# Patient Record
Sex: FEMALE | Race: WHITE | NOT HISPANIC OR LATINO | Employment: PART TIME | URBAN - NONMETROPOLITAN AREA
[De-identification: names, ages, dates, MRNs, and addresses within clinical notes are randomized per-mention and may not be internally consistent; named-entity substitution may affect disease eponyms.]

---

## 2017-01-02 ENCOUNTER — HOSPITAL ENCOUNTER (INPATIENT)
Facility: HOSPITAL | Age: 67
LOS: 2 days | Discharge: HOME | DRG: 190 | End: 2017-01-04
Attending: FAMILY MEDICINE | Admitting: INTERNAL MEDICINE
Payer: MEDICARE

## 2017-01-02 DIAGNOSIS — J44.1 COPD EXACERBATION (H): ICD-10-CM

## 2017-01-02 DIAGNOSIS — J20.9 ACUTE BRONCHITIS, UNSPECIFIED ORGANISM: Primary | ICD-10-CM

## 2017-01-02 DIAGNOSIS — I48.19 ATRIAL FIBRILLATION, PERSISTENT (H): ICD-10-CM

## 2017-01-02 DIAGNOSIS — Q23.81 BICUSPID AORTIC VALVE: ICD-10-CM

## 2017-01-02 PROBLEM — J96.20 ACUTE ON CHRONIC RESPIRATORY FAILURE (H): Status: ACTIVE | Noted: 2017-01-02

## 2017-01-02 LAB
ALBUMIN SERPL-MCNC: 3.7 G/DL (ref 3.4–5)
ALP SERPL-CCNC: 32 U/L (ref 40–150)
ALT SERPL W P-5'-P-CCNC: 24 U/L (ref 0–50)
ANION GAP SERPL CALCULATED.3IONS-SCNC: 9 MMOL/L (ref 3–14)
AST SERPL W P-5'-P-CCNC: 31 U/L (ref 0–45)
BASE EXCESS BLDA CALC-SCNC: 4.6 MMOL/L
BASOPHILS # BLD AUTO: 0.1 10E9/L (ref 0–0.2)
BASOPHILS NFR BLD AUTO: 0.5 %
BILIRUB SERPL-MCNC: 0.5 MG/DL (ref 0.2–1.3)
BUN SERPL-MCNC: 6 MG/DL (ref 7–30)
CALCIUM SERPL-MCNC: 9.3 MG/DL (ref 8.5–10.1)
CHLORIDE SERPL-SCNC: 97 MMOL/L (ref 94–109)
CO2 SERPL-SCNC: 31 MMOL/L (ref 20–32)
CREAT SERPL-MCNC: 0.69 MG/DL (ref 0.52–1.04)
DIFFERENTIAL METHOD BLD: ABNORMAL
EOSINOPHIL # BLD AUTO: 0.4 10E9/L (ref 0–0.7)
EOSINOPHIL NFR BLD AUTO: 3.9 %
ERYTHROCYTE [DISTWIDTH] IN BLOOD BY AUTOMATED COUNT: 13.2 % (ref 10–15)
FLUAV+FLUBV AG SPEC QL: NEGATIVE
FLUAV+FLUBV AG SPEC QL: NORMAL
GFR SERPL CREATININE-BSD FRML MDRD: 85 ML/MIN/1.7M2
GLUCOSE SERPL-MCNC: 129 MG/DL (ref 70–99)
HCO3 BLD-SCNC: 30 MMOL/L (ref 21–28)
HCT VFR BLD AUTO: 43.9 % (ref 35–47)
HGB BLD-MCNC: 14.3 G/DL (ref 11.7–15.7)
IMM GRANULOCYTES # BLD: 0 10E9/L (ref 0–0.4)
IMM GRANULOCYTES NFR BLD: 0.2 %
INR PPP: 1.86 (ref 0.8–1.2)
LACTATE SERPL-SCNC: 1 MMOL/L (ref 0.4–2)
LYMPHOCYTES # BLD AUTO: 1.8 10E9/L (ref 0.8–5.3)
LYMPHOCYTES NFR BLD AUTO: 15.6 %
MCH RBC QN AUTO: 31.7 PG (ref 26.5–33)
MCHC RBC AUTO-ENTMCNC: 32.6 G/DL (ref 31.5–36.5)
MCV RBC AUTO: 97 FL (ref 78–100)
MONOCYTES # BLD AUTO: 1.1 10E9/L (ref 0–1.3)
MONOCYTES NFR BLD AUTO: 9.9 %
NEUTROPHILS # BLD AUTO: 7.9 10E9/L (ref 1.6–8.3)
NEUTROPHILS NFR BLD AUTO: 69.9 %
NRBC # BLD AUTO: 0 10*3/UL
NRBC BLD AUTO-RTO: 0 /100
O2/TOTAL GAS SETTING VFR VENT: 30 %
OXYHGB MFR BLD: 97 % (ref 92–100)
PCO2 BLD: 51 MM HG (ref 35–45)
PH BLD: 7.39 PH (ref 7.35–7.45)
PLATELET # BLD AUTO: 227 10E9/L (ref 150–450)
PO2 BLD: 80 MM HG (ref 80–105)
POTASSIUM SERPL-SCNC: 4.2 MMOL/L (ref 3.4–5.3)
PROT SERPL-MCNC: 7.1 G/DL (ref 6.8–8.8)
RBC # BLD AUTO: 4.51 10E12/L (ref 3.8–5.2)
SODIUM SERPL-SCNC: 137 MMOL/L (ref 133–144)
SPECIMEN SOURCE: NORMAL
WBC # BLD AUTO: 11.3 10E9/L (ref 4–11)

## 2017-01-02 PROCEDURE — 25000125 ZZHC RX 250: Performed by: INTERNAL MEDICINE

## 2017-01-02 PROCEDURE — 83605 ASSAY OF LACTIC ACID: CPT | Performed by: FAMILY MEDICINE

## 2017-01-02 PROCEDURE — A9270 NON-COVERED ITEM OR SERVICE: HCPCS | Mod: GY | Performed by: INTERNAL MEDICINE

## 2017-01-02 PROCEDURE — 36600 WITHDRAWAL OF ARTERIAL BLOOD: CPT

## 2017-01-02 PROCEDURE — 25000132 ZZH RX MED GY IP 250 OP 250 PS 637: Mod: GY | Performed by: INTERNAL MEDICINE

## 2017-01-02 PROCEDURE — 40000275 ZZH STATISTIC RCP TIME EA 10 MIN

## 2017-01-02 PROCEDURE — 94640 AIRWAY INHALATION TREATMENT: CPT | Mod: 76

## 2017-01-02 PROCEDURE — 20000003 ZZH R&B ICU

## 2017-01-02 PROCEDURE — 36415 COLL VENOUS BLD VENIPUNCTURE: CPT | Performed by: FAMILY MEDICINE

## 2017-01-02 PROCEDURE — 82805 BLOOD GASES W/O2 SATURATION: CPT | Performed by: INTERNAL MEDICINE

## 2017-01-02 PROCEDURE — 99285 EMERGENCY DEPT VISIT HI MDM: CPT | Mod: 25

## 2017-01-02 PROCEDURE — 85610 PROTHROMBIN TIME: CPT | Performed by: FAMILY MEDICINE

## 2017-01-02 PROCEDURE — 5A09357 ASSISTANCE WITH RESPIRATORY VENTILATION, LESS THAN 24 CONSECUTIVE HOURS, CONTINUOUS POSITIVE AIRWAY PRESSURE: ICD-10-PCS | Performed by: FAMILY MEDICINE

## 2017-01-02 PROCEDURE — 99223 1ST HOSP IP/OBS HIGH 75: CPT | Mod: AI | Performed by: INTERNAL MEDICINE

## 2017-01-02 PROCEDURE — 25000132 ZZH RX MED GY IP 250 OP 250 PS 637: Performed by: FAMILY MEDICINE

## 2017-01-02 PROCEDURE — 93005 ELECTROCARDIOGRAM TRACING: CPT

## 2017-01-02 PROCEDURE — 25000308 HC RX OP HPI UCR WEL MED 250 IP 250: Performed by: FAMILY MEDICINE

## 2017-01-02 PROCEDURE — 96374 THER/PROPH/DIAG INJ IV PUSH: CPT

## 2017-01-02 PROCEDURE — 93010 ELECTROCARDIOGRAM REPORT: CPT | Performed by: INTERNAL MEDICINE

## 2017-01-02 PROCEDURE — 25000128 H RX IP 250 OP 636: Performed by: FAMILY MEDICINE

## 2017-01-02 PROCEDURE — 70450 CT HEAD/BRAIN W/O DYE: CPT | Mod: TC

## 2017-01-02 PROCEDURE — 25000125 ZZHC RX 250: Performed by: FAMILY MEDICINE

## 2017-01-02 PROCEDURE — 96361 HYDRATE IV INFUSION ADD-ON: CPT

## 2017-01-02 PROCEDURE — 87804 INFLUENZA ASSAY W/OPTIC: CPT | Performed by: FAMILY MEDICINE

## 2017-01-02 PROCEDURE — 80053 COMPREHEN METABOLIC PANEL: CPT | Performed by: FAMILY MEDICINE

## 2017-01-02 PROCEDURE — 71010 ZZHC CHEST ONE VIEW: CPT | Mod: TC

## 2017-01-02 PROCEDURE — 99285 EMERGENCY DEPT VISIT HI MDM: CPT | Performed by: FAMILY MEDICINE

## 2017-01-02 PROCEDURE — 85025 COMPLETE CBC W/AUTO DIFF WBC: CPT | Performed by: FAMILY MEDICINE

## 2017-01-02 PROCEDURE — 94640 AIRWAY INHALATION TREATMENT: CPT

## 2017-01-02 PROCEDURE — 94660 CPAP INITIATION&MGMT: CPT

## 2017-01-02 PROCEDURE — 87040 BLOOD CULTURE FOR BACTERIA: CPT | Performed by: FAMILY MEDICINE

## 2017-01-02 RX ORDER — DIPHENHYDRAMINE HCL 25 MG
25-50 CAPSULE ORAL EVERY 6 HOURS PRN
Status: DISCONTINUED | OUTPATIENT
Start: 2017-01-02 | End: 2017-01-04 | Stop reason: HOSPADM

## 2017-01-02 RX ORDER — IPRATROPIUM BROMIDE AND ALBUTEROL SULFATE 2.5; .5 MG/3ML; MG/3ML
3 SOLUTION RESPIRATORY (INHALATION) ONCE
Status: COMPLETED | OUTPATIENT
Start: 2017-01-02 | End: 2017-01-02

## 2017-01-02 RX ORDER — CLONIDINE HYDROCHLORIDE 0.1 MG/1
0.1 TABLET ORAL EVERY MORNING
Status: DISCONTINUED | OUTPATIENT
Start: 2017-01-03 | End: 2017-01-04 | Stop reason: HOSPADM

## 2017-01-02 RX ORDER — HYDRALAZINE HYDROCHLORIDE 25 MG/1
25 TABLET, FILM COATED ORAL EVERY 8 HOURS
Status: DISCONTINUED | OUTPATIENT
Start: 2017-01-02 | End: 2017-01-04 | Stop reason: HOSPADM

## 2017-01-02 RX ORDER — ALBUTEROL SULFATE 5 MG/ML
2.5 SOLUTION RESPIRATORY (INHALATION) ONCE
Status: COMPLETED | OUTPATIENT
Start: 2017-01-02 | End: 2017-01-02

## 2017-01-02 RX ORDER — WARFARIN SODIUM 2 MG/1
2 TABLET ORAL DAILY
Status: DISCONTINUED | OUTPATIENT
Start: 2017-01-02 | End: 2017-01-02

## 2017-01-02 RX ORDER — LORAZEPAM 1 MG/1
1 TABLET ORAL EVERY 4 HOURS PRN
Status: DISCONTINUED | OUTPATIENT
Start: 2017-01-02 | End: 2017-01-04 | Stop reason: HOSPADM

## 2017-01-02 RX ORDER — METHYLPREDNISOLONE SODIUM SUCCINATE 125 MG/2ML
125 INJECTION, POWDER, LYOPHILIZED, FOR SOLUTION INTRAMUSCULAR; INTRAVENOUS ONCE
Status: COMPLETED | OUTPATIENT
Start: 2017-01-02 | End: 2017-01-02

## 2017-01-02 RX ORDER — NALOXONE HYDROCHLORIDE 0.4 MG/ML
.1-.4 INJECTION, SOLUTION INTRAMUSCULAR; INTRAVENOUS; SUBCUTANEOUS
Status: DISCONTINUED | OUTPATIENT
Start: 2017-01-02 | End: 2017-01-04 | Stop reason: HOSPADM

## 2017-01-02 RX ORDER — LEVOTHYROXINE SODIUM 100 UG/1
100 TABLET ORAL
Status: DISCONTINUED | OUTPATIENT
Start: 2017-01-03 | End: 2017-01-04 | Stop reason: HOSPADM

## 2017-01-02 RX ORDER — ACETAMINOPHEN 325 MG/1
325-650 TABLET ORAL EVERY 6 HOURS PRN
Status: DISCONTINUED | OUTPATIENT
Start: 2017-01-02 | End: 2017-01-04 | Stop reason: HOSPADM

## 2017-01-02 RX ORDER — METHYLPREDNISOLONE SODIUM SUCCINATE 125 MG/2ML
125 INJECTION, POWDER, LYOPHILIZED, FOR SOLUTION INTRAMUSCULAR; INTRAVENOUS EVERY 8 HOURS
Status: DISCONTINUED | OUTPATIENT
Start: 2017-01-02 | End: 2017-01-03

## 2017-01-02 RX ORDER — CLONIDINE HYDROCHLORIDE 0.1 MG/1
0.3 TABLET ORAL EVERY EVENING
Status: DISCONTINUED | OUTPATIENT
Start: 2017-01-02 | End: 2017-01-04 | Stop reason: HOSPADM

## 2017-01-02 RX ORDER — LIDOCAINE 40 MG/G
CREAM TOPICAL
Status: DISCONTINUED | OUTPATIENT
Start: 2017-01-02 | End: 2017-01-04 | Stop reason: HOSPADM

## 2017-01-02 RX ORDER — IPRATROPIUM BROMIDE AND ALBUTEROL SULFATE 2.5; .5 MG/3ML; MG/3ML
3 SOLUTION RESPIRATORY (INHALATION) EVERY 4 HOURS PRN
Status: DISCONTINUED | OUTPATIENT
Start: 2017-01-02 | End: 2017-01-04 | Stop reason: HOSPADM

## 2017-01-02 RX ORDER — CEFTRIAXONE SODIUM 1 G/50ML
1 INJECTION, SOLUTION INTRAVENOUS EVERY 24 HOURS
Status: DISCONTINUED | OUTPATIENT
Start: 2017-01-02 | End: 2017-01-04 | Stop reason: HOSPADM

## 2017-01-02 RX ORDER — FLECAINIDE ACETATE 100 MG/1
100 TABLET ORAL 2 TIMES DAILY
Status: DISCONTINUED | OUTPATIENT
Start: 2017-01-02 | End: 2017-01-04 | Stop reason: HOSPADM

## 2017-01-02 RX ORDER — ACETAMINOPHEN 325 MG/1
650 TABLET ORAL ONCE
Status: COMPLETED | OUTPATIENT
Start: 2017-01-02 | End: 2017-01-02

## 2017-01-02 RX ORDER — WARFARIN SODIUM 2.5 MG/1
2.5 TABLET ORAL
Status: COMPLETED | OUTPATIENT
Start: 2017-01-02 | End: 2017-01-02

## 2017-01-02 RX ADMIN — HYDRALAZINE HYDROCHLORIDE 25 MG: 25 TABLET ORAL at 18:32

## 2017-01-02 RX ADMIN — CLONIDINE HYDROCHLORIDE 0.3 MG: 0.1 TABLET ORAL at 21:06

## 2017-01-02 RX ADMIN — METHYLPREDNISOLONE SODIUM SUCCINATE 125 MG: 125 INJECTION, POWDER, FOR SOLUTION INTRAMUSCULAR; INTRAVENOUS at 21:06

## 2017-01-02 RX ADMIN — METHYLPREDNISOLONE SODIUM SUCCINATE 125 MG: 125 INJECTION, POWDER, FOR SOLUTION INTRAMUSCULAR; INTRAVENOUS at 13:36

## 2017-01-02 RX ADMIN — IPRATROPIUM BROMIDE AND ALBUTEROL SULFATE 3 ML: .5; 3 SOLUTION RESPIRATORY (INHALATION) at 19:59

## 2017-01-02 RX ADMIN — WARFARIN SODIUM 2.5 MG: 2.5 TABLET ORAL at 18:32

## 2017-01-02 RX ADMIN — IPRATROPIUM BROMIDE AND ALBUTEROL SULFATE 3 ML: .5; 3 SOLUTION RESPIRATORY (INHALATION) at 13:34

## 2017-01-02 RX ADMIN — SODIUM CHLORIDE 1000 ML: 9 INJECTION, SOLUTION INTRAVENOUS at 13:36

## 2017-01-02 RX ADMIN — CEFTRIAXONE SODIUM 1 G: 1 INJECTION, SOLUTION INTRAVENOUS at 17:29

## 2017-01-02 RX ADMIN — ALBUTEROL SULFATE 2.5 MG: 2.5 SOLUTION RESPIRATORY (INHALATION) at 14:27

## 2017-01-02 RX ADMIN — ACETAMINOPHEN 650 MG: 325 TABLET, FILM COATED ORAL at 13:58

## 2017-01-02 RX ADMIN — FLECAINIDE ACETATE 100 MG: 100 TABLET ORAL at 21:06

## 2017-01-02 NOTE — ED NOTES
Patient transported by this writer to ICU without incident.  Patient was maintained with sats of % on 15LPM NRB.

## 2017-01-02 NOTE — IP AVS SNAPSHOT
MRN:[id number]                      After Visit Summary   1/2/2017    Mary Alice Cameron    MRN: [id number]           Thank you!     Thank you for choosing [facility] for your care. Our goal is always to provide you with excellent care. Hearing back from our patients is one way we can continue to improve our services. Please take a few minutes to complete the written survey that you may receive in the mail after you visit with us. Thank you!        Patient Information     Date Of Birth          1950        About your hospital stay     You were admitted on:  January 2, 2017 You last received care in the:  [facility]    You were discharged on:  January 4, 2017       Who to Call     For medical emergencies, please call 911.  For non-urgent questions about your medical care, please call your primary care provider or clinic, [phone]          Attending Provider     Provider    Haley Grajeda MD Dinter, MD Megha Colindres Scott, MD       Primary Care Provider Office Phone # Fax #    Braydon Yen [phone]       [address] [address]        After Care Instructions     Activity       Your activity upon discharge: activity as tolerated            Diet       Follow this diet upon discharge: Orders Placed This Encounter  Regular Diet Adult            Oxygen Adult       [place] Oxygen Order 2-3 liter(s) by nasal cannula continuously with use of portable tank. Expected treatment length is indefinite (99 months).. Test on conserving device as applicable.    Patients who qualify for home O2 coverage under the CMS guidelines require ABG tests or O2 sat readings obtained closest to, but no earlier than 2 days prior to the discharge, as evidence of the need for home oxygen therapy. Testing must be performed while patient is in the chronic stable state. See notes for O2 sats.    I certify that this patient, Mary Alice Cameron has been  under my care and that I, or a nurse practitioner or physician's assistant working with me, had a face-to-face encounter that meets the face-to-face encounter requirements with this patient on 1/4/2017. The patient, Mary Alice Cameron was evaluated or treated in whole, or in part, for the following medical condition, which necessitates the use of the ordered oxygen. Treatment Diagnosis: copd      Attending Provider: Lito Cleveland  Physician signature: See electronic signature associated with these discharge orders  Date of Order: January 4, 2017                  Follow-up Appointments     Follow-up and recommended labs and tests        Follow up with primary care provider, Braydon Yen, within 7-10 days for hospital follow- up.  No follow up labs or test are needed.                  Your next 10 appointments already scheduled     Jan 06, 2017 10:00 AM   LAB with NA LAB   [facility] ([facility])    [address]  [facility] [address]   [phone]            Jan 11, 2017 10:15 AM   Anticoagulation Visit with NA ANTI COAGULATION   [facility] ([facility])    [address]  [facility] [address]   [phone]            Jan 12, 2017  9:30 AM   (Arrive by 9:15 AM)   SHORT with Braydon Yen MD   [facility] ([facility])    [address]  [facility] [address]   [phone]            May 23, 2017  8:30 AM   (Arrive by 8:15 AM)   Return Visit with Eliezer Myers MD   [facility] ([facility])    [address]  [facility] [address]   [phone]              Future tests that were ordered for you     INR                 Pending Results     Date and Time Order Name Status Description    1/2/2017 1331 Blood culture Preliminary     1/2/2017 1331 Blood culture Preliminary     1/2/2017 1325 XR Chest Port 1 View Preliminary     1/2/2017 0000 CT IMAGING - HIM SCAN Preliminary             Statement of Approval     Ordered  "         01/04/17 1534  I have reviewed and agree with all the recommendations and orders detailed in this document.   EFFECTIVE NOW     Approved and electronically signed by:  Lito Cleveland MD             Admission Information        Provider Department Dept Phone    1/2/2017 Lito Cleveland MD [facility] [phone]      Your Vitals Were     Blood Pressure Pulse Temperature    169/80 mmHg 93 98.7  F (37.1  C) (Tympanic)    Respirations Height Weight    16 1.651 m (5' 5\") 75.8 kg (167 lb 1.7 oz)    BMI (Body Mass Index) Pulse Oximetry       27.81 kg/m2 93%       MyChart Information     [employer] gives you secure access to your electronic health record. If you see a primary care provider, you can also send messages to your care team and make appointments. If you have questions, please call your primary care clinic.  If you do not have a primary care provider, please call [phone] and they will assist you.        Care EveryWhere ID     This is your Care EveryWhere ID. This could be used by other organizations to access your [facility] medical records  [id number]           Review of your medicines      START taking        Dose / Directions    cefUROXime 250 MG tablet   Commonly known as:  CEFTIN   Used for:  COPD exacerbation (H), Acute bronchitis, unspecified organism        Dose:  250 mg   Take 1 tablet (250 mg) by mouth 2 times daily for 5 days   Quantity:  10 tablet   Refills:  0       predniSONE 20 MG tablet   Commonly known as:  DELTASONE   Used for:  COPD exacerbation (H)        Dose:  20 mg   Take 1 tablet (20 mg) by mouth 2 times daily (with meals) for 3 days   Quantity:  6 tablet   Refills:  0         CONTINUE these medicines which have NOT CHANGED        Dose / Directions    acetaminophen 500 MG tablet   Commonly known as:  TYLENOL        Dose:  500-1000 mg   Take 500-1,000 mg by mouth every 6 hours as needed for mild pain   Refills:  0       * albuterol 108 (90 BASE) MCG/ACT Inhaler "   Commonly known as:  VENTOLIN HFA   Used for:  Other emphysema (H)        Dose:  2 puff   Inhale 2 puffs into the lungs every 4 hours as needed for shortness of breath / dyspnea or wheezing   Quantity:  3 Inhaler   Refills:  0       * albuterol (2.5 MG/3ML) 0.083% neb solution   Used for:  COPD exacerbation (H)        Dose:  1 vial   Take 1 vial (2.5 mg) by nebulization every 6 hours as needed for shortness of breath / dyspnea or wheezing   Quantity:  25 vial   Refills:  1       * cloNIDine 0.1 MG tablet   Commonly known as:  CATAPRES   Used for:  Benign essential hypertension        Dose:  0.1 mg   Take 1 tablet (0.1 mg) by mouth every morning   Quantity:  90 tablet   Refills:  0       * cloNIDine 0.1 MG tablet   Commonly known as:  CATAPRES   Used for:  Benign essential hypertension        Dose:  0.3 mg   Take 3 tablets (0.3 mg) by mouth every evening   Quantity:  90 tablet   Refills:  0       diphenhydrAMINE 25 MG tablet   Commonly known as:  BENADRYL        Dose:  25-50 mg   Take 1-2 tablets (25-50 mg) by mouth every 6 hours as needed for itching or allergies   Quantity:  60 tablet   Refills:  1       flecainide 100 MG tablet   Commonly known as:  TAMBOCOR   Used for:  Atrial fibrillation, persistent (H)        Dose:  100 mg   Take 1 tablet (100 mg) by mouth 2 times daily   Quantity:  180 tablet   Refills:  3       furosemide 40 MG tablet   Commonly known as:  LASIX   Used for:  Essential hypertension, benign        TAKE 1 TABLET BY MOUTH TWICE DAILY.   Quantity:  180 tablet   Refills:  2       hydrALAZINE 25 MG tablet   Commonly known as:  APRESOLINE   Used for:  Essential hypertension, benign        TAKE 3 TABLETS BY MOUTH THREE TIMES DAILY   Quantity:  270 tablet   Refills:  5       ipratropium - albuterol 0.5 mg/2.5 mg/3 mL 0.5-2.5 (3) MG/3ML neb solution   Commonly known as:  DUONEB   Used for:  COPD exacerbation (H)        USE 1 VIAL PER NEBULIZER FOUR TIMES DAILY   Quantity:  6 Box   Refills:  3        levothyroxine 100 MCG tablet   Commonly known as:  SYNTHROID/LEVOTHROID   Used for:  Hypothyroidism, postablative        Dose:  100 mcg   Take 1 tablet (100 mcg) by mouth daily   Quantity:  90 tablet   Refills:  3       LORazepam 1 MG tablet   Commonly known as:  ATIVAN        Dose:  0.5-1 tablet   Take 0.5-1 tablets by mouth every 6 hours as needed   Refills:  0       other medical supplies   Used for:  Edema, Atrial fibrillation, persistent (H)        Apply one pair to help with edema   Quantity:  1 each   Refills:  0       potassium chloride SA 20 MEQ CR tablet   Commonly known as:  K-DUR/KLOR-CON M   Used for:  Hypokalemia        Dose:  20 mEq   Take 1 tablet (20 mEq) by mouth 2 times daily   Quantity:  180 tablet   Refills:  3       simvastatin 10 MG tablet   Commonly known as:  ZOCOR   Used for:  Hyperlipidemia LDL goal <130        TAKE 1 TABLET(10 MG) BY MOUTH AT BEDTIME   Quantity:  90 tablet   Refills:  3       triamcinolone 0.1 % ointment   Commonly known as:  KENALOG   Used for:  Contact dermatitis, unspecified contact dermatitis type, unspecified trigger        Apply bid and hs left hand and legs - for dermatitis   Quantity:  454 g   Refills:  3       warfarin 2 MG tablet   Commonly known as:  COUMADIN   Used for:  Persistent atrial fibrillation (H)        Dose:  2 mg   Take 1 tablet (2 mg) by mouth daily   Quantity:  90 tablet   Refills:  3       * Notice:  This list has 4 medication(s) that are the same as other medications prescribed for you. Read the directions carefully, and ask your doctor or other care provider to review them with you.         Where to get your medicines      These medications were sent to [id number] - [place] - [address] AT [facility] [address] [address]     Phone:  [phone]    - cefUROXime 250 MG tablet  - predniSONE 20 MG tablet             Protect others around you: Learn how to safely use, store and throw away your  medicines at www.disposemymeds.org.             Medication List: This is a list of all your medications and when to take them. Check marks below indicate your daily home schedule. Keep this list as a reference.      Medications           Morning Afternoon Evening Bedtime As Needed    acetaminophen 500 MG tablet   Commonly known as:  TYLENOL   Take 500-1,000 mg by mouth every 6 hours as needed for mild pain   Last time this was given:  650 mg on 1/2/2017  1:58 PM                                * albuterol 108 (90 BASE) MCG/ACT Inhaler   Commonly known as:  VENTOLIN HFA   Inhale 2 puffs into the lungs every 4 hours as needed for shortness of breath / dyspnea or wheezing   Last time this was given:  2 puffs on 1/4/2017 12:35 PM                                * albuterol (2.5 MG/3ML) 0.083% neb solution   Take 1 vial (2.5 mg) by nebulization every 6 hours as needed for shortness of breath / dyspnea or wheezing                                cefUROXime 250 MG tablet   Commonly known as:  CEFTIN   Take 1 tablet (250 mg) by mouth 2 times daily for 5 days                                * cloNIDine 0.1 MG tablet   Commonly known as:  CATAPRES   Take 1 tablet (0.1 mg) by mouth every morning   Last time this was given:  0.1 mg on 1/4/2017  8:27 AM                                * cloNIDine 0.1 MG tablet   Commonly known as:  CATAPRES   Take 3 tablets (0.3 mg) by mouth every evening   Last time this was given:  0.1 mg on 1/4/2017  8:27 AM                                diphenhydrAMINE 25 MG tablet   Commonly known as:  BENADRYL   Take 1-2 tablets (25-50 mg) by mouth every 6 hours as needed for itching or allergies                                flecainide 100 MG tablet   Commonly known as:  TAMBOCOR   Take 1 tablet (100 mg) by mouth 2 times daily   Last time this was given:  100 mg on 1/4/2017  8:18 AM                                furosemide 40 MG tablet   Commonly known as:  LASIX   TAKE 1 TABLET BY MOUTH TWICE DAILY.                                 hydrALAZINE 25 MG tablet   Commonly known as:  APRESOLINE   TAKE 3 TABLETS BY MOUTH THREE TIMES DAILY   Last time this was given:  25 mg on 1/4/2017 10:41 AM                                ipratropium - albuterol 0.5 mg/2.5 mg/3 mL 0.5-2.5 (3) MG/3ML neb solution   Commonly known as:  DUONEB   USE 1 VIAL PER NEBULIZER FOUR TIMES DAILY   Last time this was given:  3 mLs on 1/4/2017  3:11 PM                                levothyroxine 100 MCG tablet   Commonly known as:  SYNTHROID/LEVOTHROID   Take 1 tablet (100 mcg) by mouth daily   Last time this was given:  100 mcg on 1/4/2017  6:46 AM                                LORazepam 1 MG tablet   Commonly known as:  ATIVAN   Take 0.5-1 tablets by mouth every 6 hours as needed                                other medical supplies   Apply one pair to help with edema                                potassium chloride SA 20 MEQ CR tablet   Commonly known as:  K-DUR/KLOR-CON M   Take 1 tablet (20 mEq) by mouth 2 times daily                                predniSONE 20 MG tablet   Commonly known as:  DELTASONE   Take 1 tablet (20 mg) by mouth 2 times daily (with meals) for 3 days   Last time this was given:  20 mg on 1/4/2017  8:18 AM                                simvastatin 10 MG tablet   Commonly known as:  ZOCOR   TAKE 1 TABLET(10 MG) BY MOUTH AT BEDTIME                                triamcinolone 0.1 % ointment   Commonly known as:  KENALOG   Apply bid and hs left hand and legs - for dermatitis                                warfarin 2 MG tablet   Commonly known as:  COUMADIN   Take 1 tablet (2 mg) by mouth daily   Last time this was given:  1 mg on 1/3/2017  7:30 PM                                * Notice:  This list has 4 medication(s) that are the same as other medications prescribed for you. Read the directions carefully, and ask your doctor or other care provider to review them with you.              More Information        Taking Coumadin -  CORE MEASURES  Coumadin (warfarin) helps keep your blood from clotting. It is used to reduce the risk for stroke, heart attack, or a blood clot passing to your lung. Coumadin also increases your risk of bleeding. Because of this, it must be taken exactly as directed by your doctor. You also need to protect yourself from injury.    Follow These Tips    Take this medicine at the same time each day. Take it with a full glass of water, with or without food. If you miss a dose, contact your doctor immediately to find out how much to take. Never take a double dose.    Warfarin is an effective drug, but it can be dangerous if not taken properly. It makes your blood less likely to form clots. If you take too much, it can cause too much internal or external bleeding.    Be sure to tell all of your doctors that you take Coumadin. If you will be taking Coumadin for quite a while, carry an ID card or get a Medic-Alert bracelet. This will alert medical staff in case you aren t able to do so yourself. Also carry with you the name and number of the person to contact in case of an emergency.    You will need to have regular blood tests to measure the effects of the warfarin. Keep your scheduled test appointment and be sure to talk with your doctor afterward to find out your results. Your doctor may need to change your dose. Follow your doctor s advice exactly about how to take this medicine. Do not stop the medicine without talking with your doctor.  Monitoring Your PT/INR Blood Levels After Discharge  Two tests are used to find out how your blood is clotting. One is protime (PT) and the other is the international normalized ratio (INR).    Go for your blood (PT/INR) tests as often as directed. Note that diet and medication can affect your PT/INR level.    Your INR was between _____ and _____ .    Ask your doctor what your goal INR is. My goal INR is between _____ and _____.    My next PT/INR blood draw is due on _________________  (date) at ________________ (time) by ____________________ (name of doctor or clinic).    The name of the doctor who is monitoring my anticoagulation therapy is ______________________ and the phone number is ___________________.    Follow up with your doctor or as advised by his or her staff. It usually takes a few hours for your doctor to get the results of your clotting tests. Please call to get your lab results and find out if your doctor needs to make further changes to your Coumadin dose.    If your labs (PT/INR) are drawn at a location other than your doctor's office, please remember to tell your doctor as soon as you get your lab results.      What to Do at Home  1. Adjust your Coumadin dose as directed by your doctor, ____________________ (name of doctor).  2. Have another clotting test done every _______ days at _____________________ (name of clinic) by ____________________ (name of doctor).  3. Do not go barefoot. Always wear shoes.  4. Do not trim corns or calluses yourself.  5. Always talk with your doctor before taking any herbs, vitamins, or prescription or over-the-counter (OTC) medications, especially aspirin and nonsteroidal anti-inflammatory drugs.  6. Always talk with your doctor before stopping any medication or changing the dose of any of your medications.  7. Use an electric razor instead of a manual one.  8. Use a soft-bristled toothbrush and waxed floss.  9. Avoid major changes in your diet.      When to Call Your Health Care Provider  Coumadin increases your risk of bleeding. Call your health care provider right away before you take your next dose of Coumadin if you have any of these problems:    Bleeding that doesn t stop in 10 minutes    A heavier-than-normal period or bleeding between periods    Coughing or throwing up blood or something that looks like coffee grounds    Nausea, bloating, diarrhea, or bleeding hemorrhoids    Bleeding hemorrhoids    Dark red or brown urine    Red or black  tarry stools    Red or black-and-blue marks on the skin that get larger    A fever or an illness that gets worse    Dizziness, headache, weakness, or fatigue    Chest pain or trouble breathing    A serious fall or a blow to the head    Swelling or pain after an injury or at an injection site    Bleeding gums after brushing your teeth  Keep Your Diet Steady  Keep your diet pretty much the same each day. That s because many foods contain vitamin K. Vitamin K helps your blood clot. So eating foods that contain vitamin K can affect the way Coumadin works. You don t need to avoid foods that have vitamin K. But you do need to keep the amount of them you eat steady (about the same day to day). If you change your diet for any reason, such as due to illness or to lose weight, be sure to tell your doctor.    Examples of foods high in vitamin K are asparagus, avocado, broccoli, cabbage, kale, spinach, and some other leafy green vegetables. Oils, such as soybean, canola, and olive oils, are also high in vitamin K.    Other food products can affect the way Coumadin works in your body:    Food products that may affect blood clotting include cranberries and cranberry juice, fish oil supplements, garlic, richard, licorice, and turmeric.    Herbs used in herbal teas or supplements can also affect blood clotting. Keep the amount of herbal teas and supplements you use steady.    Alcohol can increase the effect of Coumadin in your body.  Talk with your health care provider if you have concerns about these or other food products and their effects on Coumadin.  What to Watch For  If you have any of these signs or reactions, call your doctor right away or go to the hospital.  Signs of too much bleeding:    More bruising than normal    Prolonged bleeding from cuts    Bleeding from the nose or gums    Blood in your urine, vomit, or stools (red or black color)    Coughing up blood    Unusually heavy menstrual bleeding    Sudden change to a  dark or purplish color in your toes or any other area of your body  Allergic Reactions:    Rash    Itching    Swelling    Trouble swallowing or breathing  ---------- IMPORTANT ----------  Medical Conditions  Before starting this medicine, be sure your doctor knows if you have any of these conditions:    Stomach ulcer now or in the past    Vomited blood or had bloody stools (black or red color)    Aneurysm, pericarditis, or pericardial effusion    Blood disorder    Recent surgery, stroke, mini-stroke, or spinal puncture    Kidney or liver disease, uncontrolled high blood pressure, diabetes, vasculitis, congestive heart failure, lupus or other collagen-vascular disease, or high cholesterol    Pregnancy or breastfeeding    Younger than 18 years old    Recent or planned dental procedure  Drug Interactions  Many medicines interfere with the effect of Coumadin. Before starting this medicine, be sure your doctor knows about any prescription, OTC, or herbal drugs you are taking. This is especially true if you are taking:    Antibiotics    Heart medicines    Cimetidine (Tagamet)    Aspirin or other anti-inflammatory drugs such as ibuprofen (Advil, Motrin, or Nuprin), naproxen (Aleve, Naprosyn, Anaprox), ketoprofen (Orudis, Oruvail), or other arthritis medicines    Drugs for depression, cancer, HIV (protease inhibitors), diabetes, seizures, gout, high cholesterol, or thyroid replacement    Vitamins containing Vitamin K or herbal products such as ginkgo, Q10, garlic, or [place]'s wort  [NOTE: This information topic may not include all directions, precautions, medical conditions, drug/food interactions, and warnings for this drug. Check with your doctor, nurse, or pharmacist for any questions that you may have.]          2092-3011 [employer]. [address] [place] [address]. All rights reserved. This information is not intended as a substitute for professional medical care. Always follow your  healthcare professional's instructions.                Using Blood Thinners (Anticoagulants)  Blood thinners or anticoagulants are medicines that help prevent blood clots from forming. They include warfarin, heparin, dabigatran, rivaroxaban, apixaban,and edoxaban. Your health care provider will help you decide which medicine is best for you.    Taking an anticoagulant safely  When you are taking a blood thinner, you will need to take certain steps to stay safe. Too much blood thinner puts you at risk for bleeding. Too little puts you at risk for stroke. Follow these guidelines. Also follow any others that your health care provider gives you.    You may be told you need regular lab testing while taking these medicines. Warfarin requires routine testing to blood-thinning level testing while the other medicines do not.    Tell your doctor about all medicines you take. This includes over the counter medicines, supplements, or herbal remedies. Do not take any medicines (including ones you buy over the counter) that your doctor doesn t know about. Some medicines can interact with blood thinners and cause serious problems.    Tell any health care provider that you see for care (such as doctors, dentists, chiropractors, home health nurses) that you take a blood thinner.    Carry a medical ID card or wear a medical-alert bracelet that says you take an anticoagulant.    Before taking aspirin, check with your doctor. Aspirin can significantly increase your risk of bleeding.    This medicine makes bleeding harder to stop. To protect yourself:    Avoid activities that may cause injury. If you fall or are injured, contact your health care provider right away. Blood thinners prevent clotting, so you could be bleeding inside without realizing it.    Use a soft-bristle toothbrush and waxed dental floss. Shave with an electric razor rather than a blade.    Don t go barefoot. Don t trim corns or calluses yourself.  Warfarin: Other  important information  Several precautions are especially important when you are taking warfarin. Always keep these points in mind:    Be sure to follow your health care provider's instructions for taking warfarin.    Take this medicine at the same time each day. Take it with a full glass of water, with or without food. If you miss a dose, contact your doctor to find out how much to take. Avoid takinga double dose.    Warfarin is an effective drug, but it can be dangerous if not taken properly. It makes your blood less likely to form clots. If you take too much, it can cause serious internal or external bleeding.    You will need to have regular monitoring while you are taking warfarin. This includes blood tests to check your international normalized ratio (INR) and prothrombin time (PT). These tests show how quickly your blood clots. You will also have a complete blood count (CBC) periodically. This looks at your blood and platelet levels. Both of these need to be followed while you're on warfarin. Talk with your health care provider about whether you need to visit the clinic every week, or if services are available for monitoring in your home.    Certain medicines can affect your INR and PT levels. Tell your health care provider if there are any changes in your medicines. This includes any over-the-counter medicines, supplements like vitamin K, or herbal remedies.    Your diet can also affect your INR and PT levels. Because of this, it's important to eat a consistent diet. It is especially important to eat a consistent amount of foods that are high in vitamin K. Be sure to talk with your health care provider before making any big changes in your diet.    Remember that warfarin increases your risk of bleeding. Be careful not to injure yourself. If you have a significant injury, contact your health care provider right away. It's important to alert your doctor if you've fallen or hurt yourself, even if you don't  break your skin. You could be bleeding inside your body without realizing it.     Warfarin: Watch your INR/PT blood levels  Two tests are used to find out how your blood is clotting. One is protime (PT), the other is the international normalized ratio (INR).    Go for your blood tests (INR/PT) as often as directed. Your diet and the other medicines you take can affect your INR/PT levels.    Your INR was between ___ and ___.    Ask your doctor what your goal INR is. My goal INR is between ___ and ___.    My next INR/PT blood draw is due on _____________ (date) at ___________ (time) by ___________ (name of doctor or clinic).    The name of the doctor who is monitoring my anticoagulation therapy is _____________________ and the phone number is _________________.    Follow up with your doctor or as advised by his or her staff. It usually takes a few hours for your doctor to get the results of your clotting tests. Call to get your lab results to find out if your doctor needs to make further changes to your warfarin dose.    If your blood is drawn for these tests at a location other than your doctor's office, tell your doctor as soon as you get your lab results.     Warfarin: Watch what you eat  Vitamin K helps your blood clot. So you have to watch how much you eat of foods that contain vitamin K. These foods can affect the way warfarin works. They do not affect the other non-warfarin blood thinners.Here are some specific tips:    Try to keep your diet about the same each day. If you change your diet for any reason, such as for illness or to lose weight, be sure to tell your doctor.    Each day, eat the same amount of foods that are high in vitamin K. These include asparagus, avocado, broccoli, cabbage, kale, spinach, and some other leafy green vegetables. Oils, such as soybean, canola, and olive oils, are also high in vitamin K.    Limit fats to 2 to 4 tablespoons a day.    Ask your health care provider if you should  avoid alcohol while you are taking a blood thinner.    Avoid teas that contain sweet clover, sweet heath, or tonka beans. These can affect how your medicine works.    Talk with your health care provider and pharmacist about specific foods or special diets that can affect anticoagulant levels. These include grapefruit juice, cranberries and cranberry juice, fish oil supplements, garlic, richard, licorice, turmeric, and herbal teas and supplements.  Talk with your health care provider if you have concerns about these or other food products and their effects on warfarin.     When to call your doctor if you re taking an anticoagulant  Call your doctor right away if you have any of these:    Bleeding that doesn t stop in 10 minutes    A heavier-than-normal menstrual period or bleeding between periods    Coughing or throwing up blood    Bloody diarrhea or bleeding hemorrhoids     Dark-colored urine or black stools    Red or black-and-blue marks on the skin that get larger    Dizziness or fatigue    Chest pain or trouble breathing  Allergic reactions:    Rash    Itching    Swelling    Trouble swallowing or breathing   Medical conditions and anticoagulants  Before starting a blood thinner, be sure your doctor knows if you have any of these conditions:    Stomach ulcer now or in the past    Vomited blood or had bloody stools (black or red color)    Aneurysm, pericarditis, or pericardial effusion    Blood disorder    Recent surgery, stroke, mini-stroke, or spinal puncture    Kidney or liver disease, uncontrolled high blood pressure, diabetes, vasculitis, heart failure, lupus, or other collagen-vascular disease, or high cholesterol    Pregnancy or breastfeeding    Younger than 18 years old    Recent or planned dental procedure  Drug interactions and anticoagulants  Many medicines interfere with the effect of blood thinners. Before starting these medicines, be sure your doctor knows about any prescription, over-the-counter,  or herbal supplements you take. In particular, tell your provider about:    Antibiotics    Heart medicines    Cimetidine    Aspirin or other anti-inflammatory drugs such as ibuprofen, naproxen, ketoprofen, or other arthritis medicines    Drugs for depression, cancer HIV (protease inhibitors), diabetes, seizures, gout, high cholesterol, or thyroid replacement    Vitamins containing vitamin K or herbal products such as ginkgo, Co-Q10, garlic, or Karli's wort     Note: This information topic may not include all directions, precautions, medical conditions, drug/food interactions, and warnings for these medicines. Check with your doctor, nurse, or pharmacist for any questions you have.      8588-0238 [employer]. [address] [place] [address], [place] [address]. All rights reserved. This information is not intended as a substitute for professional medical care. Always follow your healthcare professional's instructions.                Discharge Instructions: COPD  You have been diagnosed with chronic obstructive pulmonary disease (COPD). This is a name given to a group of diseases that limit the flow of air in and out of your lungs. This makes it harder to breathe. With COPD, you are also more likely to get lung infections. COPD includes chronic bronchitis and emphysema. COPD is most often caused by heavy, long-term cigarette smoking.  Home care  Quit smoking    If you smoke, quit. It is the best thing you can do for your COPD and your overall health.    Join a stop-smoking program. There are even telephone, text message, and Internet programs to help you quit.    Ask your health care provider about medications or other methods to help you quit.    Ask family members to quit smoking as well.    Don't allow people to smoke in your home, in your car, or when they are around you.  Protect yourself from infection    Wash your hands often. Do your best to keep your hands away from your face. Most germs are spread  from your hands to your mouth.    Get a flu shot every year. Also ask your provider about pneumonia vaccines.    Avoid crowds. It's especially important to do this in the winter when more people have colds and flu.    To stay healthy, get enough sleep, exercise regularly, and eat a balanced diet. You should:    Get about 8 hours of sleep every night.    Try to exercise for at least 30 minutes on most days.    Have healthy foods including fruits and vegetables, 100% whole grains, lean meats and fish, and low-fat dairy products. Try to stay away from foods high in fats and sugar.  Take your medications  Take your medications exactly as directed. Don't skip doses.  Manage your stress  Stress can make COPD worse. Use this stress management technique:    Find a quiet place and sit or lie in a comfortable position.    Close your eyes and perform breathing exercises for several minutes. Ask your provider about the best way to breathe.  Pulmonary rehabilitation    Pulmonary rehab can help you feel better. These programs include exercise, breathing techniques, information about COPD, counseling, and help for smokers.    Ask your provider or your local hospital about programs in your area.  When to call your health care provider  Call your provider immediately if you have any of the following:    Shortness of breath, wheezing, or coughing    Increased mucus    Yellow, green, bloody, or smelly mucus    Fever or chills    Tightness in your chest that does not go away with rest or medication    An irregular heartbeat or a feeling that your heart is beating very fast    Swollen ankles     3907-8567 [employer]. [address] [place] [address], [place] [address]. All rights reserved. This information is not intended as a substitute for professional medical care. Always follow your healthcare professional's instructions.                COPD Flare    You have had a flare-up of your COPD.  COPD, or chronic obstructive pulmonary  disease, is a common lung disease. It causes your airways to become irritated and narrower. This makes it harder for you to breathe. Emphysema and chronic bronchitis are both types of COPD. This is a chronic condition, which means you always have it. Sometimes it gets worse. When this happens, it is called a flare-up.  Symptoms of COPD  People with COPD may have symptoms most of the time. In a flare-up, your symptoms get worse. These symptoms may mean you are having a flare-up:    Shortness of breath, shallow or rapid breathing, or wheezing that gets worse    Lung infection    Cough that gets worse    More mucus, thicker mucus or mucus of a different color    Tiredness, decreased energy, or trouble doing your usual activities    Fever    Chest tightness    Your symptoms don t get better even when you use your usual medicines, inhalers, and nebulizer    Trouble talking    You feel confused  Causes of flare-ups  Unfortunately, a flare-up can happen even though you did everything right, and you followed your doctor s instructions. Some causes of flare-ups are:    Smoking or secondhand smoke    Colds, the flu, or respiratory infections    Air pollution    Sudden change in the weather    Dust, irritating chemicals, or strong fumes    Not taking your medicines as prescribed  Home care  Here are some things you can do at home to treat a flare-up:    Try not to panic. This makes it harder to breathe, and keeps you from doing the right things.    Don t smoke or be around others who are smoking.    Try to drink more fluids than usual during a flare-up, unless your doctor has told you not to because of heart and kidney problems. More fluids can help loosen the mucus.    Use your inhalers and nebulizer, if you have one, as you have been told to.    If you were given antibiotics, take them until they are used up or your doctor tells you to stop. It s important to finish the antibiotics, even though you feel better. This will  make sure the infection has cleared.    If you were given prednisone or another steroid, finish it even if you feel better.  Preventing a flare-up  Even though flare-ups happen, the best way to treat one is to prevent it before it starts. Here are some pointers:    Don t smoke or be around others who are smoking.    Take your medicines as you have been told.    Talk with your doctor about getting a flu shot every year. Also find out if you need a pneumonia shot.    If there is a weather advisory warning to stay indoors, try to stay inside when possible.    Try to eat healthy and get plenty of sleep.    Try to avoid things that usually set you off, like dust, chemical fumes, hairsprays, or strong perfumes.  Follow-up care  Follow up with your healthcare provider.  If a culture was done, you will be told if your treatment needs to be changed. You can call as directed for the results.  If X-rays were done, and a radiologist had not seen them while you were there, they will be reviewed. You will be told if there is a change in the reading, especially if it affects your treatment.  Call 911  Call 911 if any of these occur:    You have trouble breathing    You feel confused or it s difficult to wake you up    You faint or lose consciousness    You have a rapid heart rate    You have new pain in your chest, arm, shoulder, neck or upper back  When to seek medical advice  Call your healthcare provider right away if any of these occur:    Wheezing or shortness of breath gets worse    You need to use your inhalers more often than usual without relief    Fever of 100.4 F (38 C) or higher, or as directed    Coughing up lots of dark-colored or bloody sputum (mucus)    Chest pain with each breath    You do not start to get better within 24 hours    Swelling or your ankles gets worse    Dizziness or weakness       5023-8468 [employer]. [address] [place] [address], [place] [address]. All rights reserved. This information  is not intended as a substitute for professional medical care. Always follow your healthcare professional's instructions.                Patient Education    Prednisone Gastro-resistant tablet    Prednisone Oral solution    Prednisone Oral tablet  Prednisone Oral tablet  What is this medicine?  PREDNISONE (PRED ni sone) is a corticosteroid. It is commonly used to treat inflammation of the skin, joints, lungs, and other organs. Common conditions treated include asthma, allergies, and arthritis. It is also used for other conditions, such as blood disorders and diseases of the adrenal glands.  This medicine may be used for other purposes; ask your health care provider or pharmacist if you have questions.  What should I tell my health care provider before I take this medicine?  They need to know if you have any of these conditions:    Cushing's syndrome    diabetes    glaucoma    heart disease    high blood pressure    infection (especially a virus infection such as chickenpox, cold sores, or herpes)    kidney disease    liver disease    mental illness    myasthenia gravis    osteoporosis    seizures    stomach or intestine problems    thyroid disease    an unusual or allergic reaction to lactose, prednisone, other medicines, foods, dyes, or preservatives    pregnant or trying to get pregnant    breast-feeding  How should I use this medicine?  Take this medicine by mouth with a glass of water. Follow the directions on the prescription label. Take this medicine with food. If you are taking this medicine once a day, take it in the morning. Do not take more medicine than you are told to take. Do not suddenly stop taking your medicine because you may develop a severe reaction. Your doctor will tell you how much medicine to take. If your doctor wants you to stop the medicine, the dose may be slowly lowered over time to avoid any side effects.  Talk to your pediatrician regarding the use of this medicine in children. Special  care may be needed.  Overdosage: If you think you have taken too much of this medicine contact a poison control center or emergency room at once.  NOTE: This medicine is only for you. Do not share this medicine with others.  What if I miss a dose?  If you miss a dose, take it as soon as you can. If it is almost time for your next dose, talk to your doctor or health care professional. You may need to miss a dose or take an extra dose. Do not take double or extra doses without advice.  What may interact with this medicine?  Do not take this medicine with any of the following medications:    metyrapone    mifepristone  This medicine may also interact with the following medications:    aminoglutethimide    amphotericin B    aspirin and aspirin-like medicines    barbiturates    certain medicines for diabetes, like glipizide or glyburide    cholestyramine    cholinesterase inhibitors    cyclosporine    digoxin    diuretics    ephedrine    female hormones, like estrogens and birth control pills    isoniazid    ketoconazole    NSAIDS, medicines for pain and inflammation, like ibuprofen or naproxen    phenytoin    rifampin    toxoids    vaccines    warfarin  This list may not describe all possible interactions. Give your health care provider a list of all the medicines, herbs, non-prescription drugs, or dietary supplements you use. Also tell them if you smoke, drink alcohol, or use illegal drugs. Some items may interact with your medicine.  What should I watch for while using this medicine?  Visit your doctor or health care professional for regular checks on your progress. If you are taking this medicine over a prolonged period, carry an identification card with your name and address, the type and dose of your medicine, and your doctor's name and address.  This medicine may increase your risk of getting an infection. Tell your doctor or health care professional if you are around anyone with measles or chickenpox, or if you  develop sores or blisters that do not heal properly.  If you are going to have surgery, tell your doctor or health care professional that you have taken this medicine within the last twelve months.  Ask your doctor or health care professional about your diet. You may need to lower the amount of salt you eat.  This medicine may affect blood sugar levels. If you have diabetes, check with your doctor or health care professional before you change your diet or the dose of your diabetic medicine.  What side effects may I notice from receiving this medicine?  Side effects that you should report to your doctor or health care professional as soon as possible:    allergic reactions like skin rash, itching or hives, swelling of the face, lips, or tongue    changes in emotions or moods    changes in vision    depressed mood    eye pain    fever or chills, cough, sore throat, pain or difficulty passing urine    increased thirst    swelling of ankles, feet  Side effects that usually do not require medical attention (report to your doctor or health care professional if they continue or are bothersome):    confusion, excitement, restlessness    headache    nausea, vomiting    skin problems, acne, thin and shiny skin    trouble sleeping    weight gain  This list may not describe all possible side effects. Call your doctor for medical advice about side effects. You may report side effects to FDA at 8-409-FDA-6108.  Where should I keep my medicine?  Keep out of the reach of children.  Store at room temperature between 15 and 30 degrees C (59 and 86 degrees F). Protect from light. Keep container tightly closed. Throw away any unused medicine after the expiration date.  NOTE:This sheet is a summary. It may not cover all possible information. If you have questions about this medicine, talk to your doctor, pharmacist, or health care provider. Copyright  2016 [employer]                Patient Education    Omeprazole Gastro-resistant  tablet    Omeprazole Magnesium Oral suspension    Omeprazole Oral capsule, gastro-resistant sprinkles  Omeprazole Oral capsule, gastro-resistant sprinkles  What is this medicine?  OMEPRAZOLE (oh ME pray zol) prevents the production of acid in the stomach. It is used to treat gastroesophageal reflux disease (GERD), ulcers, certain bacteria in the stomach, inflammation of the esophagus, and Zollinger-Wall Syndrome. It is also used to treat other conditions that cause too much stomach acid.  This medicine may be used for other purposes; ask your health care provider or pharmacist if you have questions.  What should I tell my health care provider before I take this medicine?  They need to know if you have any of these conditions:    liver disease    low levels of magnesium in the blood    an unusual or allergic reaction to omeprazole, other medicines, foods, dyes, or preservatives    pregnant or trying to get pregnant    breast-feeding  How should I use this medicine?  Take this medicine by mouth with a glass of water. Follow the directions on the prescription label. Do not crush, break or chew the capsules. They can be opened and the contents sprinkled on a small amount of applesauce or yogurt, given with fruit juices, or swallowed immediately with water. This medicine works best if taken on an empty stomach 30 to 60 minutes before breakfast. Take your doses at regular intervals. Do not take your medicine more often than directed.  Talk to your pediatrician regarding the use of this medicine in children. Special care may be needed.  Overdosage: If you think you have taken too much of this medicine contact a poison control center or emergency room at once.  NOTE: This medicine is only for you. Do not share this medicine with others.  What if I miss a dose?  If you miss a dose, take it as soon as you can. If it is almost time for your next dose, take only that dose. Do not take double or extra doses.  What may  interact with this medicine?  Do not take this medicine with any of the following medications:    atazanavir    clopidogrel    nelfinavir  This medicine may also interact with the following medications:    ampicillin    certain medicines for anxiety or sleep    certain medicines that treat or prevent blood clots like warfarin    cyclosporine    diazepam    digoxin    disulfiram    diuretics    iron salts    methotrexate    mycophenolate mofetil    phenytoin    prescription medicine for fungal or yeast infection like itraconazole, ketoconazole, voriconazole    saquinavir    tacrolimus  This list may not describe all possible interactions. Give your health care provider a list of all the medicines, herbs, non-prescription drugs, or dietary supplements you use. Also tell them if you smoke, drink alcohol, or use illegal drugs. Some items may interact with your medicine.  What should I watch for while using this medicine?  It can take several days before your stomach pain gets better. Check with your doctor or health care professional if your condition does not start to get better, or if it gets worse.  You may need blood work done while you are taking this medicine.  What side effects may I notice from receiving this medicine?  Side effects that you should report to your doctor or health care professional as soon as possible:    allergic reactions like skin rash, itching or hives, swelling of the face, lips, or tongue    bone, muscle or joint pain    breathing problems    chest pain or chest tightness    dark yellow or brown urine    dizziness    fast, irregular heartbeat    feeling faint or lightheaded    fever or sore throat    muscle spasm    palpitations    redness, blistering, peeling or loosening of the skin, including inside the mouth    seizures    tremors    unusual bleeding or bruising    unusually weak or tired    yellowing of the eyes or skin  Side effects that usually do not require medical attention  (Report these to your doctor or health care professional if they continue or are bothersome.):    constipation    diarrhea    dry mouth    headache    nausea  This list may not describe all possible side effects. Call your doctor for medical advice about side effects. You may report side effects to FDA at 5-998-MEE-6335.  Where should I keep my medicine?  Keep out of the reach of children.  Store at room temperature between 15 and 30 degrees C (59 and 86 degrees F). Protect from light and moisture. Throw away any unused medicine after the expiration date.  NOTE:This sheet is a summary. It may not cover all possible information. If you have questions about this medicine, talk to your doctor, pharmacist, or health care provider. Copyright  2016 [employer]                Ceftriaxone Sodium Solution for injection  What is this medicine?  CEFTRIAXONE (sef try AX one) is a cephalosporin antibiotic. It is used to treat certain kinds of bacterial infections. It will not work for colds, flu, or other viral infections.  This medicine may be used for other purposes; ask your health care provider or pharmacist if you have questions.  What should I tell my health care provider before I take this medicine?  They need to know if you have any of these conditions:    any chronic illness    bowel disease, like colitis    both kidney and liver disease    high bilirubin level in  patients    an unusual or allergic reaction to ceftriaxone, other cephalosporin or penicillin antibiotics, foods, dyes, or preservatives    pregnant or trying to get pregnant    breast-feeding  How should I use this medicine?  This medicine is injected into a muscle or infused it into a vein. It is usually given in a medical office or clinic. If you are to give this medicine you will be taught how to inject it. Follow instructions carefully. Use your doses at regular intervals. Do not take your medicine more often than directed. Do not skip doses or  stop your medicine early even if you feel better. Do not stop taking except on your doctor's advice.  Talk to your pediatrician regarding the use of this medicine in children. Special care may be needed.  Overdosage: If you think you have taken too much of this medicine contact a poison control center or emergency room at once.  NOTE: This medicine is only for you. Do not share this medicine with others.  What if I miss a dose?  If you miss a dose, take it as soon as you can. If it is almost time for your next dose, take only that dose. Do not take double or extra doses.  What may interact with this medicine?  Do not take this medicine with any of the following medications:    intravenous calcium  This medicine may also interact with the following medications:    birth control pills  This list may not describe all possible interactions. Give your health care provider a list of all the medicines, herbs, non-prescription drugs, or dietary supplements you use. Also tell them if you smoke, drink alcohol, or use illegal drugs. Some items may interact with your medicine.  What should I watch for while using this medicine?  Tell your doctor or health care professional if your symptoms do not improve or if they get worse.  Do not treat diarrhea with over the counter products. Contact your doctor if you have diarrhea that lasts more than 2 days or if it is severe and watery.  If you are being treated for a sexually transmitted disease, avoid sexual contact until you have finished your treatment. Having sex can infect your sexual partner.  Calcium may bind to this medicine and cause lung or kidney problems. Avoid calcium products while taking this medicine and for 48 hours after taking the last dose of this medicine.  What side effects may I notice from receiving this medicine?  Side effects that you should report to your doctor or health care professional as soon as possible:    allergic reactions like skin rash, itching or  hives, swelling of the face, lips, or tongue    breathing problems    fever, chills    irregular heartbeat    pain when passing urine    seizures    stomach pain, cramps    unusual bleeding, bruising    unusually weak or tired  Side effects that usually do not require medical attention (report to your doctor or health care professional if they continue or are bothersome):    diarrhea    dizzy, drowsy    headache    nausea, vomiting    pain, swelling, irritation where injected    stomach upset    sweating  This list may not describe all possible side effects. Call your doctor for medical advice about side effects. You may report side effects to FDA at 7-223-OBQ-5045.  Where should I keep my medicine?  Keep out of the reach of children.  Store at room temperature below 25 degrees C (77 degrees F). Protect from light. Throw away any unused vials after the expiration date.  NOTE:This sheet is a summary. It may not cover all possible information. If you have questions about this medicine, talk to your doctor, pharmacist, or health care provider. Copyright  2016 Gold Standard                Patient Education    Cefuroxime Axetil Oral suspension    Cefuroxime Axetil Oral tablet    Cefuroxime Sodium Solution for injection  Cefuroxime Axetil Oral tablet  What is this medicine?  CEFUROXIME (se fyoor OX eem) is a cephalosporin antibiotic. It is used to treat certain kinds of bacterial infections. It will not work for colds, flu, or other viral infections.  This medicine may be used for other purposes; ask your health care provider or pharmacist if you have questions.  What should I tell my health care provider before I take this medicine?  They need to know if you have any of these conditions:    bleeding problems    bowel disease, like colitis    kidney disease    liver disease    an unusual or allergic reaction to cefuroxime, other antibiotics or medicines, foods, dyes or preservatives    pregnant or trying to get  pregnant    breast-feeding  How should I use this medicine?  Take this medicine by mouth with a full glass of water. Follow the directions on the prescription label. Do not crush or chew. This medicine works best if you take it with food. Take your medicine at regular intervals. Do not take your medicine more often than directed. Take all of your medicine as directed even if you think your are better. Do not skip doses or stop your medicine early.  Talk to your pediatrician regarding the use of this medicine in children. Special care may be needed. While this drug may be prescribed for children as young as 3 months of age for selected conditions, precautions do apply.  Overdosage: If you think you have taken too much of this medicine contact a poison control center or emergency room at once.  NOTE: This medicine is only for you. Do not share this medicine with others.  What if I miss a dose?  If you miss a dose, take it as soon as you can. If it is almost time for your next dose, take only that dose. Do not take double or extra doses.  What may interact with this medicine?  This medicine may interact with the following medications:    antacids    birth control pills    certain medicines for infection like amikacin, gentamicin, tobramycin    diuretics    probenecid    warfarin  This list may not describe all possible interactions. Give your health care provider a list of all the medicines, herbs, non-prescription drugs, or dietary supplements you use. Also tell them if you smoke, drink alcohol, or use illegal drugs. Some items may interact with your medicine.  What should I watch for while using this medicine?  Tell your doctor or health care professional if your symptoms do not improve or if you get new symptoms.  Do not treat diarrhea with over the counter products. Contact your doctor if you have diarrhea that lasts more than 2 days or if it is severe and watery.  This medicine can interfere with some urine  glucose tests. If you use such tests, talk with your health care professional.  If you are being treated for a sexually transmitted disease, avoid sexual contact until you have finished your treatment. Your sexual partner may also need treatment.  What side effects may I notice from receiving this medicine?  Side effects that you should report to your doctor or health care professional as soon as possible:    allergic reactions like skin rash, itching or hives, swelling of the face, lips, or tongue    dark urine    difficulty breathing    fever    irregular heartbeat or chest pain    redness, blistering, peeling or loosening of the skin, including inside the mouth    seizures    unusual bleeding or bruising    unusually weak or tired    white patches or sores in the mouth  Side effects that usually do not require medical attention (report to your doctor or health care professional if they continue or are bothersome):    diarrhea    gas or heartburn    headache    nausea, vomiting    vaginal itching  This list may not describe all possible side effects. Call your doctor for medical advice about side effects. You may report side effects to FDA at 9-669-XQM-0781.  Where should I keep my medicine?  Keep out of the reach of children.  Store at room temperature between 15 and 30 degrees C (59 and 86 degrees F). Keep container tightly closed. Protect from moisture. Throw away any unused medicine after the expiration date.  NOTE:This sheet is a summary. It may not cover all possible information. If you have questions about this medicine, talk to your doctor, pharmacist, or health care provider. Copyright  2016 Gold Standard

## 2017-01-02 NOTE — ED NOTES
Back from CT.  Patient was off bipap for CT and on 15LPM NRB and felt very SOB.  Patient placed back on bipap.  Resp rate increased off bipap.  Patient requesting neb tx.  MD notified.

## 2017-01-02 NOTE — IP AVS SNAPSHOT
[facility]    [address] [place] [id number]    Phone:  [phone]    Fax:  [phone]                                       After Visit Summary   1/2/2017    Mary Alice Cameron    MRN: [id number]           After Visit Summary Signature Page     I have received my discharge instructions, and my questions have been answered. I have discussed any challenges I see with this plan with the nurse or doctor.    ..........................................................................................................................................  Patient/Patient Representative Signature      ..........................................................................................................................................  Patient Representative Print Name and Relationship to Patient    ..................................................               ................................................  Date                                            Time    ..........................................................................................................................................  Reviewed by Signature/Title    ...................................................              ..............................................  Date                                                            Time

## 2017-01-02 NOTE — PHARMACY-ANTICOAGULATION SERVICE
Clinical Pharmacy - Warfarin Dosing Consult     Pharmacy has been consulted to manage this patient s warfarin therapy.  Indication: Atrial Fibrillation  Therapy Goal: INR 2-3  Warfarin Prior to Admission: Yes  Warfarin PTA Regimen: 2.5 mg daily  Significant drug interactions: ROcephin  Recent documented change in oral intake/nutrition: Unknown    INR   Date Value Ref Range Status   01/02/2017 1.86* 0.80 - 1.20 Final     INR PROTIME   Date Value Ref Range Status   12/28/2016 1.5* 0.86 - 1.14 Final       Recommend warfarin 2.5 mg today.  Pharmacy will monitor Mary Alice Cameron daily and order warfarin doses to achieve specified goal.      Please contact pharmacy as soon as possible if the warfarin needs to be held for a procedure or if the warfarin goals change.

## 2017-01-02 NOTE — ED NOTES
Patient presents today via [facility] for severe SOB.  States she was coughing yesterday but didn't feel this bad until this am.  Norman does have COPD and wears oxygen at night at 2.5LPM NC.  Found today by first responders with sats in the 70's on room air.  Upon arrival patient switched from 15LPM NRB to 4LPM NC.  Sats down to 85% on 4LPM NC with movement.  MD into eval patient.  RT paged.  Monitors applied, IV est.  Also c/o headache 6/10 and headaches are uncommon for patient.

## 2017-01-02 NOTE — ED PROVIDER NOTES
eMERGENCY dEPARTMENT eNCOUnter        CHIEF COMPLAINT    Chief Complaint   Patient presents with     Shortness of Breath     started this am at 0900     Headache     started this am       HPI    Mary Alice Cameron is a 66 year old female who presents with shortness of breath which started this morning.  Has a history of O2 (at night) dependent COPD.  No recent fevers, said the SOB gradually worsened this morning.  Has a history of a fib, on coumadin.  Comes in by ambulance.  sats here were 83% on room air.  She is very dyspnic, speaking in 1-2 word sentences.  States she also has a headache. Shortly after her arrival we were called by her POA who stated she is DNR.  She is also complaining of a headache.  She was hospitalized here on 11/6 for COPD exacerbation, treated with antibiotics and discharged.  She came from home.      REVIEW OF SYSTEMS    She is really not able to answer detailed questions due to her shortness of breath      PAST MEDICAL & SURGICAL HISTORY    Past Medical History   Diagnosis Date     Asthma      Depression      Atrial fibrillation (H)      COPD (chronic obstructive pulmonary disease) (H)      High cholesterol      HTN (hypertension)      Thyroid disease      Past Surgical History   Procedure Laterality Date     Sling bladder suspension with fascia scar       Back surgery  2006     Hysterectomy  1980     partial     Colonoscopy N/A 1/19/2016     Procedure: COLONOSCOPY;  Surgeon: Waldemar Bob MD;  Location: [facility]       CURRENT MEDICATIONS    No current outpatient prescriptions on file.    ALLERGIES    Allergies   Allergen Reactions     Amlodipine Besylate Cough     Norvasc     Lisinopril Cough       SOCIAL & FAMILY HISTORY    Social History     Social History     Marital Status:      Spouse Name: N/A     Number of Children: N/A     Years of Education: N/A     Occupational History      Retired     disabled     Social History Main Topics     Smoking status: Former Smoker -- 48 years      "Types: Cigarettes     Quit date: 01/28/2007     Smokeless tobacco: Never Used     Alcohol Use: No     Drug Use: No     Sexual Activity: No      Comment:      Other Topics Concern      Service No     Blood Transfusions Yes     Permits if needed     Caffeine Concern Yes     2 cups coffee daily     Seat Belt Yes     Parent/Sibling W/ Cabg, Mi Or Angioplasty Before 65f 55m? No     Social History Narrative     Family History   Problem Relation Age of Onset     Asthma Mother      Asthma Brother      CANCER Mother      ovarian     Prostate Cancer Father      Hypertension Father      Hypertension Mother      Hypertension Sister      Hypertension Brother      Heart Failure Father      CHF       PHYSICAL EXAM    VITAL SIGNS: /73 mmHg  Pulse 93  Temp(Src) 98.9  F (37.2  C) (Oral)  Resp 16  Ht 1.651 m (5' 5\")  Wt 73.936 kg (163 lb)  BMI 27.12 kg/m2  SpO2 96%   Constitutional:  Thin female, wide-eyed, in acute distress  HENT:  Atraumatic, moist mucus membranes  Neck: supple, no JVD   Respiratory:  Lungs very distant breath sounds, intercoastal restractions   Cardiovascular:  regular rate, no murmurs  GI:  Soft, nontender, normal bowel sounds  Musculoskeletal:  No edema, no acute deformities  Integument:  Skin is warm and dry, no petechiae   Neurologic:  Alert & oriented, no slurred speech  Psych: Pleasant affect, no hallucinations    EKG  Sinus tach, no ischemic changes.  She has a history of a fib, this appears to be sinus.    RADIOLOGY/PROCEDURES    CXR: negative for infiltrate    ED COURSE & MEDICAL DECISION MAKING    Pertinent Labs & Imaging studies reviewed and interpreted. (See chart for details)    See chart for details of medications given during the ED stay.    Filed Vitals:    01/02/17 1400 01/02/17 1430 01/02/17 1445 01/02/17 1458   BP: 154/95 161/81 133/73 134/73   Pulse:    93   Temp:    98.9  F (37.2  C)   TempSrc:       Resp: 24 33  16   Height:       Weight:       SpO2: 93% 98% 97% 96% "         FINAL IMPRESSION    1. COPD exacerbation (H)        PLAN  She was started on BiPAP and given solumedrol, initially without much improvement.  She is admitted to the ICU, Dr. Blake is here to see the patient.        Haley Grajeda MD  01/02/17 5523

## 2017-01-02 NOTE — PLAN OF CARE
Problem: Patient Goal: Rt Focus  Goal: 1. Patient Goal: RT Focus  [facility] - Respiratory Clinical Assessment    Current Patient History:    Respiratory History: COPD    Smoking History: former smoker    Oxygen dependency: Yes,    Oxygen prescribed: 2.5 lpm noc    1/2/2017 4:07 PM Patient Initial Assessment:     Level of Consciousness: alert , cooperative and pleasant    Skin color: pale    Lung sounds:expiratory wheezes    Respirations:  Normal with easy respirations and no use of accessory muscles to breathe    Respiratory symptoms: no other unusual symptoms    Cough/Sputum:  dry    Current oxygenation status: 97% 3 lpm nc

## 2017-01-03 ENCOUNTER — APPOINTMENT (OUTPATIENT)
Dept: ULTRASOUND IMAGING | Facility: HOSPITAL | Age: 67
DRG: 190 | End: 2017-01-03
Payer: MEDICARE

## 2017-01-03 LAB
ANION GAP SERPL CALCULATED.3IONS-SCNC: 9 MMOL/L (ref 3–14)
BUN SERPL-MCNC: 7 MG/DL (ref 7–30)
CALCIUM SERPL-MCNC: 9.1 MG/DL (ref 8.5–10.1)
CHLORIDE SERPL-SCNC: 96 MMOL/L (ref 94–109)
CO2 SERPL-SCNC: 30 MMOL/L (ref 20–32)
CREAT SERPL-MCNC: 0.53 MG/DL (ref 0.52–1.04)
ERYTHROCYTE [DISTWIDTH] IN BLOOD BY AUTOMATED COUNT: 12.9 % (ref 10–15)
GFR SERPL CREATININE-BSD FRML MDRD: ABNORMAL ML/MIN/1.7M2
GLUCOSE SERPL-MCNC: 161 MG/DL (ref 70–99)
HCT VFR BLD AUTO: 38.4 % (ref 35–47)
HGB BLD-MCNC: 12.8 G/DL (ref 11.7–15.7)
INR PPP: 2.13 (ref 0.8–1.2)
MCH RBC QN AUTO: 31.8 PG (ref 26.5–33)
MCHC RBC AUTO-ENTMCNC: 33.3 G/DL (ref 31.5–36.5)
MCV RBC AUTO: 95 FL (ref 78–100)
PLATELET # BLD AUTO: 164 10E9/L (ref 150–450)
POTASSIUM SERPL-SCNC: 3.9 MMOL/L (ref 3.4–5.3)
RBC # BLD AUTO: 4.03 10E12/L (ref 3.8–5.2)
SODIUM SERPL-SCNC: 135 MMOL/L (ref 133–144)
WBC # BLD AUTO: 2.6 10E9/L (ref 4–11)

## 2017-01-03 PROCEDURE — 80048 BASIC METABOLIC PNL TOTAL CA: CPT | Performed by: INTERNAL MEDICINE

## 2017-01-03 PROCEDURE — 36415 COLL VENOUS BLD VENIPUNCTURE: CPT | Performed by: INTERNAL MEDICINE

## 2017-01-03 PROCEDURE — 25000132 ZZH RX MED GY IP 250 OP 250 PS 637: Mod: GY | Performed by: INTERNAL MEDICINE

## 2017-01-03 PROCEDURE — 94640 AIRWAY INHALATION TREATMENT: CPT | Mod: 76

## 2017-01-03 PROCEDURE — 94640 AIRWAY INHALATION TREATMENT: CPT

## 2017-01-03 PROCEDURE — 85610 PROTHROMBIN TIME: CPT | Performed by: INTERNAL MEDICINE

## 2017-01-03 PROCEDURE — A9270 NON-COVERED ITEM OR SERVICE: HCPCS | Mod: GY | Performed by: INTERNAL MEDICINE

## 2017-01-03 PROCEDURE — 40000275 ZZH STATISTIC RCP TIME EA 10 MIN

## 2017-01-03 PROCEDURE — 25000125 ZZHC RX 250: Performed by: INTERNAL MEDICINE

## 2017-01-03 PROCEDURE — 99232 SBSQ HOSP IP/OBS MODERATE 35: CPT | Performed by: INTERNAL MEDICINE

## 2017-01-03 PROCEDURE — 85027 COMPLETE CBC AUTOMATED: CPT | Performed by: INTERNAL MEDICINE

## 2017-01-03 PROCEDURE — 12000000 ZZH R&B MED SURG/OB

## 2017-01-03 RX ORDER — PREDNISONE 20 MG/1
20 TABLET ORAL 2 TIMES DAILY WITH MEALS
Status: DISCONTINUED | OUTPATIENT
Start: 2017-01-03 | End: 2017-01-04 | Stop reason: HOSPADM

## 2017-01-03 RX ORDER — WARFARIN SODIUM 1 MG/1
1 TABLET ORAL
Status: COMPLETED | OUTPATIENT
Start: 2017-01-03 | End: 2017-01-03

## 2017-01-03 RX ORDER — ALUMINA, MAGNESIA, AND SIMETHICONE 2400; 2400; 240 MG/30ML; MG/30ML; MG/30ML
30 SUSPENSION ORAL EVERY 4 HOURS PRN
Status: DISCONTINUED | OUTPATIENT
Start: 2017-01-03 | End: 2017-01-04 | Stop reason: HOSPADM

## 2017-01-03 RX ADMIN — WARFARIN SODIUM 1 MG: 1 TABLET ORAL at 19:30

## 2017-01-03 RX ADMIN — CEFTRIAXONE SODIUM 1 G: 1 INJECTION, SOLUTION INTRAVENOUS at 17:15

## 2017-01-03 RX ADMIN — IPRATROPIUM BROMIDE AND ALBUTEROL SULFATE 3 ML: .5; 3 SOLUTION RESPIRATORY (INHALATION) at 11:23

## 2017-01-03 RX ADMIN — PREDNISONE 20 MG: 20 TABLET ORAL at 17:14

## 2017-01-03 RX ADMIN — HYDRALAZINE HYDROCHLORIDE 25 MG: 25 TABLET ORAL at 01:45

## 2017-01-03 RX ADMIN — FLECAINIDE ACETATE 100 MG: 100 TABLET ORAL at 08:47

## 2017-01-03 RX ADMIN — METHYLPREDNISOLONE SODIUM SUCCINATE 125 MG: 125 INJECTION, POWDER, FOR SOLUTION INTRAMUSCULAR; INTRAVENOUS at 05:28

## 2017-01-03 RX ADMIN — PREDNISONE 20 MG: 20 TABLET ORAL at 11:10

## 2017-01-03 RX ADMIN — IPRATROPIUM BROMIDE AND ALBUTEROL SULFATE 3 ML: .5; 3 SOLUTION RESPIRATORY (INHALATION) at 04:14

## 2017-01-03 RX ADMIN — HYDRALAZINE HYDROCHLORIDE 25 MG: 25 TABLET ORAL at 09:27

## 2017-01-03 RX ADMIN — IPRATROPIUM BROMIDE AND ALBUTEROL SULFATE 3 ML: .5; 3 SOLUTION RESPIRATORY (INHALATION) at 00:19

## 2017-01-03 RX ADMIN — CLONIDINE HYDROCHLORIDE 0.1 MG: 0.1 TABLET ORAL at 08:47

## 2017-01-03 RX ADMIN — IPRATROPIUM BROMIDE AND ALBUTEROL SULFATE 3 ML: .5; 3 SOLUTION RESPIRATORY (INHALATION) at 20:27

## 2017-01-03 RX ADMIN — OMEPRAZOLE 20 MG: 20 CAPSULE, DELAYED RELEASE ORAL at 11:10

## 2017-01-03 RX ADMIN — FLECAINIDE ACETATE 100 MG: 100 TABLET ORAL at 20:47

## 2017-01-03 RX ADMIN — IPRATROPIUM BROMIDE AND ALBUTEROL SULFATE 3 ML: .5; 3 SOLUTION RESPIRATORY (INHALATION) at 23:48

## 2017-01-03 RX ADMIN — CLONIDINE HYDROCHLORIDE 0.3 MG: 0.1 TABLET ORAL at 20:46

## 2017-01-03 RX ADMIN — IPRATROPIUM BROMIDE AND ALBUTEROL SULFATE 3 ML: .5; 3 SOLUTION RESPIRATORY (INHALATION) at 15:31

## 2017-01-03 RX ADMIN — IPRATROPIUM BROMIDE AND ALBUTEROL SULFATE 3 ML: .5; 3 SOLUTION RESPIRATORY (INHALATION) at 07:12

## 2017-01-03 RX ADMIN — HYDRALAZINE HYDROCHLORIDE 25 MG: 25 TABLET ORAL at 19:31

## 2017-01-03 RX ADMIN — LEVOTHYROXINE SODIUM 100 MCG: 100 TABLET ORAL at 07:44

## 2017-01-03 NOTE — PLAN OF CARE
Face to face report given with opportunity to observe patient.    Report given to augustina smith   1/2/2017  8:07 PM

## 2017-01-03 NOTE — PHARMACY-ANTICOAGULATION SERVICE
INR = 2.13, recommend warfarin 1 mg today.  This is lower than normal home dose, but patient is only eating clears and is on Rocephin.  This decreased oral intake and antibiotic use will likely make patient more sensitive to warfarin.    Haley Parks, PharmD  January 3, 2017

## 2017-01-03 NOTE — PLAN OF CARE
Problem: Goal Outcome Summary  Goal: Goal Outcome Summary  Outcome: Improving  Afebrile. VSS. Denies pain. Up in chair with SBA. O2 down to 2lpm via N.C. IV lock patent. Appetite poor.   1430 - O2 decreased to 1lpm via N.C. Denies pain. Up in room without difficulty.

## 2017-01-03 NOTE — PLAN OF CARE
Problem: Patient Goal: Rt Focus  Goal: 1. Patient Goal: RT Focus  Outcome: No Change  Pt maintaining oxygen saturation >92% on 1L n/c .  Uses 2.5L at home at night.  Three nebs given this shift.  Encouraged cough and deep breathing.

## 2017-01-03 NOTE — PLAN OF CARE
Problem: Patient Goal: Rt Focus  Goal: 1. Patient Goal: RT Focus  Patient weaned down to 2.5lpm NC per home routine.  Duonebs given Q4 hr.  Breath sounds diminished throughout with expiratory wheezes.  Sats 97%.

## 2017-01-03 NOTE — PLAN OF CARE
[facility] Inpatient Admission Note:    Patient admitted to 3126/3126-1 at approximately 1520 via cart accompanied by nurse from emergency room . Report received from PUSHPA Mar in SBAR format at 1440 via telephone. Patient ambulated to bed via self.. Patient is alert and oriented X 3, denies pain; rates at 0 on 0-10 scale.  Patient oriented to room, unit, hourly rounding, and plan of care. Explained admission packet and patient handbook with patient bill of rights brochure. Will continue to monitor and document as needed.     Inpatient Nursing criteria listed below was met:      Health care directives status obtained and documented: Yes      Care Everywhere authorization obtained No      MRSA swab completed for patient 65 years and older: N/A      Patient identifies a surrogate decision maker: Yes If yes, who: Ksenia Flood Contact Information: as on facesheet      Core Measure diagnosis present:No. If yes, state diagnosis: n/a       If initial lactic acid >2.0, repeat lactic acid drawn within one hour of arrival to unit: NA. If no, state reason: n/a      Vaccination assessment and education completed: Yes   Vaccinations received prior to admission: Pneumovax yes  Influenza(seasonal)  YES   Vaccination(s) ordered: up to date.  Received in October 2016      Clergy visit ordered if patient requests: Yes      Skin issues/needs documented: No      Isolation Patient: no Education given, correct sign in place and documentation row added to PCS:  n/a      Fall Prevention Yes: Care plan updated, education given and documented, sticker and magnet in place: Yes      Care Plan initiated: Yes, by Dona ortiz RN      Education Documented (including assessment): Yes      Patient has discharge needs : No If yes, please explain: n/a

## 2017-01-03 NOTE — PLAN OF CARE
Problem: Patient Goal: Social Work Focus  Goal: 1. Patient Goal: Social Work Focus  Outcome: No Change  Assessment completed via flowsheet.  Mary Alice is awake and up in her chair. Her PCP is Dr Yen. She see's a dental provider at [facility]. She does not have an eye provider and was provided with a list of local eye providers. She works with Dr Rosa for cardiology needs. Her POA is her niece Ksenia Piedra. She has a healthcare directive on file. She uses [facility] and [employer] [facility] in [place]. She is not a . Prior to this admission she used oxygen at night at 2.5 LPM via NC. Her oxygen supplies are provided by [employer] in [place]. She requested this be switched to [facility]. In talking to [employer] they say that because she has been with [employer] longer than one year they would not take her due to how the billing/payment is done.     Mary Alice lives in an apartment by herself with her dog Eugenio. She is normally independent with her personal cares, household upkeep, and community activity. She drives and works at the [employer] in [place]. She normally uses a cane around the home, but does not use it when she goes to work as she is uncomfortable with being seen with it publicly. She will talk to [employer] about getting a portable option for oxygen beside the tank that she could use at work when needed.     She sleeps in a recliner at night due to her breathing. She sometimes feels depressed. We discussed her history of an unexpected death of her son, death of her second  prior to that, and the relationship with her estranged remaining son. She has not worked with any kind of counselor, is willing to do so, and was provided a list of local counselors and psychologists. She denies smoking, alcohol use, and street drug use.

## 2017-01-03 NOTE — PLAN OF CARE
Face to face report given with opportunity to observe patient.    Report given to Wilmer Garcia   1/3/2017  4:44 PM

## 2017-01-03 NOTE — H&P
PRIMARY CARE PROVIDER:  Braydon Yen MD.      HISTORY OF PRESENT ILLNESS:  Mary Alice Cameron is a 66-year-old woman with a smoking history of 40 pack years and known underlying severe chronic obstructive pulmonary disease.  The exact quantification of her disease is not really well known.  The patient, however, has had a persistent pattern of abruptly becoming short of breath and requiring BiPAP therapy.  She appears to get better fairly quickly and returns to home.  She denies current smoking.  The patient has had no recent cough or sputum production.  The patient once again woke up this morning and had the onset of significant shortness of breath.  This prompted her to come to the emergency room.  In the ER, she was noted to be tachypneic and was placed immediately on BiPAP.  On this, there has been stabilization of her respiratory status and she is now admitted to the hospital for continuation of therapy.  The patient had no problems with chest pain.  As mentioned, no sputum production.      PAST MEDICAL HISTORY:  Remarkable for having had a bicuspid aortic valve.  She has not had any episodes of congestive heart failure that I can identify.  It does not appear that the patient is having chronic management of her aortic valve either.  The patient has had a laminectomy in the past.  She had hyperthyroidism which was treated with radioablation.  She has had longstanding management of atrial fibrillation and quite honestly has been in sinus rhythm most of the time.  She has been maintained on Coumadin for several years.      FAMILY HISTORY:  The patient's family history is positive for vascular disease and diabetes.      SOCIAL HISTORY:  She is , lives at home.        ALLERGIES:  She has allergies or intolerances to amlodipine and lisinopril.        CODE STATUS:  She requests do not resuscitate, do not intubate status.      MEDICATIONS:  Bronchodilators and inhalers, Coumadin and antihypertensives.      REVIEW  OF SYSTEMS:   CONSTITUTIONAL:  No fevers, no chills.   CARDIAC:  No syncope.   RESPIRATORY:  No sputum production.   GASTROINTESTINAL:  No melena, no hematochezia.   GENITOURINARY:  No dysuria.   NEUROLOGIC:  No weakness of an arm or leg or blindness of an eye.  She has had 1 fall and hit her head but CT scan today does not show any bleed.      PHYSICAL EXAMINATION:   GENERAL:  Reveals a woman who is comfortable on BiPAP.   VITAL SIGNS:  Temperature is 100.8.  Her heart rate 117 and regular.  Her respiratory rate is 30 and nonlabored, blood pressure is 160/80, saturation is 98% on 4 liters.   HEENT:  Her sclerae were clear.   NECK:  Supple.   CHEST:  Wheezing bilaterally.  No rales are heard.  No jugular venous distention is noted.   CARDIOVASCULAR:  S1, S2, without audible murmur.   ABDOMEN:  Soft, without masses.   EXTREMITIES:  Without edema.   NEUROLOGIC:  She is alert and follows commands.      PERTINENT LABORATORY DATA:  Includes a chest x-ray which does not show evidence of heart failure or pneumothorax.  CT of the head showing no signs of a bleed.  The patient's glucose is 129, hemoglobin is 14.3 with a white count of 11,300.  INR is 1.9.  The EKG appears to show sinus rhythm.      ASSESSMENT:   1.  Chronic obstructive pulmonary disease:  The patient has COPD that appears to have a significant reactive component with a history of fairly quick response to inhaler steroids and BiPAP.  The patient will have blood gases obtained at this time in order to better understand how severe her COPD is.  She will be treated with steroids and bronchodilators.  While I do not think there is a bacterial infection contributing to this, in view of the severity of her presentation, she will receive Rocephin.   2.  Hypothyroidism:  The patient continues on Synthroid replacement.   3.  Anticoagulation:  The patient continues on Coumadin for this.   4.  Bicuspid aortic valve:  It does not appear that this has been evaluated for  some time and so we will obtain an echocardiogram while she is in the hospital.         WENDIE ABEL MD             D: 2017 14:57   T: 2017 18:41   MT:       Name:     LANCE VALLEJO   MRN:      [id number]        Account:      [id number]   :      1950           Admitted:     [id number]      Document: [id number]       cc: Braydon Yen MD

## 2017-01-03 NOTE — PROGRESS NOTES
[facility]    Hospitalist Progress Note    Date of Service (when I saw the patient): 01/03/2017    Assessment and Plan  Mary Alice Cameron is a 66 year old female who was admitted on 1/2/2017.      1.  COPD Exacerbation  Acute on chronic respiratory failure:  Currently patient is doing a lot better is down to 2 liters.  breathing easier  still with some wheezes and cough.  No fevers. No chest pain .  Trying to eat  Has some complaints of heart burn.  Vitals stable.   Will move out of ICU  continue with steroids changing to oral prednisone and antibiotic and nebs  will need another day here possibly home tomorrow depending on how she is doing.    2.  Afib:  Appears to be in NSR  Is on warfarin and pharmacist is dosing. Is on flecainide also.  Stable.    3.  Heartburn:  will put her on something for this PPI for now and some Maalox prn.    4.  Hypertension:  Stable at this time on her oral meds.    DVT Prophylaxis: Warfarin  Code Status: DNR/DNI    Disposition: Expected discharge in 1-2 days once stable respiratory esquivel..    Lito Cleveland    Interval History  Has done well overnight no need for any BiPAP  has been weaned down on her oxygen.  Is hungry. no fevers  sat's good  still feels some sob and wheezing.    -Data reviewed today: I reviewed all new labs and imaging results over the last 24 hours. I personally reviewed no images or EKG's today.    Physical Exam  Temp: 98.5  F (36.9  C) Temp src: Tympanic BP: 128/64 mmHg Pulse: 93 Heart Rate: 80 Resp: 12 SpO2: 96 % O2 Device: Nasal cannula Oxygen Delivery: 2 LPM  Filed Vitals:    01/02/17 1321   Weight: 73.936 kg (163 lb)     Vital Signs with Ranges  Temp:  [97.5  F (36.4  C)-100.8  F (38.2  C)] 98.5  F (36.9  C)  Pulse:  [] 93  Heart Rate:  [] 80  Resp:  [11-33] 12  BP: (107-189)/() 128/64 mmHg  FiO2 (%):  [30 %] 30 %  SpO2:  [93 %-99 %] 96 %  I/O last 3 completed shifts:  In: 600 [P.O.:600]  Out: -     Peripheral IV 01/02/17 Left Upper  forearm (Active)   Site Assessment WDL 1/3/2017  8:13 AM   Line Status Saline locked;Checked every 1-2 hour 1/3/2017  8:13 AM   Phlebitis Scale 0-->no symptoms 1/3/2017  8:13 AM   Infiltration Scale 0 1/3/2017  8:13 AM   Number of days:1     No line/device    Constitutional: Alert and oriented x3. No distress    HEENT: NC/AT, PERRL, EOMI, mouth moist, neck supple, no LN.     Cardiovascular: RRR. no Murmur, no  rubs, or gallops.  JVD no.  Bruits no.  Pulses 2+    Respiratory:  She has wheezing bilaterally with some crackles  at bases  Able to speak full sentences. Clear to auscultation bilaterally    Abdomen: Soft, NTND, no organomegaly. Bowel sounds present    Extremities: Warm/dry. No edema    Neuro:   Non focal, cranial nerves intact, Moves all extremities.    Medications    - MEDICATION INSTRUCTIONS -       Warfarin Therapy Reminder         warfarin  1 mg Oral ONCE at 18:00     predniSONE  20 mg Oral BID w/meals     omeprazole  20 mg Oral QAM AC     sodium chloride (PF)  3 mL Intravenous Q8H     cloNIDine  0.1 mg Oral QAM     cloNIDine  0.3 mg Oral QPM     flecainide  100 mg Oral BID     hydrALAZINE  25 mg Oral Q8H     levothyroxine  100 mcg Oral QAM AC     cefTRIAXone  1 g Intravenous Q24H       Data    Recent Labs  Lab 01/03/17  0447 01/02/17  1325 12/28/16   WBC 2.6* 11.3*  --    HGB 12.8 14.3  --    MCV 95 97  --     227  --    INR 2.13* 1.86* 1.5*    137  --    POTASSIUM 3.9 4.2  --    CHLORIDE 96 97  --    CO2 30 31  --    BUN 7 6*  --    CR 0.53 0.69  --    ANIONGAP 9 9  --    KOSTA 9.1 9.3  --    * 129*  --    ALBUMIN  --  3.7  --    PROTTOTAL  --  7.1  --    BILITOTAL  --  0.5  --    ALKPHOS  --  32*  --    ALT  --  24  --    AST  --  31  --      LACTIC ACID   Date Value Ref Range Status   01/02/2017 1.0 0.4 - 2.0 mmol/L Final   11/06/2016 1.6 0.4 - 2.0 mmol/L Final   11/06/2016 2.3* 0.4 - 2.0 mmol/L Final       Recent Results (from the past 24 hour(s))   CT Head w/o Contrast     Narrative    CT SCAN OF BRAIN    REPORT:  The ventricular system is normal in size.  There are no  masses, ventricular shifts, or extracerebral collections.  Brainstem  and cerebellum appear normal.  Cranial vault is intact.  Paranasal  sinuses are clear.    IMPRESSION: NEGATIVE CT SCAN OF THE BRAIN.  Exam Date: Jan 02, 2017 02:18:00 PM  Author: REDD WADSWORTH  This report is final and signed     XR Chest Port 1 View    Narrative    CHEST SINGLE VIEW    COMPARISON:  Today's study is compared to a prior examination which is  dated December 15, 2016.    FINDINGS:  Heart is stable in size.  There is some mild linear density  at the right lung base, likely some mild atelectasis.  Mild linear  atelectasis is seen at the left lung base as well.  Upper lungs are  clear.  There is no confluent infiltrate or pleural effusion.    IMPRESSION:  MILD LINEAR ATELECTASIS.  Exam Date: Jan 02, 2017 02:48:13 PM  Author: DEBBIE URIAS  This report is preliminary and transcribed

## 2017-01-03 NOTE — PROGRESS NOTES
"Pt referred via the nutrition risk assessment for weight loss in the past 2 months.  Here with COPD exacerbation  Is on regular diet  5' 5\"  163 lbs 0 oz  Body mass index is 27.12 kg/(m^2).    Eating small amounts, as she states she has heartburn.  Receiving Maalox for this.  States she feels hungry    INR 1.  WBC 2.6    No intake charted to review.  If oral intake is low (under 50%) RD recommends supplement TID of Ensure Enlive in order to meet protein and nutrition needs.    RD will continue to follow and remain available prn.    Dona Isaac RD    "

## 2017-01-03 NOTE — PLAN OF CARE
Problem: Goal Outcome Summary  Goal: Goal Outcome Summary  Outcome: Improving  Pt presented with VSS, denying pain throughout the shift. Lungs have had expiratory wheezes throughout - pt received nebs q4h prn throughout the night. Continuing on 2.5L O2 via NC to maintain sats. Pt reported that her chest felt tight, but after neb treatment that feeling had lifted. Pt is alert/oriented. Up to bedside commode with SBA. Voiding adequately. Small BM this shift. Able to make needs known. Will continue to monitor.     Temp: 97.5  F (36.4  C) Temp src: Tympanic BP: 109/59 mmHg Pulse: 93 Heart Rate: 67 Resp: 20 SpO2: 97 % O2 Device: Nasal cannula Oxygen Delivery: 2.5 LPM

## 2017-01-03 NOTE — PLAN OF CARE
Face to face report given with opportunity to observe patient.    Report given to Norma ARCINIEGA RN.     Dona Calvert   1/3/2017  7:06 AM

## 2017-01-04 VITALS
HEART RATE: 93 BPM | DIASTOLIC BLOOD PRESSURE: 80 MMHG | HEIGHT: 65 IN | SYSTOLIC BLOOD PRESSURE: 169 MMHG | TEMPERATURE: 98.7 F | WEIGHT: 167.11 LBS | BODY MASS INDEX: 27.84 KG/M2 | RESPIRATION RATE: 16 BRPM | OXYGEN SATURATION: 93 %

## 2017-01-04 LAB
ANION GAP SERPL CALCULATED.3IONS-SCNC: 8 MMOL/L (ref 3–14)
BUN SERPL-MCNC: 11 MG/DL (ref 7–30)
CALCIUM SERPL-MCNC: 9.3 MG/DL (ref 8.5–10.1)
CHLORIDE SERPL-SCNC: 99 MMOL/L (ref 94–109)
CO2 SERPL-SCNC: 31 MMOL/L (ref 20–32)
CREAT SERPL-MCNC: 0.63 MG/DL (ref 0.52–1.04)
ERYTHROCYTE [DISTWIDTH] IN BLOOD BY AUTOMATED COUNT: 13.1 % (ref 10–15)
GFR SERPL CREATININE-BSD FRML MDRD: ABNORMAL ML/MIN/1.7M2
GLUCOSE SERPL-MCNC: 126 MG/DL (ref 70–99)
HCT VFR BLD AUTO: 36.5 % (ref 35–47)
HGB BLD-MCNC: 12 G/DL (ref 11.7–15.7)
INR PPP: 2.74 (ref 0.8–1.2)
MCH RBC QN AUTO: 31.3 PG (ref 26.5–33)
MCHC RBC AUTO-ENTMCNC: 32.9 G/DL (ref 31.5–36.5)
MCV RBC AUTO: 95 FL (ref 78–100)
PLATELET # BLD AUTO: 176 10E9/L (ref 150–450)
POTASSIUM SERPL-SCNC: 4 MMOL/L (ref 3.4–5.3)
RBC # BLD AUTO: 3.83 10E12/L (ref 3.8–5.2)
SODIUM SERPL-SCNC: 138 MMOL/L (ref 133–144)
WBC # BLD AUTO: 10.2 10E9/L (ref 4–11)

## 2017-01-04 PROCEDURE — 40000275 ZZH STATISTIC RCP TIME EA 10 MIN

## 2017-01-04 PROCEDURE — 85610 PROTHROMBIN TIME: CPT | Performed by: INTERNAL MEDICINE

## 2017-01-04 PROCEDURE — 36415 COLL VENOUS BLD VENIPUNCTURE: CPT | Performed by: INTERNAL MEDICINE

## 2017-01-04 PROCEDURE — 99238 HOSP IP/OBS DSCHRG MGMT 30/<: CPT | Performed by: INTERNAL MEDICINE

## 2017-01-04 PROCEDURE — 25000125 ZZHC RX 250: Performed by: INTERNAL MEDICINE

## 2017-01-04 PROCEDURE — A9270 NON-COVERED ITEM OR SERVICE: HCPCS | Mod: GY | Performed by: INTERNAL MEDICINE

## 2017-01-04 PROCEDURE — 94640 AIRWAY INHALATION TREATMENT: CPT | Mod: 76

## 2017-01-04 PROCEDURE — 85027 COMPLETE CBC AUTOMATED: CPT | Performed by: INTERNAL MEDICINE

## 2017-01-04 PROCEDURE — 25000132 ZZH RX MED GY IP 250 OP 250 PS 637: Mod: GY | Performed by: INTERNAL MEDICINE

## 2017-01-04 PROCEDURE — 80048 BASIC METABOLIC PNL TOTAL CA: CPT | Performed by: INTERNAL MEDICINE

## 2017-01-04 PROCEDURE — 94640 AIRWAY INHALATION TREATMENT: CPT

## 2017-01-04 RX ORDER — PREDNISONE 20 MG/1
20 TABLET ORAL 2 TIMES DAILY WITH MEALS
Qty: 6 TABLET | Refills: 0 | Status: SHIPPED | OUTPATIENT
Start: 2017-01-04 | End: 2017-01-07

## 2017-01-04 RX ORDER — ALBUTEROL SULFATE 90 UG/1
2 AEROSOL, METERED RESPIRATORY (INHALATION) EVERY 6 HOURS PRN
Status: DISCONTINUED | OUTPATIENT
Start: 2017-01-04 | End: 2017-01-04 | Stop reason: HOSPADM

## 2017-01-04 RX ORDER — CEFUROXIME AXETIL 250 MG/1
250 TABLET ORAL 2 TIMES DAILY
Qty: 10 TABLET | Refills: 0 | Status: SHIPPED | OUTPATIENT
Start: 2017-01-04 | End: 2017-01-09

## 2017-01-04 RX ADMIN — ALBUTEROL SULFATE 2 PUFF: 90 AEROSOL, METERED RESPIRATORY (INHALATION) at 12:35

## 2017-01-04 RX ADMIN — HYDRALAZINE HYDROCHLORIDE 25 MG: 25 TABLET ORAL at 10:41

## 2017-01-04 RX ADMIN — IPRATROPIUM BROMIDE AND ALBUTEROL SULFATE 3 ML: .5; 3 SOLUTION RESPIRATORY (INHALATION) at 11:29

## 2017-01-04 RX ADMIN — IPRATROPIUM BROMIDE AND ALBUTEROL SULFATE 3 ML: .5; 3 SOLUTION RESPIRATORY (INHALATION) at 15:11

## 2017-01-04 RX ADMIN — IPRATROPIUM BROMIDE AND ALBUTEROL SULFATE 3 ML: .5; 3 SOLUTION RESPIRATORY (INHALATION) at 07:35

## 2017-01-04 RX ADMIN — CLONIDINE HYDROCHLORIDE 0.1 MG: 0.1 TABLET ORAL at 08:27

## 2017-01-04 RX ADMIN — IPRATROPIUM BROMIDE AND ALBUTEROL SULFATE 3 ML: .5; 3 SOLUTION RESPIRATORY (INHALATION) at 03:32

## 2017-01-04 RX ADMIN — OMEPRAZOLE 20 MG: 20 CAPSULE, DELAYED RELEASE ORAL at 06:46

## 2017-01-04 RX ADMIN — PREDNISONE 20 MG: 20 TABLET ORAL at 08:18

## 2017-01-04 RX ADMIN — LEVOTHYROXINE SODIUM 100 MCG: 100 TABLET ORAL at 06:46

## 2017-01-04 RX ADMIN — FLECAINIDE ACETATE 100 MG: 100 TABLET ORAL at 08:18

## 2017-01-04 NOTE — PHARMACY-ANTICOAGULATION SERVICE
INR = 2.74, large increase from yesterday's INR.  Due to Rocephin treatment and not eating well in addition to this increase, I will hold today's warfarin dose.  Haley Parks, PharmD  January 4, 2017

## 2017-01-04 NOTE — PLAN OF CARE
Dr. Rosenberg paged regarding BP's and scheduled oral Hydralazine. Ordered to hold dose at 0200 am.

## 2017-01-04 NOTE — PLAN OF CARE
Problem: Goal Outcome Summary  Goal: Goal Outcome Summary  Outcome: No Change  Pt VSS stable, temp 99, lungs have inspiratory wheezes and expiratory wheezes through out, infrequent nonproductive cough, pt is on 1 LPM O2 via nasal cannula O2 sat maintaining at 92%, at beginning of shift pt O2 was lowered to 1/2 LPM, however O2 Sat dipped to 89%, while ambulating pt maintained O2 sat of 92% on 1 LPM via nasal cannula. Pt ambulated 2x this shift, with minimal difficulty. Trace edema noted in both lower legs, no edema in feet, pt states this is normal for her. Pt makes needs known and uses her call light, no alarms present.    Problem: COPD, Chronic Bronchitis/Emphysema (Adult)  Goal: Signs and Symptoms of Listed Potential Problems Will be Absent or Manageable (COPD, Chronic Bronchitis/Emphysema)  Signs and symptoms of listed potential problems will be absent or manageable by discharge/transition of care (reference COPD, Chronic Bronchitis/Emphysema (Adult) CPG).   Outcome: Improving    01/03/17 2300   COPD, Chronic Bronchitis/Emphysema   Problems Assessed (COPD, Chronic Bronchitis/Emphysema) all   Problems Present (COPD, Chronic Bronchitis/Emphysema) dyspnea;hypoxia/hypoxemia       Face to face report given with opportunity to observe patient.    Report given to PUSHPA Walter RN  1/3/2017  11:22 PM

## 2017-01-04 NOTE — PLAN OF CARE
Problem: Patient Goal: Social Work Focus  Goal: 1. Patient Goal: Social Work Focus  Outcome: Adequate for Discharge Date Met:  01/04/17  Mary Alice will get a ride home with her niece. Respiratory arranging for oxygen needs with [facility] (ph [phone]; fax [phone]).

## 2017-01-04 NOTE — PLAN OF CARE
Face to face report given with opportunity to observe patient.    Report given to Wilmer Shelton   1/4/2017  7:35 AM

## 2017-01-04 NOTE — PLAN OF CARE
Problem: Patient Goal: Rt Focus  Goal: 1. Patient Goal: RT Focus  Patient on 1lpm nasal cannula sats 93%.  Three duonebs were given.  Breath sounds diminished with expiratory wheezes.

## 2017-01-04 NOTE — PROVIDER NOTIFICATION
Dr. Cleveland notified of pt having shortness of breath after prolonged coughing episode in the bathroom, pt just had a neb treatment at 1130, pt is requesting her ventolin inhaler to be prescribed, Dr. Cleveland will order. O2 sat is 93% on 2L of O2, inspiratory wheezes throughout.

## 2017-01-04 NOTE — PLAN OF CARE
Pt VSS, afebrile, lungs are dim throughout, O2 2 LPM via nasal cannula, O2 sat 92%, pt ambulated 1x this shift. She had an episode of SOB around 1220 after a prolonged coughing spell, she had just had a neb, O2 sat were 92%, and lungs at that time had inspiratory wheezes, Dr. Cleveland notified pt requesting inhaler, inhaler ordered and given by RT with relief.     Face to face report given with opportunity to observe patient.    Report given to PUSHPA Suarez   1/4/2017  4:06 PM

## 2017-01-04 NOTE — PROGRESS NOTES
LANCE VALLEJO R - Patient seen during  rounds. Getting ready to go home.  Appreciated the care given by the staff.  No other spiritual needs requested.

## 2017-01-04 NOTE — PLAN OF CARE
Patient discharged at 4:49 PM via wheel chair accompanied by family transport and staff. Prescriptions sent to patients preferred pharmacy. All belongings sent with patient.     Discharge instructions reviewed with patient. Listed belongings gathered and returned to patient.     Patient discharged to home with O2 tank.     Core Measures and Vaccines  Core Measures applicable during stay: No.   Pneumonia and Influenza given prior to discharge, if indicated: N/A    Surgical Patient   Surgical Procedures during stay: no  Did patient receive discharge instruction on wound care and recognition of infection symptoms? Yes    MISC  Follow up appointment made:  Yes  Home and hospital aquired medications returned to patient: Yes  Patient reports pain was well managed at discharge: Yes

## 2017-01-04 NOTE — PLAN OF CARE
Problem: Goal Outcome Summary  Goal: Goal Outcome Summary  Outcome: Improving  NO falls or injuries. A&O. Afebrile. Hypotensive upon initial assessment-scheduled hydralazine PO held per MD williamson. BP slowly improving. Sat's above 92% on 1 L O2 per NC. Denies SOB or dyspnea on exertion. Denies pain. Lungs diminished throughout. HR regular. Bowels active. Slept majority of night. IV saline locked-noted to be bloody but does not leak during saline flush. Voiding adequately, no BM. Uses call light appropriately.

## 2017-01-04 NOTE — PLAN OF CARE
Addressed INR recheck for discharge with Dr. Linda. Recheck INR Friday at [place] lab per Dr. Cleveland. Appt scheduled, coumadin clinic notified of recheck lab appt.   Carola Sarkar  3:46 PM  January 4, 2017

## 2017-01-05 ENCOUNTER — ANTICOAGULATION THERAPY VISIT (OUTPATIENT)
Dept: ANTICOAGULATION | Facility: OTHER | Age: 67
End: 2017-01-05

## 2017-01-05 DIAGNOSIS — I48.19 ATRIAL FIBRILLATION, PERSISTENT (H): ICD-10-CM

## 2017-01-05 DIAGNOSIS — Z79.01 LONG-TERM (CURRENT) USE OF ANTICOAGULANTS: Primary | ICD-10-CM

## 2017-01-05 NOTE — PROGRESS NOTES
ANTICOAGULATION FOLLOW-UP CLINIC VISIT    Patient Name:  Mary Alice Cameron  Date:  1/5/2017  Contact Type:  Telephone    SUBJECTIVE:     Patient Findings     Positives Medication Changes, Hospitalization, Other Complaints, Antibiotic use or infection    Comments Discharged from [facility] 1/4/17 for exacerbation of COPD.  Pt stated concern that each episode seems to be worsening.  Pt informed to discuss this w/PCP at post hosp visit on 1/12/17 and inquire if she should see a Pulmonologist, whom she denies ever seeing.  Pt discharged on Ceftin 250mg BID x 5 days and Prednisone 20mg BID x 3 days.  States she was informed to go to the [facility] for an INR on 1/6/17, but states she still does not feel good and is concerned about going out in the extreme cold at this time.  Pt had contacted Dr Yen's office inquiring if there was a way to have the INR checked in her home.  Pt informed this service is provided with certain qualifiers per Medicare rules and with a homecare agency; this  program could not perform this service.  Discussed most recent INR 2.74 results and new medications with the discharge.  Pt informed the Ceftin should not affect the Coumadin dosing; but the Prednisone dose most likely would.  Coumadin dosing/INR recheck changed to accommodate the pt request and the medications.  Call ended with pt stating  understanding.            OBJECTIVE    INR   Date Value Ref Range Status   01/04/2017 2.74* 0.80 - 1.20 Final       ASSESSMENT / PLAN  No question data found.  Anticoagulation Summary as of 1/5/2017     INR goal 2.0-3.0   Selected INR No new INR was available at the time of this encounter.   Maintenance plan 2 mg (2 mg x 1) every day   Full instructions 1/5: 1 mg; 1/6: 1 mg; 1/7: 1 mg; Otherwise 2 mg every day   Weekly total 14 mg   Plan last modified Kenna Storm RN (8/17/2016)   Next INR check 1/11/2017   Priority INR   Target end date Indefinite    Indications   Atrial fibrillation  persistent  (H) [I48.1]  Long-term (current) use of anticoagulants [Z79.01] [Z79.01]         Anticoagulation Episode Summary     INR check location     Preferred lab     Send INR reminders to  ANTICOAG POOL    Comments       Anticoagulation Care Providers     Provider Role Specialty Phone number    Braydon Yen MD [facility] [phone]            See the Encounter Report to view Anticoagulation Flowsheet and Dosing Calendar (Go to Encounters tab in chart review, and find the Anticoagulation Therapy Visit)        Kenna Storm RN

## 2017-01-08 LAB
BACTERIA SPEC CULT: NORMAL
BACTERIA SPEC CULT: NORMAL
Lab: NORMAL
MICRO REPORT STATUS: NORMAL
MICRO REPORT STATUS: NORMAL
SPECIMEN SOURCE: NORMAL
SPECIMEN SOURCE: NORMAL

## 2017-01-11 ENCOUNTER — ANTICOAGULATION THERAPY VISIT (OUTPATIENT)
Dept: ANTICOAGULATION | Facility: OTHER | Age: 67
End: 2017-01-11
Attending: FAMILY MEDICINE
Payer: MEDICARE

## 2017-01-11 DIAGNOSIS — I48.19 ATRIAL FIBRILLATION, PERSISTENT (H): ICD-10-CM

## 2017-01-11 DIAGNOSIS — I48.19 PERSISTENT ATRIAL FIBRILLATION (H): Primary | ICD-10-CM

## 2017-01-11 DIAGNOSIS — Z79.01 LONG-TERM (CURRENT) USE OF ANTICOAGULANTS: Primary | ICD-10-CM

## 2017-01-11 LAB — INR POINT OF CARE: 2 (ref 0.86–1.14)

## 2017-01-11 PROCEDURE — 85610 PROTHROMBIN TIME: CPT | Mod: QW

## 2017-01-11 NOTE — Clinical Note
COLETTE - INR 2.0 and ok.  BUT - she sees you tomorrow for her Respiratory issue.  Today's findings - PAO2-97% w/3L O2 cont; measures at home-Low 90%'s w/O2; below 88% w/o O2; was using 2L/min unsuccessfully and bumped up to 3L/min-improved.  Nebs used as directed.  ABX/Prednisone completed treatment.  Temp-99.6; HR 75.  Skin pale, dry.  Has yellow, thick productive cough.  Has no appetite - drinking water to try staying hydrated.  Sleeps sitting upright (has been going on for some time now.)  Hope this helps your visit tomorrow with her.  If you have any new issues, feel free to contact us.  Her next INR is 4 weeks, unless needs to be sooner.  Kenna Epstein

## 2017-01-11 NOTE — MR AVS SNAPSHOT
Mary Alice Cameron   1/11/2017 10:15 AM   Anticoagulation Therapy Visit    Description:  66 year old female   Provider:   ANTI COAGULATION   Department:  [facility] Anti Coagulation           INR as of 1/11/2017     Selected INR 2.0 (1/11/2017)      Anticoagulation Summary as of 1/11/2017     INR goal 2.0-3.0   Selected INR 2.0 (1/11/2017)   Full instructions 2 mg every day   Next INR check 2/8/2017    Indications   Atrial fibrillation  persistent (H) [I48.1]  Long-term (current) use of anticoagulants [Z79.01] [Z79.01]         Your next Anticoagulation Clinic appointment(s)     Jan 11, 2017 10:15 AM   Anticoagulation Visit with [facility] ANTI COAGULATION   [facility]    [address]  [facility] [address]   [phone]            Feb 08, 2017 10:00 AM   Anticoagulation Visit with NA ANTI COAGULATION   [facility]    [address] [place]  [facility] [address]   [phone]              January 2017 Details    Sun Mon Tue Wed Thu Fri Sat     1               2               3               4               5               6               7                 8               9               10               11      2 mg   See details      12      2 mg         13      2 mg         14      2 mg           15      2 mg         16      2 mg         17      2 mg         18      2 mg         19      2 mg         20      2 mg         21      2 mg           22      2 mg         23      2 mg         24      2 mg         25      2 mg         26      2 mg         27      2 mg         28      2 mg           29      2 mg         30      2 mg         31      2 mg              Date Details   01/11 This INR check               How to take your warfarin dose     To take:  2 mg Take 1 of the 2 mg tablets.           February 2017 Details    Sun Mon Tue Wed Thu Fri Sat        1      2 mg         2      2 mg         3      2 mg         4      2 mg           5      2 mg         6      2 mg          7      2 mg         8            9               10               11                 12               13               14               15               16               17               18                 19               20               21               22               23               24               25                 26               27               28                    Date Details   No additional details    Date of next INR:  2/8/2017         How to take your warfarin dose     To take:  2 mg Take 1 of the 2 mg tablets.

## 2017-01-11 NOTE — PROGRESS NOTES
ANTICOAGULATION FOLLOW-UP CLINIC VISIT    Patient Name:  Mary Alice Cameron  Date:  1/11/2017  Contact Type:  Face to Face    SUBJECTIVE:     Patient Findings     Positives Medication Changes, Diet Changes, Antibiotic use or infection, Activity level change    Comments PAO2-97% w/3L cont O2; home measurements-low 90%'s w/cont 3L O2; below 88% w/o O2. States changed O2 liter flow from 2 to 3L/min as 2L was not helping.  States she uses O2 continuously.  Using Nebs as directed.  States she has a productive cough - yellow thick mucous - states it is a breathing effort to cough and tries to avoid this issue by not moving around too much.  Has no appetite/little interest, states it is a breathing effort to eat - states is trying to stay hydrated by drinking water, eating jello; states can not eat simple foods like soups due to the effort it takes.  States the ABX and Prednisone completed the course of treatment as directed.  Otic temp was 99.6 today; HR-75.   Pt informed Dr Yen would be notified of these findings today.  Pt states awareness to her appt with him tomorrow and plans to attend.             OBJECTIVE    INR PROTIME   Date Value Ref Range Status   01/11/2017 2.0* 0.86 - 1.14 Final       ASSESSMENT / PLAN  INR assessment THER    Recheck INR In: 4 WEEKS    INR Location Clinic      Anticoagulation Summary as of 1/11/2017     INR goal 2.0-3.0   Selected INR 2.0 (1/11/2017)   Maintenance plan 2 mg (2 mg x 1) every day   Full instructions 2 mg every day   Weekly total 14 mg   No change documented Kenna Storm RN   Plan last modified Kenna Storm RN (8/17/2016)   Next INR check 2/8/2017   Priority INR   Target end date Indefinite    Indications   Atrial fibrillation  persistent (H) [I48.1]  Long-term (current) use of anticoagulants [Z79.01] [Z79.01]         Anticoagulation Episode Summary     INR check location     Preferred lab     Send INR reminders to  ANTICO POOL    Comments       Anticoagulation  Care Providers     Provider Role Specialty Phone number    Braydon Yen MD [facility] [phone]            See the Encounter Report to view Anticoagulation Flowsheet and Dosing Calendar (Go to Encounters tab in chart review, and find the Anticoagulation Therapy Visit)        Kenna Storm RN

## 2017-01-12 ENCOUNTER — OFFICE VISIT (OUTPATIENT)
Dept: FAMILY MEDICINE | Facility: OTHER | Age: 67
End: 2017-01-12
Attending: FAMILY MEDICINE
Payer: COMMERCIAL

## 2017-01-12 ENCOUNTER — ANTICOAGULATION THERAPY VISIT (OUTPATIENT)
Dept: ANTICOAGULATION | Facility: OTHER | Age: 67
End: 2017-01-12

## 2017-01-12 ENCOUNTER — TELEPHONE (OUTPATIENT)
Dept: FAMILY MEDICINE | Facility: OTHER | Age: 67
End: 2017-01-12

## 2017-01-12 VITALS
BODY MASS INDEX: 27.12 KG/M2 | RESPIRATION RATE: 16 BRPM | DIASTOLIC BLOOD PRESSURE: 57 MMHG | HEART RATE: 65 BPM | SYSTOLIC BLOOD PRESSURE: 108 MMHG | HEIGHT: 65 IN | TEMPERATURE: 98.6 F | OXYGEN SATURATION: 96 % | WEIGHT: 162.8 LBS

## 2017-01-12 DIAGNOSIS — I48.19 ATRIAL FIBRILLATION, PERSISTENT (H): ICD-10-CM

## 2017-01-12 DIAGNOSIS — Z79.01 LONG-TERM (CURRENT) USE OF ANTICOAGULANTS: Primary | ICD-10-CM

## 2017-01-12 DIAGNOSIS — J44.1 COPD EXACERBATION (H): Primary | ICD-10-CM

## 2017-01-12 DIAGNOSIS — F41.9 ANXIETY: ICD-10-CM

## 2017-01-12 PROCEDURE — 99214 OFFICE O/P EST MOD 30 MIN: CPT | Performed by: FAMILY MEDICINE

## 2017-01-12 PROCEDURE — 99212 OFFICE O/P EST SF 10 MIN: CPT

## 2017-01-12 RX ORDER — DOXYCYCLINE 100 MG/1
100 CAPSULE ORAL 2 TIMES DAILY
Qty: 28 CAPSULE | Refills: 0 | Status: SHIPPED | OUTPATIENT
Start: 2017-01-12 | End: 2017-02-02

## 2017-01-12 RX ORDER — PREDNISONE 20 MG/1
TABLET ORAL
Qty: 20 TABLET | Refills: 0 | Status: SHIPPED | OUTPATIENT
Start: 2017-01-12 | End: 2017-01-25

## 2017-01-12 ASSESSMENT — PAIN SCALES - GENERAL: PAINLEVEL: NO PAIN (0)

## 2017-01-12 NOTE — PROGRESS NOTES
ANTICOAGULATION FOLLOW-UP CLINIC VISIT    Patient Name:  Mary Alice Cameron  Date:  1/12/2017  Contact Type:  Telephone    SUBJECTIVE:     Patient Findings     Positives Medication Changes    Comments Doxycycline and prednisone           OBJECTIVE    INR PROTIME   Date Value Ref Range Status   01/11/2017 2.0* 0.86 - 1.14 Final       ASSESSMENT / PLAN  INR assessment THER    Recheck INR In: 1 WEEK    INR Location Clinic      Anticoagulation Summary as of 1/12/2017     INR goal 2.0-3.0   Selected INR No new INR was available at the time of this encounter.   Maintenance plan 2 mg (2 mg x 1) every day   Full instructions 1/13: 1 mg; 1/14: 1 mg; 1/15: 1 mg; 1/17: 1 mg; Otherwise 2 mg every day   Weekly total 14 mg   Plan last modified Kenna Storm RN (8/17/2016)   Next INR check 1/18/2017   Priority INR   Target end date Indefinite    Indications   Atrial fibrillation  persistent (H) [I48.1]  Long-term (current) use of anticoagulants [Z79.01] [Z79.01]         Anticoagulation Episode Summary     INR check location     Preferred lab     Send INR reminders to  ANTICOAG POOL    Comments       Anticoagulation Care Providers     Provider Role Specialty Phone number    Braydon Yen MD [facility] [phone]            See the Encounter Report to view Anticoagulation Flowsheet and Dosing Calendar (Go to Encounters tab in chart review, and find the Anticoagulation Therapy Visit)        Kenna Kiran RN

## 2017-01-12 NOTE — NURSING NOTE
"Chief Complaint   Patient presents with     Hospital F/U     Pt is in for a FU after hospitalization on 001/02/17 for Bronchitis, COPD and A-fib. Pt has a cough with think yellow mucus. Pt is on continuous O2.       Initial /57 mmHg  Pulse 65  Temp(Src) 98.6  F (37  C) (Tympanic)  Resp 16  Ht 5' 5\" (1.651 m)  Wt 162 lb 12.8 oz (73.846 kg)  BMI 27.09 kg/m2  SpO2 96% Estimated body mass index is 27.09 kg/(m^2) as calculated from the following:    Height as of this encounter: 5' 5\" (1.651 m).    Weight as of this encounter: 162 lb 12.8 oz (73.846 kg).  BP completed using cuff size: jaylin Randle      "

## 2017-01-16 RX ORDER — WARFARIN SODIUM 2 MG/1
TABLET ORAL
Qty: 90 TABLET | Refills: 1 | Status: SHIPPED | OUTPATIENT
Start: 2017-01-16 | End: 2017-01-25

## 2017-01-18 ENCOUNTER — ANTICOAGULATION THERAPY VISIT (OUTPATIENT)
Dept: ANTICOAGULATION | Facility: OTHER | Age: 67
End: 2017-01-18
Attending: FAMILY MEDICINE
Payer: MEDICARE

## 2017-01-18 DIAGNOSIS — I48.19 ATRIAL FIBRILLATION, PERSISTENT (H): ICD-10-CM

## 2017-01-18 DIAGNOSIS — I48.19 PERSISTENT ATRIAL FIBRILLATION (H): Primary | ICD-10-CM

## 2017-01-18 DIAGNOSIS — Z79.01 LONG-TERM (CURRENT) USE OF ANTICOAGULANTS: Primary | ICD-10-CM

## 2017-01-18 LAB — INR POINT OF CARE: 1.7 (ref 0.86–1.14)

## 2017-01-18 PROCEDURE — 85610 PROTHROMBIN TIME: CPT | Mod: QW

## 2017-01-18 RX ORDER — WARFARIN SODIUM 2 MG/1
TABLET ORAL
Qty: 90 TABLET | Refills: 1 | Status: SHIPPED | OUTPATIENT
Start: 2017-01-18 | End: 2017-02-08

## 2017-01-18 NOTE — PROGRESS NOTES
ANTICOAGULATION FOLLOW-UP CLINIC VISIT    Patient Name:  Mary Alice Cameron  Date:  1/18/2017  Contact Type:  Face to Face    SUBJECTIVE:     Patient Findings     Positives Medication Changes, Antibiotic use or infection, Missed doses    Comments Coumadin adjustment made last wk (1/12/17) due to Doxycycline BID x 2 wk; Prednisone sliding scale-states taking 20mg daily x 3 days now.  Pt states other concerns about her health and inquiring if she should see a Pulmonologist.  Pt states she is trying to wean from cont O2 use to as needed during the day; states using approx 2x/day and measures her PA O2 to assist with determining if O2 is needed.  States has used cont at night for a long time.  States she does however feel much better than last wk.  Pt not using O2 at this appt today.  States coughing continues, but is improved.  States when she has been so sick and when not catching her breath, she becomes afraid.  (pt had a tear in her eye when voicing this concern).  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  Discussed the Care Coordination program at the [facility] and the services that can be provided.  Pt received a Care Coordination brochure.  Pt stated she was extremely interested in further pursuing participation in this program.  She was informed Dr Yen will be notified pertaining to the need for a referral to the Care Coordination program and also the pt's request for a Pulmonology consult.             OBJECTIVE    INR PROTIME   Date Value Ref Range Status   01/18/2017 1.7* 0.86 - 1.14 Final       ASSESSMENT / PLAN  INR assessment SUB    Recheck INR In: 1 WEEK    INR Location Clinic      Anticoagulation Summary as of 1/18/2017     INR goal 2.0-3.0   Selected INR 1.7! (1/18/2017)   Maintenance plan 2 mg (2 mg x 1) every day   Full instructions 2 mg  every day   Weekly total 14 mg   No change documented Kenna Storm RN   Plan last modified Kenna Storm RN (8/17/2016)   Next INR check 1/25/2017   Priority INR   Target end date Indefinite    Indications   Atrial fibrillation  persistent (H) [I48.1]  Long-term (current) use of anticoagulants [Z79.01] [Z79.01]         Anticoagulation Episode Summary     INR check location     Preferred lab     Send INR reminders to  ANTICO POOL    Comments       Anticoagulation Care Providers     Provider Role Specialty Phone number    Braydon Yen MD [facility] [phone]            See the Encounter Report to view Anticoagulation Flowsheet and Dosing Calendar (Go to Encounters tab in chart review, and find the Anticoagulation Therapy Visit)        Kenna Storm RN

## 2017-01-18 NOTE — MR AVS SNAPSHOT
Mary Alice Cameron   1/18/2017 11:15 AM   Anticoagulation Therapy Visit    Description:  66 year old female   Provider:  [facility]   Department:  [facility]           INR as of 1/18/2017     Selected INR 1.7! (1/18/2017)      Anticoagulation Summary as of 1/18/2017     INR goal 2.0-3.0   Selected INR 1.7! (1/18/2017)   Full instructions 2 mg every day   Next INR check 1/25/2017    Indications   Atrial fibrillation  persistent (H) [I48.1]  Long-term (current) use of anticoagulants [Z79.01] [Z79.01]         Your next Anticoagulation Clinic appointment(s)     Jan 25, 2017 10:15 AM   Anticoagulation Visit with [facility]   [facility]    [address] [facility] [address]   [phone]              January 2017 Details    Sun Mon Tue Wed Thu Fri Sat     1               2               3               4               5               6               7                 8               9               10               11               12               13               14                 15               16               17               18      2 mg   See details      19      2 mg         20      2 mg         21      2 mg           22      2 mg         23      2 mg         24      2 mg         25            26               27               28                 29               30               31                    Date Details   01/18 This INR check       Date of next INR:  1/25/2017         How to take your warfarin dose     To take:  2 mg Take 1 of the 2 mg tablets.

## 2017-01-18 NOTE — Clinical Note
Pt notified to schedule an appt to see you late next wk or the following - after the ABX are done.  She agreed to this plan.  She was also given information about the Senior Linkage Line.  Thanks for your feedback.  Kenna Epstein

## 2017-01-18 NOTE — PROGRESS NOTES
Pt contacted by  nurse on behalf of care coordination.  Discussed the Senior Linkage Line.  Pt states she contacted them a long time ago.  She stated she had their magnet with the phone number.  She does not recall too much about that encounter.  Pt advised to contact them again, follow the steps they suggest.  She was informed  This is one of the first steps advised by Care Coordination for pt's to do.  Pt stated understanding by stating she had not even thought about doing this before.  A booklet regarding services provided was offered to be mailed and pt stated she would like one.  Call ended with questions answered and understanding stated.

## 2017-01-18 NOTE — Clinical Note
Pt seen in AC clinic today. She is greatly improved since last wk; trying daytime F4-[id number]-rxofo 1-2x/day per PAO2 levels; using cont at night.  She was inquiring about the need to see a Pulmonologist; would like a referral and you setting this up.  She also has deep concerns about making ends meet, as well as staying out of the hospital due to the expense it creates.  We discussed the Care Coordination program; she is very interested in participation and would like to be enrolled.  Please complete a Care Coordination Referral and we will take it from there.  If you have any questions, feel free to contact me.  Thanks  Kenna Epstein

## 2017-01-25 ENCOUNTER — ANTICOAGULATION THERAPY VISIT (OUTPATIENT)
Dept: ANTICOAGULATION | Facility: OTHER | Age: 67
End: 2017-01-25
Attending: FAMILY MEDICINE
Payer: MEDICARE

## 2017-01-25 ENCOUNTER — CARE COORDINATION (OUTPATIENT)
Dept: CARE COORDINATION | Facility: OTHER | Age: 67
End: 2017-01-25

## 2017-01-25 DIAGNOSIS — Z79.01 LONG-TERM (CURRENT) USE OF ANTICOAGULANTS: Primary | ICD-10-CM

## 2017-01-25 DIAGNOSIS — I48.19 ATRIAL FIBRILLATION, PERSISTENT (H): ICD-10-CM

## 2017-01-25 DIAGNOSIS — Z76.89 HEALTH CARE HOME: Primary | ICD-10-CM

## 2017-01-25 DIAGNOSIS — J44.9 COPD (CHRONIC OBSTRUCTIVE PULMONARY DISEASE) (H): ICD-10-CM

## 2017-01-25 LAB — INR POINT OF CARE: 1.8 (ref 0.86–1.14)

## 2017-01-25 PROCEDURE — 85610 PROTHROMBIN TIME: CPT | Mod: QW

## 2017-01-25 NOTE — MR AVS SNAPSHOT
Mary Alice Cameron   1/25/2017 10:15 AM   Anticoagulation Therapy Visit    Description:  66 year old female   Provider:  [facility]   Department:  [facility]           INR as of 1/25/2017     Selected INR 1.8! (1/25/2017)      Anticoagulation Summary as of 1/25/2017     INR goal 2.0-3.0   Selected INR 1.8! (1/25/2017)   Full instructions 1/25: 3 mg; Otherwise 2 mg every day   Next INR check 2/2/2017    Indications   Atrial fibrillation  persistent (H) [I48.1]  Long-term (current) use of anticoagulants [Z79.01] [Z79.01]         January 2017 Details    Sun Mon Tue Wed Thu Fri Sat     1               2               3               4               5               6               7                 8               9               10               11               12               13               14                 15               16               17               18               19               20               21                 22               23               24               25      3 mg   See details      26      2 mg         27      2 mg         28      2 mg           29      2 mg         30      2 mg         31      2 mg              Date Details   01/25 This INR check               How to take your warfarin dose     To take:  2 mg Take 1 of the 2 mg tablets.    To take:  3 mg Take 1.5 of the 2 mg tablets.           February 2017 Details    Sun Mon Tue Wed Thu Fri Sat        1      2 mg         2            3               4                 5               6               7               8               9               10               11                 12               13               14               15               16               17               18                 19               20               21               22               23               24               25                 26               27               28                    Date Details   No additional details    Date  of next INR:  2/2/2017         How to take your warfarin dose     To take:  2 mg Take 1 of the 2 mg tablets.

## 2017-01-25 NOTE — Clinical Note
[facility] [place]  [address]  [facility] [id number] [phone]      January 26, 2017      Mary Alice Cameron  [address]    [facility] [address]      EMERGENCY CARE PLAN  Presenting Problem Treatment Plan   Questions or concerns during clinic hours    24 hour Nurse line available - Call main clinic line and follow prompts I will call the clinic directly: [phone]    24 Hour Nurse Line [phone]- Follow prompts and press option for nurse advisor    [facility]  [address] [place] [address]   Patient needs to schedule an appointment  I will call the scheduling team during business hours at [phone]   Same day treatment   I will call the clinic first, then urgent care if needed   Clinic Care Coordinators [facility]  RN Clinic Care Coordinator  [phone]    [phone]   Crisis Services:  Behavioral or Mental Health Behavioral Health Crisis [facility]  [phone]   Emergency treatment--Immediately CALL 911

## 2017-01-25 NOTE — PROGRESS NOTES
ANTICOAGULATION FOLLOW-UP CLINIC VISIT    Patient Name:  Mary Alice Cameron  Date:  1/25/2017  Contact Type:  Face to Face    SUBJECTIVE:     Patient Findings     Positives Medication Changes, Diet Changes, Antibiotic use or infection, Activity level change    Comments Has a couple Doxycycline doses remaining; states she forgot some days.  Prednisone completed course of treatment as scheduled.  States using Nebs every 6 hrs, not at night and using inhalers less frequently.  Reports her wt at 158# today; weighs self biwkly, appetite returning slowly.  Using O2 only at night; denies cough.  States is tired today and unsure why.  Has appt to follow up with PCP next wk.           OBJECTIVE    INR PROTIME   Date Value Ref Range Status   01/25/2017 1.8* 0.86 - 1.14 Final       ASSESSMENT / PLAN  INR assessment SUB    Recheck INR In: 1 WEEK    INR Location Clinic      Anticoagulation Summary as of 1/25/2017     INR goal 2.0-3.0   Selected INR 1.8! (1/25/2017)   Maintenance plan 2 mg (2 mg x 1) every day   Full instructions 1/25: 3 mg; Otherwise 2 mg every day   Weekly total 14 mg   Plan last modified Kenna Storm RN (8/17/2016)   Next INR check 2/2/2017   Priority INR   Target end date Indefinite    Indications   Atrial fibrillation  persistent (H) [I48.1]  Long-term (current) use of anticoagulants [Z79.01] [Z79.01]         Anticoagulation Episode Summary     INR check location     Preferred lab     Send INR reminders to  ANTICOAG POOL    Comments       Anticoagulation Care Providers     Provider Role Specialty Phone number    Braydon Yen MD [facility] [phone]            See the Encounter Report to view Anticoagulation Flowsheet and Dosing Calendar (Go to Encounters tab in chart review, and find the Anticoagulation Therapy Visit)        Kenna Storm RN

## 2017-01-26 DIAGNOSIS — I10 ESSENTIAL HYPERTENSION, BENIGN: Primary | ICD-10-CM

## 2017-01-26 PROBLEM — Z76.89 HEALTH CARE HOME: Status: ACTIVE | Noted: 2017-01-26

## 2017-01-26 RX ORDER — HYDRALAZINE HYDROCHLORIDE 25 MG/1
TABLET, FILM COATED ORAL
Qty: 810 TABLET | Refills: 3 | Status: SHIPPED | OUTPATIENT
Start: 2017-01-26 | End: 2017-12-12

## 2017-01-26 NOTE — PROGRESS NOTES
Clinic Care Coordination Contact  OUTREACH    Referral Information:  Referral Source: Care Team  Reason for Contact: COPD Exacerbation; recent lengthy hospitalization        Universal Utilization:   ED Visits in last year: 2  Hospital visits in last year: 3  Last PCP appointment: 01/12/17  Missed Appointments: 0     Multiple Providers or Specialists: Cardiology; Care Coordination, PCP, Anticoagulation, Dermatology    Clinical Concerns:  Current Medical Concerns: Pt reports since last wk has contacted the [facility] Line; has been directed to [employer] and is in the process of completing an application to determine qualifications and benefit eligibility. States she is also working on an application with [facility] for possible rental assistance.  States the contact so far has been beneficial.  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  Pt stated concerns R/T latest medical bills; pt given the contact information for [facility] [employer].  Discussed COPD issues.  States PAO2 ranges 95-97% with and w/out O2.  Uses O2 2/min cont @ night; prn during the day.  States using Inhalers less, as long as she Nebs self every 6 hrs/while awake.  States she sleeps in an elevated reclining chair, tho this is not new-states she has done this for quite some time.  Prednisone sliding scale completed course of treatment this past wk; still taking Doxycycline for a couple more days as she states she forgot to take it once in a while - will take all med till gone.  States concerns about wt loss; not particularly her wt of 158#.  Discussed her appetite is slowly improving.    Current Behavioral Concerns: None at this time  Education Provided to patient: COPD Action Plan, Guide to Living with Chronic Lung Disease handouts/booklet given to pt.  Education centered around the Action Plan and how to use, where to display this at home.  Discussed the use  of the booklet to increase her knowledge of COPD that will assist her in reaching her goals to stay out of the hospital/ED.  Pt stated interest in learning more about COPD and appreciated the information shared today.     Clinical Pathway: Clinic Care Coordination COPD Follow up Assessment  Symptom Review:    Are you having any increased shortness of breath? No  When you cough are you coughing up anything? Yes  Color: whitish/yellow   Consistency:  improving sometimes thick  Frequency depends on what activity she was doing; outdoors is difficult; discussed the use of a face mask last wk and pt was observed carrying this in during the visit today  What COPD Zone currently in:Green  What number would you call if you were in the YELLOW zones: Pt education provided on this information today as the COPD Action Plan was unfamiliar to the pt prior to the visit today     Medications:   Were you started on any new medications since the last time we talked?  No - finishing up the Doxycycline  Do you have all of your medications? Yes  Have you had trouble filling your prescriptions? No  Are you medications effective in controlling your symptoms? Yes, Prednisone and Doxycycline since hospital discharge have helped s/sx  Are you still on Prednisone (* does pt understand the tapering instructions)?  No - finished prescription within the past few days    Oxygen/DME  What is the current liter flow you are using? 2 L NC - Continuous at night; Intermittent during the day  Has there been any changes? No      Activity:  How much activity can you do before you are SOB? Difficulty being outdoors; have not increased activity too much yet  Have you had to reduce your activities because of dyspnea or other symptoms? Yes - no longer working at the outside job; has pretty much remained indoors in her apt due to illness     Diet:  Do you weigh yourself daily? No    Has there been a recent change in your weight? Yes, states weight was in the  160's until this recent illness - Wt today was 158#.   is ok with the wt overall, but concerned about the lack of appetite and the rapid rate of wt loss recently     Emotions/Lifestyle:    Do you smoke (if not asked upon initial assessment)? No    Follow Up Plan:  Care Coordinator follow up plan: follow up in 1-2 wks, jr following the PCP appt next wk  Referrals to consider: Discussed Pulmonology referral per pt request last wk to her PCP, who will discuss this further at the upcoming appt        Medication Management:  Manages own meds    Functional Status:  Mobility Status: Independent  Equipment Currently Used at Home: oxygen  Transportation: Drives, except will arrange rides when needed     Psychosocial:  Current living arrangement:: I live in a private home  Financial/Insurance: Referred to Sr Linkage Line and [employer].  Pt  is in the process of filing applications with [employer] and [employer] for rental assistance, whom she has been in contact with     Resources and Interventions:  Current Resources:  PCP; Care Coordination, Anticoagulation Therapy; Dermatology; Cardiology;       Senior Linkage Line Referral Placed: 01/18/17           Goals:   Goal 1 Statement:  (to manage financially w/out working outside the home)  Goal 2 Statement:  (to remain in the COPD Green Zone; stay out of the hosp)     Strengths: Motivated to understand more about living with COPD and pursuing financial assistance  Patient/Caregiver understanding:States understanding all aspects of today's visit  Frequency of Care Coordination: 1-2 wks  Upcoming appointment: 02/02/17 (Dr Yen)     Plan: Care Coordination to contact the pt following her office/exam with Dr Farshad Storm RN  Care Coordination

## 2017-02-02 ENCOUNTER — OFFICE VISIT (OUTPATIENT)
Dept: FAMILY MEDICINE | Facility: OTHER | Age: 67
End: 2017-02-02
Attending: FAMILY MEDICINE
Payer: MEDICARE

## 2017-02-02 ENCOUNTER — ANTICOAGULATION THERAPY VISIT (OUTPATIENT)
Dept: ANTICOAGULATION | Facility: OTHER | Age: 67
End: 2017-02-02

## 2017-02-02 VITALS
HEIGHT: 65 IN | RESPIRATION RATE: 20 BRPM | SYSTOLIC BLOOD PRESSURE: 138 MMHG | BODY MASS INDEX: 27.32 KG/M2 | DIASTOLIC BLOOD PRESSURE: 76 MMHG | WEIGHT: 164 LBS | OXYGEN SATURATION: 96 % | TEMPERATURE: 98.4 F | HEART RATE: 98 BPM

## 2017-02-02 DIAGNOSIS — I48.19 ATRIAL FIBRILLATION, PERSISTENT (H): ICD-10-CM

## 2017-02-02 DIAGNOSIS — Z79.01 LONG-TERM (CURRENT) USE OF ANTICOAGULANTS: Primary | ICD-10-CM

## 2017-02-02 DIAGNOSIS — J44.1 COPD EXACERBATION (H): Primary | ICD-10-CM

## 2017-02-02 DIAGNOSIS — Z79.01 LONG-TERM (CURRENT) USE OF ANTICOAGULANTS: ICD-10-CM

## 2017-02-02 LAB — INR BLD: 1.5 (ref 0.86–1.14)

## 2017-02-02 PROCEDURE — 99212 OFFICE O/P EST SF 10 MIN: CPT

## 2017-02-02 PROCEDURE — 99213 OFFICE O/P EST LOW 20 MIN: CPT | Performed by: FAMILY MEDICINE

## 2017-02-02 PROCEDURE — 85610 PROTHROMBIN TIME: CPT | Mod: QW | Performed by: FAMILY MEDICINE

## 2017-02-02 PROCEDURE — 36416 COLLJ CAPILLARY BLOOD SPEC: CPT | Performed by: FAMILY MEDICINE

## 2017-02-02 ASSESSMENT — PAIN SCALES - GENERAL: PAINLEVEL: NO PAIN (0)

## 2017-02-02 NOTE — PROGRESS NOTES
ANTICOAGULATION FOLLOW-UP CLINIC VISIT    Patient Name:  Mary Alice Cameron  Date:  2/2/2017  Contact Type:  Telephone    SUBJECTIVE:     Patient Findings     Positives Diet Changes, Antibiotic use or infection, Activity level change    Comments ABX has ended; states she saw Dr Yen today who informed the pt she is back in Atrial Fib.  Pt stated she is to seek medical attention if the HR goes above 155/min; pt self monitors at home.  Medications were reviewed by the PCP today.  Discussed diet/Vit K containing foods that can lower the INR.  Pt stated she has been eating greens sporadically or none while ill.  States she likes green tea.  Discussed consistency today with the meal planning.  States her PCP visit was beneficial.  Discussed Coumadin dosing plan and will have the INR checked next wk.             OBJECTIVE    INR POINT OF CARE   Date Value Ref Range Status   02/02/2017 1.5* 0.86 - 1.14 Final     Comment:     This test is intended for monitoring Coumadin therapy.  Results are not   accurate   in patients with prolonged INR due to factor deficiency.         ASSESSMENT / PLAN  No question data found.  Anticoagulation Summary as of 2/2/2017     INR goal 2.0-3.0   Selected INR 1.5! (2/2/2017)   Maintenance plan 2 mg (2 mg x 1) every day   Full instructions 2/2: 4 mg; 2/3: 4 mg; 2/6: 4 mg; Otherwise 2 mg every day   Weekly total 14 mg   Plan last modified Kenna Storm RN (8/17/2016)   Next INR check 2/8/2017   Priority INR   Target end date Indefinite    Indications   Atrial fibrillation  persistent (H) [I48.1]  Long-term (current) use of anticoagulants [Z79.01] [Z79.01]         Anticoagulation Episode Summary     INR check location     Preferred lab     Send INR reminders to  ANTICOAG POOL    Comments       Anticoagulation Care Providers     Provider Role Specialty Phone number    Braydon Yen MD [facility] [phone]            See the Encounter Report to view Anticoagulation  Flowsheet and Dosing Calendar (Go to Encounters tab in chart review, and find the Anticoagulation Therapy Visit)        Kenna Storm RN

## 2017-02-02 NOTE — NURSING NOTE
"Chief Complaint   Patient presents with     COPD     Pt is in for a FU on COPD. Pt has difficulty with cold air. Pt is still having problems with her breathing. Pt suses O2 at HS and during the day prn. Pt has an occasional burning sensation in her lungs.     Atrial Fib     Pt is in for a FU on a-fib.       Initial /76 mmHg  Pulse 98  Temp(Src) 98.4  F (36.9  C) (Tympanic)  Resp 20  Ht 5' 5\" (1.651 m)  Wt 164 lb (74.39 kg)  BMI 27.29 kg/m2  SpO2 96% Estimated body mass index is 27.29 kg/(m^2) as calculated from the following:    Height as of this encounter: 5' 5\" (1.651 m).    Weight as of this encounter: 164 lb (74.39 kg).  BP completed using cuff size: jaylin Randle      "

## 2017-02-02 NOTE — PROGRESS NOTES
Mary Alice Cameron    February 2, 2017    Chief Complaint   Patient presents with     COPD     Pt is in for a FU on COPD. Pt has difficulty with cold air. Pt is still having problems with her breathing. Pt suses O2 at HS and during the day prn. Pt has an occasional burning sensation in her lungs.     Atrial Fib     Pt is in for a FU on a-fib.       SUBJECTIVE:  Here for f/u.  Doing better now after the doxy.  No recurrence of sx.  Done with steroid and abx.  Continues with nebs.  F/u afib doing well without issue.  We reveiwed.      Past Medical History   Diagnosis Date     Asthma      Depression      Atrial fibrillation (H)      COPD (chronic obstructive pulmonary disease) (H)      High cholesterol      HTN (hypertension)      Thyroid disease        Past Surgical History   Procedure Laterality Date     Sling bladder suspension with fascia scar       Back surgery  2006     Hysterectomy  1980     partial     Colonoscopy N/A 1/19/2016     Procedure: COLONOSCOPY;  Surgeon: Waldemar Bob MD;  Location: [facility]       Current Outpatient Prescriptions   Medication Sig Dispense Refill     hydrALAZINE (APRESOLINE) 25 MG tablet TAKE 3 TABLETS BY MOUTH THREE TIMES DAILY 810 tablet 3     warfarin (COUMADIN) 2 MG tablet TAKE 1 TABLET BY MOUTH EVERY DAY 90 tablet 1     furosemide (LASIX) 40 MG tablet TAKE 1 TABLET BY MOUTH TWICE DAILY. 180 tablet 2     ipratropium - albuterol 0.5 mg/2.5 mg/3 mL (DUONEB) 0.5-2.5 (3) MG/3ML neb solution USE 1 VIAL PER NEBULIZER FOUR TIMES DAILY 6 Box 3     albuterol (2.5 MG/3ML) 0.083% neb solution Take 1 vial (2.5 mg) by nebulization every 6 hours as needed for shortness of breath / dyspnea or wheezing 25 vial 1     albuterol (VENTOLIN HFA) 108 (90 BASE) MCG/ACT inhaler Inhale 2 puffs into the lungs every 4 hours as needed for shortness of breath / dyspnea or wheezing 3 Inhaler 0     cloNIDine (CATAPRES) 0.1 MG tablet Take 1 tablet (0.1 mg) by mouth every morning 90 tablet 0     cloNIDine (CATAPRES)  0.1 MG tablet Take 3 tablets (0.3 mg) by mouth every evening 90 tablet 0     triamcinolone (KENALOG) 0.1 % ointment Apply bid and hs left hand and legs - for dermatitis 454 g 3     flecainide (TAMBOCOR) 100 MG tablet Take 1 tablet (100 mg) by mouth 2 times daily 180 tablet 3     simvastatin (ZOCOR) 10 MG tablet TAKE 1 TABLET(10 MG) BY MOUTH AT BEDTIME 90 tablet 3     levothyroxine (SYNTHROID,LEVOTHROID) 100 MCG tablet Take 1 tablet (100 mcg) by mouth daily 90 tablet 3     potassium chloride SA (K-DUR,KLOR-CON M) 20 MEQ tablet Take 1 tablet (20 mEq) by mouth 2 times daily 180 tablet 3     diphenhydrAMINE (BENADRYL) 25 MG tablet Take 1-2 tablets (25-50 mg) by mouth every 6 hours as needed for itching or allergies 60 tablet 1     acetaminophen (TYLENOL) 500 MG tablet Take 500-1,000 mg by mouth every 6 hours as needed for mild pain       OTHER MEDICAL SUPPLIES Apply one pair to help with edema 1 each 0     LORazepam (ATIVAN) 1 MG tablet Take 0.5-1 tablets by mouth every 6 hours as needed         Allergies   Allergen Reactions     Amlodipine Besylate Cough     Norvasc     Lisinopril Cough       Family History   Problem Relation Age of Onset     Asthma Mother      Asthma Brother      CANCER Mother      ovarian     Prostate Cancer Father      Hypertension Father      Hypertension Mother      Hypertension Sister      Hypertension Brother      Heart Failure Father      CHF       Social History     Social History     Marital Status:      Spouse Name: N/A     Number of Children: N/A     Years of Education: N/A     Occupational History      Retired     disabled     Social History Main Topics     Smoking status: Former Smoker -- 48 years     Types: Cigarettes     Quit date: 01/28/2007     Smokeless tobacco: Never Used     Alcohol Use: No     Drug Use: No     Sexual Activity: No      Comment:      Other Topics Concern      Service No     Blood Transfusions Yes     Permits if needed     Caffeine Concern Yes      2 cups coffee daily     Seat Belt Yes     Parent/Sibling W/ Cabg, Mi Or Angioplasty Before 65f 55m? No     Social History Narrative       5 point ROS negative except as noted above in HPI, including Gen., Resp., CV, GI &  system review.     OBJECTIVE:  B/P: 138/76, T: 98.4, P: 98, R: 20    GENERAL APPEARANCE: Alert, no acute distress  CV: irregular rate and rhythm, no murmur, rub or gallop  RESP: lungs clear to auscultation bilaterally with decreased breath sounds throughout.    ABDOMEN: normal bowel sounds, soft, nontender, no hepatosplenomegaly or other masses  SKIN: no suspicious lesions or rashes to visualized skin  NEURO: Alert, oriented x 3, speech and mentation normal    ASSESSMENT and PLAN:  (J44.1) COPD exacerbation (H)  (primary encounter diagnosis)  Comment: improved.    Plan: stable.  Oxygen good.  Lungs clear.  Clinically improved following the doxy.  F/u when needed.  No change in the plan right now.  Can't really sleep without the head being elevated and would like a hospital bed script.  I wrote one for the copd and orthopnea.      (I48.1) Atrial fibrillation, persistent (H)  Comment: ongoing  Plan: in fib right now.  We discussed.  On the coumadin, finally doing better with the lungs.  Recheck INR today per CC recommendations.

## 2017-02-03 ENCOUNTER — CARE COORDINATION (OUTPATIENT)
Dept: CARE COORDINATION | Facility: OTHER | Age: 67
End: 2017-02-03

## 2017-02-03 NOTE — PROGRESS NOTES
Clinic Care Coordination Contact  OUTREACH    Referral Information:  Referral Source: Care Team  Reason for Contact: Follow up post PCP visit R/T COPD; financial needs        Universal Utilization:   ED Visits in last year: 2  Hospital visits in last year: 3  Last PCP appointment: 01/12/17  Missed Appointments: 0     Multiple Providers or Specialists: Cardiology; Care Coordination, PCP, Anticoagulation, Dermatology    Clinical Concerns:  Current Medical Concerns: States she has received a decrease in her rent already; states she has returned the required documents to [facility] for further financial assistance.  Pt states her goal, especially after discussing her health issues with Dr Yen is not have to return to work.  States she forgot to ask about a Pulmonary Referral; discussed Pulmonary Rehab as a consideration to further discuss with him.  The benefits of Pul Rehab discussed and educated provided today.  Pt was advised to contact Dr Yen to check into either issue, as he had indicated earlier that he preferred to evaluate the pt before a referral would be made.  She stated he wrote a script for a hospital bed; she was going to pursue this with [employer] on Friday.  She states she is tired of sleeping in her recliner and can not position self in a regular bed.  States she was informed she is back in Atrial Fib; PCP discussed this issue and informed her to self monitor her heart rate routinely; he informed her to contact him if the HR stays above 155/min.        Education Provided to patient: Discussed and provided education on the benefits of Pulmonary Rehab.        Clinical Pathway: Clinic Care Coordination COPD Follow up Assessment  Symptom Review:    Are you having any increased shortness of breath? Depends on activity.  States is better today  When you cough are you coughing up anything? Occassional cough  Color whitish  Consistency moist  Frequency Occassional  What COPD Zone currently  in:Green  What number would you call if you were in the YELLOW zones: Pt states understanding/awareness of this information and will follow the COPD Action Plan     Medications:   Were you started on any new medications since the last time we talked?  No  Do you have all of your medications? Yes - Antibiotics have completed course of treatment  Have you had trouble filling your prescriptions? No  Are you medications effective in controlling your symptoms? Yes, At this time she is improved  Are you still on Prednisone (* does pt understand the tapering instructions)?  No  Oxygen/DME  What is the current liter flow you are using? 2 L/Min via NC Intermittent during day - uses continuous at night  Has there been any changes? No      Activity:  How much activity can you do before you are SOB? Activity is slowly improving as sx have improved  Have you had to reduce your activities because of dyspnea or other symptoms? Since recent hospitalization she has had to stop working and is hoping she will not have to return (financial considerations)    Emotions/Lifestyle:    Do you smoke (if not asked upon initial assessment)? No    Follow Up Plan:  Care Coordinator follow up plan: Follow up on At Fib (new issue) and COPD issues as well as providing any further assistance with financial connections     Medication Management:  Manages own medications independently     Functional Status:  Mobility Status: Independent  Equipment Currently Used at Home: oxygen  Transportation: Drives self       Psychosocial:  Current living arrangement:: I live in a private home  Financial/Insurance: has submitted paperwork to [facility]; currently has received a rent reduction       Resources and Interventions:  Current Resources:  PCP, Anticoagulation Clinic; Care Coordination       Senior Linkage Line Referral Placed: 01/18/17      Goals:   Goal 1 Statement:  (to manage financially w/out working outside the home)  Goal 2 Statement:  (to remain  in the COPD Green Zone; stay out of the hosp)     Strengths: Motivation to improve her health issues and to not have to return to work, by seeking the other options  Patient/Caregiver understanding: States fully understanding the plan of care and contact information  Frequency of Care Coordination: 1-2 wks  Upcoming appointment: 02/08/2017 Anticoagulation     Plan: Discuss current issues at the upcoming AC visit, incorporating Care Coordination with this visit    Kenna Storm RN  Care Coordination

## 2017-02-07 ENCOUNTER — TELEPHONE (OUTPATIENT)
Dept: FAMILY MEDICINE | Facility: OTHER | Age: 67
End: 2017-02-07

## 2017-02-07 DIAGNOSIS — J44.1 CHRONIC OBSTRUCTIVE PULMONARY DISEASE WITH ACUTE EXACERBATION (H): Primary | ICD-10-CM

## 2017-02-07 NOTE — TELEPHONE ENCOUNTER
Pt calls she forgot to aks you if you think it would be a good idea for her to see a pulmonologist?

## 2017-02-08 ENCOUNTER — CARE COORDINATION (OUTPATIENT)
Dept: CARE COORDINATION | Facility: OTHER | Age: 67
End: 2017-02-08

## 2017-02-08 ENCOUNTER — ANTICOAGULATION THERAPY VISIT (OUTPATIENT)
Dept: ANTICOAGULATION | Facility: OTHER | Age: 67
End: 2017-02-08
Attending: FAMILY MEDICINE
Payer: MEDICARE

## 2017-02-08 DIAGNOSIS — I48.19 PERSISTENT ATRIAL FIBRILLATION (H): ICD-10-CM

## 2017-02-08 DIAGNOSIS — Z79.01 LONG-TERM (CURRENT) USE OF ANTICOAGULANTS: Primary | ICD-10-CM

## 2017-02-08 DIAGNOSIS — I48.19 ATRIAL FIBRILLATION, PERSISTENT (H): ICD-10-CM

## 2017-02-08 LAB — INR POINT OF CARE: 1.8 (ref 0.86–1.14)

## 2017-02-08 PROCEDURE — 85610 PROTHROMBIN TIME: CPT | Mod: QW

## 2017-02-08 RX ORDER — WARFARIN SODIUM 2 MG/1
TABLET ORAL
Qty: 90 TABLET | Refills: 1 | COMMUNITY
Start: 2017-02-08 | End: 2017-03-01

## 2017-02-08 NOTE — PROGRESS NOTES
Clinic Care Coordination Contact  OUTREACH    Referral Information:  Referral Source: Care Team  Reason for Contact: COPD Follow up; Financial concerns follow up        Universal Utilization:   ED Visits in last year: 2  Hospital visits in last year: 3  Last PCP appointment: 01/12/17  Missed Appointments: 0     Multiple Providers or Specialists: Cardiology; Care Coordination, PCP, Anticoagulation, Dermatology; Pulmonology Referral    Clinical Concerns:  Current Medical Concerns: Discussed current COPD concerns.  States she followed up with her PCP pertaining to the possibility of a Pulmonary Referral, based on last wk's Care Coordination visit.  States her PCP agreed with this plan and offered the referral to [place] or [place].  Pt states she agreed to scheduling this appt in [place] as it is difficult to travel to [place] for appts.  She requested this decision be passed on by this writer to her PCP.  Discussed/Explained the possibility of receiving Pulmonary Rehab services.  Pt indicated she was not familiar with such; pt given information and recommended to discuss this program with the Pulmonologist.  States she is overall improving slowly; pt came in today wearing a face mask, due to cold/windy weather today.  Pt skin was warm, dry and had pink in her cheeks today.  Discussed financial concerns; states she has submitted all forms, as she knows to [facility] and has not heard yet, but was not concerned by this as she stated she knows it can take time.  States the reduction in her monthly rent changed her payment by half.  States she is more encouraged she will not have to return to her job and has discussed this with her employer.  Pt having a slight flare of the dermatitis issue on her L hand index finger knuckle; states she recently went back to applying the cream prescribed by Dr Gomez for this, which cleared the issue prior.       Education Provided to patient: Provided education/information about the  potential benefits of participating in Pulmonary Rehab; pt states she will check into this issue during the Pulmonary visit.   Clinical Pathway Name: COPD  Clinical Pathway: Clinic Care Coordination COPD Assessment    Day of hospital discharge: Approx 6 wks ago  Have the follow up appointments been scheduled? Yes  If not, can I help you set up theses appointments? N/A  Transportation concerns? No - pt improved and is able to drive self to appts  Is there a referral for Pulmonary Rehab? No - Pt has initial Pulmonology Referral scheduled for 2/14/17 w/Dr Madison    Symptom Review:    How have you been feeling now that you are home? Slowly improving  Are you having any increased shortness of breath? Depends on the day and the activity-states using Nebulizer treatments QID  When you cough, are you coughing up anything? Occassional   Do you have a COPD Action Plan?  Yes **send pt a copy  Is the COPD Action Plan on refrigerator or available: Yes   reviewed  What COPD Zone currently in:Green  What number would you call if you were in the YELLOW zones: Pt states awareness to contact information      Medications:    Were you started on any new medications?  No  Were any of your previous medications changed? No  Do you have all of your medications? Yes  Have you had trouble filling your prescriptions? No  Are you medications effective in controlling your symptoms? Yes  Are you currently on Prednisone - NO    Oxygen/DME:    Are you currently on oxygen? Yes   Is this new for you? No   Is it set up at home? Yes   What liter flow were you instructed to use and how often do you use it? 2L/min via NC continuous at night; daytime as needed  Review with patient how to use/maintain nebulizers and inhalers: States awareness of their use and had no questions.      Activity:    How much activity can you do before you are SOB? States is slowly building self up from most recent hospitalization   Have you had to reduce your activities  because of dyspnea or other symptoms? 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       Emotions/Lifestyle:    Do you smoke? No    Care Coordinator follow up plan: Contact pt in 2 wks  Referrals to consider: Pulmonary Rehab; Pt has initial Pulmonology visit 2/14/17      Medication Management:  Pt manages meds independently and without questions    Functional Status:  Mobility Status: Independent  Equipment Currently Used at Home: oxygen  Transportation: Drives self      Psychosocial:  Current living arrangement:: I live in a private home  Financial/Insurance: has applied for assistance with [employer]; awaiting further information.  Recently received a reduced [employer] rent     Resources and Interventions:  Current Resources:  Care Coordination; Anticoagulation; PCP; Cardiology     Senior Linkage Line Referral Placed: 01/18/17     Goals:   Goal 1 Statement:  (to manage financially w/out working outside the home)  Goal 2 Statement:  (to remain in the COPD Green Zone; stay out of the hosp)     Strengths: Pt motivation to improving her health, educating self pertaining to COPD  Patient/Caregiver understanding: Visit ended with pt stating understanding and questions answered today  Frequency of Care Coordination: 1-2 wks  Upcoming appointment: 02/14/17 (Pulmonology Referral)     Plan: Care Coordination to follow up with pt on 2/22/17    Kenna Storm RN  Care Coordinator

## 2017-02-08 NOTE — Clinical Note
Pt is interested in a Pulmonary Referral; states she prefers to stay in [place] and with [place].  She asked me to message you with this information to make the referral/appt.  Thanks  Kenna Epstein

## 2017-02-08 NOTE — PROGRESS NOTES
ANTICOAGULATION FOLLOW-UP CLINIC VISIT    Patient Name:  Mary Alice Cameron  Date:  2/8/2017  Contact Type:  Face to Face    SUBJECTIVE:     Patient Findings     Positives No Problem Findings, Unexplained INR or factor level change    Comments States is very slowly improving.  States she can get a referral for a Pulmonary specialist and prefers to stay in [place] for this appt.  Pt requested to this nurse to inform her PCP of this choice.  Discussed an overall Coumadin dose change due to the low INR results since the hospitalization approx 4-6wks ago.  Pt agreed to this plan.             OBJECTIVE    INR PROTIME   Date Value Ref Range Status   02/08/2017 1.8* 0.86 - 1.14 Final       ASSESSMENT / PLAN  INR assessment SUB    Recheck INR In: 2 WEEKS    INR Location Clinic      Anticoagulation Summary as of 2/8/2017     INR goal 2.0-3.0   Selected INR 1.8! (2/8/2017)   Maintenance plan 3 mg (2 mg x 1.5) on Mon, Wed, Fri; 2 mg (2 mg x 1) all other days   Full instructions 3 mg on Mon, Wed, Fri; 2 mg all other days   Weekly total 17 mg   Plan last modified Kenna Storm RN (2/8/2017)   Next INR check 2/22/2017   Priority INR   Target end date Indefinite    Indications   Atrial fibrillation  persistent (H) [I48.1]  Long-term (current) use of anticoagulants [Z79.01] [Z79.01]         Anticoagulation Episode Summary     INR check location     Preferred lab     Send INR reminders to  ANTICOAG POOL    Comments       Anticoagulation Care Providers     Provider Role Specialty Phone number    Braydon Yen MD [facility] [phone]            See the Encounter Report to view Anticoagulation Flowsheet and Dosing Calendar (Go to Encounters tab in chart review, and find the Anticoagulation Therapy Visit)        Kenna Storm RN

## 2017-02-08 NOTE — MR AVS SNAPSHOT
Mary Alice Cameron   2/8/2017 10:00 AM   Anticoagulation Therapy Visit    Description:  66 year old female   Provider:  [facility]   Department:  [facility]           INR as of 2/8/2017     Selected INR 1.8! (2/8/2017)      Anticoagulation Summary as of 2/8/2017     INR goal 2.0-3.0   Selected INR 1.8! (2/8/2017)   Full instructions 3 mg on Mon, Wed, Fri; 2 mg all other days   Next INR check 2/22/2017    Indications   Atrial fibrillation  persistent (H) [I48.1]  Long-term (current) use of anticoagulants [Z79.01] [Z79.01]         Your next Anticoagulation Clinic appointment(s)     Feb 22, 2017 10:15 AM   Anticoagulation Visit with [facility]   [facility]    [address]  [facility] [address]   [phone]              February 2017 Details    Sun Mon Tue Wed Thu Fri Sat        1               2               3               4                 5               6               7               8      3 mg   See details      9      2 mg         10      3 mg         11      2 mg           12      2 mg         13      3 mg         14      2 mg         15      3 mg         16      2 mg         17      3 mg         18      2 mg           19      2 mg         20      3 mg         21      2 mg         22            23               24               25                 26               27               28                    Date Details   02/08 This INR check       Date of next INR:  2/22/2017         How to take your warfarin dose     To take:  2 mg Take 1 of the 2 mg tablets.    To take:  3 mg Take 1.5 of the 2 mg tablets.

## 2017-02-14 ENCOUNTER — OFFICE VISIT (OUTPATIENT)
Dept: SLEEP MEDICINE | Facility: HOSPITAL | Age: 67
End: 2017-02-14
Attending: FAMILY MEDICINE
Payer: MEDICARE

## 2017-02-14 ENCOUNTER — ANTICOAGULATION THERAPY VISIT (OUTPATIENT)
Dept: ANTICOAGULATION | Facility: OTHER | Age: 67
End: 2017-02-14

## 2017-02-14 VITALS
SYSTOLIC BLOOD PRESSURE: 104 MMHG | WEIGHT: 160 LBS | HEART RATE: 62 BPM | BODY MASS INDEX: 26.66 KG/M2 | RESPIRATION RATE: 20 BRPM | HEIGHT: 65 IN | DIASTOLIC BLOOD PRESSURE: 52 MMHG | OXYGEN SATURATION: 93 %

## 2017-02-14 DIAGNOSIS — J45.40 MODERATE PERSISTENT ASTHMA WITHOUT COMPLICATION: Primary | ICD-10-CM

## 2017-02-14 DIAGNOSIS — I48.19 ATRIAL FIBRILLATION, PERSISTENT (H): ICD-10-CM

## 2017-02-14 DIAGNOSIS — Z79.01 LONG-TERM (CURRENT) USE OF ANTICOAGULANTS: ICD-10-CM

## 2017-02-14 PROCEDURE — 99212 OFFICE O/P EST SF 10 MIN: CPT | Performed by: INTERNAL MEDICINE

## 2017-02-14 PROCEDURE — 99212 OFFICE O/P EST SF 10 MIN: CPT

## 2017-02-14 RX ORDER — PREDNISONE 10 MG/1
TABLET ORAL
Qty: 60 TABLET | Refills: 0 | Status: SHIPPED | OUTPATIENT
Start: 2017-02-14 | End: 2017-03-15

## 2017-02-14 RX ORDER — MONTELUKAST SODIUM 10 MG/1
10 TABLET ORAL AT BEDTIME
Qty: 30 TABLET | Refills: 0 | Status: SHIPPED | OUTPATIENT
Start: 2017-02-14 | End: 2017-03-25

## 2017-02-14 ASSESSMENT — ENCOUNTER SYMPTOMS
SPUTUM PRODUCTION: 1
WHEEZING: 1
SHORTNESS OF BREATH: 1
PND: 0
HEADACHES: 0
COUGH: 1
ORTHOPNEA: 0

## 2017-02-14 NOTE — PROGRESS NOTES
Roomed patient, verified ID by name and .  Took vitals and brief history.  Reviewed medications, allergies and smoking history.

## 2017-02-14 NOTE — PROGRESS NOTES
ANTICOAGULATION FOLLOW-UP CLINIC VISIT    Patient Name:  Mary Alice Cameron  Date:  2/14/2017  Contact Type:  Telephone    SUBJECTIVE:     Patient Findings     Positives Change in medications    Comments Pt telephoned the AC clinic today to report she saw Dr Farrell, Pulmonology today.  States med changes were Prednisone x 30 days at differing doses and Montelukast 10mg x 30 days.  She is to follow up in 30 days.  Discussed Coumadin dosing, discussed keeping the dose without changes and maintaining the previously scheduled INR recheck for 2/22/17.  Call ended with questions answered and understanding stated.             OBJECTIVE    INR Protime   Date Value Ref Range Status   02/08/2017 1.8 (A) 0.86 - 1.14 Final       ASSESSMENT / PLAN  No question data found.  Anticoagulation Summary as of 2/14/2017     INR goal 2.0-3.0   Today's INR No new INR was available at the time of this encounter.   Maintenance plan 3 mg (2 mg x 1.5) on Mon, Wed, Fri; 2 mg (2 mg x 1) all other days   Full instructions 3 mg on Mon, Wed, Fri; 2 mg all other days   Weekly total 17 mg   No change documented Kenna Storm RN   Plan last modified Kenna Storm RN (2/8/2017)   Next INR check 2/22/2017   Priority INR   Target end date Indefinite    Indications   Atrial fibrillation  persistent (H) [I48.1]  Long-term (current) use of anticoagulants [Z79.01] [Z79.01]         Anticoagulation Episode Summary     INR check location     Preferred lab     Send INR reminders to [facility] POOL    Comments       Anticoagulation Care Providers     Provider Role Specialty Phone number    Braydon Yen MD [facility] [phone]            See the Encounter Report to view Anticoagulation Flowsheet and Dosing Calendar (Go to Encounters tab in chart review, and find the Anticoagulation Therapy Visit)        Kenna Storm RN

## 2017-02-14 NOTE — MR AVS SNAPSHOT
After Visit Summary   2/14/2017    Mary Alice Cameron    MRN: [id number]           Patient Information     Date Of Birth          1950        Visit Information        Provider Department      2/14/2017 11:00 AM Hayes Farrell MD [facility]        Today's Diagnoses     Moderate persistent asthma without complication    -  1       Follow-ups after your visit        Your next 10 appointments already scheduled     Feb 22, 2017 10:15 AM CST   Anticoagulation Visit with NA ANTI COAGULATION   [facility] ([facility])    [address]  [facility] [address]   [phone]            Mar 15, 2017 11:00 AM CDT   Return Visit with Hayes Farrell MD   [facility] ([facility] )    [address] [id number] [phone]            May 23, 2017  8:30 AM CDT   (Arrive by 8:15 AM)   Return Visit with Eliezer Myers MD   [facility] ([facility])    [address] [facility] [id number] [phone]              Who to contact     If you have questions or need follow up information about today's clinic visit or your schedule please contact [facility] directly at [phone].  Normal or non-critical lab and imaging results will be communicated to you by MyChart, letter or phone within 4 business days after the clinic has received the results. If you do not hear from us within 7 days, please contact the clinic through CyberArt[employer]art or phone. If you have a critical or abnormal lab result, we will notify you by phone as soon as possible.  Submit refill requests through [employer] or call your pharmacy and they will forward the refill request to us. Please allow 3 business days for your refill to be completed.          Additional Information About Your Visit        CyberArtshart Information     Collect[employer] gives you secure access to your electronic health record. If you see a primary care provider, you can also send messages to your care team and make  "appointments. If you have questions, please call your primary care clinic.  If you do not have a primary care provider, please call [phone] and they will assist you.        Care EveryWhere ID     This is your Care EveryWhere ID. This could be used by other organizations to access your [place] medical records  [id number]        Your Vitals Were     Pulse Respirations Height Pulse Oximetry BMI (Body Mass Index)       62 20 5' 5\" (1.651 m) 93% 26.63 kg/m2        Blood Pressure from Last 3 Encounters:   02/14/17 104/52   02/02/17 138/76   01/12/17 108/57    Weight from Last 3 Encounters:   02/14/17 160 lb (72.6 kg)   02/02/17 164 lb (74.4 kg)   01/12/17 162 lb 12.8 oz (73.8 kg)              Today, you had the following     No orders found for display         Today's Medication Changes          These changes are accurate as of: 2/14/17 11:43 AM.  If you have any questions, ask your nurse or doctor.               Start taking these medicines.        Dose/Directions    montelukast 10 MG tablet   Commonly known as:  SINGULAIR   Used for:  Moderate persistent asthma without complication   Started by:  Hayes Farrell MD        Dose:  10 mg   Take 1 tablet (10 mg) by mouth At Bedtime   Quantity:  30 tablet   Refills:  0       predniSONE 10 MG tablet   Commonly known as:  DELTASONE   Used for:  Moderate persistent asthma without complication   Started by:  Hayes Farrell MD        2 qam for 7 d , then 1 po qam   Quantity:  60 tablet   Refills:  0            Where to get your medicines      These medications were sent to [id number] - [place], [place] - [address] AT [facility] [address], [place] [address]     Phone:  [phone]     montelukast 10 MG tablet    predniSONE 10 MG tablet                Primary Care Provider Office Phone # Fax #    Braydon Yen [phone]       [facility] [address] [facility] [address]        Thank you!     " Thank you for choosing [facility]  for your care. Our goal is always to provide you with excellent care. Hearing back from our patients is one way we can continue to improve our services. Please take a few minutes to complete the written survey that you may receive in the mail after your visit with us. Thank you!             Your Updated Medication List - Protect others around you: Learn how to safely use, store and throw away your medicines at www.disposemymeds.org.          This list is accurate as of: 2/14/17 11:43 AM.  Always use your most recent med list.                   Brand Name Dispense Instructions for use    acetaminophen 500 MG tablet    TYLENOL     Take 500-1,000 mg by mouth every 6 hours as needed for mild pain       * albuterol 108 (90 BASE) MCG/ACT Inhaler    VENTOLIN HFA    3 Inhaler    Inhale 2 puffs into the lungs every 4 hours as needed for shortness of breath / dyspnea or wheezing       * albuterol (2.5 MG/3ML) 0.083% neb solution     25 vial    Take 1 vial (2.5 mg) by nebulization every 6 hours as needed for shortness of breath / dyspnea or wheezing       * cloNIDine 0.1 MG tablet    CATAPRES    90 tablet    Take 1 tablet (0.1 mg) by mouth every morning       * cloNIDine 0.1 MG tablet    CATAPRES    90 tablet    Take 3 tablets (0.3 mg) by mouth every evening       diphenhydrAMINE 25 MG tablet    BENADRYL    60 tablet    Take 1-2 tablets (25-50 mg) by mouth every 6 hours as needed for itching or allergies       flecainide 100 MG tablet    TAMBOCOR    180 tablet    Take 1 tablet (100 mg) by mouth 2 times daily       furosemide 40 MG tablet    LASIX    180 tablet    TAKE 1 TABLET BY MOUTH TWICE DAILY.       hydrALAZINE 25 MG tablet    APRESOLINE    810 tablet    TAKE 3 TABLETS BY MOUTH THREE TIMES DAILY       ipratropium - albuterol 0.5 mg/2.5 mg/3 mL 0.5-2.5 (3) MG/3ML neb solution    DUONEB    6 Box    USE 1 VIAL PER NEBULIZER FOUR TIMES DAILY       levothyroxine 100 MCG tablet     SYNTHROID/LEVOTHROID    90 tablet    Take 1 tablet (100 mcg) by mouth daily       LORazepam 1 MG tablet    ATIVAN     Take 0.5-1 tablets by mouth every 6 hours as needed       montelukast 10 MG tablet    SINGULAIR    30 tablet    Take 1 tablet (10 mg) by mouth At Bedtime       other medical supplies     1 each    Apply one pair to help with edema       potassium chloride SA 20 MEQ CR tablet    K-DUR/KLOR-CON M    180 tablet    Take 1 tablet (20 mEq) by mouth 2 times daily       predniSONE 10 MG tablet    DELTASONE    60 tablet    2 qam for 7 d , then 1 po qam       simvastatin 10 MG tablet    ZOCOR    90 tablet    TAKE 1 TABLET(10 MG) BY MOUTH AT BEDTIME       triamcinolone 0.1 % ointment    KENALOG    454 g    Apply bid and hs left hand and legs - for dermatitis       warfarin 2 MG tablet    COUMADIN    90 tablet    Take 3mg Mon/Wed/Fri; 2mg all other days or as directed by [facility]       * Notice:  This list has 4 medication(s) that are the same as other medications prescribed for you. Read the directions carefully, and ask your doctor or other care provider to review them with you.

## 2017-02-14 NOTE — MR AVS SNAPSHOT
Mary Alice Cameron   2/14/2017   Anticoagulation Therapy Visit    Description:  66 year old female   Provider:  Braydon Yen MD   Department:  [facility]           INR as of 2/14/2017     Today's INR No new INR was available at the time of this encounter.      Anticoagulation Summary as of 2/14/2017     INR goal 2.0-3.0   Today's INR No new INR was available at the time of this encounter.   Full instructions 3 mg on Mon, Wed, Fri; 2 mg all other days   Next INR check 2/22/2017    Indications   Atrial fibrillation  persistent (H) [I48.1]  Long-term (current) use of anticoagulants [Z79.01] [Z79.01]         Your next Anticoagulation Clinic appointment(s)     Feb 22, 2017 10:15 AM CST   Anticoagulation Visit with  ANTI COAGULATION   [facility] ([facility])    [address]  [facility] [address]   [phone]              February 2017 Details    Sun Mon Tue Wed Thu Fri Sat        1               2               3               4                 5               6               7               8               9               10               11                 12               13               14      2 mg   See details      15      3 mg         16      2 mg         17      3 mg         18      2 mg           19      2 mg         20      3 mg         21      2 mg         22            23               24               25                 26               27               28                    Date Details   02/14 This INR check       Date of next INR:  2/22/2017         How to take your warfarin dose     To take:  2 mg Take 1 of the 2 mg tablets.    To take:  3 mg Take 1.5 of the 2 mg tablets.

## 2017-02-14 NOTE — PROGRESS NOTES
"HPI Comments: 65 y/o referred by Dr Yen with COPD/asthma. Pt has a 40 py smoking hx, quit 2008. Has some mild chronic dypnea that doesn't bother her much in the Summer, but she tends to flare in the Winter. She was hosp in Nov 16, again in early Jan 17. AH to perfume, smoke dust. Nebs with duonebs 6-8 time a day, is effective. Steroid inhalers too expensive for her. Prednisone very effective, sx return quickly when she finishes it. Hx of a fib, on chronic AC, echos have shown good LV function in the past.     PMH hypertension,     SH , used to work for a [employer] group        Review of Systems   Constitutional: Negative for malaise/fatigue.   Respiratory: Positive for cough, sputum production, shortness of breath and wheezing.    Cardiovascular: Negative for orthopnea and PND.   Neurological: Negative for headaches.         Physical Exam   Constitutional: She is oriented to person, place, and time and well-developed, well-nourished, and in no distress.   HENT:   Mouth/Throat: No oropharyngeal exudate.   Eyes: Pupils are equal, round, and reactive to light.   Cardiovascular: Normal rate.    Pulmonary/Chest: Effort normal and breath sounds normal.   Poor airflow, coughs freq   Neurological: She is alert and oriented to person, place, and time. Gait normal.   Skin: Skin is warm and dry.   Psychiatric: Mood, memory, affect and judgment normal.       /52  Pulse 62  Resp 20  Ht 5' 5\" (1.651 m)  Wt 160 lb (72.6 kg)  SpO2 93%  BMI 26.63 kg/m2      Current Outpatient Prescriptions:      predniSONE (DELTASONE) 10 MG tablet, 2 qam for 7 d , then 1 po qam, Disp: 60 tablet, Rfl: 0     montelukast (SINGULAIR) 10 MG tablet, Take 1 tablet (10 mg) by mouth At Bedtime, Disp: 30 tablet, Rfl: 0     warfarin (COUMADIN) 2 MG tablet, Take 3mg Mon/Wed/Fri; 2mg all other days or as directed by [facility], Disp: 90 tablet, Rfl: 1     hydrALAZINE (APRESOLINE) 25 MG tablet, TAKE 3 TABLETS BY MOUTH THREE TIMES DAILY, " Disp: 810 tablet, Rfl: 3     furosemide (LASIX) 40 MG tablet, TAKE 1 TABLET BY MOUTH TWICE DAILY., Disp: 180 tablet, Rfl: 2     ipratropium - albuterol 0.5 mg/2.5 mg/3 mL (DUONEB) 0.5-2.5 (3) MG/3ML neb solution, USE 1 VIAL PER NEBULIZER FOUR TIMES DAILY, Disp: 6 Box, Rfl: 3     albuterol (2.5 MG/3ML) 0.083% neb solution, Take 1 vial (2.5 mg) by nebulization every 6 hours as needed for shortness of breath / dyspnea or wheezing, Disp: 25 vial, Rfl: 1     albuterol (VENTOLIN HFA) 108 (90 BASE) MCG/ACT inhaler, Inhale 2 puffs into the lungs every 4 hours as needed for shortness of breath / dyspnea or wheezing, Disp: 3 Inhaler, Rfl: 0     cloNIDine (CATAPRES) 0.1 MG tablet, Take 1 tablet (0.1 mg) by mouth every morning, Disp: 90 tablet, Rfl: 0     cloNIDine (CATAPRES) 0.1 MG tablet, Take 3 tablets (0.3 mg) by mouth every evening, Disp: 90 tablet, Rfl: 0     triamcinolone (KENALOG) 0.1 % ointment, Apply bid and hs left hand and legs - for dermatitis, Disp: 454 g, Rfl: 3     flecainide (TAMBOCOR) 100 MG tablet, Take 1 tablet (100 mg) by mouth 2 times daily, Disp: 180 tablet, Rfl: 3     simvastatin (ZOCOR) 10 MG tablet, TAKE 1 TABLET(10 MG) BY MOUTH AT BEDTIME, Disp: 90 tablet, Rfl: 3     levothyroxine (SYNTHROID,LEVOTHROID) 100 MCG tablet, Take 1 tablet (100 mcg) by mouth daily, Disp: 90 tablet, Rfl: 3     potassium chloride SA (K-DUR,KLOR-CON M) 20 MEQ tablet, Take 1 tablet (20 mEq) by mouth 2 times daily, Disp: 180 tablet, Rfl: 3     diphenhydrAMINE (BENADRYL) 25 MG tablet, Take 1-2 tablets (25-50 mg) by mouth every 6 hours as needed for itching or allergies, Disp: 60 tablet, Rfl: 1     acetaminophen (TYLENOL) 500 MG tablet, Take 500-1,000 mg by mouth every 6 hours as needed for mild pain, Disp: , Rfl:      OTHER MEDICAL SUPPLIES, Apply one pair to help with edema, Disp: 1 each, Rfl: 0     LORazepam (ATIVAN) 1 MG tablet, Take 0.5-1 tablets by mouth every 6 hours as needed, Disp: , Rfl:      A/ COPD/asthma clinical  variability evident, responds well to steroids, for now pred 10 mg and trial Singulair, back in a month.

## 2017-02-20 DIAGNOSIS — E89.0 HYPOTHYROIDISM, POSTABLATIVE: ICD-10-CM

## 2017-02-20 RX ORDER — LEVOTHYROXINE SODIUM 100 UG/1
TABLET ORAL
Qty: 90 TABLET | Refills: 0 | Status: SHIPPED | OUTPATIENT
Start: 2017-02-20 | End: 2017-05-19

## 2017-02-22 ENCOUNTER — ANTICOAGULATION THERAPY VISIT (OUTPATIENT)
Dept: ANTICOAGULATION | Facility: OTHER | Age: 67
End: 2017-02-22
Attending: FAMILY MEDICINE
Payer: MEDICARE

## 2017-02-22 DIAGNOSIS — Z79.01 LONG-TERM (CURRENT) USE OF ANTICOAGULANTS: ICD-10-CM

## 2017-02-22 DIAGNOSIS — I48.19 ATRIAL FIBRILLATION, PERSISTENT (H): ICD-10-CM

## 2017-02-22 LAB — INR POINT OF CARE: 1.5 (ref 0.86–1.14)

## 2017-02-22 PROCEDURE — 85610 PROTHROMBIN TIME: CPT | Mod: QW

## 2017-02-22 NOTE — PROGRESS NOTES
ANTICOAGULATION FOLLOW-UP CLINIC VISIT    Patient Name:  Mary Alice Cameron  Date:  2/22/2017  Contact Type:  Face to Face    SUBJECTIVE:     Patient Findings     Positives Change in medications, Missed doses    Comments Prednisone sliding scale continues; dose adjustments made.  States is feeling quite improved.  Not using O2 except at night.  Sats running 94-95% on room air.               OBJECTIVE    INR Protime   Date Value Ref Range Status   02/22/2017 1.5 (A) 0.86 - 1.14 Final       ASSESSMENT / PLAN  INR assessment SUB    Recheck INR In: 1 WEEK    INR Location Clinic      Anticoagulation Summary as of 2/22/2017     INR goal 2.0-3.0   Today's INR 1.5!   Maintenance plan 3 mg (2 mg x 1.5) on Mon, Wed, Fri; 2 mg (2 mg x 1) all other days   Full instructions 2/23: 3 mg; 2/25: 3 mg; Otherwise 3 mg on Mon, Wed, Fri; 2 mg all other days   Weekly total 17 mg   Plan last modified Kenna Storm RN (2/8/2017)   Next INR check 3/1/2017   Priority INR   Target end date Indefinite    Indications   Atrial fibrillation  persistent (H) [I48.1]  Long-term (current) use of anticoagulants [Z79.01] [Z79.01]         Anticoagulation Episode Summary     INR check location     Preferred lab     Send INR reminders to  ANTICOAG POOL    Comments       Anticoagulation Care Providers     Provider Role Specialty Phone number    Braydon Yen MD [facility] [phone]            See the Encounter Report to view Anticoagulation Flowsheet and Dosing Calendar (Go to Encounters tab in chart review, and find the Anticoagulation Therapy Visit)        Kenna Storm RN

## 2017-02-22 NOTE — MR AVS SNAPSHOT
Mary Alice Cameron   2/22/2017 10:15 AM   Anticoagulation Therapy Visit    Description:  66 year old female   Provider:  [facility]   Department:  [facility]           INR as of 2/22/2017     Today's INR 1.5!      Anticoagulation Summary as of 2/22/2017     INR goal 2.0-3.0   Today's INR 1.5!   Full instructions 2/23: 3 mg; 2/25: 3 mg; Otherwise 3 mg on Mon, Wed, Fri; 2 mg all other days   Next INR check 3/1/2017    Indications   Atrial fibrillation  persistent (H) [I48.1]  Long-term (current) use of anticoagulants [Z79.01] [Z79.01]         Your next Anticoagulation Clinic appointment(s)     Mar 01, 2017 10:15 AM CST   Anticoagulation Visit with NA ANTI COAGULATION   [facility] ([facility])    [address] [facility] [address]   [phone]              February 2017 Details    Sun Mon Tue Wed Thu Fri Sat        1               2               3               4                 5               6               7               8               9               10               11                 12               13               14               15               16               17               18                 19               20               21               22      3 mg   See details      23      3 mg         24      3 mg         25      3 mg           26      2 mg         27      3 mg         28      2 mg              Date Details   02/22 This INR check               How to take your warfarin dose     To take:  2 mg Take 1 of the 2 mg tablets.    To take:  3 mg Take 1.5 of the 2 mg tablets.           March 2017 Details    Sun Mon Tue Wed Thu Fri Sat        1            2               3               4                 5               6               7               8               9               10               11                 12               13               14               15               16               17               18                 19                20               21               22               23               24               25                 26               27               28               29               30               31                 Date Details   No additional details    Date of next INR:  3/1/2017         How to take your warfarin dose     To take:  3 mg Take 1.5 of the 2 mg tablets.

## 2017-02-23 ENCOUNTER — CARE COORDINATION (OUTPATIENT)
Dept: CARE COORDINATION | Facility: OTHER | Age: 67
End: 2017-02-23

## 2017-02-23 ENCOUNTER — ANTICOAGULATION THERAPY VISIT (OUTPATIENT)
Dept: CARE COORDINATION | Facility: OTHER | Age: 67
End: 2017-02-23

## 2017-02-23 NOTE — PROGRESS NOTES
"Clinic Care Coordination Contact  OUTREACH    Referral Information:  Referral Source: Care Team  Reason for Contact:  COPD follow up        Universal Utilization:   ED Visits in last year: 2  Hospital visits in last year: 3  Last PCP appointment: 02/02/17  Missed Appointments: 0     Multiple Providers or Specialists: Cardiology; Care Coordination, PCP, Anticoagulation, Dermatology, Pulmonology    Clinical Concerns:  Current Medical Concerns: Face to face visit accomplished today.  Pt reports continued improvement, since seeing Dr Farrell, Pulmonology on 2/14/17.  Pt states is noticing a difference in her motivation, as evidenced by her statement she has picked up her \"quilting\" again, not for long, but has not looked at it for a long time.  States she was able to be outdoors and taking her dog for short walks, something that she has also not done for a long time.  States her O2 Sats range 94-95% on room air; using O2 only at night.  States she is currently is taking Prednisone sliding scale and Singular as directed and thinks these are helping.  Dermatitis issue as observed previously has subsided since starting the Prednisone and using the special cream.  States she has not heard from [facility], despite leaving a message.  Pt encourage to contact that number again.  States she has a packet of new papers to go through from [facility].  Pt is still hopeful she will not have to return to her former employment and be retired.  States concern that if she were to have to work, her rent assistance would stop and is not wanting to jeopardize the health gains recently for the difference.    Education Provided to patient: Continued support and assistance with giving direction with outside services available.  Education provided to assure pt understands and recognizes when to contact her PCP for her COPD issues to avoid ED/Hospitalization  Clinical Pathway Name: COPD  Clinical Pathway: Clinic Care Coordination COPD Follow " up Assessment  Symptom Review:    Are you having any increased shortness of breath? Denies increased SOB; Uses face protection if outdoors when cold/windy.    When you cough are you coughing up anything? No  What COPD Zone currently in:Green  What number would you call if you were in the YELLOW zones: Pt states awareness and has the numbers to call readily available if needed     Medications:   Were you started on any new medications since the last time we talked?  Yes, Prednisone sliding scale; Singular @ HS  Do you have all of your medications? Yes  Have you had trouble filling your prescriptions? No  Are you medications effective in controlling your symptoms? Yes, the new ones recently added as stated above  Are you still on Prednisone - YES and pt states understanding dosing/tapering requirements as directed by Dr Farrell    Oxygen/DME  What is the current liter flow you are using? 2L/minute continuous at night; prn daytime  Has there been any changes? Yes, is only O2 at night continuous; nearly none during the day; PAO2 Sats - 94-95% on room air    Activity:  How much activity can you do before you are SOB? Is able to walk outdoors about a block and only on non-windy/warmer days  Have you had to reduce your activities because of dyspnea or other symptoms? Slowly regaining motivation - ie picking up her [employer] projects    Follow Up Plan:  Care Coordinator follow up plan: Continue to follow up with pt every 1-2 wks  Referrals to consider: Per Dr Farrell, will consider Pulmonary Rehab once enough improvement is noted to avoid a COPD Exacerbation    Medication Management:  Pt manages own meds    Functional Status:  Mobility Status: Independent  Equipment Currently Used at Home: oxygen  Transportation: Drives self      Psychosocial:  Current living arrangement:: I live in a private home  Financial/Insurance: Currently working on the necessary forms with [facility] for rent assistance and other financial  assistance.  Pt encouraged to recontact [facility] pertaining to her hospital expenses.     Resources and Interventions:  Current Resources:  Care Coordination; PCP; Anticoagulation Clinic       Senior Linkage Line Referral Placed: 01/18/17     Goals:   Goal 1 Statement:  (to manage financially w/out working outside the home)  Goal 2 Statement:  (to remain in the COPD Green Zone; stay out of the hosp)     Strengths: As pt's health is improving her motivation is improving  Patient/Caregiver understanding: Visit ended with pt stating understanding and with questions answered today  Frequency of Care Coordination: 1-2 wks  Upcoming appointment: 03/15/17 (Pulmonology Follow up)     Plan: Care Coordination to follow up with pt on the financial issues and COPD in 1 week, during the Coumadin visit.

## 2017-03-01 ENCOUNTER — ANTICOAGULATION THERAPY VISIT (OUTPATIENT)
Dept: ANTICOAGULATION | Facility: OTHER | Age: 67
End: 2017-03-01
Attending: FAMILY MEDICINE
Payer: MEDICARE

## 2017-03-01 DIAGNOSIS — I48.19 PERSISTENT ATRIAL FIBRILLATION (H): ICD-10-CM

## 2017-03-01 DIAGNOSIS — E89.0 HYPOTHYROIDISM, POSTABLATIVE: ICD-10-CM

## 2017-03-01 DIAGNOSIS — Z79.01 LONG-TERM (CURRENT) USE OF ANTICOAGULANTS: ICD-10-CM

## 2017-03-01 DIAGNOSIS — I48.19 ATRIAL FIBRILLATION, PERSISTENT (H): ICD-10-CM

## 2017-03-01 LAB
INR POINT OF CARE: 1.9 (ref 0.86–1.14)
TSH SERPL DL<=0.005 MIU/L-ACNC: 1 MU/L (ref 0.4–4)

## 2017-03-01 PROCEDURE — 84443 ASSAY THYROID STIM HORMONE: CPT | Performed by: FAMILY MEDICINE

## 2017-03-01 PROCEDURE — 85610 PROTHROMBIN TIME: CPT | Mod: QW

## 2017-03-01 RX ORDER — WARFARIN SODIUM 2 MG/1
TABLET ORAL
Qty: 90 TABLET | Refills: 1 | COMMUNITY
Start: 2017-03-01 | End: 2017-03-29

## 2017-03-01 NOTE — PROGRESS NOTES
ANTICOAGULATION FOLLOW-UP CLINIC VISIT    Patient Name:  Mary Alice Cameron  Date:  3/1/2017  Contact Type:  Face to Face    SUBJECTIVE:     Patient Findings     Positives Change in medications, Antibiotic use or infection    Comments Prednisone continues until pt returns to see Dr Farrell, Pulmonology on 3/15/17.  States she is feeling quite improved, but states has a way to go still.  States PAO2 ranges above 95% on room air consistently.  Using O2 only at night.  Discussed an overall Coumadin dose change today.             OBJECTIVE    INR Protime   Date Value Ref Range Status   03/01/2017 1.9 (A) 0.86 - 1.14 Final       ASSESSMENT / PLAN  INR assessment THER    Recheck INR In: 2 WEEKS    INR Location Clinic      Anticoagulation Summary as of 3/1/2017     INR goal 2.0-3.0   Today's INR 1.9!   Maintenance plan 2 mg (2 mg x 1) on Mon, Wed, Fri; 3 mg (2 mg x 1.5) all other days   Full instructions 3/1: 3 mg; Otherwise 2 mg on Mon, Wed, Fri; 3 mg all other days   Weekly total 18 mg   Plan last modified Kenna Storm RN (3/1/2017)   Next INR check 3/15/2017   Priority INR   Target end date Indefinite    Indications   Atrial fibrillation  persistent (H) [I48.1]  Long-term (current) use of anticoagulants [Z79.01] [Z79.01]         Anticoagulation Episode Summary     INR check location     Preferred lab     Send INR reminders to [facility] POOL    Comments       Anticoagulation Care Providers     Provider Role Specialty Phone number    Braydon Yen MD [facility] [phone]            See the Encounter Report to view Anticoagulation Flowsheet and Dosing Calendar (Go to Encounters tab in chart review, and find the Anticoagulation Therapy Visit)        Kenna Storm RN

## 2017-03-01 NOTE — MR AVS SNAPSHOT
Mary Alice Cameron   3/1/2017 10:15 AM   Anticoagulation Therapy Visit    Description:  66 year old female   Provider:  [facility]   Department:  [facility]           INR as of 3/1/2017     Today's INR 1.9!      Anticoagulation Summary as of 3/1/2017     INR goal 2.0-3.0   Today's INR 1.9!   Full instructions 3/1: 3 mg; Otherwise 2 mg on Mon, Wed, Fri; 3 mg all other days   Next INR check 3/15/2017    Indications   Atrial fibrillation  persistent (H) [I48.1]  Long-term (current) use of anticoagulants [Z79.01] [Z79.01]         Your next Anticoagulation Clinic appointment(s)     Mar 15, 2017 10:00 AM CDT   Anticoagulation Visit with [facility]   [facility]    [address]  [facility] [address]   [phone]              March 2017 Details    Sun Mon Tue Wed Thu Fri Sat        1      3 mg   See details      2      3 mg         3      2 mg         4      3 mg           5      3 mg         6      2 mg         7      3 mg         8      2 mg         9      3 mg         10      2 mg         11      3 mg           12      3 mg         13      2 mg         14      3 mg         15            16               17               18                 19               20               21               22               23               24               25                 26               27               28               29               30               31                 Date Details   03/01 This INR check       Date of next INR:  3/15/2017         How to take your warfarin dose     To take:  2 mg Take 1 of the 2 mg tablets.    To take:  3 mg Take 1.5 of the 2 mg tablets.

## 2017-03-06 ENCOUNTER — CARE COORDINATION (OUTPATIENT)
Dept: CARE COORDINATION | Facility: OTHER | Age: 67
End: 2017-03-06

## 2017-03-06 NOTE — PROGRESS NOTES
Thank you. I am not clear what to do with this information but I will help any way I can.  Braydon Yen

## 2017-03-06 NOTE — PROGRESS NOTES
"Clinic Care Coordination Contact  OUTREACH    Referral Information:  Referral Source: Care Team  Reason for Contact: follow up to unanswered questions such as obtaining a hospital bed; financial concerns pertaining to her hospital charges        Universal Utilization:   ED Visits in last year: 2  Hospital visits in last year: 3  Last PCP appointment: 02/02/17  Missed Appointments: 0     Multiple Providers or Specialists: Cardiology; Care Coordination, PCP, Anticoagulation, Dermatology (Pulmonology)    Clinical Concerns:  Current Medical Concerns: CC nurse telephoned pt today to further discuss her sleeping arrangements at home.  States since her most recent hospitalization for COPD, she has only been able to sleep in her regular bed for 2-4 hours nightly.  States from the first night she has used 4 pillows beneath her head as props to elevate the head.  States she has place stacked books beneath the head of the bed frame to elevate the head.  States she wears O2 @ 2.5L/min continuous at night.  Reports the PAO2 Sats have been 95% without O2 during the day within the past wk.  States she knows these modalities do not work as evidenced by her symptoms - PAO2 decrease to 90-91% w/continuous O2 @ 2.5L/min; states she has \"labored breathing\"; and it feels like an elephant is sitting on her chest.  States she moves to a sitting up position using a non-reclining loveseat chair with the a detached small footrest to slightly elevate her feet.  States her s/sx decrease after 1-1-1/2 hours and is able to finally fall asleep.  States she has been going through this routinely every night since 1/2/17.  States she has been in contact with PFS since last wk; discussed notification she received pertaining to new information about what she may qualify for financially.  States she will drop off the Proof of Income for applying for pharmacy assistance possibility.  *    Clinical Pathway Name: COPD    Medication Management:  Self " manages medications     Functional Status:  Mobility Status: Independent  Equipment Currently Used at Home: oxygen  Transportation: Independently drives     Psychosocial:  Current living arrangement:: I live in a private home  Financial/Insurance: Continues to work with [employer] HAMMOND and [facility]      Resources and Interventions:  Current Resources:  PCP; Care Coordination; Anticoagulation Therapy; Cardiology; Pulmonology       Senior Linkage Line Referral Placed: 01/18/17      Goals:   Goal 1 Statement:  (to manage financially w/out working outside the home)  Goal 2 Statement:  (to remain in the COPD Green Zone; stay out of the hosp)     Strengths: Motivated to improve her COPD exacerbations  Patient/Caregiver understanding: Pt states understanding pertaining to the information discussed today  Frequency of Care Coordination: 1-2 wks  Upcoming appointment: 03/15/17 (Pulmonology Follow up)     Plan: CC to continue assisting pt with COPD issues and financial issues

## 2017-03-10 NOTE — DISCHARGE SUMMARY
DATE OF ADMISSION:  01/02/2017   DATE OF DISCHARGE:  01/04/2017      DISCHARGE DIAGNOSES:   1.  Acute on chronic respiratory failure with hypoxia.   2.  Chronic obstructive pulmonary disease exacerbation.   3.  Persistent atrial fibrillation.   4.  History of bicuspid aortic valve.   5.  Post ablative hypothyroidism.      PROCEDURES:  BiPAP use.      HOSPITAL COURSE:  Mary Alice Cameron is a 66-year-old female who has significant history of COPD.  She does O2 consistently at night.  She has a history of coming in very dyspneic and quickly turns around with the use of BiPAP.  She presented to our ER on the day of her admission with complaints of dyspnea that basically started the morning of her arrival here.  When she got to the ER, her O2 sats were 88% on room air.  She had a hard time even speaking just 1 or 2-word sentences and is very dyspneic.  She has been DNR/DNI status.  However, she was placed on BiPAP and actually did very well with that.  She was transferred back up to our ICU.  Blood gas was done which showed a pH of 7.39, pCO2 of 51, pO2 of 80, bicarb 30 with 30% FiO2, 97% sats and she did actually very well.  X-ray showed no acute abnormalities.  She was treated empirically with antibiotics and thought that this may have been an infection causing this, although no clear infiltrates were seen on x-ray.  She was also given IV steroids.  She was able to be weaned off of the BiPAP without difficulty.  She was able to ambulate; however, she was maintained on O2 on a constant basis even at the time of discharge on 2 liters with sats 95%.  She was in sinus rhythm while she was here.  Her lab results were basically for the most part unremarkable.  At the time of discharge, his sodium was 138, potassium is 4, chloride 99, CO2 is 31, creatinine was 0.63, BUN of 11.  Glucose is 126.  White count 10,000, hemoglobin was 12.  INR was 2.74 at the time of discharge.  Her blood cultures were negative.  No sputum was ever  obtained as she could not produce any, but she was ready for discharge.  At the time of her discharge, her blood pressure was 168/80, her heart rate was 90, her sats were 93% on 2 liters of O2, but she felt back to her baseline and was ready to be discharged home.  She was discharged home on 2 liters per nasal cannula with a portable tank that she will use during the daytime hours for now.  In the past, she has mainly used it just at night.  She continues to be DNR/DNI status.      DISCHARGE MEDICATIONS:   1.  Ceftin 250 mg p.o. b.i.d. for 5 days.   2.  Prednisone 20 mg daily.  She takes 1 tablet twice daily for 3 more days.   3.  Clonidine 0.3 mg every evening.   4.  Albuterol inhaler 2 puffs every 4 hours p.r.n.   5.  Albuterol nebs every 6 hours p.r.n. shortness of breath.   6.  Benadryl 25-50 mg every 6 hours p.r.n. itching.   7.  Flecainide 100 mg p.o. b.i.d.   8.  Furosemide 40 mg twice daily.   9.  DuoNeb 4 times daily.   10.  Lorazepam 0.5 to 1 mg every 6 hours as needed for anxiety.   11.  Simvastatin 10 mg at bedtime.     12.  Also, she is on I believe levothyroxine 100 mcg daily.      Her activities are as tolerated.  She should have followup with Dr. Braydon Yen in 7-10 days.  Followup with the anticoagulation clinic on 03/15/2017.         SOLOMON MITCHELL MD             D: 03/10/2017 12:54   T: 03/10/2017 17:56   MT:       Name:     LANCE VALLEJO   MRN:      [id number]        Account:        [id number]   :      1950           Admit Date:                                       Discharge Date: 2017      Document: [id number]       cc: Braydon Yen MD

## 2017-03-13 DIAGNOSIS — R06.01 ORTHOPNEA: ICD-10-CM

## 2017-03-13 DIAGNOSIS — J44.9 COPD (CHRONIC OBSTRUCTIVE PULMONARY DISEASE) (H): Primary | ICD-10-CM

## 2017-03-13 NOTE — TELEPHONE ENCOUNTER
Fax received from [facility] form received for the pt's hospital bed. Form states the pt must have tried a Wedge prior to the hospital bed. I called the pt and notified. She has not tried this in the past. Rx will be sent to [facility]. Please sign the order.

## 2017-03-15 ENCOUNTER — CARE COORDINATION (OUTPATIENT)
Dept: CARE COORDINATION | Facility: OTHER | Age: 67
End: 2017-03-15

## 2017-03-15 ENCOUNTER — ANTICOAGULATION THERAPY VISIT (OUTPATIENT)
Dept: ANTICOAGULATION | Facility: OTHER | Age: 67
End: 2017-03-15
Attending: FAMILY MEDICINE
Payer: MEDICARE

## 2017-03-15 ENCOUNTER — OFFICE VISIT (OUTPATIENT)
Dept: SLEEP MEDICINE | Facility: HOSPITAL | Age: 67
End: 2017-03-15
Attending: FAMILY MEDICINE
Payer: MEDICARE

## 2017-03-15 VITALS — SYSTOLIC BLOOD PRESSURE: 140 MMHG | HEART RATE: 63 BPM | DIASTOLIC BLOOD PRESSURE: 80 MMHG | OXYGEN SATURATION: 97 %

## 2017-03-15 DIAGNOSIS — J45.30 MILD PERSISTENT ASTHMA WITHOUT COMPLICATION: Primary | ICD-10-CM

## 2017-03-15 DIAGNOSIS — Z79.01 LONG-TERM (CURRENT) USE OF ANTICOAGULANTS: ICD-10-CM

## 2017-03-15 DIAGNOSIS — I48.19 ATRIAL FIBRILLATION, PERSISTENT (H): ICD-10-CM

## 2017-03-15 LAB — INR POINT OF CARE: 2.2 (ref 0.86–1.14)

## 2017-03-15 PROCEDURE — 85610 PROTHROMBIN TIME: CPT | Mod: QW

## 2017-03-15 PROCEDURE — 99212 OFFICE O/P EST SF 10 MIN: CPT

## 2017-03-15 PROCEDURE — 99211 OFF/OP EST MAY X REQ PHY/QHP: CPT | Performed by: INTERNAL MEDICINE

## 2017-03-15 RX ORDER — ALBUTEROL SULFATE 90 UG/1
2 AEROSOL, METERED RESPIRATORY (INHALATION) EVERY 6 HOURS
Qty: 1 INHALER | Refills: 1 | Status: SHIPPED | OUTPATIENT
Start: 2017-03-15 | End: 2017-03-25

## 2017-03-15 NOTE — PROGRESS NOTES
"Clinic Care Coordination Contact  OUTREACH    Referral Information:  Referral Source: Care Team  Reason for Contact: COPD follow up        Universal Utilization:   ED Visits in last year: 2  Hospital visits in last year: 3  Last PCP appointment: 02/02/17  Missed Appointments: 0     Multiple Providers or Specialists: Cardiology; Care Coordination, PCP, Anticoagulation, Dermatology (Pulmonology)    Clinical Concerns:  Current Medical Concerns: Pt telephoned CC following up from the Pulmonology visit today.  Pt states Dr Farrell stated her progress is good.  States she will not be restarted on Prednisone.  States he wants to reserve this for when she really needs it.  States that once the Singular script is out (by week's end) she is to stop this med and switch to DuoNebs.  She is to see him again in 3 mos.  States she is getting back to normal activities - is able to quilt and do some of her crafts; is walking the dog outdoors increasing from 1/2 block round trip to 2 blocks round trip (4 blocks total), daily.  States she is quite sure she will not be returning to her former job.  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  States she is happy with the way her health has improved as she would never have been able to do these things a month ago.     Clinical Pathway Name: COPD  Clinical Pathway: Clinic Care Coordination COPD Follow up Assessment  Symptom Review:    Are you having any increased shortness of breath? No  When you cough are you coughing up anything? Yes - in the morning - yesterday was a \"rough day\" - pt R/T weather changes  Color whitish/yellow - unchanged  Consistency not too thick - unchanged  Frequency unchanged  What COPD Zone currently in:Green  What number would you call if you were in the YELLOW zones: Pt states understanding of when and who to contact.  States she has the numbers handy     Medications:   Were you started on any " "new medications since the last time we talked?  Yes, DuoNeb once Singular is completed in a few days.  Do you have all of your medications? Yes  Have you had trouble filling your prescriptions? No - but Proof of Income papers have been filed with [facility] to see if there can be a discount for meds.    Are you medications effective in controlling your symptoms? Yes, as stated above  Are you still on Prednisone  - NO last dose taken 3/14/17  Oxygen/DME  What is the current liter flow you are using? 2 L NC; continuous at night  Has there been any changes? No      Activity:  How much activity can you do before you are SOB? 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  Have you had to reduce your activities because of dyspnea or other symptoms? Yes - yesterday she described as \"a rough day\"     Follow Up Plan:  Care Coordinator follow up plan: Contact pt every 2 wks  Medication Management:  Self manages medications     Functional Status:  Mobility Status: Independent  Equipment Currently Used at Home: oxygen  Transportation: Independently drives own vehicle short distances     Psychosocial:  Current living arrangement:: I live in a private home  Financial/Insurance: Has filed Proof of Income with [facility] and [facility] for the 340B program; is working with Sukh HAMMOND       Resources and Interventions:  Current Resources:  Pulmonology; Care Coordination; Cardiology; Anticoagulation       Senior Linkage Line Referral Placed: 01/18/17     Goals:   Goal 1 Statement:  (to manage financially w/out working outside the home)  Goal 2 Statement:  (to remain in the COPD Green Zone; stay out of the hosp)     Patient/Caregiver understanding: Call ended with pt stating understanding and questions answered  Frequency of Care Coordination: 1-2 wks  Upcoming appointment: " 03/29/17 (Anticoag 3/29; Cardiology 5/23; Pulmonology 6/15/17)     Plan: CC to contact pt in 2 wks for follow up of outstanding issues and COPD

## 2017-03-15 NOTE — MR AVS SNAPSHOT
After Visit Summary   3/15/2017    Mary Alice Cameron    MRN: [id number]           Patient Information     Date Of Birth          1950        Visit Information        Provider Department      3/15/2017 11:00 AM Hayes Farrell MD [facility]        Today's Diagnoses     Mild persistent asthma without complication    -  1       Follow-ups after your visit        Your next 10 appointments already scheduled     Mar 29, 2017 10:00 AM CDT   Anticoagulation Visit with [facility]   [facility] ([facility])    [address]  [facility] [address] [phone]            May 23, 2017  8:30 AM CDT   (Arrive by 8:15 AM)   Return Visit with Eliezer Myers MD   [facility] ([facility])    [address]  [facility] [id number] [phone]              Who to contact     If you have questions or need follow up information about today's clinic visit or your schedule please contact [facility] directly at [phone].  Normal or non-critical lab and imaging results will be communicated to you by DokDokhart, letter or phone within 4 business days after the clinic has received the results. If you do not hear from us within 7 days, please contact the clinic through [employer] or phone. If you have a critical or abnormal lab result, we will notify you by phone as soon as possible.  Submit refill requests through GliaCure or call your pharmacy and they will forward the refill request to us. Please allow 3 business days for your refill to be completed.          Additional Information About Your Visit        DokDokhart Information     [employer] gives you secure access to your electronic health record. If you see a primary care provider, you can also send messages to your care team and make appointments. If you have questions, please call your primary care clinic.  If you do not have a primary care provider, please call [phone] and they will assist you.        Care  EveryWhere ID     This is your Care EveryWhere ID. This could be used by other organizations to access your [place] medical records  [id number]        Your Vitals Were     Pulse Pulse Oximetry                63 97%           Blood Pressure from Last 3 Encounters:   03/15/17 140/80   02/14/17 104/52   02/02/17 138/76    Weight from Last 3 Encounters:   02/14/17 160 lb (72.6 kg)   02/02/17 164 lb (74.4 kg)   01/12/17 162 lb 12.8 oz (73.8 kg)              Today, you had the following     No orders found for display         Today's Medication Changes          These changes are accurate as of: 3/15/17 11:10 AM.  If you have any questions, ask your nurse or doctor.               These medicines have changed or have updated prescriptions.        Dose/Directions    * albuterol 108 (90 BASE) MCG/ACT Inhaler   Commonly known as:  VENTOLIN HFA   This may have changed:  Another medication with the same name was added. Make sure you understand how and when to take each.   Used for:  Other emphysema (H)   Changed by:  Braydon Yen MD        Dose:  2 puff   Inhale 2 puffs into the lungs every 4 hours as needed for shortness of breath / dyspnea or wheezing   Quantity:  3 Inhaler   Refills:  0       * albuterol (2.5 MG/3ML) 0.083% neb solution   This may have changed:  Another medication with the same name was added. Make sure you understand how and when to take each.   Used for:  COPD exacerbation (H)   Changed by:  Braydon Yen MD        Dose:  1 vial   Take 1 vial (2.5 mg) by nebulization every 6 hours as needed for shortness of breath / dyspnea or wheezing   Quantity:  25 vial   Refills:  1       * albuterol 108 (90 BASE) MCG/ACT Inhaler   Commonly known as:  PROAIR HFA/PROVENTIL HFA/VENTOLIN HFA   This may have changed:  You were already taking a medication with the same name, and this prescription was added. Make sure you understand how and when to take each.   Used for:  Mild persistent asthma without  complication   Changed by:  Hayes Farrell MD        Dose:  2 puff   Inhale 2 puffs into the lungs every 6 hours   Quantity:  1 Inhaler   Refills:  1       * Notice:  This list has 3 medication(s) that are the same as other medications prescribed for you. Read the directions carefully, and ask your doctor or other care provider to review them with you.         Where to get your medicines      Some of these will need a paper prescription and others can be bought over the counter.  Ask your nurse if you have questions.     Bring a paper prescription for each of these medications     albuterol 108 (90 BASE) MCG/ACT Inhaler                Primary Care Provider Office Phone # Fax #    Braydon Yen [phone]       [address] [address]        Thank you!     Thank you for choosing [facility]  for your care. Our goal is always to provide you with excellent care. Hearing back from our patients is one way we can continue to improve our services. Please take a few minutes to complete the written survey that you may receive in the mail after your visit with us. Thank you!             Your Updated Medication List - Protect others around you: Learn how to safely use, store and throw away your medicines at www.disposemymeds.org.          This list is accurate as of: 3/15/17 11:10 AM.  Always use your most recent med list.                   Brand Name Dispense Instructions for use    acetaminophen 500 MG tablet    TYLENOL     Take 500-1,000 mg by mouth every 6 hours as needed for mild pain       * albuterol 108 (90 BASE) MCG/ACT Inhaler    VENTOLIN HFA    3 Inhaler    Inhale 2 puffs into the lungs every 4 hours as needed for shortness of breath / dyspnea or wheezing       * albuterol (2.5 MG/3ML) 0.083% neb solution     25 vial    Take 1 vial (2.5 mg) by nebulization every 6 hours as needed for shortness of breath / dyspnea or wheezing       * albuterol 108 (90 BASE)  MCG/ACT Inhaler    PROAIR HFA/PROVENTIL HFA/VENTOLIN HFA    1 Inhaler    Inhale 2 puffs into the lungs every 6 hours       * cloNIDine 0.1 MG tablet    CATAPRES    90 tablet    Take 3 tablets (0.3 mg) by mouth every evening       * cloNIDine 0.1 MG tablet    CATAPRES    360 tablet    TAKE 1 TABLET BY MOUTH EVERY MORNING AND 3 TABLETS EVERY EVENING       diphenhydrAMINE 25 MG tablet    BENADRYL    60 tablet    Take 1-2 tablets (25-50 mg) by mouth every 6 hours as needed for itching or allergies       flecainide 100 MG tablet    TAMBOCOR    180 tablet    Take 1 tablet (100 mg) by mouth 2 times daily       furosemide 40 MG tablet    LASIX    180 tablet    TAKE 1 TABLET BY MOUTH TWICE DAILY.       hydrALAZINE 25 MG tablet    APRESOLINE    810 tablet    TAKE 3 TABLETS BY MOUTH THREE TIMES DAILY       ipratropium - albuterol 0.5 mg/2.5 mg/3 mL 0.5-2.5 (3) MG/3ML neb solution    DUONEB    6 Box    USE 1 VIAL PER NEBULIZER FOUR TIMES DAILY       levothyroxine 100 MCG tablet    SYNTHROID/LEVOTHROID    90 tablet    TAKE 1 TABLET(100 MCG) BY MOUTH DAILY       LORazepam 1 MG tablet    ATIVAN     Take 0.5-1 tablets by mouth every 6 hours as needed       montelukast 10 MG tablet    SINGULAIR    30 tablet    Take 1 tablet (10 mg) by mouth At Bedtime       order for DME     1 each    Equipment being ordered: wedge for bed       other medical supplies     1 each    Apply one pair to help with edema       potassium chloride SA 20 MEQ CR tablet    K-DUR/KLOR-CON M    180 tablet    Take 1 tablet (20 mEq) by mouth 2 times daily       simvastatin 10 MG tablet    ZOCOR    90 tablet    TAKE 1 TABLET(10 MG) BY MOUTH AT BEDTIME       triamcinolone 0.1 % ointment    KENALOG    454 g    Apply bid and hs left hand and legs - for dermatitis       warfarin 2 MG tablet    COUMADIN    90 tablet    Take 2 mg Mon/Wed/Fri; 3 mg all other days or as directed by [facility]       * Notice:  This list has 5 medication(s) that are the same as  other medications prescribed for you. Read the directions carefully, and ask your doctor or other care provider to review them with you.

## 2017-03-15 NOTE — MR AVS SNAPSHOT
Mary Alice Cameron   3/15/2017 10:00 AM   Anticoagulation Therapy Visit    Description:  66 year old female   Provider:  [facility]   Department:  [facility]           INR as of 3/15/2017     Today's INR 2.2      Anticoagulation Summary as of 3/15/2017     INR goal 2.0-3.0   Today's INR 2.2   Full instructions 2 mg on Mon, Wed, Fri; 3 mg all other days   Next INR check 3/29/2017    Indications   Atrial fibrillation  persistent (H) [I48.1]  Long-term (current) use of anticoagulants [Z79.01] [Z79.01]         Your next Anticoagulation Clinic appointment(s)     Mar 29, 2017 10:00 AM CDT   Anticoagulation Visit with [facility]   [facility] ([facility])    [address] [facility] [address]   [phone]              March 2017 Details    Sun Mon Tue Wed Thu Fri Sat        1               2               3               4                 5               6               7               8               9               10               11                 12               13               14               15      2 mg   See details      16      3 mg         17      2 mg         18      3 mg           19      3 mg         20      2 mg         21      3 mg         22      2 mg         23      3 mg         24      2 mg         25      3 mg           26      3 mg         27      2 mg         28      3 mg         29            30               31                 Date Details   03/15 This INR check       Date of next INR:  3/29/2017         How to take your warfarin dose     To take:  2 mg Take 1 of the 2 mg tablets.    To take:  3 mg Take 1.5 of the 2 mg tablets.

## 2017-03-15 NOTE — PROGRESS NOTES
ANTICOAGULATION FOLLOW-UP CLINIC VISIT    Patient Name:  Mary Alice Cameron  Date:  3/15/2017  Contact Type:  Face to Face    SUBJECTIVE:     Patient Findings     Positives Change in medications, No Problem Findings    Comments Prednisone completed course of treatment last night.  Sees Dr Farrell today.             OBJECTIVE    INR Protime   Date Value Ref Range Status   03/15/2017 2.2 (A) 0.86 - 1.14 Final       ASSESSMENT / PLAN  INR assessment THER    Recheck INR In: 2 WEEKS    INR Location Clinic      Anticoagulation Summary as of 3/15/2017     INR goal 2.0-3.0   Today's INR 2.2   Maintenance plan 2 mg (2 mg x 1) on Mon, Wed, Fri; 3 mg (2 mg x 1.5) all other days   Full instructions 2 mg on Mon, Wed, Fri; 3 mg all other days   Weekly total 18 mg   No change documented Kenna Storm RN   Plan last modified Kenna Storm RN (3/1/2017)   Next INR check 3/29/2017   Priority INR   Target end date Indefinite    Indications   Atrial fibrillation  persistent (H) [I48.1]  Long-term (current) use of anticoagulants [Z79.01] [Z79.01]         Anticoagulation Episode Summary     INR check location     Preferred lab     Send INR reminders to  ANTICOAG POOL    Comments       Anticoagulation Care Providers     Provider Role Specialty Phone number    Braydon Yen MD [facility] [phone]            See the Encounter Report to view Anticoagulation Flowsheet and Dosing Calendar (Go to Encounters tab in chart review, and find the Anticoagulation Therapy Visit)        Kenna Storm RN

## 2017-03-15 NOTE — PROGRESS NOTES
Much improved on singulair and prednisone 10 mg. Will taper off the prednisone , if she worsens add probably nebulized budesonide 1mg. Back in 3 mo.  /80  Pulse 63  SpO2 97%  Resp no wheeze

## 2017-03-25 ENCOUNTER — HOSPITAL ENCOUNTER (EMERGENCY)
Facility: HOSPITAL | Age: 67
Discharge: HOME | End: 2017-03-25
Attending: PHYSICIAN ASSISTANT | Admitting: PHYSICIAN ASSISTANT
Payer: MEDICARE

## 2017-03-25 VITALS
TEMPERATURE: 97.6 F | SYSTOLIC BLOOD PRESSURE: 155 MMHG | HEART RATE: 68 BPM | OXYGEN SATURATION: 93 % | RESPIRATION RATE: 17 BRPM | DIASTOLIC BLOOD PRESSURE: 73 MMHG

## 2017-03-25 DIAGNOSIS — J44.1 ACUTE EXACERBATION OF CHRONIC OBSTRUCTIVE PULMONARY DISEASE (H): ICD-10-CM

## 2017-03-25 LAB
ANION GAP SERPL CALCULATED.3IONS-SCNC: 8 MMOL/L (ref 3–14)
BASOPHILS # BLD AUTO: 0 10E9/L (ref 0–0.2)
BASOPHILS NFR BLD AUTO: 0.5 %
BUN SERPL-MCNC: 11 MG/DL (ref 7–30)
CALCIUM SERPL-MCNC: 9.2 MG/DL (ref 8.5–10.1)
CHLORIDE SERPL-SCNC: 99 MMOL/L (ref 94–109)
CO2 SERPL-SCNC: 32 MMOL/L (ref 20–32)
CREAT SERPL-MCNC: 0.78 MG/DL (ref 0.52–1.04)
DIFFERENTIAL METHOD BLD: NORMAL
EOSINOPHIL # BLD AUTO: 0.2 10E9/L (ref 0–0.7)
EOSINOPHIL NFR BLD AUTO: 1.8 %
ERYTHROCYTE [DISTWIDTH] IN BLOOD BY AUTOMATED COUNT: 13.2 % (ref 10–15)
FLUAV+FLUBV AG SPEC QL: NEGATIVE
FLUAV+FLUBV AG SPEC QL: NORMAL
GFR SERPL CREATININE-BSD FRML MDRD: 73 ML/MIN/1.7M2
GLUCOSE SERPL-MCNC: 92 MG/DL (ref 70–99)
HCT VFR BLD AUTO: 42.9 % (ref 35–47)
HGB BLD-MCNC: 14.1 G/DL (ref 11.7–15.7)
IMM GRANULOCYTES # BLD: 0 10E9/L (ref 0–0.4)
IMM GRANULOCYTES NFR BLD: 0.4 %
INR PPP: 2.16 (ref 0.8–1.2)
LYMPHOCYTES # BLD AUTO: 1 10E9/L (ref 0.8–5.3)
LYMPHOCYTES NFR BLD AUTO: 11.9 %
MCH RBC QN AUTO: 31.9 PG (ref 26.5–33)
MCHC RBC AUTO-ENTMCNC: 32.9 G/DL (ref 31.5–36.5)
MCV RBC AUTO: 97 FL (ref 78–100)
MONOCYTES # BLD AUTO: 0.6 10E9/L (ref 0–1.3)
MONOCYTES NFR BLD AUTO: 6.8 %
NEUTROPHILS # BLD AUTO: 6.5 10E9/L (ref 1.6–8.3)
NEUTROPHILS NFR BLD AUTO: 78.6 %
NRBC # BLD AUTO: 0 10*3/UL
NRBC BLD AUTO-RTO: 0 /100
NT-PROBNP SERPL-MCNC: 337 PG/ML (ref 0–900)
PLATELET # BLD AUTO: 247 10E9/L (ref 150–450)
POTASSIUM SERPL-SCNC: 3.9 MMOL/L (ref 3.4–5.3)
RBC # BLD AUTO: 4.42 10E12/L (ref 3.8–5.2)
SODIUM SERPL-SCNC: 139 MMOL/L (ref 133–144)
SPECIMEN SOURCE: NORMAL
TROPONIN I SERPL-MCNC: NORMAL UG/L (ref 0–0.04)
WBC # BLD AUTO: 8.3 10E9/L (ref 4–11)

## 2017-03-25 PROCEDURE — 85610 PROTHROMBIN TIME: CPT | Performed by: PHYSICIAN ASSISTANT

## 2017-03-25 PROCEDURE — 25000132 ZZH RX MED GY IP 250 OP 250 PS 637: Mod: GY | Performed by: PHYSICIAN ASSISTANT

## 2017-03-25 PROCEDURE — 80048 BASIC METABOLIC PNL TOTAL CA: CPT | Performed by: PHYSICIAN ASSISTANT

## 2017-03-25 PROCEDURE — A9270 NON-COVERED ITEM OR SERVICE: HCPCS | Mod: GY | Performed by: PHYSICIAN ASSISTANT

## 2017-03-25 PROCEDURE — 36415 COLL VENOUS BLD VENIPUNCTURE: CPT | Performed by: PHYSICIAN ASSISTANT

## 2017-03-25 PROCEDURE — 87804 INFLUENZA ASSAY W/OPTIC: CPT | Performed by: FAMILY MEDICINE

## 2017-03-25 PROCEDURE — 93005 ELECTROCARDIOGRAM TRACING: CPT

## 2017-03-25 PROCEDURE — 99284 EMERGENCY DEPT VISIT MOD MDM: CPT | Performed by: PHYSICIAN ASSISTANT

## 2017-03-25 PROCEDURE — 93010 ELECTROCARDIOGRAM REPORT: CPT | Performed by: INTERNAL MEDICINE

## 2017-03-25 PROCEDURE — 83880 ASSAY OF NATRIURETIC PEPTIDE: CPT | Performed by: PHYSICIAN ASSISTANT

## 2017-03-25 PROCEDURE — 71020 ZZHC CHEST TWO VIEWS, FRONT/LAT: CPT | Mod: TC

## 2017-03-25 PROCEDURE — 85025 COMPLETE CBC W/AUTO DIFF WBC: CPT | Performed by: PHYSICIAN ASSISTANT

## 2017-03-25 PROCEDURE — 25000125 ZZHC RX 250: Performed by: PHYSICIAN ASSISTANT

## 2017-03-25 PROCEDURE — 84484 ASSAY OF TROPONIN QUANT: CPT | Performed by: PHYSICIAN ASSISTANT

## 2017-03-25 PROCEDURE — 99285 EMERGENCY DEPT VISIT HI MDM: CPT | Mod: 25

## 2017-03-25 RX ORDER — PREDNISONE 20 MG/1
TABLET ORAL
Qty: 10 TABLET | Refills: 0 | Status: SHIPPED | OUTPATIENT
Start: 2017-03-25 | End: 2017-04-12

## 2017-03-25 RX ORDER — CEFDINIR 300 MG/1
300 CAPSULE ORAL 2 TIMES DAILY
Qty: 20 CAPSULE | Refills: 0 | Status: SHIPPED | OUTPATIENT
Start: 2017-03-25 | End: 2017-04-12

## 2017-03-25 RX ORDER — ASPIRIN 81 MG/1
162 TABLET, CHEWABLE ORAL ONCE
Status: COMPLETED | OUTPATIENT
Start: 2017-03-25 | End: 2017-03-25

## 2017-03-25 RX ORDER — PREDNISONE 20 MG/1
40 TABLET ORAL ONCE
Status: COMPLETED | OUTPATIENT
Start: 2017-03-25 | End: 2017-03-25

## 2017-03-25 RX ADMIN — PREDNISONE 40 MG: 20 TABLET ORAL at 13:24

## 2017-03-25 RX ADMIN — ASPIRIN 81 MG 162 MG: 81 TABLET ORAL at 12:12

## 2017-03-25 ASSESSMENT — ENCOUNTER SYMPTOMS
DIARRHEA: 0
HEADACHES: 0
PALPITATIONS: 0
VOMITING: 0
APPETITE CHANGE: 0
CHILLS: 0
SORE THROAT: 0
NECK PAIN: 0
SHORTNESS OF BREATH: 1
FEVER: 0
ACTIVITY CHANGE: 1
NAUSEA: 0
DIZZINESS: 0
CHEST TIGHTNESS: 1
FATIGUE: 0
COUGH: 0
ABDOMINAL PAIN: 0
LIGHT-HEADEDNESS: 0

## 2017-03-25 NOTE — ED AVS SNAPSHOT
[facility]    [address] [address]    Phone:  [phone]                                       Mary Alice Cameron   MRN: [id number]    Department:  [facility]   Date of Visit:  3/25/2017           After Visit Summary Signature Page     I have received my discharge instructions, and my questions have been answered. I have discussed any challenges I see with this plan with the nurse or doctor.    ..........................................................................................................................................  Patient/Patient Representative Signature      ..........................................................................................................................................  Patient Representative Print Name and Relationship to Patient    ..................................................               ................................................  Date                                            Time    ..........................................................................................................................................  Reviewed by Signature/Title    ...................................................              ..............................................  Date                                                            Time

## 2017-03-25 NOTE — DISCHARGE INSTRUCTIONS
Prednisone and Cefdinir as prescribed.      Follow-up with Dr. Yen early this week for a recheck.     Please return here for ANY worsening symptoms, changes chest symptoms, fevers, or for ANY other concerns or questions.     Continue your home nebulizers.

## 2017-03-25 NOTE — ED AVS SNAPSHOT
[facility]    [address]    [facility] [address]    Phone:  [phone]                                       Mary Alice Cameron   MRN: [id number]    Department:  [facility]   Date of Visit:  3/25/2017           Patient Information     Date Of Birth          1950        Your diagnoses for this visit were:     Acute exacerbation of chronic obstructive pulmonary disease (H)        You were seen by Clarke Orellana PA-C.      Follow-up Information     Schedule an appointment as soon as possible for a visit with Braydon Yen MD.    Specialty:  Family Practice    Contact information:    [facility]  [address]  [facility] [address] [phone]          Follow up with [facility].    Specialty:  EMERGENCY MEDICINE    Why:  If symptoms worsen    Contact information:    [address]  [facility] [address] [phone]    Additional information:    From [place]: Take [address]. Turn left at [address]. Turn left at the first stoplight on [address]. At the first stop sign, take a right onto [address]. Take a left into the parking lot and continue through until you reach the North enterance of the building.       From [place]: Take [address]. Take the [address] ramp towards [place]. Turn left onto [address]. Take a slight right onto [address]. Turn left at the first stoplight on [address]. At the first stop sign, take a right onto [address]. Take a left into the parking lot and continue through until you reach the North enterance of the building.       From [place]: Take [address]. Take a right at [address]. At the first stop sign, take a right onto [address]. Take a left into the parking lot and continue through until you reach the North enterance of the building.         Discharge Instructions       Prednisone and Cefdinir as prescribed.       Follow-up with Dr. Yen early this week for a recheck.     Please return here for ANY worsening symptoms, changes chest symptoms, fevers, or for ANY other concerns or questions.     Continue your home nebulizers.         Discharge References/Attachments     COPD FLARE (ENGLISH)      Future Appointments        Provider Department Dept Phone Center    3/29/2017 10:00 AM NA ANTI COAGULATION [facility] [phone] [facility]    5/23/2017 8:30 AM Eliezer Myers MD [facility] [phone] [place]    6/15/2017 10:00 AM Hayes Farrell MD [facility] [phone] [facility]         Review of your medicines      START taking        Dose / Directions Last dose taken    cefdinir 300 MG capsule   Commonly known as:  OMNICEF   Dose:  300 mg   Quantity:  20 capsule        Take 1 capsule (300 mg) by mouth 2 times daily   Refills:  0        predniSONE 20 MG tablet   Commonly known as:  DELTASONE   Quantity:  10 tablet        Take two tablets (= 40mg) each day for 5 (five) days   Refills:  0          Our records show that you are taking the medicines listed below. If these are incorrect, please call your family doctor or clinic.        Dose / Directions Last dose taken    acetaminophen 500 MG tablet   Commonly known as:  TYLENOL   Dose:  500-1000 mg        Take 500-1,000 mg by mouth every 6 hours as needed for mild pain   Refills:  0        * albuterol 108 (90 BASE) MCG/ACT Inhaler   Commonly known as:  VENTOLIN HFA   Dose:  2 puff   Quantity:  3 Inhaler        Inhale 2 puffs into the lungs every 4 hours as needed for shortness of breath / dyspnea or wheezing   Refills:  0        * albuterol (2.5 MG/3ML) 0.083% neb solution   Dose:  1 vial   Quantity:  25 vial        Take 1 vial (2.5 mg) by nebulization every 6 hours as needed for shortness of breath / dyspnea or wheezing   Refills:  1        * cloNIDine 0.1 MG tablet   Commonly known as:  CATAPRES   Dose:  0.3 mg   Quantity:   90 tablet        Take 3 tablets (0.3 mg) by mouth every evening   Refills:  0        * cloNIDine 0.1 MG tablet   Commonly known as:  CATAPRES   Quantity:  360 tablet        TAKE 1 TABLET BY MOUTH EVERY MORNING AND 3 TABLETS EVERY EVENING   Refills:  0        diphenhydrAMINE 25 MG tablet   Commonly known as:  BENADRYL   Dose:  25-50 mg   Quantity:  60 tablet        Take 1-2 tablets (25-50 mg) by mouth every 6 hours as needed for itching or allergies   Refills:  1        flecainide 100 MG tablet   Commonly known as:  TAMBOCOR   Dose:  100 mg   Quantity:  180 tablet        Take 1 tablet (100 mg) by mouth 2 times daily   Refills:  3        furosemide 40 MG tablet   Commonly known as:  LASIX   Quantity:  180 tablet        TAKE 1 TABLET BY MOUTH TWICE DAILY.   Refills:  2        hydrALAZINE 25 MG tablet   Commonly known as:  APRESOLINE   Quantity:  810 tablet        TAKE 3 TABLETS BY MOUTH THREE TIMES DAILY   Refills:  3        ipratropium - albuterol 0.5 mg/2.5 mg/3 mL 0.5-2.5 (3) MG/3ML neb solution   Commonly known as:  DUONEB   Quantity:  6 Box        USE 1 VIAL PER NEBULIZER FOUR TIMES DAILY   Refills:  3        levothyroxine 100 MCG tablet   Commonly known as:  SYNTHROID/LEVOTHROID   Quantity:  90 tablet        TAKE 1 TABLET(100 MCG) BY MOUTH DAILY   Refills:  0        LORazepam 1 MG tablet   Commonly known as:  ATIVAN   Dose:  0.5-1 tablet        Take 0.5-1 tablets by mouth every 6 hours as needed   Refills:  0        order for DME   Quantity:  1 each        Equipment being ordered: wedge for bed   Refills:  0        other medical supplies   Quantity:  1 each        Apply one pair to help with edema   Refills:  0        potassium chloride SA 20 MEQ CR tablet   Commonly known as:  K-DUR/KLOR-CON M   Dose:  20 mEq   Quantity:  180 tablet        Take 1 tablet (20 mEq) by mouth 2 times daily   Refills:  3        simvastatin 10 MG tablet   Commonly known as:  ZOCOR   Quantity:  90 tablet        TAKE 1 TABLET(10 MG) BY  MOUTH AT BEDTIME   Refills:  3        triamcinolone 0.1 % ointment   Commonly known as:  KENALOG   Quantity:  454 g        Apply bid and hs left hand and legs - for dermatitis   Refills:  3        warfarin 2 MG tablet   Commonly known as:  COUMADIN   Quantity:  90 tablet        Take 2 mg Mon/Wed/Fri; 3 mg all other days or as directed by [facility]   Refills:  1        * Notice:  This list has 4 medication(s) that are the same as other medications prescribed for you. Read the directions carefully, and ask your doctor or other care provider to review them with you.            Prescriptions were sent or printed at these locations (2 Prescriptions)                   [facility] - [place], [place] - [address] AT [facility] [address]   [address], [facility] [address]    Telephone:  [phone]   Fax:  [phone]   Hours:                  E-Prescribed (2 of 2)         predniSONE (DELTASONE) 20 MG tablet               cefdinir (OMNICEF) 300 MG capsule                Procedures and tests performed during your visit     Basic metabolic panel    CBC with platelets differential    EKG 12-lead, tracing only    INR    Influenza A/B antigen    Nt probnp inpatient (BNP)    Troponin I    XR Chest 2 Views      Orders Needing Specimen Collection     None      Pending Results     Date and Time Order Name Status Description    3/25/2017 1145 XR Chest 2 Views In process             Pending Culture Results     No orders found from 3/23/2017 to 3/26/2017.            Thank you for choosing [facility]       Thank you for choosing [facility] for your care. Our goal is always to provide you with excellent care. Hearing back from our patients is one way we can continue to improve our services. Please take a few minutes to complete the written survey that you may receive in the mail after you visit with us. Thank you!        [employer] Information     [facility] gives you secure access to your electronic health record.  If you see a primary care provider, you can also send messages to your care team and make appointments. If you have questions, please call your primary care clinic.  If you do not have a primary care provider, please call [phone] and they will assist you.        Care EveryWhere ID     This is your Care EveryWhere ID. This could be used by other organizations to access your [facility] medical records  [id number]        After Visit Summary       This is your record. Keep this with you and show to your community pharmacist(s) and doctor(s) at your next visit.

## 2017-03-25 NOTE — ED PROVIDER NOTES
History     Chief Complaint   Patient presents with     Chest Wall Pain     hx of COPD, chest congestion X24 hours, cough, chest congestion/ burning X24 hours.     The history is provided by the patient.     Mary Alice Cameron is a 66 year old female who presented to the ED ambulatory for evaluation of 24 hours of chest burning and dyspnea.  She has an appreciable hx of COPD and uses O2 at night as needed.  She was hospitalized here in Jan and Feb for COPD exacerbations.  She tells me that this seem very similar as the previous episodes but she decided to come in the ED much earlier.  No fevers.  Very little cough and no change in production.  States that she had trouble lying supine last night. Takes coumadin for atrial fib.  No hx of CAD.      Past Medical History:   Diagnosis Date     Asthma      Atrial fibrillation (H)      COPD (chronic obstructive pulmonary disease) (H)      Depression      High cholesterol      HTN (hypertension)      Thyroid disease       Past Surgical History:   Procedure Laterality Date     BACK SURGERY  2006     COLONOSCOPY N/A 1/19/2016    Procedure: COLONOSCOPY;  Surgeon: Waldemar Bob MD;  Location: [facility]     HYSTERECTOMY  1980    partial     SLING BLADDER SUSPENSION WITH FASCIA LINNETTE        Social History     Social History     Marital status:      Spouse name: N/A     Number of children: N/A     Years of education: N/A     Occupational History      Retired     disabled     Social History Main Topics     Smoking status: Former Smoker     Years: 48.00     Types: Cigarettes     Quit date: 1/28/2007     Smokeless tobacco: Never Used     Alcohol use No     Drug use: No     Sexual activity: No      Comment:      Other Topics Concern      Service No     Blood Transfusions Yes     Permits if needed     Caffeine Concern Yes     2 cups coffee daily     Seat Belt Yes     Parent/Sibling W/ Cabg, Mi Or Angioplasty Before 65f 55m? No     Social History Narrative      Family  History   Problem Relation Age of Onset     Asthma Mother      Asthma Brother      CANCER Mother      ovarian     Prostate Cancer Father      Hypertension Father      Hypertension Mother      Hypertension Sister      Hypertension Brother      Heart Failure Father      CHF       I have reviewed the Medications, Allergies, Past Medical and Surgical History, and Social History in the Epic system.    Review of Systems   Constitutional: Positive for activity change. Negative for appetite change, chills, fatigue and fever.   HENT: Negative for congestion and sore throat.    Respiratory: Positive for chest tightness and shortness of breath. Negative for cough.    Cardiovascular: Positive for chest pain. Negative for palpitations.   Gastrointestinal: Negative for abdominal pain, diarrhea, nausea and vomiting.   Genitourinary: Negative.    Musculoskeletal: Negative for neck pain.   Skin: Negative for rash.   Neurological: Negative for dizziness, light-headedness and headaches.       Physical Exam   BP: 157/76  Pulse: 69  Heart Rate: 62  Temp: 97.8  F (36.6  C)  Resp: 18  SpO2: 95 %  Physical Exam   Constitutional: She is oriented to person, place, and time. She appears well-developed and well-nourished. No distress.   Pleasant and talkative    Neck: Normal range of motion. Neck supple.   Cardiovascular: Normal rate.    Irregular    Pulmonary/Chest: Effort normal.   Lungs sound pretty good    Musculoskeletal: She exhibits no edema.   Lymphadenopathy:     She has no cervical adenopathy.   Neurological: She is alert and oriented to person, place, and time.   Skin: Skin is warm and dry.   Psychiatric: She has a normal mood and affect.   Nursing note and vitals reviewed.      ED Course     ED Course     Procedures        EKG shows a NSR with slightly prolonged QT and nonspecific T wave inversions in III, V2-V3.  No significant change from previous.      CXR shows no focal consolidation, PTX, or widened mediastinum.      Medications   predniSONE (DELTASONE) tablet 40 mg (not administered)   aspirin chewable tablet 162 mg (162 mg Oral Given 3/25/17 1212)     Results for orders placed or performed during the hospital encounter of 03/25/17 (from the past 24 hour(s))   Influenza A/B antigen   Result Value Ref Range    Influenza A/B Agn Specimen Nares     Influenza A Negative NEG    Influenza B  NEG     Negative   Test results must be correlated with clinical data. If necessary, results   should be confirmed by a molecular assay or viral culture.     CBC with platelets differential   Result Value Ref Range    WBC 8.3 4.0 - 11.0 10e9/L    RBC Count 4.42 3.8 - 5.2 10e12/L    Hemoglobin 14.1 11.7 - 15.7 g/dL    Hematocrit 42.9 35.0 - 47.0 %    MCV 97 78 - 100 fl    MCH 31.9 26.5 - 33.0 pg    MCHC 32.9 31.5 - 36.5 g/dL    RDW 13.2 10.0 - 15.0 %    Platelet Count 247 150 - 450 10e9/L    Diff Method Automated Method     % Neutrophils 78.6 %    % Lymphocytes 11.9 %    % Monocytes 6.8 %    % Eosinophils 1.8 %    % Basophils 0.5 %    % Immature Granulocytes 0.4 %    Nucleated RBCs 0 0 /100    Absolute Neutrophil 6.5 1.6 - 8.3 10e9/L    Absolute Lymphocytes 1.0 0.8 - 5.3 10e9/L    Absolute Monocytes 0.6 0.0 - 1.3 10e9/L    Absolute Eosinophils 0.2 0.0 - 0.7 10e9/L    Absolute Basophils 0.0 0.0 - 0.2 10e9/L    Abs Immature Granulocytes 0.0 0 - 0.4 10e9/L    Absolute Nucleated RBC 0.0    Basic metabolic panel   Result Value Ref Range    Sodium 139 133 - 144 mmol/L    Potassium 3.9 3.4 - 5.3 mmol/L    Chloride 99 94 - 109 mmol/L    Carbon Dioxide 32 20 - 32 mmol/L    Anion Gap 8 3 - 14 mmol/L    Glucose 92 70 - 99 mg/dL    Urea Nitrogen 11 7 - 30 mg/dL    Creatinine 0.78 0.52 - 1.04 mg/dL    GFR Estimate 73 >60 mL/min/1.7m2    GFR Estimate If Black 89 >60 mL/min/1.7m2    Calcium 9.2 8.5 - 10.1 mg/dL   Troponin I   Result Value Ref Range    Troponin I ES  0.000 - 0.045 ug/L     <0.015  The 99th percentile for upper reference range is 0.045 ug/L.   Troponin values in   the range of 0.045 - 0.120 ug/L may be associated with risks of adverse   clinical events.     INR   Result Value Ref Range    INR 2.16 (H) 0.80 - 1.20   Nt probnp inpatient (BNP)   Result Value Ref Range    N-Terminal Pro BNP Inpatient 337 0 - 900 pg/mL       Critical Care time:  none               Labs Ordered and Resulted from Time of ED Arrival Up to the Time of Departure from the ED   INR - Abnormal; Notable for the following:        Result Value    INR 2.16 (*)     All other components within normal limits   CBC WITH PLATELETS DIFFERENTIAL   BASIC METABOLIC PANEL   TROPONIN I   NT PROBNP INPATIENT   INFLUENZA A/B ANTIGEN       Assessments & Plan (with Medical Decision Making)   Ms Cameron was quite adamant about her condition and how she would like it treated.  She tells me that she is trying to come to the ED sooner than in previous years.  She tells me that the chest burning is normal for her when she has exacerbations.  She has no hx of CAD and with a negative troponin after >24 hours of continuous symptoms; ACS would be very unlikely.  This was all discussed at length with Ms. Cameron.  She would like to go home and this is certainly reasonable.  Cefdinir and prednisone as an outpatient with close clinic follow-up.  She is to return HERE for ANY worsening symptoms whatsoever.  This includes any changes in her chest symptoms.  Ms. Cameron voiced complete understanding and was happy and agreeable.     I have reviewed the nursing notes.    I have reviewed the findings, diagnosis, plan and need for follow up with the patient.    New Prescriptions    CEFDINIR (OMNICEF) 300 MG CAPSULE    Take 1 capsule (300 mg) by mouth 2 times daily    PREDNISONE (DELTASONE) 20 MG TABLET    Take two tablets (= 40mg) each day for 5 (five) days       Final diagnoses:   Acute exacerbation of chronic obstructive pulmonary disease (H)       3/25/2017   [facility]     Clarke Orellana PA-C  03/25/17  [id number]

## 2017-03-25 NOTE — ED NOTES
Presents to the ED with friend Ksenia.  Pt has COPD and past 24 hours has had some coughing, SOB, chest congestion, chest burning,   Assessment is complete.  BP and sat monitor on. Call light is given.  Friend at the bedside.

## 2017-03-27 ENCOUNTER — ANTICOAGULATION THERAPY VISIT (OUTPATIENT)
Dept: ANTICOAGULATION | Facility: OTHER | Age: 67
End: 2017-03-27

## 2017-03-27 DIAGNOSIS — I48.19 ATRIAL FIBRILLATION, PERSISTENT (H): ICD-10-CM

## 2017-03-27 DIAGNOSIS — Z79.01 LONG-TERM (CURRENT) USE OF ANTICOAGULANTS: ICD-10-CM

## 2017-03-27 NOTE — MR AVS SNAPSHOT
Mary Alice Cameron   3/27/2017   Anticoagulation Therapy Visit    Description:  66 year old female   Provider:  Braydon Yen MD   Department:  [facility] Anti Coagulation           INR as of 3/27/2017     Today's INR 2.16 (3/25/2017)      Anticoagulation Summary as of 3/27/2017     INR goal 2.0-3.0   Today's INR 2.16 (3/25/2017)   Full instructions 3/27: Hold; 3/28: 2 mg; Otherwise 2 mg on Mon, Wed, Fri; 3 mg all other days   Next INR check 3/29/2017    Indications   Atrial fibrillation  persistent (H) [I48.1]  Long-term (current) use of anticoagulants [Z79.01] [Z79.01]         Your next Anticoagulation Clinic appointment(s)     Mar 29, 2017 10:00 AM CDT   Anticoagulation Visit with NA ANTI COAGULATION   [facility]    [address]  [facility] [address]   [phone]              March 2017 Details    Sun Mon Tue Wed Thu Fri Sat        1               2               3               4                 5               6               7               8               9               10               11                 12               13               14               15               16               17               18                 19               20               21               22               23               24               25                 26               27      Hold   See details      28      2 mg         29            30               31                 Date Details   03/27 This INR check   Hold dose   Prednisone 40mg x 5d; Omnicef 300mg began 3/25       Date of next INR:  3/29/2017         How to take your warfarin dose     To take:  2 mg Take 1 of the 2 mg tablets.    Hold Do not take your warfarin dose. See the Details table to the right for additional instructions.

## 2017-03-27 NOTE — PROGRESS NOTES
ANTICOAGULATION FOLLOW-UP CLINIC VISIT    Patient Name:  Mary Alice Cameron  Date:  3/27/2017  Contact Type:  Telephone    SUBJECTIVE:     Patient Findings     Positives Change in medications, Antibiotic use or infection    Comments Pt seen in ED on 3/25/17.  Instructed to take Prednisone 40mg daily x 5days and Omnicef as directed.  INR drawn during the visit.  AC nurse telephoned pt, left voice message.   INR results/Coumadin dosing and INR recheck information left on the telephone voice message.  Call ended with pt informed to contact the AC clinic with any questions.           OBJECTIVE    INR   Date Value Ref Range Status   03/25/2017 2.16 (H) 0.80 - 1.20 Final       ASSESSMENT / PLAN  INR assessment THER    Recheck INR In: 2 DAYS    INR Location Clinic      Anticoagulation Summary as of 3/27/2017     INR goal 2.0-3.0   Today's INR 2.16 (3/25/2017)   Maintenance plan 2 mg (2 mg x 1) on Mon, Wed, Fri; 3 mg (2 mg x 1.5) all other days   Full instructions 3/27: Hold; 3/28: 2 mg; Otherwise 2 mg on Mon, Wed, Fri; 3 mg all other days   Weekly total 18 mg   Plan last modified Kenna Storm RN (3/1/2017)   Next INR check 3/29/2017   Priority INR   Target end date Indefinite    Indications   Atrial fibrillation  persistent (H) [I48.1]  Long-term (current) use of anticoagulants [Z79.01] [Z79.01]         Anticoagulation Episode Summary     INR check location     Preferred lab     Send INR reminders to  ANTICOAG POOL    Comments       Anticoagulation Care Providers     Provider Role Specialty Phone number    Braydon Yen MD [facility] [phone]            See the Encounter Report to view Anticoagulation Flowsheet and Dosing Calendar (Go to Encounters tab in chart review, and find the Anticoagulation Therapy Visit)        Kenna Storm RN

## 2017-03-28 ENCOUNTER — CARE COORDINATION (OUTPATIENT)
Dept: CARE COORDINATION | Facility: OTHER | Age: 67
End: 2017-03-28

## 2017-03-28 NOTE — PROGRESS NOTES
"Clinic Care Coordination Contact  OUTREACH    Referral Information:     Reason for Contact: follow up ER visit for COPD exacerbation,         Universal Utilization:    ED visits past year 3  Hospital visits in past year  3  Last PCP appt.  2/2/17, but will call today for post ER follow up appt.  Missed appointments 0                   Clinical Concerns:  Current Medical Concerns: patient in ER on 3/25/17. States she was having a little increased shortness of breath and inside of her chest \"itched\" which signifies increase in COPD issues for her. She wanted to get to the ER and get treatment before it got \"worse\" and end up in the hospital.  She states she was given an antibiotic (omnicef) and Prednisone 40mg daily for 5 days. She will complete both on 3/30/17. I encouraged her to call for her follow up MD appointment today as sometimes it is hard to get an appointment with Dr. Yen. She said she will do that.  She states she is feeling much better today and is having no issues. She states with the change to Duonebs she is coughing up a great deal of clear phlegm right after the treatment. She states she was able to walk 8 blocks with her dog yesterday and had no increase in symptoms. She says she can usually vacuum 1 room and has to rest for about 15 minutes before she can go on to the next room. 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   She states she is also able to do some of her crafting such as quilting without any issues. She states she is feeling good today and will continue her medications given to her in ER and will follow up with Dr. Yen sometime this week or early next week.   Current Behavioral Concerns: none at this time    Education Provided to patient: We discussed taking her medications as prescribed. We discussed early intervention when having increased symptoms will probably have a good effect instead of waiting " "until symptoms progress and is having increased respiratory difficulty. We discussed continuing her activity as much as she can. Continued walks with her dog and continued interest in crafting and quilting.      Clinical Pathway: Clinic Care Coordination COPD Follow up Assessment  Symptom Review:    Are you having any increased shortness of breath? Yes  When you cough are you coughing up anything? Yes  Color she states it is clear  Consistency watery  Frequency states right after duoneb she coughs a great deal bringing up phlegm but after that it subsides quite a bit.  What COPD Zone currently in:Green  What number would you call if you were in the YELLOW zones: she states she has care coordination number     Medications:   Were you started on any new medications since the last time we talked?  Yes, she was seen in ER and started on Omnicef and prednisone  Do you have all of your medications? Yes, she states she was recently reimbursed by [employer] for what she described was \"overages\" in the amount she was paying for copay. She states she is no longer paying anything out of pocket for prescriptions so is pretty sure she is on the 340 B program also but not positive.   Have you had trouble filling your prescriptions? No  Are you medications effective in controlling your symptoms? Yes, She states with the antibiotic and prednisone she is not having any difficulty  Are you still on Prednisone (* does pt understand the tapering instructions)?  She will continue prednisone 40 mg. Daily and will complete on 3/30/17. She states that warfarin clinic is away of antibiotic and prednisone use for past few days.       Oxygen/DME  What is the current liter flow you are using? 2 L NC at night  Has there been any changes? Yes, she states when she is feeling short of breath during day that she has sometimes had to use O2 for short period of time      Activity:  How much activity can you do before you are SOB? She states she was able " to walk 8 blocks yesterday without symptoms. She states if she is vacuuming she has to rest about 15 minutes between rooms   Have you had to reduce your activities because of dyspnea or other symptoms? No     Diet:  Do you weigh yourself daily? No and N/A    Has there been a recent change in your weight? No     Emotions/Lifestyle:    Do you smoke (if not asked upon initial assessment)? no    Follow Up Plan:  Care Coordinator follow up plan: She is going to call clinic today for post ER follow up appointment with her MD. We will also follow up with patient in about 10 days (we will call her after her MD appt).          Medication Management:  Patient manages her own medications and states she has no difficulty     Functional Status:        Transportation: she continues to drive and has no issues with transportation           Psychosocial:     Financial/Insurance: she recently quit her outside job and will be doing  at her apartment complex. She gets the benefit of free rent by doing this and states it is going well       Resources and Interventions:  Current Resources:  ;  PCP, care coordination. Warfarin management, Pulmonology,  She states she is also utilizing some of the programs listed in the senior linkage line.                 Goals: to be able to stay out of the hospital and to remain financially stable without working outside job.                 Patient/Caregiver understanding: she verbalized understanding of all things discussed           Plan: to follow up with patient after her post ER MD appointment later this week or early next week.       Kenna Kiran RN  Care Coordination

## 2017-03-29 ENCOUNTER — ANTICOAGULATION THERAPY VISIT (OUTPATIENT)
Dept: ANTICOAGULATION | Facility: OTHER | Age: 67
End: 2017-03-29
Attending: FAMILY MEDICINE
Payer: MEDICARE

## 2017-03-29 DIAGNOSIS — I48.19 ATRIAL FIBRILLATION, PERSISTENT (H): ICD-10-CM

## 2017-03-29 DIAGNOSIS — Z79.01 LONG-TERM (CURRENT) USE OF ANTICOAGULANTS: ICD-10-CM

## 2017-03-29 DIAGNOSIS — I48.19 PERSISTENT ATRIAL FIBRILLATION (H): ICD-10-CM

## 2017-03-29 LAB — INR POINT OF CARE: 1.9 (ref 0.86–1.14)

## 2017-03-29 PROCEDURE — 85610 PROTHROMBIN TIME: CPT | Mod: QW

## 2017-03-29 RX ORDER — WARFARIN SODIUM 2 MG/1
TABLET ORAL
Qty: 135 TABLET | Refills: 3 | Status: SHIPPED | OUTPATIENT
Start: 2017-03-29 | End: 2017-12-13

## 2017-03-29 NOTE — MR AVS SNAPSHOT
Mary Alice Cameron   3/29/2017 10:00 AM   Anticoagulation Therapy Visit    Description:  66 year old female   Provider:  [facility]   Department:  [facility]           INR as of 3/29/2017     Today's INR 1.9!      Anticoagulation Summary as of 3/29/2017     INR goal 2.0-3.0   Today's INR 1.9!   Full instructions 2 mg on Mon, Wed, Fri; 3 mg all other days   Next INR check 4/12/2017    Indications   Atrial fibrillation  persistent (H) [I48.1]  Long-term (current) use of anticoagulants [Z79.01] [Z79.01]         Your next Anticoagulation Clinic appointment(s)     Apr 12, 2017 10:15 AM CDT   Anticoagulation Visit with [facility]   [facility] ([facility])    [address]  [facility] [address]   [phone]              March 2017 Details    Sun Mon Tue Wed Thu Fri Sat        1               2               3               4                 5               6               7               8               9               10               11                 12               13               14               15               16               17               18                 19               20               21               22               23               24               25                 26               27               28               29      2 mg   See details      30      3 mg         31      2 mg           Date Details   03/29 This INR check               How to take your warfarin dose     To take:  2 mg Take 1 of the 2 mg tablets.    To take:  3 mg Take 1.5 of the 2 mg tablets.           April 2017 Details    Sun Mon Tue Wed Thu Fri Sat           1      3 mg           2      3 mg         3      2 mg         4      3 mg         5      2 mg         6      3 mg         7      2 mg         8      3 mg           9      3 mg         10      2 mg         11      3 mg         12            13               14               15                 16               17                18               19               20               21               22                 23               24               25               26               27               28               29                 30                      Date Details   No additional details    Date of next INR:  4/12/2017         How to take your warfarin dose     To take:  2 mg Take 1 of the 2 mg tablets.    To take:  3 mg Take 1.5 of the 2 mg tablets.

## 2017-03-29 NOTE — PROGRESS NOTES
ANTICOAGULATION FOLLOW-UP CLINIC VISIT    Patient Name:  Mary Alice Cameron  Date:  3/29/2017  Contact Type:  Face to Face    SUBJECTIVE:     Patient Findings     Positives Change in medications, Antibiotic use or infection, Intentional hold of therapy    Comments Intentional HOLD R/T ABX and Prednisone from ED visit 3/26.  States has 1 day remaining on the Prednisone.  States usually 2 wks following Prednisone use and discontinuance, the COPD sx start coming back.  Pt will see PCP in follow up tomorrow.  Discussed plan for Coumadin dosing and INR recheck; pt agreed.             OBJECTIVE    INR Protime   Date Value Ref Range Status   03/29/2017 1.9 (A) 0.86 - 1.14 Final       ASSESSMENT / PLAN  INR assessment THER    Recheck INR In: 2 WEEKS    INR Location Clinic      Anticoagulation Summary as of 3/29/2017     INR goal 2.0-3.0   Today's INR 1.9!   Maintenance plan 2 mg (2 mg x 1) on Mon, Wed, Fri; 3 mg (2 mg x 1.5) all other days   Full instructions 2 mg on Mon, Wed, Fri; 3 mg all other days   Weekly total 18 mg   No change documented Kenna Storm RN   Plan last modified Kenna Storm RN (3/1/2017)   Next INR check 4/12/2017   Priority INR   Target end date Indefinite    Indications   Atrial fibrillation  persistent (H) [I48.1]  Long-term (current) use of anticoagulants [Z79.01] [Z79.01]         Anticoagulation Episode Summary     INR check location     Preferred lab     Send INR reminders to [facility] POOL    Comments       Anticoagulation Care Providers     Provider Role Specialty Phone number    Braydon Yen MD [facility] [phone]            See the Encounter Report to view Anticoagulation Flowsheet and Dosing Calendar (Go to Encounters tab in chart review, and find the Anticoagulation Therapy Visit)        Kenna Storm RN

## 2017-03-30 ENCOUNTER — OFFICE VISIT (OUTPATIENT)
Dept: FAMILY MEDICINE | Facility: OTHER | Age: 67
End: 2017-03-30
Attending: FAMILY MEDICINE
Payer: COMMERCIAL

## 2017-03-30 ENCOUNTER — CARE COORDINATION (OUTPATIENT)
Dept: ANTICOAGULATION | Facility: OTHER | Age: 67
End: 2017-03-30

## 2017-03-30 VITALS
OXYGEN SATURATION: 94 % | DIASTOLIC BLOOD PRESSURE: 58 MMHG | SYSTOLIC BLOOD PRESSURE: 112 MMHG | WEIGHT: 160 LBS | HEIGHT: 65 IN | BODY MASS INDEX: 26.66 KG/M2 | HEART RATE: 61 BPM | TEMPERATURE: 98.2 F | RESPIRATION RATE: 24 BRPM

## 2017-03-30 DIAGNOSIS — N39.41 URGE INCONTINENCE OF URINE: ICD-10-CM

## 2017-03-30 DIAGNOSIS — J44.1 COPD EXACERBATION (H): Primary | ICD-10-CM

## 2017-03-30 PROCEDURE — 99212 OFFICE O/P EST SF 10 MIN: CPT

## 2017-03-30 PROCEDURE — 99213 OFFICE O/P EST LOW 20 MIN: CPT | Performed by: FAMILY MEDICINE

## 2017-03-30 RX ORDER — FLUTICASONE PROPIONATE AND SALMETEROL XINAFOATE 115; 21 UG/1; UG/1
2 AEROSOL, METERED RESPIRATORY (INHALATION) 2 TIMES DAILY
Qty: 36 G | Refills: 1 | Status: SHIPPED | OUTPATIENT
Start: 2017-03-30 | End: 2017-10-29

## 2017-03-30 RX ORDER — PREDNISONE 10 MG/1
TABLET ORAL
Qty: 30 TABLET | Refills: 0 | Status: SHIPPED | OUTPATIENT
Start: 2017-03-30 | End: 2017-04-12

## 2017-03-30 ASSESSMENT — PAIN SCALES - GENERAL: PAINLEVEL: NO PAIN (0)

## 2017-03-30 NOTE — PROGRESS NOTES
Clinic Care Coordination Contact  OUTREACH    Referral Information:  Referral Source: Care Team  Reason for Contact: COPD follow up/ED visit; Hospital bed acquisition issue        Universal Utilization:   ED Visits in last year: 3  Hospital visits in last year: 2  Last PCP appointment: 02/02/17  Missed Appointments: 0     Multiple Providers or Specialists: Cardiology; Care Coordination, PCP, Anticoagulation, Dermatology (Pulmonology)    Clinical Concerns:  Current Medical Concerns:  CC nurse visited face to face w/pt today.  Followed up on recent ED visit on 3/25/17.  Pt states she was being proactive to report to the ED.  States she knew the s/sx to watch for and was sure she was headed to the Red Zone if she waited until Monday.  Was given Prednisone and an ABX for the sx.  Sees PCP on 3/30 in follow up.  States she is in the Green zone today.  States she is still trying to get the hospital bed so she no longer has to sleep, sitting upright, in the love seat at night.  States she has tried a wedge.  States she placed it between the mattress and pillows for a couple wks.  States this was unsuccessful and had to move to the loveseat.  States she placed the wedge between the mattress and box spring of the bed and tried this for a couple wks; this too was unsuccessful whereby she would have to move to the loveseat, sitting up to sleep.  She uses O2 continuous at night.  Pt is independent in her bed mobility, can get in and out of bed, turn side to side and sit up in bed all independently - no assistance is needed.  States she has also tried elevating the head of the bed by raising the head of bed up onto blocks, while leaving the foot of the bed on the floor without devices.  States this has failed, whereby she is unable to sleep and moves to try sleeping sitting up in the loveseat.  She has tried increased use of pillows behind her to raise the head without success.           Education Provided to patient:  Recommended she inform her PCP of the bed issue also during the appt.   Clinical Pathway Name: COPD  Clinical Pathway: Clinic Care Coordination COPD Follow up Assessment  Symptom Review:    Are you having any increased shortness of breath? No  When you cough are you coughing up anything? Yes  Color whitish/thick phlegm-like sputum  Consistency see above  Frequency frequently during the recent episode  What COPD Zone currently in:Green today, but on 3/25 was yellow and feeling like it would have gone to Red if she would have waited longer to get medical attention  What number would you call if you were in the YELLOW zones: States awareness to contact the CC clinic      Medications:   Were you started on any new medications since the last time we talked?  Yes, Prednisone and Omnicef  Do you have all of your medications? Yes,   Have you had trouble filling your prescriptions? Not now, but has applied for medication assistance via [facility], as [facility] increased the pricing on the meds.  States she has worked out a better pricing schedule with [employer], states they were overcharging her.  Are you medications effective in controlling your symptoms? Yes  Are you still on Prednisone (* does pt understand the tapering instructions)?  States is taking Prednisone 40mg daily x 5 days then is to stop.    Oxygen/DME  What is the current liter flow you are using? Intermittent during the day-2L/min continuous at night   Has there been any changes? Yes - over the wkend      Activity:  How much activity can you do before you are SOB?  Depends on the activity; this weekend she was unable to do very much  Have you had to reduce your activities because of dyspnea or other symptoms? This wkend - yes; is feeling improved today and able to resume most activites     Diet:  Do you weigh yourself daily? No    Has there been a recent change in your weight? No     Emotions/Lifestyle:    Do you smoke (if not asked upon initial  assessment)? No    Follow Up Plan:  Care Coordinator follow up plan:  Contact pt in 2 weeks, during Anticoagulation visit    Medication Management:  Manages independently    Functional Status:  Mobility Status: Independent  Equipment Currently Used at Home: oxygen  Transportation: Drives     Psychosocial:  Current living arrangement:: I live in a private home  Financial/Insurance:  Working with [employer] includes the [facility] [facility]    Resources and Interventions:  Current Resources:  PCP; Care Coordination; Pulmonology; Anticoagulation; Cardiology       Senior Linkage Line Referral Placed: 01/18/17     Goals:   Goal 1 Statement:  (to manage financially w/out working outside the home)  Goal 2 Statement:  (to remain in the COPD Green Zone; stay out of the hosp)      Strengths: Pt recognizes the s/sx of COPD and understands the Zones  Patient/Caregiver understanding: Pt leaves stating understanding and with questions answered.  Frequency of Care Coordination: 1-2 wks  Upcoming appointment: 03/30/17     Plan: Contact pt in 2 weeks.  Continue efforts assisting pt with obtaining a hospital bed.  Continue to assist with medication costs follow up.    Kenna Storm RN  Care Coordination      4/3/17 -  nurse telephoned [facility] pertaining to the pt's request for a hospital bed.  This nurse described information received from the pt R/T the wedge and pillow issues used in her home.  She stated she would resend the Medical Necessity form to Dr Yen, based on the information as described above.  This nurse indicated Dr Yen will be notified today.  Call ended with questions answered and understanding stated.  Kenna Storm RN

## 2017-03-30 NOTE — PROGRESS NOTES
Mary Alice Cameron    March 30, 2017    Chief Complaint   Patient presents with     Hospital F/U     Follow up hospital 03/25/2017, COPD flare up.       SUBJECTIVE:  Here for copd exacc f/u.  Doing well.  Breathing better.  Finishing 5 days of prednisone today.   We discussed options and copd today.  Also stress incontinence of urine.  Had surgery years ago.  Would like a depends undergarment for intermittent use.      Past Medical History:   Diagnosis Date     Asthma      Atrial fibrillation (H)      COPD (chronic obstructive pulmonary disease) (H)      Depression      High cholesterol      HTN (hypertension)      Thyroid disease        Past Surgical History:   Procedure Laterality Date     BACK SURGERY  2006     COLONOSCOPY N/A 1/19/2016    Procedure: COLONOSCOPY;  Surgeon: Waldemar Bob MD;  Location: [facility] OR     HYSTERECTOMY  1980    partial     SLING BLADDER SUSPENSION WITH FASCIA LINNETTE         Current Outpatient Prescriptions   Medication Sig Dispense Refill     predniSONE (DELTASONE) 10 MG tablet 20 mg daily x 3 days, 10 mg po daily x 3 days, 5 mg po daily x 4 days, then stop. 30 tablet 0     warfarin (COUMADIN) 2 MG tablet Take 2 mg Mon/Wed/Fri; 3 mg all other days or as directed by [facility] 135 tablet 3     predniSONE (DELTASONE) 20 MG tablet Take two tablets (= 40mg) each day for 5 (five) days 10 tablet 0     cefdinir (OMNICEF) 300 MG capsule Take 1 capsule (300 mg) by mouth 2 times daily 20 capsule 0     order for DME Equipment being ordered: wedge for bed 1 each 0     cloNIDine (CATAPRES) 0.1 MG tablet TAKE 1 TABLET BY MOUTH EVERY MORNING AND 3 TABLETS EVERY EVENING 360 tablet 0     levothyroxine (SYNTHROID/LEVOTHROID) 100 MCG tablet TAKE 1 TABLET(100 MCG) BY MOUTH DAILY 90 tablet 0     hydrALAZINE (APRESOLINE) 25 MG tablet TAKE 3 TABLETS BY MOUTH THREE TIMES DAILY 810 tablet 3     furosemide (LASIX) 40 MG tablet TAKE 1 TABLET BY MOUTH TWICE DAILY. 180 tablet 2     ipratropium - albuterol 0.5  mg/2.5 mg/3 mL (DUONEB) 0.5-2.5 (3) MG/3ML neb solution USE 1 VIAL PER NEBULIZER FOUR TIMES DAILY 6 Box 3     albuterol (2.5 MG/3ML) 0.083% neb solution Take 1 vial (2.5 mg) by nebulization every 6 hours as needed for shortness of breath / dyspnea or wheezing 25 vial 1     albuterol (VENTOLIN HFA) 108 (90 BASE) MCG/ACT inhaler Inhale 2 puffs into the lungs every 4 hours as needed for shortness of breath / dyspnea or wheezing 3 Inhaler 0     cloNIDine (CATAPRES) 0.1 MG tablet Take 3 tablets (0.3 mg) by mouth every evening 90 tablet 0     triamcinolone (KENALOG) 0.1 % ointment Apply bid and hs left hand and legs - for dermatitis 454 g 3     flecainide (TAMBOCOR) 100 MG tablet Take 1 tablet (100 mg) by mouth 2 times daily 180 tablet 3     simvastatin (ZOCOR) 10 MG tablet TAKE 1 TABLET(10 MG) BY MOUTH AT BEDTIME 90 tablet 3     potassium chloride SA (K-DUR,KLOR-CON M) 20 MEQ tablet Take 1 tablet (20 mEq) by mouth 2 times daily 180 tablet 3     diphenhydrAMINE (BENADRYL) 25 MG tablet Take 1-2 tablets (25-50 mg) by mouth every 6 hours as needed for itching or allergies 60 tablet 1     acetaminophen (TYLENOL) 500 MG tablet Take 500-1,000 mg by mouth every 6 hours as needed for mild pain       OTHER MEDICAL SUPPLIES Apply one pair to help with edema 1 each 0     LORazepam (ATIVAN) 1 MG tablet Take 0.5-1 tablets by mouth every 6 hours as needed         Allergies   Allergen Reactions     Amlodipine Besylate Cough     Norvasc     Lisinopril Cough       Family History   Problem Relation Age of Onset     Prostate Cancer Father      Hypertension Father      Heart Failure Father      CHF     Asthma Mother      CANCER Mother      ovarian     Hypertension Mother      Asthma Brother      Hypertension Sister      Hypertension Brother        Social History     Social History     Marital status:      Spouse name: N/A     Number of children: N/A     Years of education: N/A     Occupational History      Retired     disabled      Social History Main Topics     Smoking status: Former Smoker     Years: 48.00     Types: Cigarettes     Quit date: 1/28/2007     Smokeless tobacco: Never Used     Alcohol use No     Drug use: No     Sexual activity: No      Comment:      Other Topics Concern      Service No     Blood Transfusions Yes     Permits if needed     Caffeine Concern Yes     2 cups coffee daily     Seat Belt Yes     Parent/Sibling W/ Cabg, Mi Or Angioplasty Before 65f 55m? No     Social History Narrative       5 point ROS negative except as noted above in HPI, including Gen., Resp., CV, GI &  system review.     OBJECTIVE:  B/P: 112/58, T: 98.2, P: 61, R: 24    GENERAL APPEARANCE: Alert, no acute distress  CV: regular rate and rhythm, no murmur, rub or gallop  RESP: lungs clear to auscultation bilaterally with decreased   ABDOMEN: normal bowel sounds, soft, nontender, no hepatosplenomegaly or other masses  SKIN: no suspicious lesions or rashes to visualized skin  NEURO: Alert, oriented x 3, speech and mentation normal    ASSESSMENT and PLAN:  (J44.1) COPD exacerbation (H)  (primary encounter diagnosis)  Comment: ongoing  Plan: predniSONE (DELTASONE) 10 MG tablet        Extend and slow down the prednisone, longer taper sent.  Add advair which she used to take and have less flares.  No labs needed today in f/u.  F/u promptly with any worsening.      (N39.41) Urge incontinence of urine  Comment: discussed.   Plan: would like a depend type undergarment.  This is reasonable.  Written script given.  She will let me know if it changes.

## 2017-03-30 NOTE — MR AVS SNAPSHOT
After Visit Summary   3/30/2017    Mary Alice Cameron    MRN: [id number]           Patient Information     Date Of Birth          1950        Visit Information        Provider Department      3/30/2017 11:00 AM Braydon Yen MD [facility]        Today's Diagnoses     COPD exacerbation (H)    -  1    Urge incontinence of urine          Care Instructions    F/u with ongoing concerns.         Follow-ups after your visit        Your next 10 appointments already scheduled     Apr 12, 2017 10:15 AM CDT   Anticoagulation Visit with NA ANTI COAGULATION   [facility] ([facility])    [address]  [facility] [address]   [phone]            May 23, 2017  8:30 AM CDT   (Arrive by 8:15 AM)   Return Visit with Eliezer Myers MD   [facility] ([facility])    [address] [facility] [address]   [phone]            Mahendra 15, 2017 10:00 AM CDT   Return Visit with Hayes Farrell MD   [facility] ([facility] )    [address] [address] [phone]              Who to contact     If you have questions or need follow up information about today's clinic visit or your schedule please contact [facility] directly at [phone].  Normal or non-critical lab and imaging results will be communicated to you by [employer], letter or phone within 4 business days after the clinic has received the results. If you do not hear from us within 7 days, please contact the clinic through [employer] or phone. If you have a critical or abnormal lab result, we will notify you by phone as soon as possible.  Submit refill requests through [employer] or call your pharmacy and they will forward the refill request to us. Please allow 3 business days for your refill to be completed.          Additional Information About Your Visit        [employer] Information     [employer] gives you secure access to your electronic health record. If you  "see a primary care provider, you can also send messages to your care team and make appointments. If you have questions, please call your primary care clinic.  If you do not have a primary care provider, please call [phone] and they will assist you.        Care EveryWhere ID     This is your Care EveryWhere ID. This could be used by other organizations to access your [facility] medical records  [id number]        Your Vitals Were     Pulse Temperature Respirations Height Pulse Oximetry BMI (Body Mass Index)    61 98.2  F (36.8  C) (Tympanic) 24 5' 5\" (1.651 m) 94% 26.63 kg/m2       Blood Pressure from Last 3 Encounters:   03/30/17 112/58   03/25/17 155/73   03/15/17 140/80    Weight from Last 3 Encounters:   03/30/17 160 lb (72.6 kg)   02/14/17 160 lb (72.6 kg)   02/02/17 164 lb (74.4 kg)              Today, you had the following     No orders found for display         Today's Medication Changes          These changes are accurate as of: 3/30/17 11:35 AM.  If you have any questions, ask your nurse or doctor.               Start taking these medicines.        Dose/Directions    fluticasone-salmeterol 115-21 MCG/ACT Inhaler   Commonly known as:  ADVAIR-HFA   Used for:  COPD exacerbation (H)   Started by:  Braydon Yen MD        Dose:  2 puff   Inhale 2 puffs into the lungs 2 times daily   Quantity:  36 g   Refills:  1         These medicines have changed or have updated prescriptions.        Dose/Directions    * predniSONE 20 MG tablet   Commonly known as:  DELTASONE   This may have changed:  Another medication with the same name was added. Make sure you understand how and when to take each.        Take two tablets (= 40mg) each day for 5 (five) days   Quantity:  10 tablet   Refills:  0       * predniSONE 10 MG tablet   Commonly known as:  DELTASONE   This may have changed:  You were already taking a medication with the same name, and this prescription was added. Make sure you understand how and when to take " each.   Used for:  COPD exacerbation (H)   Changed by:  Braydon Yen MD        20 mg daily x 3 days, 10 mg po daily x 3 days, 5 mg po daily x 4 days, then stop.   Quantity:  30 tablet   Refills:  0       * Notice:  This list has 2 medication(s) that are the same as other medications prescribed for you. Read the directions carefully, and ask your doctor or other care provider to review them with you.         Where to get your medicines      These medications were sent to [id number] - [place] - [address] AT [facility] [address], [place] [address]     Phone:  [phone]     fluticasone-salmeterol 115-21 MCG/ACT Inhaler    predniSONE 10 MG tablet                Primary Care Provider Office Phone # Fax #    Braydon Yen [phone]       [facility] [address] [facility] [address]        Thank you!     Thank you for choosing [facility]  for your care. Our goal is always to provide you with excellent care. Hearing back from our patients is one way we can continue to improve our services. Please take a few minutes to complete the written survey that you may receive in the mail after your visit with us. Thank you!             Your Updated Medication List - Protect others around you: Learn how to safely use, store and throw away your medicines at www.disposemymeds.org.          This list is accurate as of: 3/30/17 11:35 AM.  Always use your most recent med list.                   Brand Name Dispense Instructions for use    acetaminophen 500 MG tablet    TYLENOL     Take 500-1,000 mg by mouth every 6 hours as needed for mild pain       * albuterol 108 (90 BASE) MCG/ACT Inhaler    VENTOLIN HFA    3 Inhaler    Inhale 2 puffs into the lungs every 4 hours as needed for shortness of breath / dyspnea or wheezing       * albuterol (2.5 MG/3ML) 0.083% neb solution     25 vial    Take 1 vial (2.5 mg) by nebulization every 6 hours  as needed for shortness of breath / dyspnea or wheezing       cefdinir 300 MG capsule    OMNICEF    20 capsule    Take 1 capsule (300 mg) by mouth 2 times daily       * cloNIDine 0.1 MG tablet    CATAPRES    90 tablet    Take 3 tablets (0.3 mg) by mouth every evening       * cloNIDine 0.1 MG tablet    CATAPRES    360 tablet    TAKE 1 TABLET BY MOUTH EVERY MORNING AND 3 TABLETS EVERY EVENING       diphenhydrAMINE 25 MG tablet    BENADRYL    60 tablet    Take 1-2 tablets (25-50 mg) by mouth every 6 hours as needed for itching or allergies       flecainide 100 MG tablet    TAMBOCOR    180 tablet    Take 1 tablet (100 mg) by mouth 2 times daily       fluticasone-salmeterol 115-21 MCG/ACT Inhaler    ADVAIR-HFA    36 g    Inhale 2 puffs into the lungs 2 times daily       furosemide 40 MG tablet    LASIX    180 tablet    TAKE 1 TABLET BY MOUTH TWICE DAILY.       hydrALAZINE 25 MG tablet    APRESOLINE    810 tablet    TAKE 3 TABLETS BY MOUTH THREE TIMES DAILY       ipratropium - albuterol 0.5 mg/2.5 mg/3 mL 0.5-2.5 (3) MG/3ML neb solution    DUONEB    6 Box    USE 1 VIAL PER NEBULIZER FOUR TIMES DAILY       levothyroxine 100 MCG tablet    SYNTHROID/LEVOTHROID    90 tablet    TAKE 1 TABLET(100 MCG) BY MOUTH DAILY       LORazepam 1 MG tablet    ATIVAN     Take 0.5-1 tablets by mouth every 6 hours as needed       order for DME     1 each    Equipment being ordered: wedge for bed       other medical supplies     1 each    Apply one pair to help with edema       potassium chloride SA 20 MEQ CR tablet    K-DUR/KLOR-CON M    180 tablet    Take 1 tablet (20 mEq) by mouth 2 times daily       * predniSONE 20 MG tablet    DELTASONE    10 tablet    Take two tablets (= 40mg) each day for 5 (five) days       * predniSONE 10 MG tablet    DELTASONE    30 tablet    20 mg daily x 3 days, 10 mg po daily x 3 days, 5 mg po daily x 4 days, then stop.       simvastatin 10 MG tablet    ZOCOR    90 tablet    TAKE 1 TABLET(10 MG) BY MOUTH AT BEDTIME        triamcinolone 0.1 % ointment    KENALOG    454 g    Apply bid and hs left hand and legs - for dermatitis       warfarin 2 MG tablet    COUMADIN    135 tablet    Take 2 mg Mon/Wed/Fri; 3 mg all other days or as directed by [facility]       * Notice:  This list has 6 medication(s) that are the same as other medications prescribed for you. Read the directions carefully, and ask your doctor or other care provider to review them with you.

## 2017-03-30 NOTE — NURSING NOTE
"Chief Complaint   Patient presents with     Hospital F/U     Follow up hospital 03/25/2017, COPD flare up.       Initial /58  Pulse 61  Temp 98.2  F (36.8  C) (Tympanic)  Resp 24  Ht 5' 5\" (1.651 m)  Wt 160 lb (72.6 kg)  SpO2 94%  BMI 26.63 kg/m2 Estimated body mass index is 26.63 kg/(m^2) as calculated from the following:    Height as of this encounter: 5' 5\" (1.651 m).    Weight as of this encounter: 160 lb (72.6 kg).  Medication Reconciliation: complete   Vivian Valencia      "

## 2017-04-05 ENCOUNTER — TELEPHONE (OUTPATIENT)
Dept: FAMILY MEDICINE | Facility: OTHER | Age: 67
End: 2017-04-05

## 2017-04-05 NOTE — TELEPHONE ENCOUNTER
I spoke to the pt she has tried pillows for elevation without success. Form given to Dr Yen to be completed.

## 2017-04-05 NOTE — TELEPHONE ENCOUNTER
Form received from [facility] for hospital bed. Pt has tried the wedge without success per Kenna Storm's note. I called the pt and left a message to call back to verify pillows were unsuccessful also.

## 2017-04-12 ENCOUNTER — ANTICOAGULATION THERAPY VISIT (OUTPATIENT)
Dept: ANTICOAGULATION | Facility: OTHER | Age: 67
End: 2017-04-12
Attending: FAMILY MEDICINE
Payer: MEDICARE

## 2017-04-12 ENCOUNTER — ANTICOAGULATION THERAPY VISIT (OUTPATIENT)
Dept: ANTICOAGULATION | Facility: OTHER | Age: 67
End: 2017-04-12

## 2017-04-12 DIAGNOSIS — I48.19 ATRIAL FIBRILLATION, PERSISTENT (H): ICD-10-CM

## 2017-04-12 DIAGNOSIS — Z79.01 LONG-TERM (CURRENT) USE OF ANTICOAGULANTS: ICD-10-CM

## 2017-04-12 LAB — INR POINT OF CARE: 2.1 (ref 0.86–1.14)

## 2017-04-12 PROCEDURE — 85610 PROTHROMBIN TIME: CPT | Mod: QW

## 2017-04-12 NOTE — PROGRESS NOTES
Clinic Care Coordination Contact  OUTREACH    Referral Information:  Referral Source: Care Team  Reason for Contact: COPD Follow up        Universal Utilization:   ED Visits in last year: 3  Hospital visits in last year: 2  Last PCP appointment: 03/30/17  Missed Appointments: 0     Multiple Providers or Specialists: Cardiology; Care Coordination, PCP, Anticoagulation, Dermatology (Pulmonology)    Clinical Concerns:  Current Medical Concerns: CC nurse visited face to face today with pt following up on COPD; financial issues and acquisition of a medical bed.  Pt reports she now has a hospital bed; was delivered 4/7/17.  States she is able to sleep so much better; states she feels rested in the morning now that she has the bed.  She states appreciation for the assistance with this issue.  She also reports with the assistance of the [facility] Care program, she no longer has hospital charges and is so appreciative of this program.  States she will for sure not have to return to the former employment at the [employer].  States she was able to work out her issues with [employer] to her satisfaction and is not interested in the [facility] program at this time.  States she is walking approx 1 mile 3x/wk and is overall feeling good.  Refer to COPD pathway below for further information.      Education Provided to patient: Discussed the issues she was having in the past with working at the [employer], such as the dermatitis issue; discussed how that environment may also had been contributing to her COPD exacerbation.  Pt states she will not be returning to that employment; states she helps out at her apt bldg as able.  Clinical Pathway Name: COPD  Clinical Pathway: Clinic Care Coordination COPD Follow up Assessment  Symptom Review:    Are you having any increased shortness of breath? No  When you cough are you coughing up anything? Occassional cough  Color whitish  Consistency thin  Frequency occasional  What COPD Zone  currently in:Green  What number would you call if you were in the YELLOW zones: pt states awareness to the number to contact within the Care Coordination office     Medications:   Were you started on any new medications since the last time we talked?  No  Do you have all of your medications? Yes  Have you had trouble filling your prescriptions? No  Are you medications effective in controlling your symptoms? Yes  Are you still on Prednisone (* does pt understand the tapering instructions)?  Just finished the prescription within the past wk  How often have you had to use your rescue medications? Depends on the weather and activity level    Oxygen/DME  What is the current liter flow you are using? 2L/min at night  Has there been any changes? No      Activity:  How much activity can you do before you are SOB? Able to walk 3x/daily approx 2 blocks down and 2 blocks back each time  Have you had to reduce your activities because of dyspnea or other symptoms? Able to increase outdoor walking as long as the weather cooperates    Diet:  Do you weigh yourself daily? No    Has there been a recent change in your weight? No     Follow Up Plan:  Care Coordinator follow up plan: 3 weeks    Medication Management:  Manages own meds    Functional Status:  Mobility Status: Independent  Equipment Currently Used at Home: oxygen  Transportation: Drives self     Psychosocial:  Current living arrangement:: I live in a private home  Financial/Insurance: [facility] program has assisted immensely with her hospital changes recently; [facility] has worked out her issue with F[employer] and the prescription changes; no longer interested in pursuing the program thru [place] [employer] for prescription assistance at this time       Resources and Interventions:  Current Resources:  Care Coordination; Anticoagulation therapy; Cardiology; Pulmonology; PCP       Senior Linkage Line Referral Placed: 01/18/17           Goals:   Goal 1 Statement:   (to manage financially w/out working outside the home)  Goal 2 Statement:  (to remain in the COPD Green Zone; stay out of the hosp)    Strengths: Motivated to continue to improve and recognize the COPD s/sx before they worsen into the Yellow zone  Patient/Caregiver understanding: States understanding and left today with questions answered  Frequency of Care Coordination: 2-4 wks - pt states will continue to follow the CC program and next visit in 3 wks during her Anticoagulation visit.  Upcoming appointment: 05/03/17 (5/3- Mushtaq; 5/23-Cardio-Bette; 6/15- Pul-Bud)     Plan: COPD follow up in 3 weeks - 5/3/17    Kenna Storm RN  Care Coordination

## 2017-04-12 NOTE — PROGRESS NOTES
ANTICOAGULATION FOLLOW-UP CLINIC VISIT    Patient Name:  Mary Alice Cameron  Date:  4/12/2017  Contact Type:  Face to Face    SUBJECTIVE:     Patient Findings     Positives No Problem Findings           OBJECTIVE    INR Protime   Date Value Ref Range Status   04/12/2017 2.1 (A) 0.86 - 1.14 Final       ASSESSMENT / PLAN  INR assessment THER    Recheck INR In: 3 WEEKS    INR Location Clinic      Anticoagulation Summary as of 4/12/2017     INR goal 2.0-3.0   Today's INR 2.1   Maintenance plan 2 mg (2 mg x 1) on Mon, Wed, Fri; 3 mg (2 mg x 1.5) all other days   Full instructions 2 mg on Mon, Wed, Fri; 3 mg all other days   Weekly total 18 mg   No change documented Kenna Storm RN   Plan last modified Kenna Storm RN (3/1/2017)   Next INR check 5/3/2017   Priority INR   Target end date Indefinite    Indications   Atrial fibrillation  persistent (H) [I48.1]  Long-term (current) use of anticoagulants [Z79.01] [Z79.01]         Anticoagulation Episode Summary     INR check location     Preferred lab     Send INR reminders to  ANTICOAG POOL    Comments       Anticoagulation Care Providers     Provider Role Specialty Phone number    Braydon Yen MD [facility] [phone]            See the Encounter Report to view Anticoagulation Flowsheet and Dosing Calendar (Go to Encounters tab in chart review, and find the Anticoagulation Therapy Visit)        Kenna Storm RN

## 2017-04-12 NOTE — MR AVS SNAPSHOT
Mary Alice Cameron   4/12/2017   Anticoagulation Therapy Visit    Description:  66 year old female   Provider:  Braydon Yen MD   Department:  Hc Anti Coagulation           INR as of 4/12/2017     Today's INR       Anticoagulation Summary as of 4/12/2017     INR goal 2.0-3.0   Today's INR    Next INR check     Indications   Atrial fibrillation  persistent (H) [I48.1]  Long-term (current) use of anticoagulants [Z79.01] [Z79.01]         Your next Anticoagulation Clinic appointment(s)     May 03, 2017 10:15 AM CDT   Anticoagulation Visit with NA ANTI COAGULATION   [facility] ([facility])    [address]  [facility] [address]   [phone]              April 2017 Details    Sun Mon Tue Wed Thu Fri Sat           1                 2               3               4               5               6               7               8                 9               10               11               12      2 mg   See details      13      3 mg         14      2 mg         15      3 mg           16      3 mg         17      2 mg         18      3 mg         19      2 mg         20      3 mg         21      2 mg         22      3 mg           23      3 mg         24      2 mg         25      3 mg         26      2 mg         27      3 mg         28      2 mg         29      3 mg           30      3 mg                Date Details   04/12 This INR check               How to take your warfarin dose     To take:  2 mg Take 1 of the 2 mg tablets.    To take:  3 mg Take 1.5 of the 2 mg tablets.           May 2017 Details    Sun Mon Tue Wed Thu Fri Sat      1      2 mg         2      3 mg         3            4               5               6                 7               8               9               10               11               12               13                 14               15               16               17               18               19               20                 21                22               23               24               25               26               27                 28               29               30               31                   Date Details   No additional details    Date of next INR:  5/3/2017         How to take your warfarin dose     To take:  2 mg Take 1 of the 2 mg tablets.    To take:  3 mg Take 1.5 of the 2 mg tablets.

## 2017-04-12 NOTE — MR AVS SNAPSHOT
Mary Alice Cameron   4/12/2017 10:15 AM   Anticoagulation Therapy Visit    Description:  66 year old female   Provider:  [facility] ANTI COAGUL[facility]   Department:  [facility] Anti Coagulation           INR as of 4/12/2017     Today's INR 2.1      Anticoagulation Summary as of 4/12/2017     INR goal 2.0-3.0   Today's INR 2.1   Full instructions 2 mg on Mon, Wed, Fri; 3 mg all other days   Next INR check 5/3/2017    Indications   Atrial fibrillation  persistent (H) [I48.1]  Long-term (current) use of anticoagulants [Z79.01] [Z79.01]         Your next Anticoagulation Clinic appointment(s)     May 03, 2017 10:15 AM CDT   Anticoagulation Visit with [facility] ANTI COAGULATION   [facility] ([facility])    [address]  [facility] [address]   [phone]              April 2017 Details    Sun Mon Tue Wed Thu Fri Sat           1                 2               3               4               5               6               7               8                 9               10               11               12      2 mg   See details      13      3 mg         14      2 mg         15      3 mg           16      3 mg         17      2 mg         18      3 mg         19      2 mg         20      3 mg         21      2 mg         22      3 mg           23      3 mg         24      2 mg         25      3 mg         26      2 mg         27      3 mg         28      2 mg         29      3 mg           30      3 mg                Date Details   04/12 This INR check               How to take your warfarin dose     To take:  2 mg Take 1 of the 2 mg tablets.    To take:  3 mg Take 1.5 of the 2 mg tablets.           May 2017 Details    Sun Mon Tue Wed Thu Fri Sat      1      2 mg         2      3 mg         3            4               5               6                 7               8               9               10               11               12               13                 14               15               16                17               18               19               20                 21               22               23               24               25               26               27                 28               29               30               31                   Date Details   No additional details    Date of next INR:  5/3/2017         How to take your warfarin dose     To take:  2 mg Take 1 of the 2 mg tablets.    To take:  3 mg Take 1.5 of the 2 mg tablets.

## 2017-04-17 ENCOUNTER — ANTICOAGULATION THERAPY VISIT (OUTPATIENT)
Dept: ANTICOAGULATION | Facility: OTHER | Age: 67
End: 2017-04-17

## 2017-04-17 DIAGNOSIS — I48.19 ATRIAL FIBRILLATION, PERSISTENT (H): ICD-10-CM

## 2017-04-17 DIAGNOSIS — J43.8 OTHER EMPHYSEMA (H): ICD-10-CM

## 2017-04-17 DIAGNOSIS — Z79.01 LONG-TERM (CURRENT) USE OF ANTICOAGULANTS: ICD-10-CM

## 2017-04-17 NOTE — MR AVS SNAPSHOT
Mary Alice Cameron   4/17/2017   Anticoagulation Therapy Visit    Description:  66 year old female   Provider:  Braydon Yen MD   Department:  Hc Anti Coagulation           INR as of 4/17/2017     Today's INR No new INR was available at the time of this encounter.      Anticoagulation Summary as of 4/17/2017     INR goal 2.0-3.0   Today's INR No new INR was available at the time of this encounter.   Full instructions 2 mg on Mon, Wed, Fri; 3 mg all other days   Next INR check 5/3/2017    Indications   Atrial fibrillation  persistent (H) [I48.1]  Long-term (current) use of anticoagulants [Z79.01] [Z79.01]         Your next Anticoagulation Clinic appointment(s)     May 03, 2017 10:15 AM CDT   Anticoagulation Visit with  ANTI COAGULATION   [facility] ([facility])    [address]  [facility] [address]   [phone]              April 2017 Details    Sun Mon Tue Wed Thu Fri Sat           1                 2               3               4               5               6               7               8                 9               10               11               12               13               14               15                 16               17      2 mg   See details      18      3 mg         19      2 mg         20      3 mg         21      2 mg         22      3 mg           23      3 mg         24      2 mg         25      3 mg         26      2 mg         27      3 mg         28      2 mg         29      3 mg           30      3 mg                Date Details   04/17 This INR check               How to take your warfarin dose     To take:  2 mg Take 1 of the 2 mg tablets.    To take:  3 mg Take 1.5 of the 2 mg tablets.           May 2017 Details    Sun Mon Tue Wed Thu Fri Sat      1      2 mg         2      3 mg         3            4               5               6                 7               8               9               10               11               12                13                 14               15               16               17               18               19               20                 21               22               23               24               25               26               27                 28               29               30               31                   Date Details   No additional details    Date of next INR:  5/3/2017         How to take your warfarin dose     To take:  2 mg Take 1 of the 2 mg tablets.    To take:  3 mg Take 1.5 of the 2 mg tablets.

## 2017-04-17 NOTE — PROGRESS NOTES
ANTICOAGULATION FOLLOW-UP CLINIC VISIT    Patient Name:  Mary Alice Cameron  Date:  4/17/2017  Contact Type:  Telephone    SUBJECTIVE:     Patient Findings     Positives Dental/Other procedures    Comments Pt telephoned the AC clinic pertaining to an upcoming dental appt.  Voice message left for AC nurse questioning if Coumadin needed to be stopped for this appt.  AC nurse returned the call today, left a voice message, informing the pt that for tooth cleaning and anything other than oral surgery, tooth extractions, root canals, etc, Coumadin is not stopped.  Pt informed to contact the AC clinic if this information did not answer her question.  Call ended.           OBJECTIVE    INR Protime   Date Value Ref Range Status   04/12/2017 2.1 (A) 0.86 - 1.14 Final       ASSESSMENT / PLAN  No question data found.  Anticoagulation Summary as of 4/17/2017     INR goal 2.0-3.0   Today's INR No new INR was available at the time of this encounter.   Maintenance plan 2 mg (2 mg x 1) on Mon, Wed, Fri; 3 mg (2 mg x 1.5) all other days   Full instructions 2 mg on Mon, Wed, Fri; 3 mg all other days   Weekly total 18 mg   No change documented Kenna Storm RN   Plan last modified Kenna Storm RN (3/1/2017)   Next INR check 5/3/2017   Priority INR   Target end date Indefinite    Indications   Atrial fibrillation  persistent (H) [I48.1]  Long-term (current) use of anticoagulants [Z79.01] [Z79.01]         Anticoagulation Episode Summary     INR check location     Preferred lab     Send INR reminders to  ANTICOAG POOL    Comments       Anticoagulation Care Providers     Provider Role Specialty Phone number    Braydon Yen MD [facility] [phone]            See the Encounter Report to view Anticoagulation Flowsheet and Dosing Calendar (Go to Encounters tab in chart review, and find the Anticoagulation Therapy Visit)        Kenna Storm RN

## 2017-04-19 RX ORDER — ALBUTEROL SULFATE 90 UG/1
2 AEROSOL, METERED RESPIRATORY (INHALATION) EVERY 4 HOURS PRN
Qty: 3 INHALER | Refills: 0 | Status: SHIPPED | OUTPATIENT
Start: 2017-04-19 | End: 2017-06-05

## 2017-05-03 ENCOUNTER — ANTICOAGULATION THERAPY VISIT (OUTPATIENT)
Dept: ANTICOAGULATION | Facility: OTHER | Age: 67
End: 2017-05-03
Attending: FAMILY MEDICINE
Payer: MEDICARE

## 2017-05-03 ENCOUNTER — CARE COORDINATION (OUTPATIENT)
Dept: CARE COORDINATION | Facility: OTHER | Age: 67
End: 2017-05-03

## 2017-05-03 DIAGNOSIS — Z79.01 LONG-TERM (CURRENT) USE OF ANTICOAGULANTS: ICD-10-CM

## 2017-05-03 DIAGNOSIS — I48.19 ATRIAL FIBRILLATION, PERSISTENT (H): ICD-10-CM

## 2017-05-03 LAB — INR POINT OF CARE: 2.3 (ref 0.86–1.14)

## 2017-05-03 PROCEDURE — 85610 PROTHROMBIN TIME: CPT | Mod: QW

## 2017-05-03 NOTE — PROGRESS NOTES
Clinic Care Coordination Contact  OUTREACH    Referral Information:  Referral Source: Care Team  Reason for Contact: COPD follow up        Universal Utilization:   ED Visits in last year: 3  Hospital visits in last year: 2  Last PCP appointment: 03/30/17  Missed Appointments: 0     Multiple Providers or Specialists: Cardiology; Care Coordination, PCP, Anticoagulation, Dermatology (Pulmonology)    Clinical Concerns:  Current Medical Concerns: CC nurse visited face to face w/pt today updating COPD status.  States she was in the Green zone today.  States then the weather was bad a few days ago, was Yellow, dropping to a Red Zone.  States she used the COPD Action Plan as a guide and followed the plan directions, which she stated helped her significantly.  States she used her inhalers more.  Denies having to use O2 during the day with this episode.  States the hospital bed is of great relief and was able to sleep, despite the issues.      Education Provided to patient: Discussed the importance of following the COPD Action Plan; calling the Care Coord office for advice and assistance, when in the slipping into the the different COPD Zones.     Clinical Pathway Name: COPD  Clinical Pathway: Clinic Care Coordination COPD Follow up Assessment  Symptom Review:  Are you having any increased shortness of breath? No  When you cough are you coughing up anything? No  What COPD Zone currently in:Green  What number would you call if you were in the YELLOW zones: States understanding how to follow the Action Plan to watch for s/sx and when to make contact     Medications:   Were you started on any new medications since the last time we talked?  No  Do you have all of your medications? Yes,   Have you had trouble filling your prescriptions? No  Are you medications effective in controlling your symptoms? Yes, Recently used the inhalers to assist with a recent exaccerbation  How often have you had to use your rescue medications? Used  them a few days ago    Oxygen/DME  What is the current liter flow you are using? 2 L NC cont @ night; none during the day, even during the recent exaccerbation  Has there been any changes? No    Activity:  How much activity can you do before you are SOB? Walking outdoors with her pet; but never on windy days as it takes her breath away, causing coughing spells  Have you had to reduce your activities because of dyspnea or other symptoms? Recently yes - but prior no    Emotions/Lifestyle:    Do you smoke (if not asked upon initial assessment)? No    Follow Up Plan:  Care Coordinator follow up plan: 4-6wks    Medication Management:  Independent     Functional Status:  Mobility Status: Independent  Equipment Currently Used at Home: oxygen  Transportation: Drives     Psychosocial:  Current living arrangement:: I live in a private home  Financial/Insurance:  Denies needing assistance, more than she receives at this time    Resources and Interventions:  Current Resources:  Care Coord; Anticoag; PCP; Pulmonology; Cardiology; Dermatology       Senior Linkage Line Referral Placed: 01/18/17     Goals:   Goal 1 Statement:  (to manage financially w/out working outside the home)  Goal 2 Statement:  (to remain in the COPD Green Zone; stay out of the hosp)     Strengths: Motivation to regain strength; resume and maintain independence  Patient/Caregiver understanding: Visit ended with questions answered and understanding stated.  Frequency of Care Coordination: 4-6 wks  Upcoming appointment: 05/23/17 (5/23-Cardio-Bette; 6/14-AC Clinic6/15-PulWil)     Plan: Follow up COPD in 6 wks during the AC visit    Kenna Storm RN  Care Coordination

## 2017-05-03 NOTE — MR AVS SNAPSHOT
Mary Alice Cameron   5/3/2017 10:15 AM   Anticoagulation Therapy Visit    Description:  66 year old female   Provider:  [facility]   Department:  [facility]           INR as of 5/3/2017     Today's INR 2.3      Anticoagulation Summary as of 5/3/2017     INR goal 2.0-3.0   Today's INR 2.3   Full instructions 2 mg on Mon, Wed, Fri; 3 mg all other days   Next INR check 6/14/2017    Indications   Atrial fibrillation  persistent (H) [I48.1]  Long-term (current) use of anticoagulants [Z79.01] [Z79.01]         Your next Anticoagulation Clinic appointment(s)     Jun 14, 2017 10:00 AM CDT   Anticoagulation Visit with [facility]   [facility]    [address]  [facility] [address]   [phone]              May 2017 Details    Sun Mon Tue Wed Thu Fri Sat      1               2               3      2 mg   See details      4      3 mg         5      2 mg         6      3 mg           7      3 mg         8      2 mg         9      3 mg         10      2 mg         11      3 mg         12      2 mg         13      3 mg           14      3 mg         15      2 mg         16      3 mg         17      2 mg         18      3 mg         19      2 mg         20      3 mg           21      3 mg         22      2 mg         23      3 mg         24      2 mg         25      3 mg         26      2 mg         27      3 mg           28      3 mg         29      2 mg         30      3 mg         31      2 mg             Date Details   05/03 This INR check               How to take your warfarin dose     To take:  2 mg Take 1 of the 2 mg tablets.    To take:  3 mg Take 1.5 of the 2 mg tablets.           June 2017 Details    Sun Mon Tue Wed Thu Fri Sat         1      3 mg         2      2 mg         3      3 mg           4      3 mg         5      2 mg         6      3 mg         7      2 mg         8      3 mg         9      2 mg         10      3 mg           11      3 mg          12      2 mg         13      3 mg         14            15               16               17                 18               19               20               21               22               23               24                 25               26               27               28               29               30                 Date Details   No additional details    Date of next INR:  6/14/2017         How to take your warfarin dose     To take:  2 mg Take 1 of the 2 mg tablets.    To take:  3 mg Take 1.5 of the 2 mg tablets.

## 2017-05-03 NOTE — PROGRESS NOTES
ANTICOAGULATION FOLLOW-UP CLINIC VISIT    Patient Name:  Mary Alice Cameron  Date:  5/3/2017  Contact Type:  Face to Face    SUBJECTIVE:     Patient Findings     Positives No Problem Findings           OBJECTIVE    INR Protime   Date Value Ref Range Status   05/03/2017 2.3 (A) 0.86 - 1.14 Final       ASSESSMENT / PLAN  INR assessment THER    Recheck INR In: 6 WEEKS    INR Location Clinic      Anticoagulation Summary as of 5/3/2017     INR goal 2.0-3.0   Today's INR 2.3   Maintenance plan 2 mg (2 mg x 1) on Mon, Wed, Fri; 3 mg (2 mg x 1.5) all other days   Full instructions 2 mg on Mon, Wed, Fri; 3 mg all other days   Weekly total 18 mg   No change documented Kenna Storm RN   Plan last modified Kenna Storm RN (3/1/2017)   Next INR check 6/14/2017   Priority INR   Target end date Indefinite    Indications   Atrial fibrillation  persistent (H) [I48.1]  Long-term (current) use of anticoagulants [Z79.01] [Z79.01]         Anticoagulation Episode Summary     INR check location     Preferred lab     Send INR reminders to  ANTICOAG POOL    Comments       Anticoagulation Care Providers     Provider Role Specialty Phone number    Braydon Yen MD [facility] [phone]            See the Encounter Report to view Anticoagulation Flowsheet and Dosing Calendar (Go to Encounters tab in chart review, and find the Anticoagulation Therapy Visit)        Kenna Storm RN

## 2017-05-19 DIAGNOSIS — E89.0 HYPOTHYROIDISM, POSTABLATIVE: ICD-10-CM

## 2017-05-22 RX ORDER — LEVOTHYROXINE SODIUM 100 UG/1
TABLET ORAL
Qty: 90 TABLET | Refills: 2 | Status: SHIPPED | OUTPATIENT
Start: 2017-05-22 | End: 2017-08-23

## 2017-05-23 ENCOUNTER — OFFICE VISIT (OUTPATIENT)
Dept: CARDIOLOGY | Facility: OTHER | Age: 67
End: 2017-05-23
Attending: INTERNAL MEDICINE
Payer: COMMERCIAL

## 2017-05-23 VITALS
RESPIRATION RATE: 20 BRPM | WEIGHT: 160 LBS | OXYGEN SATURATION: 93 % | HEIGHT: 63 IN | DIASTOLIC BLOOD PRESSURE: 61 MMHG | HEART RATE: 59 BPM | BODY MASS INDEX: 28.35 KG/M2 | TEMPERATURE: 97.4 F | SYSTOLIC BLOOD PRESSURE: 121 MMHG

## 2017-05-23 DIAGNOSIS — I10 ESSENTIAL HYPERTENSION, BENIGN: Primary | ICD-10-CM

## 2017-05-23 DIAGNOSIS — I48.19 ATRIAL FIBRILLATION, PERSISTENT (H): ICD-10-CM

## 2017-05-23 DIAGNOSIS — Q23.81 BICUSPID AORTIC VALVE: ICD-10-CM

## 2017-05-23 PROCEDURE — 99214 OFFICE O/P EST MOD 30 MIN: CPT | Performed by: INTERNAL MEDICINE

## 2017-05-23 PROCEDURE — 99212 OFFICE O/P EST SF 10 MIN: CPT

## 2017-05-23 ASSESSMENT — PAIN SCALES - GENERAL: PAINLEVEL: NO PAIN (0)

## 2017-05-23 NOTE — NURSING NOTE
"Chief Complaint   Patient presents with     RECHECK     1 year follow-up Atrial fibrillation       Initial /61 (BP Location: Left arm, Cuff Size: Adult Regular)  Pulse 59  Temp 97.4  F (36.3  C) (Tympanic)  Resp 20  Ht 1.6 m (5' 3\")  Wt 72.6 kg (160 lb)  SpO2 93%  BMI 28.34 kg/m2 Estimated body mass index is 28.34 kg/(m^2) as calculated from the following:    Height as of this encounter: 1.6 m (5' 3\").    Weight as of this encounter: 72.6 kg (160 lb).  Medication Reconciliation: complete   Fe Stephenson      "

## 2017-05-23 NOTE — LETTER
"2017      RE: Mary Alice Cameron  [address]    [facility] [address]       Patient seen by Dr. Myers 2017   See note below.     Giana Andino RN-BSN      Chief Complaint: atrial fibrillation, bicuspid valve    HPI: I was happy to see Mrs. Cameron for the above. She has seen several cardiologists over the past year for these problems. Her  was ill and they moved to be closer to his daughters. He  in October and she has settled in [place]. Her atrial fibrillation began in the past year. In reviewing the old records both  and  are reported as when the atrial fibrillation began and she is no longer sure. This has been associated with shortness of breath but as was discussed with her by one of the cardiologist she has multiple reasons for shortness of breath including COPD. She is finishing treatment for a COPD exacerbation at this time. She reports the plan had been to start her on warfarin and then cardiovert her. With all the chaos in her life the past few months this never happened. She was found to have hyperthyroidism in August and was treated with Iodine ablation. She also has a bicuspid aortic valve and mild aortic root dilation (per report of echocardiogram in old records). The echocardiogram did not report significant aortic stenosis or insufficiency and her systolic function was normal making it less likely that her shortness of breath was related to her valvular heart disease. She was told she would need periodic f/u of her valve and aorta. She reports two angiograms done at [facility] and that they were \"okay\". I do not see copies of those reports in the old records. They will be requested.    She underwent DC cardioversion on 2014. She reports feeling better afterward. She feels like she needs to use her inhaler less often and has less shortness of breath. However, the ECG today shows she is back in atrial fibrillation.    I have reviewed the records sent from [facility]. " "There is an echocardiogram report from 2002 and records regarding back surgery. I see no cath results.    A repeat cardioversion in July failed to restore sinus rhythm. A stress study in April (see below) showed no ischemia or infarction.    [id number]:  She had recurrent atrial fibrillation was started on flecainide and scheduled for cardioversion. When she arrived for the cardioversion she was in sinus.     A CT aortogram showed atherosclerosis but not dilation of the descending aoota.    [id number]:    She was admitted in March 2015 with a COPD exacerbation. Her breathing is at baseline with no fever, sputum or wheezing.    She has not noted any atrial fibrillation, no palpitations, chest pain/discomfort, unusual dyspnea on exertion, bleeding or neurologic symptoms.    [id number]: Her follow-up echocardiogram (see lab section) showed no change in aortic valve function. She report rare \"flutters\" but no sustained palpitations like with her atrial fibrillation. She reports no significant bleeding episodes on warfarin. She denies any neurologic symptoms suggestive of CVA or TIA. Edema improved when she switched her diltiazem to bedtime.    Her primary health problem has been her COPD. She uses oxygen at night. She has had had any recent symptoms of upper or lower respiratory infection.    [id number] Interval History: She had two admissions this past winter for COPD exacerbations. Overall, she feels her dyspnea on exertion is slowly getting worse. She has not had exertional chest pain/discomfort.     She reports no increased edema, orthopnea or paroxysmal nocturnal dyspnea.    Current Outpatient Prescriptions   Medication Sig Dispense Refill     levothyroxine (SYNTHROID/LEVOTHROID) 100 MCG tablet TAKE 1 TABLET(100 MCG) BY MOUTH DAILY 90 tablet 2     albuterol (VENTOLIN HFA) 108 (90 BASE) MCG/ACT Inhaler Inhale 2 puffs into the lungs every 4 hours as needed for shortness of breath / dyspnea or wheezing 3 Inhaler 0     " fluticasone-salmeterol (ADVAIR-HFA) 115-21 MCG/ACT Inhaler Inhale 2 puffs into the lungs 2 times daily 36 g 1     warfarin (COUMADIN) 2 MG tablet Take 2 mg Mon/Wed/Fri; 3 mg all other days or as directed by [facility] 135 tablet 3     order for DME Equipment being ordered: wedge for bed 1 each 0     cloNIDine (CATAPRES) 0.1 MG tablet TAKE 1 TABLET BY MOUTH EVERY MORNING AND 3 TABLETS EVERY EVENING 360 tablet 0     hydrALAZINE (APRESOLINE) 25 MG tablet TAKE 3 TABLETS BY MOUTH THREE TIMES DAILY 810 tablet 3     furosemide (LASIX) 40 MG tablet TAKE 1 TABLET BY MOUTH TWICE DAILY. 180 tablet 2     ipratropium - albuterol 0.5 mg/2.5 mg/3 mL (DUONEB) 0.5-2.5 (3) MG/3ML neb solution USE 1 VIAL PER NEBULIZER FOUR TIMES DAILY 6 Box 3     albuterol (2.5 MG/3ML) 0.083% neb solution Take 1 vial (2.5 mg) by nebulization every 6 hours as needed for shortness of breath / dyspnea or wheezing 25 vial 1     flecainide (TAMBOCOR) 100 MG tablet Take 1 tablet (100 mg) by mouth 2 times daily 180 tablet 3     simvastatin (ZOCOR) 10 MG tablet TAKE 1 TABLET(10 MG) BY MOUTH AT BEDTIME 90 tablet 3     potassium chloride SA (K-DUR,KLOR-CON M) 20 MEQ tablet Take 1 tablet (20 mEq) by mouth 2 times daily 180 tablet 3     diphenhydrAMINE (BENADRYL) 25 MG tablet Take 1-2 tablets (25-50 mg) by mouth every 6 hours as needed for itching or allergies 60 tablet 1     acetaminophen (TYLENOL) 500 MG tablet Take 500-1,000 mg by mouth every 6 hours as needed for mild pain       OTHER MEDICAL SUPPLIES Apply one pair to help with edema 1 each 0     triamcinolone (KENALOG) 0.1 % ointment Apply bid and hs left hand and legs - for dermatitis (Patient not taking: Reported on 5/23/2017) 454 g 3     LORazepam (ATIVAN) 1 MG tablet Take 0.5-1 tablets by mouth every 6 hours as needed Reported on 5/23/2017         Past Medical History:   Diagnosis Date     Asthma      Atrial fibrillation (H)      COPD (chronic obstructive pulmonary disease) (H)      Depression   "    High cholesterol      HTN (hypertension)      Thyroid disease        Past Surgical History:   Procedure Laterality Date     BACK SURGERY  2006     COLONOSCOPY N/A 1/19/2016    Procedure: COLONOSCOPY;  Surgeon: Waldemar Bob MD;  Location: [facility] OR     HYSTERECTOMY  1980    partial     SLING BLADDER SUSPENSION WITH FASCIA LINNETTE         Family History   Problem Relation Age of Onset     Prostate Cancer Father      Hypertension Father      Heart Failure Father      CHF     Asthma Mother      CANCER Mother      ovarian     Hypertension Mother      Asthma Brother      Hypertension Sister      Hypertension Brother        Social History   Substance Use Topics     Smoking status: Former Smoker     Years: 48.00     Types: Cigarettes     Quit date: 1/28/2007     Smokeless tobacco: Never Used     Alcohol use No       Allergies   Allergen Reactions     Amlodipine Besylate Cough     Norvasc     Lisinopril Cough       ROS: ten system review negative except as noted above. [id number]    Physical Examination:  /61 (BP Location: Left arm, Cuff Size: Adult Regular)  Pulse 59  Temp 97.4  F (36.3  C) (Tympanic)  Resp 20  Ht 1.6 m (5' 3\")  Wt 72.6 kg (160 lb)  SpO2 93%  BMI 28.34 kg/m2  GENERAL APPEARANCE: healthy, alert and no distress  HEENT: no icterus, no xanthelasmas  NECK:  JVP is not visible, left CEA scar  CHEST:  no rales or rhonchi, breath sounds are diminished throughout, expiratory phase is prolonged, no wheezing  CARDIOVASCULAR: regular rhythm, S1S2 split, no S3 or S4 and no murmur, click or rub, precordium quiet  ABDOMEN: soft, non tender, bowel sounds normal, no abdominal bruits  EXTREMITIES: some edema, but also adipose    Laboratory:    Follow-up echocardiogram ordered.    Previous Studies:   ECHOCARDIOGRAM    M-mode, two-dimensional, color-flow, and Doppler studies were obtained  on this patient.  Standard views were utilized.    ORDERING PHYSICIAN:  Eliezer Myers MD    INDICATIONS:  Bicuspid " aortic valve.    Cardiac Dimensions                                    Normal  Dimensions  Aortic Root:                         32.3 mm      Aortic Root  Diameter: 20-37 mm  Left Atrium:                          45 mm      Left Atrium: 19-40  mm  Right Ventricle:                     25.8 mm      Right Ventricle:  7-23 mm  Left Ventricle end-diastole:   54.8 mm      Left Ventricle  end-diastole: 35-56 mm  Left Ventricle end-systole:    32.8 mm      Left Ventricle  end-systole: 35-56 mm  IVS end-diastole:                 8.8 mm      IVS end-diastole: 7-11  mm  LVPW:                                6.6 mm      LVPW: 7-11 mm    Review of the study shows there is no pericardial effusion.  The left  ventricle is normal size and systolic function.  Ejection fraction at  70%.  The left atrium is borderline enlarged, volume is 70 mL, indexed  it was 37 mL/meter2.  The right ventricle is normal on 2-D study.  The  mitral valve has a mild amount of mitral regurgitation.  The aortic  valve appears to be bicuspid in nature.  Peak and mean gradients are  25 and 13 respectively with a peak velocity of 2.52 meter/second,  signifying just very slight stenosis.  There is mild-to-moderate  aortic insufficiency.  Also noted is some slight tricuspid  regurgitation.  Unable to estimate the right ventricular systolic  pressure.      ASSESSMENT:  Echocardiographic study revealing  1.  Normal left ventricular size and systolic function.  Ejection  fraction at 70%.  2.  Borderline left atrial enlargement.  3.  Normal right ventricular size and function.  4.  Slight mitral regurgitation.  5.  Aortic valve is likely bicuspid.  Mild stenosis.  Peak and mean  gradients are 25 and 13 with mild-to-moderate aortic insufficiency.  This is a slight increase in terms of insufficiency compared to echo  from February 2014.  6.  Slight tricuspid regurgitation.  Unable to estimate the right  ventricular systolic pressure.  Exam Date: May 12, 2016 10:33:00  AM  Author: LITO CLEVELAND    LEXISCAN CARDIOLITE STUDY-INTERNAL MEDICINE DICTATION  DICTATING PHYSICIAN: Lito Cleveland MD  INDICATIONS: A 63-year-old female referred by Dr. Myers and Dr. Yen because of chest pain and atrial fibrillation.  FINDINGS: The patient was placed upon the table using our protocol,  given 5 mL/0.4 mg of Lexiscan rapidly over 15 seconds followed by  saline flush. She was then given the Sestamibi at 45 seconds into  the study. She was monitored continuously throughout the study.  Resting heart rate was 85 with blood pressure 130/80. Heart rate gurpreet  to 113 and blood pressure dropped to 110/70. She did have some  transient shortness of breath. Review of her electrocardiogram shows  she has atrial fibrillation throughout the study, but no acute changes  were noted.  ASSESSMENT: Lexiscan Sestamibi study. On the Lexiscan portion the  patient did have appropriate heart rate and blood pressure response,  transient symptoms. No acute electrocardiogram changes or  arrhythmias. The Cardiolite scan is pending.  CARDIOLITE IV LEXISCAN AND GATED SPECT-RADIOLOGIST DICTATION  DICTATING PHYSICIAN: Rufus Newberry MD  HISTORY: A 63-year-old female with chest pain.  LEXISCAN DOSE: 5 mL (0.4 mg regadenoson) by rapid intravenous  injection.  SESTAMIBI DOSE: 10 mCi 99mTc, 1 mL MIBI Injection Time: 0550  SESTAMIBI DOSE: 30 mCi 99mTc, 1 mL MIBI Injection Time: 0749  ANGIOCATH SIZE: 22 INJECTION SITE: Right AC INITIALS: JR  IV DISCONTINUE TIME: 0753 SITE CONDITION: No infiltrate TIP INTACT:  Yes    The patient is a 63-year-old female who was evaluated with  Sestamibi/Lexiscan testing. 5 mL Lexiscan (0.4 mg regadenoson) was  administered by rapid intravenous injection; followed immediately by  saline flush and radiopharmaceutical. The patient's resting heart  rate was 85 and blood pressure was 130/80. The maximum response in  heart rate and blood pressure after Lexiscan injection was 113  and  110/70.  Symptoms during the procedure included: Transient shortness of  breath.  If symptoms were present, did they resolve post-test? Yes.  Electrocardiogram monitoring demonstrates: Atrial fibrillation  throughout study. No acute electrocardiogram changes.  Arrhythmias: None, other than atrial fibrillation.  Summary: Appropriate heart rate and blood pressure response.  Transient shortness of breath. No acute electrocardiogram changes.  VIEWS OBTAINED: Short axis, horizontal long axis, vertical long axis  at rest and stress.  FINDINGS: The left ventricular ejection fraction is 56%. No fixed  or reversible perfusion defects are present.  IMPRESSION: No evidence of cardiac ischemia.      Echocardiogram:  ASSESSMENT: Echocardiographic study revealing  1. Normal left ventricle size and systolic function. Ejection  fraction at 55%.  2. Ascending aorta appears to be normal on current measurements.  3. Left atrial enlargement.  4. Normal right ventricle size and function.  5. Mild mitral regurgitation.  6. Aortic valve appears to probably be bicuspid without any  significant stenosis. There is a slight amount of aortic  insufficiency.  7. Slight tricuspid regurgitation. Peak systolic pressure right  ventricle is estimated at 14 plus right atrium.    Exam Date: Feb 14, 2014 11:18:00 AM  Author: SOLOMON MITCHELL    Old records show a TSH of 1.87 in 11/13. Labs from 10/13 show a normal K, creat and estimated GFR.    Assessment and recommendations:    1) Persistent atrial fibrillation (recurrent) - maintaining sinus, feels well    - continue flecainide 100 mg BID    2) Bicuspid aortic valve - previous echocardiogram does not show significant aortic stenosis or insufficiency. CT aortogram shows no dilation of descending aorta.    - follow-up echocardiogram in a year    3) Peripheral edema - unchanged    4) Hypertension - controlled    I appreciate the chance to help with Mrs. Cameron's care. Please let me know if you have  any questions or concerns. I will see her back in one year.    Eliezer Myers M.D.        Eliezer Myers MD

## 2017-05-23 NOTE — MR AVS SNAPSHOT
After Visit Summary   5/23/2017    Mary Alice Cameron    MRN: [id number]           Patient Information     Date Of Birth          1950        Visit Information        Provider Department      5/23/2017 8:30 AM Eliezer Myers MD [facility]        Today's Diagnoses     Essential hypertension, benign    -  1    Atrial fibrillation, persistent (H)        Bicuspid aortic valve          Care Instructions    You were seen by Dr. Myers , 5/23/2017 .     1. You have an Echocardiogram in one year, you will  Be called by diagnostic imaging to schedule this test.    2. Continue all current medications      You will follow up with Dr. Myers in one year.       Please call the cardiology office with problems, questions, or concerns at [phone].    If you experience chest pain, chest pressure, chest tightness, shortness of breath, fainting, lightheadedness, nausea, vomiting, or other concerning symptoms, please report to the Emergency Department or call 911. These symptoms may be emergent, and best treated in the Emergency Department.     Giana NAVARRO RN-BSN  Cardiology   [facility]  [phone]        Follow-ups after your visit        Your next 10 appointments already scheduled     Jun 14, 2017 10:00 AM CDT   Anticoagulation Visit with NA ANTI COAGULATION   [facility] ([facility])    [address]  [facility] [address] [phone]            Mahendra 15, 2017 10:00 AM CDT   Return Visit with Hayes Farrell MD   [facility] ([facility] )    [address] [address] [phone]              Who to contact     If you have questions or need follow up information about today's clinic visit or your schedule please contact [facility] directly at [phone].  Normal or non-critical lab and imaging results will be communicated to you by MyChart, letter or phone within 4 business days after the clinic has received  "the results. If you do not hear from us within 7 days, please contact the clinic through [employer] or phone. If you have a critical or abnormal lab result, we will notify you by phone as soon as possible.  Submit refill requests through [employer] or call your pharmacy and they will forward the refill request to us. Please allow 3 business days for your refill to be completed.          Additional Information About Your Visit        [facility] [employer] Information     [employer] gives you secure access to your electronic health record. If you see a primary care provider, you can also send messages to your care team and make appointments. If you have questions, please call your primary care clinic.  If you do not have a primary care provider, please call [phone] and they will assist you.        Care EveryWhere ID     This is your Care EveryWhere ID. This could be used by other organizations to access your [place] medical records  [id number]        Your Vitals Were     Pulse Temperature Respirations Height Pulse Oximetry BMI (Body Mass Index)    59 97.4  F (36.3  C) (Tympanic) 20 1.6 m (5' 3\") 93% 28.34 kg/m2       Blood Pressure from Last 3 Encounters:   05/23/17 121/61   03/30/17 112/58   03/25/17 155/73    Weight from Last 3 Encounters:   05/23/17 72.6 kg (160 lb)   03/30/17 72.6 kg (160 lb)   02/14/17 72.6 kg (160 lb)              We Performed the Following     Echocardiogram Complete        Primary Care Provider Office Phone # Fax #    Braydon Yen [phone]       [address] [address]        Thank you!     Thank you for choosing [facility] [place]  for your care. Our goal is always to provide you with excellent care. Hearing back from our patients is one way we can continue to improve our services. Please take a few minutes to complete the written survey that you may receive in the mail after your visit with us. Thank you!             Your Updated Medication " List - Protect others around you: Learn how to safely use, store and throw away your medicines at www.disposemymeds.org.          This list is accurate as of: 5/23/17  8:56 AM.  Always use your most recent med list.                   Brand Name Dispense Instructions for use    acetaminophen 500 MG tablet    TYLENOL     Take 500-1,000 mg by mouth every 6 hours as needed for mild pain       * albuterol (2.5 MG/3ML) 0.083% neb solution     25 vial    Take 1 vial (2.5 mg) by nebulization every 6 hours as needed for shortness of breath / dyspnea or wheezing       * albuterol 108 (90 BASE) MCG/ACT Inhaler    VENTOLIN HFA    3 Inhaler    Inhale 2 puffs into the lungs every 4 hours as needed for shortness of breath / dyspnea or wheezing       cloNIDine 0.1 MG tablet    CATAPRES    360 tablet    TAKE 1 TABLET BY MOUTH EVERY MORNING AND 3 TABLETS EVERY EVENING       diphenhydrAMINE 25 MG tablet    BENADRYL    60 tablet    Take 1-2 tablets (25-50 mg) by mouth every 6 hours as needed for itching or allergies       flecainide 100 MG tablet    TAMBOCOR    180 tablet    Take 1 tablet (100 mg) by mouth 2 times daily       fluticasone-salmeterol 115-21 MCG/ACT Inhaler    ADVAIR-HFA    36 g    Inhale 2 puffs into the lungs 2 times daily       furosemide 40 MG tablet    LASIX    180 tablet    TAKE 1 TABLET BY MOUTH TWICE DAILY.       hydrALAZINE 25 MG tablet    APRESOLINE    810 tablet    TAKE 3 TABLETS BY MOUTH THREE TIMES DAILY       ipratropium - albuterol 0.5 mg/2.5 mg/3 mL 0.5-2.5 (3) MG/3ML neb solution    DUONEB    6 Box    USE 1 VIAL PER NEBULIZER FOUR TIMES DAILY       levothyroxine 100 MCG tablet    SYNTHROID/LEVOTHROID    90 tablet    TAKE 1 TABLET(100 MCG) BY MOUTH DAILY       LORazepam 1 MG tablet    ATIVAN     Take 0.5-1 tablets by mouth every 6 hours as needed Reported on 5/23/2017       order for DME     1 each    Equipment being ordered: wedge for bed       other medical supplies     1 each    Apply one pair to help  with edema       potassium chloride SA 20 MEQ CR tablet    K-DUR/KLOR-CON M    180 tablet    Take 1 tablet (20 mEq) by mouth 2 times daily       simvastatin 10 MG tablet    ZOCOR    90 tablet    TAKE 1 TABLET(10 MG) BY MOUTH AT BEDTIME       triamcinolone 0.1 % ointment    KENALOG    454 g    Apply bid and hs left hand and legs - for dermatitis       warfarin 2 MG tablet    COUMADIN    135 tablet    Take 2 mg Mon/Wed/Fri; 3 mg all other days or as directed by [facility]       * Notice:  This list has 2 medication(s) that are the same as other medications prescribed for you. Read the directions carefully, and ask your doctor or other care provider to review them with you.

## 2017-05-23 NOTE — PROGRESS NOTES
"Chief Complaint: atrial fibrillation, bicuspid valve    HPI: I was happy to see Mrs. Cameron for the above. She has seen several cardiologists over the past year for these problems. Her  was ill and they moved to be closer to his daughters. He  in October and she has settled in [place]. Her atrial fibrillation began in the past year. In reviewing the old records both  and  are reported as when the atrial fibrillation began and she is no longer sure. This has been associated with shortness of breath but as was discussed with her by one of the cardiologist she has multiple reasons for shortness of breath including COPD. She is finishing treatment for a COPD exacerbation at this time. She reports the plan had been to start her on warfarin and then cardiovert her. With all the chaos in her life the past few months this never happened. She was found to have hyperthyroidism in August and was treated with Iodine ablation. She also has a bicuspid aortic valve and mild aortic root dilation (per report of echocardiogram in old records). The echocardiogram did not report significant aortic stenosis or insufficiency and her systolic function was normal making it less likely that her shortness of breath was related to her valvular heart disease. She was told she would need periodic f/u of her valve and aorta. She reports two angiograms done at [facility] and that they were \"okay\". I do not see copies of those reports in the old records. They will be requested.    She underwent DC cardioversion on 2014. She reports feeling better afterward. She feels like she needs to use her inhaler less often and has less shortness of breath. However, the ECG today shows she is back in atrial fibrillation.    I have reviewed the records sent from [facility]. There is an echocardiogram report from  and records regarding back surgery. I see no cath results.    A repeat cardioversion in July failed to restore sinus " "rhythm. A stress study in April (see below) showed no ischemia or infarction.    11Nov2014:  She had recurrent atrial fibrillation was started on flecainide and scheduled for cardioversion. When she arrived for the cardioversion she was in sinus.     A CT aortogram showed atherosclerosis but not dilation of the descending aoota.    [id number]:    She was admitted in March 2015 with a COPD exacerbation. Her breathing is at baseline with no fever, sputum or wheezing.    She has not noted any atrial fibrillation, no palpitations, chest pain/discomfort, unusual dyspnea on exertion, bleeding or neurologic symptoms.    [id number]: Her follow-up echocardiogram (see lab section) showed no change in aortic valve function. She report rare \"flutters\" but no sustained palpitations like with her atrial fibrillation. She reports no significant bleeding episodes on warfarin. She denies any neurologic symptoms suggestive of CVA or TIA. Edema improved when she switched her diltiazem to bedtime.    Her primary health problem has been her COPD. She uses oxygen at night. She has had had any recent symptoms of upper or lower respiratory infection.    [id number] Interval History: She had two admissions this past winter for COPD exacerbations. Overall, she feels her dyspnea on exertion is slowly getting worse. She has not had exertional chest pain/discomfort.     She reports no increased edema, orthopnea or paroxysmal nocturnal dyspnea.    Current Outpatient Prescriptions   Medication Sig Dispense Refill     levothyroxine (SYNTHROID/LEVOTHROID) 100 MCG tablet TAKE 1 TABLET(100 MCG) BY MOUTH DAILY 90 tablet 2     albuterol (VENTOLIN HFA) 108 (90 BASE) MCG/ACT Inhaler Inhale 2 puffs into the lungs every 4 hours as needed for shortness of breath / dyspnea or wheezing 3 Inhaler 0     fluticasone-salmeterol (ADVAIR-HFA) 115-21 MCG/ACT Inhaler Inhale 2 puffs into the lungs 2 times daily 36 g 1     warfarin (COUMADIN) 2 MG tablet Take 2 mg " Mon/Wed/Fri; 3 mg all other days or as directed by [facility] 135 tablet 3     order for DME Equipment being ordered: wedge for bed 1 each 0     cloNIDine (CATAPRES) 0.1 MG tablet TAKE 1 TABLET BY MOUTH EVERY MORNING AND 3 TABLETS EVERY EVENING 360 tablet 0     hydrALAZINE (APRESOLINE) 25 MG tablet TAKE 3 TABLETS BY MOUTH THREE TIMES DAILY 810 tablet 3     furosemide (LASIX) 40 MG tablet TAKE 1 TABLET BY MOUTH TWICE DAILY. 180 tablet 2     ipratropium - albuterol 0.5 mg/2.5 mg/3 mL (DUONEB) 0.5-2.5 (3) MG/3ML neb solution USE 1 VIAL PER NEBULIZER FOUR TIMES DAILY 6 Box 3     albuterol (2.5 MG/3ML) 0.083% neb solution Take 1 vial (2.5 mg) by nebulization every 6 hours as needed for shortness of breath / dyspnea or wheezing 25 vial 1     flecainide (TAMBOCOR) 100 MG tablet Take 1 tablet (100 mg) by mouth 2 times daily 180 tablet 3     simvastatin (ZOCOR) 10 MG tablet TAKE 1 TABLET(10 MG) BY MOUTH AT BEDTIME 90 tablet 3     potassium chloride SA (K-DUR,KLOR-CON M) 20 MEQ tablet Take 1 tablet (20 mEq) by mouth 2 times daily 180 tablet 3     diphenhydrAMINE (BENADRYL) 25 MG tablet Take 1-2 tablets (25-50 mg) by mouth every 6 hours as needed for itching or allergies 60 tablet 1     acetaminophen (TYLENOL) 500 MG tablet Take 500-1,000 mg by mouth every 6 hours as needed for mild pain       OTHER MEDICAL SUPPLIES Apply one pair to help with edema 1 each 0     triamcinolone (KENALOG) 0.1 % ointment Apply bid and hs left hand and legs - for dermatitis (Patient not taking: Reported on 5/23/2017) 454 g 3     LORazepam (ATIVAN) 1 MG tablet Take 0.5-1 tablets by mouth every 6 hours as needed Reported on 5/23/2017         Past Medical History:   Diagnosis Date     Asthma      Atrial fibrillation (H)      COPD (chronic obstructive pulmonary disease) (H)      Depression      High cholesterol      HTN (hypertension)      Thyroid disease        Past Surgical History:   Procedure Laterality Date     BACK SURGERY  2006      "COLONOSCOPY N/A 1/19/2016    Procedure: COLONOSCOPY;  Surgeon: Waldemar Bob MD;  Location: [facility]     HYSTERECTOMY  1980    partial     SLING BLADDER SUSPENSION WITH FASCIA LINNETTE         Family History   Problem Relation Age of Onset     Prostate Cancer Father      Hypertension Father      Heart Failure Father      CHF     Asthma Mother      CANCER Mother      ovarian     Hypertension Mother      Asthma Brother      Hypertension Sister      Hypertension Brother        Social History   Substance Use Topics     Smoking status: Former Smoker     Years: 48.00     Types: Cigarettes     Quit date: 1/28/2007     Smokeless tobacco: Never Used     Alcohol use No       Allergies   Allergen Reactions     Amlodipine Besylate Cough     Norvasc     Lisinopril Cough       ROS: ten system review negative except as noted above. [id number]    Physical Examination:  /61 (BP Location: Left arm, Cuff Size: Adult Regular)  Pulse 59  Temp 97.4  F (36.3  C) (Tympanic)  Resp 20  Ht 1.6 m (5' 3\")  Wt 72.6 kg (160 lb)  SpO2 93%  BMI 28.34 kg/m2  GENERAL APPEARANCE: healthy, alert and no distress  HEENT: no icterus, no xanthelasmas  NECK:  JVP is not visible, left CEA scar  CHEST:  no rales or rhonchi, breath sounds are diminished throughout, expiratory phase is prolonged, no wheezing  CARDIOVASCULAR: regular rhythm, S1S2 split, no S3 or S4 and no murmur, click or rub, precordium quiet  ABDOMEN: soft, non tender, bowel sounds normal, no abdominal bruits  EXTREMITIES: some edema, but also adipose    Laboratory:    Follow-up echocardiogram ordered.    Previous Studies:   ECHOCARDIOGRAM    M-mode, two-dimensional, color-flow, and Doppler studies were obtained  on this patient.  Standard views were utilized.    ORDERING PHYSICIAN:  Eliezer Myers MD    INDICATIONS:  Bicuspid aortic valve.    Cardiac Dimensions                                    Normal  Dimensions  Aortic Root:                         32.3 mm      Aortic " Root  Diameter: 20-37 mm  Left Atrium:                          45 mm      Left Atrium: 19-40  mm  Right Ventricle:                     25.8 mm      Right Ventricle:  7-23 mm  Left Ventricle end-diastole:   54.8 mm      Left Ventricle  end-diastole: 35-56 mm  Left Ventricle end-systole:    32.8 mm      Left Ventricle  end-systole: 35-56 mm  IVS end-diastole:                 8.8 mm      IVS end-diastole: 7-11  mm  LVPW:                                6.6 mm      LVPW: 7-11 mm    Review of the study shows there is no pericardial effusion.  The left  ventricle is normal size and systolic function.  Ejection fraction at  70%.  The left atrium is borderline enlarged, volume is 70 mL, indexed  it was 37 mL/meter2.  The right ventricle is normal on 2-D study.  The  mitral valve has a mild amount of mitral regurgitation.  The aortic  valve appears to be bicuspid in nature.  Peak and mean gradients are  25 and 13 respectively with a peak velocity of 2.52 meter/second,  signifying just very slight stenosis.  There is mild-to-moderate  aortic insufficiency.  Also noted is some slight tricuspid  regurgitation.  Unable to estimate the right ventricular systolic  pressure.      ASSESSMENT:  Echocardiographic study revealing  1.  Normal left ventricular size and systolic function.  Ejection  fraction at 70%.  2.  Borderline left atrial enlargement.  3.  Normal right ventricular size and function.  4.  Slight mitral regurgitation.  5.  Aortic valve is likely bicuspid.  Mild stenosis.  Peak and mean  gradients are 25 and 13 with mild-to-moderate aortic insufficiency.  This is a slight increase in terms of insufficiency compared to echo  from February 2014.  6.  Slight tricuspid regurgitation.  Unable to estimate the right  ventricular systolic pressure.  Exam Date: May 12, 2016 10:33:00 AM  Author: [facility]-INTERNAL MEDICINE DICTATION  DICTATING PHYSICIAN: Lito Cleveland MD  INDICATIONS: A  63-year-old female referred by Dr. Myers and Dr. Yen because of chest pain and atrial fibrillation.  FINDINGS: The patient was placed upon the table using our protocol,  given 5 mL/0.4 mg of Lexiscan rapidly over 15 seconds followed by  saline flush. She was then given the Sestamibi at 45 seconds into  the study. She was monitored continuously throughout the study.  Resting heart rate was 85 with blood pressure 130/80. Heart rate gurpreet  to 113 and blood pressure dropped to 110/70. She did have some  transient shortness of breath. Review of her electrocardiogram shows  she has atrial fibrillation throughout the study, but no acute changes  were noted.  ASSESSMENT: Lexiscan Sestamibi study. On the Lexiscan portion the  patient did have appropriate heart rate and blood pressure response,  transient symptoms. No acute electrocardiogram changes or  arrhythmias. The Cardiolite scan is pending.  CARDIOLITE IV LEXISCAN AND GATED SPECT-RADIOLOGIST DICTATION  DICTATING PHYSICIAN: Rufus Newberry MD  HISTORY: A 63-year-old female with chest pain.  LEXISCAN DOSE: 5 mL (0.4 mg regadenoson) by rapid intravenous  injection.  SESTAMIBI DOSE: 10 mCi 99mTc, 1 mL MIBI Injection Time: 0550  SESTAMIBI DOSE: 30 mCi 99mTc, 1 mL MIBI Injection Time: 0749  ANGIOCATH SIZE: 22 INJECTION SITE: Right AC INITIALS: JR  IV DISCONTINUE TIME: 0753 SITE CONDITION: No infiltrate TIP INTACT:  Yes    The patient is a 63-year-old female who was evaluated with  Sestamibi/Lexiscan testing. 5 mL Lexiscan (0.4 mg regadenoson) was  administered by rapid intravenous injection; followed immediately by  saline flush and radiopharmaceutical. The patient's resting heart  rate was 85 and blood pressure was 130/80. The maximum response in  heart rate and blood pressure after Lexiscan injection was 113 and  110/70.  Symptoms during the procedure included: Transient shortness of  breath.  If symptoms were present, did they resolve post-test?  Yes.  Electrocardiogram monitoring demonstrates: Atrial fibrillation  throughout study. No acute electrocardiogram changes.  Arrhythmias: None, other than atrial fibrillation.  Summary: Appropriate heart rate and blood pressure response.  Transient shortness of breath. No acute electrocardiogram changes.  VIEWS OBTAINED: Short axis, horizontal long axis, vertical long axis  at rest and stress.  FINDINGS: The left ventricular ejection fraction is 56%. No fixed  or reversible perfusion defects are present.  IMPRESSION: No evidence of cardiac ischemia.      Echocardiogram:  ASSESSMENT: Echocardiographic study revealing  1. Normal left ventricle size and systolic function. Ejection  fraction at 55%.  2. Ascending aorta appears to be normal on current measurements.  3. Left atrial enlargement.  4. Normal right ventricle size and function.  5. Mild mitral regurgitation.  6. Aortic valve appears to probably be bicuspid without any  significant stenosis. There is a slight amount of aortic  insufficiency.  7. Slight tricuspid regurgitation. Peak systolic pressure right  ventricle is estimated at 14 plus right atrium.    Exam Date: Feb 14, 2014 11:18:00 AM  Author: SOLOMON MITCHELL    Old records show a TSH of 1.87 in 11/13. Labs from 10/13 show a normal K, creat and estimated GFR.    Assessment and recommendations:    1) Persistent atrial fibrillation (recurrent) - maintaining sinus, feels well    - continue flecainide 100 mg BID    2) Bicuspid aortic valve - previous echocardiogram does not show significant aortic stenosis or insufficiency. CT aortogram shows no dilation of descending aorta.    - follow-up echocardiogram in a year    3) Peripheral edema - unchanged    4) Hypertension - controlled    I appreciate the chance to help with Mrs. Cameron's care. Please let me know if you have any questions or concerns. I will see her back in one year.    Eliezer Myers M.D.

## 2017-05-23 NOTE — PATIENT INSTRUCTIONS
You were seen by Dr. Myers , 5/23/2017 .     1. You have an Echocardiogram in one year, you will  Be called by diagnostic imaging to schedule this test.    2. Continue all current medications      You will follow up with Dr. Myers in one year.       Please call the cardiology office with problems, questions, or concerns at [phone].    If you experience chest pain, chest pressure, chest tightness, shortness of breath, fainting, lightheadedness, nausea, vomiting, or other concerning symptoms, please report to the Emergency Department or call 911. These symptoms may be emergent, and best treated in the Emergency Department.     Giana NAVARRO RN-BSN  Cardiology   [facility]  [phone]

## 2017-06-03 DIAGNOSIS — I10 ESSENTIAL HYPERTENSION: ICD-10-CM

## 2017-06-05 DIAGNOSIS — J43.8 OTHER EMPHYSEMA (H): ICD-10-CM

## 2017-06-06 RX ORDER — CLONIDINE HYDROCHLORIDE 0.1 MG/1
TABLET ORAL
Qty: 360 TABLET | Refills: 1 | Status: SHIPPED | OUTPATIENT
Start: 2017-06-06 | End: 2017-08-23

## 2017-06-06 NOTE — TELEPHONE ENCOUNTER
Catapres      Last Written Prescription Date: 3/6/2017  Last Fill Quantity: 360, # refills: 0  Last Office Visit with [facility], [facility] or [facility] prescribing provider: 5/23/2017  Next 5 appointments (look out 90 days)     Mahendra 15, 2017 10:00 AM CDT   Return Visit with Hayes Farrell MD   [facility] ([facility] )    [address] [place] [id number]   [phone]                   Potassium   Date Value Ref Range Status   03/25/2017 3.9 3.4 - 5.3 mmol/L Final     Creatinine   Date Value Ref Range Status   03/25/2017 0.78 0.52 - 1.04 mg/dL Final     BP Readings from Last 3 Encounters:   05/23/17 121/61   03/30/17 112/58   03/25/17 155/73

## 2017-06-07 RX ORDER — ALBUTEROL SULFATE 90 UG/1
AEROSOL, METERED RESPIRATORY (INHALATION)
Qty: 54 G | Refills: 0 | Status: SHIPPED | OUTPATIENT
Start: 2017-06-07 | End: 2017-09-01

## 2017-06-14 ENCOUNTER — ANTICOAGULATION THERAPY VISIT (OUTPATIENT)
Dept: ANTICOAGULATION | Facility: OTHER | Age: 67
End: 2017-06-14

## 2017-06-14 DIAGNOSIS — Z79.01 LONG-TERM (CURRENT) USE OF ANTICOAGULANTS: ICD-10-CM

## 2017-06-14 DIAGNOSIS — I48.19 ATRIAL FIBRILLATION, PERSISTENT (H): ICD-10-CM

## 2017-06-14 LAB — INR BLD: 2.3 (ref 0.86–1.14)

## 2017-06-14 PROCEDURE — 36416 COLLJ CAPILLARY BLOOD SPEC: CPT | Mod: ZL | Performed by: FAMILY MEDICINE

## 2017-06-14 PROCEDURE — 85610 PROTHROMBIN TIME: CPT | Mod: QW,ZL | Performed by: FAMILY MEDICINE

## 2017-06-14 NOTE — PROGRESS NOTES
ANTICOAGULATION FOLLOW-UP CLINIC VISIT    Patient Name:  Mary Alice Cameron  Date:  6/14/2017  Contact Type:  Telephone    SUBJECTIVE:     Patient Findings     Positives No Problem Findings           OBJECTIVE    INR Point of Care   Date Value Ref Range Status   06/14/2017 2.3 (H) 0.86 - 1.14 Final     Comment:     This test is intended for monitoring Coumadin therapy.  Results are not   accurate   in patients with prolonged INR due to factor deficiency.         ASSESSMENT / PLAN  INR assessment THER    Recheck INR In: 6 WEEKS    INR Location Clinic      Anticoagulation Summary as of 6/14/2017     INR goal 2.0-3.0   Today's INR 2.3   Maintenance plan 2 mg (2 mg x 1) on Mon, Wed, Fri; 3 mg (2 mg x 1.5) all other days   Full instructions 2 mg on Mon, Wed, Fri; 3 mg all other days   Weekly total 18 mg   No change documented Kenna Kiran RN   Plan last modified Kenna Storm RN (3/1/2017)   Next INR check 7/26/2017   Priority INR   Target end date Indefinite    Indications   Atrial fibrillation  persistent (H) [I48.1]  Long-term (current) use of anticoagulants [Z79.01] [Z79.01]         Anticoagulation Episode Summary     INR check location     Preferred lab     Send INR reminders to [facility] POOL    Comments       Anticoagulation Care Providers     Provider Role Specialty Phone number    Braydon Yen MD [facility] [phone]            See the Encounter Report to view Anticoagulation Flowsheet and Dosing Calendar (Go to Encounters tab in chart review, and find the Anticoagulation Therapy Visit)        Kenna Kiran RN

## 2017-06-14 NOTE — MR AVS SNAPSHOT
Mary Alice Cameron   6/14/2017   Anticoagulation Therapy Visit    Description:  67 year old female   Provider:  Braydon Yen MD   Department:  [facility]           INR as of 6/14/2017     Today's INR 2.3      Anticoagulation Summary as of 6/14/2017     INR goal 2.0-3.0   Today's INR 2.3   Full instructions 2 mg on Mon, Wed, Fri; 3 mg all other days   Next INR check 7/26/2017    Indications   Atrial fibrillation  persistent (H) [I48.1]  Long-term (current) use of anticoagulants [Z79.01] [Z79.01]         June 2017 Details    Sun Mon Tue Wed Thu Fri Sat         1               2               3                 4               5               6               7               8               9               10                 11               12               13               14      2 mg   See details      15      3 mg         16      2 mg         17      3 mg           18      3 mg         19      2 mg         20      3 mg         21      2 mg         22      3 mg         23      2 mg         24      3 mg           25      3 mg         26      2 mg         27      3 mg         28      2 mg         29      3 mg         30      2 mg           Date Details   06/14 This INR check               How to take your warfarin dose     To take:  2 mg Take 1 of the 2 mg tablets.    To take:  3 mg Take 1.5 of the 2 mg tablets.           July 2017 Details    Sun Mon Tue Wed Thu Fri Sat           1      3 mg           2      3 mg         3      2 mg         4      3 mg         5      2 mg         6      3 mg         7      2 mg         8      3 mg           9      3 mg         10      2 mg         11      3 mg         12      2 mg         13      3 mg         14      2 mg         15      3 mg           16      3 mg         17      2 mg         18      3 mg         19      2 mg         20      3 mg         21      2 mg         22      3 mg           23      3 mg         24      2 mg         25      3 mg         26             27               28               29                 30               31                     Date Details   No additional details    Date of next INR:  7/26/2017         How to take your warfarin dose     To take:  2 mg Take 1 of the 2 mg tablets.    To take:  3 mg Take 1.5 of the 2 mg tablets.

## 2017-06-15 ENCOUNTER — CARE COORDINATION (OUTPATIENT)
Dept: CARE COORDINATION | Facility: OTHER | Age: 67
End: 2017-06-15

## 2017-06-15 ENCOUNTER — OFFICE VISIT (OUTPATIENT)
Dept: SLEEP MEDICINE | Facility: HOSPITAL | Age: 67
End: 2017-06-15
Attending: INTERNAL MEDICINE
Payer: MEDICARE

## 2017-06-15 VITALS
DIASTOLIC BLOOD PRESSURE: 64 MMHG | HEART RATE: 63 BPM | SYSTOLIC BLOOD PRESSURE: 124 MMHG | RESPIRATION RATE: 12 BRPM | WEIGHT: 160 LBS | HEIGHT: 65 IN | BODY MASS INDEX: 26.66 KG/M2 | OXYGEN SATURATION: 95 %

## 2017-06-15 DIAGNOSIS — J45.40 MODERATE PERSISTENT ASTHMA WITHOUT COMPLICATION: Primary | ICD-10-CM

## 2017-06-15 PROCEDURE — 99212 OFFICE O/P EST SF 10 MIN: CPT | Performed by: INTERNAL MEDICINE

## 2017-06-15 PROCEDURE — 99211 OFF/OP EST MAY X REQ PHY/QHP: CPT

## 2017-06-15 NOTE — PROGRESS NOTES
"Had a tough winter, hosp x 2, last in Feb, responds well to prednisone and Dr Yen has given her a script at home for flares. She is also now on Advair, started recently, and doing pretty well.   /64  Pulse 63  Resp 12  Ht 5' 5\" (1.651 m)  Wt 160 lb (72.6 kg)  SpO2 95%  BMI 26.63 kg/m2  Resp mild exp wheeze  Cor rrr    A/ Asthma, agree with her current management, follow up prn.  "

## 2017-06-15 NOTE — MR AVS SNAPSHOT
After Visit Summary   6/15/2017    Mary Alice Cameron    MRN: [id number]           Patient Information     Date Of Birth          1950        Visit Information        Provider Department      6/15/2017 10:00 AM Hayes Farrell MD [facility]        Today's Diagnoses     Moderate persistent asthma without complication    -  1       Follow-ups after your visit        Your next 10 appointments already scheduled     May 22, 2018 10:30 AM CDT   (Arrive by 10:15 AM)   Return Visit with Eliezer Myers MD   [facility] [place] ([facility] [place] )    [address]  [place] [address]   [phone]              Who to contact     If you have questions or need follow up information about today's clinic visit or your schedule please contact [facility] directly at [phone].  Normal or non-critical lab and imaging results will be communicated to you by MyChart, letter or phone within 4 business days after the clinic has received the results. If you do not hear from us within 7 days, please contact the clinic through MyChart or phone. If you have a critical or abnormal lab result, we will notify you by phone as soon as possible.  Submit refill requests through [employer] or call your pharmacy and they will forward the refill request to us. Please allow 3 business days for your refill to be completed.          Additional Information About Your Visit        MyChart Information     [employer] gives you secure access to your electronic health record. If you see a primary care provider, you can also send messages to your care team and make appointments. If you have questions, please call your primary care clinic.  If you do not have a primary care provider, please call [phone] and they will assist you.        Care EveryWhere ID     This is your Care EveryWhere ID. This could be used by other organizations to access your [facility] [place] medical records  [id number]        Your Vitals Were      "Pulse Respirations Height Pulse Oximetry BMI (Body Mass Index)       63 12 5' 5\" (1.651 m) 95% 26.63 kg/m2        Blood Pressure from Last 3 Encounters:   06/15/17 124/64   05/23/17 121/61   03/30/17 112/58    Weight from Last 3 Encounters:   06/15/17 160 lb (72.6 kg)   05/23/17 160 lb (72.6 kg)   03/30/17 160 lb (72.6 kg)              Today, you had the following     No orders found for display       Primary Care Provider Office Phone # Fax #    Braydon Yen [phone]       [address] [address]        Thank you!     Thank you for choosing [facility]  for your care. Our goal is always to provide you with excellent care. Hearing back from our patients is one way we can continue to improve our services. Please take a few minutes to complete the written survey that you may receive in the mail after your visit with us. Thank you!             Your Updated Medication List - Protect others around you: Learn how to safely use, store and throw away your medicines at www.disposemymeds.org.          This list is accurate as of: 6/15/17 10:09 AM.  Always use your most recent med list.                   Brand Name Dispense Instructions for use    acetaminophen 500 MG tablet    TYLENOL     Take 500-1,000 mg by mouth every 6 hours as needed for mild pain       * albuterol (2.5 MG/3ML) 0.083% neb solution     25 vial    Take 1 vial (2.5 mg) by nebulization every 6 hours as needed for shortness of breath / dyspnea or wheezing       * VENTOLIN  (90 BASE) MCG/ACT Inhaler   Generic drug:  albuterol     54 g    INHALE 2 PUFFS INTO THE LUNGS EVERY 4 HOURS AS NEEDED FOR SHORTNESS OF BREATH OR DIFFICULT BREATHING OR WHEEZING       * cloNIDine 0.1 MG tablet    CATAPRES    360 tablet    TAKE 1 TABLET BY MOUTH EVERY MORNING AND 3 TABLETS EVERY EVENING       * cloNIDine 0.1 MG tablet    CATAPRES    360 tablet    TAKE 1 TABLET BY MOUTH EVERY MORNING AND 3 TABLETS EVERY " EVENING       diphenhydrAMINE 25 MG tablet    BENADRYL    60 tablet    Take 1-2 tablets (25-50 mg) by mouth every 6 hours as needed for itching or allergies       flecainide 100 MG tablet    TAMBOCOR    180 tablet    Take 1 tablet (100 mg) by mouth 2 times daily       fluticasone-salmeterol 115-21 MCG/ACT Inhaler    ADVAIR-HFA    36 g    Inhale 2 puffs into the lungs 2 times daily       furosemide 40 MG tablet    LASIX    180 tablet    TAKE 1 TABLET BY MOUTH TWICE DAILY.       hydrALAZINE 25 MG tablet    APRESOLINE    810 tablet    TAKE 3 TABLETS BY MOUTH THREE TIMES DAILY       ipratropium - albuterol 0.5 mg/2.5 mg/3 mL 0.5-2.5 (3) MG/3ML neb solution    DUONEB    6 Box    USE 1 VIAL PER NEBULIZER FOUR TIMES DAILY       levothyroxine 100 MCG tablet    SYNTHROID/LEVOTHROID    90 tablet    TAKE 1 TABLET(100 MCG) BY MOUTH DAILY       LORazepam 1 MG tablet    ATIVAN     Take 0.5-1 tablets by mouth every 6 hours as needed Reported on 5/23/2017       order for DME     1 each    Equipment being ordered: wedge for bed       other medical supplies     1 each    Apply one pair to help with edema       potassium chloride SA 20 MEQ CR tablet    K-DUR/KLOR-CON M    180 tablet    Take 1 tablet (20 mEq) by mouth 2 times daily       simvastatin 10 MG tablet    ZOCOR    90 tablet    TAKE 1 TABLET(10 MG) BY MOUTH AT BEDTIME       triamcinolone 0.1 % ointment    KENALOG    454 g    Apply bid and hs left hand and legs - for dermatitis       warfarin 2 MG tablet    COUMADIN    135 tablet    Take 2 mg Mon/Wed/Fri; 3 mg all other days or as directed by [facility]       * Notice:  This list has 4 medication(s) that are the same as other medications prescribed for you. Read the directions carefully, and ask your doctor or other care provider to review them with you.

## 2017-06-15 NOTE — PROGRESS NOTES
Clinic Care Coordination Contact  [facility]/Voicemail    Referral Source: Care Team  Clinical Data: Care Coordinator Outreach  Outreach attempted x 1.  Left message on voicemail with call back information and requested return call.  Plan:  Care Coordinator will try to reach patient again in 1-2 business days.  Ina Arias RN

## 2017-06-19 ENCOUNTER — CARE COORDINATION (OUTPATIENT)
Dept: CARE COORDINATION | Facility: OTHER | Age: 67
End: 2017-06-19

## 2017-06-19 NOTE — PROGRESS NOTES
Clinic Care Coordination Contact  [facility]/Voicemail    Referral Source: Care Team  Clinical Data: Care Coordinator Outreach  Outreach attempted x 1.  Left message on voicemail with call back information and requested return call.  Plan:. Care Coordinator will try to reach patient again in 1-2 business days.    Magaly Aguirre RN-BSN  RN Clinic Care Coordinator  [facility]  Phone:  [phone]

## 2017-06-22 ENCOUNTER — CARE COORDINATION (OUTPATIENT)
Dept: CARE COORDINATION | Facility: OTHER | Age: 67
End: 2017-06-22

## 2017-06-22 NOTE — PROGRESS NOTES
"Clinic Care Coordination Contact  OUTREACH    Referral Information:  Referral Source: Care Team  Reason for Contact: Follow-up on COPD        [place] Utilization:   ED Visits in last year: 3  Hospital visits in last year: 2  Last PCP appointment: 03/30/17  Missed Appointments: 0     Multiple Providers or Specialists: Cardiology; Care Coordination, PCP, Anticoagulation, Dermatology (Pulmonology)    Clinical Concerns:  Current Medical Concerns: See COPD pathway   Current Behavioral Concerns: No concerns   Education Provided to patient: Educated on after hours nurse line.   Clinical Pathway Name: COPD  Clinical Pathway: Clinic Care Coordination COPD Follow up Assessment  Symptom Review:    Are you having any increased shortness of breath? States \"My chest was a little bit tight this morning, but I have not used my nebulizer  When you cough are you coughing up anything? No  Color NA  Consistency NA  Frequency NA  What COPD Zone currently in:Green- States \" For the most part of the I'm in the green zone, and if not I take it easy\"  What number would you call if you were in the YELLOW zones: Is aware of care coordinator and provided with after ours     Medications:   Were you started on any new medications since the last time we talked?  No  Do you have all of your medications? Yes  Have you had trouble filling your prescriptions? No  Are you medications effective in controlling your symptoms? Yes, for the most part pending on the days  Are you still on Prednisone (* does pt understand the tapering instructions)?  She has prn order available  For patients with DM:     Are you monitoring your blood sugars, if so how are your blood sugars? NA  How often have you had to use your rescue medications? \"Not very often, unless I feel my chest getting tight:  On average 3x/day    Oxygen/DME  What is the current liter flow you are using? 2.5 L NC and Intermittent at night  Has there been any changes? No      Activity:  How much " "activity can you do before you are SOB? \"I can do quite a bit\"    Have you had to reduce your activities because of dyspnea or other symptoms? \"only when it gets humid, I think it's more weather than related than activty  Diet:  Do you weigh yourself daily? Yes    Has there been a recent change in your weight? No - Remains stable    Emotions/Lifestyle:    Do you smoke (if not asked upon initial assessment)? No- quit 2006    Follow Up Plan:  Care Coordinator follow up plan: RN care coordinator follows every 4-6 week        Medication Management:   Denies any changes to medication regime.    Functional Status:  Mobility Status: Independent  Equipment Currently Used at Home: oxygen  Transportation: Drives           Psychosocial:  Current living arrangement:: I live in a private home  Financial/Insurance: Reports \"I'm doing good there.  I'm getting prescription assistance at [facility]\"     Resources and Interventions:  Current Resources:  ;  Cardiology, Pulmonology as needed, anti-coagulation clinic, and follows with Dr. Yen     [facility] Linkage Line Referral Placed: 01/18/17        Goals: \"My biggest goal is to maintain my current status and stay out of the hospital\"        Barriers: Patient does have dx of COPD and does have steroid containing inhalers which can decrease immunity and increase risk for infections.   Strengths: Adherent to medical plan of care and takes her medications as directed.  Continues to stay active and listens to her body when she needs to rest  Patient/Caregiver understanding: Patient verbalized an understanding of how to contact the nurse advisor line after hours and aware to contact care coordination for a change in her symptoms  Frequency of Care Coordination: 4-6 wks  Upcoming appointment: 05/22/18 (Dr. Myers)     Plan: Plan to f/u with patient in 6 weeks.     Magaly Aguirre, RN-BSN  RN Clinic Care Coordinator  [facility]  Phone:  [phone]  "

## 2017-08-07 ENCOUNTER — CARE COORDINATION (OUTPATIENT)
Dept: CARE COORDINATION | Facility: OTHER | Age: 67
End: 2017-08-07

## 2017-08-07 NOTE — PROGRESS NOTES
Called and left patient message to follow up with concerns. She will be contacted again in 2 weeks for follow up.     MAC ValdezN-RN  Care Coordinator

## 2017-08-09 ENCOUNTER — MYC MEDICAL ADVICE (OUTPATIENT)
Dept: FAMILY MEDICINE | Facility: OTHER | Age: 67
End: 2017-08-09

## 2017-08-09 ENCOUNTER — ANTICOAGULATION THERAPY VISIT (OUTPATIENT)
Dept: ANTICOAGULATION | Facility: OTHER | Age: 67
End: 2017-08-09

## 2017-08-09 DIAGNOSIS — J44.1 COPD EXACERBATION (H): Primary | ICD-10-CM

## 2017-08-09 DIAGNOSIS — Z79.01 LONG-TERM (CURRENT) USE OF ANTICOAGULANTS: ICD-10-CM

## 2017-08-09 DIAGNOSIS — I48.19 ATRIAL FIBRILLATION, PERSISTENT (H): ICD-10-CM

## 2017-08-09 LAB — INR BLD: 2.8 (ref 0.86–1.14)

## 2017-08-09 PROCEDURE — 85610 PROTHROMBIN TIME: CPT | Mod: QW,ZL | Performed by: FAMILY MEDICINE

## 2017-08-09 PROCEDURE — 36416 COLLJ CAPILLARY BLOOD SPEC: CPT | Mod: ZL | Performed by: FAMILY MEDICINE

## 2017-08-09 RX ORDER — PREDNISONE 20 MG/1
TABLET ORAL
Qty: 20 TABLET | Refills: 3 | Status: SHIPPED | OUTPATIENT
Start: 2017-08-09 | End: 2017-10-26

## 2017-08-09 NOTE — PROGRESS NOTES
ANTICOAGULATION FOLLOW-UP CLINIC VISIT    Patient Name:  Mary Alice Cameron  Date:  8/9/2017  Contact Type:  Telephone/ message left on patient listed phone number    SUBJECTIVE:     Patient Findings     Comments Message left on patient voicemail re: INR result, warfarin dosing and INR recheck date. She is to call warfarin clinic if any bleeding/bruising, changes in diet/meds/activity or questions.            OBJECTIVE    INR Point of Care   Date Value Ref Range Status   08/09/2017 2.8 (H) 0.86 - 1.14 Final     Comment:     This test is intended for monitoring Coumadin therapy.  Results are not   accurate   in patients with prolonged INR due to factor deficiency.         ASSESSMENT / PLAN  INR assessment THER    Recheck INR In: 5 WEEKS    INR Location Clinic      Anticoagulation Summary as of 8/9/2017     INR goal 2.0-3.0   Today's INR 2.8   Maintenance plan 2 mg (2 mg x 1) on Mon, Wed, Fri; 3 mg (2 mg x 1.5) all other days   Full instructions 2 mg on Mon, Wed, Fri; 3 mg all other days   Weekly total 18 mg   No change documented Kenna Kiran RN   Plan last modified Kenna Storm RN (3/1/2017)   Next INR check 9/13/2017   Priority INR   Target end date Indefinite    Indications   Atrial fibrillation  persistent (H) [I48.1]  Long-term (current) use of anticoagulants [Z79.01] [Z79.01]         Anticoagulation Episode Summary     INR check location     Preferred lab     Send INR reminders to  ANTICOAG POOL    Comments       Anticoagulation Care Providers     Provider Role Specialty Phone number    Braydon Yen MD [facility] [phone]            See the Encounter Report to view Anticoagulation Flowsheet and Dosing Calendar (Go to Encounters tab in chart review, and find the Anticoagulation Therapy Visit)        Kenna Kiran RN

## 2017-08-09 NOTE — MR AVS SNAPSHOT
Mary Alice MERLE Cameron   8/9/2017   Anticoagulation Therapy Visit    Description:  67 year old female   Provider:  Braydon Yen MD   Department:  [facility]           INR as of 8/9/2017     Today's INR 2.8      Anticoagulation Summary as of 8/9/2017     INR goal 2.0-3.0   Today's INR 2.8   Full instructions 2 mg on Mon, Wed, Fri; 3 mg all other days   Next INR check 9/13/2017    Indications   Atrial fibrillation  persistent (H) [I48.1]  Long-term (current) use of anticoagulants [Z79.01] [Z79.01]         August 2017 Details    Sun Mon Tue Wed Thu Fri Sat       1               2               3               4               5                 6               7               8               9      2 mg   See details      10      3 mg         11      2 mg         12      3 mg           13      3 mg         14      2 mg         15      3 mg         16      2 mg         17      3 mg         18      2 mg         19      3 mg           20      3 mg         21      2 mg         22      3 mg         23      2 mg         24      3 mg         25      2 mg         26      3 mg           27      3 mg         28      2 mg         29      3 mg         30      2 mg         31      3 mg            Date Details   08/09 This INR check               How to take your warfarin dose     To take:  2 mg Take 1 of the 2 mg tablets.    To take:  3 mg Take 1.5 of the 2 mg tablets.           September 2017 Details    Sun Mon Tue Wed Thu Fri Sat          1      2 mg         2      3 mg           3      3 mg         4      2 mg         5      3 mg         6      2 mg         7      3 mg         8      2 mg         9      3 mg           10      3 mg         11      2 mg         12      3 mg         13            14               15               16                 17               18               19               20               21               22               23                 24               25               26               27                28               29               30                Date Details   No additional details    Date of next INR:  9/13/2017         How to take your warfarin dose     To take:  2 mg Take 1 of the 2 mg tablets.    To take:  3 mg Take 1.5 of the 2 mg tablets.

## 2017-08-17 ENCOUNTER — CARE COORDINATION (OUTPATIENT)
Dept: CARE COORDINATION | Facility: OTHER | Age: 67
End: 2017-08-17

## 2017-08-17 NOTE — PROGRESS NOTES
Clinic Care Coordination Contact  OUTREACH    Referral Information:     Reason for Contact: follow up on COPD and AFIB        [place] Utilization:      ED Visits in last year: 3  Hospital visits in last year: 2  Last PCP appointment: 03/30/17  Missed Appointments: 0                 Clinical Concerns:  Current Medical Concerns: patient stated that she has AFib and doesn't know when it happens, COPD is managing no concerns   Current Behavioral Concerns: none at this time    Education Provided to patient: COPD Action Plan      Clinical Pathway: Clinic Care Coordination COPD Follow up Assessment  Symptom Review:    Are you having any increased shortness of breath? Yes  When you cough are you coughing up anything? no  Color n/a  Consistency n/a  Frequency n/a  What COPD Zone currently in:Green  What number would you call if you were in the YELLOW zones: CC number is in her cell phone     Medications:   Were you started on any new medications since the last time we talked?  No  Do you have all of your medications? Yes, Dr Farrell prefers that she has Prednisone on hand  Have you had trouble filling your prescriptions? No  Are you medications effective in controlling your symptoms? Yes, for the most part. Seemed as though medications were not working when she was in the Yellow Zone with her symptoms  Are you still on Prednisone (* does pt understand the tapering instructions)?  no  For patients with DM:     Are you monitoring your blood sugars, if so how are your blood sugars? N/a  How often have you had to use your rescue medications? N/a    Oxygen/DME  What is the current liter flow you are using? At night n/c 2.5 L  Has there been any changes? No      Activity:  How much activity can you do before you are SOB? Walking dogs every day, but after walking she needs to put on the O2  Have you had to reduce your activities because of dyspnea or other symptoms? Sometimes not always     Diet:  Do you weigh yourself daily?  "Yes    Has there been a recent change in your weight? No     Emotions/Lifestyle:    Do you smoke (if not asked upon initial assessment)? no  If so, how many cigarettes a day are you smoking? N/a  Would you like to try to quit smoking? N/a    Follow Up Plan:  Care Coordinator follow up plan: monthly  Referrals to consider: none at this time          Medication Management:  Up to date on medications, however she stated that Dr. Farrell would like her to have a standing order for Prednisone on file so that she can fill when needed. Patient will talk about this with PCP on 8/23/17     Functional Status:        Transportation: drives herself           Psychosocial:     Financial/Insurance: not discussed       Resources and Interventions:  Current Resources:  ;     Cardiology, Pulmonology as needed, anti-coagulation clinic, and follows with Dr. Yen              Goals: My biggest goal is to maintain my current status and stay out of the hospital\"                 Barriers: Patient does have dx of COPD and does have steroid containing inhalers which can decrease immunity and increase risk for infections.   Strengths: Adherent to medical plan of care and takes her medications as directed.  Continues to stay active and listens to her body when she needs to rest  Patient/Caregiver understanding:Patient verbalized understanding of COPD action plan and know to call CC when she is in the yellow, no other concerns at this time.          Plan: patient would like to hear from CC once monthly, I agree. I will f/u on     STEPHANIE Valdez-RN  Care Coordinator  "

## 2017-08-23 ENCOUNTER — OFFICE VISIT (OUTPATIENT)
Dept: FAMILY MEDICINE | Facility: OTHER | Age: 67
End: 2017-08-23
Attending: FAMILY MEDICINE
Payer: MEDICARE

## 2017-08-23 VITALS
RESPIRATION RATE: 18 BRPM | BODY MASS INDEX: 28.66 KG/M2 | WEIGHT: 172 LBS | DIASTOLIC BLOOD PRESSURE: 68 MMHG | TEMPERATURE: 97.9 F | HEART RATE: 63 BPM | HEIGHT: 65 IN | OXYGEN SATURATION: 98 % | SYSTOLIC BLOOD PRESSURE: 116 MMHG

## 2017-08-23 DIAGNOSIS — J43.8 OTHER EMPHYSEMA (H): ICD-10-CM

## 2017-08-23 DIAGNOSIS — E78.5 HYPERLIPIDEMIA LDL GOAL <130: ICD-10-CM

## 2017-08-23 DIAGNOSIS — Z78.9 HEALTH MAINTENANCE ALTERATION: ICD-10-CM

## 2017-08-23 DIAGNOSIS — E89.0 HYPOTHYROIDISM, POSTABLATIVE: ICD-10-CM

## 2017-08-23 DIAGNOSIS — S39.012D STRAIN OF LUMBAR REGION, SUBSEQUENT ENCOUNTER: Primary | ICD-10-CM

## 2017-08-23 DIAGNOSIS — I48.19 ATRIAL FIBRILLATION, PERSISTENT (H): ICD-10-CM

## 2017-08-23 LAB — TSH SERPL DL<=0.005 MIU/L-ACNC: 1.43 MU/L (ref 0.4–4)

## 2017-08-23 PROCEDURE — 99214 OFFICE O/P EST MOD 30 MIN: CPT | Performed by: FAMILY MEDICINE

## 2017-08-23 PROCEDURE — 36415 COLL VENOUS BLD VENIPUNCTURE: CPT | Mod: ZL | Performed by: FAMILY MEDICINE

## 2017-08-23 PROCEDURE — G0009 ADMIN PNEUMOCOCCAL VACCINE: HCPCS | Performed by: FAMILY MEDICINE

## 2017-08-23 PROCEDURE — 90732 PPSV23 VACC 2 YRS+ SUBQ/IM: CPT | Performed by: FAMILY MEDICINE

## 2017-08-23 PROCEDURE — 84443 ASSAY THYROID STIM HORMONE: CPT | Mod: ZL | Performed by: FAMILY MEDICINE

## 2017-08-23 PROCEDURE — 99212 OFFICE O/P EST SF 10 MIN: CPT

## 2017-08-23 PROCEDURE — 72100 X-RAY EXAM L-S SPINE 2/3 VWS: CPT | Mod: TC

## 2017-08-23 RX ORDER — LEVOTHYROXINE SODIUM 100 UG/1
TABLET ORAL
Qty: 90 TABLET | Refills: 3 | Status: SHIPPED | OUTPATIENT
Start: 2017-08-23 | End: 2017-12-12

## 2017-08-23 RX ORDER — SIMVASTATIN 10 MG
TABLET ORAL
Qty: 90 TABLET | Refills: 3 | Status: SHIPPED | OUTPATIENT
Start: 2017-08-23 | End: 2017-10-12

## 2017-08-23 RX ORDER — FLECAINIDE ACETATE 100 MG/1
100 TABLET ORAL 2 TIMES DAILY
Qty: 180 TABLET | Refills: 3 | Status: SHIPPED | OUTPATIENT
Start: 2017-08-23 | End: 2018-06-19 | Stop reason: ALTCHOICE

## 2017-08-23 ASSESSMENT — ANXIETY QUESTIONNAIRES
GAD7 TOTAL SCORE: 3
5. BEING SO RESTLESS THAT IT IS HARD TO SIT STILL: NOT AT ALL
1. FEELING NERVOUS, ANXIOUS, OR ON EDGE: NOT AT ALL
IF YOU CHECKED OFF ANY PROBLEMS ON THIS QUESTIONNAIRE, HOW DIFFICULT HAVE THESE PROBLEMS MADE IT FOR YOU TO DO YOUR WORK, TAKE CARE OF THINGS AT HOME, OR GET ALONG WITH OTHER PEOPLE: NOT DIFFICULT AT ALL
2. NOT BEING ABLE TO STOP OR CONTROL WORRYING: SEVERAL DAYS
7. FEELING AFRAID AS IF SOMETHING AWFUL MIGHT HAPPEN: NOT AT ALL
6. BECOMING EASILY ANNOYED OR IRRITABLE: NOT AT ALL
3. WORRYING TOO MUCH ABOUT DIFFERENT THINGS: SEVERAL DAYS
4. TROUBLE RELAXING: SEVERAL DAYS

## 2017-08-23 ASSESSMENT — PATIENT HEALTH QUESTIONNAIRE - PHQ9: SUM OF ALL RESPONSES TO PHQ QUESTIONS 1-9: 4

## 2017-08-23 NOTE — PROGRESS NOTES
Mary Alice Cameron    August 23, 2017    Chief Complaint   Patient presents with     COPD     refills needed     Lipids     Hypertension       SUBJECTIVE:  Here for f/u.  Doing ok overall.  Always better in the summer.  Breathing doing well.  Compliant with her meds.  No new issues or concerns.  Lengthy review of her overall health, meds, recent cares and cares going forward.     Past Medical History:   Diagnosis Date     Asthma      Atrial fibrillation (H)      COPD (chronic obstructive pulmonary disease) (H)      Depression      High cholesterol      HTN (hypertension)      Thyroid disease        Past Surgical History:   Procedure Laterality Date     BACK SURGERY  2006     COLONOSCOPY N/A 1/19/2016    Procedure: COLONOSCOPY;  Surgeon: Waldemar Bob MD;  Location: [facility]     HYSTERECTOMY  1980    partial     SLING BLADDER SUSPENSION WITH FASCIA LINNETTE         Current Outpatient Prescriptions   Medication Sig Dispense Refill     Calcium Carb-Cholecalciferol (CALTRATE 600+D3 SOFT PO) Take 600 mg by mouth 2 times daily       simvastatin (ZOCOR) 10 MG tablet TAKE 1 TABLET(10 MG) BY MOUTH AT BEDTIME 90 tablet 3     flecainide (TAMBOCOR) 100 MG tablet Take 1 tablet (100 mg) by mouth 2 times daily 180 tablet 3     levothyroxine (SYNTHROID/LEVOTHROID) 100 MCG tablet TAKE 1 TABLET(100 MCG) BY MOUTH DAILY 90 tablet 3     predniSONE (DELTASONE) 20 MG tablet Take 3 tabs (60 mg) by mouth daily x 3 days, 2 tabs (40 mg) daily x 3 days, 1 tab (20 mg) daily x 3 days, then 1/2 tab (10 mg) x 3 days. 20 tablet 3     VENTOLIN  (90 BASE) MCG/ACT Inhaler INHALE 2 PUFFS INTO THE LUNGS EVERY 4 HOURS AS NEEDED FOR SHORTNESS OF BREATH OR DIFFICULT BREATHING OR WHEEZING 54 g 0     fluticasone-salmeterol (ADVAIR-HFA) 115-21 MCG/ACT Inhaler Inhale 2 puffs into the lungs 2 times daily 36 g 1     warfarin (COUMADIN) 2 MG tablet Take 2 mg Mon/Wed/Fri; 3 mg all other days or as directed by [facility] 135 tablet 3     order for DME  Equipment being ordered: wedge for bed 1 each 0     cloNIDine (CATAPRES) 0.1 MG tablet TAKE 1 TABLET BY MOUTH EVERY MORNING AND 3 TABLETS EVERY EVENING 360 tablet 0     hydrALAZINE (APRESOLINE) 25 MG tablet TAKE 3 TABLETS BY MOUTH THREE TIMES DAILY 810 tablet 3     furosemide (LASIX) 40 MG tablet TAKE 1 TABLET BY MOUTH TWICE DAILY. 180 tablet 2     ipratropium - albuterol 0.5 mg/2.5 mg/3 mL (DUONEB) 0.5-2.5 (3) MG/3ML neb solution USE 1 VIAL PER NEBULIZER FOUR TIMES DAILY 6 Box 3     albuterol (2.5 MG/3ML) 0.083% neb solution Take 1 vial (2.5 mg) by nebulization every 6 hours as needed for shortness of breath / dyspnea or wheezing 25 vial 1     triamcinolone (KENALOG) 0.1 % ointment Apply bid and hs left hand and legs - for dermatitis 454 g 3     potassium chloride SA (K-DUR,KLOR-CON M) 20 MEQ tablet Take 1 tablet (20 mEq) by mouth 2 times daily 180 tablet 3     diphenhydrAMINE (BENADRYL) 25 MG tablet Take 1-2 tablets (25-50 mg) by mouth every 6 hours as needed for itching or allergies 60 tablet 1     acetaminophen (TYLENOL) 500 MG tablet Take 500-1,000 mg by mouth every 6 hours as needed for mild pain       OTHER MEDICAL SUPPLIES Apply one pair to help with edema 1 each 0     LORazepam (ATIVAN) 1 MG tablet Take 0.5-1 tablets by mouth every 6 hours as needed Reported on 5/23/2017       [DISCONTINUED] cloNIDine (CATAPRES) 0.1 MG tablet TAKE 1 TABLET BY MOUTH EVERY MORNING AND 3 TABLETS EVERY EVENING 360 tablet 1     [DISCONTINUED] levothyroxine (SYNTHROID/LEVOTHROID) 100 MCG tablet TAKE 1 TABLET(100 MCG) BY MOUTH DAILY 90 tablet 2     [DISCONTINUED] flecainide (TAMBOCOR) 100 MG tablet Take 1 tablet (100 mg) by mouth 2 times daily 180 tablet 3     [DISCONTINUED] simvastatin (ZOCOR) 10 MG tablet TAKE 1 TABLET(10 MG) BY MOUTH AT BEDTIME 90 tablet 3       Allergies   Allergen Reactions     Amlodipine Besylate Cough     Norvasc     Lisinopril Cough       Family History   Problem Relation Age of Onset     Prostate Cancer  Father      Hypertension Father      Heart Failure Father      CHF     Asthma Mother      CANCER Mother      ovarian     Hypertension Mother      Asthma Brother      Hypertension Sister      Hypertension Brother        Social History     Social History     Marital status:      Spouse name: N/A     Number of children: N/A     Years of education: N/A     Occupational History      Retired     disabled     Social History Main Topics     Smoking status: Former Smoker     Years: 48.00     Types: Cigarettes     Quit date: 1/28/2007     Smokeless tobacco: Never Used     Alcohol use No     Drug use: No     Sexual activity: No      Comment:      Other Topics Concern      Service No     Blood Transfusions Yes     Permits if needed     Caffeine Concern Yes     2 cups coffee daily     Seat Belt Yes     Parent/Sibling W/ Cabg, Mi Or Angioplasty Before 65f 55m? No     Social History Narrative       5 point ROS negative except as noted above in HPI, including Gen., Resp., CV, GI &  system review.     OBJECTIVE:  B/P: 116/68, T: 97.9, P: 63, R: 18    GENERAL APPEARANCE: Alert, no acute distress  CV: regular rate and rhythm, no murmur, rub or gallop  RESP: lungs clear to auscultation bilaterally with decreased breath sounds and no wheezes.    ABDOMEN: normal bowel sounds, soft, nontender, no hepatosplenomegaly or other masses  MSK:  Walks with slight limp.    SKIN: no suspicious lesions or rashes to visualized skin  NEURO: Alert, oriented x 3, speech and mentation normal    ASSESSMENT and PLAN:  (S39.012D) Strain of lumbar region, subsequent encounter  (primary encounter diagnosis)  Comment: reviewed options.    Plan: PHYSICAL THERAPY REFERRAL        Going to update xray and start PT.  F/u based on clinical picture.     (E78.5) Hyperlipidemia LDL goal <130  Comment: stable.   Plan: simvastatin (ZOCOR) 10 MG tablet        Update labs.     (I48.1) Atrial fibrillation, persistent (H)  Comment: stable, paced.    Plan: flecainide (TAMBOCOR) 100 MG tablet        No change.     (E89.0) Hypothyroidism, postablative  Comment: stable.   Plan: levothyroxine (SYNTHROID/LEVOTHROID) 100 MCG         tablet, TSH with free T4 reflex        Update labs and follow.     (J43.8) Other emphysema (H)  Comment: stable.   Plan: COPD ACTION PLAN - order for Health Maintenance        No change.        (Z78.9) Health maintenance alteration  Comment: pneumovax  Plan: given.

## 2017-08-23 NOTE — PATIENT INSTRUCTIONS
My COPD Action Plan     Name: Mary Alice Cameron    YOB: 1950   Date: 8/23/2017    My doctor: Braydon Yen MD   My clinic: [address] [address]  [phone]  My Controller Medicine: Serevent/Fluticasone (Advair)   Dose: 115     My Rescue Medicine: Albuterol (Proair/Ventolin/Proventil) inhaler   Dose: 108     My Flare Up Medicine: duo nebs   Dose:      My COPD Severity: Mild = FeV1 > 80%      Use of Oxygen: 2.5 Liters at night     Make sure you've had your pneumonia   vaccines.          GREEN ZONE       Doing well today      Usual level of activity and exercise    Usual amount of cough and mucus    No shortness of breath    Usual level of health (thinking clearly, sleeping well, feel like eating) Actions:      Take daily medicines    Use oxygen as prescribed    Follow regular exercise and diet plan    Avoid cigarette smoke and other irritants that harm the lungs           YELLOW ZONE          Having a bad day or flare up      Short of breath more than usual    A lot more sputum (mucus) than usual    Sputum looks yellow, green, tan, brown or bloody    More coughing or wheezing    Fever or chills    Less energy; trouble completing activities    Trouble thinking or focusing    Using quick relief inhaler or nebulizer more often    Poor sleep; symptoms wake me up    Do not feel like eating Actions:      Get plenty of rest    Take daily medicines    Use quick relief inhaler every 4 hours    If you use oxygen, call you doctor to see if you should adjust your oxygen    Do breathing exercises or other things to help you relax    Let a loved one, friend or neighbor know you are feeling worse    Call your care team if you have 2 or more symptoms.  Start taking steroids or antibiotics if directed by your care team           RED ZONE       Need medical care now      Severe shortness of breath (feel you can't breathe)    Fever, chills    Not enough breath to do any  activity    Trouble coughing up mucus, walking or talking    Blood in mucus    Frequent coughing   Rescue medicines are not working    Not able to sleep because of breathing    Feel confused or drowsy    Chest pain    Actions:      Call your health care team.  If you cannot reach your care team, call 911 or go to the emergency room.        Electronically signed by: Kay Randle, August 23, 2017  Annual Reminders:  Meet with Care Team, Flu Shot every Fall  Pharmacy:    [facility] [address] [place] [address] [address] [place] [address] AT [facility] [address]  [facility] - [place], [place] - [facility] [address]

## 2017-08-23 NOTE — MR AVS SNAPSHOT
After Visit Summary   8/23/2017    Mary Alice Cameron    MRN: [id number]           Patient Information     Date Of Birth          1950        Visit Information        Provider Department      8/23/2017 10:15 AM Braydon Yen MD [facility]        Today's Diagnoses     Strain of lumbar region, subsequent encounter    -  1    Hyperlipidemia LDL goal <130        Atrial fibrillation, persistent (H)        Hypothyroidism, postablative        Other emphysema (H)        Health maintenance alteration          Care Instructions       My COPD Action Plan     Name: Mary Alice Cameron    YOB: 1950   Date: 8/23/2017    My doctor: Braydon Yen MD   My clinic: [address] [address]  [phone]  My Controller Medicine: Serevent/Fluticasone (Advair)   Dose: 115     My Rescue Medicine: Albuterol (Proair/Ventolin/Proventil) inhaler   Dose: 108     My Flare Up Medicine: duo nebs   Dose:      My COPD Severity: Mild = FeV1 > 80%      Use of Oxygen: 2.5 Liters at night     Make sure you've had your pneumonia   vaccines.          GREEN ZONE       Doing well today      Usual level of activity and exercise    Usual amount of cough and mucus    No shortness of breath    Usual level of health (thinking clearly, sleeping well, feel like eating) Actions:      Take daily medicines    Use oxygen as prescribed    Follow regular exercise and diet plan    Avoid cigarette smoke and other irritants that harm the lungs           YELLOW ZONE          Having a bad day or flare up      Short of breath more than usual    A lot more sputum (mucus) than usual    Sputum looks yellow, green, tan, brown or bloody    More coughing or wheezing    Fever or chills    Less energy; trouble completing activities    Trouble thinking or focusing    Using quick relief inhaler or nebulizer more often    Poor sleep; symptoms wake me up    Do not feel like eating Actions:      Get  "plenty of rest    Take daily medicines    Use quick relief inhaler every 4 hours    If you use oxygen, call you doctor to see if you should adjust your oxygen    Do breathing exercises or other things to help you relax    Let a loved one, friend or neighbor know you are feeling worse    Call your care team if you have 2 or more symptoms.  Start taking steroids or antibiotics if directed by your care team           RED ZONE       Need medical care now      Severe shortness of breath (feel you can't breathe)    Fever, chills    Not enough breath to do any activity    Trouble coughing up mucus, walking or talking    Blood in mucus    Frequent coughing   Rescue medicines are not working    Not able to sleep because of breathing    Feel confused or drowsy    Chest pain    Actions:      Call your health care team.  If you cannot reach your care team, call 911 or go to the emergency room.        Electronically signed by: Kay Randle, August 23, 2017  Annual Reminders:  Meet with Care Team, Flu Shot every Fall  Pharmacy:    [facility] [address] [place] [address] [address] [address]  AT [facility] [address]  [place] [facility] - [facility] [address] [place] [address]          Follow-ups after your visit        Additional Services     PHYSICAL THERAPY REFERRAL       [facility]  Treatment: Evaluation & Treatment  Special Instructions/Modalities: none  Special Programs: None    Please be aware that coverage of these services is subject to the terms and limitations of your health insurance plan.  Call member services at your health plan with any benefit or coverage questions.      **Note to Provider:  If you are referring outside of [facility] for the therapy appointment, please list the name of the location in the \"special instructions\" above, print the referral and give to the patient to schedule the appointment.                  Your next 10 appointments already scheduled     May 22, 2018 10:30 AM CDT " "  (Arrive by 10:15 AM)   Return Visit with Eliezer Myers MD   [facility] ([facility] )    [place] [address]   [phone]              Who to contact     If you have questions or need follow up information about today's clinic visit or your schedule please contact [facility] directly at [phone].  Normal or non-critical lab and imaging results will be communicated to you by Orexohart, letter or phone within 4 business days after the clinic has received the results. If you do not hear from us within 7 days, please contact the clinic through Orexohart or phone. If you have a critical or abnormal lab result, we will notify you by phone as soon as possible.  Submit refill requests through [email or web address] or call your pharmacy and they will forward the refill request to us. Please allow 3 business days for your refill to be completed.          Additional Information About Your Visit        [email or web address] Information     [email or web address] lets you send messages to your doctor, view your test results, renew your prescriptions, schedule appointments and more. To sign up, go to [email or web address] . Click on \"Log in\" on the left side of the screen, which will take you to the Welcome page. Then click on \"Sign up Now\" on the right side of the page.     You will be asked to enter the access code listed below, as well as some personal information. Please follow the directions to create your username and password.     Your access code is: [email or web address]  Expires: 2017 11:31 AM     Your access code will  in 90 days. If you need help or a new code, please call your [facility] clinic or [phone].        Care EveryWhere ID     This is your Care EveryWhere ID. This could be used by other organizations to access your [facility] medical records  [id number]        Your Vitals Were     Pulse Temperature Respirations Height Pulse Oximetry BMI (Body Mass Index)    63 97.9 " " F (36.6  C) (Tympanic) 18 5' 5\" (1.651 m) 98% 28.62 kg/m2       Blood Pressure from Last 3 Encounters:   08/23/17 116/68   06/15/17 124/64   05/23/17 121/61    Weight from Last 3 Encounters:   08/23/17 172 lb (78 kg)   06/15/17 160 lb (72.6 kg)   05/23/17 160 lb (72.6 kg)              We Performed the Following     ADMIN 1st VACCINE     COPD ACTION PLAN - order for Health Maintenance     PHYSICAL THERAPY REFERRAL     PNEUMOCOCCAL VACCINE,ADULT,SQ OR IM     TSH with free T4 reflex     XR LUMBAR SPINE 2/3 VIEWS (Clinic Performed)          Today's Medication Changes          These changes are accurate as of: 8/23/17 11:31 AM.  If you have any questions, ask your nurse or doctor.               These medicines have changed or have updated prescriptions.        Dose/Directions    cloNIDine 0.1 MG tablet   Commonly known as:  CATAPRES   This may have changed:  Another medication with the same name was removed. Continue taking this medication, and follow the directions you see here.   Used for:  Essential hypertension   Changed by:  Braydon Yen MD        TAKE 1 TABLET BY MOUTH EVERY MORNING AND 3 TABLETS EVERY EVENING   Quantity:  360 tablet   Refills:  0       levothyroxine 100 MCG tablet   Commonly known as:  SYNTHROID/LEVOTHROID   This may have changed:  See the new instructions.   Used for:  Hypothyroidism, postablative   Changed by:  Braydon Yen MD        TAKE 1 TABLET(100 MCG) BY MOUTH DAILY   Quantity:  90 tablet   Refills:  3            Where to get your medicines      [facility] [address] [place] [address] [facility] [address] AT [facility] [address], [place] [address]     Phone:  [phone]     flecainide 100 MG tablet    levothyroxine 100 MCG tablet    simvastatin 10 MG tablet                Primary Care Provider Office Phone # Fax #    Braydon Yen [phone]       [place] [address] [address] [facility] [address]  [facility] [address]   "      Equal Access to Services     [facility]: Hadii aad ku hadirisnael Osminali, waemileda luqadaha, qaybta kabarbmaryam mendoza, sourav holder. So [facility] [phone].    ATENCIÓN: Si habla español, tiene a lindsey disposición servicios gratuitos de asistencia lingüística. Troyame al [phone].    We comply with applicable federal civil rights laws and [place] laws. We do not discriminate on the basis of race, color, national origin, age, disability sex, sexual orientation or gender identity.            Thank you!     Thank you for choosing [facility]  for your care. Our goal is always to provide you with excellent care. Hearing back from our patients is one way we can continue to improve our services. Please take a few minutes to complete the written survey that you may receive in the mail after your visit with us. Thank you!             Your Updated Medication List - Protect others around you: Learn how to safely use, store and throw away your medicines at www.disposemymeds.org.          This list is accurate as of: 8/23/17 11:31 AM.  Always use your most recent med list.                   Brand Name Dispense Instructions for use Diagnosis    acetaminophen 500 MG tablet    TYLENOL     Take 500-1,000 mg by mouth every 6 hours as needed for mild pain        * albuterol (2.5 MG/3ML) 0.083% neb solution     25 vial    Take 1 vial (2.5 mg) by nebulization every 6 hours as needed for shortness of breath / dyspnea or wheezing    COPD exacerbation (H)       * VENTOLIN  (90 BASE) MCG/ACT Inhaler   Generic drug:  albuterol     54 g    INHALE 2 PUFFS INTO THE LUNGS EVERY 4 HOURS AS NEEDED FOR SHORTNESS OF BREATH OR DIFFICULT BREATHING OR WHEEZING    Other emphysema (H)       CALTRATE 600+D3 SOFT PO      Take 600 mg by mouth 2 times daily        cloNIDine 0.1 MG tablet    CATAPRES    360 tablet    TAKE 1 TABLET BY MOUTH EVERY MORNING AND 3 TABLETS EVERY EVENING    Essential  hypertension       diphenhydrAMINE 25 MG tablet    BENADRYL    60 tablet    Take 1-2 tablets (25-50 mg) by mouth every 6 hours as needed for itching or allergies        flecainide 100 MG tablet    TAMBOCOR    180 tablet    Take 1 tablet (100 mg) by mouth 2 times daily    Atrial fibrillation, persistent (H)       fluticasone-salmeterol 115-21 MCG/ACT Inhaler    ADVAIR-HFA    36 g    Inhale 2 puffs into the lungs 2 times daily    COPD exacerbation (H)       furosemide 40 MG tablet    LASIX    180 tablet    TAKE 1 TABLET BY MOUTH TWICE DAILY.    Essential hypertension, benign       hydrALAZINE 25 MG tablet    APRESOLINE    810 tablet    TAKE 3 TABLETS BY MOUTH THREE TIMES DAILY    Essential hypertension, benign       ipratropium - albuterol 0.5 mg/2.5 mg/3 mL 0.5-2.5 (3) MG/3ML neb solution    DUONEB    6 Box    USE 1 VIAL PER NEBULIZER FOUR TIMES DAILY    COPD exacerbation (H)       levothyroxine 100 MCG tablet    SYNTHROID/LEVOTHROID    90 tablet    TAKE 1 TABLET(100 MCG) BY MOUTH DAILY    Hypothyroidism, postablative       LORazepam 1 MG tablet    ATIVAN     Take 0.5-1 tablets by mouth every 6 hours as needed Reported on 5/23/2017        order for DME     1 each    Equipment being ordered: wedge for bed    Orthopnea       other medical supplies     1 each    Apply one pair to help with edema    Edema, Atrial fibrillation, persistent (H)       potassium chloride SA 20 MEQ CR tablet    K-DUR/KLOR-CON M    180 tablet    Take 1 tablet (20 mEq) by mouth 2 times daily    Hypokalemia       predniSONE 20 MG tablet    DELTASONE    20 tablet    Take 3 tabs (60 mg) by mouth daily x 3 days, 2 tabs (40 mg) daily x 3 days, 1 tab (20 mg) daily x 3 days, then 1/2 tab (10 mg) x 3 days.    COPD exacerbation (H)       simvastatin 10 MG tablet    ZOCOR    90 tablet    TAKE 1 TABLET(10 MG) BY MOUTH AT BEDTIME    Hyperlipidemia LDL goal <130       triamcinolone 0.1 % ointment    KENALOG    454 g    Apply bid and hs left hand and legs -  for dermatitis    Contact dermatitis, unspecified contact dermatitis type, unspecified trigger       warfarin 2 MG tablet    COUMADIN    135 tablet    Take 2 mg Mon/Wed/Fri; 3 mg all other days or as directed by [facility]    Persistent atrial fibrillation (H)       * Notice:  This list has 2 medication(s) that are the same as other medications prescribed for you. Read the directions carefully, and ask your doctor or other care provider to review them with you.

## 2017-08-24 ASSESSMENT — ANXIETY QUESTIONNAIRES: GAD7 TOTAL SCORE: 3

## 2017-08-29 ENCOUNTER — TRANSFERRED RECORDS (OUTPATIENT)
Dept: HEALTH INFORMATION MANAGEMENT | Facility: HOSPITAL | Age: 67
End: 2017-08-29

## 2017-09-01 DIAGNOSIS — J43.8 OTHER EMPHYSEMA (H): ICD-10-CM

## 2017-09-01 DIAGNOSIS — J44.1 COPD EXACERBATION (H): ICD-10-CM

## 2017-09-05 RX ORDER — IPRATROPIUM BROMIDE AND ALBUTEROL SULFATE 2.5; .5 MG/3ML; MG/3ML
SOLUTION RESPIRATORY (INHALATION)
Qty: 540 ML | Refills: 1 | Status: SHIPPED | OUTPATIENT
Start: 2017-09-05 | End: 2018-01-25

## 2017-09-05 RX ORDER — ALBUTEROL SULFATE 90 UG/1
AEROSOL, METERED RESPIRATORY (INHALATION)
Qty: 54 G | Refills: 1 | Status: SHIPPED | OUTPATIENT
Start: 2017-09-05 | End: 2017-12-12

## 2017-09-05 NOTE — TELEPHONE ENCOUNTER
ipratropium - albuterol 0.5 mg/2.5 mg/3 mL (DUONEB) 0.5-2.5 (3) MG/3ML neb solution     Last Written Prescription Date: 12/15/16  Last Fill Quantity: 540ml, # refills: 0    Last Office Visit with [facility] or [facility] prescribing provider:  8/23/17   Future Office Visit:       Date of Last Asthma Action Plan Letter:   There are no preventive care reminders to display for this patient.   Asthma Control Test:   ACT Total Scores 12/10/2013   ACT TOTAL SCORE 9   ASTHMA ER VISITS 0 = None   ASTHMA HOSPITALIZATIONS 0 = None       Date of Last Spirometry Test:   No results found for this or any previous visit.          VENTOLIN  (90 BASE) MCG/ACT Inhaler  Last Written Prescription Date:  6/7/17  Last Fill Quantity: 54g,   # refills: 0  Last Office Visit with [facility] or [facility] prescribing provider: 8/23/17  Future Office visit:       Routing refill request to provider for review/approval because:  Drug not on the [facility] or [facility] refill protocol or controlled substance    [

## 2017-09-14 ENCOUNTER — ANTICOAGULATION THERAPY VISIT (OUTPATIENT)
Dept: ANTICOAGULATION | Facility: OTHER | Age: 67
End: 2017-09-14

## 2017-09-14 DIAGNOSIS — I48.19 ATRIAL FIBRILLATION, PERSISTENT (H): ICD-10-CM

## 2017-09-14 DIAGNOSIS — Z79.01 LONG-TERM (CURRENT) USE OF ANTICOAGULANTS: ICD-10-CM

## 2017-09-14 LAB — INR BLD: 2.8 (ref 0.86–1.14)

## 2017-09-14 PROCEDURE — 85610 PROTHROMBIN TIME: CPT | Mod: QW,ZL | Performed by: FAMILY MEDICINE

## 2017-09-14 PROCEDURE — 36416 COLLJ CAPILLARY BLOOD SPEC: CPT | Mod: ZL | Performed by: FAMILY MEDICINE

## 2017-09-14 NOTE — PROGRESS NOTES
ANTICOAGULATION FOLLOW-UP CLINIC VISIT    Patient Name:  Mary Alice Cameron  Date:  9/14/2017  Contact Type:  Telephone    SUBJECTIVE:     Patient Findings     Positives No Problem Findings    Comments Call placed to patient listed phone number and spoke to patient re: INR result, warfarin dosing and INR recheck date. She verbalized understanding of instructions and has no questions.            OBJECTIVE    INR Point of Care   Date Value Ref Range Status   09/14/2017 2.8 (H) 0.86 - 1.14 Final     Comment:     This test is intended for monitoring Coumadin therapy.  Results are not   accurate in patients with prolonged INR due to factor deficiency.         ASSESSMENT / PLAN  INR assessment THER    Recheck INR In: 6 WEEKS    INR Location Clinic      Anticoagulation Summary as of 9/14/2017     INR goal 2.0-3.0   Today's INR 2.8   Maintenance plan 2 mg (2 mg x 1) on Mon, Wed, Fri; 3 mg (2 mg x 1.5) all other days   Full instructions 2 mg on Mon, Wed, Fri; 3 mg all other days   Weekly total 18 mg   No change documented Kenna Kiran RN   Plan last modified Kenna Storm RN (3/1/2017)   Next INR check 10/26/2017   Priority INR   Target end date Indefinite    Indications   Atrial fibrillation  persistent (H) [I48.1]  Long-term (current) use of anticoagulants [Z79.01] [Z79.01]         Anticoagulation Episode Summary     INR check location     Preferred lab     Send INR reminders to  ANTICOAG POOL    Comments       Anticoagulation Care Providers     Provider Role Specialty Phone number    Braydon Yen MD [facility] [phone]            See the Encounter Report to view Anticoagulation Flowsheet and Dosing Calendar (Go to Encounters tab in chart review, and find the Anticoagulation Therapy Visit)        Kenna Kiran RN

## 2017-09-14 NOTE — MR AVS SNAPSHOT
Mary Alice Cameron   9/14/2017   Anticoagulation Therapy Visit    Description:  67 year old female   Provider:  Braydon Yen MD   Department:  [facility] Anti Coagulation           INR as of 9/14/2017     Today's INR 2.8      Anticoagulation Summary as of 9/14/2017     INR goal 2.0-3.0   Today's INR 2.8   Full instructions 2 mg on Mon, Wed, Fri; 3 mg all other days   Next INR check 10/26/2017    Indications   Atrial fibrillation  persistent (H) [I48.1]  Long-term (current) use of anticoagulants [Z79.01] [Z79.01]         September 2017 Details    Sun Mon Tue Wed Thu Fri Sat          1               2                 3               4               5               6               7               8               9                 10               11               12               13               14      3 mg   See details      15      2 mg         16      3 mg           17      3 mg         18      2 mg         19      3 mg         20      2 mg         21      3 mg         22      2 mg         23      3 mg           24      3 mg         25      2 mg         26      3 mg         27      2 mg         28      3 mg         29      2 mg         30      3 mg          Date Details   09/14 This INR check               How to take your warfarin dose     To take:  2 mg Take 1 of the 2 mg tablets.    To take:  3 mg Take 1.5 of the 2 mg tablets.           October 2017 Details    Sun Mon Tue Wed Thu Fri Sat     1      3 mg         2      2 mg         3      3 mg         4      2 mg         5      3 mg         6      2 mg         7      3 mg           8      3 mg         9      2 mg         10      3 mg         11      2 mg         12      3 mg         13      2 mg         14      3 mg           15      3 mg         16      2 mg         17      3 mg         18      2 mg         19      3 mg         20      2 mg         21      3 mg           22      3 mg         23      2 mg         24      3 mg         25      2 mg         26             27               28                 29               30               31                    Date Details   No additional details    Date of next INR:  10/26/2017         How to take your warfarin dose     To take:  2 mg Take 1 of the 2 mg tablets.    To take:  3 mg Take 1.5 of the 2 mg tablets.

## 2017-09-17 DIAGNOSIS — I10 ESSENTIAL HYPERTENSION, BENIGN: ICD-10-CM

## 2017-09-18 RX ORDER — FUROSEMIDE 40 MG
TABLET ORAL
Qty: 180 TABLET | Refills: 2 | Status: SHIPPED | OUTPATIENT
Start: 2017-09-18 | End: 2018-07-13

## 2017-09-27 ENCOUNTER — CARE COORDINATION (OUTPATIENT)
Dept: CARE COORDINATION | Facility: OTHER | Age: 67
End: 2017-09-27

## 2017-09-27 NOTE — PROGRESS NOTES
Clinic Care Coordination Contact  [facility]/Voicemail       Clinical Data: Care Coordinator Outreach  Outreach attempted x 1.  Left message on voicemail with call back information and requested return call.  Plan: Care Coordinator . Care Coordinator will try to call back in 3-5 business days.      STEPHANIE Valdez  CHF and General Care Coordinator

## 2017-10-04 ENCOUNTER — TRANSFERRED RECORDS (OUTPATIENT)
Dept: HEALTH INFORMATION MANAGEMENT | Facility: HOSPITAL | Age: 67
End: 2017-10-04

## 2017-10-11 ENCOUNTER — CARE COORDINATION (OUTPATIENT)
Dept: CARE COORDINATION | Facility: OTHER | Age: 67
End: 2017-10-11

## 2017-10-11 ENCOUNTER — TELEPHONE (OUTPATIENT)
Dept: FAMILY MEDICINE | Facility: OTHER | Age: 67
End: 2017-10-11

## 2017-10-11 NOTE — PROGRESS NOTES
Clinic Care Coordination Contact  OUTREACH    Referral Information:  Referral Source: Care Team  Reason for Contact: Patient called to report on set on new symptoms that started on Saturday 10/7/17. She woke to have a headache, took Tylenol 1000 mg right away, which didn't relieve headache. She laid down in the afternoon to see if that would help (getting rest). When she got up she was sweating, clammy, SOB, no energy nausea, and palpitations. The palpations are a new symptom that she had never experienced before. She denies dizziness, vomiting, LOC, diarrhea. Symptoms had subsided later that day however patient did mention that she thinks that her COPD is getting worse from last office visit on 8/23/17. Increased SOB with activity and stated that she wants to use her rescue medications more than prescribed.         Universal Utilization:   ED Visits in last year: 3  Hospital visits in last year: 2  Last PCP appointment: 03/30/17  Missed Appointments: 0     Multiple Providers or Specialists: Cardiology; Care Coordination, PCP, Anticoagulation, Dermatology (Pulmonology)    Clinical Concerns:  Current Medical Concerns: COPD, SOB, Weight gain of # 10 in one month, swelling in lower extremities bilaterally and abdomen.     Current Behavioral Concerns: Denies    Education Provided to patient: elevate extremities, take medications as prescribed, deep breathing exercises   Clinical Pathway Name: COPD  Clinical Pathway: Clinic Care Coordination COPD Follow up Assessment  Symptom Review:    Are you having any increased shortness of breath? Yes- breathing has gotten worse in the last month.   When you cough are you coughing up anything? No  Color N/A  Consistency N/A  Frequency N/A  What COPD Zone currently in:Yellow-SOB,less energy,using inhalers.  What number would you call if you were in the YELLOW zones: CC read back number.      Medications:   Were you started on any new medications since the last time we talked?  No  Do  "you have all of your medications? Yes, No refill needed  Have you had trouble filling your prescriptions? No  Are you medications effective in controlling your symptoms? Well, she feel like sometime they are or sometimes they aren't and she stated that she feels worse with her COPD.   Are you still on Prednisone (* does pt understand the tapering instructions)?  Standing order at [facility]  For patients with DM:     Are you monitoring your blood sugars, if so how are your blood sugars? N/A  How often have you had to use your rescue medications? \"All depend on the day, tries not to use them only when you should\" Recently feels like she needs to use them more often than prescribed.     Oxygen/DME  What is the current liter flow you are using? 2.5 L NC- at night. 95 - 96 % O2   Has there been any changes? No changes      Activity:  How much activity can you do before you are SOB? I can do a little of vacuuming then have to stop and take a break for about 10 minutes before resuming her activity.   Have you had to reduce your activities because of dyspnea or other symptoms? No \"i try not to\"    Diet:  Do you weigh yourself daily? Yes    Has there been a recent change in your weight? Yes, # 173. This is a 10 pound weight increase in a month.      Emotions/Lifestyle:    Do you smoke (if not asked upon initial assessment)? No  If so, how many cigarettes a day are you smoking? N/A  Would you like to try to quit smoking? N/A    Follow Up Plan:  Care Coordinator follow up plan: follow up with patient after appointment with PCP.   Referrals to consider: none at this time.           Medication Management:  Patient verbalized med list is up to date. No refills at this time.     Functional Status:  Mobility Status: Independent  Equipment Currently Used at Home: cane, straight, grab bar, tub bench, walker, rolling  Transportation: \"I drive myself\"           Psychosocial:  Current living arrangement:: I live in a private " home  Financial/Insurance: Denies concerns at this time       Resources and Interventions:  Current Resources:  ;       Senior Linkage Line Referral Placed: 01/18/17  Advanced Care Plans/Directives on file:: Yes  Referrals Placed: Other      Goals:   Goal 1 Statement: to really stay out of hospital and find out what is going on with this wilakhwinderht gain.              Barriers: Her COPD and increased SOB   Strengths: proactive of health care and has a good support of friends.   Patient/Caregiver understanding: Patient stated that she understands plan of care.   Frequency of Care Coordination: 4-6 wks  Upcoming appointment: 05/22/18 (Dr. Myers)     Plan: Follow up after office visit with PCP for increased weight gain and SOB. Patient verbalized that she will go to UC/ER or call 911 if increased SOB becomes intolerable. Schedulers, scheduled this patient for the 24 th. I called the scheduling department, talked with Yumiko, who was very helpful, and put in an overbook for this week. Patient was called and updated that the clinic will be calling back with the date and time of when PCP can get her in.     STEPHANIE Valdez-RN  CHF and General Care Coordinator  [phone] Option # 1         (0) understands/communicates without difficulty

## 2017-10-11 NOTE — TELEPHONE ENCOUNTER
12:39 PM    Reason for Call: OVERBOOK    Patient is having the following symptoms Pt has an appointment on (10/24/17) and she wants to be seen sooner than this..    The patient is requesting an appointment for (10/12/17) with .    Was an appointment offered for this call? No  If yes : Appointment type              Date    Preferred method for responding to this message: Telephone Call  What is your phone number ?    If we cannot reach you directly, may we leave a detailed response at the number you provided? Yes    Can this message wait until your PCP/provider returns, if unavailable today? Not applicable,     Ade Wright

## 2017-10-11 NOTE — TELEPHONE ENCOUNTER
I called the pt she is having COPD issues and needs to be seen tomorrow at 3:15 pm, arriving at 3 pm. Please schedule her she is aware.

## 2017-10-12 ENCOUNTER — OFFICE VISIT (OUTPATIENT)
Dept: FAMILY MEDICINE | Facility: OTHER | Age: 67
End: 2017-10-12
Attending: FAMILY MEDICINE
Payer: MEDICARE

## 2017-10-12 ENCOUNTER — RADIANT APPOINTMENT (OUTPATIENT)
Dept: GENERAL RADIOLOGY | Facility: OTHER | Age: 67
End: 2017-10-12
Attending: FAMILY MEDICINE
Payer: MEDICARE

## 2017-10-12 VITALS
DIASTOLIC BLOOD PRESSURE: 68 MMHG | SYSTOLIC BLOOD PRESSURE: 134 MMHG | RESPIRATION RATE: 20 BRPM | OXYGEN SATURATION: 97 % | BODY MASS INDEX: 29.49 KG/M2 | HEART RATE: 63 BPM | TEMPERATURE: 98.1 F | WEIGHT: 177 LBS | HEIGHT: 65 IN

## 2017-10-12 DIAGNOSIS — E78.5 HYPERLIPIDEMIA LDL GOAL <130: ICD-10-CM

## 2017-10-12 DIAGNOSIS — R06.02 SOB (SHORTNESS OF BREATH): ICD-10-CM

## 2017-10-12 DIAGNOSIS — R63.5 WEIGHT GAIN: ICD-10-CM

## 2017-10-12 DIAGNOSIS — B34.9 VIRAL SYNDROME: Primary | ICD-10-CM

## 2017-10-12 DIAGNOSIS — Z23 NEED FOR PROPHYLACTIC VACCINATION AND INOCULATION AGAINST INFLUENZA: ICD-10-CM

## 2017-10-12 LAB
BASOPHILS # BLD AUTO: 0 10E9/L (ref 0–0.2)
BASOPHILS NFR BLD AUTO: 0.3 %
DIFFERENTIAL METHOD BLD: NORMAL
EOSINOPHIL # BLD AUTO: 0.1 10E9/L (ref 0–0.7)
EOSINOPHIL NFR BLD AUTO: 1.2 %
ERYTHROCYTE [DISTWIDTH] IN BLOOD BY AUTOMATED COUNT: 12.9 % (ref 10–15)
HCT VFR BLD AUTO: 38.8 % (ref 35–47)
HGB BLD-MCNC: 12.7 G/DL (ref 11.7–15.7)
LYMPHOCYTES # BLD AUTO: 0.9 10E9/L (ref 0.8–5.3)
LYMPHOCYTES NFR BLD AUTO: 15.8 %
MCH RBC QN AUTO: 32.4 PG (ref 26.5–33)
MCHC RBC AUTO-ENTMCNC: 32.7 G/DL (ref 31.5–36.5)
MCV RBC AUTO: 99 FL (ref 78–100)
MONOCYTES # BLD AUTO: 0.6 10E9/L (ref 0–1.3)
MONOCYTES NFR BLD AUTO: 10.9 %
NEUTROPHILS # BLD AUTO: 4.2 10E9/L (ref 1.6–8.3)
NEUTROPHILS NFR BLD AUTO: 71.8 %
PLATELET # BLD AUTO: 181 10E9/L (ref 150–450)
RBC # BLD AUTO: 3.92 10E12/L (ref 3.8–5.2)
WBC # BLD AUTO: 5.9 10E9/L (ref 4–11)

## 2017-10-12 PROCEDURE — 99214 OFFICE O/P EST MOD 30 MIN: CPT | Performed by: FAMILY MEDICINE

## 2017-10-12 PROCEDURE — 99212 OFFICE O/P EST SF 10 MIN: CPT | Mod: 25

## 2017-10-12 PROCEDURE — 80053 COMPREHEN METABOLIC PANEL: CPT | Mod: ZL | Performed by: FAMILY MEDICINE

## 2017-10-12 PROCEDURE — 90662 IIV NO PRSV INCREASED AG IM: CPT | Performed by: FAMILY MEDICINE

## 2017-10-12 PROCEDURE — 84443 ASSAY THYROID STIM HORMONE: CPT | Mod: ZL | Performed by: FAMILY MEDICINE

## 2017-10-12 PROCEDURE — 85025 COMPLETE CBC W/AUTO DIFF WBC: CPT | Mod: ZL | Performed by: FAMILY MEDICINE

## 2017-10-12 PROCEDURE — 71020 XR CHEST 2 VW: CPT | Mod: TC

## 2017-10-12 PROCEDURE — 90471 IMMUNIZATION ADMIN: CPT | Performed by: FAMILY MEDICINE

## 2017-10-12 PROCEDURE — 36415 COLL VENOUS BLD VENIPUNCTURE: CPT | Mod: ZL | Performed by: FAMILY MEDICINE

## 2017-10-12 RX ORDER — SIMVASTATIN 10 MG
TABLET ORAL
Qty: 90 TABLET | Refills: 3 | Status: SHIPPED | OUTPATIENT
Start: 2017-10-12 | End: 2018-01-11

## 2017-10-12 ASSESSMENT — PATIENT HEALTH QUESTIONNAIRE - PHQ9
SUM OF ALL RESPONSES TO PHQ QUESTIONS 1-9: 8
5. POOR APPETITE OR OVEREATING: MORE THAN HALF THE DAYS

## 2017-10-12 ASSESSMENT — ANXIETY QUESTIONNAIRES
7. FEELING AFRAID AS IF SOMETHING AWFUL MIGHT HAPPEN: NOT AT ALL
6. BECOMING EASILY ANNOYED OR IRRITABLE: NOT AT ALL
5. BEING SO RESTLESS THAT IT IS HARD TO SIT STILL: SEVERAL DAYS
2. NOT BEING ABLE TO STOP OR CONTROL WORRYING: MORE THAN HALF THE DAYS
3. WORRYING TOO MUCH ABOUT DIFFERENT THINGS: MORE THAN HALF THE DAYS
IF YOU CHECKED OFF ANY PROBLEMS ON THIS QUESTIONNAIRE, HOW DIFFICULT HAVE THESE PROBLEMS MADE IT FOR YOU TO DO YOUR WORK, TAKE CARE OF THINGS AT HOME, OR GET ALONG WITH OTHER PEOPLE: SOMEWHAT DIFFICULT
GAD7 TOTAL SCORE: 7
1. FEELING NERVOUS, ANXIOUS, OR ON EDGE: NOT AT ALL

## 2017-10-12 ASSESSMENT — PAIN SCALES - GENERAL: PAINLEVEL: NO PAIN (0)

## 2017-10-12 NOTE — NURSING NOTE
"Chief Complaint   Patient presents with     COPD     Pt is having increased SOB which worsened 10/07/17. Pt has swelling in her lower legs. Pt does not have an increased cough and no sputum. Pt has fatigue. Pt wears O2 at HS only.     Weight Problem     Pt has had a 13 lb wt gain in the last month. Pt has abd bloating.       Initial /68 (BP Location: Right arm, Patient Position: Chair, Cuff Size: Adult Regular)  Pulse 63  Temp 98.1  F (36.7  C) (Tympanic)  Resp 20  Ht 5' 5\" (1.651 m)  Wt 177 lb (80.3 kg)  SpO2 97%  BMI 29.45 kg/m2 Estimated body mass index is 29.45 kg/(m^2) as calculated from the following:    Height as of this encounter: 5' 5\" (1.651 m).    Weight as of this encounter: 177 lb (80.3 kg).  Medication Reconciliation: complete   Talia Randle    "

## 2017-10-12 NOTE — MR AVS SNAPSHOT
"              After Visit Summary   10/12/2017    Mary Alice Cameron    MRN: [id number]           Patient Information     Date Of Birth          1950        Visit Information        Provider Department      10/12/2017 3:15 PM Braydon Yen MD [facility]        Today's Diagnoses     Viral syndrome    -  1    Hyperlipidemia LDL goal <130        SOB (shortness of breath)        Weight gain        Need for prophylactic vaccination and inoculation against influenza          Care Instructions    F/u with ongoing concerns          Follow-ups after your visit        Your next 10 appointments already scheduled     May 22, 2018 10:30 AM CDT   (Arrive by 10:15 AM)   Return Visit with Eliezer Myers MD   [facility] [place] ([facility] [place] )    [address]  [place] [address]   [phone]              Who to contact     If you have questions or need follow up information about today's clinic visit or your schedule please contact [facility] directly at [phone].  Normal or non-critical lab and imaging results will be communicated to you by [facility] [employer], letter or phone within 4 business days after the clinic has received the results. If you do not hear from us within 7 days, please contact the clinic through [employer] or phone. If you have a critical or abnormal lab result, we will notify you by phone as soon as possible.  Submit refill requests through [facility] or call your pharmacy and they will forward the refill request to us. Please allow 3 business days for your refill to be completed.          Additional Information About Your Visit        MyChart Information     [employer] lets you send messages to your doctor, view your test results, renew your prescriptions, schedule appointments and more. To sign up, go to [email or web address] . Click on \"Log in\" on the left side of the screen, which will take you to the Welcome page. Then click on \"Sign up Now\" on the " "right side of the page.     You will be asked to enter the access code listed below, as well as some personal information. Please follow the directions to create your username and password.     Your access code is: [email or web address]  Expires: 2017 11:31 AM     Your access code will  in 90 days. If you need help or a new code, please call your [facility] or [phone].        Care EveryWhere ID     This is your Care EveryWhere ID. This could be used by other organizations to access your [facility] medical records  [id number]        Your Vitals Were     Pulse Temperature Respirations Height Pulse Oximetry BMI (Body Mass Index)    63 98.1  F (36.7  C) (Tympanic) 20 5' 5\" (1.651 m) 97% 29.45 kg/m2       Blood Pressure from Last 3 Encounters:   10/12/17 134/68   17 116/68   06/15/17 124/64    Weight from Last 3 Encounters:   10/12/17 177 lb (80.3 kg)   17 172 lb (78 kg)   06/15/17 160 lb (72.6 kg)              We Performed the Following     CBC with platelets and differential     Comprehensive metabolic panel (BMP + Alb, Alk Phos, ALT, AST, Total. Bili, TP)     FLU VACCINE, INCREASED ANTIGEN, PRESV FREE, AGE 65+ [40406]     TSH with free T4 reflex     Vaccine Administration, Initial [27247]          Where to get your medicines      These medications were sent to [id number] - [place], [place] - [address] AT [facility] [address], [place] [address]     Phone:  [phone]     simvastatin 10 MG tablet          Primary Care Provider Office Phone # Fax #    Braydon Yen [phone]       [facility] [address] [facility] [address]  [facility] [address]        Equal Access to Services     [facility] : Triin Garcia, waaxda luqadaha, qaybta kaalmisha mendoza, sourav montgomery . [facility] [phone].    ATENCIÓN: Si habla español, tiene a lindsey disposición servicios gratuitos de asistencia lingüística. Llame al " [phone].    We comply with applicable federal civil rights laws and [place] laws. We do not discriminate on the basis of race, color, national origin, age, disability, sex, sexual orientation, or gender identity.            Thank you!     Thank you for choosing [facility]  for your care. Our goal is always to provide you with excellent care. Hearing back from our patients is one way we can continue to improve our services. Please take a few minutes to complete the written survey that you may receive in the mail after your visit with us. Thank you!             Your Updated Medication List - Protect others around you: Learn how to safely use, store and throw away your medicines at www.disposemymeds.org.          This list is accurate as of: 10/12/17  4:35 PM.  Always use your most recent med list.                   Brand Name Dispense Instructions for use Diagnosis    acetaminophen 500 MG tablet    TYLENOL     Take 500-1,000 mg by mouth every 6 hours as needed for mild pain        * albuterol (2.5 MG/3ML) 0.083% neb solution     25 vial    Take 1 vial (2.5 mg) by nebulization every 6 hours as needed for shortness of breath / dyspnea or wheezing    COPD exacerbation (H)       * VENTOLIN  (90 BASE) MCG/ACT Inhaler   Generic drug:  albuterol     54 g    INHALE 2 PUFFS INTO THE LUNGS EVERY 4 HOURS AS NEEDED FOR SHORTNESS OF BREATH OR DIFFICULT BREATHING OR WHEEZING    Other emphysema (H)       CALTRATE 600+D3 SOFT PO      Take 600 mg by mouth 2 times daily        cloNIDine 0.1 MG tablet    CATAPRES    360 tablet    TAKE 1 TABLET BY MOUTH EVERY MORNING AND 3 TABLETS EVERY EVENING    Essential hypertension       diphenhydrAMINE 25 MG tablet    BENADRYL    60 tablet    Take 1-2 tablets (25-50 mg) by mouth every 6 hours as needed for itching or allergies        flecainide 100 MG tablet    TAMBOCOR    180 tablet    Take 1 tablet (100 mg) by mouth 2 times daily    Atrial fibrillation, persistent (H)        fluticasone-salmeterol 115-21 MCG/ACT Inhaler    ADVAIR-HFA    36 g    Inhale 2 puffs into the lungs 2 times daily    COPD exacerbation (H)       furosemide 40 MG tablet    LASIX    180 tablet    TAKE 1 TABLET BY MOUTH TWICE DAILY.    Essential hypertension, benign       hydrALAZINE 25 MG tablet    APRESOLINE    810 tablet    TAKE 3 TABLETS BY MOUTH THREE TIMES DAILY    Essential hypertension, benign       ipratropium - albuterol 0.5 mg/2.5 mg/3 mL 0.5-2.5 (3) MG/3ML neb solution    DUONEB    540 mL    USE 1 VIAL PER NEBULIZER FOUR TIMES DAILY    COPD exacerbation (H)       levothyroxine 100 MCG tablet    SYNTHROID/LEVOTHROID    90 tablet    TAKE 1 TABLET(100 MCG) BY MOUTH DAILY    Hypothyroidism, postablative       LORazepam 1 MG tablet    ATIVAN     Take 0.5-1 tablets by mouth every 6 hours as needed Reported on 5/23/2017        order for DME     1 each    Equipment being ordered: wedge for bed    Orthopnea       other medical supplies     1 each    Apply one pair to help with edema    Edema, Atrial fibrillation, persistent (H)       potassium chloride SA 20 MEQ CR tablet    K-DUR/KLOR-CON M    180 tablet    Take 1 tablet (20 mEq) by mouth 2 times daily    Hypokalemia       predniSONE 20 MG tablet    DELTASONE    20 tablet    Take 3 tabs (60 mg) by mouth daily x 3 days, 2 tabs (40 mg) daily x 3 days, 1 tab (20 mg) daily x 3 days, then 1/2 tab (10 mg) x 3 days.    COPD exacerbation (H)       simvastatin 10 MG tablet    ZOCOR    90 tablet    TAKE 1 TABLET(10 MG) BY MOUTH AT BEDTIME    Hyperlipidemia LDL goal <130       triamcinolone 0.1 % ointment    KENALOG    454 g    Apply bid and hs left hand and legs - for dermatitis    Contact dermatitis, unspecified contact dermatitis type, unspecified trigger       warfarin 2 MG tablet    COUMADIN    135 tablet    Take 2 mg Mon/Wed/Fri; 3 mg all other days or as directed by [facility]    Persistent atrial fibrillation (H)       * Notice:  This list has 2  medication(s) that are the same as other medications prescribed for you. Read the directions carefully, and ask your doctor or other care provider to review them with you.

## 2017-10-12 NOTE — PROGRESS NOTES
Mary Alice Cameron    October 12, 2017    Chief Complaint   Patient presents with     COPD     Pt is having increased SOB which worsened 10/07/17. Pt has swelling in her lower legs. Pt does not have an increased cough and no sputum. Pt has fatigue. Pt wears O2 at HS only.     Weight Problem     Pt has had a 13 lb wt gain in the last month. Pt has abd bloating.       SUBJECTIVE:  Here for increased sob.  It happened on the weekend.  Saturday and Sunday sx were present, and then they improved.  No cough.  Some GI upset is ongoing with early satiety, which just started on Saturday.  We discussed at some length.  Overall she is feeling ok but just worried about the eating.  She reports gaining 13 pounds in a month.  We discussed.     Past Medical History:   Diagnosis Date     Asthma      Atrial fibrillation (H)      COPD (chronic obstructive pulmonary disease) (H)      Depression      High cholesterol      HTN (hypertension)      Thyroid disease        Past Surgical History:   Procedure Laterality Date     BACK SURGERY  2006     COLONOSCOPY N/A 1/19/2016    Procedure: COLONOSCOPY;  Surgeon: Waldemar Bob MD;  Location: [facility]     HYSTERECTOMY  1980    partial     SLING BLADDER SUSPENSION WITH FASCIA LINNETTE         Current Outpatient Prescriptions   Medication Sig Dispense Refill     simvastatin (ZOCOR) 10 MG tablet TAKE 1 TABLET(10 MG) BY MOUTH AT BEDTIME 90 tablet 3     furosemide (LASIX) 40 MG tablet TAKE 1 TABLET BY MOUTH TWICE DAILY. 180 tablet 2     ipratropium - albuterol 0.5 mg/2.5 mg/3 mL (DUONEB) 0.5-2.5 (3) MG/3ML neb solution USE 1 VIAL PER NEBULIZER FOUR TIMES DAILY 540 mL 1     VENTOLIN  (90 BASE) MCG/ACT Inhaler INHALE 2 PUFFS INTO THE LUNGS EVERY 4 HOURS AS NEEDED FOR SHORTNESS OF BREATH OR DIFFICULT BREATHING OR WHEEZING 54 g 1     Calcium Carb-Cholecalciferol (CALTRATE 600+D3 SOFT PO) Take 600 mg by mouth 2 times daily       flecainide (TAMBOCOR) 100 MG tablet Take 1 tablet (100 mg) by mouth 2 times  daily 180 tablet 3     levothyroxine (SYNTHROID/LEVOTHROID) 100 MCG tablet TAKE 1 TABLET(100 MCG) BY MOUTH DAILY 90 tablet 3     predniSONE (DELTASONE) 20 MG tablet Take 3 tabs (60 mg) by mouth daily x 3 days, 2 tabs (40 mg) daily x 3 days, 1 tab (20 mg) daily x 3 days, then 1/2 tab (10 mg) x 3 days. 20 tablet 3     fluticasone-salmeterol (ADVAIR-HFA) 115-21 MCG/ACT Inhaler Inhale 2 puffs into the lungs 2 times daily 36 g 1     warfarin (COUMADIN) 2 MG tablet Take 2 mg Mon/Wed/Fri; 3 mg all other days or as directed by [facility] 135 tablet 3     order for DME Equipment being ordered: wedge for bed 1 each 0     cloNIDine (CATAPRES) 0.1 MG tablet TAKE 1 TABLET BY MOUTH EVERY MORNING AND 3 TABLETS EVERY EVENING 360 tablet 0     hydrALAZINE (APRESOLINE) 25 MG tablet TAKE 3 TABLETS BY MOUTH THREE TIMES DAILY 810 tablet 3     albuterol (2.5 MG/3ML) 0.083% neb solution Take 1 vial (2.5 mg) by nebulization every 6 hours as needed for shortness of breath / dyspnea or wheezing 25 vial 1     triamcinolone (KENALOG) 0.1 % ointment Apply bid and hs left hand and legs - for dermatitis 454 g 3     potassium chloride SA (K-DUR,KLOR-CON M) 20 MEQ tablet Take 1 tablet (20 mEq) by mouth 2 times daily 180 tablet 3     diphenhydrAMINE (BENADRYL) 25 MG tablet Take 1-2 tablets (25-50 mg) by mouth every 6 hours as needed for itching or allergies 60 tablet 1     acetaminophen (TYLENOL) 500 MG tablet Take 500-1,000 mg by mouth every 6 hours as needed for mild pain       OTHER MEDICAL SUPPLIES Apply one pair to help with edema 1 each 0     LORazepam (ATIVAN) 1 MG tablet Take 0.5-1 tablets by mouth every 6 hours as needed Reported on 5/23/2017       [DISCONTINUED] simvastatin (ZOCOR) 10 MG tablet TAKE 1 TABLET(10 MG) BY MOUTH AT BEDTIME 90 tablet 3       Allergies   Allergen Reactions     Amlodipine Besylate Cough     Norvasc     Lisinopril Cough       Family History   Problem Relation Age of Onset     Prostate Cancer Father       Hypertension Father      Heart Failure Father      CHF     Asthma Mother      CANCER Mother      ovarian     Hypertension Mother      Asthma Brother      Hypertension Sister      Hypertension Brother        Social History     Social History     Marital status:      Spouse name: N/A     Number of children: N/A     Years of education: N/A     Occupational History      Retired     disabled     Social History Main Topics     Smoking status: Former Smoker     Years: 48.00     Types: Cigarettes     Quit date: 1/28/2007     Smokeless tobacco: Never Used     Alcohol use No     Drug use: No     Sexual activity: No      Comment:      Other Topics Concern      Service No     Blood Transfusions Yes     Permits if needed     Caffeine Concern Yes     2 cups coffee daily     Seat Belt Yes     Parent/Sibling W/ Cabg, Mi Or Angioplasty Before 65f 55m? No     Social History Narrative       5 point ROS negative except as noted above in HPI, including Gen., Resp., CV, GI &  system review.     OBJECTIVE:  B/P: 134/68, T: 98.1, P: 63, R: 20    GENERAL APPEARANCE: Alert, no acute distress  CV: regular rate and rhythm, no murmur, rub or gallop  RESP: lungs clear to auscultation bilaterally with decreased breath sounds throughout.   ABDOMEN: normal bowel sounds, soft, nontender, no hepatosplenomegaly or other masses.  No fluid wave.  Doubt ascites.    SKIN: no suspicious lesions or rashes to visualized skin  Ext:  Trace to 1+ LE edema bilaterally.    NEURO: Alert, oriented x 3, speech and mentation normal    ASSESSMENT and PLAN:  (B34.9) Viral syndrome  (primary encounter diagnosis)  Comment: likely given hx.   Plan: reviewed.  I think this set things off.  I couldn't find anything else going on at this point.  I didn't change the heart medds.   (E78.5) Hyperlipidemia LDL goal <130  Comment: history of.   Plan: simvastatin (ZOCOR) 10 MG tablet, Comprehensive        metabolic panel (BMP + Alb, Alk Phos, ALT, AST,         Total. Bili, TP), TSH with free T4 reflex, CBC         with platelets and differential, XR CHEST 2 VW         (Clinic Performed)        Refilled.      (R06.02) SOB (shortness of breath)  Comment: copd and heart considered.   Plan: Comprehensive metabolic panel (BMP + Alb, Alk         Phos, ALT, AST, Total. Bili, TP), TSH with free        T4 reflex, CBC with platelets and differential,        XR CHEST 2 VW (Clinic Performed)        Both seem stable right now.  Reassuring workup today.        (R63.5) Weight gain  Comment: as above.   Plan: as above.  No clear cause.  cmp and tsh pending at this time, but will be back tomorrow.  Otherwise observe sx.        Injectable Influenza Immunization Documentation    1.  Is the person to be vaccinated sick today?   No    2. Does the person to be vaccinated have an allergy to a component   of the vaccine?   No    3. Has the person to be vaccinated ever had a serious reaction   to influenza vaccine in the past?   No    4. Has the person to be vaccinated ever had Guillain-Barré syndrome?   No    Form completed by SERGEI EVANGELISTA

## 2017-10-13 LAB
ALBUMIN SERPL-MCNC: 3.6 G/DL (ref 3.4–5)
ALP SERPL-CCNC: 31 U/L (ref 40–150)
ALT SERPL W P-5'-P-CCNC: 20 U/L (ref 0–50)
ANION GAP SERPL CALCULATED.3IONS-SCNC: 8 MMOL/L (ref 3–14)
AST SERPL W P-5'-P-CCNC: 20 U/L (ref 0–45)
BILIRUB SERPL-MCNC: 0.4 MG/DL (ref 0.2–1.3)
BUN SERPL-MCNC: 14 MG/DL (ref 7–30)
CALCIUM SERPL-MCNC: 8.7 MG/DL (ref 8.5–10.1)
CHLORIDE SERPL-SCNC: 101 MMOL/L (ref 94–109)
CO2 SERPL-SCNC: 30 MMOL/L (ref 20–32)
CREAT SERPL-MCNC: 0.87 MG/DL (ref 0.52–1.04)
GFR SERPL CREATININE-BSD FRML MDRD: 65 ML/MIN/1.7M2
GLUCOSE SERPL-MCNC: 103 MG/DL (ref 70–99)
POTASSIUM SERPL-SCNC: 4 MMOL/L (ref 3.4–5.3)
PROT SERPL-MCNC: 6.8 G/DL (ref 6.8–8.8)
SODIUM SERPL-SCNC: 139 MMOL/L (ref 133–144)
TSH SERPL DL<=0.005 MIU/L-ACNC: 0.82 MU/L (ref 0.4–4)

## 2017-10-13 ASSESSMENT — ANXIETY QUESTIONNAIRES: GAD7 TOTAL SCORE: 7

## 2017-10-17 ENCOUNTER — CARE COORDINATION (OUTPATIENT)
Dept: CARE COORDINATION | Facility: OTHER | Age: 67
End: 2017-10-17

## 2017-10-17 NOTE — PROGRESS NOTES
Clinic Care Coordination Contact  Care Team Conversations    Called patient and she reported that her swelling has gone down, stated that she has a little bit of SOB. Denies any other symptoms. Reports that she will call CC if she has any concerns or questions.     Plan: follow up in one month    STEPHANIE Valdez-RN  CHF and General Care Coordinator  [phone] Option # 1

## 2017-10-26 ENCOUNTER — ANTICOAGULATION THERAPY VISIT (OUTPATIENT)
Dept: ANTICOAGULATION | Facility: OTHER | Age: 67
End: 2017-10-26

## 2017-10-26 DIAGNOSIS — Z79.01 LONG-TERM (CURRENT) USE OF ANTICOAGULANTS: ICD-10-CM

## 2017-10-26 DIAGNOSIS — I48.19 ATRIAL FIBRILLATION, PERSISTENT (H): ICD-10-CM

## 2017-10-26 LAB — INR BLD: 2 (ref 0.86–1.14)

## 2017-10-26 PROCEDURE — 85610 PROTHROMBIN TIME: CPT | Mod: QW,ZL | Performed by: FAMILY MEDICINE

## 2017-10-26 PROCEDURE — 36416 COLLJ CAPILLARY BLOOD SPEC: CPT | Mod: ZL | Performed by: FAMILY MEDICINE

## 2017-10-26 NOTE — MR AVS SNAPSHOT
Mary Alice MERLE Cameron   10/26/2017   Anticoagulation Therapy Visit    Description:  67 year old female   Provider:  Braydon Yen MD   Department:  [facility]           INR as of 10/26/2017     Today's INR 2.0      Anticoagulation Summary as of 10/26/2017     INR goal 2.0-3.0   Today's INR 2.0   Full instructions 2 mg on Mon, Wed, Fri; 3 mg all other days   Next INR check 12/6/2017    Indications   Atrial fibrillation  persistent (H) [I48.1]  Long-term (current) use of anticoagulants [Z79.01] [Z79.01]         October 2017 Details    Sun Mon Tue Wed Thu Fri Sat     1               2               3               4               5               6               7                 8               9               10               11               12               13               14                 15               16               17               18               19               20               21                 22               23               24               25               26      3 mg   See details      27      2 mg         28      3 mg           29      3 mg         30      2 mg         31      3 mg              Date Details   10/26 This INR check               How to take your warfarin dose     To take:  2 mg Take 1 of the 2 mg tablets.    To take:  3 mg Take 1.5 of the 2 mg tablets.           November 2017 Details    Sun Mon Tue Wed Thu Fri Sat        1      2 mg         2      3 mg         3      2 mg         4      3 mg           5      3 mg         6      2 mg         7      3 mg         8      2 mg         9      3 mg         10      2 mg         11      3 mg           12      3 mg         13      2 mg         14      3 mg         15      2 mg         16      3 mg         17      2 mg         18      3 mg           19      3 mg         20      2 mg         21      3 mg         22      2 mg         23      3 mg         24      2 mg         25      3 mg           26      3 mg         27      2  mg         28      3 mg         29      2 mg         30      3 mg            Date Details   No additional details            How to take your warfarin dose     To take:  2 mg Take 1 of the 2 mg tablets.    To take:  3 mg Take 1.5 of the 2 mg tablets.           December 2017 Details    Sun Mon Tue Wed Thu Fri Sat          1      2 mg         2      3 mg           3      3 mg         4      2 mg         5      3 mg         6            7               8               9                 10               11               12               13               14               15               16                 17               18               19               20               21               22               23                 24               25               26               27               28               29               30                 31                      Date Details   No additional details    Date of next INR:  12/6/2017         How to take your warfarin dose     To take:  2 mg Take 1 of the 2 mg tablets.    To take:  3 mg Take 1.5 of the 2 mg tablets.

## 2017-10-26 NOTE — PROGRESS NOTES
ANTICOAGULATION FOLLOW-UP CLINIC VISIT    Patient Name:  Mary Alice Cameron  Date:  10/26/2017  Contact Type   Telephone  message left on home phone voicemail    SUBJECTIVE:     Patient Findings     Positives No Problem Findings    Comments Call placed to patient and message left on home phone voicemail re: INR result, warfarin dosing and INR recheck date. Patient to call warfarin clinic if any changes in diet/meds/activity or questions.            OBJECTIVE    INR Point of Care   Date Value Ref Range Status   10/26/2017 2.0 (H) 0.86 - 1.14 Final     Comment:     This test is intended for monitoring Coumadin therapy.  Results are not   accurate in patients with prolonged INR due to factor deficiency.         ASSESSMENT / PLAN  INR assessment THER    Recheck INR In: 6 WEEKS    INR Location Clinic      Anticoagulation Summary as of 10/26/2017     INR goal 2.0-3.0   Today's INR 2.0   Maintenance plan 2 mg (2 mg x 1) on Mon, Wed, Fri; 3 mg (2 mg x 1.5) all other days   Full instructions 2 mg on Mon, Wed, Fri; 3 mg all other days   Weekly total 18 mg   No change documented Kenna Kiran RN   Plan last modified Kenna Storm RN (3/1/2017)   Next INR check 12/6/2017   Priority INR   Target end date Indefinite    Indications   Atrial fibrillation  persistent (H) [I48.1]  Long-term (current) use of anticoagulants [Z79.01] [Z79.01]         Anticoagulation Episode Summary     INR check location     Preferred lab     Send INR reminders to  ANTICOAG POOL    Comments       Anticoagulation Care Providers     Provider Role Specialty Phone number    Braydon Yen MD [facility] [phone]            See the Encounter Report to view Anticoagulation Flowsheet and Dosing Calendar (Go to Encounters tab in chart review, and find the Anticoagulation Therapy Visit)        Kenna Kiran, RN

## 2017-10-29 DIAGNOSIS — J44.1 COPD EXACERBATION (H): ICD-10-CM

## 2017-10-30 NOTE — TELEPHONE ENCOUNTER
advair      Last Written Prescription Date: 3/30/17  Last Fill Quantity: 36,  # refills: 1   Last Office Visit with G, P or [facility] prescribing provider: 10/26/17

## 2017-11-01 RX ORDER — FLUTICASONE PROPIONATE AND SALMETEROL XINAFOATE 115; 21 UG/1; UG/1
AEROSOL, METERED RESPIRATORY (INHALATION)
Qty: 36 G | Refills: 2 | Status: SHIPPED | OUTPATIENT
Start: 2017-11-01 | End: 2018-10-30

## 2017-11-13 DIAGNOSIS — E87.6 HYPOKALEMIA: ICD-10-CM

## 2017-11-15 RX ORDER — POTASSIUM CHLORIDE 1500 MG/1
TABLET, EXTENDED RELEASE ORAL
Qty: 180 TABLET | Refills: 2 | Status: SHIPPED | OUTPATIENT
Start: 2017-11-15 | End: 2018-02-19

## 2017-11-15 NOTE — TELEPHONE ENCOUNTER
Potassium       Last Written Prescription Date: 11/10/2016  Last Fill Quantity: 180,  # refills: 3   Last Office Visit with G, [facility] or [facility] prescribing provider: 10/12/2017

## 2017-11-22 ENCOUNTER — CARE COORDINATION (OUTPATIENT)
Dept: CARE COORDINATION | Facility: OTHER | Age: 67
End: 2017-11-22

## 2017-11-22 NOTE — PROGRESS NOTES
Clinic Care Coordination Contact  [facility]/Voicemail    Referral Source: Care Team  Clinical Data: Care Coordinator Outreach  Outreach attempted x 1.  Left message on voicemail with call back information and requested return call.  Plan:. Care Coordinator will try to reach patient again in 3-5 business days.    MAC ValdezN-RN  CHF and General Care Coordinator  [phone] Option # 1

## 2017-12-04 ENCOUNTER — CARE COORDINATION (OUTPATIENT)
Dept: CARE COORDINATION | Facility: OTHER | Age: 67
End: 2017-12-04

## 2017-12-04 NOTE — PROGRESS NOTES
Clinic Care Coordination Contact  [facility]/Voicemail    Referral Source: Care Team  Clinical Data: Care Coordinator Outreach  Outreach attempted x 2.  Left message on voicemail with call back information and requested return call.      STEPHANIE Valdez-RN  CHF and General Care Coordinator  [phone] Option # 1

## 2017-12-06 ENCOUNTER — ANTICOAGULATION THERAPY VISIT (OUTPATIENT)
Dept: ANTICOAGULATION | Facility: OTHER | Age: 67
End: 2017-12-06
Attending: FAMILY MEDICINE
Payer: MEDICARE

## 2017-12-06 DIAGNOSIS — I48.19 ATRIAL FIBRILLATION, PERSISTENT (H): ICD-10-CM

## 2017-12-06 DIAGNOSIS — Z79.01 LONG-TERM (CURRENT) USE OF ANTICOAGULANTS: ICD-10-CM

## 2017-12-06 LAB — INR POINT OF CARE: 2.9 (ref 0.86–1.14)

## 2017-12-06 PROCEDURE — 85610 PROTHROMBIN TIME: CPT | Mod: QW,ZL

## 2017-12-06 RX ORDER — PREDNISONE 20 MG/1
20 TABLET ORAL DAILY
Qty: 15 TABLET | Refills: 0 | COMMUNITY
Start: 2017-12-04 | End: 2018-01-17

## 2017-12-06 NOTE — MR AVS SNAPSHOT
Mary Alice Cameron   12/6/2017 10:15 AM   Anticoagulation Therapy Visit    Description:  67 year old female   Provider:  [facility]   Department:  [facility]           INR as of 12/6/2017     Today's INR 2.9      Anticoagulation Summary as of 12/6/2017     INR goal 2.0-3.0   Today's INR 2.9   Full instructions 12/6: 1 mg; 12/7: 2 mg; 12/9: 2 mg; 12/10: 2 mg; 12/12: 2 mg; Otherwise 2 mg on Mon, Wed, Fri; 3 mg all other days   Next INR check 12/13/2017    Indications   Atrial fibrillation  persistent (H) [I48.1]  Long-term (current) use of anticoagulants [Z79.01] [Z79.01]         Your next Anticoagulation Clinic appointment(s)     Dec 13, 2017 10:15 AM CST   Anticoagulation Visit with [facility]   [facility] ([facility] )    [address] [address]   [phone]              December 2017 Details    Sun Mon Tue Wed Thu Fri Sat          1               2                 3               4               5               6      1 mg   See details      7      2 mg         8      2 mg         9      2 mg           10      2 mg         11      2 mg         12      2 mg         13            14               15               16                 17               18               19               20               21               22               23                 24               25               26               27               28               29               30                 31                      Date Details   12/06 This INR check       Date of next INR:  12/13/2017         How to take your warfarin dose     To take:  1 mg Take 0.5 of a 2 mg tablet.    To take:  2 mg Take 1 of the 2 mg tablets.

## 2017-12-12 ENCOUNTER — OFFICE VISIT (OUTPATIENT)
Dept: FAMILY MEDICINE | Facility: OTHER | Age: 67
End: 2017-12-12
Attending: FAMILY MEDICINE
Payer: COMMERCIAL

## 2017-12-12 VITALS
BODY MASS INDEX: 28.99 KG/M2 | DIASTOLIC BLOOD PRESSURE: 88 MMHG | HEART RATE: 88 BPM | WEIGHT: 174 LBS | HEIGHT: 65 IN | SYSTOLIC BLOOD PRESSURE: 128 MMHG | OXYGEN SATURATION: 95 % | TEMPERATURE: 97.9 F

## 2017-12-12 DIAGNOSIS — E89.0 HYPOTHYROIDISM, POSTABLATIVE: ICD-10-CM

## 2017-12-12 DIAGNOSIS — J44.1 COPD EXACERBATION (H): ICD-10-CM

## 2017-12-12 DIAGNOSIS — E78.5 HYPERLIPIDEMIA LDL GOAL <130: ICD-10-CM

## 2017-12-12 DIAGNOSIS — J43.8 OTHER EMPHYSEMA (H): ICD-10-CM

## 2017-12-12 DIAGNOSIS — I48.19 PERSISTENT ATRIAL FIBRILLATION (H): ICD-10-CM

## 2017-12-12 DIAGNOSIS — I10 ESSENTIAL HYPERTENSION, BENIGN: ICD-10-CM

## 2017-12-12 LAB
CHOLEST SERPL-MCNC: 243 MG/DL
HDLC SERPL-MCNC: 145 MG/DL
LDLC SERPL CALC-MCNC: 79 MG/DL
NONHDLC SERPL-MCNC: 98 MG/DL
TRIGL SERPL-MCNC: 94 MG/DL

## 2017-12-12 PROCEDURE — 99212 OFFICE O/P EST SF 10 MIN: CPT

## 2017-12-12 PROCEDURE — 80061 LIPID PANEL: CPT | Mod: ZL | Performed by: FAMILY MEDICINE

## 2017-12-12 PROCEDURE — 99214 OFFICE O/P EST MOD 30 MIN: CPT | Performed by: FAMILY MEDICINE

## 2017-12-12 PROCEDURE — 36415 COLL VENOUS BLD VENIPUNCTURE: CPT | Mod: ZL | Performed by: FAMILY MEDICINE

## 2017-12-12 RX ORDER — ALBUTEROL SULFATE 90 UG/1
AEROSOL, METERED RESPIRATORY (INHALATION)
Qty: 54 G | Refills: 1 | Status: SHIPPED | OUTPATIENT
Start: 2017-12-12 | End: 2018-10-30

## 2017-12-12 RX ORDER — LEVOTHYROXINE SODIUM 100 UG/1
TABLET ORAL
Qty: 90 TABLET | Refills: 3 | Status: SHIPPED | OUTPATIENT
Start: 2017-12-12 | End: 2018-02-28

## 2017-12-12 RX ORDER — ALBUTEROL SULFATE 0.83 MG/ML
1 SOLUTION RESPIRATORY (INHALATION) EVERY 6 HOURS PRN
Qty: 25 VIAL | Refills: 1 | Status: SHIPPED | OUTPATIENT
Start: 2017-12-12 | End: 2020-11-10

## 2017-12-12 RX ORDER — HYDRALAZINE HYDROCHLORIDE 25 MG/1
TABLET, FILM COATED ORAL
Qty: 810 TABLET | Refills: 3 | Status: SHIPPED | OUTPATIENT
Start: 2017-12-12 | End: 2018-08-21

## 2017-12-12 ASSESSMENT — ANXIETY QUESTIONNAIRES
GAD7 TOTAL SCORE: 3
3. WORRYING TOO MUCH ABOUT DIFFERENT THINGS: SEVERAL DAYS
6. BECOMING EASILY ANNOYED OR IRRITABLE: NOT AT ALL
5. BEING SO RESTLESS THAT IT IS HARD TO SIT STILL: SEVERAL DAYS
1. FEELING NERVOUS, ANXIOUS, OR ON EDGE: NOT AT ALL
7. FEELING AFRAID AS IF SOMETHING AWFUL MIGHT HAPPEN: NOT AT ALL
2. NOT BEING ABLE TO STOP OR CONTROL WORRYING: NOT AT ALL
4. TROUBLE RELAXING: SEVERAL DAYS

## 2017-12-12 ASSESSMENT — PAIN SCALES - GENERAL: PAINLEVEL: NO PAIN (0)

## 2017-12-12 ASSESSMENT — PATIENT HEALTH QUESTIONNAIRE - PHQ9: SUM OF ALL RESPONSES TO PHQ QUESTIONS 1-9: 3

## 2017-12-12 NOTE — PROGRESS NOTES
Mary Alice Cameron    December 12, 2017    Chief Complaint   Patient presents with     Recheck Medication     pt needs refills of meds       SUBJECTIVE:  Here for f/u.  Doing well and feeling well.  Needs some meds refilled.  Overall stable without new issue.  Breathing is good right now.  She gets flares from time to time and none right now.  OTW no new issues or concerns.      Past Medical History:   Diagnosis Date     Asthma      Atrial fibrillation (H)      COPD (chronic obstructive pulmonary disease) (H)      Depression      High cholesterol      HTN (hypertension)      Thyroid disease        Past Surgical History:   Procedure Laterality Date     BACK SURGERY  2006     COLONOSCOPY N/A 1/19/2016    Procedure: COLONOSCOPY;  Surgeon: Waldemar Bob MD;  Location: [facility]     HYSTERECTOMY  1980    partial     SLING BLADDER SUSPENSION WITH FASCIA LINNETTE         Current Outpatient Prescriptions   Medication Sig Dispense Refill     levothyroxine (SYNTHROID/LEVOTHROID) 100 MCG tablet TAKE 1 TABLET(100 MCG) BY MOUTH DAILY 90 tablet 3     hydrALAZINE (APRESOLINE) 25 MG tablet TAKE 3 TABLETS BY MOUTH THREE TIMES DAILY 810 tablet 3     albuterol (VENTOLIN HFA) 108 (90 BASE) MCG/ACT Inhaler INHALE 2 PUFFS INTO THE LUNGS EVERY 4 HOURS AS NEEDED FOR SHORTNESS OF BREATH OR DIFFICULT BREATHING OR WHEEZING 54 g 1     albuterol (2.5 MG/3ML) 0.083% neb solution Take 1 vial (2.5 mg) by nebulization every 6 hours as needed for shortness of breath / dyspnea or wheezing 25 vial 1     cloNIDine (CATAPRES) 0.1 MG tablet TAKE 1 TABLET BY MOUTH EVERY MORNING AND 3 TABLETS EVERY EVENING 360 tablet 2     predniSONE (DELTASONE) 20 MG tablet Take 1 tablet (20 mg) by mouth daily Take 60mg (3 tabs) daily x 3 days; 40mg (2 tabs) daily x 3 days; 20mg (1 tab) daily x 3 days; 10mg (1/2 tab) daily x 3 days 15 tablet 0     potassium chloride SA (K-DUR/KLOR-CON M) 20 MEQ CR tablet TAKE ONE TABLET BY MOUTH TWICE A  tablet 2     ADVAIR -21  MCG/ACT Inhaler INHALE 2 PUFFS INTO THE LUNGS TWICE DAILY 36 g 2     simvastatin (ZOCOR) 10 MG tablet TAKE 1 TABLET(10 MG) BY MOUTH AT BEDTIME 90 tablet 3     furosemide (LASIX) 40 MG tablet TAKE 1 TABLET BY MOUTH TWICE DAILY. 180 tablet 2     ipratropium - albuterol 0.5 mg/2.5 mg/3 mL (DUONEB) 0.5-2.5 (3) MG/3ML neb solution USE 1 VIAL PER NEBULIZER FOUR TIMES DAILY 540 mL 1     Calcium Carb-Cholecalciferol (CALTRATE 600+D3 SOFT PO) Take 600 mg by mouth 2 times daily       flecainide (TAMBOCOR) 100 MG tablet Take 1 tablet (100 mg) by mouth 2 times daily 180 tablet 3     warfarin (COUMADIN) 2 MG tablet Take 2 mg Mon/Wed/Fri; 3 mg all other days or as directed by [facility] 135 tablet 3     order for DME Equipment being ordered: wedge for bed 1 each 0     triamcinolone (KENALOG) 0.1 % ointment Apply bid and hs left hand and legs - for dermatitis 454 g 3     diphenhydrAMINE (BENADRYL) 25 MG tablet Take 1-2 tablets (25-50 mg) by mouth every 6 hours as needed for itching or allergies 60 tablet 1     acetaminophen (TYLENOL) 500 MG tablet Take 500-1,000 mg by mouth every 6 hours as needed for mild pain       OTHER MEDICAL SUPPLIES Apply one pair to help with edema 1 each 0     LORazepam (ATIVAN) 1 MG tablet Take 0.5-1 tablets by mouth every 6 hours as needed Reported on 5/23/2017       [DISCONTINUED] VENTOLIN  (90 BASE) MCG/ACT Inhaler INHALE 2 PUFFS INTO THE LUNGS EVERY 4 HOURS AS NEEDED FOR SHORTNESS OF BREATH OR DIFFICULT BREATHING OR WHEEZING 54 g 1     [DISCONTINUED] levothyroxine (SYNTHROID/LEVOTHROID) 100 MCG tablet TAKE 1 TABLET(100 MCG) BY MOUTH DAILY 90 tablet 3     [DISCONTINUED] cloNIDine (CATAPRES) 0.1 MG tablet TAKE 1 TABLET BY MOUTH EVERY MORNING AND 3 TABLETS EVERY EVENING 360 tablet 0     [DISCONTINUED] hydrALAZINE (APRESOLINE) 25 MG tablet TAKE 3 TABLETS BY MOUTH THREE TIMES DAILY 810 tablet 3     [DISCONTINUED] albuterol (2.5 MG/3ML) 0.083% neb solution Take 1 vial (2.5 mg) by  nebulization every 6 hours as needed for shortness of breath / dyspnea or wheezing 25 vial 1       Allergies   Allergen Reactions     Amlodipine Besylate Cough     Norvasc     Lisinopril Cough       Family History   Problem Relation Age of Onset     Prostate Cancer Father      Hypertension Father      Heart Failure Father      CHF     Asthma Mother      CANCER Mother      ovarian     Hypertension Mother      Asthma Brother      Hypertension Sister      Hypertension Brother        Social History     Social History     Marital status:      Spouse name: N/A     Number of children: N/A     Years of education: N/A     Occupational History      Retired     disabled     Social History Main Topics     Smoking status: Former Smoker     Years: 48.00     Types: Cigarettes     Quit date: 1/28/2007     Smokeless tobacco: Never Used     Alcohol use No     Drug use: No     Sexual activity: No      Comment:      Other Topics Concern      Service No     Blood Transfusions Yes     Permits if needed     Caffeine Concern Yes     2 cups coffee daily     Seat Belt Yes     Parent/Sibling W/ Cabg, Mi Or Angioplasty Before 65f 55m? No     Social History Narrative       5 point ROS negative except as noted above in HPI, including Gen., Resp., CV, GI &  system review.     OBJECTIVE:  B/P: 128/88, Temperature: 97.9, Pulse: 88, Respirations: Data Unavailable    GENERAL APPEARANCE: Alert, no acute distress  CV: irregular rate and rhythm, no murmur, rub or gallop  RESP: lungs clear to auscultation bilaterally  ABDOMEN: normal bowel sounds, soft, nontender, no hepatosplenomegaly or other masses  SKIN: no suspicious lesions or rashes to visualized skin  NEURO: Alert, oriented x 3, speech and mentation normal    ASSESSMENT and PLAN:  (I48.1) Persistent atrial fibrillation (H)  Comment: stable.   Plan: Lipid Profile (Chol, Trig, HDL, LDL calc)        Reviewed.  On the coumadin.  Update meds and lipids today.     (E89.0)  Hypothyroidism, postablative  Comment: stable.   Plan: levothyroxine (SYNTHROID/LEVOTHROID) 100 MCG         tablet        Update lab.  Due for recheck in 10/2018.      (I10) Essential hypertension, benign  Comment: stable.   Plan: hydrALAZINE (APRESOLINE) 25 MG tablet, Lipid         Profile (Chol, Trig, HDL, LDL calc)        No change.  Continue cares.     (E78.5) Hyperlipidemia LDL goal <130  Comment: as above.   Plan: Lipid Profile (Chol, Trig, HDL, LDL calc)        As above.     (J43.8) Other emphysema (H)  Comment: stable.   Plan: albuterol (VENTOLIN HFA) 108 (90 BASE) MCG/ACT         Inhaler        No change.  cointue nebs and meds.     (J44.1) COPD exacerbation (H)  Comment: history of.   Plan: albuterol (2.5 MG/3ML) 0.083% neb solution        Neb on hand.  Using the inhaler primarily.  Doing well overall.

## 2017-12-12 NOTE — MR AVS SNAPSHOT
After Visit Summary   12/12/2017    Mary Alice Cameron    MRN: [id number]           Patient Information     Date Of Birth          1950        Visit Information        Provider Department      12/12/2017 8:45 AM Braydon Yen MD [facility] [place]        Today's Diagnoses     Persistent atrial fibrillation (H)        Hypothyroidism, postablative        Essential hypertension, benign        Hyperlipidemia LDL goal <130        Other emphysema (H)        COPD exacerbation (H)          Care Instructions    F/u with ongoing concerns.           Follow-ups after your visit        Your next 10 appointments already scheduled     Dec 13, 2017 10:15 AM CST   Anticoagulation Visit with [facility] ANTI COAGULATION   [facility] [place] ([facility] )    [address]  [facility] [address]   [phone]            May 22, 2018 10:30 AM CDT   (Arrive by 10:15 AM)   Return Visit with Eliezer Myers MD   [facility] ([facility] [place] [facility] )    [address]  [facility] [id number] [phone]              Who to contact     If you have questions or need follow up information about today's clinic visit or your schedule please contact [facility] directly at [phone].  Normal or non-critical lab and imaging results will be communicated to you by Getting[employer]hart, letter or phone within 4 business days after the clinic has received the results. If you do not hear from us within 7 days, please contact the clinic through Getting[employer] or phone. If you have a critical or abnormal lab result, we will notify you by phone as soon as possible.  Submit refill requests through [employer] or call your pharmacy and they will forward the refill request to us. Please allow 3 business days for your refill to be completed.          Additional Information About Your Visit        GettingHiredharcottonTracks Information     [employer] lets you send messages to your doctor, view your test results,  "renew your prescriptions, schedule appointments and more. To sign up, go to [email or web address] . Click on \"Log in\" on the left side of the screen, which will take you to the Welcome page. Then click on \"Sign up Now\" on the right side of the page.     You will be asked to enter the access code listed below, as well as some personal information. Please follow the directions to create your username and password.     Your access code is: [email or web address]  Expires: 3/12/2018  9:33 AM     Your access code will  in 90 days. If you need help or a new code, please call your [place] clinic or [phone].        Care EveryWhere ID     This is your Care EveryWhere ID. This could be used by other organizations to access your [place] medical records  [id number]        Your Vitals Were     Pulse Temperature Height Pulse Oximetry BMI (Body Mass Index)       88 97.9  F (36.6  C) 5' 5\" (1.651 m) 95% 28.96 kg/m2        Blood Pressure from Last 3 Encounters:   17 128/88   10/12/17 134/68   17 116/68    Weight from Last 3 Encounters:   17 174 lb (78.9 kg)   10/12/17 177 lb (80.3 kg)   17 172 lb (78 kg)              We Performed the Following     Lipid Profile (Chol, Trig, HDL, LDL calc)          Today's Medication Changes          These changes are accurate as of: 17  9:33 AM.  If you have any questions, ask your nurse or doctor.               These medicines have changed or have updated prescriptions.        Dose/Directions    * albuterol 108 (90 BASE) MCG/ACT Inhaler   Commonly known as:  VENTOLIN HFA   This may have changed:  See the new instructions.   Used for:  Other emphysema (H)   Changed by:  Braydon Yen MD        INHALE 2 PUFFS INTO THE LUNGS EVERY 4 HOURS AS NEEDED FOR SHORTNESS OF BREATH OR DIFFICULT BREATHING OR WHEEZING   Quantity:  54 g   Refills:  1       * albuterol (2.5 MG/3ML) 0.083% neb solution   This may have changed:  Another medication with the same name was " changed. Make sure you understand how and when to take each.   Used for:  COPD exacerbation (H)   Changed by:  Braydon Yen MD        Dose:  1 vial   Take 1 vial (2.5 mg) by nebulization every 6 hours as needed for shortness of breath / dyspnea or wheezing   Quantity:  25 vial   Refills:  1       cloNIDine 0.1 MG tablet   Commonly known as:  CATAPRES   This may have changed:  Another medication with the same name was removed. Continue taking this medication, and follow the directions you see here.   Used for:  Essential hypertension   Changed by:  Braydon Yen MD        TAKE 1 TABLET BY MOUTH EVERY MORNING AND 3 TABLETS EVERY EVENING   Quantity:  360 tablet   Refills:  2       * Notice:  This list has 2 medication(s) that are the same as other medications prescribed for you. Read the directions carefully, and ask your doctor or other care provider to review them with you.         Where to get your medicines      These medications were sent to [facility] [address] [place] [address], [place] - [address] AT [facility] of [facility] [address], [facility] [place] [address]     Phone:  [phone]     albuterol (2.5 MG/3ML) 0.083% neb solution    albuterol 108 (90 BASE) MCG/ACT Inhaler    hydrALAZINE 25 MG tablet    levothyroxine 100 MCG tablet                Primary Care Provider Office Phone # Fax #    Braydon Yen [phone]       [facility] [address]  [facility] [address]        Equal Access to Services     CHARLES FISHER AH: Hadii aad ku hadasho Soomaali, waaxda luqadaha, qaybta kaalmada adeegyada, sourav holder. So [facility] [phone].    ATENCIÓN: Si habla español, tiene a lindsey disposición servicios gratuitos de asistencia lingüística. Hong al [phone].    We comply with applicable federal civil rights laws and [place] laws. We do not discriminate on the basis of race, color, national origin, age, disability, sex, sexual orientation, or gender  identity.            Thank you!     Thank you for choosing [facility]  for your care. Our goal is always to provide you with excellent care. Hearing back from our patients is one way we can continue to improve our services. Please take a few minutes to complete the written survey that you may receive in the mail after your visit with us. Thank you!             Your Updated Medication List - Protect others around you: Learn how to safely use, store and throw away your medicines at www.disposemymeds.org.          This list is accurate as of: 12/12/17  9:33 AM.  Always use your most recent med list.                   Brand Name Dispense Instructions for use Diagnosis    acetaminophen 500 MG tablet    TYLENOL     Take 500-1,000 mg by mouth every 6 hours as needed for mild pain        ADVAIR -21 MCG/ACT Inhaler   Generic drug:  fluticasone-salmeterol     36 g    INHALE 2 PUFFS INTO THE LUNGS TWICE DAILY    COPD exacerbation (H)       * albuterol 108 (90 BASE) MCG/ACT Inhaler    VENTOLIN HFA    54 g    INHALE 2 PUFFS INTO THE LUNGS EVERY 4 HOURS AS NEEDED FOR SHORTNESS OF BREATH OR DIFFICULT BREATHING OR WHEEZING    Other emphysema (H)       * albuterol (2.5 MG/3ML) 0.083% neb solution     25 vial    Take 1 vial (2.5 mg) by nebulization every 6 hours as needed for shortness of breath / dyspnea or wheezing    COPD exacerbation (H)       CALTRATE 600+D3 SOFT PO      Take 600 mg by mouth 2 times daily        cloNIDine 0.1 MG tablet    CATAPRES    360 tablet    TAKE 1 TABLET BY MOUTH EVERY MORNING AND 3 TABLETS EVERY EVENING    Essential hypertension       diphenhydrAMINE 25 MG tablet    BENADRYL    60 tablet    Take 1-2 tablets (25-50 mg) by mouth every 6 hours as needed for itching or allergies        flecainide 100 MG tablet    TAMBOCOR    180 tablet    Take 1 tablet (100 mg) by mouth 2 times daily    Atrial fibrillation, persistent (H)       furosemide 40 MG tablet    LASIX    180 tablet    TAKE 1  TABLET BY MOUTH TWICE DAILY.    Essential hypertension, benign       hydrALAZINE 25 MG tablet    APRESOLINE    810 tablet    TAKE 3 TABLETS BY MOUTH THREE TIMES DAILY    Essential hypertension, benign       ipratropium - albuterol 0.5 mg/2.5 mg/3 mL 0.5-2.5 (3) MG/3ML neb solution    DUONEB    540 mL    USE 1 VIAL PER NEBULIZER FOUR TIMES DAILY    COPD exacerbation (H)       levothyroxine 100 MCG tablet    SYNTHROID/LEVOTHROID    90 tablet    TAKE 1 TABLET(100 MCG) BY MOUTH DAILY    Hypothyroidism, postablative       LORazepam 1 MG tablet    ATIVAN     Take 0.5-1 tablets by mouth every 6 hours as needed Reported on 5/23/2017        order for DME     1 each    Equipment being ordered: wedge for bed    Orthopnea       other medical supplies     1 each    Apply one pair to help with edema    Edema, Atrial fibrillation, persistent (H)       potassium chloride SA 20 MEQ CR tablet    K-DUR/KLOR-CON M    180 tablet    TAKE ONE TABLET BY MOUTH TWICE A DAY    Hypokalemia       predniSONE 20 MG tablet    DELTASONE    15 tablet    Take 1 tablet (20 mg) by mouth daily Take 60mg (3 tabs) daily x 3 days; 40mg (2 tabs) daily x 3 days; 20mg (1 tab) daily x 3 days; 10mg (1/2 tab) daily x 3 days        simvastatin 10 MG tablet    ZOCOR    90 tablet    TAKE 1 TABLET(10 MG) BY MOUTH AT BEDTIME    Hyperlipidemia LDL goal <130       triamcinolone 0.1 % ointment    KENALOG    454 g    Apply bid and hs left hand and legs - for dermatitis    Contact dermatitis, unspecified contact dermatitis type, unspecified trigger       warfarin 2 MG tablet    COUMADIN    135 tablet    Take 2 mg Mon/Wed/Fri; 3 mg all other days or as directed by [facility]    Persistent atrial fibrillation (H)       * Notice:  This list has 2 medication(s) that are the same as other medications prescribed for you. Read the directions carefully, and ask your doctor or other care provider to review them with you.

## 2017-12-12 NOTE — NURSING NOTE
"Chief Complaint   Patient presents with     Recheck Medication     pt needs refills of meds       Initial /88  Pulse 88  Temp 97.9  F (36.6  C)  Ht 5' 5\" (1.651 m)  Wt 174 lb (78.9 kg)  SpO2 95%  BMI 28.96 kg/m2 Estimated body mass index is 28.96 kg/(m^2) as calculated from the following:    Height as of this encounter: 5' 5\" (1.651 m).    Weight as of this encounter: 174 lb (78.9 kg).  Medication Reconciliation: complete   Joy Franklin    "

## 2017-12-12 NOTE — LETTER
December 12, 2017      Mary Alice Cameron      [facility] [address]        Dear ,    We are writing to inform you of your test results.    Your test results are stable from previous results. Your good cholesterol is better than the bad cholesterol, so these are good numbers. Please continue with current treatment plan.    Resulted Orders   Lipid Profile (Chol, Trig, HDL, LDL calc)   Result Value Ref Range    Cholesterol 243 (H) <200 mg/dL      Comment:      Desirable:       <200 mg/dl    Triglycerides 94 <150 mg/dL      Comment:      Fasting specimen    HDL Cholesterol 145 >49 mg/dL    LDL Cholesterol Calculated 79 <100 mg/dL      Comment:      Desirable:       <100 mg/dl    Non HDL Cholesterol 98 <130 mg/dL       If you have any questions or concerns, please call the clinic at the number listed above.       Sincerely,        Braydon Yen MD

## 2017-12-13 ENCOUNTER — ANTICOAGULATION THERAPY VISIT (OUTPATIENT)
Dept: ANTICOAGULATION | Facility: OTHER | Age: 67
End: 2017-12-13
Attending: FAMILY MEDICINE
Payer: MEDICARE

## 2017-12-13 DIAGNOSIS — I48.19 PERSISTENT ATRIAL FIBRILLATION (H): ICD-10-CM

## 2017-12-13 DIAGNOSIS — I48.19 ATRIAL FIBRILLATION, PERSISTENT (H): ICD-10-CM

## 2017-12-13 DIAGNOSIS — Z79.01 LONG-TERM (CURRENT) USE OF ANTICOAGULANTS: ICD-10-CM

## 2017-12-13 LAB — INR POINT OF CARE: 3.2 (ref 0.86–1.14)

## 2017-12-13 PROCEDURE — 85610 PROTHROMBIN TIME: CPT | Mod: QW,ZL

## 2017-12-13 RX ORDER — WARFARIN SODIUM 2 MG/1
TABLET ORAL
Qty: 135 TABLET | Refills: 3 | COMMUNITY
Start: 2017-12-13 | End: 2018-02-27 | Stop reason: DRUGHIGH

## 2017-12-13 ASSESSMENT — ANXIETY QUESTIONNAIRES: GAD7 TOTAL SCORE: 3

## 2017-12-13 NOTE — MR AVS SNAPSHOT
Mary Alice Cameron   12/13/2017 10:15 AM   Anticoagulation Therapy Visit    Description:  67 year old female   Provider:  [facility]   Department:  [facility]           INR as of 12/13/2017     Today's INR 3.2!      Anticoagulation Summary as of 12/13/2017     INR goal 2.0-3.0   Today's INR 3.2!   Full instructions 12/13: 1 mg; Otherwise 3 mg on Mon, Wed, Fri; 2 mg all other days   Next INR check 1/3/2018    Indications   Atrial fibrillation  persistent (H) [I48.1]  Long-term (current) use of anticoagulants [Z79.01] [Z79.01]         December 2017 Details    Sun Mon Tue Wed Thu Fri Sat          1               2                 3               4               5               6               7               8               9                 10               11               12               13      1 mg   See details      14      2 mg         15      3 mg         16      2 mg           17      2 mg         18      3 mg         19      2 mg         20      3 mg         21      2 mg         22      3 mg         23      2 mg           24      2 mg         25      3 mg         26      2 mg         27      3 mg         28      2 mg         29      3 mg         30      2 mg           31      2 mg                Date Details   12/13 This INR check               How to take your warfarin dose     To take:  1 mg Take 0.5 of a 2 mg tablet.    To take:  2 mg Take 1 of the 2 mg tablets.    To take:  3 mg Take 1.5 of the 2 mg tablets.           January 2018 Details    Sun Mon Tue Wed Thu Fri Sat      1      3 mg         2      2 mg         3            4               5               6                 7               8               9               10               11               12               13                 14               15               16               17               18               19               20                 21               22               23               24               25                26               27                 28               29               30               31                   Date Details   No additional details    Date of next INR:  1/3/2018         How to take your warfarin dose     To take:  2 mg Take 1 of the 2 mg tablets.    To take:  3 mg Take 1.5 of the 2 mg tablets.

## 2017-12-13 NOTE — PROGRESS NOTES
ANTICOAGULATION FOLLOW-UP CLINIC VISIT    Patient Name:  Mary Alice Cameron  Date:  12/13/2017  Contact Type:  Face to Face    SUBJECTIVE:     Patient Findings     Positives Unexplained INR or factor level change    Comments Pt states has not had any changes.  Discussed an overall dose reduction; pt agreed with plan and stated understanding.           OBJECTIVE    INR Protime   Date Value Ref Range Status   12/13/2017 3.2 (A) 0.86 - 1.14 Final       ASSESSMENT / PLAN  INR assessment SUPRA    Recheck INR In: 3 WEEKS    INR Location Clinic      Anticoagulation Summary as of 12/13/2017     INR goal 2.0-3.0   Today's INR 3.2!   Maintenance plan 3 mg (2 mg x 1.5) on Mon, Wed, Fri; 2 mg (2 mg x 1) all other days   Full instructions 12/13: 1 mg; Otherwise 3 mg on Mon, Wed, Fri; 2 mg all other days   Weekly total 17 mg   Plan last modified Kenna Storm RN (12/13/2017)   Next INR check 1/3/2018   Priority INR   Target end date Indefinite    Indications   Atrial fibrillation  persistent (H) [I48.1]  Long-term (current) use of anticoagulants [Z79.01] [Z79.01]         Anticoagulation Episode Summary     INR check location     Preferred lab     Send INR reminders to [facility] POOL    Comments       Anticoagulation Care Providers     Provider Role Specialty Phone number    Braydon Yen MD [facility] [phone]            See the Encounter Report to view Anticoagulation Flowsheet and Dosing Calendar (Go to Encounters tab in chart review, and find the Anticoagulation Therapy Visit)        Kenna Storm RN

## 2018-01-08 DIAGNOSIS — J44.1 COPD EXACERBATION (H): ICD-10-CM

## 2018-01-10 ENCOUNTER — CARE COORDINATION (OUTPATIENT)
Dept: CARE COORDINATION | Facility: OTHER | Age: 68
End: 2018-01-10

## 2018-01-10 ENCOUNTER — ANTICOAGULATION THERAPY VISIT (OUTPATIENT)
Dept: ANTICOAGULATION | Facility: OTHER | Age: 68
End: 2018-01-10
Payer: COMMERCIAL

## 2018-01-10 DIAGNOSIS — I48.19 ATRIAL FIBRILLATION, PERSISTENT (H): ICD-10-CM

## 2018-01-10 DIAGNOSIS — Z79.01 LONG-TERM (CURRENT) USE OF ANTICOAGULANTS: ICD-10-CM

## 2018-01-10 LAB — INR BLD: 4.3 (ref 0.86–1.14)

## 2018-01-10 PROCEDURE — 36416 COLLJ CAPILLARY BLOOD SPEC: CPT | Mod: ZL | Performed by: FAMILY MEDICINE

## 2018-01-10 PROCEDURE — 85610 PROTHROMBIN TIME: CPT | Mod: QW,ZL | Performed by: FAMILY MEDICINE

## 2018-01-10 RX ORDER — PREDNISONE 20 MG/1
TABLET ORAL
Qty: 20 TABLET | Refills: 0 | Status: SHIPPED | OUTPATIENT
Start: 2018-01-10 | End: 2018-01-17

## 2018-01-10 NOTE — MR AVS SNAPSHOT
Mary Alice MERLE Cameron   1/10/2018   Anticoagulation Therapy Visit    Description:  67 year old female   Provider:  Braydon Yen MD   Department:  [facility]           INR as of 1/10/2018     Today's INR 4.3!      Anticoagulation Summary as of 1/10/2018     INR goal 2.0-3.0   Today's INR 4.3!   Full instructions 1/10: Hold; 1/11: Hold; Otherwise 3 mg on Mon, Fri; 2 mg all other days   Next INR check 1/17/2018    Indications   Atrial fibrillation  persistent (H) [I48.1]  Long-term (current) use of anticoagulants [Z79.01] [Z79.01]         January 2018 Details    Sun Mon Tue Wed Thu Fri Sat      1               2               3               4               5               6                 7               8               9               10      Hold   See details      11      Hold         12      3 mg         13      2 mg           14      2 mg         15      3 mg         16      2 mg         17            18               19               20                 21               22               23               24               25               26               27                 28               29               30               31                   Date Details   01/10 This INR check       Date of next INR:  1/17/2018         How to take your warfarin dose     To take:  2 mg Take 1 of the 2 mg tablets.    To take:  3 mg Take 1.5 of the 2 mg tablets.    Hold Do not take your warfarin dose. See the Details table to the right for additional instructions.

## 2018-01-10 NOTE — PROGRESS NOTES
"Clinic Care Coordination Contact  OUTREACH    Referral Information:  Referral Source: Care Team  Reason for Contact: follow up COPD        [place] Utilization:   ED Visits in last year: 1  Hospital visits in last year: 1  Last PCP appointment: 12/12/17  Missed Appointments: 0     Multiple Providers or Specialists: Cardiology; Care Coordination, PCP, Anticoagulation, Dermatology (Pulmonology)    Clinical Concerns:  Current Medical Concerns: Patient states she is feeling good at this time. States she notices when the barometer changes and goes up is when she starts having more issues with her COPD.  She did say that she ran out of her Flecainide around [place] and was without the med for 1 week. She states she does have it now and is taking it. She did notice an increase in her \"palpitations\" when off the medication. She states that she has had no increase in COPD symptoms recently and is able to do her housework and walks without any shortness of breath or need to take a break before finishing. She did say she is having increased palpitations and then having \"twinges\" of chest pain that wake her at night. She states it usually last a short time. She states that taking \"Tums\" has helped relieve the twinges of chest pain. I told her to make sure she mentions that to Dr. Myers at her next visit with him.   Current Behavioral Concerns: none at this time   Education Provided to patient: We discussed that she should make sure she has enough of her medication so she does not run out. We talked about her chest pain and palpitations and I told her that she really needed to talk to cardiology. If chest pain persists she should go to ER (she states she has a \"twinge\" in her chest when she gets palpitations).   Clinical Pathway Name: COPD  Clinical Pathway: Clinic Care Coordination COPD Follow up Assessment  Symptom Review:    Are you having any increased shortness of breath? No  When you cough are you coughing up " anything? No  Color she states the color of her phlegm is clear at this time. She states when it becomes yellow she starts taking her PRN prednisone taper. She states she is not on prednisone at this time  Consistency N/A  Frequency only when she has increased shortness of breath does she get increased phlegm production  What COPD Zone currently in:Green  What number would you call if you were in the YELLOW zones: she knows to call care coordination     Medications:   Were you started on any new medications since the last time we talked?  She has PRN prescription for prednisone  Do you have all of your medications? She has all her medications at this time but did run out of flecainide and did not take it for 1 week around the holidays  Have you had trouble filling your prescriptions? She states she ran out of flecainide r/t disruption of mail around the holidays  Are you medications effective in controlling your symptoms? Yes, she states she is having no symptoms at this time  Are you still on Prednisone    She has the medication at home but states only starts it when her phlegm becomes yellow and thick She understands dosing instructions  For patients with DM:     Are you monitoring your blood sugars, if so how are your blood sugars? N/A  How often have you had to use your rescue medications? Not at all lately    Oxygen/DME  What is the current liter flow you are using? 2.5 L NC but states only using it at night  Has there been any changes? No      Activity:  How much activity can you do before you are SOB? She states that when she is having symptoms she is severely restricted in activity. She states at this time she can complete her housework without resting and is able to walk the length of the apartment building hallways without stopping  Have you had to reduce your activities because of dyspnea or other symptoms? When she has increased shortness of breath she restricts activity.      Diet:  Do you weigh  yourself daily? No    Has there been a recent change in your weight? She states she weighs 170 pounds     Emotions/Lifestyle:    Do you smoke (if not asked upon initial assessment)? N/A  If so, how many cigarettes a day are you smoking? Does not smoke  Would you like to try to quit smoking? N/A    Follow Up Plan:  Care Coordinator follow up plan: follow up with patient in 4 weeks or if she starts having COPD symptoms  Referrals to consider:None at this time          Medication Management:  She manages her own medications and usually does not have issues getting them    Functional Status:  Mobility Status: Independent  Equipment Currently Used at Home:  grab bar, tub bench, walker, rolling  Transportation: She drives herself           Psychosocial:  Current living arrangement:: I live in a private home  Financial/Insurance: Not addressed at this time       Resources and Interventions:  Current Resources:  ;  PCP, care coordination, cardiology     Senior Linkage Line Referral Placed: 01/18/17  Advanced Care Plans/Directives on file:: Yes  Referrals Placed: NONE     Goals: to maintain current health status                 Barriers: recurring episodes of COPD and the increased shortness of breath when COPD flares  Strengths: does her usual activity when feeling good.  Patient/Caregiver understanding: she verbalized understanding of conversation and will tell cardiology about palpitations and other symptoms  Frequency of Care Coordination: 4-6 wks  Upcoming appointment: 05/22/18 (Dr. Myers)     Plan: follow up with patient in 6 weeks or sooner if issues      LISA Kiran RN, BSN  Care Coordination

## 2018-01-10 NOTE — PROGRESS NOTES
ANTICOAGULATION FOLLOW-UP CLINIC VISIT    Patient Name:  Mary Alice Cameron  Date:  1/10/2018  Contact Type:  Telephone    SUBJECTIVE:     Patient Findings     Positives Other complaints    Comments INR done by lab and result noted by warfarin clinic. Call placed to patient and spoke to her re: INR result, warfarin dosing and INR recheck date. She does have prednisone listed on medication list but states it is PRN use and is not using it at this time. We discussed that if she starts the taper dose of prednisone it is important to let us know as prednisone will affect INR result. She states she will notify warfarin clinic if she starts the prednisone. We discussed INR and warfarin dosing and she verbalized understanding and has no questions.            OBJECTIVE    INR Point of Care   Date Value Ref Range Status   01/10/2018 4.3 (H) 0.86 - 1.14 Final     Comment:     This test is intended for monitoring Coumadin therapy.  Results are not   accurate in patients with prolonged INR due to factor deficiency.         ASSESSMENT / PLAN  INR assessment SUPRA    Recheck INR In: 1 WEEK    INR Location Clinic      Anticoagulation Summary as of 1/10/2018     INR goal 2.0-3.0   Today's INR 4.3!   Maintenance plan 3 mg (2 mg x 1.5) on Mon, Fri; 2 mg (2 mg x 1) all other days   Full instructions 1/10: Hold; 1/11: Hold; Otherwise 3 mg on Mon, Fri; 2 mg all other days   Weekly total 16 mg   Plan last modified Kenna Kiran RN (1/10/2018)   Next INR check 1/17/2018   Priority INR   Target end date Indefinite    Indications   Atrial fibrillation  persistent (H) [I48.1]  Long-term (current) use of anticoagulants [Z79.01] [Z79.01]         Anticoagulation Episode Summary     INR check location     Preferred lab     Send INR reminders to  ANTICOAG POOL    Comments       Anticoagulation Care Providers     Provider Role Specialty Phone number    Braydon Yen MD [facility] [phone]            See the Encounter  Report to view Anticoagulation Flowsheet and Dosing Calendar (Go to Encounters tab in chart review, and find the Anticoagulation Therapy Visit)        Kenna Kiran RN

## 2018-01-11 DIAGNOSIS — E78.5 HYPERLIPIDEMIA LDL GOAL <130: ICD-10-CM

## 2018-01-11 NOTE — TELEPHONE ENCOUNTER
simvastatin (ZOCOR) 10 MG tablet  Last Written Prescription Date:  10/12/17  Last Fill Quantity: 90,   # refills: 3  Last Office Visit: 12/12/17  Future Office visit:

## 2018-01-12 RX ORDER — SIMVASTATIN 10 MG
TABLET ORAL
Qty: 90 TABLET | Refills: 3 | Status: SHIPPED | OUTPATIENT
Start: 2018-01-12 | End: 2018-06-19 | Stop reason: ALTCHOICE

## 2018-01-17 ENCOUNTER — ANTICOAGULATION THERAPY VISIT (OUTPATIENT)
Dept: ANTICOAGULATION | Facility: OTHER | Age: 68
End: 2018-01-17
Payer: COMMERCIAL

## 2018-01-17 DIAGNOSIS — I48.19 ATRIAL FIBRILLATION, PERSISTENT (H): ICD-10-CM

## 2018-01-17 DIAGNOSIS — Z79.01 LONG-TERM (CURRENT) USE OF ANTICOAGULANTS: ICD-10-CM

## 2018-01-17 LAB — INR BLD: 1.7 (ref 0.86–1.14)

## 2018-01-17 PROCEDURE — 85610 PROTHROMBIN TIME: CPT | Mod: QW,ZL | Performed by: FAMILY MEDICINE

## 2018-01-17 PROCEDURE — 36416 COLLJ CAPILLARY BLOOD SPEC: CPT | Mod: ZL | Performed by: FAMILY MEDICINE

## 2018-01-17 NOTE — MR AVS SNAPSHOT
Mary Alice MERLE Cameron   1/17/2018   Anticoagulation Therapy Visit    Description:  67 year old female   Provider:  Braydon Yen MD   Department:  [facility] Anti Coagulation           INR as of 1/17/2018     Today's INR 1.7!      Anticoagulation Summary as of 1/17/2018     INR goal 2.0-3.0   Today's INR 1.7!   Full instructions 1/17: 3 mg; Otherwise 3 mg on Mon, Fri; 2 mg all other days   Next INR check 2/7/2018    Indications   Atrial fibrillation  persistent (H) [I48.1]  Long-term (current) use of anticoagulants [Z79.01] [Z79.01]         January 2018 Details    Sun Mon Tue Wed Thu Fri Sat      1               2               3               4               5               6                 7               8               9               10               11               12               13                 14               15               16               17      3 mg   See details      18      2 mg         19      3 mg         20      2 mg           21      2 mg         22      3 mg         23      2 mg         24      2 mg         25      2 mg         26      3 mg         27      2 mg           28      2 mg         29      3 mg         30      2 mg         31      2 mg             Date Details   01/17 This INR check               How to take your warfarin dose     To take:  2 mg Take 1 of the 2 mg tablets.    To take:  3 mg Take 1.5 of the 2 mg tablets.           February 2018 Details    Sun Mon Tue Wed Thu Fri Sat         1      2 mg         2      3 mg         3      2 mg           4      2 mg         5      3 mg         6      2 mg         7            8               9               10                 11               12               13               14               15               16               17                 18               19               20               21               22               23               24                 25               26               27               28                    Date Details   No additional details    Date of next INR:  2/7/2018         How to take your warfarin dose     To take:  2 mg Take 1 of the 2 mg tablets.    To take:  3 mg Take 1.5 of the 2 mg tablets.

## 2018-01-17 NOTE — PROGRESS NOTES
ANTICOAGULATION FOLLOW-UP CLINIC VISIT    Patient Name:  Mary Alice Cameron  Date:  1/17/2018  Contact Type:  Telephone    SUBJECTIVE:     Patient Findings     Positives No Problem Findings    Comments Call placed to patient home phone and spoke to patient. She has completed prednisone. States her breathing is better. We discussed INR result, warfarin dosing and INR recheck date. She verbalized understanding and has no questions. She will call if she has any new changes in diet/meds/activity.           OBJECTIVE    INR Point of Care   Date Value Ref Range Status   01/17/2018 1.7 (H) 0.86 - 1.14 Final     Comment:     This test is intended for monitoring Coumadin therapy.  Results are not   accurate in patients with prolonged INR due to factor deficiency.         ASSESSMENT / PLAN  INR assessment SUB    Recheck INR In: 3 WEEKS    INR Location Clinic      Anticoagulation Summary as of 1/17/2018     INR goal 2.0-3.0   Today's INR 1.7!   Maintenance plan 3 mg (2 mg x 1.5) on Mon, Fri; 2 mg (2 mg x 1) all other days   Full instructions 1/17: 3 mg; Otherwise 3 mg on Mon, Fri; 2 mg all other days   Weekly total 16 mg   Plan last modified Kenna Kiran, RN (1/10/2018)   Next INR check 2/7/2018   Priority INR   Target end date Indefinite    Indications   Atrial fibrillation  persistent (H) [I48.1]  Long-term (current) use of anticoagulants [Z79.01] [Z79.01]         Anticoagulation Episode Summary     INR check location     Preferred lab     Send INR reminders to  ANTICOAG POOL    Comments       Anticoagulation Care Providers     Provider Role Specialty Phone number    Braydon Yen MD [facility] [phone]            See the Encounter Report to view Anticoagulation Flowsheet and Dosing Calendar (Go to Encounters tab in chart review, and find the Anticoagulation Therapy Visit)        Kenna Kiran, RN

## 2018-01-24 ENCOUNTER — ANTICOAGULATION THERAPY VISIT (OUTPATIENT)
Dept: ANTICOAGULATION | Facility: OTHER | Age: 68
End: 2018-01-24
Payer: COMMERCIAL

## 2018-01-24 DIAGNOSIS — I48.19 ATRIAL FIBRILLATION, PERSISTENT (H): ICD-10-CM

## 2018-01-24 DIAGNOSIS — Z79.01 LONG-TERM (CURRENT) USE OF ANTICOAGULANTS: ICD-10-CM

## 2018-01-24 RX ORDER — PREDNISONE 20 MG/1
TABLET ORAL
Qty: 20 TABLET | Refills: 0 | COMMUNITY
Start: 2018-01-24 | End: 2018-02-28

## 2018-01-24 NOTE — Clinical Note
FYI-Pt reported today she was in the COPD Yellow Zone.  She states she self started the standing order for Prednisone 60x3d; 40x3d; 20x3d; 10x3d on 1/23.  Started using O2-2L/min Cont last night; not sleeping well and increase SOB w/ambulating short distances.  Informed to follow the COPD Action Plan and to seek assistance if needed at the ED/UC.  Coumadin adjusted accordingly with an INR recheck for 1/30.  States will contact Dr Farrell on 1/25 to report sx.

## 2018-01-24 NOTE — MR AVS SNAPSHOT
Mary Alice MERLE Cameron   1/24/2018   Anticoagulation Therapy Visit    Description:  67 year old female   Provider:  Braydon Yen MD   Department:  [facility] Anti Coagulation           INR as of 1/24/2018     Today's INR No new INR was available at the time of this encounter.      Anticoagulation Summary as of 1/24/2018     INR goal 2.0-3.0   Today's INR No new INR was available at the time of this encounter.   Full instructions 1/24: 1 mg; 1/25: 1 mg; 1/26: 2 mg; 1/27: 1 mg; 1/28: 1 mg; 1/29: 2 mg; Otherwise 3 mg on Mon, Fri; 2 mg all other days   Next INR check 1/30/2018    Indications   Atrial fibrillation  persistent (H) [I48.1]  Long-term (current) use of anticoagulants [Z79.01] [Z79.01]         January 2018 Details    Sun Mon Tue Wed Thu Fri Sat      1               2               3               4               5               6                 7               8               9               10               11               12               13                 14               15               16               17               18               19               20                 21               22               23               24      1 mg   See details      25      1 mg   See details      26      2 mg   See details      27      1 mg   See details        28      1 mg   See details      29      2 mg   See details      30            31                   Date Details   01/24 This INR check   Take 1 mg (2 mg tablets x 0.5)   Prednisone 60mg x3d      01/25 Take 1 mg (2 mg tablets x 0.5)   Prednisone 60mg x3d      01/26 Take 2 mg (2 mg tablets x 1)   Prednisone 40mg x3d      01/27 Take 1 mg (2 mg tablets x 0.5)   Prednisone 40mg x3d      01/28 Take 1 mg (2 mg tablets x 0.5)   Prednisone 40mg x3d      01/29 Take 2 mg (2 mg tablets x 1)   Prednisone 20mg x3d       Date of next INR:  1/30/2018         How to take your warfarin dose     To take:  1 mg Take 0.5 of a 2 mg tablet.    To take:  2 mg Take 1 of the 2 mg  tablets.

## 2018-01-24 NOTE — PROGRESS NOTES
"  ANTICOAGULATION FOLLOW-UP CLINIC VISIT    Patient Name:  Mary Alice Cameron  Date:  1/24/2018  Contact Type:  Telephone    SUBJECTIVE:     Patient Findings     Positives Change in medications, Other complaints    Comments Pt telephoned the AC clinic reporting she was having an COPD exacerbation and was in the \"YELLOW ZONE\".  States she did not sleep much last night.  States she has resumed her O2@2L/min continuous.  States she has difficulty walking distances.  States she follows the [facility] Action Plan and understands the physical changes, if there is no improvement she needs to seek immediate attention via 911 or using [facility] ED and as guided on the sheet.  She also reports she self started Prednisone Standing Order on 1/23/18 per Dr Farrell-Pulmonology.  She stated she has now used the last of her refills and was inquiring what to do.  Pt informed to contact Dr Farrell's office tomorrow to report her current condition and to report the Prednisone issue/and usage.  She stated she follow through tomorrow, due to the time of this call today.  Pt reports her standing Prednisone order is 60mg x 3d; 40mg x 3d; 20mg x3d; 10mg x3d then D/C.  Discussed next INR recheck; discussed altering her Coumadin dosing plan until the INR is checked, which also is based on the pt improving.  This call ended with questions answered and understanding stated.             OBJECTIVE    INR Point of Care   Date Value Ref Range Status   01/17/2018 1.7 (H) 0.86 - 1.14 Final     Comment:     This test is intended for monitoring Coumadin therapy.  Results are not   accurate in patients with prolonged INR due to factor deficiency.         ASSESSMENT / PLAN  No question data found.  Anticoagulation Summary as of 1/24/2018     INR goal 2.0-3.0   Today's INR No new INR was available at the time of this encounter.   Maintenance plan 3 mg (2 mg x 1.5) on Mon, Fri; 2 mg (2 mg x 1) all other days   Full instructions 1/24: 1 mg; 1/25: 1 mg; 1/26: 2 mg; 1/27: " 1 mg; 1/28: 1 mg; 1/29: 2 mg; Otherwise 3 mg on Mon, Fri; 2 mg all other days   Weekly total 16 mg   Plan last modified Kenna Kiran RN (1/10/2018)   Next INR check 1/30/2018   Priority INR   Target end date Indefinite    Indications   Atrial fibrillation  persistent (H) [I48.1]  Long-term (current) use of anticoagulants [Z79.01] [Z79.01]         Anticoagulation Episode Summary     INR check location     Preferred lab     Send INR reminders to  ANTICOAG POOL    Comments 1/24/18-Started Prednisone 60mg x 3d; 40mg x 3d; 20mg x3d; 10mg x3d then D/C.       Anticoagulation Care Providers     Provider Role Specialty Phone number    Braydon Yen MD [facility] [phone]            See the Encounter Report to view Anticoagulation Flowsheet and Dosing Calendar (Go to Encounters tab in chart review, and find the Anticoagulation Therapy Visit)        Kenna Storm RN

## 2018-01-25 ENCOUNTER — DOCUMENTATION ONLY (OUTPATIENT)
Dept: SLEEP MEDICINE | Facility: HOSPITAL | Age: 68
End: 2018-01-25

## 2018-01-25 DIAGNOSIS — J44.1 COPD EXACERBATION (H): ICD-10-CM

## 2018-01-25 RX ORDER — IPRATROPIUM BROMIDE AND ALBUTEROL SULFATE 2.5; .5 MG/3ML; MG/3ML
SOLUTION RESPIRATORY (INHALATION)
Qty: 1620 ML | Refills: 1 | Status: SHIPPED | OUTPATIENT
Start: 2018-01-25 | End: 2018-12-04

## 2018-01-29 ENCOUNTER — TELEPHONE (OUTPATIENT)
Dept: FAMILY MEDICINE | Facility: OTHER | Age: 68
End: 2018-01-29

## 2018-01-29 NOTE — TELEPHONE ENCOUNTER
Pt calls back and leaves a voicemail to call back.  I called pt again and no answer, left a message on her voicemail that if she is having chest pain she should go to the ER for evaluation.

## 2018-01-29 NOTE — TELEPHONE ENCOUNTER
12:26 PM    Reason for Call: OVERBOOK    Patient is having the following symptoms: Was having chest pains and unstable BP over the weekend    The patient is requesting an appointment for Today with Dr. Yen    Was an appointment offered for this call?  No    Preferred method for responding to this message: [phone]    If we cannot reach you directly, may we leave a detailed response at the number you provided ?  Yes      Kenna Panchal

## 2018-01-30 ENCOUNTER — ANTICOAGULATION THERAPY VISIT (OUTPATIENT)
Dept: ANTICOAGULATION | Facility: OTHER | Age: 68
End: 2018-01-30
Payer: COMMERCIAL

## 2018-01-30 DIAGNOSIS — Z79.01 LONG-TERM (CURRENT) USE OF ANTICOAGULANTS: ICD-10-CM

## 2018-01-30 DIAGNOSIS — I48.19 ATRIAL FIBRILLATION, PERSISTENT (H): ICD-10-CM

## 2018-01-30 LAB — INR BLD: 1.6 (ref 0.86–1.14)

## 2018-01-30 PROCEDURE — 36416 COLLJ CAPILLARY BLOOD SPEC: CPT | Mod: ZL | Performed by: FAMILY MEDICINE

## 2018-01-30 PROCEDURE — 85610 PROTHROMBIN TIME: CPT | Mod: QW,ZL | Performed by: FAMILY MEDICINE

## 2018-01-30 NOTE — MR AVS SNAPSHOT
Mary Alice Cameron   1/30/2018   Anticoagulation Therapy Visit    Description:  67 year old female   Provider:  Braydon Yen MD   Department:  [facility]c Anti Coagulation           INR as of 1/30/2018     Today's INR 1.6!      Anticoagulation Summary as of 1/30/2018     INR goal 2.0-3.0   Today's INR 1.6!   Full instructions 1/30: 4 mg; Otherwise 3 mg on Mon, Fri; 2 mg all other days   Next INR check 2/13/2018    Indications   Atrial fibrillation  persistent (H) [I48.1]  Long-term (current) use of anticoagulants [Z79.01] [Z79.01]         January 2018 Details    Sun Mon Tue Wed Thu Fri Sat      1               2               3               4               5               6                 7               8               9               10               11               12               13                 14               15               16               17               18               19               20                 21               22               23               24               25               26               27                 28               29               30      4 mg   See details      31      2 mg             Date Details   01/30 This INR check               How to take your warfarin dose     To take:  2 mg Take 1 of the 2 mg tablets.    To take:  4 mg Take 2 of the 2 mg tablets.           February 2018 Details    Sun Mon Tue Wed Thu Fri Sat         1      2 mg         2      3 mg         3      2 mg           4      2 mg         5      3 mg         6      2 mg         7      2 mg         8      2 mg         9      3 mg         10      2 mg           11      2 mg         12      3 mg         13            14               15               16               17                 18               19               20               21               22               23               24                 25               26               27               28                   Date Details   No additional  details    Date of next INR:  2/13/2018         How to take your warfarin dose     To take:  2 mg Take 1 of the 2 mg tablets.    To take:  3 mg Take 1.5 of the 2 mg tablets.

## 2018-01-30 NOTE — PROGRESS NOTES
ANTICOAGULATION FOLLOW-UP CLINIC VISIT    Patient Name:  Mary Alice Cameron  Date:  1/30/2018  Contact Type:  Telephone/ message left on listed phone number voicemail    SUBJECTIVE:     Patient Findings     Positives No Problem Findings    Comments Call placed to patient and message left re: INR result, warfarin dosing and INR recheck date. She told nurse last week that her breathing was worsening and she was thinking of starting the standing order of prednisone that she has. She is to call the warfarin clinic and let this nurse know if she is on prednisone. She is also to notify warfarin clinic if any bleeding/bruising, changes in diet/meds/activity or questions.            OBJECTIVE    INR Point of Care   Date Value Ref Range Status   01/30/2018 1.6 (H) 0.86 - 1.14 Final     Comment:     This test is intended for monitoring Coumadin therapy.  Results are not   accurate in patients with prolonged INR due to factor deficiency.         ASSESSMENT / PLAN  INR assessment SUB    Recheck INR In: 2 WEEKS    INR Location Clinic      Anticoagulation Summary as of 1/30/2018     INR goal 2.0-3.0   Today's INR 1.6!   Maintenance plan 3 mg (2 mg x 1.5) on Mon, Fri; 2 mg (2 mg x 1) all other days   Full instructions 1/30: 4 mg; Otherwise 3 mg on Mon, Fri; 2 mg all other days   Weekly total 16 mg   Plan last modified Kenna Kiran RN (1/10/2018)   Next INR check 2/13/2018   Priority INR   Target end date Indefinite    Indications   Atrial fibrillation  persistent (H) [I48.1]  Long-term (current) use of anticoagulants [Z79.01] [Z79.01]         Anticoagulation Episode Summary     INR check location     Preferred lab     Send INR reminders to  ANTICOAG POOL    Comments 1/24/18-Started Prednisone 60mg x 3d; 40mg x 3d; 20mg x3d; 10mg x3d then D/C.       Anticoagulation Care Providers     Provider Role Specialty Phone number    Braydon Yen MD [facility] [phone]            See the Encounter Report to view  Anticoagulation Flowsheet and Dosing Calendar (Go to Encounters tab in chart review, and find the Anticoagulation Therapy Visit)        Kenna Kiran RN

## 2018-02-01 DIAGNOSIS — J44.1 COPD EXACERBATION (H): ICD-10-CM

## 2018-02-01 RX ORDER — PREDNISONE 20 MG/1
TABLET ORAL
Qty: 20 TABLET | Refills: 0 | Status: SHIPPED | OUTPATIENT
Start: 2018-02-01 | End: 2018-03-22

## 2018-02-12 ENCOUNTER — ANTICOAGULATION THERAPY VISIT (OUTPATIENT)
Dept: ANTICOAGULATION | Facility: OTHER | Age: 68
End: 2018-02-12
Payer: COMMERCIAL

## 2018-02-12 DIAGNOSIS — I48.19 ATRIAL FIBRILLATION, PERSISTENT (H): ICD-10-CM

## 2018-02-12 DIAGNOSIS — Z79.01 LONG-TERM (CURRENT) USE OF ANTICOAGULANTS: ICD-10-CM

## 2018-02-12 NOTE — PROGRESS NOTES
ANTICOAGULATION FOLLOW-UP CLINIC VISIT    Patient Name:  Mary Alice Cameron  Date:  2/12/2018  Contact Type:  Telephone/ message left on listed phone voicemail    SUBJECTIVE:     Patient Findings     Positives Other complaints    Comments Call placed to patient and message left on voicemail re: missed INR appointment last week. Requested she go have INR done before Friday this week.            OBJECTIVE    INR Point of Care   Date Value Ref Range Status   01/30/2018 1.6 (H) 0.86 - 1.14 Final     Comment:     This test is intended for monitoring Coumadin therapy.  Results are not   accurate in patients with prolonged INR due to factor deficiency.         ASSESSMENT / PLAN  No question data found.  Anticoagulation Summary as of 2/12/2018     INR goal 2.0-3.0   Today's INR No new INR was available at the time of this encounter.   Maintenance plan 3 mg (2 mg x 1.5) on Mon, Fri; 2 mg (2 mg x 1) all other days   Full instructions 3 mg on Mon, Fri; 2 mg all other days   Weekly total 16 mg   No change documented Kenna Kiran RN   Plan last modified Kenna Kiran RN (1/10/2018)   Next INR check 2/19/2018   Priority INR   Target end date Indefinite    Indications   Atrial fibrillation  persistent (H) [I48.1]  Long-term (current) use of anticoagulants [Z79.01] [Z79.01]         Anticoagulation Episode Summary     INR check location     Preferred lab     Send INR reminders to [facility] POOL    Comments 1/24/18-Started Prednisone 60mg x 3d; 40mg x 3d; 20mg x3d; 10mg x3d then D/C.       Anticoagulation Care Providers     Provider Role Specialty Phone number    Braydon Yen MD [facility] [phone]            See the Encounter Report to view Anticoagulation Flowsheet and Dosing Calendar (Go to Encounters tab in chart review, and find the Anticoagulation Therapy Visit)        Kenna Kiran RN

## 2018-02-12 NOTE — MR AVS SNAPSHOT
Mary Alice Cameron   2/12/2018   Anticoagulation Therapy Visit    Description:  67 year old female   Provider:  Braydon Yen MD   Department:  Hc Anti Coagulation           INR as of 2/12/2018     Today's INR No new INR was available at the time of this encounter.      Anticoagulation Summary as of 2/12/2018     INR goal 2.0-3.0   Today's INR No new INR was available at the time of this encounter.   Full instructions 3 mg on Mon, Fri; 2 mg all other days   Next INR check 2/19/2018    Indications   Atrial fibrillation  persistent (H) [I48.1]  Long-term (current) use of anticoagulants [Z79.01] [Z79.01]         February 2018 Details    Sun Mon Tue Wed Thu Fri Sat         1               2               3                 4               5               6               7               8               9               10                 11               12      3 mg   See details      13      2 mg         14      2 mg         15      2 mg         16      3 mg         17      2 mg           18      2 mg         19            20               21               22               23               24                 25               26               27               28                   Date Details   02/12 This INR check       Date of next INR:  2/19/2018         How to take your warfarin dose     To take:  2 mg Take 1 of the 2 mg tablets.    To take:  3 mg Take 1.5 of the 2 mg tablets.

## 2018-02-14 ENCOUNTER — ANTICOAGULATION THERAPY VISIT (OUTPATIENT)
Dept: ANTICOAGULATION | Facility: OTHER | Age: 68
End: 2018-02-14
Payer: COMMERCIAL

## 2018-02-14 DIAGNOSIS — Z79.01 LONG-TERM (CURRENT) USE OF ANTICOAGULANTS: ICD-10-CM

## 2018-02-14 DIAGNOSIS — Z79.01 LONG TERM CURRENT USE OF ANTICOAGULANT THERAPY: ICD-10-CM

## 2018-02-14 DIAGNOSIS — Z79.01 LONG TERM CURRENT USE OF ANTICOAGULANT THERAPY: Primary | ICD-10-CM

## 2018-02-14 DIAGNOSIS — I48.19 ATRIAL FIBRILLATION, PERSISTENT (H): ICD-10-CM

## 2018-02-14 LAB — INR BLD: 2.9 (ref 0.86–1.14)

## 2018-02-14 PROCEDURE — 36416 COLLJ CAPILLARY BLOOD SPEC: CPT | Mod: ZL | Performed by: FAMILY MEDICINE

## 2018-02-14 PROCEDURE — 85610 PROTHROMBIN TIME: CPT | Mod: QW,ZL | Performed by: FAMILY MEDICINE

## 2018-02-14 NOTE — MR AVS SNAPSHOT
Mary Alice Cameron   2/14/2018   Anticoagulation Therapy Visit    Description:  67 year old female   Provider:  Braydon Yen MD   Department:  Hc Anti Coagulation           INR as of 2/14/2018     Today's INR 2.9      Anticoagulation Summary as of 2/14/2018     INR goal 2.0-3.0   Today's INR 2.9   Full instructions 3 mg on Mon, Fri; 2 mg all other days   Next INR check 3/7/2018    Indications   Atrial fibrillation  persistent (H) [I48.1]  Long-term (current) use of anticoagulants [Z79.01] [Z79.01]         February 2018 Details    Sun Mon Tue Wed Thu Fri Sat         1               2               3                 4               5               6               7               8               9               10                 11               12               13               14      2 mg   See details      15      2 mg         16      3 mg         17      2 mg           18      2 mg         19      3 mg         20      2 mg         21      2 mg         22      2 mg         23      3 mg         24      2 mg           25      2 mg         26      3 mg         27      2 mg         28      2 mg             Date Details   02/14 This INR check               How to take your warfarin dose     To take:  2 mg Take 1 of the 2 mg tablets.    To take:  3 mg Take 1.5 of the 2 mg tablets.           March 2018 Details    Sun Mon Tue Wed Thu Fri Sat         1      2 mg         2      3 mg         3      2 mg           4      2 mg         5      3 mg         6      2 mg         7            8               9               10                 11               12               13               14               15               16               17                 18               19               20               21               22               23               24                 25               26               27               28               29               30               31                Date Details   No additional  details    Date of next INR:  3/7/2018         How to take your warfarin dose     To take:  2 mg Take 1 of the 2 mg tablets.    To take:  3 mg Take 1.5 of the 2 mg tablets.

## 2018-02-14 NOTE — PROGRESS NOTES
ANTICOAGULATION FOLLOW-UP CLINIC VISIT    Patient Name:  Mary Alice Cameron  Date:  2/14/2018  Contact Type:  Telephone/ message left on home phone voicemail    SUBJECTIVE:     Patient Findings     Positives No Problem Findings    Comments INR done by lab and result noted by warfarin clinic nurse. Call placed to patient and message left re: INR result, warfarin dosing and INR recheck date. She is to call warfarin clinic if any bleeding/bruising, changes in diet/meds/activity or questions           OBJECTIVE    INR Point of Care   Date Value Ref Range Status   02/14/2018 2.9 (H) 0.86 - 1.14 Final     Comment:     This test is intended for monitoring Coumadin therapy.  Results are not   accurate in patients with prolonged INR due to factor deficiency.         ASSESSMENT / PLAN  INR assessment THER    Recheck INR In: 3 WEEKS    INR Location Clinic      Anticoagulation Summary as of 2/14/2018     INR goal 2.0-3.0   Today's INR 2.9   Maintenance plan 3 mg (2 mg x 1.5) on Mon, Fri; 2 mg (2 mg x 1) all other days   Full instructions 3 mg on Mon, Fri; 2 mg all other days   Weekly total 16 mg   No change documented Kenna Kiran RN   Plan last modified Kenna Kiran RN (1/10/2018)   Next INR check 3/7/2018   Priority INR   Target end date Indefinite    Indications   Atrial fibrillation  persistent (H) [I48.1]  Long-term (current) use of anticoagulants [Z79.01] [Z79.01]         Anticoagulation Episode Summary     INR check location     Preferred lab     Send INR reminders to [facility] ANTICOAG POOL    Comments 1/24/18-Started Prednisone 60mg x 3d; 40mg x 3d; 20mg x3d; 10mg x3d then D/C.       Anticoagulation Care Providers     Provider Role Specialty Phone number    Braydon Yen MD [facility] [phone]            See the Encounter Report to view Anticoagulation Flowsheet and Dosing Calendar (Go to Encounters tab in chart review, and find the Anticoagulation Therapy Visit)        Kenna Kiran RN

## 2018-02-19 DIAGNOSIS — E89.0 HYPOTHYROIDISM, POSTABLATIVE: ICD-10-CM

## 2018-02-19 DIAGNOSIS — E87.6 HYPOKALEMIA: ICD-10-CM

## 2018-02-19 NOTE — TELEPHONE ENCOUNTER
Pt calls the [facility] mail order pharmacy she has been getting her Potassium through is no longer available. She needs to get this sent to a different pharmacy. I called the pt and left a message to call back and let me know where she wants this sent.

## 2018-02-19 NOTE — TELEPHONE ENCOUNTER
Levothyroxine  Last Written Prescription Date: 12/12/17  Last Fill Quantity: 90 # of Refills: 3  Last Office Visit: 12/12/17

## 2018-02-19 NOTE — TELEPHONE ENCOUNTER
Pt calls back she would like it to go to [facility]. Last visit was 12/12/17. Last Potassium was 10/12/17.

## 2018-02-20 RX ORDER — POTASSIUM CHLORIDE 1500 MG/1
20 TABLET, EXTENDED RELEASE ORAL 2 TIMES DAILY
Qty: 180 TABLET | Refills: 2 | Status: SHIPPED | OUTPATIENT
Start: 2018-02-20 | End: 2018-11-15

## 2018-02-20 RX ORDER — LEVOTHYROXINE SODIUM 100 UG/1
TABLET ORAL
Qty: 90 TABLET | Refills: 0 | OUTPATIENT
Start: 2018-02-20

## 2018-02-27 DIAGNOSIS — Z79.01 LONG TERM CURRENT USE OF ANTICOAGULANT THERAPY: Primary | ICD-10-CM

## 2018-02-27 RX ORDER — WARFARIN SODIUM 2 MG/1
TABLET ORAL
Qty: 110 TABLET | Refills: 3 | Status: SHIPPED | OUTPATIENT
Start: 2018-02-27 | End: 2018-06-26

## 2018-02-28 ENCOUNTER — OFFICE VISIT (OUTPATIENT)
Dept: FAMILY MEDICINE | Facility: OTHER | Age: 68
End: 2018-02-28
Attending: FAMILY MEDICINE
Payer: COMMERCIAL

## 2018-02-28 VITALS
SYSTOLIC BLOOD PRESSURE: 105 MMHG | WEIGHT: 172.4 LBS | BODY MASS INDEX: 28.69 KG/M2 | OXYGEN SATURATION: 96 % | RESPIRATION RATE: 20 BRPM | TEMPERATURE: 98.3 F | HEART RATE: 85 BPM | DIASTOLIC BLOOD PRESSURE: 72 MMHG

## 2018-02-28 DIAGNOSIS — E89.0 HYPOTHYROIDISM, POSTABLATIVE: Primary | ICD-10-CM

## 2018-02-28 DIAGNOSIS — R68.2 DRY MOUTH: ICD-10-CM

## 2018-02-28 DIAGNOSIS — I48.19 ATRIAL FIBRILLATION, PERSISTENT (H): ICD-10-CM

## 2018-02-28 PROCEDURE — G0463 HOSPITAL OUTPT CLINIC VISIT: HCPCS

## 2018-02-28 PROCEDURE — 99213 OFFICE O/P EST LOW 20 MIN: CPT | Performed by: FAMILY MEDICINE

## 2018-02-28 RX ORDER — LEVOTHYROXINE SODIUM 100 UG/1
TABLET ORAL
Qty: 90 TABLET | Refills: 3 | Status: SHIPPED | OUTPATIENT
Start: 2018-02-28 | End: 2019-10-10

## 2018-02-28 ASSESSMENT — ANXIETY QUESTIONNAIRES
GAD7 TOTAL SCORE: 0
1. FEELING NERVOUS, ANXIOUS, OR ON EDGE: NOT AT ALL
6. BECOMING EASILY ANNOYED OR IRRITABLE: NOT AT ALL
IF YOU CHECKED OFF ANY PROBLEMS ON THIS QUESTIONNAIRE, HOW DIFFICULT HAVE THESE PROBLEMS MADE IT FOR YOU TO DO YOUR WORK, TAKE CARE OF THINGS AT HOME, OR GET ALONG WITH OTHER PEOPLE: NOT DIFFICULT AT ALL
2. NOT BEING ABLE TO STOP OR CONTROL WORRYING: NOT AT ALL
5. BEING SO RESTLESS THAT IT IS HARD TO SIT STILL: NOT AT ALL
3. WORRYING TOO MUCH ABOUT DIFFERENT THINGS: NOT AT ALL
7. FEELING AFRAID AS IF SOMETHING AWFUL MIGHT HAPPEN: NOT AT ALL

## 2018-02-28 ASSESSMENT — PAIN SCALES - GENERAL: PAINLEVEL: NO PAIN (0)

## 2018-02-28 ASSESSMENT — PATIENT HEALTH QUESTIONNAIRE - PHQ9: 5. POOR APPETITE OR OVEREATING: NOT AT ALL

## 2018-02-28 NOTE — NURSING NOTE
"Chief Complaint   Patient presents with     Recheck Medication     Pt is in for a FU on her meds. Pt has been having a dry mouth. Pt was told by her pharmacy she needed to be seen. She is not due for labs at this time.       Initial /72 (BP Location: Left arm, Patient Position: Chair, Cuff Size: Adult Regular)  Pulse 85  Temp 98.3  F (36.8  C) (Tympanic)  Resp 20  Wt 172 lb 6.4 oz (78.2 kg)  SpO2 96%  BMI 28.69 kg/m2 Estimated body mass index is 28.69 kg/(m^2) as calculated from the following:    Height as of 12/12/17: 5' 5\" (1.651 m).    Weight as of this encounter: 172 lb 6.4 oz (78.2 kg).  Medication Reconciliation: complete   Talia Randle    "

## 2018-02-28 NOTE — PROGRESS NOTES
Mary Alice BLOOM Nisha    February 28, 2018    Chief Complaint   Patient presents with     Recheck Medication     Pt is in for a FU on her meds. Pt has been having a dry mouth. Pt was told by her pharmacy she needed to be seen. She is not due for labs at this time.       SUBJECTIVE:  Here for f/u.  Was told she needed f/u for her thyroid.  She does not.  She is due next October for lab.  She has been out for 2 weeks and has some sx she attributes to her thyroid.  Nothing else going on to speak of.  We reviewed and discussed.  See below.  She can feel an irregular heartbeat of late and we discussed.     Past Medical History:   Diagnosis Date     Asthma      Atrial fibrillation (H)      COPD (chronic obstructive pulmonary disease) (H)      Depression      High cholesterol      HTN (hypertension)      Thyroid disease        Past Surgical History:   Procedure Laterality Date     BACK SURGERY  2006     COLONOSCOPY N/A 1/19/2016    Procedure: COLONOSCOPY;  Surgeon: Waldemar Bob MD;  Location: [facility]     HYSTERECTOMY  1980    partial     SLING BLADDER SUSPENSION WITH FASCIA LINNETTE         Current Outpatient Prescriptions   Medication Sig Dispense Refill     levothyroxine (SYNTHROID/LEVOTHROID) 100 MCG tablet TAKE 1 TABLET(100 MCG) BY MOUTH DAILY 90 tablet 3     warfarin (COUMADIN) 2 MG tablet 3 mg Mon,Fri and 2mg all other days or as directed by warfarin clinic 110 tablet 3     potassium chloride SA (K-DUR/KLOR-CON M) 20 MEQ CR tablet Take 1 tablet (20 mEq) by mouth 2 times daily 180 tablet 2     predniSONE (DELTASONE) 20 MG tablet TAKE 3 TABLETS BY MOUTH DAILY FOR 3 DAYS, THEN 2 TABLETS X 3 DAYS, 1 TABLET X 3 DAYS, AND 1/2 TABLET X 3 DAYS 20 tablet 0     ipratropium - albuterol 0.5 mg/2.5 mg/3 mL (DUONEB) 0.5-2.5 (3) MG/3ML neb solution USE 1 VIAL PER NEBULIZER FOUR TIMES DAILY 1620 mL 1     simvastatin (ZOCOR) 10 MG tablet TAKE 1 TABLET(10 MG) BY MOUTH AT BEDTIME 90 tablet 3     hydrALAZINE (APRESOLINE) 25 MG tablet TAKE 3  TABLETS BY MOUTH THREE TIMES DAILY 810 tablet 3     albuterol (VENTOLIN HFA) 108 (90 BASE) MCG/ACT Inhaler INHALE 2 PUFFS INTO THE LUNGS EVERY 4 HOURS AS NEEDED FOR SHORTNESS OF BREATH OR DIFFICULT BREATHING OR WHEEZING 54 g 1     albuterol (2.5 MG/3ML) 0.083% neb solution Take 1 vial (2.5 mg) by nebulization every 6 hours as needed for shortness of breath / dyspnea or wheezing 25 vial 1     cloNIDine (CATAPRES) 0.1 MG tablet TAKE 1 TABLET BY MOUTH EVERY MORNING AND 3 TABLETS EVERY EVENING 360 tablet 2     ADVAIR -21 MCG/ACT Inhaler INHALE 2 PUFFS INTO THE LUNGS TWICE DAILY 36 g 2     furosemide (LASIX) 40 MG tablet TAKE 1 TABLET BY MOUTH TWICE DAILY. 180 tablet 2     Calcium Carb-Cholecalciferol (CALTRATE 600+D3 SOFT PO) Take 600 mg by mouth 2 times daily       flecainide (TAMBOCOR) 100 MG tablet Take 1 tablet (100 mg) by mouth 2 times daily 180 tablet 3     order for DME Equipment being ordered: wedge for bed 1 each 0     triamcinolone (KENALOG) 0.1 % ointment Apply bid and hs left hand and legs - for dermatitis 454 g 3     diphenhydrAMINE (BENADRYL) 25 MG tablet Take 1-2 tablets (25-50 mg) by mouth every 6 hours as needed for itching or allergies 60 tablet 1     acetaminophen (TYLENOL) 500 MG tablet Take 500-1,000 mg by mouth every 6 hours as needed for mild pain       OTHER MEDICAL SUPPLIES Apply one pair to help with edema 1 each 0     LORazepam (ATIVAN) 1 MG tablet Take 0.5-1 tablets by mouth every 6 hours as needed Reported on 5/23/2017       [DISCONTINUED] levothyroxine (SYNTHROID/LEVOTHROID) 100 MCG tablet TAKE 1 TABLET(100 MCG) BY MOUTH DAILY 90 tablet 3       Allergies   Allergen Reactions     Amlodipine Besylate Cough     Norvasc     Lisinopril Cough       Family History   Problem Relation Age of Onset     Prostate Cancer Father      Hypertension Father      Heart Failure Father      CHF     Asthma Mother      CANCER Mother      ovarian     Hypertension Mother      Asthma Brother      Hypertension  Sister      Hypertension Brother        Social History     Social History     Marital status:      Spouse name: N/A     Number of children: N/A     Years of education: N/A     Occupational History      Retired     disabled     Social History Main Topics     Smoking status: Former Smoker     Years: 48.00     Types: Cigarettes     Quit date: 1/28/2007     Smokeless tobacco: Never Used     Alcohol use No     Drug use: No     Sexual activity: No      Comment:      Other Topics Concern      Service No     Blood Transfusions Yes     Permits if needed     Caffeine Concern Yes     2 cups coffee daily     Seat Belt Yes     Parent/Sibling W/ Cabg, Mi Or Angioplasty Before 65f 55m? No     Social History Narrative       5 point ROS negative except as noted above in HPI, including Gen., Resp., CV, GI &  system review.     OBJECTIVE:  B/P: 105/72, Temperature: 98.3, Pulse: 85, Respirations: 20    GENERAL APPEARANCE: Alert, no acute distress  CV: irregular rate and rhythm, no murmur, rub or gallop  RESP: lungs clear to auscultation bilaterally  ABDOMEN: normal bowel sounds, soft, nontender, no hepatosplenomegaly or other masses  SKIN: no suspicious lesions or rashes to visualized skin  NEURO: Alert, oriented x 3, speech and mentation normal    ASSESSMENT and PLAN:    (E89.0) Hypothyroidism, postablative  (primary encounter diagnosis)  Comment: reviewed.   Plan: levothyroxine (SYNTHROID/LEVOTHROID) 100 MCG         tablet        She is not due.  I sent the med for the year.  F/u in October.      (R68.2) Dry mouth  Comment: likely due to thyroid.    Plan: f/u in a month or so if this is ongoing.  I think it's pretty clear.      (I48.1) Atrial fibrillation, persistent (H)  Comment: reviewed.   Plan: she is anticoagulated.  We discussed just observation right now.  No tachycardia or chf sx.

## 2018-02-28 NOTE — MR AVS SNAPSHOT
"              After Visit Summary   2/28/2018    Mary Alice Cameron    MRN: [id number]           Patient Information     Date Of Birth          1950        Visit Information        Provider Department      2/28/2018 9:30 AM Braydon Yen MD [facility]        Today's Diagnoses     Hypothyroidism, postablative    -  1    Dry mouth        Atrial fibrillation, persistent (H)          Care Instructions    F/u with ongoing concerns.           Follow-ups after your visit        Your next 10 appointments already scheduled     May 22, 2018 10:30 AM CDT   (Arrive by 10:15 AM)   Return Visit with Eliezer Myers MD   [facility] ([facility] )    [address]  [facility] [address]   [phone]              Who to contact     If you have questions or need follow up information about today's clinic visit or your schedule please contact [facility] directly at [phone].  Normal or non-critical lab and imaging results will be communicated to you by MyChart, letter or phone within 4 business days after the clinic has received the results. If you do not hear from us within 7 days, please contact the clinic through [employer] or phone. If you have a critical or abnormal lab result, we will notify you by phone as soon as possible.  Submit refill requests through [employer] or call your pharmacy and they will forward the refill request to us. Please allow 3 business days for your refill to be completed.          Additional Information About Your Visit        [employer] Information     [employer] lets you send messages to your doctor, view your test results, renew your prescriptions, schedule appointments and more. To sign up, go to [email or web address] . Click on \"Log in\" on the left side of the screen, which will take you to the Welcome page. Then click on \"Sign up Now\" on the right side of the page.     You will be asked to enter the access code listed below, as " well as some personal information. Please follow the directions to create your username and password.     Your access code is: [email or web address]  Expires: 2018  9:19 AM     Your access code will  in 90 days. If you need help or a new code, please call your [facility] clinic or [phone].        Care EveryWhere ID     This is your Care EveryWhere ID. This could be used by other organizations to access your [facility] medical records  [id number]        Your Vitals Were     Pulse Temperature Respirations Pulse Oximetry BMI (Body Mass Index)       85 98.3  F (36.8  C) (Tympanic) 20 96% 28.69 kg/m2        Blood Pressure from Last 3 Encounters:   18 105/72   17 128/88   10/12/17 134/68    Weight from Last 3 Encounters:   18 172 lb 6.4 oz (78.2 kg)   17 174 lb (78.9 kg)   10/12/17 177 lb (80.3 kg)              Today, you had the following     No orders found for display         Where to get your medicines      These medications were sent to [facility] [address] [place] [address] [address] AT [facility] [address]  [address], [facility] [address]     Phone:  [phone]     levothyroxine 100 MCG tablet          Primary Care Provider Office Phone # Fax #    Braydon Yen [phone]       [address] [place] [address]        Equal Access to Services     [facility] AH: Hadii aad ku hadasho Soomaali, waaxda luqadaha, qaybta kaalmada adeegyada, sourav idiyamila holder. So [facility] [phone].    ATENCIÓN: Si habla español, tiene a lindsey disposición servicios gratuitos de asistencia lingüística. Llame al [phone].    We comply with applicable federal civil rights laws and [place] laws. We do not discriminate on the basis of race, color, national origin, age, disability, sex, sexual orientation, or gender identity.            Thank you!     Thank you for choosing [facility]  for your care. Our goal is always to provide you  with excellent care. Hearing back from our patients is one way we can continue to improve our services. Please take a few minutes to complete the written survey that you may receive in the mail after your visit with us. Thank you!             Your Updated Medication List - Protect others around you: Learn how to safely use, store and throw away your medicines at www.disposemymeds.org.          This list is accurate as of 2/28/18  9:47 AM.  Always use your most recent med list.                   Brand Name Dispense Instructions for use Diagnosis    acetaminophen 500 MG tablet    TYLENOL     Take 500-1,000 mg by mouth every 6 hours as needed for mild pain        ADVAIR -21 MCG/ACT Inhaler   Generic drug:  fluticasone-salmeterol     36 g    INHALE 2 PUFFS INTO THE LUNGS TWICE DAILY    COPD exacerbation (H)       * albuterol 108 (90 BASE) MCG/ACT Inhaler    VENTOLIN HFA    54 g    INHALE 2 PUFFS INTO THE LUNGS EVERY 4 HOURS AS NEEDED FOR SHORTNESS OF BREATH OR DIFFICULT BREATHING OR WHEEZING    Other emphysema (H)       * albuterol (2.5 MG/3ML) 0.083% neb solution     25 vial    Take 1 vial (2.5 mg) by nebulization every 6 hours as needed for shortness of breath / dyspnea or wheezing    COPD exacerbation (H)       CALTRATE 600+D3 SOFT PO      Take 600 mg by mouth 2 times daily        cloNIDine 0.1 MG tablet    CATAPRES    360 tablet    TAKE 1 TABLET BY MOUTH EVERY MORNING AND 3 TABLETS EVERY EVENING    Essential hypertension       diphenhydrAMINE 25 MG tablet    BENADRYL    60 tablet    Take 1-2 tablets (25-50 mg) by mouth every 6 hours as needed for itching or allergies        flecainide 100 MG tablet    TAMBOCOR    180 tablet    Take 1 tablet (100 mg) by mouth 2 times daily    Atrial fibrillation, persistent (H)       furosemide 40 MG tablet    LASIX    180 tablet    TAKE 1 TABLET BY MOUTH TWICE DAILY.    Essential hypertension, benign       hydrALAZINE 25 MG tablet    APRESOLINE    810 tablet    TAKE 3 TABLETS  BY MOUTH THREE TIMES DAILY    Essential hypertension, benign       ipratropium - albuterol 0.5 mg/2.5 mg/3 mL 0.5-2.5 (3) MG/3ML neb solution    DUONEB    1620 mL    USE 1 VIAL PER NEBULIZER FOUR TIMES DAILY    COPD exacerbation (H)       levothyroxine 100 MCG tablet    SYNTHROID/LEVOTHROID    90 tablet    TAKE 1 TABLET(100 MCG) BY MOUTH DAILY    Hypothyroidism, postablative       LORazepam 1 MG tablet    ATIVAN     Take 0.5-1 tablets by mouth every 6 hours as needed Reported on 5/23/2017        order for DME     1 each    Equipment being ordered: wedge for bed    Orthopnea       other medical supplies     1 each    Apply one pair to help with edema    Edema, Atrial fibrillation, persistent (H)       potassium chloride SA 20 MEQ CR tablet    K-DUR/KLOR-CON M    180 tablet    Take 1 tablet (20 mEq) by mouth 2 times daily    Hypokalemia       predniSONE 20 MG tablet    DELTASONE    20 tablet    TAKE 3 TABLETS BY MOUTH DAILY FOR 3 DAYS, THEN 2 TABLETS X 3 DAYS, 1 TABLET X 3 DAYS, AND 1/2 TABLET X 3 DAYS    COPD exacerbation (H)       simvastatin 10 MG tablet    ZOCOR    90 tablet    TAKE 1 TABLET(10 MG) BY MOUTH AT BEDTIME    Hyperlipidemia LDL goal <130       triamcinolone 0.1 % ointment    KENALOG    454 g    Apply bid and hs left hand and legs - for dermatitis    Contact dermatitis, unspecified contact dermatitis type, unspecified trigger       warfarin 2 MG tablet    COUMADIN    110 tablet    3 mg Mon,Fri and 2mg all other days or as directed by warfarin clinic    Long term current use of anticoagulant therapy       * Notice:  This list has 2 medication(s) that are the same as other medications prescribed for you. Read the directions carefully, and ask your doctor or other care provider to review them with you.

## 2018-03-01 ASSESSMENT — PATIENT HEALTH QUESTIONNAIRE - PHQ9: SUM OF ALL RESPONSES TO PHQ QUESTIONS 1-9: 4

## 2018-03-01 ASSESSMENT — ANXIETY QUESTIONNAIRES: GAD7 TOTAL SCORE: 0

## 2018-03-05 ENCOUNTER — CARE COORDINATION (OUTPATIENT)
Dept: CARE COORDINATION | Facility: OTHER | Age: 68
End: 2018-03-05

## 2018-03-05 NOTE — PROGRESS NOTES
Clinic Care Coordination Contact  [facility]/Voicemail       Clinical Data: Care Coordinator Outreach  Outreach attempted x 1.  Left message on voicemail with call back information and requested return call.  Plan: Care Coordinator will await return call. Care Coordinator will try to reach patient again in 3-5 business days.    Lauren Pipkin, BS-RN   CHF and General Care Coordinator  [phone] Option # 1

## 2018-03-07 ENCOUNTER — ANTICOAGULATION THERAPY VISIT (OUTPATIENT)
Dept: ANTICOAGULATION | Facility: OTHER | Age: 68
End: 2018-03-07
Payer: COMMERCIAL

## 2018-03-07 DIAGNOSIS — Z79.01 LONG-TERM (CURRENT) USE OF ANTICOAGULANTS: ICD-10-CM

## 2018-03-07 DIAGNOSIS — I48.19 ATRIAL FIBRILLATION, PERSISTENT (H): ICD-10-CM

## 2018-03-07 DIAGNOSIS — Z79.01 LONG TERM CURRENT USE OF ANTICOAGULANT THERAPY: ICD-10-CM

## 2018-03-07 LAB — INR BLD: 2.6 (ref 0.86–1.14)

## 2018-03-07 PROCEDURE — 85610 PROTHROMBIN TIME: CPT | Mod: QW,ZL | Performed by: FAMILY MEDICINE

## 2018-03-07 PROCEDURE — 36416 COLLJ CAPILLARY BLOOD SPEC: CPT | Mod: ZL | Performed by: FAMILY MEDICINE

## 2018-03-07 NOTE — MR AVS SNAPSHOT
Mary Alice MERLE Cameron   3/7/2018   Anticoagulation Therapy Visit    Description:  67 year old female   Provider:  Braydon Yen MD   Department:  [facility] Anti Coagulation           INR as of 3/7/2018     Today's INR 2.6      Anticoagulation Summary as of 3/7/2018     INR goal 2.0-3.0   Today's INR 2.6   Full instructions 3 mg on Mon, Fri; 2 mg all other days   Next INR check 4/11/2018    Indications   Atrial fibrillation  persistent (H) [I48.1]  Long-term (current) use of anticoagulants [Z79.01] [Z79.01]         March 2018 Details    Sun Mon Tue Wed Thu Fri Sat         1               2               3                 4               5               6               7      2 mg   See details      8      2 mg         9      3 mg         10      2 mg           11      2 mg         12      3 mg         13      2 mg         14      2 mg         15      2 mg         16      3 mg         17      2 mg           18      2 mg         19      3 mg         20      2 mg         21      2 mg         22      2 mg         23      3 mg         24      2 mg           25      2 mg         26      3 mg         27      2 mg         28      2 mg         29      2 mg         30      3 mg         31      2 mg          Date Details   03/07 This INR check               How to take your warfarin dose     To take:  2 mg Take 1 of the 2 mg tablets.    To take:  3 mg Take 1.5 of the 2 mg tablets.           April 2018 Details    Sun Mon Tue Wed Thu Fri Sat     1      2 mg         2      3 mg         3      2 mg         4      2 mg         5      2 mg         6      3 mg         7      2 mg           8      2 mg         9      3 mg         10      2 mg         11            12               13               14                 15               16               17               18               19               20               21                 22               23               24               25               26               27               28                  29 30                     Date Details   No additional details    Date of next INR:  4/11/2018         How to take your warfarin dose     To take:  2 mg Take 1 of the 2 mg tablets.    To take:  3 mg Take 1.5 of the 2 mg tablets.

## 2018-03-07 NOTE — PROGRESS NOTES
ANTICOAGULATION FOLLOW-UP CLINIC VISIT    Patient Name:  Mary Alice Cameron  Date:  3/7/2018  Contact Type:  Telephone/ message left on voicemail    SUBJECTIVE:     Patient Findings     Comments INR done by lab and result noted by warfarin clinic nurse. Call placed to patient and message left on her voicemail re: INR result, warfarin dosing and INR recheck date and time to go to lab. She is to call warfarin clinic if she has any new changes in diet/meds/activity or any bleeding/bruising.            OBJECTIVE    INR Point of Care   Date Value Ref Range Status   03/07/2018 2.6 (H) 0.86 - 1.14 Final     Comment:     This test is intended for monitoring Coumadin therapy.  Results are not   accurate in patients with prolonged INR due to factor deficiency.         ASSESSMENT / PLAN  INR assessment THER    Recheck INR In: 5 WEEKS    INR Location Clinic      Anticoagulation Summary as of 3/7/2018     INR goal 2.0-3.0   Today's INR 2.6   Maintenance plan 3 mg (2 mg x 1.5) on Mon, Fri; 2 mg (2 mg x 1) all other days   Full instructions 3 mg on Mon, Fri; 2 mg all other days   Weekly total 16 mg   No change documented Kenna Kiran, RN   Plan last modified Kenna Kiran RN (1/10/2018)   Next INR check 4/11/2018   Priority INR   Target end date Indefinite    Indications   Atrial fibrillation  persistent (H) [I48.1]  Long-term (current) use of anticoagulants [Z79.01] [Z79.01]         Anticoagulation Episode Summary     INR check location     Preferred lab     Send INR reminders to  ANTICOAG POOL    Comments 1/24/18-Started Prednisone 60mg x 3d; 40mg x 3d; 20mg x3d; 10mg x3d then D/C.       Anticoagulation Care Providers     Provider Role Specialty Phone number    B[facility]don Yen MD [facility] [phone]            See the Encounter Report to view Anticoagulation Flowsheet and Dosing Calendar (Go to Encounters tab in chart review, and find the Anticoagulation Therapy Visit)        Kenna Kiran RN

## 2018-03-08 ENCOUNTER — CARE COORDINATION (OUTPATIENT)
Dept: CARE COORDINATION | Facility: OTHER | Age: 68
End: 2018-03-08

## 2018-03-08 NOTE — PROGRESS NOTES
Clinic Care Coordination Contact  OUTREACH    Referral Information:  Referral Source: Care Team  Reason for Contact: Regular Outreach        [place] Utilization:   ED Visits in last year: 0  Hospital visits in last year: 1  Last PCP appointment: 02/28/18  Missed Appointments: 0  Upcoming Appointment: Dr Myers 5/22/18  Multiple Providers or Specialists: Cardiology; Care Coordination, PCP, Anticoagulation, Dermatology (Pulmonology)    Clinical Concerns:  Current Medical Concerns: denies at this time    Current Behavioral Concerns: none identified/denies    Education Provided to patient: educated on COPD action plan and when to call Care Coordinator   Clinical Pathway Name: COPD  Clinical Pathway: Clinic Care Coordination COPD Follow up Assessment  Symptom Review:    Are you having any increased shortness of breath? No  When you cough are you coughing up anything? Yes  Color: clear  Consistency: viscous  Frequency: occasionally in the AM  What COPD Zone currently in:Green  What number would you call if you were in the YELLOW zones: Care Coordinator     Medications:   Were you started on any new medications since the last time we talked?  No  Do you have all of your medications? Yes  Have you had trouble filling your prescriptions? Yes, recently had an issue filling her thyroid medication.  She went without the medication for several days before she was able to come in to see Dr Yen.  She came in feeling sluggish and tired.  She restarted the medication and this resolved.  Are you medications effective in controlling your symptoms? Yes  Are you still on Prednisone (* does pt understand the tapering instructions)?  No- states took it for quite some time with the winter temperatures recently  For patients with DM:  N/A  Are you monitoring your blood sugars, if so how are your blood sugars? N/A  How often have you had to use your rescue medications? minimal    Oxygen/DME  What is the current liter flow you are  using? 2.5 L NC  Has there been any changes? No      Activity:  How much activity can you do before you are SOB? Patient states I can do quite a bit depending on the day.  I do housework and walk in my apartment building every day.  Have you had to reduce your activities because of dyspnea or other symptoms? When she is having more SOB will restrict activity as needed.     Diet:  Do you weigh yourself daily? Yes    Has there been a recent change in your weight? No still 170lbs- stable     Emotions/Lifestyle:    Do you smoke (if not asked upon initial assessment)? No  If so, how many cigarettes a day are you smoking? N/A  Would you like to try to quit smoking? N/A    Follow Up Plan:  Care Coordinator follow up plan: follow up with patient in 6 weeks- patient to contact CC if she starts having increasing COPD symptoms  Referrals to consider: None at this time        Medication Management:  Patient takes all medications as ordered and denies need for refills at this time.     Functional Status:  Mobility Status: Independent  Equipment Currently Used at Home: cane, straight, grab bar, tub bench, walker, rolling  Transportation: Patient drives self           Psychosocial:  Current living arrangement:: I live in a private home- with my little dog  Financial/Insurance: denies at this time       Resources and Interventions:  Current Resources: PCP; CC; Cardiology       Senior Linkage Line Referral Placed: 01/18/17  Advanced Care Plans/Directives on file:: Yes  Referrals Place: None today     Goals:   Statement 1: I would like to continue to maintain my current health status.     Barriers: recurring episodes of COPD, increased SOB with COPD flares  Strengths: patient is active when she is feeling well  Patient/Caregiver understanding: patient verbalized understanding that she can contact CC anytime she feels she may be entering the Yellow Zone of COPD  Frequency of Care Coordination: 4-6 wks  Upcoming appointment: 05/22/18  (Dr. Myers)     Plan: Care Coordinator will follow up with patient in 6 weeks.  Patient is in agreement with this and will reach out to Care Coordinator if any needs arise sooner.    Lauren Pipkin, BS-RN   CHF and General Care Coordinator  [phone] Option # 1

## 2018-03-08 NOTE — PROGRESS NOTES
Clinic Care Coordination Contact  Care Team Conversations    Patient called and left a message for Care Coordinator.  CC phoned patient again and left a message.  Will await return call.    Lauren Pipkin, BS-RN   CHF and General Care Coordinator  [phone] Option # 1

## 2018-03-14 ENCOUNTER — TELEPHONE (OUTPATIENT)
Dept: FAMILY MEDICINE | Facility: OTHER | Age: 68
End: 2018-03-14

## 2018-03-14 NOTE — TELEPHONE ENCOUNTER
1:44 PM    Reason for Call: OVERBOOK    Patient is having the following symptoms: COPD and possible flu for 1 weeks.    The patient is requesting an appointment for 03/14/18 or 03/15/18 with .    Was an appointment offered for this call? No  If yes : Appointment type              Date    Preferred method for responding to this message: Telephone Call  What is your phone number ?[phone]    If we cannot reach you directly, may we leave a detailed response at the number you provided? Yes    Can this message wait until your PCP/provider returns, if unavailable today? Not applicable, PCP is in     [facility]

## 2018-03-15 ENCOUNTER — OFFICE VISIT (OUTPATIENT)
Dept: FAMILY MEDICINE | Facility: OTHER | Age: 68
End: 2018-03-15
Attending: FAMILY MEDICINE
Payer: MEDICARE

## 2018-03-15 ENCOUNTER — TELEPHONE (OUTPATIENT)
Dept: FAMILY MEDICINE | Facility: OTHER | Age: 68
End: 2018-03-15

## 2018-03-15 ENCOUNTER — RADIANT APPOINTMENT (OUTPATIENT)
Dept: GENERAL RADIOLOGY | Facility: OTHER | Age: 68
End: 2018-03-15
Attending: FAMILY MEDICINE
Payer: MEDICARE

## 2018-03-15 VITALS
HEIGHT: 65 IN | TEMPERATURE: 99.1 F | SYSTOLIC BLOOD PRESSURE: 152 MMHG | OXYGEN SATURATION: 94 % | BODY MASS INDEX: 28.16 KG/M2 | DIASTOLIC BLOOD PRESSURE: 98 MMHG | HEART RATE: 112 BPM | WEIGHT: 169 LBS

## 2018-03-15 DIAGNOSIS — J44.1 COPD EXACERBATION (H): ICD-10-CM

## 2018-03-15 DIAGNOSIS — J18.9 PNEUMONIA OF BOTH LOWER LOBES DUE TO INFECTIOUS ORGANISM: ICD-10-CM

## 2018-03-15 DIAGNOSIS — J44.1 COPD EXACERBATION (H): Primary | ICD-10-CM

## 2018-03-15 PROCEDURE — 99214 OFFICE O/P EST MOD 30 MIN: CPT | Performed by: FAMILY MEDICINE

## 2018-03-15 PROCEDURE — G0463 HOSPITAL OUTPT CLINIC VISIT: HCPCS | Mod: 25

## 2018-03-15 PROCEDURE — 71046 X-RAY EXAM CHEST 2 VIEWS: CPT | Mod: TC,FY

## 2018-03-15 PROCEDURE — 96372 THER/PROPH/DIAG INJ SC/IM: CPT | Performed by: FAMILY MEDICINE

## 2018-03-15 RX ORDER — CEFTRIAXONE 1 G/1
1000 INJECTION, POWDER, FOR SOLUTION INTRAMUSCULAR; INTRAVENOUS ONCE
Qty: 10 ML | Refills: 0 | OUTPATIENT
Start: 2018-03-15 | End: 2019-02-07

## 2018-03-15 RX ORDER — PREDNISONE 20 MG/1
TABLET ORAL
Qty: 20 TABLET | Refills: 0 | Status: SHIPPED | OUTPATIENT
Start: 2018-03-15 | End: 2018-04-11

## 2018-03-15 RX ORDER — METHYLPREDNISOLONE SODIUM SUCCINATE 125 MG/2ML
125 INJECTION, POWDER, LYOPHILIZED, FOR SOLUTION INTRAMUSCULAR; INTRAVENOUS ONCE
Qty: 2 ML | Refills: 0 | OUTPATIENT
Start: 2018-03-15 | End: 2019-02-07

## 2018-03-15 RX ORDER — AZITHROMYCIN 250 MG/1
TABLET, FILM COATED ORAL
Qty: 6 TABLET | Refills: 0 | Status: SHIPPED | OUTPATIENT
Start: 2018-03-15 | End: 2018-03-22

## 2018-03-15 ASSESSMENT — ANXIETY QUESTIONNAIRES
7. FEELING AFRAID AS IF SOMETHING AWFUL MIGHT HAPPEN: NOT AT ALL
3. WORRYING TOO MUCH ABOUT DIFFERENT THINGS: NOT AT ALL
GAD7 TOTAL SCORE: 1
2. NOT BEING ABLE TO STOP OR CONTROL WORRYING: NOT AT ALL
6. BECOMING EASILY ANNOYED OR IRRITABLE: NOT AT ALL
5. BEING SO RESTLESS THAT IT IS HARD TO SIT STILL: NOT AT ALL
1. FEELING NERVOUS, ANXIOUS, OR ON EDGE: NOT AT ALL

## 2018-03-15 ASSESSMENT — PATIENT HEALTH QUESTIONNAIRE - PHQ9: 5. POOR APPETITE OR OVEREATING: SEVERAL DAYS

## 2018-03-15 ASSESSMENT — PAIN SCALES - GENERAL: PAINLEVEL: NO PAIN (0)

## 2018-03-15 NOTE — MR AVS SNAPSHOT
After Visit Summary   3/15/2018    Mary Alice Cameron    MRN: [id number]           Patient Information     Date Of Birth          1950        Visit Information        Provider Department      3/15/2018 9:45 AM Braydon Yen MD [facility]        Today's Diagnoses     COPD exacerbation (H)    -  1    Pneumonia of both lower lobes due to infectious organism          Care Instructions    F/u with ongoing concerns.           Follow-ups after your visit        Your next 10 appointments already scheduled     Mar 19, 2018  9:30 AM CDT   (Arrive by 9:15 AM)   SHORT with Braydon Yen MD   [facility] ([facility] )    [address]  [facility] [place] [address]   [phone]            Apr 11, 2018 11:00 AM CDT   LAB with NA LAB   [facility] ([facility] )    [address]  [facility] [place] [address]   [phone]            May 22, 2018 10:30 AM CDT   (Arrive by 10:15 AM)   Return Visit with Eliezer Myers MD   [facility] ([facility] )    [address] [facility] [address]   [phone]              Who to contact     If you have questions or need follow up information about today's clinic visit or your schedule please contact [facility] directly at [phone].  Normal or non-critical lab and imaging results will be communicated to you by MyChart, letter or phone within 4 business days after the clinic has received the results. If you do not hear from us within 7 days, please contact the clinic through MyChart or phone. If you have a critical or abnormal lab result, we will notify you by phone as soon as possible.  Submit refill requests through [employer] or call your pharmacy and they will forward the refill request to us. Please allow 3 business days for your refill to be completed.          Additional Information About Your Visit        MyChart Information      "[id number] gives you secure access to your electronic health record. If you see a primary care provider, you can also send messages to your care team and make appointments. If you have questions, please call your primary care clinic.  If you do not have a primary care provider, please call [phone] and they will assist you.        Care EveryWhere ID     This is your Care EveryWhere ID. This could be used by other organizations to access your [facility] medical records  [id number]        Your Vitals Were     Pulse Temperature Height Pulse Oximetry BMI (Body Mass Index)       112 99.1  F (37.3  C) 5' 5\" (1.651 m) 94% 28.12 kg/m2        Blood Pressure from Last 3 Encounters:   03/15/18 (!) 152/98   02/28/18 105/72   12/12/17 128/88    Weight from Last 3 Encounters:   03/15/18 169 lb (76.7 kg)   02/28/18 172 lb 6.4 oz (78.2 kg)   12/12/17 174 lb (78.9 kg)              We Performed the Following     CEFTRIAXONE NA INJ /250MG     INJECTION INTRAMUSCULAR OR SUB-Q     INJECTION INTRAMUSCULAR OR SUB-Q     METHYLPRED 125 MG SOD INJ          Today's Medication Changes          These changes are accurate as of 3/15/18 12:40 PM.  If you have any questions, ask your nurse or doctor.               Start taking these medicines.        Dose/Directions    azithromycin 250 MG tablet   Commonly known as:  ZITHROMAX   Used for:  Pneumonia of both lower lobes due to infectious organism   Started by:  Braydon Yen MD        Two tablets first day, then one tablet daily for four days.   Quantity:  6 tablet   Refills:  0       cefTRIAXone 1 GM vial   Commonly known as:  ROCEPHIN   Used for:  Pneumonia of both lower lobes due to infectious organism, COPD exacerbation (H)   Started by:  Braydon Yen MD        Dose:  1000 mg   Inject 1 g (1,000 mg) into the muscle once for 1 dose   Quantity:  10 mL   Refills:  0       methylPREDNISolone sodium succinate 125 mg/2 mL injection   Commonly known as:  solu-MEDROL   Used for:  Pneumonia " of both lower lobes due to infectious organism, COPD exacerbation (H)   Started by:  Braydon Yen MD        Dose:  125 mg   Inject 2 mLs (125 mg) into the muscle once for 1 dose   Quantity:  2 mL   Refills:  0         These medicines have changed or have updated prescriptions.        Dose/Directions    * predniSONE 20 MG tablet   Commonly known as:  DELTASONE   This may have changed:  Another medication with the same name was added. Make sure you understand how and when to take each.   Used for:  COPD exacerbation (H)   Changed by:  Braydon Yen MD        TAKE 3 TABLETS BY MOUTH DAILY FOR 3 DAYS, THEN 2 TABLETS X 3 DAYS, 1 TABLET X 3 DAYS, AND 1/2 TABLET X 3 DAYS   Quantity:  20 tablet   Refills:  0       * predniSONE 20 MG tablet   Commonly known as:  DELTASONE   This may have changed:  You were already taking a medication with the same name, and this prescription was added. Make sure you understand how and when to take each.   Used for:  COPD exacerbation (H)   Changed by:  Braydon Yen MD        Take 3 tabs (60 mg) by mouth daily x 3 days, 2 tabs (40 mg) daily x 3 days, 1 tab (20 mg) daily x 3 days, then 1/2 tab (10 mg) x 3 days.   Quantity:  20 tablet   Refills:  0       * Notice:  This list has 2 medication(s) that are the same as other medications prescribed for you. Read the directions carefully, and ask your doctor or other care provider to review them with you.         Where to get your medicines      These medications were sent to [facility] [address] [place] [address] [place] - [address] [facility] [address] [place] [address]     Phone:  [phone]     azithromycin 250 MG tablet    predniSONE 20 MG tablet         Some of these will need a paper prescription and others can be bought over the counter.  Ask your nurse if you have questions.     You don't need a prescription for these medications     cefTRIAXone 1 GM vial    methylPREDNISolone sodium succinate 125  mg/2 mL injection                Primary Care Provider Office Phone # Fax #    Braydon Yen [phone]       [address] [place] [address] E  [facility] [address]        Equal Access to Services     CHARLES FISHER : Trini madrid ku torreso Sospencerali, waaxda luqadaha, qaybta kaalmada kelly, sourav reddytroy holder. So [facility] [place] [facility] [phone].    ATENCIÓN: Si habla español, tiene a lindsey disposición servicios gratuitos de asistencia lingüística. Llame al [phone].    We comply with applicable federal civil rights laws and [place] laws. We do not discriminate on the basis of race, color, national origin, age, disability, sex, sexual orientation, or gender identity.            Thank you!     Thank you for choosing [facility]  for your care. Our goal is always to provide you with excellent care. Hearing back from our patients is one way we can continue to improve our services. Please take a few minutes to complete the written survey that you may receive in the mail after your visit with us. Thank you!             Your Updated Medication List - Protect others around you: Learn how to safely use, store and throw away your medicines at www.disposemymeds.org.          This list is accurate as of 3/15/18 12:40 PM.  Always use your most recent med list.                   Brand Name Dispense Instructions for use Diagnosis    acetaminophen 500 MG tablet    TYLENOL     Take 500-1,000 mg by mouth every 6 hours as needed for mild pain        ADVAIR -21 MCG/ACT Inhaler   Generic drug:  fluticasone-salmeterol     36 g    INHALE 2 PUFFS INTO THE LUNGS TWICE DAILY    COPD exacerbation (H)       * albuterol 108 (90 BASE) MCG/ACT Inhaler    VENTOLIN HFA    54 g    INHALE 2 PUFFS INTO THE LUNGS EVERY 4 HOURS AS NEEDED FOR SHORTNESS OF BREATH OR DIFFICULT BREATHING OR WHEEZING    Other emphysema (H)       * albuterol (2.5 MG/3ML) 0.083% neb solution     25 vial    Take 1 vial (2.5 mg) by  nebulization every 6 hours as needed for shortness of breath / dyspnea or wheezing    COPD exacerbation (H)       azithromycin 250 MG tablet    ZITHROMAX    6 tablet    Two tablets first day, then one tablet daily for four days.    Pneumonia of both lower lobes due to infectious organism       CALTRATE 600+D3 SOFT PO      Take 600 mg by mouth 2 times daily        cefTRIAXone 1 GM vial    ROCEPHIN    10 mL    Inject 1 g (1,000 mg) into the muscle once for 1 dose    Pneumonia of both lower lobes due to infectious organism, COPD exacerbation (H)       cloNIDine 0.1 MG tablet    CATAPRES    360 tablet    TAKE 1 TABLET BY MOUTH EVERY MORNING AND 3 TABLETS EVERY EVENING    Essential hypertension       diphenhydrAMINE 25 MG tablet    BENADRYL    60 tablet    Take 1-2 tablets (25-50 mg) by mouth every 6 hours as needed for itching or allergies        flecainide 100 MG tablet    TAMBOCOR    180 tablet    Take 1 tablet (100 mg) by mouth 2 times daily    Atrial fibrillation, persistent (H)       furosemide 40 MG tablet    LASIX    180 tablet    TAKE 1 TABLET BY MOUTH TWICE DAILY.    Essential hypertension, benign       hydrALAZINE 25 MG tablet    APRESOLINE    810 tablet    TAKE 3 TABLETS BY MOUTH THREE TIMES DAILY    Essential hypertension, benign       ipratropium - albuterol 0.5 mg/2.5 mg/3 mL 0.5-2.5 (3) MG/3ML neb solution    DUONEB    1620 mL    USE 1 VIAL PER NEBULIZER FOUR TIMES DAILY    COPD exacerbation (H)       levothyroxine 100 MCG tablet    SYNTHROID/LEVOTHROID    90 tablet    TAKE 1 TABLET(100 MCG) BY MOUTH DAILY    Hypothyroidism, postablative       LORazepam 1 MG tablet    ATIVAN     Take 0.5-1 tablets by mouth every 6 hours as needed Reported on 5/23/2017        methylPREDNISolone sodium succinate 125 mg/2 mL injection    solu-MEDROL    2 mL    Inject 2 mLs (125 mg) into the muscle once for 1 dose    Pneumonia of both lower lobes due to infectious organism, COPD exacerbation (H)       order for DME     1 each     Equipment being ordered: wedge for bed    Orthopnea       other medical supplies     1 each    Apply one pair to help with edema    Edema, Atrial fibrillation, persistent (H)       potassium chloride SA 20 MEQ CR tablet    K-DUR/KLOR-CON M    180 tablet    Take 1 tablet (20 mEq) by mouth 2 times daily    Hypokalemia       * predniSONE 20 MG tablet    DELTASONE    20 tablet    TAKE 3 TABLETS BY MOUTH DAILY FOR 3 DAYS, THEN 2 TABLETS X 3 DAYS, 1 TABLET X 3 DAYS, AND 1/2 TABLET X 3 DAYS    COPD exacerbation (H)       * predniSONE 20 MG tablet    DELTASONE    20 tablet    Take 3 tabs (60 mg) by mouth daily x 3 days, 2 tabs (40 mg) daily x 3 days, 1 tab (20 mg) daily x 3 days, then 1/2 tab (10 mg) x 3 days.    COPD exacerbation (H)       simvastatin 10 MG tablet    ZOCOR    90 tablet    TAKE 1 TABLET(10 MG) BY MOUTH AT BEDTIME    Hyperlipidemia LDL goal <130       triamcinolone 0.1 % ointment    KENALOG    454 g    Apply bid and hs left hand and legs - for dermatitis    Contact dermatitis, unspecified contact dermatitis type, unspecified trigger       warfarin 2 MG tablet    COUMADIN    110 tablet    3 mg Mon,Fri and 2mg all other days or as directed by warfarin clinic    Long term current use of anticoagulant therapy       * Notice:  This list has 4 medication(s) that are the same as other medications prescribed for you. Read the directions carefully, and ask your doctor or other care provider to review them with you.

## 2018-03-15 NOTE — NURSING NOTE
"Chief Complaint   Patient presents with     URI     cough, SOB, chest tightness, fatigue, night sweats x 5 days, started prednisone on Sunday       Initial BP (!) 152/98  Pulse 112  Temp 99.1  F (37.3  C)  Ht 5' 5\" (1.651 m)  Wt 169 lb (76.7 kg)  SpO2 94%  BMI 28.12 kg/m2 Estimated body mass index is 28.12 kg/(m^2) as calculated from the following:    Height as of this encounter: 5' 5\" (1.651 m).    Weight as of this encounter: 169 lb (76.7 kg).  Medication Reconciliation: complete   Joy Franklin    "

## 2018-03-15 NOTE — PROGRESS NOTES
Mary Alice Cameron    March 15, 2018    Chief Complaint   Patient presents with     URI     cough, SOB, chest tightness, fatigue, night sweats x 5 days, started prednisone on Sunday       SUBJECTIVE:  Here for bad URI and cough.  Feels very sob.  Started prednisone 3 days ago.  Severe cough and SOB.  Worried about pneumonia.  Got sick from her grandchild who had a URI.  She has had to be in the hospital with copd and is scared right now.  We reviewed and discussed at some length.      Past Medical History:   Diagnosis Date     Asthma      Atrial fibrillation (H)      COPD (chronic obstructive pulmonary disease) (H)      Depression      High cholesterol      HTN (hypertension)      Thyroid disease        Past Surgical History:   Procedure Laterality Date     BACK SURGERY  2006     COLONOSCOPY N/A 1/19/2016    Procedure: COLONOSCOPY;  Surgeon: Waldemar Bob MD;  Location: [facility] OR     HYSTERECTOMY  1980    partial     SLING BLADDER SUSPENSION WITH FASCIA LINNETTE         Current Outpatient Prescriptions   Medication Sig Dispense Refill     predniSONE (DELTASONE) 20 MG tablet Take 3 tabs (60 mg) by mouth daily x 3 days, 2 tabs (40 mg) daily x 3 days, 1 tab (20 mg) daily x 3 days, then 1/2 tab (10 mg) x 3 days. 20 tablet 0     azithromycin (ZITHROMAX) 250 MG tablet Two tablets first day, then one tablet daily for four days. 6 tablet 0     cefTRIAXone (ROCEPHIN) 1 GM vial Inject 1 g (1,000 mg) into the muscle once for 1 dose 10 mL 0     levothyroxine (SYNTHROID/LEVOTHROID) 100 MCG tablet TAKE 1 TABLET(100 MCG) BY MOUTH DAILY 90 tablet 3     warfarin (COUMADIN) 2 MG tablet 3 mg Mon,Fri and 2mg all other days or as directed by warfarin clinic 110 tablet 3     potassium chloride SA (K-DUR/KLOR-CON M) 20 MEQ CR tablet Take 1 tablet (20 mEq) by mouth 2 times daily 180 tablet 2     predniSONE (DELTASONE) 20 MG tablet TAKE 3 TABLETS BY MOUTH DAILY FOR 3 DAYS, THEN 2 TABLETS X 3 DAYS, 1 TABLET X 3 DAYS, AND 1/2 TABLET X 3 DAYS 20  tablet 0     ipratropium - albuterol 0.5 mg/2.5 mg/3 mL (DUONEB) 0.5-2.5 (3) MG/3ML neb solution USE 1 VIAL PER NEBULIZER FOUR TIMES DAILY 1620 mL 1     simvastatin (ZOCOR) 10 MG tablet TAKE 1 TABLET(10 MG) BY MOUTH AT BEDTIME 90 tablet 3     hydrALAZINE (APRESOLINE) 25 MG tablet TAKE 3 TABLETS BY MOUTH THREE TIMES DAILY 810 tablet 3     albuterol (VENTOLIN HFA) 108 (90 BASE) MCG/ACT Inhaler INHALE 2 PUFFS INTO THE LUNGS EVERY 4 HOURS AS NEEDED FOR SHORTNESS OF BREATH OR DIFFICULT BREATHING OR WHEEZING 54 g 1     albuterol (2.5 MG/3ML) 0.083% neb solution Take 1 vial (2.5 mg) by nebulization every 6 hours as needed for shortness of breath / dyspnea or wheezing 25 vial 1     cloNIDine (CATAPRES) 0.1 MG tablet TAKE 1 TABLET BY MOUTH EVERY MORNING AND 3 TABLETS EVERY EVENING 360 tablet 2     ADVAIR -21 MCG/ACT Inhaler INHALE 2 PUFFS INTO THE LUNGS TWICE DAILY 36 g 2     furosemide (LASIX) 40 MG tablet TAKE 1 TABLET BY MOUTH TWICE DAILY. 180 tablet 2     Calcium Carb-Cholecalciferol (CALTRATE 600+D3 SOFT PO) Take 600 mg by mouth 2 times daily       flecainide (TAMBOCOR) 100 MG tablet Take 1 tablet (100 mg) by mouth 2 times daily 180 tablet 3     order for DME Equipment being ordered: wedge for bed 1 each 0     triamcinolone (KENALOG) 0.1 % ointment Apply bid and hs left hand and legs - for dermatitis 454 g 3     diphenhydrAMINE (BENADRYL) 25 MG tablet Take 1-2 tablets (25-50 mg) by mouth every 6 hours as needed for itching or allergies 60 tablet 1     acetaminophen (TYLENOL) 500 MG tablet Take 500-1,000 mg by mouth every 6 hours as needed for mild pain       OTHER MEDICAL SUPPLIES Apply one pair to help with edema 1 each 0     LORazepam (ATIVAN) 1 MG tablet Take 0.5-1 tablets by mouth every 6 hours as needed Reported on 5/23/2017         Allergies   Allergen Reactions     Amlodipine Besylate Cough     Norvasc     Lisinopril Cough       Family History   Problem Relation Age of Onset     Prostate Cancer Father       Hypertension Father      Heart Failure Father      CHF     Asthma Mother      CANCER Mother      ovarian     Hypertension Mother      Asthma Brother      Hypertension Sister      Hypertension Brother        Social History     Social History     Marital status:      Spouse name: N/A     Number of children: N/A     Years of education: N/A     Occupational History      Retired     disabled     Social History Main Topics     Smoking status: Former Smoker     Years: 48.00     Types: Cigarettes     Quit date: 1/28/2007     Smokeless tobacco: Never Used     Alcohol use No     Drug use: No     Sexual activity: No      Comment:      Other Topics Concern      Service No     Blood Transfusions Yes     Permits if needed     Caffeine Concern Yes     2 cups coffee daily     Seat Belt Yes     Parent/Sibling W/ Cabg, Mi Or Angioplasty Before 65f 55m? No     Social History Narrative       5 point ROS negative except as noted above in HPI, including Gen., Resp., CV, GI &  system review.     OBJECTIVE:  B/P: 152/98, Temperature: 99.1, Pulse: 112, Respirations: Data Unavailable    GENERAL APPEARANCE: Alert, no acute distress  HEENT:  Entirely intact.   CV: regular rate and rhythm, no murmur, rub or gallop  RESP: tight wheeze and decreased breath sounds throughout.   ABDOMEN: normal bowel sounds, soft, nontender, no hepatosplenomegaly or other masses  SKIN: no suspicious lesions or rashes to visualized skin  NEURO: Alert, oriented x 3, speech and mentation normal    ASSESSMENT and PLAN:  (J44.1) COPD exacerbation (H)  (primary encounter diagnosis)  Comment: reviewed.   Plan: XR CHEST 2 VW (Clinic Performed), predniSONE         (DELTASONE) 20 MG tablet, cefTRIAXone         (ROCEPHIN) 1 GM vial        Question the bases bilaterally.  Radiology read clear, but I errored on the side of abx coverage.  Rocephin, azithro given.  Continue home prednisone she already started, and I got 125 IM solumedrol in her today as  well.  Continue same steroid.  Jen Hays.  F/u Monday with me in clinic.  ER over the weekend with any worsening.  I did not get CBC intentionally as she is on the steroid and I don't trust the wbc in this case.     (J18.9) Pneumonia of both lower lobes due to infectious organism  Comment: as above.   Plan: azithromycin (ZITHROMAX) 250 MG tablet,         cefTRIAXone (ROCEPHIN) 1 GM vial        As above.  Radiology read atelectasis at the bases, but I am favoring an early pneumonia.  Either way, cover and close f/u.

## 2018-03-16 ASSESSMENT — ANXIETY QUESTIONNAIRES: GAD7 TOTAL SCORE: 1

## 2018-03-16 ASSESSMENT — PATIENT HEALTH QUESTIONNAIRE - PHQ9: SUM OF ALL RESPONSES TO PHQ QUESTIONS 1-9: 4

## 2018-03-19 ENCOUNTER — ANTICOAGULATION THERAPY VISIT (OUTPATIENT)
Dept: ANTICOAGULATION | Facility: OTHER | Age: 68
End: 2018-03-19

## 2018-03-19 ENCOUNTER — OFFICE VISIT (OUTPATIENT)
Dept: FAMILY MEDICINE | Facility: OTHER | Age: 68
End: 2018-03-19
Attending: FAMILY MEDICINE
Payer: COMMERCIAL

## 2018-03-19 VITALS
OXYGEN SATURATION: 96 % | DIASTOLIC BLOOD PRESSURE: 76 MMHG | WEIGHT: 169 LBS | BODY MASS INDEX: 28.16 KG/M2 | TEMPERATURE: 97.8 F | HEART RATE: 85 BPM | SYSTOLIC BLOOD PRESSURE: 120 MMHG | HEIGHT: 65 IN

## 2018-03-19 DIAGNOSIS — J44.1 COPD EXACERBATION (H): Primary | ICD-10-CM

## 2018-03-19 DIAGNOSIS — Z79.01 LONG-TERM (CURRENT) USE OF ANTICOAGULANTS: ICD-10-CM

## 2018-03-19 DIAGNOSIS — I48.19 ATRIAL FIBRILLATION, PERSISTENT (H): ICD-10-CM

## 2018-03-19 PROCEDURE — G0463 HOSPITAL OUTPT CLINIC VISIT: HCPCS

## 2018-03-19 PROCEDURE — 99213 OFFICE O/P EST LOW 20 MIN: CPT | Performed by: FAMILY MEDICINE

## 2018-03-19 ASSESSMENT — ANXIETY QUESTIONNAIRES
1. FEELING NERVOUS, ANXIOUS, OR ON EDGE: NOT AT ALL
3. WORRYING TOO MUCH ABOUT DIFFERENT THINGS: NOT AT ALL
5. BEING SO RESTLESS THAT IT IS HARD TO SIT STILL: NOT AT ALL
7. FEELING AFRAID AS IF SOMETHING AWFUL MIGHT HAPPEN: NOT AT ALL
GAD7 TOTAL SCORE: 0
6. BECOMING EASILY ANNOYED OR IRRITABLE: NOT AT ALL
2. NOT BEING ABLE TO STOP OR CONTROL WORRYING: NOT AT ALL

## 2018-03-19 ASSESSMENT — PATIENT HEALTH QUESTIONNAIRE - PHQ9: 5. POOR APPETITE OR OVEREATING: NOT AT ALL

## 2018-03-19 ASSESSMENT — PAIN SCALES - GENERAL: PAINLEVEL: NO PAIN (0)

## 2018-03-19 NOTE — MR AVS SNAPSHOT
After Visit Summary   3/19/2018    Mary Alice Cameron    MRN: [id number]           Patient Information     Date Of Birth          1950        Visit Information        Provider Department      3/19/2018 9:30 AM Braydon Yen MD [facility]        Today's Diagnoses     COPD exacerbation (H)    -  1      Care Instructions    F/u with ongoing concerns.           Follow-ups after your visit        Your next 10 appointments already scheduled     Apr 11, 2018 11:00 AM CDT   LAB with NA LAB   [facility] ([facility] )    [address]  [facility] [address]   [phone]            May 22, 2018 10:30 AM CDT   (Arrive by 10:15 AM)   Return Visit with Eliezer Myers MD   [facility] ([facility] )    [address]  [facility] [address] [phone]              Who to contact     If you have questions or need follow up information about today's clinic visit or your schedule please contact [facility] directly at [phone].  Normal or non-critical lab and imaging results will be communicated to you by [employer], letter or phone within 4 business days after the clinic has received the results. If you do not hear from us within 7 days, please contact the clinic through [employer] or phone. If you have a critical or abnormal lab result, we will notify you by phone as soon as possible.  Submit refill requests through [email or web address] or call your pharmacy and they will forward the refill request to us. Please allow 3 business days for your refill to be completed.          Additional Information About Your Visit        MyChart Information     [email or web address] gives you secure access to your electronic health record. If you see a primary care provider, you can also send messages to your care team and make appointments. If you have questions, please call your primary care clinic.  If you do not have a primary care provider, please  "call [phone] and they will assist you.        Care EveryWhere ID     This is your Care EveryWhere ID. This could be used by other organizations to access your [facility] medical records  [id number]        Your Vitals Were     Pulse Temperature Height Pulse Oximetry BMI (Body Mass Index)       85 97.8  F (36.6  C) 5' 5\" (1.651 m) 96% 28.12 kg/m2        Blood Pressure from Last 3 Encounters:   03/19/18 120/76   03/15/18 (!) 152/98   02/28/18 105/72    Weight from Last 3 Encounters:   03/19/18 169 lb (76.7 kg)   03/15/18 169 lb (76.7 kg)   02/28/18 172 lb 6.4 oz (78.2 kg)              Today, you had the following     No orders found for display       Primary Care Provider Office Phone # Fax #    Braydon Yen [phone]       [address] [place] [address]        Equal Access to Services     CHARLES FISHER AH: Hadii aad ku hadasho Soomaali, waaxda luqadaha, qaybta kaalmada adeegyada, waxay josein haysantiagon pinky montgomery . So [facility] [phone].    ATENCIÓN: Si habla español, tiene a lindsey disposición servicios gratuitos de asistencia lingüística. [facility] [phone].    We comply with applicable federal civil rights laws and [place] laws. We do not discriminate on the basis of race, color, national origin, age, disability, sex, sexual orientation, or gender identity.            Thank you!     Thank you for choosing [facility]  for your care. Our goal is always to provide you with excellent care. Hearing back from our patients is one way we can continue to improve our services. Please take a few minutes to complete the written survey that you may receive in the mail after your visit with us. Thank you!             Your Updated Medication List - Protect others around you: Learn how to safely use, store and throw away your medicines at www.disposemymeds.org.          This list is accurate as of 3/19/18  9:45 AM.  Always use your most recent med list.                   Brand " Name Dispense Instructions for use Diagnosis    acetaminophen 500 MG tablet    TYLENOL     Take 500-1,000 mg by mouth every 6 hours as needed for mild pain        ADVAIR -21 MCG/ACT Inhaler   Generic drug:  fluticasone-salmeterol     36 g    INHALE 2 PUFFS INTO THE LUNGS TWICE DAILY    COPD exacerbation (H)       * albuterol 108 (90 BASE) MCG/ACT Inhaler    VENTOLIN HFA    54 g    INHALE 2 PUFFS INTO THE LUNGS EVERY 4 HOURS AS NEEDED FOR SHORTNESS OF BREATH OR DIFFICULT BREATHING OR WHEEZING    Other emphysema (H)       * albuterol (2.5 MG/3ML) 0.083% neb solution     25 vial    Take 1 vial (2.5 mg) by nebulization every 6 hours as needed for shortness of breath / dyspnea or wheezing    COPD exacerbation (H)       azithromycin 250 MG tablet    ZITHROMAX    6 tablet    Two tablets first day, then one tablet daily for four days.    Pneumonia of both lower lobes due to infectious organism       CALTRATE 600+D3 SOFT PO      Take 600 mg by mouth 2 times daily        cloNIDine 0.1 MG tablet    CATAPRES    360 tablet    TAKE 1 TABLET BY MOUTH EVERY MORNING AND 3 TABLETS EVERY EVENING    Essential hypertension       diphenhydrAMINE 25 MG tablet    BENADRYL    60 tablet    Take 1-2 tablets (25-50 mg) by mouth every 6 hours as needed for itching or allergies        flecainide 100 MG tablet    TAMBOCOR    180 tablet    Take 1 tablet (100 mg) by mouth 2 times daily    Atrial fibrillation, persistent (H)       furosemide 40 MG tablet    LASIX    180 tablet    TAKE 1 TABLET BY MOUTH TWICE DAILY.    Essential hypertension, benign       hydrALAZINE 25 MG tablet    APRESOLINE    810 tablet    TAKE 3 TABLETS BY MOUTH THREE TIMES DAILY    Essential hypertension, benign       ipratropium - albuterol 0.5 mg/2.5 mg/3 mL 0.5-2.5 (3) MG/3ML neb solution    DUONEB    1620 mL    USE 1 VIAL PER NEBULIZER FOUR TIMES DAILY    COPD exacerbation (H)       levothyroxine 100 MCG tablet    SYNTHROID/LEVOTHROID    90 tablet    TAKE 1  TABLET(100 MCG) BY MOUTH DAILY    Hypothyroidism, postablative       LORazepam 1 MG tablet    ATIVAN     Take 0.5-1 tablets by mouth every 6 hours as needed Reported on 5/23/2017        order for DME     1 each    Equipment being ordered: wedge for bed    Orthopnea       other medical supplies     1 each    Apply one pair to help with edema    Edema, Atrial fibrillation, persistent (H)       potassium chloride SA 20 MEQ CR tablet    K-DUR/KLOR-CON M    180 tablet    Take 1 tablet (20 mEq) by mouth 2 times daily    Hypokalemia       * predniSONE 20 MG tablet    DELTASONE    20 tablet    TAKE 3 TABLETS BY MOUTH DAILY FOR 3 DAYS, THEN 2 TABLETS X 3 DAYS, 1 TABLET X 3 DAYS, AND 1/2 TABLET X 3 DAYS    COPD exacerbation (H)       * predniSONE 20 MG tablet    DELTASONE    20 tablet    Take 3 tabs (60 mg) by mouth daily x 3 days, 2 tabs (40 mg) daily x 3 days, 1 tab (20 mg) daily x 3 days, then 1/2 tab (10 mg) x 3 days.    COPD exacerbation (H)       simvastatin 10 MG tablet    ZOCOR    90 tablet    TAKE 1 TABLET(10 MG) BY MOUTH AT BEDTIME    Hyperlipidemia LDL goal <130       triamcinolone 0.1 % ointment    KENALOG    454 g    Apply bid and hs left hand and legs - for dermatitis    Contact dermatitis, unspecified contact dermatitis type, unspecified trigger       warfarin 2 MG tablet    COUMADIN    110 tablet    3 mg Mon,Fri and 2mg all other days or as directed by warfarin clinic    Long term current use of anticoagulant therapy       * Notice:  This list has 4 medication(s) that are the same as other medications prescribed for you. Read the directions carefully, and ask your doctor or other care provider to review them with you.

## 2018-03-19 NOTE — PROGRESS NOTES
Mary Alice Cameron    March 19, 2018    Chief Complaint   Patient presents with     Recheck Medication     pneumonia-pt states she is feeling better       SUBJECTIVE:  Here for f/u pneumonia.  Questionable xray with atelectasis vs. Infiltrate.  I gave her rocephin/azithro along with solumedrol and she's on the zpac and prednisone and nebs.  Doing way better.  Not quite at baseline but near.  Feels happy about it.     Past Medical History:   Diagnosis Date     Asthma      Atrial fibrillation (H)      COPD (chronic obstructive pulmonary disease) (H)      Depression      High cholesterol      HTN (hypertension)      Thyroid disease        Past Surgical History:   Procedure Laterality Date     BACK SURGERY  2006     COLONOSCOPY N/A 1/19/2016    Procedure: COLONOSCOPY;  Surgeon: Waldemar Bob MD;  Location: [facility]     HYSTERECTOMY  1980    partial     SLING BLADDER SUSPENSION WITH FASCIA LINNETTE         Current Outpatient Prescriptions   Medication Sig Dispense Refill     predniSONE (DELTASONE) 20 MG tablet Take 3 tabs (60 mg) by mouth daily x 3 days, 2 tabs (40 mg) daily x 3 days, 1 tab (20 mg) daily x 3 days, then 1/2 tab (10 mg) x 3 days. 20 tablet 0     azithromycin (ZITHROMAX) 250 MG tablet Two tablets first day, then one tablet daily for four days. 6 tablet 0     levothyroxine (SYNTHROID/LEVOTHROID) 100 MCG tablet TAKE 1 TABLET(100 MCG) BY MOUTH DAILY 90 tablet 3     warfarin (COUMADIN) 2 MG tablet 3 mg Mon,Fri and 2mg all other days or as directed by warfarin clinic 110 tablet 3     potassium chloride SA (K-DUR/KLOR-CON M) 20 MEQ CR tablet Take 1 tablet (20 mEq) by mouth 2 times daily 180 tablet 2     predniSONE (DELTASONE) 20 MG tablet TAKE 3 TABLETS BY MOUTH DAILY FOR 3 DAYS, THEN 2 TABLETS X 3 DAYS, 1 TABLET X 3 DAYS, AND 1/2 TABLET X 3 DAYS 20 tablet 0     ipratropium - albuterol 0.5 mg/2.5 mg/3 mL (DUONEB) 0.5-2.5 (3) MG/3ML neb solution USE 1 VIAL PER NEBULIZER FOUR TIMES DAILY 1620 mL 1     simvastatin (ZOCOR)  10 MG tablet TAKE 1 TABLET(10 MG) BY MOUTH AT BEDTIME 90 tablet 3     hydrALAZINE (APRESOLINE) 25 MG tablet TAKE 3 TABLETS BY MOUTH THREE TIMES DAILY 810 tablet 3     albuterol (VENTOLIN HFA) 108 (90 BASE) MCG/ACT Inhaler INHALE 2 PUFFS INTO THE LUNGS EVERY 4 HOURS AS NEEDED FOR SHORTNESS OF BREATH OR DIFFICULT BREATHING OR WHEEZING 54 g 1     albuterol (2.5 MG/3ML) 0.083% neb solution Take 1 vial (2.5 mg) by nebulization every 6 hours as needed for shortness of breath / dyspnea or wheezing 25 vial 1     cloNIDine (CATAPRES) 0.1 MG tablet TAKE 1 TABLET BY MOUTH EVERY MORNING AND 3 TABLETS EVERY EVENING 360 tablet 2     ADVAIR -21 MCG/ACT Inhaler INHALE 2 PUFFS INTO THE LUNGS TWICE DAILY 36 g 2     furosemide (LASIX) 40 MG tablet TAKE 1 TABLET BY MOUTH TWICE DAILY. 180 tablet 2     Calcium Carb-Cholecalciferol (CALTRATE 600+D3 SOFT PO) Take 600 mg by mouth 2 times daily       flecainide (TAMBOCOR) 100 MG tablet Take 1 tablet (100 mg) by mouth 2 times daily 180 tablet 3     order for DME Equipment being ordered: wedge for bed 1 each 0     triamcinolone (KENALOG) 0.1 % ointment Apply bid and hs left hand and legs - for dermatitis 454 g 3     diphenhydrAMINE (BENADRYL) 25 MG tablet Take 1-2 tablets (25-50 mg) by mouth every 6 hours as needed for itching or allergies 60 tablet 1     acetaminophen (TYLENOL) 500 MG tablet Take 500-1,000 mg by mouth every 6 hours as needed for mild pain       OTHER MEDICAL SUPPLIES Apply one pair to help with edema 1 each 0     LORazepam (ATIVAN) 1 MG tablet Take 0.5-1 tablets by mouth every 6 hours as needed Reported on 5/23/2017         Allergies   Allergen Reactions     Amlodipine Besylate Cough     Norvasc     Lisinopril Cough       Family History   Problem Relation Age of Onset     Prostate Cancer Father      Hypertension Father      Heart Failure Father      CHF     Asthma Mother      CANCER Mother      ovarian     Hypertension Mother      Asthma Brother      Hypertension  Sister      Hypertension Brother        Social History     Social History     Marital status:      Spouse name: N/A     Number of children: N/A     Years of education: N/A     Occupational History      Retired     disabled     Social History Main Topics     Smoking status: Former Smoker     Years: 48.00     Types: Cigarettes     Quit date: 1/28/2007     Smokeless tobacco: Never Used     Alcohol use No     Drug use: No     Sexual activity: No      Comment:      Other Topics Concern      Service No     Blood Transfusions Yes     Permits if needed     Caffeine Concern Yes     2 cups coffee daily     Seat Belt Yes     Parent/Sibling W/ Cabg, Mi Or Angioplasty Before 65f 55m? No     Social History Narrative       5 point ROS negative except as noted above in HPI, including Gen., Resp., CV, GI &  system review.     OBJECTIVE:  B/P: 120/76, Temperature: 97.8, Pulse: 85, Respirations: Data Unavailable    GENERAL APPEARANCE: Alert, no acute distress  CV: regular rate and rhythm, no murmur, rub or gallop  RESP: lungs clear to auscultation bilaterally without wheeze.  Decreased breath sounds.    ABDOMEN: normal bowel sounds, soft, nontender, no hepatosplenomegaly or other masses  SKIN: no suspicious lesions or rashes to visualized skin  NEURO: Alert, oriented x 3, speech and mentation normal    ASSESSMENT and PLAN:  (J44.1) COPD exacerbation (H)  (primary encounter diagnosis)  Comment: improved.    Plan: continue same.  No further workup today as such clear clinical improvement.  F/u with any change.  Finish the steroids and the zpac.

## 2018-03-19 NOTE — PROGRESS NOTES
ANTICOAGULATION FOLLOW-UP CLINIC VISIT    Patient Name:  Mary Alice Cameron  Date:  3/19/2018  Contact Type:  Telephone    SUBJECTIVE:     Patient Findings     Comments Call placed to patient as I had been Notified that patient has been on prednisone and zpak. Call placed to patient and spoke to her re: effects of antibiotic and prednisone on INR.  She states she took her last dose of azithromycin today.  She will be done with prednisone in 3-4 days. She is down to 20mg daily. She denies any bleeding/bruising. No other changes. We discussed warfarin dosing and INR recheck date and she verbalized understanding and has no questions.           OBJECTIVE    INR Point of Care   Date Value Ref Range Status   03/07/2018 2.6 (H) 0.86 - 1.14 Final     Comment:     This test is intended for monitoring Coumadin therapy.  Results are not   accurate in patients with prolonged INR due to factor deficiency.         ASSESSMENT / PLAN  No question data found.  Anticoagulation Summary as of 3/19/2018     INR goal 2.0-3.0   Today's INR No new INR was available at the time of this encounter.   Maintenance plan 3 mg (2 mg x 1.5) on Mon, Fri; 2 mg (2 mg x 1) all other days   Full instructions 3/19: Hold; 3/20: Hold; 3/21: 1 mg; Otherwise 3 mg on Mon, Fri; 2 mg all other days   Weekly total 16 mg   Plan last modified Kenna Kiran RN (1/10/2018)   Next INR check 3/22/2018   Priority INR   Target end date Indefinite    Indications   Atrial fibrillation  persistent (H) [I48.1]  Long-term (current) use of anticoagulants [Z79.01] [Z79.01]         Anticoagulation Episode Summary     INR check location     Preferred lab     Send INR reminders to [facility] POOL    Comments 1/24/18-Started Prednisone 60mg x 3d; 40mg x 3d; 20mg x3d; 10mg x3d then D/C.       Anticoagulation Care Providers     Provider Role Specialty Phone number    Braydon Yen MD [facility] [phone]            See the Encounter Report to view  Anticoagulation Flowsheet and Dosing Calendar (Go to Encounters tab in chart review, and find the Anticoagulation Therapy Visit)        Kenna Kiran RN

## 2018-03-19 NOTE — MR AVS SNAPSHOT
Mary Alice MERLE Cameron   3/19/2018   Anticoagulation Therapy Visit    Description:  67 year old female   Provider:  Braydon Yen MD   Department:  [facility] Anti Coagulation           INR as of 3/19/2018     Today's INR No new INR was available at the time of this encounter.      Anticoagulation Summary as of 3/19/2018     INR goal 2.0-3.0   Today's INR No new INR was available at the time of this encounter.   Full instructions 3/19: Hold; 3/20: Hold; 3/21: 1 mg; Otherwise 3 mg on Mon, Fri; 2 mg all other days   Next INR check 3/22/2018    Indications   Atrial fibrillation  persistent (H) [I48.1]  Long-term (current) use of anticoagulants [Z79.01] [Z79.01]         March 2018 Details    Sun Mon Tue Wed Thu Fri Sat         1               2               3                 4               5               6               7               8               9               10                 11               12               13               14               15               16               17                 18               19      Hold   See details      20      Hold         21      1 mg         22            23               24                 25               26               27               28               29               30               31                Date Details   03/19 This INR check       Date of next INR:  3/22/2018         How to take your warfarin dose     To take:  1 mg Take 0.5 of a 2 mg tablet.    To take:  2 mg Take 1 of the 2 mg tablets.    Hold Do not take your warfarin dose. See the Details table to the right for additional instructions.

## 2018-03-19 NOTE — NURSING NOTE
"Chief Complaint   Patient presents with     Recheck Medication     pneumonia-pt states she is feeling better       Initial /76  Pulse 85  Temp 97.8  F (36.6  C)  Ht 5' 5\" (1.651 m)  Wt 169 lb (76.7 kg)  SpO2 96%  BMI 28.12 kg/m2 Estimated body mass index is 28.12 kg/(m^2) as calculated from the following:    Height as of this encounter: 5' 5\" (1.651 m).    Weight as of this encounter: 169 lb (76.7 kg).  Medication Reconciliation: complete   Joy Franklin    "

## 2018-03-20 ASSESSMENT — ANXIETY QUESTIONNAIRES: GAD7 TOTAL SCORE: 0

## 2018-03-20 ASSESSMENT — PATIENT HEALTH QUESTIONNAIRE - PHQ9: SUM OF ALL RESPONSES TO PHQ QUESTIONS 1-9: 0

## 2018-03-21 ENCOUNTER — PATIENT OUTREACH (OUTPATIENT)
Dept: CARE COORDINATION | Facility: OTHER | Age: 68
End: 2018-03-21

## 2018-03-21 NOTE — PROGRESS NOTES
Clinic Care Coordination Contact    Situation: Patient chart reviewed by care coordinator.    Background: Patient was seen in clinic by Dr Yen 3/15 and 3/19 for COPD exacerbation/pneumonia.      Assessment: Per Dr Yen's note from 3/19 patient is reporting feeling better now.  Not quite to baseline but near.      Plan/Recommendations: See Dr Yen's progress note for updates to plan of care.  Patient to follow-up if any change in condition or concerns.  Care Coordinator reaches out to patient every 4-6 weeks.    Lauren Pipkin, BS-RN   CHF and General Care Coordinator  [phone] Option # 1

## 2018-03-22 ENCOUNTER — ANTICOAGULATION THERAPY VISIT (OUTPATIENT)
Dept: ANTICOAGULATION | Facility: OTHER | Age: 68
End: 2018-03-22
Payer: COMMERCIAL

## 2018-03-22 DIAGNOSIS — I48.19 ATRIAL FIBRILLATION, PERSISTENT (H): ICD-10-CM

## 2018-03-22 DIAGNOSIS — Z79.01 LONG-TERM (CURRENT) USE OF ANTICOAGULANTS: ICD-10-CM

## 2018-03-22 DIAGNOSIS — Z79.01 LONG TERM CURRENT USE OF ANTICOAGULANT THERAPY: ICD-10-CM

## 2018-03-22 LAB — INR PPP: 2.14 (ref 0.8–1.2)

## 2018-03-22 PROCEDURE — 85610 PROTHROMBIN TIME: CPT | Mod: ZL | Performed by: FAMILY MEDICINE

## 2018-03-22 PROCEDURE — 36415 COLL VENOUS BLD VENIPUNCTURE: CPT | Mod: ZL | Performed by: FAMILY MEDICINE

## 2018-03-22 PROCEDURE — 85610 PROTHROMBIN TIME: CPT | Mod: QW,ZL | Performed by: FAMILY MEDICINE

## 2018-03-22 PROCEDURE — 36416 COLLJ CAPILLARY BLOOD SPEC: CPT | Mod: ZL | Performed by: FAMILY MEDICINE

## 2018-03-22 NOTE — MR AVS SNAPSHOT
Mary Alice Cameron   3/22/2018   Anticoagulation Therapy Visit    Description:  67 year old female   Provider:  Braydon Yen MD   Department:  [facility] Anti Coagulation           INR as of 3/22/2018     Today's INR 2.14      Anticoagulation Summary as of 3/22/2018     INR goal 2.0-3.0   Today's INR 2.14   Full instructions 3/23: 2 mg; Otherwise 3 mg on Mon, Fri; 2 mg all other days   Next INR check 4/2/2018    Indications   Atrial fibrillation  persistent (H) [I48.1]  Long-term (current) use of anticoagulants [Z79.01] [Z79.01]         March 2018 Details    Sun Mon Tue Wed Thu Fri Sat         1               2               3                 4               5               6               7               8               9               10                 11               12               13               14               15               16               17                 18               19               20               21               22      2 mg   See details      23      2 mg         24      2 mg           25      2 mg         26      3 mg         27      2 mg         28      2 mg         29      2 mg         30      3 mg         31      2 mg          Date Details   03/22 This INR check               How to take your warfarin dose     To take:  2 mg Take 1 of the 2 mg tablets.    To take:  3 mg Take 1.5 of the 2 mg tablets.           April 2018 Details    Sun Mon Tue Wed Thu Fri Sat     1      2 mg         2            3               4               5               6               7                 8               9               10               11               12               13               14                 15               16               17               18               19               20               21                 22               23               24               25               26               27               28                 29               30                     Date Details    No additional details    Date of next INR:  4/2/2018         How to take your warfarin dose     To take:  2 mg Take 1 of the 2 mg tablets.    To take:  3 mg Take 1.5 of the 2 mg tablets.

## 2018-03-22 NOTE — PROGRESS NOTES
ANTICOAGULATION FOLLOW-UP CLINIC VISIT    Patient Name:  Mary Alice Cameron  Date:  3/22/2018  Contact Type:  Telephone/ message left on patient phone    SUBJECTIVE:     Patient Findings     Comments INR done by lab and result noted by warfarin clinic nurse Call placed to patient and message left re: INR result, warfarin dosing and INR recheck date. She should have 2 days left of prednisone 20mg and then 3 days of 10mg and then complete. Zpak should already have been completed. Patient to call warfarin clinic if she has any new changes in medications, diet, activity, any bleeding/bruising.           OBJECTIVE    INR   Date Value Ref Range Status   03/22/2018 2.14 (H) 0.80 - 1.20 Final       ASSESSMENT / PLAN  INR assessment THER    Recheck INR In: 10 DAYS    INR Location Clinic      Anticoagulation Summary as of 3/22/2018     INR goal 2.0-3.0   Today's INR 2.14   Maintenance plan 3 mg (2 mg x 1.5) on Mon, Fri; 2 mg (2 mg x 1) all other days   Full instructions 3/23: 2 mg; Otherwise 3 mg on Mon, Fri; 2 mg all other days   Weekly total 16 mg   Plan last modified Kenna Kiran, RN (1/10/2018)   Next INR check 4/2/2018   Priority INR   Target end date Indefinite    Indications   Atrial fibrillation  persistent (H) [I48.1]  Long-term (current) use of anticoagulants [Z79.01] [Z79.01]         Anticoagulation Episode Summary     INR check location     Preferred lab     Send INR reminders to [facility]TICO[facility] POOL    Comments 1/24/18-Started Prednisone 60mg x 3d; 40mg x 3d; 20mg x3d; 10mg x3d then D/C.       Anticoagulation Care Providers     Provider Role Specialty Phone number    Braydon Yen MD [facility] [phone]            See the Encounter Report to view Anticoagulation Flowsheet and Dosing Calendar (Go to Encounters tab in chart review, and find the Anticoagulation Therapy Visit)        Kenna Kiran, RN

## 2018-04-02 ENCOUNTER — ANTICOAGULATION THERAPY VISIT (OUTPATIENT)
Dept: ANTICOAGULATION | Facility: OTHER | Age: 68
End: 2018-04-02
Payer: COMMERCIAL

## 2018-04-02 DIAGNOSIS — Z79.01 LONG TERM CURRENT USE OF ANTICOAGULANT THERAPY: ICD-10-CM

## 2018-04-02 DIAGNOSIS — Z79.01 LONG-TERM (CURRENT) USE OF ANTICOAGULANTS: ICD-10-CM

## 2018-04-02 DIAGNOSIS — I48.19 ATRIAL FIBRILLATION, PERSISTENT (H): ICD-10-CM

## 2018-04-02 LAB — INR BLD: 3 (ref 0.86–1.14)

## 2018-04-02 PROCEDURE — 85610 PROTHROMBIN TIME: CPT | Mod: QW,ZL | Performed by: FAMILY MEDICINE

## 2018-04-02 PROCEDURE — 36416 COLLJ CAPILLARY BLOOD SPEC: CPT | Mod: ZL | Performed by: FAMILY MEDICINE

## 2018-04-02 NOTE — PROGRESS NOTES
ANTICOAGULATION FOLLOW-UP CLINIC VISIT    Patient Name:  Mary Alice Cameron  Date:  4/2/2018  Contact Type:  Telephone    SUBJECTIVE:     Patient Findings     Positives No Problem Findings    Comments INR done by lab and result noted by warfarin clinic nurse. Call placed to patient and spoke to her re: INR result, warfarin dosing and INR recheck date. She states vit K intake has decreased as sometimes the greens upset her stomach. She states her diet is not consistent and sometimes she eats very little when her stomach is upset. We discussed that she can drink some green tea for vit K and also on the days she is not eating well that she can drink Boost or Ensure as those are diet supplements and will also give her some vit K intake. She verbalized understanding of all things discussed and has no questions. She states she is done with prednisone and antibiotics at this time.            OBJECTIVE    INR Point of Care   Date Value Ref Range Status   04/02/2018 3.0 (H) 0.86 - 1.14 Final     Comment:     This test is intended for monitoring Coumadin therapy.  Results are not   accurate in patients with prolonged INR due to factor deficiency.         ASSESSMENT / PLAN  INR assessment THER    Recheck INR In: 2 WEEKS    INR Location Clinic      Anticoagulation Summary as of 4/2/2018     INR goal 2.0-3.0   Today's INR 3.0   Maintenance plan 3 mg (2 mg x 1.5) on Mon, Fri; 2 mg (2 mg x 1) all other days   Full instructions 4/2: 2 mg; Otherwise 3 mg on Mon, Fri; 2 mg all other days   Weekly total 16 mg   Plan last modified Kenna Kiran RN (1/10/2018)   Next INR check 4/18/2018   Priority INR   Target end date Indefinite    Indications   Atrial fibrillation  persistent (H) [I48.1]  Long-term (current) use of anticoagulants [Z79.01] [Z79.01]         Anticoagulation Episode Summary     INR check location     Preferred lab     Send INR reminders to  ANTICOAG POOL    Comments        Anticoagulation Care Providers     Provider Role  Specialty Phone number    Braydon Yen MD [facility] [phone]            See the Encounter Report to view Anticoagulation Flowsheet and Dosing Calendar (Go to Encounters tab in chart review, and find the Anticoagulation Therapy Visit)        Kenna Kiran RN

## 2018-04-02 NOTE — MR AVS SNAPSHOT
Mary Alice Cameron   4/2/2018   Anticoagulation Therapy Visit    Description:  67 year old female   Provider:  Braydon Yen MD   Department:  Hc Anti Coagulation           INR as of 4/2/2018     Today's INR 3.0      Anticoagulation Summary as of 4/2/2018     INR goal 2.0-3.0   Today's INR 3.0   Full instructions 4/2: 2 mg; Otherwise 3 mg on Mon, Fri; 2 mg all other days   Next INR check 4/18/2018    Indications   Atrial fibrillation  persistent (H) [I48.1]  Long-term (current) use of anticoagulants [Z79.01] [Z79.01]         April 2018 Details    Sun Mon Tue Wed Thu Fri Sat     1               2      2 mg   See details      3      2 mg         4      2 mg         5      2 mg         6      3 mg         7      2 mg           8      2 mg         9      3 mg         10      2 mg         11      2 mg         12      2 mg         13      3 mg         14      2 mg           15      2 mg         16      3 mg         17      2 mg         18            19               20               21                 22               23               24               25               26               27               28                 29               30                     Date Details   04/02 This INR check       Date of next INR:  4/18/2018         How to take your warfarin dose     To take:  2 mg Take 1 of the 2 mg tablets.    To take:  3 mg Take 1.5 of the 2 mg tablets.

## 2018-04-11 ENCOUNTER — ANTICOAGULATION THERAPY VISIT (OUTPATIENT)
Dept: ANTICOAGULATION | Facility: OTHER | Age: 68
End: 2018-04-11
Payer: COMMERCIAL

## 2018-04-11 DIAGNOSIS — Z79.01 LONG-TERM (CURRENT) USE OF ANTICOAGULANTS: ICD-10-CM

## 2018-04-11 DIAGNOSIS — I48.19 ATRIAL FIBRILLATION, PERSISTENT (H): ICD-10-CM

## 2018-04-11 DIAGNOSIS — Z79.01 LONG TERM CURRENT USE OF ANTICOAGULANT THERAPY: ICD-10-CM

## 2018-04-11 LAB — INR BLD: 2.2 (ref 0.86–1.14)

## 2018-04-11 PROCEDURE — 85610 PROTHROMBIN TIME: CPT | Mod: QW,ZL | Performed by: FAMILY MEDICINE

## 2018-04-11 PROCEDURE — 36416 COLLJ CAPILLARY BLOOD SPEC: CPT | Mod: ZL | Performed by: FAMILY MEDICINE

## 2018-04-11 NOTE — MR AVS SNAPSHOT
Mary Alice Cameron   4/11/2018   Anticoagulation Therapy Visit    Description:  67 year old female   Provider:  Braydon Yen MD   Department:  Hc Anti Coagulation           INR as of 4/11/2018     Today's INR 2.2      Anticoagulation Summary as of 4/11/2018     INR goal 2.0-3.0   Today's INR 2.2   Full instructions 3 mg on Mon, Fri; 2 mg all other days   Next INR check 5/2/2018    Indications   Atrial fibrillation  persistent (H) [I48.1]  Long-term (current) use of anticoagulants [Z79.01] [Z79.01]         Your next Anticoagulation Clinic appointment(s)     May 02, 2018 11:00 AM CDT   Anticoagulation Visit with HC ANTI COAGULATION   [facility] [place] ([facility] [place] )    [address]  [place] [address]   [phone]              April 2018 Details    Sun Mon Tue Wed Thu Fri Sat     1               2               3               4               5               6               7                 8               9               10               11      2 mg   See details      12      2 mg         13      3 mg         14      2 mg           15      2 mg         16      3 mg         17      2 mg         18      2 mg         19      2 mg         20      3 mg         21      2 mg           22      2 mg         23      3 mg         24      2 mg         25      2 mg         26      2 mg         27      3 mg         28      2 mg           29      2 mg         30      3 mg               Date Details   04/11 This INR check               How to take your warfarin dose     To take:  2 mg Take 1 of the 2 mg tablets.    To take:  3 mg Take 1.5 of the 2 mg tablets.           May 2018 Details    Sun Mon Tue Wed Thu Fri Sat       1      2 mg         2            3               4               5                 6               7               8               9               10               11               12                 13               14               15               16               17                18               19                 20               21               22               23               24               25               26                 27               28               29               30               31                  Date Details   No additional details    Date of next INR:  5/2/2018         How to take your warfarin dose     To take:  2 mg Take 1 of the 2 mg tablets.

## 2018-04-11 NOTE — PROGRESS NOTES
ANTICOAGULATION FOLLOW-UP CLINIC VISIT    Patient Name:  Mary Alice Cameron  Date:  4/11/2018  Contact Type:  Telephone/ message left on phone voicemail.    SUBJECTIVE:     Patient Findings     Positives No Problem Findings    Comments INR done by lab and result noted by warfarin clinic nurse. Call placed to patient and message left re: INR result, warfarin dosing and INR recheck date. Patient to call warfarin clinic if she has any bleeding/bruising, changes in diet/meds/activity or questions.            OBJECTIVE    INR Point of Care   Date Value Ref Range Status   04/11/2018 2.2 (H) 0.86 - 1.14 Final     Comment:     This test is intended for monitoring Coumadin therapy.  Results are not   accurate in patients with prolonged INR due to factor deficiency.         ASSESSMENT / PLAN  INR assessment THER    Recheck INR In: 3 WEEKS    INR Location Clinic      Anticoagulation Summary as of 4/11/2018     INR goal 2.0-3.0   Today's INR 2.2   Maintenance plan 3 mg (2 mg x 1.5) on Mon, Fri; 2 mg (2 mg x 1) all other days   Full instructions 3 mg on Mon, Fri; 2 mg all other days   Weekly total 16 mg   No change documented Kenna Kiran RN   Plan last modified Kenna Kiran, RN (1/10/2018)   Next INR check 5/2/2018   Priority INR   Target end date Indefinite    Indications   Atrial fibrillation  persistent (H) [I48.1]  Long-term (current) use of anticoagulants [Z79.01] [Z79.01]         Anticoagulation Episode Summary     INR check location     Preferred lab     Send INR reminders to  JOSAFAT POOL    Comments        Anticoagulation Care Providers     Provider Role Specialty Phone number    Braydon Yen MD [facility] [phone]            See the Encounter Report to view Anticoagulation Flowsheet and Dosing Calendar (Go to Encounters tab in chart review, and find the Anticoagulation Therapy Visit)        Kenna Kiran RN

## 2018-04-13 ENCOUNTER — ANTICOAGULATION THERAPY VISIT (OUTPATIENT)
Dept: ANTICOAGULATION | Facility: OTHER | Age: 68
End: 2018-04-13
Payer: COMMERCIAL

## 2018-04-13 DIAGNOSIS — I48.19 ATRIAL FIBRILLATION, PERSISTENT (H): ICD-10-CM

## 2018-04-13 DIAGNOSIS — Z79.01 LONG-TERM (CURRENT) USE OF ANTICOAGULANTS: ICD-10-CM

## 2018-04-13 NOTE — MR AVS SNAPSHOT
Mary Alice MERLE Cameron   4/13/2018   Anticoagulation Therapy Visit    Description:  67 year old female   Provider:  Braydon Yen MD   Department:  Hc Anti Coagulation           INR as of 4/13/2018     Today's INR No new INR was available at the time of this encounter.      Anticoagulation Summary as of 4/13/2018     INR goal 2.0-3.0   Today's INR No new INR was available at the time of this encounter.   Full instructions 4/13: 1 mg; 4/14: 1 mg; 4/15: 1 mg; 4/16: 2 mg; Otherwise 3 mg on Mon, Fri; 2 mg all other days   Next INR check 4/17/2018    Indications   Atrial fibrillation  persistent (H) [I48.1]  Long-term (current) use of anticoagulants [Z79.01] [Z79.01]         Your next Anticoagulation Clinic appointment(s)     Apr 17, 2018  9:30 AM CDT   Anticoagulation Visit with HC ANTI COAGULATION   [facility] [place] ([facility] )    [address]  [place] [address]   [phone]            May 02, 2018 11:00 AM CDT   Anticoagulation Visit with HC ANTI COAGULATION   [facility] [place] ([facility] [place] )    [address]  [place] [address]   [phone]              April 2018 Details    Sun Mon Tue Wed Thu Fri Sat     1               2               3               4               5               6               7                 8               9               10               11               12               13      1 mg   See details      14      1 mg           15      1 mg         16      2 mg         17            18               19               20               21                 22               23               24               25               26               27               28                 29               30                     Date Details   04/13 This INR check       Date of next INR:  4/17/2018         How to take your warfarin dose     To take:  1 mg Take 0.5 of a 2 mg tablet.    To take:  2 mg Take 1 of the 2 mg tablets.

## 2018-04-13 NOTE — PROGRESS NOTES
ANTICOAGULATION FOLLOW-UP CLINIC VISIT    Patient Name:  Mary Alice Cameron  Date:  4/13/2018  Contact Type:  Telephone    SUBJECTIVE:     Patient Findings     Positives Change in medications    Comments Call from patient. She states she restarted her prednisone yesterday.  Takes 60mg x 3 days, 40mg x 3 days, 20mg x 3 days and 1/2 pill x 3 days. Should complete prednisone on 4/22/18  We discussed changes in warfarin clinic dosing and INR recheck date. Patient verbalized understanding and has no questions.            OBJECTIVE    INR Point of Care   Date Value Ref Range Status   04/11/2018 2.2 (H) 0.86 - 1.14 Final     Comment:     This test is intended for monitoring Coumadin therapy.  Results are not   accurate in patients with prolonged INR due to factor deficiency.         ASSESSMENT / PLAN  No question data found.  Anticoagulation Summary as of 4/13/2018     INR goal 2.0-3.0   Today's INR No new INR was available at the time of this encounter.   Maintenance plan 3 mg (2 mg x 1.5) on Mon, Fri; 2 mg (2 mg x 1) all other days   Full instructions 4/13: 1 mg; 4/14: 1 mg; 4/15: 1 mg; 4/16: 2 mg; Otherwise 3 mg on Mon, Fri; 2 mg all other days   Weekly total 16 mg   Plan last modified Knena Kiran RN (1/10/2018)   Next INR check 4/17/2018   Priority INR   Target end date Indefinite    Indications   Atrial fibrillation  persistent (H) [I48.1]  Long-term (current) use of anticoagulants [Z79.01] [Z79.01]         Anticoagulation Episode Summary     INR check location     Preferred lab     Send INR reminders to  ANTICOAG POOL    Comments        Anticoagulation Care Providers     Provider Role Specialty Phone number    Braydon Yen MD [facility] [phone]            See the Encounter Report to view Anticoagulation Flowsheet and Dosing Calendar (Go to Encounters tab in chart review, and find the Anticoagulation Therapy Visit)        Kenna Kiran, RN

## 2018-04-15 DIAGNOSIS — J45.40 MODERATE PERSISTENT ASTHMA WITHOUT COMPLICATION: ICD-10-CM

## 2018-04-15 DIAGNOSIS — J44.1 COPD EXACERBATION (H): ICD-10-CM

## 2018-04-16 RX ORDER — PREDNISONE 20 MG/1
TABLET ORAL
Qty: 20 TABLET | Refills: 0 | Status: SHIPPED | OUTPATIENT
Start: 2018-04-16 | End: 2018-06-19

## 2018-04-17 ENCOUNTER — ANTICOAGULATION THERAPY VISIT (OUTPATIENT)
Dept: ANTICOAGULATION | Facility: OTHER | Age: 68
End: 2018-04-17
Attending: FAMILY MEDICINE
Payer: MEDICARE

## 2018-04-17 DIAGNOSIS — Z79.01 LONG TERM CURRENT USE OF ANTICOAGULANT THERAPY: ICD-10-CM

## 2018-04-17 DIAGNOSIS — I48.19 ATRIAL FIBRILLATION, PERSISTENT (H): ICD-10-CM

## 2018-04-17 DIAGNOSIS — Z79.01 LONG-TERM (CURRENT) USE OF ANTICOAGULANTS: ICD-10-CM

## 2018-04-17 LAB — INR BLD: 2.6 (ref 0.86–1.14)

## 2018-04-17 PROCEDURE — 85610 PROTHROMBIN TIME: CPT | Mod: QW,ZL | Performed by: FAMILY MEDICINE

## 2018-04-17 PROCEDURE — 36416 COLLJ CAPILLARY BLOOD SPEC: CPT | Mod: ZL | Performed by: FAMILY MEDICINE

## 2018-04-17 RX ORDER — PREDNISONE 10 MG/1
TABLET ORAL
Qty: 60 TABLET | Refills: 0 | Status: SHIPPED | OUTPATIENT
Start: 2018-04-17 | End: 2018-05-22

## 2018-04-17 NOTE — MR AVS SNAPSHOT
Mary Alice Cameron   4/17/2018 9:30 AM   Anticoagulation Therapy Visit    Description:  67 year old female   Provider:   ANTI COAGULATION   Department:  Hc Anti Coagulation           INR as of 4/17/2018     Today's INR 2.6      Anticoagulation Summary as of 4/17/2018     INR goal 2.0-3.0   Today's INR 2.6   Full instructions 4/17: 1 mg; 4/18: 1 mg; 4/19: 1 mg; 4/20: 2 mg; Otherwise 3 mg on Mon, Fri; 2 mg all other days   Next INR check 4/23/2018    Indications   Atrial fibrillation  persistent (H) [I48.1]  Long-term (current) use of anticoagulants [Z79.01] [Z79.01]         Your next Anticoagulation Clinic appointment(s)     May 02, 2018 11:00 AM CDT   Anticoagulation Visit with  ANTI COAGULATION   [facility] [place] ([facility] [place] )    [address]  [place] [address]   [phone]              April 2018 Details    Sun Mon Tue Wed Thu Fri Sat     1               2               3               4               5               6               7                 8               9               10               11               12               13               14                 15               16               17      1 mg   See details      18      1 mg         19      1 mg         20      2 mg         21      2 mg           22      2 mg         23            24               25               26               27               28                 29               30                     Date Details   04/17 This INR check       Date of next INR:  4/23/2018         How to take your warfarin dose     To take:  1 mg Take 0.5 of a 2 mg tablet.    To take:  2 mg Take 1 of the 2 mg tablets.    To take:  3 mg Take 1.5 of the 2 mg tablets.

## 2018-04-17 NOTE — PROGRESS NOTES
ANTICOAGULATION FOLLOW-UP CLINIC VISIT    Patient Name:  Mary Alice Cameron  Date:  4/17/2018  Contact Type:  Telephone    SUBJECTIVE:     Patient Findings     Positives Change in medications    Comments INR done by lab and result noted by warfarin clinic. Call placed to patient and spoke to her. She is down to prednisone 20 mg daily for 3 more day 18,19,20) and then 10 mg daily for 3 days (21,22,23). We discussed warfarin dosing and INR recheck date and she verbalized understanding and has no questions.            OBJECTIVE    INR Point of Care   Date Value Ref Range Status   04/17/2018 2.6 (H) 0.86 - 1.14 Final     Comment:     This test is intended for monitoring Coumadin therapy.  Results are not   accurate in patients with prolonged INR due to factor deficiency.         ASSESSMENT / PLAN  INR assessment THER    Recheck INR In: 6 DAYS    INR Location Clinic      Anticoagulation Summary as of 4/17/2018     INR goal 2.0-3.0   Today's INR 2.6   Maintenance plan 3 mg (2 mg x 1.5) on Mon, Fri; 2 mg (2 mg x 1) all other days   Full instructions 4/17: 1 mg; 4/18: 1 mg; 4/19: 1 mg; 4/20: 2 mg; Otherwise 3 mg on Mon, Fri; 2 mg all other days   Weekly total 16 mg   Plan last modified Kenna Kiran RN (1/10/2018)   Next INR check 4/23/2018   Priority INR   Target end date Indefinite    Indications   Atrial fibrillation  persistent (H) [I48.1]  Long-term (current) use of anticoagulants [Z79.01] [Z79.01]         Anticoagulation Episode Summary     INR check location     Preferred lab     Send INR reminders to  ANTICOAG POOL    Comments        Anticoagulation Care Providers     Provider Role Specialty Phone number    Braydon Yen MD [facility] [phone]            See the Encounter Report to view Anticoagulation Flowsheet and Dosing Calendar (Go to Encounters tab in chart review, and find the Anticoagulation Therapy Visit)        Kenna Kiran, RN

## 2018-04-23 ENCOUNTER — ANTICOAGULATION THERAPY VISIT (OUTPATIENT)
Dept: ANTICOAGULATION | Facility: OTHER | Age: 68
End: 2018-04-23
Attending: FAMILY MEDICINE
Payer: MEDICARE

## 2018-04-23 DIAGNOSIS — Z79.01 LONG TERM CURRENT USE OF ANTICOAGULANT THERAPY: ICD-10-CM

## 2018-04-23 DIAGNOSIS — Z79.01 LONG-TERM (CURRENT) USE OF ANTICOAGULANTS: ICD-10-CM

## 2018-04-23 DIAGNOSIS — I48.19 ATRIAL FIBRILLATION, PERSISTENT (H): ICD-10-CM

## 2018-04-23 LAB — INR BLD: 1.9 (ref 0.86–1.14)

## 2018-04-23 PROCEDURE — 36416 COLLJ CAPILLARY BLOOD SPEC: CPT | Mod: ZL | Performed by: FAMILY MEDICINE

## 2018-04-23 PROCEDURE — 85610 PROTHROMBIN TIME: CPT | Mod: QW,ZL | Performed by: FAMILY MEDICINE

## 2018-04-23 NOTE — PROGRESS NOTES
ANTICOAGULATION FOLLOW-UP CLINIC VISIT    Patient Name:  Mary Alice Cameron  Date:  4/23/2018  Contact Type:  Telephone/ Message left on patient voicemail X 2.    SUBJECTIVE:     Patient Findings     Comments Called patient and message left.  Requested patient to call back and she has not.  So second call put out to patient and message left regarding INR result, Next INR recheck date and new Warfarin dosing.             OBJECTIVE    INR Point of Care   Date Value Ref Range Status   04/23/2018 1.9 (H) 0.86 - 1.14 Final     Comment:     This test is intended for monitoring Coumadin therapy.  Results are not   accurate in patients with prolonged INR due to factor deficiency.         ASSESSMENT / PLAN  No question data found.  Anticoagulation Summary as of 4/23/2018     INR goal 2.0-3.0   Today's INR 1.9!   Maintenance plan 3 mg (2 mg x 1.5) on Mon, Fri; 2 mg (2 mg x 1) all other days   Full instructions 3 mg on Mon, Fri; 2 mg all other days   Weekly total 16 mg   No change documented Nathaly Davidson RN   Plan last modified Kenna Kiran RN (1/10/2018)   Next INR check 5/7/2018   Priority INR   Target end date Indefinite    Indications   Atrial fibrillation  persistent (H) [I48.1]  Long-term (current) use of anticoagulants [Z79.01] [Z79.01]         Anticoagulation Episode Summary     INR check location     Preferred lab     Send INR reminders to  ANTICOAG POOL    Comments        Anticoagulation Care Providers     Provider Role Specialty Phone number    Braydon Yen MD [facility] [phone]            See the Encounter Report to view Anticoagulation Flowsheet and Dosing Calendar (Go to Encounters tab in chart review, and find the Anticoagulation Therapy Visit)        Nathaly Davidson, PUSHPA

## 2018-04-23 NOTE — MR AVS SNAPSHOT
Mary Alice Cameron   4/23/2018 10:00 AM   Anticoagulation Therapy Visit    Description:  67 year old female   Provider:   ANTI COAGULATION   Department:  Hc Anti Coagulation           INR as of 4/23/2018     Today's INR 1.9!      Anticoagulation Summary as of 4/23/2018     INR goal 2.0-3.0   Today's INR 1.9!   Full instructions 3 mg on Mon, Fri; 2 mg all other days   Next INR check 5/7/2018    Indications   Atrial fibrillation  persistent (H) [I48.1]  Long-term (current) use of anticoagulants [Z79.01] [Z79.01]         Description     Not on antibiotic and only 10 mg prednisone then back to reg dosing. 9:30?      Your next Anticoagulation Clinic appointment(s)     May 02, 2018 11:00 AM CDT   Anticoagulation Visit with  ANTI COAGULATION   [facility] [place] ([facility] [place] )    [address] [place] [address]   [phone]              April 2018 Details    Sun Mon Tue Wed Thu Fri Sat     1               2               3               4               5               6               7                 8               9               10               11               12               13               14                 15               16               17               18               19               20               21                 22               23      3 mg   See details      24      2 mg         25      2 mg         26      2 mg         27      3 mg         28      2 mg           29      2 mg         30      3 mg               Date Details   04/23 This INR check               How to take your warfarin dose     To take:  2 mg Take 1 of the 2 mg tablets.    To take:  3 mg Take 1.5 of the 2 mg tablets.           May 2018 Details    Sun Mon Tue Wed Thu Fri Sat       1      2 mg         2      2 mg         3      2 mg         4      3 mg         5      2 mg           6      2 mg         7            8               9               10               11               12                  13               14               15               16               17               18               19                 20               21               22               23               24               25               26                 27               28               29               30               31                  Date Details   No additional details    Date of next INR:  5/7/2018         How to take your warfarin dose     To take:  2 mg Take 1 of the 2 mg tablets.    To take:  3 mg Take 1.5 of the 2 mg tablets.

## 2018-04-25 ENCOUNTER — MEDICAL CORRESPONDENCE (OUTPATIENT)
Dept: HEALTH INFORMATION MANAGEMENT | Facility: CLINIC | Age: 68
End: 2018-04-25

## 2018-05-07 ENCOUNTER — ANTICOAGULATION THERAPY VISIT (OUTPATIENT)
Dept: ANTICOAGULATION | Facility: OTHER | Age: 68
End: 2018-05-07
Attending: FAMILY MEDICINE
Payer: MEDICARE

## 2018-05-07 DIAGNOSIS — Z79.01 LONG TERM CURRENT USE OF ANTICOAGULANT THERAPY: ICD-10-CM

## 2018-05-07 DIAGNOSIS — I48.19 ATRIAL FIBRILLATION, PERSISTENT (H): ICD-10-CM

## 2018-05-07 DIAGNOSIS — Z79.01 LONG-TERM (CURRENT) USE OF ANTICOAGULANTS: ICD-10-CM

## 2018-05-07 LAB — INR BLD: 3 (ref 0.86–1.14)

## 2018-05-07 PROCEDURE — 36416 COLLJ CAPILLARY BLOOD SPEC: CPT | Mod: ZL | Performed by: FAMILY MEDICINE

## 2018-05-07 PROCEDURE — 85610 PROTHROMBIN TIME: CPT | Mod: QW,ZL | Performed by: FAMILY MEDICINE

## 2018-05-07 NOTE — MR AVS SNAPSHOT
Mary Alice Cameron   5/7/2018 9:30 AM   Anticoagulation Therapy Visit    Description:  67 year old female   Provider:   ANTI COAGULATION   Department:  Hc Anti Coagulation           INR as of 5/7/2018     Today's INR 3.0      Anticoagulation Summary as of 5/7/2018     INR goal 2.0-3.0   Today's INR 3.0   Full instructions 5/7: 2 mg; Otherwise 3 mg on Mon, Fri; 2 mg all other days   Next INR check 5/21/2018    Indications   Atrial fibrillation  persistent (H) [I48.1]  Long-term (current) use of anticoagulants [Z79.01] [Z79.01]         Your next Anticoagulation Clinic appointment(s)     May 21, 2018 10:00 AM CDT   Anticoagulation Visit with  ANTI COAGULATION   [facility] [place] ([facility] [place] )    [address] [place] [address]   [phone]              May 2018 Details    Sun Mon Tue Wed Thu Fri Sat       1               2               3               4               5                 6               7      2 mg   See details      8      2 mg         9      2 mg         10      2 mg         11      3 mg         12      2 mg           13      2 mg         14      3 mg         15      2 mg         16      2 mg         17      2 mg         18      3 mg         19      2 mg           20      2 mg         21            22               23               24               25               26                 27               28               29               30               31                  Date Details   05/07 This INR check       Date of next INR:  5/21/2018         How to take your warfarin dose     To take:  2 mg Take 1 of the 2 mg tablets.    To take:  3 mg Take 1.5 of the 2 mg tablets.

## 2018-05-07 NOTE — PROGRESS NOTES
ANTICOAGULATION FOLLOW-UP CLINIC VISIT    Patient Name:  Mary Alice Cameron  Date:  5/7/2018  Contact Type:  Telephone/ message left on patient voicemail    SUBJECTIVE:     Patient Findings     Positives No Problem Findings    Comments INR done by lab. Call placed to patient and message left re: INR result, warfarin dosing and INR recheck date. She is to call warfarin clinic if she has any bleeding/bruising, changes in diet/meds/activity or questions.           OBJECTIVE    INR Point of Care   Date Value Ref Range Status   05/07/2018 3.0 (H) 0.86 - 1.14 Final     Comment:     This test is intended for monitoring Coumadin therapy.  Results are not   accurate in patients with prolonged INR due to factor deficiency.         ASSESSMENT / PLAN  INR assessment THER    Recheck INR In: 2 WEEKS    INR Location Clinic      Anticoagulation Summary as of 5/7/2018     INR goal 2.0-3.0   Today's INR 3.0   Maintenance plan 3 mg (2 mg x 1.5) on Mon, Fri; 2 mg (2 mg x 1) all other days   Full instructions 5/7: 2 mg; Otherwise 3 mg on Mon, Fri; 2 mg all other days   Weekly total 16 mg   Plan last modified Kenna Kiran, RN (1/10/2018)   Next INR check 5/21/2018   Priority INR   Target end date Indefinite    Indications   Atrial fibrillation  persistent (H) [I48.1]  Long-term (current) use of anticoagulants [Z79.01] [Z79.01]         Anticoagulation Episode Summary     INR check location     Preferred lab     Send INR reminders to  ANTICOAG POOL    Comments        Anticoagulation Care Providers     Provider Role Specialty Phone number    Braydon Yen MD [facility] [phone]            See the Encounter Report to view Anticoagulation Flowsheet and Dosing Calendar (Go to Encounters tab in chart review, and find the Anticoagulation Therapy Visit)        Kenna Kiran, RN

## 2018-05-09 ENCOUNTER — HOSPITAL ENCOUNTER (OUTPATIENT)
Dept: CARDIOLOGY | Facility: HOSPITAL | Age: 68
Discharge: HOME | End: 2018-05-09
Attending: INTERNAL MEDICINE | Admitting: INTERNAL MEDICINE
Payer: MEDICARE

## 2018-05-09 PROCEDURE — 93306 TTE W/DOPPLER COMPLETE: CPT | Mod: 26 | Performed by: INTERNAL MEDICINE

## 2018-05-09 PROCEDURE — 93306 TTE W/DOPPLER COMPLETE: CPT | Mod: TC

## 2018-05-21 ENCOUNTER — ANTICOAGULATION THERAPY VISIT (OUTPATIENT)
Dept: ANTICOAGULATION | Facility: OTHER | Age: 68
End: 2018-05-21
Attending: FAMILY MEDICINE
Payer: MEDICARE

## 2018-05-21 DIAGNOSIS — Z79.01 LONG-TERM (CURRENT) USE OF ANTICOAGULANTS: ICD-10-CM

## 2018-05-21 DIAGNOSIS — I48.19 ATRIAL FIBRILLATION, PERSISTENT (H): ICD-10-CM

## 2018-05-21 DIAGNOSIS — Z79.01 LONG TERM CURRENT USE OF ANTICOAGULANT THERAPY: ICD-10-CM

## 2018-05-21 LAB — INR BLD: 2.6 (ref 0.86–1.14)

## 2018-05-21 PROCEDURE — 36416 COLLJ CAPILLARY BLOOD SPEC: CPT | Mod: ZL | Performed by: FAMILY MEDICINE

## 2018-05-21 PROCEDURE — 85610 PROTHROMBIN TIME: CPT | Mod: QW,ZL | Performed by: FAMILY MEDICINE

## 2018-05-21 NOTE — PROGRESS NOTES
ANTICOAGULATION FOLLOW-UP CLINIC VISIT    Patient Name:  Mary Alice Cameron  Date:  5/21/2018  Contact Type:  Telephone/ message left on voicemail    SUBJECTIVE:     Patient Findings     Positives No Problem Findings    Comments INR done by lab. Call placed to patient and message left re: INR result, warfarin dosing and INR recheck date. She is to call warfarin clinic if she has any bleeding/bruising, changes in diet/meds/activity or questions.            OBJECTIVE    INR Point of Care   Date Value Ref Range Status   05/21/2018 2.6 (H) 0.86 - 1.14 Final     Comment:     This test is intended for monitoring Coumadin therapy.  Results are not   accurate in patients with prolonged INR due to factor deficiency.         ASSESSMENT / PLAN  INR assessment THER    Recheck INR In: 4 WEEKS    INR Location Clinic      Anticoagulation Summary as of 5/21/2018     INR goal 2.0-3.0   Today's INR 2.6   Maintenance plan 3 mg (2 mg x 1.5) on Mon, Fri; 2 mg (2 mg x 1) all other days   Full instructions 3 mg on Mon, Fri; 2 mg all other days   Weekly total 16 mg   No change documented Kenna Kiran RN   Plan last modified Kenna Kiran RN (1/10/2018)   Next INR check 6/18/2018   Priority INR   Target end date Indefinite    Indications   Atrial fibrillation  persistent (H) [I48.1]  Long-term (current) use of anticoagulants [Z79.01] [Z79.01]         Anticoagulation Episode Summary     INR check location     Preferred lab     Send INR reminders to  ANTICOAG POOL    Comments        Anticoagulation Care Providers     Provider Role Specialty Phone number    Braydon Yen MD [facility] [phone]            See the Encounter Report to view Anticoagulation Flowsheet and Dosing Calendar (Go to Encounters tab in chart review, and find the Anticoagulation Therapy Visit)        Kenna Kiran RN

## 2018-05-21 NOTE — MR AVS SNAPSHOT
Mary Alice Cameron   5/21/2018 10:00 AM   Anticoagulation Therapy Visit    Description:  67 year old female   Provider:   ANTI COAGULATION   Department:  Hc Anti Coagulation           INR as of 5/21/2018     Today's INR 2.6      Anticoagulation Summary as of 5/21/2018     INR goal 2.0-3.0   Today's INR 2.6   Full instructions 3 mg on Mon, Fri; 2 mg all other days   Next INR check 6/18/2018    Indications   Atrial fibrillation  persistent (H) [I48.1]  Long-term (current) use of anticoagulants [Z79.01] [Z79.01]         Your next Anticoagulation Clinic appointment(s)     Jun 18, 2018 10:00 AM CDT   Anticoagulation Visit with  ANTI COAGULATION   [facility] ([facility] )    [address] [place] [address]   [phone]              May 2018 Details    Sun Mon Tue Wed Thu Fri Sat       1               2               3               4               5                 6               7               8               9               10               11               12                 13               14               15               16               17               18               19                 20               21      3 mg   See details      22      2 mg         23      2 mg         24      2 mg         25      3 mg         26      2 mg           27      2 mg         28      3 mg         29      2 mg         30      2 mg         31      2 mg            Date Details   05/21 This INR check               How to take your warfarin dose     To take:  2 mg Take 1 of the 2 mg tablets.    To take:  3 mg Take 1.5 of the 2 mg tablets.           June 2018 Details    Sun Mon Tue Wed Thu Fri Sat          1      3 mg         2      2 mg           3      2 mg         4      3 mg         5      2 mg         6      2 mg         7      2 mg         8      3 mg         9      2 mg           10      2 mg         11      3 mg         12      2 mg         13      2 mg         14      2 mg          15      3 mg         16      2 mg           17      2 mg         18            19               20               21               22               23                 24               25               26               27               28               29               30                Date Details   No additional details    Date of next INR:  6/18/2018         How to take your warfarin dose     To take:  2 mg Take 1 of the 2 mg tablets.    To take:  3 mg Take 1.5 of the 2 mg tablets.

## 2018-05-22 ENCOUNTER — OFFICE VISIT (OUTPATIENT)
Dept: CARDIOLOGY | Facility: OTHER | Age: 68
End: 2018-05-22
Attending: INTERNAL MEDICINE
Payer: COMMERCIAL

## 2018-05-22 ENCOUNTER — PATIENT OUTREACH (OUTPATIENT)
Dept: CARE COORDINATION | Facility: OTHER | Age: 68
End: 2018-05-22

## 2018-05-22 VITALS
BODY MASS INDEX: 27.82 KG/M2 | WEIGHT: 167 LBS | HEIGHT: 65 IN | DIASTOLIC BLOOD PRESSURE: 60 MMHG | SYSTOLIC BLOOD PRESSURE: 114 MMHG | OXYGEN SATURATION: 95 % | TEMPERATURE: 97.9 F | HEART RATE: 96 BPM | RESPIRATION RATE: 22 BRPM

## 2018-05-22 DIAGNOSIS — I51.89 MILD LEFT VENTRICULAR SYSTOLIC DYSFUNCTION: ICD-10-CM

## 2018-05-22 DIAGNOSIS — I48.19 ATRIAL FIBRILLATION, PERSISTENT (H): ICD-10-CM

## 2018-05-22 DIAGNOSIS — R06.09 DOE (DYSPNEA ON EXERTION): ICD-10-CM

## 2018-05-22 DIAGNOSIS — Z87.891 FORMER SMOKER: ICD-10-CM

## 2018-05-22 DIAGNOSIS — Q23.81 BICUSPID AORTIC VALVE: Primary | ICD-10-CM

## 2018-05-22 PROCEDURE — 99214 OFFICE O/P EST MOD 30 MIN: CPT | Performed by: INTERNAL MEDICINE

## 2018-05-22 PROCEDURE — 93010 ELECTROCARDIOGRAM REPORT: CPT | Performed by: INTERNAL MEDICINE

## 2018-05-22 PROCEDURE — G0463 HOSPITAL OUTPT CLINIC VISIT: HCPCS

## 2018-05-22 PROCEDURE — G0463 HOSPITAL OUTPT CLINIC VISIT: HCPCS | Mod: 25

## 2018-05-22 PROCEDURE — 93005 ELECTROCARDIOGRAM TRACING: CPT

## 2018-05-22 ASSESSMENT — PAIN SCALES - GENERAL: PAINLEVEL: NO PAIN (0)

## 2018-05-22 NOTE — PROGRESS NOTES
Clinic Care Coordination Contact  Clinic Care Coordination Contact  OUTREACH    Referral Information:  Referral Source: Care Team    Primary Diagnosis: COPD    No chief complaint on file.       [facility] Utilization:   Clinic Utilization  Difficulty keeping appointments:: No  Utilization    Last refreshed: 5/22/2018 11:09 AM:  No Show Count (past year) 0       Last refreshed: 5/22/2018 11:09 AM:  ED visits 0       Last refreshed: 5/22/2018 11:09 AM:  Hospital admissions 0          Current as of: 5/22/2018 11:09 AM         Clinical Concerns:  Current Medical Concerns:  COPD- currently stable  Current Behavioral Concerns: denies  Education Provided to patient: None   Pain  Chronic pain (GOAL):: No  Health Maintenance Reviewed:    Clinical Pathway: Clinic Care Coordination COPD Assessment    Discharge:    Hospital summary: N/A  Day of hospital discharge: N/A  What recommendations were made for follow up after your recent hospitalization? N/A  Have the follow up appointments been scheduled? N/A  If not, can I help you set up these appointments? N/A  Transportation concerns (GOAL):: No  Is there a referral for Pulmonary Rehab? N/A    Symptom Review:    How have you been feeling now that you are home? N/A  Are you having any increased shortness of breath? No       Do you have a COPD Action Plan? Yes **send pt a copy COPD     Is the COPD Action Plan on refrigerator or available: Yes  CC reviewed  What number would you call if you were in the YELLOW zones:  [phone] option #1  Medications:    Were you started on any new medications?  No  Were any of your previous medications changed? No  Do you have all of your medications? Yes  Have you had trouble filling your prescriptions? No  Are you medications effective in controlling your symptoms? Yes  Are you currently on Prednisone (* does pt understand the tapering instructions)?  No  For patients with DM:     Are you monitoring your blood sugars, if so how are your blood  sugars? N/A  Did you start on insulin in the hospital or has your Insulin dose changed? N/A  Medication reconciliation completed? Yes  Was MTM or Diabetic Education referral placed (*Make sure to put transitions as reason for referral)? No    Oxygen/DME:    Are you currently on oxygen?  Yes at night  Is this new for you? No   Is it set up at home? Yes   What liter flow were you instructed to use and how often do you use it? 2.5L at night  What type of home oxygen system do you have (*ask about portability and ability to manage a portable oxygen delivery)?  Concentrator in bedroom, also portable  Who is your supply company? [employer]  Review with patient how to use/maintain nebulizers and inhalers: Patient verbalized understanding    Activity:    How much activity can you do before you are SOB? Quite a bit  Have you had to reduce your activities because of dyspnea or other symptoms? No     Diet:    Do you weigh yourself daily? No    Are there any current diet restrictions or changes per discharge instructions? No    Emotions/Lifestyle:    Do you smoke?  reports that she quit smoking about 11 years ago. Her smoking use included Cigarettes. She quit after 48.00 years of use. She has never used smokeless tobacco.  Would you like to try to quit smoking? N/A    Follow Up Plan:    Care Coordinator follow up plan: Continue outreach approximately every 6 weeks  Referrals to consider: None      Medication Management:  Patient manages her own medications, denies concerns or questions, denies need for refills     Functional Status:  Bed or wheelchair confined:: No  Mobility Status: Independent  Fallen 2 or more times in the past year?: No  Any fall with injury in the past year?: No    Living Situation:  Current living arrangement:: I live in a private home  Type of residence:: Apartment    Diet/Exercise/Sleep:  Diet:: Regular  Inadequate nutrition (GOAL):: No  Food Insecurity: No  Tube Feeding: No  Exercise::  Yes  Days per week of moderate to strenuous exercise (like a brisk walk): 2  On average, minutes per day of exercise at this level: 60  How intense was your typical exercise? : Moderate (like brisk walking)  Exercise Minutes per Week: 120  Inadequate activity/exercise (GOAL):: No  Significant changes in sleep pattern (GOAL): No    Transportation:  Transportation concerns (GOAL):: No  Transportation means:: Regular car     Psychosocial:  Mandaeism or spiritual beliefs that impact treatment:: No  Mental health DX:: No  Mental health management concern (GOAL):: No  Informal Support system:: Family     Financial/Insurance:   Financial/Insurance concerns (GOAL):: No       Resources and Interventions:  Current Resources: PCP; Cardiology; Pulmonology; CC       Advance Care Plan/Directive  Advanced Care Plans/Directives on file:: Yes  Type Advanced Care Plans/Directives: Advanced Directive - On File  Advanced Care Plan/Directive Status: Not Applicable          Goals: Maintain current health status and functional ability despite chronic health alterations    Patient/Caregiver understanding: Patient verbalized understanding that she can call CC with any questions or concerns.  She verbalized understanding of COPD action plan and when to call CC.    Outreach Frequency: 6 weeks  Future Appointments              In 3 weeks HC ANTI COAGULATION [facility] [place]    In 3 weeks NA LAB [facility]    In 4 months Eliezer Myers MD [facility] [place]          Plan: Continue regular outreach    Lauren Pipkin, BS-RN   CHF and General Care Coordinator  [phone] Option # 1

## 2018-05-22 NOTE — MR AVS SNAPSHOT
After Visit Summary   5/22/2018    Mary Alice Cameron    MRN: [id number]           Patient Information     Date Of Birth          1950        Visit Information        Provider Department      5/22/2018 10:30 AM Eliezer Myers MD [facility]        Today's Diagnoses     Bicuspid aortic valve    -  1    Atrial fibrillation, persistent (H)        Mild left ventricular systolic dysfunction        Former smoker        MOROCHO (dyspnea on exertion)          Care Instructions    1. A stress test has been ordered.  Diagnostic imaging will call you to set this up.  2. An electrocardiogram has been ordered.  Please complete this today.  3. Follow up with Dr. Myers in 4 months, sooner if you have problems.  4. Please call cardiology nurse at [phone] if you have an questions/concerns.  5. If you have severe chest pain or increased shortness of breath, please seek medical treatment as soon as possible.  6. Continue your current medications.  There were no medication changes today.          Follow-ups after your visit        Your next 10 appointments already scheduled     Jun 18, 2018 10:00 AM CDT   Anticoagulation Visit with  ANTI COAGULATION   [facility] ([facility] )    [address] [place] [address]   [phone]            Jun 18, 2018 10:15 AM CDT   LAB with NA LAB   [facility]    [address] [place] [address]   [phone]            Sep 25, 2018 10:30 AM CDT   (Arrive by 10:15 AM)   Return Visit with Eliezer Myers MD   [facility] ([facility] )    [address] [place] [address]   [phone]              Future tests that were ordered for you today     Open Future Orders        Priority Expected Expires Ordered    NM Lexiscan stress test Routine 5/22/2018 5/22/2019 5/22/2018            Who to contact     If you have questions or need  "follow up information about today's clinic visit or your schedule please contact [facility] directly at [phone].  Normal or non-critical lab and imaging results will be communicated to you by MyChart, letter or phone within 4 business days after the clinic has received the results. If you do not hear from us within 7 days, please contact the clinic through [employer] or phone. If you have a critical or abnormal lab result, we will notify you by phone as soon as possible.  Submit refill requests through [employer] or call your pharmacy and they will forward the refill request to us. Please allow 3 business days for your refill to be completed.          Additional Information About Your Visit        [employer] Information     [employer] gives you secure access to your electronic health record. If you see a primary care provider, you can also send messages to your care team and make appointments. If you have questions, please call your primary care clinic.  If you do not have a primary care provider, please call [phone] and they will assist you.        Care EveryWhere ID     This is your Care EveryWhere ID. This could be used by other organizations to access your [place] medical records  [id number]        Your Vitals Were     Pulse Temperature Respirations Height Pulse Oximetry BMI (Body Mass Index)    96 97.9  F (36.6  C) (Tympanic) 22 5' 5\" (1.651 m) 95% 27.79 kg/m2       Blood Pressure from Last 3 Encounters:   05/22/18 114/60   03/19/18 120/76   03/15/18 (!) 152/98    Weight from Last 3 Encounters:   05/22/18 167 lb (75.8 kg)   03/19/18 169 lb (76.7 kg)   03/15/18 169 lb (76.7 kg)              We Performed the Following     EKG 12-lead complete w/read - (Clinic Performed)        Primary Care Provider Office Phone # Fax #    Braydon Yen [phone]       [address] [place] [id number]        Equal Access to Services     CHARLES FISHER AH: sienna Segovia " madhav papidev rosalessourav lobo ah. So [facility] [place] [facility] [phone].    ATENCIÓN: Si patricia ortega, tiene a lindsey disposición servicios gratuitos de asistencia lingüística. Hong al [phone].    We comply with applicable federal civil rights laws and [place] laws. We do not discriminate on the basis of race, color, national origin, age, disability, sex, sexual orientation, or gender identity.            Thank you!     [facility] you [facility]  [facility]. Our goal is always to provide you with excellent care. Hearing back from our patients is one way we can continue to improve our services. Please take a few minutes to complete the written survey that you may receive in the mail after your visit with us. Thank you!             Your Updated Medication List - Protect others around you: Learn how to safely use, store and throw away your medicines at www.disposemymeds.org.          This list is accurate as of 5/22/18 10:41 AM.  Always use your most recent med list.                   Brand Name Dispense Instructions for use Diagnosis    acetaminophen 500 MG tablet    TYLENOL     Take 500-1,000 mg by mouth every 6 hours as needed for mild pain        ADVAIR -21 MCG/ACT Inhaler   Generic drug:  fluticasone-salmeterol     36 g    INHALE 2 PUFFS INTO THE LUNGS TWICE DAILY    COPD exacerbation (H)       * albuterol 108 (90 Base) MCG/ACT Inhaler    VENTOLIN HFA    54 g    INHALE 2 PUFFS INTO THE LUNGS EVERY 4 HOURS AS NEEDED FOR SHORTNESS OF BREATH OR DIFFICULT BREATHING OR WHEEZING    Other emphysema (H)       * albuterol (2.5 MG/3ML) 0.083% neb solution     25 vial    Take 1 vial (2.5 mg) by nebulization every 6 hours as needed for shortness of breath / dyspnea or wheezing    COPD exacerbation (H)       CALTRATE 600+D3 SOFT PO      Take 600 mg by mouth 2 times daily        cloNIDine 0.1 MG tablet    CATAPRES    360 tablet    TAKE 1 TABLET BY MOUTH EVERY  MORNING AND 3 TABLETS EVERY EVENING    Essential hypertension       diphenhydrAMINE 25 MG tablet    BENADRYL    60 tablet    Take 1-2 tablets (25-50 mg) by mouth every 6 hours as needed for itching or allergies        flecainide 100 MG tablet    TAMBOCOR    180 tablet    Take 1 tablet (100 mg) by mouth 2 times daily    Atrial fibrillation, persistent (H)       furosemide 40 MG tablet    LASIX    180 tablet    TAKE 1 TABLET BY MOUTH TWICE DAILY.    Essential hypertension, benign       hydrALAZINE 25 MG tablet    APRESOLINE    810 tablet    TAKE 3 TABLETS BY MOUTH THREE TIMES DAILY    Essential hypertension, benign       ipratropium - albuterol 0.5 mg/2.5 mg/3 mL 0.5-2.5 (3) MG/3ML neb solution    DUONEB    1620 mL    USE 1 VIAL PER NEBULIZER FOUR TIMES DAILY    COPD exacerbation (H)       levothyroxine 100 MCG tablet    SYNTHROID/LEVOTHROID    90 tablet    TAKE 1 TABLET(100 MCG) BY MOUTH DAILY    Hypothyroidism, postablative       LORazepam 1 MG tablet    ATIVAN     Take 0.5-1 tablets by mouth every 6 hours as needed Reported on 5/23/2017        other medical supplies     1 each    Apply one pair to help with edema    Edema, Atrial fibrillation, persistent (H)       potassium chloride SA 20 MEQ CR tablet    K-DUR/KLOR-CON M    180 tablet    Take 1 tablet (20 mEq) by mouth 2 times daily    Hypokalemia       predniSONE 20 MG tablet    DELTASONE    20 tablet    TAKE 3 TABLETS BY MOUTH DAILY X 3 DAYS, 2 TABLETS DAILY X 3 DAYS, 1 TABLET DAILY X 3 DAYS, 1/2 TABLET DAILY X 3 DAYS    COPD exacerbation (H)       simvastatin 10 MG tablet    ZOCOR    90 tablet    TAKE 1 TABLET(10 MG) BY MOUTH AT BEDTIME    Hyperlipidemia LDL goal <130       triamcinolone 0.1 % ointment    KENALOG    454 g    Apply bid and hs left hand and legs - for dermatitis    Contact dermatitis, unspecified contact dermatitis type, unspecified trigger       warfarin 2 MG tablet    COUMADIN    110 tablet    3 mg Mon,Fri and 2mg all other days or as directed  by warfarin clinic    Long term current use of anticoagulant therapy       * Notice:  This list has 2 medication(s) that are the same as other medications prescribed for you. Read the directions carefully, and ask your doctor or other care provider to review them with you.

## 2018-05-22 NOTE — NURSING NOTE
"Chief Complaint   Patient presents with     RECHECK     1 year follow-up Atrial fibrillation, persistent       Initial /60 (BP Location: Left arm, Patient Position: Chair, Cuff Size: Adult Large)  Pulse 96  Temp 97.9  F (36.6  C) (Tympanic)  Resp 22  Ht 1.651 m (5' 5\")  Wt 75.8 kg (167 lb)  SpO2 95%  BMI 27.79 kg/m2 Estimated body mass index is 27.79 kg/(m^2) as calculated from the following:    Height as of this encounter: 1.651 m (5' 5\").    Weight as of this encounter: 75.8 kg (167 lb).  Medication Reconciliation: complete    Fe Stephenson LPN    "

## 2018-05-22 NOTE — PATIENT INSTRUCTIONS
1. A stress test has been ordered.  Diagnostic imaging will call you to set this up.  2. An electrocardiogram has been ordered.  Please complete this today.  3. Follow up with Dr. Myers in 4 months, sooner if you have problems.  4. Please call cardiology nurse at [phone] if you have an questions/concerns.  5. If you have severe chest pain or increased shortness of breath, please seek medical treatment as soon as possible.  6. Continue your current medications.  There were no medication changes today.

## 2018-05-22 NOTE — PROGRESS NOTES
"Chief Complaint: atrial fibrillation, bicuspid valve    HPI: I was happy to see Mrs. Cameron for the above. She has seen several cardiologists over the past year for these problems. Her  was ill and they moved to be closer to his daughters. He  in October and she has settled in [place]. Her atrial fibrillation began in the past year. In reviewing the old records both  and  are reported as when the atrial fibrillation began and she is no longer sure. This has been associated with shortness of breath but as was discussed with her by one of the cardiologist she has multiple reasons for shortness of breath including COPD. She is finishing treatment for a COPD exacerbation at this time. She reports the plan had been to start her on warfarin and then cardiovert her. With all the chaos in her life the past few months this never happened. She was found to have hyperthyroidism in August and was treated with Iodine ablation. She also has a bicuspid aortic valve and mild aortic root dilation (per report of echocardiogram in old records). The echocardiogram did not report significant aortic stenosis or insufficiency and her systolic function was normal making it less likely that her shortness of breath was related to her valvular heart disease. She was told she would need periodic f/u of her valve and aorta. She reports two angiograms done at [facility] and that they were \"okay\". I do not see copies of those reports in the old records. They will be requested.    She underwent DC cardioversion on 2014. She reports feeling better afterward. She feels like she needs to use her inhaler less often and has less shortness of breath. However, the ECG today shows she is back in atrial fibrillation.    I have reviewed the records sent from [facility]. There is an echocardiogram report from  and records regarding back surgery. I see no cath results.    A repeat cardioversion in July failed to restore sinus " "rhythm. A stress study in April (see below) showed no ischemia or infarction.    11Nov2014:  She had recurrent atrial fibrillation was started on flecainide and scheduled for cardioversion. When she arrived for the cardioversion she was in sinus.     A CT aortogram showed atherosclerosis but not dilation of the descending aoota.    [id number]:    She was admitted in March 2015 with a COPD exacerbation. Her breathing is at baseline with no fever, sputum or wheezing.    She has not noted any atrial fibrillation, no palpitations, chest pain/discomfort, unusual dyspnea on exertion, bleeding or neurologic symptoms.    [id number]: Her follow-up echocardiogram (see lab section) showed no change in aortic valve function. She report rare \"flutters\" but no sustained palpitations like with her atrial fibrillation. She reports no significant bleeding episodes on warfarin. She denies any neurologic symptoms suggestive of CVA or TIA. Edema improved when she switched her diltiazem to bedtime.    Her primary health problem has been her COPD. She uses oxygen at night. She has had had any recent symptoms of upper or lower respiratory infection.    [id number]: She had two admissions this past winter for COPD exacerbations. Overall, she feels her dyspnea on exertion is slowly getting worse. She has not had exertional chest pain/discomfort.     She reports no increased edema, orthopnea or paroxysmal nocturnal dyspnea.    [id number] Interval history: echocardiogram shows a decline in systolic function.  She reports that she has felt more fatigued and has had more dyspnea on exertion since I last saw her.  She also reports she has had problems with her lungs over the winter.  She is been on a prolonged course of prednisone.  She has not had chest pain or chest discomfort.  She has noted episodes of atrial fibrillation, or palpitations that she thinks is atrial fibrillation.  These episodes were relatively infrequent and do not last very long. "  They are not associated with associated symptoms and she has not found them to be overly troublesome.  She has had no prolonged episodes where she felt the need to consider seeking medical treatment.      Current Outpatient Prescriptions   Medication Sig Dispense Refill     ADVAIR -21 MCG/ACT Inhaler INHALE 2 PUFFS INTO THE LUNGS TWICE DAILY 36 g 2     albuterol (2.5 MG/3ML) 0.083% neb solution Take 1 vial (2.5 mg) by nebulization every 6 hours as needed for shortness of breath / dyspnea or wheezing 25 vial 1     albuterol (VENTOLIN HFA) 108 (90 BASE) MCG/ACT Inhaler INHALE 2 PUFFS INTO THE LUNGS EVERY 4 HOURS AS NEEDED FOR SHORTNESS OF BREATH OR DIFFICULT BREATHING OR WHEEZING 54 g 1     Calcium Carb-Cholecalciferol (CALTRATE 600+D3 SOFT PO) Take 600 mg by mouth 2 times daily       cloNIDine (CATAPRES) 0.1 MG tablet TAKE 1 TABLET BY MOUTH EVERY MORNING AND 3 TABLETS EVERY EVENING 360 tablet 2     diphenhydrAMINE (BENADRYL) 25 MG tablet Take 1-2 tablets (25-50 mg) by mouth every 6 hours as needed for itching or allergies 60 tablet 1     flecainide (TAMBOCOR) 100 MG tablet Take 1 tablet (100 mg) by mouth 2 times daily 180 tablet 3     furosemide (LASIX) 40 MG tablet TAKE 1 TABLET BY MOUTH TWICE DAILY. 180 tablet 2     hydrALAZINE (APRESOLINE) 25 MG tablet TAKE 3 TABLETS BY MOUTH THREE TIMES DAILY 810 tablet 3     ipratropium - albuterol 0.5 mg/2.5 mg/3 mL (DUONEB) 0.5-2.5 (3) MG/3ML neb solution USE 1 VIAL PER NEBULIZER FOUR TIMES DAILY 1620 mL 1     levothyroxine (SYNTHROID/LEVOTHROID) 100 MCG tablet TAKE 1 TABLET(100 MCG) BY MOUTH DAILY 90 tablet 3     OTHER MEDICAL SUPPLIES Apply one pair to help with edema 1 each 0     potassium chloride SA (K-DUR/KLOR-CON M) 20 MEQ CR tablet Take 1 tablet (20 mEq) by mouth 2 times daily 180 tablet 2     simvastatin (ZOCOR) 10 MG tablet TAKE 1 TABLET(10 MG) BY MOUTH AT BEDTIME 90 tablet 3     triamcinolone (KENALOG) 0.1 % ointment Apply bid and hs left hand and legs - for  "dermatitis 454 g 3     warfarin (COUMADIN) 2 MG tablet 3 mg Mon,Fri and 2mg all other days or as directed by warfarin clinic 110 tablet 3     acetaminophen (TYLENOL) 500 MG tablet Take 500-1,000 mg by mouth every 6 hours as needed for mild pain       LORazepam (ATIVAN) 1 MG tablet Take 0.5-1 tablets by mouth every 6 hours as needed Reported on 5/23/2017       predniSONE (DELTASONE) 20 MG tablet TAKE 3 TABLETS BY MOUTH DAILY X 3 DAYS, 2 TABLETS DAILY X 3 DAYS, 1 TABLET DAILY X 3 DAYS, 1/2 TABLET DAILY X 3 DAYS (Patient not taking: Reported on 5/22/2018) 20 tablet 0       Past Medical History:   Diagnosis Date     Asthma      Atrial fibrillation (H)      COPD (chronic obstructive pulmonary disease) (H)      Depression      High cholesterol      HTN (hypertension)      Thyroid disease        Past Surgical History:   Procedure Laterality Date     BACK SURGERY  2006     COLONOSCOPY N/A 1/19/2016    Procedure: COLONOSCOPY;  Surgeon: Waldemar Bob MD;  Location: [facility]     HYSTERECTOMY  1980    partial     SLING BLADDER SUSPENSION WITH FASCIA LINNETTE         Family History   Problem Relation Age of Onset     Prostate Cancer Father      Hypertension Father      Heart Failure Father      CHF     Asthma Mother      CANCER Mother      ovarian     Hypertension Mother      Asthma Brother      Hypertension Sister      Hypertension Brother        Social History   Substance Use Topics     Smoking status: Former Smoker     Years: 48.00     Types: Cigarettes     Quit date: 1/28/2007     Smokeless tobacco: Never Used     Alcohol use No       Allergies   Allergen Reactions     Amlodipine Besylate Cough     Norvasc     Lisinopril Cough       ROS: ten system review negative except as noted above. [id number]    Physical Examination:  /60 (BP Location: Left arm, Patient Position: Chair, Cuff Size: Adult Large)  Pulse 96  Temp 97.9  F (36.6  C) (Tympanic)  Resp 22  Ht 1.651 m (5' 5\")  Wt 75.8 kg (167 lb)  SpO2 95%  BMI " 27.79 kg/m2  GENERAL APPEARANCE: healthy, alert and no distress  HEENT: no icterus, no xanthelasmas  NECK:  JVP is not visible, left CEA scar  CHEST:  no rales or rhonchi, breath sounds are diminished throughout, expiratory phase is prolonged, no wheezing  CARDIOVASCULAR: regular rhythm, S1S2 split, no S3 or S4 and no murmur, click or rub, precordium quiet  ABDOMEN: soft, non tender, bowel sounds normal, no abdominal bruits  EXTREMITIES: some edema, but also adipose    Laboratory:    ECG and nuclear stress study ordered today.  I have personally reviewed the ECG obtained today.  It shows atrial fibrillation with controlled ventricular rate.  No new Q waves are seen.  Nonspecific repolarization changes are again noted.    [facility]  Echocardiography Laboratory  [address] [place] [id number]     Name: LANCE VALLEJO  MRN: [id number]  : 1950  Study Date: 2018 10:30 AM  Age: 67 yrs  Gender: Female  Patient Location: [facility]  Reason For Study: , Essential hypertension, benign, Atrial fibrillation,  persisten  History: bicuspid AV  Ordering Physician: TODD JAMES  Referring Physician: TODD JAMES  Performed By: Genevieve Contreras     BSA: 1.8 m2  Height: 64 in  Weight: 160 lb  _____________________________________________________________________________  __        Procedure  The patient's rhythm is atrial fibrillation.  _____________________________________________________________________________  __        Interpretation Summary  No pericardial effusion is present.  Borderline left ventricular dilation is present.  The Ejection Fraction is estimated at 40-45%.  Grade I or early diastolic dysfunction.  The right ventricle is normal size.  Global right ventricular function is normal.  Mild left atrial enlargement is present.  Mild right atrial enlargement is present.  Mild mitral insufficiency is present.  Mild to moderate aortic valve sclerosis is  present.  Cannot exclude a bicuspid aortic valve.  Mild to moderate aortic insufficiency is present.  The peak aortic velocity is 2.57 m/sec.  The mean gradient across the aortic valve is16.9 mmHg.  Peak gradient AoV 26.4 mmHg  The aortic valve area is .94 cm^2, by the continuity equation.  Mild to moderate tricuspid insufficiency is present.  The peak velocity of the tricuspid regurgitant jet is not obtainable.    Previous Studies:   ECHOCARDIOGRAM    M-mode, two-dimensional, color-flow, and Doppler studies were obtained  on this patient.  Standard views were utilized.    ORDERING PHYSICIAN:  Eliezer Myers MD    INDICATIONS:  Bicuspid aortic valve.    Cardiac Dimensions                                    Normal  Dimensions  Aortic Root:                         32.3 mm      Aortic Root  Diameter: 20-37 mm  Left Atrium:                          45 mm      Left Atrium: 19-40  mm  Right Ventricle:                     25.8 mm      Right Ventricle:  7-23 mm  Left Ventricle end-diastole:   54.8 mm      Left Ventricle  end-diastole: 35-56 mm  Left Ventricle end-systole:    32.8 mm      Left Ventricle  end-systole: 35-56 mm  IVS end-diastole:                 8.8 mm      IVS end-diastole: 7-11  mm  LVPW:                                6.6 mm      LVPW: 7-11 mm    Review of the study shows there is no pericardial effusion.  The left  ventricle is normal size and systolic function.  Ejection fraction at  70%.  The left atrium is borderline enlarged, volume is 70 mL, indexed  it was 37 mL/meter2.  The right ventricle is normal on 2-D study.  The  mitral valve has a mild amount of mitral regurgitation.  The aortic  valve appears to be bicuspid in nature.  Peak and mean gradients are  25 and 13 respectively with a peak velocity of 2.52 meter/second,  signifying just very slight stenosis.  There is mild-to-moderate  aortic insufficiency.  Also noted is some slight tricuspid  regurgitation.  Unable to estimate the right  ventricular systolic  pressure.      ASSESSMENT:  Echocardiographic study revealing  1.  Normal left ventricular size and systolic function.  Ejection  fraction at 70%.  2.  Borderline left atrial enlargement.  3.  Normal right ventricular size and function.  4.  Slight mitral regurgitation.  5.  Aortic valve is likely bicuspid.  Mild stenosis.  Peak and mean  gradients are 25 and 13 with mild-to-moderate aortic insufficiency.  This is a slight increase in terms of insufficiency compared to echo  from February 2014.  6.  Slight tricuspid regurgitation.  Unable to estimate the right  ventricular systolic pressure.  Exam Date: May 12, 2016 10:33:00 AM  Author: LITO CLEVELAND    LEXISCAN CARDIOLITE STUDY-INTERNAL MEDICINE DICTATION  DICTATING PHYSICIAN: Lito Cleveland MD  INDICATIONS: A 63-year-old female referred by Dr. Myers and Dr. Yen because of chest pain and atrial fibrillation.  FINDINGS: The patient was placed upon the table using our protocol,  given 5 mL/0.4 mg of Lexiscan rapidly over 15 seconds followed by  saline flush. She was then given the Sestamibi at 45 seconds into  the study. She was monitored continuously throughout the study.  Resting heart rate was 85 with blood pressure 130/80. Heart rate gurpreet  to 113 and blood pressure dropped to 110/70. She did have some  transient shortness of breath. Review of her electrocardiogram shows  she has atrial fibrillation throughout the study, but no acute changes  were noted.  ASSESSMENT: Lexiscan Sestamibi study. On the Lexiscan portion the  patient did have appropriate heart rate and blood pressure response,  transient symptoms. No acute electrocardiogram changes or  arrhythmias. The Cardiolite scan is pending.  CARDIOLITE IV LEXISCAN AND GATED SPECT-RADIOLOGIST DICTATION  DICTATING PHYSICIAN: Rufus Newberry MD  HISTORY: A 63-year-old female with chest pain.  LEXISCAN DOSE: 5 mL (0.4 mg regadenoson) by rapid intravenous  injection.  SESTAMIBI  DOSE: 10 mCi 99mTc, 1 mL MIBI Injection Time: 0550  SESTAMIBI DOSE: 30 mCi 99mTc, 1 mL MIBI Injection Time: 0749  ANGIOCATH SIZE: 22 INJECTION SITE: Right AC INITIALS: JR  IV DISCONTINUE TIME: 0753 SITE CONDITION: No infiltrate TIP INTACT:  Yes    The patient is a 63-year-old female who was evaluated with  Sestamibi/Lexiscan testing. 5 mL Lexiscan (0.4 mg regadenoson) was  administered by rapid intravenous injection; followed immediately by  saline flush and radiopharmaceutical. The patient's resting heart  rate was 85 and blood pressure was 130/80. The maximum response in  heart rate and blood pressure after Lexiscan injection was 113 and  110/70.  Symptoms during the procedure included: Transient shortness of  breath.  If symptoms were present, did they resolve post-test? Yes.  Electrocardiogram monitoring demonstrates: Atrial fibrillation  throughout study. No acute electrocardiogram changes.  Arrhythmias: None, other than atrial fibrillation.  Summary: Appropriate heart rate and blood pressure response.  Transient shortness of breath. No acute electrocardiogram changes.  VIEWS OBTAINED: Short axis, horizontal long axis, vertical long axis  at rest and stress.  FINDINGS: The left ventricular ejection fraction is 56%. No fixed  or reversible perfusion defects are present.  IMPRESSION: No evidence of cardiac ischemia.      Echocardiogram:  ASSESSMENT: Echocardiographic study revealing  1. Normal left ventricle size and systolic function. Ejection  fraction at 55%.  2. Ascending aorta appears to be normal on current measurements.  3. Left atrial enlargement.  4. Normal right ventricle size and function.  5. Mild mitral regurgitation.  6. Aortic valve appears to probably be bicuspid without any  significant stenosis. There is a slight amount of aortic  insufficiency.  7. Slight tricuspid regurgitation. Peak systolic pressure right  ventricle is estimated at 14 plus right atrium.    Exam Date: Feb 14, 2014 11:18:00  AM  Author: OSLOMON MITCHELL    Old records show a TSH of 1.87 in 11/13. Labs from 10/13 show a normal K, creat and estimated GFR.    Assessment and recommendations:    1) Persistent atrial fibrillation (recurrent) - maintaining sinus, feels well    - continue flecainide 100 mg BID  - stress test ordered    2) Bicuspid aortic valve - previous echocardiogram does not show significant aortic stenosis or insufficiency. CT aortogram shows no dilation of descending aorta.    - follow-up echocardiogram in a year    3) Peripheral edema - unchanged    4) Hypertension - controlled    5) New systolic dysfunction -there is been no change in her aortic valve disease to explain left ventricular systolic dysfunction.  She has noted more dyspnea on exertion and exercise intolerance.  She is a former smoker.  A nuclear stress study has been ordered to exclude underlying ischemia.  I discussed with her if the study is abnormal coronary angiography would be required.    I would like to put her on an ACE inhibitor or ARB as well as a beta-blocker.  However, she has been having increasing problems with her COPD taking beta-blocker therapy problematic.  She has a history of cough related to an ACE inhibitor.  If her nuclear study confirms LV systolic dysfunction will talk with her about starting angiotensin receptor blocker.    I appreciate the chance to help with Mrs. Cameron's care. Please let me know if you have any questions or concerns. I will see her back in one year.    Portions of this note were dictated using speech recognition software. The note has been proofread but errors in the text may have been overlooked. Please contact me if there are any concerns regarding the accuracy of the dictation.     Eliezer Myers M.D.

## 2018-05-30 ENCOUNTER — HOSPITAL ENCOUNTER (OUTPATIENT)
Dept: NUCLEAR MEDICINE | Facility: HOSPITAL | Age: 68
Setting detail: NUCLEAR MEDICINE
End: 2018-05-30
Attending: INTERNAL MEDICINE
Payer: MEDICARE

## 2018-05-30 ENCOUNTER — HOSPITAL ENCOUNTER (OUTPATIENT)
Dept: GENERAL RADIOLOGY | Facility: HOSPITAL | Age: 68
Setting detail: NUCLEAR MEDICINE
End: 2018-05-30
Attending: INTERNAL MEDICINE
Payer: MEDICARE

## 2018-05-30 ENCOUNTER — HOSPITAL ENCOUNTER (OUTPATIENT)
Dept: NUCLEAR MEDICINE | Facility: HOSPITAL | Age: 68
Setting detail: NUCLEAR MEDICINE
Discharge: HOME | End: 2018-05-30
Attending: INTERNAL MEDICINE | Admitting: INTERNAL MEDICINE
Payer: MEDICARE

## 2018-05-30 DIAGNOSIS — I51.89 MILD LEFT VENTRICULAR SYSTOLIC DYSFUNCTION: ICD-10-CM

## 2018-05-30 DIAGNOSIS — Z87.891 FORMER SMOKER: ICD-10-CM

## 2018-05-30 DIAGNOSIS — R06.09 DOE (DYSPNEA ON EXERTION): ICD-10-CM

## 2018-05-30 PROCEDURE — 25000128 H RX IP 250 OP 636: Performed by: INTERNAL MEDICINE

## 2018-05-30 PROCEDURE — A9500 TC99M SESTAMIBI: HCPCS | Performed by: RADIOLOGY

## 2018-05-30 PROCEDURE — 34300033 ZZH RX 343: Performed by: RADIOLOGY

## 2018-05-30 PROCEDURE — 93018 CV STRESS TEST I&R ONLY: CPT | Performed by: INTERNAL MEDICINE

## 2018-05-30 PROCEDURE — 93016 CV STRESS TEST SUPVJ ONLY: CPT | Performed by: INTERNAL MEDICINE

## 2018-05-30 PROCEDURE — 78452 HT MUSCLE IMAGE SPECT MULT: CPT | Mod: TC

## 2018-05-30 PROCEDURE — 93017 CV STRESS TEST TRACING ONLY: CPT

## 2018-05-30 RX ORDER — AMINOPHYLLINE 25 MG/ML
INJECTION, SOLUTION INTRAVENOUS
Status: DISCONTINUED
Start: 2018-05-30 | End: 2018-05-30 | Stop reason: WASHOUT

## 2018-05-30 RX ORDER — REGADENOSON 0.08 MG/ML
0.4 INJECTION, SOLUTION INTRAVENOUS ONCE
Status: COMPLETED | OUTPATIENT
Start: 2018-05-30 | End: 2018-05-30

## 2018-05-30 RX ADMIN — Medication 10 MILLICURIE: at 07:45

## 2018-05-30 RX ADMIN — Medication 30 MILLICURIE: at 10:15

## 2018-05-30 RX ADMIN — REGADENOSON 0.4 MG: 0.08 INJECTION, SOLUTION INTRAVENOUS at 10:16

## 2018-05-31 ENCOUNTER — TELEPHONE (OUTPATIENT)
Dept: CARDIOLOGY | Facility: OTHER | Age: 68
End: 2018-05-31

## 2018-05-31 DIAGNOSIS — R06.09 DOE (DYSPNEA ON EXERTION): ICD-10-CM

## 2018-05-31 DIAGNOSIS — R94.39 ABNORMAL CARDIOVASCULAR STRESS TEST: Primary | ICD-10-CM

## 2018-05-31 NOTE — TELEPHONE ENCOUNTER
"Patient returned call and was informed of Lexiscan stress test results per the note recorded by Dr. Myers.  Patient states she \"is unsure if she wants to proceed\" with an angiogram at this time.  Patient states she \"would like some time to think about it\".  Offered patient several appointments to come in and follow-up with Pau Nelson next week or Dr. Pennington tomorrow.  Patient declined all appointments and states she \"has grandkids tomorrow and she'll be busy next week\".  Patient is requesting a call back from Pau Nelson CNP to discuss these results.    "

## 2018-06-01 NOTE — TELEPHONE ENCOUNTER
Please fax all cardiology records, cardiology test results including Lexiscan stress test, EKG's, demographics, etc to Elisha,  at [facility] phone:  [phone]  [place] fax:  [phone]

## 2018-06-01 NOTE — TELEPHONE ENCOUNTER
Shamika Culver 10 minutes ago (11:34 AM)                  Notes were faxed to Elisha at [facility].  Imaging was pushed (Routing comment)

## 2018-06-01 NOTE — TELEPHONE ENCOUNTER
Called patient to review results of recent Lexiscan stress test, with noted new systolic dysfunction and increased MOROCHO, angiogram has been recommended. Patient admits that she is not interested in driving to the [facility] for this procedure and is requesting to have this done in [place] if possible. Referral has been sent to [facility] for angiogram. She will receive a phone call from [facility] for scheduling and further instruction.   Pau Nelson

## 2018-06-01 NOTE — TELEPHONE ENCOUNTER
Call placed to Elisha at [facility] and patients name and  left with instructions that we would be faxing down records for them to set-up an angiogram.    RICARDA Mendez:  Please fax all cardiology records, tests, EKG, demographics to Elisha, ,  at [facility].

## 2018-06-04 NOTE — TELEPHONE ENCOUNTER
Vanessa,   Please call patient and schedule a Cardiology follow-up with Pau Nelson CNP for 1 week after angiogram.

## 2018-06-07 ENCOUNTER — TRANSFERRED RECORDS (OUTPATIENT)
Dept: HEALTH INFORMATION MANAGEMENT | Facility: CLINIC | Age: 68
End: 2018-06-07

## 2018-06-07 ENCOUNTER — TELEPHONE (OUTPATIENT)
Dept: FAMILY MEDICINE | Facility: OTHER | Age: 68
End: 2018-06-07

## 2018-06-07 DIAGNOSIS — R94.39 ABNORMAL STRESS TEST: Primary | ICD-10-CM

## 2018-06-07 LAB
ANION GAP SERPL CALCULATED.3IONS-SCNC: 8 MMOL/L (ref 3–14)
BASOPHILS # BLD AUTO: 0 10E9/L (ref 0–0.2)
BASOPHILS NFR BLD AUTO: 0.3 %
BUN SERPL-MCNC: 13 MG/DL (ref 7–30)
CALCIUM SERPL-MCNC: 8.7 MG/DL (ref 8.5–10.1)
CHLORIDE SERPL-SCNC: 104 MMOL/L (ref 94–109)
CO2 SERPL-SCNC: 29 MMOL/L (ref 20–32)
CREAT SERPL-MCNC: 0.88 MG/DL (ref 0.52–1.04)
DIFFERENTIAL METHOD BLD: ABNORMAL
EOSINOPHIL # BLD AUTO: 0.1 10E9/L (ref 0–0.7)
EOSINOPHIL NFR BLD AUTO: 0.9 %
ERYTHROCYTE [DISTWIDTH] IN BLOOD BY AUTOMATED COUNT: 12.8 % (ref 10–15)
GFR SERPL CREATININE-BSD FRML MDRD: 64 ML/MIN/1.7M2
GLUCOSE SERPL-MCNC: 125 MG/DL (ref 70–99)
HCT VFR BLD AUTO: 41 % (ref 35–47)
HGB BLD-MCNC: 13.7 G/DL (ref 11.7–15.7)
LYMPHOCYTES # BLD AUTO: 1 10E9/L (ref 0.8–5.3)
LYMPHOCYTES NFR BLD AUTO: 13.3 %
MCH RBC QN AUTO: 33.1 PG (ref 26.5–33)
MCHC RBC AUTO-ENTMCNC: 33.4 G/DL (ref 31.5–36.5)
MCV RBC AUTO: 99 FL (ref 78–100)
MONOCYTES # BLD AUTO: 0.5 10E9/L (ref 0–1.3)
MONOCYTES NFR BLD AUTO: 6.9 %
NEUTROPHILS # BLD AUTO: 6.1 10E9/L (ref 1.6–8.3)
NEUTROPHILS NFR BLD AUTO: 78.6 %
PLATELET # BLD AUTO: 175 10E9/L (ref 150–450)
POTASSIUM SERPL-SCNC: 4.1 MMOL/L (ref 3.4–5.3)
RBC # BLD AUTO: 4.14 10E12/L (ref 3.8–5.2)
SODIUM SERPL-SCNC: 141 MMOL/L (ref 133–144)
WBC # BLD AUTO: 7.8 10E9/L (ref 4–11)

## 2018-06-07 PROCEDURE — 80048 BASIC METABOLIC PNL TOTAL CA: CPT | Mod: ZL | Performed by: INTERNAL MEDICINE

## 2018-06-07 PROCEDURE — 85025 COMPLETE CBC W/AUTO DIFF WBC: CPT | Mod: ZL | Performed by: INTERNAL MEDICINE

## 2018-06-07 PROCEDURE — 36415 COLL VENOUS BLD VENIPUNCTURE: CPT | Mod: ZL | Performed by: INTERNAL MEDICINE

## 2018-06-07 NOTE — TELEPHONE ENCOUNTER
10:43 AM    Reason for Call: Phone Call    Description: pt called in with concerns over angiogram she is supposed to have in [place], stated that lab/bloodwork is supposed to be sent to Dr. Yen but she is not seeing anything in her MyChart.  Please advise if we have this information or not.      Was an appointment offered for this call? No  If yes : Appointment type              Date    Preferred method for responding to this message: Telephone Call  What is your phone number ? [phone]    If we cannot reach you directly, may we leave a detailed response at the number you provided? Yes    Can this message wait until your PCP/provider returns, if available today? Not applicable, provider is in today.    Thank you,    Tatiana Shane

## 2018-06-12 ENCOUNTER — TRANSFERRED RECORDS (OUTPATIENT)
Dept: HEALTH INFORMATION MANAGEMENT | Facility: CLINIC | Age: 68
End: 2018-06-12

## 2018-06-14 ENCOUNTER — TELEPHONE (OUTPATIENT)
Dept: CARDIOLOGY | Facility: OTHER | Age: 68
End: 2018-06-14

## 2018-06-14 ENCOUNTER — ANTICOAGULATION THERAPY VISIT (OUTPATIENT)
Dept: ANTICOAGULATION | Facility: OTHER | Age: 68
End: 2018-06-14
Payer: COMMERCIAL

## 2018-06-14 DIAGNOSIS — Z79.01 LONG-TERM (CURRENT) USE OF ANTICOAGULANTS: ICD-10-CM

## 2018-06-14 DIAGNOSIS — I48.19 ATRIAL FIBRILLATION, PERSISTENT (H): ICD-10-CM

## 2018-06-14 NOTE — PROGRESS NOTES
ANTICOAGULATION FOLLOW-UP CLINIC VISIT    Patient Name:  Mary lAice Cameron  Date:  6/14/2018  Contact Type:  Telephone    SUBJECTIVE:     Patient Findings     Positives Other complaints, Intentional hold of therapy    Comments Call from patient stating she had angiogram did in [place] 6/12/18. States she was told to hold warfarin x 4 days prior to procedure and also held night of procedure. We discussed that withholding warfarin x 5 days her INR should be very low. We discussed new warfarin dosing and she is to have INR done on 6/18/18 as previously scheduled. She verbalized understanding of instruction and has no questions.            OBJECTIVE    INR Point of Care   Date Value Ref Range Status   05/21/2018 2.6 (H) 0.86 - 1.14 Final     Comment:     This test is intended for monitoring Coumadin therapy.  Results are not   accurate in patients with prolonged INR due to factor deficiency.         ASSESSMENT / PLAN  No question data found.  Anticoagulation Summary as of 6/14/2018     INR goal 2.0-3.0   Today's INR No new INR was available at the time of this encounter.   Warfarin maintenance plan 3 mg (2 mg x 1.5) on Mon, Fri; 2 mg (2 mg x 1) all other days   Full warfarin instructions 6/14: 4 mg; 6/15: 5 mg; Otherwise 3 mg on Mon, Fri; 2 mg all other days   Weekly warfarin total 16 mg   Plan last modified Kenna Kiran RN (1/10/2018)   Next INR check 6/18/2018   Priority INR   Target end date Indefinite    Indications   Atrial fibrillation  persistent (H) [I48.1]  Long-term (current) use of anticoagulants [Z79.01] [Z79.01]         Anticoagulation Episode Summary     INR check location     Preferred lab     Send INR reminders to  ANTICOAG POOL    Comments        Anticoagulation Care Providers     Provider Role Specialty Phone number    Braydon Yen MD Responsible Family Practice [phone]            See the Encounter Report to view Anticoagulation Flowsheet and Dosing Calendar (Go to Encounters tab in chart  review, and find the Anticoagulation Therapy Visit)        Kenna Kiran RN

## 2018-06-14 NOTE — MR AVS SNAPSHOT
Mary Alice Cameron   6/14/2018   Anticoagulation Therapy Visit    Description:  68 year old female   Provider:  Braydon Yen MD   Department:  [facility] Anti Coagulation           INR as of 6/14/2018     Today's INR No new INR was available at the time of this encounter.      Anticoagulation Summary as of 6/14/2018     INR goal 2.0-3.0   Today's INR No new INR was available at the time of this encounter.   Full warfarin instructions 6/14: 4 mg; 6/15: 5 mg; Otherwise 3 mg on Mon, Fri; 2 mg all other days   Next INR check 6/18/2018    Indications   Atrial fibrillation  persistent (H) [I48.1]  Long-term (current) use of anticoagulants [Z79.01] [Z79.01]         Your next Anticoagulation Clinic appointment(s)     Jun 18, 2018 10:00 AM CDT   Anticoagulation Visit with  ANTI COAGULATION   [facility] ([facility] )    [address] [place] [place] [address]   [phone]              June 2018 Details    Sun Mon Tue Wed Thu Fri Sat          1               2                 3               4               5               6               7               8               9                 10               11               12               13               14      4 mg   See details      15      5 mg         16      2 mg           17      2 mg         18            19               20               21               22               23                 24               25               26               27               28               29               30                Date Details   06/14 This INR check       Date of next INR:  6/18/2018         How to take your warfarin dose     To take:  2 mg Take 1 of the 2 mg tablets.    To take:  3 mg Take 1.5 of the 2 mg tablets.    To take:  4 mg Take 2 of the 2 mg tablets.    To take:  5 mg Take 2.5 of the 2 mg tablets.

## 2018-06-14 NOTE — TELEPHONE ENCOUNTER
Please request records from [facility] from the angiogram which was completed on 6/12/18.   Thank you , Giana

## 2018-06-18 ENCOUNTER — ANTICOAGULATION THERAPY VISIT (OUTPATIENT)
Dept: ANTICOAGULATION | Facility: OTHER | Age: 68
End: 2018-06-18
Attending: FAMILY MEDICINE
Payer: MEDICARE

## 2018-06-18 DIAGNOSIS — Z79.01 LONG-TERM (CURRENT) USE OF ANTICOAGULANTS: ICD-10-CM

## 2018-06-18 DIAGNOSIS — Z79.01 LONG TERM CURRENT USE OF ANTICOAGULANT THERAPY: ICD-10-CM

## 2018-06-18 DIAGNOSIS — I48.19 ATRIAL FIBRILLATION, PERSISTENT (H): ICD-10-CM

## 2018-06-18 LAB — INR BLD: 1.5 (ref 0.86–1.14)

## 2018-06-18 PROCEDURE — 36416 COLLJ CAPILLARY BLOOD SPEC: CPT | Mod: ZL | Performed by: FAMILY MEDICINE

## 2018-06-18 PROCEDURE — 85610 PROTHROMBIN TIME: CPT | Mod: QW,ZL | Performed by: FAMILY MEDICINE

## 2018-06-18 NOTE — PROGRESS NOTES
ANTICOAGULATION FOLLOW-UP CLINIC VISIT    Patient Name:  Mary Alice Cameron  Date:  6/18/2018  Contact Type:  Telephone/ message left on voicemail    SUBJECTIVE:     Patient Findings     Positives Missed doses    Comments INR done by lab. Call placed to patient and message left re: INR result, warfarin dosing and INR recheck date. She is to notify warfarin clinic if she has any bleeding/bruising, changes in diet/meds/activity or questions.           OBJECTIVE    INR Point of Care   Date Value Ref Range Status   06/18/2018 1.5 (H) 0.86 - 1.14 Final     Comment:     This test is intended for monitoring Coumadin therapy.  Results are not   accurate in patients with prolonged INR due to factor deficiency.         ASSESSMENT / PLAN  INR assessment SUB missed doses last week for heart cath   Recheck INR In: 1 WEEK    INR Location Clinic      Anticoagulation Summary as of 6/18/2018     INR goal 2.0-3.0   Today's INR 1.5!   Warfarin maintenance plan 3 mg (2 mg x 1.5) on Mon, Fri; 2 mg (2 mg x 1) all other days   Full warfarin instructions 6/18: 4 mg; Otherwise 3 mg on Mon, Fri; 2 mg all other days   Weekly warfarin total 16 mg   Plan last modified Kenna Kiran RN (1/10/2018)   Next INR check 6/25/2018   Priority INR   Target end date Indefinite    Indications   Atrial fibrillation  persistent (H) [I48.1]  Long-term (current) use of anticoagulants [Z79.01] [Z79.01]         Anticoagulation Episode Summary     INR check location     Preferred lab     Send INR reminders to  ANTICOAG POOL    Comments        Anticoagulation Care Providers     Provider Role Specialty Phone number    Braydon Yen MD [facility] [phone]            See the Encounter Report to view Anticoagulation Flowsheet and Dosing Calendar (Go to Encounters tab in chart review, and find the Anticoagulation Therapy Visit)        Kenna Kiran, RN

## 2018-06-18 NOTE — MR AVS SNAPSHOT
Mary Alice Cameron   6/18/2018 10:00 AM   Anticoagulation Therapy Visit    Description:  68 year old female   Provider:   ANTI COAGULATION   Department:  [facility] Anti Coagulation           INR as of 6/18/2018     Today's INR 1.5!      Anticoagulation Summary as of 6/18/2018     INR goal 2.0-3.0   Today's INR 1.5!   Full warfarin instructions 6/18: 4 mg; Otherwise 3 mg on Mon, Fri; 2 mg all other days   Next INR check 6/25/2018    Indications   Atrial fibrillation  persistent (H) [I48.1]  Long-term (current) use of anticoagulants [Z79.01] [Z79.01]         Your next Anticoagulation Clinic appointment(s)     Jun 25, 2018 10:45 AM CDT   Anticoagulation Visit with  ANTI COAGULATION   [facility] ([facility] )    [address] [place] [address]   [phone]              June 2018 Details    Sun Mon Tue Wed Thu Fri Sat          1               2                 3               4               5               6               7               8               9                 10               11               12               13               14               15               16                 17               18      4 mg   See details      19      2 mg         20      2 mg         21      2 mg         22      3 mg         23      2 mg           24      2 mg         25            26               27               28               29               30                Date Details   06/18 This INR check       Date of next INR:  6/25/2018         How to take your warfarin dose     To take:  2 mg Take 1 of the 2 mg tablets.    To take:  3 mg Take 1.5 of the 2 mg tablets.    To take:  4 mg Take 2 of the 2 mg tablets.

## 2018-06-19 ENCOUNTER — PATIENT OUTREACH (OUTPATIENT)
Dept: CARE COORDINATION | Facility: OTHER | Age: 68
End: 2018-06-19

## 2018-06-19 ENCOUNTER — OFFICE VISIT (OUTPATIENT)
Dept: CARDIOLOGY | Facility: OTHER | Age: 68
End: 2018-06-19
Attending: NURSE PRACTITIONER
Payer: COMMERCIAL

## 2018-06-19 VITALS
TEMPERATURE: 98.2 F | SYSTOLIC BLOOD PRESSURE: 118 MMHG | RESPIRATION RATE: 20 BRPM | HEIGHT: 65 IN | WEIGHT: 164 LBS | OXYGEN SATURATION: 95 % | HEART RATE: 89 BPM | DIASTOLIC BLOOD PRESSURE: 70 MMHG | BODY MASS INDEX: 27.32 KG/M2

## 2018-06-19 DIAGNOSIS — E78.2 MIXED HYPERLIPIDEMIA: ICD-10-CM

## 2018-06-19 DIAGNOSIS — I48.19 ATRIAL FIBRILLATION, PERSISTENT (H): ICD-10-CM

## 2018-06-19 DIAGNOSIS — I51.9 LV DYSFUNCTION: ICD-10-CM

## 2018-06-19 DIAGNOSIS — I10 ESSENTIAL HYPERTENSION: ICD-10-CM

## 2018-06-19 DIAGNOSIS — I25.10 CORONARY ARTERY DISEASE INVOLVING NATIVE CORONARY ARTERY OF NATIVE HEART WITHOUT ANGINA PECTORIS: ICD-10-CM

## 2018-06-19 DIAGNOSIS — Z98.890 STATUS POST CORONARY ANGIOGRAM: Primary | ICD-10-CM

## 2018-06-19 DIAGNOSIS — Q23.81 BICUSPID AORTIC VALVE: ICD-10-CM

## 2018-06-19 DIAGNOSIS — Z79.01 LONG-TERM (CURRENT) USE OF ANTICOAGULANTS: ICD-10-CM

## 2018-06-19 PROCEDURE — 99214 OFFICE O/P EST MOD 30 MIN: CPT | Performed by: NURSE PRACTITIONER

## 2018-06-19 PROCEDURE — G0463 HOSPITAL OUTPT CLINIC VISIT: HCPCS

## 2018-06-19 RX ORDER — ATORVASTATIN CALCIUM 10 MG/1
10 TABLET, FILM COATED ORAL DAILY
Qty: 30 TABLET | Refills: 11 | Status: SHIPPED | OUTPATIENT
Start: 2018-06-19 | End: 2018-06-19

## 2018-06-19 RX ORDER — VALSARTAN 40 MG/1
40 TABLET ORAL 2 TIMES DAILY
Qty: 60 TABLET | Refills: 3 | Status: SHIPPED | OUTPATIENT
Start: 2018-06-19 | End: 2018-06-19

## 2018-06-19 RX ORDER — DILTIAZEM HYDROCHLORIDE 120 MG/1
120 CAPSULE, COATED, EXTENDED RELEASE ORAL DAILY
Qty: 30 CAPSULE | Refills: 11 | Status: SHIPPED | OUTPATIENT
Start: 2018-06-19 | End: 2018-06-19

## 2018-06-19 ASSESSMENT — PAIN SCALES - GENERAL: PAINLEVEL: NO PAIN (0)

## 2018-06-19 NOTE — PATIENT INSTRUCTIONS
You were seen by Pau Nelson NP, 6/19/2018.     1.  Please discontinue Flecainide daily.    2.  You will begin Diltiazem 120 mg daily    3.  Please discontinue Zocor daily.    4.  Please begin Lipitor 10 mg daily.    5. You will have a repeat EKG at the time of your follow up appointment     You will follow up with Dr. Pau Nelson NP in 1 month.       Please call the cardiology office with problems, questions, or concerns at [phone].    If you experience chest pain, chest pressure, chest tightness, shortness of breath, fainting, lightheadedness, nausea, vomiting, or other concerning symptoms, please report to the Emergency Department or call 911. These symptoms may be emergent, and best treated in the Emergency Department.       Giana NAVARRO RN-BSN  Cardiology   [facility]  [phone]

## 2018-06-19 NOTE — PROGRESS NOTES
[facility] HEART [facility]   CARDIOLOGY PROGRESS NOTE    Mary Alice Cameron   [address]    [facility] [address]      Braydon Yen     Chief Complaint   Patient presents with     RECHECK     Follow-up angiogram at [facility], patient of Dr. Myers        HPI:   Ms. Cameron is a 68 year old female who presents for cardiology follow-up s/p coronary angiogram. She has followed cardiology for atrial fibrillation, for which she developed this past year and known bicuspid aortic valve. Additionally, she has a history of COPD, hypothyroidism and depression.     She has limited symptoms with permanent AF, reports shortness of breath which is likely multifactorial with significant COPD. She has been on Flecainide AAT and Coumadin oral anticoagulation.    Recent TTE on 5/23/17 with LV EF reduced to 40-45%, grade I/early diastolic dysfunction. History of bicuspid aortic valve with mild to moderate aortic insufficiency with peak aortic velocity 2.57 m/sec, mean gradient 16.9 mmHg and peak gradient AoV 26.4 mmHg.     At her last cardiology visit on 5/22/18, reviewd results of TTE, with decline in systolic function and reported increased fatigue a NM Lexiscan stress test was ordered. Results of this study revealed possible ischemia of the anterior wall and apex with small completed infarct of the apex, LV EF 55%. It was recommended further evaluation and possible intervention with coronary angiography. Patient requested to have this at [facility].    Angiogram at [facility] on 6/12/18 with findings of mild coronary atherosclerosis.   Coronaries:  LM: mild diffuse disease  LAD:Mild diffuse disease  LCX: Mild diffuse disease  RCA: Dominant, mild diffuse disease.     INTERVAL HISTORY:  Today patient admits that she is feeling good. She denies any chest pain or pressure, no increased shortness of breath or MORO[place], no increased edema. No increased palpitations, lightheadedness or syncope. She has no concerns today.     PAST MEDICAL HISTORY:   Past  Medical History:   Diagnosis Date     Asthma      Atrial fibrillation (H)      COPD (chronic obstructive pulmonary disease) (H)      Depression      High cholesterol      HTN (hypertension)      Thyroid disease           FAMILY HISTORY:   Family History   Problem Relation Age of Onset     Prostate Cancer Father      Hypertension Father      Heart Failure Father      CHF     Asthma Mother      Cancer Mother      ovarian     Hypertension Mother      Asthma Brother      Hypertension Sister      Hypertension Brother           PAST SURGICAL HISTORY:   Past Surgical History:   Procedure Laterality Date     BACK SURGERY  2006     CARDIAC SURGERY  06/12/2018    Angiogram at [facility]     COLONOSCOPY N/A 1/19/2016    Procedure: COLONOSCOPY;  Surgeon: Waldemar Bob MD;  Location: [facility] OR     HYSTERECTOMY  1980    partial     SLING BLADDER SUSPENSION WITH FASCIA LINNETTE            SOCIAL HISTORY:   Social History     Social History     Marital status:      Spouse name: N/A     Number of children: N/A     Years of education: N/A     Occupational History      Retired     disabled     Social History Main Topics     Smoking status: Former Smoker     Years: 48.00     Types: Cigarettes     Quit date: 1/28/2007     Smokeless tobacco: Never Used     Alcohol use No     Drug use: No     Sexual activity: No      Comment:      Other Topics Concern      Service No     Blood Transfusions Yes     Permits if needed     Caffeine Concern Yes     2 cups coffee daily     Seat Belt Yes     Parent/Sibling W/ Cabg, Mi Or Angioplasty Before 65f 55m? No     Social History Narrative          CURRENT MEDICATIONS:   Current Outpatient Prescriptions   Medication     acetaminophen (TYLENOL) 500 MG tablet     ADVAIR -21 MCG/ACT Inhaler     albuterol (2.5 MG/3ML) 0.083% neb solution     Calcium Carb-Cholecalciferol (CALTRATE 600+D3 SOFT PO)     cloNIDine (CATAPRES) 0.1 MG tablet     flecainide (TAMBOCOR) 100 MG tablet  "    furosemide (LASIX) 40 MG tablet     hydrALAZINE (APRESOLINE) 25 MG tablet     ipratropium - albuterol 0.5 mg/2.5 mg/3 mL (DUONEB) 0.5-2.5 (3) MG/3ML neb solution     levothyroxine (SYNTHROID/LEVOTHROID) 100 MCG tablet     potassium chloride SA (K-DUR/KLOR-CON M) 20 MEQ CR tablet     simvastatin (ZOCOR) 10 MG tablet     warfarin (COUMADIN) 2 MG tablet     albuterol (VENTOLIN HFA) 108 (90 BASE) MCG/ACT Inhaler     diphenhydrAMINE (BENADRYL) 25 MG tablet     LORazepam (ATIVAN) 1 MG tablet     OTHER MEDICAL SUPPLIES     triamcinolone (KENALOG) 0.1 % ointment     No current facility-administered medications for this visit.           ALLERGIES:   Allergies   Allergen Reactions     Amlodipine Besylate Cough     Norvasc     Lisinopril Cough          ROS:   CONSTITUTIONAL: No weight changes, fever, chills or weakness.   CARDIOVASCULAR: No chest pain, chest pressure or chest discomfort. No increased palpitations or lower extremity edema.   RESPIRATORY: No increased shortness of breath, cough or sputum production.   GASTROINTESTINAL: No reported abdominal pain.  NEUROLOGICAL: No lightheadedness, dizziness, syncope, ataxia or weakness.   HEMATOLOGIC: No reported anemia, bleeding or excessive bruising.   SKIN: No abnormal rashes or itching. Right wrist access site healed well.       PHYSICAL EXAM:   /70 (BP Location: Right arm, Patient Position: Chair, Cuff Size: Adult Regular)  Pulse 89  Temp 98.2  F (36.8  C) (Tympanic)  Resp 20  Ht 1.651 m (5' 5\")  Wt 74.4 kg (164 lb)  SpO2 95%  BMI 27.29 kg/m2  GENERAL: The patient is a well-developed, well-nourished, in no apparent distress. Alert and oriented x3.   HEENT: Head is normocephalic and atraumatic. Eyes are symmetrical with normal visual tracking. Nares appeared normal without nasal drainage. Mucous membranes are moist.   NECK: Supple. No JVD.  HEART: Iegular rate and rhythm, S1S2 present without murmur, rub or gallop.   LUNGS: Respirations regular and " unlabored. Clear to auscultation.   GI: Abdomen is nondistended.   EXTREMITIES: No peripheral edema present.   NEUROLOGIC: Alert and oriented X3. No focal neurologic deficits.   SKIN: No jaundice. No rashes or visible skin lesions present. Right wrist access site healed well.       LAB RESULTS:   Orders Only on 06/18/2018   Component Date Value Ref Range Status     INR Point of Care 06/18/2018 1.5* 0.86 - 1.14 Final   Orders Only on 06/07/2018   Component Date Value Ref Range Status     Sodium 06/07/2018 141  133 - 144 mmol/L Final     Potassium 06/07/2018 4.1  3.4 - 5.3 mmol/L Final     Chloride 06/07/2018 104  94 - 109 mmol/L Final     Carbon Dioxide 06/07/2018 29  20 - 32 mmol/L Final     Anion Gap 06/07/2018 8  3 - 14 mmol/L Final     Glucose 06/07/2018 125* 70 - 99 mg/dL Final     Urea Nitrogen 06/07/2018 13  7 - 30 mg/dL Final     Creatinine 06/07/2018 0.88  0.52 - 1.04 mg/dL Final     GFR Estimate 06/07/2018 64  >60 mL/min/1.7m2 Final     GFR Estimate If Black 06/07/2018 77  >60 mL/min/1.7m2 Final     Calcium 06/07/2018 8.7  8.5 - 10.1 mg/dL Final     WBC 06/07/2018 7.8  4.0 - 11.0 10e9/L Final     RBC Count 06/07/2018 4.14  3.8 - 5.2 10e12/L Final     Hemoglobin 06/07/2018 13.7  11.7 - 15.7 g/dL Final     Hematocrit 06/07/2018 41.0  35.0 - 47.0 % Final     MCV 06/07/2018 99  78 - 100 fl Final     MCH 06/07/2018 33.1* 26.5 - 33.0 pg Final     MCHC 06/07/2018 33.4  31.5 - 36.5 g/dL Final     RDW 06/07/2018 12.8  10.0 - 15.0 % Final     Platelet Count 06/07/2018 175  150 - 450 10e9/L Final     Diff Method 06/07/2018 Automated Method   Final     % Neutrophils 06/07/2018 78.6  % Final     % Lymphocytes 06/07/2018 13.3  % Final     % Monocytes 06/07/2018 6.9  % Final     % Eosinophils 06/07/2018 0.9  % Final     % Basophils 06/07/2018 0.3  % Final     Absolute Neutrophil 06/07/2018 6.1  1.6 - 8.3 10e9/L Final     Absolute Lymphocytes 06/07/2018 1.0  0.8 - 5.3 10e9/L Final     Absolute Monocytes 06/07/2018 0.5  0.0  - 1.3 10e9/L Final     Absolute Eosinophils 06/07/2018 0.1  0.0 - 0.7 10e9/L Final     Absolute Basophils 06/07/2018 0.0  0.0 - 0.2 10e9/L Final   Orders Only on 05/21/2018   Component Date Value Ref Range Status     INR Point of Care 05/21/2018 2.6* 0.86 - 1.14 Final   Orders Only on 05/07/2018   Component Date Value Ref Range Status     INR Point of Care 05/07/2018 3.0* 0.86 - 1.14 Final   Orders Only on 04/23/2018   Component Date Value Ref Range Status     INR Point of Care 04/23/2018 1.9* 0.86 - 1.14 Final   Orders Only on 04/17/2018   Component Date Value Ref Range Status     INR Point of Care 04/17/2018 2.6* 0.86 - 1.14 Final   Orders Only on 04/11/2018   Component Date Value Ref Range Status     INR Point of Care 04/11/2018 2.2* 0.86 - 1.14 Final   Orders Only on 04/02/2018   Component Date Value Ref Range Status     INR Point of Care 04/02/2018 3.0* 0.86 - 1.14 Final   Orders Only on 03/22/2018   Component Date Value Ref Range Status     INR 03/22/2018 2.14* 0.80 - 1.20 Final   Orders Only on 03/07/2018   Component Date Value Ref Range Status     INR Point of Care 03/07/2018 2.6* 0.86 - 1.14 Final          ASSESSMENT:   Mary Alice Cameron presents for cardiology follow-up s/p coronary angiogram. She has followed cardiology for atrial fibrillation, for which she developed this past year and known bicuspid aortic valve. Additionally, she has a history of COPD, hypothyroidism and depression.   At her last cardiology visit on 5/22/18, reviewd results of TTE, with decline in systolic function and reported increased fatigue a NM Lexiscan stress test was ordered. Results of this study revealed possible ischemia of the anterior wall and apex with small completed infarct of the apex, LV EF 55%. It was recommended further evaluation and possible intervention with coronary angiography. Angiogram at [facility] on 6/12/18 with findings of mild coronary atherosclerosis.    PLAN:   1. Status post coronary angiogram  Angiogram  at [facility] on 6/12/18 with findings of mild coronary atherosclerosis.  Continue cardiac risk factor optimization. Start Atorvastatin 10 mg every night.      - EKG 12-lead complete w/read - (Clinic Performed); Future    2. Coronary artery disease involving native coronary artery of native heart without angina pectoris  - atorvastatin (LIPITOR) 10 MG tablet; Take 1 tablet (10 mg) by mouth daily  Dispense: 30 tablet; Refill: 11    3. Bicuspid aortic valve  This has remained stable, continue with repeat TTE surveillance in one year, no increased symptoms  CT aortogram shows no dilation of descending aorta.    4. Atrial fibrillation, persistent (H)  In regards to AF patient has been on Flecainide AAT, with new mild CAD and reduced LV systolic function it is recommended that she discontinue Flecainide.   She will restart Diltiazem for rate control, admits that she was previously on this medication. May consider alternative AAT if increased symptoms or difficulty with rate control.     - diltiazem (CARDIZEM CD/CARTIA XT) 120 MG 24 hr capsule; Take 1 capsule (120 mg) by mouth daily  Dispense: 30 capsule; Refill: 11  - EKG 12-lead complete w/read - (Clinic Performed); Future    5. Long-term (current) use of anticoagulants [Z79.01]  Continue with Coumadin oral anticoagulation for persistent AF    6. Essential hypertension  BP stable today, see med adjustments.   With starting Diltiazem and Valsartan, it has been recommended she take 25 mg Hydralazine now on a PRN basis up to three times daily for systolic pressure above 160.    - diltiazem (CARDIZEM CD/CARTIA XT) 120 MG 24 hr capsule; Take 1 capsule (120 mg) by mouth daily  Dispense: 30 capsule; Refill: 11  - valsartan (DIOVAN) 40 MG tablet; Take 1 tablet (40 mg) by mouth 2 times daily  Dispense: 60 tablet; Refill: 3    7. LV dysfunction  With recent TTE findings of depressed LV systolic function, no obstructing CAD on angiography, no changes in her aortic valve disease on  recent TTE to explain this.   With this findings it is recommended ACE or ARB as well as Beta blocker for GDMT. With COPD and increased shortness of breath BB therapy was felt may be problematic. She has a history of cough with ACEi. It is recommended today that she start ARB valsartan 40 mg BID.     - valsartan (DIOVAN) 40 MG tablet; Take 1 tablet (40 mg) by mouth 2 times daily  Dispense: 60 tablet; Refill: 3    8. Mixed hyperlipidemia  - atorvastatin (LIPITOR) 10 MG tablet; Take 1 tablet (10 mg) by mouth daily  Dispense: 30 tablet; Refill: 11         Thank you for allowing me to participate in the care of your patient. Please do not hesitate to contact me if you have any questions.     Pau Nelson

## 2018-06-19 NOTE — PROGRESS NOTES
Clinic Care Coordination Contact  Care Team Conversations    Care Coordinator attended Mary Alice's appointment today with Pau Nelson.  See progress note for updates to plan of care.  Patient verbalized understanding of the instruction provided at today's visit.  She reports fatigue following her angiogram but Pau reassured her this is a common occurrence.  Mary Alice was instructed to stop taking flecainide as this is contraindicated in CAD.  She will monitor for atrial fibrillation symptoms and will contact CC as needed. She was instructed to follow-up with Pau Nelson in 1 month.  Care Coordinator will continue to reach out regularly.      Lauren Pipkin, BS-RN   CHF and General Care Coordinator  [phone] Option # 1

## 2018-06-19 NOTE — NURSING NOTE
"Chief Complaint   Patient presents with     RECHECK     Follow-up angiogram at [facility], patient of Dr. Myers       Initial /70 (BP Location: Right arm, Patient Position: Chair, Cuff Size: Adult Regular)  Pulse 89  Temp 98.2  F (36.8  C) (Tympanic)  Resp 20  Ht 1.651 m (5' 5\")  Wt 74.4 kg (164 lb)  SpO2 95%  BMI 27.29 kg/m2 Estimated body mass index is 27.29 kg/(m^2) as calculated from the following:    Height as of this encounter: 1.651 m (5' 5\").    Weight as of this encounter: 74.4 kg (164 lb).  Medication Reconciliation: complete    Fe Stephenson LPN    "

## 2018-06-19 NOTE — MR AVS SNAPSHOT
After Visit Summary   6/19/2018    Mary Alice Cameron    MRN: [id number]           Patient Information     Date Of Birth          1950        Visit Information        Provider Department      6/19/2018 9:45 AM Pau Nelson APRN [facility]        Today's Diagnoses     Status post coronary angiogram    -  1    Coronary artery disease involving native coronary artery of native heart without angina pectoris        Bicuspid aortic valve        Atrial fibrillation, persistent (H)        Long-term (current) use of anticoagulants [Z79.01]        Essential hypertension          Care Instructions    You were seen by Pau Nelson NP, 6/19/2018.     1.  Please discontinue Flecainide daily.    2.  You will begin Diltiazem 120 mg daily    3.  Please discontinue Zocor daily.    4.  Please begin Lipitor 10 mg daily.    5. You will have a repeat EKG at the time of your follow up appointment     You will follow up with Dr. Pau Nelson NP in 1 month.       Please call the cardiology office with problems, questions, or concerns at [phone].    If you experience chest pain, chest pressure, chest tightness, shortness of breath, fainting, lightheadedness, nausea, vomiting, or other concerning symptoms, please report to the Emergency Department or call 911. These symptoms may be emergent, and best treated in the Emergency Department.       Giana NAVARRO RN-BSN  Cardiology   [facility]  [phone]              Follow-ups after your visit        Follow-up notes from your care team     Return in about 4 weeks (around 7/17/2018).      Your next 10 appointments already scheduled     Jun 25, 2018 10:45 AM CDT   Anticoagulation Visit with  ANTI COAGULATION   [facility] ([facility] )    [address] [place] [address]   [phone]            Jun 25, 2018 10:45 AM CDT   LAB with NA LAB   [facility] ([facility] )    [address]  [facility] [address]   [phone]            Jun 27, 2018  1:15 PM CDT   (Arrive by 1:00 PM)   SHORT with Braydon Yen MD   [facility] ([facility] )    [address]  [facility] [address]   [phone]            Jul 19, 2018  9:15 AM CDT   (Arrive by 9:00 AM)   Return Visit with CESAR Aleman CNP   [facility] ([facility] )    [address]  [facility] [address]   [phone]            Sep 25, 2018 10:30 AM CDT   (Arrive by 10:15 AM)   Return Visit with Eliezer Myers MD   [facility] ([facility] )    [address]  [facility] [address]   [phone]              Who to contact     If you have questions or need follow up information about today's clinic visit or your schedule please contact [facility] directly at [phone].  Normal or non-critical lab and imaging results will be communicated to you by MyChart, letter or phone within 4 business days after the clinic has received the results. If you do not hear from us within 7 days, please contact the clinic through [employer] or phone. If you have a critical or abnormal lab result, we will notify you by phone as soon as possible.  Submit refill requests through [employer] or call your pharmacy and they will forward the refill request to us. Please allow 3 business days for your refill to be completed.          Additional Information About Your Visit        MyChart Information     [employer] gives you secure access to your electronic health record. If you see a primary care provider, you can also send messages to your care team and make appointments. If you have questions, please call your primary care clinic.  If you do not have a primary care provider, please call [phone] and they will assist you.        Care EveryWhere ID     This is your Care EveryWhere ID. This could be used by other  "organizations to access your [place] medical records  [id number]        Your Vitals Were     Pulse Temperature Respirations Height Pulse Oximetry BMI (Body Mass Index)    89 98.2  F (36.8  C) (Tympanic) 20 1.651 m (5' 5\") 95% 27.29 kg/m2       Blood Pressure from Last 3 Encounters:   06/19/18 118/70   05/22/18 114/60   03/19/18 120/76    Weight from Last 3 Encounters:   06/19/18 74.4 kg (164 lb)   05/22/18 75.8 kg (167 lb)   03/19/18 76.7 kg (169 lb)              Today, you had the following     No orders found for display       Primary Care Provider Office Phone # Fax #    Braydon Yen [phone]       [address] [place] [id number]        Goals        General    Maintain current health status     Notes - Note created  5/22/2018 11:35 AM by Pipkin, Lauren N, RN    Maintain current health status and functional ability despite chronic health alterations          Equal Access to Services     [facility]: Hadii aad ku hadasho Soomaali, waaxda luqadaha, qaybta kaalmada adeegyada, waxay josein haysantiagon pinky montgomery . So [facility] [place] [phone].    ATENCIÓN: Si habla español, tiene a lindsey disposición servicios gratuitos de asistencia lingüística. Llame al [phone].    We comply with applicable federal civil rights laws and [place] laws. We do not discriminate on the basis of race, color, national origin, age, disability, sex, sexual orientation, or gender identity.            Thank you!     Thank you for choosing [facility] [place]  for your care. Our goal is always to provide you with excellent care. Hearing back from our patients is one way we can continue to improve our services. Please take a few minutes to complete the written survey that you may receive in the mail after your visit with us. Thank you!             Your Updated Medication List - Protect others around you: Learn how to safely use, store and throw away your medicines at www.disposemymeds.org.          This " list is accurate as of 6/19/18 10:32 AM.  Always use your most recent med list.                   Brand Name Dispense Instructions for use Diagnosis    acetaminophen 500 MG tablet    TYLENOL     Take 500-1,000 mg by mouth every 6 hours as needed for mild pain        ADVAIR -21 MCG/ACT Inhaler   Generic drug:  fluticasone-salmeterol     36 g    INHALE 2 PUFFS INTO THE LUNGS TWICE DAILY    COPD exacerbation (H)       * albuterol 108 (90 Base) MCG/ACT Inhaler    VENTOLIN HFA    54 g    INHALE 2 PUFFS INTO THE LUNGS EVERY 4 HOURS AS NEEDED FOR SHORTNESS OF BREATH OR DIFFICULT BREATHING OR WHEEZING    Other emphysema (H)       * albuterol (2.5 MG/3ML) 0.083% neb solution     25 vial    Take 1 vial (2.5 mg) by nebulization every 6 hours as needed for shortness of breath / dyspnea or wheezing    COPD exacerbation (H)       CALTRATE 600+D3 SOFT PO      Take 600 mg by mouth 2 times daily        cloNIDine 0.1 MG tablet    CATAPRES    360 tablet    TAKE 1 TABLET BY MOUTH EVERY MORNING AND 3 TABLETS EVERY EVENING    Essential hypertension       diphenhydrAMINE 25 MG tablet    BENADRYL    60 tablet    Take 1-2 tablets (25-50 mg) by mouth every 6 hours as needed for itching or allergies        flecainide 100 MG tablet    TAMBOCOR    180 tablet    Take 1 tablet (100 mg) by mouth 2 times daily    Atrial fibrillation, persistent (H)       furosemide 40 MG tablet    LASIX    180 tablet    TAKE 1 TABLET BY MOUTH TWICE DAILY.    Essential hypertension, benign       hydrALAZINE 25 MG tablet    APRESOLINE    810 tablet    TAKE 3 TABLETS BY MOUTH THREE TIMES DAILY    Essential hypertension, benign       ipratropium - albuterol 0.5 mg/2.5 mg/3 mL 0.5-2.5 (3) MG/3ML neb solution    DUONEB    1620 mL    USE 1 VIAL PER NEBULIZER FOUR TIMES DAILY    COPD exacerbation (H)       levothyroxine 100 MCG tablet    SYNTHROID/LEVOTHROID    90 tablet    TAKE 1 TABLET(100 MCG) BY MOUTH DAILY    Hypothyroidism, postablative       LORazepam 1 MG  tablet    ATIVAN     Take 0.5-1 tablets by mouth every 6 hours as needed Reported on 5/23/2017        other medical supplies     1 each    Apply one pair to help with edema    Edema, Atrial fibrillation, persistent (H)       potassium chloride SA 20 MEQ CR tablet    K-DUR/KLOR-CON M    180 tablet    Take 1 tablet (20 mEq) by mouth 2 times daily    Hypokalemia       simvastatin 10 MG tablet    ZOCOR    90 tablet    TAKE 1 TABLET(10 MG) BY MOUTH AT BEDTIME    Hyperlipidemia LDL goal <130       triamcinolone 0.1 % ointment    KENALOG    454 g    Apply bid and hs left hand and legs - for dermatitis    Contact dermatitis, unspecified contact dermatitis type, unspecified trigger       warfarin 2 MG tablet    COUMADIN    110 tablet    3 mg Mon,Fri and 2mg all other days or as directed by warfarin clinic    Long term current use of anticoagulant therapy       * Notice:  This list has 2 medication(s) that are the same as other medications prescribed for you. Read the directions carefully, and ask your doctor or other care provider to review them with you.

## 2018-06-20 ENCOUNTER — ANTICOAGULATION THERAPY VISIT (OUTPATIENT)
Dept: ANTICOAGULATION | Facility: OTHER | Age: 68
End: 2018-06-20
Payer: COMMERCIAL

## 2018-06-20 DIAGNOSIS — I48.19 ATRIAL FIBRILLATION, PERSISTENT (H): ICD-10-CM

## 2018-06-20 DIAGNOSIS — Z79.01 LONG-TERM (CURRENT) USE OF ANTICOAGULANTS: ICD-10-CM

## 2018-06-20 RX ORDER — ATORVASTATIN CALCIUM 10 MG/1
TABLET, FILM COATED ORAL
Qty: 90 TABLET | Refills: 3 | Status: SHIPPED | OUTPATIENT
Start: 2018-06-20 | End: 2018-07-19

## 2018-06-20 RX ORDER — VALSARTAN 40 MG/1
TABLET ORAL
Qty: 180 TABLET | Refills: 0 | Status: SHIPPED | OUTPATIENT
Start: 2018-06-20 | End: 2018-08-16

## 2018-06-20 RX ORDER — DILTIAZEM HYDROCHLORIDE 120 MG/1
CAPSULE, EXTENDED RELEASE ORAL
Qty: 90 CAPSULE | Refills: 3 | Status: SHIPPED | OUTPATIENT
Start: 2018-06-20 | End: 2018-07-19

## 2018-06-20 NOTE — MR AVS SNAPSHOT
Mary Alice Cameron   6/20/2018   Anticoagulation Therapy Visit    Description:  68 year old female   Provider:  Braydon Yen MD   Department:  Hc Anti Coagulation           INR as of 6/20/2018     Today's INR No new INR was available at the time of this encounter.      Anticoagulation Summary as of 6/20/2018     INR goal 2.0-3.0   Today's INR No new INR was available at the time of this encounter.   Full warfarin instructions 3 mg on Mon, Fri; 2 mg all other days   Next INR check 6/25/2018    Indications   Atrial fibrillation  persistent (H) [I48.1]  Long-term (current) use of anticoagulants [Z79.01] [Z79.01]         Your next Anticoagulation Clinic appointment(s)     Jun 25, 2018 10:45 AM CDT   Anticoagulation Visit with HC ANTI COAGULATION   [facility] ([facility] )    [address] [facility] [address]   [phone]              June 2018 Details    Sun Mon Tue Wed Thu Fri Sat          1               2                 3               4               5               6               7               8               9                 10               11               12               13               14               15               16                 17               18               19               20      2 mg   See details      21      2 mg         22      3 mg         23      2 mg           24      2 mg         25            26               27               28               29               30                Date Details   06/20 This INR check       Date of next INR:  6/25/2018         How to take your warfarin dose     To take:  2 mg Take 1 of the 2 mg tablets.    To take:  3 mg Take 1.5 of the 2 mg tablets.

## 2018-06-20 NOTE — PROGRESS NOTES
ANTICOAGULATION FOLLOW-UP CLINIC VISIT    Patient Name:  Mary Alice Cameron  Date:  6/20/2018  Contact Type:  Telephone    SUBJECTIVE:     Patient Findings     Positives Change in medications    Comments Call from patient stating she saw Giana Jackman, cardiology NP and states that the flecainide was discontinued and will be on diltiazem. Also she will be on Lipitor. We discussed that the discontinuing of flecainide may affect her INR. She is to have INR done on 6/25 so will have that one done and will see if needs dose adjustment at that time. She verbalized understanding of instruction and had no questions           OBJECTIVE    INR Point of Care   Date Value Ref Range Status   06/18/2018 1.5 (H) 0.86 - 1.14 Final     Comment:     This test is intended for monitoring Coumadin therapy.  Results are not   accurate in patients with prolonged INR due to factor deficiency.         ASSESSMENT / PLAN  No question data found.  Anticoagulation Summary as of 6/20/2018     INR goal 2.0-3.0   Today's INR No new INR was available at the time of this encounter.   Warfarin maintenance plan 3 mg (2 mg x 1.5) on Mon, Fri; 2 mg (2 mg x 1) all other days   Full warfarin instructions 3 mg on Mon, Fri; 2 mg all other days   Weekly warfarin total 16 mg   No change documented Kenna Kiran, RN   Plan last modified Kenna Kiran, RN (1/10/2018)   Next INR check 6/25/2018   Priority INR   Target end date Indefinite    Indications   Atrial fibrillation  persistent (H) [I48.1]  Long-term (current) use of anticoagulants [Z79.01] [Z79.01]         Anticoagulation Episode Summary     INR check location     Preferred lab     Send INR reminders to  ANTICOAG POOL    Comments        Anticoagulation Care Providers     Provider Role Specialty Phone number    Braydon Yen MD [facility] [phone]            See the Encounter Report to view Anticoagulation Flowsheet and Dosing Calendar (Go to Encounters tab in chart review,  and find the Anticoagulation Therapy Visit)        Kenna Kiran RN

## 2018-06-21 ENCOUNTER — TELEPHONE (OUTPATIENT)
Dept: CARE COORDINATION | Facility: OTHER | Age: 68
End: 2018-06-21

## 2018-06-21 NOTE — TELEPHONE ENCOUNTER
Clinic Care Coordination Contact  Care Team Conversations    LM for Mary Alice to call back to speak with Care Coordinator to inform her of Pau Nelson's instructions regarding her medication.  Will await return call.    Lauren Pipkin, BS-RN   CHF and General Care Coordinator  [phone] Option # 1

## 2018-06-21 NOTE — TELEPHONE ENCOUNTER
----- Message from CESAR Aleman CNP sent at 6/20/2018 11:00 AM CDT -----  Regarding: Med adjustments  Please call patient with medication adjustments following visit yesterday. Due to decreased systolic function seen on echo and NM stress test I would like her to also start new medication Valsartan 40 mg twice daily. As this can effect her BP, I would like her to take Hydralazine 25 mg now on an as needed basis (she was taking this scheduled) for systolic BP's above 160.   This medication is in addition to starting Diltiazem recommended yesterday for AFib.  Thanks!  Pau

## 2018-06-21 NOTE — TELEPHONE ENCOUNTER
Clinic Care Coordination Contact  Care Team Conversations    Care Coordinator informed Mary Alice of the medication adjustments Pau Nelson is making.  Mary Alice verbalized understanding and states she has written down all new instructions.  She reports she has felt headachey today and believes it is related to the medication changes.  She was advised to continue to monitor this closely and call if things seem to worsen or if she has additional questions or needs clarification on instructions provided today.       Lauren Pipkin, BS-RN   CHF and General Care Coordinator  [phone] Option # 1

## 2018-06-25 DIAGNOSIS — Z79.01 LONG TERM CURRENT USE OF ANTICOAGULANT THERAPY: ICD-10-CM

## 2018-06-25 LAB — INR BLD: 1.9 (ref 0.86–1.14)

## 2018-06-25 PROCEDURE — 85610 PROTHROMBIN TIME: CPT | Mod: QW,ZL | Performed by: FAMILY MEDICINE

## 2018-06-25 PROCEDURE — 36416 COLLJ CAPILLARY BLOOD SPEC: CPT | Mod: ZL | Performed by: FAMILY MEDICINE

## 2018-06-26 DIAGNOSIS — Z79.01 LONG TERM CURRENT USE OF ANTICOAGULANT THERAPY: ICD-10-CM

## 2018-06-26 RX ORDER — WARFARIN SODIUM 2 MG/1
TABLET ORAL
Qty: 110 TABLET | Refills: 3 | Status: SHIPPED | OUTPATIENT
Start: 2018-06-26 | End: 2018-06-28

## 2018-06-27 ENCOUNTER — OFFICE VISIT (OUTPATIENT)
Dept: FAMILY MEDICINE | Facility: OTHER | Age: 68
End: 2018-06-27
Attending: FAMILY MEDICINE
Payer: COMMERCIAL

## 2018-06-27 VITALS
BODY MASS INDEX: 27.49 KG/M2 | WEIGHT: 165 LBS | TEMPERATURE: 98.4 F | HEIGHT: 65 IN | HEART RATE: 102 BPM | DIASTOLIC BLOOD PRESSURE: 74 MMHG | SYSTOLIC BLOOD PRESSURE: 126 MMHG | OXYGEN SATURATION: 88 %

## 2018-06-27 DIAGNOSIS — J43.9 PULMONARY EMPHYSEMA, UNSPECIFIED EMPHYSEMA TYPE (H): Primary | ICD-10-CM

## 2018-06-27 PROCEDURE — G0463 HOSPITAL OUTPT CLINIC VISIT: HCPCS

## 2018-06-27 PROCEDURE — 99214 OFFICE O/P EST MOD 30 MIN: CPT | Performed by: FAMILY MEDICINE

## 2018-06-27 ASSESSMENT — ANXIETY QUESTIONNAIRES
6. BECOMING EASILY ANNOYED OR IRRITABLE: NOT AT ALL
7. FEELING AFRAID AS IF SOMETHING AWFUL MIGHT HAPPEN: NOT AT ALL
1. FEELING NERVOUS, ANXIOUS, OR ON EDGE: NOT AT ALL
5. BEING SO RESTLESS THAT IT IS HARD TO SIT STILL: SEVERAL DAYS
GAD7 TOTAL SCORE: 2
2. NOT BEING ABLE TO STOP OR CONTROL WORRYING: NOT AT ALL
3. WORRYING TOO MUCH ABOUT DIFFERENT THINGS: NOT AT ALL

## 2018-06-27 ASSESSMENT — PAIN SCALES - GENERAL: PAINLEVEL: NO PAIN (0)

## 2018-06-27 ASSESSMENT — PATIENT HEALTH QUESTIONNAIRE - PHQ9: 5. POOR APPETITE OR OVEREATING: SEVERAL DAYS

## 2018-06-27 NOTE — PROGRESS NOTES
SUBJECTIVE:                                                    Mary Alice Cameron is a 68 year old female who presents to clinic today for the following health issues:    COPD Follow-Up    Symptoms are currently: slightly worsened    Current fatigue or dyspnea with ambulation: worsened from baseline    Shortness of breath: slightly worsened    Increased or change in Cough/Sputum: No    Fever(s): No    Baseline ambulation without stopping to rest:  Unknown . Able to walk up 1 flights of stairs without stopping to rest.    Any ER/UC or hospital admissions since your last visit? No     History   Smoking Status     Former Smoker     Years: 48.00     Types: Cigarettes     Quit date: 1/28/2007   Smokeless Tobacco     Never Used     No results found for: FEV1, VGH2DZB    Amount of exercise or physical activity: 2-3 days/week for an average of 30-45 minutes    Problems taking medications regularly: No    Medication side effects: none    Diet: regular (no restrictions)        PROBLEMS TO ADD ON...    Problem list and histories reviewed & adjusted, as indicated.  Additional history: doingok.  Some sweats with new afib meds.  Lots of changes recently.  Breathing stable overall.      Patient Active Problem List   Diagnosis     Atrial fibrillation, persistent (H)     Pulmonary emphysema (H)     Bicuspid aortic valve     Mild major depression (H)     Acute bronchitis     Hypothyroidism, postablative     Advance care planning     Essential hypertension     Long-term (current) use of anticoagulants [Z79.01]     Acute on chronic respiratory failure (H)     Health Care Home     Status post coronary angiogram     Coronary artery disease involving native coronary artery of native heart without angina pectoris     Past Surgical History:   Procedure Laterality Date     BACK SURGERY  2006     CARDIAC SURGERY  06/12/2018    [facility]giogram at [facility]     COLONOSCOPY N/A 1/19/2016    Procedure: COLONOSCOPY;  Surgeon: Waldemar Bob,  MD;  Location: [facility] OR     HYSTERECTOMY  1980    partial     SLING BLADDER SUSPENSION WITH FASCIA LINNETTE         Social History   Substance Use Topics     Smoking status: Former Smoker     Packs/day: 0.50     Years: 41.00     Types: Cigarettes     Quit date: 1/28/2007     Smokeless tobacco: Never Used     Alcohol use No     Family History   Problem Relation Age of Onset     Prostate Cancer Father      Hypertension Father      Heart Failure Father      CHF     Asthma Mother      Cancer Mother      ovarian     Hypertension Mother      Asthma Brother      Hypertension Sister      Hypertension Brother          Current Outpatient Prescriptions   Medication Sig Dispense Refill     acetaminophen (TYLENOL) 500 MG tablet Take 500-1,000 mg by mouth every 6 hours as needed for mild pain       ADVAIR -21 MCG/ACT Inhaler INHALE 2 PUFFS INTO THE LUNGS TWICE DAILY 36 g 2     albuterol (2.5 MG/3ML) 0.083% neb solution Take 1 vial (2.5 mg) by nebulization every 6 hours as needed for shortness of breath / dyspnea or wheezing 25 vial 1     albuterol (VENTOLIN HFA) 108 (90 BASE) MCG/ACT Inhaler INHALE 2 PUFFS INTO THE LUNGS EVERY 4 HOURS AS NEEDED FOR SHORTNESS OF BREATH OR DIFFICULT BREATHING OR WHEEZING (Patient not taking: Reported on 6/19/2018) 54 g 1     atorvastatin (LIPITOR) 10 MG tablet TAKE 1 TABLET BY MOUTH DAILY 90 tablet 3     Calcium Carb-Cholecalciferol (CALTRATE 600+D3 SOFT PO) Take 600 mg by mouth 2 times daily       CARTIA  MG 24 hr capsule TAKE 1 CAPSULE BY MOUTH DAILY 90 capsule 3     cloNIDine (CATAPRES) 0.1 MG tablet TAKE 1 TABLET BY MOUTH EVERY MORNING AND 3 TABLETS EVERY EVENING 360 tablet 2     diphenhydrAMINE (BENADRYL) 25 MG tablet Take 1-2 tablets (25-50 mg) by mouth every 6 hours as needed for itching or allergies 60 tablet 1     furosemide (LASIX) 40 MG tablet TAKE 1 TABLET BY MOUTH TWICE DAILY. 180 tablet 2     hydrALAZINE (APRESOLINE) 25 MG tablet TAKE 3 TABLETS BY MOUTH THREE TIMES DAILY  "(Patient taking differently: TAKE 3 TABLETS BY MOUTH THREE TIMES DAILY PRN) 810 tablet 3     ipratropium - albuterol 0.5 mg/2.5 mg/3 mL (DUONEB) 0.5-2.5 (3) MG/3ML neb solution USE 1 VIAL PER NEBULIZER FOUR TIMES DAILY 1620 mL 1     levothyroxine (SYNTHROID/LEVOTHROID) 100 MCG tablet TAKE 1 TABLET(100 MCG) BY MOUTH DAILY 90 tablet 3     LORazepam (ATIVAN) 1 MG tablet Take 0.5-1 tablets by mouth every 6 hours as needed Reported on 5/23/2017       OTHER MEDICAL SUPPLIES Apply one pair to help with edema (Patient not taking: Reported on 6/19/2018) 1 each 0     potassium chloride SA (K-DUR/KLOR-CON M) 20 MEQ CR tablet Take 1 tablet (20 mEq) by mouth 2 times daily 180 tablet 2     triamcinolone (KENALOG) 0.1 % ointment Apply bid and hs left hand and legs - for dermatitis (Patient not taking: Reported on 6/19/2018) 454 g 3     valsartan (DIOVAN) 40 MG tablet TAKE 1 TABLET BY MOUTH TWICE DAILY 180 tablet 0     warfarin (COUMADIN) 2 MG tablet 3 mg Mon,Fri and 2mg all other days or as directed by warfarin clinic 110 tablet 3     Allergies   Allergen Reactions     Amlodipine Besylate Cough     Norvasc     Lisinopril Cough       ROS:  Constitutional, HEENT, cardiovascular, pulmonary, gi and gu systems are negative, except as otherwise noted.    OBJECTIVE:                                                    /74  Pulse 102  Temp 98.4  F (36.9  C)  Ht 5' 5\" (1.651 m)  Wt 165 lb (74.8 kg)  SpO2 (!) 88%  BMI 27.46 kg/m2  Body mass index is 27.46 kg/(m^2).  GENERAL APPEARANCE: Alert, no acute distress  CV: irregular rate and rhythm, no murmur, rub or gallop  RESP: lungs clear to auscultation bilaterally with no wheeze.    ABDOMEN: normal bowel sounds, soft, nontender, no hepatosplenomegaly or other masses  SKIN: no suspicious lesions or rashes to visualized skin  NEURO: Alert, oriented x 3, speech and mentation normal           ASSESSMENT/PLAN:                                                    1. Pulmonary emphysema, " unspecified emphysema type (H)  Reviewed.  Update spirometry with respiratory.  Stable right now.  Working through cardiology with the meds.    - General PFT Lab (Please always keep checked); Future  - General PFT Lab (Please always keep checked)      F/u based on results.  Oxygen down to 88 with walk.      Braydon Yen MD  [facility]

## 2018-06-27 NOTE — MR AVS SNAPSHOT
After Visit Summary   6/27/2018    Mary Alice Cameron    MRN: [id number]           Patient Information     Date Of Birth          1950        Visit Information        Provider Department      6/27/2018 1:15 PM Braydon Yen MD [facility]        Today's Diagnoses     Pulmonary emphysema, unspecified emphysema type (H)    -  1      Care Instructions    F/u with ongoing concerns.           Follow-ups after your visit        Your next 10 appointments already scheduled     Jul 09, 2018  9:45 AM CDT   LAB with NA LAB   [facility] ([facility] )    [address]  [facility] [address]   [phone]            Jul 09, 2018  9:45 AM CDT   Anticoagulation Visit with HC ANTI COAGULATION   [facility] ([facility] )    [address]  [place] [address]   [phone]            Jul 19, 2018  9:15 AM CDT   (Arrive by 9:00 AM)   Return Visit with CESAR Aleman CNP   [facility] ([facility] [place] )    [address]  [place] [address]   [phone]            Sep 25, 2018 10:30 AM CDT   (Arrive by 10:15 AM)   Return Visit with Eliezer Myers MD   [facility] ([facility] [place] [facility] [place] )    [address]  [place] [address]   [phone]              Who to contact     If you have questions or need follow up information about today's clinic visit or your schedule please contact [facility] directly at [phone].  Normal or non-critical lab and imaging results will be communicated to you by MyChart, letter or phone within 4 business days after the clinic has received the results. If you do not hear from us within 7 days, please contact the clinic through MyChart or phone. If you have a critical or abnormal lab result, we will notify you by phone as soon as possible.  Submit refill requests through [employer] or call your pharmacy and they will  "forward the refill request to us. Please allow 3 business days for your refill to be completed.          Additional Information About Your Visit        [facility] Information     [employer] gives you secure access to your electronic health record. If you see a primary care provider, you can also send messages to your care team and make appointments. If you have questions, please call your primary care clinic.  If you do not have a primary care provider, please call [phone] and they will assist you.        Care EveryWhere ID     This is your Care EveryWhere ID. This could be used by other organizations to access your [place] medical records  [id number]        Your Vitals Were     Pulse Temperature Height Pulse Oximetry BMI (Body Mass Index)       102 98.4  F (36.9  C) 5' 5\" (1.651 m) 88% 27.46 kg/m2        Blood Pressure from Last 3 Encounters:   06/27/18 126/74   06/19/18 118/70   05/22/18 114/60    Weight from Last 3 Encounters:   06/27/18 165 lb (74.8 kg)   06/19/18 164 lb (74.4 kg)   05/22/18 167 lb (75.8 kg)                 Today's Medication Changes          These changes are accurate as of 6/27/18  2:21 PM.  If you have any questions, ask your nurse or doctor.               These medicines have changed or have updated prescriptions.        Dose/Directions    hydrALAZINE 25 MG tablet   Commonly known as:  APRESOLINE   This may have changed:  additional instructions   Used for:  Essential hypertension, benign        TAKE 3 TABLETS BY MOUTH THREE TIMES DAILY   Quantity:  810 tablet   Refills:  3                Primary Care Provider Office Phone # Fax #    Braydon Yen [phone]       [address] [place] [id number]        Goals        General    Maintain current health status     Notes - Note created  5/22/2018 11:35 AM by Pipkin, Lauren N, RN    Maintain current health status and functional ability despite chronic health alterations          Equal Access to Services     CHARLES FISHER " AH: Trini biggs Karyyoel, waaxda luqadaha, qaybta karanda mendoza, waxslim félix geotroy lewisamaramaritza holder. So [facility] [phone].    ATENCIÓN: Si habla espminda, tiene a lindsey disposición servicios gratuitos de asistencia lingüística. Llame al [phone].    We comply with applicable federal civil rights laws and [place] laws. We do not discriminate on the basis of race, color, national origin, age, disability, sex, sexual orientation, or gender identity.            Thank you!     Thank you for choosing [facility]  for your care. Our goal is always to provide you with excellent care. Hearing back from our patients is one way we can continue to improve our services. Please take a few minutes to complete the written survey that you may receive in the mail after your visit with us. Thank you!             Your Updated Medication List - Protect others around you: Learn how to safely use, store and throw away your medicines at www.disposemymeds.org.          This list is accurate as of 6/27/18  2:21 PM.  Always use your most recent med list.                   Brand Name Dispense Instructions for use Diagnosis    acetaminophen 500 MG tablet    TYLENOL     Take 500-1,000 mg by mouth every 6 hours as needed for mild pain        ADVAIR -21 MCG/ACT Inhaler   Generic drug:  fluticasone-salmeterol     36 g    INHALE 2 PUFFS INTO THE LUNGS TWICE DAILY    COPD exacerbation (H)       * albuterol 108 (90 Base) MCG/ACT Inhaler    VENTOLIN HFA    54 g    INHALE 2 PUFFS INTO THE LUNGS EVERY 4 HOURS AS NEEDED FOR SHORTNESS OF BREATH OR DIFFICULT BREATHING OR WHEEZING    Other emphysema (H)       * albuterol (2.5 MG/3ML) 0.083% neb solution     25 vial    Take 1 vial (2.5 mg) by nebulization every 6 hours as needed for shortness of breath / dyspnea or wheezing    COPD exacerbation (H)       atorvastatin 10 MG tablet    LIPITOR    90 tablet    TAKE 1 TABLET BY MOUTH DAILY    Coronary artery disease  involving native coronary artery of native heart without angina pectoris, Mixed hyperlipidemia       CALTRATE 600+D3 SOFT PO      Take 600 mg by mouth 2 times daily        CARTIA  MG 24 hr capsule   Generic drug:  diltiazem     90 capsule    TAKE 1 CAPSULE BY MOUTH DAILY    Atrial fibrillation, persistent (H), Essential hypertension       cloNIDine 0.1 MG tablet    CATAPRES    360 tablet    TAKE 1 TABLET BY MOUTH EVERY MORNING AND 3 TABLETS EVERY EVENING    Essential hypertension       diphenhydrAMINE 25 MG tablet    BENADRYL    60 tablet    Take 1-2 tablets (25-50 mg) by mouth every 6 hours as needed for itching or allergies        furosemide 40 MG tablet    LASIX    180 tablet    TAKE 1 TABLET BY MOUTH TWICE DAILY.    Essential hypertension, benign       hydrALAZINE 25 MG tablet    APRESOLINE    810 tablet    TAKE 3 TABLETS BY MOUTH THREE TIMES DAILY    Essential hypertension, benign       ipratropium - albuterol 0.5 mg/2.5 mg/3 mL 0.5-2.5 (3) MG/3ML neb solution    DUONEB    1620 mL    USE 1 VIAL PER NEBULIZER FOUR TIMES DAILY    COPD exacerbation (H)       levothyroxine 100 MCG tablet    SYNTHROID/LEVOTHROID    90 tablet    TAKE 1 TABLET(100 MCG) BY MOUTH DAILY    Hypothyroidism, postablative       LORazepam 1 MG tablet    ATIVAN     Take 0.5-1 tablets by mouth every 6 hours as needed Reported on 5/23/2017        other medical supplies     1 each    Apply one pair to help with edema    Edema, Atrial fibrillation, persistent (H)       potassium chloride SA 20 MEQ CR tablet    K-DUR/KLOR-CON M    180 tablet    Take 1 tablet (20 mEq) by mouth 2 times daily    Hypokalemia       triamcinolone 0.1 % ointment    KENALOG    454 g    Apply bid and hs left hand and legs - for dermatitis    Contact dermatitis, unspecified contact dermatitis type, unspecified trigger       valsartan 40 MG tablet    DIOVAN    180 tablet    TAKE 1 TABLET BY MOUTH TWICE DAILY    Essential hypertension, LV dysfunction       warfarin 2 MG  tablet    COUMADIN    110 tablet    3 mg Mon,Fri and 2mg all other days or as directed by warfarin clinic    Long term current use of anticoagulant therapy       * Notice:  This list has 2 medication(s) that are the same as other medications prescribed for you. Read the directions carefully, and ask your doctor or other care provider to review them with you.

## 2018-06-27 NOTE — NURSING NOTE
"Chief Complaint   Patient presents with     COPD       Initial /74  Pulse 102  Temp 98.4  F (36.9  C)  Ht 5' 5\" (1.651 m)  Wt 165 lb (74.8 kg)  SpO2 (!) 88%  BMI 27.46 kg/m2 Estimated body mass index is 27.46 kg/(m^2) as calculated from the following:    Height as of this encounter: 5' 5\" (1.651 m).    Weight as of this encounter: 165 lb (74.8 kg).  Medication Reconciliation: complete    Joy Franklin LPN  "

## 2018-06-28 ENCOUNTER — TELEPHONE (OUTPATIENT)
Dept: FAMILY MEDICINE | Facility: OTHER | Age: 68
End: 2018-06-28

## 2018-06-28 DIAGNOSIS — Z79.01 LONG TERM CURRENT USE OF ANTICOAGULANT THERAPY: ICD-10-CM

## 2018-06-28 RX ORDER — WARFARIN SODIUM 2 MG/1
TABLET ORAL
Qty: 110 TABLET | Refills: 3 | Status: SHIPPED | OUTPATIENT
Start: 2018-06-28 | End: 2018-07-20 | Stop reason: ALTCHOICE

## 2018-06-28 ASSESSMENT — PATIENT HEALTH QUESTIONNAIRE - PHQ9: SUM OF ALL RESPONSES TO PHQ QUESTIONS 1-9: 2

## 2018-06-28 ASSESSMENT — ANXIETY QUESTIONNAIRES: GAD7 TOTAL SCORE: 2

## 2018-06-28 NOTE — TELEPHONE ENCOUNTER
carlos santos requesting different dose than previously ordered      Fax refill request:  Warfarin SOD 2 mg tablets (purple)  Take 1.5 tablets Mondays and Friday, then take 1 tablet other days.    warfarin (COUMADIN) 2 MG tablet 110 tablet 3 6/26/2018  No   Sig: 3 mg Mon,Fri and 2mg all other days or as directed by warfarin clinic   Class: E-Prescribe     Didn't prepare med unsure if you want this dose change.

## 2018-07-09 ENCOUNTER — ANTICOAGULATION THERAPY VISIT (OUTPATIENT)
Dept: ANTICOAGULATION | Facility: OTHER | Age: 68
End: 2018-07-09
Attending: FAMILY MEDICINE
Payer: MEDICARE

## 2018-07-09 DIAGNOSIS — Z79.01 LONG TERM CURRENT USE OF ANTICOAGULANT THERAPY: ICD-10-CM

## 2018-07-09 DIAGNOSIS — Z79.01 LONG-TERM (CURRENT) USE OF ANTICOAGULANTS: ICD-10-CM

## 2018-07-09 DIAGNOSIS — I48.19 ATRIAL FIBRILLATION, PERSISTENT (H): ICD-10-CM

## 2018-07-09 LAB — INR BLD: 2.5 (ref 0.86–1.14)

## 2018-07-09 PROCEDURE — 36416 COLLJ CAPILLARY BLOOD SPEC: CPT | Mod: ZL | Performed by: FAMILY MEDICINE

## 2018-07-09 PROCEDURE — 85610 PROTHROMBIN TIME: CPT | Mod: QW,ZL | Performed by: FAMILY MEDICINE

## 2018-07-09 NOTE — PROGRESS NOTES
ANTICOAGULATION FOLLOW-UP CLINIC VISIT    Patient Name:  Mary Alice Cameron  Date:  7/9/2018  Contact Type:  Telephone    SUBJECTIVE:     Patient Findings     Positives No Problem Findings    Comments INR done by lab. Call placed to patient and spoke to her re: INR result, warfarin dosing and INR recheck date. She states she has had no bleeding/bruising and no changes in diet/meds/activity. We discussed warfarin dosing and INR recheck date. She verbalized understanding and has no questions.            OBJECTIVE    INR Point of Care   Date Value Ref Range Status   07/09/2018 2.5 (H) 0.86 - 1.14 Final     Comment:     This test is intended for monitoring Coumadin therapy.  Results are not   accurate in patients with prolonged INR due to factor deficiency.         ASSESSMENT / PLAN  INR assessment THER    Recheck INR In: 4 WEEKS    INR Location Clinic      Anticoagulation Summary as of 7/9/2018     INR goal 2.0-3.0   Today's INR 2.5   Warfarin maintenance plan 3 mg (2 mg x 1.5) on Mon, Fri; 2 mg (2 mg x 1) all other days   Full warfarin instructions 3 mg on Mon, Fri; 2 mg all other days   Weekly warfarin total 16 mg   No change documented Kenna Kiran, RN   Plan last modified Kenna Kiran, RN (1/10/2018)   Next INR check 8/6/2018   Priority INR   Target end date Indefinite    Indications   Atrial fibrillation  persistent (H) [I48.1]  Long-term (current) use of anticoagulants [Z79.01] [Z79.01]         Anticoagulation Episode Summary     INR check location     Preferred lab     Send INR reminders to  ANTICOAG POOL    Comments        Anticoagulation Care Providers     Provider Role Specialty Phone number    Braydon Yen MD [facility] [phone]            See the Encounter Report to view Anticoagulation Flowsheet and Dosing Calendar (Go to Encounters tab in chart review, and find the Anticoagulation Therapy Visit)        Kenna Kiran RN

## 2018-07-09 NOTE — MR AVS SNAPSHOT
Mary Alice Cameron   7/9/2018 9:45 AM   Anticoagulation Therapy Visit    Description:  68 year old female   Provider:   ANTI COAGULATION   Department:  [facility] Anti Coagulation           INR as of 7/9/2018     Today's INR 2.5      Anticoagulation Summary as of 7/9/2018     INR goal 2.0-3.0   Today's INR 2.5   Full warfarin instructions 3 mg on Mon, Fri; 2 mg all other days   Next INR check 8/6/2018    Indications   Atrial fibrillation  persistent (H) [I48.1]  Long-term (current) use of anticoagulants [Z79.01] [Z79.01]         Your next Anticoagulation Clinic appointment(s)     Aug 06, 2018  9:45 AM CDT   Anticoagulation Visit with  ANTI COAGULATION   [facility] ([facility] )    [address] [place] [address]   [phone]              July 2018 Details    Sun Mon Tue Wed Thu Fri Sat     1               2               3               4               5               6               7                 8               9      3 mg   See details      10      2 mg         11      2 mg         12      2 mg         13      3 mg         14      2 mg           15      2 mg         16      3 mg         17      2 mg         18      2 mg         19      2 mg         20      3 mg         21      2 mg           22      2 mg         23      3 mg         24      2 mg         25      2 mg         26      2 mg         27      3 mg         28      2 mg           29      2 mg         30      3 mg         31      2 mg              Date Details   07/09 This INR check               How to take your warfarin dose     To take:  2 mg Take 1 of the 2 mg tablets.    To take:  3 mg Take 1.5 of the 2 mg tablets.           August 2018 Details    Sun Mon Tue Wed Thu Fri Sat        1      2 mg         2      2 mg         3      3 mg         4      2 mg           5      2 mg         6            7               8               9               10               11                 12               13                14               15               16               17               18                 19               20               21               22               23               24               25                 26               27               28               29               30               31                 Date Details   No additional details    Date of next INR:  8/6/2018         How to take your warfarin dose     To take:  2 mg Take 1 of the 2 mg tablets.    To take:  3 mg Take 1.5 of the 2 mg tablets.

## 2018-07-13 DIAGNOSIS — I10 ESSENTIAL HYPERTENSION, BENIGN: ICD-10-CM

## 2018-07-13 RX ORDER — FUROSEMIDE 40 MG
TABLET ORAL
Qty: 180 TABLET | Refills: 1 | Status: SHIPPED | OUTPATIENT
Start: 2018-07-13 | End: 2018-10-09

## 2018-07-19 ENCOUNTER — OFFICE VISIT (OUTPATIENT)
Dept: CARDIOLOGY | Facility: OTHER | Age: 68
End: 2018-07-19
Attending: FAMILY MEDICINE
Payer: COMMERCIAL

## 2018-07-19 ENCOUNTER — PATIENT OUTREACH (OUTPATIENT)
Dept: CARE COORDINATION | Facility: OTHER | Age: 68
End: 2018-07-19

## 2018-07-19 VITALS
TEMPERATURE: 97.9 F | SYSTOLIC BLOOD PRESSURE: 118 MMHG | HEART RATE: 77 BPM | RESPIRATION RATE: 20 BRPM | OXYGEN SATURATION: 97 % | WEIGHT: 165 LBS | DIASTOLIC BLOOD PRESSURE: 80 MMHG | HEIGHT: 65 IN | BODY MASS INDEX: 27.49 KG/M2

## 2018-07-19 DIAGNOSIS — I48.19 PERSISTENT ATRIAL FIBRILLATION (H): Primary | ICD-10-CM

## 2018-07-19 DIAGNOSIS — E78.2 MIXED HYPERLIPIDEMIA: ICD-10-CM

## 2018-07-19 DIAGNOSIS — I48.19 ATRIAL FIBRILLATION, PERSISTENT (H): ICD-10-CM

## 2018-07-19 DIAGNOSIS — Q23.81 BICUSPID AORTIC VALVE: ICD-10-CM

## 2018-07-19 DIAGNOSIS — I25.10 CORONARY ARTERY DISEASE INVOLVING NATIVE CORONARY ARTERY OF NATIVE HEART WITHOUT ANGINA PECTORIS: ICD-10-CM

## 2018-07-19 DIAGNOSIS — Z98.890 STATUS POST CORONARY ANGIOGRAM: ICD-10-CM

## 2018-07-19 DIAGNOSIS — I10 ESSENTIAL HYPERTENSION: ICD-10-CM

## 2018-07-19 LAB
ANION GAP SERPL CALCULATED.3IONS-SCNC: 8 MMOL/L (ref 3–14)
BUN SERPL-MCNC: 14 MG/DL (ref 7–30)
CALCIUM SERPL-MCNC: 9.4 MG/DL (ref 8.5–10.1)
CHLORIDE SERPL-SCNC: 104 MMOL/L (ref 94–109)
CO2 SERPL-SCNC: 31 MMOL/L (ref 20–32)
CREAT SERPL-MCNC: 0.85 MG/DL (ref 0.52–1.04)
GFR SERPL CREATININE-BSD FRML MDRD: 66 ML/MIN/1.7M2
GLUCOSE SERPL-MCNC: 110 MG/DL (ref 70–99)
POTASSIUM SERPL-SCNC: 4.2 MMOL/L (ref 3.4–5.3)
SODIUM SERPL-SCNC: 143 MMOL/L (ref 133–144)

## 2018-07-19 PROCEDURE — 80048 BASIC METABOLIC PNL TOTAL CA: CPT | Mod: ZL | Performed by: NURSE PRACTITIONER

## 2018-07-19 PROCEDURE — 93005 ELECTROCARDIOGRAM TRACING: CPT

## 2018-07-19 PROCEDURE — 99214 OFFICE O/P EST MOD 30 MIN: CPT | Performed by: NURSE PRACTITIONER

## 2018-07-19 PROCEDURE — G0463 HOSPITAL OUTPT CLINIC VISIT: HCPCS | Mod: 25

## 2018-07-19 PROCEDURE — 36415 COLL VENOUS BLD VENIPUNCTURE: CPT | Mod: ZL | Performed by: NURSE PRACTITIONER

## 2018-07-19 PROCEDURE — 93010 ELECTROCARDIOGRAM REPORT: CPT | Performed by: INTERNAL MEDICINE

## 2018-07-19 RX ORDER — ASPIRIN 81 MG/1
81 TABLET, CHEWABLE ORAL DAILY
COMMUNITY
End: 2018-08-21

## 2018-07-19 RX ORDER — DILTIAZEM HYDROCHLORIDE 240 MG/1
240 CAPSULE, COATED, EXTENDED RELEASE ORAL DAILY
Qty: 90 CAPSULE | Refills: 3 | Status: SHIPPED | OUTPATIENT
Start: 2018-07-19 | End: 2019-06-11

## 2018-07-19 RX ORDER — ATORVASTATIN CALCIUM 10 MG/1
10 TABLET, FILM COATED ORAL AT BEDTIME
Qty: 90 TABLET | Refills: 3 | Status: SHIPPED | OUTPATIENT
Start: 2018-07-19 | End: 2019-07-16

## 2018-07-19 RX ORDER — CLONIDINE HYDROCHLORIDE 0.1 MG/1
0.2 TABLET ORAL AT BEDTIME
Qty: 60 TABLET | Refills: 3 | Status: SHIPPED | OUTPATIENT
Start: 2018-07-19 | End: 2019-01-30

## 2018-07-19 ASSESSMENT — PAIN SCALES - GENERAL: PAINLEVEL: NO PAIN (0)

## 2018-07-19 NOTE — NURSING NOTE
"Chief Complaint   Patient presents with     Follow Up For     follow up angiogram       Initial /80 (BP Location: Left arm, Patient Position: Sitting, Cuff Size: Adult Regular)  Pulse 77  Temp 97.9  F (36.6  C) (Tympanic)  Resp 20  Ht 1.651 m (5' 5\")  Wt 74.8 kg (165 lb)  SpO2 97%  BMI 27.46 kg/m2 Estimated body mass index is 27.46 kg/(m^2) as calculated from the following:    Height as of this encounter: 1.651 m (5' 5\").    Weight as of this encounter: 74.8 kg (165 lb).  Medication Reconciliation: complete    Giana Andino RN    "

## 2018-07-19 NOTE — PROGRESS NOTES
[facility] HEART [facility]   CARDIOLOGY PROGRESS NOTE    Mary Alice Cameron   PO    [facility] [id number]      Braydon Yen     Chief Complaint   Patient presents with     Follow Up For     follow up angiogram        HPI:   Ms. Cameron is a 68 year old female who presents for cardiology follow-up atrial fibrillation and  s/p coronary angiogram. She has followed cardiology for atrial fibrillation, for which she developed this past year and has history of known bicuspid aortic valve. Additionally, she has a history of COPD, hypothyroidism and depression.      She has limited symptoms with permanent AF, reports shortness of breath which is likely multifactorial with significant COPD. She has been on Flecainide AAT and Coumadin oral anticoagulation.     Recent TTE on 5/23/17 with LVEF reduced to 40-45%, grade I/early diastolic dysfunction. History of bicuspid aortic valve with mild to moderate aortic insufficiency with peak aortic velocity 2.57 m/sec, mean gradient 16.9 mmHg and peak gradient AoV 26.4 mmHg.      At her last cardiology visit on 5/22/18, reviewd results of TTE, with decline in systolic function and reported increased fatigue a NM Lexiscan stress test was ordered. Results of this study revealed possible ischemia of the anterior wall and apex with small completed infarct of the apex, LV EF 55%. It was recommended further evaluation and possible intervention with coronary angiography. Patient requested to have this at [facility].     Angiogram at [facility] on 6/12/18 with findings of mild coronary atherosclerosis.   Coronaries:  LM: mild diffuse disease  LAD:Mild diffuse disease  LCX: Mild diffuse disease  RCA: Dominant, mild diffuse disease.     Patient was last seen by cardiology on 6/19/18 with newly identified non obstructing CAD s/p angiogram and reduced LVEF it was recommended that she discontinue Flecainide AAT. She denies any palpitations or symptoms of AF since she stopped this medication. She does feel a  mild increase in shortness of breath mostly noted in the AM. At her last visit she was started back on Diltiazem for rate control. She has no concerns today.     PAST MEDICAL HISTORY:   Past Medical History:   Diagnosis Date     Asthma      Atrial fibrillation (H)      COPD (chronic obstructive pulmonary disease) (H)      Depression      High cholesterol      HTN (hypertension)      Thyroid disease           FAMILY HISTORY:   Family History   Problem Relation Age of Onset     Prostate Cancer Father      Hypertension Father      Heart Failure Father      CHF     Asthma Mother      Cancer Mother      ovarian     Hypertension Mother      Asthma Brother      Hypertension Sister      Hypertension Brother           PAST SURGICAL HISTORY:   Past Surgical History:   Procedure Laterality Date     BACK SURGERY  2006     CARDIAC SURGERY  06/12/2018    Angiogram at [facility]     COLONOSCOPY N/A 1/19/2016    Procedure: COLONOSCOPY;  Surgeon: Waldemar Bob MD;  Location: [facility] OR     HYSTERECTOMY  1980    partial     SLING BLADDER SUSPENSION WITH FASCIA LINNETTE            SOCIAL HISTORY:   Social History     Social History     Marital status:      Spouse name: N/A     Number of children: N/A     Years of education: N/A     Occupational History      Retired     disabled     Social History Main Topics     Smoking status: Former Smoker     Packs/day: 0.50     Years: 41.00     Types: Cigarettes     Quit date: 1/28/2007     Smokeless tobacco: Never Used     Alcohol use No     Drug use: No     Sexual activity: No      Comment:      Other Topics Concern      Service No     Blood Transfusions Yes     Permits if needed     Caffeine Concern Yes     2 cups coffee daily     Seat Belt Yes     Parent/Sibling W/ Cabg, Mi Or Angioplasty Before 65f 55m? No     Social History Narrative          CURRENT MEDICATIONS:   Prior to Admission medications    Medication Sig Start Date End Date Taking? Authorizing Provider    acetaminophen (TYLENOL) 500 MG tablet Take 500-1,000 mg by mouth every 6 hours as needed for mild pain   Yes Reported, Patient   ADVAIR -21 MCG/ACT Inhaler INHALE 2 PUFFS INTO THE LUNGS TWICE DAILY 11/1/17  Yes Braydon Yen MD   albuterol (2.5 MG/3ML) 0.083% neb solution Take 1 vial (2.5 mg) by nebulization every 6 hours as needed for shortness of breath / dyspnea or wheezing 12/12/17  Yes Braydon Yen MD   albuterol (VENTOLIN HFA) 108 (90 BASE) MCG/ACT Inhaler INHALE 2 PUFFS INTO THE LUNGS EVERY 4 HOURS AS NEEDED FOR SHORTNESS OF BREATH OR DIFFICULT BREATHING OR WHEEZING 12/12/17  Yes Braydon Yen MD   apixaban ANTICOAGULANT (ELIQUIS) 5 MG tablet Take 1 tablet (5 mg) by mouth 2 times daily 7/19/18  Yes Pau Nelson APRN CNP   aspirin 81 MG chewable tablet Take 81 mg by mouth daily   Yes Reported, Patient   atorvastatin (LIPITOR) 10 MG tablet Take 1 tablet (10 mg) by mouth At Bedtime 7/19/18  Yes Pau Nelson APRN CNP   Calcium Carb-Cholecalciferol (CALTRATE 600+D3 SOFT PO) Take 600 mg by mouth 2 times daily   Yes Reported, Patient   cloNIDine (CATAPRES) 0.1 MG tablet Take 2 tablets (0.2 mg) by mouth At Bedtime 7/19/18  Yes Pau Nelson APRN CNP   diltiazem 240 MG 24 hr capsule Take 1 capsule (240 mg) by mouth daily 7/19/18  Yes Pau Nelson APRN CNP   diphenhydrAMINE (BENADRYL) 25 MG tablet Take 1-2 tablets (25-50 mg) by mouth every 6 hours as needed for itching or allergies 9/7/16  Yes Braydon Yen MD   furosemide (LASIX) 40 MG tablet TAKE 1 TABLET BY MOUTH TWICE DAILY. 7/13/18  Yes Braydon Yen MD   ipratropium - albuterol 0.5 mg/2.5 mg/3 mL (DUONEB) 0.5-2.5 (3) MG/3ML neb solution USE 1 VIAL PER NEBULIZER FOUR TIMES DAILY 1/25/18  Yes Braydon Yen MD   levothyroxine (SYNTHROID/LEVOTHROID) 100 MCG tablet TAKE 1 TABLET(100 MCG) BY MOUTH DAILY 2/28/18  Yes Braydon Yen MD   LORazepam (ATIVAN) 1 MG tablet Take 0.5-1 tablets by mouth every  "6 hours as needed Reported on 5/23/2017   Yes Reported, Patient   OTHER MEDICAL SUPPLIES Apply one pair to help with edema 10/15/14  Yes Amie Stern, MARGARETTE   potassium chloride SA (K-DUR/KLOR-CON M) 20 MEQ CR tablet Take 1 tablet (20 mEq) by mouth 2 times daily 2/20/18  Yes Elana Mazariegos PA   triamcinolone (KENALOG) 0.1 % ointment Apply bid and hs left hand and legs - for dermatitis 11/15/16  Yes NICOLE Gomez MD   valsartan (DIOVAN) 40 MG tablet TAKE 1 TABLET BY MOUTH TWICE DAILY 6/20/18  Yes Pau Nelson APRN CNP   warfarin (COUMADIN) 2 MG tablet Take 1.5 tablets (3 mg) Mon,Fri and take 1 tablet (2mg) all other days or as directed by warfarin clinic 6/28/18  Yes Braydon Yen MD   hydrALAZINE (APRESOLINE) 25 MG tablet TAKE 3 TABLETS BY MOUTH THREE TIMES DAILY  Patient not taking: Reported on 7/19/2018 12/12/17   Braydon Yen MD          ALLERGIES:   Allergies   Allergen Reactions     Amlodipine Besylate Cough     Norvasc     Lisinopril Cough          ROS:   CONSTITUTIONAL: No weight changes, fever, chills, weakness or increased fatigue.   CARDIOVASCULAR: No chest pain, chest pressure or chest discomfort. No palpitations or lower extremity edema.   RESPIRATORY: Positive for chronic shortness of breath, no reported cough or sputum production.   GASTROINTESTINAL: No reported abdominal pain. No melena or hematochezia.   NEUROLOGICAL: No lightheadedness, dizziness, syncope, ataxia or weakness.   HEMATOLOGIC: No anemia, bleeding or bruising.   PSYCHIATRIC: No history of depression or anxiety.   ENDOCRINOLOGIC: No reports of sweating, cold or heat intolerance.   SKIN: No abnormal rashes or itching.       PHYSICAL EXAM:   /80 (BP Location: Left arm, Patient Position: Sitting, Cuff Size: Adult Regular)  Pulse 77  Temp 97.9  F (36.6  C) (Tympanic)  Resp 20  Ht 1.651 m (5' 5\")  Wt 74.8 kg (165 lb)  SpO2 97%  BMI 27.46 kg/m2  GENERAL: The patient is a well-developed, well-nourished, in " no apparent distress. Alert and oriented x3.   HEENT: Head is normocephalic and atraumatic. Eyes are symmetrical with normal visual tracking. Nares appeared normal without nasal drainage. Mucous membranes are moist.   NECK: Supple.   HEART: Irregular rate and rhythm, S1S2 present without murmur, rub or gallop.   LUNGS: Respirations regular and unlabored. Clear to auscultation.   GI: Abdomen is nondistended.   EXTREMITIES: No peripheral edema present.   NEUROLOGIC: Alert and oriented X3. No focal neurologic deficits.   SKIN: No jaundice. No rashes or visible skin lesions present.       EKG:    Return atrial fibrillation with RVR, rate 131 bpm.     LAB RESULTS:   Orders Only on 07/09/2018   Component Date Value Ref Range Status     INR Point of Care 07/09/2018 2.5* 0.86 - 1.14 Final   Orders Only on 06/25/2018   Component Date Value Ref Range Status     INR Point of Care 06/25/2018 1.9* 0.86 - 1.14 Final   Orders Only on 06/18/2018   Component Date Value Ref Range Status     INR Point of Care 06/18/2018 1.5* 0.86 - 1.14 Final   Orders Only on 06/07/2018   Component Date Value Ref Range Status     Sodium 06/07/2018 141  133 - 144 mmol/L Final     Potassium 06/07/2018 4.1  3.4 - 5.3 mmol/L Final     Chloride 06/07/2018 104  94 - 109 mmol/L Final     Carbon Dioxide 06/07/2018 29  20 - 32 mmol/L Final     Anion Gap 06/07/2018 8  3 - 14 mmol/L Final     Glucose 06/07/2018 125* 70 - 99 mg/dL Final     Urea Nitrogen 06/07/2018 13  7 - 30 mg/dL Final     Creatinine 06/07/2018 0.88  0.52 - 1.04 mg/dL Final     GFR Estimate 06/07/2018 64  >60 mL/min/1.7m2 Final     GFR Estimate If Black 06/07/2018 77  >60 mL/min/1.7m2 Final     Calcium 06/07/2018 8.7  8.5 - 10.1 mg/dL Final     WBC 06/07/2018 7.8  4.0 - 11.0 10e9/L Final     RBC Count 06/07/2018 4.14  3.8 - 5.2 10e12/L Final     Hemoglobin 06/07/2018 13.7  11.7 - 15.7 g/dL Final     Hematocrit 06/07/2018 41.0  35.0 - 47.0 % Final     MCV 06/07/2018 99  78 - 100 fl Final     MCH  06/07/2018 33.1* 26.5 - 33.0 pg Final     MCHC 06/07/2018 33.4  31.5 - 36.5 g/dL Final     RDW 06/07/2018 12.8  10.0 - 15.0 % Final     Platelet Count 06/07/2018 175  150 - 450 10e9/L Final     Diff Method 06/07/2018 Automated Method   Final     % Neutrophils 06/07/2018 78.6  % Final     % Lymphocytes 06/07/2018 13.3  % Final     % Monocytes 06/07/2018 6.9  % Final     % Eosinophils 06/07/2018 0.9  % Final     % Basophils 06/07/2018 0.3  % Final     Absolute Neutrophil 06/07/2018 6.1  1.6 - 8.3 10e9/L Final     Absolute Lymphocytes 06/07/2018 1.0  0.8 - 5.3 10e9/L Final     Absolute Monocytes 06/07/2018 0.5  0.0 - 1.3 10e9/L Final     Absolute Eosinophils 06/07/2018 0.1  0.0 - 0.7 10e9/L Final     Absolute Basophils 06/07/2018 0.0  0.0 - 0.2 10e9/L Final   Orders Only on 05/21/2018   Component Date Value Ref Range Status     INR Point of Care 05/21/2018 2.6* 0.86 - 1.14 Final   Orders Only on 05/07/2018   Component Date Value Ref Range Status     INR Point of Care 05/07/2018 3.0* 0.86 - 1.14 Final   Orders Only on 04/23/2018   Component Date Value Ref Range Status     INR Point of Care 04/23/2018 1.9* 0.86 - 1.14 Final   Orders Only on 04/17/2018   Component Date Value Ref Range Status     INR Point of Care 04/17/2018 2.6* 0.86 - 1.14 Final   Orders Only on 04/11/2018   Component Date Value Ref Range Status     INR Point of Care 04/11/2018 2.2* 0.86 - 1.14 Final   Orders Only on 04/02/2018   Component Date Value Ref Range Status     INR Point of Care 04/02/2018 3.0* 0.86 - 1.14 Final          ASSESSMENT:   Mary Alice MERLE Cameron presents for cardiology follow-up atrial fibrillation and  s/p coronary angiogram. She has followed cardiology for atrial fibrillation, for which she developed this past year and has history of known bicuspid aortic valve. Additionally, she has a history of COPD, hypothyroidism and depression.   Patient was last seen by cardiology on 6/19/18 with newly identified non obstructing CAD s/p angiogram and  reduced LVEF it was recommended that she discontinue Flecainide AAT. She denies any palpitations or symptoms of AF since she stopped this medication. She does feel a mild increase in shortness of breath mostly noted in the AM. At her last visit she was started back on Diltiazem for rate control. She has no concerns today.       PLAN:   1. CAD (coronary artery disease)  Angiogram at [facility] on 6/12/18 with findings of mild coronary atherosclerosis.  Continue cardiac risk factor optimization. Continue with Atorvastatin 10 mg every night.  - EKG 12-lead complete w/read - Clinics    2. Persistent atrial fibrillation (H)  Patient is returned to AF since she discontinued Flecainide, noted rate not well controlled today on ECG.   It is recommended that she increase Diltiazem to 240 mg once daily.   She reports no symptoms associated to AF.   She will continue with oral anticoagulation, currently on Warfarin. Discussed alternative option of NOAC's. She is interested in Eliquis, she is requesting a prescription to see if she can afford this. If she does choose to start Eliquis we will start this medication once she discontinues Warfarin and INR level <2, she may then start Eliquis 5mg BID.     - apixaban ANTICOAGULANT (ELIQUIS) 5 MG tablet; Take 1 tablet (5 mg) by mouth 2 times daily  Dispense: 60 tablet; Refill: 3  - diltiazem 240 MG 24 hr capsule; Take 1 capsule (240 mg) by mouth daily  Dispense: 90 capsule; Refill: 3    3. Essential hypertension  Adjustment of BP meds today with increased dose of Diltiazem. Pressures 118/80 today.   She will continue with Valsartan 40 mg BID, Diltiazem was increased to 240 mg daily for rate control with AF, she will drop Clonidine to 0.2 mg at bedtime. Hydralazine as needed for systolic pressures greater than 160.    - diltiazem 240 MG 24 hr capsule; Take 1 capsule (240 mg) by mouth daily  Dispense: 90 capsule; Refill: 3  - cloNIDine (CATAPRES) 0.1 MG tablet; Take 2 tablets (0.2 mg) by  mouth At Bedtime  Dispense: 60 tablet; Refill: 3    4. Bicuspid aortic valve  Known bicuspid aortic valve.   This has remained stable, continue with repeat TTE surveillance in 1-2 year.  CT aortogram shows no dilation of descending aorta.    5. Status post coronary angiogram  See above.    8. Mixed hyperlipidemia  - atorvastatin (LIPITOR) 10 MG tablet; Take 1 tablet (10 mg) by mouth At Bedtime  Dispense: 90 tablet; Refill: 3    Follow-up with cardiology in 4 weeks, sooner if needed.     Thank you for allowing me to participate in the care of your patient. Please do not hesitate to contact me if you have any questions.     Pau Nelson

## 2018-07-19 NOTE — PROGRESS NOTES
Clinic Care Coordination Contact  Care Team Conversations    Care Coordinator met with Mary Alice briefly following her appointment today with Pau Nelson. See Pau's progress note for updates to plan of care.  Patient verbalized understanding of the instructions provided at today's visit.  Denies additional questions or concerns for Care Coordinator today.    Lauren Pipkin, BS-RN   CHF and General Care Coordinator  [phone] Option # 1

## 2018-07-19 NOTE — PATIENT INSTRUCTIONS
You were seen by Pau Nelson NP, 7/19/2018.     1.  Please increase the dose of Diltiazem to 240 mg daily.      2.  Please discontinue the Am dose of Clonidine, please continue the PM dose of Clonidine 0.2 mg 2 tablets at bedtime.     3. You may use the Hydralazine PRN for systolic blood pressure > 160    4. Please discontinue the Flecainide.     5. You will begin Eliquis for anticoagulation if it is covered by your insurance.  Please use the Copay card to receive a discount.     6. Please continue to monitor your blood pressures at home morning and evening.  If they are running >140/90 or less than 100/60 please call the cardiology office so you may be seen sooner.     7.  You will have lab today, you will be notified when these results become available.     8. Please complete pulmonary function testing as scheduled.    You will follow up with Pau Nelson NP in 4 weeks.       Please call the cardiology office with problems, questions, or concerns at [phone].    If you experience chest pain, chest pressure, chest tightness, shortness of breath, fainting, lightheadedness, nausea, vomiting, or other concerning symptoms, please report to the Emergency Department or call 911. These symptoms may be emergent, and best treated in the Emergency Department.       Giana NAVARRO RN-BSN  Cardiology   [facility]  [phone]

## 2018-07-19 NOTE — MR AVS SNAPSHOT
After Visit Summary   7/19/2018    Mary Alice Cameron    MRN: [id number]           Patient Information     Date Of Birth          1950        Visit Information        Provider Department      7/19/2018 9:15 AM Pau Nelson APRN [facility]        Today's Diagnoses     Persistent atrial fibrillation (H)    -  1    CAD (coronary artery disease)        Essential hypertension        Bicuspid aortic valve        Status post coronary angiogram        Atrial fibrillation, persistent (H)        Coronary artery disease involving native coronary artery of native heart without angina pectoris        Mixed hyperlipidemia          Care Instructions    You were seen by Pau Nelson NP, 7/19/2018.     1.  Please increase the dose of Diltiazem to 240 mg daily.      2.  Please discontinue the Am dose of Clonidine, please continue the PM dose of Clonidine 0.2 mg 2 tablets at bedtime.     3. You may use the Hydralazine PRN for systolic blood pressure > 160    4. Please discontinue the Flecainide.     5. You will begin Eliquis for anticoagulation if it is covered by your insurance.  Please use the Copay card to receive a discount.     6. Please continue to monitor your blood pressures at home morning and evening.  If they are running >140/90 or less than 100/60 please call the cardiology office so you may be seen sooner.     7.  You will have lab today, you will be notified when these results become available.     8. Please complete pulmonary function testing as scheduled.    You will follow up with Pau Nelson NP in 4 weeks.       Please call the cardiology office with problems, questions, or concerns at [phone].    If you experience chest pain, chest pressure, chest tightness, shortness of breath, fainting, lightheadedness, nausea, vomiting, or other concerning symptoms, please report to the Emergency Department or call 911. These symptoms may be emergent, and best treated in the  Emergency Department.       Giana NAVARRO RN-BSN  Cardiology   [facility]  [phone]              Follow-ups after your visit        Your next 10 appointments already scheduled     Jul 31, 2018 12:15 PM CDT   Pulmonary Function with HI PULMONARY LAB   [facility] ([facility] )    [address] [place] [id number] [phone]           No Inhalers for 6 hours prior to test No Smoking 2 hours prior to test            Aug 06, 2018  9:45 AM CDT   Anticoagulation Visit with HC ANTI COAGULATION   [facility]    [address] [facility] [address]   [phone]            Aug 06, 2018  9:45 AM CDT   LAB with NA LAB   [facility] )    [address]  [facility] [address]   [phone]            Aug 16, 2018  9:15 AM CDT   (Arrive by 9:00 AM)   Return Visit with CESAR Aleman CNP   [facility] )    [address] [facility] [address]   [phone]            Sep 25, 2018 10:30 AM CDT   (Arrive by 10:15 AM)   Return Visit with Eliezer Myers MD   [facility] )    [address] [place] [address]   [phone]              Who to contact     If you have questions or need follow up information about today's clinic visit or your schedule please contact [facility] directly at [phone].  Normal or non-critical lab and imaging results will be communicated to you by MyChart, letter or phone within 4 business days after the clinic has received the results. If you do not hear from us within 7 days, please contact the clinic through MyChart or phone. If you have a critical or abnormal lab result, we will notify you by phone as soon as possible.  Submit refill requests through [employer] or call your pharmacy and they will forward the refill request to us. Please  "allow 3 business days for your refill to be completed.          Additional Information About Your Visit        MyChart Information     [email or web address] gives you secure access to your electronic health record. If you see a primary care provider, you can also send messages to your care team and make appointments. If you have questions, please call your primary care clinic.  If you do not have a primary care provider, please call [phone] and they will assist you.        Care EveryWhere ID     This is your Care EveryWhere ID. This could be used by other organizations to access your [facility] medical records  [id number]        Your Vitals Were     Pulse Temperature Respirations Height Pulse Oximetry BMI (Body Mass Index)    77 97.9  F (36.6  C) (Tympanic) 20 1.651 m (5' 5\") 97% 27.46 kg/m2       Blood Pressure from Last 3 Encounters:   07/19/18 118/80   06/27/18 126/74   06/19/18 118/70    Weight from Last 3 Encounters:   07/19/18 74.8 kg (165 lb)   06/27/18 74.8 kg (165 lb)   06/19/18 74.4 kg (164 lb)              We Performed the Following     Basic metabolic panel     EKG 12-lead complete w/read - Clinics          Today's Medication Changes          These changes are accurate as of 7/19/18 10:02 AM.  If you have any questions, ask your nurse or doctor.               Start taking these medicines.        Dose/Directions    apixaban ANTICOAGULANT 5 MG tablet   Commonly known as:  ELIQUIS   Used for:  Persistent atrial fibrillation (H)   Started by:  Pau Nelson APRN CNP        Dose:  5 mg   Take 1 tablet (5 mg) by mouth 2 times daily   Quantity:  60 tablet   Refills:  3         These medicines have changed or have updated prescriptions.        Dose/Directions    atorvastatin 10 MG tablet   Commonly known as:  LIPITOR   This may have changed:  See the new instructions.   Used for:  Coronary artery disease involving native coronary artery of native heart without angina pectoris, Mixed hyperlipidemia   Changed by:  " Pau Nelson APRN CNP        Dose:  10 mg   Take 1 tablet (10 mg) by mouth At Bedtime   Quantity:  90 tablet   Refills:  3       cloNIDine 0.1 MG tablet   Commonly known as:  CATAPRES   This may have changed:  See the new instructions.   Used for:  Essential hypertension   Changed by:  Pau Nelson APRN CNP        Dose:  0.2 mg   Take 2 tablets (0.2 mg) by mouth At Bedtime   Quantity:  60 tablet   Refills:  3       diltiazem 240 MG 24 hr capsule   Commonly known as:  CARTIA XT   This may have changed:    - medication strength  - See the new instructions.   Used for:  Atrial fibrillation, persistent (H), Essential hypertension   Changed by:  Pau Nelson APRN CNP        Dose:  240 mg   Take 1 capsule (240 mg) by mouth daily   Quantity:  90 capsule   Refills:  3            Where to get your medicines      These medications were sent to [facility] - [place], [place] - [address] [address] [facility] [address]  [address] , [facility] [address]     Phone:  [phone]     apixaban ANTICOAGULANT 5 MG tablet    atorvastatin 10 MG tablet    cloNIDine 0.1 MG tablet    diltiazem 240 MG 24 hr capsule                Primary Care Provider Office Phone # Fax #    Braydon Yen [phone]       [address] [place] [address]        Goals        General    Maintain current health status     Notes - Note created  5/22/2018 11:35 AM by Pipkin, Lauren N, RN    Maintain current health status and functional ability despite chronic health alterations          Equal Access to Services     [facility]: Hadii aad ku hadasho Soomaali, waaxda luqadaha, qaybta kaalmada adeegyada, waxay idiin hayaan adeeg becca montgomery . So [facility] [phone].    ATENCIÓN: Si habla español, tiene a lindsey disposición servicios gratuitos de asistencia lingüística. Llame al [phone].    We comply with applicable federal civil rights laws and [place] laws. We do not discriminate on the basis of  race, color, national origin, age, disability, sex, sexual orientation, or gender identity.            Thank you!     Thank you for choosing [facility]  for your care. Our goal is always to provide you with excellent care. Hearing back from our patients is one way we can continue to improve our services. Please take a few minutes to complete the written survey that you may receive in the mail after your visit with us. Thank you!             Your Updated Medication List - Protect others around you: Learn how to safely use, store and throw away your medicines at www.disposemymeds.org.          This list is accurate as of 7/19/18 10:02 AM.  Always use your most recent med list.                   Brand Name Dispense Instructions for use Diagnosis    acetaminophen 500 MG tablet    TYLENOL     Take 500-1,000 mg by mouth every 6 hours as needed for mild pain        ADVAIR -21 MCG/ACT Inhaler   Generic drug:  fluticasone-salmeterol     36 g    INHALE 2 PUFFS INTO THE LUNGS TWICE DAILY    COPD exacerbation (H)       * albuterol 108 (90 Base) MCG/ACT Inhaler    VENTOLIN HFA    54 g    INHALE 2 PUFFS INTO THE LUNGS EVERY 4 HOURS AS NEEDED FOR SHORTNESS OF BREATH OR DIFFICULT BREATHING OR WHEEZING    Other emphysema (H)       * albuterol (2.5 MG/3ML) 0.083% neb solution     25 vial    Take 1 vial (2.5 mg) by nebulization every 6 hours as needed for shortness of breath / dyspnea or wheezing    COPD exacerbation (H)       apixaban ANTICOAGULANT 5 MG tablet    ELIQUIS    60 tablet    Take 1 tablet (5 mg) by mouth 2 times daily    Persistent atrial fibrillation (H)       aspirin 81 MG chewable tablet      Take 81 mg by mouth daily        atorvastatin 10 MG tablet    LIPITOR    90 tablet    Take 1 tablet (10 mg) by mouth At Bedtime    Coronary artery disease involving native coronary artery of native heart without angina pectoris, Mixed hyperlipidemia       CALTRATE 600+D3 SOFT PO      Take 600 mg by mouth 2  times daily        cloNIDine 0.1 MG tablet    CATAPRES    60 tablet    Take 2 tablets (0.2 mg) by mouth At Bedtime    Essential hypertension       diltiazem 240 MG 24 hr capsule    CARTIA XT    90 capsule    Take 1 capsule (240 mg) by mouth daily    Atrial fibrillation, persistent (H), Essential hypertension       diphenhydrAMINE 25 MG tablet    BENADRYL    60 tablet    Take 1-2 tablets (25-50 mg) by mouth every 6 hours as needed for itching or allergies        furosemide 40 MG tablet    LASIX    180 tablet    TAKE 1 TABLET BY MOUTH TWICE DAILY.    Essential hypertension, benign       hydrALAZINE 25 MG tablet    APRESOLINE    810 tablet    TAKE 3 TABLETS BY MOUTH THREE TIMES DAILY    Essential hypertension, benign       ipratropium - albuterol 0.5 mg/2.5 mg/3 mL 0.5-2.5 (3) MG/3ML neb solution    DUONEB    1620 mL    USE 1 VIAL PER NEBULIZER FOUR TIMES DAILY    COPD exacerbation (H)       levothyroxine 100 MCG tablet    SYNTHROID/LEVOTHROID    90 tablet    TAKE 1 TABLET(100 MCG) BY MOUTH DAILY    Hypothyroidism, postablative       LORazepam 1 MG tablet    ATIVAN     Take 0.5-1 tablets by mouth every 6 hours as needed Reported on 5/23/2017        other medical supplies     1 each    Apply one pair to help with edema    Edema, Atrial fibrillation, persistent (H)       potassium chloride SA 20 MEQ CR tablet    K-DUR/KLOR-CON M    180 tablet    Take 1 tablet (20 mEq) by mouth 2 times daily    Hypokalemia       triamcinolone 0.1 % ointment    KENALOG    454 g    Apply bid and hs left hand and legs - for dermatitis    Contact dermatitis, unspecified contact dermatitis type, unspecified trigger       valsartan 40 MG tablet    DIOVAN    180 tablet    TAKE 1 TABLET BY MOUTH TWICE DAILY    Essential hypertension, LV dysfunction       warfarin 2 MG tablet    COUMADIN    110 tablet    Take 1.5 tablets (3 mg) Mon,Fri and take 1 tablet (2mg) all other days or as directed by warfarin clinic    Long term current use of  anticoagulant therapy       * Notice:  This list has 2 medication(s) that are the same as other medications prescribed for you. Read the directions carefully, and ask your doctor or other care provider to review them with you.

## 2018-07-20 ENCOUNTER — ANTICOAGULATION THERAPY VISIT (OUTPATIENT)
Dept: ANTICOAGULATION | Facility: OTHER | Age: 68
End: 2018-07-20
Payer: COMMERCIAL

## 2018-07-20 ENCOUNTER — ANTICOAGULATION THERAPY VISIT (OUTPATIENT)
Dept: ANTICOAGULATION | Facility: OTHER | Age: 68
End: 2018-07-20

## 2018-07-20 ENCOUNTER — TELEPHONE (OUTPATIENT)
Dept: CARDIOLOGY | Facility: OTHER | Age: 68
End: 2018-07-20

## 2018-07-20 DIAGNOSIS — Z79.01 LONG-TERM (CURRENT) USE OF ANTICOAGULANTS: ICD-10-CM

## 2018-07-20 DIAGNOSIS — I48.19 ATRIAL FIBRILLATION, PERSISTENT (H): ICD-10-CM

## 2018-07-20 DIAGNOSIS — Z79.01 LONG TERM CURRENT USE OF ANTICOAGULANT THERAPY: ICD-10-CM

## 2018-07-20 LAB — INR BLD: 4.4 (ref 0.86–1.14)

## 2018-07-20 PROCEDURE — 36416 COLLJ CAPILLARY BLOOD SPEC: CPT | Mod: ZL | Performed by: FAMILY MEDICINE

## 2018-07-20 PROCEDURE — 85610 PROTHROMBIN TIME: CPT | Mod: QW,ZL | Performed by: FAMILY MEDICINE

## 2018-07-20 NOTE — PROGRESS NOTES
ANTICOAGULATION FOLLOW-UP CLINIC VISIT    Patient Name:  Mary Alice Cameron  Date:  7/20/2018  Contact Type:  Telephone    SUBJECTIVE:     Patient Findings     Comments Call from patient stating that she saw cardiology today. She states that she was told to start Eliquis today. I told patient to stop her warfarin today.  Unknown what INR is today. She states that she saw Giana Nelson NP and was not told to do anything else but to start the Eliquis today and patient unsure if she was to stop warfarin today.  Patient discharged from warfarin clinic           OBJECTIVE    INR Point of Care   Date Value Ref Range Status   07/09/2018 2.5 (H) 0.86 - 1.14 Final     Comment:     This test is intended for monitoring Coumadin therapy.  Results are not   accurate in patients with prolonged INR due to factor deficiency.         ASSESSMENT / PLAN  No question data found.  Anticoagulation Summary as of 7/20/2018     INR goal 2.0-3.0   Today's INR No new INR was available at the time of this encounter.   Warfarin maintenance plan 3 mg (2 mg x 1.5) on Mon, Fri; 2 mg (2 mg x 1) all other days   Full warfarin instructions 7/22: Hold; Otherwise 3 mg on Mon, Fri; 2 mg all other days   Weekly warfarin total 16 mg   Plan last modified Kenna Kiran RN (1/10/2018)   Next INR check    Target end date Indefinite    Indications   Atrial fibrillation  persistent (H) [I48.1]  Long-term (current) use of anticoagulants [Z79.01] [Z79.01]         Anticoagulation Episode Summary     INR check location     Preferred lab     Resolved date 7/20/2018    Resolved reason Changed Anticoagulant     Send INR reminders to  ANTICOAG POOL    Comments        Anticoagulation Care Providers     Provider Role Specialty Phone number    Braydon Yen MD [facility] [phone]            See the Encounter Report to view Anticoagulation Flowsheet and Dosing Calendar (Go to Encounters tab in chart review, and find the Anticoagulation Therapy  Visit)        Kenna Kiran RN

## 2018-07-20 NOTE — PROGRESS NOTES
Clinic Care Coordination Contact  Care Team Conversations    Care Coordinator phoned Mary Alice today to inquire about enrolling her now for the [facility] program.  She agrees to participate.  Care Coordinator will enroll her and will be in touch with her about next steps.      Lauren Pipkin, BS-RN   CHF and General Care Coordinator  [phone] Option # 1

## 2018-07-20 NOTE — MR AVS SNAPSHOT
Mary Alice Cameron   7/20/2018   Anticoagulation Therapy Visit    Description:  68 year old female   Provider:  Braydon Yen MD   Department:  Hc Anti Coagulation           INR as of 7/20/2018     Today's INR 4.4!      Anticoagulation Summary as of 7/20/2018     INR goal 2.0-3.0   Today's INR 4.4!   Full warfarin instructions 7/20: Hold; 7/21: Hold; 7/22: Hold; Otherwise No maintenance plan   Next INR check 7/23/2018    Indications   Atrial fibrillation  persistent (H) [I48.1]  Long-term (current) use of anticoagulants [Z79.01] [Z79.01]         Your next Anticoagulation Clinic appointment(s)     Jul 23, 2018 10:15 AM CDT   Anticoagulation Visit with HC ANTI COAGULATION   [facility] [place] ([facility] [place] )    [address]  [place] [address]   [phone]              July 2018 Details    Sun Mon Tue Wed Thu Fri Sat     1               2               3               4               5               6               7                 8               9               10               11               12               13               14                 15               16               17               18               19               20      Hold   See details      21      Hold           22      Hold         23            24               25               26               27               28                 29               30               31                    Date Details   07/20 This INR check       Date of next INR:  7/23/2018         How to take your warfarin dose     Hold Do not take your warfarin dose. See the Details table to the right for additional instructions.

## 2018-07-20 NOTE — TELEPHONE ENCOUNTER
Patient has filled the Rx for Eliquis and discontinued the warfarin. Called to inform you that the medication was approved. Warfarin has been discontinued from her medication list.   Giana Andino RN-BSN

## 2018-07-20 NOTE — PROGRESS NOTES
ANTICOAGULATION FOLLOW-UP CLINIC VISIT    Patient Name:  Mary Alice Cameron  Date:  7/20/2018  Contact Type:  Telephone    SUBJECTIVE:     Patient Findings     Positives Change in medications, Other complaints    Comments INR today is 4.4.  Patient told to hold both eliquis and warfarin and have INR done on 7/23/18.  Eliquis is not to be taken until INR is 2.0 or below.  Dr. Sun will be notified of action taken re: warfarin/eliquis hold until INR on 7/23/18. Cardiology and Dr. Yen and all other internal med MD's gone already for the day. Please see first note from today for other info. Patient was discharged and had to be readmitted from warfarin clinic program r/t elevated INR todya           OBJECTIVE    INR Point of Care   Date Value Ref Range Status   07/20/2018 4.4 (H) 0.86 - 1.14 Final     Comment:     This test is intended for monitoring Coumadin therapy.  Results are not   accurate in patients with prolonged INR due to factor deficiency.         ASSESSMENT / PLAN  INR assessment SUPRA    Recheck INR In: 3 DAYS    INR Location Clinic      Anticoagulation Summary as of 7/20/2018     INR goal 2.0-3.0   Today's INR 4.4!   Warfarin maintenance plan No maintenance plan   Full warfarin instructions 7/20: Hold; 7/21: Hold; 7/22: Hold; Otherwise No maintenance plan   Next INR check 7/23/2018   Target end date     Indications   Atrial fibrillation  persistent (H) [I48.1]  Long-term (current) use of anticoagulants [Z79.01] [Z79.01]         Anticoagulation Episode Summary     INR check location     Preferred lab     Send INR reminders to  ANTICOAG POOL    Comments       Anticoagulation Care Providers     Provider Role Specialty Phone number    Braydon Yen MD [facility] [phone]            See the Encounter Report to view Anticoagulation Flowsheet and Dosing Calendar (Go to Encounters tab in chart review, and find the Anticoagulation Therapy Visit)        Kenna Kiran RN

## 2018-07-20 NOTE — MR AVS SNAPSHOT
Mary Alice Cameron   7/20/2018   Anticoagulation Therapy Visit    Description:  68 year old female   Provider:  Braydon Yen MD   Department:  Hc Anti Coagulation           INR as of 7/20/2018     Today's INR No new INR was available at the time of this encounter.      Anticoagulation Summary as of 7/20/2018     INR goal 2.0-3.0   Today's INR No new INR was available at the time of this encounter.   Full warfarin instructions 7/22: Hold; Otherwise 3 mg on Mon, Fri; 2 mg all other days   Next INR check     Indications   Atrial fibrillation  persistent (H) [I48.1]  Long-term (current) use of anticoagulants [Z79.01] [Z79.01]         Anticoagulation Episode Summary     Resolved date 7/20/2018    Resolved reason Changed Anticoagulant       Your next Anticoagulation Clinic appointment(s)     Aug 06, 2018  9:45 AM CDT   Anticoagulation Visit with HC ANTI COAGULATION   [facility] ([facility] )    [address] [place] [address]   [phone]

## 2018-07-23 ENCOUNTER — ANTICOAGULATION THERAPY VISIT (OUTPATIENT)
Dept: ANTICOAGULATION | Facility: OTHER | Age: 68
End: 2018-07-23
Payer: COMMERCIAL

## 2018-07-23 DIAGNOSIS — Z79.01 LONG TERM CURRENT USE OF ANTICOAGULANT THERAPY: ICD-10-CM

## 2018-07-23 DIAGNOSIS — I48.19 ATRIAL FIBRILLATION, PERSISTENT (H): ICD-10-CM

## 2018-07-23 DIAGNOSIS — Z79.01 LONG-TERM (CURRENT) USE OF ANTICOAGULANTS: ICD-10-CM

## 2018-07-23 LAB — INR BLD: 1.4 (ref 0.86–1.14)

## 2018-07-23 PROCEDURE — 36416 COLLJ CAPILLARY BLOOD SPEC: CPT | Mod: ZL | Performed by: FAMILY MEDICINE

## 2018-07-23 PROCEDURE — 85610 PROTHROMBIN TIME: CPT | Mod: QW,ZL | Performed by: FAMILY MEDICINE

## 2018-07-23 NOTE — TELEPHONE ENCOUNTER
Pau Nelson, CESAR CNP   You 3 hours ago (9:52 AM)                 Great!   Thanks,   PRADEEP (Routing comment)

## 2018-07-23 NOTE — MR AVS SNAPSHOT
Mary Alice Cameron   7/23/2018   Anticoagulation Therapy Visit    Description:  68 year old female   Provider:  Braydon Yen MD   Department:  Hc Anti Coagulation           INR as of 7/23/2018     Today's INR 1.4!      Anticoagulation Summary as of 7/23/2018     INR goal 2.0-3.0   Today's INR 1.4!   Full warfarin instructions No maintenance plan   Next INR check     Indications   Atrial fibrillation  persistent (H) [I48.1]  Long-term (current) use of anticoagulants [Z79.01] [Z79.01]         Anticoagulation Episode Summary     Resolved date 7/23/2018    Resolved reason Changed Anticoagulant

## 2018-07-23 NOTE — PROGRESS NOTES
ANTICOAGULATION FOLLOW-UP CLINIC VISIT    Patient Name:  Mary Alice Cameron  Date:  7/23/2018  Contact Type:  Telephone/ message left on patient voicemail    SUBJECTIVE:     Patient Findings     Positives Change in medications    Comments Patient INR done and result was 1.4. Call placed to patient and  Message left to discontinue warfarin and start Eliquis today as her INR is low enough to do so.  She is to call warfarin clinic if she has any questions. Will discharge her from warfarin clinic program since warfarin has been discontinue and Eliquis started           OBJECTIVE    INR Point of Care   Date Value Ref Range Status   07/23/2018 1.4 (H) 0.86 - 1.14 Final     Comment:     This test is intended for monitoring Coumadin therapy.  Results are not   accurate in patients with prolonged INR due to factor deficiency.         ASSESSMENT / PLAN  No question data found.  Anticoagulation Summary as of 7/23/2018     INR goal 2.0-3.0   Today's INR 1.4!   Warfarin maintenance plan No maintenance plan   Full warfarin instructions No maintenance plan   No change documented Kenna Kiran RN   Next INR check    Target end date     Indications   Atrial fibrillation  persistent (H) [I48.1]  Long-term (current) use of anticoagulants [Z79.01] [Z79.01]         Anticoagulation Episode Summary     INR check location     Preferred lab     Resolved date 7/23/2018    Resolved reason Changed Anticoagulant     Send INR reminders to  ANTICOAG POOL    Comments       Anticoagulation Care Providers     Provider Role Specialty Phone number    Braydon Yen MD [facility] [phone]            See the Encounter Report to view Anticoagulation Flowsheet and Dosing Calendar (Go to Encounters tab in chart review, and find the Anticoagulation Therapy Visit)        Kenna Kiran RN

## 2018-07-31 ENCOUNTER — HOSPITAL ENCOUNTER (OUTPATIENT)
Dept: RESPIRATORY THERAPY | Facility: HOSPITAL | Age: 68
Discharge: HOME | End: 2018-07-31
Attending: FAMILY MEDICINE | Admitting: FAMILY MEDICINE
Payer: MEDICARE

## 2018-07-31 LAB
COHGB MFR BLD: 1.4 % (ref 0–2)
HGB BLD-MCNC: 13.8 G/DL (ref 11.7–15.7)

## 2018-07-31 PROCEDURE — 94726 PLETHYSMOGRAPHY LUNG VOLUMES: CPT | Mod: 26 | Performed by: INTERNAL MEDICINE

## 2018-07-31 PROCEDURE — 94060 EVALUATION OF WHEEZING: CPT

## 2018-07-31 PROCEDURE — 94060 EVALUATION OF WHEEZING: CPT | Mod: 26 | Performed by: INTERNAL MEDICINE

## 2018-07-31 PROCEDURE — 82375 ASSAY CARBOXYHB QUANT: CPT | Performed by: FAMILY MEDICINE

## 2018-07-31 PROCEDURE — 85018 HEMOGLOBIN: CPT | Performed by: FAMILY MEDICINE

## 2018-07-31 PROCEDURE — 94729 DIFFUSING CAPACITY: CPT | Mod: 26 | Performed by: INTERNAL MEDICINE

## 2018-07-31 PROCEDURE — 25000125 ZZHC RX 250: Performed by: FAMILY MEDICINE

## 2018-07-31 PROCEDURE — 94729 DIFFUSING CAPACITY: CPT

## 2018-07-31 PROCEDURE — 36415 COLL VENOUS BLD VENIPUNCTURE: CPT | Performed by: FAMILY MEDICINE

## 2018-07-31 PROCEDURE — 94726 PLETHYSMOGRAPHY LUNG VOLUMES: CPT

## 2018-07-31 RX ORDER — ALBUTEROL SULFATE 0.83 MG/ML
2.5 SOLUTION RESPIRATORY (INHALATION)
Status: COMPLETED | OUTPATIENT
Start: 2018-07-31 | End: 2018-07-31

## 2018-07-31 RX ADMIN — ALBUTEROL SULFATE 2.5 MG: 2.5 SOLUTION RESPIRATORY (INHALATION) at 12:53

## 2018-08-03 ENCOUNTER — TELEPHONE (OUTPATIENT)
Dept: FAMILY MEDICINE | Facility: OTHER | Age: 68
End: 2018-08-03

## 2018-08-10 ENCOUNTER — PATIENT OUTREACH (OUTPATIENT)
Dept: CARE COORDINATION | Facility: OTHER | Age: 68
End: 2018-08-10

## 2018-08-10 NOTE — PROGRESS NOTES
Clinic Care Coordination Contact  Care Team Conversations    Care Coordinator received a phone call form Mary Alice today.  She states she received a letter in the mail letting her know [facility] in [place] is closing and can no longer supply her oxygen.  She reports she currently has a concentrator and portable tanks (wheeled) in her home.  She does not want to lose the concentrator so would like to find out what options she has locally.  She was advised Care Coordinator will look into this and will let her know next week when she comes into the clinic 8/16 for cardiology follow-up.  She was advised Care Coordinator will also complete her enrollment and sync her device for [employer] at that time.  Will plan to have her start using [employer] on 8/20.    Lauren Pipkin, BS-RN   CHF and General Care Coordinator  [phone] Option # 1

## 2018-08-16 ENCOUNTER — OFFICE VISIT (OUTPATIENT)
Dept: CARDIOLOGY | Facility: OTHER | Age: 68
End: 2018-08-16
Attending: FAMILY MEDICINE
Payer: COMMERCIAL

## 2018-08-16 ENCOUNTER — PATIENT OUTREACH (OUTPATIENT)
Dept: CARE COORDINATION | Facility: OTHER | Age: 68
End: 2018-08-16

## 2018-08-16 VITALS
BODY MASS INDEX: 28.88 KG/M2 | DIASTOLIC BLOOD PRESSURE: 63 MMHG | TEMPERATURE: 98.3 F | OXYGEN SATURATION: 97 % | HEIGHT: 63 IN | WEIGHT: 163 LBS | RESPIRATION RATE: 16 BRPM | HEART RATE: 99 BPM | SYSTOLIC BLOOD PRESSURE: 137 MMHG

## 2018-08-16 DIAGNOSIS — Z98.890 STATUS POST CORONARY ANGIOGRAM: ICD-10-CM

## 2018-08-16 DIAGNOSIS — I25.10 CORONARY ARTERY DISEASE INVOLVING NATIVE CORONARY ARTERY OF NATIVE HEART WITHOUT ANGINA PECTORIS: ICD-10-CM

## 2018-08-16 DIAGNOSIS — Q23.81 BICUSPID AORTIC VALVE: ICD-10-CM

## 2018-08-16 DIAGNOSIS — I10 ESSENTIAL HYPERTENSION: ICD-10-CM

## 2018-08-16 DIAGNOSIS — J43.9 PULMONARY EMPHYSEMA, UNSPECIFIED EMPHYSEMA TYPE (H): ICD-10-CM

## 2018-08-16 DIAGNOSIS — I48.19 ATRIAL FIBRILLATION, PERSISTENT (H): Primary | ICD-10-CM

## 2018-08-16 PROCEDURE — 99214 OFFICE O/P EST MOD 30 MIN: CPT | Performed by: NURSE PRACTITIONER

## 2018-08-16 PROCEDURE — G0463 HOSPITAL OUTPT CLINIC VISIT: HCPCS

## 2018-08-16 RX ORDER — LOSARTAN POTASSIUM 50 MG/1
50 TABLET ORAL
Qty: 60 TABLET | Refills: 3 | Status: SHIPPED | OUTPATIENT
Start: 2018-08-16 | End: 2018-12-31

## 2018-08-16 RX ORDER — LOSARTAN POTASSIUM 50 MG/1
TABLET ORAL
Qty: 180 TABLET | Refills: 3 | OUTPATIENT
Start: 2018-08-16

## 2018-08-16 ASSESSMENT — PAIN SCALES - GENERAL: PAINLEVEL: NO PAIN (0)

## 2018-08-16 NOTE — PATIENT INSTRUCTIONS
You were seen by Pau Nelson NP, 8/16/2018.     1.  Please continue all medications as they have been prescribed.      2.   If you develop new or worsening symptoms please call the cardiology office.       You will follow up with Dr. Myers in 9/25/18.       Please call the cardiology office with problems, questions, or concerns at [phone].    If you experience chest pain, chest pressure, chest tightness, shortness of breath, fainting, lightheadedness, nausea, vomiting, or other concerning symptoms, please report to the Emergency Department or call 911. These symptoms may be emergent, and best treated in the Emergency Department.       Giana NAVARRO RN-BSN  Cardiology   [facility]  [phone]

## 2018-08-16 NOTE — NURSING NOTE
"Chief Complaint   Patient presents with     Atrial Fib     Hypertension       Initial /64 (BP Location: Left arm, Patient Position: Chair, Cuff Size: Adult Regular)  Pulse 99  Temp 98.3  F (36.8  C) (Tympanic)  Resp 16  Ht 1.594 m (5' 2.75\")  Wt 73.9 kg (163 lb)  SpO2 97%  BMI 29.1 kg/m2 Estimated body mass index is 29.1 kg/(m^2) as calculated from the following:    Height as of this encounter: 1.594 m (5' 2.75\").    Weight as of this encounter: 73.9 kg (163 lb).  Medication Reconciliation: complete    Talia Randle LPN    "

## 2018-08-16 NOTE — PROGRESS NOTES
Clinic Care Coordination Contact  Care Team Conversations    Care Coordinator met with Mary Alice today and completed enrollment for [facility] for CHF monitoring.  Patient was provided with instruction on using the helen on her smartphone and confirmatory messages were sent and received.  Patient will begin entering her data on the helen on 8/20/18.  Patient verbalized understanding of all instruction today.  She was encouraged to reach out with any questions or concerns.    Lauren Pipkin, BS-RN   CHF and General Care Coordinator  [phone] Option # 1

## 2018-08-16 NOTE — PROGRESS NOTES
[facility] HEART CARE   CARDIOLOGY PROGRESS NOTE    Mary Alice Cameron   [address]    [facility] [address]      Braydon Yen     Chief Complaint   Patient presents with     Atrial Fib     Hypertension        HPI:   Ms. Cameron is a 68 year old female who presents for cardiology follow-up atrial fibrillation and  s/p coronary angiogram. She has followed cardiology for atrial fibrillation, for which she developed this past year and has history of known bicuspid aortic valve. Additionally, she has a history of COPD, hypothyroidism and depression.       She has limited symptoms with permanent AF, reports shortness of breath which is likely multifactorial with significant COPD. She had been on Flecainide AAT and Coumadin oral anticoagulation for management.      Recent TTE on 5/23/17 with LVEF reduced to 40-45%, grade I/early diastolic dysfunction. History of bicuspid aortic valve with mild to moderate aortic insufficiency with peak aortic velocity 2.57 m/sec, mean gradient 16.9 mmHg and peak gradient AoV 26.4 mmHg.       With decline in systolic function and reported increased fatigue a NM Lexiscan stress test was ordered. Results of this study revealed possible ischemia of the anterior wall and apex with small completed infarct of the apex, LV EF 55%. It was recommended further evaluation and possible intervention with coronary angiography. Patient requested to have this performed at [facility] in [place].      Angiogram at [facility] on 6/12/18 with findings of mild coronary atherosclerosis.   Coronaries:  LM: mild diffuse disease  LAD:Mild diffuse disease  LCX: Mild diffuse disease  RCA: Dominant, mild diffuse disease.      With newly identified mild CAD s/p angiogram and reduced LVEF it was recommended that she discontinue Flecainide AAT. At her last visit on 7/19/18 she denied any palpitations or symptoms of AF since she stopped this medication. She does feel a mild increase in shortness of breath mostly noted in  the AM. She was started back on Diltiazem for rate control. With advanced COPD she is not the best candidate for beta blockade. She has no concerns today.      PAST MEDICAL HISTORY:   Past Medical History:   Diagnosis Date     Asthma      Atrial fibrillation (H)      COPD (chronic obstructive pulmonary disease) (H)      Depression      High cholesterol      HTN (hypertension)      Thyroid disease           FAMILY HISTORY:   Family History   Problem Relation Age of Onset     Prostate Cancer Father      Hypertension Father      Heart Failure Father      CHF     Asthma Mother      Cancer Mother      ovarian     Hypertension Mother      Asthma Brother      Hypertension Sister      Hypertension Brother           PAST SURGICAL HISTORY:   Past Surgical History:   Procedure Laterality Date     BACK SURGERY  2006     CARDIAC SURGERY  06/12/2018    Angiogram at [facility]     COLONOSCOPY N/A 1/19/2016    Procedure: COLONOSCOPY;  Surgeon: Waldemar Bob MD;  Location: [facility] OR     HYSTERECTOMY  1980    partial     SLING BLADDER SUSPENSION WITH FASCIA LINNETTE            SOCIAL HISTORY:   Social History     Social History     Marital status:      Spouse name: N/A     Number of children: N/A     Years of education: N/A     Occupational History      Retired     disabled     Social History Main Topics     Smoking status: Former Smoker     Packs/day: 0.50     Years: 41.00     Types: Cigarettes     Quit date: 1/28/2007     Smokeless tobacco: Never Used     Alcohol use No     Drug use: No     Sexual activity: No      Comment:      Other Topics Concern      Service No     Blood Transfusions Yes     Permits if needed     Caffeine Concern Yes     2 cups coffee daily     Seat Belt Yes     Parent/Sibling W/ Cabg, Mi Or Angioplasty Before 65f 55m? No     Social History Narrative          CURRENT MEDICATIONS:   Prior to Admission medications    Medication Sig Start Date End Date Taking? Authorizing Provider    acetaminophen (TYLENOL) 500 MG tablet Take 500-1,000 mg by mouth every 6 hours as needed for mild pain   Yes Reported, Patient   ADVAIR -21 MCG/ACT Inhaler INHALE 2 PUFFS INTO THE LUNGS TWICE DAILY 11/1/17  Yes Braydon Yen MD   albuterol (2.5 MG/3ML) 0.083% neb solution Take 1 vial (2.5 mg) by nebulization every 6 hours as needed for shortness of breath / dyspnea or wheezing 12/12/17  Yes Braydon Yen MD   albuterol (VENTOLIN HFA) 108 (90 BASE) MCG/ACT Inhaler INHALE 2 PUFFS INTO THE LUNGS EVERY 4 HOURS AS NEEDED FOR SHORTNESS OF BREATH OR DIFFICULT BREATHING OR WHEEZING 12/12/17  Yes Braydon Yen MD   apixaban ANTICOAGULANT (ELIQUIS) 5 MG tablet Take 1 tablet (5 mg) by mouth 2 times daily 7/19/18  Yes Pau Nelson APRN CNP   aspirin 81 MG chewable tablet Take 81 mg by mouth daily   Yes Reported, Patient   atorvastatin (LIPITOR) 10 MG tablet Take 1 tablet (10 mg) by mouth At Bedtime 7/19/18  Yes Pau Nelson APRN CNP   Calcium Carb-Cholecalciferol (CALTRATE 600+D3 SOFT PO) Take 600 mg by mouth 2 times daily   Yes Reported, Patient   cloNIDine (CATAPRES) 0.1 MG tablet Take 2 tablets (0.2 mg) by mouth At Bedtime 7/19/18  Yes Pau Nelson APRN CNP   diltiazem 240 MG 24 hr capsule Take 1 capsule (240 mg) by mouth daily 7/19/18  Yes Pau Nelson APRN CNP   diphenhydrAMINE (BENADRYL) 25 MG tablet Take 1-2 tablets (25-50 mg) by mouth every 6 hours as needed for itching or allergies 9/7/16  Yes Braydon Yen MD   furosemide (LASIX) 40 MG tablet TAKE 1 TABLET BY MOUTH TWICE DAILY. 7/13/18  Yes Braydon Yen MD   hydrALAZINE (APRESOLINE) 25 MG tablet TAKE 3 TABLETS BY MOUTH THREE TIMES DAILY 12/12/17  Yes Braydon Yen MD   ipratropium - albuterol 0.5 mg/2.5 mg/3 mL (DUONEB) 0.5-2.5 (3) MG/3ML neb solution USE 1 VIAL PER NEBULIZER FOUR TIMES DAILY 1/25/18  Yes Braydon Yen MD   levothyroxine (SYNTHROID/LEVOTHROID) 100 MCG tablet TAKE 1 TABLET(100  "MCG) BY MOUTH DAILY 2/28/18  Yes Braydon Yen MD   LORazepam (ATIVAN) 1 MG tablet Take 0.5-1 tablets by mouth every 6 hours as needed Reported on 5/23/2017   Yes Reported, Patient   OTHER MEDICAL SUPPLIES Apply one pair to help with edema 10/15/14  Yes Amie Stern, MARGARETTE   potassium chloride SA (K-DUR/KLOR-CON M) 20 MEQ CR tablet Take 1 tablet (20 mEq) by mouth 2 times daily 2/20/18  Yes Elana Mazariegos PA   triamcinolone (KENALOG) 0.1 % ointment Apply bid and hs left hand and legs - for dermatitis 11/15/16  Yes NICOLE Gomez MD   valsartan (DIOVAN) 40 MG tablet TAKE 1 TABLET BY MOUTH TWICE DAILY 6/20/18  Yes Pau Nelson APRN CNP          ALLERGIES:   Allergies   Allergen Reactions     Amlodipine Besylate Cough     Norvasc     Lisinopril Cough          ROS:   CONSTITUTIONAL: No reported fever or chills. No changes in weight.  ENT: No visual disturbance, ear ache, epistaxis or sore throat.   CARDIOVASCULAR: No chest pain, chest pressure or chest discomfort. No palpitations or lower extremity edema.   RESPIRATORY: Chronic shortness of breath and dyspnea upon exertion which has remained stable. No reported cough, wheezing or hemoptysis.   GI: No reported abdominal pain. No nausea, vomiting or diarrhea. No melena or hematochezia.  : No hematuria or dysuria.   NEUROLOGICAL: No reported lightheadedness, dizziness, syncope, ataxia, paresthesias or weakness.   HEMATOLOGIC: No history of anemia. No bleeding or excessive bruising. No history of blood clots.   MUSCULOSKELETAL: No joint pain or swelling, no muscle pain.  ENDOCRINOLOGIC: No temperature intolerance. No hair or skin changes.  SKIN: No abnormal rashes or sores, no unusual itching.      PHYSICAL EXAM:   /63  Pulse 99  Temp 98.3  F (36.8  C) (Tympanic)  Resp 16  Ht 1.594 m (5' 2.75\")  Wt 73.9 kg (163 lb)  SpO2 97%  BMI 29.1 kg/m2  GENERAL: The patient is a well-developed, well-nourished, in no apparent distress.  HEENT: Head is " normocephalic and atraumatic. Eyes are symmetrical with normal visual tracking. No icterus, no xanthelasmas. Nares appeared normal without nasal drainage. Mucous membranes are moist, no cyanosis.  NECK: Supple. No adenopathy, asymmetry or masses.   CHEST/ LUNGS: Slight expiratory wheeze present, no rales, rhonchi or use of accessory muscles, no retractions, respirations unlabored and normal respiratory rate.   CARDIO: Irregular rate and rhythm, no murmur present, click or rub. Precordium quiet with normal PMI.    ABD: Abdomen is nondistended.   MUSCULOSKELETAL: No joint swelling.   NEUROLOGIC: Alert and oriented X3. Normal speech, gait and affect. No focal neurologic deficits.   SKIN: No jaundice. No rashes or visible skin lesions present. No ecchymosis.       LAB RESULTS:   Orders Only on 07/23/2018   Component Date Value Ref Range Status     INR Point of Care 07/23/2018 1.4* 0.86 - 1.14 Final   Orders Only on 07/20/2018   Component Date Value Ref Range Status     INR Point of Care 07/20/2018 4.4* 0.86 - 1.14 Final   Office Visit on 07/19/2018   Component Date Value Ref Range Status     Sodium 07/19/2018 143  133 - 144 mmol/L Final     Potassium 07/19/2018 4.2  3.4 - 5.3 mmol/L Final     Chloride 07/19/2018 104  94 - 109 mmol/L Final     Carbon Dioxide 07/19/2018 31  20 - 32 mmol/L Final     Anion Gap 07/19/2018 8  3 - 14 mmol/L Final     Glucose 07/19/2018 110* 70 - 99 mg/dL Final     Urea Nitrogen 07/19/2018 14  7 - 30 mg/dL Final     Creatinine 07/19/2018 0.85  0.52 - 1.04 mg/dL Final     GFR Estimate 07/19/2018 66  >60 mL/min/1.7m2 Final     GFR Estimate If Black 07/19/2018 80  >60 mL/min/1.7m2 Final     Calcium 07/19/2018 9.4  8.5 - 10.1 mg/dL Final   Orders Only on 07/09/2018   Component Date Value Ref Range Status     INR Point of Care 07/09/2018 2.5* 0.86 - 1.14 Final   Orders Only on 06/25/2018   Component Date Value Ref Range Status     INR Point of Care 06/25/2018 1.9* 0.86 - 1.14 Final   Orders Only on  06/18/2018   Component Date Value Ref Range Status     INR Point of Care 06/18/2018 1.5* 0.86 - 1.14 Final   Orders Only on 06/07/2018   Component Date Value Ref Range Status     Sodium 06/07/2018 141  133 - 144 mmol/L Final     Potassium 06/07/2018 4.1  3.4 - 5.3 mmol/L Final     Chloride 06/07/2018 104  94 - 109 mmol/L Final     Carbon Dioxide 06/07/2018 29  20 - 32 mmol/L Final     Anion Gap 06/07/2018 8  3 - 14 mmol/L Final     Glucose 06/07/2018 125* 70 - 99 mg/dL Final     Urea Nitrogen 06/07/2018 13  7 - 30 mg/dL Final     Creatinine 06/07/2018 0.88  0.52 - 1.04 mg/dL Final     GFR Estimate 06/07/2018 64  >60 mL/min/1.7m2 Final     GFR Estimate If Black 06/07/2018 77  >60 mL/min/1.7m2 Final     Calcium 06/07/2018 8.7  8.5 - 10.1 mg/dL Final     WBC 06/07/2018 7.8  4.0 - 11.0 10e9/L Final     RBC Count 06/07/2018 4.14  3.8 - 5.2 10e12/L Final     Hemoglobin 06/07/2018 13.7  11.7 - 15.7 g/dL Final     Hematocrit 06/07/2018 41.0  35.0 - 47.0 % Final     MCV 06/07/2018 99  78 - 100 fl Final     MCH 06/07/2018 33.1* 26.5 - 33.0 pg Final     MCHC 06/07/2018 33.4  31.5 - 36.5 g/dL Final     RDW 06/07/2018 12.8  10.0 - 15.0 % Final     Platelet Count 06/07/2018 175  150 - 450 10e9/L Final     Diff Method 06/07/2018 Automated Method   Final     % Neutrophils 06/07/2018 78.6  % Final     % Lymphocytes 06/07/2018 13.3  % Final     % Monocytes 06/07/2018 6.9  % Final     % Eosinophils 06/07/2018 0.9  % Final     % Basophils 06/07/2018 0.3  % Final     Absolute Neutrophil 06/07/2018 6.1  1.6 - 8.3 10e9/L Final     Absolute Lymphocytes 06/07/2018 1.0  0.8 - 5.3 10e9/L Final     Absolute Monocytes 06/07/2018 0.5  0.0 - 1.3 10e9/L Final     Absolute Eosinophils 06/07/2018 0.1  0.0 - 0.7 10e9/L Final     Absolute Basophils 06/07/2018 0.0  0.0 - 0.2 10e9/L Final   Orders Only on 05/21/2018   Component Date Value Ref Range Status     INR Point of Care 05/21/2018 2.6* 0.86 - 1.14 Final          ASSESSMENT:   Mary Alice Cameron  presents for cardiology follow-up atrial fibrillation and  s/p coronary angiogram. She has followed cardiology for atrial fibrillation, for which she developed this past year and has history of known bicuspid aortic valve. Additionally, she has a history of COPD, hypothyroidism and depression.     PLAN:   1. Atrial fibrillation, persistent (H)  Patient describes remaining largely asymptomatic with AF since she discontinued the Flecainide AAT.    She will continue with Diltiazem to 240 mg once daily, admits that rates have remained well controlled.  She will continue with Eliquis oral anticoagulation.      2. Bicuspid aortic valve  Known bicuspid aortic valve.   This has remained stable, continue with repeat TTE surveillance in 1-2 year.  CT aortogram shows no dilation of descending aorta.       3. Essential hypertension  /63 today, continue to monitor pressures.   With Valsartan recall we will switch her ARB as ordered.   No other adjustments at this time, she will continue to monitor her pressures.       4. Coronary artery disease involving native coronary artery of native heart without angina pectoris  [facility] at [facility] on 6/12/18 with findings of mild coronary atherosclerosis.  Continue cardiac risk factor optimization, continue with Atorvastatin 10 mg every night.    5. Pulmonary emphysema, unspecified emphysema type (H)  Reports that she has found a new medical supplier for her oxygen, she will now be using [employer] out of [place]    Follow-up with cardiology as scheduled with Dr. Myers on 9/25/18.      Thank you for allowing me to participate in the care of your patient. Please do not hesitate to contact me if you have any questions.     Pau Nelson

## 2018-08-16 NOTE — MR AVS SNAPSHOT
After Visit Summary   8/16/2018    Mary Alice Cameron    MRN: [id number]           Patient Information     Date Of Birth          1950        Visit Information        Provider Department      8/16/2018 9:15 AM Pau Nelson APRN [facility]        Today's Diagnoses     Atrial fibrillation, persistent (H)    -  1    Bicuspid aortic valve        Essential hypertension        Coronary artery disease involving native coronary artery of native heart without angina pectoris        Pulmonary emphysema, unspecified emphysema type (H)        Status post coronary angiogram          Care Instructions    You were seen by Pau Nelson NP, 8/16/2018.     1.  Please continue all medications as they have been prescribed.      2.   If you develop new or worsening symptoms please call the cardiology office.       You will follow up with Dr. Myers in 9/25/18.       Please call the cardiology office with problems, questions, or concerns at [phone].    If you experience chest pain, chest pressure, chest tightness, shortness of breath, fainting, lightheadedness, nausea, vomiting, or other concerning symptoms, please report to the Emergency Department or call 911. These symptoms may be emergent, and best treated in the Emergency Department.       Giana NAVARRO RN-BSN  Cardiology   [facility]  [phone]              Follow-ups after your visit        Your next 10 appointments already scheduled     Sep 25, 2018 10:30 AM CDT   (Arrive by 10:15 AM)   Return Visit with Eliezer Myers MD   [facility] ([facility] )    [address] [place] [address]   [phone]              Who to contact     If you have questions or need follow up information about today's clinic visit or your schedule please contact [facility] directly at [phone].  Normal or non-critical lab and imaging results will be communicated to you by Afshan  "letter or phone within 4 business days after the clinic has received the results. If you do not hear from us within 7 days, please contact the clinic through Jaunt or phone. If you have a critical or abnormal lab result, we will notify you by phone as soon as possible.  Submit refill requests through Jaunt or call your pharmacy and they will forward the refill request to us. Please allow 3 business days for your refill to be completed.          Additional Information About Your Visit        [email or web address] (formerly [facility]) Information     Jaunt gives you secure access to your electronic health record. If you see a primary care provider, you can also send messages to your care team and make appointments. If you have questions, please call your primary care clinic.  If you do not have a primary care provider, please call [phone] and they will assist you.        Care EveryWhere ID     This is your Care EveryWhere ID. This could be used by other organizations to access your [place] medical records  [id number]        Your Vitals Were     Pulse Temperature Respirations Height Pulse Oximetry BMI (Body Mass Index)    99 98.3  F (36.8  C) (Tympanic) 16 1.594 m (5' 2.75\") 97% 29.1 kg/m2       Blood Pressure from Last 3 Encounters:   08/16/18 137/63   07/19/18 118/80   06/27/18 126/74    Weight from Last 3 Encounters:   08/16/18 73.9 kg (163 lb)   07/19/18 74.8 kg (165 lb)   06/27/18 74.8 kg (165 lb)              Today, you had the following     No orders found for display       Primary Care Provider Office Phone # Fax #    Braydon Yen [phone]       [address] [place] [address]        Goals        General    Maintain current health status     Notes - Note created  5/22/2018 11:35 AM by Pipkin, Lauren N, RN    Maintain current health status and functional ability despite chronic health alterations          Equal Access to Services     CHARLES FISHER AH: [facility] Soyoel, waaxda luqadaha, qaybta " sourav huynhlakhwinder montgomery ah. Kary [facility] [phone].    ATENCIÓN: Si patricia ortega, tiene a lindsey disposición servicios gratuitos de asistencia lingüística. Hong al [phone].    We comply with applicable federal civil rights laws and [place] laws. We do not discriminate on the basis of race, color, national origin, age, disability, sex, sexual orientation, or gender identity.            Thank you!     Thank you for choosing [facility]  for your care. Our goal is always to provide you with excellent care. Hearing back from our patients is one way we can continue to improve our services. Please take a few minutes to complete the written survey that you may receive in the mail after your visit with us. Thank you!             Your Updated Medication List - Protect others around you: Learn how to safely use, store and throw away your medicines at www.disposemymeds.org.          This list is accurate as of 8/16/18  9:45 AM.  Always use your most recent med list.                   Brand Name Dispense Instructions for use Diagnosis    acetaminophen 500 MG tablet    TYLENOL     Take 500-1,000 mg by mouth every 6 hours as needed for mild pain        ADVAIR -21 MCG/ACT Inhaler   Generic drug:  fluticasone-salmeterol     36 g    INHALE 2 PUFFS INTO THE LUNGS TWICE DAILY    COPD exacerbation (H)       * albuterol 108 (90 Base) MCG/ACT inhaler    VENTOLIN HFA    54 g    INHALE 2 PUFFS INTO THE LUNGS EVERY 4 HOURS AS NEEDED FOR SHORTNESS OF BREATH OR DIFFICULT BREATHING OR WHEEZING    Other emphysema (H)       * albuterol (2.5 MG/3ML) 0.083% neb solution     25 vial    Take 1 vial (2.5 mg) by nebulization every 6 hours as needed for shortness of breath / dyspnea or wheezing    COPD exacerbation (H)       apixaban ANTICOAGULANT 5 MG tablet    ELIQUIS    60 tablet    Take 1 tablet (5 mg) by mouth 2 times daily    Persistent atrial fibrillation (H)       aspirin 81 MG chewable  tablet      Take 81 mg by mouth daily        atorvastatin 10 MG tablet    LIPITOR    90 tablet    Take 1 tablet (10 mg) by mouth At Bedtime    Coronary artery disease involving native coronary artery of native heart without angina pectoris, Mixed hyperlipidemia       CALTRATE 600+D3 SOFT PO      Take 600 mg by mouth 2 times daily        cloNIDine 0.1 MG tablet    CATAPRES    60 tablet    Take 2 tablets (0.2 mg) by mouth At Bedtime    Essential hypertension       diltiazem 240 MG 24 hr capsule    CARTIA XT    90 capsule    Take 1 capsule (240 mg) by mouth daily    Atrial fibrillation, persistent (H), Essential hypertension       diphenhydrAMINE 25 MG tablet    BENADRYL    60 tablet    Take 1-2 tablets (25-50 mg) by mouth every 6 hours as needed for itching or allergies        furosemide 40 MG tablet    LASIX    180 tablet    TAKE 1 TABLET BY MOUTH TWICE DAILY.    Essential hypertension, benign       hydrALAZINE 25 MG tablet    APRESOLINE    810 tablet    TAKE 3 TABLETS BY MOUTH THREE TIMES DAILY    Essential hypertension, benign       ipratropium - albuterol 0.5 mg/2.5 mg/3 mL 0.5-2.5 (3) MG/3ML neb solution    DUONEB    1620 mL    USE 1 VIAL PER NEBULIZER FOUR TIMES DAILY    COPD exacerbation (H)       levothyroxine 100 MCG tablet    SYNTHROID/LEVOTHROID    90 tablet    TAKE 1 TABLET(100 MCG) BY MOUTH DAILY    Hypothyroidism, postablative       LORazepam 1 MG tablet    ATIVAN     Take 0.5-1 tablets by mouth every 6 hours as needed Reported on 5/23/2017        other medical supplies     1 each    Apply one pair to help with edema    Edema, Atrial fibrillation, persistent (H)       potassium chloride SA 20 MEQ CR tablet    K-DUR/KLOR-CON M    180 tablet    Take 1 tablet (20 mEq) by mouth 2 times daily    Hypokalemia       triamcinolone 0.1 % ointment    KENALOG    454 g    Apply bid and hs left hand and legs - for dermatitis    Contact dermatitis, unspecified contact dermatitis type, unspecified trigger        valsartan 40 MG tablet    DIOVAN    180 tablet    TAKE 1 TABLET BY MOUTH TWICE DAILY    Essential hypertension, LV dysfunction       * Notice:  This list has 2 medication(s) that are the same as other medications prescribed for you. Read the directions carefully, and ask your doctor or other care provider to review them with you.

## 2018-08-24 ENCOUNTER — TELEPHONE (OUTPATIENT)
Dept: CARDIOLOGY | Facility: OTHER | Age: 68
End: 2018-08-24

## 2018-08-24 NOTE — TELEPHONE ENCOUNTER
Returned pt phone call.  Pt had left a message regarding a mailed prescription for losartan; wondered what it was for as she has never taken it before.  Explained to pt that Giana was out today; will leave her a message regarding pt concern and question.  She will get a call back on Monday.  Pt ok with this, but will not start this medication until after speaking with Giana or Tila in cardiology.  Fe Sterling

## 2018-08-24 NOTE — TELEPHONE ENCOUNTER
Spoke with patient regarding new prescription for Losartan.  Explained to patient that there was a recall on Valsartan, and this is why Pau Nelson prescribed this medication.  Patient verbalized understanding.  She just wasn't comfortable taking the new medication until she knew why.    Carola Trujillo RN

## 2018-09-12 ENCOUNTER — PATIENT OUTREACH (OUTPATIENT)
Dept: CARE COORDINATION | Facility: OTHER | Age: 68
End: 2018-09-12

## 2018-09-12 NOTE — PROGRESS NOTES
Clinic Care Coordination Contact  [facility]/Voicemail       Clinical Data: Care Coordinator Outreach - checked On[employer] this AM and there was an alert from pt stating patient requests provider contact.  Outreach attempted x 1.  Left message on voicemail with call back information and requested return call.  Plan: Care Coordinator will await return phone call from pt.    Delia Marie RN-BSN  Chronic Pain Care Coordinator  [phone] opt. #3

## 2018-09-14 NOTE — PROGRESS NOTES
Clinic Care Coordination Contact  Care Team Conversations    Care Coordinator received a return call from Mary Alice today.  She states the reason she wanted a call from the nurse was to discuss her nebulizers.  She states she is not able to get in all 4 daily Duonebs sometimes when she is not at home all day.  She reports it is difficult for her to maintain an active lifestyle and still get all 4 in every day.  She was advised that it isn't realistic for her to have to stay at home to assure she can do this every day so she should just do the best she can to get in all of her daily breathing medications.  It is understandable that occasionally a dose may be missed.  She doesn't want this to impact her success with On[employer] and she was assured it will not.  Care Coordinator praised her efforts to use this tool in successfully managing her CHF.  She reports that she is really happy with the program.  She also reports she frequently refers to her CHF Action Plan.    Mary Alice is scheduled to see Dr Myers on 9/25/18.  Care Coordinator will continue regular outreach.    Lauren Pipkin, BS-RN   CHF and General Care Coordinator  [phone] Option # 1

## 2018-09-25 ENCOUNTER — TELEPHONE (OUTPATIENT)
Dept: CARDIOLOGY | Facility: OTHER | Age: 68
End: 2018-09-25

## 2018-09-25 ENCOUNTER — OFFICE VISIT (OUTPATIENT)
Dept: CARDIOLOGY | Facility: OTHER | Age: 68
End: 2018-09-25
Attending: INTERNAL MEDICINE
Payer: COMMERCIAL

## 2018-09-25 VITALS
BODY MASS INDEX: 25.71 KG/M2 | OXYGEN SATURATION: 98 % | RESPIRATION RATE: 18 BRPM | WEIGHT: 160 LBS | HEIGHT: 66 IN | HEART RATE: 83 BPM | SYSTOLIC BLOOD PRESSURE: 128 MMHG | DIASTOLIC BLOOD PRESSURE: 83 MMHG | TEMPERATURE: 98.8 F

## 2018-09-25 DIAGNOSIS — I77.810 ASCENDING AORTA DILATATION (H): ICD-10-CM

## 2018-09-25 DIAGNOSIS — Q23.81 BICUSPID AORTIC VALVE: Primary | ICD-10-CM

## 2018-09-25 DIAGNOSIS — M54.6 ACUTE MIDLINE THORACIC BACK PAIN: ICD-10-CM

## 2018-09-25 DIAGNOSIS — I48.19 ATRIAL FIBRILLATION, PERSISTENT (H): ICD-10-CM

## 2018-09-25 PROCEDURE — 93010 ELECTROCARDIOGRAM REPORT: CPT | Performed by: INTERNAL MEDICINE

## 2018-09-25 PROCEDURE — G0463 HOSPITAL OUTPT CLINIC VISIT: HCPCS | Mod: 25

## 2018-09-25 PROCEDURE — 93005 ELECTROCARDIOGRAM TRACING: CPT

## 2018-09-25 PROCEDURE — 99214 OFFICE O/P EST MOD 30 MIN: CPT | Performed by: INTERNAL MEDICINE

## 2018-09-25 ASSESSMENT — PAIN SCALES - GENERAL: PAINLEVEL: NO PAIN (0)

## 2018-09-25 NOTE — TELEPHONE ENCOUNTER
CT Angiogram of the abdomen, diagnosis needed.  Bicuspid aortic valve not sufficient.   Please place order.  Thanks, Giana.

## 2018-09-25 NOTE — MR AVS SNAPSHOT
After Visit Summary   9/25/2018    Mary Alice Cameron    MRN: [id number]           Patient Information     Date Of Birth          1950        Visit Information        Provider Department      9/25/2018 10:30 AM Eliezer Myers MD [facility]        Today's Diagnoses     Bicuspid aortic valve    -  1    Ascending aorta dilatation (H)          Care Instructions    You were seen by Dr. Myers, 9/25/2018.     1.  You will be scheduled for a CT of the Chest and Abdomen to evaluate for aneyserum.    Hospital scheduling will call to schedule this appointment.     2.  You will have an echocardiogram performed.  This is an ultrasound of the heart, that evaluates heart function.  The hospital scheduling department will call to schedule you for this test.     3. You will have an EKG 9/25/2018    4. Please continue all meds as prescribed.       You will follow up with Dr. Myers in October, 2018.       Please call the cardiology office with problems, questions, or concerns at [phone].    If you experience chest pain, chest pressure, chest tightness, shortness of breath, fainting, lightheadedness, nausea, vomiting, or other concerning symptoms, please report to the Emergency Department or call 911. These symptoms may be emergent, and best treated in the Emergency Department.       Giana NAVARRO RN-BSN  Cardiology   [facility]  [phone]              Follow-ups after your visit        Your next 10 appointments already scheduled     Oct 23, 2018 10:00 AM CDT   (Arrive by 9:45 AM)   Return Visit with Eliezer Myers MD   [facility] ([facility] )    [address] [place] [address]   [phone]              Future tests that were ordered for you today     Open Future Orders        Priority Expected Expires Ordered    Echocardiogram Complete Routine  9/25/2019 9/25/2018    CT Abdomen w & w/o contrast Routine  "9/26/2018 9/25/2019 9/25/2018    CT Chest Angio w/o & w Contrast Routine 9/26/2018 9/25/2019 9/25/2018            Who to contact     If you have questions or need follow up information about today's clinic visit or your schedule please contact [facility] directly at [phone].  Normal or non-critical lab and imaging results will be communicated to you by MyChart, letter or phone within 4 business days after the clinic has received the results. If you do not hear from us within 7 days, please contact the clinic through [employer] or phone. If you have a critical or abnormal lab result, we will notify you by phone as soon as possible.  Submit refill requests through [employer] or call your pharmacy and they will forward the refill request to us. Please allow 3 business days for your refill to be completed.          Additional Information About Your Visit        [employer] Information     [employer] gives you secure access to your electronic health record. If you see a primary care provider, you can also send messages to your care team and make appointments. If you have questions, please call your primary care clinic.  If you do not have a primary care provider, please call [phone] and they will assist you.        Care EveryWhere ID     This is your Care EveryWhere ID. This could be used by other organizations to access your [facility] medical records  [id number]        Your Vitals Were     Pulse Temperature Respirations Height Pulse Oximetry BMI (Body Mass Index)    83 98.8  F (37.1  C) (Tympanic) 18 1.664 m (5' 5.5\") 98% 26.22 kg/m2       Blood Pressure from Last 3 Encounters:   09/25/18 128/83   08/16/18 137/63   07/19/18 118/80    Weight from Last 3 Encounters:   09/25/18 72.6 kg (160 lb)   08/16/18 73.9 kg (163 lb)   07/19/18 74.8 kg (165 lb)              We Performed the Following     EKG 12-lead complete w/read - (Clinic Performed)          Today's Medication Changes          These changes are " accurate as of 9/25/18 11:08 AM.  If you have any questions, ask your nurse or doctor.               These medicines have changed or have updated prescriptions.        Dose/Directions    cloNIDine 0.1 MG tablet   Commonly known as:  CATAPRES   This may have changed:  Another medication with the same name was removed. Continue taking this medication, and follow the directions you see here.   Used for:  Essential hypertension   Changed by:  Eliezer Myers MD        Dose:  0.2 mg   Take 2 tablets (0.2 mg) by mouth At Bedtime   Quantity:  60 tablet   Refills:  3                Primary Care Provider Office Phone # Fax #    Braydon Yen [phone]       [address] [place] [address]        Goals        General    Maintain current health status     Notes - Note created  5/22/2018 11:35 AM by Pipkin, Lauren N, RN    Maintain current health status and functional ability despite chronic health alterations          Equal Access to Services     [facility]: Hadii aad ku hadasho Soomaali, waaxda luqadaha, qaybta kaalmada adeegyada, waxay josein hayaan pinky montgomery . So [facility] [phone].    ATENCIÓN: Si habla español, tiene a lindsey disposición servicios gratuitos de asistencia lingüística. Hong al [phone].    We comply with applicable federal civil rights laws and [place] laws. We do not discriminate on the basis of race, color, national origin, age, disability, sex, sexual orientation, or gender identity.            Thank you!     Thank you for choosing [facility]  for your care. Our goal is always to provide you with excellent care. Hearing back from our patients is one way we can continue to improve our services. Please take a few minutes to complete the written survey that you may receive in the mail after your visit with us. Thank you!             Your Updated Medication List - Protect others around you: Learn how to safely use, store and throw away  your medicines at www.disposemymeds.org.          This list is accurate as of 9/25/18 11:08 AM.  Always use your most recent med list.                   Brand Name Dispense Instructions for use Diagnosis    acetaminophen 500 MG tablet    TYLENOL     Take 500-1,000 mg by mouth every 6 hours as needed for mild pain        ADVAIR -21 MCG/ACT Inhaler   Generic drug:  fluticasone-salmeterol     36 g    INHALE 2 PUFFS INTO THE LUNGS TWICE DAILY    COPD exacerbation (H)       * albuterol 108 (90 Base) MCG/ACT inhaler    VENTOLIN HFA    54 g    INHALE 2 PUFFS INTO THE LUNGS EVERY 4 HOURS AS NEEDED FOR SHORTNESS OF BREATH OR DIFFICULT BREATHING OR WHEEZING    Other emphysema (H)       * albuterol (2.5 MG/3ML) 0.083% neb solution     25 vial    Take 1 vial (2.5 mg) by nebulization every 6 hours as needed for shortness of breath / dyspnea or wheezing    COPD exacerbation (H)       apixaban ANTICOAGULANT 5 MG tablet    ELIQUIS    60 tablet    Take 1 tablet (5 mg) by mouth 2 times daily    Persistent atrial fibrillation (H)       atorvastatin 10 MG tablet    LIPITOR    90 tablet    Take 1 tablet (10 mg) by mouth At Bedtime    Coronary artery disease involving native coronary artery of native heart without angina pectoris, Mixed hyperlipidemia       CALTRATE 600+D3 SOFT PO      Take 600 mg by mouth 2 times daily        cloNIDine 0.1 MG tablet    CATAPRES    60 tablet    Take 2 tablets (0.2 mg) by mouth At Bedtime    Essential hypertension       diltiazem 240 MG 24 hr capsule    CARTIA XT    90 capsule    Take 1 capsule (240 mg) by mouth daily    Atrial fibrillation, persistent (H), Essential hypertension       diphenhydrAMINE 25 MG tablet    BENADRYL    60 tablet    Take 1-2 tablets (25-50 mg) by mouth every 6 hours as needed for itching or allergies        furosemide 40 MG tablet    LASIX    180 tablet    TAKE 1 TABLET BY MOUTH TWICE DAILY.    Essential hypertension, benign       ipratropium - albuterol 0.5 mg/2.5 mg/3 mL  0.5-2.5 (3) MG/3ML neb solution    DUONEB    1620 mL    USE 1 VIAL PER NEBULIZER FOUR TIMES DAILY    COPD exacerbation (H)       levothyroxine 100 MCG tablet    SYNTHROID/LEVOTHROID    90 tablet    TAKE 1 TABLET(100 MCG) BY MOUTH DAILY    Hypothyroidism, postablative       LORazepam 1 MG tablet    ATIVAN     Take 0.5-1 tablets by mouth every 6 hours as needed Reported on 5/23/2017        losartan 50 MG tablet    COZAAR    60 tablet    Take 1 tablet (50 mg) by mouth 2 times daily    Essential hypertension       other medical supplies     1 each    Apply one pair to help with edema    Edema, Atrial fibrillation, persistent (H)       potassium chloride SA 20 MEQ CR tablet    K-DUR/KLOR-CON M    180 tablet    Take 1 tablet (20 mEq) by mouth 2 times daily    Hypokalemia       * Notice:  This list has 2 medication(s) that are the same as other medications prescribed for you. Read the directions carefully, and ask your doctor or other care provider to review them with you.

## 2018-09-25 NOTE — PROGRESS NOTES
"Chief Complaint: atrial fibrillation, bicuspid valve    HPI: I was happy to see Mrs. Cameron for the above. She has seen several cardiologists over the past year for these problems. Her  was ill and they moved to be closer to his daughters. He  in October and she has settled in [place]. Her atrial fibrillation began in the past year. In reviewing the old records both  and  are reported as when the atrial fibrillation began and she is no longer sure. This has been associated with shortness of breath but as was discussed with her by one of the cardiologist she has multiple reasons for shortness of breath including COPD. She is finishing treatment for a COPD exacerbation at this time. She reports the plan had been to start her on warfarin and then cardiovert her. With all the chaos in her life the past few months this never happened. She was found to have hyperthyroidism in August and was treated with Iodine ablation. She also has a bicuspid aortic valve and mild aortic root dilation (per report of echocardiogram in old records). The echocardiogram did not report significant aortic stenosis or insufficiency and her systolic function was normal making it less likely that her shortness of breath was related to her valvular heart disease. She was told she would need periodic f/u of her valve and aorta. She reports two angiograms done at [facility] and that they were \"okay\". I do not see copies of those reports in the old records. They will be requested.    She underwent DC cardioversion on 2014. She reports feeling better afterward. She feels like she needs to use her inhaler less often and has less shortness of breath. However, the ECG today shows she is back in atrial fibrillation.    I have reviewed the records sent from [facility]. There is an echocardiogram report from  and records regarding back surgery. I see no cath results.    A repeat cardioversion in July failed to restore sinus " "rhythm. A stress study in April (see below) showed no ischemia or infarction.    11Nov2014:  She had recurrent atrial fibrillation was started on flecainide and scheduled for cardioversion. When she arrived for the cardioversion she was in sinus.     A CT aortogram showed atherosclerosis but not dilation of the descending aoota.    [id number]:    She was admitted in March 2015 with a COPD exacerbation. Her breathing is at baseline with no fever, sputum or wheezing.    She has not noted any atrial fibrillation, no palpitations, chest pain/discomfort, unusual dyspnea on exertion, bleeding or neurologic symptoms.    [id number]: Her follow-up echocardiogram (see lab section) showed no change in aortic valve function. She report rare \"flutters\" but no sustained palpitations like with her atrial fibrillation. She reports no significant bleeding episodes on warfarin. She denies any neurologic symptoms suggestive of CVA or TIA. Edema improved when she switched her diltiazem to bedtime.    Her primary health problem has been her COPD. She uses oxygen at night. She has had had any recent symptoms of upper or lower respiratory infection.    [id number]: She had two admissions this past winter for COPD exacerbations. Overall, she feels her dyspnea on exertion is slowly getting worse. She has not had exertional chest pain/discomfort.     She reports no increased edema, orthopnea or paroxysmal nocturnal dyspnea.    [id number]: echocardiogram shows a decline in systolic function.  She reports that she has felt more fatigued and has had more dyspnea on exertion since I last saw her.  She also reports she has had problems with her lungs over the winter.  She is been on a prolonged course of prednisone.  She has not had chest pain or chest discomfort.  She has noted episodes of atrial fibrillation, or palpitations that she thinks is atrial fibrillation.  These episodes were relatively infrequent and do not last very long.  They are not " associated with associated symptoms and she has not found them to be overly troublesome.  She has had no prolonged episodes where she felt the need to consider seeking medical treatment.    September 25, 2018 Interval history: her angiogram showed no obstructive coronary artery disease. She reports two episodes of pain between her shoulders, lasting 10 minutes.     No palpitations. Flecainide stopped due to drop in systolic function.     Has had a few problems with her COPD.       Current Outpatient Prescriptions   Medication Sig Dispense Refill     acetaminophen (TYLENOL) 500 MG tablet Take 500-1,000 mg by mouth every 6 hours as needed for mild pain       ADVAIR -21 MCG/ACT Inhaler INHALE 2 PUFFS INTO THE LUNGS TWICE DAILY 36 g 2     albuterol (2.5 MG/3ML) 0.083% neb solution Take 1 vial (2.5 mg) by nebulization every 6 hours as needed for shortness of breath / dyspnea or wheezing 25 vial 1     albuterol (VENTOLIN HFA) 108 (90 BASE) MCG/ACT Inhaler INHALE 2 PUFFS INTO THE LUNGS EVERY 4 HOURS AS NEEDED FOR SHORTNESS OF BREATH OR DIFFICULT BREATHING OR WHEEZING 54 g 1     apixaban ANTICOAGULANT (ELIQUIS) 5 MG tablet Take 1 tablet (5 mg) by mouth 2 times daily 60 tablet 3     atorvastatin (LIPITOR) 10 MG tablet Take 1 tablet (10 mg) by mouth At Bedtime 90 tablet 3     Calcium Carb-Cholecalciferol (CALTRATE 600+D3 SOFT PO) Take 600 mg by mouth 2 times daily       cloNIDine (CATAPRES) 0.1 MG tablet Take 2 tablets (0.2 mg) by mouth At Bedtime 60 tablet 3     diltiazem 240 MG 24 hr capsule Take 1 capsule (240 mg) by mouth daily 90 capsule 3     diphenhydrAMINE (BENADRYL) 25 MG tablet Take 1-2 tablets (25-50 mg) by mouth every 6 hours as needed for itching or allergies 60 tablet 1     furosemide (LASIX) 40 MG tablet TAKE 1 TABLET BY MOUTH TWICE DAILY. 180 tablet 1     ipratropium - albuterol 0.5 mg/2.5 mg/3 mL (DUONEB) 0.5-2.5 (3) MG/3ML neb solution USE 1 VIAL PER NEBULIZER FOUR TIMES DAILY 1620 mL 1      levothyroxine (SYNTHROID/LEVOTHROID) 100 MCG tablet TAKE 1 TABLET(100 MCG) BY MOUTH DAILY 90 tablet 3     LORazepam (ATIVAN) 1 MG tablet Take 0.5-1 tablets by mouth every 6 hours as needed Reported on 5/23/2017       losartan (COZAAR) 50 MG tablet Take 1 tablet (50 mg) by mouth 2 times daily 60 tablet 3     potassium chloride SA (K-DUR/KLOR-CON M) 20 MEQ CR tablet Take 1 tablet (20 mEq) by mouth 2 times daily 180 tablet 2     OTHER MEDICAL SUPPLIES Apply one pair to help with edema (Patient not taking: Reported on 9/25/2018) 1 each 0     [DISCONTINUED] cloNIDine (CATAPRES) 0.1 MG tablet TAKE 1 TABLET BY MOUTH EVERY MORNING AND 3 TABLETS EVERY EVENING 360 tablet 0       Past Medical History:   Diagnosis Date     Asthma      Atrial fibrillation (H)      COPD (chronic obstructive pulmonary disease) (H)      Depression      High cholesterol      HTN (hypertension)      Thyroid disease        Past Surgical History:   Procedure Laterality Date     BACK SURGERY  2006     CARDIAC SURGERY  06/12/2018    [facility]gi[facility] at [facility]     COLONOSCOPY N/A 1/19/2016    Procedure: COLONOSCOPY;  Surgeon: Waldemar Bob MD;  Location: [facility]     HYSTERECTOMY  1980    partial     SLING BLADDER SUSPENSION WITH FASCIA LINNETTE         Family History   Problem Relation Age of Onset     Prostate Cancer Father      Hypertension Father      Heart Failure Father      CHF     Asthma Mother      Cancer Mother      ovarian     Hypertension Mother      Asthma Brother      Hypertension Sister      Hypertension Brother        Social History   Substance Use Topics     Smoking status: Former Smoker     Packs/day: 0.50     Years: 41.00     Types: Cigarettes     Quit date: 1/28/2007     Smokeless tobacco: Never Used     Alcohol use No       Allergies   Allergen Reactions     Amlodipine Besylate Cough     Norvasc     Lisinopril Cough       ROS: ten system review negative except as noted above. September 25, 2018/woa    Physical Examination:  BP  "128/83 (BP Location: Left arm, Patient Position: Chair, Cuff Size: Adult Regular)  Pulse 83  Temp 98.8  F (37.1  C) (Tympanic)  Resp 18  Ht 1.664 m (5' 5.5\")  Wt 72.6 kg (160 lb)  SpO2 98%  BMI 26.22 kg/m2  GENERAL APPEARANCE: healthy, alert and no distress  HEENT: no icterus, no xanthelasmas  NECK:  JVP is not visible, left CEA scar  CHEST:  no rales or rhonchi, breath sounds are diminished throughout, expiratory phase is prolonged, no wheezing  CARDIOVASCULAR: irregular rhythm, S1S2 split, no S3 or S4 and no murmur, click or rub, precordium quiet  ABDOMEN: soft, non tender, bowel sounds normal, no abdominal bruits  EXTREMITIES: some edema, but also adipose    Laboratory and diagnostic data reviewed 2018:          Previous Studies reviewed 2018:    [facility]  Echocardiography Laboratory  [address] [place] [id number]     Name: LANCE VALLEJO  MRN: [id number]  : 1950  Study Date: 2018 10:30 AM  Age: 67 yrs  Gender: Female  Patient Location: [facility]  Reason For Study: , Essential hypertension, benign, Atrial fibrillation,  persisten  History: bicuspid AV  Ordering Physician: TODD JAMES  Referring Physician: TODD JAMES  Performed By: Genevieve Contreras     BSA: 1.8 m2  Height: 64 in  Weight: 160 lb  _____________________________________________________________________________  __        Procedure  The patient's rhythm is atrial fibrillation.  _____________________________________________________________________________  __        Interpretation Summary  No pericardial effusion is present.  Borderline left ventricular dilation is present.  The Ejection Fraction is estimated at 40-45%.  Grade I or early diastolic dysfunction.  The right ventricle is normal size.  Global right ventricular function is normal.  Mild left atrial enlargement is present.  Mild right atrial enlargement is present.  Mild mitral insufficiency is " present.  Mild to moderate aortic valve sclerosis is present.  Cannot exclude a bicuspid aortic valve.  Mild to moderate aortic insufficiency is present.  The peak aortic velocity is 2.57 m/sec.  The mean gradient across the aortic valve is16.9 mmHg.  Peak gradient AoV 26.4 mmHg  The aortic valve area is .94 cm^2, by the continuity equation.  Mild to moderate tricuspid insufficiency is present.  The peak velocity of the tricuspid regurgitant jet is not obtainable.      ECHOCARDIOGRAM    M-mode, two-dimensional, color-flow, and Doppler studies were obtained  on this patient.  Standard views were utilized.    ORDERING PHYSICIAN:  Eliezer Myers MD    INDICATIONS:  Bicuspid aortic valve.    Cardiac Dimensions                                    Normal  Dimensions  Aortic Root:                         32.3 mm      Aortic Root  Diameter: 20-37 mm  Left Atrium:                          45 mm      Left Atrium: 19-40  mm  Right Ventricle:                     25.8 mm      Right Ventricle:  7-23 mm  Left Ventricle end-diastole:   54.8 mm      Left Ventricle  end-diastole: 35-56 mm  Left Ventricle end-systole:    32.8 mm      Left Ventricle  end-systole: 35-56 mm  IVS end-diastole:                 8.8 mm      IVS end-diastole: 7-11  mm  LVPW:                                6.6 mm      LVPW: 7-11 mm    Review of the study shows there is no pericardial effusion.  The left  ventricle is normal size and systolic function.  Ejection fraction at  70%.  The left atrium is borderline enlarged, volume is 70 mL, indexed  it was 37 mL/meter2.  The right ventricle is normal on 2-D study.  The  mitral valve has a mild amount of mitral regurgitation.  The aortic  valve appears to be bicuspid in nature.  Peak and mean gradients are  25 and 13 respectively with a peak velocity of 2.52 meter/second,  signifying just very slight stenosis.  There is mild-to-moderate  aortic insufficiency.  Also noted is some slight tricuspid  regurgitation.   Unable to estimate the right ventricular systolic  pressure.      ASSESSMENT:  Echocardiographic study revealing  1.  Normal left ventricular size and systolic function.  Ejection  fraction at 70%.  2.  Borderline left atrial enlargement.  3.  Normal right ventricular size and function.  4.  Slight mitral regurgitation.  5.  Aortic valve is likely bicuspid.  Mild stenosis.  Peak and mean  gradients are 25 and 13 with mild-to-moderate aortic insufficiency.  This is a slight increase in terms of insufficiency compared to echo  from February 2014.  6.  Slight tricuspid regurgitation.  Unable to estimate the right  ventricular systolic pressure.  Exam Date: May 12, 2016 10:33:00 AM  Author: LITO CLEVELAND    LEXISCAN CARDIOLITE STUDY-INTERNAL MEDICINE DICTATION  DICTATING PHYSICIAN: Lito Cleveland MD  INDICATIONS: A 63-year-old female referred by Dr. Myers and Dr. Yen because of chest pain and atrial fibrillation.  FINDINGS: The patient was placed upon the table using our protocol,  given 5 mL/0.4 mg of Lexiscan rapidly over 15 seconds followed by  saline flush. She was then given the Sestamibi at 45 seconds into  the study. She was monitored continuously throughout the study.  Resting heart rate was 85 with blood pressure 130/80. Heart rate gurpreet  to 113 and blood pressure dropped to 110/70. She did have some  transient shortness of breath. Review of her electrocardiogram shows  she has atrial fibrillation throughout the study, but no acute changes  were noted.  ASSESSMENT: Lexiscan Sestamibi study. On the Lexiscan portion the  patient did have appropriate heart rate and blood pressure response,  transient symptoms. No acute electrocardiogram changes or  arrhythmias. The Cardiolite scan is pending.  CARDIOLITE IV LEXISCAN AND GATED SPECT-RADIOLOGIST DICTATION  DICTATING PHYSICIAN: Rufus Newberry MD  HISTORY: A 63-year-old female with chest pain.  LEXISCAN DOSE: 5 mL (0.4 mg regadenoson) by rapid  intravenous  injection.  SESTAMIBI DOSE: 10 mCi 99mTc, 1 mL MIBI Injection Time: 0550  SESTAMIBI DOSE: 30 mCi 99mTc, 1 mL MIBI Injection Time: 0749  ANGIOCATH SIZE: 22 INJECTION SITE: Right AC INITIALS: JR  IV DISCONTINUE TIME: 0753 SITE CONDITION: No infiltrate TIP INTACT:  Yes    The patient is a 63-year-old female who was evaluated with  Sestamibi/Lexiscan testing. 5 mL Lexiscan (0.4 mg regadenoson) was  administered by rapid intravenous injection; followed immediately by  saline flush and radiopharmaceutical. The patient's resting heart  rate was 85 and blood pressure was 130/80. The maximum response in  heart rate and blood pressure after Lexiscan injection was 113 and  110/70.  Symptoms during the procedure included: Transient shortness of  breath.  If symptoms were present, did they resolve post-test? Yes.  Electrocardiogram monitoring demonstrates: Atrial fibrillation  throughout study. No acute electrocardiogram changes.  Arrhythmias: None, other than atrial fibrillation.  Summary: Appropriate heart rate and blood pressure response.  Transient shortness of breath. No acute electrocardiogram changes.  VIEWS OBTAINED: Short axis, horizontal long axis, vertical long axis  at rest and stress.  FINDINGS: The left ventricular ejection fraction is 56%. No fixed  or reversible perfusion defects are present.  IMPRESSION: No evidence of cardiac ischemia.    Echocardiogram:  ASSESSMENT: Echocardiographic study revealing  1. Normal left ventricle size and systolic function. Ejection  fraction at 55%.  2. Ascending aorta appears to be normal on current measurements.  3. Left atrial enlargement.  4. Normal right ventricle size and function.  5. Mild mitral regurgitation.  6. Aortic valve appears to probably be bicuspid without any  significant stenosis. There is a slight amount of aortic  insufficiency.  7. Slight tricuspid regurgitation. Peak systolic pressure right  ventricle is estimated at 14 plus right  atrium.    Exam Date: Feb 14, 2014 11:18:00 AM  Author: SOLOMON MITCHELL    Old records show a TSH of 1.87 in 11/13. Labs from 10/13 show a normal K, creat and estimated GFR.    Assessment and recommendations:    1) Persistent atrial fibrillation (recurrent) - off flecainide    -  ECG ordered    2) Bicuspid aortic valve - previous echocardiogram does not show significant aortic stenosis or insufficiency. CT aortogram shows no dilation of descending aorta.    - follow-up echocardiogram as below    3) Peripheral edema - unchanged    4) Hypertension - controlled    5) New systolic dysfunction - no coronary artery disease found    - echocardiogram for follow-up of EF    6) New pain between shoulders - mild aortic dilation noted on prior CT     - f/u ct angio of chest and abd ordered     I appreciate the chance to help with Mrs. Cameron's care. Please let me know if you have any questions or concerns. I will see her back in one year.    Portions of this note were dictated using speech recognition software. The note has been proofread but errors in the text may have been overlooked. Please contact me if there are any concerns regarding the accuracy of the dictation.     Eliezer Myers M.D.

## 2018-09-25 NOTE — NURSING NOTE
"Chief Complaint   Patient presents with     RECHECK     4 month follow-up.  2 episodes of pain between shoulder blades, lasting about 10 minutes.  Alleviates with rest.  Patient also gets SOB.  No other complaints.  Angiogram negative for blockages.       Initial /83 (BP Location: Left arm, Patient Position: Chair, Cuff Size: Adult Regular)  Pulse 83  Temp 98.8  F (37.1  C) (Tympanic)  Resp 18  Ht 1.664 m (5' 5.5\")  Wt 72.6 kg (160 lb)  SpO2 98%  BMI 26.22 kg/m2 Estimated body mass index is 26.22 kg/(m^2) as calculated from the following:    Height as of this encounter: 1.664 m (5' 5.5\").    Weight as of this encounter: 72.6 kg (160 lb).  Medication Reconciliation: complete    Carola Trujillo RN    "

## 2018-09-25 NOTE — PATIENT INSTRUCTIONS
You were seen by Dr. Myers, 9/25/2018.     1.  You will be scheduled for a CT of the Chest and Abdomen to evaluate for aneyserum.    Hospital scheduling will call to schedule this appointment.     2.  You will have an echocardiogram performed.  This is an ultrasound of the heart, that evaluates heart function.  The hospital scheduling department will call to schedule you for this test.     3. You will have an EKG 9/25/2018    4. Please continue all meds as prescribed.       You will follow up with Dr. Myers in October, 2018.       Please call the cardiology office with problems, questions, or concerns at [phone].    If you experience chest pain, chest pressure, chest tightness, shortness of breath, fainting, lightheadedness, nausea, vomiting, or other concerning symptoms, please report to the Emergency Department or call 911. These symptoms may be emergent, and best treated in the Emergency Department.       Giana NAVARRO RN-BSN  Cardiology   [facility]  [phone]

## 2018-09-27 ENCOUNTER — TELEPHONE (OUTPATIENT)
Dept: CARDIOLOGY | Facility: OTHER | Age: 68
End: 2018-09-27

## 2018-09-27 DIAGNOSIS — I71.21 ASCENDING AORTIC ANEURYSM (H): Primary | ICD-10-CM

## 2018-09-28 ENCOUNTER — PATIENT OUTREACH (OUTPATIENT)
Dept: CARE COORDINATION | Facility: OTHER | Age: 68
End: 2018-09-28

## 2018-09-28 NOTE — PROGRESS NOTES
Clinic Care Coordination Contact  Care Team Conversations    Per Dr Pennington:    It looks like she is on lasix 40 mg BID. Lets have her take an extra dose of lasix this afternoon as I assume that she took her morning dose already. She should take an extra dose in the morning as well if her wt is up. If her wt is back down to her baseline, she should not take an extra dose of lasix in the morning. Thanks!     Dr. Gorge Wheat was advised of the above information and verbalized understanding.  She was reminded that we are not monitoring OnTrack after business hours and on the weekends.  She would need to call the 24 Hour Nurse Line if she has questions or concerns or call 911 if she has a medical emergency.    Lauren Pipkin, BS-RN   CHF and General Care Coordinator  [phone] Option # 1

## 2018-09-28 NOTE — PROGRESS NOTES
Clinic Care Coordination Contact  Care Team Conversations    Patient participates in [facility] for CHF management.  Care Coordinator notes patient is reporting a 2lb weight gain (164-166lbs) overnight yesterday to today as well as an increase in swelling or bloating.      Unsure if this is related but she did have an increase of 4lbs (164-168lbs) overnight from 9/14-9/15 (Fri to Sat) but went back down to 164lbs by Monday 9/17.  Care Coordinator has been monitoring this with Mary Alice to see if there is a pattern.  It is concerning today that she is reporting the increase in swelling/bloating.  When she had the 4lb weight gain over the weekend she did not report this.  Prior to these fluctuations, the patient was reporting a steady, unchanged weight of 164lbs.    No cardiology provider in clinic here in [place] today.  Will forward to Dr Pennington at [facility] to see what his thought are or if he would like to have Mary Alice do anything to address this.    Lauren Pipkin, BS-RN   CHF and General Care Coordinator  [phone] Option # 1

## 2018-10-03 ENCOUNTER — TELEPHONE (OUTPATIENT)
Dept: FAMILY MEDICINE | Facility: OTHER | Age: 68
End: 2018-10-03

## 2018-10-03 NOTE — TELEPHONE ENCOUNTER
2:27 PM    Reason for Call: Phone Call    Description: Pt called and stated that they use to get oxygen from [facility] in [place] but they closed and now she is getting it from [facility]? In [place] and they need to know when last breathing test was done.     Was an appointment offered for this call? No  If yes : Appointment type              Date    Preferred method for responding to this message: Telephone Call  What is your phone number ?[phone]    If we cannot reach you directly, may we leave a detailed response at the number you provided? Yes    Can this message wait until your PCP/provider returns, if available today? YES, provider is out    Litzy Rios

## 2018-10-03 NOTE — PROGRESS NOTES
Clinic Care Coordination Contact  Care Team Conversations    Care Coordinator has been reviewing [facility] for patient data regarding weight/edema the past few days Mon 10/1 thru today.  Weight appears to have returned to baseline and she is no longer reporting increased edema.     Lauren Pipkin, BS-RN   CHF and General Care Coordinator  [phone] Option # 1

## 2018-10-09 ENCOUNTER — PATIENT OUTREACH (OUTPATIENT)
Dept: CARE COORDINATION | Facility: OTHER | Age: 68
End: 2018-10-09

## 2018-10-09 DIAGNOSIS — I10 ESSENTIAL HYPERTENSION, BENIGN: Primary | ICD-10-CM

## 2018-10-09 DIAGNOSIS — I10 ESSENTIAL HYPERTENSION, BENIGN: ICD-10-CM

## 2018-10-09 RX ORDER — TORSEMIDE 20 MG/1
40 TABLET ORAL DAILY
Qty: 120 TABLET | Refills: 0 | Status: SHIPPED | OUTPATIENT
Start: 2018-10-09 | End: 2018-12-13

## 2018-10-09 NOTE — PROGRESS NOTES
Clinic Care Coordination Contact  Care Team Conversations    Care Coordinator discussed Mary Alice's OnTrack entries with Pau Nelson NP today.  Noted patterns of weight increase/decrease along with reports of increased bloating/edema at times.  Pau feels patient would benefit from discontinuing the Lasix and starting Torsemide instead.  Torsemide likely to be more effective and will hopefully stabilize things.    Mary Alice was advised of the above information.  Rx for Torsemide 20mg 2 tablets daily sent to Mary Alice's pharmacy.  She was advised to take this medication in the morning (total of 40mg daily).  Mary Alice verbalized understanding of this instruction.  She will start the medication tomorrow and will let CC know if she has further questions or concerns.  CC will continue to monitor OnTrack.  Will help us determine if this change stabilizes her weight and eliminates the increased bloating she is having at times.    Lauren Pipkin, BS-RN   CHF and General Care Coordinator  [phone] Option # 1

## 2018-10-10 ENCOUNTER — ALLIED HEALTH/NURSE VISIT (OUTPATIENT)
Dept: FAMILY MEDICINE | Facility: OTHER | Age: 68
End: 2018-10-10
Attending: FAMILY MEDICINE
Payer: MEDICARE

## 2018-10-10 DIAGNOSIS — Z23 NEED FOR PROPHYLACTIC VACCINATION AND INOCULATION AGAINST INFLUENZA: Primary | ICD-10-CM

## 2018-10-10 PROCEDURE — 90662 IIV NO PRSV INCREASED AG IM: CPT

## 2018-10-10 PROCEDURE — G0008 ADMIN INFLUENZA VIRUS VAC: HCPCS

## 2018-10-10 NOTE — PROGRESS NOTES

## 2018-10-10 NOTE — MR AVS SNAPSHOT
After Visit Summary   10/10/2018    Mary Alice Cameron    MRN: [id number]           Patient Information     Date Of Birth          1950        Visit Information        Provider Department      10/10/2018 9:50 AM NA FLU SHOT CLINIC [facility]        Today's Diagnoses     Need for prophylactic vaccination and inoculation against influenza    -  1       Follow-ups after your visit        Your next 10 appointments already scheduled     Oct 11, 2018  8:30 AM CDT   (Arrive by 8:15 AM)   CTA CHEST WITH CONTRAST with HICT1   [facility] CT SCAN ([facility] )    [address] [place] [id number] [phone]           How do I prepare for my exam? (Food and drink instructions) **You will have contrast for this exam.** Do not eat or drink for 2 hours before your exam. If you need to take medicine, you may take it with small sips of water. (We may ask you to take liquid medicine as well.)  The day before your exam, drink extra fluids at least six 8-ounce glasses (unless your doctor tells you to restrict your fluids).  How do I prepare for my exam? (Other instructions) Patients over 70 or patients with diabetes or kidney problems: If you haven t had a blood test (creatinine test) within the last 30 days, the Cardiologist/Radiologist may require you to get this test prior to your exam.  What should I wear: Please wear loose clothing, such as a sweat suit or jogging clothes.  Avoid snaps, zippers and other metal. We may ask you to undress and put on a hospital gown.  How long does the exam take: Most scans take less than 20 minutes.  What should I bring: Please bring any scans or X-rays taken at other hospitals, if similar tests were done. Also bring a list of your medicines, including vitamins, minerals and over-the-counter drugs. It is safest to leave personal items at home.  Do I need a :  No  is needed.  What do I need to tell my doctor? Be sure to tell your  doctor: * If you have any allergies. * If there s any chance you are pregnant. * If you are breastfeeding. * If you have diabetes as your medication may need to be adjusted for this exam.  What should I do after the exam: No restrictions, You may resume normal activities.  What is this test: A CT (computed tomography) scan is a series of pictures that allows us to look inside your body. The scanner creates images of the body in cross sections, much like slices of bread. This helps us see any problems more clearly. You may receive contrast (X-ray dye) before or during your scan. Contrast is given through an IV (small needle in your arm).  Who should I call with questions: If you have any questions, please call the Imaging Department where you will have your exam. Directions, parking instructions, and other information is available on our website, [email or web address].            Oct 11, 2018  9:00 AM CDT   Ech Complete with [address] Echo ([facility] )    [address] [facility] [address] [phone]           1.  Please bring or wear a comfortable two-piece outfit. 2.  You may eat, drink and take your normal medicines. 3.  For any questions that cannot be answered, please contact the ordering physician 4.  Please do not wear perfumes or scented lotions on the day of your exam.            Oct 23, 2018 10:00 AM CDT   (Arrive by 9:45 AM)   Return Visit with Eliezer Myers MD   [facility]    [address]  [facility] [address]   [phone]            Oct 29, 2018  3:30 PM CDT   (Arrive by 3:15 PM)   SHORT with Braydon Yen MD   [facility] ([facility] )    [address]  [facility] [address]   [phone]              Who to contact     If you have questions or need follow up information about today's clinic visit or your schedule please contact [facility]  directly at [phone].  Normal or non-critical lab and imaging results will be communicated to you by MyChart, letter or phone within 4 business days after the clinic has received the results. If you do not hear from us within 7 days, please contact the clinic through [employer] or phone. If you have a critical or abnormal lab result, we will notify you by phone as soon as possible.  Submit refill requests through [employer] or call your pharmacy and they will forward the refill request to us. Please allow 3 business days for your refill to be completed.          Additional Information About Your Visit        [employer] Information     [employer] gives you secure access to your electronic health record. If you see a primary care provider, you can also send messages to your care team and make appointments. If you have questions, please call your primary care clinic.  If you do not have a primary care provider, please call [phone] and they will assist you.        Care EveryWhere ID     This is your Care EveryWhere ID. This could be used by other organizations to access your [place] medical records  [id number]         Blood Pressure from Last 3 Encounters:   09/25/18 128/83   08/16/18 137/63   07/19/18 118/80    Weight from Last 3 Encounters:   09/25/18 160 lb (72.6 kg)   08/16/18 163 lb (73.9 kg)   07/19/18 165 lb (74.8 kg)              We Performed the Following     ADMIN INFLUENZA (For MEDICARE Patients ONLY) []     HC FLU VACCINE, INCREASED ANTIGEN, PRESV FREE        Primary Care Provider Office Phone # Fax #    Braydon Yen [phone]       [address] [place] [id number]        Goals        General    Maintain current health status     Notes - Note created  5/22/2018 11:35 AM by Pipkin, Lauren N, RN    Maintain current health status and functional ability despite chronic health alterations          Equal Access to Services     CHARLES FISHER AH: sienna Segovia  kaylyn coreysourav lobo ah. So [facility] [phone].    ATENCIÓN: Si patricia ortega, tiene a lindsey disposición servicios gratuitos de asistencia lingüística. Hong al [phone].    We comply with applicable federal civil rights laws and [place] laws. We do not discriminate on the basis of race, color, national origin, age, disability, sex, sexual orientation, or gender identity.            Thank you!     Thank you for choosing [facility]  for your care. Our goal is always to provide you with excellent care. Hearing back from our patients is one way we can continue to improve our services. Please take a few minutes to complete the written survey that you may receive in the mail after your visit with us. Thank you!             Your Updated Medication List - Protect others around you: Learn how to safely use, store and throw away your medicines at www.disposemymeds.org.          This list is accurate as of 10/10/18 10:41 AM.  Always use your most recent med list.                   Brand Name Dispense Instructions for use Diagnosis    acetaminophen 500 MG tablet    TYLENOL     Take 500-1,000 mg by mouth every 6 hours as needed for mild pain        ADVAIR -21 MCG/ACT Inhaler   Generic drug:  fluticasone-salmeterol     36 g    INHALE 2 PUFFS INTO THE LUNGS TWICE DAILY    COPD exacerbation (H)       * albuterol 108 (90 Base) MCG/ACT inhaler    VENTOLIN HFA    54 g    INHALE 2 PUFFS INTO THE LUNGS EVERY 4 HOURS AS NEEDED FOR SHORTNESS OF BREATH OR DIFFICULT BREATHING OR WHEEZING    Other emphysema (H)       * albuterol (2.5 MG/3ML) 0.083% neb solution     25 vial    Take 1 vial (2.5 mg) by nebulization every 6 hours as needed for shortness of breath / dyspnea or wheezing    COPD exacerbation (H)       apixaban ANTICOAGULANT 5 MG tablet    ELIQUIS    60 tablet    Take 1 tablet (5 mg) by mouth 2 times daily    Persistent atrial fibrillation (H)        atorvastatin 10 MG tablet    LIPITOR    90 tablet    Take 1 tablet (10 mg) by mouth At Bedtime    Coronary artery disease involving native coronary artery of native heart without angina pectoris, Mixed hyperlipidemia       CALTRATE 600+D3 SOFT PO      Take 600 mg by mouth 2 times daily        cloNIDine 0.1 MG tablet    CATAPRES    60 tablet    Take 2 tablets (0.2 mg) by mouth At Bedtime    Essential hypertension       diltiazem 240 MG 24 hr capsule    CARTIA XT    90 capsule    Take 1 capsule (240 mg) by mouth daily    Atrial fibrillation, persistent (H), Essential hypertension       diphenhydrAMINE 25 MG tablet    BENADRYL    60 tablet    Take 1-2 tablets (25-50 mg) by mouth every 6 hours as needed for itching or allergies        ipratropium - albuterol 0.5 mg/2.5 mg/3 mL 0.5-2.5 (3) MG/3ML neb solution    DUONEB    1620 mL    USE 1 VIAL PER NEBULIZER FOUR TIMES DAILY    COPD exacerbation (H)       levothyroxine 100 MCG tablet    SYNTHROID/LEVOTHROID    90 tablet    TAKE 1 TABLET(100 MCG) BY MOUTH DAILY    Hypothyroidism, postablative       LORazepam 1 MG tablet    ATIVAN     Take 0.5-1 tablets by mouth every 6 hours as needed Reported on 5/23/2017        losartan 50 MG tablet    COZAAR    60 tablet    Take 1 tablet (50 mg) by mouth 2 times daily    Essential hypertension       other medical supplies     1 each    Apply one pair to help with edema    Edema, Atrial fibrillation, persistent (H)       potassium chloride SA 20 MEQ CR tablet    K-DUR/KLOR-CON M    180 tablet    Take 1 tablet (20 mEq) by mouth 2 times daily    Hypokalemia       torsemide 20 MG tablet    DEMADEX    120 tablet    Take 2 tablets (40 mg) by mouth daily    Essential hypertension, benign       * Notice:  This list has 2 medication(s) that are the same as other medications prescribed for you. Read the directions carefully, and ask your doctor or other care provider to review them with you.

## 2018-10-11 ENCOUNTER — HOSPITAL ENCOUNTER (OUTPATIENT)
Dept: CT IMAGING | Facility: HOSPITAL | Age: 68
End: 2018-10-11
Attending: INTERNAL MEDICINE
Payer: MEDICARE

## 2018-10-11 ENCOUNTER — HOSPITAL ENCOUNTER (OUTPATIENT)
Dept: CARDIOLOGY | Facility: HOSPITAL | Age: 68
Discharge: HOME | End: 2018-10-11
Attending: INTERNAL MEDICINE | Admitting: INTERNAL MEDICINE
Payer: MEDICARE

## 2018-10-11 DIAGNOSIS — I77.810 ASCENDING AORTA DILATATION (H): ICD-10-CM

## 2018-10-11 DIAGNOSIS — Q23.81 BICUSPID AORTIC VALVE: ICD-10-CM

## 2018-10-11 PROCEDURE — 93306 TTE W/DOPPLER COMPLETE: CPT | Mod: 26 | Performed by: INTERNAL MEDICINE

## 2018-10-11 PROCEDURE — 93306 TTE W/DOPPLER COMPLETE: CPT | Mod: TC

## 2018-10-11 PROCEDURE — 25000128 H RX IP 250 OP 636: Performed by: RADIOLOGY

## 2018-10-11 PROCEDURE — 71275 CT ANGIOGRAPHY CHEST: CPT | Mod: TC

## 2018-10-11 RX ORDER — IOPAMIDOL 755 MG/ML
75 INJECTION, SOLUTION INTRAVASCULAR ONCE
Status: COMPLETED | OUTPATIENT
Start: 2018-10-11 | End: 2018-10-11

## 2018-10-11 RX ORDER — TORSEMIDE 20 MG/1
TABLET ORAL
Qty: 180 TABLET | Refills: 0 | OUTPATIENT
Start: 2018-10-11

## 2018-10-11 RX ADMIN — IOPAMIDOL 75 ML: 755 INJECTION, SOLUTION INTRAVENOUS at 08:44

## 2018-10-17 ENCOUNTER — TELEPHONE (OUTPATIENT)
Dept: FAMILY MEDICINE | Facility: OTHER | Age: 68
End: 2018-10-17

## 2018-10-17 NOTE — TELEPHONE ENCOUNTER
I spoke with Alona from [facility] and they need the last note addended to say that pt is currently on O2 and would benefit from 2.5 liter continuous flow via nasal cannula and to continue O2 therapy.  Pt is mobile and requires portable O2.  Pt was notified that she will need a new face to face appt, she has an appt on 10/29.  We will update office notes and fax note to [phone].  Pt states she may call [facility] to see about switching O2 there.

## 2018-10-17 NOTE — PROGRESS NOTES
SUBJECTIVE:                                                    Mary Alice Cameron is a 68 year old female who presents to clinic today for the following health issues:      Face to face-oxygen re-cert      Duration: n/a    Description (location/character/radiation): oxygen re-cert    Intensity:  mild    Accompanying signs and symptoms: SOB    History (similar episodes/previous evaluation): None    Precipitating or alleviating factors: none    Therapies tried and outcome: oxygen 2.5 liters     Patient is currently on 2.5 liters oxygen via nasal cannula and is benefiting from it, continue oxygen therapy.  Pt is mobile and needs portable oxygen.       PROBLEMS TO ADD ON...    Problem list and histories reviewed & adjusted, as indicated.  Additional history: due for other maintenance as well.  Reviewed and discussed.  See below.  Feeling well and doing well currently.  Breathing is ok right now.     Patient Active Problem List   Diagnosis     Paroxysmal atrial fibrillation (H)     Pulmonary emphysema (H)     Bicuspid aortic valve     Mild major depression (H)     Acute bronchitis     Hypothyroidism, postablative     Advance care planning     Essential hypertension     Long-term (current) use of anticoagulants [Z79.01]     Acute on chronic respiratory failure (H)     Health Care Home     Status post coronary angiogram     Coronary artery disease involving native coronary artery of native heart without angina pectoris     Past Surgical History:   Procedure Laterality Date     BACK SURGERY  2006     CARDIAC SURGERY  06/12/2018    [facility]giogram at [facility]     COLONOSCOPY N/A 1/19/2016    Procedure: COLONOSCOPY;  Surgeon: Waldemar Bob MD;  Location: [facility]     HYSTERECTOMY  1980    partial     SLING BLADDER SUSPENSION WITH FASCIA LINNETTE         Social History   Substance Use Topics     Smoking status: Former Smoker     Packs/day: 0.50     Years: 41.00     Types: Cigarettes     Quit date: 1/28/2007     Smokeless  tobacco: Never Used     Alcohol use No     Family History   Problem Relation Age of Onset     Prostate Cancer Father      Hypertension Father      Heart Failure Father      CHF     Asthma Mother      Cancer Mother      ovarian     Hypertension Mother      Asthma Brother      Hypertension Sister      Hypertension Brother          Current Outpatient Prescriptions   Medication Sig Dispense Refill     acetaminophen (TYLENOL) 500 MG tablet Take 500-1,000 mg by mouth every 6 hours as needed for mild pain       ADVAIR -21 MCG/ACT Inhaler INHALE 2 PUFFS INTO THE LUNGS TWICE DAILY 36 g 2     albuterol (2.5 MG/3ML) 0.083% neb solution Take 1 vial (2.5 mg) by nebulization every 6 hours as needed for shortness of breath / dyspnea or wheezing 25 vial 1     albuterol (VENTOLIN HFA) 108 (90 BASE) MCG/ACT Inhaler INHALE 2 PUFFS INTO THE LUNGS EVERY 4 HOURS AS NEEDED FOR SHORTNESS OF BREATH OR DIFFICULT BREATHING OR WHEEZING 54 g 1     apixaban ANTICOAGULANT (ELIQUIS) 5 MG tablet Take 1 tablet (5 mg) by mouth 2 times daily 60 tablet 3     atorvastatin (LIPITOR) 10 MG tablet Take 1 tablet (10 mg) by mouth At Bedtime 90 tablet 3     Calcium Carb-Cholecalciferol (CALTRATE 600+D3 SOFT PO) Take 600 mg by mouth 2 times daily       cloNIDine (CATAPRES) 0.1 MG tablet Take 2 tablets (0.2 mg) by mouth At Bedtime 60 tablet 3     diltiazem 240 MG 24 hr capsule Take 1 capsule (240 mg) by mouth daily 90 capsule 3     diphenhydrAMINE (BENADRYL) 25 MG tablet Take 1-2 tablets (25-50 mg) by mouth every 6 hours as needed for itching or allergies 60 tablet 1     ipratropium - albuterol 0.5 mg/2.5 mg/3 mL (DUONEB) 0.5-2.5 (3) MG/3ML neb solution USE 1 VIAL PER NEBULIZER FOUR TIMES DAILY 1620 mL 1     levothyroxine (SYNTHROID/LEVOTHROID) 100 MCG tablet TAKE 1 TABLET(100 MCG) BY MOUTH DAILY 90 tablet 3     losartan (COZAAR) 50 MG tablet Take 1 tablet (50 mg) by mouth 2 times daily 60 tablet 3     order for DME Equipment being ordered: Oxygen 2.5  "liters via nasal canula      FAX TO [facility] 2 Units 11     OTHER MEDICAL SUPPLIES Apply one pair to help with edema 1 each 0     potassium chloride SA (K-DUR/KLOR-CON M) 20 MEQ CR tablet Take 1 tablet (20 mEq) by mouth 2 times daily 180 tablet 2     torsemide (DEMADEX) 20 MG tablet Take 2 tablets (40 mg) by mouth daily 120 tablet 0     Allergies   Allergen Reactions     Amlodipine Besylate Cough     Norvasc     Lisinopril Cough       ROS:  Constitutional, HEENT, cardiovascular, pulmonary, gi and gu systems are negative, except as otherwise noted.    OBJECTIVE:                                                    /86  Pulse 71  Ht 5' 5\" (1.651 m)  Wt 164 lb (74.4 kg)  SpO2 (!) 88%  BMI 27.29 kg/m2  Body mass index is 27.29 kg/(m^2).  GENERAL APPEARANCE: Alert, no acute distress  CV: regular rate and rhythm, no murmur, rub or gallop  RESP: lungs clear to auscultation bilaterally  ABDOMEN: normal bowel sounds, soft, nontender, no hepatosplenomegaly or other masses  SKIN: no suspicious lesions or rashes to visualized skin  NEURO: Alert, oriented x 3, speech and mentation normal      Labs pending.      ASSESSMENT/PLAN:                                                    1. Hypothyroidism, postablative  Reviewed.  Stable.  Update and follow.   - TSH with free T4 reflex    2. Essential hypertension  Stable.  No change.  Update labs, even though non fasting.    - Comprehensive metabolic panel (BMP + Alb, Alk Phos, ALT, AST, Total. Bili, TP)  - Lipid Profile (Chol, Trig, HDL, LDL calc)    3. Chronic obstructive pulmonary disease, unspecified COPD type (H)  Due for oxygen recert.  Done today.  F/u routine.  Reviewed.    - order for DME; Equipment being ordered: Oxygen 2.5 liters via nasal canula      FAX TO [facility]  Dispense: 2 Units; Refill: 11          Braydon Yen MD  [facility]      "

## 2018-10-17 NOTE — TELEPHONE ENCOUNTER
12:38 PM    Reason for Call: Phone Call    Description: Alona from [facility] called and states that she is trying to get her oxygen through them and Alona would like to know if there is anyway she can get her office notes addended for this would like a call back regarding this.     Was an appointment offered for this call? No  If yes : Appointment type              Date    Preferred method for responding to this message: Telephone Call  What is your phone number ?[phone]    If we cannot reach you directly, may we leave a detailed response at the number you provided? Yes    Can this message wait until your PCP/provider returns, if available today? No, pcp is out     Ade Wright

## 2018-10-23 ENCOUNTER — OFFICE VISIT (OUTPATIENT)
Dept: CARDIOLOGY | Facility: OTHER | Age: 68
End: 2018-10-23
Attending: INTERNAL MEDICINE
Payer: COMMERCIAL

## 2018-10-23 VITALS
WEIGHT: 165 LBS | OXYGEN SATURATION: 97 % | BODY MASS INDEX: 27.49 KG/M2 | RESPIRATION RATE: 20 BRPM | DIASTOLIC BLOOD PRESSURE: 75 MMHG | SYSTOLIC BLOOD PRESSURE: 136 MMHG | HEIGHT: 65 IN | HEART RATE: 79 BPM

## 2018-10-23 DIAGNOSIS — Q23.81 BICUSPID AORTIC VALVE: ICD-10-CM

## 2018-10-23 DIAGNOSIS — I48.0 PAROXYSMAL ATRIAL FIBRILLATION (H): ICD-10-CM

## 2018-10-23 DIAGNOSIS — I10 ESSENTIAL HYPERTENSION: Primary | ICD-10-CM

## 2018-10-23 PROCEDURE — 99214 OFFICE O/P EST MOD 30 MIN: CPT | Performed by: INTERNAL MEDICINE

## 2018-10-23 PROCEDURE — G0463 HOSPITAL OUTPT CLINIC VISIT: HCPCS

## 2018-10-23 ASSESSMENT — PAIN SCALES - GENERAL: PAINLEVEL: NO PAIN (0)

## 2018-10-23 NOTE — PROGRESS NOTES
"Chief Complaint: atrial fibrillation, bicuspid valve    HPI: I was happy to see Mrs. Cameron for the above. She has seen several cardiologists over the past year for these problems. Her  was ill and they moved to be closer to his daughters. He  in October and she has settled in [place]. Her atrial fibrillation began in the past year. In reviewing the old records both  and  are reported as when the atrial fibrillation began and she is no longer sure. This has been associated with shortness of breath but as was discussed with her by one of the cardiologist she has multiple reasons for shortness of breath including COPD. She is finishing treatment for a COPD exacerbation at this time. She reports the plan had been to start her on warfarin and then cardiovert her. With all the chaos in her life the past few months this never happened. She was found to have hyperthyroidism in August and was treated with Iodine ablation. She also has a bicuspid aortic valve and mild aortic root dilation (per report of echocardiogram in old records). The echocardiogram did not report significant aortic stenosis or insufficiency and her systolic function was normal making it less likely that her shortness of breath was related to her valvular heart disease. She was told she would need periodic f/u of her valve and aorta. She reports two angiograms done at [facility] and that they were \"okay\". I do not see copies of those reports in the old records. They will be requested.    She underwent DC cardioversion on 2014. She reports feeling better afterward. She feels like she needs to use her inhaler less often and has less shortness of breath. However, the ECG today shows she is back in atrial fibrillation.    I have reviewed the records sent from [facility]. There is an echocardiogram report from  and records regarding back surgery. I see no cath results.    A repeat cardioversion in July failed to restore sinus " "rhythm. A stress study in April (see below) showed no ischemia or infarction.    [id number]:  She had recurrent atrial fibrillation was started on flecainide and scheduled for cardioversion. When she arrived for the cardioversion she was in sinus.     A CT aortogram showed atherosclerosis but not dilation of the descending aoota.    [id number]:    She was admitted in March 2015 with a COPD exacerbation. Her breathing is at baseline with no fever, sputum or wheezing.    She has not noted any atrial fibrillation, no palpitations, chest pain/discomfort, unusual dyspnea on exertion, bleeding or neurologic symptoms.    [id number]: Her follow-up echocardiogram (see lab section) showed no change in aortic valve function. She report rare \"flutters\" but no sustained palpitations like with her atrial fibrillation. She reports no significant bleeding episodes on warfarin. She denies any neurologic symptoms suggestive of CVA or TIA. Edema improved when she switched her diltiazem to bedtime.    Her primary health problem has been her COPD. She uses oxygen at night. She has had had any recent symptoms of upper or lower respiratory infection.    [id number]: She had two admissions this past winter for COPD exacerbations. Overall, she feels her dyspnea on exertion is slowly getting worse. She has not had exertional chest pain/discomfort.     She reports no increased edema, orthopnea or paroxysmal nocturnal dyspnea.    [id number]: echocardiogram shows a decline in systolic function.  She reports that she has felt more fatigued and has had more dyspnea on exertion since I last saw her.  She also reports she has had problems with her lungs over the winter.  She is been on a prolonged course of prednisone.  She has not had chest pain or chest discomfort.  She has noted episodes of atrial fibrillation, or palpitations that she thinks is atrial fibrillation.  These episodes were relatively infrequent and do not last very long.  They are not " associated with associated symptoms and she has not found them to be overly troublesome.  She has had no prolonged episodes where she felt the need to consider seeking medical treatment.    September 25, 2018: her angiogram showed no obstructive coronary artery disease. She reports two episodes of pain between her shoulders, lasting 10 minutes.     No palpitations. Flecainide stopped due to drop in systolic function.     Has had a few problems with her COPD.     October 23, 2018 Interval history: Ms. Cameron reports that she has been feeling somewhat better since switching to the torsemide.  Her swelling is better.  She continues to work or cleaning jobs.  She reports no significant episodes of atrial fibrillation.  She has occasional palpitations but nothing that has been very troublesome.  Her echocardiogram shows an improvement in her ejection fraction.  Her CT aortogram showed evidence of atherosclerosis but no dissections or aneurysms.      Current Outpatient Prescriptions   Medication Sig Dispense Refill     acetaminophen (TYLENOL) 500 MG tablet Take 500-1,000 mg by mouth every 6 hours as needed for mild pain       ADVAIR -21 MCG/ACT Inhaler INHALE 2 PUFFS INTO THE LUNGS TWICE DAILY 36 g 2     albuterol (2.5 MG/3ML) 0.083% neb solution Take 1 vial (2.5 mg) by nebulization every 6 hours as needed for shortness of breath / dyspnea or wheezing 25 vial 1     albuterol (VENTOLIN HFA) 108 (90 BASE) MCG/ACT Inhaler INHALE 2 PUFFS INTO THE LUNGS EVERY 4 HOURS AS NEEDED FOR SHORTNESS OF BREATH OR DIFFICULT BREATHING OR WHEEZING 54 g 1     apixaban ANTICOAGULANT (ELIQUIS) 5 MG tablet Take 1 tablet (5 mg) by mouth 2 times daily 60 tablet 3     atorvastatin (LIPITOR) 10 MG tablet Take 1 tablet (10 mg) by mouth At Bedtime 90 tablet 3     Calcium Carb-Cholecalciferol (CALTRATE 600+D3 SOFT PO) Take 600 mg by mouth 2 times daily       cloNIDine (CATAPRES) 0.1 MG tablet Take 2 tablets (0.2 mg) by mouth At Bedtime 60  tablet 3     diltiazem 240 MG 24 hr capsule Take 1 capsule (240 mg) by mouth daily 90 capsule 3     diphenhydrAMINE (BENADRYL) 25 MG tablet Take 1-2 tablets (25-50 mg) by mouth every 6 hours as needed for itching or allergies 60 tablet 1     ipratropium - albuterol 0.5 mg/2.5 mg/3 mL (DUONEB) 0.5-2.5 (3) MG/3ML neb solution USE 1 VIAL PER NEBULIZER FOUR TIMES DAILY 1620 mL 1     levothyroxine (SYNTHROID/LEVOTHROID) 100 MCG tablet TAKE 1 TABLET(100 MCG) BY MOUTH DAILY 90 tablet 3     losartan (COZAAR) 50 MG tablet Take 1 tablet (50 mg) by mouth 2 times daily 60 tablet 3     OTHER MEDICAL SUPPLIES Apply one pair to help with edema 1 each 0     potassium chloride SA (K-DUR/KLOR-CON M) 20 MEQ CR tablet Take 1 tablet (20 mEq) by mouth 2 times daily 180 tablet 2     torsemide (DEMADEX) 20 MG tablet Take 2 tablets (40 mg) by mouth daily 120 tablet 0       Past Medical History:   Diagnosis Date     Asthma      Atrial fibrillation (H)      COPD (chronic obstructive pulmonary disease) (H)      Depression      High cholesterol      HTN (hypertension)      Thyroid disease        Past Surgical History:   Procedure Laterality Date     BACK SURGERY  2006     CARDIAC SURGERY  06/12/2018    Angiogram at [facility]     COLONOSCOPY N/A 1/19/2016    Procedure: COLONOSCOPY;  Surgeon: Waldemar Bob MD;  Location: [facility]     HYSTERECTOMY  1980    partial     SLING BLADDER SUSPENSION WITH FASCIA LINNETTE         Family History   Problem Relation Age of Onset     Prostate Cancer Father      Hypertension Father      Heart Failure Father      CHF     Asthma Mother      Cancer Mother      ovarian     Hypertension Mother      Asthma Brother      Hypertension Sister      Hypertension Brother        Social History   Substance Use Topics     Smoking status: Former Smoker     Packs/day: 0.50     Years: 41.00     Types: Cigarettes     Quit date: 1/28/2007     Smokeless tobacco: Never Used     Alcohol use No       Allergies   Allergen  "Reactions     Amlodipine Besylate Cough     Norvasc     Lisinopril Cough       ROS: ten system review negative except as noted above. 2018/woa    Physical Examination:  /75 (BP Location: Left arm, Patient Position: Chair, Cuff Size: Adult Regular)  Pulse 79  Resp 20  Ht 1.651 m (5' 5\")  Wt 74.8 kg (165 lb)  SpO2 97%  BMI 27.46 kg/m2  GENERAL APPEARANCE: healthy, alert and no distress  HEENT: no icterus, no xanthelasmas  NECK:  JVP is not visible, left CEA scar  CHEST:  no rales or rhonchi, breath sounds are diminished throughout, expiratory phase is prolonged, no wheezing  CARDIOVASCULAR: irregular rhythm, S1S2 split, no S3 or S4 and no murmur, click or rub, precordium quiet  ABDOMEN: soft, non tender, bowel sounds normal, no abdominal bruits  EXTREMITIES: some edema, but also adipose    Laboratory and diagnostic data reviewed 2018:    [facility],[place]  Echocardiography Laboratory  [address] [place] [address]     Name: LANCE VALLEJO  MRN: [id number]  : 1950  Study Date: 10/11/2018 08:57 AM  Age: 68 yrs  Gender: Female  Patient Location: [facility]  Reason For Study: , Bicuspid aortic valve  History: Bicuspid Aortic Valve  Ordering Physician: TODD JAMES  Referring Physician: TODD JAMES  Performed By: Genevieve Contreras     BSA: 1.8 m2  Height: 65 in  Weight: 160 lb  _____________________________________________________________________________  __     _____________________________________________________________________________  __        Interpretation Summary  No pericardial effusion is present.  Mild left ventricular dilation is present.  The Ejection Fraction is estimated at 45-50%.  The right ventricle is normal size.  Global right ventricular function is normal.  Mild left atrial enlargement is present.  Mild mitral insufficiency is present.  Cannot exclude a bicuspid aortic valve.  Mild aortic valve sclerosis is " present.  Mild aortic insufficiency is present.  The peak aortic velocity is 2.28 m/sec.  The mean gradient across the aortic valve is11.8 mmHg.  Mild tricuspid insufficiency is present.  Right ventricular systolic pressure is 32.4mmHg above the right atrial  pressure.  The pulmonic valve is normal.  The aorta root is normal.  _____________________________________________________________________________  __        Left Ventricle  Mild left ventricular dilation is present. The Ejection Fraction is estimated  at 45-50%.     Right Ventricle  The right ventricle is normal size. Global right ventricular function is  normal.     Atria  Mild left atrial enlargement is present.        Mitral Valve  Mild mitral insufficiency is present.     Aortic Valve  Cannot exclude a bicuspid aortic valve. Mild aortic valve sclerosis is  present. Mild aortic insufficiency is present. The peak aortic velocity is  2.28 m/sec. The peak AoV pressure gradient is 20.7 mmHg. The mean AoV pressure  gradient is 11.8 mmHg. The mean gradient across the aortic valve is11.8 mmHg.     Tricuspid Valve  Mild tricuspid insufficiency is present. Right ventricular systolic pressure  is 32.4mmHg above the right atrial pressure.     Pulmonic Valve  The pulmonic valve is normal.     Vessels  The aorta root is normal.     Pericardium  No pericardial effusion is present.     _____________________________________________________________________________  __  MMode/2D Measurements & Calculations  IVSd: 1.0 cm  IVSs: 1.4 cm  LVIDd: 6.1 cm  LVIDs: 4.7 cm  LVPWd: 0.84 cm  LVPWs: 1.5 cm     FS: 23.7 %  LV mass(C)d: 235.8 grams  LV mass(C)dI: 131.1 grams/m2  LV mass(C)sI: 151.1 grams/m2  Ao root diam: 3.5 cm  LA dimension: 4.7 cm  LA/Ao: 1.4  LVOT diam: 2.2 cm  LVOT area: 3.8 cm2  RWT: 0.27     Time Measurements  Pulm. HR: 228.0 BPM  MM HR: 138.5 BPM     Doppler Measurements & Calculations  Ao V2 max: 227.8 cm/sec  Ao max P.7 mmHg  Ao V2 mean: 154.9 cm/sec  Ao  mean P.8 mmHg  Ao V2 VTI: 47.0 cm  MATIAS(I,D): 1.0 cm2  AMTIAS(V,D): 1.0 cm2  LV V1 max P.5 mmHg  LV V1 max: 61.2 cm/sec  LV V1 VTI: 13.0 cm  CO(LVOT): 9.6 l/min  CI(LVOT): 5.3 l/min/m2  SV(LVOT): 49.0 ml  SI(LVOT): 27.3 ml/m2  TV max P.5 mmHg  PA V2 max: 74.0 cm/sec  PA max P.2 mmHg  PA mean P.3 mmHg  PA V2 VTI: 13.8 cm     TR max parveen: 284.9 cm/sec  TR max P.5 mmHg  AV Parveen Ratio (DI): 0.27  MATIAS Index (cm2/m2): 0.58     _____________________________________________________________________________  __           Report approved by: Kieran Singer 10/11/2018 04:02 PM        CTA CHEST ABDOMEN WITH CONTRAST    HISTORY: 68 yearsFemale ; Bicuspid aortic valve; Ascending aorta  dilatation (H)    TECHNIQUE: Axial CT imaging of the chest abdomen and pelvis was  performed with intervenous contrast contrast. Coronal and sagittal  reconstructions were obtained.    COMPARISON: None    FINDINGS:    CT CHEST: The ace ascending thoracic aorta is ectatic measuring 4.4 cm  transversely and 4.3 cm in AP dimension. The descending thoracic aorta  is 2.5 x 2.2 cm respectively. There is calcified atherosclerotic  disease of the thoracic aorta without evidence of dissection. There is  calcification of the aortic valve.  There is no mediastinal or hilar or axillary lymphadenopathy.    There is mild linear atelectasis at both lung bases. There is  cardiomegaly.         No concerning osseous lesions are identified    IMPRESSION CHEST: There is ectasia of the ascending thoracic aorta  measuring 4.4 x 4.3 cm in diameter.    There is cardiomegaly.      CT ABDOMEN AND PELVIS: There is atherosclerotic disease of the  abdominal aorta without evidence of aneurysm.     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 The residual lumen measures 2 mm in diameter. There is  approximate 50% stenosis of the origin of the superior mesenteric  artery, residual vessel lumen measures 3.5 to 4 mm.    There  are 2 patent right renal arteries. There are 2 left renal  arteries with a dominant vessel originating slightly below the smaller  accessory vessel. There is moderate to severe stenosis of the dominant  left renal artery. There is moderate to severe stenosis of the origin  of the inferior mesenteric artery.    The visualized solid organs are unremarkable. No abnormally distended  loops of bowel are evident. The appendix is unremarkable.    IMPRESSION ABDOMEN AND PELVIS:     Atherosclerotic disease of the abdominal aorta without evidence of  aneurysm or dissection.    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 See description above.    MARINA BRICE MD    Previous Studies reviewed 2018:            [facility]  Echocardiography Laboratory  [address] [place] [id number]     Name: LANCE VALLEJO  MRN: [id number]  : 1950  Study Date: 2018 10:30 AM  Age: 67 yrs  Gender: Female  Patient Location: [facility]  Reason For Study: , Essential hypertension, benign, Atrial fibrillation,  persisten  History: bicuspid AV  Ordering Physician: TODD JAMES  Referring Physician: TODD JAMES  Performed By: Genevieve Contreras     BSA: 1.8 m2  Height: 64 in  Weight: 160 lb  _____________________________________________________________________________  __        Procedure  The patient's rhythm is atrial fibrillation.  _____________________________________________________________________________  __        Interpretation Summary  No pericardial effusion is present.  Borderline left ventricular dilation is present.  The Ejection Fraction is estimated at 40-45%.  Grade I or early diastolic dysfunction.  The right ventricle is normal size.  Global right ventricular function is normal.  Mild left atrial enlargement is present.  Mild right atrial enlargement is present.  Mild mitral insufficiency is  present.  Mild to moderate aortic valve sclerosis is present.  Cannot exclude a bicuspid aortic valve.  Mild to moderate aortic insufficiency is present.  The peak aortic velocity is 2.57 m/sec.  The mean gradient across the aortic valve is16.9 mmHg.  Peak gradient AoV 26.4 mmHg  The aortic valve area is .94 cm^2, by the continuity equation.  Mild to moderate tricuspid insufficiency is present.  The peak velocity of the tricuspid regurgitant jet is not obtainable.      ECHOCARDIOGRAM    M-mode, two-dimensional, color-flow, and Doppler studies were obtained  on this patient.  Standard views were utilized.    ORDERING PHYSICIAN:  Eliezer Myers MD    INDICATIONS:  Bicuspid aortic valve.    Cardiac Dimensions                                    Normal  Dimensions  Aortic Root:                         32.3 mm      Aortic Root  Diameter: 20-37 mm  Left Atrium:                          45 mm      Left Atrium: 19-40  mm  Right Ventricle:                     25.8 mm      Right Ventricle:  7-23 mm  Left Ventricle end-diastole:   54.8 mm      Left Ventricle  end-diastole: 35-56 mm  Left Ventricle end-systole:    32.8 mm      Left Ventricle  end-systole: 35-56 mm  IVS end-diastole:                 8.8 mm      IVS end-diastole: 7-11  mm  LVPW:                                6.6 mm      LVPW: 7-11 mm    Review of the study shows there is no pericardial effusion.  The left  ventricle is normal size and systolic function.  Ejection fraction at  70%.  The left atrium is borderline enlarged, volume is 70 mL, indexed  it was 37 mL/meter2.  The right ventricle is normal on 2-D study.  The  mitral valve has a mild amount of mitral regurgitation.  The aortic  valve appears to be bicuspid in nature.  Peak and mean gradients are  25 and 13 respectively with a peak velocity of 2.52 meter/second,  signifying just very slight stenosis.  There is mild-to-moderate  aortic insufficiency.  Also noted is some slight tricuspid  regurgitation.   Unable to estimate the right ventricular systolic  pressure.      ASSESSMENT:  Echocardiographic study revealing  1.  Normal left ventricular size and systolic function.  Ejection  fraction at 70%.  2.  Borderline left atrial enlargement.  3.  Normal right ventricular size and function.  4.  Slight mitral regurgitation.  5.  Aortic valve is likely bicuspid.  Mild stenosis.  Peak and mean  gradients are 25 and 13 with mild-to-moderate aortic insufficiency.  This is a slight increase in terms of insufficiency compared to echo  from February 2014.  6.  Slight tricuspid regurgitation.  Unable to estimate the right  ventricular systolic pressure.  Exam Date: May 12, 2016 10:33:00 AM  Author: LITO CLEVELAND    LEXISCAN CARDIOLITE STUDY-INTERNAL MEDICINE DICTATION  DICTATING PHYSICIAN: Lito Cleveland MD  INDICATIONS: A 63-year-old female referred by Dr. Myers and Dr. Yen because of chest pain and atrial fibrillation.  FINDINGS: The patient was placed upon the table using our protocol,  given 5 mL/0.4 mg of Lexiscan rapidly over 15 seconds followed by  saline flush. She was then given the Sestamibi at 45 seconds into  the study. She was monitored continuously throughout the study.  Resting heart rate was 85 with blood pressure 130/80. Heart rate gurpreet  to 113 and blood pressure dropped to 110/70. She did have some  transient shortness of breath. Review of her electrocardiogram shows  she has atrial fibrillation throughout the study, but no acute changes  were noted.  ASSESSMENT: Lexiscan Sestamibi study. On the Lexiscan portion the  patient did have appropriate heart rate and blood pressure response,  transient symptoms. No acute electrocardiogram changes or  arrhythmias. The Cardiolite scan is pending.  CARDIOLITE IV LEXISCAN AND GATED SPECT-RADIOLOGIST DICTATION  DICTATING PHYSICIAN: Rufus Newberry MD  HISTORY: A 63-year-old female with chest pain.  LEXISCAN DOSE: 5 mL (0.4 mg regadenoson) by rapid  intravenous  injection.  SESTAMIBI DOSE: 10 mCi 99mTc, 1 mL MIBI Injection Time: 0550  SESTAMIBI DOSE: 30 mCi 99mTc, 1 mL MIBI Injection Time: 0749  ANGIOCATH SIZE: 22 INJECTION SITE: Right AC INITIALS: JR  IV DISCONTINUE TIME: 0753 SITE CONDITION: No infiltrate TIP INTACT:  Yes    The patient is a 63-year-old female who was evaluated with  Sestamibi/Lexiscan testing. 5 mL Lexiscan (0.4 mg regadenoson) was  administered by rapid intravenous injection; followed immediately by  saline flush and radiopharmaceutical. The patient's resting heart  rate was 85 and blood pressure was 130/80. The maximum response in  heart rate and blood pressure after Lexiscan injection was 113 and  110/70.  Symptoms during the procedure included: Transient shortness of  breath.  If symptoms were present, did they resolve post-test? Yes.  Electrocardiogram monitoring demonstrates: Atrial fibrillation  throughout study. No acute electrocardiogram changes.  Arrhythmias: None, other than atrial fibrillation.  Summary: Appropriate heart rate and blood pressure response.  Transient shortness of breath. No acute electrocardiogram changes.  VIEWS OBTAINED: Short axis, horizontal long axis, vertical long axis  at rest and stress.  FINDINGS: The left ventricular ejection fraction is 56%. No fixed  or reversible perfusion defects are present.  IMPRESSION: No evidence of cardiac ischemia.    Echocardiogram:  ASSESSMENT: Echocardiographic study revealing  1. Normal left ventricle size and systolic function. Ejection  fraction at 55%.  2. Ascending aorta appears to be normal on current measurements.  3. Left atrial enlargement.  4. Normal right ventricle size and function.  5. Mild mitral regurgitation.  6. Aortic valve appears to probably be bicuspid without any  significant stenosis. There is a slight amount of aortic  insufficiency.  7. Slight tricuspid regurgitation. Peak systolic pressure right  ventricle is estimated at 14 plus right  atrium.    Exam Date: Feb 14, 2014 11:18:00 AM  Author: SOLOMON MITCHELL    Old records show a TSH of 1.87 in 11/13. Labs from 10/13 show a normal K, creat and estimated GFR.    Assessment and recommendations:    1) Persistent atrial fibrillation (recurrent) - off flecainide    -Continue to monitor clinically, if she has increased frequency of the episodes or increased symptoms related to the episodes would consider another antiarrhythmic drug.    2) Bicuspid aortic valve - previous echocardiogram does not show significant aortic stenosis or insufficiency. CT aortogram shows no dilation of descending aorta.    -Valve function is stable    3) Peripheral edema -improved    4) Hypertension - controlled    5) Systolic dysfunction - no coronary artery disease found    - echocardiogram for follow-up of EF shows some improvement in her ejection fraction    6) New pain between shoulders - mild aortic dilation noted on prior CT     -CT scan showed no evidence of dissection or aneurysm    I appreciate the chance to help with Mrs. Cameron's care. Please let me know if you have any questions or concerns. I will see her back in one year.    Portions of this note were dictated using speech recognition software. The note has been proofread but errors in the text may have been overlooked. Please contact me if there are any concerns regarding the accuracy of the dictation.     Eliezer Myers M.D.

## 2018-10-23 NOTE — NURSING NOTE
"Chief Complaint   Patient presents with     RECHECK     1 month cardiology follow-up and test results       Initial /75 (BP Location: Left arm, Patient Position: Chair, Cuff Size: Adult Regular)  Pulse 79  Resp 20  Ht 1.651 m (5' 5\")  Wt 74.8 kg (165 lb)  SpO2 97%  BMI 27.46 kg/m2 Estimated body mass index is 27.46 kg/(m^2) as calculated from the following:    Height as of this encounter: 1.651 m (5' 5\").    Weight as of this encounter: 74.8 kg (165 lb).  Medication Reconciliation: complete    Fe Stephenson LPN    "

## 2018-10-23 NOTE — MR AVS SNAPSHOT
After Visit Summary   10/23/2018    Mary Alice Cameron    MRN: [id number]           Patient Information     Date Of Birth          1950        Visit Information        Provider Department      10/23/2018 10:00 AM Eliezer Myers MD [facility] [place]        Today's Diagnoses     Essential hypertension    -  1      Care Instructions    You were seen by Dr. Myers, 10/23/201.     1.  You will have lab 10/23/2018 you will be called with results when they become available.      2.  If you develop new or worsening symptoms please call the cardiology office as you may need to be seen sooner.     3.  Please continue all medications as they have been prescribed.       You will follow up with Dr. Myers in 6  months.       Please call the cardiology office with problems, questions, or concerns at [phone].    If you experience chest pain, chest pressure, chest tightness, shortness of breath, fainting, lightheadedness, nausea, vomiting, or other concerning symptoms, please report to the Emergency Department or call 911. These symptoms may be emergent, and best treated in the Emergency Department.       Giana NAVARRO RN-BSN  Cardiology   [facility]  [phone]              Follow-ups after your visit        Your next 10 appointments already scheduled     Oct 29, 2018  3:30 PM CDT   (Arrive by 3:15 PM)   [facility]don [facility] MD   [facility]    [address]  [facility] [address]   [phone]            Apr 23, 2019 10:00 AM CDT   (Arrive by 9:45 AM)   Return Visit with Eliezer Myers MD   [facility] [place] ([facility] )    [address] [place] [address]   [phone]              Who to contact     If you have questions or need follow up information about today's clinic visit or your schedule please contact [facility] [place] directly at  "[phone].  Normal or non-critical lab and imaging results will be communicated to you by MyChart, letter or phone within 4 business days after the clinic has received the results. If you do not hear from us within 7 days, please contact the clinic through [employer] or phone. If you have a critical or abnormal lab result, we will notify you by phone as soon as possible.  Submit refill requests through [facility] or call your pharmacy and they will forward the refill request to us. Please allow 3 business days for your refill to be completed.          Additional Information About Your Visit        [employer] Information     Clinicbook gives you secure access to your electronic health record. If you see a primary care provider, you can also send messages to your care team and make appointments. If you have questions, please call your primary care clinic.  If you do not have a primary care provider, please call [phone] and they will assist you.        Care EveryWhere ID     This is your Care EveryWhere ID. This could be used by other organizations to access your [place] medical records  [id number]        Your Vitals Were     Pulse Respirations Height Pulse Oximetry BMI (Body Mass Index)       79 20 1.651 m (5' 5\") 97% 27.46 kg/m2        Blood Pressure from Last 3 Encounters:   10/23/18 136/75   09/25/18 128/83   08/16/18 137/63    Weight from Last 3 Encounters:   10/23/18 74.8 kg (165 lb)   09/25/18 72.6 kg (160 lb)   08/16/18 73.9 kg (163 lb)              We Performed the Following     Basic metabolic panel        Primary Care Provider Office Phone # Fax #    Braydon Yen [phone]       [address] [place] [address]        Goals        General    Maintain current health status     Notes - Note created  5/22/2018 11:35 AM by Pipkin, Lauren N, RN    Maintain current health status and functional ability despite chronic health alterations          Equal Access to Services     CHARLES FISHER " AH: Trini biggs Osminali, waaxda luqadaha, qaybta kaalmisha mendoza, sourav félix geotroy lewisamaramaritza holder. So [facility] [phone].    ATENCIÓN: Si habla español, tiene a lindsey disposición servicios gratuitos de asistencia lingüística. Llame al [phone].    We comply with applicable federal civil rights laws and [place] laws. We do not discriminate on the basis of race, color, national origin, age, disability, sex, sexual orientation, or gender identity.            Thank you!     Thank you for choosing [facility]  for your care. Our goal is always to provide you with excellent care. Hearing back from our patients is one way we can continue to improve our services. Please take a few minutes to complete the written survey that you may receive in the mail after your visit with us. Thank you!             Your Updated Medication List - Protect others around you: Learn how to safely use, store and throw away your medicines at www.disposemymeds.org.          This list is accurate as of 10/23/18 10:09 AM.  Always use your most recent med list.                   Brand Name Dispense Instructions for use Diagnosis    acetaminophen 500 MG tablet    TYLENOL     Take 500-1,000 mg by mouth every 6 hours as needed for mild pain        ADVAIR -21 MCG/ACT Inhaler   Generic drug:  fluticasone-salmeterol     36 g    INHALE 2 PUFFS INTO THE LUNGS TWICE DAILY    COPD exacerbation (H)       * albuterol 108 (90 Base) MCG/ACT inhaler    VENTOLIN HFA    54 g    INHALE 2 PUFFS INTO THE LUNGS EVERY 4 HOURS AS NEEDED FOR SHORTNESS OF BREATH OR DIFFICULT BREATHING OR WHEEZING    Other emphysema (H)       * albuterol (2.5 MG/3ML) 0.083% neb solution     25 vial    Take 1 vial (2.5 mg) by nebulization every 6 hours as needed for shortness of breath / dyspnea or wheezing    COPD exacerbation (H)       apixaban ANTICOAGULANT 5 MG tablet    ELIQUIS    60 tablet    Take 1 tablet (5 mg) by mouth 2 times  daily    Persistent atrial fibrillation (H)       atorvastatin 10 MG tablet    LIPITOR    90 tablet    Take 1 tablet (10 mg) by mouth At Bedtime    Coronary artery disease involving native coronary artery of native heart without angina pectoris, Mixed hyperlipidemia       CALTRATE 600+D3 SOFT PO      Take 600 mg by mouth 2 times daily        cloNIDine 0.1 MG tablet    CATAPRES    60 tablet    Take 2 tablets (0.2 mg) by mouth At Bedtime    Essential hypertension       diltiazem 240 MG 24 hr capsule    CARTIA XT    90 capsule    Take 1 capsule (240 mg) by mouth daily    Atrial fibrillation, persistent (H), Essential hypertension       diphenhydrAMINE 25 MG tablet    BENADRYL    60 tablet    Take 1-2 tablets (25-50 mg) by mouth every 6 hours as needed for itching or allergies        ipratropium - albuterol 0.5 mg/2.5 mg/3 mL 0.5-2.5 (3) MG/3ML neb solution    DUONEB    1620 mL    USE 1 VIAL PER NEBULIZER FOUR TIMES DAILY    COPD exacerbation (H)       levothyroxine 100 MCG tablet    SYNTHROID/LEVOTHROID    90 tablet    TAKE 1 TABLET(100 MCG) BY MOUTH DAILY    Hypothyroidism, postablative       losartan 50 MG tablet    COZAAR    60 tablet    Take 1 tablet (50 mg) by mouth 2 times daily    Essential hypertension       other medical supplies     1 each    Apply one pair to help with edema    Edema, Atrial fibrillation, persistent (H)       potassium chloride SA 20 MEQ CR tablet    K-DUR/KLOR-CON M    180 tablet    Take 1 tablet (20 mEq) by mouth 2 times daily    Hypokalemia       torsemide 20 MG tablet    DEMADEX    120 tablet    Take 2 tablets (40 mg) by mouth daily    Essential hypertension, benign       * Notice:  This list has 2 medication(s) that are the same as other medications prescribed for you. Read the directions carefully, and ask your doctor or other care provider to review them with you.

## 2018-10-23 NOTE — PATIENT INSTRUCTIONS
You were seen by Dr. Myers, 10/23/201.     1.  You will have lab 10/23/2018 you will be called with results when they become available.      2.  If you develop new or worsening symptoms please call the cardiology office as you may need to be seen sooner.     3.  Please continue all medications as they have been prescribed.       You will follow up with Dr. Myers in 6  months.       Please call the cardiology office with problems, questions, or concerns at [phone].    If you experience chest pain, chest pressure, chest tightness, shortness of breath, fainting, lightheadedness, nausea, vomiting, or other concerning symptoms, please report to the Emergency Department or call 911. These symptoms may be emergent, and best treated in the Emergency Department.       Giana NAVARRO RN-BSN  Cardiology   [facility]  [phone]

## 2018-10-29 ENCOUNTER — OFFICE VISIT (OUTPATIENT)
Dept: FAMILY MEDICINE | Facility: OTHER | Age: 68
End: 2018-10-29
Attending: FAMILY MEDICINE
Payer: COMMERCIAL

## 2018-10-29 VITALS
SYSTOLIC BLOOD PRESSURE: 120 MMHG | HEART RATE: 71 BPM | WEIGHT: 164 LBS | HEIGHT: 65 IN | BODY MASS INDEX: 27.32 KG/M2 | OXYGEN SATURATION: 88 % | DIASTOLIC BLOOD PRESSURE: 86 MMHG

## 2018-10-29 DIAGNOSIS — J44.9 CHRONIC OBSTRUCTIVE PULMONARY DISEASE, UNSPECIFIED COPD TYPE (H): Primary | ICD-10-CM

## 2018-10-29 DIAGNOSIS — E89.0 HYPOTHYROIDISM, POSTABLATIVE: ICD-10-CM

## 2018-10-29 DIAGNOSIS — I10 ESSENTIAL HYPERTENSION: ICD-10-CM

## 2018-10-29 PROCEDURE — G0463 HOSPITAL OUTPT CLINIC VISIT: HCPCS

## 2018-10-29 PROCEDURE — 84443 ASSAY THYROID STIM HORMONE: CPT | Mod: ZL | Performed by: FAMILY MEDICINE

## 2018-10-29 PROCEDURE — 99214 OFFICE O/P EST MOD 30 MIN: CPT | Performed by: FAMILY MEDICINE

## 2018-10-29 PROCEDURE — 80061 LIPID PANEL: CPT | Mod: ZL | Performed by: FAMILY MEDICINE

## 2018-10-29 PROCEDURE — 80053 COMPREHEN METABOLIC PANEL: CPT | Mod: ZL | Performed by: FAMILY MEDICINE

## 2018-10-29 PROCEDURE — 36415 COLL VENOUS BLD VENIPUNCTURE: CPT | Mod: ZL | Performed by: FAMILY MEDICINE

## 2018-10-29 ASSESSMENT — PATIENT HEALTH QUESTIONNAIRE - PHQ9
SUM OF ALL RESPONSES TO PHQ QUESTIONS 1-9: 0
5. POOR APPETITE OR OVEREATING: NOT AT ALL

## 2018-10-29 ASSESSMENT — ANXIETY QUESTIONNAIRES
5. BEING SO RESTLESS THAT IT IS HARD TO SIT STILL: NOT AT ALL
6. BECOMING EASILY ANNOYED OR IRRITABLE: NOT AT ALL
GAD7 TOTAL SCORE: 0
1. FEELING NERVOUS, ANXIOUS, OR ON EDGE: NOT AT ALL
3. WORRYING TOO MUCH ABOUT DIFFERENT THINGS: NOT AT ALL
7. FEELING AFRAID AS IF SOMETHING AWFUL MIGHT HAPPEN: NOT AT ALL
2. NOT BEING ABLE TO STOP OR CONTROL WORRYING: NOT AT ALL

## 2018-10-29 ASSESSMENT — PAIN SCALES - GENERAL: PAINLEVEL: NO PAIN (0)

## 2018-10-29 NOTE — NURSING NOTE
"Chief Complaint   Patient presents with     Clinic Care Coordination - Face To Face     oxygen       Initial /86  Pulse 71  Ht 5' 5\" (1.651 m)  Wt 164 lb (74.4 kg)  SpO2 (!) 88%  BMI 27.29 kg/m2 Estimated body mass index is 27.29 kg/(m^2) as calculated from the following:    Height as of this encounter: 5' 5\" (1.651 m).    Weight as of this encounter: 164 lb (74.4 kg).  Medication Reconciliation: complete    Joy Frankiln, LPN  "

## 2018-10-29 NOTE — LETTER
My COPD Action Plan     Name: Mary Alice Cameron    YOB: 1950   Date: 10/25/2018    My doctor: Braydon Yen MD   My clinic: [address] [address]  [phone]  My Controller Medicine: Serevent/Fluticasone (Advair)   Dose: 115-21 mcg     My Rescue Medicine: Albuterol (Proair/Ventolin/Proventil) inhaler   Dose: 2 puffs every 4-6 hours as needed.     My Flare Up Medicine: .   Dose:      My COPD Severity: Severe = FeV1 < 30%-49%      Use of Oxygen: 2.5 Liters continuously     Make sure you've had your pneumonia   vaccines.          GREEN ZONE       Doing well today      Usual level of activity and exercise    Usual amount of cough and mucus    No shortness of breath    Usual level of health (thinking clearly, sleeping well, feel like eating) Actions:      Take daily medicines    Use oxygen as prescribed    Follow regular exercise and diet plan    Avoid cigarette smoke and other irritants that harm the lungs           YELLOW ZONE          Having a bad day or flare up      Short of breath more than usual    A lot more sputum (mucus) than usual    Sputum looks yellow, green, tan, brown or bloody    More coughing or wheezing    Fever or chills    Less energy; trouble completing activities    Trouble thinking or focusing    Using quick relief inhaler or nebulizer more often    Poor sleep; symptoms wake me up    Do not feel like eating Actions:      Get plenty of rest    Take daily medicines    Use quick relief inhaler every 4-6 hours    If you use oxygen, call you doctor to see if you should adjust your oxygen    Do breathing exercises or other things to help you relax    Let a loved one, friend or neighbor know you are feeling worse    Call your care team if you have 2 or more symptoms.  Start taking steroids or antibiotics if directed by your care team           RED ZONE       Need medical care now      Severe shortness of breath (feel you can't  breathe)    Fever, chills    Not enough breath to do any activity    Trouble coughing up mucus, walking or talking    Blood in mucus    Frequent coughing   Rescue medicines are not working    Not able to sleep because of breathing    Feel confused or drowsy    Chest pain    Actions:      Call your health care team.  If you cannot reach your care team, call 911 or go to the emergency room.        Annual Reminders:  Meet with Care Team, Flu Shot every Fall  Pharmacy:    [facility] [address] [place] [address] [address]  AT [facility] [address]  [facility] - [place], [place] - [address]

## 2018-10-29 NOTE — MR AVS SNAPSHOT
After Visit Summary   10/29/2018    Mary Alice Cameron    MRN: [id number]           Patient Information     Date Of Birth          1950        Visit Information        Provider Department      10/29/2018 3:30 PM Braydon Yen MD [facility]        Today's Diagnoses     Chronic obstructive pulmonary disease, unspecified COPD type (H)    -  1    Hypothyroidism, postablative        Essential hypertension          Care Instructions    F/u with ongoing concerns.             Follow-ups after your visit        Your next 10 appointments already scheduled     Apr 23, 2019 10:00 AM CDT   (Arrive by 9:45 AM)   Return Visit with Eliezer Myers MD   [facility] )    [address]  [place] [address]   [phone]              Who to contact     If you have questions or need follow up information about today's clinic visit or your schedule please contact [facility] directly at [phone].  Normal or non-critical lab and imaging results will be communicated to you by [employer], letter or phone within 4 business days after the clinic has received the results. If you do not hear from us within 7 days, please contact the clinic through [employer] or phone. If you have a critical or abnormal lab result, we will notify you by phone as soon as possible.  Submit refill requests through [employer] or call your pharmacy and they will forward the refill request to us. Please allow 3 business days for your refill to be completed.          Additional Information About Your Visit        [employer] Information     [employer] gives you secure access to your electronic health record. If you see a primary care provider, you can also send messages to your care team and make appointments. If you have questions, please call your primary care clinic.  If you do not have a primary care provider, please call [phone] and they will  "assist you.        Care EveryWhere ID     This is your Care EveryWhere ID. This could be used by other organizations to access your [place] medical records  [id number]        Your Vitals Were     Pulse Height Pulse Oximetry BMI (Body Mass Index)          71 5' 5\" (1.651 m) 88% 27.29 kg/m2         Blood Pressure from Last 3 Encounters:   10/29/18 120/86   10/23/18 136/75   09/25/18 128/83    Weight from Last 3 Encounters:   10/29/18 164 lb (74.4 kg)   10/23/18 165 lb (74.8 kg)   09/25/18 160 lb (72.6 kg)              We Performed the Following     Comprehensive metabolic panel (BMP + Alb, Alk Phos, ALT, AST, Total. Bili, TP)     Lipid Profile (Chol, Trig, HDL, LDL calc)     TSH with free T4 reflex          Today's Medication Changes          These changes are accurate as of 10/29/18  3:47 PM.  If you have any questions, ask your nurse or doctor.               Start taking these medicines.        Dose/Directions    order for DME   Used for:  Chronic obstructive pulmonary disease, unspecified COPD type (H)   Started by:  Braydon Yen MD        Equipment being ordered: Oxygen 2.5 liters via nasal canula   [facility]   Quantity:  2 Units   Refills:  11            Where to get your medicines      Some of these will need a paper prescription and others can be bought over the counter.  Ask your nurse if you have questions.     Bring a paper prescription for each of these medications     order for DME                Primary Care Provider Office Phone # Fax #    Braydon Yen [phone]       [address] [place] [address]        Goals        General    Maintain current health status     Notes - Note created  5/22/2018 11:35 AM by Pipkin, Lauren N, RN    Maintain current health status and functional ability despite chronic health alterations          Equal Access to Services     CHARLES FISHER AH: Trini haddad hadiriso Soomaali, waaxda luqadaha, qaybta kaalmada adeegyada, sourav garciain " arelis cortezaatroy ah. So [facility] [phone].    ATENCIÓN: Si habla jrañol, tiene a lindsey disposición servicios gratuitos de asistencia lingüística. Hong al [phone].    We comply with applicable federal civil rights laws and [place] laws. We do not discriminate on the basis of race, color, national origin, age, disability, sex, sexual orientation, or gender identity.            Thank you!     Thank you for choosing [facility]  for your care. Our goal is always to provide you with excellent care. Hearing back from our patients is one way we can continue to improve our services. Please take a few minutes to complete the written survey that you may receive in the mail after your visit with us. Thank you!             Your Updated Medication List - Protect others around you: Learn how to safely use, store and throw away your medicines at www.disposemymeds.org.          This list is accurate as of 10/29/18  3:47 PM.  Always use your most recent med list.                   Brand Name Dispense Instructions for use Diagnosis    acetaminophen 500 MG tablet    TYLENOL     Take 500-1,000 mg by mouth every 6 hours as needed for mild pain        ADVAIR -21 MCG/ACT Inhaler   Generic drug:  fluticasone-salmeterol     36 g    INHALE 2 PUFFS INTO THE LUNGS TWICE DAILY    COPD exacerbation (H)       * albuterol 108 (90 Base) MCG/ACT inhaler    VENTOLIN HFA    54 g    INHALE 2 PUFFS INTO THE LUNGS EVERY 4 HOURS AS NEEDED FOR SHORTNESS OF BREATH OR DIFFICULT BREATHING OR WHEEZING    Other emphysema (H)       * albuterol (2.5 MG/3ML) 0.083% neb solution     25 vial    Take 1 vial (2.5 mg) by nebulization every 6 hours as needed for shortness of breath / dyspnea or wheezing    COPD exacerbation (H)       apixaban ANTICOAGULANT 5 MG tablet    ELIQUIS    60 tablet    Take 1 tablet (5 mg) by mouth 2 times daily    Persistent atrial fibrillation (H)       atorvastatin 10 MG tablet    LIPITOR    90  tablet    Take 1 tablet (10 mg) by mouth At Bedtime    Coronary artery disease involving native coronary artery of native heart without angina pectoris, Mixed hyperlipidemia       CALTRATE 600+D3 SOFT PO      Take 600 mg by mouth 2 times daily        cloNIDine 0.1 MG tablet    CATAPRES    60 tablet    Take 2 tablets (0.2 mg) by mouth At Bedtime    Essential hypertension       diltiazem 240 MG 24 hr capsule    CARTIA XT    90 capsule    Take 1 capsule (240 mg) by mouth daily    Atrial fibrillation, persistent (H), Essential hypertension       diphenhydrAMINE 25 MG tablet    BENADRYL    60 tablet    Take 1-2 tablets (25-50 mg) by mouth every 6 hours as needed for itching or allergies        ipratropium - albuterol 0.5 mg/2.5 mg/3 mL 0.5-2.5 (3) MG/3ML neb solution    DUONEB    1620 mL    USE 1 VIAL PER NEBULIZER FOUR TIMES DAILY    COPD exacerbation (H)       levothyroxine 100 MCG tablet    SYNTHROID/LEVOTHROID    90 tablet    TAKE 1 TABLET(100 MCG) BY MOUTH DAILY    Hypothyroidism, postablative       losartan 50 MG tablet    COZAAR    60 tablet    Take 1 tablet (50 mg) by mouth 2 times daily    Essential hypertension       order for DME     2 Units    Equipment being ordered: Oxygen 2.5 liters via nasal canula   FAX TO [facility]    Chronic obstructive pulmonary disease, unspecified COPD type (H)       other medical supplies     1 each    Apply one pair to help with edema    Edema, Atrial fibrillation, persistent (H)       potassium chloride SA 20 MEQ CR tablet    K-DUR/KLOR-CON M    180 tablet    Take 1 tablet (20 mEq) by mouth 2 times daily    Hypokalemia       torsemide 20 MG tablet    DEMADEX    120 tablet    Take 2 tablets (40 mg) by mouth daily    Essential hypertension, benign       * Notice:  This list has 2 medication(s) that are the same as other medications prescribed for you. Read the directions carefully, and ask your doctor or other care provider to review them with you.

## 2018-10-30 LAB
ALBUMIN SERPL-MCNC: 3.8 G/DL (ref 3.4–5)
ALP SERPL-CCNC: 37 U/L (ref 40–150)
ALT SERPL W P-5'-P-CCNC: 16 U/L (ref 0–50)
ANION GAP SERPL CALCULATED.3IONS-SCNC: 9 MMOL/L (ref 3–14)
AST SERPL W P-5'-P-CCNC: 18 U/L (ref 0–45)
BILIRUB SERPL-MCNC: 0.7 MG/DL (ref 0.2–1.3)
BUN SERPL-MCNC: 14 MG/DL (ref 7–30)
CALCIUM SERPL-MCNC: 8.6 MG/DL (ref 8.5–10.1)
CHLORIDE SERPL-SCNC: 106 MMOL/L (ref 94–109)
CHOLEST SERPL-MCNC: 162 MG/DL
CO2 SERPL-SCNC: 28 MMOL/L (ref 20–32)
CREAT SERPL-MCNC: 0.87 MG/DL (ref 0.52–1.04)
GFR SERPL CREATININE-BSD FRML MDRD: 65 ML/MIN/1.7M2
GLUCOSE SERPL-MCNC: 77 MG/DL (ref 70–99)
HDLC SERPL-MCNC: 97 MG/DL
LDLC SERPL CALC-MCNC: 37 MG/DL
NONHDLC SERPL-MCNC: 65 MG/DL
POTASSIUM SERPL-SCNC: 3.8 MMOL/L (ref 3.4–5.3)
PROT SERPL-MCNC: 6.8 G/DL (ref 6.8–8.8)
SODIUM SERPL-SCNC: 143 MMOL/L (ref 133–144)
TRIGL SERPL-MCNC: 139 MG/DL
TSH SERPL DL<=0.005 MIU/L-ACNC: 0.46 MU/L (ref 0.4–4)

## 2018-10-30 ASSESSMENT — ANXIETY QUESTIONNAIRES: GAD7 TOTAL SCORE: 0

## 2018-11-02 ENCOUNTER — PATIENT OUTREACH (OUTPATIENT)
Dept: CARE COORDINATION | Facility: OTHER | Age: 68
End: 2018-11-02

## 2018-11-02 NOTE — PROGRESS NOTES
Clinic Care Coordination Contact    Situation: Patient chart reviewed by care coordinator.    Background: Patient had OV with PCP 10/29 for oxygen recert.  Also had OV with Cardiologist- Dr Myers 10/23.  Visits reviewed for updates to plan of care.    Assessment: Noted no significant changes to plan of care.  Patient is stable currently.    Plan/Recommendations: Continue to follow-up as recommended, continue to use [employer] for CHF monitoring, call Care Coordinator with concerns/questions.    Lauren Pipkin, BS-RN   CHF and General Care Coordinator  [phone] Option # 1

## 2018-11-13 DIAGNOSIS — I48.19 PERSISTENT ATRIAL FIBRILLATION (H): ICD-10-CM

## 2018-11-13 NOTE — TELEPHONE ENCOUNTER
ELIQUIS 5MG TABLETS  Last Written Prescription Date:  7/19/18  Last Fill Quantity: 60,   # refills: 3  Last Office Visit: 10/29/18  Future Office visit:

## 2018-11-14 RX ORDER — APIXABAN 5 MG/1
TABLET, FILM COATED ORAL
Qty: 60 TABLET | Refills: 0 | Status: SHIPPED | OUTPATIENT
Start: 2018-11-14 | End: 2018-12-13

## 2018-11-15 DIAGNOSIS — E87.6 HYPOKALEMIA: ICD-10-CM

## 2018-11-15 RX ORDER — POTASSIUM CHLORIDE 1500 MG/1
20 TABLET, EXTENDED RELEASE ORAL 2 TIMES DAILY
Qty: 180 TABLET | Refills: 3 | Status: SHIPPED | OUTPATIENT
Start: 2018-11-15 | End: 2019-09-16

## 2018-11-15 NOTE — TELEPHONE ENCOUNTER
potassium chloride SA (K-DUR/KLOR-CON M) 20 MEQ CR tablet  Last Written Prescription Date:  2/20/18  Last Fill Quantity: 180,   # refills: 2  Last Office Visit: 10/29/18  Future Office visit:

## 2018-12-04 DIAGNOSIS — J44.1 COPD EXACERBATION (H): ICD-10-CM

## 2018-12-05 NOTE — TELEPHONE ENCOUNTER
ipratropium - albuterol 0.5 mg/2.5 mg/3 mL (DUONEB) 0.5-2.5 (3) MG/3ML neb solution     Last Written Prescription Date:  1/25/18  Last Fill Quantity: 1620ml,   # refills: 1  Last Office Visit: 10/29/18  Future Office visit:

## 2018-12-06 RX ORDER — IPRATROPIUM BROMIDE AND ALBUTEROL SULFATE 2.5; .5 MG/3ML; MG/3ML
SOLUTION RESPIRATORY (INHALATION)
Qty: 1620 ML | Refills: 0 | Status: SHIPPED | OUTPATIENT
Start: 2018-12-06 | End: 2019-03-07

## 2018-12-06 NOTE — TELEPHONE ENCOUNTER
Protocol failed due to    Asthma control assessment score within normal limits in last 6 months     Please advise. Thank you!

## 2018-12-10 ENCOUNTER — ANCILLARY PROCEDURE (OUTPATIENT)
Dept: GENERAL RADIOLOGY | Facility: OTHER | Age: 68
End: 2018-12-10
Attending: FAMILY MEDICINE
Payer: MEDICARE

## 2018-12-10 ENCOUNTER — OFFICE VISIT (OUTPATIENT)
Dept: FAMILY MEDICINE | Facility: OTHER | Age: 68
End: 2018-12-10
Attending: FAMILY MEDICINE
Payer: MEDICARE

## 2018-12-10 ENCOUNTER — TELEPHONE (OUTPATIENT)
Dept: FAMILY MEDICINE | Facility: OTHER | Age: 68
End: 2018-12-10

## 2018-12-10 VITALS
HEART RATE: 72 BPM | OXYGEN SATURATION: 92 % | TEMPERATURE: 100 F | BODY MASS INDEX: 26.99 KG/M2 | SYSTOLIC BLOOD PRESSURE: 122 MMHG | WEIGHT: 162.2 LBS | DIASTOLIC BLOOD PRESSURE: 73 MMHG

## 2018-12-10 DIAGNOSIS — J44.1 COPD EXACERBATION (H): Primary | ICD-10-CM

## 2018-12-10 DIAGNOSIS — J44.1 COPD EXACERBATION (H): ICD-10-CM

## 2018-12-10 LAB
BASOPHILS # BLD AUTO: 0 10E9/L (ref 0–0.2)
BASOPHILS NFR BLD AUTO: 0.2 %
DIFFERENTIAL METHOD BLD: ABNORMAL
EOSINOPHIL # BLD AUTO: 0.1 10E9/L (ref 0–0.7)
EOSINOPHIL NFR BLD AUTO: 0.8 %
ERYTHROCYTE [DISTWIDTH] IN BLOOD BY AUTOMATED COUNT: 12.8 % (ref 10–15)
HCT VFR BLD AUTO: 41.7 % (ref 35–47)
HGB BLD-MCNC: 13.3 G/DL (ref 11.7–15.7)
LYMPHOCYTES # BLD AUTO: 1.3 10E9/L (ref 0.8–5.3)
LYMPHOCYTES NFR BLD AUTO: 10 %
MCH RBC QN AUTO: 32.8 PG (ref 26.5–33)
MCHC RBC AUTO-ENTMCNC: 31.9 G/DL (ref 31.5–36.5)
MCV RBC AUTO: 103 FL (ref 78–100)
MONOCYTES # BLD AUTO: 1.2 10E9/L (ref 0–1.3)
MONOCYTES NFR BLD AUTO: 9.2 %
NEUTROPHILS # BLD AUTO: 10.4 10E9/L (ref 1.6–8.3)
NEUTROPHILS NFR BLD AUTO: 79.8 %
PLATELET # BLD AUTO: 243 10E9/L (ref 150–450)
RBC # BLD AUTO: 4.06 10E12/L (ref 3.8–5.2)
WBC # BLD AUTO: 13.1 10E9/L (ref 4–11)

## 2018-12-10 PROCEDURE — G0463 HOSPITAL OUTPT CLINIC VISIT: HCPCS | Mod: 25

## 2018-12-10 PROCEDURE — 99214 OFFICE O/P EST MOD 30 MIN: CPT | Performed by: FAMILY MEDICINE

## 2018-12-10 PROCEDURE — 36415 COLL VENOUS BLD VENIPUNCTURE: CPT | Mod: ZL | Performed by: FAMILY MEDICINE

## 2018-12-10 PROCEDURE — 96372 THER/PROPH/DIAG INJ SC/IM: CPT | Performed by: FAMILY MEDICINE

## 2018-12-10 PROCEDURE — 71046 X-RAY EXAM CHEST 2 VIEWS: CPT | Mod: TC,FY

## 2018-12-10 PROCEDURE — 85025 COMPLETE CBC W/AUTO DIFF WBC: CPT | Mod: ZL | Performed by: FAMILY MEDICINE

## 2018-12-10 RX ORDER — AZITHROMYCIN 250 MG/1
TABLET, FILM COATED ORAL
Qty: 6 TABLET | Refills: 0 | Status: SHIPPED | OUTPATIENT
Start: 2018-12-10 | End: 2019-02-07

## 2018-12-10 RX ORDER — METHYLPREDNISOLONE SOD SUCC 125 MG
125 VIAL (EA) INJECTION ONCE
Status: CANCELLED | OUTPATIENT
Start: 2018-12-10 | End: 2018-12-10

## 2018-12-10 RX ORDER — PREDNISONE 20 MG/1
TABLET ORAL
Qty: 20 TABLET | Refills: 0 | Status: SHIPPED | OUTPATIENT
Start: 2018-12-10 | End: 2019-02-07

## 2018-12-10 RX ORDER — METHYLPREDNISOLONE SOD SUCC 125 MG
125 VIAL (EA) INJECTION ONCE
Status: DISCONTINUED | OUTPATIENT
Start: 2018-12-10 | End: 2018-12-12

## 2018-12-10 ASSESSMENT — ANXIETY QUESTIONNAIRES
GAD7 TOTAL SCORE: 0
3. WORRYING TOO MUCH ABOUT DIFFERENT THINGS: NOT AT ALL
5. BEING SO RESTLESS THAT IT IS HARD TO SIT STILL: NOT AT ALL
2. NOT BEING ABLE TO STOP OR CONTROL WORRYING: NOT AT ALL
6. BECOMING EASILY ANNOYED OR IRRITABLE: NOT AT ALL
1. FEELING NERVOUS, ANXIOUS, OR ON EDGE: NOT AT ALL
4. TROUBLE RELAXING: NOT AT ALL
7. FEELING AFRAID AS IF SOMETHING AWFUL MIGHT HAPPEN: NOT AT ALL

## 2018-12-10 ASSESSMENT — PATIENT HEALTH QUESTIONNAIRE - PHQ9: SUM OF ALL RESPONSES TO PHQ QUESTIONS 1-9: 1

## 2018-12-10 ASSESSMENT — PAIN SCALES - GENERAL: PAINLEVEL: NO PAIN (0)

## 2018-12-10 NOTE — NURSING NOTE
"Chief Complaint   Patient presents with     COPD       Initial /73 (BP Location: Left arm, Patient Position: Sitting, Cuff Size: Adult Regular)   Pulse 72   Temp 100  F (37.8  C) (Tympanic)   Wt 73.6 kg (162 lb 3.2 oz)   SpO2 92%   BMI 26.99 kg/m   Estimated body mass index is 26.99 kg/m  as calculated from the following:    Height as of 10/29/18: 1.651 m (5' 5\").    Weight as of this encounter: 73.6 kg (162 lb 3.2 oz).  Medication Reconciliation: complete    Kala Aguayo LPN  "

## 2018-12-10 NOTE — TELEPHONE ENCOUNTER
10:03 AM    Reason for Call: OVERBOOK    Patient is having the following symptoms: COPD for 1 years.    The patient is requesting an appointment for 12/10/18 with .    Was an appointment offered for this call? No  If yes : Appointment type              Date    Preferred method for responding to this message: Telephone Call  What is your phone number ? [phone]    If we cannot reach you directly, may we leave a detailed response at the number you provided? Yes    Can this message wait until your PCP/provider returns, if unavailable today? Not applicable, pcp is in     [facility]

## 2018-12-10 NOTE — LETTER
My COPD Action Plan     Name: Mary Alice Cameron    YOB: 1950   Date: 12/10/2018    My doctor: Braydon Yen MD   My clinic: [address] [address]  [phone]  My Controller Medicine: { :409870}   Dose: ***     My Rescue Medicine: { :631744}   Dose: ***     My Flare Up Medicine: { :434643}   Dose: ***     My COPD Severity: { :770429}      Use of Oxygen: { :631890}     Make sure you've had your pneumonia   vaccines.          GREEN ZONE       Doing well today      Usual level of activity and exercise    Usual amount of cough and mucus    No shortness of breath    Usual level of health (thinking clearly, sleeping well, feel like eating) Actions:      Take daily medicines    Use oxygen as prescribed    Follow regular exercise and diet plan    Avoid cigarette smoke and other irritants that harm the lungs           YELLOW ZONE          Having a bad day or flare up      Short of breath more than usual    A lot more sputum (mucus) than usual    Sputum looks yellow, green, tan, brown or bloody    More coughing or wheezing    Fever or chills    Less energy; trouble completing activities    Trouble thinking or focusing    Using quick relief inhaler or nebulizer more often    Poor sleep; symptoms wake me up    Do not feel like eating Actions:      Get plenty of rest    Take daily medicines    Use quick relief inhaler every *** hours    If you use oxygen, call you doctor to see if you should adjust your oxygen    Do breathing exercises or other things to help you relax    Let a loved one, friend or neighbor know you are feeling worse    Call your care team if you have 2 or more symptoms.  Start taking steroids or antibiotics if directed by your care team           RED ZONE       Need medical care now      Severe shortness of breath (feel you can't breathe)    Fever, chills    Not enough breath to do any activity    Trouble coughing up mucus, walking or  talking    Blood in mucus    Frequent coughing   Rescue medicines are not working    Not able to sleep because of breathing    Feel confused or drowsy    Chest pain    Actions:      Call your health care team.  If you cannot reach your care team, call 911 or go to the emergency room.        Annual Reminders:  Meet with Care Team, Flu Shot every Fall  Pharmacy:    [facility] [address] [place], [place] - [address] AT [facility] [address]  [facility] - [place], [place] - [facility] [address]

## 2018-12-10 NOTE — PROGRESS NOTES
SUBJECTIVE:   Mary Alice Cameron is a 68 year old female who presents to clinic today for the following health issues:      COPD Follow-Up    Symptoms are currently: slightly worsened    Current fatigue or dyspnea with ambulation: worsened from baseline    Shortness of breath: much worse    Increased or change in Cough/Sputum: Yes-  Sputum slight yellow to clear    Fever(s): Yes-  chills    Baseline ambulation without stopping to rest: 3  rooms. Able to walk up 0 flights of stairs without stopping to rest.    Any ER/UC or hospital admissions since your last visit? No     History   Smoking Status     Former Smoker     Packs/day: 0.50     Years: 41.00     Types: Cigarettes     Quit date: 1/28/2007   Smokeless Tobacco     Never Used     No results found for: FEV1, [id number]    Amount of exercise or physical activity: None    Problems taking medications regularly: No    Medication side effects: none    Diet: regular (no restrictions)        PROBLEMS TO ADD ON...    Problem list and histories reviewed & adjusted, as indicated.  Additional history: having cough and sob.  Worried about pneumonia.  Slight cold like sx as well.  Nebs less effective.     Patient Active Problem List   Diagnosis     Paroxysmal atrial fibrillation (H)     Pulmonary emphysema (H)     Bicuspid aortic valve     Mild major depression (H)     Acute bronchitis     Hypothyroidism, postablative     Advance care planning     Essential hypertension     Long-term (current) use of anticoagulants [Z79.01]     Acute on chronic respiratory failure (H)     Health Care Home     Status post coronary angiogram     Coronary artery disease involving native coronary artery of native heart without angina pectoris     Past Surgical History:   Procedure Laterality Date     BACK SURGERY  2006     CARDIAC SURGERY  06/12/2018    Angiogram at [facility]     COLONOSCOPY N/A 1/19/2016    Procedure: COLONOSCOPY;  Surgeon: Waldemar Bob MD;  Location: [facility]      HYSTERECTOMY      partial     SLING BLADDER SUSPENSION WITH FASCIA LINNETTE         Social History     Tobacco Use     Smoking status: Former Smoker     Packs/day: 0.50     Years: 41.00     Pack years: 20.50     Types: Cigarettes     Last attempt to quit: 2007     Years since quittin.8     Smokeless tobacco: Never Used   Substance Use Topics     Alcohol use: No     Alcohol/week: 0.0 oz     Family History   Problem Relation Age of Onset     Prostate Cancer Father      Hypertension Father      Heart Failure Father         CHF     Asthma Mother      Cancer Mother         ovarian     Hypertension Mother      Asthma Brother      Hypertension Sister      Hypertension Brother          Current Outpatient Medications   Medication Sig Dispense Refill     acetaminophen (TYLENOL) 500 MG tablet Take 500-1,000 mg by mouth every 6 hours as needed for mild pain       ADVAIR -21 MCG/ACT Inhaler INHALE 2 PUFFS INTO THE LUNGS TWICE DAILY 36 g 2     albuterol (2.5 MG/3ML) 0.083% neb solution Take 1 vial (2.5 mg) by nebulization every 6 hours as needed for shortness of breath / dyspnea or wheezing 25 vial 1     atorvastatin (LIPITOR) 10 MG tablet Take 1 tablet (10 mg) by mouth At Bedtime 90 tablet 3     Calcium Carb-Cholecalciferol (CALTRATE 600+D3 SOFT PO) Take 600 mg by mouth 2 times daily       cloNIDine (CATAPRES) 0.1 MG tablet Take 2 tablets (0.2 mg) by mouth At Bedtime 60 tablet 3     diltiazem 240 MG 24 hr capsule Take 1 capsule (240 mg) by mouth daily 90 capsule 3     diphenhydrAMINE (BENADRYL) 25 MG tablet Take 1-2 tablets (25-50 mg) by mouth every 6 hours as needed for itching or allergies 60 tablet 1     ELIQUIS 5 MG tablet TAKE 1 TABLET BY MOUTH TWICE DAILY 60 tablet 0     ipratropium - albuterol 0.5 mg/2.5 mg/3 mL (DUONEB) 0.5-2.5 (3) MG/3ML neb solution USE 1 VIAL PER NEBULIZER FOUR TIMES DAILY 1620 mL 0     levothyroxine (SYNTHROID/LEVOTHROID) 100 MCG tablet TAKE 1 TABLET(100 MCG) BY MOUTH DAILY 90 tablet  3     losartan (COZAAR) 50 MG tablet Take 1 tablet (50 mg) by mouth 2 times daily 60 tablet 3     order for DME Equipment being ordered: Oxygen 2.5 liters via nasal canula      FAX TO [facility] 2 Units 11     OTHER MEDICAL SUPPLIES Apply one pair to help with edema 1 each 0     potassium chloride SA (K-DUR/KLOR-CON M) 20 MEQ CR tablet Take 1 tablet (20 mEq) by mouth 2 times daily 180 tablet 3     torsemide (DEMADEX) 20 MG tablet Take 2 tablets (40 mg) by mouth daily 120 tablet 0     VENTOLIN  (90 Base) MCG/ACT inhaler INHALE 2 PUFFS INTO THE LUNGS EVERY 4 HOURS AS NEEDED FOR SHORTNESS OF BREATH OR DIFFICULT BREATHING OR WHEEZING 54 g 2     Allergies   Allergen Reactions     Amlodipine Besylate Cough     Norvasc     Lisinopril Cough       Reviewed and updated as needed this visit by clinical staff       Reviewed and updated as needed this visit by Provider         ROS:  Constitutional, HEENT, cardiovascular, pulmonary, gi and gu systems are negative, except as otherwise noted.    OBJECTIVE:                                                    /73 (BP Location: Left arm, Patient Position: Sitting, Cuff Size: Adult Regular)   Pulse 72   Temp 100  F (37.8  C) (Tympanic)   Wt 73.6 kg (162 lb 3.2 oz)   SpO2 92%   BMI 26.99 kg/m    Body mass index is 26.99 kg/m .  GENERAL APPEARANCE: Alert, no acute distress  CV: regular rate and rhythm, no murmur, rub or gallop  RESP: lungs clear to auscultation bilaterally with decreased breath sounds throughout  ABDOMEN: normal bowel sounds, soft, nontender, no hepatosplenomegaly or other masses  SKIN: no suspicious lesions or rashes to visualized skin  NEURO: Alert, oriented x 3, speech and mentation normal      cxr shows no obvious infiltrate.  Cbc shows slightly elevated wbc.      ASSESSMENT/PLAN:                                                    1. COPD exacerbation (H)  Worsened.  IM steroid here and steroid burst and taper.  Cover with the abx.  F/u 2 days for  recheck.  Borderline needs to be int [facility] right now but we are trying this outpatient.    - XR CHEST 2 VW (Clinic Performed); Future  - CBC with platelets and differential      2 day f/u.  Lengthy review about risk/benefit.      Braydon Yen MD  [facility]

## 2018-12-11 ASSESSMENT — ANXIETY QUESTIONNAIRES: GAD7 TOTAL SCORE: 0

## 2018-12-12 ENCOUNTER — OFFICE VISIT (OUTPATIENT)
Dept: FAMILY MEDICINE | Facility: OTHER | Age: 68
End: 2018-12-12
Attending: FAMILY MEDICINE
Payer: COMMERCIAL

## 2018-12-12 VITALS
HEART RATE: 82 BPM | DIASTOLIC BLOOD PRESSURE: 62 MMHG | OXYGEN SATURATION: 98 % | BODY MASS INDEX: 26.96 KG/M2 | SYSTOLIC BLOOD PRESSURE: 98 MMHG | WEIGHT: 162 LBS | TEMPERATURE: 97.6 F

## 2018-12-12 DIAGNOSIS — J44.1 COPD WITH ACUTE EXACERBATION (H): Primary | ICD-10-CM

## 2018-12-12 PROCEDURE — G0463 HOSPITAL OUTPT CLINIC VISIT: HCPCS

## 2018-12-12 PROCEDURE — 99213 OFFICE O/P EST LOW 20 MIN: CPT | Performed by: FAMILY MEDICINE

## 2018-12-12 ASSESSMENT — PAIN SCALES - GENERAL: PAINLEVEL: NO PAIN (0)

## 2018-12-12 NOTE — PROGRESS NOTES
SUBJECTIVE:                                                    Mary Alice Cameron is a 68 year old female who presents to clinic today for the following health issues:    RESPIRATORY SYMPTOMS      Duration: week    Description  cough    Severity: moderate    Accompanying signs and symptoms: SOB, fatigue    History (predisposing factors):  COPD    Precipitating or alleviating factors: None    Therapies tried and outcome:  Solumedrol, zpak, prednisone      PROBLEMS TO ADD ON...    Problem list and histories reviewed & adjusted, as indicated.  Additional history: getting better.  Still coughing.  Slightly productive.  Breathing easier.      Patient Active Problem List   Diagnosis     Paroxysmal atrial fibrillation (H)     Pulmonary emphysema (H)     Bicuspid aortic valve     Mild major depression (H)     Acute bronchitis     Hypothyroidism, postablative     Advance care planning     Essential hypertension     Long-term (current) use of anticoagulants [Z79.01]     Acute on chronic respiratory failure (H)     Health Care Home     Status post coronary angiogram     Coronary artery disease involving native coronary artery of native heart without angina pectoris     Past Surgical History:   Procedure Laterality Date     BACK SURGERY       CARDIAC SURGERY  2018    Angiogram at [facility]     COLONOSCOPY N/A 2016    Procedure: COLONOSCOPY;  Surgeon: Waldemar Bob MD;  Location: [facility]     HYSTERECTOMY      partial     SLING BLADDER SUSPENSION WITH FASCIA LINNETTE         Social History     Tobacco Use     Smoking status: Former Smoker     Packs/day: 0.50     Years: 41.00     Pack years: 20.50     Types: Cigarettes     Last attempt to quit: 2007     Years since quittin.8     Smokeless tobacco: Never Used   Substance Use Topics     Alcohol use: No     Alcohol/week: 0.0 oz     Family History   Problem Relation Age of Onset     Prostate Cancer Father      Hypertension Father      Heart Failure  Father         CHF     Asthma Mother      Cancer Mother         ovarian     Hypertension Mother      Asthma Brother      Hypertension Sister      Hypertension Brother          Current Outpatient Medications   Medication Sig Dispense Refill     acetaminophen (TYLENOL) 500 MG tablet Take 500-1,000 mg by mouth every 6 hours as needed for mild pain       ADVAIR -21 MCG/ACT Inhaler INHALE 2 PUFFS INTO THE LUNGS TWICE DAILY 36 g 2     albuterol (2.5 MG/3ML) 0.083% neb solution Take 1 vial (2.5 mg) by nebulization every 6 hours as needed for shortness of breath / dyspnea or wheezing 25 vial 1     atorvastatin (LIPITOR) 10 MG tablet Take 1 tablet (10 mg) by mouth At Bedtime 90 tablet 3     azithromycin (ZITHROMAX) 250 MG tablet Two tablets first day, then one tablet daily for four days. 6 tablet 0     Calcium Carb-Cholecalciferol (CALTRATE 600+D3 SOFT PO) Take 600 mg by mouth 2 times daily       cloNIDine (CATAPRES) 0.1 MG tablet Take 2 tablets (0.2 mg) by mouth At Bedtime 60 tablet 3     diltiazem 240 MG 24 hr capsule Take 1 capsule (240 mg) by mouth daily 90 capsule 3     diphenhydrAMINE (BENADRYL) 25 MG tablet Take 1-2 tablets (25-50 mg) by mouth every 6 hours as needed for itching or allergies 60 tablet 1     ELIQUIS 5 MG tablet TAKE 1 TABLET BY MOUTH TWICE DAILY 60 tablet 0     ipratropium - albuterol 0.5 mg/2.5 mg/3 mL (DUONEB) 0.5-2.5 (3) MG/3ML neb solution USE 1 VIAL PER NEBULIZER FOUR TIMES DAILY 1620 mL 0     levothyroxine (SYNTHROID/LEVOTHROID) 100 MCG tablet TAKE 1 TABLET(100 MCG) BY MOUTH DAILY 90 tablet 3     losartan (COZAAR) 50 MG tablet Take 1 tablet (50 mg) by mouth 2 times daily 60 tablet 3     order for DME Equipment being ordered: Oxygen 2.5 liters via nasal canula      FAX TO [facility] 2 Units 11     OTHER MEDICAL SUPPLIES Apply one pair to help with edema 1 each 0     potassium chloride SA (K-DUR/KLOR-CON M) 20 MEQ CR tablet Take 1 tablet (20 mEq) by mouth 2 times daily 180 tablet 3      predniSONE (DELTASONE) 20 MG tablet Take 3 tabs (60 mg) by mouth daily x 3 days, 2 tabs (40 mg) daily x 3 days, 1 tab (20 mg) daily x 3 days, then 1/2 tab (10 mg) x 3 days.. 20 tablet 0     torsemide (DEMADEX) 20 MG tablet Take 2 tablets (40 mg) by mouth daily 120 tablet 0     VENTOLIN  (90 Base) MCG/ACT inhaler INHALE 2 PUFFS INTO THE LUNGS EVERY 4 HOURS AS NEEDED FOR SHORTNESS OF BREATH OR DIFFICULT BREATHING OR WHEEZING 54 g 2     Allergies   Allergen Reactions     Amlodipine Besylate Cough     Norvasc     Lisinopril Cough       ROS:  Constitutional, HEENT, cardiovascular, pulmonary, gi and gu systems are negative, except as otherwise noted.    OBJECTIVE:                                                    BP 98/62   Pulse 82   Temp 97.6  F (36.4  C)   Wt 73.5 kg (162 lb)   SpO2 98%   BMI 26.96 kg/m    Body mass index is 26.96 kg/m .  GENERAL APPEARANCE: Alert, no acute distress  CV: regular rate and rhythm, no murmur, rub or gallop  RESP: lungs clear to auscultation bilaterally with decreased breath sounds.  Very minimal wheeze.   ABDOMEN: normal bowel sounds, soft, nontender, no hepatosplenomegaly or other masses  SKIN: no suspicious lesions or rashes to visualized skin  NEURO: Alert, oriented x 3, speech and mentation normal           ASSESSMENT/PLAN:                                                    1. COPD with acute exacerbation (H)  Improving.  Stable oxygen.  Exam stable.  F/u with ongoign concerns.  Finish azithro and the prednisone.            Braydon Yen MD  [facility]

## 2018-12-12 NOTE — NURSING NOTE
"Chief Complaint   Patient presents with     RECHECK       Initial BP 98/62   Pulse 82   Temp 97.6  F (36.4  C)   Wt 73.5 kg (162 lb)   SpO2 98%   BMI 26.96 kg/m   Estimated body mass index is 26.96 kg/m  as calculated from the following:    Height as of 10/29/18: 1.651 m (5' 5\").    Weight as of this encounter: 73.5 kg (162 lb).  Medication Reconciliation: complete    Joy Franklin LPN  "

## 2018-12-13 DIAGNOSIS — I48.19 PERSISTENT ATRIAL FIBRILLATION (H): ICD-10-CM

## 2018-12-13 DIAGNOSIS — I10 ESSENTIAL HYPERTENSION, BENIGN: ICD-10-CM

## 2018-12-13 DIAGNOSIS — I10 ESSENTIAL HYPERTENSION: ICD-10-CM

## 2018-12-13 RX ORDER — APIXABAN 5 MG/1
TABLET, FILM COATED ORAL
Qty: 60 TABLET | Refills: 5 | Status: SHIPPED | OUTPATIENT
Start: 2018-12-13 | End: 2019-06-04

## 2018-12-13 RX ORDER — TORSEMIDE 20 MG/1
TABLET ORAL
Qty: 120 TABLET | Refills: 5 | Status: SHIPPED | OUTPATIENT
Start: 2018-12-13 | End: 2019-01-18 | Stop reason: ALTCHOICE

## 2018-12-13 NOTE — TELEPHONE ENCOUNTER
Pau Nelson APRN CNP   You 1 hour ago (2:10 PM)       Patient should only be on Eliquis.   Thanks,   Pau Nelson (Routing comment)

## 2018-12-13 NOTE — TELEPHONE ENCOUNTER
Spoke with patient to clarify if she is taking both Eliquis and coumadin.  She states the pharmacy filled the coumadin by mistake.  She only takes Eliquis.  Will process refill request for Eliquis.  Patient also needs a refill on torsemide, which will be processed.  She states the pharmacy gave her lasix, which was discontinued a while ago.    Carola Trujillo RN

## 2018-12-13 NOTE — TELEPHONE ENCOUNTER
Eliquis - received call from pharmacy needing clarification because patient recently filled Warfarin last week.  Warfarin was discontinued as patient is taking Eliquis.  Please advise which patient should be taking. Thank you  Last visit: 12.12.18

## 2018-12-17 ENCOUNTER — PATIENT OUTREACH (OUTPATIENT)
Dept: CARE COORDINATION | Facility: OTHER | Age: 68
End: 2018-12-17

## 2018-12-17 RX ORDER — CLONIDINE HYDROCHLORIDE 0.1 MG/1
TABLET ORAL
Qty: 360 TABLET | Refills: 0 | OUTPATIENT
Start: 2018-12-17

## 2018-12-17 NOTE — PROGRESS NOTES
Clinic Care Coordination Contact  Care Team Conversations    Care Coordinator phoned Mary Alice today to follow-up after noting she was in twice last week for COPD exacerbation.  She states she is still not feeling 100%. She had a better day yesterday and then today is requiring her oxygen again.  She is done with the azithromycin but still has a couple of doses of prednisone left.  She was advised the azithromycin does continue working in your system for several days after ending it.  Advised if she doesn't seem to be improving as the days go on, especially after ending the prednisone, she should call CC back.  Pt verbalized understanding.    She continues to use [employer] to manage her CHF.  She reports she likes to use this helen and it seems to be helping her stay on track.  She denies any questions or concerns about this.    Denies need for medication refills at this time.  Care Coordinator will continue regular outreach.    Lauren Pipkin, BS-RN   CHF and General Care Coordinator  [phone] Option # 1

## 2018-12-31 DIAGNOSIS — I10 ESSENTIAL HYPERTENSION: ICD-10-CM

## 2018-12-31 RX ORDER — LOSARTAN POTASSIUM 50 MG/1
TABLET ORAL
Qty: 60 TABLET | Refills: 1 | Status: SHIPPED | OUTPATIENT
Start: 2018-12-31 | End: 2019-03-03

## 2019-01-08 ENCOUNTER — TELEPHONE (OUTPATIENT)
Dept: CARDIOLOGY | Facility: OTHER | Age: 69
End: 2019-01-08

## 2019-01-08 NOTE — TELEPHONE ENCOUNTER
"Patient called and states that she is \"taking Valsartan and the pharmacy sent her a prescription for losartan\".  Patient is confused which prescription she should be taking or if she is supposed to be taking both.  Patient states she has not started the losartan yet and is only taking the Valsartan at this time.    Patient states she sees both Pau Nelson CNP and Dr. Myers.    Please advise.  "

## 2019-01-08 NOTE — TELEPHONE ENCOUNTER
Pau Nelson APRN CNP   You 13 minutes ago (4:00 PM)      With Valsartan recall she was switched to Losartan which is appropriate.   Thanks,   Pau Nelson (Routing comment)

## 2019-01-12 DIAGNOSIS — I10 ESSENTIAL HYPERTENSION, BENIGN: ICD-10-CM

## 2019-01-13 ENCOUNTER — HOSPITAL ENCOUNTER (EMERGENCY)
Facility: HOSPITAL | Age: 69
Discharge: HOME | End: 2019-01-13
Attending: PHYSICIAN ASSISTANT | Admitting: PHYSICIAN ASSISTANT
Payer: MEDICARE

## 2019-01-13 VITALS
HEART RATE: 92 BPM | TEMPERATURE: 99.5 F | SYSTOLIC BLOOD PRESSURE: 158 MMHG | OXYGEN SATURATION: 97 % | RESPIRATION RATE: 20 BRPM | DIASTOLIC BLOOD PRESSURE: 90 MMHG

## 2019-01-13 DIAGNOSIS — Z76.0 ENCOUNTER FOR MEDICATION REFILL: ICD-10-CM

## 2019-01-13 DIAGNOSIS — J44.9 CHRONIC OBSTRUCTIVE PULMONARY DISEASE, UNSPECIFIED COPD TYPE (H): ICD-10-CM

## 2019-01-13 DIAGNOSIS — J44.9 COPD (CHRONIC OBSTRUCTIVE PULMONARY DISEASE) (H): ICD-10-CM

## 2019-01-13 PROCEDURE — 99213 OFFICE O/P EST LOW 20 MIN: CPT | Performed by: PHYSICIAN ASSISTANT

## 2019-01-13 PROCEDURE — G0463 HOSPITAL OUTPT CLINIC VISIT: HCPCS

## 2019-01-13 ASSESSMENT — ENCOUNTER SYMPTOMS
DIZZINESS: 0
FEVER: 0
SHORTNESS OF BREATH: 1
COUGH: 0
ABDOMINAL PAIN: 0
LIGHT-HEADEDNESS: 0
NAUSEA: 0
SINUS PRESSURE: 0
HEADACHES: 0
APPETITE CHANGE: 0
VOMITING: 0
VOICE CHANGE: 0
TROUBLE SWALLOWING: 0
PSYCHIATRIC NEGATIVE: 1
FATIGUE: 0
DIARRHEA: 0
CARDIOVASCULAR NEGATIVE: 1
SORE THROAT: 0

## 2019-01-13 NOTE — ED PROVIDER NOTES
History     Chief Complaint   Patient presents with     Medication Refill     neb machine broke last night, needs prescription for one     The history is provided by the patient. No  was used.     Mary Alice Cameron is a 68 year old female who is here for an rx for new nebulizer machine. Pt has COPD and uses it 4 times a day. Denies new or increased SOB. No fever.      Problem List:    Patient Active Problem List    Diagnosis Date Noted     Status post coronary angiogram 06/19/2018     Priority: Medium     Coronary artery disease involving native coronary artery of native heart without angina pectoris 06/19/2018     Priority: Medium     Health Care Home 01/26/2017     Priority: Medium     Acute on chronic respiratory failure (H) 01/02/2017     Priority: Medium     Long-term (current) use of anticoagulants [Z79.01] 08/03/2016     Priority: Medium     Essential hypertension 06/29/2016     Priority: Medium     Advance care planning 02/08/2016     Priority: Medium     Advance Care Planning 2/8/2016: Receipt of ACP document:  Received: Health Care Directive which was witnessed or notarized on 1/5/16.  Document previously scanned on 1/22/16.  Validation form completed and sent to be scanned.  Code Status needs to be updated to reflect choices in most recent ACP document.  1/5/16 Health Care Directive indicates preference for DNR code; recommend conversation about goals of care and completion of a POLST.  Confirmed/documented designated decision maker(s).  Added by Shobha Jang             Hypothyroidism, postablative 03/23/2015     Priority: Medium     Problem list name updated by automated process. Provider to review       Acute bronchitis 03/22/2015     Priority: Medium     Mild major depression (H) 08/27/2014     Priority: Medium     Bicuspid aortic valve 03/25/2014     Priority: Medium     Paroxysmal atrial fibrillation (H) 01/17/2014     Priority: Medium     Pulmonary emphysema (H) 01/17/2014      Priority: Medium        Past Medical History:    Past Medical History:   Diagnosis Date     Asthma      Atrial fibrillation (H)      COPD (chronic obstructive pulmonary disease) (H)      Depression      High cholesterol      HTN (hypertension)      Thyroid disease        Past Surgical History:    Past Surgical History:   Procedure Laterality Date     BACK SURGERY  2006     CARDIAC SURGERY  2018    Angiogram at [facility]     COLONOSCOPY N/A 2016    Procedure: COLONOSCOPY;  Surgeon: Waldemar Bob MD;  Location: [facility]     HYSTERECTOMY      partial     SLING BLADDER SUSPENSION WITH FASCIA LINNETTE         Family History:    Family History   Problem Relation Age of Onset     Prostate Cancer Father      Hypertension Father      Heart Failure Father         CHF     Asthma Mother      Cancer Mother         ovarian     Hypertension Mother      Asthma Brother      Hypertension Sister      Hypertension Brother        Social History:  Marital Status:   [5]  Social History     Tobacco Use     Smoking status: Former Smoker     Packs/day: 0.50     Years: 41.00     Pack years: 20.50     Types: Cigarettes     Last attempt to quit: 2007     Years since quittin.9     Smokeless tobacco: Never Used   Substance Use Topics     Alcohol use: No     Alcohol/week: 0.0 oz     Drug use: No        Medications:      ADVAIR -21 MCG/ACT Inhaler   albuterol (2.5 MG/3ML) 0.083% neb solution   atorvastatin (LIPITOR) 10 MG tablet   Calcium Carb-Cholecalciferol (CALTRATE 600+D3 SOFT PO)   cloNIDine (CATAPRES) 0.1 MG tablet   diltiazem 240 MG 24 hr capsule   ELIQUIS 5 MG tablet   ipratropium - albuterol 0.5 mg/2.5 mg/3 mL (DUONEB) 0.5-2.5 (3) MG/3ML neb solution   levothyroxine (SYNTHROID/LEVOTHROID) 100 MCG tablet   losartan (COZAAR) 50 MG tablet   potassium chloride SA (K-DUR/KLOR-CON M) 20 MEQ CR tablet   torsemide (DEMADEX) 20 MG tablet   VENTOLIN  (90 Base) MCG/ACT inhaler   acetaminophen  (TYLENOL) 500 MG tablet   diphenhydrAMINE (BENADRYL) 25 MG tablet   OTHER MEDICAL SUPPLIES         Review of Systems   Constitutional: Negative for appetite change, fatigue and fever.   HENT: Negative for congestion, ear pain, sinus pressure, sore throat, trouble swallowing and voice change.    Respiratory: Positive for shortness of breath. Negative for cough.    Cardiovascular: Negative.    Gastrointestinal: Negative for abdominal pain, diarrhea, nausea and vomiting.   Genitourinary: Negative.    Neurological: Negative for dizziness, light-headedness and headaches.   Psychiatric/Behavioral: Negative.        Physical Exam   BP: 158/90  Pulse: 92  Temp: 99.5  F (37.5  C)  Resp: 20  SpO2: 97 %      Physical Exam   Constitutional: She is oriented to person, place, and time. She appears well-developed and well-nourished. No distress.   HENT:   Head: Normocephalic and atraumatic.   Bilateral TMs/canals clear/wnl  No sinus TTP     Eyes: Conjunctivae and EOM are normal. Right eye exhibits no discharge. Left eye exhibits no discharge.   Neck: Normal range of motion. Neck supple.   Cardiovascular: Normal rate.   Pulmonary/Chest: Effort normal and breath sounds normal. No respiratory distress.   Neurological: She is alert and oriented to person, place, and time.   Skin: Skin is warm and dry. No rash noted. She is not diaphoretic.   Psychiatric: She has a normal mood and affect.   Nursing note and vitals reviewed.      ED Course        Procedures          No results found for this or any previous visit (from the past 24 hour(s)).    Medications - No data to display    Assessments & Plan (with Medical Decision Making)     I have reviewed the nursing notes.    I have reviewed the findings, diagnosis, plan and need for follow up with the patient.    Final diagnoses:   COPD (chronic obstructive pulmonary disease) (H)   Encounter for medication refill         I gave pt a written rx for new nebulizer machine.  Patient verbally  educated and given appropriate education sheets for the diagnoses and has no questions.  Take medications as directed.   Follow up with your Primary Care provider as already directed.   if further concerns develop, return to the ER  Gerri Auguste Certified  Physician Assistant  1/13/2019  11:30 AM  URGENT CARE CLINIC      1/13/2019   [facility]     Gerri Auguste PA  01/13/19 [id number]

## 2019-01-13 NOTE — ED AVS SNAPSHOT
[facility]  [address] [address]  Phone:  [phone]                                    Mary Alice Cameron   MRN: [id number]    Department:  [facility]   Date of Visit:  1/13/2019           After Visit Summary Signature Page    I have received my discharge instructions, and my questions have been answered. I have discussed any challenges I see with this plan with the nurse or doctor.    ..........................................................................................................................................  Patient/Patient Representative Signature      ..........................................................................................................................................  Patient Representative Print Name and Relationship to Patient    ..................................................               ................................................  Date                                   Time    ..........................................................................................................................................  Reviewed by Signature/Title    ...................................................              ..............................................  Date                                               Time          22EPIC Rev 08/18

## 2019-01-13 NOTE — ED TRIAGE NOTES
Pt presents today alone for a new Rx for a NEB machine as hers quit working last night after 10 years of use.

## 2019-01-14 ENCOUNTER — TELEPHONE (OUTPATIENT)
Dept: CARDIOLOGY | Facility: OTHER | Age: 69
End: 2019-01-14

## 2019-01-14 RX ORDER — FUROSEMIDE 40 MG
TABLET ORAL
Qty: 180 TABLET | Refills: 0 | Status: SHIPPED | OUTPATIENT
Start: 2019-01-14 | End: 2019-04-13

## 2019-01-14 NOTE — TELEPHONE ENCOUNTER
"Patient reports increased swelling in the feet and legs since switching to the Torsemide 20 mg 2 tablets by mouth daily.  She does not report increased shortness of breath or weight gain.  Reports weight at 160# 1/14/2019.       12/12/18 12/10/18 10/29/18 10/23/18 9/25/18   BP 98/62 122/73 120/86 136/75 128/83   Pulse 82 72 71 79 83   Resp -- -- -- 20 18   Height -- -- 1.651 m (5' 5\") 1.651 m (5' 5\") 1.664 m (5' 5.5\")   Weight 73.5 kg (162 lb) 73.6 kg (162 lb 3.2 oz) 74.4 kg (164 lb) 74.8 kg (165 lb) 72.6 kg (160 lb)   BMI (Calculated) -- -- 27.35 27.51 26.28     Please advise further changes.   Thank you, Giana  "

## 2019-01-14 NOTE — TELEPHONE ENCOUNTER
Med is not on med list and was discontinued on 10/9/18 by another health care provider.  Please advise.    furosemide (LASIX) 40 MG tablet (Discontinued)      Last Written Prescription Date:  7/13/18  Last Fill Quantity: 180,   # refills: 1  Last Office Visit: 12/12/18  Future Office visit:       Routing refill request to provider for review/approval because:  Drug not on the [facility], [facility] or [facility] refill protocol or controlled substance

## 2019-01-15 NOTE — TELEPHONE ENCOUNTER
Patient states that that the swelling is somewhat better, patient is taking Torsemide 20 mg AM and 20 mg at 1200.   Patient would still prefer Lasix as she feels it works better.    Refill of Lasix was sent to [facility] pharmacy by Dr. Yen 1/14/19.  Patient states that she did not request refill of this and did not  from pharmacy.     Please advise.

## 2019-01-16 ENCOUNTER — TELEPHONE (OUTPATIENT)
Dept: FAMILY MEDICINE | Facility: OTHER | Age: 69
End: 2019-01-16

## 2019-01-16 ENCOUNTER — OFFICE VISIT (OUTPATIENT)
Dept: FAMILY MEDICINE | Facility: OTHER | Age: 69
End: 2019-01-16
Attending: FAMILY MEDICINE
Payer: COMMERCIAL

## 2019-01-16 ENCOUNTER — ANCILLARY PROCEDURE (OUTPATIENT)
Dept: GENERAL RADIOLOGY | Facility: OTHER | Age: 69
End: 2019-01-16
Attending: FAMILY MEDICINE
Payer: MEDICARE

## 2019-01-16 VITALS
TEMPERATURE: 100.2 F | WEIGHT: 160 LBS | HEART RATE: 123 BPM | OXYGEN SATURATION: 97 % | BODY MASS INDEX: 26.63 KG/M2 | DIASTOLIC BLOOD PRESSURE: 86 MMHG | SYSTOLIC BLOOD PRESSURE: 138 MMHG

## 2019-01-16 DIAGNOSIS — J44.1 COPD EXACERBATION (H): ICD-10-CM

## 2019-01-16 DIAGNOSIS — J44.1 COPD EXACERBATION (H): Primary | ICD-10-CM

## 2019-01-16 LAB
DIFFERENTIAL METHOD BLD: ABNORMAL
EOSINOPHIL # BLD AUTO: 0.3 10E9/L (ref 0–0.7)
EOSINOPHIL NFR BLD AUTO: 1 %
ERYTHROCYTE [DISTWIDTH] IN BLOOD BY AUTOMATED COUNT: 13 % (ref 10–15)
HCT VFR BLD AUTO: 44.7 % (ref 35–47)
HGB BLD-MCNC: 14.5 G/DL (ref 11.7–15.7)
LYMPHOCYTES # BLD AUTO: 0.8 10E9/L (ref 0.8–5.3)
LYMPHOCYTES NFR BLD AUTO: 3 %
MCH RBC QN AUTO: 33.1 PG (ref 26.5–33)
MCHC RBC AUTO-ENTMCNC: 32.4 G/DL (ref 31.5–36.5)
MCV RBC AUTO: 102 FL (ref 78–100)
MONOCYTES # BLD AUTO: 0.3 10E9/L (ref 0–1.3)
MONOCYTES NFR BLD AUTO: 1 %
NEUTROPHILS # BLD AUTO: 24.2 10E9/L (ref 1.6–8.3)
NEUTROPHILS NFR BLD AUTO: 92 %
NEUTS BAND # BLD AUTO: 0.8 10E9/L (ref 0–0.6)
NEUTS BAND NFR BLD MANUAL: 3 %
PLATELET # BLD AUTO: 204 10E9/L (ref 150–450)
PLATELET # BLD EST: ABNORMAL 10*3/UL
RBC # BLD AUTO: 4.38 10E12/L (ref 3.8–5.2)
RBC MORPH BLD: NORMAL
WBC # BLD AUTO: 26.4 10E9/L (ref 4–11)

## 2019-01-16 PROCEDURE — 85025 COMPLETE CBC W/AUTO DIFF WBC: CPT | Mod: ZL | Performed by: FAMILY MEDICINE

## 2019-01-16 PROCEDURE — 71046 X-RAY EXAM CHEST 2 VIEWS: CPT | Mod: TC,FY

## 2019-01-16 PROCEDURE — G0463 HOSPITAL OUTPT CLINIC VISIT: HCPCS | Mod: 25

## 2019-01-16 PROCEDURE — 36415 COLL VENOUS BLD VENIPUNCTURE: CPT | Mod: ZL | Performed by: FAMILY MEDICINE

## 2019-01-16 PROCEDURE — 99214 OFFICE O/P EST MOD 30 MIN: CPT | Performed by: FAMILY MEDICINE

## 2019-01-16 RX ORDER — AZITHROMYCIN 250 MG/1
TABLET, FILM COATED ORAL
Qty: 6 TABLET | Refills: 0 | Status: SHIPPED | OUTPATIENT
Start: 2019-01-16 | End: 2019-02-07

## 2019-01-16 RX ORDER — PREDNISONE 20 MG/1
TABLET ORAL
Qty: 20 TABLET | Refills: 0 | Status: SHIPPED | OUTPATIENT
Start: 2019-01-16 | End: 2019-03-13

## 2019-01-16 RX ORDER — METHYLPREDNISOLONE SOD SUCC 125 MG
125 VIAL (EA) INJECTION ONCE
Status: DISCONTINUED | OUTPATIENT
Start: 2019-01-16 | End: 2019-01-16

## 2019-01-16 RX ADMIN — METHYLPREDNISOLONE SODIUM SUCCINATE 125 MG: 125 INJECTION, POWDER, FOR SOLUTION INTRAMUSCULAR; INTRAVENOUS at 15:18

## 2019-01-16 ASSESSMENT — PAIN SCALES - GENERAL: PAINLEVEL: NO PAIN (0)

## 2019-01-16 NOTE — PROGRESS NOTES
SUBJECTIVE:                                                    Mary Alice Cameron is a 68 year old female who presents to clinic today for the following health issues:    RESPIRATORY SYMPTOMS      Duration: this morning    Description  fever, headache and fatigue/malaise    Severity: moderate    Accompanying signs and symptoms: SOB    History (predisposing factors):  COPD    Precipitating or alleviating factors: None    Therapies tried and outcome:  nebs      PROBLEMS TO ADD ON...    Problem list and histories reviewed & adjusted, as indicated.  Additional history: bad URI with a cold and cough.  Severe sob.  Worried about having to go to the hospital.  Took 40 of prednsione.      Patient Active Problem List   Diagnosis     Paroxysmal atrial fibrillation (H)     Pulmonary emphysema (H)     Bicuspid aortic valve     Mild major depression (H)     Acute bronchitis     Hypothyroidism, postablative     Advance care planning     Essential hypertension     Long-term (current) use of anticoagulants [Z79.01]     Acute on chronic respiratory failure (H)     Health Care Home     Status post coronary angiogram     Coronary artery disease involving native coronary artery of native heart without angina pectoris     Past Surgical History:   Procedure Laterality Date     BACK SURGERY       CARDIAC SURGERY  2018    Angiogram at [facility]     COLONOSCOPY N/A 2016    Procedure: COLONOSCOPY;  Surgeon: Waldemar Bob MD;  Location: [facility]     HYSTERECTOMY      partial     SLING BLADDER SUSPENSION WITH FASCIA LINNETTE         Social History     Tobacco Use     Smoking status: Former Smoker     Packs/day: 0.50     Years: 41.00     Pack years: 20.50     Types: Cigarettes     Start date: 1966     Last attempt to quit: 2007     Years since quittin.9     Smokeless tobacco: Never Used   Substance Use Topics     Alcohol use: No     Alcohol/week: 0.0 oz     Family History   Problem Relation Age of Onset      Prostate Cancer Father      Hypertension Father      Heart Failure Father         CHF     Asthma Mother      Cancer Mother         ovarian     Hypertension Mother      Asthma Brother      Hypertension Sister      Hypertension Brother          Current Outpatient Medications   Medication Sig Dispense Refill     acetaminophen (TYLENOL) 500 MG tablet Take 500-1,000 mg by mouth every 6 hours as needed for mild pain       ADVAIR -21 MCG/ACT Inhaler INHALE 2 PUFFS INTO THE LUNGS TWICE DAILY 36 g 2     albuterol (2.5 MG/3ML) 0.083% neb solution Take 1 vial (2.5 mg) by nebulization every 6 hours as needed for shortness of breath / dyspnea or wheezing 25 vial 1     atorvastatin (LIPITOR) 10 MG tablet Take 1 tablet (10 mg) by mouth At Bedtime 90 tablet 3     azithromycin (ZITHROMAX) 250 MG tablet Take 2 tablets (500 mg) by mouth daily for 1 day, THEN 1 tablet (250 mg) daily for 4 days. 6 tablet 0     Calcium Carb-Cholecalciferol (CALTRATE 600+D3 SOFT PO) Take 600 mg by mouth 2 times daily       cloNIDine (CATAPRES) 0.1 MG tablet Take 2 tablets (0.2 mg) by mouth At Bedtime 60 tablet 3     diltiazem 240 MG 24 hr capsule Take 1 capsule (240 mg) by mouth daily 90 capsule 3     diphenhydrAMINE (BENADRYL) 25 MG tablet Take 1-2 tablets (25-50 mg) by mouth every 6 hours as needed for itching or allergies 60 tablet 1     ELIQUIS 5 MG tablet TAKE 1 TABLET BY MOUTH TWICE DAILY 60 tablet 5     furosemide (LASIX) 40 MG tablet TAKE 1 TABLET BY MOUTH TWICE DAILY. 180 tablet 0     ipratropium - albuterol 0.5 mg/2.5 mg/3 mL (DUONEB) 0.5-2.5 (3) MG/3ML neb solution USE 1 VIAL PER NEBULIZER FOUR TIMES DAILY 1620 mL 0     levothyroxine (SYNTHROID/LEVOTHROID) 100 MCG tablet TAKE 1 TABLET(100 MCG) BY MOUTH DAILY 90 tablet 3     losartan (COZAAR) 50 MG tablet TAKE 1 TABLET BY MOUTH TWICE DAILY 60 tablet 1     OTHER MEDICAL SUPPLIES Apply one pair to help with edema 1 each 0     potassium chloride SA (K-DUR/KLOR-CON M) 20 MEQ CR tablet  Take 1 tablet (20 mEq) by mouth 2 times daily 180 tablet 3     predniSONE (DELTASONE) 20 MG tablet Take 60 mg by mouth daily for 3 days, THEN 40 mg daily for 3 days, THEN 20 mg daily for 3 days, THEN 10 mg daily for 3 days. 20 tablet 0     VENTOLIN  (90 Base) MCG/ACT inhaler INHALE 2 PUFFS INTO THE LUNGS EVERY 4 HOURS AS NEEDED FOR SHORTNESS OF BREATH OR DIFFICULT BREATHING OR WHEEZING 54 g 2     torsemide (DEMADEX) 20 MG tablet TAKE 2 TABLETS BY MOUTH EVERY DAY (Patient not taking: Reported on 1/16/2019) 120 tablet 5     Allergies   Allergen Reactions     Amlodipine Besylate Cough     Norvasc     Lisinopril Cough       ROS:  Constitutional, HEENT, cardiovascular, pulmonary, gi and gu systems are negative, except as otherwise noted.    OBJECTIVE:                                                    /86   Pulse 123   Temp 100.2  F (37.9  C)   Wt 72.6 kg (160 lb)   SpO2 97%   BMI 26.63 kg/m    Body mass index is 26.63 kg/m .  GENERAL APPEARANCE: Alert, no acute distress  CV: regular rate and rhythm, no murmur, rub or gallop  RESP: lungs clear to auscultation bilaterally with severely decreased breath sounds.   ABDOMEN: normal bowel sounds, soft, nontender, no hepatosplenomegaly or other masses  SKIN: no suspicious lesions or rashes to visualized skin  NEURO: Alert, oriented x 3, speech and mentation normal      cxr clear.  Cbc elevated wbc, marked, presumably due to prednisone     ASSESSMENT/PLAN:                                                    1. COPD exacerbation (H)  Solumedrol, prednisone, azithro, and close f/u with any worsening.  She understands the significance of this.   - XR CHEST 2 VW (Clinic Performed); Future  - CBC with platelets and differential  - azithromycin (ZITHROMAX) 250 MG tablet; Take 2 tablets (500 mg) by mouth daily for 1 day, THEN 1 tablet (250 mg) daily for 4 days.  Dispense: 6 tablet; Refill: 0  - predniSONE (DELTASONE) 20 MG tablet; Take 60 mg by mouth daily for 3 days,  THEN 40 mg daily for 3 days, THEN 20 mg daily for 3 days, THEN 10 mg daily for 3 days.  Dispense: 20 tablet; Refill: 0          Braydon Yen MD  [facility]

## 2019-01-16 NOTE — NURSING NOTE
"Chief Complaint   Patient presents with     URI       Initial /86   Pulse 123   Temp 100.2  F (37.9  C)   Wt 72.6 kg (160 lb)   SpO2 97%   BMI 26.63 kg/m   Estimated body mass index is 26.63 kg/m  as calculated from the following:    Height as of 10/29/18: 1.651 m (5' 5\").    Weight as of this encounter: 72.6 kg (160 lb).  Medication Reconciliation: complete    Joy Franklin LPN  "

## 2019-01-18 NOTE — TELEPHONE ENCOUNTER
Patient notified of below instructions.  Patient instructed to continue daily weight and if she experiences 2 # weight gain in 24 hours or 5 # in one week she is to report this to the cardiology office, also increasing edema or shortness of breath.  Patient instructed to discontinue the Demadex at this time. Patient agrees with plan and verbalized understanding.   Giana Andino RN-BSN

## 2019-01-28 ENCOUNTER — TELEPHONE (OUTPATIENT)
Dept: CARDIOLOGY | Facility: OTHER | Age: 69
End: 2019-01-28

## 2019-01-28 DIAGNOSIS — I10 ESSENTIAL HYPERTENSION: ICD-10-CM

## 2019-01-28 NOTE — TELEPHONE ENCOUNTER
Patient called and states she has been out of her atorvastatin (Lipitor) for 3 weeks.  She states that [facility] closed out her Rx there for Lipitor.  Informed patient that she should have refills remaining at the pharmacy and advised her I will call the pharmacy to find out why she did not receive this medication.  Patient states she would like the pharmacy to mail it to her house.      Call placed to Jodi, pharmacist at [facility], who states that patient had told her that she was no longer taking the Lipitor back in December do the medication was discontinued at the pharmacy.    Jodi, pharmacist, would like Lor, RN Care Coordination, to call [facility] tomorrow to do a medication reconciliation on all of her medications that she has prescribed from the different providers.  Jodi states she will mail patient out her Lipitor in the meantime.     Lor, can you please call to follow-up with one of the pharmacists at [facility] tomorrow to clarify her medications.

## 2019-01-30 NOTE — TELEPHONE ENCOUNTER
Clinic Care Coordination Contact  Care Team Conversations    CC phoned Jodi at [facility] and reconciled pt's med list.  The only med in question is the clonidine.  They have a script on file for clonidine 0.1mg 1 tab in the AM and 3 tabs in the PM.  CC advised it looks like her med list indicates she is doing 2 tabs at bedtime only and the last script we sent was 7/19/18 for 2 tabs at bedtime. Jodi reported they have an rx on file for the alternate dosing which appears to have been sent by one of the refill nurses in September 2018.  CC advised, will clarify with Dr Yen the intended dose and have him send a new script.      Per Jodi- question about Lipitor has already been addressed with previous conversation with Pau Nelson's nurse.    Lauren Pipkin, BS-RN   CHF and General Care Coordinator  [phone] Option # 1

## 2019-01-30 NOTE — TELEPHONE ENCOUNTER
I am fine with 2 at bedtime, but I think this was sent by Pau as well?  Can we ask cardiology please for this one to as I don't want to step on any toes.  If they defer to me current dosing is fine.  Thanks.  Braydon Yen

## 2019-02-01 RX ORDER — CLONIDINE HYDROCHLORIDE 0.1 MG/1
0.2 TABLET ORAL AT BEDTIME
Qty: 180 TABLET | Refills: 0 | Status: SHIPPED | OUTPATIENT
Start: 2019-02-01 | End: 2019-06-04

## 2019-02-01 NOTE — TELEPHONE ENCOUNTER
Pau Nelson, APRN CNP   You 20 hours ago (12:58 PM)      Hi, from us in cardiology- we last recommended she be on 0.2 mg at bed time.   Thanks,   Pau Nelson (Routing comment)

## 2019-02-05 ENCOUNTER — PATIENT OUTREACH (OUTPATIENT)
Dept: CARE COORDINATION | Facility: OTHER | Age: 69
End: 2019-02-05

## 2019-02-05 NOTE — PROGRESS NOTES
Clinic Care Coordination Contact  Care Team Conversations    CC attempted to reach Mary Alice again and with no response CC has requested [facility]'s office do a welfare check.  They will keep us updated.    CC has advised Dr Yen's nurse this was done and will keep them updated as well.    Lauren Pipkin, BS-RN   CHF and General Care Coordinator  [phone] Option # 1

## 2019-02-05 NOTE — PROGRESS NOTES
Clinic Care Coordination Contact  Care Team Conversations    CC notes pt has not entered data into [employer] since 2/2/19- system alerting due to no entries.  Checked this AM and no data entered.  Waited to see if she entered today but since no data entered, CC phoned pt to check in.  Left a message on her voicemail requesting a call back.    CC also attempted to reach her emergency contact, Ksenia Piedra.  Ksenia states she spoke with Mary Alice last on Friday 2/1 and everything was fine.  CC advised we are a bit concerned as she it isn't like her to miss entries on the system.  Ksenia states she will try to call and text Mary Alice and will have her call CC back if she is able to reach her.    CC will wait until 5PM and then will request a precautionary welfare check by law enforcement if we don't hear back.    Lauren Pipkin, BS-RN   CHF and General Care Coordinator  [phone] Option # 1

## 2019-02-06 NOTE — PROGRESS NOTES
Clinic Care Coordination Contact  Care Team Conversations    CC notes the 's office called back after hrs yesterday to let us know they did make contact with Mary Alice and she is doing fine.  She told them she would be in contact with us.    Lauren Pipkin, BS-RN   CHF and General Care Coordinator  [phone] Option # 1

## 2019-02-07 ENCOUNTER — OFFICE VISIT (OUTPATIENT)
Dept: FAMILY MEDICINE | Facility: OTHER | Age: 69
End: 2019-02-07
Attending: FAMILY MEDICINE
Payer: COMMERCIAL

## 2019-02-07 VITALS
SYSTOLIC BLOOD PRESSURE: 108 MMHG | TEMPERATURE: 98.1 F | WEIGHT: 155 LBS | BODY MASS INDEX: 25.79 KG/M2 | OXYGEN SATURATION: 96 % | HEART RATE: 95 BPM | DIASTOLIC BLOOD PRESSURE: 78 MMHG

## 2019-02-07 DIAGNOSIS — J44.1 COPD EXACERBATION (H): Primary | ICD-10-CM

## 2019-02-07 PROCEDURE — G0463 HOSPITAL OUTPT CLINIC VISIT: HCPCS

## 2019-02-07 PROCEDURE — 99213 OFFICE O/P EST LOW 20 MIN: CPT | Performed by: FAMILY MEDICINE

## 2019-02-07 RX ORDER — PREDNISONE 20 MG/1
TABLET ORAL
Qty: 20 TABLET | Refills: 0 | Status: SHIPPED | OUTPATIENT
Start: 2019-02-07 | End: 2019-03-13

## 2019-02-07 RX ORDER — DOXYCYCLINE 100 MG/1
100 CAPSULE ORAL 2 TIMES DAILY
Qty: 28 CAPSULE | Refills: 0 | Status: SHIPPED | OUTPATIENT
Start: 2019-02-07 | End: 2019-03-13

## 2019-02-07 NOTE — PROGRESS NOTES
SUBJECTIVE:                                                    Mary Alice Cameron is a 68 year old female who presents to clinic today for the following health issues:    RESPIRATORY SYMPTOMS      Duration: 5 days    Description  nasal congestion, cough, wheezing, headache and fatigue/malaise    Severity: moderate    Accompanying signs and symptoms: chest congestion    History (predisposing factors):  COPD    Precipitating or alleviating factors: None    Therapies tried and outcome:  Nebs and inhaler, prednisone      PROBLEMS TO ADD ON...    Problem list and histories reviewed & adjusted, as indicated.  Additional history: got a cold 5 days ago.  Yellow phlegm, cough, and sob.  Doing well otherwise.     Patient Active Problem List   Diagnosis     Paroxysmal atrial fibrillation (H)     Pulmonary emphysema (H)     Bicuspid aortic valve     Mild major depression (H)     Acute bronchitis     Hypothyroidism, postablative     Advance care planning     Essential hypertension     Long-term (current) use of anticoagulants [Z79.01]     Acute on chronic respiratory failure (H)     Health Care Home     Status post coronary angiogram     Coronary artery disease involving native coronary artery of native heart without angina pectoris     Past Surgical History:   Procedure Laterality Date     BACK SURGERY       CARDIAC SURGERY  2018    Angiogram at [facility]     COLONOSCOPY N/A 2016    Procedure: COLONOSCOPY;  Surgeon: Waldemar Bob MD;  Location: [facility]     HYSTERECTOMY      partial     SLING BLADDER SUSPENSION WITH FASCIA LINNETTE         Social History     Tobacco Use     Smoking status: Former Smoker     Packs/day: 0.50     Years: 41.00     Pack years: 20.50     Types: Cigarettes     Start date: 1966     Last attempt to quit: 2007     Years since quittin.0     Smokeless tobacco: Never Used   Substance Use Topics     Alcohol use: No     Alcohol/week: 0.0 oz     Family History   Problem  Relation Age of Onset     Prostate Cancer Father      Hypertension Father      Heart Failure Father         CHF     Asthma Mother      Cancer Mother         ovarian     Hypertension Mother      Asthma Brother      Hypertension Sister      Hypertension Brother          Current Outpatient Medications   Medication Sig Dispense Refill     acetaminophen (TYLENOL) 500 MG tablet Take 500-1,000 mg by mouth every 6 hours as needed for mild pain       ADVAIR -21 MCG/ACT Inhaler INHALE 2 PUFFS INTO THE LUNGS TWICE DAILY 36 g 2     albuterol (2.5 MG/3ML) 0.083% neb solution Take 1 vial (2.5 mg) by nebulization every 6 hours as needed for shortness of breath / dyspnea or wheezing 25 vial 1     atorvastatin (LIPITOR) 10 MG tablet Take 1 tablet (10 mg) by mouth At Bedtime 90 tablet 3     Calcium Carb-Cholecalciferol (CALTRATE 600+D3 SOFT PO) Take 600 mg by mouth 2 times daily       cloNIDine (CATAPRES) 0.1 MG tablet Take 2 tablets (0.2 mg) by mouth At Bedtime 180 tablet 0     diltiazem 240 MG 24 hr capsule Take 1 capsule (240 mg) by mouth daily 90 capsule 3     diphenhydrAMINE (BENADRYL) 25 MG tablet Take 1-2 tablets (25-50 mg) by mouth every 6 hours as needed for itching or allergies 60 tablet 1     doxycycline hyclate (VIBRAMYCIN) 100 MG capsule Take 1 capsule (100 mg) by mouth 2 times daily for 14 days 28 capsule 0     ELIQUIS 5 MG tablet TAKE 1 TABLET BY MOUTH TWICE DAILY 60 tablet 5     furosemide (LASIX) 40 MG tablet TAKE 1 TABLET BY MOUTH TWICE DAILY. 180 tablet 0     ipratropium - albuterol 0.5 mg/2.5 mg/3 mL (DUONEB) 0.5-2.5 (3) MG/3ML neb solution USE 1 VIAL PER NEBULIZER FOUR TIMES DAILY 1620 mL 0     levothyroxine (SYNTHROID/LEVOTHROID) 100 MCG tablet TAKE 1 TABLET(100 MCG) BY MOUTH DAILY 90 tablet 3     losartan (COZAAR) 50 MG tablet TAKE 1 TABLET BY MOUTH TWICE DAILY 60 tablet 1     OTHER MEDICAL SUPPLIES Apply one pair to help with edema 1 each 0     potassium chloride SA (K-DUR/KLOR-CON M) 20 MEQ CR tablet  Take 1 tablet (20 mEq) by mouth 2 times daily 180 tablet 3     predniSONE (DELTASONE) 20 MG tablet Take 60 mg by mouth daily for 3 days, THEN 40 mg daily for 3 days, THEN 20 mg daily for 3 days, THEN 10 mg daily for 3 days. 20 tablet 0     VENTOLIN  (90 Base) MCG/ACT inhaler INHALE 2 PUFFS INTO THE LUNGS EVERY 4 HOURS AS NEEDED FOR SHORTNESS OF BREATH OR DIFFICULT BREATHING OR WHEEZING 54 g 2     Allergies   Allergen Reactions     Amlodipine Besylate Cough     Norvasc     Lisinopril Cough       ROS:  Constitutional, HEENT, cardiovascular, pulmonary, gi and gu systems are negative, except as otherwise noted.    OBJECTIVE:                                                    /78   Pulse 95   Temp 98.1  F (36.7  C)   Wt 70.3 kg (155 lb)   SpO2 96%   BMI 25.79 kg/m    Body mass index is 25.79 kg/m .  GENERAL APPEARANCE: Alert, no acute distress  CV: regular rate and rhythm, no murmur, rub or gallop  RESP: lungs clear to auscultation bilaterally with severely decreased breath sounds.   ABDOMEN: normal bowel sounds, soft, nontender, no hepatosplenomegaly or other masses  SKIN: no suspicious lesions or rashes to visualized skin  NEURO: Alert, oriented x 3, speech and mentation normal           ASSESSMENT/PLAN:                                                    1. COPD exacerbation (H)  Reviewed.  Doing same except alternating abx.  She already started the steroid.  Discussed lack of workup, as this is the same as always and wont' change the management.  F/u routine.    - predniSONE (DELTASONE) 20 MG tablet; Take 60 mg by mouth daily for 3 days, THEN 40 mg daily for 3 days, THEN 20 mg daily for 3 days, THEN 10 mg daily for 3 days.  Dispense: 20 tablet; Refill: 0  - doxycycline hyclate (VIBRAMYCIN) 100 MG capsule; Take 1 capsule (100 mg) by mouth 2 times daily for 14 days  Dispense: 28 capsule; Refill: 0          Braydon Yen MD  [facility]

## 2019-02-07 NOTE — NURSING NOTE
"Chief Complaint   Patient presents with     COPD       Initial /78   Pulse 95   Temp 98.1  F (36.7  C)   Wt 70.3 kg (155 lb)   SpO2 96%   BMI 25.79 kg/m   Estimated body mass index is 25.79 kg/m  as calculated from the following:    Height as of 10/29/18: 1.651 m (5' 5\").    Weight as of this encounter: 70.3 kg (155 lb).  Medication Reconciliation: complete    Joy Franklin LPN  "

## 2019-02-11 ENCOUNTER — PATIENT OUTREACH (OUTPATIENT)
Dept: CARE COORDINATION | Facility: OTHER | Age: 69
End: 2019-02-11

## 2019-02-11 NOTE — PROGRESS NOTES
Clinic Care Coordination Contact  Care Team Conversations    Care Coordinator phoned Mary Alice today to follow-up on her visit with Dr Yen for COPD exacerbation.  She states she is taking the antibiotic and prednisone as directed.  She is using her nebulizer 4 times daily.  She feels her SOB is improving some but she continues to have a good deal of yellow mucus that she is coughing up.  She states she can't overdo things and has to rest frequently.      CC inquired about here interest in continuing to use the [employer] system for her CHF.  She would like to inactivate her account for now.  She feels it is helpful but she is ready for a break.  She states she really was exhausted prior to CC sending out the  for a welfare check.  She states she didn't want to even get up to eat much less have any interest in entering the data.  She has been using Ensure.      Overall, Mary Alice feels these COPD exacerbation episodes seem worse with each occurrence.  She was completely wiped out.  We discussed good handwashing and avoiding going to places where she may encounter more ill people.    Also use of sanitizing wipes at the grocery store, etc.      CC will update cardiology that she is putting her [employer] account on hold for now.      CC has encouraged Mary Alice to reach out later this week should she find things aren't continuing to improve on the antibiotic and prednisone.  Pt verbalized understanding.      Lauren Pipkin, BS-RN   CHF and General Care Coordinator  [phone] Option # 1

## 2019-02-24 ENCOUNTER — MYC MEDICAL ADVICE (OUTPATIENT)
Dept: FAMILY MEDICINE | Facility: OTHER | Age: 69
End: 2019-02-24

## 2019-03-03 DIAGNOSIS — I10 ESSENTIAL HYPERTENSION: ICD-10-CM

## 2019-03-05 RX ORDER — LOSARTAN POTASSIUM 50 MG/1
TABLET ORAL
Qty: 60 TABLET | Refills: 5 | Status: SHIPPED | OUTPATIENT
Start: 2019-03-05 | End: 2019-08-15

## 2019-03-07 DIAGNOSIS — J44.1 COPD EXACERBATION (H): ICD-10-CM

## 2019-03-08 NOTE — TELEPHONE ENCOUNTER
ipratropium - albuterol 0.5 mg/2.5 mg/3 mL (DUONEB) 0.5-2.5 (3) MG/3ML neb solution    Last Written Prescription Date:  12/6/18  Last Fill Quantity: 1620ml,   # refills: 0  Last Office Visit: 2/7/19  Future Office visit:    Next 5 appointments (look out 90 days)    Apr 23, 2019 10:00 AM CDT  (Arrive by 9:45 AM)  Return Visit with Eliezer Myers MD  [facility] ([facility] ) [address]  [place] [address]  [phone]

## 2019-03-11 RX ORDER — IPRATROPIUM BROMIDE AND ALBUTEROL SULFATE 2.5; .5 MG/3ML; MG/3ML
SOLUTION RESPIRATORY (INHALATION)
Qty: 1620 ML | Refills: 0 | Status: SHIPPED | OUTPATIENT
Start: 2019-03-11 | End: 2019-04-23

## 2019-03-12 ENCOUNTER — MYC MEDICAL ADVICE (OUTPATIENT)
Dept: FAMILY MEDICINE | Facility: OTHER | Age: 69
End: 2019-03-12

## 2019-03-12 DIAGNOSIS — J44.1 COPD EXACERBATION (H): ICD-10-CM

## 2019-03-13 RX ORDER — PREDNISONE 20 MG/1
TABLET ORAL
Qty: 20 TABLET | Refills: 0 | Status: SHIPPED | OUTPATIENT
Start: 2019-03-13 | End: 2019-03-26

## 2019-03-17 ENCOUNTER — MYC MEDICAL ADVICE (OUTPATIENT)
Dept: FAMILY MEDICINE | Facility: OTHER | Age: 69
End: 2019-03-17

## 2019-03-18 ENCOUNTER — OFFICE VISIT (OUTPATIENT)
Dept: FAMILY MEDICINE | Facility: OTHER | Age: 69
End: 2019-03-18
Attending: FAMILY MEDICINE
Payer: MEDICARE

## 2019-03-18 VITALS
HEART RATE: 90 BPM | DIASTOLIC BLOOD PRESSURE: 72 MMHG | WEIGHT: 157 LBS | TEMPERATURE: 99.5 F | OXYGEN SATURATION: 97 % | BODY MASS INDEX: 26.13 KG/M2 | SYSTOLIC BLOOD PRESSURE: 122 MMHG

## 2019-03-18 DIAGNOSIS — J44.1 COPD WITH ACUTE EXACERBATION (H): Primary | ICD-10-CM

## 2019-03-18 PROCEDURE — 96372 THER/PROPH/DIAG INJ SC/IM: CPT | Performed by: FAMILY MEDICINE

## 2019-03-18 PROCEDURE — G0463 HOSPITAL OUTPT CLINIC VISIT: HCPCS | Mod: 25

## 2019-03-18 PROCEDURE — 99213 OFFICE O/P EST LOW 20 MIN: CPT | Performed by: FAMILY MEDICINE

## 2019-03-18 RX ORDER — ALBUTEROL SULFATE 90 UG/1
AEROSOL, METERED RESPIRATORY (INHALATION)
Refills: 0 | COMMUNITY
Start: 2019-03-08 | End: 2019-04-23

## 2019-03-18 RX ORDER — METHYLPREDNISOLONE SOD SUCC 125 MG
125 VIAL (EA) INJECTION ONCE
Status: COMPLETED | OUTPATIENT
Start: 2019-03-18 | End: 2019-03-18

## 2019-03-18 RX ORDER — METHYLPREDNISOLONE SODIUM SUCCINATE 40 MG/ML
80 INJECTION, POWDER, LYOPHILIZED, FOR SOLUTION INTRAMUSCULAR; INTRAVENOUS ONCE
Status: DISCONTINUED | OUTPATIENT
Start: 2019-03-18 | End: 2019-03-18 | Stop reason: DRUGHIGH

## 2019-03-18 RX ORDER — DOXYCYCLINE 100 MG/1
100 CAPSULE ORAL 2 TIMES DAILY
Qty: 28 CAPSULE | Refills: 1 | Status: SHIPPED | OUTPATIENT
Start: 2019-03-18 | End: 2019-03-26

## 2019-03-18 RX ADMIN — METHYLPREDNISOLONE SODIUM SUCCINATE 125 MG: 125 INJECTION, POWDER, FOR SOLUTION INTRAMUSCULAR; INTRAVENOUS at 11:44

## 2019-03-18 ASSESSMENT — PAIN SCALES - GENERAL: PAINLEVEL: NO PAIN (0)

## 2019-03-18 NOTE — PROGRESS NOTES
Prior to injection, verified patient identity using patient's name and date of birth.      solu-medrol    Drug Amount Wasted:  None.  Vial/Syringe: Single dose vial  Expiration Date:  07/2021      Bernadine Story

## 2019-03-18 NOTE — PROGRESS NOTES
SUBJECTIVE:                                                    Mary Alice Cameron is a 68 year old female who presents to clinic today for the following health issues:      COPD Follow-Up    Symptoms are currently: slightly worsened, feels she has a lot of fluid around the abdomen area    Current fatigue or dyspnea with ambulation: worsened from baseline    Shortness of breath: much worse    Increased or change in Cough/Sputum: Yes-  Turned to yellow in color. Hard to cough up    Fever(s): No    Baseline ambulation without stopping to rest:  20 feet. Able to walk up 0     flights of stairs without stopping to rest.    Any ER/UC or hospital admissions since your last visit? No     History   Smoking Status     Former Smoker     Packs/day: 0.50     Years: 41.00     Types: Cigarettes     Start date: 1/1/1966     Quit date: 1/28/2007   Smokeless Tobacco     Never Used     No results found for: FEV1, CFP8YSA    Amount of exercise or physical activity: None    Problems taking medications regularly: No    Medication side effects: none    Diet: low salt        PROBLEMS TO ADD ON...    Problem list and histories reviewed & adjusted, as indicated.  Additional history: doing ok but still coughing.  On the prednisone now and tapering as usual.  Feels sob but the cough is the worst.  Now having yellow sputum.      Patient Active Problem List   Diagnosis     Paroxysmal atrial fibrillation (H)     Pulmonary emphysema (H)     Bicuspid aortic valve     Mild major depression (H)     Acute bronchitis     Hypothyroidism, postablative     Advance care planning     Essential hypertension     Long-term (current) use of anticoagulants [Z79.01]     Acute on chronic respiratory failure (H)     Health Care Home     Status post coronary angiogram     Coronary artery disease involving native coronary artery of native heart without angina pectoris     Past Surgical History:   Procedure Laterality Date     BACK SURGERY  2006     CARDIAC SURGERY   2018    Angiogram at [facility] in [place]     COLONOSCOPY N/A 2016    Procedure: COLONOSCOPY;  Surgeon: Waldemar Bob MD;  Location: [facility]     HYSTERECTOMY      partial     SLING BLADDER SUSPENSION WITH FASCIA LINNETTE         Social History     Tobacco Use     Smoking status: Former Smoker     Packs/day: 0.50     Years: 41.00     Pack years: 20.50     Types: Cigarettes     Start date: 1966     Last attempt to quit: 2007     Years since quittin.1     Smokeless tobacco: Never Used   Substance Use Topics     Alcohol use: No     Alcohol/week: 0.0 oz     Family History   Problem Relation Age of Onset     Prostate Cancer Father      Hypertension Father      Heart Failure Father         CHF     Asthma Mother      Cancer Mother         ovarian     Hypertension Mother      Asthma Brother      Hypertension Sister      Hypertension Brother          Current Outpatient Medications   Medication Sig Dispense Refill     acetaminophen (TYLENOL) 500 MG tablet Take 500-1,000 mg by mouth every 6 hours as needed for mild pain       ADVAIR -21 MCG/ACT Inhaler INHALE 2 PUFFS INTO THE LUNGS TWICE DAILY 36 g 2     albuterol (2.5 MG/3ML) 0.083% neb solution Take 1 vial (2.5 mg) by nebulization every 6 hours as needed for shortness of breath / dyspnea or wheezing 25 vial 1     albuterol (PROAIR HFA/PROVENTIL HFA/VENTOLIN HFA) 108 (90 Base) MCG/ACT inhaler INL 2 PFS ITL Q 4 H PRF SOB OR DIFFICULT BREATHING OR WHZ  0     atorvastatin (LIPITOR) 10 MG tablet Take 1 tablet (10 mg) by mouth At Bedtime 90 tablet 3     Calcium Carb-Cholecalciferol (CALTRATE 600+D3 SOFT PO) Take 600 mg by mouth 2 times daily       cloNIDine (CATAPRES) 0.1 MG tablet Take 2 tablets (0.2 mg) by mouth At Bedtime 180 tablet 0     diltiazem 240 MG 24 hr capsule Take 1 capsule (240 mg) by mouth daily 90 capsule 3     diphenhydrAMINE (BENADRYL) 25 MG tablet Take 1-2 tablets (25-50 mg) by mouth every 6 hours as needed for itching or  allergies 60 tablet 1     doxycycline hyclate (VIBRAMYCIN) 100 MG capsule Take 1 capsule (100 mg) by mouth 2 times daily 28 capsule 1     ELIQUIS 5 MG tablet TAKE 1 TABLET BY MOUTH TWICE DAILY 60 tablet 5     furosemide (LASIX) 40 MG tablet TAKE 1 TABLET BY MOUTH TWICE DAILY. 180 tablet 0     ipratropium - albuterol 0.5 mg/2.5 mg/3 mL (DUONEB) 0.5-2.5 (3) MG/3ML neb solution USE 1 VIAL PER NEBULIZER FOUR TIMES DAILY 1620 mL 0     levothyroxine (SYNTHROID/LEVOTHROID) 100 MCG tablet TAKE 1 TABLET(100 MCG) BY MOUTH DAILY 90 tablet 3     losartan (COZAAR) 50 MG tablet TAKE 1 TABLET BY MOUTH TWICE DAILY 60 tablet 5     OTHER MEDICAL SUPPLIES Apply one pair to help with edema 1 each 0     potassium chloride SA (K-DUR/KLOR-CON M) 20 MEQ CR tablet Take 1 tablet (20 mEq) by mouth 2 times daily 180 tablet 3     predniSONE (DELTASONE) 20 MG tablet Take 60 mg by mouth daily for 3 days, THEN 40 mg daily for 3 days, THEN 20 mg daily for 3 days, THEN 10 mg daily for 3 days. 20 tablet 0     Allergies   Allergen Reactions     Amlodipine Besylate Cough     Norvasc     Lisinopril Cough       ROS:  Constitutional, HEENT, cardiovascular, pulmonary, gi and gu systems are negative, except as otherwise noted.    OBJECTIVE:                                                    /72   Pulse 90   Temp 99.5  F (37.5  C) (Tympanic)   Wt 71.2 kg (157 lb)   SpO2 97%   BMI 26.13 kg/m    Body mass index is 26.13 kg/m .  GENERAL APPEARANCE: Alert, no acute distress  CV: regular rate and rhythm, no murmur, rub or gallop  RESP: decreased breath sounds.  No crackles.  Occasional tiny wheeze upper lobes.    ABDOMEN: normal bowel sounds, soft, nontender, no hepatosplenomegaly or other masses  SKIN: no suspicious lesions or rashes to visualized skin  NEURO: Alert, oriented x 3, speech and mentation normal           ASSESSMENT/PLAN:                                                    1. COPD with acute exacerbation (H)  Reviewed at some length.   These are getting a bit more frequent.  Certainly with the season some of this is expected.  For now, finish the prednisone, boost given of the solumedrol at her request.   Add doxy.  Facilitate Dr. Glover to help with this going forward.  Any worsening prompt recheck.    - PULMONARY MEDICINE REFERRAL  - doxycycline hyclate (VIBRAMYCIN) 100 MG capsule; Take 1 capsule (100 mg) by mouth 2 times daily  Dispense: 28 capsule; Refill: 1  - methylPREDNISolone sodium succinate (solu-MEDROL) injection 125 mg          Braydon Yen MD  [facility]

## 2019-03-18 NOTE — NURSING NOTE
"Chief Complaint   Patient presents with     COPD       Initial /72   Pulse 90   Temp 99.5  F (37.5  C) (Tympanic)   Wt 71.2 kg (157 lb)   SpO2 97%   BMI 26.13 kg/m   Estimated body mass index is 26.13 kg/m  as calculated from the following:    Height as of 10/29/18: 1.651 m (5' 5\").    Weight as of this encounter: 71.2 kg (157 lb).  Medication Reconciliation: complete    Bernadine Story MA    "

## 2019-03-19 ENCOUNTER — HOSPITAL ENCOUNTER (EMERGENCY)
Facility: HOSPITAL | Age: 69
Discharge: HOME | End: 2019-03-19
Attending: FAMILY MEDICINE | Admitting: FAMILY MEDICINE
Payer: MEDICARE

## 2019-03-19 ENCOUNTER — APPOINTMENT (OUTPATIENT)
Dept: GENERAL RADIOLOGY | Facility: HOSPITAL | Age: 69
End: 2019-03-19
Attending: FAMILY MEDICINE
Payer: MEDICARE

## 2019-03-19 VITALS
TEMPERATURE: 97.9 F | WEIGHT: 155.65 LBS | HEART RATE: 102 BPM | BODY MASS INDEX: 25.93 KG/M2 | DIASTOLIC BLOOD PRESSURE: 66 MMHG | HEIGHT: 65 IN | SYSTOLIC BLOOD PRESSURE: 99 MMHG | RESPIRATION RATE: 18 BRPM | OXYGEN SATURATION: 95 %

## 2019-03-19 DIAGNOSIS — J81.0 ACUTE PULMONARY EDEMA (H): ICD-10-CM

## 2019-03-19 DIAGNOSIS — R60.0 PEDAL EDEMA: ICD-10-CM

## 2019-03-19 DIAGNOSIS — J44.1 COPD EXACERBATION (H): ICD-10-CM

## 2019-03-19 DIAGNOSIS — I48.91 ATRIAL FIBRILLATION WITH RVR (H): Primary | ICD-10-CM

## 2019-03-19 LAB
ALBUMIN SERPL-MCNC: 3.3 G/DL (ref 3.4–5)
ALP SERPL-CCNC: 26 U/L (ref 40–150)
ALT SERPL W P-5'-P-CCNC: 24 U/L (ref 0–50)
ANION GAP SERPL CALCULATED.3IONS-SCNC: 7 MMOL/L (ref 3–14)
AST SERPL W P-5'-P-CCNC: 25 U/L (ref 0–45)
BASOPHILS # BLD AUTO: 0 10E9/L (ref 0–0.2)
BASOPHILS NFR BLD AUTO: 0.1 %
BILIRUB SERPL-MCNC: 0.9 MG/DL (ref 0.2–1.3)
BUN SERPL-MCNC: 18 MG/DL (ref 7–30)
CALCIUM SERPL-MCNC: 9 MG/DL (ref 8.5–10.1)
CHLORIDE SERPL-SCNC: 99 MMOL/L (ref 94–109)
CO2 SERPL-SCNC: 30 MMOL/L (ref 20–32)
CREAT SERPL-MCNC: 0.82 MG/DL (ref 0.52–1.04)
DIFFERENTIAL METHOD BLD: ABNORMAL
EOSINOPHIL # BLD AUTO: 0 10E9/L (ref 0–0.7)
EOSINOPHIL NFR BLD AUTO: 0 %
ERYTHROCYTE [DISTWIDTH] IN BLOOD BY AUTOMATED COUNT: 13.2 % (ref 10–15)
GFR SERPL CREATININE-BSD FRML MDRD: 73 ML/MIN/{1.73_M2}
GLUCOSE SERPL-MCNC: 141 MG/DL (ref 70–99)
HCT VFR BLD AUTO: 40 % (ref 35–47)
HGB BLD-MCNC: 13.6 G/DL (ref 11.7–15.7)
IMM GRANULOCYTES # BLD: 0.1 10E9/L (ref 0–0.4)
IMM GRANULOCYTES NFR BLD: 0.5 %
LACTATE BLD-SCNC: 1.8 MMOL/L (ref 0.7–2)
LYMPHOCYTES # BLD AUTO: 0.2 10E9/L (ref 0.8–5.3)
LYMPHOCYTES NFR BLD AUTO: 1.5 %
MCH RBC QN AUTO: 34 PG (ref 26.5–33)
MCHC RBC AUTO-ENTMCNC: 34 G/DL (ref 31.5–36.5)
MCV RBC AUTO: 100 FL (ref 78–100)
MONOCYTES # BLD AUTO: 0.7 10E9/L (ref 0–1.3)
MONOCYTES NFR BLD AUTO: 4.5 %
NEUTROPHILS # BLD AUTO: 14.5 10E9/L (ref 1.6–8.3)
NEUTROPHILS NFR BLD AUTO: 93.4 %
NRBC # BLD AUTO: 0 10*3/UL
NRBC BLD AUTO-RTO: 0 /100
NT-PROBNP SERPL-MCNC: 2458 PG/ML (ref 0–900)
PLATELET # BLD AUTO: 194 10E9/L (ref 150–450)
POTASSIUM SERPL-SCNC: 4.1 MMOL/L (ref 3.4–5.3)
PROT SERPL-MCNC: 6.5 G/DL (ref 6.8–8.8)
RBC # BLD AUTO: 4 10E12/L (ref 3.8–5.2)
SODIUM SERPL-SCNC: 136 MMOL/L (ref 133–144)
TROPONIN I SERPL-MCNC: <0.015 UG/L (ref 0–0.04)
WBC # BLD AUTO: 15.6 10E9/L (ref 4–11)

## 2019-03-19 PROCEDURE — 85025 COMPLETE CBC W/AUTO DIFF WBC: CPT | Performed by: FAMILY MEDICINE

## 2019-03-19 PROCEDURE — 25000125 ZZHC RX 250: Performed by: FAMILY MEDICINE

## 2019-03-19 PROCEDURE — 83880 ASSAY OF NATRIURETIC PEPTIDE: CPT | Performed by: FAMILY MEDICINE

## 2019-03-19 PROCEDURE — 99285 EMERGENCY DEPT VISIT HI MDM: CPT | Mod: Z6 | Performed by: FAMILY MEDICINE

## 2019-03-19 PROCEDURE — 99285 EMERGENCY DEPT VISIT HI MDM: CPT | Mod: 25

## 2019-03-19 PROCEDURE — 93010 ELECTROCARDIOGRAM REPORT: CPT | Performed by: INTERNAL MEDICINE

## 2019-03-19 PROCEDURE — 80053 COMPREHEN METABOLIC PANEL: CPT | Performed by: FAMILY MEDICINE

## 2019-03-19 PROCEDURE — 36415 COLL VENOUS BLD VENIPUNCTURE: CPT | Performed by: FAMILY MEDICINE

## 2019-03-19 PROCEDURE — 84484 ASSAY OF TROPONIN QUANT: CPT

## 2019-03-19 PROCEDURE — 93005 ELECTROCARDIOGRAM TRACING: CPT

## 2019-03-19 PROCEDURE — 71045 X-RAY EXAM CHEST 1 VIEW: CPT | Mod: TC

## 2019-03-19 PROCEDURE — 96375 TX/PRO/DX INJ NEW DRUG ADDON: CPT

## 2019-03-19 PROCEDURE — 96374 THER/PROPH/DIAG INJ IV PUSH: CPT

## 2019-03-19 PROCEDURE — 25000128 H RX IP 250 OP 636: Performed by: FAMILY MEDICINE

## 2019-03-19 PROCEDURE — 96376 TX/PRO/DX INJ SAME DRUG ADON: CPT

## 2019-03-19 PROCEDURE — 83605 ASSAY OF LACTIC ACID: CPT | Performed by: FAMILY MEDICINE

## 2019-03-19 RX ORDER — ONDANSETRON 2 MG/ML
4 INJECTION INTRAMUSCULAR; INTRAVENOUS ONCE
Status: COMPLETED | OUTPATIENT
Start: 2019-03-19 | End: 2019-03-19

## 2019-03-19 RX ORDER — FUROSEMIDE 10 MG/ML
40 INJECTION INTRAMUSCULAR; INTRAVENOUS ONCE
Status: COMPLETED | OUTPATIENT
Start: 2019-03-19 | End: 2019-03-19

## 2019-03-19 RX ORDER — METOPROLOL TARTRATE 1 MG/ML
5 INJECTION, SOLUTION INTRAVENOUS ONCE
Status: COMPLETED | OUTPATIENT
Start: 2019-03-19 | End: 2019-03-19

## 2019-03-19 RX ORDER — DILTIAZEM HYDROCHLORIDE 5 MG/ML
10 INJECTION INTRAVENOUS ONCE
Status: DISCONTINUED | OUTPATIENT
Start: 2019-03-19 | End: 2019-03-19

## 2019-03-19 RX ORDER — DILTIAZEM HYDROCHLORIDE 5 MG/ML
15 INJECTION INTRAVENOUS ONCE
Status: COMPLETED | OUTPATIENT
Start: 2019-03-19 | End: 2019-03-19

## 2019-03-19 RX ORDER — DILTIAZEM HYDROCHLORIDE 5 MG/ML
10 INJECTION INTRAVENOUS ONCE
Status: COMPLETED | OUTPATIENT
Start: 2019-03-19 | End: 2019-03-19

## 2019-03-19 RX ADMIN — METOPROLOL TARTRATE 5 MG: 1 INJECTION, SOLUTION INTRAVENOUS at 18:49

## 2019-03-19 RX ADMIN — ONDANSETRON 4 MG: 2 INJECTION INTRAMUSCULAR; INTRAVENOUS at 17:12

## 2019-03-19 RX ADMIN — DILTIAZEM HYDROCHLORIDE 10 MG: 5 INJECTION INTRAVENOUS at 16:21

## 2019-03-19 RX ADMIN — FUROSEMIDE 40 MG: 10 INJECTION, SOLUTION INTRAMUSCULAR; INTRAVENOUS at 17:21

## 2019-03-19 RX ADMIN — DILTIAZEM HYDROCHLORIDE 15 MG: 5 INJECTION INTRAVENOUS at 17:16

## 2019-03-19 RX ADMIN — METOPROLOL TARTRATE 5 MG: 1 INJECTION, SOLUTION INTRAVENOUS at 18:11

## 2019-03-19 RX ADMIN — METOPROLOL TARTRATE 5 MG: 1 INJECTION, SOLUTION INTRAVENOUS at 19:56

## 2019-03-19 ASSESSMENT — MIFFLIN-ST. JEOR
SCORE: 1211.28
SCORE: 1236.88

## 2019-03-19 ASSESSMENT — ENCOUNTER SYMPTOMS
NECK STIFFNESS: 0
HEADACHES: 0
ABDOMINAL PAIN: 0
FEVER: 0
EYE REDNESS: 0
CHEST TIGHTNESS: 0
DIFFICULTY URINATING: 0
ARTHRALGIAS: 0
CONFUSION: 0
COLOR CHANGE: 0

## 2019-03-19 NOTE — ED PROVIDER NOTES
History     Chief Complaint   Patient presents with     Copd Exacerbation     Atrial Fib     HPI  Mary Alice Cameron is a 68 year old woman with history of HTN, COPD and CAD with atrial fibrillation with , started yesterday on treatment for COPD exacerbation (with doxycycline and prednisone) who presents by EMS with palpitations and acute shortness of breath that began approximately 1 hour PTA. She reports 4 - 5 days of increased pedal edema, progressive SOB and cough productive of thick sputum. No fevers/chills.    Allergies:  Allergies   Allergen Reactions     Amlodipine Besylate Cough     Norvasc     Lisinopril Cough       Problem List:    Patient Active Problem List    Diagnosis Date Noted     Status post coronary angiogram 06/19/2018     Priority: Medium     Coronary artery disease involving native coronary artery of native heart without angina pectoris 06/19/2018     Priority: Medium     Health Care Home 01/26/2017     Priority: Medium     Acute on chronic respiratory failure (H) 01/02/2017     Priority: Medium     Long-term (current) use of anticoagulants [Z79.01] 08/03/2016     Priority: Medium     Essential hypertension 06/29/2016     Priority: Medium     Advance care planning 02/08/2016     Priority: Medium     Advance Care Planning 2/8/2016: Receipt of ACP document:  Received: Health Care Directive which was witnessed or notarized on 1/5/16.  Document previously scanned on 1/22/16.  Validation form completed and sent to be scanned.  Code Status needs to be updated to reflect choices in most recent ACP document.  1/5/16 Health Care Directive indicates preference for DNR code; recommend conversation about goals of care and completion of a POLST.  Confirmed/documented designated decision maker(s).  Added by Shobha Jang             Hypothyroidism, postablative 03/23/2015     Priority: Medium     Problem list name updated by automated process. Provider to review       Acute bronchitis 03/22/2015      Priority: Medium     Mild major depression (H) 2014     Priority: Medium     Bicuspid aortic valve 2014     Priority: Medium     Paroxysmal atrial fibrillation (H) 2014     Priority: Medium     Pulmonary emphysema (H) 2014     Priority: Medium        Past Medical History:    Past Medical History:   Diagnosis Date     Asthma      Atrial fibrillation (H)      COPD (chronic obstructive pulmonary disease) (H)      Depression      High cholesterol      HTN (hypertension)      Thyroid disease        Past Surgical History:    Past Surgical History:   Procedure Laterality Date     BACK SURGERY       CARDIAC SURGERY  2018    [facility] at [facility]     COLONOSCOPY N/A 2016    Procedure: COLONOSCOPY;  Surgeon: Waldemar Bob MD;  Location: [facility]     HYSTERECTOMY      partial     SLING BLADDER SUSPENSION WITH FASCIA LINNETTE         Family History:    Family History   Problem Relation Age of Onset     Prostate Cancer Father      Hypertension Father      Heart Failure Father         CHF     Asthma Mother      Cancer Mother         ovarian     Hypertension Mother      Asthma Brother      Hypertension Sister      Hypertension Brother        Social History:  Marital Status:   [5]  Social History     Tobacco Use     Smoking status: Former Smoker     Packs/day: 0.50     Years: 41.00     Pack years: 20.50     Types: Cigarettes     Start date: 1966     Last attempt to quit: 2007     Years since quittin.1     Smokeless tobacco: Never Used   Substance Use Topics     Alcohol use: No     Alcohol/week: 0.0 oz     Drug use: No        Medications:      acetaminophen (TYLENOL) 500 MG tablet   ADVAIR -21 MCG/ACT Inhaler   albuterol (2.5 MG/3ML) 0.083% neb solution   albuterol (PROAIR HFA/PROVENTIL HFA/VENTOLIN HFA) 108 (90 Base) MCG/ACT inhaler   atorvastatin (LIPITOR) 10 MG tablet   Calcium Carb-Cholecalciferol (CALTRATE 600+D3 SOFT PO)   cloNIDine (CATAPRES) 0.1  "MG tablet   diltiazem 240 MG 24 hr capsule   doxycycline hyclate (VIBRAMYCIN) 100 MG capsule   ELIQUIS 5 MG tablet   furosemide (LASIX) 40 MG tablet   ipratropium - albuterol 0.5 mg/2.5 mg/3 mL (DUONEB) 0.5-2.5 (3) MG/3ML neb solution   levothyroxine (SYNTHROID/LEVOTHROID) 100 MCG tablet   losartan (COZAAR) 50 MG tablet   potassium chloride SA (K-DUR/KLOR-CON M) 20 MEQ CR tablet   predniSONE (DELTASONE) 20 MG tablet   diphenhydrAMINE (BENADRYL) 25 MG tablet   OTHER MEDICAL SUPPLIES         Review of Systems   Constitutional: Negative for fever.   HENT: Negative for congestion.    Eyes: Negative for redness.   Respiratory: Negative for chest tightness.    Cardiovascular: Positive for leg swelling. Negative for chest pain.   Gastrointestinal: Negative for abdominal pain.   Genitourinary: Negative for difficulty urinating.   Musculoskeletal: Negative for arthralgias and neck stiffness.   Skin: Negative for color change.   Neurological: Negative for headaches.   Psychiatric/Behavioral: Negative for confusion.       Physical Exam   BP: 119/100  Heart Rate: (!) 170  Temp: 98.1  F (36.7  C)  Resp: 26  Height: 165.1 cm (5' 5\")  Weight: 68 kg (150 lb)  SpO2: 96 %      Physical Exam  General Appearance: well-developed, well-nourished, alert & oriented, no apparent distress.    HEENT: atraumatic. Pupils equal, round & reactive to light, extraocular movements intact. Bilateral ear canals clear. Nose without rhinorrhea or epistaxis. Clear oropharynx. Moist mucous membranes.    Neck: normal inspection, non-tender, full & painless ROM, supple, no lymphadenopathy or nuchal rigidity. No jugular venous distension.    Cardiovascular: regular rate, rhythm, normal S1 & S2, no murmurs, rubs or gallops.    Respiratory: coarse lung sounds bilaterally, mildly diminished in the bases. Mild acute respiratory distress.    Gastrointestinal: normal inspection, normal bowel sounds, non-tender, no masses or organomegaly.    Extremities: 2+/4+ " pulses bilaterally, normal & painless ROM, non-tender, joints normal, normal inspection.    Neurologic: CN II - XII intact, no motor/sensory deficit.    Skin: normal color, no skin rash.      ED Course       2045  Repeat pulmonary examination reveals clear lungs with resolution of coarse sounds. Patient has no respiratory distress. She requests discharge at this time.  Procedures           Results for orders placed or performed during the hospital encounter of 03/19/19 (from the past 24 hour(s))   CBC with platelets differential   Result Value Ref Range    WBC 15.6 (H) 4.0 - 11.0 10e9/L    RBC Count 4.00 3.8 - 5.2 10e12/L    Hemoglobin 13.6 11.7 - 15.7 g/dL    Hematocrit 40.0 35.0 - 47.0 %     78 - 100 fl    MCH 34.0 (H) 26.5 - 33.0 pg    MCHC 34.0 31.5 - 36.5 g/dL    RDW 13.2 10.0 - 15.0 %    Platelet Count 194 150 - 450 10e9/L    Diff Method Automated Method     % Neutrophils 93.4 %    % Lymphocytes 1.5 %    % Monocytes 4.5 %    % Eosinophils 0.0 %    % Basophils 0.1 %    % Immature Granulocytes 0.5 %    Nucleated RBCs 0 0 /100    Absolute Neutrophil 14.5 (H) 1.6 - 8.3 10e9/L    Absolute Lymphocytes 0.2 (L) 0.8 - 5.3 10e9/L    Absolute Monocytes 0.7 0.0 - 1.3 10e9/L    Absolute Eosinophils 0.0 0.0 - 0.7 10e9/L    Absolute Basophils 0.0 0.0 - 0.2 10e9/L    Abs Immature Granulocytes 0.1 0 - 0.4 10e9/L    Absolute Nucleated RBC 0.0    Comprehensive metabolic panel   Result Value Ref Range    Sodium 136 133 - 144 mmol/L    Potassium 4.1 3.4 - 5.3 mmol/L    Chloride 99 94 - 109 mmol/L    Carbon Dioxide 30 20 - 32 mmol/L    Anion Gap 7 3 - 14 mmol/L    Glucose 141 (H) 70 - 99 mg/dL    Urea Nitrogen 18 7 - 30 mg/dL    Creatinine 0.82 0.52 - 1.04 mg/dL    GFR Estimate 73 >60 mL/min/[1.73_m2]    GFR Estimate If Black 85 >60 mL/min/[1.73_m2]    Calcium 9.0 8.5 - 10.1 mg/dL    Bilirubin Total 0.9 0.2 - 1.3 mg/dL    Albumin 3.3 (L) 3.4 - 5.0 g/dL    Protein Total 6.5 (L) 6.8 - 8.8 g/dL    Alkaline Phosphatase 26 (L) 40  - 150 U/L    ALT 24 0 - 50 U/L    AST 25 0 - 45 U/L   Lactic acid whole blood   Result Value Ref Range    Lactic Acid 1.8 0.7 - 2.0 mmol/L   NT pro BNP   Result Value Ref Range    N-Terminal Pro BNP Inpatient 2,458 (H) 0 - 900 pg/mL   Troponin I   Result Value Ref Range    Troponin I ES <0.015 0.000 - 0.045 ug/L   XR Chest Port 1 View    Narrative    Procedure:XR CHEST PORT 1 VW    Clinical history:Female, 68 years, cough    Technique: Single view was obtained.    Comparison: 1/16/2019    Findings: The cardiac silhouette is mildly enlarged and unchanged. The  pulmonary vasculature is normal.    The lungs are similar in appearance and remain hyperinflated with  linear atelectasis versus scarring at the left lung base. Bony  structures are unremarkable.      Impression    Impression:   No acute abnormality. Persistent hyperinflation of the lungs with  unchanged left basilar scarring/atelectasis.    MELY LUCERO MD       Medications   diltiazem (CARDIZEM) injection 10 mg (not administered)   diltiazem (CARDIZEM) injection 10 mg (10 mg Intravenous Given 3/19/19 1621)   diltiazem (CARDIZEM) injection 15 mg (15 mg Intravenous Given 3/19/19 1716)   furosemide (LASIX) injection 40 mg (40 mg Intravenous Given 3/19/19 1721)   ondansetron (ZOFRAN) injection 4 mg (4 mg Intravenous Given 3/19/19 1712)   metoprolol (LOPRESSOR) injection 5 mg (5 mg Intravenous Given 3/19/19 1811)   metoprolol (LOPRESSOR) injection 5 mg (5 mg Intravenous Given 3/19/19 1849)   metoprolol (LOPRESSOR) injection 5 mg (5 mg Intravenous Given 3/19/19 1956)       Assessments & Plan (with Medical Decision Making)   The patient is a 68 year old woman who presents with COPD exacerbation, atrial fibrillation with RVR and acute pulmonary edema & pedal edema.    1) COPD exacerbation - plan to continue treatment as previously prescribed with doxycycline and prednisone with plan for PCP follow-up in 5 - 7 days regarding this ER issue.    2) Atrial  fibrillation with RVR - possibly from strain of COPD exacerbation. Responded well to treatment with diltiazem and metoprolol. plan for PCP follow-up in 5 - 7 days regarding this ER issue.    3) Pulmonary edema & pedal edema - likely early CHF. CXR not available until after patient had diuresed, so pulmonary edema was present on exam and resolved following diuresis. Patient's SOB resolved and she requested discharge. Patient reports that she has an upcoming appointment for echocardiogram in the next month. Plan for PCP follow-up in 5 - 7 days regarding this ER issue.      The patient verbalizes agreement with this plan as well as to immediately return to the ER with any new/worsening S/Sx.      I have reviewed the nursing notes.    I have reviewed the findings, diagnosis, plan and need for follow up with the patient.         Medication List      There are no discharge medications for this visit.         Final diagnoses:   Atrial fibrillation with RVR (H)   Acute pulmonary edema (H)   Pedal edema   COPD exacerbation (H)       3/19/2019   [facility]     Waldemar Roberts MD  03/19/19 [id number]

## 2019-03-19 NOTE — ED AVS SNAPSHOT
[facility]  [address] [address]  Phone:  [phone]                                    Mary Alice Cameron   MRN: [id number]    Department:  [facility]   Date of Visit:  3/19/2019           After Visit Summary Signature Page    I have received my discharge instructions, and my questions have been answered. I have discussed any challenges I see with this plan with the nurse or doctor.    ..........................................................................................................................................  Patient/Patient Representative Signature      ..........................................................................................................................................  Patient Representative Print Name and Relationship to Patient    ..................................................               ................................................  Date                                   Time    ..........................................................................................................................................  Reviewed by Signature/Title    ...................................................              ..............................................  Date                                               Time          22EPIC Rev 08/18

## 2019-03-20 NOTE — ED NOTES
Noted heart rate mostly 90's up briefly to low 100's, pt states she feels better. Dr AMBER Roberts notified.

## 2019-03-20 NOTE — ED NOTES
Face to face report given with opportunity to observe patient.    Report given to PUSHPA Jensen   3/19/2019  7:14 PM

## 2019-03-22 NOTE — PROGRESS NOTES
SUBJECTIVE:                                                    Mary Alice Cameron is a 68 year old female who presents to clinic today for the following health issues:      ED/UC Followup:    [place]  [facility] [place]  Date of visit: 3/19/19  Reason for visit: COPD exacerbation, a-fib  Current Status: still very short of breath. Does not feel she is bouncing back like she should.         PROBLEMS TO ADD ON...    Problem list and histories reviewed & adjusted, as indicated.  Additional history: still with some fib but stable.  Doesn't feel that.  She isn't bouncing back as quickly this time.;  Still some yellow sputum despite the doxy.      Patient Active Problem List   Diagnosis     Paroxysmal atrial fibrillation (H)     Pulmonary emphysema (H)     Bicuspid aortic valve     Mild major depression (H)     Acute bronchitis     Hypothyroidism, postablative     Advance care planning     Essential hypertension     Long-term (current) use of anticoagulants [Z79.01]     Acute on chronic respiratory failure (H)     Health Care Home     Status post coronary angiogram     Coronary artery disease involving native coronary artery of native heart without angina pectoris     Past Surgical History:   Procedure Laterality Date     BACK SURGERY       CARDIAC SURGERY  2018    Angiogram at [facility]     COLONOSCOPY N/A 2016    Procedure: COLONOSCOPY;  Surgeon: Waldemar Bob MD;  Location: [facility]     HYSTERECTOMY      partial     SLING BLADDER SUSPENSION WITH FASCIA LINNETTE         Social History     Tobacco Use     Smoking status: Former Smoker     Packs/day: 0.50     Years: 41.00     Pack years: 20.50     Types: Cigarettes     Start date: 1966     Last attempt to quit: 2007     Years since quittin.1     Smokeless tobacco: Never Used   Substance Use Topics     Alcohol use: No     Alcohol/week: 0.0 oz     Family History   Problem Relation Age of Onset     Prostate Cancer Father      Hypertension Father       Heart Failure Father         CHF     Asthma Mother      Cancer Mother         ovarian     Hypertension Mother      Asthma Brother      Hypertension Sister      Hypertension Brother          Current Outpatient Medications   Medication Sig Dispense Refill     acetaminophen (TYLENOL) 500 MG tablet Take 500-1,000 mg by mouth every 6 hours as needed for mild pain       ADVAIR -21 MCG/ACT Inhaler INHALE 2 PUFFS INTO THE LUNGS TWICE DAILY 36 g 2     albuterol (2.5 MG/3ML) 0.083% neb solution Take 1 vial (2.5 mg) by nebulization every 6 hours as needed for shortness of breath / dyspnea or wheezing 25 vial 1     albuterol (PROAIR HFA/PROVENTIL HFA/VENTOLIN HFA) 108 (90 Base) MCG/ACT inhaler INHALE 2 PUFFS INTO THE LUNGS EVERY 4 HOURS AS NEEDED FOR SHORTNESS OF BREATH OR DIFFICULT BREATHING OR WHEEZING 54 g 0     albuterol (PROAIR HFA/PROVENTIL HFA/VENTOLIN HFA) 108 (90 Base) MCG/ACT inhaler INL 2 PFS ITL Q 4 H PRF SOB OR DIFFICULT BREATHING OR WHZ  0     atorvastatin (LIPITOR) 10 MG tablet Take 1 tablet (10 mg) by mouth At Bedtime 90 tablet 3     Calcium Carb-Cholecalciferol (CALTRATE 600+D3 SOFT PO) Take 600 mg by mouth 2 times daily       cloNIDine (CATAPRES) 0.1 MG tablet Take 2 tablets (0.2 mg) by mouth At Bedtime 180 tablet 0     diltiazem 240 MG 24 hr capsule Take 1 capsule (240 mg) by mouth daily 90 capsule 3     diphenhydrAMINE (BENADRYL) 25 MG tablet Take 1-2 tablets (25-50 mg) by mouth every 6 hours as needed for itching or allergies 60 tablet 1     ELIQUIS 5 MG tablet TAKE 1 TABLET BY MOUTH TWICE DAILY 60 tablet 5     furosemide (LASIX) 40 MG tablet TAKE 1 TABLET BY MOUTH TWICE DAILY. 180 tablet 0     ipratropium - albuterol 0.5 mg/2.5 mg/3 mL (DUONEB) 0.5-2.5 (3) MG/3ML neb solution USE 1 VIAL PER NEBULIZER FOUR TIMES DAILY 1620 mL 0     levothyroxine (SYNTHROID/LEVOTHROID) 100 MCG tablet TAKE 1 TABLET(100 MCG) BY MOUTH DAILY 90 tablet 3     losartan (COZAAR) 50 MG tablet TAKE 1 TABLET BY MOUTH TWICE  DAILY 60 tablet 5     OTHER MEDICAL SUPPLIES Apply one pair to help with edema 1 each 0     potassium chloride SA (K-DUR/KLOR-CON M) 20 MEQ CR tablet Take 1 tablet (20 mEq) by mouth 2 times daily 180 tablet 3     predniSONE (DELTASONE) 20 MG tablet Take 60 mg by mouth daily for 3 days, THEN 40 mg daily for 3 days, THEN 20 mg daily for 3 days, THEN 10 mg daily for 3 days. 20 tablet 0     sulfamethoxazole-trimethoprim (BACTRIM DS) 800-160 MG tablet Take 1 tablet by mouth 2 times daily for 7 days 28 tablet 0     Allergies   Allergen Reactions     Amlodipine Besylate Cough     Norvasc     Lisinopril Cough       ROS:  Constitutional, HEENT, cardiovascular, pulmonary, gi and gu systems are negative, except as otherwise noted.    OBJECTIVE:                                                    /62   Pulse 101   Temp 97.9  F (36.6  C) (Tympanic)   Wt 67.6 kg (149 lb)   SpO2 96%   BMI 24.79 kg/m    Body mass index is 24.79 kg/m .  GENERAL APPEARANCE: Alert, no acute distress  CV: regular rate and rhythm, no murmur, rub or gallop  RESP: lungs clear to auscultation bilaterally with occasional end exp wheeze bilaterally.    ABDOMEN: normal bowel sounds, soft, nontender, no hepatosplenomegaly or other masses  SKIN: no suspicious lesions or rashes to visualized skin  NEURO: Alert, oriented x 3, speech and mentation normal           ASSESSMENT/PLAN:                                                    1. COPD with acute exacerbation (H)  Reviewed.  Another round of abx warranted given sx of ongoing productive yellow sputum.  Bactrim and f/u with ongoing concerns.  Finish taper of prednisone as scheduled.    - sulfamethoxazole-trimethoprim (BACTRIM DS) 800-160 MG tablet; Take 1 tablet by mouth 2 times daily for 7 days  Dispense: 28 tablet; Refill: 0    2. Acute bronchitis, unspecified organism  As above.      3. Paroxysmal atrial fibrillation (H)  In fib today.  On eliquis and diltiazem with reasonable rate control.  tx the  lung infection and take it from there.  Consider cardiology if ongoing.     4. COPD exacerbation (H)  As above.  I sent the prednisone for her to have on hand.  She used the one she had at home.  Noted seeing pulmonary soon as well as this is getting more frequent.    - predniSONE (DELTASONE) 20 MG tablet; Take 60 mg by mouth daily for 3 days, THEN 40 mg daily for 3 days, THEN 20 mg daily for 3 days, THEN 10 mg daily for 3 days.  Dispense: 20 tablet; Refill: 0          Braydon Yen MD  [facility]

## 2019-03-23 DIAGNOSIS — J43.8 OTHER EMPHYSEMA (H): ICD-10-CM

## 2019-03-25 RX ORDER — ALBUTEROL SULFATE 90 UG/1
AEROSOL, METERED RESPIRATORY (INHALATION)
Qty: 54 G | Refills: 0 | Status: SHIPPED | OUTPATIENT
Start: 2019-03-25 | End: 2019-05-12

## 2019-03-25 NOTE — TELEPHONE ENCOUNTER
Med is historical please advise.  Due for ACT per protocol.    albuterol (PROAIR HFA/PROVENTIL HFA/VENTOLIN HFA) 108 (90 Base) MCG/ACT inhaler      Last Written Prescription Date:  3/8/19  Last Fill Quantity: ,   # refills:     Last Office Visit: 3/18/19  Future Office visit:    Next 5 appointments (look out 90 days)    Mar 26, 2019 10:00 AM CDT  (Arrive by 9:45 AM)  SHORT with Braydon Yen MD  [facility] [address]  [facility] [address]  [phone]   Apr 23, 2019 10:00 AM CDT  (Arrive by 9:45 AM)  Return Visit with Eliezer Myers MD  [facility] [address]  [facility] [address]  [phone]           Routing refill request to provider for review/approval because:  Medication is reported/historical

## 2019-03-26 ENCOUNTER — OFFICE VISIT (OUTPATIENT)
Dept: FAMILY MEDICINE | Facility: OTHER | Age: 69
End: 2019-03-26
Attending: PHYSICIAN ASSISTANT
Payer: COMMERCIAL

## 2019-03-26 VITALS
SYSTOLIC BLOOD PRESSURE: 110 MMHG | TEMPERATURE: 97.9 F | DIASTOLIC BLOOD PRESSURE: 62 MMHG | OXYGEN SATURATION: 96 % | BODY MASS INDEX: 24.79 KG/M2 | HEART RATE: 101 BPM | WEIGHT: 149 LBS

## 2019-03-26 DIAGNOSIS — J44.1 COPD WITH ACUTE EXACERBATION (H): Primary | ICD-10-CM

## 2019-03-26 DIAGNOSIS — J20.9 ACUTE BRONCHITIS, UNSPECIFIED ORGANISM: ICD-10-CM

## 2019-03-26 DIAGNOSIS — J44.1 COPD EXACERBATION (H): ICD-10-CM

## 2019-03-26 DIAGNOSIS — I48.0 PAROXYSMAL ATRIAL FIBRILLATION (H): ICD-10-CM

## 2019-03-26 PROCEDURE — 99214 OFFICE O/P EST MOD 30 MIN: CPT | Performed by: FAMILY MEDICINE

## 2019-03-26 PROCEDURE — G0463 HOSPITAL OUTPT CLINIC VISIT: HCPCS

## 2019-03-26 RX ORDER — SULFAMETHOXAZOLE/TRIMETHOPRIM 800-160 MG
1 TABLET ORAL 2 TIMES DAILY
Qty: 28 TABLET | Refills: 0 | Status: SHIPPED | OUTPATIENT
Start: 2019-03-26 | End: 2019-05-08

## 2019-03-26 RX ORDER — PREDNISONE 20 MG/1
TABLET ORAL
Qty: 20 TABLET | Refills: 0 | Status: SHIPPED | OUTPATIENT
Start: 2019-03-26 | End: 2019-05-08

## 2019-03-26 ASSESSMENT — PAIN SCALES - GENERAL: PAINLEVEL: NO PAIN (0)

## 2019-03-26 NOTE — NURSING NOTE
"Chief Complaint   Patient presents with     ER F/U       Initial /62   Pulse 101   Temp 97.9  F (36.6  C) (Tympanic)   Wt 67.6 kg (149 lb)   SpO2 96%   BMI 24.79 kg/m   Estimated body mass index is 24.79 kg/m  as calculated from the following:    Height as of 3/19/19: 1.651 m (5' 5\").    Weight as of this encounter: 67.6 kg (149 lb).  Medication Reconciliation: complete    Bernadine Story MA    "

## 2019-04-02 ENCOUNTER — PATIENT OUTREACH (OUTPATIENT)
Dept: CARE COORDINATION | Facility: OTHER | Age: 69
End: 2019-04-02

## 2019-04-02 NOTE — PROGRESS NOTES
Clinic Care Coordination Contact  Care Team Conversations    CC phoned Mary Alice to advise her on Dr Yen's recommendations. She verbalized understanding.  Will let CC know if the Robitussin DM does not provide her with some relief from the cough.    She states she probably could stay with her nephew if absolutely necessary.  CC encouraged her to try to minimize her exposure to the chemicals/toxins as best as she can given her health concerns and current exacerbation.    Lauren Pipkin, BS-RN   CHF and General Care Coordinator  [phone] Option # 1

## 2019-04-02 NOTE — PROGRESS NOTES
Braydon Yen MD   You 15 minutes ago (9:52 AM)      Thanks.  Agree with minimizing exposure to the toxins.  Recommend OTC robitussin DM.  Thanks.  Braydon Yen    Routing Comment

## 2019-04-02 NOTE — PROGRESS NOTES
Clinic Care Coordination Contact  Care Team Conversations    Care Coordinator phoned Mary Alice to see how she is doing after her ED visit 3/19 (Had f/u with Dr Yen on 3/26).    1) Mary Alice states she is still slow to improve.  Has finished the prednisone but still has several days left of the Bactrim (had to finish previous antibiotic prior to starting this).  She is now having a dry, unproductive cough that is quite bothersome.  (See COPD assessment flowsheet completed today by RN CC.)     CC advised will check with Dr Yen to see if he would be willing to prescribe a cough syrup.  She would like this sent to [facility] in [place].      2) Mary Alice also reports she is scheduled to have her windows sealed in her apartment on Thursday and Friday this week.  She states they will be using an oil-based stain with an aerosol sealer.  She is very concerned about this but feels there is nothing she can do.  She understands they need to do this work but she doesn't feel things will go well with her breathing.  CC is in agreement that this is concerning.  Mary Alice states she will leave when they are doing the work but will have to return that day as she doesn't have anywhere else to stay.  CC advised will see if Dr Yen has thoughts on this.  It will be warmer later this week so she could have her windows open a bit to air out the apartment, however, this may not be enough.    Lauren Pipkin, BS-RN   CHF and General Care Coordinator  [phone] Option # 1

## 2019-04-13 DIAGNOSIS — I10 ESSENTIAL HYPERTENSION, BENIGN: ICD-10-CM

## 2019-04-15 RX ORDER — FUROSEMIDE 40 MG
TABLET ORAL
Qty: 180 TABLET | Refills: 0 | Status: SHIPPED | OUTPATIENT
Start: 2019-04-15 | End: 2019-07-16

## 2019-04-19 ENCOUNTER — TRANSFERRED RECORDS (OUTPATIENT)
Dept: HEALTH INFORMATION MANAGEMENT | Facility: CLINIC | Age: 69
End: 2019-04-19

## 2019-04-23 ENCOUNTER — OFFICE VISIT (OUTPATIENT)
Dept: CARDIOLOGY | Facility: OTHER | Age: 69
End: 2019-04-23
Attending: INTERNAL MEDICINE
Payer: COMMERCIAL

## 2019-04-23 VITALS
RESPIRATION RATE: 20 BRPM | HEART RATE: 97 BPM | SYSTOLIC BLOOD PRESSURE: 115 MMHG | OXYGEN SATURATION: 98 % | WEIGHT: 153 LBS | DIASTOLIC BLOOD PRESSURE: 77 MMHG | HEIGHT: 64 IN | BODY MASS INDEX: 26.12 KG/M2

## 2019-04-23 DIAGNOSIS — Q23.81 BICUSPID AORTIC VALVE: ICD-10-CM

## 2019-04-23 DIAGNOSIS — Z95.2 S/P AVR (AORTIC VALVE REPLACEMENT): ICD-10-CM

## 2019-04-23 DIAGNOSIS — I10 ESSENTIAL HYPERTENSION: ICD-10-CM

## 2019-04-23 DIAGNOSIS — I48.19 PERSISTENT ATRIAL FIBRILLATION (H): Primary | ICD-10-CM

## 2019-04-23 PROCEDURE — G0463 HOSPITAL OUTPT CLINIC VISIT: HCPCS

## 2019-04-23 PROCEDURE — 99215 OFFICE O/P EST HI 40 MIN: CPT | Performed by: INTERNAL MEDICINE

## 2019-04-23 RX ORDER — IPRATROPIUM BROMIDE AND ALBUTEROL 20; 100 UG/1; UG/1
1 SPRAY, METERED RESPIRATORY (INHALATION) 4 TIMES DAILY
COMMUNITY
Start: 2019-04-20 | End: 2022-03-28

## 2019-04-23 ASSESSMENT — MIFFLIN-ST. JEOR: SCORE: 1209

## 2019-04-23 ASSESSMENT — PAIN SCALES - GENERAL: PAINLEVEL: NO PAIN (0)

## 2019-04-23 NOTE — PROGRESS NOTES
"Chief Complaint: atrial fibrillation, bicuspid valve    HPI: I was happy to see Mrs. Cameron for the above. She has seen several cardiologists over the past year for these problems. Her  was ill and they moved to be closer to his daughters. He  in October and she has settled in [place]. Her atrial fibrillation began in the past year. In reviewing the old records both  and  are reported as when the atrial fibrillation began and she is no longer sure. This has been associated with shortness of breath but as was discussed with her by one of the cardiologist she has multiple reasons for shortness of breath including COPD. She is finishing treatment for a COPD exacerbation at this time. She reports the plan had been to start her on warfarin and then cardiovert her. With all the chaos in her life the past few months this never happened. She was found to have hyperthyroidism in August and was treated with Iodine ablation. She also has a bicuspid aortic valve and mild aortic root dilation (per report of echocardiogram in old records). The echocardiogram did not report significant aortic stenosis or insufficiency and her systolic function was normal making it less likely that her shortness of breath was related to her valvular heart disease. She was told she would need periodic f/u of her valve and aorta. She reports two angiograms done at [facility] and that they were \"okay\". I do not see copies of those reports in the old records. They will be requested.    She underwent DC cardioversion on 2014. She reports feeling better afterward. She feels like she needs to use her inhaler less often and has less shortness of breath. However, the ECG today shows she is back in atrial fibrillation.    I have reviewed the records sent from [facility]. There is an echocardiogram report from  and records regarding back surgery. I see no cath results.    A repeat cardioversion in July failed to restore sinus " "rhythm. A stress study in April (see below) showed no ischemia or infarction.    [id number]:  She had recurrent atrial fibrillation was started on flecainide and scheduled for cardioversion. When she arrived for the cardioversion she was in sinus.     A CT aortogram showed atherosclerosis but not dilation of the descending aoota.    [id number]:    She was admitted in March 2015 with a COPD exacerbation. Her breathing is at baseline with no fever, sputum or wheezing.    She has not noted any atrial fibrillation, no palpitations, chest pain/discomfort, unusual dyspnea on exertion, bleeding or neurologic symptoms.    [id number]: Her follow-up echocardiogram (see lab section) showed no change in aortic valve function. She report rare \"flutters\" but no sustained palpitations like with her atrial fibrillation. She reports no significant bleeding episodes on warfarin. She denies any neurologic symptoms suggestive of CVA or TIA. Edema improved when she switched her diltiazem to bedtime.    Her primary health problem has been her COPD. She uses oxygen at night. She has had had any recent symptoms of upper or lower respiratory infection.    [id number]: She had two admissions this past winter for COPD exacerbations. Overall, she feels her dyspnea on exertion is slowly getting worse. She has not had exertional chest pain/discomfort.     She reports no increased edema, orthopnea or paroxysmal nocturnal dyspnea.    [id number]: echocardiogram shows a decline in systolic function.  She reports that she has felt more fatigued and has had more dyspnea on exertion since I last saw her.  She also reports she has had problems with her lungs over the winter.  She is been on a prolonged course of prednisone.  She has not had chest pain or chest discomfort.  She has noted episodes of atrial fibrillation, or palpitations that she thinks is atrial fibrillation.  These episodes were relatively infrequent and do not last very long.  They are not " associated with associated symptoms and she has not found them to be overly troublesome.  She has had no prolonged episodes where she felt the need to consider seeking medical treatment.    September 25, 2018: her angiogram showed no obstructive coronary artery disease. She reports two episodes of pain between her shoulders, lasting 10 minutes.     No palpitations. Flecainide stopped due to drop in systolic function.     Has had a few problems with her COPD.     October 23, 2018: Ms. Cameron reports that she has been feeling somewhat better since switching to the torsemide.  Her swelling is better.  She continues to work or cleaning jobs.  She reports no significant episodes of atrial fibrillation.  She has occasional palpitations but nothing that has been very troublesome.  Her echocardiogram shows an improvement in her ejection fraction.  Her CT aortogram showed evidence of atherosclerosis but no dissections or aneurysms.    April 23, 2019 Interval history: She was seen in the emergency department on March 19, 2019 complaining of a productive cough.  She was diagnosed with acute bronchitis.  She was also noted to be in atrial fibrillation with rapid ventricular rate.  She has had 2 courses of antibiotics for her acute bronchitis.    She reports her breathing is back to baseline.  She reports if she feels like it takes her longer to get things done and she takes more breaks but she does not find this particularly troublesome.      Current Outpatient Medications   Medication Sig Dispense Refill     acetaminophen (TYLENOL) 500 MG tablet Take 500-1,000 mg by mouth every 6 hours as needed for mild pain       ADVAIR -21 MCG/ACT Inhaler INHALE 2 PUFFS INTO THE LUNGS TWICE DAILY 36 g 2     albuterol (PROAIR HFA/PROVENTIL HFA/VENTOLIN HFA) 108 (90 Base) MCG/ACT inhaler INHALE 2 PUFFS INTO THE LUNGS EVERY 4 HOURS AS NEEDED FOR SHORTNESS OF BREATH OR DIFFICULT BREATHING OR WHEEZING 54 g 0     atorvastatin (LIPITOR) 10  MG tablet Take 1 tablet (10 mg) by mouth At Bedtime 90 tablet 3     Calcium Carb-Cholecalciferol (CALTRATE 600+D3 SOFT PO) Take 600 mg by mouth 2 times daily       cloNIDine (CATAPRES) 0.1 MG tablet Take 2 tablets (0.2 mg) by mouth At Bedtime 180 tablet 0     COMBIVENT RESPIMAT  MCG/ACT inhaler Inhale 1 puff into the lungs 4 times daily        diltiazem 240 MG 24 hr capsule Take 1 capsule (240 mg) by mouth daily 90 capsule 3     diphenhydrAMINE (BENADRYL) 25 MG tablet Take 1-2 tablets (25-50 mg) by mouth every 6 hours as needed for itching or allergies 60 tablet 1     ELIQUIS 5 MG tablet TAKE 1 TABLET BY MOUTH TWICE DAILY 60 tablet 5     furosemide (LASIX) 40 MG tablet TAKE 1 TABLET BY MOUTH TWICE DAILY. 180 tablet 0     levothyroxine (SYNTHROID/LEVOTHROID) 100 MCG tablet TAKE 1 TABLET(100 MCG) BY MOUTH DAILY 90 tablet 3     losartan (COZAAR) 50 MG tablet TAKE 1 TABLET BY MOUTH TWICE DAILY 60 tablet 5     potassium chloride SA (K-DUR/KLOR-CON M) 20 MEQ CR tablet Take 1 tablet (20 mEq) by mouth 2 times daily 180 tablet 3     albuterol (2.5 MG/3ML) 0.083% neb solution Take 1 vial (2.5 mg) by nebulization every 6 hours as needed for shortness of breath / dyspnea or wheezing (Patient not taking: Reported on 4/23/2019) 25 vial 1     OTHER MEDICAL SUPPLIES Apply one pair to help with edema (Patient not taking: Reported on 4/23/2019) 1 each 0       Past Medical History:   Diagnosis Date     Asthma      Atrial fibrillation (H)      COPD (chronic obstructive pulmonary disease) (H)      Depression      High cholesterol      HTN (hypertension)      Thyroid disease        Past Surgical History:   Procedure Laterality Date     BACK SURGERY  2006     CARDIAC SURGERY  06/12/2018    Angiogram at [facility]     COLONOSCOPY N/A 1/19/2016    Procedure: COLONOSCOPY;  Surgeon: Waldemar Bob MD;  Location: [facility]     HYSTERECTOMY  1980    partial     SLING BLADDER SUSPENSION WITH FASCIA LINNETTE         Family History  "  Problem Relation Age of Onset     Prostate Cancer Father      Hypertension Father      Heart Failure Father         CHF     Asthma Mother      Cancer Mother         ovarian     Hypertension Mother      Asthma Brother      Hypertension Sister      Hypertension Brother        Social History     Tobacco Use     Smoking status: Former Smoker     Packs/day: 0.50     Years: 41.00     Pack years: 20.50     Types: Cigarettes     Start date: 1966     Last attempt to quit: 2007     Years since quittin.2     Smokeless tobacco: Never Used   Substance Use Topics     Alcohol use: No     Alcohol/week: 0.0 oz       Allergies   Allergen Reactions     Amlodipine Besylate Cough     Norvasc     Lisinopril Cough       ROS: ten system review negative except as noted above. 2019/woa    Physical Examination:  /77 (BP Location: Right arm, Patient Position: Chair, Cuff Size: Adult Regular)   Pulse 97   Resp 20   Ht 1.626 m (5' 4\")   Wt 69.4 kg (153 lb)   SpO2 98%   BMI 26.26 kg/m    GENERAL APPEARANCE: healthy, alert and no distress  HEENT: no icterus, no xanthelasmas  NECK:  JVP is not visible, left CEA scar  CHEST:  no rales or rhonchi, breath sounds are diminished throughout, expiratory phase is prolonged, no wheezing  CARDIOVASCULAR: irregular rhythm, S1S2 split, no S3 or S4 and no murmur, click or rub, precordium quiet  ABDOMEN: soft, non tender, bowel sounds normal, no abdominal bruits  EXTREMITIES: some edema, but also adipose    Laboratory and diagnostic data reviewed 2019:    I reviewed her ECG from 2019 that shows atrial fibrillation with rapid ventricular rate.    Results for LANCE VALLEJO (MRN [id number]) as of 2019 10:09   Ref. Range 3/19/2019 16:31   Sodium Latest Ref Range: 133 - 144 mmol/L 136   Potassium Latest Ref Range: 3.4 - 5.3 mmol/L 4.1   Chloride Latest Ref Range: 94 - 109 mmol/L 99   Carbon Dioxide Latest Ref Range: 20 - 32 mmol/L 30   Urea Nitrogen " Latest Ref Range: 7 - 30 mg/dL 18   Creatinine Latest Ref Range: 0.52 - 1.04 mg/dL 0.82   GFR Estimate Latest Ref Range: >60 mL/min/1.73_m2 73           [facility],[place]  Echocardiography Laboratory  [address] [place] [address]     Name: LANCE VALLEJO  MRN: [id number]  : 1950  Study Date: 10/11/2018 08:57 AM  Age: 68 yrs  Gender: Female  Patient Location: [facility]  Reason For Study: , Bicuspid aortic valve  History: Bicuspid Aortic Valve  Ordering Physician: TODD JAMES  Referring Physician: TODD JAMES  Performed By: Genevieve Contreras     BSA: 1.8 m2  Height: 65 in  Weight: 160 lb  _____________________________________________________________________________  __     _____________________________________________________________________________  __        Interpretation Summary  No pericardial effusion is present.  Mild left ventricular dilation is present.  The Ejection Fraction is estimated at 45-50%.  The right ventricle is normal size.  Global right ventricular function is normal.  Mild left atrial enlargement is present.  Mild mitral insufficiency is present.  Cannot exclude a bicuspid aortic valve.  Mild aortic valve sclerosis is present.  Mild aortic insufficiency is present.  The peak aortic velocity is 2.28 m/sec.  The mean gradient across the aortic valve is11.8 mmHg.  Mild tricuspid insufficiency is present.  Right ventricular systolic pressure is 32.4mmHg above the right atrial  pressure.  The pulmonic valve is normal.  The aorta root is normal.  _____________________________________________________________________________  __        Left Ventricle  Mild left ventricular dilation is present. The Ejection Fraction is estimated  at 45-50%.     Right Ventricle  The right ventricle is normal size. Global right ventricular function is  normal.     Atria  Mild left atrial enlargement is present.        Mitral Valve  Mild mitral insufficiency is  present.     Aortic Valve  Cannot exclude a bicuspid aortic valve. Mild aortic valve sclerosis is  present. Mild aortic insufficiency is present. The peak aortic velocity is  2.28 m/sec. The peak AoV pressure gradient is 20.7 mmHg. The mean AoV pressure  gradient is 11.8 mmHg. The mean gradient across the aortic valve is11.8 mmHg.     Tricuspid Valve  Mild tricuspid insufficiency is present. Right ventricular systolic pressure  is 32.4mmHg above the right atrial pressure.     Pulmonic Valve  The pulmonic valve is normal.     Vessels  The aorta root is normal.     Pericardium  No pericardial effusion is present.     _____________________________________________________________________________  __  MMode/2D Measurements & Calculations  IVSd: 1.0 cm  IVSs: 1.4 cm  LVIDd: 6.1 cm  LVIDs: 4.7 cm  LVPWd: 0.84 cm  LVPWs: 1.5 cm     FS: 23.7 %  LV mass(C)d: 235.8 grams  LV mass(C)dI: 131.1 grams/m2  LV mass(C)sI: 151.1 grams/m2  Ao root diam: 3.5 cm  LA dimension: 4.7 cm  LA/Ao: 1.4  LVOT diam: 2.2 cm  LVOT area: 3.8 cm2  RWT: 0.27     Time Measurements  Pulm. HR: 228.0 BPM  MM HR: 138.5 BPM     Doppler Measurements & Calculations  Ao V2 max: 227.8 cm/sec  Ao max P.7 mmHg  Ao V2 mean: 154.9 cm/sec  Ao mean P.8 mmHg  Ao V2 VTI: 47.0 cm  MATIAS(I,D): 1.0 cm2  MATIAS(V,D): 1.0 cm2  LV V1 max P.5 mmHg  LV V1 max: 61.2 cm/sec  LV V1 VTI: 13.0 cm  CO(LVOT): 9.6 l/min  CI(LVOT): 5.3 l/min/m2  SV(LVOT): 49.0 ml  SI(LVOT): 27.3 ml/m2  TV max P.5 mmHg  PA V2 max: 74.0 cm/sec  PA max P.2 mmHg  PA mean P.3 mmHg  PA V2 VTI: 13.8 cm     TR max parveen: 284.9 cm/sec  TR max P.5 mmHg  AV Parveen Ratio (DI): 0.27  MATIAS Index (cm2/m2): 0.58     _____________________________________________________________________________  __           Report approved by: Kieran Singer 10/11/2018 04:02 PM        CTA CHEST ABDOMEN WITH CONTRAST    HISTORY: 68 yearsFemale ; Bicuspid aortic valve; Ascending aorta  dilatation  (H)    TECHNIQUE: Axial CT imaging of the chest abdomen and pelvis was  performed with intervenous contrast contrast. Coronal and sagittal  reconstructions were obtained.    COMPARISON: None    FINDINGS:    CT CHEST: The ace ascending thoracic aorta is ectatic measuring 4.4 cm  transversely and 4.3 cm in AP dimension. The descending thoracic aorta  is 2.5 x 2.2 cm respectively. There is calcified atherosclerotic  disease of the thoracic aorta without evidence of dissection. There is  calcification of the aortic valve.  There is no mediastinal or hilar or axillary lymphadenopathy.    There is mild linear atelectasis at both lung bases. There is  cardiomegaly.         No concerning osseous lesions are identified    IMPRESSION CHEST: There is ectasia of the ascending thoracic aorta  measuring 4.4 x 4.3 cm in diameter.    There is cardiomegaly.      CT ABDOMEN AND PELVIS: There is atherosclerotic disease of the  abdominal aorta without evidence of aneurysm.     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 The residual lumen measures 2 mm in diameter. There is  approximate 50% stenosis of the origin of the superior mesenteric  artery, residual vessel lumen measures 3.5 to 4 mm.    There are 2 patent right renal arteries. There are 2 left renal  arteries with a dominant vessel originating slightly below the smaller  accessory vessel. There is moderate to severe stenosis of the dominant  left renal artery. There is moderate to severe stenosis of the origin  of the inferior mesenteric artery.    The visualized solid organs are unremarkable. No abnormally distended  loops of bowel are evident. The appendix is unremarkable.    IMPRESSION ABDOMEN AND PELVIS:     Atherosclerotic disease of the abdominal aorta without evidence of  aneurysm or dissection.    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 See description above.    MARINA  MD YUNIEL                [facility]  Echocardiography Laboratory  [address] [place] [id number]     Name: LANCE VALLEJO  MRN: [id number]  : 1950  Study Date: 2018 10:30 AM  Age: 67 yrs  Gender: Female  Patient [facility]: [facility]  Reason For Study: , Essential hypertension, benign, Atrial fibrillation,  persisten  History: bicuspid AV  Ordering Physician: ELIEZER JAMES  Referring Physician: ELIEZER JAMES  Performed By: Genevieve Contreras     BSA: 1.8 m2  Height: 64 in  Weight: 160 lb  _____________________________________________________________________________  __        Procedure  The patient's rhythm is atrial fibrillation.  _____________________________________________________________________________  __        Interpretation Summary  No pericardial effusion is present.  Borderline left ventricular dilation is present.  The Ejection Fraction is estimated at 40-45%.  Grade I or early diastolic dysfunction.  The right ventricle is normal size.  Global right ventricular function is normal.  Mild left atrial enlargement is present.  Mild right atrial enlargement is present.  Mild mitral insufficiency is present.  Mild to moderate aortic valve sclerosis is present.  Cannot exclude a bicuspid aortic valve.  Mild to moderate aortic insufficiency is present.  The peak aortic velocity is 2.57 m/sec.  The mean gradient across the aortic valve is16.9 mmHg.  Peak gradient AoV 26.4 mmHg  The aortic valve area is .94 cm^2, by the continuity equation.  Mild to moderate tricuspid insufficiency is present.  The peak velocity of the tricuspid regurgitant jet is not obtainable.      ECHOCARDIOGRAM    M-mode, two-dimensional, color-flow, and Doppler studies were obtained  on this patient.  Standard views were utilized.    ORDERING PHYSICIAN:  Eliezer James MD    INDICATIONS:  Bicuspid aortic valve.    Cardiac Dimensions                                     Normal  Dimensions  Aortic Root:                         32.3 mm      Aortic Root  Diameter: 20-37 mm  Left Atrium:                          45 mm      Left Atrium: 19-40  mm  Right Ventricle:                     25.8 mm      Right Ventricle:  7-23 mm  Left Ventricle end-diastole:   54.8 mm      Left Ventricle  end-diastole: 35-56 mm  Left Ventricle end-systole:    32.8 mm      Left Ventricle  end-systole: 35-56 mm  IVS end-diastole:                 8.8 mm      IVS end-diastole: 7-11  mm  LVPW:                                6.6 mm      LVPW: 7-11 mm    Review of the study shows there is no pericardial effusion.  The left  ventricle is normal size and systolic function.  Ejection fraction at  70%.  The left atrium is borderline enlarged, volume is 70 mL, indexed  it was 37 mL/meter2.  The right ventricle is normal on 2-D study.  The  mitral valve has a mild amount of mitral regurgitation.  The aortic  valve appears to be bicuspid in nature.  Peak and mean gradients are  25 and 13 respectively with a peak velocity of 2.52 meter/second,  signifying just very slight stenosis.  There is mild-to-moderate  aortic insufficiency.  Also noted is some slight tricuspid  regurgitation.  Unable to estimate the right ventricular systolic  pressure.      ASSESSMENT:  Echocardiographic study revealing  1.  Normal left ventricular size and systolic function.  Ejection  fraction at 70%.  2.  Borderline left atrial enlargement.  3.  Normal right ventricular size and function.  4.  Slight mitral regurgitation.  5.  Aortic valve is likely bicuspid.  Mild stenosis.  Peak and mean  gradients are 25 and 13 with mild-to-moderate aortic insufficiency.  This is a slight increase in terms of insufficiency compared to echo  from February 2014.  6.  Slight tricuspid regurgitation.  Unable to estimate the right  ventricular systolic pressure.  Exam Date: May 12, 2016 10:33:00 AM  Author: SOLOMON [facility]EN[facility] CARDIOLITE STUDY-INTERNAL  MEDICINE DICTATION  DICTATING PHYSICIAN: Lito Cleveland MD  INDICATIONS: A 63-year-old female referred by Dr. Myers and Dr. Yen because of chest pain and atrial fibrillation.  FINDINGS: The patient was placed upon the table using our protocol,  given 5 mL/0.4 mg of Lexiscan rapidly over 15 seconds followed by  saline flush. She was then given the Sestamibi at 45 seconds into  the study. She was monitored continuously throughout the study.  Resting heart rate was 85 with blood pressure 130/80. Heart rate gurpreet  to 113 and blood pressure dropped to 110/70. She did have some  transient shortness of breath. Review of her electrocardiogram shows  she has atrial fibrillation throughout the study, but no acute changes  were noted.  ASSESSMENT: Lexiscan Sestamibi study. On the Lexiscan portion the  patient did have appropriate heart rate and blood pressure response,  transient symptoms. No acute electrocardiogram changes or  arrhythmias. The Cardiolite scan is pending.  CARDIOLITE IV LEXISCAN AND GATED SPECT-RADIOLOGIST DICTATION  DICTATING PHYSICIAN: Rufus Newberry MD  HISTORY: A 63-year-old female with chest pain.  LEXISCAN DOSE: 5 mL (0.4 mg regadenoson) by rapid intravenous  injection.  SESTAMIBI DOSE: 10 mCi 99mTc, 1 mL MIBI Injection Time: 0550  SESTAMIBI DOSE: 30 mCi 99mTc, 1 mL MIBI Injection Time: 0749  ANGIOCATH SIZE: 22 INJECTION SITE: Right AC INITIALS: JR  IV DISCONTINUE TIME: 0753 SITE CONDITION: No infiltrate TIP INTACT:  Yes    The patient is a 63-year-old female who was evaluated with  Sestamibi/Lexiscan testing. 5 mL Lexiscan (0.4 mg regadenoson) was  administered by rapid intravenous injection; followed immediately by  saline flush and radiopharmaceutical. The patient's resting heart  rate was 85 and blood pressure was 130/80. The maximum response in  heart rate and blood pressure after Lexiscan injection was 113 and  110/70.  Symptoms during the procedure included: Transient shortness  of  breath.  If symptoms were present, did they resolve post-test? Yes.  Electrocardiogram monitoring demonstrates: Atrial fibrillation  throughout study. No acute electrocardiogram changes.  Arrhythmias: None, other than atrial fibrillation.  Summary: Appropriate heart rate and blood pressure response.  Transient shortness of breath. No acute electrocardiogram changes.  VIEWS OBTAINED: Short axis, horizontal long axis, vertical long axis  at rest and stress.  FINDINGS: The left ventricular ejection fraction is 56%. No fixed  or reversible perfusion defects are present.  IMPRESSION: No evidence of cardiac ischemia.    Echocardiogram:  ASSESSMENT: Echocardiographic study revealing  1. Normal left ventricle size and systolic function. Ejection  fraction at 55%.  2. Ascending aorta appears to be normal on current measurements.  3. Left atrial enlargement.  4. Normal right ventricle size and function.  5. Mild mitral regurgitation.  6. Aortic valve appears to probably be bicuspid without any  significant stenosis. There is a slight amount of aortic  insufficiency.  7. Slight tricuspid regurgitation. Peak systolic pressure right  ventricle is estimated at 14 plus right atrium.    Exam Date: Feb 14, 2014 11:18:00 AM  Author: SOLOMON MITCHELL    Old records show a TSH of 1.87 in 11/13. Labs from 10/13 show a normal K, creat and estimated GFR.    Assessment and recommendations:    1) Persistent atrial fibrillation (recurrent) - discussed rate versus rhythm control. At this time she is leaning against antiarrhythmic drug/cardioversion.      - 48 hour Holter for heart rate control assessment    2) Bicuspid aortic valve - previous echocardiogram does not show significant aortic stenosis or insufficiency. CT aortogram shows no dilation of descending aorta.     -Valve function is stable    3)  Hypertension - controlled    4) Systolic dysfunction - no coronary artery disease found     - echocardiogram with stable EF, if anything  slightly improved      I appreciate the chance to help with Mrs. Cameron's care. Please let me know if you have any questions or concerns. I will see her back in one year.    Portions of this note were dictated using speech recognition software. The note has been proofread but errors in the text may have been overlooked. Please contact me if there are any concerns regarding the accuracy of the dictation.     Eliezer Myers M.D.

## 2019-04-23 NOTE — PATIENT INSTRUCTIONS
You were seen by Dr. Myers, 4/23/2019.     1.  You may consider having a cardioversion.  This may convert your heart to a normal heart rhythm.      2.  You may consider medication to help control your atrial fibrillation.     3.  Please continue Eliquis 5 mg twice daily.  This medication helps to reduce the risk of stroke related to atrial fibrillation.     4. If you develop new or worsening symptoms please call the cardiology office as you may need to be seen sooner.     5.  Please continue all medications as they have been prescribed.     6.  You may consider an ablation in an attempt to correct the cause of atrial fibrillation.      7.  You will be scheduled for a 48 hour Holter Monitor.  Hospital scheduling will call to schedule this appointment.     You will follow up with Dr. Myers in 6 weeks      Please call the cardiology office with problems, questions, or concerns at [phone].    If you experience chest pain, chest pressure, chest tightness, shortness of breath, fainting, lightheadedness, nausea, vomiting, or other concerning symptoms, please report to the Emergency Department or call 911. These symptoms may be emergent, and best treated in the Emergency Department.       Giana NAVARRO RN-BSN  Cardiology   [facility]  [phone]

## 2019-04-23 NOTE — NURSING NOTE
"Chief Complaint   Patient presents with     RECHECK     6 month cardiology follow-up       Initial /77 (BP Location: Right arm, Patient Position: Chair, Cuff Size: Adult Regular)   Pulse 97   Resp 20   Ht 1.626 m (5' 4\")   Wt 69.4 kg (153 lb)   SpO2 98%   BMI 26.26 kg/m   Estimated body mass index is 26.26 kg/m  as calculated from the following:    Height as of this encounter: 1.626 m (5' 4\").    Weight as of this encounter: 69.4 kg (153 lb).  Medication Reconciliation: complete    Fe Stephenson LPN    "

## 2019-04-29 ENCOUNTER — HOSPITAL ENCOUNTER (OUTPATIENT)
Dept: CARDIOLOGY | Facility: HOSPITAL | Age: 69
Discharge: HOME | End: 2019-04-29
Attending: INTERNAL MEDICINE | Admitting: INTERNAL MEDICINE
Payer: MEDICARE

## 2019-04-29 DIAGNOSIS — I48.19 PERSISTENT ATRIAL FIBRILLATION (H): ICD-10-CM

## 2019-04-29 PROCEDURE — 93227 XTRNL ECG REC<48 HR R&I: CPT | Performed by: INTERNAL MEDICINE

## 2019-04-29 PROCEDURE — 93226 XTRNL ECG REC<48 HR SCAN A/R: CPT | Mod: TC

## 2019-05-01 ENCOUNTER — HOSPITAL ENCOUNTER (OUTPATIENT)
Dept: CARDIOLOGY | Facility: HOSPITAL | Age: 69
Discharge: HOME | End: 2019-05-01
Attending: INTERNAL MEDICINE | Admitting: INTERNAL MEDICINE
Payer: MEDICARE

## 2019-05-08 DIAGNOSIS — J44.1 COPD EXACERBATION (H): ICD-10-CM

## 2019-05-08 DIAGNOSIS — J44.1 COPD WITH ACUTE EXACERBATION (H): ICD-10-CM

## 2019-05-08 RX ORDER — PREDNISONE 20 MG/1
TABLET ORAL
Qty: 20 TABLET | Refills: 0 | Status: SHIPPED | OUTPATIENT
Start: 2019-05-08 | End: 2019-07-19

## 2019-05-08 RX ORDER — SULFAMETHOXAZOLE/TRIMETHOPRIM 800-160 MG
TABLET ORAL
Qty: 28 TABLET | Refills: 0 | Status: SHIPPED | OUTPATIENT
Start: 2019-05-08 | End: 2019-07-19

## 2019-05-08 NOTE — TELEPHONE ENCOUNTER
Bactrim  Last office visit: 03/26/19  Last refill: 03/26/19 #28    Thank you.    Prednisone  Last office visit: 03/26/19  Last refill: 03/26/19 #20    Thank you.

## 2019-05-22 DIAGNOSIS — Z12.31 VISIT FOR SCREENING MAMMOGRAM: ICD-10-CM

## 2019-05-22 PROCEDURE — 77063 BREAST TOMOSYNTHESIS BI: CPT | Mod: TC

## 2019-06-04 DIAGNOSIS — I10 ESSENTIAL HYPERTENSION, BENIGN: ICD-10-CM

## 2019-06-04 DIAGNOSIS — I10 ESSENTIAL HYPERTENSION: ICD-10-CM

## 2019-06-04 DIAGNOSIS — I48.19 PERSISTENT ATRIAL FIBRILLATION (H): ICD-10-CM

## 2019-06-05 ENCOUNTER — PATIENT OUTREACH (OUTPATIENT)
Dept: CARE COORDINATION | Facility: OTHER | Age: 69
End: 2019-06-05

## 2019-06-05 ASSESSMENT — ACTIVITIES OF DAILY LIVING (ADL): DEPENDENT_IADLS:: INDEPENDENT

## 2019-06-05 NOTE — TELEPHONE ENCOUNTER
Catapres      Last Written Prescription Date:  2/1/2019  Last Fill Quantity: 180,   # refills: 0    Eliquis       Last Written Prescription Date:  2/1/2019  Last Fill Quantity: 180,   # refills: 0  Last Office Visit: 4/23/2019  Future Office visit:    Next 5 appointments (look out 90 days)    Jun 11, 2019 11:30 AM CDT  (Arrive by 11:15 AM)  Return Visit with Eliezer Myers MD  [facility] ([facility] ) [address]  [place] [address]  [phone]

## 2019-06-05 NOTE — PROGRESS NOTES
Clinic Care Coordination Contact  Care Team Conversations    CC received a telephone call from Mary Alice.  She reports she has received a bill from [employer] for her oxygen and she doesn't understand why she is being charged so much money.  She states she used to be charged $3 per month by [employer] and now she is being charged $41 per month.  She states she doesn't have extra money for this and inquired about brando care.  CC checked with Matt in [employer] and advised pt they do not provide brando care funding for home health/home medical expenses- this is used for clinic/hospital expenses only.  CC advised writer also spoke with Sarahi at [facility] to ask what they recommend Gerardo do.  She advised Mary Alice call their oxygen billing dept at [phone].  Pt was given this information.  CC encouraged her to inform them that she doesn't understand the bill and they need to explain this thoroughly to her.  Also encouraged her to inquire about any financial assistance resources they may have to offer.  Pt verbalized understanding.    Lauren Pipkin, BS-RN   CHF and General Care Coordinator  [phone] Option # 1

## 2019-06-05 NOTE — TELEPHONE ENCOUNTER
Hydralazine       Last Written Prescription Date:  Patient request   Last Fill Quantity: 810,   # refills: 0  Last Office Visit: 4/23/2019  Future Office visit:    Next 5 appointments (look out 90 days)    Jun 11, 2019 11:30 AM CDT  (Arrive by 11:15 AM)  Return Visit with Eliezer Myers MD  [facility] ([facility] ) [address]  [place] [address]  [phone]           Routing refill request to provider for review/approval because:  Drug not active on patient's medication list

## 2019-06-06 RX ORDER — CLONIDINE HYDROCHLORIDE 0.1 MG/1
TABLET ORAL
Qty: 180 TABLET | Refills: 0 | Status: SHIPPED | OUTPATIENT
Start: 2019-06-06 | End: 2019-09-08

## 2019-06-06 RX ORDER — APIXABAN 5 MG/1
TABLET, FILM COATED ORAL
Qty: 60 TABLET | Refills: 1 | Status: SHIPPED | OUTPATIENT
Start: 2019-06-06 | End: 2019-08-15

## 2019-06-06 RX ORDER — HYDRALAZINE HYDROCHLORIDE 25 MG/1
TABLET, FILM COATED ORAL
Qty: 810 TABLET | Refills: 0 | OUTPATIENT
Start: 2019-06-06

## 2019-06-06 NOTE — TELEPHONE ENCOUNTER
Left message for patient to make a follow up appointment if she wants to restart this medication.  Sabina Beckman LPN

## 2019-06-11 ENCOUNTER — OFFICE VISIT (OUTPATIENT)
Dept: CARDIOLOGY | Facility: OTHER | Age: 69
End: 2019-06-11
Attending: INTERNAL MEDICINE
Payer: COMMERCIAL

## 2019-06-11 VITALS
BODY MASS INDEX: 25.78 KG/M2 | HEART RATE: 85 BPM | DIASTOLIC BLOOD PRESSURE: 77 MMHG | OXYGEN SATURATION: 96 % | TEMPERATURE: 98.2 F | RESPIRATION RATE: 16 BRPM | HEIGHT: 64 IN | WEIGHT: 151 LBS | SYSTOLIC BLOOD PRESSURE: 123 MMHG

## 2019-06-11 DIAGNOSIS — I48.19 ATRIAL FIBRILLATION, PERSISTENT (H): Primary | ICD-10-CM

## 2019-06-11 DIAGNOSIS — I10 ESSENTIAL HYPERTENSION: ICD-10-CM

## 2019-06-11 PROCEDURE — G0463 HOSPITAL OUTPT CLINIC VISIT: HCPCS

## 2019-06-11 PROCEDURE — 99214 OFFICE O/P EST MOD 30 MIN: CPT | Performed by: INTERNAL MEDICINE

## 2019-06-11 RX ORDER — DILTIAZEM HYDROCHLORIDE 360 MG/1
360 CAPSULE, EXTENDED RELEASE ORAL DAILY
Qty: 90 CAPSULE | Refills: 3 | Status: SHIPPED | OUTPATIENT
Start: 2019-06-11 | End: 2020-03-11 | Stop reason: ALTCHOICE

## 2019-06-11 ASSESSMENT — PAIN SCALES - GENERAL: PAINLEVEL: NO PAIN (0)

## 2019-06-11 ASSESSMENT — MIFFLIN-ST. JEOR: SCORE: 1194.93

## 2019-06-11 NOTE — PROGRESS NOTES
"Chief Complaint: atrial fibrillation, bicuspid valve    HPI: I was happy to see Mrs. Cameron for the above. She has seen several cardiologists over the past year for these problems. Her  was ill and they moved to be closer to his daughters. He  in October and she has settled in [place]. Her atrial fibrillation began in the past year. In reviewing the old records both  and  are reported as when the atrial fibrillation began and she is no longer sure. This has been associated with shortness of breath but as was discussed with her by one of the cardiologist she has multiple reasons for shortness of breath including COPD. She is finishing treatment for a COPD exacerbation at this time. She reports the plan had been to start her on warfarin and then cardiovert her. With all the chaos in her life the past few months this never happened. She was found to have hyperthyroidism in August and was treated with Iodine ablation. She also has a bicuspid aortic valve and mild aortic root dilation (per report of echocardiogram in old records). The echocardiogram did not report significant aortic stenosis or insufficiency and her systolic function was normal making it less likely that her shortness of breath was related to her valvular heart disease. She was told she would need periodic f/u of her valve and aorta. She reports two angiograms done at [facility] and that they were \"okay\". I do not see copies of those reports in the old records. They will be requested.    She underwent DC cardioversion on 2014. She reports feeling better afterward. She feels like she needs to use her inhaler less often and has less shortness of breath. However, the ECG today shows she is back in atrial fibrillation.    I have reviewed the records sent from [facility]. There is an echocardiogram report from  and records regarding back surgery. I see no cath results.    A repeat cardioversion in July failed to restore sinus " "rhythm. A stress study in April (see below) showed no ischemia or infarction.    [id number]:  She had recurrent atrial fibrillation was started on flecainide and scheduled for cardioversion. When she arrived for the cardioversion she was in sinus.     A CT aortogram showed atherosclerosis but not dilation of the descending aoota.    [id number]:    She was admitted in March 2015 with a COPD exacerbation. Her breathing is at baseline with no fever, sputum or wheezing.    She has not noted any atrial fibrillation, no palpitations, chest pain/discomfort, unusual dyspnea on exertion, bleeding or neurologic symptoms.    [id number]: Her follow-up echocardiogram (see lab section) showed no change in aortic valve function. She report rare \"flutters\" but no sustained palpitations like with her atrial fibrillation. She reports no significant bleeding episodes on warfarin. She denies any neurologic symptoms suggestive of CVA or TIA. Edema improved when she switched her diltiazem to bedtime.    Her primary health problem has been her COPD. She uses oxygen at night. She has had had any recent symptoms of upper or lower respiratory infection.    [id number]: She had two admissions this past winter for COPD exacerbations. Overall, she feels her dyspnea on exertion is slowly getting worse. She has not had exertional chest pain/discomfort.     She reports no increased edema, orthopnea or paroxysmal nocturnal dyspnea.    [id number]: echocardiogram shows a decline in systolic function.  She reports that she has felt more fatigued and has had more dyspnea on exertion since I last saw her.  She also reports she has had problems with her lungs over the winter.  She is been on a prolonged course of prednisone.  She has not had chest pain or chest discomfort.  She has noted episodes of atrial fibrillation, or palpitations that she thinks is atrial fibrillation.  These episodes were relatively infrequent and do not last very long.  They are not " associated with associated symptoms and she has not found them to be overly troublesome.  She has had no prolonged episodes where she felt the need to consider seeking medical treatment.    September 25, 2018: her angiogram showed no obstructive coronary artery disease. She reports two episodes of pain between her shoulders, lasting 10 minutes.     No palpitations. Flecainide stopped due to drop in systolic function.     Has had a few problems with her COPD.     October 23, 2018: Ms. Cameron reports that she has been feeling somewhat better since switching to the torsemide.  Her swelling is better.  She continues to work or cleaning jobs.  She reports no significant episodes of atrial fibrillation.  She has occasional palpitations but nothing that has been very troublesome.  Her echocardiogram shows an improvement in her ejection fraction.  Her CT aortogram showed evidence of atherosclerosis but no dissections or aneurysms.    April 23, 2019: She was seen in the emergency department on March 19, 2019 complaining of a productive cough.  She was diagnosed with acute bronchitis.  She was also noted to be in atrial fibrillation with rapid ventricular rate.  She has had 2 courses of antibiotics for her acute bronchitis.    She reports her breathing is back to baseline.  She reports if she feels like it takes her longer to get things done and she takes more breaks but she does not find this particularly troublesome.    June 11, 2019 Interval history: She reports that her breathing is about the same. She has not noted an increase in her heart failure symptoms. Her Holter shows poor heart rate control.        Current Outpatient Medications   Medication Sig Dispense Refill     acetaminophen (TYLENOL) 500 MG tablet Take 500-1,000 mg by mouth every 6 hours as needed for mild pain       ADVAIR -21 MCG/ACT Inhaler INHALE 2 PUFFS INTO THE LUNGS TWICE DAILY 36 g 2     albuterol (PROAIR HFA/PROVENTIL HFA/VENTOLIN HFA) 108 (90  Base) MCG/ACT inhaler INHALE 2 PUFFS INTO THE LUNGS EVERY 4 HOURS AS NEEDED FOR SHORTNESS OF BREATH OR DIFFICULT BREATHING OR WHEEZING 54 g 0     atorvastatin (LIPITOR) 10 MG tablet Take 1 tablet (10 mg) by mouth At Bedtime 90 tablet 3     Calcium Carb-Cholecalciferol (CALTRATE 600+D3 SOFT PO) Take 600 mg by mouth 2 times daily       cloNIDine (CATAPRES) 0.1 MG tablet TAKE 2 TABLETS BY MOUTH EVERY NIGHT AT BEDTIME 180 tablet 0     COMBIVENT RESPIMAT  MCG/ACT inhaler Inhale 1 puff into the lungs 4 times daily        diltiazem 240 MG 24 hr capsule Take 1 capsule (240 mg) by mouth daily 90 capsule 3     ELIQUIS 5 MG tablet TAKE 1 TABLET BY MOUTH TWICE DAILY 60 tablet 1     furosemide (LASIX) 40 MG tablet TAKE 1 TABLET BY MOUTH TWICE DAILY. 180 tablet 0     levothyroxine (SYNTHROID/LEVOTHROID) 100 MCG tablet TAKE 1 TABLET(100 MCG) BY MOUTH DAILY 90 tablet 3     losartan (COZAAR) 50 MG tablet TAKE 1 TABLET BY MOUTH TWICE DAILY 60 tablet 5     potassium chloride SA (K-DUR/KLOR-CON M) 20 MEQ CR tablet Take 1 tablet (20 mEq) by mouth 2 times daily 180 tablet 3     albuterol (2.5 MG/3ML) 0.083% neb solution Take 1 vial (2.5 mg) by nebulization every 6 hours as needed for shortness of breath / dyspnea or wheezing (Patient not taking: Reported on 4/23/2019) 25 vial 1     diphenhydrAMINE (BENADRYL) 25 MG tablet Take 1-2 tablets (25-50 mg) by mouth every 6 hours as needed for itching or allergies 60 tablet 1     OTHER MEDICAL SUPPLIES Apply one pair to help with edema (Patient not taking: Reported on 4/23/2019) 1 each 0     predniSONE (DELTASONE) 20 MG tablet TAKE 3 TABLETS BY MOUTH X 3 DAYS, 2 TABLETS X 3 DAYS, 1 TABLET X 3 DAYS, AND 0.5 TABLETS X 3 DAYS (Patient not taking: Reported on 6/11/2019) 20 tablet 0     sulfamethoxazole-trimethoprim (BACTRIM DS/SEPTRA DS) 800-160 MG tablet TAKE 1 TABLET BY MOUTH TWICE DAILY FOR 7 DAYS (Patient not taking: Reported on 6/11/2019) 28 tablet 0       Past Medical History:   Diagnosis  "Date     Asthma      Atrial fibrillation (H)      COPD (chronic obstructive pulmonary disease) (H)      Depression      High cholesterol      HTN (hypertension)      Thyroid disease        Past Surgical History:   Procedure Laterality Date     BACK SURGERY  2006     CARDIAC SURGERY  2018    [facility]gi[facility] at [facility]     COLONOSCOPY N/A 2016    Procedure: COLONOSCOPY;  Surgeon: Waldemar Bob MD;  Location: [facility] OR     HYSTERECTOMY      partial     SLING BLADDER SUSPENSION WITH FASCIA LINNETTE         Family History   Problem Relation Age of Onset     Prostate Cancer Father      Hypertension Father      Heart Failure Father         CHF     Asthma Mother      Cancer Mother         ovarian     Hypertension Mother      Asthma Brother      Hypertension Sister      Hypertension Brother        Social History     Tobacco Use     Smoking status: Former Smoker     Packs/day: 0.50     Years: 41.00     Pack years: 20.50     Types: Cigarettes     Start date: 1966     Last attempt to quit: 2007     Years since quittin.3     Smokeless tobacco: Never Used   Substance Use Topics     Alcohol use: No     Alcohol/week: 0.0 oz       Allergies   Allergen Reactions     Amlodipine Besylate Cough     Norvasc     Lisinopril Cough       ROS: ten system review negative except as noted above.     Physical Examination:  /77 (BP Location: Left arm, Patient Position: Chair, Cuff Size: Adult Regular)   Pulse 85   Temp 98.2  F (36.8  C) (Tympanic)   Resp 16   Ht 1.626 m (5' 4\")   Wt 68.5 kg (151 lb)   SpO2 96%   BMI 25.92 kg/m    GENERAL APPEARANCE: healthy, alert and no distress  HEENT: no icterus, no xanthelasmas  NECK:  JVP is not visible, left CEA scar  CHEST:  no rales or rhonchi, breath sounds are diminished throughout, expiratory phase is prolonged, no wheezing  CARDIOVASCULAR: irregular rhythm, S1S2 split, no S3 or S4 and no murmur, click or rub, precordium quiet  ABDOMEN: soft, non tender, " bowel sounds normal, no abdominal bruits  EXTREMITIES: some edema, but also adipose    Laboratory and diagnostic data reviewed 2019:        I reviewed her ECG from 2019 that shows atrial fibrillation with rapid ventricular rate.    [facility]  [facility]  [address] [place] [address]     Name: LANCE VALLEJO  MRN: [id number]  : 1950  Study Date: 10/11/2018 08:57 AM  Age: 68 yrs  Gender: Female  Patient [facility]: [facility]  Reason For Study: , Bicuspid aortic valve  History: Bicuspid Aortic Valve  Ordering Physician: TODD JAMES  Referring Physician: TODD JAMES  Performed By: Genevieve Contreras     BSA: 1.8 m2  Height: 65 in  Weight: 160 lb  _____________________________________________________________________________  __     _____________________________________________________________________________  __        Interpretation Summary  No pericardial effusion is present.  Mild left ventricular dilation is present.  The Ejection Fraction is estimated at 45-50%.  The right ventricle is normal size.  Global right ventricular function is normal.  Mild left atrial enlargement is present.  Mild mitral insufficiency is present.  Cannot exclude a bicuspid aortic valve.  Mild aortic valve sclerosis is present.  Mild aortic insufficiency is present.  The peak aortic velocity is 2.28 m/sec.  The mean gradient across the aortic valve is11.8 mmHg.  Mild tricuspid insufficiency is present.  Right ventricular systolic pressure is 32.4mmHg above the right atrial  pressure.  The pulmonic valve is normal.  The aorta root is normal.  _____________________________________________________________________________  __        Left Ventricle  Mild left ventricular dilation is present. The Ejection Fraction is estimated  at 45-50%.     Right Ventricle  The right ventricle is normal size. Global right ventricular function  is  normal.     Atria  Mild left atrial enlargement is present.        Mitral Valve  Mild mitral insufficiency is present.     Aortic Valve  Cannot exclude a bicuspid aortic valve. Mild aortic valve sclerosis is  present. Mild aortic insufficiency is present. The peak aortic velocity is  2.28 m/sec. The peak AoV pressure gradient is 20.7 mmHg. The mean AoV pressure  gradient is 11.8 mmHg. The mean gradient across the aortic valve is11.8 mmHg.     Tricuspid Valve  Mild tricuspid insufficiency is present. Right ventricular systolic pressure  is 32.4mmHg above the right atrial pressure.     Pulmonic Valve  The pulmonic valve is normal.     Vessels  The aorta root is normal.     Pericardium  No pericardial effusion is present.     _____________________________________________________________________________  __  MMode/2D Measurements & Calculations  IVSd: 1.0 cm  IVSs: 1.4 cm  LVIDd: 6.1 cm  LVIDs: 4.7 cm  LVPWd: 0.84 cm  LVPWs: 1.5 cm     FS: 23.7 %  LV mass(C)d: 235.8 grams  LV mass(C)dI: 131.1 grams/m2  LV mass(C)sI: 151.1 grams/m2  Ao root diam: 3.5 cm  LA dimension: 4.7 cm  LA/Ao: 1.4  LVOT diam: 2.2 cm  LVOT area: 3.8 cm2  RWT: 0.27     Time Measurements  Pulm. HR: 228.0 BPM  MM HR: 138.5 BPM     Doppler Measurements & Calculations  Ao V2 max: 227.8 cm/sec  Ao max P.7 mmHg  Ao V2 mean: 154.9 cm/sec  Ao mean P.8 mmHg  Ao V2 VTI: 47.0 cm  MATIAS(I,D): 1.0 cm2  MATIAS(V,D): 1.0 cm2  LV V1 max P.5 mmHg  LV V1 max: 61.2 cm/sec  LV V1 VTI: 13.0 cm  CO(LVOT): 9.6 l/min  CI(LVOT): 5.3 l/min/m2  SV(LVOT): 49.0 ml  SI(LVOT): 27.3 ml/m2  TV max P.5 mmHg  PA V2 max: 74.0 cm/sec  PA max P.2 mmHg  PA mean P.3 mmHg  PA V2 VTI: 13.8 cm     TR max parveen: 284.9 cm/sec  TR max P.5 mmHg  AV Parveen Ratio (DI): 0.27  MATIAS Index (cm2/m2): 0.58     _____________________________________________________________________________  __           Report approved by: Kieran Singer 10/11/2018 04:02 PM        CTA CHEST  ABDOMEN WITH CONTRAST    HISTORY: 68 yearsFemale ; Bicuspid aortic valve; Ascending aorta  dilatation (H)    TECHNIQUE: Axial CT imaging of the chest abdomen and pelvis was  performed with intervenous contrast contrast. Coronal and sagittal  reconstructions were obtained.    COMPARISON: None    FINDINGS:    CT CHEST: The ace ascending thoracic aorta is ectatic measuring 4.4 cm  transversely and 4.3 cm in AP dimension. The descending thoracic aorta  is 2.5 x 2.2 cm respectively. There is calcified atherosclerotic  disease of the thoracic aorta without evidence of dissection. There is  calcification of the aortic valve.  There is no mediastinal or hilar or axillary lymphadenopathy.    There is mild linear atelectasis at both lung bases. There is  cardiomegaly.         No concerning osseous lesions are identified    IMPRESSION CHEST: There is ectasia of the ascending thoracic aorta  measuring 4.4 x 4.3 cm in diameter.    There is cardiomegaly.      CT ABDOMEN AND PELVIS: There is atherosclerotic disease of the  abdominal aorta without evidence of aneurysm.     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 The residual lumen measures 2 mm in diameter. There is  approximate 50% stenosis of the origin of the superior mesenteric  artery, residual vessel lumen measures 3.5 to 4 mm.    There are 2 patent right renal arteries. There are 2 left renal  arteries with a dominant vessel originating slightly below the smaller  accessory vessel. There is moderate to severe stenosis of the dominant  left renal artery. There is moderate to severe stenosis of the origin  of the inferior mesenteric artery.    The visualized solid organs are unremarkable. No abnormally distended  loops of bowel are evident. The appendix is unremarkable.    IMPRESSION ABDOMEN AND PELVIS:     Atherosclerotic disease of the abdominal aorta without evidence of  aneurysm or dissection.    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 See description above.    MARINA BRICE MD              Old records show a TSH of 1.87 in 11/13. Labs from 10/13 show a normal K, creat and estimated GFR.    Assessment and recommendations:    1) Persistent atrial fibrillation (recurrent) - she remains reluctant to consider antiarrhythmic drug/cardioversion. We discussed that her heart rate control is poor and that this could lead to a decline in her systolic heart function.     If heart rate cannot be controlled we discussed AV node ablation with pacing versus pulmonary vein isolation for control of her atrial fibrillation. If she choose the latter would strongly recommend antiarrhythmic drug/cardioversion prior to pulmonary vein isolation and continuation of antiarrhythmic drug for 90 days post ablation.    For now she would like to increase her diltiazem in an effort to control her heart rate pharmacologically.       2) Bicuspid aortic valve - previous echocardiogram does not show significant aortic stenosis or insufficiency. CT aortogram shows no dilation of descending aorta.     -Valve function is stable    3)  Hypertension - controlled    4) Systolic dysfunction - no coronary artery disease found     - echocardiogram with stable EF, if anything slightly improved      I appreciate the chance to help with Mrs. Cameron's care. Please let me know if you have any questions or concerns. I will see her back in one year.    Portions of this note were dictated using speech recognition software. The note has been proofread but errors in the text may have been overlooked. Please contact me if there are any concerns regarding the accuracy of the dictation.     Eliezer Myers M.D.

## 2019-06-11 NOTE — NURSING NOTE
"Chief Complaint   Patient presents with     RECHECK     7 week cardiology follow-up       Initial /77 (BP Location: Left arm, Patient Position: Chair, Cuff Size: Adult Regular)   Pulse 85   Temp 98.2  F (36.8  C) (Tympanic)   Resp 16   Ht 1.626 m (5' 4\")   Wt 68.5 kg (151 lb)   SpO2 96%   BMI 25.92 kg/m   Estimated body mass index is 25.92 kg/m  as calculated from the following:    Height as of this encounter: 1.626 m (5' 4\").    Weight as of this encounter: 68.5 kg (151 lb).  Medication Reconciliation: complete    Fe Stephenson LPN    "

## 2019-06-11 NOTE — PATIENT INSTRUCTIONS
You were seen by Dr. Myers, 6/11/2019.     1.  You may consider medication to control atrial fibrillation.      2.  You may consider having an ablation procedure in an attempt to isolate the area of the heart causing the atrial fibrillation.      3. Please increase the dose of Diltiazem to 360 mg per day.  Take this medication in the evening as this may help to reduce swelling in the legs.     4 You will be scheduled for a 24 hour Holter monitor  In one month.  You will be contacted to schedule this appointment.     You will follow up with Dr. Myers in 6 weeks.       Please call the cardiology office with problems, questions, or concerns at [phone].    If you experience chest pain, chest pressure, chest tightness, shortness of breath, fainting, lightheadedness, nausea, vomiting, or other concerning symptoms, please report to the Emergency Department or call 911. These symptoms may be emergent, and best treated in the Emergency Department.       Giana NAVARRO RN-BSN  Cardiology   [facility]  [phone]

## 2019-06-16 DIAGNOSIS — J44.1 COPD EXACERBATION (H): ICD-10-CM

## 2019-06-17 ENCOUNTER — HOSPITAL ENCOUNTER (OUTPATIENT)
Dept: CARDIOLOGY | Facility: HOSPITAL | Age: 69
Discharge: HOME | End: 2019-06-17
Attending: INTERNAL MEDICINE | Admitting: INTERNAL MEDICINE
Payer: MEDICARE

## 2019-06-17 DIAGNOSIS — I48.19 ATRIAL FIBRILLATION, PERSISTENT (H): ICD-10-CM

## 2019-06-17 PROCEDURE — 93225 XTRNL ECG REC<48 HRS REC: CPT | Mod: TC

## 2019-06-17 PROCEDURE — 93227 XTRNL ECG REC<48 HR R&I: CPT | Performed by: INTERNAL MEDICINE

## 2019-06-17 RX ORDER — IPRATROPIUM BROMIDE AND ALBUTEROL SULFATE 2.5; .5 MG/3ML; MG/3ML
SOLUTION RESPIRATORY (INHALATION)
Qty: 1620 ML | Refills: 0 | Status: SHIPPED | OUTPATIENT
Start: 2019-06-17 | End: 2019-12-15

## 2019-06-17 NOTE — TELEPHONE ENCOUNTER
ipratropium - albuterol 0.5 mg/2.5 mg/3 mL (DUONEB) 0.5-2.5 (3) MG/3ML neb solution       Last Written Prescription Date:  Not on med list  Last Fill Quantity: -,   # refills: -  Last Office Visit: 3/26/19  Future Office visit:    Next 5 appointments (look out 90 days)    Jun 18, 2019  1:00 PM CDT  Return Visit with [facility]  [facility] ([facility] ) [address] [facility] [address]  [phone]   Jul 23, 2019 10:00 AM CDT  (Arrive by 9:45 AM)  [facility]iezer Myers MD  [facility] [address] [address] [facility] [address]  [phone]           Routing refill request to provider for review/approval because:  Drug not active on patient's medication list    Med discontinued by LPN on 4/23/19 for reason- discontinued by another healthcare provider. I don't see that med was ever discontinued by an MD. Please advise. Thank you!

## 2019-06-18 ENCOUNTER — HOSPITAL ENCOUNTER (OUTPATIENT)
Dept: CARDIOLOGY | Facility: HOSPITAL | Age: 69
Discharge: HOME | End: 2019-06-18
Attending: FAMILY MEDICINE | Admitting: FAMILY MEDICINE
Payer: MEDICARE

## 2019-07-03 DIAGNOSIS — I48.19 ATRIAL FIBRILLATION, PERSISTENT (H): ICD-10-CM

## 2019-07-03 DIAGNOSIS — I10 ESSENTIAL HYPERTENSION: ICD-10-CM

## 2019-07-05 RX ORDER — DILTIAZEM HYDROCHLORIDE 240 MG/1
CAPSULE, COATED, EXTENDED RELEASE ORAL
Qty: 90 CAPSULE | Refills: 0 | OUTPATIENT
Start: 2019-07-05

## 2019-07-16 DIAGNOSIS — I25.10 CORONARY ARTERY DISEASE INVOLVING NATIVE CORONARY ARTERY OF NATIVE HEART WITHOUT ANGINA PECTORIS: ICD-10-CM

## 2019-07-16 DIAGNOSIS — I10 ESSENTIAL HYPERTENSION, BENIGN: ICD-10-CM

## 2019-07-16 DIAGNOSIS — E78.2 MIXED HYPERLIPIDEMIA: ICD-10-CM

## 2019-07-16 RX ORDER — FUROSEMIDE 40 MG
TABLET ORAL
Qty: 180 TABLET | Refills: 0 | Status: SHIPPED | OUTPATIENT
Start: 2019-07-16 | End: 2019-10-14

## 2019-07-16 RX ORDER — ATORVASTATIN CALCIUM 10 MG/1
TABLET, FILM COATED ORAL
Qty: 90 TABLET | Refills: 0 | Status: SHIPPED | OUTPATIENT
Start: 2019-07-16 | End: 2019-10-14

## 2019-07-19 DIAGNOSIS — J44.1 COPD WITH ACUTE EXACERBATION (H): ICD-10-CM

## 2019-07-19 DIAGNOSIS — J44.1 COPD EXACERBATION (H): ICD-10-CM

## 2019-07-21 NOTE — TELEPHONE ENCOUNTER
12:15 PM    Reason for Call: OVERBOOK    Patient is having the following symptoms: Fever, chills, SOB  For  1 day    The patient is requesting an appointment for today  with Dr. Yen    Was an appointment offered for this call?  No    Preferred method for responding to this message: [phone]    If we cannot reach you directly, may we leave a detailed response at the number you provided ?  Yes    Kenna Panchal      
Appt given for today.  
initiate hand expression routine/initiate skin to skin/stimulate nutritive suck using

## 2019-07-22 RX ORDER — SULFAMETHOXAZOLE/TRIMETHOPRIM 800-160 MG
TABLET ORAL
Qty: 28 TABLET | Refills: 0 | Status: SHIPPED | OUTPATIENT
Start: 2019-07-22 | End: 2019-07-23

## 2019-07-22 RX ORDER — PREDNISONE 20 MG/1
TABLET ORAL
Qty: 20 TABLET | Refills: 0 | Status: SHIPPED | OUTPATIENT
Start: 2019-07-22 | End: 2020-01-24

## 2019-07-22 NOTE — TELEPHONE ENCOUNTER
predniSONE  Last Written Prescription Date: 5/8/19  Last Fill Quantity: 20 # of Refills: 0  Last Office Visit: 3/26/19    bactrim  Last Written Prescription Date: 5/8/19  Last Fill Quantity: 28 # of Refills: 0  Last Office Visit: 3/26/19

## 2019-07-23 ENCOUNTER — PATIENT OUTREACH (OUTPATIENT)
Dept: CARE COORDINATION | Facility: OTHER | Age: 69
End: 2019-07-23

## 2019-07-23 ENCOUNTER — OFFICE VISIT (OUTPATIENT)
Dept: CARDIOLOGY | Facility: OTHER | Age: 69
End: 2019-07-23
Attending: INTERNAL MEDICINE
Payer: COMMERCIAL

## 2019-07-23 VITALS
RESPIRATION RATE: 20 BRPM | HEART RATE: 70 BPM | OXYGEN SATURATION: 96 % | BODY MASS INDEX: 25.27 KG/M2 | HEIGHT: 64 IN | SYSTOLIC BLOOD PRESSURE: 131 MMHG | WEIGHT: 148 LBS | TEMPERATURE: 98.1 F | DIASTOLIC BLOOD PRESSURE: 68 MMHG

## 2019-07-23 DIAGNOSIS — Q23.81 BICUSPID AORTIC VALVE: ICD-10-CM

## 2019-07-23 DIAGNOSIS — I50.32 CHRONIC HEART FAILURE WITH PRESERVED EJECTION FRACTION (H): ICD-10-CM

## 2019-07-23 DIAGNOSIS — J43.9 PULMONARY EMPHYSEMA, UNSPECIFIED EMPHYSEMA TYPE (H): ICD-10-CM

## 2019-07-23 DIAGNOSIS — I48.19 PERSISTENT ATRIAL FIBRILLATION (H): Primary | ICD-10-CM

## 2019-07-23 PROCEDURE — 99214 OFFICE O/P EST MOD 30 MIN: CPT | Performed by: INTERNAL MEDICINE

## 2019-07-23 PROCEDURE — G0463 HOSPITAL OUTPT CLINIC VISIT: HCPCS

## 2019-07-23 RX ORDER — SULFAMETHOXAZOLE/TRIMETHOPRIM 800-160 MG
TABLET ORAL PRN
COMMUNITY
Start: 2019-07-22 | End: 2020-02-18

## 2019-07-23 ASSESSMENT — ACTIVITIES OF DAILY LIVING (ADL): DEPENDENT_IADLS:: INDEPENDENT

## 2019-07-23 ASSESSMENT — MIFFLIN-ST. JEOR: SCORE: 1181.32

## 2019-07-23 ASSESSMENT — PAIN SCALES - GENERAL: PAINLEVEL: NO PAIN (0)

## 2019-07-23 NOTE — PATIENT INSTRUCTIONS
You were seen by Dr. Myers, 7/23/2019.     1.  You may consider an ablation procedure in an attempt to isolate the area of the heart causing the atrial fibrillation.      2.  You may choose to treat atrial fibrillation with medication and repeat cardioversion.     3.  You may choose to have a second opinion if you would like your procedure done in [place]. If you would like to proceed please contact Dr. Yen for a referral.       4. Please continue all medication as prescribed.     5. If you develop new or worsening symptoms please call the cardiology office as you may need to be seen sooner.     You will follow up with Dr. Myers in 6 months.    Please call the cardiology office with problems, questions, or concerns at [phone].    If you experience chest pain, chest pressure, chest tightness, shortness of breath, fainting, lightheadedness, nausea, vomiting, or other concerning symptoms, please report to the Emergency Department or call 911. These symptoms may be emergent, and best treated in the Emergency Department.       Giana NAVARRO RN-BSN  Cardiology   [facility]  [phone]

## 2019-07-23 NOTE — NURSING NOTE
"Chief Complaint   Patient presents with     RECHECK     6 week cardiology follow-up and follow-up test results;  Patient states she has \"had a productive cough of cloudy phlegm since last night\".        Initial /68 (BP Location: Left arm, Patient Position: Chair, Cuff Size: Adult Regular)   Pulse 70   Temp 98.1  F (36.7  C) (Tympanic)   Resp 20   Ht 1.626 m (5' 4\")   Wt 67.1 kg (148 lb)   SpO2 96%   BMI 25.40 kg/m   Estimated body mass index is 25.4 kg/m  as calculated from the following:    Height as of this encounter: 1.626 m (5' 4\").    Weight as of this encounter: 67.1 kg (148 lb).  Medication Reconciliation: complete   Fe Stephenson      "

## 2019-07-23 NOTE — PROGRESS NOTES
Clinic Care Coordination Contact  Care Team Conversations    CC met with Mary Alice today in clinic prior to her appointment with Dr Myers.  Pt reports she is doing well.  Denies significant changes to her health at the current time.  She states she did find out that her 11yo dog has diabetes and needs shots of insulin.  She also reports she does have a concern for her ability to afford out of pocket medical costs.  Reports she thinks Dr Myers may discuss her need for a pacemaker or other cardiac intervention but she worries about the costs associated with this.  We discussed having her speak with Matt with [employer] to screen for 340B.  She will consider this.      Mary Alice was encouraged to reach out anytime with questions or concerns.    Lauren Pipkin, BS-RN   CHF and General Care Coordinator  [phone] Option # 1

## 2019-08-08 NOTE — PROGRESS NOTES
SUBJECTIVE:   Mary Alice Cameron is a 69 year old female who presents to clinic today for the following   health issues:    Hypertension Follow-up      Do you check your blood pressure regularly outside of the clinic? Yes     Are you following a low salt diet? Yes    Are your blood pressures ever more than 140 on the top number (systolic) OR more   than 90 on the bottom number (diastolic), for example 140/90? No  Hypothyroidism Follow-up      Since last visit, patient describes the following symptoms: dry skin      How many servings of fruits and vegetables do you eat daily?  0-1    On average, how many sweetened beverages do you drink each day (soda, juice, sweet tea, etc)?   0    How many days per week do you miss taking your medication? 0          PROBLEMS TO ADD ON...    Additional history: doing well overall.  We discussed at length Dr. Myers's recommendation of ablation vs rate control.  I reviewed his note and discussed with Mary Alice the options.  She really doesn't want to do it (ablation) and I told her she doesn't have to but it might improve her functional capacity if she does.      Reviewed  and updated as needed this visit by clinical staff         Reviewed and updated as needed this visit by Provider         Patient Active Problem List   Diagnosis     Persistent atrial fibrillation (H)     Pulmonary emphysema (H)     Bicuspid aortic valve     Mild major depression (H)     Acute bronchitis     Hypothyroidism, postablative     Advance care planning     Essential hypertension     Long-term (current) use of anticoagulants [Z79.01]     Acute on chronic respiratory failure (H)     Health Care Home     Status post coronary angiogram     Coronary artery disease involving native coronary artery of native heart without angina pectoris     S/P AVR (aortic valve replacement)     Past Surgical History:   Procedure Laterality Date     BACK SURGERY  2006     CARDIAC SURGERY  06/12/2018    Angiogram at [facility] in  [place]     COLONOSCOPY N/A 2016    Procedure: COLONOSCOPY;  Surgeon: Waldemar Bob MD;  Location: [facility]     HYSTERECTOMY      partial     SLING BLADDER SUSPENSION WITH FASCIA LINNETTE         Social History     Tobacco Use     Smoking status: Former Smoker     Packs/day: 0.50     Years: 41.00     Pack years: 20.50     Types: Cigarettes     Start date: 1966     Last attempt to quit: 2007     Years since quittin.5     Smokeless tobacco: Never Used   Substance Use Topics     Alcohol use: No     Alcohol/week: 0.0 oz     Family History   Problem Relation Age of Onset     Prostate Cancer Father      Hypertension Father      Heart Failure Father         CHF     Asthma Mother      Cancer Mother         ovarian     Hypertension Mother      Asthma Brother      Hypertension Sister      Hypertension Brother          Current Outpatient Medications   Medication Sig Dispense Refill     acetaminophen (TYLENOL) 500 MG tablet Take 500-1,000 mg by mouth every 6 hours as needed for mild pain       ADVAIR -21 MCG/ACT Inhaler INHALE 2 PUFFS INTO THE LUNGS TWICE DAILY 36 g 2     albuterol (2.5 MG/3ML) 0.083% neb solution Take 1 vial (2.5 mg) by nebulization every 6 hours as needed for shortness of breath / dyspnea or wheezing 25 vial 1     albuterol (PROAIR HFA/PROVENTIL HFA/VENTOLIN HFA) 108 (90 Base) MCG/ACT inhaler INHALE 2 PUFFS INTO THE LUNGS EVERY 4 HOURS AS NEEDED FOR SHORTNESS OF BREATH OR DIFFICULT BREATHING OR WHEEZING 54 g 0     atorvastatin (LIPITOR) 10 MG tablet TAKE 1 TABLET BY MOUTH EVERY NIGHT AT BEDTIME 90 tablet 0     Calcium Carb-Cholecalciferol (CALTRATE 600+D3 SOFT PO) Take 600 mg by mouth 2 times daily       cloNIDine (CATAPRES) 0.1 MG tablet TAKE 2 TABLETS BY MOUTH EVERY NIGHT AT BEDTIME 180 tablet 0     COMBIVENT RESPIMAT  MCG/ACT inhaler Inhale 1 puff into the lungs 4 times daily        diltiazem ER COATED BEADS (CARDIZEM CD) 360 MG 24 hr capsule Take 1 capsule (360 mg) by  mouth daily 90 capsule 3     diphenhydrAMINE (BENADRYL) 25 MG tablet Take 1-2 tablets (25-50 mg) by mouth every 6 hours as needed for itching or allergies 60 tablet 1     ELIQUIS 5 MG tablet TAKE 1 TABLET BY MOUTH TWICE DAILY 60 tablet 3     furosemide (LASIX) 40 MG tablet TAKE 1 TABLET BY MOUTH TWICE DAILY. 180 tablet 0     ipratropium - albuterol 0.5 mg/2.5 mg/3 mL (DUONEB) 0.5-2.5 (3) MG/3ML neb solution USE 1 VIAL PER NEBULIZER FOUR TIMES DAILY 1620 mL 0     levothyroxine (SYNTHROID/LEVOTHROID) 100 MCG tablet TAKE 1 TABLET(100 MCG) BY MOUTH DAILY 90 tablet 3     losartan (COZAAR) 50 MG tablet TAKE 1 TABLET BY MOUTH TWICE DAILY 60 tablet 3     OTHER MEDICAL SUPPLIES Apply one pair to help with edema 1 each 0     potassium chloride SA (K-DUR/KLOR-CON M) 20 MEQ CR tablet Take 1 tablet (20 mEq) by mouth 2 times daily 180 tablet 3     predniSONE (DELTASONE) 20 MG tablet TAKE 3 TABLETS BY MOUTH X 3 DAYS, 2 TABLETS X 3 DAYS, 1 TABLET X 3 DAYS, AND 0.5 TABLETS X 3 DAYS 20 tablet 0     sulfamethoxazole-trimethoprim (BACTRIM DS/SEPTRA DS) 800-160 MG tablet as needed       Allergies   Allergen Reactions     Amlodipine Besylate Cough     Norvasc     Lisinopril Cough       ROS:  Constitutional, HEENT, cardiovascular, pulmonary, gi and gu systems are negative, except as otherwise noted.    OBJECTIVE:                                                    /74   Pulse 87   Wt 68 kg (150 lb)   SpO2 96%   BMI 25.75 kg/m    Body mass index is 25.75 kg/m .  GENERAL APPEARANCE: Alert, no acute distress  CV: irregular rate and rhythm, 2/6 murmur, no rub or gallop  RESP: lungs clear to auscultation bilaterally  ABDOMEN: normal bowel sounds, soft, nontender, no hepatosplenomegaly or other masses  SKIN: no suspicious lesions or rashes to visualized skin  NEURO: Alert, oriented x 3, speech and mentation normal      Labs pending.       ASSESSMENT/PLAN:                                                    1. Persistent atrial  fibrillation (H)  Reviewed at some length.  She really doesn't want to have the ablation and I basically told her she doesn't have to but it could help her.  She is going to hold off and f/u as scheduled.  Good rate control right now.  She is in afib to auscultation.      2. Pulmonary emphysema, unspecified emphysema type (H)  Stable.  No change.  She sees pulmonary as well.      3. Hypothyroidism, postablative  Stable.  No new sx.  Update labs.      4. Essential hypertension  Stable.  Doing well. udpate bmp. It's been about 6 months.  F/u routine.            Braydon Yen MD  [facility]

## 2019-08-15 DIAGNOSIS — I48.19 PERSISTENT ATRIAL FIBRILLATION (H): ICD-10-CM

## 2019-08-15 DIAGNOSIS — E87.6 HYPOKALEMIA: ICD-10-CM

## 2019-08-15 DIAGNOSIS — I10 ESSENTIAL HYPERTENSION: ICD-10-CM

## 2019-08-15 RX ORDER — POTASSIUM CHLORIDE 1500 MG/1
TABLET, EXTENDED RELEASE ORAL
Qty: 180 TABLET | Refills: 0 | OUTPATIENT
Start: 2019-08-15

## 2019-08-15 RX ORDER — LOSARTAN POTASSIUM 50 MG/1
TABLET ORAL
Qty: 60 TABLET | Refills: 3 | Status: SHIPPED | OUTPATIENT
Start: 2019-08-15 | End: 2019-12-13

## 2019-08-15 NOTE — TELEPHONE ENCOUNTER
ELIQUIS 5 MG tablet      Last Written Prescription Date:  6/6/19  Last Fill Quantity: 60,   # refills: 1  Last Office Visit: 7/23/19  Future Office visit:    Next 5 appointments (look out 90 days)    Aug 19, 2019  1:30 PM CDT  (Arrive by 1:15 PM)  [facility] [address] [facility] [address]  [phone]           Routing refill request to provider for review/approval because:  Serum creatinine warning that has not previously been signed off on. Please advise. Thank you!    Creatinine   Date Value Ref Range Status   03/19/2019 0.82 0.52 - 1.04 mg/dL Final

## 2019-08-19 ENCOUNTER — OFFICE VISIT (OUTPATIENT)
Dept: FAMILY MEDICINE | Facility: OTHER | Age: 69
End: 2019-08-19
Attending: FAMILY MEDICINE
Payer: COMMERCIAL

## 2019-08-19 VITALS
SYSTOLIC BLOOD PRESSURE: 110 MMHG | HEART RATE: 87 BPM | OXYGEN SATURATION: 96 % | DIASTOLIC BLOOD PRESSURE: 74 MMHG | BODY MASS INDEX: 25.75 KG/M2 | WEIGHT: 150 LBS

## 2019-08-19 DIAGNOSIS — I48.19 PERSISTENT ATRIAL FIBRILLATION (H): Primary | ICD-10-CM

## 2019-08-19 DIAGNOSIS — E89.0 HYPOTHYROIDISM, POSTABLATIVE: ICD-10-CM

## 2019-08-19 DIAGNOSIS — I10 ESSENTIAL HYPERTENSION: ICD-10-CM

## 2019-08-19 DIAGNOSIS — J43.9 PULMONARY EMPHYSEMA, UNSPECIFIED EMPHYSEMA TYPE (H): ICD-10-CM

## 2019-08-19 PROCEDURE — G0463 HOSPITAL OUTPT CLINIC VISIT: HCPCS

## 2019-08-19 PROCEDURE — 36415 COLL VENOUS BLD VENIPUNCTURE: CPT | Mod: ZL | Performed by: FAMILY MEDICINE

## 2019-08-19 PROCEDURE — 84443 ASSAY THYROID STIM HORMONE: CPT | Mod: ZL | Performed by: FAMILY MEDICINE

## 2019-08-19 PROCEDURE — 84439 ASSAY OF FREE THYROXINE: CPT | Mod: ZL | Performed by: FAMILY MEDICINE

## 2019-08-19 PROCEDURE — 80048 BASIC METABOLIC PNL TOTAL CA: CPT | Mod: ZL | Performed by: FAMILY MEDICINE

## 2019-08-19 PROCEDURE — 99214 OFFICE O/P EST MOD 30 MIN: CPT | Performed by: FAMILY MEDICINE

## 2019-08-19 RX ORDER — APIXABAN 5 MG/1
TABLET, FILM COATED ORAL
Qty: 60 TABLET | Refills: 3 | Status: SHIPPED | OUTPATIENT
Start: 2019-08-19 | End: 2019-09-24

## 2019-08-19 ASSESSMENT — PAIN SCALES - GENERAL: PAINLEVEL: NO PAIN (0)

## 2019-08-19 ASSESSMENT — PATIENT HEALTH QUESTIONNAIRE - PHQ9: SUM OF ALL RESPONSES TO PHQ QUESTIONS 1-9: 2

## 2019-08-19 NOTE — NURSING NOTE
"No chief complaint on file.      Initial /74   Pulse 87   Wt 68 kg (150 lb)   SpO2 96%   BMI 25.75 kg/m   Estimated body mass index is 25.75 kg/m  as calculated from the following:    Height as of 7/23/19: 1.626 m (5' 4\").    Weight as of this encounter: 68 kg (150 lb).  Medication Reconciliation: complete       Bernadine Story      "

## 2019-08-19 NOTE — LETTER
My Depression Action Plan  Name: Mary Alice Cameron   Date of Birth 1950  Date: 8/8/2019    My doctor: Braydon Yen   My clinic: [address]  [facility] [address]  [phone]          GREEN    ZONE   Good Control    What it looks like:     Things are going generally well. You have normal up s and down s. You may even feel depressed from time to time, but bad moods usually last less than a day.   What you need to do:  1. Continue to care for yourself (see self care plan)  2. Check your depression survival kit and update it as needed  3. Follow your physician s recommendations including any medication.  4. Do not stop taking medication unless you consult with your physician first.           YELLOW         ZONE Getting Worse    What it looks like:     Depression is starting to interfere with your life.     It may be hard to get out of bed; you may be starting to isolate yourself from others.    Symptoms of depression are starting to last most all day and this has happened for several days.     You may have suicidal thoughts but they are not constant.   What you need to do:     1. Call your care team, your response to treatment will improve if you keep your care team informed of your progress. Yellow periods are signs an adjustment may need to be made.     2. Continue your self-care, even if you have to fake it!    3. Talk to someone in your support network    4. Open up your depression survival kit           RED    ZONE Medical Alert - Get Help    What it looks like:     Depression is seriously interfering with your life.     You may experience these or other symptoms: You can t get out of bed most days, can t work or engage in other necessary activities, you have trouble taking care of basic hygiene, or basic responsibilities, thoughts of suicide or death that will not go away, self-injurious behavior.     What you need to do:  1. Call your care team and request  a same-day appointment. If they are not available (weekends or after hours) call your local crisis line, emergency room or 911.            Depression Self Care Plan / Survival Kit    Self-Care for Depression  Here s the deal. Your body and mind are really not as separate as most people think.  What you do and think affects how you feel and how you feel influences what you do and think. This means if you do things that people who feel good do, it will help you feel better.  Sometimes this is all it takes.  There is also a place for medication and therapy depending on how severe your depression is, so be sure to consult with your medical provider and/ or Behavioral Health Consultant if your symptoms are worsening or not improving.     In order to better manage my stress, I will:    Exercise  Get some form of exercise, every day. This will help reduce pain and release endorphins, the  feel good  chemicals in your brain. This is almost as good as taking antidepressants!  This is not the same as joining a gym and then never going! (they count on that by the way ) It can be as simple as just going for a walk or doing some gardening, anything that will get you moving.      Hygiene   Maintain good hygiene (Get out of bed in the morning, Make your bed, Brush your teeth, Take a shower, and Get dressed like you were going to work, even if you are unemployed).  If your clothes don't fit try to get ones that do.    Diet  I will strive to eat foods that are good for me, drink plenty of water, and avoid excessive sugar, caffeine, alcohol, and other mood-altering substances.  Some foods that are helpful in depression are: complex carbohydrates, B vitamins, flaxseed, fish or fish oil, fresh fruits and vegetables.    Psychotherapy  I agree to participate in Individual Therapy (if recommended).    Medication  If prescribed medications, I agree to take them.  Missing doses can result in serious side effects.  I understand that drinking  alcohol, or other illicit drug use, may cause potential side effects.  I will not stop my medication abruptly without first discussing it with my provider.    Staying Connected With Others  I will stay in touch with my friends, family members, and my primary care provider/team.    Use your imagination  Be creative.  We all have a creative side; it doesn t matter if it s oil painting, sand castles, or mud pies! This will also kick up the endorphins.    Witness Beauty  (AKA stop and smell the roses) Take a look outside, even in mid-winter. Notice colors, textures. Watch the squirrels and birds.     Service to others  Be of service to others.  There is always someone else in need.  By helping others we can  get out of ourselves  and remember the really important things.  This also provides opportunities for practicing all the other parts of the program.    Humor  Laugh and be silly!  Adjust your TV habits for less news and crime-drama and more comedy.    Control your stress  Try breathing deep, massage therapy, biofeedback, and meditation. Find time to relax each day.     My support system    Clinic Contact:  Phone number:    Contact 1:  Phone number:    Contact 2:  Phone number:    Yazdanism/:  Phone number:    Therapist:  Phone number:    Local crisis center:    Phone number:    Other community support:  Phone number:

## 2019-08-20 ENCOUNTER — TELEPHONE (OUTPATIENT)
Dept: FAMILY MEDICINE | Facility: OTHER | Age: 69
End: 2019-08-20

## 2019-08-20 DIAGNOSIS — E89.0 HYPOTHYROIDISM, POSTABLATIVE: Primary | ICD-10-CM

## 2019-08-20 DIAGNOSIS — E87.6 HYPOKALEMIA: ICD-10-CM

## 2019-08-20 LAB
ANION GAP SERPL CALCULATED.3IONS-SCNC: 9 MMOL/L (ref 3–14)
BUN SERPL-MCNC: 9 MG/DL (ref 7–30)
CALCIUM SERPL-MCNC: 9 MG/DL (ref 8.5–10.1)
CHLORIDE SERPL-SCNC: 105 MMOL/L (ref 94–109)
CO2 SERPL-SCNC: 27 MMOL/L (ref 20–32)
CREAT SERPL-MCNC: 0.77 MG/DL (ref 0.52–1.04)
GFR SERPL CREATININE-BSD FRML MDRD: 79 ML/MIN/{1.73_M2}
GLUCOSE SERPL-MCNC: 94 MG/DL (ref 70–99)
POTASSIUM SERPL-SCNC: 3.2 MMOL/L (ref 3.4–5.3)
SODIUM SERPL-SCNC: 141 MMOL/L (ref 133–144)
T4 FREE SERPL-MCNC: 1.37 NG/DL (ref 0.76–1.46)
TSH SERPL DL<=0.005 MIU/L-ACNC: 0.16 MU/L (ref 0.4–4)

## 2019-09-03 ENCOUNTER — HOSPITAL ENCOUNTER (INPATIENT)
Facility: HOSPITAL | Age: 69
LOS: 6 days | Discharge: HOME HEALTH | DRG: 388 | End: 2019-09-09
Attending: PHYSICIAN ASSISTANT | Admitting: INTERNAL MEDICINE
Payer: MEDICARE

## 2019-09-03 ENCOUNTER — APPOINTMENT (OUTPATIENT)
Dept: CT IMAGING | Facility: HOSPITAL | Age: 69
DRG: 388 | End: 2019-09-03
Attending: PHYSICIAN ASSISTANT
Payer: MEDICARE

## 2019-09-03 DIAGNOSIS — J43.9 PULMONARY EMPHYSEMA, UNSPECIFIED EMPHYSEMA TYPE (H): ICD-10-CM

## 2019-09-03 DIAGNOSIS — I48.19 PERSISTENT ATRIAL FIBRILLATION (H): ICD-10-CM

## 2019-09-03 DIAGNOSIS — J18.9 PNEUMONIA OF RIGHT MIDDLE LOBE DUE TO INFECTIOUS ORGANISM: Primary | ICD-10-CM

## 2019-09-03 DIAGNOSIS — N39.0 URINARY TRACT INFECTION: ICD-10-CM

## 2019-09-03 DIAGNOSIS — R10.30 LOWER ABDOMINAL PAIN: ICD-10-CM

## 2019-09-03 DIAGNOSIS — J96.22 ACUTE ON CHRONIC RESPIRATORY FAILURE WITH HYPOXIA AND HYPERCAPNIA (H): ICD-10-CM

## 2019-09-03 DIAGNOSIS — I50.9 CHF (CONGESTIVE HEART FAILURE) (H): ICD-10-CM

## 2019-09-03 DIAGNOSIS — J96.21 ACUTE ON CHRONIC RESPIRATORY FAILURE WITH HYPOXIA AND HYPERCAPNIA (H): ICD-10-CM

## 2019-09-03 DIAGNOSIS — K56.609 SMALL BOWEL OBSTRUCTION (H): ICD-10-CM

## 2019-09-03 PROBLEM — N17.9 ACUTE RENAL FAILURE (H): Status: ACTIVE | Noted: 2019-09-03

## 2019-09-03 PROBLEM — N30.00 ACUTE CYSTITIS WITHOUT HEMATURIA: Status: ACTIVE | Noted: 2019-09-03

## 2019-09-03 LAB
ALBUMIN SERPL-MCNC: 3 G/DL (ref 3.4–5)
ALBUMIN UR-MCNC: 10 MG/DL
ALP SERPL-CCNC: 30 U/L (ref 40–150)
ALT SERPL W P-5'-P-CCNC: 12 U/L (ref 0–50)
ANION GAP SERPL CALCULATED.3IONS-SCNC: 9 MMOL/L (ref 3–14)
APPEARANCE UR: ABNORMAL
AST SERPL W P-5'-P-CCNC: 14 U/L (ref 0–45)
BACTERIA #/AREA URNS HPF: ABNORMAL /HPF
BASOPHILS # BLD AUTO: 0 10E9/L (ref 0–0.2)
BASOPHILS NFR BLD AUTO: 0.2 %
BILIRUB SERPL-MCNC: 1.1 MG/DL (ref 0.2–1.3)
BILIRUB UR QL STRIP: NEGATIVE
BUN SERPL-MCNC: 45 MG/DL (ref 7–30)
CALCIUM SERPL-MCNC: 9.6 MG/DL (ref 8.5–10.1)
CHLORIDE SERPL-SCNC: 101 MMOL/L (ref 94–109)
CO2 SERPL-SCNC: 26 MMOL/L (ref 20–32)
COLOR UR AUTO: YELLOW
CREAT SERPL-MCNC: 1.78 MG/DL (ref 0.52–1.04)
CRP SERPL-MCNC: 158 MG/L (ref 0–8)
DIFFERENTIAL METHOD BLD: ABNORMAL
EOSINOPHIL # BLD AUTO: 0 10E9/L (ref 0–0.7)
EOSINOPHIL NFR BLD AUTO: 0.1 %
ERYTHROCYTE [DISTWIDTH] IN BLOOD BY AUTOMATED COUNT: 13.2 % (ref 10–15)
GFR SERPL CREATININE-BSD FRML MDRD: 29 ML/MIN/{1.73_M2}
GLUCOSE SERPL-MCNC: 97 MG/DL (ref 70–99)
GLUCOSE UR STRIP-MCNC: NEGATIVE MG/DL
HCT VFR BLD AUTO: 39.4 % (ref 35–47)
HGB BLD-MCNC: 13.5 G/DL (ref 11.7–15.7)
HGB UR QL STRIP: ABNORMAL
HYALINE CASTS #/AREA URNS LPF: 11 /LPF
IMM GRANULOCYTES # BLD: 0.1 10E9/L (ref 0–0.4)
IMM GRANULOCYTES NFR BLD: 0.5 %
KETONES UR STRIP-MCNC: NEGATIVE MG/DL
LACTATE BLD-SCNC: 0.9 MMOL/L (ref 0.7–2)
LACTATE BLD-SCNC: 1.1 MMOL/L (ref 0.7–2)
LEUKOCYTE ESTERASE UR QL STRIP: ABNORMAL
LYMPHOCYTES # BLD AUTO: 1.1 10E9/L (ref 0.8–5.3)
LYMPHOCYTES NFR BLD AUTO: 8.2 %
MCH RBC QN AUTO: 32.9 PG (ref 26.5–33)
MCHC RBC AUTO-ENTMCNC: 34.3 G/DL (ref 31.5–36.5)
MCV RBC AUTO: 96 FL (ref 78–100)
MONOCYTES # BLD AUTO: 0.8 10E9/L (ref 0–1.3)
MONOCYTES NFR BLD AUTO: 5.9 %
MUCOUS THREADS #/AREA URNS LPF: PRESENT /LPF
NEUTROPHILS # BLD AUTO: 10.9 10E9/L (ref 1.6–8.3)
NEUTROPHILS NFR BLD AUTO: 85.1 %
NITRATE UR QL: NEGATIVE
NRBC # BLD AUTO: 0 10*3/UL
NRBC BLD AUTO-RTO: 0 /100
NT-PROBNP SERPL-MCNC: 1969 PG/ML (ref 0–900)
PH UR STRIP: 5.5 PH (ref 4.7–8)
PLATELET # BLD AUTO: 209 10E9/L (ref 150–450)
POTASSIUM SERPL-SCNC: 3.5 MMOL/L (ref 3.4–5.3)
PROT SERPL-MCNC: 6.6 G/DL (ref 6.8–8.8)
RBC # BLD AUTO: 4.1 10E12/L (ref 3.8–5.2)
RBC #/AREA URNS AUTO: 2 /HPF (ref 0–2)
SODIUM SERPL-SCNC: 136 MMOL/L (ref 133–144)
SOURCE: ABNORMAL
SP GR UR STRIP: 1.01 (ref 1–1.03)
SQUAMOUS #/AREA URNS AUTO: 2 /HPF (ref 0–1)
TROPONIN I SERPL-MCNC: <0.015 UG/L (ref 0–0.04)
UROBILINOGEN UR STRIP-MCNC: NORMAL MG/DL (ref 0–2)
WBC # BLD AUTO: 12.8 10E9/L (ref 4–11)
WBC #/AREA URNS AUTO: 79 /HPF (ref 0–5)

## 2019-09-03 PROCEDURE — 12000000 ZZH R&B MED SURG/OB

## 2019-09-03 PROCEDURE — 86140 C-REACTIVE PROTEIN: CPT | Performed by: PHYSICIAN ASSISTANT

## 2019-09-03 PROCEDURE — 99223 1ST HOSP IP/OBS HIGH 75: CPT | Mod: AI | Performed by: INTERNAL MEDICINE

## 2019-09-03 PROCEDURE — 93010 ELECTROCARDIOGRAM REPORT: CPT | Performed by: INTERNAL MEDICINE

## 2019-09-03 PROCEDURE — 84484 ASSAY OF TROPONIN QUANT: CPT | Performed by: PHYSICIAN ASSISTANT

## 2019-09-03 PROCEDURE — 83605 ASSAY OF LACTIC ACID: CPT | Performed by: PHYSICIAN ASSISTANT

## 2019-09-03 PROCEDURE — 74176 CT ABD & PELVIS W/O CONTRAST: CPT | Mod: TC

## 2019-09-03 PROCEDURE — 40000275 ZZH STATISTIC RCP TIME EA 10 MIN

## 2019-09-03 PROCEDURE — 93005 ELECTROCARDIOGRAM TRACING: CPT

## 2019-09-03 PROCEDURE — 85025 COMPLETE CBC W/AUTO DIFF WBC: CPT | Performed by: PHYSICIAN ASSISTANT

## 2019-09-03 PROCEDURE — 36415 COLL VENOUS BLD VENIPUNCTURE: CPT | Performed by: PHYSICIAN ASSISTANT

## 2019-09-03 PROCEDURE — 99284 EMERGENCY DEPT VISIT MOD MDM: CPT | Mod: Z6 | Performed by: PHYSICIAN ASSISTANT

## 2019-09-03 PROCEDURE — 80053 COMPREHEN METABOLIC PANEL: CPT | Performed by: PHYSICIAN ASSISTANT

## 2019-09-03 PROCEDURE — 25800030 ZZH RX IP 258 OP 636: Performed by: INTERNAL MEDICINE

## 2019-09-03 PROCEDURE — 25000128 H RX IP 250 OP 636: Performed by: PHYSICIAN ASSISTANT

## 2019-09-03 PROCEDURE — 83880 ASSAY OF NATRIURETIC PEPTIDE: CPT | Performed by: PHYSICIAN ASSISTANT

## 2019-09-03 PROCEDURE — 96365 THER/PROPH/DIAG IV INF INIT: CPT

## 2019-09-03 PROCEDURE — 81001 URINALYSIS AUTO W/SCOPE: CPT | Performed by: PHYSICIAN ASSISTANT

## 2019-09-03 PROCEDURE — 87086 URINE CULTURE/COLONY COUNT: CPT | Performed by: PHYSICIAN ASSISTANT

## 2019-09-03 PROCEDURE — 25000128 H RX IP 250 OP 636: Performed by: INTERNAL MEDICINE

## 2019-09-03 PROCEDURE — 99285 EMERGENCY DEPT VISIT HI MDM: CPT | Mod: 25

## 2019-09-03 PROCEDURE — 96375 TX/PRO/DX INJ NEW DRUG ADDON: CPT

## 2019-09-03 RX ORDER — CEFTRIAXONE SODIUM 1 G/50ML
1 INJECTION, SOLUTION INTRAVENOUS EVERY 24 HOURS
Status: DISCONTINUED | OUTPATIENT
Start: 2019-09-04 | End: 2019-09-04

## 2019-09-03 RX ORDER — POTASSIUM CHLORIDE 7.45 MG/ML
10 INJECTION INTRAVENOUS
Status: DISCONTINUED | OUTPATIENT
Start: 2019-09-03 | End: 2019-09-09 | Stop reason: HOSPADM

## 2019-09-03 RX ORDER — SODIUM CHLORIDE, SODIUM LACTATE, POTASSIUM CHLORIDE, CALCIUM CHLORIDE 600; 310; 30; 20 MG/100ML; MG/100ML; MG/100ML; MG/100ML
INJECTION, SOLUTION INTRAVENOUS CONTINUOUS
Status: ACTIVE | OUTPATIENT
Start: 2019-09-03 | End: 2019-09-04

## 2019-09-03 RX ORDER — ONDANSETRON 4 MG/1
4 TABLET, ORALLY DISINTEGRATING ORAL EVERY 6 HOURS PRN
Status: DISCONTINUED | OUTPATIENT
Start: 2019-09-03 | End: 2019-09-09 | Stop reason: HOSPADM

## 2019-09-03 RX ORDER — POTASSIUM CL/LIDO/0.9 % NACL 10MEQ/0.1L
10 INTRAVENOUS SOLUTION, PIGGYBACK (ML) INTRAVENOUS
Status: DISCONTINUED | OUTPATIENT
Start: 2019-09-03 | End: 2019-09-09 | Stop reason: HOSPADM

## 2019-09-03 RX ORDER — CEFTRIAXONE SODIUM 1 G/50ML
1 INJECTION, SOLUTION INTRAVENOUS ONCE
Status: COMPLETED | OUTPATIENT
Start: 2019-09-03 | End: 2019-09-03

## 2019-09-03 RX ORDER — NALOXONE HYDROCHLORIDE 0.4 MG/ML
.1-.4 INJECTION, SOLUTION INTRAMUSCULAR; INTRAVENOUS; SUBCUTANEOUS
Status: DISCONTINUED | OUTPATIENT
Start: 2019-09-03 | End: 2019-09-09 | Stop reason: HOSPADM

## 2019-09-03 RX ORDER — POTASSIUM CHLORIDE 1.5 G/1.58G
20-40 POWDER, FOR SOLUTION ORAL
Status: DISCONTINUED | OUTPATIENT
Start: 2019-09-03 | End: 2019-09-09 | Stop reason: HOSPADM

## 2019-09-03 RX ORDER — MAGNESIUM SULFATE HEPTAHYDRATE 40 MG/ML
4 INJECTION, SOLUTION INTRAVENOUS EVERY 4 HOURS PRN
Status: DISCONTINUED | OUTPATIENT
Start: 2019-09-03 | End: 2019-09-09 | Stop reason: HOSPADM

## 2019-09-03 RX ORDER — IPRATROPIUM BROMIDE AND ALBUTEROL SULFATE 2.5; .5 MG/3ML; MG/3ML
3 SOLUTION RESPIRATORY (INHALATION) EVERY 4 HOURS PRN
Status: DISCONTINUED | OUTPATIENT
Start: 2019-09-03 | End: 2019-09-09 | Stop reason: HOSPADM

## 2019-09-03 RX ORDER — METHYLPREDNISOLONE SODIUM SUCCINATE 40 MG/ML
40 INJECTION, POWDER, LYOPHILIZED, FOR SOLUTION INTRAMUSCULAR; INTRAVENOUS EVERY 12 HOURS
Status: DISCONTINUED | OUTPATIENT
Start: 2019-09-03 | End: 2019-09-04

## 2019-09-03 RX ORDER — POTASSIUM CHLORIDE 1500 MG/1
20-40 TABLET, EXTENDED RELEASE ORAL
Status: DISCONTINUED | OUTPATIENT
Start: 2019-09-03 | End: 2019-09-09 | Stop reason: HOSPADM

## 2019-09-03 RX ORDER — ONDANSETRON 2 MG/ML
4 INJECTION INTRAMUSCULAR; INTRAVENOUS EVERY 30 MIN PRN
Status: DISCONTINUED | OUTPATIENT
Start: 2019-09-03 | End: 2019-09-03

## 2019-09-03 RX ORDER — ALBUTEROL SULFATE 0.83 MG/ML
2.5 SOLUTION RESPIRATORY (INHALATION)
Status: DISCONTINUED | OUTPATIENT
Start: 2019-09-03 | End: 2019-09-09 | Stop reason: HOSPADM

## 2019-09-03 RX ORDER — ONDANSETRON 2 MG/ML
4 INJECTION INTRAMUSCULAR; INTRAVENOUS EVERY 6 HOURS PRN
Status: DISCONTINUED | OUTPATIENT
Start: 2019-09-03 | End: 2019-09-09 | Stop reason: HOSPADM

## 2019-09-03 RX ORDER — HYDROMORPHONE HYDROCHLORIDE 1 MG/ML
0.3 INJECTION, SOLUTION INTRAMUSCULAR; INTRAVENOUS; SUBCUTANEOUS
Status: DISCONTINUED | OUTPATIENT
Start: 2019-09-03 | End: 2019-09-09 | Stop reason: HOSPADM

## 2019-09-03 RX ORDER — LIDOCAINE 40 MG/G
CREAM TOPICAL
Status: DISCONTINUED | OUTPATIENT
Start: 2019-09-03 | End: 2019-09-09 | Stop reason: HOSPADM

## 2019-09-03 RX ADMIN — ENOXAPARIN SODIUM 70 MG: 80 INJECTION SUBCUTANEOUS at 23:20

## 2019-09-03 RX ADMIN — CEFTRIAXONE SODIUM 1 G: 1 INJECTION, SOLUTION INTRAVENOUS at 21:34

## 2019-09-03 RX ADMIN — DIATRIZOATE MEGLUMINE AND DIATRIZOATE SODIUM 30 ML: 660; 100 SOLUTION ORAL; RECTAL at 19:34

## 2019-09-03 RX ADMIN — ONDANSETRON 4 MG: 2 INJECTION INTRAMUSCULAR; INTRAVENOUS at 17:15

## 2019-09-03 RX ADMIN — SODIUM CHLORIDE, POTASSIUM CHLORIDE, SODIUM LACTATE AND CALCIUM CHLORIDE: 600; 310; 30; 20 INJECTION, SOLUTION INTRAVENOUS at 22:53

## 2019-09-03 RX ADMIN — METHYLPREDNISOLONE SODIUM SUCCINATE 40 MG: 40 INJECTION, POWDER, FOR SOLUTION INTRAMUSCULAR; INTRAVENOUS at 23:21

## 2019-09-03 ASSESSMENT — MIFFLIN-ST. JEOR
SCORE: 1167.72
SCORE: 1179.96

## 2019-09-03 NOTE — ED TRIAGE NOTES
"Patient presents via ambulance with complaints of nvd X 4 DAYS.  States yesterday she had a bout of chest pain that lasted for approximately 7 min; pain went away on it's own.  Also notes that she has been having more swelling than normal in her lower extremities.  Denies any chest pain, no SOB at this time.  Does c/o abdominal pain \"right across the middle\" states currently pain 5/10 and earlier was 10/10 (improved after having diarrhea)  Patient describes the pain as a \"soreness\" that is \"there\".  "

## 2019-09-03 NOTE — ED NOTES
Pt to the ED via [facility] report of diarrhea x4 days, nausea that almost went away after diarrhea, had chest pain last HS for 7 minutes, hx of afib, BLE edema - per report pt does usually have edema and takes lasix.  EMS report normal VS.  Monitors on pt. Call light is given. IV started by EMS.  Assessment is complete.

## 2019-09-03 NOTE — ED PROVIDER NOTES
History     Chief Complaint   Patient presents with     Nausea, Vomiting, & Diarrhea     Irregular Heart Beat     HPI  Mary Alice Cameron is a 69 year old female who presents emergency department with complaints of nausea, vomiting, diarrhea, abdominal pain for the last 4 days.  Patient denies bloody stools, urinary symptoms, fever.  She denies any prior history of abdominal surgery.  She reports having a single episode of chest pain that she describes as sharp and stabbing that lasted for approximately 7 minutes yesterday morning and has not recurred since.  She denies associated shortness of breath.  She notes that she has had bilateral lower extremity edema that has been worsening since onset of symptoms 4 days ago.  Patient denies prior history of C. Difficile.    Patient has a history of atrial fibrillation and COPD.      Allergies:  Allergies   Allergen Reactions     Amlodipine Besylate Cough     Norvasc     Lisinopril Cough       Problem List:    Patient Active Problem List    Diagnosis Date Noted     S/P AVR (aortic valve replacement) 04/23/2019     Priority: Medium     Status post coronary angiogram 06/19/2018     Priority: Medium     Coronary artery disease involving native coronary artery of native heart without angina pectoris 06/19/2018     Priority: Medium     Health Care Home 01/26/2017     Priority: Medium     Acute on chronic respiratory failure (H) 01/02/2017     Priority: Medium     Long-term (current) use of anticoagulants [Z79.01] 08/03/2016     Priority: Medium     Essential hypertension 06/29/2016     Priority: Medium     Advance care planning 02/08/2016     Priority: Medium     Advance Care Planning 2/8/2016: Receipt of ACP document:  Received: Health Care Directive which was witnessed or notarized on 1/5/16.  Document previously scanned on 1/22/16.  Validation form completed and sent to be scanned.  Code Status needs to be updated to reflect choices in most recent ACP document.  1/5/16 Health  Care Directive indicates preference for DNR code; recommend conversation about goals of care and completion of a POLST.  Confirmed/documented designated decision maker(s).  Added by Shobha Jang             Hypothyroidism, postablative 2015     Priority: Medium     Problem list name updated by automated process. Provider to review       Acute bronchitis 2015     Priority: Medium     Mild major depression (H) 2014     Priority: Medium     Bicuspid aortic valve 2014     Priority: Medium     Persistent atrial fibrillation (H) 2014     Priority: Medium     Pulmonary emphysema (H) 2014     Priority: Medium        Past Medical History:    Past Medical History:   Diagnosis Date     Asthma      Atrial fibrillation (H)      COPD (chronic obstructive pulmonary disease) (H)      Depression      High cholesterol      HTN (hypertension)      Thyroid disease        Past Surgical History:    Past Surgical History:   Procedure Laterality Date     BACK SURGERY  2006     CARDIAC SURGERY  2018    [facility]     COLONOSCOPY N/A 2016    Procedure: COLONOSCOPY;  Surgeon: Waldemar Bob MD;  Location: [facility] OR     HYSTERECTOMY      partial     SLING BLADDER SUSPENSION WITH FASCIA LINNETTE         Family History:    Family History   Problem Relation Age of Onset     Prostate Cancer Father      Hypertension Father      Heart Failure Father         CHF     Asthma Mother      Cancer Mother         ovarian     Hypertension Mother      Asthma Brother      Hypertension Sister      Hypertension Brother        Social History:  Marital Status:   [5]  Social History     Tobacco Use     Smoking status: Former Smoker     Packs/day: 0.50     Years: 41.00     Pack years: 20.50     Types: Cigarettes     Start date: 1966     Last attempt to quit: 2007     Years since quittin.6     Smokeless tobacco: Never Used   Substance Use Topics     Alcohol use: No      "Alcohol/week: 0.0 oz     Drug use: No        Medications:      acetaminophen (TYLENOL) 500 MG tablet   ADVAIR -21 MCG/ACT Inhaler   albuterol (2.5 MG/3ML) 0.083% neb solution   albuterol (PROAIR HFA/PROVENTIL HFA/VENTOLIN HFA) 108 (90 Base) MCG/ACT inhaler   atorvastatin (LIPITOR) 10 MG tablet   Calcium Carb-Cholecalciferol (CALTRATE 600+D3 SOFT PO)   cloNIDine (CATAPRES) 0.1 MG tablet   COMBIVENT RESPIMAT  MCG/ACT inhaler   diltiazem ER COATED BEADS (CARDIZEM CD) 360 MG 24 hr capsule   ELIQUIS 5 MG tablet   furosemide (LASIX) 40 MG tablet   ipratropium - albuterol 0.5 mg/2.5 mg/3 mL (DUONEB) 0.5-2.5 (3) MG/3ML neb solution   levothyroxine (SYNTHROID/LEVOTHROID) 100 MCG tablet   losartan (COZAAR) 50 MG tablet   potassium chloride SA (K-DUR/KLOR-CON M) 20 MEQ CR tablet   diphenhydrAMINE (BENADRYL) 25 MG tablet   levothyroxine (SYNTHROID/LEVOTHROID) 88 MCG tablet   OTHER MEDICAL SUPPLIES   predniSONE (DELTASONE) 20 MG tablet   sulfamethoxazole-trimethoprim (BACTRIM DS/SEPTRA DS) 800-160 MG tablet         Review of Systems   Constitutional: Negative for fever.   Respiratory: Negative for shortness of breath.    Cardiovascular: Positive for chest pain and leg swelling.   Gastrointestinal: Positive for abdominal pain, diarrhea, nausea and vomiting. Negative for blood in stool.   Genitourinary: Negative.        Physical Exam   BP: 112/68  Heart Rate: 79  Temp: 98.3  F (36.8  C)  Resp: 18  Height: 162.6 cm (5' 4\")  Weight: 65.8 kg (145 lb)  SpO2: 95 %      Physical Exam   Constitutional: She is oriented to person, place, and time. No distress.   HENT:   Head: Atraumatic.   Mouth/Throat: Oropharynx is clear and moist. No oropharyngeal exudate.   Eyes: Pupils are equal, round, and reactive to light. No scleral icterus.   Neck: Normal range of motion.   Cardiovascular: Intact distal pulses.   Murmur heard.  Pulmonary/Chest: Breath sounds normal. No respiratory distress.   Abdominal: Soft. Bowel sounds are normal. " There is tenderness (Significant tenderness to palpation of lower abdomen with guarding in RLQ.).   Musculoskeletal: She exhibits edema (significant bilateral lower leg edema  ). She exhibits no tenderness.   Neurological: She is alert and oriented to person, place, and time.   Skin: Skin is warm. No rash noted. She is not diaphoretic.       ED Course        Procedures  Symptoms concerning for possible diverticulitis, appendicitis, gastroenteritis.  Significant tenderness to palpation of RLQ and LLQ.  WBC elevated at 12.8K.  CT significant for small bowel obstruction.  Reviewed findings with Dr. St, gen surg on-call.  He expressed some concern for increased risk of bowel ischemia given significant atherosclerosis of the vessels.  Lactate remained stable and there is low probability of ischemia at this time.      Patient noted to have increased HR.  ECG obtained and she was found to be in a-fib with RVR with HR under 110 and not symptomatic.  Patient started on Rocephin IV for treatment of UTI.  Patient was admitted by Dr. Waddell.             EKG Interpretation:      Interpreted by ALCON White  Time reviewed: 2115  Symptoms at time of EKG: tachycardia   Rhythm: a-fib with RVR  Rate: 101  Axis: normal  Ectopy: none  Conduction: normal  ST Segments/ T Waves: No ST-T wave changes  Q Waves: none    Clinical Impression: a-fib with RVR.           Results for orders placed or performed during the hospital encounter of 09/03/19 (from the past 24 hour(s))   CBC with platelets differential   Result Value Ref Range    WBC 12.8 (H) 4.0 - 11.0 10e9/L    RBC Count 4.10 3.8 - 5.2 10e12/L    Hemoglobin 13.5 11.7 - 15.7 g/dL    Hematocrit 39.4 35.0 - 47.0 %    MCV 96 78 - 100 fl    MCH 32.9 26.5 - 33.0 pg    MCHC 34.3 31.5 - 36.5 g/dL    RDW 13.2 10.0 - 15.0 %    Platelet Count 209 150 - 450 10e9/L    Diff Method Automated Method     % Neutrophils 85.1 %    % Lymphocytes 8.2 %    % Monocytes 5.9 %    % Eosinophils 0.1 %     % Basophils 0.2 %    % Immature Granulocytes 0.5 %    Nucleated RBCs 0 0 /100    Absolute Neutrophil 10.9 (H) 1.6 - 8.3 10e9/L    Absolute Lymphocytes 1.1 0.8 - 5.3 10e9/L    Absolute Monocytes 0.8 0.0 - 1.3 10e9/L    Absolute Eosinophils 0.0 0.0 - 0.7 10e9/L    Absolute Basophils 0.0 0.0 - 0.2 10e9/L    Abs Immature Granulocytes 0.1 0 - 0.4 10e9/L    Absolute Nucleated RBC 0.0    Comprehensive metabolic panel   Result Value Ref Range    Sodium 136 133 - 144 mmol/L    Potassium 3.5 3.4 - 5.3 mmol/L    Chloride 101 94 - 109 mmol/L    Carbon Dioxide 26 20 - 32 mmol/L    Anion Gap 9 3 - 14 mmol/L    Glucose 97 70 - 99 mg/dL    Urea Nitrogen 45 (H) 7 - 30 mg/dL    Creatinine 1.78 (H) 0.52 - 1.04 mg/dL    GFR Estimate 29 (L) >60 mL/min/[1.73_m2]    GFR Estimate If Black 33 (L) >60 mL/min/[1.73_m2]    Calcium 9.6 8.5 - 10.1 mg/dL    Bilirubin Total 1.1 0.2 - 1.3 mg/dL    Albumin 3.0 (L) 3.4 - 5.0 g/dL    Protein Total 6.6 (L) 6.8 - 8.8 g/dL    Alkaline Phosphatase 30 (L) 40 - 150 U/L    ALT 12 0 - 50 U/L    AST 14 0 - 45 U/L   CRP inflammation   Result Value Ref Range    CRP Inflammation 158.0 (H) 0.0 - 8.0 mg/L   Lactic acid whole blood   Result Value Ref Range    Lactic Acid 1.1 0.7 - 2.0 mmol/L   Nt probnp inpatient (BNP)   Result Value Ref Range    N-Terminal Pro BNP Inpatient 1,969 (H) 0 - 900 pg/mL   Troponin I   Result Value Ref Range    Troponin I ES <0.015 0.000 - 0.045 ug/L   CT Abdomen Pelvis w/o Contrast    Narrative    CT ABDOMEN PELVIS W/O CONTRAST    CLINICAL HISTORY: Female, age 69 years,  Abd pain, diverticulitis  suspected; Nausea, vomiting; RLQ and LLQ abd tenderness.;    Comparison:  CT scan abdomen and pelvis 8/13/2014    TECHNIQUE:  CT was performed of the abdomen and pelvis with oral  contrast. Sagittal, coronal and axial reconstructions were reviewed.     FINDINGS:    Abdomen/Pelvis CT:  Lung Bases:  Mild emphysema and peripheral areas of atelectasis in  both lung bases.     Esophagus/stomach:  Small hiatal hernia. Stomach is markedly distended  with contrast material.    Liver:  Normal.    Gallbladder: Surgically absent    Spleen: Normal.    Pancreas: Normal.    Adrenal Glands: Normal.    Kidneys: 6 mm dense lesion upper pole left kidney may represent a  small calcification containing cyst or perhaps a hemorrhagic cyst.  Ureters: Normal.  Urinary bladder: Normal.    Abdominal Aorta: Scattered atherosclerotic calcifications.  IVC: Normal.    Lymph Nodes: Normal.    Large and Small Bowel: Proximal small bowel is markedly dilated. Small  volume of radiodense material is seen scattered throughout the colon  and distal small bowel. There are numerous diverticula seen within the  distal colon. No apparent diverticulitis.  Appendix: Normal.    Pelvic Organs:  The uterus is surgically absent.  Peritoneum: Moderate free fluid in the lower pelvis. No free  intraperitoneal or retroperitoneal gas.  Bony structures: Postoperative changes in the lumbar spine. No acute  abnormality. Degenerative spondylolisthesis of L4 relative to L5.    Other Findings:  Inguinal lymph nodes are normal.      Impression    IMPRESSION:   Mid small bowel obstruction. No free air. Some of the small bowel  loops in the midabdomen may have gas within the walls. Lack of IV  contrast limits evaluation and the possibility of necrosis/ischemia is  not excluded. No evidence of gas within the portal venous system.    Diverticulosis of the distal colon. No diverticulitis.    MELY LUCERO MD   UA reflex to Microscopic and Culture   Result Value Ref Range    Color Urine Yellow     Appearance Urine Slightly Cloudy     Glucose Urine Negative NEG^Negative mg/dL    Bilirubin Urine Negative NEG^Negative    Ketones Urine Negative NEG^Negative mg/dL    Specific Gravity Urine 1.014 1.003 - 1.035    Blood Urine Trace (A) NEG^Negative    pH Urine 5.5 4.7 - 8.0 pH    Protein Albumin Urine 10 (A) NEG^Negative mg/dL    Urobilinogen mg/dL Normal 0.0 - 2.0 mg/dL     Nitrite Urine Negative NEG^Negative    Leukocyte Esterase Urine Large (A) NEG^Negative    Source Midstream Urine     RBC Urine 2 0 - 2 /HPF    WBC Urine 79 (H) 0 - 5 /HPF    Bacteria Urine Few (A) NEG^Negative /HPF    Squamous Epithelial /HPF Urine 2 (H) 0 - 1 /HPF    Mucous Urine Present (A) NEG^Negative /LPF    Hyaline Casts 11 (A) OTO2^O - 2 /LPF   Lactic acid whole blood   Result Value Ref Range    Lactic Acid 0.9 0.7 - 2.0 mmol/L       Medications   ondansetron (ZOFRAN) injection 4 mg (4 mg Intravenous Given 9/3/19 1715)   cefTRIAXone in d5w (ROCEPHIN) intermittent infusion 1 g (has no administration in time range)   diatrizoate meglumine-sodium (GASTROGRAFIN/GASTROVIEW) 66-10 % solution 30 mL (30 mLs Oral Given 9/3/19 1934)       Assessments & Plan (with Medical Decision Making)     I have reviewed the nursing notes.    I have reviewed the findings, diagnosis, plan and need for follow up with the patient.    New Prescriptions    No medications on file       Final diagnoses:   Small bowel obstruction (H)   Lower abdominal pain   Urinary tract infection   CHF (congestive heart failure) (H)     ALCON White on 9/5/2019 at 9:57 AM   9/3/2019   [facility]     Armin Tracy PA  09/05/19 1007

## 2019-09-04 LAB
ALBUMIN SERPL-MCNC: 2.7 G/DL (ref 3.4–5)
ALP SERPL-CCNC: 28 U/L (ref 40–150)
ALT SERPL W P-5'-P-CCNC: 10 U/L (ref 0–50)
ANION GAP SERPL CALCULATED.3IONS-SCNC: 11 MMOL/L (ref 3–14)
ANION GAP SERPL CALCULATED.3IONS-SCNC: 9 MMOL/L (ref 3–14)
AST SERPL W P-5'-P-CCNC: 15 U/L (ref 0–45)
BASOPHILS # BLD AUTO: 0 10E9/L (ref 0–0.2)
BASOPHILS NFR BLD AUTO: 0.2 %
BILIRUB SERPL-MCNC: 0.6 MG/DL (ref 0.2–1.3)
BUN SERPL-MCNC: 38 MG/DL (ref 7–30)
BUN SERPL-MCNC: 39 MG/DL (ref 7–30)
CALCIUM SERPL-MCNC: 9 MG/DL (ref 8.5–10.1)
CALCIUM SERPL-MCNC: 9 MG/DL (ref 8.5–10.1)
CHLORIDE SERPL-SCNC: 100 MMOL/L (ref 94–109)
CHLORIDE SERPL-SCNC: 101 MMOL/L (ref 94–109)
CO2 SERPL-SCNC: 24 MMOL/L (ref 20–32)
CO2 SERPL-SCNC: 29 MMOL/L (ref 20–32)
CREAT SERPL-MCNC: 1.09 MG/DL (ref 0.52–1.04)
CREAT SERPL-MCNC: 1.16 MG/DL (ref 0.52–1.04)
DIFFERENTIAL METHOD BLD: ABNORMAL
EOSINOPHIL # BLD AUTO: 0 10E9/L (ref 0–0.7)
EOSINOPHIL NFR BLD AUTO: 0 %
ERYTHROCYTE [DISTWIDTH] IN BLOOD BY AUTOMATED COUNT: 12.8 % (ref 10–15)
ERYTHROCYTE [DISTWIDTH] IN BLOOD BY AUTOMATED COUNT: 12.9 % (ref 10–15)
GFR SERPL CREATININE-BSD FRML MDRD: 48 ML/MIN/{1.73_M2}
GFR SERPL CREATININE-BSD FRML MDRD: 52 ML/MIN/{1.73_M2}
GLUCOSE SERPL-MCNC: 111 MG/DL (ref 70–99)
GLUCOSE SERPL-MCNC: 125 MG/DL (ref 70–99)
HCT VFR BLD AUTO: 39.1 % (ref 35–47)
HCT VFR BLD AUTO: 40 % (ref 35–47)
HGB BLD-MCNC: 13.5 G/DL (ref 11.7–15.7)
HGB BLD-MCNC: 13.7 G/DL (ref 11.7–15.7)
IMM GRANULOCYTES # BLD: 0.1 10E9/L (ref 0–0.4)
IMM GRANULOCYTES NFR BLD: 0.5 %
LACTATE BLD-SCNC: 0.6 MMOL/L (ref 0.7–2)
LACTATE BLD-SCNC: 1 MMOL/L (ref 0.7–2)
LYMPHOCYTES # BLD AUTO: 0.5 10E9/L (ref 0.8–5.3)
LYMPHOCYTES NFR BLD AUTO: 5.4 %
MAGNESIUM SERPL-MCNC: 1.7 MG/DL (ref 1.6–2.3)
MCH RBC QN AUTO: 32.9 PG (ref 26.5–33)
MCH RBC QN AUTO: 33 PG (ref 26.5–33)
MCHC RBC AUTO-ENTMCNC: 34.3 G/DL (ref 31.5–36.5)
MCHC RBC AUTO-ENTMCNC: 34.5 G/DL (ref 31.5–36.5)
MCV RBC AUTO: 96 FL (ref 78–100)
MCV RBC AUTO: 96 FL (ref 78–100)
MONOCYTES # BLD AUTO: 0.1 10E9/L (ref 0–1.3)
MONOCYTES NFR BLD AUTO: 1.1 %
NEUTROPHILS # BLD AUTO: 8.5 10E9/L (ref 1.6–8.3)
NEUTROPHILS NFR BLD AUTO: 92.8 %
NRBC # BLD AUTO: 0 10*3/UL
NRBC BLD AUTO-RTO: 0 /100
PLATELET # BLD AUTO: 196 10E9/L (ref 150–450)
PLATELET # BLD AUTO: 232 10E9/L (ref 150–450)
POTASSIUM SERPL-SCNC: 3.6 MMOL/L (ref 3.4–5.3)
POTASSIUM SERPL-SCNC: 4.1 MMOL/L (ref 3.4–5.3)
PROT SERPL-MCNC: 6.4 G/DL (ref 6.8–8.8)
RBC # BLD AUTO: 4.09 10E12/L (ref 3.8–5.2)
RBC # BLD AUTO: 4.16 10E12/L (ref 3.8–5.2)
SODIUM SERPL-SCNC: 136 MMOL/L (ref 133–144)
SODIUM SERPL-SCNC: 138 MMOL/L (ref 133–144)
WBC # BLD AUTO: 9.1 10E9/L (ref 4–11)
WBC # BLD AUTO: 9.2 10E9/L (ref 4–11)

## 2019-09-04 PROCEDURE — 40000786 ZZHCL STATISTIC ACTIVE MRSA SURVEILLANCE CULTURE: Performed by: INTERNAL MEDICINE

## 2019-09-04 PROCEDURE — 25800030 ZZH RX IP 258 OP 636: Performed by: INTERNAL MEDICINE

## 2019-09-04 PROCEDURE — 36415 COLL VENOUS BLD VENIPUNCTURE: CPT | Performed by: INTERNAL MEDICINE

## 2019-09-04 PROCEDURE — 99232 SBSQ HOSP IP/OBS MODERATE 35: CPT | Performed by: SURGERY

## 2019-09-04 PROCEDURE — 83605 ASSAY OF LACTIC ACID: CPT | Performed by: INTERNAL MEDICINE

## 2019-09-04 PROCEDURE — 25000131 ZZH RX MED GY IP 250 OP 636 PS 637: Performed by: INTERNAL MEDICINE

## 2019-09-04 PROCEDURE — 93010 ELECTROCARDIOGRAM REPORT: CPT | Performed by: INTERNAL MEDICINE

## 2019-09-04 PROCEDURE — 25000128 H RX IP 250 OP 636: Performed by: INTERNAL MEDICINE

## 2019-09-04 PROCEDURE — 25000125 ZZHC RX 250: Performed by: INTERNAL MEDICINE

## 2019-09-04 PROCEDURE — 85027 COMPLETE CBC AUTOMATED: CPT | Performed by: INTERNAL MEDICINE

## 2019-09-04 PROCEDURE — 25000128 H RX IP 250 OP 636

## 2019-09-04 PROCEDURE — 25000132 ZZH RX MED GY IP 250 OP 250 PS 637: Performed by: INTERNAL MEDICINE

## 2019-09-04 PROCEDURE — 40000275 ZZH STATISTIC RCP TIME EA 10 MIN

## 2019-09-04 PROCEDURE — 93005 ELECTROCARDIOGRAM TRACING: CPT

## 2019-09-04 PROCEDURE — 12000000 ZZH R&B MED SURG/OB

## 2019-09-04 PROCEDURE — 83735 ASSAY OF MAGNESIUM: CPT | Performed by: INTERNAL MEDICINE

## 2019-09-04 PROCEDURE — 99233 SBSQ HOSP IP/OBS HIGH 50: CPT | Performed by: INTERNAL MEDICINE

## 2019-09-04 PROCEDURE — 94640 AIRWAY INHALATION TREATMENT: CPT

## 2019-09-04 PROCEDURE — 80053 COMPREHEN METABOLIC PANEL: CPT | Performed by: INTERNAL MEDICINE

## 2019-09-04 PROCEDURE — 94640 AIRWAY INHALATION TREATMENT: CPT | Mod: 76

## 2019-09-04 PROCEDURE — 80048 BASIC METABOLIC PNL TOTAL CA: CPT | Performed by: INTERNAL MEDICINE

## 2019-09-04 PROCEDURE — 85025 COMPLETE CBC W/AUTO DIFF WBC: CPT | Performed by: INTERNAL MEDICINE

## 2019-09-04 RX ORDER — SODIUM CHLORIDE, SODIUM LACTATE, POTASSIUM CHLORIDE, CALCIUM CHLORIDE 600; 310; 30; 20 MG/100ML; MG/100ML; MG/100ML; MG/100ML
INJECTION, SOLUTION INTRAVENOUS CONTINUOUS
Status: ACTIVE | OUTPATIENT
Start: 2019-09-04 | End: 2019-09-05

## 2019-09-04 RX ORDER — CIPROFLOXACIN 2 MG/ML
INJECTION, SOLUTION INTRAVENOUS
Status: COMPLETED
Start: 2019-09-04 | End: 2019-09-04

## 2019-09-04 RX ORDER — CIPROFLOXACIN 2 MG/ML
400 INJECTION, SOLUTION INTRAVENOUS EVERY 24 HOURS
Status: DISCONTINUED | OUTPATIENT
Start: 2019-09-04 | End: 2019-09-04

## 2019-09-04 RX ORDER — METOPROLOL TARTRATE 1 MG/ML
5 INJECTION, SOLUTION INTRAVENOUS EVERY 5 MIN PRN
Status: DISCONTINUED | OUTPATIENT
Start: 2019-09-04 | End: 2019-09-09 | Stop reason: HOSPADM

## 2019-09-04 RX ORDER — METHYLPREDNISOLONE SODIUM SUCCINATE 40 MG/ML
20 INJECTION, POWDER, LYOPHILIZED, FOR SOLUTION INTRAMUSCULAR; INTRAVENOUS EVERY 12 HOURS
Status: DISCONTINUED | OUTPATIENT
Start: 2019-09-04 | End: 2019-09-06

## 2019-09-04 RX ORDER — METOPROLOL TARTRATE 25 MG/1
25 TABLET, FILM COATED ORAL 2 TIMES DAILY
Status: DISCONTINUED | OUTPATIENT
Start: 2019-09-04 | End: 2019-09-04

## 2019-09-04 RX ORDER — CIPROFLOXACIN 2 MG/ML
400 INJECTION, SOLUTION INTRAVENOUS EVERY 12 HOURS
Status: DISCONTINUED | OUTPATIENT
Start: 2019-09-04 | End: 2019-09-06

## 2019-09-04 RX ORDER — METOPROLOL TARTRATE 25 MG/1
25 TABLET, FILM COATED ORAL ONCE
Status: COMPLETED | OUTPATIENT
Start: 2019-09-04 | End: 2019-09-04

## 2019-09-04 RX ORDER — DILTIAZEM HYDROCHLORIDE 360 MG/1
360 CAPSULE, EXTENDED RELEASE ORAL DAILY
Status: DISCONTINUED | OUTPATIENT
Start: 2019-09-04 | End: 2019-09-04

## 2019-09-04 RX ORDER — METOPROLOL TARTRATE 50 MG
50 TABLET ORAL 2 TIMES DAILY
Status: DISCONTINUED | OUTPATIENT
Start: 2019-09-04 | End: 2019-09-06

## 2019-09-04 RX ORDER — METOPROLOL TARTRATE 1 MG/ML
2.5 INJECTION, SOLUTION INTRAVENOUS EVERY 5 MIN PRN
Status: DISCONTINUED | OUTPATIENT
Start: 2019-09-04 | End: 2019-09-04

## 2019-09-04 RX ADMIN — METHYLPREDNISOLONE SODIUM SUCCINATE 40 MG: 40 INJECTION, POWDER, FOR SOLUTION INTRAMUSCULAR; INTRAVENOUS at 10:46

## 2019-09-04 RX ADMIN — METRONIDAZOLE 500 MG: 500 INJECTION, SOLUTION INTRAVENOUS at 03:21

## 2019-09-04 RX ADMIN — METOPROLOL TARTRATE 25 MG: 25 TABLET ORAL at 13:56

## 2019-09-04 RX ADMIN — CIPROFLOXACIN 400 MG: 2 INJECTION, SOLUTION INTRAVENOUS at 01:36

## 2019-09-04 RX ADMIN — METOPROLOL TARTRATE 2.5 MG: 1 INJECTION, SOLUTION INTRAVENOUS at 10:38

## 2019-09-04 RX ADMIN — METOPROLOL TARTRATE 2.5 MG: 1 INJECTION, SOLUTION INTRAVENOUS at 12:19

## 2019-09-04 RX ADMIN — ONDANSETRON 4 MG: 4 TABLET, ORALLY DISINTEGRATING ORAL at 22:29

## 2019-09-04 RX ADMIN — METOPROLOL TARTRATE 2.5 MG: 1 INJECTION, SOLUTION INTRAVENOUS at 08:46

## 2019-09-04 RX ADMIN — METRONIDAZOLE 500 MG: 500 INJECTION, SOLUTION INTRAVENOUS at 10:55

## 2019-09-04 RX ADMIN — SODIUM CHLORIDE, POTASSIUM CHLORIDE, SODIUM LACTATE AND CALCIUM CHLORIDE: 600; 310; 30; 20 INJECTION, SOLUTION INTRAVENOUS at 08:22

## 2019-09-04 RX ADMIN — METHYLPREDNISOLONE SODIUM SUCCINATE 20 MG: 40 INJECTION, POWDER, FOR SOLUTION INTRAMUSCULAR; INTRAVENOUS at 22:19

## 2019-09-04 RX ADMIN — METOPROLOL TARTRATE 50 MG: 50 TABLET, FILM COATED ORAL at 22:19

## 2019-09-04 RX ADMIN — CIPROFLOXACIN 400 MG: 2 INJECTION, SOLUTION INTRAVENOUS at 13:56

## 2019-09-04 RX ADMIN — METRONIDAZOLE 500 MG: 500 INJECTION, SOLUTION INTRAVENOUS at 17:32

## 2019-09-04 RX ADMIN — IPRATROPIUM BROMIDE AND ALBUTEROL SULFATE 3 ML: .5; 3 SOLUTION RESPIRATORY (INHALATION) at 15:01

## 2019-09-04 RX ADMIN — METRONIDAZOLE 500 MG: 500 INJECTION, SOLUTION INTRAVENOUS at 22:21

## 2019-09-04 RX ADMIN — FLUTICASONE FUROATE AND VILANTEROL TRIFENATATE 1 PUFF: 100; 25 POWDER RESPIRATORY (INHALATION) at 08:35

## 2019-09-04 RX ADMIN — METOPROLOL TARTRATE 25 MG: 25 TABLET ORAL at 08:47

## 2019-09-04 RX ADMIN — SODIUM CHLORIDE, POTASSIUM CHLORIDE, SODIUM LACTATE AND CALCIUM CHLORIDE: 600; 310; 30; 20 INJECTION, SOLUTION INTRAVENOUS at 22:17

## 2019-09-04 RX ADMIN — METOPROLOL TARTRATE 5 MG: 1 INJECTION, SOLUTION INTRAVENOUS at 14:38

## 2019-09-04 ASSESSMENT — ACTIVITIES OF DAILY LIVING (ADL)
ADLS_ACUITY_SCORE: 13
DRESS: 0-->INDEPENDENT
RETIRED_COMMUNICATION: 0-->UNDERSTANDS/COMMUNICATES WITHOUT DIFFICULTY
ADLS_ACUITY_SCORE: 11
ADLS_ACUITY_SCORE: 11
ADLS_ACUITY_SCORE: 13
BATHING: 0-->INDEPENDENT
RETIRED_EATING: 0-->INDEPENDENT
TOILETING: 0-->INDEPENDENT
COGNITION: 0 - NO COGNITION ISSUES REPORTED
SWALLOWING: 0-->SWALLOWS FOODS/LIQUIDS WITHOUT DIFFICULTY
ADLS_ACUITY_SCORE: 11
ADLS_ACUITY_SCORE: 11
FALL_HISTORY_WITHIN_LAST_SIX_MONTHS: NO

## 2019-09-04 NOTE — PLAN OF CARE
VSS, afebrile, denies pain, pt states that the NG made that go away. HR Irregular, telemetry reads Afib 's per ICU staff. Lungs are clear but rather dim throughout, bowels are hypoactive. Pt does have tenderness across her abdomen, abdomen is rounder than normal per pt. Pt has 2+ edema in her ankles, states this is only slightly larger than normal. Pt did have 2 small amounts of stool incontinence, is on Enteric isolation for C Diff rule out, specimen still needs to be collect. Pt is alert and oriented x 4, makes her needs known and calls appropriately. Pt is up stand by assist to bedside commode.  NG has had 450 mL out this shift, clear, brown, tan with chunks.     Face to face report given with opportunity to observe patient.    Report given to PUSHPA Obregon and PUSHPA Green RN   9/4/2019  8:01 AM

## 2019-09-04 NOTE — PHARMACY
[facility]    Pharmacy    Antimicrobial Stewardship Note     Current antimicrobial therapy:  Anti-infectives (From now, onward)    Start     Dose/Rate Route Frequency Ordered Stop    09/04/19 1330  ciprofloxacin (CIPRO) infusion 400 mg      400 mg  over 1 Hours Intravenous EVERY 12 HOURS 09/04/19 0951      09/04/19 0130  metroNIDAZOLE (FLAGYL) infusion 500 mg      500 mg  100 mL/hr over 60 Minutes Intravenous EVERY 6 HOURS 09/04/19 0118          Indication: IAI (possible gastroenteritis), acute cystitis     Days of Therapy: 1     Pertinent labs:  Creatinine   Creatinine   Date Value Ref Range Status   09/04/2019 1.09 (H) 0.52 - 1.04 mg/dL Final   09/04/2019 1.16 (H) 0.52 - 1.04 mg/dL Final   09/03/2019 1.78 (H) 0.52 - 1.04 mg/dL Final     WBC   WBC   Date Value Ref Range Status   09/04/2019 9.2 4.0 - 11.0 10e9/L Final   09/04/2019 9.1 4.0 - 11.0 10e9/L Final   09/03/2019 12.8 (H) 4.0 - 11.0 10e9/L Final     Procalcitonin No results found for: PCAL  CRP   CRP Inflammation   Date Value Ref Range Status   09/03/2019 158.0 (H) 0.0 - 8.0 mg/L Final   03/21/2015 14.2 (H) 0.0 - 8.0 mg/L Final     Culture Results:   7-Day Micro Results   Procedure Component Value Units Date/Time   Stool culture SSCE    Order Status: No result Lab Status: No result    Specimen: Stool    Clostridium difficile toxin B PCR    Order Status: No result Lab Status: No result    Specimen: Stool    Active MRSA Surveillance Culture [[id number]] Collected: 09/04/19 0015   Order Status: Completed Lab Status: In process Updated: 09/04/19 0025   Specimen: Swab from Nose    Urine Culture Aerobic Bacterial [[id number]] Collected: 09/03/19 2007   Order Status: Completed Lab Status: Preliminary result Updated: 09/04/19 0757   Specimen: Midstream Urine     Specimen Description Midstream Urine    Culture Micro Culture in progress         Recommendations/Interventions:  1. No recommendations at this time. Will continue to monitor.     Quita Jean  [facility]  September 4, 2019

## 2019-09-04 NOTE — PLAN OF CARE
Updated MD on heart rate in 170's with activity per telemetry. MD ordered Lopressor 2.5 mg IV PRN.

## 2019-09-04 NOTE — PLAN OF CARE
Notified day shift nurse of heart rate 150-170s. Also informed nurse that I did not see any rate controlled medications on MAR and she does take medications at home.

## 2019-09-04 NOTE — CONSULTS
Hospital Consult  9/4/2019    Patient: Mary Alice Cameron    Consulting Provider: Rakesh Waddell MD    Admitting diagnosis: Abdominal Pain.      This is a 69 year old female who presented to the emergency department with abdominal pain.  While in the emergency room the pain was unable to be controlled with narcotics.  A CT of the abdomen pelvis was obtained which showed dilation of the small bowel.  There is also some concern of whether or not there was bowel ischemia.  On initial evaluation did not appear that bowel ischemia was the cause.  As the patient was feeling better with decreasing abdominal pain.  The patient was admitted to the medical service.  Overnight she has had several bowel movements.  She is feeling better.  She has been passing gas.  Has had several bowel movements.  No fevers or chills.        Past Medical History:  Past Medical History:   Diagnosis Date     Asthma      Atrial fibrillation (H)      COPD (chronic obstructive pulmonary disease) (H)      Depression      High cholesterol      HTN (hypertension)      Thyroid disease        Past Surgical History:  Past Surgical History:   Procedure Laterality Date     BACK SURGERY  2006     CARDIAC SURGERY  06/12/2018    Angiogram at [facility]     COLONOSCOPY N/A 1/19/2016    Procedure: COLONOSCOPY;  Surgeon: Waldemar Bob MD;  Location: [facility]     HYSTERECTOMY  1980    partial     SLING BLADDER SUSPENSION WITH FASCIA LINNETTE         Family History History:  Family History   Problem Relation Age of Onset     Prostate Cancer Father      Hypertension Father      Heart Failure Father         CHF     Asthma Mother      Cancer Mother         ovarian     Hypertension Mother      Asthma Brother      Hypertension Sister      Hypertension Brother        History of Tobacco Use:  History   Smoking Status     Former Smoker     Packs/day: 0.50     Years: 41.00     Types: Cigarettes     Start date: 1/1/1966     Quit date: 1/28/2007   Smokeless Tobacco      "Never Used       Current Medications:  No current outpatient medications on file.       Allergies:  Allergies   Allergen Reactions     Amlodipine Besylate Cough     Norvasc     Lisinopril Cough       ROS:  Pertinent items are noted in HPI.  All other systems are negative.    PHYSICAL EXAM:     Vital signs: /75   Pulse (!) 125   Temp 98.2  F (36.8  C) (Tympanic)   Resp 24   Ht 1.626 m (5' 4\")   Wt 67 kg (147 lb 11.2 oz)   SpO2 (!) 91%   BMI 25.35 kg/m     Weight: [unfilled]   BMI: Body mass index is 25.35 kg/m .   General: Normal, healthy, cooperative   Skin: no jaundice   HEENT: PERRLA   Neck: supple   Lungs: clear to auscultation   CV: Irregular    Abdominal: Soft, distended, bowel sounds positive, minimally tender to deep palpation, no peritoneal signs, no rebound    Extremities: No cyanosis, clubbing or edema noted bilaterally in Upper and Lower Extremities   Neurological: without deficit    LABORATORY:  CBC Results:   Recent Labs   Lab Test 09/04/19  1127   WBC 9.2   RBC 4.16   HGB 13.7   HCT 40.0   MCV 96   MCH 32.9   MCHC 34.3   RDW 12.9          Last Comprehensive Metabolic Panel:  Sodium   Date Value Ref Range Status   09/04/2019 138 133 - 144 mmol/L Final     Potassium   Date Value Ref Range Status   09/04/2019 4.1 3.4 - 5.3 mmol/L Final     Chloride   Date Value Ref Range Status   09/04/2019 100 94 - 109 mmol/L Final     Carbon Dioxide   Date Value Ref Range Status   09/04/2019 29 20 - 32 mmol/L Final     Anion Gap   Date Value Ref Range Status   09/04/2019 9 3 - 14 mmol/L Final     Glucose   Date Value Ref Range Status   09/04/2019 111 (H) 70 - 99 mg/dL Final     Urea Nitrogen   Date Value Ref Range Status   09/04/2019 38 (H) 7 - 30 mg/dL Final     Creatinine   Date Value Ref Range Status   09/04/2019 1.09 (H) 0.52 - 1.04 mg/dL Final     GFR Estimate   Date Value Ref Range Status   09/04/2019 52 (L) >60 mL/min/[1.73_m2] Final     Comment:     Non  GFR Calc  Starting " 12/18/2018, serum creatinine based estimated GFR (eGFR) will be   calculated using the Chronic Kidney Disease Epidemiology Collaboration   (CKD-EPI) equation.       Calcium   Date Value Ref Range Status   09/04/2019 9.0 8.5 - 10.1 mg/dL Final     Bilirubin Total   Date Value Ref Range Status   09/04/2019 0.6 0.2 - 1.3 mg/dL Final     Alkaline Phosphatase   Date Value Ref Range Status   09/04/2019 28 (L) 40 - 150 U/L Final     ALT   Date Value Ref Range Status   09/04/2019 10 0 - 50 U/L Final     AST   Date Value Ref Range Status   09/04/2019 15 0 - 45 U/L Final       RADIOLOGY:    Results for orders placed during the hospital encounter of 09/03/19   CT Abdomen Pelvis w/o Contrast    Narrative CT ABDOMEN PELVIS W/O CONTRAST    CLINICAL HISTORY: Female, age 69 years,  Abd pain, diverticulitis  suspected; Nausea, vomiting; RLQ and LLQ abd tenderness.;    Comparison:  CT scan abdomen and pelvis 8/13/2014    TECHNIQUE:  CT was performed of the abdomen and pelvis with oral  contrast. Sagittal, coronal and axial reconstructions were reviewed.     FINDINGS:    Abdomen/Pelvis CT:  Lung Bases:  Mild emphysema and peripheral areas of atelectasis in  both lung bases.     Esophagus/stomach: Small hiatal hernia. Stomach is markedly distended  with contrast material.    Liver:  Normal.    Gallbladder: Surgically absent    Spleen: Normal.    Pancreas: Normal.    Adrenal Glands: Normal.    Kidneys: 6 mm dense lesion upper pole left kidney may represent a  small calcification containing cyst or perhaps a hemorrhagic cyst.  Ureters: Normal.  Urinary bladder: Normal.    Abdominal Aorta: Scattered atherosclerotic calcifications.  IVC: Normal.    Lymph Nodes: Normal.    Large and Small Bowel: Proximal small bowel is markedly dilated. Small  volume of radiodense material is seen scattered throughout the colon  and distal small bowel. There are numerous diverticula seen within the  distal colon. No apparent diverticulitis.  Appendix:  Normal.    Pelvic Organs:  The uterus is surgically absent.  Peritoneum: Moderate free fluid in the lower pelvis. No free  intraperitoneal or retroperitoneal gas.  Bony structures: Postoperative changes in the lumbar spine. No acute  abnormality. Degenerative spondylolisthesis of L4 relative to L5.    Other Findings:  Inguinal lymph nodes are normal.      Impression IMPRESSION:   Mid small bowel obstruction. No free air. Some of the small bowel  loops in the midabdomen may have gas within the walls. Lack of IV  contrast limits evaluation and the possibility of necrosis/ischemia is  not excluded. No evidence of gas within the portal venous system.    Diverticulosis of the distal colon. No diverticulitis.    MELY LUCERO MD     Results for orders placed during the hospital encounter of 10/11/18   CTA Chest Abdomen with Contrast    Narrative CTA CHEST ABDOMEN WITH CONTRAST    HISTORY: 68 yearsFemale ; Bicuspid aortic valve; Ascending aorta  dilatation (H)    TECHNIQUE: Axial CT imaging of the chest abdomen and pelvis was  performed with intervenous contrast contrast. Coronal and sagittal  reconstructions were obtained.    COMPARISON: None    FINDINGS:    CT CHEST: The ace ascending thoracic aorta is ectatic measuring 4.4 cm  transversely and 4.3 cm in AP dimension. The descending thoracic aorta  is 2.5 x 2.2 cm respectively. There is calcified atherosclerotic  disease of the thoracic aorta without evidence of dissection. There is  calcification of the aortic valve.  There is no mediastinal or hilar or axillary lymphadenopathy.    There is mild linear atelectasis at both lung bases. There is  cardiomegaly.         No concerning osseous lesions are identified    IMPRESSION CHEST: There is ectasia of the ascending thoracic aorta  measuring 4.4 x 4.3 cm in diameter.    There is cardiomegaly.      CT ABDOMEN AND PELVIS: There is atherosclerotic disease of the  abdominal aorta without evidence of aneurysm.     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 The residual lumen measures 2 mm in diameter. There is  approximate 50% stenosis of the origin of the superior mesenteric  artery, residual vessel lumen measures 3.5 to 4 mm.    There are 2 patent right renal arteries. There are 2 left renal  arteries with a dominant vessel originating slightly below the smaller  accessory vessel. There is moderate to severe stenosis of the dominant  left renal artery. There is moderate to severe stenosis of the origin  of the inferior mesenteric artery.    The visualized solid organs are unremarkable. No abnormally distended  loops of bowel are evident. The appendix is unremarkable.    IMPRESSION ABDOMEN AND PELVIS:     Atherosclerotic disease of the abdominal aorta without evidence of  aneurysm or dissection.    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 See description above.    MARINA BRICE MD     I have reviewed the patients CT of the abdomen and pelvis and agree with its findings.    ASSESSMENT: This is a 69 year old female with acute onset of abdominal pain.  The pain has seemed to resolve.  The patient is passing gas and having bowel movements.    Given the results of her CTA, she is at a risk of bowel ischemia.  At this time I do not feel the bowel ischemia is present.  This is based on her improvement, and her lactic acid which is been normal.     PLAN: Would keep her n.p.o. with an NG tube tonight.  If she continues to improve would discontinue the NG tube tomorrow.  Would slowly advance her diet.    We will continue to follow with you.  Thank you for the consult.  T

## 2019-09-04 NOTE — PLAN OF CARE
"Reason for hospital stay:  Small bowel obstruction    Most recent vitals: /86   Pulse 116   Temp 99.1  F (37.3  C) (Tympanic)   Resp 16   Ht 1.626 m (5' 4\")   Wt 67 kg (147 lb 11.2 oz)   SpO2 92%   BMI 25.35 kg/m      Pain Management:  Denies pain during shift.     Orientation:  A&O, makes needs known. Sad today due to long time pet passing away yesterday.     Cardiac:  Irregular, Afib 80's-120's per tele. Was 170's with activity 0800, MD ordered IV Lopressor PRN, gave 4 doses. HR remains 110's.  Also ordered oral Lopressor, gave 2 doses.     Respiratory:  Diminished throughout, no complaints of SOB.     GI:  NG tube intact, bowel sounds faint and hypoactive. No complaints of nausea or pain. NG has mild bloody drainage from nostrils, at 60 cm. Drainage was 100 mL, green/brown in color, thin. Stool sample still needed due to stool and urine mixing.     :  Voids without difficulty.     Nutrition:  NPO with ice chips, water given with oral Lopressor.     Skin Issues:  No areas of concern, mild 2+ edema in both ankles, states this is normal for her.     IVF:  Lactated Ringers 100 mL/hr    ABX:  Cipro, Flagyl    Mobility:  Stand by assist, mild weakness. Got up to commode.     Safety:  WDL, bed in lowest, call light in reach.           9/4/2019  3:00 PM  Lilia Denney   "

## 2019-09-04 NOTE — PROGRESS NOTES
LANCE VALLEJO.  Patient visit during  rounds. Patient was sitting up and alert. She noted no spiritual care requests at this time.

## 2019-09-04 NOTE — H&P
[facility]    History and Physical  Hospitalist       Date of Admission:  9/3/2019    Assessment & Plan   Mary Alice Cameron is a 69 year old female who presents with crampy abdominal pain, diarrhea, nausea and vomiting x 4 days. Admitted for SBO. H/o afib, chronic anticoagulation, bicuspid aortic valve, advanced COPD, chronic respiratory failure requiring oxygen 24/7, hypothyroidism and coronary artery disease.    Principal Problem:    Small bowel obstruction (H) vs Gastroenteritis.    Assessment: There is also possibility that patient might have gastroenteritis, presenting with the crampy abdominal pain and diarrhea.  No free air.  Some of the small bowel loops in the midabdomen may have gas within the wounds. No evidence of gas within the portal venous system.  There is diverticulosis but no diverticulitis.  Lactic acid is normal.  Doubt there is ischemic bowel but we will monitor closely and do serial lactic acid and physical exam.  Patient has known stenoses in her mesenteric arteries.    Plan: N.p.o.   NG tube to low intermittent suctioning   Surgery consult.   We will start her on ciprofloxacin and Flagyl.   Stool culture, c-diff.      Active Problems:    Persistent atrial fibrillation (H)    Assessment: Present on admission.  Rate acceptable    Plan: Continue home medications.  We will stop Eliquis (only took a.m. Dose), will give 1 dose of Lovenox therapeutic dose.  In the morning we will reassess the patient if she improves and surgery will not be planned, will consider giving her another dose or starting her oral Eliquis.      Pulmonary emphysema (H)    Assessment: Number admission the patient is on home oxygen 2.5 L at night.    Plan: Continue same      Bicuspid aortic valve    Assessment: Present on admission with mild stenosis.    Plan: No intervention required only monitoring      Hypothyroidism, postablative    Assessment: Present on admission     Plan: continue Synthroid      Coronary artery  disease involving native coronary artery of native heart without angina pectoris    Assessment: Last echo showed ejection fraction 45-50% with no signs of volume overload or heart failure symptoms.    Plan: Continue monitoring.      Acute cystitis without hematuria    Assessment: Present on admission.  Cultures pending.    Plan: Until we will get cultures back we will continue Rocephin.  Started in the emergency room.      Acute renal failure (H)    Assessment: Present on admission.  Most likely prerenal.  2 weeks ago creatinine was 0.77, today 1.78.    Plan: Given lactated Ringer 150 cc/h for 16 hours.    Chronic leukocytosis  Assessment: Present on admission.  Etiology not clear.  Differential includes infectious inflammatory versus lymphoma or leukemia.  Plan keep monitoring        DVT Prophylaxis: Enoxaparin (Lovenox) SQ  Code Status: DNR / DNI    Disposition: Expected discharge in 2 to 3 days days once all bowel obstruction resolved.  Not excluded that she will require longer stay or even surgery.  Impossible to predict at this point of time.    Rakesh Waddell    Primary Care Physician   Braydon Yen    Chief Complaint abdominal pain/diarrhea x 4 days    Reason for Admission: SBO vs gastroenteritis. Acute renal failure.     Admission status: full inpatient.     History is obtained from the patient, electronic health record and emergency department physician    History of Present Illness   Mary Alice Cameron is a 69 year old women with PMH of afib, chronic anticoagulation, bicuspid aortic valve, advanced COPD, chronic respiratory failure requiring oxygen 24/7, hypothyroidism and coronary artery disease who presented to emergency room complaining of nausea vomiting diarrhea crampy abdominal pain which started 4 days prior to admission.  She also admits that yesterday he had a bout of chest pain which resolved on its own after 7 8 minutes.  Scribes her pain both sides below umbilicus which is crampy associated  with nausea vomiting and diarrhea.  No hematemesis or hematochezia.  Dates 5 out of 10 but it was 10 out of 10 at home.  Stated that pain improved after she had a bowel movement.  EMS reported normal vital signs one day came to the patient's house.  Denies dysuria or hematuria.  Denies fever.  Last time she vomited was 3 days prior to admission and last bowel movement was on the day of admission in the afternoon  Surgeyr consutled.  Discussed the case with Dr. St, general surgery.  NG tube was placed in the emergency room and green content return.  Gas it was repeated x2 because of the air in the intestinal wall, and both times was low and physical exam does not support ischemic bowel findings.  Patient will be admitted to medical floor to treat small bowel obstruction or gastroenteritis.    ED events: No acute events, NG tube placed, surgery notified.    ED diagnostic workup: CBC chemistry and CT abdomen.  Creatinine 1.78 when creatinine August 19, 2019 was 0.77.  CRP is 158 which is very high, lactic acid 1.1 and repeated 0.9.  N-terminal BNP 1,969.  Troponin 0.015.  White blood cell count is 12.8, which is important to stress that is elevated since December 2018.    ED imaging studies:  Mid small bowel obstruction. No free air. Some of the small bowel  loops in the midabdomen may have gas within the walls. Lack of IV contrast limits evaluation and the possibility of necrosis/ischemia is not excluded. No evidence of gas within the portal venous system. Diverticulosis of the distal colon. No diverticulitis.    ED treatment:   ondansetron (ZOFRAN) injection 4 mg (4 mg Intravenous Given 9/3/19 1715)   cefTRIAXone in d5w (ROCEPHIN) intermittent infusion 1 g (has no administration in time range)   diatrizoate meglumine-sodium (GASTROGRAFIN/GASTROVIEW) 66-10 % solution 30 mL (30 mLs Oral Given 9/3/19 1934)       Past Medical History    I have reviewed this patient's medical history and updated it with pertinent  information if needed.   Past Medical History:   Diagnosis Date     Asthma      Atrial fibrillation (H)      COPD (chronic obstructive pulmonary disease) (H)      Depression      High cholesterol      HTN (hypertension)      Thyroid disease        Past Surgical History   I have reviewed this patient's surgical history and updated it with pertinent information if needed.  Past Surgical History:   Procedure Laterality Date     BACK SURGERY  2006     CARDIAC SURGERY  06/12/2018    Angiogram at [facility]     COLONOSCOPY N/A 1/19/2016    Procedure: COLONOSCOPY;  Surgeon: Waldemar Bob MD;  Location: [facility] OR     HYSTERECTOMY  1980    partial     SLING BLADDER SUSPENSION WITH FASCIA LINNETTE         Prior to Admission Medications   Prior to Admission Medications   Prescriptions Last Dose Informant Patient Reported? Taking?   ADVAIR -21 MCG/ACT Inhaler 9/3/2019 at Unknown time Self No Yes   Sig: INHALE 2 PUFFS INTO THE LUNGS TWICE DAILY   COMBIVENT RESPIMAT  MCG/ACT inhaler 9/3/2019 at Unknown time Self Yes Yes   Sig: Inhale 1 puff into the lungs 4 times daily    Calcium Carb-Cholecalciferol (CALTRATE 600+D3 SOFT PO) 9/3/2019 at Unknown time Self Yes Yes   Sig: Take 600 mg by mouth 2 times daily   ELIQUIS 5 MG tablet 9/3/2019 at Unknown time Self No Yes   Sig: TAKE 1 TABLET BY MOUTH TWICE DAILY   OTHER MEDICAL SUPPLIES  Self No No   Sig: Apply one pair to help with edema   acetaminophen (TYLENOL) 500 MG tablet Past Week at Unknown time Self Yes Yes   Sig: Take 500-1,000 mg by mouth every 6 hours as needed for mild pain   albuterol (2.5 MG/3ML) 0.083% neb solution 9/3/2019 at Unknown time Self No Yes   Sig: Take 1 vial (2.5 mg) by nebulization every 6 hours as needed for shortness of breath / dyspnea or wheezing   albuterol (PROAIR HFA/PROVENTIL HFA/VENTOLIN HFA) 108 (90 Base) MCG/ACT inhaler 9/2/2019 at Unknown time Self No Yes   Sig: INHALE 2 PUFFS INTO THE LUNGS EVERY 4 HOURS AS NEEDED FOR SHORTNESS  OF BREATH OR DIFFICULT BREATHING OR WHEEZING   atorvastatin (LIPITOR) 10 MG tablet 9/2/2019 at Unknown time Self No Yes   Sig: TAKE 1 TABLET BY MOUTH EVERY NIGHT AT BEDTIME   cloNIDine (CATAPRES) 0.1 MG tablet 9/2/2019 at Unknown time Self No Yes   Sig: TAKE 2 TABLETS BY MOUTH EVERY NIGHT AT BEDTIME   diltiazem ER COATED BEADS (CARDIZEM CD) 360 MG 24 hr capsule 9/3/2019 at Unknown time Self No Yes   Sig: Take 1 capsule (360 mg) by mouth daily   diphenhydrAMINE (BENADRYL) 25 MG tablet  Self Yes No   Sig: Take 1-2 tablets (25-50 mg) by mouth every 6 hours as needed for itching or allergies   furosemide (LASIX) 40 MG tablet 9/3/2019 at Unknown time Self No Yes   Sig: TAKE 1 TABLET BY MOUTH TWICE DAILY.   ipratropium - albuterol 0.5 mg/2.5 mg/3 mL (DUONEB) 0.5-2.5 (3) MG/3ML neb solution 9/3/2019 at Unknown time Self No Yes   Sig: USE 1 VIAL PER NEBULIZER FOUR TIMES DAILY   levothyroxine (SYNTHROID/LEVOTHROID) 100 MCG tablet 9/3/2019 at Unknown time Self No Yes   Sig: TAKE 1 TABLET(100 MCG) BY MOUTH DAILY   levothyroxine (SYNTHROID/LEVOTHROID) 88 MCG tablet   No No   Sig: TAKE 1 TABLET(100 MCG) BY MOUTH DAILY   losartan (COZAAR) 50 MG tablet 9/3/2019 at Unknown time Self No Yes   Sig: TAKE 1 TABLET BY MOUTH TWICE DAILY   potassium chloride SA (K-DUR/KLOR-CON M) 20 MEQ CR tablet 9/3/2019 at Unknown time Self No Yes   Sig: Take 1 tablet (20 mEq) by mouth 2 times daily   predniSONE (DELTASONE) 20 MG tablet  Self No No   Sig: TAKE 3 TABLETS BY MOUTH X 3 DAYS, 2 TABLETS X 3 DAYS, 1 TABLET X 3 DAYS, AND 0.5 TABLETS X 3 DAYS   sulfamethoxazole-trimethoprim (BACTRIM DS/SEPTRA DS) 800-160 MG tablet  Self Yes No   Sig: as needed      Facility-Administered Medications: None     Allergies   Allergies   Allergen Reactions     Amlodipine Besylate Cough     Norvasc     Lisinopril Cough       Social History   I have reviewed this patient's social history and updated it with pertinent information if needed. Mary Alice Cameron  reports that  she quit smoking about 12 years ago. Her smoking use included cigarettes. She started smoking about 53 years ago. She has a 20.50 pack-year smoking history. She has never used smokeless tobacco. She reports that she does not drink alcohol or use drugs.    Family History   I have reviewed this patient's family history and updated it with pertinent information if needed.   Family History   Problem Relation Age of Onset     Prostate Cancer Father      Hypertension Father      Heart Failure Father         CHF     Asthma Mother      Cancer Mother         ovarian     Hypertension Mother      Asthma Brother      Hypertension Sister      Hypertension Brother        Review of Systems   All 14 Systems were reviewed and found to be negative except what's mentioned in HPI.    Physical Exam   Temp: 98.8  F (37.1  C) Temp src: Oral BP: 129/70 Pulse: 101 Heart Rate: 115 Resp: 17 SpO2: 93 % O2 Device: None (Room air)    Vital Signs with Ranges  Temp:  [98.3  F (36.8  C)-98.8  F (37.1  C)] 98.8  F (37.1  C)  Pulse:  [101] 101  Heart Rate:  [] 115  Resp:  [14-27] 17  BP: (111-141)/(55-90) 129/70  SpO2:  [92 %-96 %] 93 %  145 lbs 0 oz    Physical exam:   GENERAL: Awake, alert, in no distress.   HEENT: NC/AT. MM dry. OP clear.   NECK: trachea midline, no JVD.   CARDIAC: regular, normal S1,S2, no S3. No murmurs, gallops or rubs. No bruits in the neck. No peripheral edema.    PULMONARY: Diminished bilaterally there is scattered wheezes, but not much  GI: Two-parent upper abdomen with hyperactive bowel sounds.  Mild tenderness but no rebound.  No hepatospleno megaly.  : CVA not tender, bladder not palpable.  MUSCULOSKELETAL: major joints without effusion, full range of motion, no sarcopenia.  NEUROLOGICAL: normal muscle strength and sensory exam. DTR 2/4, no myoclonus. Gait not tested.   PSYCHIATRIC: Flat affect   INTEGUMENT: no rash, no ulcerations, warm, dry.  LYMPHATIC/HEMATOLOGIC: no LAD in the neck, both axillae, and groins.  No petechiae, no ecchymoses.       Goals of care were discussed with the patient and family which included but not limited to anticipated treatment course during the current hospitalization, recovery from current event, discharge planning and transitions of care responsibilities and expected outcomes. Code status was addressed on admission as well. End of life care discussion not done.    Data   Data reviewed today:  I personally reviewed blood work.  Radiologist reviewed: The abdomen  See below radiology report.   Recent Labs   Lab 09/03/19  1657   WBC 12.8*   HGB 13.5   MCV 96         POTASSIUM 3.5   CHLORIDE 101   CO2 26   BUN 45*   CR 1.78*   ANIONGAP 9   KOSTA 9.6   GLC 97   ALBUMIN 3.0*   PROTTOTAL 6.6*   BILITOTAL 1.1   ALKPHOS 30*   ALT 12   AST 14   TROPI <0.015       Recent Results (from the past 24 hour(s))   CT Abdomen Pelvis w/o Contrast    Narrative    CT ABDOMEN PELVIS W/O CONTRAST    CLINICAL HISTORY: Female, age 69 years,  Abd pain, diverticulitis  suspected; Nausea, vomiting; RLQ and LLQ abd tenderness.;    Comparison:  CT scan abdomen and pelvis 8/13/2014    TECHNIQUE:  CT was performed of the abdomen and pelvis with oral  contrast. Sagittal, coronal and axial reconstructions were reviewed.     FINDINGS:    Abdomen/Pelvis CT:  Lung Bases:  Mild emphysema and peripheral areas of atelectasis in  both lung bases.     Esophagus/stomach: Small hiatal hernia. Stomach is markedly distended  with contrast material.    Liver:  Normal.    Gallbladder: Surgically absent    Spleen: Normal.    Pancreas: Normal.    Adrenal Glands: Normal.    Kidneys: 6 mm dense lesion upper pole left kidney may represent a  small calcification containing cyst or perhaps a hemorrhagic cyst.  Ureters: Normal.  Urinary bladder: Normal.    Abdominal Aorta: Scattered atherosclerotic calcifications.  IVC: Normal.    Lymph Nodes: Normal.    Large and Small Bowel: Proximal small bowel is markedly dilated. Small  volume of  radiodense material is seen scattered throughout the colon  and distal small bowel. There are numerous diverticula seen within the  distal colon. No apparent diverticulitis.  Appendix: Normal.    Pelvic Organs:  The uterus is surgically absent.  Peritoneum: Moderate free fluid in the lower pelvis. No free  intraperitoneal or retroperitoneal gas.  Bony structures: Postoperative changes in the lumbar spine. No acute  abnormality. Degenerative spondylolisthesis of L4 relative to L5.    Other Findings:  Inguinal lymph nodes are normal.      Impression    IMPRESSION:   Mid small bowel obstruction. No free air. Some of the small bowel  loops in the midabdomen may have gas within the walls. Lack of IV  contrast limits evaluation and the possibility of necrosis/ischemia is  not excluded. No evidence of gas within the portal venous system.    Diverticulosis of the distal colon. No diverticulitis.    MELY LUCERO MD

## 2019-09-04 NOTE — ED NOTES
Face to face report given with opportunity to observe patient.    Report given to Tammy HANEY RN.    Vero Wills RN   9/3/2019  7:06 PM

## 2019-09-04 NOTE — PLAN OF CARE
Face to face report given with opportunity to observe patient.    Report given to PUSHPA Muhammad and PUSHPA Pickard   9/4/2019  3:37 PM

## 2019-09-04 NOTE — PROGRESS NOTES
[facility]    Medicine Progress Note - Hospitalist Service       Date of Admission:  9/3/2019  Assessment & Plan      Mary Alice Cameron is a 69 year old female who presents with crampy abdominal pain, diarrhea, nausea and vomiting x 4 days. Admitted for SBO. H/o afib, chronic anticoagulation, bicuspid aortic valve, advanced COPD, chronic respiratory failure requiring oxygen 24/7, hypothyroidism and coronary artery disease.    Active Problems:    Small bowel obstruction (H) vs Gastroenteritis.  Patient is symptomatically improving.  There was concern for small bowel obstruction, but she might have had more ileus in the setting of urinary tract infection or gastroenteritis.  We will continue to follow patient with a serial abdominal exam and NG tube will be continued until output is minimal.  Patient was started on ciprofloxacin and Flagyl and will continue with that.  Stool culture and C. difficile will be followed for patient's diarrhea symptoms.      Acute cystitis without hematuria  We will continue with Cipro and stop ceftriaxone.  Will follow urine culture and refined antibiotic choices.       Persistent atrial fibrillation (H)  Patient is having rapid ventricular response with atrial fibrillation.  Patient had been on diltiazem, but it appears that patient does have systolic congestive heart failure history.  We will switch patient AV tiffany blocker from diltiazem to metoprolol in light of patient's depressed LV function.  Patient had been on Eliquis, but this is on hold for possible surgery.  Patient was given 1 dose of Lovenox at therapeutic dose.  Since patient has bloody NG tube output, will hold off on all anticoagulation at this time.  We will monitor patient's hemoglobin and for signs of active bleeding.  When there is no signs of active bleeding, we will resume anticoagulation possibly with IV heparin drip if there is possibility of patient having to go to the OR for surgical intervention.        Pulmonary emphysema (H)  Patient was on home oxygen 2.5 L at night and bronchodilators.  We will continue with patient's bronchodilators and supplemental oxygen.  Patient was started on IV steroid in light of her n.p.o. status.      Bicuspid aortic valve    Assessment: Present on admission with mild stenosis.    Plan: No intervention required only monitoring      Hypothyroidism, postablative    Assessment: Present on admission     Plan: continue Synthroid      Coronary artery disease involving native coronary artery of native heart without angina pectoris    Assessment: Last echo showed ejection fraction 45-50% with no signs of volume overload or heart failure symptoms.    Plan: Continue monitoring.      Acute renal failure (H)  Patient's renal function is improving with IV fluid hydration.  We will decrease the rate of IV fluid to 100mL/h.    Chronic leukocytosis  Assessment: Present on admission.  Etiology not clear.  Differential includes infectious inflammatory versus lymphoma or leukemia.  Plan keep monitoring    DVT Prophylaxis: Enoxaparin (Lovenox) SQ  Code Status: DNR / DNI    Disposition: Expected discharge in 2 to 3 days days once all bowel obstruction resolved.  Not excluded that she will require longer stay or even surgery.  Impossible to predict at this point of time.    Arvydas Urbonas       Diet: NPO for Medical/Clinical Reasons Except for: Ice Chips    DVT Prophylaxis: Enoxaparin (Lovenox) SQ  Tyler Catheter: not present  Code Status: DNR/DNI      Disposition Plan   Expected discharge: 2 - 3 days, recommended to prior living arrangement once adequate pain management/ tolerating PO medications and antibiotic plan established.  Entered: Kane Escamilla MD 09/04/2019, 7:55 AM     The patient's care was discussed with the Patient and Dr. St, surgical consult..  Total time spent 45 min, 30 min were spent on gathering data, speaking with family/consultant, and coordinating care.    Kane Escamilla  MD  Hospitalist Service  [facility]    ______________________________________________________________________    Interval History   Patient reports feeling better with softer abdomen; she also had bowel movements last night.  NG tube output is also decreasing.  Patient denies fever, chills, chest pain, cough, lower extremity edema, headache, visual changes.    Data reviewed today: I reviewed all medications, new labs and imaging results over the last 24 hours. I personally reviewed no images or EKG's today.    Physical Exam   Vital Signs: Temp: 98.8  F (37.1  C) Temp src: Tympanic BP: 132/70 Pulse: 101 Heart Rate: 116 Resp: 16 SpO2: 98 % O2 Device: Nasal cannula Oxygen Delivery: 2.5 LPM  Weight: 147 lbs 11.2 oz  General Appearance: In no acute distress  HEENT: NG tube in place with maroon color aspirate in the tube  Respiratory: Clear to auscultation bilaterally  Cardiovascular: Irregularly irregular, tachycardia rate  GI: Mildly distended, soft, nontender, bowel sounds present  Skin: Intact  Other: Neuro: Grossly nonfocal    Data   Recent Labs   Lab 09/04/19  0545 09/03/19  1657   WBC 9.1 12.8*   HGB 13.5 13.5   MCV 96 96    209    136   POTASSIUM 3.6 3.5   CHLORIDE 101 101   CO2 24 26   BUN 39* 45*   CR 1.16* 1.78*   ANIONGAP 11 9   KOSTA 9.0 9.6   * 97   ALBUMIN 2.7* 3.0*   PROTTOTAL 6.4* 6.6*   BILITOTAL 0.6 1.1   ALKPHOS 28* 30*   ALT 10 12   AST 15 14   TROPI  --  <0.015     Recent Results (from the past 24 hour(s))   CT Abdomen Pelvis w/o Contrast    Narrative    CT ABDOMEN PELVIS W/O CONTRAST    CLINICAL HISTORY: Female, age 69 years,  Abd pain, diverticulitis  suspected; Nausea, vomiting; RLQ and LLQ abd tenderness.;    Comparison:  CT scan abdomen and pelvis 8/13/2014    TECHNIQUE:  CT was performed of the abdomen and pelvis with oral  contrast. Sagittal, coronal and axial reconstructions were reviewed.     FINDINGS:    Abdomen/Pelvis CT:  Lung Bases:  Mild emphysema and  peripheral areas of atelectasis in  both lung bases.     Esophagus/stomach: Small hiatal hernia. Stomach is markedly distended  with contrast material.    Liver:  Normal.    Gallbladder: Surgically absent    Spleen: Normal.    Pancreas: Normal.    Adrenal Glands: Normal.    Kidneys: 6 mm dense lesion upper pole left kidney may represent a  small calcification containing cyst or perhaps a hemorrhagic cyst.  Ureters: Normal.  Urinary bladder: Normal.    Abdominal Aorta: Scattered atherosclerotic calcifications.  IVC: Normal.    Lymph Nodes: Normal.    Large and Small Bowel: Proximal small bowel is markedly dilated. Small  volume of radiodense material is seen scattered throughout the colon  and distal small bowel. There are numerous diverticula seen within the  distal colon. No apparent diverticulitis.  Appendix: Normal.    Pelvic Organs:  The uterus is surgically absent.  Peritoneum: Moderate free fluid in the lower pelvis. No free  intraperitoneal or retroperitoneal gas.  Bony structures: Postoperative changes in the lumbar spine. No acute  abnormality. Degenerative spondylolisthesis of L4 relative to L5.    Other Findings:  Inguinal lymph nodes are normal.      Impression    IMPRESSION:   Mid small bowel obstruction. No free air. Some of the small bowel  loops in the midabdomen may have gas within the walls. Lack of IV  contrast limits evaluation and the possibility of necrosis/ischemia is  not excluded. No evidence of gas within the portal venous system.    Diverticulosis of the distal colon. No diverticulitis.    MELY LUCERO MD     Medications     lactated ringers 150 mL/hr at 09/04/19 0822     - MEDICATION INSTRUCTIONS -         ciprofloxacin  400 mg Intravenous Q12H     fluticasone-vilanterol  1 puff Inhalation Daily     methylPREDNISolone  40 mg Intravenous Q12H     metoprolol tartrate  25 mg Oral BID     metroNIDAZOLE  500 mg Intravenous Q6H     sodium chloride (PF)  3 mL Intracatheter Q8H

## 2019-09-04 NOTE — PLAN OF CARE
[facility] Inpatient Admission Note:    Patient admitted to 3106/3106-1 at approximately 2230 via cart accompanied by transport tech from emergency room . Report received from ER Nurse in SBAR format at 2230 via telephone. Patient ambulated to bed via self.. Patient is alert and oriented X 3, denies pain; rates at 0 on 0-10 scale.  Patient oriented to room, unit, hourly rounding, and plan of care. Explained admission packet and patient handbook with patient bill of rights brochure. Will continue to monitor and document as needed.     Inpatient Nursing criteria listed below was met:    Health care directives status obtained and documented: Yes    Care Everywhere authorization obtained No    MRSA swab completed for patient 65 years and older: Yes    Patient identifies a surrogate decision maker: Yes If yes, who:Ksenia Eisenberg Contact Information: Pt does not have correct number at this time    Core Measure diagnosis present:No.      If initial lactic acid >2.0, repeat lactic acid drawn within one hour of arrival to unit: NA.     Vaccination assessment and education completed: Yes   Vaccinations received prior to admission: Pneumovax yes  Influenza(seasonal)  YES   Vaccination(s) ordered: not given today because received 2018 boost, new one is not due yet.    Clergy visit ordered if patient requests: N/A    Skin issues/needs documented: N/A    Isolation Patient: no   Fall Prevention Yes: Care plan updated, education given and documented, sticker and magnet in place: Yes    Care Plan initiated: Yes    Education Documented (including assessment): Yes    Patient has discharge needs : No    Face to face report received from PUSHPA Pickard and PUSHPA Muhammad @ 7059 9/3/19. This report completed using information given to me at that time from their report sheets.
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
## 2019-09-05 LAB
ALBUMIN SERPL-MCNC: 2.6 G/DL (ref 3.4–5)
ALP SERPL-CCNC: 24 U/L (ref 40–150)
ALT SERPL W P-5'-P-CCNC: 11 U/L (ref 0–50)
ANION GAP SERPL CALCULATED.3IONS-SCNC: 11 MMOL/L (ref 3–14)
AST SERPL W P-5'-P-CCNC: 10 U/L (ref 0–45)
BACTERIA SPEC CULT: ABNORMAL
BACTERIA SPEC CULT: NORMAL
BILIRUB DIRECT SERPL-MCNC: 0.2 MG/DL (ref 0–0.2)
BILIRUB SERPL-MCNC: 0.4 MG/DL (ref 0.2–1.3)
BUN SERPL-MCNC: 37 MG/DL (ref 7–30)
CALCIUM SERPL-MCNC: 8.5 MG/DL (ref 8.5–10.1)
CHLORIDE SERPL-SCNC: 101 MMOL/L (ref 94–109)
CO2 SERPL-SCNC: 26 MMOL/L (ref 20–32)
CREAT SERPL-MCNC: 0.9 MG/DL (ref 0.52–1.04)
ERYTHROCYTE [DISTWIDTH] IN BLOOD BY AUTOMATED COUNT: 12.8 % (ref 10–15)
GFR SERPL CREATININE-BSD FRML MDRD: 65 ML/MIN/{1.73_M2}
GLUCOSE SERPL-MCNC: 109 MG/DL (ref 70–99)
HCT VFR BLD AUTO: 37.6 % (ref 35–47)
HGB BLD-MCNC: 12.9 G/DL (ref 11.7–15.7)
MAGNESIUM SERPL-MCNC: 2 MG/DL (ref 1.6–2.3)
MCH RBC QN AUTO: 32.7 PG (ref 26.5–33)
MCHC RBC AUTO-ENTMCNC: 34.3 G/DL (ref 31.5–36.5)
MCV RBC AUTO: 95 FL (ref 78–100)
PLATELET # BLD AUTO: 242 10E9/L (ref 150–450)
POTASSIUM SERPL-SCNC: 3.6 MMOL/L (ref 3.4–5.3)
PROT SERPL-MCNC: 5.9 G/DL (ref 6.8–8.8)
RBC # BLD AUTO: 3.94 10E12/L (ref 3.8–5.2)
SODIUM SERPL-SCNC: 138 MMOL/L (ref 133–144)
SPECIMEN SOURCE: ABNORMAL
SPECIMEN SOURCE: NORMAL
WBC # BLD AUTO: 12.3 10E9/L (ref 4–11)

## 2019-09-05 PROCEDURE — 12000000 ZZH R&B MED SURG/OB

## 2019-09-05 PROCEDURE — 25000128 H RX IP 250 OP 636: Performed by: INTERNAL MEDICINE

## 2019-09-05 PROCEDURE — 25000125 ZZHC RX 250: Performed by: INTERNAL MEDICINE

## 2019-09-05 PROCEDURE — 80076 HEPATIC FUNCTION PANEL: CPT | Performed by: INTERNAL MEDICINE

## 2019-09-05 PROCEDURE — 83735 ASSAY OF MAGNESIUM: CPT | Performed by: INTERNAL MEDICINE

## 2019-09-05 PROCEDURE — 94640 AIRWAY INHALATION TREATMENT: CPT

## 2019-09-05 PROCEDURE — 36415 COLL VENOUS BLD VENIPUNCTURE: CPT | Performed by: INTERNAL MEDICINE

## 2019-09-05 PROCEDURE — 40000275 ZZH STATISTIC RCP TIME EA 10 MIN

## 2019-09-05 PROCEDURE — 25000132 ZZH RX MED GY IP 250 OP 250 PS 637: Performed by: INTERNAL MEDICINE

## 2019-09-05 PROCEDURE — 25800030 ZZH RX IP 258 OP 636: Performed by: INTERNAL MEDICINE

## 2019-09-05 PROCEDURE — 99233 SBSQ HOSP IP/OBS HIGH 50: CPT | Performed by: INTERNAL MEDICINE

## 2019-09-05 PROCEDURE — 85027 COMPLETE CBC AUTOMATED: CPT | Performed by: INTERNAL MEDICINE

## 2019-09-05 PROCEDURE — 80048 BASIC METABOLIC PNL TOTAL CA: CPT | Performed by: INTERNAL MEDICINE

## 2019-09-05 RX ORDER — SODIUM CHLORIDE, SODIUM LACTATE, POTASSIUM CHLORIDE, CALCIUM CHLORIDE 600; 310; 30; 20 MG/100ML; MG/100ML; MG/100ML; MG/100ML
INJECTION, SOLUTION INTRAVENOUS CONTINUOUS
Status: ACTIVE | OUTPATIENT
Start: 2019-09-05 | End: 2019-09-06

## 2019-09-05 RX ORDER — METOPROLOL TARTRATE 1 MG/ML
5 INJECTION, SOLUTION INTRAVENOUS EVERY 6 HOURS
Status: COMPLETED | OUTPATIENT
Start: 2019-09-05 | End: 2019-09-06

## 2019-09-05 RX ORDER — METOPROLOL TARTRATE 25 MG/1
25 TABLET, FILM COATED ORAL ONCE
Status: COMPLETED | OUTPATIENT
Start: 2019-09-05 | End: 2019-09-05

## 2019-09-05 RX ADMIN — METOPROLOL TARTRATE 50 MG: 50 TABLET, FILM COATED ORAL at 21:36

## 2019-09-05 RX ADMIN — METRONIDAZOLE 500 MG: 500 INJECTION, SOLUTION INTRAVENOUS at 04:25

## 2019-09-05 RX ADMIN — METHYLPREDNISOLONE SODIUM SUCCINATE 20 MG: 40 INJECTION, POWDER, FOR SOLUTION INTRAMUSCULAR; INTRAVENOUS at 22:55

## 2019-09-05 RX ADMIN — CIPROFLOXACIN 400 MG: 2 INJECTION, SOLUTION INTRAVENOUS at 00:49

## 2019-09-05 RX ADMIN — METOPROLOL TARTRATE 5 MG: 5 INJECTION, SOLUTION INTRAVENOUS at 17:49

## 2019-09-05 RX ADMIN — METHYLPREDNISOLONE SODIUM SUCCINATE 20 MG: 40 INJECTION, POWDER, FOR SOLUTION INTRAMUSCULAR; INTRAVENOUS at 10:13

## 2019-09-05 RX ADMIN — SODIUM CHLORIDE, POTASSIUM CHLORIDE, SODIUM LACTATE AND CALCIUM CHLORIDE: 600; 310; 30; 20 INJECTION, SOLUTION INTRAVENOUS at 13:26

## 2019-09-05 RX ADMIN — METRONIDAZOLE 500 MG: 500 INJECTION, SOLUTION INTRAVENOUS at 17:54

## 2019-09-05 RX ADMIN — METOPROLOL TARTRATE 5 MG: 5 INJECTION, SOLUTION INTRAVENOUS at 06:51

## 2019-09-05 RX ADMIN — METOPROLOL TARTRATE 5 MG: 5 INJECTION, SOLUTION INTRAVENOUS at 12:37

## 2019-09-05 RX ADMIN — METRONIDAZOLE 500 MG: 500 INJECTION, SOLUTION INTRAVENOUS at 11:33

## 2019-09-05 RX ADMIN — METOPROLOL TARTRATE 5 MG: 1 INJECTION, SOLUTION INTRAVENOUS at 21:36

## 2019-09-05 RX ADMIN — METRONIDAZOLE 500 MG: 500 INJECTION, SOLUTION INTRAVENOUS at 22:59

## 2019-09-05 RX ADMIN — METOPROLOL TARTRATE 25 MG: 25 TABLET ORAL at 11:33

## 2019-09-05 RX ADMIN — CIPROFLOXACIN 400 MG: 2 INJECTION, SOLUTION INTRAVENOUS at 13:26

## 2019-09-05 RX ADMIN — IPRATROPIUM BROMIDE AND ALBUTEROL SULFATE 3 ML: .5; 3 SOLUTION RESPIRATORY (INHALATION) at 16:07

## 2019-09-05 RX ADMIN — METOPROLOL TARTRATE 50 MG: 50 TABLET, FILM COATED ORAL at 09:08

## 2019-09-05 ASSESSMENT — ENCOUNTER SYMPTOMS
ABDOMINAL PAIN: 1
FEVER: 0
BLOOD IN STOOL: 0
SHORTNESS OF BREATH: 0
VOMITING: 1
DIARRHEA: 1
NAUSEA: 1

## 2019-09-05 ASSESSMENT — ACTIVITIES OF DAILY LIVING (ADL)
ADLS_ACUITY_SCORE: 13
ADLS_ACUITY_SCORE: 11

## 2019-09-05 NOTE — PLAN OF CARE
Face to face report given with opportunity to observe patient.    Report given to  Bhargavi  For most recent vital sign. Told her that pt did not void or have Bm on our shift  We told her that we have bed bath and performs night care.   patient was stable and sitting in the chair /recliner.    Ngoc Reynolds   9/4/2019  7:58 PM

## 2019-09-05 NOTE — PROGRESS NOTES
[facility]    Medicine Progress Note - Hospitalist Service       Date of Admission:  9/3/2019  Assessment & Plan      Mary Alice Cameron is a 69 year old female who presents with crampy abdominal pain, diarrhea, nausea and vomiting x 4 days. Admitted for SBO. H/o afib, chronic anticoagulation, bicuspid aortic valve, advanced COPD, chronic respiratory failure requiring oxygen 24/7, hypothyroidism and coronary artery disease.    Active Problems:    Small bowel obstruction (H) vs Gastroenteritis.  Patient's obstructive symptoms are resolving, and her NG tube output appears to be decreasing.  I spoke with Dr. St, surgical consult, who recommends nonsurgical approach.  We will continue to monitor patient's NG tube output.  Patient is on ciprofloxacin and Flagyl for possible intra-abdominal infection and will continue with that for now.      Acute cystitis without hematuria  Patient's urine culture grew mixed venancio greater than 10,000 colonies per milliliter.  It appears that patient did not have a significant UTI.       Persistent atrial fibrillation (H)  Patient is having rapid ventricular response with atrial fibrillation.  Patient had been on diltiazem, but it appears that patient does have systolic congestive heart failure history.  Pt's AV tiffany blocker was switched from diltiazem to metoprolol in light of patient's depressed LV function.  Patient had been on Eliquis, but this is on hold for possible surgery.  Patient was given 1 dose of Lovenox at therapeutic dose.  Since patient had bloody NG tube output, we are holding all anticoagulation at this time.  We will monitor patient's hemoglobin and for signs of active bleeding.  When patient's obstructive symptoms resolve and NG tube is discontinued, will resume patient's Eliquis.       Pulmonary emphysema (H)  Patient was on home oxygen 2.5 L at night and bronchodilators.  We will continue with patient's bronchodilators and supplemental oxygen.  Patient was  started on IV steroid in light of her n.p.o. status.      Bicuspid aortic valve    Assessment: Present on admission with mild stenosis.    Plan: No intervention required only monitoring      Hypothyroidism, postablative    Assessment: Present on admission     Plan: continue Synthroid      Coronary artery disease involving native coronary artery of native heart without angina pectoris    Assessment: Last echo showed ejection fraction 45-50% with no signs of volume overload or heart failure symptoms.    Plan: Continue monitoring.      Acute renal failure (H)  Patient's renal function is improving with IV fluid hydration.  We will decrease the rate of IV fluid to 100mL/h.      Diet: NPO for Medical/Clinical Reasons Except for: Ice Chips    DVT Prophylaxis: Ambulate  Tyler Catheter: not present  Code Status: DNR/DNI      Disposition Plan   Expected discharge: 2 - 3 days, recommended to prior living arrangement once adequate pain management/ tolerating PO medications and antibiotic plan established.  Entered: Kane Escamilla MD 09/05/2019, 11:06 AM     The patient's care was discussed with the Patient and Dr. St, surgical consult..  Total time spent 45 min, 30 min were spent on gathering data, speaking with family/consultant, and coordinating care.    Kane Escamilla MD  Hospitalist Service  [facility]    ______________________________________________________________________    Interval History   Patient reports feeling better with softer abdomen; she also had bowel movements last night.  NG tube output is also decreasing.  Patient denies fever, chills, chest pain, cough, lower extremity edema, headache, visual changes.    Data reviewed today: I reviewed all medications, new labs and imaging results over the last 24 hours. I personally reviewed no images or EKG's today.    Physical Exam   Vital Signs: Temp: 97.1  F (36.2  C) Temp src: Temporal BP: 148/71 Pulse: (!) 142(PO metoprolol given) Heart Rate: (!) 133  Resp: 16 SpO2: 92 % O2 Device: None (Room air) Oxygen Delivery: 2.5 LPM  Weight: 147 lbs 11.2 oz  General Appearance: In no acute distress  HEENT: NG tube in place with maroon color aspirate in the tube  Respiratory: Clear to auscultation bilaterally  Cardiovascular: Irregularly irregular, tachycardia rate  GI: Mildly distended, soft, nontender, bowel sounds present  Skin: Intact  Other: Neuro: Grossly nonfocal    Data   Recent Labs   Lab 09/05/19  0608 09/04/19  1127 09/04/19  0545 09/03/19  1657   WBC 12.3* 9.2 9.1 12.8*   HGB 12.9 13.7 13.5 13.5   MCV 95 96 96 96    232 196 209    138 136 136   POTASSIUM 3.6 4.1 3.6 3.5   CHLORIDE 101 100 101 101   CO2 26 29 24 26   BUN 37* 38* 39* 45*   CR 0.90 1.09* 1.16* 1.78*   ANIONGAP 11 9 11 9   KOSTA 8.5 9.0 9.0 9.6   * 111* 125* 97   ALBUMIN 2.6*  --  2.7* 3.0*   PROTTOTAL 5.9*  --  6.4* 6.6*   BILITOTAL 0.4  --  0.6 1.1   ALKPHOS 24*  --  28* 30*   ALT 11  --  10 12   AST 10  --  15 14   TROPI  --   --   --  <0.015     No results found for this or any previous visit (from the past 24 hour(s)).  Medications     lactated ringers 125 mL/hr at 09/04/19 2217     - MEDICATION INSTRUCTIONS -         ciprofloxacin  400 mg Intravenous Q12H     fluticasone-vilanterol  1 puff Inhalation Daily     methylPREDNISolone  20 mg Intravenous Q12H     metoprolol  5 mg Intravenous Q6H     metoprolol tartrate  50 mg Oral BID     metroNIDAZOLE  500 mg Intravenous Q6H     sodium chloride (PF)  3 mL Intracatheter Q8H

## 2019-09-05 NOTE — PLAN OF CARE
Pt has been afebrile through the day, VS as charted.  Her HR at the start of the shift was jumping into the 150's with activity - she has received PO Metoprolol (had an additional dose of 25mg) along with IV - this afternoon her HR has been more consistently in the one teens to 120's.  Her O2 sats are in the mid to upper 90's on 2.5 LPM via NC (O2 removed for the day - sats dipped into the upper 80's - replaced O2) lung sounds are clear/diminished. She does have IV fluids running decreased to 75 mL/hr -continues on IV antibiotics. She is NPO with ice chips, did have 2 urine/stool mix (unable to send stool sample) - remains on enteric isolation. NG tube in place with total of 300 mL of thin green/brown return new canister placed at 1230.  Her bowel sounds are active, no nausea/vomiting. She has been up in the chair most of the day - tolerated well.  She has remained free of falls, call light within reach - does make her needs known, alarms on and frequent rounding done to assess needs.

## 2019-09-05 NOTE — PLAN OF CARE
"Reason for hospital stay:  Abdominal pain r/t SBO    Most recent vitals: Blood Pressure 137/62   Pulse (Abnormal) 142   Temperature 98.1  F (36.7  C) (Temporal)   Respiration 16   Height 1.626 m (5' 4\")   Weight 67 kg (147 lb 11.2 oz)   Oxygen Saturation 99%   Body Mass Index 25.35 kg/m      Pain Management:  Pt had no c/o pain last night.  She did c/o some heartburn during an episode of A-fib in the 140's-150's.  I gave her zofran and PO metoprolol and turned off LIS for 1 hour.  Her HR decreased to the lower 100's.      Orientation:  A&O x4, PERRL, calm cooperative    Cardiac:  A-fib with rates as high as 150, received both PO and IV metoprolol on my shift.  See MAR for details. Heart sounds irregular.    Respiratory:  LS are clear, pt wears 2.5 L o2 at night and is on RA during the day    GI:  Soft, nontender, BS active, has L NG at 60 cm which put out 250 mL of green/brown thin drainage with LIS.      :  Voiding appropriately    Nutrition:  NPO except ice chips    Skin Issues:  2+ edema in the feet and ankles    IVF:  LR at 125 mL/hr    ABX:  Flagyl and cipro    Mobility:  Up with SBA to bedside commode, turns self independently    Safety:  Pt uses call light appropriately, A&O, gets up SBA to commode due to tubes/ lines/ HR.      Comments:      9/5/2019  7:57 AM  Bhargavi Fox RN    "

## 2019-09-05 NOTE — PROGRESS NOTES
INPATIENT ROUNDING NOTE  9/5/2019    Patient: Mary Alice Cameron    Physician of Record: Gaetano St MD    Admitting diagnosis: CHF (congestive heart failure) (H) [I50.9]  Small bowel obstruction (H) [K56.609]  Urinary tract infection [N39.0]  Lower abdominal pain [R10.30]    Reason for Admission: Abdominal pain with questionable obstruction.    Length of stay: 2    Current Diet: NPO    CURRENT MEDICATIONS:  Continuous Medications:  Current Facility-Administered Medications   Medication Last Rate     lactated ringers 75 mL/hr at 09/05/19 1326     - MEDICATION INSTRUCTIONS -         Scheduled Medications:  Current Facility-Administered Medications   Medication Dose Route Frequency     ciprofloxacin  400 mg Intravenous Q12H     fluticasone-vilanterol  1 puff Inhalation Daily     methylPREDNISolone  20 mg Intravenous Q12H     metoprolol  5 mg Intravenous Q6H     metoprolol tartrate  50 mg Oral BID     metroNIDAZOLE  500 mg Intravenous Q6H     sodium chloride (PF)  3 mL Intracatheter Q8H       PRN Medications:  Current Facility-Administered Medications   Medication Dose Route Frequency     albuterol  2.5 mg Nebulization Q2H PRN     HYDROmorphone  0.3 mg Subcutaneous Q3H PRN     ipratropium - albuterol 0.5 mg/2.5 mg/3 mL  3 mL Nebulization Q4H PRN     lidocaine 4%   Topical Q1H PRN     lidocaine (buffered or not buffered)  0.1-1 mL Other Q1H PRN     magnesium sulfate  4 g Intravenous Q4H PRN     metoprolol  5 mg Intravenous Q5 Min PRN     naloxone  0.1-0.4 mg Intravenous Q2 Min PRN     ondansetron  4 mg Oral Q6H PRN    Or     ondansetron  4 mg Intravenous Q6H PRN     - MEDICATION INSTRUCTIONS -   Does not apply Continuous PRN     potassium chloride  20-40 mEq Oral or Feeding Tube Q2H PRN     potassium chloride with lidocaine  10 mEq Intravenous Q1H PRN     potassium chloride  10 mEq Intravenous Q1H PRN     potassium chloride  20-40 mEq Oral Q2H PRN     sodium chloride (PF)  3 mL Intracatheter q1 min prn  "      SUBJECTIVE:   Nausea: No. Vomiting: No. Fever: No. Chills: No. Excessive burping: No. Flatus: Yes. BM: Yes. Pain is 3/10. Pain control: good.  Patient is feeling much better.    PHYSICAL EXAM:   Vital signs: /96   Pulse (!) 142   Temp 97.1  F (36.2  C) (Temporal)   Resp 16   Ht 1.626 m (5' 4\")   Wt 67 kg (147 lb 11.2 oz)   SpO2 96%   BMI 25.35 kg/m     Weight: [unfilled]   BMI: Body mass index is 25.35 kg/m .   General: Normal, healthy, cooperative   HEENT: PERRLA and EOMI   Neck: supple   Lungs: clear to auscultation   CV: Regular rate and rhythm without murmer   Abdominal: Abdomen soft, non-tender. BS normal. No masses, organomegaly   Extremities: No cyanosis, clubbing or edema noted bilaterally in Upper and Lower Extremities   Neurological: without deficit    INPUT/OUTPUT:      Intake/Output Summary (Last 24 hours) at 9/5/2019 1622  Last data filed at 9/5/2019 1326  Gross per 24 hour   Intake 2288 ml   Output 1700 ml   Net 588 ml       I/O last 3 completed shifts:  In: 2288 [I.V.:2288]  Out: 1700 [Urine:650; Emesis/NG output:700; Other:350]    LABS:    Last CBC Rrsults:   Recent Labs   Lab Test 09/05/19  0608 09/04/19  1127 09/04/19  0545   WBC 12.3* 9.2 9.1   RBC 3.94 4.16 4.09   HGB 12.9 13.7 13.5   HCT 37.6 40.0 39.1   MCV 95 96 96   MCH 32.7 32.9 33.0   MCHC 34.3 34.3 34.5   RDW 12.8 12.9 12.8    232 196       Last Comprehensive Metabolic panel:  Recent Labs   Lab Test 09/05/19  0608 09/04/19  1127 09/04/19  0545 09/03/19  1657    138 136 136   POTASSIUM 3.6 4.1 3.6 3.5   CHLORIDE 101 100 101 101   CO2 26 29 24 26   ANIONGAP 11 9 11 9   * 111* 125* 97   BUN 37* 38* 39* 45*   CR 0.90 1.09* 1.16* 1.78*   GFRESTIMATED 65 52* 48* 29*   KOSTA 8.5 9.0 9.0 9.6   BILITOTAL 0.4  --  0.6 1.1   ALKPHOS 24*  --  28* 30*   ALT 11  --  10 12   AST 10  --  15 14       Recent Labs   Lab Test 09/05/19  0608 09/04/19  0545 09/03/19  1657  03/07/14  0700   MAG 2.0 1.7  --   --  1.7*   ALBUMIN " 2.6* 2.7* 3.0*   < >  --     < > = values in this interval not displayed.       ASSESSMENT:    69 year old female admitted for CHF (congestive heart failure) (H) [I50.9]  Small bowel obstruction (H) [K56.609]  Urinary tract infection [N39.0]  Lower abdominal pain [R10.30].  Here for questionable small bowel obstruction.. Hospital day 2.     Patient is doing well.  Bowel function is returning.  At this point I think this is more acute gastro enteritis with ileus.    PLAN: We will go ahead and remove the NG tube and slowly advanced her diet.

## 2019-09-05 NOTE — PLAN OF CARE
Face to face report given with opportunity to observe patient.    Report given to Melly RN and Angel Louis RN   9/5/2019  3:48 PM

## 2019-09-05 NOTE — PLAN OF CARE
Face to face report given with opportunity to observe patient.    Report given to PUSHPA Gomez RN   9/5/2019  8:34 AM

## 2019-09-05 NOTE — PLAN OF CARE
"Reason for hospital stay:  ABD pain decompressed by NG tube - diarrhea     Most recent vitals: /93   Pulse (!) 124   Temp 98.7  F (37.1  C) (Temporal)   Resp 24   Ht 1.626 m (5' 4\")   Wt 67 kg (147 lb 11.2 oz)   SpO2 93%   BMI 25.35 kg/m      Pain Management:  denies pain - some intermittent Low ABD pain reported from patient but not happening at this time    Orientation:  oriented x4     Cardiac:  afib 80s-120s per ICU nurse - Apical irregular - asymptomatic during tachycardia periods     Respiratory:  dim throughout equal bilat - shallow and tachypnea (calm affect)     GI:  ABD flat - BS hypoactive x4 - no BM during my shift - NG tube to low intermittent suction thin green brown remaining in position not irritating patients nares      :  Voids spontaneously making needs known    Nutrition:  NPO with ice chips     Skin Issues:  edema to bilat ankles     IVF:  LR continuous     ABX:  IV cipro and flagyl     Mobility:  bedrest with bathroom privileges     Safety:  alarms on - education to patient - proper footwear    Face to face report given with opportunity to observe patient.    Report given to Bhargavi Luna   9/4/2019  8:02 PM    "

## 2019-09-06 LAB
ALBUMIN SERPL-MCNC: 2.5 G/DL (ref 3.4–5)
ANION GAP SERPL CALCULATED.3IONS-SCNC: 8 MMOL/L (ref 3–14)
BUN SERPL-MCNC: 36 MG/DL (ref 7–30)
CALCIUM SERPL-MCNC: 8.5 MG/DL (ref 8.5–10.1)
CHLORIDE SERPL-SCNC: 102 MMOL/L (ref 94–109)
CO2 SERPL-SCNC: 29 MMOL/L (ref 20–32)
CREAT SERPL-MCNC: 0.83 MG/DL (ref 0.52–1.04)
ERYTHROCYTE [DISTWIDTH] IN BLOOD BY AUTOMATED COUNT: 12.9 % (ref 10–15)
GFR SERPL CREATININE-BSD FRML MDRD: 72 ML/MIN/{1.73_M2}
GLUCOSE SERPL-MCNC: 105 MG/DL (ref 70–99)
HCT VFR BLD AUTO: 37.7 % (ref 35–47)
HGB BLD-MCNC: 12.8 G/DL (ref 11.7–15.7)
LACTATE BLD-SCNC: 3 MMOL/L (ref 0.7–2)
MAGNESIUM SERPL-MCNC: 2.3 MG/DL (ref 1.6–2.3)
MCH RBC QN AUTO: 32.9 PG (ref 26.5–33)
MCHC RBC AUTO-ENTMCNC: 34 G/DL (ref 31.5–36.5)
MCV RBC AUTO: 97 FL (ref 78–100)
PHOSPHATE SERPL-MCNC: 3 MG/DL (ref 2.5–4.5)
PLATELET # BLD AUTO: 251 10E9/L (ref 150–450)
POTASSIUM SERPL-SCNC: 4.1 MMOL/L (ref 3.4–5.3)
RBC # BLD AUTO: 3.89 10E12/L (ref 3.8–5.2)
SODIUM SERPL-SCNC: 139 MMOL/L (ref 133–144)
WBC # BLD AUTO: 13.7 10E9/L (ref 4–11)

## 2019-09-06 PROCEDURE — 25000132 ZZH RX MED GY IP 250 OP 250 PS 637: Performed by: INTERNAL MEDICINE

## 2019-09-06 PROCEDURE — 94640 AIRWAY INHALATION TREATMENT: CPT

## 2019-09-06 PROCEDURE — 40000275 ZZH STATISTIC RCP TIME EA 10 MIN

## 2019-09-06 PROCEDURE — 80069 RENAL FUNCTION PANEL: CPT | Performed by: INTERNAL MEDICINE

## 2019-09-06 PROCEDURE — 25000125 ZZHC RX 250: Performed by: INTERNAL MEDICINE

## 2019-09-06 PROCEDURE — 12000000 ZZH R&B MED SURG/OB

## 2019-09-06 PROCEDURE — 85027 COMPLETE CBC AUTOMATED: CPT | Performed by: INTERNAL MEDICINE

## 2019-09-06 PROCEDURE — 99232 SBSQ HOSP IP/OBS MODERATE 35: CPT | Performed by: INTERNAL MEDICINE

## 2019-09-06 PROCEDURE — 25000128 H RX IP 250 OP 636: Performed by: INTERNAL MEDICINE

## 2019-09-06 PROCEDURE — 83605 ASSAY OF LACTIC ACID: CPT | Performed by: INTERNAL MEDICINE

## 2019-09-06 PROCEDURE — 83735 ASSAY OF MAGNESIUM: CPT | Performed by: INTERNAL MEDICINE

## 2019-09-06 PROCEDURE — 36415 COLL VENOUS BLD VENIPUNCTURE: CPT | Performed by: INTERNAL MEDICINE

## 2019-09-06 RX ORDER — METOPROLOL TARTRATE 100 MG
100 TABLET ORAL 2 TIMES DAILY
Status: DISCONTINUED | OUTPATIENT
Start: 2019-09-06 | End: 2019-09-09 | Stop reason: HOSPADM

## 2019-09-06 RX ORDER — METOPROLOL TARTRATE 50 MG
50 TABLET ORAL ONCE
Status: COMPLETED | OUTPATIENT
Start: 2019-09-06 | End: 2019-09-06

## 2019-09-06 RX ORDER — PREDNISONE 20 MG/1
20 TABLET ORAL DAILY
Status: DISCONTINUED | OUTPATIENT
Start: 2019-09-07 | End: 2019-09-09 | Stop reason: HOSPADM

## 2019-09-06 RX ADMIN — METOPROLOL TARTRATE 50 MG: 50 TABLET, FILM COATED ORAL at 13:35

## 2019-09-06 RX ADMIN — APIXABAN 5 MG: 2.5 TABLET, FILM COATED ORAL at 21:34

## 2019-09-06 RX ADMIN — FLUTICASONE FUROATE AND VILANTEROL TRIFENATATE 1 PUFF: 100; 25 POWDER RESPIRATORY (INHALATION) at 08:49

## 2019-09-06 RX ADMIN — METRONIDAZOLE 500 MG: 500 INJECTION, SOLUTION INTRAVENOUS at 05:11

## 2019-09-06 RX ADMIN — METOPROLOL TARTRATE 5 MG: 5 INJECTION, SOLUTION INTRAVENOUS at 00:53

## 2019-09-06 RX ADMIN — APIXABAN 5 MG: 2.5 TABLET, FILM COATED ORAL at 13:35

## 2019-09-06 RX ADMIN — SODIUM CHLORIDE 1000 ML: 9 INJECTION, SOLUTION INTRAVENOUS at 21:43

## 2019-09-06 RX ADMIN — METRONIDAZOLE 500 MG: 500 INJECTION, SOLUTION INTRAVENOUS at 11:15

## 2019-09-06 RX ADMIN — METRONIDAZOLE 500 MG: 500 INJECTION, SOLUTION INTRAVENOUS at 23:19

## 2019-09-06 RX ADMIN — CIPROFLOXACIN 400 MG: 2 INJECTION, SOLUTION INTRAVENOUS at 01:39

## 2019-09-06 RX ADMIN — IPRATROPIUM BROMIDE AND ALBUTEROL SULFATE 3 ML: .5; 3 SOLUTION RESPIRATORY (INHALATION) at 08:49

## 2019-09-06 RX ADMIN — TAZOBACTAM SODIUM AND PIPERACILLIN SODIUM 4.5 G: 500; 4 INJECTION, SOLUTION INTRAVENOUS at 22:13

## 2019-09-06 RX ADMIN — METOPROLOL TARTRATE 100 MG: 100 TABLET, FILM COATED ORAL at 21:34

## 2019-09-06 RX ADMIN — METOPROLOL TARTRATE 50 MG: 50 TABLET, FILM COATED ORAL at 08:14

## 2019-09-06 RX ADMIN — CIPROFLOXACIN 400 MG: 2 INJECTION, SOLUTION INTRAVENOUS at 13:35

## 2019-09-06 RX ADMIN — METHYLPREDNISOLONE SODIUM SUCCINATE 20 MG: 40 INJECTION, POWDER, FOR SOLUTION INTRAMUSCULAR; INTRAVENOUS at 10:53

## 2019-09-06 RX ADMIN — METRONIDAZOLE 500 MG: 500 INJECTION, SOLUTION INTRAVENOUS at 16:49

## 2019-09-06 ASSESSMENT — ACTIVITIES OF DAILY LIVING (ADL)
ADLS_ACUITY_SCORE: 11

## 2019-09-06 ASSESSMENT — MIFFLIN-ST. JEOR: SCORE: 1196

## 2019-09-06 NOTE — PLAN OF CARE
"Reason for hospital stay:  Bowel Obstruction, CHF, UTI, A-Fib    Most recent vitals: /84   Pulse 111   Temp 97.8  F (36.6  C) (Oral)   Resp 16   Ht 1.626 m (5' 4\")   Wt 67 kg (147 lb 11.2 oz)   SpO2 94%   BMI 25.35 kg/m      Pain Management:  Denied any pain this shift.    Orientation:  A+O x4 - calls appropriately and makes needs known    Cardiac:  AP irregular - Tele reading A-Fib 100s-160s per ICU nurse. Receiving IV and PO metoprolol per orders.     Respiratory:  Lungs clear but diminished. Sats 98% on 2.5LPM O2.     GI:  Denies any nausea or abdominal pain. NPO except ice chips. Bowel sounds present x4 quadrants.    :  Denies any difficulty voiding.     Skin Issues:  Bilateral +2 edema to LEs    IVF:  LR infusing at 75mL/hr.    ABX:  Continues on IV Cipro and IV Flagyl    Mobility:  Up with SBA to commode      Face to face report given with opportunity to observe patient.    Report given to Mindi Brito RN   9/6/2019  7:30 AM        "

## 2019-09-06 NOTE — PROGRESS NOTES
INPATIENT ROUNDING NOTE  9/6/2019    Patient: Mary Alice BLOOM Nisha    Physician of Record: Hospitalist Service    Admitting diagnosis: CHF (congestive heart failure) (H) [I50.9]  Small bowel obstruction (H) [K56.609]  Urinary tract infection [N39.0]  Lower abdominal pain [R10.30]    Reason for Admission: Abdominal pain    Length of stay: 3    Current Diet: NPO    CURRENT MEDICATIONS:  Continuous Medications:  Current Facility-Administered Medications   Medication Last Rate     - MEDICATION INSTRUCTIONS -         Scheduled Medications:  Current Facility-Administered Medications   Medication Dose Route Frequency     ciprofloxacin  400 mg Intravenous Q12H     fluticasone-vilanterol  1 puff Inhalation Daily     methylPREDNISolone  20 mg Intravenous Q12H     metoprolol tartrate  50 mg Oral BID     metroNIDAZOLE  500 mg Intravenous Q6H     sodium chloride (PF)  3 mL Intracatheter Q8H       PRN Medications:  Current Facility-Administered Medications   Medication Dose Route Frequency     albuterol  2.5 mg Nebulization Q2H PRN     HYDROmorphone  0.3 mg Subcutaneous Q3H PRN     ipratropium - albuterol 0.5 mg/2.5 mg/3 mL  3 mL Nebulization Q4H PRN     lidocaine 4%   Topical Q1H PRN     lidocaine (buffered or not buffered)  0.1-1 mL Other Q1H PRN     magnesium sulfate  4 g Intravenous Q4H PRN     metoprolol  5 mg Intravenous Q5 Min PRN     naloxone  0.1-0.4 mg Intravenous Q2 Min PRN     ondansetron  4 mg Oral Q6H PRN    Or     ondansetron  4 mg Intravenous Q6H PRN     - MEDICATION INSTRUCTIONS -   Does not apply Continuous PRN     potassium chloride  20-40 mEq Oral or Feeding Tube Q2H PRN     potassium chloride with lidocaine  10 mEq Intravenous Q1H PRN     potassium chloride  10 mEq Intravenous Q1H PRN     potassium chloride  20-40 mEq Oral Q2H PRN     sodium chloride (PF)  3 mL Intracatheter q1 min prn       SUBJECTIVE:   Nausea: No. Vomiting: No. Fever: No. Chills: No. Excessive burping: No. Flatus: Yes. BM: Yes. Pain is 2/10. Pain  "control: good.  He is hungry.    PHYSICAL EXAM:   Vital signs: /83   Pulse 107   Temp 97.9  F (36.6  C) (Temporal)   Resp 18   Ht 1.626 m (5' 4\")   Wt 68.6 kg (151 lb 3.8 oz)   SpO2 (!) 90%   BMI 25.96 kg/m     Weight: [unfilled]   BMI: Body mass index is 25.96 kg/m .   General: Normal, healthy, cooperative   HEENT: PERRLA   Neck: supple   Lungs: clear to auscultation   CV: Regular rate and rhythm without murmer   Abdominal: Abdomen soft, non-tender. BS normal. No masses, organomegaly   Extremities: No cyanosis, clubbing or edema noted bilaterally in Upper and Lower Extremities   Neurological: without deficit    INPUT/OUTPUT:      Intake/Output Summary (Last 24 hours) at 9/6/2019 1209  Last data filed at 9/6/2019 0815  Gross per 24 hour   Intake 2270 ml   Output 1650 ml   Net 620 ml       I/O last 3 completed shifts:  In: 2250 [I.V.:2250]  Out: 1600 [Urine:650; Emesis/NG output:600; Other:350]    LABS:    Last CBC Rrsults:   Recent Labs   Lab Test 09/06/19  0519 09/05/19  0608 09/04/19  1127   WBC 13.7* 12.3* 9.2   RBC 3.89 3.94 4.16   HGB 12.8 12.9 13.7   HCT 37.7 37.6 40.0   MCV 97 95 96   MCH 32.9 32.7 32.9   MCHC 34.0 34.3 34.3   RDW 12.9 12.8 12.9    242 232       Last Comprehensive Metabolic panel:  Recent Labs   Lab Test 09/06/19  0519 09/05/19  0608 09/04/19  1127 09/04/19  0545 09/03/19  1657    138 138 136 136   POTASSIUM 4.1 3.6 4.1 3.6 3.5   CHLORIDE 102 101 100 101 101   CO2 29 26 29 24 26   ANIONGAP 8 11 9 11 9   * 109* 111* 125* 97   BUN 36* 37* 38* 39* 45*   CR 0.83 0.90 1.09* 1.16* 1.78*   GFRESTIMATED 72 65 52* 48* 29*   KOSTA 8.5 8.5 9.0 9.0 9.6   BILITOTAL  --  0.4  --  0.6 1.1   ALKPHOS  --  24*  --  28* 30*   ALT  --  11  --  10 12   AST  --  10  --  15 14       Recent Labs   Lab Test 09/06/19  0519 09/05/19  0608 09/04/19  0545   MAG 2.3 2.0 1.7   ALBUMIN 2.5* 2.6* 2.7*   PHOS 3.0  --   --        ASSESSMENT:    69 year old female admitted for CHF (congestive heart " failure) (H) [I50.9]  Small bowel obstruction (H) [K56.609]  Urinary tract infection [N39.0]  Lower abdominal pain [R10.30].  Here for abdominal pain. Hospital day 3.     At this point she seems to be improving.  She is having bowel movements.    PLAN: We will go ahead and advance her diet as tolerated.    We will sign off for now.  If there is any questions or if she worsens please feel free to contact me again.  Thank you very much.

## 2019-09-06 NOTE — PLAN OF CARE
"Reason for hospital stay:  SBO    Most recent vitals: /63   Pulse 107   Temp 97.9  F (36.6  C) (Temporal)   Resp 18   Ht 1.626 m (5' 4\")   Wt 68.6 kg (151 lb 3.8 oz)   SpO2 (!) 90%   BMI 25.96 kg/m    Pain Management:  Denies pain  Orientation:  A&O, very pleasant  Cardiac:  HR Afib 100-140 per ICU. Apical pulse 107-111 no prn Lopressor given as parameters not meet. Scheduled metoprolol in creased to 100mg bid  Respiratory:  LS diminished. O2 of 2.5 LPM used at night. Sat 89-92% on RA during the day  GI:  BS hypoactive. Pt reports passing gas. Denies nausea. NG removed yesterday  :  Voids without difficulty  Diet: Clears this afternoon, well tolerated  Skin Issues:  Scattered bruising, otherwise intact  IVF:  IV SL  ABX:  Flagyl and Cipro  Mobility:  SBA  Safety:  Call light within reach, uses appropriately    Comments:    9/6/2019  1:51 PM  Mindi Murry RN    Face to face report given with opportunity to observe patient.    Report given to PUSHPA Moreno RN   9/6/2019  3:27 PM      "

## 2019-09-06 NOTE — PROGRESS NOTES
[facility]    Medicine Progress Note - Hospitalist Service       Date of Admission:  9/3/2019  Assessment & Plan      Mary Alice Cameron is a 69 year old female who presents with crampy abdominal pain, diarrhea, nausea and vomiting x 4 days. Admitted for SBO. H/o afib, chronic anticoagulation, bicuspid aortic valve, advanced COPD, chronic respiratory failure requiring oxygen 24/7, hypothyroidism and coronary artery disease.    Active Problems:    Small bowel obstruction (H) vs Gastroenteritis.  Patient's NG tube was discontinued yesterday and she is tolerating it well.  Patient was started on clear liquid diet today and will follow patient's symptoms if she tolerates.  Patient is on ciprofloxacin and Flagyl for possible intra-abdominal infection and will continue with that for now.      Acute cystitis without hematuria  Patient's urine culture grew mixed venancio greater than 10,000 colonies per milliliter.  It appears that patient did not have a significant UTI.       Persistent atrial fibrillation (H)  Patient had been on diltiazem, but it appears that patient does have systolic congestive heart failure history.  Pt's AV tiffany blocker was switched from diltiazem to metoprolol in light of patient's depressed LV function.  At metoprolol 100 mg/day, patient still remains mildly tachycardic; dosage will be increased to 200 mg/day.  Patient had been on Eliquis, but this is on hold for possible surgery.  Patient was given 1 dose of Lovenox at therapeutic dose.  Since patient had bloody NG tube output, anticoagulation were held.  We will monitor patient's hemoglobin and for signs of active bleeding.  Since patient's obstructive symptoms resolved and NG tube was discontinued, will resume patient's Eliquis.       Pulmonary emphysema (H)  Patient was on home oxygen 2.5 L at night and bronchodilators.  We will continue with patient's bronchodilators and supplemental oxygen.  Patient was started on IV steroid in light of  her n.p.o. status.      Bicuspid aortic valve    Assessment: Present on admission with mild stenosis.    Plan: No intervention required only monitoring      Hypothyroidism, postablative    Assessment: Present on admission     Plan: continue Synthroid      Coronary artery disease involving native coronary artery of native heart without angina pectoris    Assessment: Last echo showed ejection fraction 45-50% with no signs of volume overload or heart failure symptoms.    Plan: Continue monitoring.      Acute renal failure (H)  Renal function has now normalized with IV fluid      Diet: Clear Liquid Diet    DVT Prophylaxis: Ambulate  Tyler Catheter: not present  Code Status: DNR/DNI      Disposition Plan   Expected discharge: 2 - 3 days, recommended to prior living arrangement once adequate pain management/ tolerating PO medications and antibiotic plan established.  Entered: Kane Escamilla MD 09/06/2019, 12:40 PM     The patient's care was discussed with the Patient and Dr. St, surgical consult..  Total time spent 45 min, 30 min were spent on gathering data, speaking with family/consultant, and coordinating care.    Kane Escamilla MD  Hospitalist Service  [facility]    ______________________________________________________________________    Interval History   NG tube was discontinued yesterday and patient has not developed obstructive symptoms.  Patient is currently on clear liquid and is tolerating it.  Patient denies fever, chills, chest pain, cough, lower extremity edema, headache, visual changes.    Data reviewed today: I reviewed all medications, new labs and imaging results over the last 24 hours. I personally reviewed no images or EKG's today.    Physical Exam   Vital Signs: Temp: 97.9  F (36.6  C) Temp src: Temporal BP: 141/83 Pulse: 107 Heart Rate: 116 Resp: 18 SpO2: (!) 90 % O2 Device: None (Room air) Oxygen Delivery: 2.5 LPM  Weight: 151 lbs 3.77 oz  General Appearance: In no acute distress  HEENT:  Clear oropharynx  Respiratory: Clear to auscultation bilaterally  Cardiovascular: Irregularly irregular, tachycardia rate  GI: Mildly distended, soft, nontender, bowel sounds present  Skin: Intact  Other: Neuro: Grossly nonfocal    Data   Recent Labs   Lab 09/06/19  0519 09/05/19  0608 09/04/19  1127 09/04/19  0545 09/03/19  1657   WBC 13.7* 12.3* 9.2 9.1 12.8*   HGB 12.8 12.9 13.7 13.5 13.5   MCV 97 95 96 96 96    242 232 196 209    138 138 136 136   POTASSIUM 4.1 3.6 4.1 3.6 3.5   CHLORIDE 102 101 100 101 101   CO2 29 26 29 24 26   BUN 36* 37* 38* 39* 45*   CR 0.83 0.90 1.09* 1.16* 1.78*   ANIONGAP 8 11 9 11 9   KOSTA 8.5 8.5 9.0 9.0 9.6   * 109* 111* 125* 97   ALBUMIN 2.5* 2.6*  --  2.7* 3.0*   PROTTOTAL  --  5.9*  --  6.4* 6.6*   BILITOTAL  --  0.4  --  0.6 1.1   ALKPHOS  --  24*  --  28* 30*   ALT  --  11  --  10 12   AST  --  10  --  15 14   TROPI  --   --   --   --  <0.015     No results found for this or any previous visit (from the past 24 hour(s)).  Medications     - MEDICATION INSTRUCTIONS -         ciprofloxacin  400 mg Intravenous Q12H     fluticasone-vilanterol  1 puff Inhalation Daily     methylPREDNISolone  20 mg Intravenous Q12H     metoprolol tartrate  50 mg Oral BID     metroNIDAZOLE  500 mg Intravenous Q6H     sodium chloride (PF)  3 mL Intracatheter Q8H

## 2019-09-07 ENCOUNTER — APPOINTMENT (OUTPATIENT)
Dept: GENERAL RADIOLOGY | Facility: HOSPITAL | Age: 69
DRG: 388 | End: 2019-09-07
Attending: INTERNAL MEDICINE
Payer: MEDICARE

## 2019-09-07 LAB
ALBUMIN SERPL-MCNC: 2.4 G/DL (ref 3.4–5)
ALBUMIN UR-MCNC: 30 MG/DL
ANION GAP SERPL CALCULATED.3IONS-SCNC: 6 MMOL/L (ref 3–14)
APPEARANCE UR: CLEAR
BACTERIA #/AREA URNS HPF: ABNORMAL /HPF
BASOPHILS # BLD AUTO: 0 10E9/L (ref 0–0.2)
BASOPHILS NFR BLD AUTO: 0.2 %
BILIRUB UR QL STRIP: NEGATIVE
BUN SERPL-MCNC: 30 MG/DL (ref 7–30)
CALCIUM SERPL-MCNC: 8.2 MG/DL (ref 8.5–10.1)
CHLORIDE SERPL-SCNC: 104 MMOL/L (ref 94–109)
CO2 SERPL-SCNC: 29 MMOL/L (ref 20–32)
COLOR UR AUTO: YELLOW
CREAT SERPL-MCNC: 0.84 MG/DL (ref 0.52–1.04)
DIFFERENTIAL METHOD BLD: ABNORMAL
EOSINOPHIL # BLD AUTO: 0 10E9/L (ref 0–0.7)
EOSINOPHIL NFR BLD AUTO: 0.1 %
ERYTHROCYTE [DISTWIDTH] IN BLOOD BY AUTOMATED COUNT: 12.8 % (ref 10–15)
ERYTHROCYTE [DISTWIDTH] IN BLOOD BY AUTOMATED COUNT: 12.8 % (ref 10–15)
GFR SERPL CREATININE-BSD FRML MDRD: 71 ML/MIN/{1.73_M2}
GLUCOSE SERPL-MCNC: 98 MG/DL (ref 70–99)
GLUCOSE UR STRIP-MCNC: NEGATIVE MG/DL
HCT VFR BLD AUTO: 37.9 % (ref 35–47)
HCT VFR BLD AUTO: 39.7 % (ref 35–47)
HGB BLD-MCNC: 12.7 G/DL (ref 11.7–15.7)
HGB BLD-MCNC: 13.6 G/DL (ref 11.7–15.7)
HGB UR QL STRIP: ABNORMAL
IMM GRANULOCYTES # BLD: 0.2 10E9/L (ref 0–0.4)
IMM GRANULOCYTES NFR BLD: 1.2 %
KETONES UR STRIP-MCNC: 5 MG/DL
LACTATE BLD-SCNC: 1.3 MMOL/L (ref 0.7–2)
LACTATE BLD-SCNC: 1.3 MMOL/L (ref 0.7–2)
LEUKOCYTE ESTERASE UR QL STRIP: ABNORMAL
LYMPHOCYTES # BLD AUTO: 1.7 10E9/L (ref 0.8–5.3)
LYMPHOCYTES NFR BLD AUTO: 10.9 %
MAGNESIUM SERPL-MCNC: 2.3 MG/DL (ref 1.6–2.3)
MCH RBC QN AUTO: 32.3 PG (ref 26.5–33)
MCH RBC QN AUTO: 33.1 PG (ref 26.5–33)
MCHC RBC AUTO-ENTMCNC: 33.5 G/DL (ref 31.5–36.5)
MCHC RBC AUTO-ENTMCNC: 34.3 G/DL (ref 31.5–36.5)
MCV RBC AUTO: 96 FL (ref 78–100)
MCV RBC AUTO: 97 FL (ref 78–100)
MONOCYTES # BLD AUTO: 1.4 10E9/L (ref 0–1.3)
MONOCYTES NFR BLD AUTO: 9 %
MUCOUS THREADS #/AREA URNS LPF: PRESENT /LPF
NEUTROPHILS # BLD AUTO: 12.2 10E9/L (ref 1.6–8.3)
NEUTROPHILS NFR BLD AUTO: 78.6 %
NITRATE UR QL: NEGATIVE
NRBC # BLD AUTO: 0 10*3/UL
NRBC BLD AUTO-RTO: 0 /100
PH UR STRIP: 6 PH (ref 4.7–8)
PHOSPHATE SERPL-MCNC: 1.8 MG/DL (ref 2.5–4.5)
PLATELET # BLD AUTO: 270 10E9/L (ref 150–450)
PLATELET # BLD AUTO: 280 10E9/L (ref 150–450)
POTASSIUM SERPL-SCNC: 3.5 MMOL/L (ref 3.4–5.3)
RBC # BLD AUTO: 3.93 10E12/L (ref 3.8–5.2)
RBC # BLD AUTO: 4.11 10E12/L (ref 3.8–5.2)
RBC #/AREA URNS AUTO: 2 /HPF (ref 0–2)
SODIUM SERPL-SCNC: 139 MMOL/L (ref 133–144)
SOURCE: ABNORMAL
SP GR UR STRIP: 1.03 (ref 1–1.03)
SQUAMOUS #/AREA URNS AUTO: 14 /HPF (ref 0–1)
UROBILINOGEN UR STRIP-MCNC: NORMAL MG/DL (ref 0–2)
WBC # BLD AUTO: 15.5 10E9/L (ref 4–11)
WBC # BLD AUTO: 17.1 10E9/L (ref 4–11)
WBC #/AREA URNS AUTO: 15 /HPF (ref 0–5)

## 2019-09-07 PROCEDURE — 85027 COMPLETE CBC AUTOMATED: CPT | Performed by: INTERNAL MEDICINE

## 2019-09-07 PROCEDURE — 12000000 ZZH R&B MED SURG/OB

## 2019-09-07 PROCEDURE — 25000128 H RX IP 250 OP 636: Performed by: INTERNAL MEDICINE

## 2019-09-07 PROCEDURE — 83605 ASSAY OF LACTIC ACID: CPT | Performed by: INTERNAL MEDICINE

## 2019-09-07 PROCEDURE — 99233 SBSQ HOSP IP/OBS HIGH 50: CPT | Performed by: INTERNAL MEDICINE

## 2019-09-07 PROCEDURE — 94640 AIRWAY INHALATION TREATMENT: CPT

## 2019-09-07 PROCEDURE — 36415 COLL VENOUS BLD VENIPUNCTURE: CPT | Performed by: INTERNAL MEDICINE

## 2019-09-07 PROCEDURE — 25000131 ZZH RX MED GY IP 250 OP 636 PS 637: Performed by: INTERNAL MEDICINE

## 2019-09-07 PROCEDURE — 40000275 ZZH STATISTIC RCP TIME EA 10 MIN

## 2019-09-07 PROCEDURE — 71046 X-RAY EXAM CHEST 2 VIEWS: CPT | Mod: TC

## 2019-09-07 PROCEDURE — 81001 URINALYSIS AUTO W/SCOPE: CPT | Performed by: INTERNAL MEDICINE

## 2019-09-07 PROCEDURE — 80069 RENAL FUNCTION PANEL: CPT | Performed by: INTERNAL MEDICINE

## 2019-09-07 PROCEDURE — 25000132 ZZH RX MED GY IP 250 OP 250 PS 637: Performed by: INTERNAL MEDICINE

## 2019-09-07 PROCEDURE — 25000125 ZZHC RX 250: Performed by: INTERNAL MEDICINE

## 2019-09-07 PROCEDURE — 83735 ASSAY OF MAGNESIUM: CPT | Performed by: INTERNAL MEDICINE

## 2019-09-07 PROCEDURE — 85025 COMPLETE CBC W/AUTO DIFF WBC: CPT | Performed by: INTERNAL MEDICINE

## 2019-09-07 PROCEDURE — 25800030 ZZH RX IP 258 OP 636: Performed by: INTERNAL MEDICINE

## 2019-09-07 RX ORDER — SODIUM CHLORIDE 9 MG/ML
INJECTION, SOLUTION INTRAVENOUS CONTINUOUS
Status: DISCONTINUED | OUTPATIENT
Start: 2019-09-07 | End: 2019-09-09 | Stop reason: HOSPADM

## 2019-09-07 RX ADMIN — AZITHROMYCIN MONOHYDRATE 500 MG: 500 INJECTION, POWDER, LYOPHILIZED, FOR SOLUTION INTRAVENOUS at 17:00

## 2019-09-07 RX ADMIN — PREDNISONE 20 MG: 20 TABLET ORAL at 08:29

## 2019-09-07 RX ADMIN — METOPROLOL TARTRATE 100 MG: 100 TABLET, FILM COATED ORAL at 20:29

## 2019-09-07 RX ADMIN — APIXABAN 5 MG: 2.5 TABLET, FILM COATED ORAL at 20:28

## 2019-09-07 RX ADMIN — METRONIDAZOLE 500 MG: 500 INJECTION, SOLUTION INTRAVENOUS at 05:04

## 2019-09-07 RX ADMIN — SODIUM CHLORIDE, PRESERVATIVE FREE: 5 INJECTION INTRAVENOUS at 17:40

## 2019-09-07 RX ADMIN — APIXABAN 5 MG: 2.5 TABLET, FILM COATED ORAL at 08:29

## 2019-09-07 RX ADMIN — TAZOBACTAM SODIUM AND PIPERACILLIN SODIUM 4.5 G: 500; 4 INJECTION, SOLUTION INTRAVENOUS at 09:29

## 2019-09-07 RX ADMIN — IPRATROPIUM BROMIDE AND ALBUTEROL SULFATE 3 ML: .5; 3 SOLUTION RESPIRATORY (INHALATION) at 08:13

## 2019-09-07 RX ADMIN — FLUTICASONE FUROATE AND VILANTEROL TRIFENATATE 1 PUFF: 100; 25 POWDER RESPIRATORY (INHALATION) at 08:16

## 2019-09-07 RX ADMIN — SODIUM CHLORIDE 250 ML: 9 INJECTION, SOLUTION INTRAVENOUS at 16:56

## 2019-09-07 RX ADMIN — TAZOBACTAM SODIUM AND PIPERACILLIN SODIUM 4.5 G: 500; 4 INJECTION, SOLUTION INTRAVENOUS at 20:33

## 2019-09-07 RX ADMIN — TAZOBACTAM SODIUM AND PIPERACILLIN SODIUM 4.5 G: 500; 4 INJECTION, SOLUTION INTRAVENOUS at 03:06

## 2019-09-07 RX ADMIN — TAZOBACTAM SODIUM AND PIPERACILLIN SODIUM 4.5 G: 500; 4 INJECTION, SOLUTION INTRAVENOUS at 15:44

## 2019-09-07 RX ADMIN — METOPROLOL TARTRATE 100 MG: 100 TABLET, FILM COATED ORAL at 08:29

## 2019-09-07 ASSESSMENT — ACTIVITIES OF DAILY LIVING (ADL)
ADLS_ACUITY_SCORE: 13

## 2019-09-07 ASSESSMENT — MIFFLIN-ST. JEOR: SCORE: 1238

## 2019-09-07 NOTE — PROVIDER NOTIFICATION
MD updated RE: patient has not really voided since 0600 today when she voided 300 ml. She was up once but only voided milliliters. Lab unable to do UA with that amount of urine. Oral intake isn't the best. 300 ml oral intake on day shift. Bladder scan revealed only 72 ml in bladder. Updated that chest x-ray was done. MD placing orders.

## 2019-09-07 NOTE — PLAN OF CARE
"Patient has denied pain this shift. VSS with the exception of tachycardic heart rate. Telemetry A-Fib 110's per ICU written. Heart rate has been noted to be all over the place on monitor during the shift, from low 100's to 150's. But heart rate does not sit at consistent rate, therefore, PRN Metoprolol IV was not given. Scheduled Metoprolol PO continues. Sepsis BPA did fire, lactic came back 3.0. 1 L NS bolus being given and IV antibiotics changed. MD did come to see the patient. Patient is asymptomatic with tachycardic A-fib rate. She states \"that's because the A-fib.\" Bowel sounds active throughout. Tolerating clear liquid diet. Oral intake fair. No BM this shift. MD discontinued enteric isolation order because no BM since yesterday evening. Urine output adequate. Patient was up in the chair for half the shift. Ambulating into the bathroom. Remains assist of 1 for safety. Alarms in place. Call light within reach. IV SL. IV antibiotics continue.    Face to face report given with opportunity to observe patient.    Report given to Krystyna BARROW.    Sarahi Jeronimo RN   9/7/2019  12:03 AM    "

## 2019-09-07 NOTE — PROGRESS NOTES
[facility]    Medicine Progress Note - Hospitalist Service       Date of Admission:  9/3/2019  Assessment & Plan      Mary Alice Cameron is a 69 year old female who presents with crampy abdominal pain, diarrhea, nausea and vomiting x 4 days. Admitted for SBO. H/o afib, chronic anticoagulation, bicuspid aortic valve, advanced COPD, chronic respiratory failure requiring oxygen 24/7, hypothyroidism and coronary artery disease.    Active Problems:  Leukocytosis and lactic acidosis  Patient's leukocytosis might be due to steroid, but her steroid dose has been decreasing and is now on prednisone 20 mg.  In light of lactic acidosis, worsening leukocytosis needs to be watched closely.  Since patient has more productive cough, will check chest x-ray.  We will also check urinalysis.  Patient's enteritis might of been masked by steroid; we will continue to follow patient's GI symptoms as well.      Small bowel obstruction (H) vs Gastroenteritis.  Patient's NG tube was discontinued and she is tolerating it well.  Patient was started on clear liquid diet and will advance it as she tolerates.  Patient is on ciprofloxacin and Flagyl for possible intra-abdominal infection and it was changed to Zosyn for lactic acidosis.      Acute cystitis without hematuria  Patient's urine culture grew mixed venancio greater than 10,000 colonies per milliliter.  It appears that patient did not have a significant UTI.       Persistent atrial fibrillation (H)  Patient had been on diltiazem, but it appears that patient does have systolic congestive heart failure history.  Pt's AV tiffany blocker was switched from diltiazem to metoprolol in light of patient's depressed LV function.  At metoprolol 100 mg/day, patient still remains mildly tachycardic; dosage will be increased to 200 mg/day.  Patient had been on Eliquis, but this was on hold since patient had bloody NG tube output.  Since patient's obstructive symptoms resolved and NG tube was  discontinued, patient's Eliquis was resumed.       Pulmonary emphysema (H)  Patient was on home oxygen 2.5 L at night and bronchodilators.  We will continue with patient's bronchodilators and supplemental oxygen.  Is also on prednisone.      Bicuspid aortic valve    Assessment: Present on admission with mild stenosis.    Plan: No intervention required only monitoring      Hypothyroidism, postablative    Assessment: Present on admission     Plan: continue Synthroid      Coronary artery disease involving native coronary artery of native heart without angina pectoris    Assessment: Last echo showed ejection fraction 45-50% with no signs of volume overload or heart failure symptoms.    Plan: Continue monitoring.      Acute renal failure (H)  Renal function has now normalized with IV fluid      Diet: Full Liquid Diet    DVT Prophylaxis: Ambulate  Tyler Catheter: not present  Code Status: DNR/DNI      Disposition Plan   Expected discharge: 2 - 3 days, recommended to prior living arrangement once adequate pain management/ tolerating PO medications and antibiotic plan established.  Entered: Kane Escamilla MD 09/07/2019, 7:12 AM     The patient's care was discussed with the Patient and Dr. St, surgical consult..  Total time spent 45 min, 30 min were spent on gathering data and coordinating care.    Kane Escamilla MD  Hospitalist Service  [facility]    ______________________________________________________________________    Interval History   Patient tolerated clear liquid diet yesterday and she reports that her abdomen remains stable.  No diarrhea has returned and she remained afebrile.  Patient however did have tachycardic episode and lactic acid was 3.0 last night.  Patient was treated with IV fluid bolus and antibiotics were switched to Zosyn.  Patient does report that she has been having productive cough much more than usual, but denies chest pain, dysuria, diarrhea.    Data reviewed today: I reviewed all  medications, new labs and imaging results over the last 24 hours. I personally reviewed no images or EKG's today.    Physical Exam   Vital Signs: Temp: 97.9  F (36.6  C) Temp src: Tympanic BP: 140/68 Pulse: 110 Heart Rate: (!) 122 Resp: 17 SpO2: 99 % O2 Device: Nasal cannula Oxygen Delivery: 2.5 LPM  Weight: 151 lbs 3.77 oz  General Appearance: In no acute distress  HEENT: Clear oropharynx  Respiratory: Mild crackles at bases bilaterally  Cardiovascular: Irregularly irregular, tachycardia rate  GI: Mildly distended, soft, nontender, bowel sounds present  Skin: Intact  Other: Neuro: Grossly nonfocal    Data   Recent Labs   Lab 09/07/19  0545 09/06/19  0519 09/05/19  0608  09/04/19  0545 09/03/19  1657   WBC 15.5* 13.7* 12.3*   < > 9.1 12.8*   HGB 12.7 12.8 12.9   < > 13.5 13.5   MCV 96 97 95   < > 96 96    251 242   < > 196 209    139 138   < > 136 136   POTASSIUM 3.5 4.1 3.6   < > 3.6 3.5   CHLORIDE 104 102 101   < > 101 101   CO2 29 29 26   < > 24 26   BUN 30 36* 37*   < > 39* 45*   CR 0.84 0.83 0.90   < > 1.16* 1.78*   ANIONGAP 6 8 11   < > 11 9   KOSTA 8.2* 8.5 8.5   < > 9.0 9.6   GLC 98 105* 109*   < > 125* 97   ALBUMIN 2.4* 2.5* 2.6*  --  2.7* 3.0*   PROTTOTAL  --   --  5.9*  --  6.4* 6.6*   BILITOTAL  --   --  0.4  --  0.6 1.1   ALKPHOS  --   --  24*  --  28* 30*   ALT  --   --  11  --  10 12   AST  --   --  10  --  15 14   TROPI  --   --   --   --   --  <0.015    < > = values in this interval not displayed.     No results found for this or any previous visit (from the past 24 hour(s)).  Medications     - MEDICATION INSTRUCTIONS -         apixaban ANTICOAGULANT  5 mg Oral BID     fluticasone-vilanterol  1 puff Inhalation Daily     metoprolol tartrate  100 mg Oral BID     metroNIDAZOLE  500 mg Intravenous Q6H     piperacillin-tazobactam  4.5 g Intravenous Q6H     predniSONE  20 mg Oral Daily     sodium chloride (PF)  3 mL Intracatheter Q8H

## 2019-09-07 NOTE — PROVIDER NOTIFICATION
MD updated RE: sepsis BPA fired. Lactic acid 3.0. Updated that telemetry has been A-Fib 100's-140's this shift. Day shift MD increased Metoprolol to 100 mg PO BID. VSS otherwise. MD placing order.

## 2019-09-07 NOTE — PHARMACY
[facility]    Pharmacy      Antimicrobial Stewardship Note     Current antimicrobial therapy:  Anti-infectives (From now, onward)    Start     Dose/Rate Route Frequency Ordered Stop    09/06/19 2145  piperacillin-tazobactam (ZOSYN) intermittent infusion 4.5 g      4.5 g  200 mL/hr over 30 Minutes Intravenous EVERY 6 HOURS 09/06/19 2141            Indication: Intra-abdominal infection, Sepsis    Days of Therapy: Day #2 Zosyn (Day #5 IV antibiotics)     Pertinent labs:  Creatinine   Creatinine   Date Value Ref Range Status   09/07/2019 0.84 0.52 - 1.04 mg/dL Final   09/06/2019 0.83 0.52 - 1.04 mg/dL Final   09/05/2019 0.90 0.52 - 1.04 mg/dL Final     WBC   WBC   Date Value Ref Range Status   09/07/2019 15.5 (H) 4.0 - 11.0 10e9/L Final   09/06/2019 13.7 (H) 4.0 - 11.0 10e9/L Final   09/05/2019 12.3 (H) 4.0 - 11.0 10e9/L Final     Procalcitonin No results found for: PCAL  CRP   CRP Inflammation   Date Value Ref Range Status   09/03/2019 158.0 (H) 0.0 - 8.0 mg/L Final   03/21/2015 14.2 (H) 0.0 - 8.0 mg/L Final       Culture Results:   (9/3/19) Urine = mixed venancio  (9/4/19) MRSA swab = negative     Recommendations/Interventions:  1. Zosyn indication states sepsis, but no blood cultures have been collected. Recommend adding to choose appropriate antibiotic therapy    Markel Avila, [facility]  September 7, 2019

## 2019-09-07 NOTE — PROVIDER NOTIFICATION
MD requesting to discontinue enteric isolation order because patient has not had a bowel movement since yesterday evening.

## 2019-09-07 NOTE — PLAN OF CARE
"Reason for hospital stay:  SBO    Most recent vitals: /68   Pulse 110   Temp 97.9  F (36.6  C) (Tympanic)   Resp 17   Ht 1.626 m (5' 4\")   Wt 68.6 kg (151 lb 3.8 oz)   SpO2 99%   BMI 25.96 kg/m    Pain Management:  denies pain  Orientation:  wnl  Cardiac:  -110s per icu hx afib  Respiratory:  fine/faint crackles in bases bilat  denies MOROCHO or SOB wears 2.5 LNC as per home  GI:  bowel sounds active, no stools this shift  :  voids clear urine  Diet: clear liquids  Skin Issues:  wnl  IVF:  SL @ tko  ABX:  zosyn & flagyl IV  Mobility:  sba 1  Safety:  bed alarms on call light within reach makes needs known    Comments:    9/7/2019  4:01 AM  JOIE PINA RN    "

## 2019-09-07 NOTE — PLAN OF CARE
Face to face report given with opportunity to observe patient.    Report given to PUSHPA Mcclain RN   9/7/2019  7:12 AM

## 2019-09-08 DIAGNOSIS — I10 ESSENTIAL HYPERTENSION: ICD-10-CM

## 2019-09-08 PROBLEM — E87.6 HYPOKALEMIA: Status: ACTIVE | Noted: 2019-09-08

## 2019-09-08 LAB
ALBUMIN SERPL-MCNC: 2.2 G/DL (ref 3.4–5)
ANION GAP SERPL CALCULATED.3IONS-SCNC: 8 MMOL/L (ref 3–14)
BUN SERPL-MCNC: 19 MG/DL (ref 7–30)
C DIFF TOX B STL QL: NEGATIVE
CALCIUM SERPL-MCNC: 7.9 MG/DL (ref 8.5–10.1)
CHLORIDE SERPL-SCNC: 103 MMOL/L (ref 94–109)
CO2 SERPL-SCNC: 27 MMOL/L (ref 20–32)
CREAT SERPL-MCNC: 0.78 MG/DL (ref 0.52–1.04)
ERYTHROCYTE [DISTWIDTH] IN BLOOD BY AUTOMATED COUNT: 12.6 % (ref 10–15)
GFR SERPL CREATININE-BSD FRML MDRD: 78 ML/MIN/{1.73_M2}
GLUCOSE SERPL-MCNC: 80 MG/DL (ref 70–99)
HCT VFR BLD AUTO: 37.5 % (ref 35–47)
HGB BLD-MCNC: 12.9 G/DL (ref 11.7–15.7)
LACTATE BLD-SCNC: 1 MMOL/L (ref 0.7–2)
MAGNESIUM SERPL-MCNC: 2 MG/DL (ref 1.6–2.3)
MCH RBC QN AUTO: 32.5 PG (ref 26.5–33)
MCHC RBC AUTO-ENTMCNC: 34.4 G/DL (ref 31.5–36.5)
MCV RBC AUTO: 95 FL (ref 78–100)
PHOSPHATE SERPL-MCNC: 1.7 MG/DL (ref 2.5–4.5)
PLATELET # BLD AUTO: 276 10E9/L (ref 150–450)
POTASSIUM SERPL-SCNC: 3 MMOL/L (ref 3.4–5.3)
POTASSIUM SERPL-SCNC: 4 MMOL/L (ref 3.4–5.3)
RBC # BLD AUTO: 3.97 10E12/L (ref 3.8–5.2)
SODIUM SERPL-SCNC: 138 MMOL/L (ref 133–144)
SPECIMEN SOURCE: NORMAL
WBC # BLD AUTO: 14.7 10E9/L (ref 4–11)

## 2019-09-08 PROCEDURE — 25000132 ZZH RX MED GY IP 250 OP 250 PS 637: Performed by: INTERNAL MEDICINE

## 2019-09-08 PROCEDURE — 94640 AIRWAY INHALATION TREATMENT: CPT | Mod: 76

## 2019-09-08 PROCEDURE — 25000128 H RX IP 250 OP 636: Performed by: INTERNAL MEDICINE

## 2019-09-08 PROCEDURE — 25000125 ZZHC RX 250: Performed by: INTERNAL MEDICINE

## 2019-09-08 PROCEDURE — 85027 COMPLETE CBC AUTOMATED: CPT | Performed by: INTERNAL MEDICINE

## 2019-09-08 PROCEDURE — 83605 ASSAY OF LACTIC ACID: CPT | Performed by: INTERNAL MEDICINE

## 2019-09-08 PROCEDURE — 84132 ASSAY OF SERUM POTASSIUM: CPT | Performed by: INTERNAL MEDICINE

## 2019-09-08 PROCEDURE — 25800030 ZZH RX IP 258 OP 636: Performed by: INTERNAL MEDICINE

## 2019-09-08 PROCEDURE — 99233 SBSQ HOSP IP/OBS HIGH 50: CPT | Performed by: INTERNAL MEDICINE

## 2019-09-08 PROCEDURE — 25000131 ZZH RX MED GY IP 250 OP 636 PS 637: Performed by: INTERNAL MEDICINE

## 2019-09-08 PROCEDURE — 87046 STOOL CULTR AEROBIC BACT EA: CPT | Performed by: INTERNAL MEDICINE

## 2019-09-08 PROCEDURE — 87045 FECES CULTURE AEROBIC BACT: CPT | Performed by: INTERNAL MEDICINE

## 2019-09-08 PROCEDURE — 40000275 ZZH STATISTIC RCP TIME EA 10 MIN

## 2019-09-08 PROCEDURE — 83735 ASSAY OF MAGNESIUM: CPT | Performed by: INTERNAL MEDICINE

## 2019-09-08 PROCEDURE — 87493 C DIFF AMPLIFIED PROBE: CPT | Performed by: INTERNAL MEDICINE

## 2019-09-08 PROCEDURE — 36415 COLL VENOUS BLD VENIPUNCTURE: CPT | Performed by: INTERNAL MEDICINE

## 2019-09-08 PROCEDURE — 12000000 ZZH R&B MED SURG/OB

## 2019-09-08 PROCEDURE — 80069 RENAL FUNCTION PANEL: CPT | Performed by: INTERNAL MEDICINE

## 2019-09-08 RX ORDER — DILTIAZEM HYDROCHLORIDE 30 MG/1
30 TABLET, FILM COATED ORAL EVERY 6 HOURS SCHEDULED
Status: DISCONTINUED | OUTPATIENT
Start: 2019-09-08 | End: 2019-09-09 | Stop reason: HOSPADM

## 2019-09-08 RX ORDER — LEVOTHYROXINE SODIUM 100 UG/1
100 TABLET ORAL
Status: DISCONTINUED | OUTPATIENT
Start: 2019-09-08 | End: 2019-09-09 | Stop reason: HOSPADM

## 2019-09-08 RX ADMIN — POTASSIUM CHLORIDE 20 MEQ: 20 TABLET, EXTENDED RELEASE ORAL at 09:44

## 2019-09-08 RX ADMIN — POTASSIUM & SODIUM PHOSPHATES POWDER PACK 280-160-250 MG 1 PACKET: 280-160-250 PACK at 18:20

## 2019-09-08 RX ADMIN — PREDNISONE 20 MG: 20 TABLET ORAL at 09:44

## 2019-09-08 RX ADMIN — DILTIAZEM HYDROCHLORIDE 30 MG: 30 TABLET, FILM COATED ORAL at 18:24

## 2019-09-08 RX ADMIN — AZITHROMYCIN MONOHYDRATE 500 MG: 500 INJECTION, POWDER, LYOPHILIZED, FOR SOLUTION INTRAVENOUS at 16:40

## 2019-09-08 RX ADMIN — METOPROLOL TARTRATE 100 MG: 100 TABLET, FILM COATED ORAL at 21:32

## 2019-09-08 RX ADMIN — TAZOBACTAM SODIUM AND PIPERACILLIN SODIUM 4.5 G: 500; 4 INJECTION, SOLUTION INTRAVENOUS at 21:32

## 2019-09-08 RX ADMIN — APIXABAN 5 MG: 2.5 TABLET, FILM COATED ORAL at 21:32

## 2019-09-08 RX ADMIN — POTASSIUM PHOSPHATE, MONOBASIC AND POTASSIUM PHOSPHATE, DIBASIC 15 MMOL: 224; 236 INJECTION, SOLUTION INTRAVENOUS at 19:03

## 2019-09-08 RX ADMIN — POTASSIUM CHLORIDE 40 MEQ: 20 TABLET, EXTENDED RELEASE ORAL at 06:58

## 2019-09-08 RX ADMIN — TAZOBACTAM SODIUM AND PIPERACILLIN SODIUM 4.5 G: 500; 4 INJECTION, SOLUTION INTRAVENOUS at 02:17

## 2019-09-08 RX ADMIN — METOPROLOL TARTRATE 100 MG: 100 TABLET, FILM COATED ORAL at 09:45

## 2019-09-08 RX ADMIN — POTASSIUM & SODIUM PHOSPHATES POWDER PACK 280-160-250 MG 1 PACKET: 280-160-250 PACK at 21:32

## 2019-09-08 RX ADMIN — TAZOBACTAM SODIUM AND PIPERACILLIN SODIUM 4.5 G: 500; 4 INJECTION, SOLUTION INTRAVENOUS at 14:20

## 2019-09-08 RX ADMIN — LEVOTHYROXINE SODIUM 100 MCG: 100 TABLET ORAL at 18:24

## 2019-09-08 RX ADMIN — FLUTICASONE FUROATE AND VILANTEROL TRIFENATATE 1 PUFF: 100; 25 POWDER RESPIRATORY (INHALATION) at 10:50

## 2019-09-08 RX ADMIN — SODIUM CHLORIDE, PRESERVATIVE FREE: 5 INJECTION INTRAVENOUS at 01:26

## 2019-09-08 RX ADMIN — DILTIAZEM HYDROCHLORIDE 30 MG: 30 TABLET, FILM COATED ORAL at 11:48

## 2019-09-08 RX ADMIN — APIXABAN 5 MG: 2.5 TABLET, FILM COATED ORAL at 09:44

## 2019-09-08 RX ADMIN — TAZOBACTAM SODIUM AND PIPERACILLIN SODIUM 4.5 G: 500; 4 INJECTION, SOLUTION INTRAVENOUS at 09:47

## 2019-09-08 ASSESSMENT — MIFFLIN-ST. JEOR: SCORE: 1239

## 2019-09-08 ASSESSMENT — ACTIVITIES OF DAILY LIVING (ADL)
ADLS_ACUITY_SCORE: 13
ADLS_ACUITY_SCORE: 19
ADLS_ACUITY_SCORE: 13
ADLS_ACUITY_SCORE: 19

## 2019-09-08 NOTE — PLAN OF CARE
VSS, afebrile, HR Irregular, telemetry reads Afib 120's per ICU staff. Lungs have fine crackles in the LLL, diminished in the RML and RLL, clear at the apexes. Bowels are active. Pt did have 2 incontinent episodes of stool, stool was black in color,watery soft, and missed the hat with her urine this shift.  Pt also had 1 caught mixed urine and stool this morning 50 mL, stool black tarry looking. Pt is alert and oriented x 4, makes her needs known and calls appropriately. Potassium 3.0 this morning, replacement protocol initiated with 40 mEq of Oral Potassium.      Face to face report given with opportunity to observe patient.    Report given to PUSHPA Holloway RN   9/8/2019  7:40 AM

## 2019-09-08 NOTE — PROGRESS NOTES
[facility]    Medicine Progress Note - Hospitalist Service       Date of Admission:  9/3/2019  Assessment & Plan      Mary Alice Cameron is a 69 year old female who presents with crampy abdominal pain, diarrhea, nausea and vomiting x 4 days. Admitted for SBO. H/o afib, chronic anticoagulation, bicuspid aortic valve, advanced COPD, chronic respiratory failure requiring oxygen 24/7, hypothyroidism and coronary artery disease.    Active Problems:  Right lower lobe pneumonia, leukocytosis and lactic acidosis  Patient's leukocytosis is improving with broadening antibiotic coverage to include pneumonia and also with decreasing intensity of steroid.  Overall, patient is slowly improving.  We will continue the current antibiotic regimen and follow clinically.      Small bowel obstruction (H) vs Gastroenteritis.  Patient's NG tube was discontinued and she is tolerating it well.  Patient was started on clear liquid diet and and was advanced to full liquid diet, which patient is tolerating well.  Patient was on ciprofloxacin and Flagyl for possible intra-abdominal infection and it was changed to Zosyn for lactic acidosis.      Acute cystitis without hematuria  Patient's urine culture grew mixed venancio greater than 10,000 colonies per milliliter.  It appears that patient did not have a significant UTI.       Persistent atrial fibrillation (H)  Patient had been on diltiazem, but it appears that patient does have systolic congestive heart failure history.  Pt's AV tiffany blocker was switched from diltiazem to metoprolol in light of patient's depressed LV function.  At metoprolol 100 mg/day, patient was still mildly tachycardic; dosage was increased to 200 mg/day.  Today, patient is still tachycardic.  We will try out adding low-dose diltiazem to see if it improves.  Patient had been on Eliquis, but this was on hold since patient had bloody NG tube output.  Since patient's obstructive symptoms resolved and NG tube was  discontinued, patient's Eliquis was resumed.       Pulmonary emphysema (H)  Patient was on home oxygen 2.5 L at night and bronchodilators.  We will continue with patient's bronchodilators and supplemental oxygen.  Is also on prednisone.      Bicuspid aortic valve    Assessment: Present on admission with mild stenosis.    Plan: No intervention required only monitoring      Hypothyroidism, postablative    Assessment: Present on admission     Plan: continue Synthroid      Coronary artery disease involving native coronary artery of native heart without angina pectoris    Assessment: Last echo showed ejection fraction 45-50% with no signs of volume overload or heart failure symptoms.    Plan: Continue monitoring.      Acute renal failure (H)  Renal function has now normalized with IV fluid      Diet: Full Liquid Diet    DVT Prophylaxis: Ambulate  Tyler Catheter: not present  Code Status: DNR/DNI      Disposition Plan   Expected discharge: 2 - 3 days, recommended to prior living arrangement once adequate pain management/ tolerating PO medications and antibiotic plan established.  Entered: Kane Escamilla MD 09/08/2019, 11:35 AM     The patient's care was discussed with the Patient and Dr. St, surgical consult..  Total time spent 45 min, 30 min were spent on gathering data and coordinating care.    Kane Escamilla MD  Hospitalist Service  [facility]    ______________________________________________________________________    Interval History   Patient tolerated full liquid diet yesterday and she reports that her abdomen remains stable.    No diarrhea has returned and she remained afebrile.  Patient remains tachycardic with atrial fibrillation but she is asymptomatic.  Patient reported a productive cough yesterday; chest x-ray showed right basilar infiltrate.  Azithromycin was added to cover for possible atypical pneumonia.  Patient's leukocytosis is improving this morning.    Data reviewed today: I reviewed all  medications, new labs and imaging results over the last 24 hours. I personally reviewed no images or EKG's today.    Physical Exam   Vital Signs: Temp: 97  F (36.1  C) Temp src: Temporal BP: 145/85   Heart Rate: 115 Resp: 18 SpO2: 95 % O2 Device: None (Room air) Oxygen Delivery: 1 LPM  Weight: 160 lbs 11.45 oz  General Appearance: In no acute distress  HEENT: Clear oropharynx  Respiratory: Mild crackles at bases bilaterally  Cardiovascular: Irregularly irregular, tachycardia rate  GI: Mildly distended, soft, nontender, bowel sounds present  Skin: Intact  Other: Neuro: Grossly nonfocal    Data   Recent Labs   Lab 09/08/19  0603 09/07/19  1304 09/07/19  0545 09/06/19  0519 09/05/19  0608  09/04/19  0545 09/03/19  1657   WBC 14.7* 17.1* 15.5* 13.7* 12.3*   < > 9.1 12.8*   HGB 12.9 13.6 12.7 12.8 12.9   < > 13.5 13.5   MCV 95 97 96 97 95   < > 96 96    280 270 251 242   < > 196 209     --  139 139 138   < > 136 136   POTASSIUM 3.0*  --  3.5 4.1 3.6   < > 3.6 3.5   CHLORIDE 103  --  104 102 101   < > 101 101   CO2 27  --  29 29 26   < > 24 26   BUN 19  --  30 36* 37*   < > 39* 45*   CR 0.78  --  0.84 0.83 0.90   < > 1.16* 1.78*   ANIONGAP 8  --  6 8 11   < > 11 9   KOSTA 7.9*  --  8.2* 8.5 8.5   < > 9.0 9.6   GLC 80  --  98 105* 109*   < > 125* 97   ALBUMIN 2.2*  --  2.4* 2.5* 2.6*  --  2.7* 3.0*   PROTTOTAL  --   --   --   --  5.9*  --  6.4* 6.6*   BILITOTAL  --   --   --   --  0.4  --  0.6 1.1   ALKPHOS  --   --   --   --  24*  --  28* 30*   ALT  --   --   --   --  11  --  10 12   AST  --   --   --   --  10  --  15 14   TROPI  --   --   --   --   --   --   --  <0.015    < > = values in this interval not displayed.     No results found for this or any previous visit (from the past 24 hour(s)).  Medications     - MEDICATION INSTRUCTIONS -       sodium chloride 100 mL/hr at 09/08/19 0816       apixaban ANTICOAGULANT  5 mg Oral BID     azithromycin  500 mg Intravenous Q24H     fluticasone-vilanterol  1 puff  Inhalation Daily     metoprolol tartrate  100 mg Oral BID     piperacillin-tazobactam  4.5 g Intravenous Q6H     predniSONE  20 mg Oral Daily     sodium chloride (PF)  3 mL Intracatheter Q8H

## 2019-09-08 NOTE — PHARMACY
[facility]    Pharmacy      Antimicrobial Stewardship Note     Current antimicrobial therapy:  Anti-infectives (From now, onward)    Start     Dose/Rate Route Frequency Ordered Stop    09/07/19 1700  azithromycin (ZITHROMAX) 500 mg in sodium chloride 0.9 % 250 mL intermittent infusion      500 mg  over 1 Hours Intravenous EVERY 24 HOURS 09/07/19 1559      09/06/19 2145  piperacillin-tazobactam (ZOSYN) intermittent infusion 4.5 g      4.5 g  200 mL/hr over 30 Minutes Intravenous EVERY 6 HOURS 09/06/19 2141            Indication: Intra-abdominal infection, Sepsis, HCAP     Days of Therapy: Day #2 Zithromax, Day #3 Zosyn (Day #6 IV antibiotics: was on Cipro/Flagyl)     Pertinent labs:  Creatinine   Creatinine   Date Value Ref Range Status   09/08/2019 0.78 0.52 - 1.04 mg/dL Final   09/07/2019 0.84 0.52 - 1.04 mg/dL Final   09/06/2019 0.83 0.52 - 1.04 mg/dL Final     WBC   WBC   Date Value Ref Range Status   09/08/2019 14.7 (H) 4.0 - 11.0 10e9/L Final   09/07/2019 17.1 (H) 4.0 - 11.0 10e9/L Final   09/07/2019 15.5 (H) 4.0 - 11.0 10e9/L Final     Procalcitonin No results found for: PCAL  CRP   CRP Inflammation   Date Value Ref Range Status   09/03/2019 158.0 (H) 0.0 - 8.0 mg/L Final   03/21/2015 14.2 (H) 0.0 - 8.0 mg/L Final       Culture Results:   (9/3/19) Urine = mixed venancio  (9/4/19) MRSA swab = negative  (9/8/19) Stool + C. Diff = pending     Recommendations/Interventions:  1. Zosyn indication states sepsis, but no blood cultures have been collected. Recommend adding to choose appropriate antibiotic therapy    Markel Avila RPH  September 8, 2019    Spoke with Dr. Escamilla, and Zosyn is not for sepsis. Orders updated.  Markel Avila RPH on 9/8/2019 at 4:53 PM

## 2019-09-08 NOTE — PLAN OF CARE
Pt is A&O, full liquid diet, SBA. VS /88  RR 18 T 97.5 O2 92% on RA, denies pain.  Lungs are dim/clear, uses oxygen when sleeping.  Bowels active x4, passing flatus, liquid stools this shift.  Potassium replacement given this shift, re-check is 4.0.   Sepsis BPA fired, lactic acid 1.0.  Pt denies N/V but does have a poor appetite, mostly liquids taken in.  Edema noted from ankles to knees bilaterally 3+, non-pitting.  Pedals normals.  Per ICU tele report pt is a-fib 100-120s.  IVF and abx continue.  Brakes locked, call light within reach, makes needs known.  Frequent rounding done to assess needs, free from falls.      Face to face report given with opportunity to observe patient.    Report given to PUSHPA Mcclain RN   9/8/2019  3:39 PM

## 2019-09-08 NOTE — PLAN OF CARE
"Patient has denied pain this shift. She has denied respiratory difficulty. O2 sat 89-92% on room air. O2 sat increases with deep breathing but quickly decreases back down to 89-90% on room air. O2 @ 1 LPM NC was placed on day shift and patient continues to be on that. Will relay to night shift to increase oxygen to 2.5 LPM as she wears at home. HR continues to be irregular, tachycardic. Telemetry on day shift was A-Fib 90's per ICU written report. Evening telemetry report was A-Fib 90's-160's per ICU written report. I was not notified when patient had increased heart rate in the 160's. Heart rate has not been a consistent rate all day, therefore PRN Metoprolol IV has not been given. Scheduled Metoprolol PO BID continues. Low grade temps the first part of evening shift. BP stable. Lung sounds were diminished with fine crackles in bilateral bases. CXR on day shift showed RLL infiltrate. Patient has been encouraged to deep breathe and cough frequently. She denies coughing up any sputum. Patient was sluggish on day shift, laying in bed, sleeping quite a bit. On evening shift she was encouraged to get up to the chair a go for walks. Oral intake was somewhat inadequate. Diet advanced to full liquids on day shift. Patient reports not having much of an appetite, she was encouraged to start small and bland. She was also encouraged to increase oral fluid intake. Bowel sounds were active at the start of day shift but did become hypoactive at the start of evening shift. Patient reports having a couple small loose stools but day shift and evening shift unit assistants did not know patient had BM's. She did make the comment that she \"shouldn't have taken Pepto Bismol for the diarrhea.\" Urine output was inadequate on day shift, patient did not void from 0600 until 1400, when she only had about 5 ml of urine out. Bladder scan revealed only 72 ml. MD was updated. 250 ml bolus given. IVF were restarted. UA sent. Due to pneumonia on " CXR today, IV antibiotics were changed around. Urine output did increase towards the end of evening shift, total of 370 ml sanford urine out. Patient did spend most of evening shift up in the chair. She also ambulated hallways a couple times with walker and assist of 1 for safety. Patient is walking into the bathroom. Alarms in place. Call light within reach. IVF and IV antibiotics continue. Eliquis PO continues.     Face to face report given with opportunity to observe patient.    Report given to Wilmer BARROW.    Sarahi Jeronimo RN   9/7/2019  11:25 PM

## 2019-09-09 ENCOUNTER — APPOINTMENT (OUTPATIENT)
Dept: OCCUPATIONAL THERAPY | Facility: HOSPITAL | Age: 69
DRG: 388 | End: 2019-09-09
Attending: INTERNAL MEDICINE
Payer: MEDICARE

## 2019-09-09 ENCOUNTER — APPOINTMENT (OUTPATIENT)
Dept: PHYSICAL THERAPY | Facility: HOSPITAL | Age: 69
DRG: 388 | End: 2019-09-09
Attending: INTERNAL MEDICINE
Payer: MEDICARE

## 2019-09-09 VITALS
SYSTOLIC BLOOD PRESSURE: 129 MMHG | DIASTOLIC BLOOD PRESSURE: 67 MMHG | HEART RATE: 110 BPM | HEIGHT: 64 IN | OXYGEN SATURATION: 98 % | BODY MASS INDEX: 28.6 KG/M2 | WEIGHT: 167.55 LBS | RESPIRATION RATE: 17 BRPM | TEMPERATURE: 98.2 F

## 2019-09-09 LAB
ALBUMIN SERPL-MCNC: 2.2 G/DL (ref 3.4–5)
ANION GAP SERPL CALCULATED.3IONS-SCNC: 11 MMOL/L (ref 3–14)
BUN SERPL-MCNC: 14 MG/DL (ref 7–30)
CALCIUM SERPL-MCNC: 7.6 MG/DL (ref 8.5–10.1)
CHLORIDE SERPL-SCNC: 105 MMOL/L (ref 94–109)
CO2 SERPL-SCNC: 25 MMOL/L (ref 20–32)
CREAT SERPL-MCNC: 0.77 MG/DL (ref 0.52–1.04)
ERYTHROCYTE [DISTWIDTH] IN BLOOD BY AUTOMATED COUNT: 12.9 % (ref 10–15)
GFR SERPL CREATININE-BSD FRML MDRD: 79 ML/MIN/{1.73_M2}
GLUCOSE SERPL-MCNC: 90 MG/DL (ref 70–99)
HCT VFR BLD AUTO: 37.4 % (ref 35–47)
HGB BLD-MCNC: 12.6 G/DL (ref 11.7–15.7)
MAGNESIUM SERPL-MCNC: 1.8 MG/DL (ref 1.6–2.3)
MCH RBC QN AUTO: 32.5 PG (ref 26.5–33)
MCHC RBC AUTO-ENTMCNC: 33.7 G/DL (ref 31.5–36.5)
MCV RBC AUTO: 96 FL (ref 78–100)
PHOSPHATE SERPL-MCNC: 2.5 MG/DL (ref 2.5–4.5)
PHOSPHATE SERPL-MCNC: 2.5 MG/DL (ref 2.5–4.5)
PLATELET # BLD AUTO: 306 10E9/L (ref 150–450)
POTASSIUM SERPL-SCNC: 3.7 MMOL/L (ref 3.4–5.3)
RBC # BLD AUTO: 3.88 10E12/L (ref 3.8–5.2)
SODIUM SERPL-SCNC: 141 MMOL/L (ref 133–144)
WBC # BLD AUTO: 11.4 10E9/L (ref 4–11)

## 2019-09-09 PROCEDURE — 25000131 ZZH RX MED GY IP 250 OP 636 PS 637: Performed by: INTERNAL MEDICINE

## 2019-09-09 PROCEDURE — 83735 ASSAY OF MAGNESIUM: CPT | Performed by: INTERNAL MEDICINE

## 2019-09-09 PROCEDURE — 25000128 H RX IP 250 OP 636: Performed by: INTERNAL MEDICINE

## 2019-09-09 PROCEDURE — 25000132 ZZH RX MED GY IP 250 OP 250 PS 637: Performed by: INTERNAL MEDICINE

## 2019-09-09 PROCEDURE — 97161 PT EVAL LOW COMPLEX 20 MIN: CPT | Mod: GP

## 2019-09-09 PROCEDURE — 36415 COLL VENOUS BLD VENIPUNCTURE: CPT | Performed by: INTERNAL MEDICINE

## 2019-09-09 PROCEDURE — 80069 RENAL FUNCTION PANEL: CPT | Performed by: INTERNAL MEDICINE

## 2019-09-09 PROCEDURE — 97165 OT EVAL LOW COMPLEX 30 MIN: CPT | Mod: GO

## 2019-09-09 PROCEDURE — 99239 HOSP IP/OBS DSCHRG MGMT >30: CPT | Performed by: INTERNAL MEDICINE

## 2019-09-09 PROCEDURE — 84100 ASSAY OF PHOSPHORUS: CPT | Performed by: INTERNAL MEDICINE

## 2019-09-09 PROCEDURE — 97530 THERAPEUTIC ACTIVITIES: CPT | Mod: GO

## 2019-09-09 PROCEDURE — 85027 COMPLETE CBC AUTOMATED: CPT | Performed by: INTERNAL MEDICINE

## 2019-09-09 RX ADMIN — PREDNISONE 20 MG: 20 TABLET ORAL at 08:56

## 2019-09-09 RX ADMIN — APIXABAN 5 MG: 2.5 TABLET, FILM COATED ORAL at 08:55

## 2019-09-09 RX ADMIN — DILTIAZEM HYDROCHLORIDE 30 MG: 30 TABLET, FILM COATED ORAL at 00:08

## 2019-09-09 RX ADMIN — POTASSIUM & SODIUM PHOSPHATES POWDER PACK 280-160-250 MG 1 PACKET: 280-160-250 PACK at 16:35

## 2019-09-09 RX ADMIN — TAZOBACTAM SODIUM AND PIPERACILLIN SODIUM 4.5 G: 500; 4 INJECTION, SOLUTION INTRAVENOUS at 14:35

## 2019-09-09 RX ADMIN — POTASSIUM & SODIUM PHOSPHATES POWDER PACK 280-160-250 MG 1 PACKET: 280-160-250 PACK at 12:09

## 2019-09-09 RX ADMIN — DILTIAZEM HYDROCHLORIDE 30 MG: 30 TABLET, FILM COATED ORAL at 06:06

## 2019-09-09 RX ADMIN — LEVOTHYROXINE SODIUM 100 MCG: 100 TABLET ORAL at 07:47

## 2019-09-09 RX ADMIN — DILTIAZEM HYDROCHLORIDE 30 MG: 30 TABLET, FILM COATED ORAL at 12:06

## 2019-09-09 RX ADMIN — TAZOBACTAM SODIUM AND PIPERACILLIN SODIUM 4.5 G: 500; 4 INJECTION, SOLUTION INTRAVENOUS at 03:14

## 2019-09-09 RX ADMIN — METOPROLOL TARTRATE 100 MG: 100 TABLET, FILM COATED ORAL at 08:56

## 2019-09-09 RX ADMIN — POTASSIUM & SODIUM PHOSPHATES POWDER PACK 280-160-250 MG 1 PACKET: 280-160-250 PACK at 08:56

## 2019-09-09 RX ADMIN — TAZOBACTAM SODIUM AND PIPERACILLIN SODIUM 4.5 G: 500; 4 INJECTION, SOLUTION INTRAVENOUS at 08:57

## 2019-09-09 ASSESSMENT — ACTIVITIES OF DAILY LIVING (ADL)
ADLS_ACUITY_SCORE: 19
ADLS_ACUITY_SCORE: 17
ADLS_ACUITY_SCORE: 19

## 2019-09-09 ASSESSMENT — MIFFLIN-ST. JEOR: SCORE: 1270

## 2019-09-09 NOTE — PLAN OF CARE
Discharge Planner PT   Patient plan for discharge: Return home.   Current status: Patient able to ambulate 350 feet, but required multiple standing rest breaks and was fatigued with performance. O2 sat remained above 90%, but upon return to room patient did ask to have supplemental oxygen on. Discussed with RN. Patient in chair at end of session on 1 liter O2.   Barriers to return to prior living situation: None present  Recommendations for discharge: Home care PT  Rationale for recommendations: Patient is not at baseline and is currently homebound due to low stamina and endurance. Patient would benefit from continued skilled PT services to return patient to prior activity tolerance.        Entered by: Karissa Anderson 09/09/2019 11:14 AM

## 2019-09-09 NOTE — PLAN OF CARE
Pt is A&O, regular diet, SBA w/ walker.  VSS, afebrile.  Lungs are dim/clear, bowels active x4.  Pt continues to have loose stools.  Pt did eat a good amount (75%) for breakfast and seems to be in better spirits this morning.  IVF and abx continue.  Edema noted in BLE.  See PCS flowsheet for full assessment. Frequent rounding done to assess needs, free from falls.      Face to face report given with opportunity to observe patient.    Report given to PUSHPA Mendez RN   9/9/2019  11:22 AM

## 2019-09-09 NOTE — PLAN OF CARE
Patient discharged at 5:43 PM via wheel chair accompanied by other:Niece  and staff. Prescriptions sent to patients preferred pharmacy. All belongings sent with patient.     Discharge instructions reviewed with Patient. Listed belongings gathered and returned to patient. Yes    Patient discharged to Home.   Report called to NA     Core Measures and Vaccines  Core Measures applicable during stay: No. If yes, state diagnosis: NA   Pneumonia and Influenza given prior to discharge, if indicated: N/A    Surgical Patient   Surgical Procedures during stay: None  Did patient receive discharge instruction on wound care and recognition of infection symptoms? N/A    MISC  Follow up appointment made:  Yes  Home and hospital aquired medications returned to patient: N/A  Patient reports pain was well managed at discharge: Yes    Temp: 98.2  F (36.8  C) Temp src: Tympanic BP: 129/67   Heart Rate: 92 Resp: 17 SpO2: 98 % O2 Device: Nasal cannula Oxygen Delivery: 1/2 LPM    A&O, no c/o pain.  LS dim, dyspneic with exertion.  BS active, continues to have small, loose stools.  NS at 100, ABX zithromax, sent home with PO augmentin.  Per tele report afib 110's.  SBA with walker in room, makes her needs known.

## 2019-09-09 NOTE — PLAN OF CARE
Discharge Planner OT   Patient plan for discharge: Return home  Current status: Transfer Skills: Mod I sit<>stand from chair  Toilet Transfer: SBA  Lower Body Dressing: SBA but pt presented with moderate difficulties to doff/don her socks and mild difficulties to doff/don pants. Pt required frequent rest breaks too   Toileting: Independent   Grooming: Independent   Fatigue noted during ADLs  Barriers to return to prior living situation: None from an OT perspective  Recommendations for discharge: Home with Home Care OT  Rationale for recommendations: Pt presents with generalized weakness and impaired activity tolerance from baseline and would benefit from home care OT services to improve her overall strength and independence in ADLs        Entered by: Kaycee Cutler 09/09/2019 1:19 PM

## 2019-09-09 NOTE — PROGRESS NOTES
Inpatient Physical Therapy Evaluation    Name: Mary Alice Cameron MRN# [id number]   Age: 69 year old YOB: 1950     Date of Consultation: 2019  Consultation is requested by:  Dr. Escamilla  Specific Consultation Request:  Home safety eval  Primary care provider: Braydon Yen    General Information:   Onset Date: 9/3/19    History of Current Problem/Admission: CHF (congestive heart failure) (H) [I50.9]  Small bowel obstruction (H) [K56.609]  Urinary tract infection [N39.0]  Lower abdominal pain [R10.30]    Prior Level of Function: Prior to admission, patient was community distance ambulator. Patient performed her own errands, drives and does all activities in home including cooking and cleaning independently. Patient has both SPC and 4WW. Uses 4WW the most often. Uses home O2 at night of 2.5 liters and occasionally as needed during the day.   Ambulation: 1 - Assistive Equipment   Transferrin - Assistive Equipment   Toiletin - Independent    Bathin - Independent   Dressin - Independent   Eatin - Not assessed  Communication: 0 - Understands/communicates without difficulty      Fall history within the last 6 months: No    Current Living Situation: Patient has no stairs to enter the home.First level apartment    Current Equipment Used at Home: 4WW or SPC or occasionally no assistive device     Patient & Family Goals: return home and regain strength. Patient is fatigued compared to her usual level of function     Past Medical History:   Past Medical History:   Diagnosis Date     Asthma      Atrial fibrillation (H)      COPD (chronic obstructive pulmonary disease) (H)      Depression      High cholesterol      HTN (hypertension)      Thyroid disease        Past Surgical History:  Past Surgical History:   Procedure Laterality Date     BACK SURGERY  2006     CARDIAC SURGERY  2018    [facility]gi[facility] at [facility]     COLONOSCOPY N/A 2016    Procedure: COLONOSCOPY;   Surgeon: Waldemar Bob MD;  Location: [facility] OR     HYSTERECTOMY  1980    partial     SLING BLADDER SUSPENSION WITH FASCIA LINNETTE         Medications:   Current Facility-Administered Medications   Medication     albuterol (PROVENTIL) neb solution 2.5 mg     apixaban ANTICOAGULANT (ELIQUIS) tablet 5 mg     azithromycin (ZITHROMAX) 500 mg in sodium chloride 0.9 % 250 mL intermittent infusion     diltiazem (CARDIZEM) tablet 30 mg     fluticasone-vilanterol (BREO ELLIPTA) 100-25 MCG/INH inhaler 1 puff     HYDROmorphone (PF) (DILAUDID) injection 0.3 mg     ipratropium - albuterol 0.5 mg/2.5 mg/3 mL (DUONEB) neb solution 3 mL     levothyroxine (SYNTHROID/LEVOTHROID) tablet 100 mcg     lidocaine (LMX4) kit     lidocaine 1 % 0.1-1 mL     magnesium sulfate 4 g in 100 mL sterile water (premade)     metoprolol (LOPRESSOR) injection 5 mg     metoprolol tartrate (LOPRESSOR) tablet 100 mg     naloxone (NARCAN) injection 0.1-0.4 mg     ondansetron (ZOFRAN-ODT) ODT tab 4 mg    Or     ondansetron (ZOFRAN) injection 4 mg     Patient is already receiving anticoagulation with heparin, enoxaparin (LOVENOX), warfarin (COUMADIN)  or other anticoagulant medication     piperacillin-tazobactam (ZOSYN) intermittent infusion 4.5 g     potassium & sodium phosphates (NEUTRA-PHOS) Packet 1 packet     potassium chloride (KLOR-CON) Packet 20-40 mEq     potassium chloride 10 mEq in 100 mL intermittent infusion with 10 mg lidocaine     potassium chloride 10 mEq in 100 mL sterile water intermittent infusion (premix)     potassium chloride ER (K-DUR/KLOR-CON M) CR tablet 20-40 mEq     predniSONE (DELTASONE) tablet 20 mg     sodium chloride (PF) 0.9% PF flush 3 mL     sodium chloride (PF) 0.9% PF flush 3 mL     sodium chloride 0.9% infusion       Weight Bearing Status: NA     Cognitive Status Examination:  Orientation: awake and alert  Level of Consciousness: alert  Follows Commands and Answers Questions: 100% of the time  Personal Safety and Judgement:  Intact  Memory: Immediate recall intact    Pain:   Currently in pain? No    Physical Therapy Evaluation:   Integumentary/Edema: edema present in bilateral LEs. Patient states that this is her baseline and has been present for many years.   ROM: Functional motion of LEs   Strength: Functional strength present to allow for independent transfers and ambulation; however endurance and stamina deficits present. Noted with ambulation.   Bed Mobility: NT  Transfers: Mod I   Gait: Mod I with slow tracee and several standing rest breaks. Patient ambulated a total of 350 feet with fatigue greatest limitation. O2 sat remained above 90%.   Stairs: NT  Balance: Mild deficits noted. Good stability with use of FWW.   Sensory: NT  Coordination: NT    Physical Therapy Interventions: Gait Training , Strengthening and endurance training    Clinical Impressions:  Criteria for Skilled Therapeutic Intervention Met: Yes, treatment indicated  PT Diagnosis: decreased stamina and endurance following recent illness and admission  Influenced by the following impairments: SOB, Fatigue, functional weakness  Functional limitations due to impairment: Decreased tolerance for ambulation, unable to perform community level tasks, increased difficulty with household tasks  Clinical presentation: Stable/Uncomplicated  Clinical presentation rationale: clinical judgement  Clinical Decision making (complexity): Low Complexity  Frequency: 6 times/week  Predicted Duration of Therapy Intervention (days/wks): 5 days  Anticipated Discharge Disposition: Home with Home Therapy  Anticipated Equipment Needs at Discharge: none  Risks and Benefits of therapy have been explained: Yes  Patient, Family & other staff in agreement with plan of care: Yes  Clinical Impression Comments: Patient is not at baseline and would currently qualify as homebound status due to decline in functional mobility endurance and stamina. Recommending Home Care PT services to return patient to  baseline.     Total Eval Time: 25

## 2019-09-09 NOTE — PROGRESS NOTES
Name: Mary Alice Cameron    MRN#: [id number]    Reason for Hospitalization: CHF (congestive heart failure) (H) [I50.9]  Small bowel obstruction (H) [K56.609]  Urinary tract infection [N39.0]  Lower abdominal pain [R10.30]    REILLY: average    Discharge Date: 9/9/2019    Medicare IM letter reviewed with her last today    Patient / Family response to discharge plan: she understands and agrees    Follow-Up Appt:   Future Appointments   Date Time Provider Department Center   9/17/2019 11:15 AM Braydon Yen MD [facility]   1/28/2020 10:00 AM Eliezer Myers MD [facility]       Other Providers (Care Coordinator, County Services, PCA services etc): Yes: orders faxed to [facility]    Discharge Disposition: home via her niece    Talia Bautista CM RN

## 2019-09-09 NOTE — PLAN OF CARE
"Pt is A&O, SBA w/ walker & belt, regular diet.  VSS, afebrile.  Lungs are dim/clear.  Bowels hypo x4, passing flatus.  Per pt report \"stools are starting to form up.\"  Edema noted from bilateral knees to ankles, encouraged elevation.  Pt using 1 L of oxygen during night when asleep, sating adequately.  Pt denies pain, N/V. Per ICU tele report pt is a-fib 100s.   IVF and abx continue.  Slept well throughout shift.  Brakes locked, call light within reach, makes needs known.  Frequent rounding done, free from falls.    "

## 2019-09-09 NOTE — DISCHARGE SUMMARY
[facility]  Hospitalist Discharge Summary       Date of Admission:  9/3/2019  Date of Discharge:  9/9/2019  Discharging Provider: Kane Escamilla MD    Discharge Diagnoses   Principal Problem:    Small bowel obstruction (H)  Active Problems:    Persistent atrial fibrillation (H)    Pulmonary emphysema (H)    Bicuspid aortic valve    Hypothyroidism, postablative    Coronary artery disease involving native coronary artery of native heart without angina pectoris    Acute cystitis without hematuria    Acute renal failure (H)    Hypokalemia    Follow-ups Needed After Discharge   Follow-up Appointments     Follow-up and recommended labs and tests       Follow up with primary care provider, Braydon Yen, within 7 days   for hospital follow- up.  The following labs/tests are recommended:   CBC,BMP.           Unresulted Labs Ordered in the Past 30 Days of this Admission     Date and Time Order Name Status Description    9/4/2019 0118 Stool culture SSCE Preliminary     8/19/2019 1340 T4 FREE In process         Discharge Disposition   Discharged to home  Condition at discharge: Stable    Hospital Course   Mary Alice Cameron is a 69 year old women with PMH of afib, chronic anticoagulation, bicuspid aortic valve, advanced COPD, chronic respiratory failure requiring oxygen 24/7, hypothyroidism and coronary artery disease who presented to emergency room complaining of nausea vomiting diarrhea crampy abdominal pain which started 4 days prior to admission.  She also admits that yesterday he had a bout of chest pain which resolved on its own after 7 8 minutes.  Scribes her pain both sides below umbilicus which is crampy associated with nausea vomiting and diarrhea.  No hematemesis or hematochezia.  Dates 5 out of 10 but it was 10 out of 10 at home.  Stated that pain improved after she had a bowel movement.  EMS reported normal vital signs one day came to the patient's house.  Denies dysuria or hematuria.  Denies fever.  Last  time she vomited was 3 days prior to admission and last bowel movement was on the day of admission in the afternoon  Surgeyr consutled.  Discussed the case with Dr. St, general surgery.  NG tube was placed in the emergency room and green content return.  Gas it was repeated x2 because of the air in the intestinal wall, and both times was low and physical exam does not support ischemic bowel findings.  Patient will be admitted to medical floor to treat small bowel obstruction or gastroenteritis.    Following issues were addressed during her hospitalization:      Small bowel obstruction (H) vs Gastroenteritis.  Soon after admission, patient had bowel movements and a surgical evaluation determined that patient is not completely obstructed and conservative management was recommended.  Patient's nausea and vomiting symptoms resolved and NG tube was discontinued.  Diet was slowly advanced and patient tolerated well.   Patient was on ciprofloxacin and Flagyl for possible intra-abdominal infection and it was changed to Zosyn for lactic acidosis.  Patient was discharged on Augmentin.      Right lower lobe pneumonia, leukocytosis and lactic acidosis  Patient's leukocytosis worsened with decreased in intensity of steroid.  Patient complained of increased productive cough.  Chest x-ray showed right lower lobe pneumonia and she was treated with Zosyn and subsequently Augmentin.      Acute cystitis without hematuria  Patient's urine culture grew mixed venancio greater than 10,000 colonies per milliliter.  It appears that patient did not have a significant UTI.                  Persistent atrial fibrillation (H)  Patient had been on diltiazem, but it appears that patient does have systolic congestive heart failure history.  Pt's AV tiffany blocker was switched from diltiazem to metoprolol in light of patient's depressed LV function.    Metoprolol, however, did not control patient's tachycardia and required addition of diltiazem.  At  the time of discharge, patient was advised to resume her diltiazem at PTA dose.  Patient had been on Eliquis, but this was on hold since patient had bloody NG tube output.    After patient's obstructive symptoms resolved and NG tube was discontinued, patient's Eliquis was resumed.      Pulmonary emphysema (H)  Patient was on home oxygen 2.5 L at night and bronchodilators.    Patient's of bronchodilators and supplemental oxygen will continued.  Patient was initially switched to IV steroid, which then was switched to p.o. prednisone after p.o. intake was resumed.      Acute renal failure (H)  Patient was noted with a DRE on admission, which resolved with IV hydration.    Consultations This Hospital Stay   SURGERY GENERAL IP CONSULT  SOCIAL WORK IP CONSULT  PHARMACY IP CONSULT  PHYSICAL THERAPY ADULT IP CONSULT  OCCUPATIONAL THERAPY ADULT IP CONSULT  OCCUPATIONAL THERAPY ADULT IP CONSULT    Code Status   DNR/DNI    Time Spent on this Encounter   I, Kane Escamilla MD, personally saw the patient today and spent greater than 30 minutes discharging this patient.       Kane Escamilla MD  [facility]  ______________________________________________________________________    Physical Exam   Vital Signs: Temp: 98.2  F (36.8  C) Temp src: Tympanic BP: 129/67   Heart Rate: 92 Resp: 17 SpO2: 98 % O2 Device: Nasal cannula Oxygen Delivery: 1/2 LPM  Weight: 167 lbs 8.79 oz         Primary Care Physician   Braydon Yen    Discharge Orders      Home Care PT Referral for Hospital Discharge      Home Care OT Referral for Hospital Discharge      Reason for your hospital stay    SBO, pneumonia, atrial fibrillation     Follow-up and recommended labs and tests     Follow up with primary care provider, Braydon Yen, within 7 days for hospital follow- up.  The following labs/tests are recommended: CBC,BMP.     MD face to face encounter    Documentation of Face to Face and Certification for Home Health Services    I certify that  patient: Mary Alice Cameron is under my care and that I, or a nurse practitioner or physician's assistant working with me, had a face-to-face encounter that meets the physician face-to-face encounter requirements with this patient on: 9/9/2019.    This encounter with the patient was in whole, or in part, for the following medical condition, which is the primary reason for home health care: atrial fibrillation, recent pneumonia, SBO.    I certify that, based on my findings, the following services are medically necessary home health services: Occupational Therapy and Physical Therapy.    My clinical findings support the need for the above services because: Occupational Therapy Services are needed to assess and treat cognitive ability and address ADL safety due to impairment in mobility. and Physical Therapy Services are needed to assess and treat the following functional impairments: mobility.    Further, I certify that my clinical findings support that this patient is homebound (i.e. absences from home require considerable and taxing effort and are for medical reasons or [employer] services or infrequently or of short duration when for other reasons) because: Leaving home is medically contraindicated for the following reason(s): Dyspnea on exertion that makes it so they cannot leave their home for needed services without clinical deterioration.., Patient is bedbound due to: deconditioning from hospitalization. and Requires assistance of another person or specialized equipment to access medical services because patient: Is unable to operate assistive equipment on their own., Is unable to walk greater than 25 feet without rest. and Requires supervision of another for safe transfer...    Based on the above findings. I certify that this patient is confined to the home and needs intermittent skilled nursing care, physical therapy and/or speech therapy.  The patient is under my care, and I have initiated the establishment of the  plan of care.  This patient will be followed by a physician who will periodically review the plan of care.  Physician/Provider to provide follow up care: Braydon Yen    Attending hospital physician (the Medicare certified [place] provider): Kane Escamilla MD  Physician Signature: See electronic signature associated with these discharge orders.  Date: 9/9/2019     Activity    Your activity upon discharge: activity as tolerated with assist     DNR/DNI     Oxygen Adult    Renew Home Oxygen Order  Renew previous prescription.  Expected treatment length is indefinite (99 months).    Attending Provider: Kane Escamilla MD  Physician signature: See electronic signature associated with these discharge orders  Date of Order: September 9, 2019     Diet    Follow this diet upon discharge: Orders Placed This Encounter      Advance Diet as Tolerated: Regular Diet Adult; Regular Diet Adult       Significant Results and Procedures   Most Recent 3 CBC's:  Recent Labs   Lab Test 09/09/19  0525 09/08/19  0603 09/07/19  1304   WBC 11.4* 14.7* 17.1*   HGB 12.6 12.9 13.6   MCV 96 95 97    276 280     Most Recent 3 BMP's:  Recent Labs   Lab Test 09/09/19  0525 09/08/19  1355 09/08/19  0603 09/07/19  0545     --  138 139   POTASSIUM 3.7 4.0 3.0* 3.5   CHLORIDE 105  --  103 104   CO2 25  --  27 29   BUN 14  --  19 30   CR 0.77  --  0.78 0.84   ANIONGAP 11  --  8 6   KOSTA 7.6*  --  7.9* 8.2*   GLC 90  --  80 98     Most Recent 2 LFT's:  Recent Labs   Lab Test 09/05/19  0608 09/04/19  0545   AST 10 15   ALT 11 10   ALKPHOS 24* 28*   BILITOTAL 0.4 0.6   ,   Results for orders placed or performed during the hospital encounter of 09/03/19   CT Abdomen Pelvis w/o Contrast    Narrative    CT ABDOMEN PELVIS W/O CONTRAST    CLINICAL HISTORY: Female, age 69 years,  Abd pain, diverticulitis  suspected; Nausea, vomiting; RLQ and LLQ abd tenderness.;    Comparison:  CT scan abdomen and pelvis 8/13/2014    TECHNIQUE:  CT was performed of the  abdomen and pelvis with oral  contrast. Sagittal, coronal and axial reconstructions were reviewed.     FINDINGS:    Abdomen/Pelvis CT:  Lung Bases:  Mild emphysema and peripheral areas of atelectasis in  both lung bases.     Esophagus/stomach: Small hiatal hernia. Stomach is markedly distended  with contrast material.    Liver:  Normal.    Gallbladder: Surgically absent    Spleen: Normal.    Pancreas: Normal.    Adrenal Glands: Normal.    Kidneys: 6 mm dense lesion upper pole left kidney may represent a  small calcification containing cyst or perhaps a hemorrhagic cyst.  Ureters: Normal.  Urinary bladder: Normal.    Abdominal Aorta: Scattered atherosclerotic calcifications.  IVC: Normal.    Lymph Nodes: Normal.    Large and Small Bowel: Proximal small bowel is markedly dilated. Small  volume of radiodense material is seen scattered throughout the colon  and distal small bowel. There are numerous diverticula seen within the  distal colon. No apparent diverticulitis.  Appendix: Normal.    Pelvic Organs:  The uterus is surgically absent.  Peritoneum: Moderate free fluid in the lower pelvis. No free  intraperitoneal or retroperitoneal gas.  Bony structures: Postoperative changes in the lumbar spine. No acute  abnormality. Degenerative spondylolisthesis of L4 relative to L5.    Other Findings:  Inguinal lymph nodes are normal.      Impression    IMPRESSION:   Mid small bowel obstruction. No free air. Some of the small bowel  loops in the midabdomen may have gas within the walls. Lack of IV  contrast limits evaluation and the possibility of necrosis/ischemia is  not excluded. No evidence of gas within the portal venous system.    Diverticulosis of the distal colon. No diverticulitis.    MELY LUCERO MD   XR Chest 2 Views    Narrative    PROCEDURE:  XR CHEST 2 VW    HISTORY:  leukocytosis and productive cough; evaluate for pneumonia.     COMPARISON:  3/19/2019    FINDINGS:   The cardiac silhouette is normal in size.  The pulmonary vasculature is  normal.  There are airspace opacities in the right lung base. There  are small bilateral pleural effusions. No pneumothorax.      Impression    IMPRESSION:  Right basilar infiltrate. Recommend follow-up to ensure  resolution.      JAJA CARLTON MD       Discharge Medications   Current Discharge Medication List      START taking these medications    Details   amoxicillin-clavulanate (AUGMENTIN) 875-125 MG tablet Take 1 tablet by mouth 2 times daily for 2 days  Qty: 4 tablet, Refills: 0    Associated Diagnoses: Pneumonia of right middle lobe due to infectious organism (H); Pulmonary emphysema, unspecified emphysema type (H); Acute on chronic respiratory failure with hypoxia and hypercapnia (H)         CONTINUE these medications which have NOT CHANGED    Details   acetaminophen (TYLENOL) 500 MG tablet Take 500-1,000 mg by mouth every 6 hours as needed for mild pain      ADVAIR -21 MCG/ACT Inhaler INHALE 2 PUFFS INTO THE LUNGS TWICE DAILY  Qty: 36 g, Refills: 2    Associated Diagnoses: COPD exacerbation (H)      albuterol (2.5 MG/3ML) 0.083% neb solution Take 1 vial (2.5 mg) by nebulization every 6 hours as needed for shortness of breath / dyspnea or wheezing  Qty: 25 vial, Refills: 1    Associated Diagnoses: COPD exacerbation (H)      albuterol (PROAIR HFA/PROVENTIL HFA/VENTOLIN HFA) 108 (90 Base) MCG/ACT inhaler INHALE 2 PUFFS INTO THE LUNGS EVERY 4 HOURS AS NEEDED FOR SHORTNESS OF BREATH OR DIFFICULT BREATHING OR WHEEZING  Qty: 54 g, Refills: 0    Associated Diagnoses: Other emphysema (H)      atorvastatin (LIPITOR) 10 MG tablet TAKE 1 TABLET BY MOUTH EVERY NIGHT AT BEDTIME  Qty: 90 tablet, Refills: 0    Associated Diagnoses: Coronary artery disease involving native coronary artery of native heart without angina pectoris; Mixed hyperlipidemia      Calcium Carb-Cholecalciferol (CALTRATE 600+D3 SOFT PO) Take 600 mg by mouth 2 times daily      cloNIDine (CATAPRES) 0.1 MG tablet TAKE  2 TABLETS BY MOUTH EVERY NIGHT AT BEDTIME  Qty: 180 tablet, Refills: 0    Associated Diagnoses: Essential hypertension      COMBIVENT RESPIMAT  MCG/ACT inhaler Inhale 1 puff into the lungs 4 times daily       diltiazem ER COATED BEADS (CARDIZEM CD) 360 MG 24 hr capsule Take 1 capsule (360 mg) by mouth daily  Qty: 90 capsule, Refills: 3    Associated Diagnoses: Atrial fibrillation, persistent (H); Essential hypertension      ELIQUIS 5 MG tablet TAKE 1 TABLET BY MOUTH TWICE DAILY  Qty: 60 tablet, Refills: 3    Associated Diagnoses: Persistent atrial fibrillation (H)      furosemide (LASIX) 40 MG tablet TAKE 1 TABLET BY MOUTH TWICE DAILY.  Qty: 180 tablet, Refills: 0    Associated Diagnoses: Essential hypertension, benign      ipratropium - albuterol 0.5 mg/2.5 mg/3 mL (DUONEB) 0.5-2.5 (3) MG/3ML neb solution USE 1 VIAL PER NEBULIZER FOUR TIMES DAILY  Qty: 1620 mL, Refills: 0    Associated Diagnoses: COPD exacerbation (H)      levothyroxine (SYNTHROID/LEVOTHROID) 100 MCG tablet TAKE 1 TABLET(100 MCG) BY MOUTH DAILY  Qty: 90 tablet, Refills: 3    Associated Diagnoses: Hypothyroidism, postablative      losartan (COZAAR) 50 MG tablet TAKE 1 TABLET BY MOUTH TWICE DAILY  Qty: 60 tablet, Refills: 3    Associated Diagnoses: Essential hypertension      potassium chloride SA (K-DUR/KLOR-CON M) 20 MEQ CR tablet Take 1 tablet (20 mEq) by mouth 2 times daily  Qty: 180 tablet, Refills: 3    Associated Diagnoses: Hypokalemia      diphenhydrAMINE (BENADRYL) 25 MG tablet Take 1-2 tablets (25-50 mg) by mouth every 6 hours as needed for itching or allergies  Qty: 60 tablet, Refills: 1      OTHER MEDICAL SUPPLIES Apply one pair to help with edema  Qty: 1 each, Refills: 0    Associated Diagnoses: Edema; Atrial fibrillation, persistent (H)      predniSONE (DELTASONE) 20 MG tablet TAKE 3 TABLETS BY MOUTH X 3 DAYS, 2 TABLETS X 3 DAYS, 1 TABLET X 3 DAYS, AND 0.5 TABLETS X 3 DAYS  Qty: 20 tablet, Refills: 0    Associated Diagnoses: COPD  exacerbation (H)      sulfamethoxazole-trimethoprim (BACTRIM DS/SEPTRA DS) 800-160 MG tablet as needed           Allergies   Allergies   Allergen Reactions     Amlodipine Besylate Cough     Norvasc     Lisinopril Cough

## 2019-09-09 NOTE — TELEPHONE ENCOUNTER
cloNIDine (CATAPRES) 0.1 MG tablet  Last Written Prescription Date:  06/06/2019  Last Fill Quantity: 180,   # refills: 0  Last Office Visit: 08/19/2019  Future Office visit:       Routing refill request to provider for review/approval because:

## 2019-09-09 NOTE — PROGRESS NOTES
Inpatient Occupational Therapy Evaluation    Name: Mary Alice Cameron MRN# [id number]   Age: 69 year old YOB: 1950     Date of Consultation: 2019  Consultation is requested by:  Dr. Escamilla  Specific Consultation Request:  Home Safety Eval  Primary care provider: Braydon Yen    General Information:   Onset Date: 19    History of Current Problem/Admission: CHF (congestive heart failure) (H) [I50.9]  Small bowel obstruction (H) [K56.609]  Urinary tract infection [N39.0]  Lower abdominal pain [R10.30]    Prior Level of Function: Pt previously independent in ADLs and used a walker or cane for functional mobility. Pt used home oxygen during the night and occasionally during the day.   Ambulation: 1 - Assistive Equipment   Transferrin - Assistive Equipment   Toiletin - Independent    Bathin - Assistive Equipment   Dressin - Independent   Eatin - Independent   Communication: 0 - Understands/communicates without difficulty  Swallowin - swallows foods/liquids without difficulty  Cognition: 0 - no cognitive issues reported    Fall history within the last 6 months: No    Current Living Situation: Pt lives alone in an apartment. No stairs to enter. Bathroom has a regular-lower height toilet and a walk in shower with a built in seat and grab bars. Pt was unsure about grab bars near the toilet but indicated the sink is close to assist with transfers.     Current Equipment Used at Home: Walker, cane, grab bars in shower, built in seat in shower     Patient & Family Goals: Return home and return to [facility]     Past Medical History:   Past Medical History:   Diagnosis Date     Asthma      Atrial fibrillation (H)      COPD (chronic obstructive pulmonary disease) (H)      Depression      High cholesterol      HTN (hypertension)      Thyroid disease        Past Surgical History:  Past Surgical History:   Procedure Laterality Date     BACK SURGERY  2006     CARDIAC SURGERY  2018     [facility]gi[facility] at [facility]     COLONOSCOPY N/A 1/19/2016    Procedure: COLONOSCOPY;  Surgeon: Waldemar Bob MD;  Location: [facility] OR     HYSTERECTOMY  1980    partial     SLING BLADDER SUSPENSION WITH FASCIA LINNETTE         Medications:   Current Facility-Administered Medications   Medication     albuterol (PROVENTIL) neb solution 2.5 mg     apixaban ANTICOAGULANT (ELIQUIS) tablet 5 mg     azithromycin (ZITHROMAX) 500 mg in sodium chloride 0.9 % 250 mL intermittent infusion     diltiazem (CARDIZEM) tablet 30 mg     fluticasone-vilanterol (BREO ELLIPTA) 100-25 MCG/INH inhaler 1 puff     HYDROmorphone (PF) (DILAUDID) injection 0.3 mg     ipratropium - albuterol 0.5 mg/2.5 mg/3 mL (DUONEB) neb solution 3 mL     levothyroxine (SYNTHROID/LEVOTHROID) tablet 100 mcg     lidocaine (LMX4) kit     lidocaine 1 % 0.1-1 mL     magnesium sulfate 4 g in 100 mL sterile water (premade)     metoprolol (LOPRESSOR) injection 5 mg     metoprolol tartrate (LOPRESSOR) tablet 100 mg     naloxone (NARCAN) injection 0.1-0.4 mg     ondansetron (ZOFRAN-ODT) ODT tab 4 mg    Or     ondansetron (ZOFRAN) injection 4 mg     Patient is already receiving anticoagulation with heparin, enoxaparin (LOVENOX), warfarin (COUMADIN)  or other anticoagulant medication     piperacillin-tazobactam (ZOSYN) intermittent infusion 4.5 g     potassium & sodium phosphates (NEUTRA-PHOS) Packet 1 packet     potassium chloride (KLOR-CON) Packet 20-40 mEq     potassium chloride 10 mEq in 100 mL intermittent infusion with 10 mg lidocaine     potassium chloride 10 mEq in 100 mL sterile water intermittent infusion (premix)     potassium chloride ER (K-DUR/KLOR-CON M) CR tablet 20-40 mEq     predniSONE (DELTASONE) tablet 20 mg     sodium chloride (PF) 0.9% PF flush 3 mL     sodium chloride (PF) 0.9% PF flush 3 mL     sodium chloride 0.9% infusion       Weight Bearing Status: NA    Cognitive Status Examination:  Orientation: oriented to time, place and person  Level of  Consciousness: alert  Follows Commands and Answers Questions: 100% of the time  Personal Safety and Judgement: Intact  Memory: Immediate recall intact and Long-term memory intact    Pain:   Currently in pain? No    Occupational Therapy Evaluation:   Range of Motion: BUE's WFL   Strength: BUMAREK's grossly 4/5  Hand Strength: Bilateral gross grasp good  Muscle Tone Assessment: No issues observed     Mobility:   Transfer Skills: Mod I sit<>stand from chair  Toilet Transfer: SBA   Balance: No LOB with use of walker      ADLs:  Lower Body Dressing: SBA but pt presented with moderate difficulties to doff/don her socks and mild difficulties to doff/don pants. Pt required frequent rest breaks too   Toileting: Independent   Grooming: Independent     Pt's oxygen was assessed before and after activity.Pt was on 1L at rest but removed during ADLs. Oxygen remained above 90% on both assessments.     IADLs:   Previous Responsibilities of the Patient: Meal Prep, Housekeeping, Laundry, Shopping, Medication Management, Finances  and Driving      Activities of Daily Living Analysis:   Impairments Contributing to Impaired Activities of Daily Living: activity tolerance decreased, generalized weakness    Occupational Therapy Interventions: ADL Retraining , ROM , strengthening  and progressive activity/exercise    Clinical Impressions:  Criteria for Skilled Therapeutic Intervention Met: Yes, treatment indicated  OT Diagnosis: Impaired ADLs  Influenced by the following impairments: Impaired activity tolerance, generalized weakness  Functional limitations due to impairment: Difficulties performing ADLs and IADLs  Clinical presentation: Stable/Uncomplicated  Clinical presentation rationale: Clinical judgement  Clinical Decision making (complexity): Low Complexity  Frequency: 5 times/week  Predicted Duration of Therapy Intervention (days/wks): 5 days  Anticipated Discharge Disposition: Home with Home Therapy  Anticipated Equipment Needs at  Discharge: None  Risks and Benefits of therapy have been explained: Yes  Patient, Family & other staff in agreement with plan of care: Yes  Clinical Impression Comments: Pt presents with generalized weakness and impaired activity tolerance from baseline and would benefit from home care OT services to improve her overall strength and independence in ADLs     Total Eval Time: 12

## 2019-09-09 NOTE — PLAN OF CARE
Patient denies pain. VSS with the exception of heart rate. Telemetry A-Fib 90's-160's per ICU written report. I was not notified of increased heart rate to 160's. Scheduled Metoprolol PO and Cardizem PO continue. When vital signs were obtained, telemetry monitor only read as documented in vital sign flow sheet. Lung sounds had expiratory wheezes throughout. She denies respiratory difficulty. Patient has been encouraged to deep breathe and cough frequently as able. Oral intake needs encouragement, without it, patient would not be taking enough in. Urine output is fair. She had 3 small loose brown BM's this shift. Patient has been continent this shift. She was up in the chair for most of the shift. Patient ambulated hallways once this shift. Remains assist of 1 with walker and gait belt for safety. Alarms in place. Call light within reach. IVF and IV antibiotic continue. Potassium Phosphate IV replacement given this shift along with PO.    Face to face report given with opportunity to observe patient.    Report given to Amie BARROW.    Sarahi Jeronimo RN   9/9/2019  12:57 AM

## 2019-09-09 NOTE — PLAN OF CARE
Physical Therapy Discharge Summary    Reason for therapy discharge:    Discharged to home with home therapy.    Progress towards therapy goal(s). See goals on Care Plan in [facility] electronic health record for goal details.  Goals partially met.  Barriers to achieving goals:   discharge from facility.    Therapy recommendation(s):    Continued therapy is recommended.  Rationale/Recommendations:  skilled home rehab services to improve stamina and endurance and return patient to baseline.

## 2019-09-10 NOTE — PLAN OF CARE
Occupational Therapy Discharge Summary    Reason for therapy discharge:    Discharged to home with home therapy.    Progress towards therapy goal(s). See goals on Care Plan in [facility] electronic health record for goal details.  Goals partially met.  Barriers to achieving goals:   discharge from facility and discharge on same date as initial evaluation.    Therapy recommendation(s):    Pt presents with generalized weakness and impaired activity tolerance from baseline and would benefit from home care OT services to improve her overall strength and independence in ADLs.

## 2019-09-11 LAB
BACTERIA SPEC CULT: ABNORMAL
E COLI SXT1+2 STL IA: ABNORMAL
SPECIMEN SOURCE: ABNORMAL

## 2019-09-11 RX ORDER — CLONIDINE HYDROCHLORIDE 0.1 MG/1
TABLET ORAL
Qty: 180 TABLET | Refills: 0 | Status: SHIPPED | OUTPATIENT
Start: 2019-09-11 | End: 2019-12-13

## 2019-09-13 ENCOUNTER — TELEPHONE (OUTPATIENT)
Dept: FAMILY MEDICINE | Facility: OTHER | Age: 69
End: 2019-09-13

## 2019-09-13 ENCOUNTER — APPOINTMENT (OUTPATIENT)
Dept: GENERAL RADIOLOGY | Facility: HOSPITAL | Age: 69
End: 2019-09-13
Attending: PHYSICIAN ASSISTANT
Payer: MEDICARE

## 2019-09-13 ENCOUNTER — HOSPITAL ENCOUNTER (EMERGENCY)
Facility: HOSPITAL | Age: 69
Discharge: HOME | End: 2019-09-13
Attending: PHYSICIAN ASSISTANT | Admitting: PHYSICIAN ASSISTANT
Payer: MEDICARE

## 2019-09-13 VITALS
SYSTOLIC BLOOD PRESSURE: 127 MMHG | RESPIRATION RATE: 18 BRPM | OXYGEN SATURATION: 98 % | TEMPERATURE: 98.4 F | DIASTOLIC BLOOD PRESSURE: 93 MMHG | HEART RATE: 108 BPM

## 2019-09-13 DIAGNOSIS — K62.5 BRBPR (BRIGHT RED BLOOD PER RECTUM): ICD-10-CM

## 2019-09-13 DIAGNOSIS — Z79.01 LONG TERM CURRENT USE OF ANTICOAGULANT THERAPY: ICD-10-CM

## 2019-09-13 DIAGNOSIS — R10.84 ABDOMINAL PAIN, GENERALIZED: ICD-10-CM

## 2019-09-13 DIAGNOSIS — R60.0 BILATERAL LOWER EXTREMITY EDEMA: ICD-10-CM

## 2019-09-13 LAB
ALBUMIN SERPL-MCNC: 2.6 G/DL (ref 3.4–5)
ALBUMIN UR-MCNC: NEGATIVE MG/DL
ALP SERPL-CCNC: 22 U/L (ref 40–150)
ALT SERPL W P-5'-P-CCNC: 12 U/L (ref 0–50)
ANION GAP SERPL CALCULATED.3IONS-SCNC: 10 MMOL/L (ref 3–14)
APPEARANCE UR: CLEAR
AST SERPL W P-5'-P-CCNC: 15 U/L (ref 0–45)
BACTERIA #/AREA URNS HPF: ABNORMAL /HPF
BASOPHILS # BLD AUTO: 0 10E9/L (ref 0–0.2)
BASOPHILS NFR BLD AUTO: 0.1 %
BILIRUB SERPL-MCNC: 0.5 MG/DL (ref 0.2–1.3)
BILIRUB UR QL STRIP: NEGATIVE
BUN SERPL-MCNC: 5 MG/DL (ref 7–30)
CALCIUM SERPL-MCNC: 8.2 MG/DL (ref 8.5–10.1)
CHLORIDE SERPL-SCNC: 100 MMOL/L (ref 94–109)
CO2 SERPL-SCNC: 32 MMOL/L (ref 20–32)
COLOR UR AUTO: ABNORMAL
CREAT SERPL-MCNC: 0.67 MG/DL (ref 0.52–1.04)
DIFFERENTIAL METHOD BLD: ABNORMAL
EOSINOPHIL # BLD AUTO: 0.1 10E9/L (ref 0–0.7)
EOSINOPHIL NFR BLD AUTO: 0.9 %
ERYTHROCYTE [DISTWIDTH] IN BLOOD BY AUTOMATED COUNT: 13.6 % (ref 10–15)
GFR SERPL CREATININE-BSD FRML MDRD: 89 ML/MIN/{1.73_M2}
GLUCOSE SERPL-MCNC: 96 MG/DL (ref 70–99)
GLUCOSE UR STRIP-MCNC: NEGATIVE MG/DL
HCT VFR BLD AUTO: 35.2 % (ref 35–47)
HGB BLD-MCNC: 11.8 G/DL (ref 11.7–15.7)
HGB UR QL STRIP: ABNORMAL
HYALINE CASTS #/AREA URNS LPF: 1 /LPF
IMM GRANULOCYTES # BLD: 0.1 10E9/L (ref 0–0.4)
IMM GRANULOCYTES NFR BLD: 0.5 %
KETONES UR STRIP-MCNC: NEGATIVE MG/DL
LACTATE BLD-SCNC: 2.3 MMOL/L (ref 0.7–2)
LEUKOCYTE ESTERASE UR QL STRIP: NEGATIVE
LYMPHOCYTES # BLD AUTO: 1.4 10E9/L (ref 0.8–5.3)
LYMPHOCYTES NFR BLD AUTO: 9.2 %
MCH RBC QN AUTO: 32.9 PG (ref 26.5–33)
MCHC RBC AUTO-ENTMCNC: 33.5 G/DL (ref 31.5–36.5)
MCV RBC AUTO: 98 FL (ref 78–100)
MONOCYTES # BLD AUTO: 0.8 10E9/L (ref 0–1.3)
MONOCYTES NFR BLD AUTO: 5.5 %
MUCOUS THREADS #/AREA URNS LPF: PRESENT /LPF
NEUTROPHILS # BLD AUTO: 12.2 10E9/L (ref 1.6–8.3)
NEUTROPHILS NFR BLD AUTO: 83.8 %
NITRATE UR QL: NEGATIVE
NRBC # BLD AUTO: 0 10*3/UL
NRBC BLD AUTO-RTO: 0 /100
PH UR STRIP: 7.5 PH (ref 4.7–8)
PLATELET # BLD AUTO: 287 10E9/L (ref 150–450)
POTASSIUM SERPL-SCNC: 3.1 MMOL/L (ref 3.4–5.3)
PROCALCITONIN SERPL-MCNC: <0.05 NG/ML
PROT SERPL-MCNC: 5.4 G/DL (ref 6.8–8.8)
RBC # BLD AUTO: 3.59 10E12/L (ref 3.8–5.2)
RBC #/AREA URNS AUTO: <1 /HPF (ref 0–2)
SODIUM SERPL-SCNC: 142 MMOL/L (ref 133–144)
SOURCE: ABNORMAL
SP GR UR STRIP: 1 (ref 1–1.03)
SQUAMOUS #/AREA URNS AUTO: 1 /HPF (ref 0–1)
UROBILINOGEN UR STRIP-MCNC: NORMAL MG/DL (ref 0–2)
WBC # BLD AUTO: 14.6 10E9/L (ref 4–11)
WBC #/AREA URNS AUTO: <1 /HPF (ref 0–5)

## 2019-09-13 PROCEDURE — 81001 URINALYSIS AUTO W/SCOPE: CPT | Performed by: PHYSICIAN ASSISTANT

## 2019-09-13 PROCEDURE — 99284 EMERGENCY DEPT VISIT MOD MDM: CPT | Mod: Z6 | Performed by: PHYSICIAN ASSISTANT

## 2019-09-13 PROCEDURE — 36415 COLL VENOUS BLD VENIPUNCTURE: CPT | Performed by: PHYSICIAN ASSISTANT

## 2019-09-13 PROCEDURE — 80053 COMPREHEN METABOLIC PANEL: CPT | Performed by: PHYSICIAN ASSISTANT

## 2019-09-13 PROCEDURE — 83605 ASSAY OF LACTIC ACID: CPT | Performed by: PHYSICIAN ASSISTANT

## 2019-09-13 PROCEDURE — 85025 COMPLETE CBC W/AUTO DIFF WBC: CPT | Performed by: PHYSICIAN ASSISTANT

## 2019-09-13 PROCEDURE — 74019 RADEX ABDOMEN 2 VIEWS: CPT | Mod: TC

## 2019-09-13 PROCEDURE — 99284 EMERGENCY DEPT VISIT MOD MDM: CPT

## 2019-09-13 PROCEDURE — 84145 PROCALCITONIN (PCT): CPT | Performed by: PHYSICIAN ASSISTANT

## 2019-09-13 ASSESSMENT — ENCOUNTER SYMPTOMS
SHORTNESS OF BREATH: 0
NAUSEA: 0
ABDOMINAL PAIN: 1
VOMITING: 0
BLOOD IN STOOL: 1
LIGHT-HEADEDNESS: 1
FEVER: 0

## 2019-09-13 NOTE — PROGRESS NOTES
Emergency Department Assessment  PCP: Braydon eYn  Specialists or MH providers: Dr. Myers- cardiology, Dr. Blake- at [facility]   Pharmacy: [facility]   Medication routine/concerns independently manages   Cognitive Behavioral Status: awake, alert and oriented    Affect and Mood Status: pleasant     Problems sleeping: no concerns     Mental Health History: denies   Recent Stressors: denies     Case Manger/: Lauren Pipkin, clinic care coordinator     Support System: family, friends  Transportation: friends, grandchildren     Current Living Situation:    Home Setting: apartment    Living Situation:Alone   Function Status/Assistive Device: wheeled walker    Employment/Financial/Insurance Concerns:retired     Insurance Plan     [facility]   Health Care Directive: yes, identifies Ksenia and Negar   Previous Services at Home or in the Community:[facility], just started will have PT and OT.  Will consider nursing.  Notified of ED visit   Falls at home?: denies     ADL's:independent   Equipment at home?: walker, oxygen at night   O2: at night  Smoker: denies   ETOH: denies   THC/Drugs: denies     Any Violence in the home?: denies   Do you feel safe at home?: yes       Plan: home via niece

## 2019-09-13 NOTE — ED PROVIDER NOTES
History     Chief Complaint   Patient presents with     Abdominal Pain     c/o lower abdominal pain rated 6/10 since Wednesday. Reports bright red blood in stool.      HPI  Mary Alice Cameron is a 69 year old female who presents emergency department with complaints of lower abdominal pain which she rates as 6 out of 10 which is been ongoing since Wednesday.  Patient was discharged from the hospital on September 9 after 6-day stay for small bowel obstruction.  Patient states that she developed bright red blood in the stools yesterday and has been feeling very gurgly.  Last passed gas while up to bathroom in ED on arrival.  She had some lightheaded this morning this morning but denies shortness of breath.  Patient has a history of COPD and is currently on oxygen at night.  Her oxygen saturations here are 93 to 95% on room air.  She was treated for pneumonia while she was in the hospital and she states that her respiratory symptoms have improved.  She denies nausea, vomiting, fever, urinary symptoms.  She is currently taking Eliquis for atrial fibrillation and bicuspid aorta.      Allergies:  Allergies   Allergen Reactions     Amlodipine Besylate Cough     Norvasc     Lisinopril Cough       Problem List:    Patient Active Problem List    Diagnosis Date Noted     Hypokalemia 09/08/2019     Priority: Medium     Acute cystitis without hematuria 09/03/2019     Priority: Medium     Small bowel obstruction (H) 09/03/2019     Priority: Medium     Acute renal failure (H) 09/03/2019     Priority: Medium     S/P AVR (aortic valve replacement) 04/23/2019     Priority: Medium     Status post coronary angiogram 06/19/2018     Priority: Medium     Coronary artery disease involving native coronary artery of native heart without angina pectoris 06/19/2018     Priority: Medium     Health Care Home 01/26/2017     Priority: Medium     Acute on chronic respiratory failure (H) 01/02/2017     Priority: Medium     Long-term (current) use of  anticoagulants [Z79.01] 08/03/2016     Priority: Medium     Essential hypertension 06/29/2016     Priority: Medium     Advance care planning 02/08/2016     Priority: Medium     Advance Care Planning 2/8/2016: Receipt of ACP document:  Received: Health Care Directive which was witnessed or notarized on 1/5/16.  Document previously scanned on 1/22/16.  Validation form completed and sent to be scanned.  Code Status needs to be updated to reflect choices in most recent ACP document.  1/5/16 Health Care Directive indicates preference for DNR code; recommend conversation about goals of care and completion of a POLST.  Confirmed/documented designated decision maker(s).  Added by Shobha Jang             Hypothyroidism, postablative 03/23/2015     Priority: Medium     Problem list name updated by automated process. Provider to review       Acute bronchitis 03/22/2015     Priority: Medium     Mild major depression (H) 08/27/2014     Priority: Medium     Bicuspid aortic valve 03/25/2014     Priority: Medium     Persistent atrial fibrillation (H) 01/17/2014     Priority: Medium     Pulmonary emphysema (H) 01/17/2014     Priority: Medium        Past Medical History:    Past Medical History:   Diagnosis Date     Asthma      Atrial fibrillation (H)      COPD (chronic obstructive pulmonary disease) (H)      Depression      High cholesterol      HTN (hypertension)      Thyroid disease        Past Surgical History:    Past Surgical History:   Procedure Laterality Date     BACK SURGERY  2006     CARDIAC SURGERY  06/12/2018    Angiogram at [facility]     COLONOSCOPY N/A 1/19/2016    Procedure: COLONOSCOPY;  Surgeon: Waldemar Bob MD;  Location: [facility] OR     HYSTERECTOMY  1980    partial     SLING BLADDER SUSPENSION WITH FASCIA LINNETTE         Family History:    Family History   Problem Relation Age of Onset     Prostate Cancer Father      Hypertension Father      Heart Failure Father         CHF     Asthma Mother       Cancer Mother         ovarian     Hypertension Mother      Asthma Brother      Hypertension Sister      Hypertension Brother        Social History:  Marital Status:   [5]  Social History     Tobacco Use     Smoking status: Former Smoker     Packs/day: 0.50     Years: 41.00     Pack years: 20.50     Types: Cigarettes     Start date: 1966     Last attempt to quit: 2007     Years since quittin.6     Smokeless tobacco: Never Used   Substance Use Topics     Alcohol use: No     Alcohol/week: 0.0 oz     Drug use: No        Medications:      acetaminophen (TYLENOL) 500 MG tablet   ADVAIR -21 MCG/ACT Inhaler   albuterol (2.5 MG/3ML) 0.083% neb solution   albuterol (PROAIR HFA/PROVENTIL HFA/VENTOLIN HFA) 108 (90 Base) MCG/ACT inhaler   atorvastatin (LIPITOR) 10 MG tablet   Calcium Carb-Cholecalciferol (CALTRATE 600+D3 SOFT PO)   cloNIDine (CATAPRES) 0.1 MG tablet   COMBIVENT RESPIMAT  MCG/ACT inhaler   diltiazem ER COATED BEADS (CARDIZEM CD) 360 MG 24 hr capsule   diphenhydrAMINE (BENADRYL) 25 MG tablet   ELIQUIS 5 MG tablet   furosemide (LASIX) 40 MG tablet   ipratropium - albuterol 0.5 mg/2.5 mg/3 mL (DUONEB) 0.5-2.5 (3) MG/3ML neb solution   levothyroxine (SYNTHROID/LEVOTHROID) 100 MCG tablet   potassium chloride SA (K-DUR/KLOR-CON M) 20 MEQ CR tablet   losartan (COZAAR) 50 MG tablet   OTHER MEDICAL SUPPLIES   predniSONE (DELTASONE) 20 MG tablet   sulfamethoxazole-trimethoprim (BACTRIM DS/SEPTRA DS) 800-160 MG tablet         Review of Systems   Constitutional: Negative for fever.   Respiratory: Negative for shortness of breath.    Cardiovascular: Negative for chest pain.   Gastrointestinal: Positive for abdominal pain and blood in stool. Negative for nausea and vomiting.   Genitourinary: Negative.    Neurological: Positive for light-headedness.       Physical Exam   BP: 104/63  Pulse: 108  Heart Rate: 101  Temp: 99.1  F (37.3  C)  Resp: 19  SpO2: 93 %  Lying Orthostatic BP: 116/73  Lying  Orthostatic Pulse: 85 bpm  Sitting Orthostatic BP: 107/61  Sitting Orthostatic Pulse: 85 bpm  Standing Orthostatic BP: 103/58  Standing Orthostatic Pulse: 119 bpm      Physical Exam   Constitutional: No distress.   HENT:   Head: Atraumatic.   Mouth/Throat: Oropharynx is clear and moist. No oropharyngeal exudate.   Eyes: Pupils are equal, round, and reactive to light. No scleral icterus.   Cardiovascular: Normal heart sounds and intact distal pulses.   Pulmonary/Chest: Breath sounds normal. No respiratory distress.   Abdominal: Soft. There is tenderness in the left lower quadrant.   Tympanic bowel sounds in RU region of the abdomen.     Musculoskeletal: She exhibits no edema or tenderness.   Skin: Skin is warm. No rash noted. She is not diaphoretic.       ED Course        Procedures  Symptoms concerning for bowel obstruction but patient is passing gas and last BM was yesterday afternoon.  Patient is not yet anemic.  HR did increase to 118 on standing but she denied lightheadedness.  Tachycardia was likely secondary to atrial fibrillation.      Discussed patient with Destinee Carter and Dayna who both felt patient could stop Elequis and be safely discharged until follow-up with Dr. Yen.  Appt scheduled for Monday morning.  Discussed with patient that she needs to return to ED immediately if new fever, increased abdominal pain with no BMs, increased lightheadedness or shortness of breath suggestive of anemia.               Results for orders placed or performed during the hospital encounter of 09/13/19 (from the past 24 hour(s))   UA reflex to Microscopic and Culture   Result Value Ref Range    Color Urine Light Yellow     Appearance Urine Clear     Glucose Urine Negative NEG^Negative mg/dL    Bilirubin Urine Negative NEG^Negative    Ketones Urine Negative NEG^Negative mg/dL    Specific Gravity Urine 1.005 1.003 - 1.035    Blood Urine Trace (A) NEG^Negative    pH Urine 7.5 4.7 - 8.0 pH    Protein Albumin  Urine Negative NEG^Negative mg/dL    Urobilinogen mg/dL Normal 0.0 - 2.0 mg/dL    Nitrite Urine Negative NEG^Negative    Leukocyte Esterase Urine Negative NEG^Negative    Source Unspecified Urine     RBC Urine <1 0 - 2 /HPF    WBC Urine <1 0 - 5 /HPF    Bacteria Urine None (A) NEG^Negative /HPF    Squamous Epithelial /HPF Urine 1 0 - 1 /HPF    Mucous Urine Present (A) NEG^Negative /LPF    Hyaline Casts 1 (A) OTO2^O - 2 /LPF   CBC with platelets differential   Result Value Ref Range    WBC 14.6 (H) 4.0 - 11.0 10e9/L    RBC Count 3.59 (L) 3.8 - 5.2 10e12/L    Hemoglobin 11.8 11.7 - 15.7 g/dL    Hematocrit 35.2 35.0 - 47.0 %    MCV 98 78 - 100 fl    MCH 32.9 26.5 - 33.0 pg    MCHC 33.5 31.5 - 36.5 g/dL    RDW 13.6 10.0 - 15.0 %    Platelet Count 287 150 - 450 10e9/L    Diff Method Automated Method     % Neutrophils 83.8 %    % Lymphocytes 9.2 %    % Monocytes 5.5 %    % Eosinophils 0.9 %    % Basophils 0.1 %    % Immature Granulocytes 0.5 %    Nucleated RBCs 0 0 /100    Absolute Neutrophil 12.2 (H) 1.6 - 8.3 10e9/L    Absolute Lymphocytes 1.4 0.8 - 5.3 10e9/L    Absolute Monocytes 0.8 0.0 - 1.3 10e9/L    Absolute Eosinophils 0.1 0.0 - 0.7 10e9/L    Absolute Basophils 0.0 0.0 - 0.2 10e9/L    Abs Immature Granulocytes 0.1 0 - 0.4 10e9/L    Absolute Nucleated RBC 0.0    Comprehensive metabolic panel   Result Value Ref Range    Sodium 142 133 - 144 mmol/L    Potassium 3.1 (L) 3.4 - 5.3 mmol/L    Chloride 100 94 - 109 mmol/L    Carbon Dioxide 32 20 - 32 mmol/L    Anion Gap 10 3 - 14 mmol/L    Glucose 96 70 - 99 mg/dL    Urea Nitrogen 5 (L) 7 - 30 mg/dL    Creatinine 0.67 0.52 - 1.04 mg/dL    GFR Estimate 89 >60 mL/min/[1.73_m2]    GFR Estimate If Black >90 >60 mL/min/[1.73_m2]    Calcium 8.2 (L) 8.5 - 10.1 mg/dL    Bilirubin Total 0.5 0.2 - 1.3 mg/dL    Albumin 2.6 (L) 3.4 - 5.0 g/dL    Protein Total 5.4 (L) 6.8 - 8.8 g/dL    Alkaline Phosphatase 22 (L) 40 - 150 U/L    ALT 12 0 - 50 U/L    AST 15 0 - 45 U/L   Lactic acid  whole blood   Result Value Ref Range    Lactic Acid 2.3 (H) 0.7 - 2.0 mmol/L   Procalcitonin   Result Value Ref Range    Procalcitonin <0.05 ng/ml   XR Abdomen 2 Views    Narrative    PROCEDURE: XR ABDOMEN 2 VW 9/13/2019 11:33 AM    HISTORY: possible bowel obstruction    COMPARISONS: None.    TECHNIQUE: Flat and upright    FINDINGS: There is mildly distended small bowel loops noted improved  as compared to a CT scan on September 3, 2019. Gas is seen in  nondilated colonic loops. No extraluminal gas is noted surgical clips  are seen in the right upper quadrant heavy atherosclerotic plaquing is  noted in the abdominal aorta and iliac vessels. Degenerative changes  are present lumbar spine.         Impression    IMPRESSION: Small bowel distention which is improved as compared to a  CT scan September 3, 2019    REDD WADSWORTH MD       Medications - No data to display    Assessments & Plan (with Medical Decision Making)     I have reviewed the nursing notes.    I have reviewed the findings, diagnosis, plan and need for follow up with the patient.    New Prescriptions    No medications on file       Final diagnoses:   BRBPR (bright red blood per rectum)   Long term current use of anticoagulant therapy   Bilateral lower extremity edema   Abdominal pain, generalized     ALCON White on 9/13/2019 at 12:29 PM   9/13/2019   [facility] EM[facility]     Armin Tracy PA  09/13/19 2196

## 2019-09-13 NOTE — ED NOTES
Pt discharged at this time via wheelchair with nephew to home. Discharge instructions reviewed with pt including medication changes which include stopping Eliquis until her follow up appt which has been scheduled for Monday 9-16. Reviewed with pt reasons to return to ED, pt verbalizes understanding.

## 2019-09-13 NOTE — ED NOTES
DATE:  9/13/2019   TIME OF RECEIPT FROM LAB:  1120  LAB TEST:  Lactic acid  LAB VALUE:  2.3  RESULTS GIVEN WITH READ-BACK TO (PROVIDER):  Esdras ARREOLA LAB VALUE REPORTED TO PROVIDER:   1120

## 2019-09-13 NOTE — ED NOTES
Pt up to commode, passed small stool but mixed with urine so unable to send to lab. Stool is soft and is maroon in color. ALCON Dewitt notified.

## 2019-09-13 NOTE — TELEPHONE ENCOUNTER
Melissa from homecare/hospice called for verbal orders for skilled nursing visit. Verbals given.    Maria Victoria Reyes LPN

## 2019-09-13 NOTE — ED AVS SNAPSHOT
[facility]  [address] [address]  Phone:  [phone]                                    Mary Alice Cameron   MRN: [id number]    Department:  [facility] Emergency [facility]   Date of Visit:  9/13/2019           After Visit Summary Signature Page    I have received my discharge instructions, and my questions have been answered. I have discussed any challenges I see with this plan with the nurse or doctor.    ..........................................................................................................................................  Patient/Patient Representative Signature      ..........................................................................................................................................  Patient Representative Print Name and Relationship to Patient    ..................................................               ................................................  Date                                   Time    ..........................................................................................................................................  Reviewed by Signature/Title    ...................................................              ..............................................  Date                                               Time          22EPIC Rev 08/18

## 2019-09-14 ENCOUNTER — TELEPHONE (OUTPATIENT)
Dept: CASE MANAGEMENT | Facility: HOSPITAL | Age: 69
End: 2019-09-14

## 2019-09-14 DIAGNOSIS — E87.6 HYPOKALEMIA: ICD-10-CM

## 2019-09-14 DIAGNOSIS — J44.1 COPD EXACERBATION (H): ICD-10-CM

## 2019-09-14 NOTE — TELEPHONE ENCOUNTER
Mary Alice Cameron, was discharged to home on 9/9/19   from [facility]. I spoke today with her regarding her  discharge.   She indicates she has receive(d) sufficient information upon discharge. Medications were reviewed in full on discharge, including: medications to be continued from preadmission and any side effects. Prescriptions were received at discharge and were able to be filled. Medications are being taken as prescribed.   She indicates she has  an appointment scheduled for 9/16/19  and has  transportation available. She was  able to confirm her  appointment time and has  it written down.   She did not have any questions regarding discharge instructions or condition.  Per their request, the following employee (s) can be recognized for their outstanding services delivered:  Cared for very well.   Suggestions to improve service: Nothing indicated.   She was informed she  may receive a survey in the mail from the hospital. Asked if she  would kindly complete the survey in order for [facility] to know if services fully met patient needs.

## 2019-09-16 ENCOUNTER — PATIENT OUTREACH (OUTPATIENT)
Dept: CARE COORDINATION | Facility: OTHER | Age: 69
End: 2019-09-16

## 2019-09-16 ENCOUNTER — TELEPHONE (OUTPATIENT)
Dept: FAMILY MEDICINE | Facility: OTHER | Age: 69
End: 2019-09-16

## 2019-09-16 ENCOUNTER — OFFICE VISIT (OUTPATIENT)
Dept: FAMILY MEDICINE | Facility: OTHER | Age: 69
End: 2019-09-16
Attending: FAMILY MEDICINE
Payer: COMMERCIAL

## 2019-09-16 VITALS
SYSTOLIC BLOOD PRESSURE: 108 MMHG | TEMPERATURE: 98.6 F | BODY MASS INDEX: 25.4 KG/M2 | HEART RATE: 70 BPM | OXYGEN SATURATION: 96 % | WEIGHT: 148 LBS | DIASTOLIC BLOOD PRESSURE: 62 MMHG

## 2019-09-16 DIAGNOSIS — I48.19 PERSISTENT ATRIAL FIBRILLATION (H): ICD-10-CM

## 2019-09-16 DIAGNOSIS — K62.5 BRBPR (BRIGHT RED BLOOD PER RECTUM): Primary | ICD-10-CM

## 2019-09-16 DIAGNOSIS — E87.6 HYPOKALEMIA: ICD-10-CM

## 2019-09-16 DIAGNOSIS — Z53.9 PERSONS ENCOUNTERING HEALTH SERVICES FOR SPECIFIC PROCEDURES, NOT CARRIED OUT: Primary | ICD-10-CM

## 2019-09-16 LAB
ANION GAP SERPL CALCULATED.3IONS-SCNC: 9 MMOL/L (ref 3–14)
BASOPHILS # BLD AUTO: 0 10E9/L (ref 0–0.2)
BASOPHILS NFR BLD AUTO: 0.4 %
BUN SERPL-MCNC: 4 MG/DL (ref 7–30)
CALCIUM SERPL-MCNC: 9 MG/DL (ref 8.5–10.1)
CHLORIDE SERPL-SCNC: 98 MMOL/L (ref 94–109)
CO2 SERPL-SCNC: 31 MMOL/L (ref 20–32)
CREAT SERPL-MCNC: 0.83 MG/DL (ref 0.52–1.04)
DIFFERENTIAL METHOD BLD: ABNORMAL
EOSINOPHIL # BLD AUTO: 0.1 10E9/L (ref 0–0.7)
EOSINOPHIL NFR BLD AUTO: 0.7 %
ERYTHROCYTE [DISTWIDTH] IN BLOOD BY AUTOMATED COUNT: 14.1 % (ref 10–15)
GFR SERPL CREATININE-BSD FRML MDRD: 72 ML/MIN/{1.73_M2}
GLUCOSE SERPL-MCNC: 92 MG/DL (ref 70–99)
HCT VFR BLD AUTO: 35.2 % (ref 35–47)
HGB BLD-MCNC: 11.5 G/DL (ref 11.7–15.7)
LYMPHOCYTES # BLD AUTO: 1.2 10E9/L (ref 0.8–5.3)
LYMPHOCYTES NFR BLD AUTO: 12.2 %
MCH RBC QN AUTO: 33.6 PG (ref 26.5–33)
MCHC RBC AUTO-ENTMCNC: 32.7 G/DL (ref 31.5–36.5)
MCV RBC AUTO: 103 FL (ref 78–100)
MONOCYTES # BLD AUTO: 0.9 10E9/L (ref 0–1.3)
MONOCYTES NFR BLD AUTO: 8.9 %
NEUTROPHILS # BLD AUTO: 7.6 10E9/L (ref 1.6–8.3)
NEUTROPHILS NFR BLD AUTO: 77.8 %
PLATELET # BLD AUTO: 307 10E9/L (ref 150–450)
POTASSIUM SERPL-SCNC: 3.3 MMOL/L (ref 3.4–5.3)
RBC # BLD AUTO: 3.42 10E12/L (ref 3.8–5.2)
SODIUM SERPL-SCNC: 138 MMOL/L (ref 133–144)
WBC # BLD AUTO: 9.7 10E9/L (ref 4–11)

## 2019-09-16 PROCEDURE — 36415 COLL VENOUS BLD VENIPUNCTURE: CPT | Mod: ZL | Performed by: FAMILY MEDICINE

## 2019-09-16 PROCEDURE — 85025 COMPLETE CBC W/AUTO DIFF WBC: CPT | Mod: ZL | Performed by: FAMILY MEDICINE

## 2019-09-16 PROCEDURE — G0180 MD CERTIFICATION HHA PATIENT: HCPCS | Performed by: FAMILY MEDICINE

## 2019-09-16 PROCEDURE — 80048 BASIC METABOLIC PNL TOTAL CA: CPT | Mod: ZL | Performed by: FAMILY MEDICINE

## 2019-09-16 PROCEDURE — G0463 HOSPITAL OUTPT CLINIC VISIT: HCPCS

## 2019-09-16 PROCEDURE — 99214 OFFICE O/P EST MOD 30 MIN: CPT | Performed by: FAMILY MEDICINE

## 2019-09-16 RX ORDER — POTASSIUM CHLORIDE 1500 MG/1
TABLET, EXTENDED RELEASE ORAL
Qty: 180 TABLET | Refills: 0 | OUTPATIENT
Start: 2019-09-16

## 2019-09-16 RX ORDER — FLUTICASONE PROPIONATE AND SALMETEROL XINAFOATE 115; 21 UG/1; UG/1
AEROSOL, METERED RESPIRATORY (INHALATION)
Qty: 36 G | Refills: 0 | Status: SHIPPED | OUTPATIENT
Start: 2019-09-16 | End: 2020-01-08

## 2019-09-16 RX ORDER — POTASSIUM CHLORIDE 1500 MG/1
20 TABLET, EXTENDED RELEASE ORAL 2 TIMES DAILY
Qty: 180 TABLET | Refills: 3 | Status: SHIPPED | OUTPATIENT
Start: 2019-09-16 | End: 2020-09-10

## 2019-09-16 ASSESSMENT — PAIN SCALES - GENERAL: PAINLEVEL: NO PAIN (0)

## 2019-09-16 ASSESSMENT — ACTIVITIES OF DAILY LIVING (ADL): DEPENDENT_IADLS:: INDEPENDENT

## 2019-09-16 NOTE — PROGRESS NOTES
Clinic Care Coordination Contact  Care Team Conversations    Outreach call to Mary Alice this morning.  She reports she is seeing Dr Yen today at 11.  Is going to find out what follow-up is needed from her recent hospitalization and ED visit.  She denies having any questions or needs from care coordination at this time.  She states she is concerned about her out-of-pocket costs for the medical care she has been receiving.  She states she has the "[employer] application she received from Matt with [employer].  She has not completed and sent this because she hasn't been feeling well.  She reports she will take care of this soon.  CC encouraged her to reach out if there is anything we can do for her.  Mary Alice verbalized understanding.    Lauren Pipkin, BS-RN   CHF and General Care Coordinator  [phone] Option # 1

## 2019-09-16 NOTE — PROGRESS NOTES
Subjective     Mary Alice Cameron is a 69 year old female who presents to clinic today for the following health issues:    HPI   ED[place]UC Followup:    [place]  [facility] [place] visit: 19  Reason for visit: blood in stool  Current Status: feels every day is getting better. No blood in stool [facility]       Hospital Followup:    [place]  [facility]  [place] of visit: 9/3-19  Reason for visit: bowel obstruction   Current Status: not really obstructed.  Having soft stools now without issue.         Also has a blister right leg.  Was off K due to inability of pharmacy to refill.  I clarified that today.    Patient Active Problem List   Diagnosis     Persistent atrial fibrillation (H)     Pulmonary emphysema (H)     Bicuspid aortic valve     Mild major depression (H)     Acute bronchitis     Hypothyroidism, postablative     Advance care planning     Essential hypertension     Long-term (current) use of anticoagulants [Z79.01]     Acute on chronic respiratory failure (H)     Health Care Home     Status post coronary angiogram     Coronary artery disease involving native coronary artery of native heart without angina pectoris     S/P AVR (aortic valve replacement)     Acute cystitis without hematuria     Small bowel obstruction (H)     Acute renal failure (H)     Hypokalemia     Past Surgical History:   Procedure Laterality Date     BACK SURGERY  2006     CARDIAC SURGERY  2018    Angiogram at [facility]     COLONOSCOPY N/A 2016    Procedure: COLONOSCOPY;  Surgeon: Waldemar Bob MD;  Location: [facility]     HYSTERECTOMY      partial     SLING BLADDER SUSPENSION WITH FASCIA LINNETTE         Social History     Tobacco Use     Smoking status: Former Smoker     Packs/day: 0.50     Years: 41.00     Pack years: 20.50     Types: Cigarettes     Start date: 1966     Last attempt to quit: 2007     Years since quittin.6     Smokeless tobacco: Never Used   Substance Use Topics     Alcohol  use: No     Alcohol/week: 0.0 oz     Family History   Problem Relation Age of Onset     Prostate Cancer Father      Hypertension Father      Heart Failure Father         CHF     Asthma Mother      Cancer Mother         ovarian     Hypertension Mother      Asthma Brother      Hypertension Sister      Hypertension Brother          Current Outpatient Medications   Medication Sig Dispense Refill     acetaminophen (TYLENOL) 500 MG tablet Take 500-1,000 mg by mouth every 6 hours as needed for mild pain       ADVAIR -21 MCG/ACT Inhaler INHALE 2 PUFFS INTO THE LUNGS TWICE DAILY 36 g 2     albuterol (2.5 MG/3ML) 0.083% neb solution Take 1 vial (2.5 mg) by nebulization every 6 hours as needed for shortness of breath / dyspnea or wheezing 25 vial 1     albuterol (PROAIR HFA/PROVENTIL HFA/VENTOLIN HFA) 108 (90 Base) MCG/ACT inhaler INHALE 2 PUFFS INTO THE LUNGS EVERY 4 HOURS AS NEEDED FOR SHORTNESS OF BREATH OR DIFFICULT BREATHING OR WHEEZING 54 g 0     atorvastatin (LIPITOR) 10 MG tablet TAKE 1 TABLET BY MOUTH EVERY NIGHT AT BEDTIME 90 tablet 0     Calcium Carb-Cholecalciferol (CALTRATE 600+D3 SOFT PO) Take 600 mg by mouth 2 times daily       cloNIDine (CATAPRES) 0.1 MG tablet TAKE 2 TABLETS BY MOUTH EVERY NIGHT AT BEDTIME 180 tablet 0     COMBIVENT RESPIMAT  MCG/ACT inhaler Inhale 1 puff into the lungs 4 times daily        diltiazem ER COATED BEADS (CARDIZEM CD) 360 MG 24 hr capsule Take 1 capsule (360 mg) by mouth daily 90 capsule 3     diphenhydrAMINE (BENADRYL) 25 MG tablet Take 1-2 tablets (25-50 mg) by mouth every 6 hours as needed for itching or allergies 60 tablet 1     furosemide (LASIX) 40 MG tablet TAKE 1 TABLET BY MOUTH TWICE DAILY. 180 tablet 0     ipratropium - albuterol 0.5 mg/2.5 mg/3 mL (DUONEB) 0.5-2.5 (3) MG/3ML neb solution USE 1 VIAL PER NEBULIZER FOUR TIMES DAILY 1620 mL 0     levothyroxine (SYNTHROID/LEVOTHROID) 100 MCG tablet TAKE 1 TABLET(100 MCG) BY MOUTH DAILY 90 tablet 3     losartan  (COZAAR) 50 MG tablet TAKE 1 TABLET BY MOUTH TWICE DAILY 60 tablet 3     OTHER MEDICAL SUPPLIES Apply one pair to help with edema 1 each 0     potassium chloride ER (K-DUR/KLOR-CON M) 20 MEQ CR tablet Take 1 tablet (20 mEq) by mouth 2 times daily 180 tablet 3     ELIQUIS 5 MG tablet TAKE 1 TABLET BY MOUTH TWICE DAILY (Patient not taking: Reported on 9/16/2019) 60 tablet 3     predniSONE (DELTASONE) 20 MG tablet TAKE 3 TABLETS BY MOUTH X 3 DAYS, 2 TABLETS X 3 DAYS, 1 TABLET X 3 DAYS, AND 0.5 TABLETS X 3 DAYS (Patient not taking: Reported on 9/16/2019) 20 tablet 0     sulfamethoxazole-trimethoprim (BACTRIM DS/SEPTRA DS) 800-160 MG tablet as needed       Allergies   Allergen Reactions     Amlodipine Besylate Cough     Norvasc     Lisinopril Cough       PROBLEMS TO ADD ON...  Reviewed and updated as needed this visit by Provider         Review of Systems   ROS COMP: Constitutional, HEENT, cardiovascular, pulmonary, gi and gu systems are negative, except as otherwise noted.      Objective    There were no vitals taken for this visit.  There is no height or weight on file to calculate BMI.  Physical Exam   GENERAL: healthy, alert and no distress  NECK: no adenopathy, no asymmetry, masses, or scars and thyroid normal to palpation  RESP: lungs clear to auscultation - no rales, rhonchi or wheezes  CV: irregular rate and rhythm, normal S1 S2, no S3 or S4, no murmur, click or rub, no peripheral edema and peripheral pulses strong  ABDOMEN: soft, nontender, no hepatosplenomegaly, no masses and bowel sounds normal  MS: no gross musculoskeletal defects noted, no edema  SKIN: no suspicious lesions or rashes.  5 cm blister, intact, RLE.     Diagnostic Test Results:  Labs reviewed in Epic        Assessment & Plan       ICD-10-CM    1. BRBPR (bright red blood per rectum) K62.5 CBC with platelets and differential   2. Hypokalemia E87.6 Basic metabolic panel     potassium chloride ER (K-DUR/KLOR-CON M) 20 MEQ CR tablet   3. Persistent  "atrial fibrillation (H) I48.1    review today.  Doing well.  She is off the eliquis due to BRBPR, improved.  Now concern for restart and when.  I did message cardiology for assistance.  She is actively in afib right now for sure by ausculation.  I restarted the K today and rechecked.  Hgb stable with the GIB and will observe.  No ongoing bleeding.  Await cardiology advice as well.      BMI:   Estimated body mass index is 25.4 kg/m  as calculated from the following:    Height as of 9/3/19: 1.626 m (5' 4\").    Weight as of this encounter: 67.1 kg (148 lb).               No follow-ups on file.    Braydon Yen MD  [facility]      "

## 2019-09-16 NOTE — TELEPHONE ENCOUNTER
9:00 AM    Reason for Call: Phone Call    Description: Luis from [facility] calls states that he would like to get a call back regarding some orders he's trying to get for skilled nursing for this pt    Was an appointment offered for this call? No  If yes : Appointment type              Date    Preferred method for responding to this message: Telephone Call  What is your phone number ? [phone] Luis    If we cannot reach you directly, may we leave a detailed response at the number you provided? Yes    Can this message wait until your PCP/provider returns, if available today? Not applicable PCP In    Chelita Turner

## 2019-09-16 NOTE — NURSING NOTE
"Chief Complaint   Patient presents with     ER F/U       Initial /62   Pulse 70   Temp 98.6  F (37  C) (Tympanic)   Wt 67.1 kg (148 lb)   SpO2 96%   BMI 25.40 kg/m   Estimated body mass index is 25.4 kg/m  as calculated from the following:    Height as of 9/3/19: 1.626 m (5' 4\").    Weight as of this encounter: 67.1 kg (148 lb).  Medication Reconciliation: complete     Bernadine Story MA      "

## 2019-09-17 ENCOUNTER — TELEPHONE (OUTPATIENT)
Dept: FAMILY MEDICINE | Facility: OTHER | Age: 69
End: 2019-09-17

## 2019-09-17 NOTE — TELEPHONE ENCOUNTER
Patient called and is questioning that she has been on clear fluids. Patient is now questioning when she can advance her diet. Please advise.

## 2019-09-17 NOTE — TELEPHONE ENCOUNTER
Pt has been on clear liquids since she was discharged from hospital, can she resume regular foods?  Did you hear from Dr Myers regarding restarting Eliquis?

## 2019-09-17 NOTE — TELEPHONE ENCOUNTER
Ok to advance diet.  Up to full liquids, and then advance diet as tolerated.  I did not hear back from Dr. Myers.  Let's go till the end of the week and then I will try again if no word.  Thanks. Braydon Yen MD

## 2019-09-18 NOTE — TELEPHONE ENCOUNTER
Please let Mary Alice know it is recommended she stay off eliquis.  Stay on asa 81 mg.  This is a permanent change with the 2 GIB episodes.  Thanks. . Me

## 2019-09-19 ENCOUNTER — TELEPHONE (OUTPATIENT)
Dept: FAMILY MEDICINE | Facility: OTHER | Age: 69
End: 2019-09-19

## 2019-09-19 NOTE — TELEPHONE ENCOUNTER
abx salve of any kind and daily dressing changes and observe the wound.  Thanks.  Braydon Yen MD

## 2019-09-19 NOTE — TELEPHONE ENCOUNTER
Tg from [facility] called. Phone number: [phone]    Per Tg Blister on right ankle, blister has popped since appointment. Nae did apply dressing. Tg questioning if you would like to just continue the ABD wrap?

## 2019-09-23 ENCOUNTER — TELEPHONE (OUTPATIENT)
Dept: FAMILY MEDICINE | Facility: OTHER | Age: 69
End: 2019-09-23

## 2019-09-23 NOTE — TELEPHONE ENCOUNTER
"To: Dr. Yen nurse  1:52 PM    Reason for Call: OVERBOOK today 9/23/19 or tomorrow 9/24/19    Patient is having the following symptoms: possible gout in feet. \"Dr. Yen has seen me after hours so I am open to that too\"    patient is requesting an appointment for (see above)    Was an appointment offered for this call? No  If yes : Appointment type              Date    Preferred method for responding to this message: Telephone Call     What is your phone number [phone]    If we cannot reach you directly, may we leave a detailed response at the number you provided? Yes    Can this message wait until your PCP/provider returns, if unavailable today? Not applicable, provider is in    [facility]    "

## 2019-09-24 ENCOUNTER — ANCILLARY PROCEDURE (OUTPATIENT)
Dept: GENERAL RADIOLOGY | Facility: OTHER | Age: 69
End: 2019-09-24
Attending: FAMILY MEDICINE
Payer: MEDICARE

## 2019-09-24 ENCOUNTER — OFFICE VISIT (OUTPATIENT)
Dept: FAMILY MEDICINE | Facility: OTHER | Age: 69
End: 2019-09-24
Attending: FAMILY MEDICINE
Payer: COMMERCIAL

## 2019-09-24 VITALS
DIASTOLIC BLOOD PRESSURE: 42 MMHG | HEART RATE: 93 BPM | SYSTOLIC BLOOD PRESSURE: 100 MMHG | WEIGHT: 144 LBS | OXYGEN SATURATION: 95 % | BODY MASS INDEX: 24.72 KG/M2 | TEMPERATURE: 99.2 F

## 2019-09-24 DIAGNOSIS — M79.671 BILATERAL FOOT PAIN: ICD-10-CM

## 2019-09-24 DIAGNOSIS — R60.9 EDEMA, UNSPECIFIED TYPE: ICD-10-CM

## 2019-09-24 DIAGNOSIS — M79.672 BILATERAL FOOT PAIN: ICD-10-CM

## 2019-09-24 DIAGNOSIS — T14.8XXA BLISTER: ICD-10-CM

## 2019-09-24 DIAGNOSIS — M79.671 BILATERAL FOOT PAIN: Primary | ICD-10-CM

## 2019-09-24 DIAGNOSIS — M79.672 BILATERAL FOOT PAIN: Primary | ICD-10-CM

## 2019-09-24 DIAGNOSIS — I48.91 ATRIAL FIBRILLATION, UNSPECIFIED TYPE (H): ICD-10-CM

## 2019-09-24 PROCEDURE — 99214 OFFICE O/P EST MOD 30 MIN: CPT | Performed by: FAMILY MEDICINE

## 2019-09-24 PROCEDURE — 36415 COLL VENOUS BLD VENIPUNCTURE: CPT | Mod: ZL | Performed by: FAMILY MEDICINE

## 2019-09-24 PROCEDURE — 84550 ASSAY OF BLOOD/URIC ACID: CPT | Mod: ZL | Performed by: FAMILY MEDICINE

## 2019-09-24 PROCEDURE — 80048 BASIC METABOLIC PNL TOTAL CA: CPT | Mod: ZL | Performed by: FAMILY MEDICINE

## 2019-09-24 PROCEDURE — 73630 X-RAY EXAM OF FOOT: CPT | Mod: TC,RT,FY

## 2019-09-24 PROCEDURE — G0463 HOSPITAL OUTPT CLINIC VISIT: HCPCS

## 2019-09-24 ASSESSMENT — PAIN SCALES - GENERAL: PAINLEVEL: MILD PAIN (3)

## 2019-09-24 NOTE — NURSING NOTE
"Chief Complaint   Patient presents with     Musculoskeletal Problem       Initial /42   Pulse 93   Temp 99.2  F (37.3  C) (Tympanic)   Wt 65.3 kg (144 lb)   SpO2 95%   BMI 24.72 kg/m   Estimated body mass index is 24.72 kg/m  as calculated from the following:    Height as of 9/3/19: 1.626 m (5' 4\").    Weight as of this encounter: 65.3 kg (144 lb).  Medication Reconciliation: complete  Bernadine Story MA    "

## 2019-09-24 NOTE — PROGRESS NOTES
Subjective     Mary Alice Cameron is a 69 year old female who presents to clinic today for the following health issues:    HPI   Gout  Duration: since 9-13-19  Description   Location: foot - bilateral and both legs are very swollen  Joint Swelling: YES  Redness: YES  Pain intensity:  moderate  Accompanying signs and symptoms: had large water blisters mostly all last week that were draining.   History  Previous history of gout: no    Trauma to the area: no   Precipitating factors:   Alcohol usage: none  Diuretic use: YES  Recent illness: no   Therapies tried and outcome: home care comes out to check the blisters and feet. Home care nurse wanted a second opinion    PROBLEMS TO ADD ON...reviewed at length.  Edema is prominent.  Had a large blister which drained.  We checked on that.  Feet painful.      Patient Active Problem List   Diagnosis     Persistent atrial fibrillation (H)     Pulmonary emphysema (H)     Bicuspid aortic valve     Mild major depression (H)     Acute bronchitis     Hypothyroidism, postablative     Advance care planning     Essential hypertension     Long-term (current) use of anticoagulants [Z79.01]     Acute on chronic respiratory failure (H)     Health Care Home     Status post coronary angiogram     Coronary artery disease involving native coronary artery of native heart without angina pectoris     S/P AVR (aortic valve replacement)     Acute cystitis without hematuria     Small bowel obstruction (H)     Acute renal failure (H)     Hypokalemia     Past Surgical History:   Procedure Laterality Date     BACK SURGERY  2006     CARDIAC SURGERY  06/12/2018    Angi[facility] at [facility]     COLONOSCOPY N/A 1/19/2016    Procedure: COLONOSCOPY;  Surgeon: Waldemar Bob MD;  Location: [facility]     HYSTERECTOMY  1980    partial     SLING BLADDER SUSPENSION WITH FASCIA LINNETTE         Social History     Tobacco Use     Smoking status: Former Smoker     Packs/day: 0.50     Years: 41.00     Pack years:  20.50     Types: Cigarettes     Start date: 1966     Last attempt to quit: 2007     Years since quittin.6     Smokeless tobacco: Never Used   Substance Use Topics     Alcohol use: No     Alcohol/week: 0.0 standard drinks     Family History   Problem Relation Age of Onset     Prostate Cancer Father      Hypertension Father      Heart Failure Father         CHF     Asthma Mother      Cancer Mother         ovarian     Hypertension Mother      Asthma Brother      Hypertension Sister      Hypertension Brother          Current Outpatient Medications   Medication Sig Dispense Refill     acetaminophen (TYLENOL) 500 MG tablet Take 500-1,000 mg by mouth every 6 hours as needed for mild pain       albuterol (2.5 MG/3ML) 0.083% neb solution Take 1 vial (2.5 mg) by nebulization every 6 hours as needed for shortness of breath / dyspnea or wheezing 25 vial 1     albuterol (PROAIR HFA/PROVENTIL HFA/VENTOLIN HFA) 108 (90 Base) MCG/ACT inhaler INHALE 2 PUFFS INTO THE LUNGS EVERY 4 HOURS AS NEEDED FOR SHORTNESS OF BREATH OR DIFFICULT BREATHING OR WHEEZING 54 g 0     atorvastatin (LIPITOR) 10 MG tablet TAKE 1 TABLET BY MOUTH EVERY NIGHT AT BEDTIME 90 tablet 0     Calcium Carb-Cholecalciferol (CALTRATE 600+D3 SOFT PO) Take 600 mg by mouth 2 times daily       cloNIDine (CATAPRES) 0.1 MG tablet TAKE 2 TABLETS BY MOUTH EVERY NIGHT AT BEDTIME 180 tablet 0     COMBIVENT RESPIMAT  MCG/ACT inhaler Inhale 1 puff into the lungs 4 times daily        diltiazem ER COATED BEADS (CARDIZEM CD) 360 MG 24 hr capsule Take 1 capsule (360 mg) by mouth daily 90 capsule 3     diphenhydrAMINE (BENADRYL) 25 MG tablet Take 1-2 tablets (25-50 mg) by mouth every 6 hours as needed for itching or allergies 60 tablet 1     furosemide (LASIX) 40 MG tablet TAKE 1 TABLET BY MOUTH TWICE DAILY. 180 tablet 0     ipratropium - albuterol 0.5 mg/2.5 mg/3 mL (DUONEB) 0.5-2.5 (3) MG/3ML neb solution USE 1 VIAL PER NEBULIZER FOUR TIMES DAILY 1620 mL 0      levothyroxine (SYNTHROID/LEVOTHROID) 100 MCG tablet TAKE 1 TABLET(100 MCG) BY MOUTH DAILY 90 tablet 3     losartan (COZAAR) 50 MG tablet TAKE 1 TABLET BY MOUTH TWICE DAILY 60 tablet 3     OTHER MEDICAL SUPPLIES Apply one pair to help with edema 1 each 0     potassium chloride ER (K-DUR/KLOR-CON M) 20 MEQ CR tablet Take 1 tablet (20 mEq) by mouth 2 times daily 180 tablet 3     ADVAIR -21 MCG/ACT inhaler INHALE 2 PUFFS INTO THE LUNGS TWICE DAILY 36 g 0     predniSONE (DELTASONE) 20 MG tablet TAKE 3 TABLETS BY MOUTH X 3 DAYS, 2 TABLETS X 3 DAYS, 1 TABLET X 3 DAYS, AND 0.5 TABLETS X 3 DAYS (Patient not taking: Reported on 9/16/2019) 20 tablet 0     sulfamethoxazole-trimethoprim (BACTRIM DS/SEPTRA DS) 800-160 MG tablet as needed       Allergies   Allergen Reactions     Amlodipine Besylate Cough     Norvasc     Lisinopril Cough       -------------------------------------  Reviewed and updated as needed this visit by Provider         Review of Systems   ROS COMP: Constitutional, HEENT, cardiovascular, pulmonary, gi and gu systems are negative, except as otherwise noted.      Objective    There were no vitals taken for this visit.  There is no height or weight on file to calculate BMI.  Physical Exam   GENERAL: healthy, alert and no distress  NECK: no adenopathy, no asymmetry, masses, or scars and thyroid normal to palpation  RESP: lungs clear to auscultation - no rales, rhonchi or wheezes  CV: regular rate and rhythm, normal S1 S2, no S3 or S4, no murmur, click or rub, no peripheral edema and peripheral pulses strong  ABDOMEN: soft, nontender, no hepatosplenomegaly, no masses and bowel sounds normal  MS: no gross musculoskeletal defects noted, no edema  SKIN: no suspicious lesions or rashes and large blister right lower leg, undressed, draining, no cellulitis.    NEURO: Normal strength and tone, mentation intact and speech normal  Ext:  2-3+ LE edema prominent in the feet.  Diffusely tender feet.      Diagnostic  "Test Results:  Labs reviewed in Epic        Assessment & Plan       ICD-10-CM    1. Bilateral foot pain M79.671 Basic metabolic panel    M79.672 Uric acid     XR FOOT RT G/E 3 VW (Clinic Performed)   2. Edema, unspecified type R60.9    3. Atrial fibrillation, unspecified type (H) I48.91    4. Blister T14.8XXA        Reviewed at length.  Blister tended, cared for, and redressed.  Edema prominent.  Getting bmp and uric acid and foot xray, all pending right now.  F/u based on results.  Local cares for the edema.  Consider changing the diltiazem as could contribute.  F/u routine.      BMI:   Estimated body mass index is 24.72 kg/m  as calculated from the following:    Height as of 9/3/19: 1.626 m (5' 4\").    Weight as of this encounter: 65.3 kg (144 lb).               No follow-ups on file.    Braydon Yen MD  [facility]      "

## 2019-09-25 LAB
ANION GAP SERPL CALCULATED.3IONS-SCNC: 9 MMOL/L (ref 3–14)
BUN SERPL-MCNC: 12 MG/DL (ref 7–30)
CALCIUM SERPL-MCNC: 9.2 MG/DL (ref 8.5–10.1)
CHLORIDE SERPL-SCNC: 96 MMOL/L (ref 94–109)
CO2 SERPL-SCNC: 29 MMOL/L (ref 20–32)
CREAT SERPL-MCNC: 0.91 MG/DL (ref 0.52–1.04)
GFR SERPL CREATININE-BSD FRML MDRD: 65 ML/MIN/{1.73_M2}
GLUCOSE SERPL-MCNC: 88 MG/DL (ref 70–99)
POTASSIUM SERPL-SCNC: 4 MMOL/L (ref 3.4–5.3)
SODIUM SERPL-SCNC: 134 MMOL/L (ref 133–144)
URATE SERPL-MCNC: 6.5 MG/DL (ref 2.6–6)

## 2019-10-10 ENCOUNTER — TELEPHONE (OUTPATIENT)
Dept: FAMILY MEDICINE | Facility: OTHER | Age: 69
End: 2019-10-10

## 2019-10-10 ENCOUNTER — ALLIED HEALTH/NURSE VISIT (OUTPATIENT)
Dept: FAMILY MEDICINE | Facility: OTHER | Age: 69
End: 2019-10-10
Attending: FAMILY MEDICINE
Payer: MEDICARE

## 2019-10-10 DIAGNOSIS — E89.0 HYPOTHYROIDISM, POSTABLATIVE: Primary | ICD-10-CM

## 2019-10-10 DIAGNOSIS — E89.0 HYPOTHYROIDISM, POSTABLATIVE: ICD-10-CM

## 2019-10-10 DIAGNOSIS — Z23 NEED FOR PROPHYLACTIC VACCINATION AND INOCULATION AGAINST INFLUENZA: Primary | ICD-10-CM

## 2019-10-10 LAB
T4 FREE SERPL-MCNC: 0.48 NG/DL (ref 0.76–1.46)
TSH SERPL DL<=0.005 MIU/L-ACNC: 39.56 MU/L (ref 0.4–4)

## 2019-10-10 PROCEDURE — 84443 ASSAY THYROID STIM HORMONE: CPT | Mod: ZL | Performed by: FAMILY MEDICINE

## 2019-10-10 PROCEDURE — 90662 IIV NO PRSV INCREASED AG IM: CPT

## 2019-10-10 PROCEDURE — 36415 COLL VENOUS BLD VENIPUNCTURE: CPT | Mod: ZL | Performed by: FAMILY MEDICINE

## 2019-10-10 PROCEDURE — 90471 IMMUNIZATION ADMIN: CPT

## 2019-10-10 PROCEDURE — 84439 ASSAY OF FREE THYROXINE: CPT | Mod: ZL | Performed by: FAMILY MEDICINE

## 2019-10-10 RX ORDER — LEVOTHYROXINE SODIUM 112 UG/1
112 TABLET ORAL DAILY
Qty: 90 TABLET | Refills: 0 | Status: SHIPPED | OUTPATIENT
Start: 2019-10-10 | End: 2020-01-07

## 2019-10-14 DIAGNOSIS — E78.2 MIXED HYPERLIPIDEMIA: ICD-10-CM

## 2019-10-14 DIAGNOSIS — I25.10 CORONARY ARTERY DISEASE INVOLVING NATIVE CORONARY ARTERY OF NATIVE HEART WITHOUT ANGINA PECTORIS: ICD-10-CM

## 2019-10-14 DIAGNOSIS — I10 ESSENTIAL HYPERTENSION, BENIGN: ICD-10-CM

## 2019-10-15 ENCOUNTER — MYC MEDICAL ADVICE (OUTPATIENT)
Dept: FAMILY MEDICINE | Facility: OTHER | Age: 69
End: 2019-10-15

## 2019-10-15 RX ORDER — FUROSEMIDE 40 MG
TABLET ORAL
Qty: 180 TABLET | Refills: 1 | Status: SHIPPED | OUTPATIENT
Start: 2019-10-15 | End: 2020-04-14

## 2019-10-15 RX ORDER — ATORVASTATIN CALCIUM 10 MG/1
TABLET, FILM COATED ORAL
Qty: 90 TABLET | Refills: 0 | Status: SHIPPED | OUTPATIENT
Start: 2019-10-15 | End: 2020-01-15

## 2019-10-15 NOTE — TELEPHONE ENCOUNTER
Ok to wait until Dr Yen is back on 10/22.  Pt would like to return to work on 11/1.  She was out of work due to her recent hospitalization.  Ok for letter?

## 2019-10-15 NOTE — LETTER
October 24, 2019      Mary Alice Cameron  [address] [place] [address] [address]  [facility] [id number]        To Whom It May Concern:    Mary Alice Cameron may return to work without restrictions on November 1, 2019.      Sincerely,        Braydon Yen MD

## 2019-10-21 ENCOUNTER — TELEPHONE (OUTPATIENT)
Dept: CARDIOLOGY | Facility: CLINIC | Age: 69
End: 2019-10-21

## 2019-10-21 NOTE — TELEPHONE ENCOUNTER
Patient called and left  on cardiology phone that Dr. Myers had discontinued her Eliquis 5 mg BID and that [facility] was still filling it. Patient asked if nurse could call [facility] and have it taken off of her med list.   This medication is not on her active med list but I can't find note by Dr. Myers stating to discontinue it.    Patient reports she was in the hospital when this happened and that her that PCP talked with Dr. Myers to have this medication discontinued due to her increase in bleeding. Patient states she is NOT taking this at this time and verbalizes understanding that she may not get a call back today as Dr. Myers and Dr. Yen are out today. Please advise.     Patient would like a call back once this is taken care of.   [phone]

## 2019-10-22 NOTE — TELEPHONE ENCOUNTER
Medication is not on current [facility] med list.  Called [facility] and notified the pharmacy this medication is discontinued.  They removed it from patient's medication list.  Patient notified that [facility] was notified and medication was removed from their med list.  Patient verbalized understanding.    Carola Trujillo RN

## 2019-10-22 NOTE — TELEPHONE ENCOUNTER
D/w cardiology.  Recommend stopping with the 2 GIB episodes.  I spoke to Mary Alice about this as well. Please update med list.  Thanks. Braydon Yen MD

## 2019-10-24 ENCOUNTER — MEDICAL CORRESPONDENCE (OUTPATIENT)
Dept: HEALTH INFORMATION MANAGEMENT | Facility: CLINIC | Age: 69
End: 2019-10-24

## 2019-11-18 DIAGNOSIS — E89.0 HYPOTHYROIDISM, POSTABLATIVE: ICD-10-CM

## 2019-11-20 RX ORDER — LEVOTHYROXINE SODIUM 88 UG/1
TABLET ORAL
Qty: 90 TABLET | Refills: 0 | OUTPATIENT
Start: 2019-11-20

## 2019-12-05 DIAGNOSIS — E89.0 HYPOTHYROIDISM, POSTABLATIVE: ICD-10-CM

## 2019-12-05 DIAGNOSIS — E89.0 HYPOTHYROIDISM, POSTABLATIVE: Primary | ICD-10-CM

## 2019-12-05 LAB
T4 FREE SERPL-MCNC: 1.36 NG/DL (ref 0.76–1.46)
TSH SERPL DL<=0.005 MIU/L-ACNC: 0.16 MU/L (ref 0.4–4)

## 2019-12-05 PROCEDURE — 36415 COLL VENOUS BLD VENIPUNCTURE: CPT | Mod: ZL | Performed by: FAMILY MEDICINE

## 2019-12-05 PROCEDURE — 84443 ASSAY THYROID STIM HORMONE: CPT | Mod: ZL | Performed by: FAMILY MEDICINE

## 2019-12-05 PROCEDURE — 84439 ASSAY OF FREE THYROXINE: CPT | Mod: ZL | Performed by: FAMILY MEDICINE

## 2019-12-13 DIAGNOSIS — I10 ESSENTIAL HYPERTENSION: ICD-10-CM

## 2019-12-15 DIAGNOSIS — J44.1 COPD EXACERBATION (H): ICD-10-CM

## 2019-12-16 NOTE — TELEPHONE ENCOUNTER
Cozaar      Last Written Prescription Date:  8/15/2019  Last Fill Quantity: 60,   # refills: 3  Last Office Visit: 9/24/2019    Catapres      Last Written Prescription Date:  9/11/2019  Last Fill Quantity: 180,   # refills: 0  Last Office Visit: 9/24/2019    Future Office visit:    Next 5 appointments (look out 90 days)    Jan 28, 2020 10:00 AM CST  (Arrive by 9:45 AM)  Return Visit with Eliezer Myers MD  [facility] [place] [facility] [address]  [place] [address]  [phone]

## 2019-12-17 DIAGNOSIS — I10 ESSENTIAL HYPERTENSION: ICD-10-CM

## 2019-12-17 RX ORDER — CLONIDINE HYDROCHLORIDE 0.1 MG/1
TABLET ORAL
Qty: 180 TABLET | Refills: 0 | Status: SHIPPED | OUTPATIENT
Start: 2019-12-17 | End: 2020-03-13

## 2019-12-17 RX ORDER — LOSARTAN POTASSIUM 50 MG/1
TABLET ORAL
Qty: 120 TABLET | Refills: 0 | Status: SHIPPED | OUTPATIENT
Start: 2019-12-17 | End: 2020-02-20

## 2019-12-18 RX ORDER — IPRATROPIUM BROMIDE AND ALBUTEROL SULFATE 2.5; .5 MG/3ML; MG/3ML
SOLUTION RESPIRATORY (INHALATION)
Qty: 1620 ML | Refills: 0 | Status: SHIPPED | OUTPATIENT
Start: 2019-12-18 | End: 2020-11-10

## 2019-12-20 RX ORDER — LOSARTAN POTASSIUM 50 MG/1
TABLET ORAL
Qty: 180 TABLET | OUTPATIENT
Start: 2019-12-20

## 2019-12-20 NOTE — TELEPHONE ENCOUNTER
Losartan  Last Written Prescription Date: 12/17/19  Last Fill Quantity: 120 # of Refills: 0  Last Office Visit: 9/24/19

## 2019-12-27 NOTE — PROGRESS NOTES
Subjective     Mary Alice Cameron is a 69 year old female who presents to clinic today for the following health issues:    HPI   Asthma Follow-Up    Was ACT completed today?    Yes    ACT Total Scores 1/8/2020   ACT TOTAL SCORE -   ASTHMA ER VISITS -   ASTHMA HOSPITALIZATIONS -   ACT TOTAL SCORE (Goal Greater than or Equal to 20) 19   In the past 12 months, how many times did you visit the emergency room for your asthma without being admitted to the hospital? 3   In the past 12 months, how many times were you hospitalized overnight because of your asthma? 1       How many days per week do you miss taking your asthma controller medication?  0    Please describe any recent triggers for your asthma: dust mites and feels the barimetric pressure    Have you had any Emergency Room Visits, Urgent Care Visits, or Hospital Admissions since your last office visit?  No     Patient also thinks she may have sinus infection.    PROBLEMS TO ADD ON...doing ok right now.  Breathing well.      Patient Active Problem List   Diagnosis     Persistent atrial fibrillation     Pulmonary emphysema (H)     Bicuspid aortic valve     Mild major depression (H)     Acute bronchitis     Hypothyroidism, postablative     Advance care planning     Essential hypertension     Long-term (current) use of anticoagulants [Z79.01]     Acute on chronic respiratory failure (H)     Health Care Home     Status post coronary angiogram     Coronary artery disease involving native coronary artery of native heart without angina pectoris     S/P AVR (aortic valve replacement)     Acute cystitis without hematuria     Small bowel obstruction (H)     Acute renal failure (H)     Hypokalemia     Past Surgical History:   Procedure Laterality Date     BACK SURGERY  2006     CARDIAC SURGERY  06/12/2018    Angiogram at [facility] in [place]     COLONOSCOPY N/A 1/19/2016    Procedure: COLONOSCOPY;  Surgeon: Waldemar Bob MD;  Location: [facility]     HYSTERECTOMY  1980    partial      SLING BLADDER SUSPENSION WITH FASCIA LNINETTE         Social History     Tobacco Use     Smoking status: Former Smoker     Packs/day: 0.50     Years: 41.00     Pack years: 20.50     Types: Cigarettes     Start date: 1966     Last attempt to quit: 2007     Years since quittin.9     Smokeless tobacco: Never Used   Substance Use Topics     Alcohol use: No     Alcohol/week: 0.0 standard drinks     Family History   Problem Relation Age of Onset     Prostate Cancer Father      Hypertension Father      Heart Failure Father         CHF     Asthma Mother      Cancer Mother         ovarian     Hypertension Mother      Asthma Brother      Hypertension Sister      Hypertension Brother          Current Outpatient Medications   Medication Sig Dispense Refill     amoxicillin-clavulanate (AUGMENTIN) 875-125 MG tablet Take 1 tablet by mouth 2 times daily for 10 days 20 tablet 0     acetaminophen (TYLENOL) 500 MG tablet Take 500-1,000 mg by mouth every 6 hours as needed for mild pain       ADVAIR -21 MCG/ACT inhaler INHALE 2 PUFFS INTO THE LUNGS TWICE DAILY 36 g 0     albuterol (2.5 MG/3ML) 0.083% neb solution Take 1 vial (2.5 mg) by nebulization every 6 hours as needed for shortness of breath / dyspnea or wheezing 25 vial 1     aspirin 81 MG EC tablet Take 81 mg by mouth daily       atorvastatin (LIPITOR) 10 MG tablet TAKE 1 TABLET BY MOUTH EVERY NIGHT AT BEDTIME 90 tablet 0     Calcium Carb-Cholecalciferol (CALTRATE 600+D3 SOFT PO) Take 600 mg by mouth 2 times daily       cloNIDine (CATAPRES) 0.1 MG tablet TAKE 2 TABLETS BY MOUTH EVERY NIGHT AT BEDTIME 180 tablet 0     COMBIVENT RESPIMAT  MCG/ACT inhaler Inhale 1 puff into the lungs 4 times daily        diltiazem ER COATED BEADS (CARDIZEM CD) 360 MG 24 hr capsule Take 1 capsule (360 mg) by mouth daily 90 capsule 3     diphenhydrAMINE (BENADRYL) 25 MG tablet Take 1-2 tablets (25-50 mg) by mouth every 6 hours as needed for itching or allergies 60 tablet 1      furosemide (LASIX) 40 MG tablet TAKE 1 TABLET BY MOUTH TWICE DAILY. 180 tablet 1     ipratropium - albuterol 0.5 mg/2.5 mg/3 mL (DUONEB) 0.5-2.5 (3) MG/3ML neb solution USE 1 VIAL PER NEBULIZER FOUR TIMES DAILY 1620 mL 0     levothyroxine (SYNTHROID/LEVOTHROID) 112 MCG tablet TAKE 1 TABLET(112 MCG) BY MOUTH DAILY 90 tablet 0     losartan (COZAAR) 50 MG tablet TAKE 1 TABLET BY MOUTH TWICE DAILY 120 tablet 0     OTHER MEDICAL SUPPLIES Apply one pair to help with edema 1 each 0     potassium chloride ER (K-DUR/KLOR-CON M) 20 MEQ CR tablet Take 1 tablet (20 mEq) by mouth 2 times daily 180 tablet 3     predniSONE (DELTASONE) 20 MG tablet TAKE 3 TABLETS BY MOUTH X 3 DAYS, 2 TABLETS X 3 DAYS, 1 TABLET X 3 DAYS, AND 0.5 TABLETS X 3 DAYS (Patient not taking: Reported on 9/16/2019) 20 tablet 0     sulfamethoxazole-trimethoprim (BACTRIM DS/SEPTRA DS) 800-160 MG tablet as needed       Allergies   Allergen Reactions     Amlodipine Besylate Cough     Norvasc     Lisinopril Cough       PROBLEMS TO ADD ON...  Reviewed and updated as needed this visit by Provider         Review of Systems   ROS COMP: Constitutional, HEENT, cardiovascular, pulmonary, gi and gu systems are negative, except as otherwise noted.      Objective    There were no vitals taken for this visit.  There is no height or weight on file to calculate BMI.  Physical Exam   GENERAL: healthy, alert and no distress  NECK: no adenopathy, no asymmetry, masses, or scars and thyroid normal to palpation  RESP: lungs clear to auscultation - no rales, rhonchi or wheezes  CV: regular rate and rhythm, normal S1 S2, no S3 or S4, no murmur, click or rub, no peripheral edema and peripheral pulses strong  ABDOMEN: soft, nontender, no hepatosplenomegaly, no masses and bowel sounds normal  MS: no gross musculoskeletal defects noted, no edema    Diagnostic Test Results:  Labs reviewed in Epic        Assessment & Plan       ICD-10-CM    1. Pulmonary emphysema, unspecified emphysema  type (H) J43.9    2. Persistent atrial fibrillation I48.19    3. Acute non-recurrent frontal sinusitis J01.10 amoxicillin-clavulanate (AUGMENTIN) 875-125 MG tablet          Review.  Frontal sinusitis by history.  augmentin there.  Reviewed risk/benefit.  Pulmonary status stable right now.  Update ACT, reviewed indications for steroid, abx, etc.  F/u routine pending ongoing sx development.      No follow-ups on file.    Braydon Yen MD  [facility]

## 2020-01-08 ENCOUNTER — OFFICE VISIT (OUTPATIENT)
Dept: FAMILY MEDICINE | Facility: OTHER | Age: 70
End: 2020-01-08
Attending: FAMILY MEDICINE
Payer: COMMERCIAL

## 2020-01-08 VITALS
OXYGEN SATURATION: 94 % | WEIGHT: 140 LBS | TEMPERATURE: 99.2 F | SYSTOLIC BLOOD PRESSURE: 126 MMHG | HEART RATE: 86 BPM | DIASTOLIC BLOOD PRESSURE: 72 MMHG | BODY MASS INDEX: 24.03 KG/M2

## 2020-01-08 DIAGNOSIS — J43.9 PULMONARY EMPHYSEMA, UNSPECIFIED EMPHYSEMA TYPE (H): Primary | ICD-10-CM

## 2020-01-08 DIAGNOSIS — I48.19 PERSISTENT ATRIAL FIBRILLATION (H): ICD-10-CM

## 2020-01-08 DIAGNOSIS — J01.10 ACUTE NON-RECURRENT FRONTAL SINUSITIS: ICD-10-CM

## 2020-01-08 PROCEDURE — 99214 OFFICE O/P EST MOD 30 MIN: CPT | Performed by: FAMILY MEDICINE

## 2020-01-08 PROCEDURE — G0463 HOSPITAL OUTPT CLINIC VISIT: HCPCS

## 2020-01-08 ASSESSMENT — ASTHMA QUESTIONNAIRES
ACT_TOTALSCORE: 19
QUESTION_2 LAST FOUR WEEKS HOW OFTEN HAVE YOU HAD SHORTNESS OF BREATH: NOT AT ALL
QUESTION_5 LAST FOUR WEEKS HOW WOULD YOU RATE YOUR ASTHMA CONTROL: SOMEWHAT CONTROLLED
QUESTION_3 LAST FOUR WEEKS HOW OFTEN DID YOUR ASTHMA SYMPTOMS (WHEEZING, COUGHING, SHORTNESS OF BREATH, CHEST TIGHTNESS OR PAIN) WAKE YOU UP AT NIGHT OR EARLIER THAN USUAL IN THE MORNING: ONCE OR TWICE
QUESTION_4 LAST FOUR WEEKS HOW OFTEN HAVE YOU USED YOUR RESCUE INHALER OR NEBULIZER MEDICATION (SUCH AS ALBUTEROL): ONE OR TWO TIMES PER DAY
QUESTION_1 LAST FOUR WEEKS HOW MUCH OF THE TIME DID YOUR ASTHMA KEEP YOU FROM GETTING AS MUCH DONE AT WORK, SCHOOL OR AT HOME: NONE OF THE TIME
EMERGENCY_ROOM_LAST_YEAR_TOTAL: THREE OR MORE
HOSPITALIZATION_OVERNIGHT_LAST_YEAR_TOTAL: ONE
ACUTE_EXACERBATION_TODAY: YES

## 2020-01-08 ASSESSMENT — PAIN SCALES - GENERAL: PAINLEVEL: NO PAIN (0)

## 2020-01-08 NOTE — LETTER
My Asthma Action Plan    Name: Mary Alice Cameron   YOB: 1950  Date: 12/27/2019   My doctor: Braydon Yen MD   My clinic: [facility]        My Rescue Medicine:   Albuterol inhaler (Proair/Ventolin/Proventil HFA)  2-4 puffs EVERY 4 HOURS as needed. Use a spacer if recommended by your provider.   My Asthma Severity:   Intermittent / Exercise Induced  Know your asthma triggers: dust mites and barimetric pressure             GREEN ZONE   Good Control    I feel good    No cough or wheeze    Can work, sleep and play without asthma symptoms       Take your asthma control medicine every day.     1. If exercise triggers your asthma, take your rescue medication    15 minutes before exercise or sports, and    During exercise if you have asthma symptoms  2. Spacer to use with inhaler: If you have a spacer, make sure to use it with your inhaler             YELLOW ZONE Getting Worse  I have ANY of these:    I do not feel good    Cough or wheeze    Chest feels tight    Wake up at night   1. Keep taking your Green Zone medications  2. Start taking your rescue medicine:    every 20 minutes for up to 1 hour. Then every 4 hours for 24-48 hours.  3. If you stay in the Yellow Zone for more than 12-24 hours, contact your doctor.  4. If you do not return to the Green Zone in 12-24 hours or you get worse, start taking your oral steroid medicine if prescribed by your provider.           RED ZONE Medical Alert - Get Help  I have ANY of these:    I feel awful    Medicine is not helping    Breathing getting harder    Trouble walking or talking    Nose opens wide to breathe       1. Take your rescue medicine NOW  2. If your provider has prescribed an oral steroid medicine, start taking it NOW  3. Call your doctor NOW  4. If you are still in the Red Zone after 20 minutes and you have not reached your doctor:    Take your rescue medicine again and    Call 911 or go to the emergency room right away    See  your regular doctor within 2 weeks of an Emergency Room or Urgent Care visit for follow-up treatment.          Annual Reminders:  Meet with Asthma Educator,  Flu Shot in the Fall, consider Pneumonia Vaccination for patients with asthma (aged 19 and older).    Pharmacy:    [id number] - [place] - [address] [facility] [place] [facility] [address] [facility] AT [facility] [address]  [facility] - [place], [place] - [address]     Electronically signed by rBaydon Yen MD   Date: 12/27/19                    Asthma Triggers  How To Control Things That Make Your Asthma Worse    Triggers are things that make your asthma worse.  Look at the list below to help you find your triggers and   what you can do about them. You can help prevent asthma flare-ups by staying away from your triggers.      Trigger                                                          What you can do   Cigarette Smoke  Tobacco smoke can make asthma worse. Do not allow smoking in your home, car or around you.  Be sure no one smokes at a child s day care or school.  If you smoke, ask your health care provider for ways to help you quit.  Ask family members to quit too.  Ask your health care provider for a referral to Quit Plan to help you quit smoking, or call 5-647-013-PLAN.     Colds, Flu, Bronchitis  These are common triggers of asthma. Wash your hands often.  Don t touch your eyes, nose or mouth.  Get a flu shot every year.     Dust Mites  These are tiny bugs that live in cloth or carpet. They are too small to see. Wash sheets and blankets in hot water every week.   Encase pillows and mattress in dust mite proof covers.  Avoid having carpet if you can. If you have carpet, vacuum weekly.   Use a dust mask and HEPA vacuum.   Pollen and Outdoor Mold  Some people are allergic to trees, grass, or weed pollen, or molds. Try to keep your windows closed.  Limit time out doors when pollen count is high.   Ask you health care provider about taking  medicine during allergy season.     Animal Dander  Some people are allergic to skin flakes, urine or saliva from pets with fur or feathers. Keep pets with fur or feathers out of your home.    If you can t keep the pet outdoors, then keep the pet out of your bedroom.  Keep the bedroom door closed.  Keep pets off cloth furniture and away from stuffed toys.     Mice, Rats, and Cockroaches  Some people are allergic to the waste from these pests.   Cover food and garbage.  Clean up spills and food crumbs.  Store grease in the refrigerator.   Keep food out of the bedroom.   Indoor Mold  This can be a trigger if your home has high moisture. Fix leaking faucets, pipes, or other sources of water.   Clean moldy surfaces.  Dehumidify basement if it is damp and smelly.   Smoke, Strong Odors, and Sprays  These can reduce air quality. Stay away from strong odors and sprays, such as perfume, powder, hair spray, paints, smoke incense, paint, cleaning products, candles and new carpet.   Exercise or Sports  Some people with asthma have this trigger. Be active!  Ask your doctor about taking medicine before sports or exercise to prevent symptoms.    Warm up for 5-10 minutes before and after sports or exercise.     Other Triggers of Asthma  Cold air:  Cover your nose and mouth with a scarf.  Sometimes laughing or crying can be a trigger.  Some medicines and food can trigger asthma.

## 2020-01-08 NOTE — NURSING NOTE
"Chief Complaint   Patient presents with     Asthma       Initial /72   Pulse 86   Temp 99.2  F (37.3  C) (Tympanic)   Wt 63.5 kg (140 lb)   SpO2 94%   BMI 24.03 kg/m   Estimated body mass index is 24.03 kg/m  as calculated from the following:    Height as of 9/3/19: 1.626 m (5' 4\").    Weight as of this encounter: 63.5 kg (140 lb).  Medication Reconciliation: complete  Bernadine Story MA    "

## 2020-01-09 ASSESSMENT — ASTHMA QUESTIONNAIRES: ACT_TOTALSCORE: 19

## 2020-01-12 DIAGNOSIS — E78.2 MIXED HYPERLIPIDEMIA: ICD-10-CM

## 2020-01-12 DIAGNOSIS — I25.10 CORONARY ARTERY DISEASE INVOLVING NATIVE CORONARY ARTERY OF NATIVE HEART WITHOUT ANGINA PECTORIS: ICD-10-CM

## 2020-01-13 NOTE — TELEPHONE ENCOUNTER
Lipitor       Last Written Prescription Date:  10/15/2019  Last Fill Quantity: 90,   # refills: 0  Last Office Visit: 1/8/20  Future Office visit:    Next 5 appointments (look out 90 days)    Jan 28, 2020 10:00 AM CST  (Arrive by 9:45 AM)  Return Visit with Eliezer Myers MD  [facility] [place] ([facility] [place] [facility] [address]  [place] [address]  [phone]

## 2020-01-15 RX ORDER — ATORVASTATIN CALCIUM 10 MG/1
TABLET, FILM COATED ORAL
Qty: 90 TABLET | Refills: 0 | Status: SHIPPED | OUTPATIENT
Start: 2020-01-15 | End: 2020-04-14

## 2020-01-28 ENCOUNTER — OFFICE VISIT (OUTPATIENT)
Dept: CARDIOLOGY | Facility: OTHER | Age: 70
End: 2020-01-28
Attending: INTERNAL MEDICINE
Payer: COMMERCIAL

## 2020-01-28 VITALS
BODY MASS INDEX: 24.41 KG/M2 | HEIGHT: 64 IN | WEIGHT: 143 LBS | OXYGEN SATURATION: 96 % | RESPIRATION RATE: 16 BRPM | DIASTOLIC BLOOD PRESSURE: 80 MMHG | TEMPERATURE: 98.7 F | HEART RATE: 130 BPM | SYSTOLIC BLOOD PRESSURE: 130 MMHG

## 2020-01-28 DIAGNOSIS — Q23.81 BICUSPID AORTIC VALVE: ICD-10-CM

## 2020-01-28 DIAGNOSIS — I48.19 PERSISTENT ATRIAL FIBRILLATION (H): Primary | ICD-10-CM

## 2020-01-28 DIAGNOSIS — I51.89 MILD LEFT VENTRICULAR SYSTOLIC DYSFUNCTION: ICD-10-CM

## 2020-01-28 PROCEDURE — 99214 OFFICE O/P EST MOD 30 MIN: CPT | Performed by: INTERNAL MEDICINE

## 2020-01-28 ASSESSMENT — PAIN SCALES - GENERAL: PAINLEVEL: NO PAIN (0)

## 2020-01-28 ASSESSMENT — MIFFLIN-ST. JEOR: SCORE: 1158.64

## 2020-01-28 NOTE — PROGRESS NOTES
"Chief Complaint: atrial fibrillation, bicuspid valve    HPI: I was happy to see Mrs. Cameron for the above. She has seen several cardiologists over the past year for these problems. Her  was ill and they moved to be closer to his daughters. He  in October and she has settled in [place]. Her atrial fibrillation began in the past year. In reviewing the old records both  and  are reported as when the atrial fibrillation began and she is no longer sure. This has been associated with shortness of breath but as was discussed with her by one of the cardiologist she has multiple reasons for shortness of breath including COPD. She is finishing treatment for a COPD exacerbation at this time. She reports the plan had been to start her on warfarin and then cardiovert her. With all the chaos in her life the past few months this never happened. She was found to have hyperthyroidism in August and was treated with Iodine ablation. She also has a bicuspid aortic valve and mild aortic root dilation (per report of echocardiogram in old records). The echocardiogram did not report significant aortic stenosis or insufficiency and her systolic function was normal making it less likely that her shortness of breath was related to her valvular heart disease. She was told she would need periodic f/u of her valve and aorta. She reports two angiograms done at [facility] and that they were \"okay\". I do not see copies of those reports in the old records. They will be requested.    She underwent DC cardioversion on 2014. She reports feeling better afterward. She feels like she needs to use her inhaler less often and has less shortness of breath. However, the ECG today shows she is back in atrial fibrillation.    I have reviewed the records sent from [facility]. There is an echocardiogram report from  and records regarding back surgery. I see no cath results.    A repeat cardioversion in July failed to restore sinus " "rhythm. A stress study in April (see below) showed no ischemia or infarction.    [id number]:  She had recurrent atrial fibrillation was started on flecainide and scheduled for cardioversion. When she arrived for the cardioversion she was in sinus.     A CT aortogram showed atherosclerosis but not dilation of the descending aoota.    [id number]:    She was admitted in March 2015 with a COPD exacerbation. Her breathing is at baseline with no fever, sputum or wheezing.    She has not noted any atrial fibrillation, no palpitations, chest pain/discomfort, unusual dyspnea on exertion, bleeding or neurologic symptoms.    [id number]: Her follow-up echocardiogram (see lab section) showed no change in aortic valve function. She report rare \"flutters\" but no sustained palpitations like with her atrial fibrillation. She reports no significant bleeding episodes on warfarin. She denies any neurologic symptoms suggestive of CVA or TIA. Edema improved when she switched her diltiazem to bedtime.    Her primary health problem has been her COPD. She uses oxygen at night. She has had had any recent symptoms of upper or lower respiratory infection.    [id number]: She had two admissions this past winter for COPD exacerbations. Overall, she feels her dyspnea on exertion is slowly getting worse. She has not had exertional chest pain/discomfort.     She reports no increased edema, orthopnea or paroxysmal nocturnal dyspnea.    [id number]: echocardiogram shows a decline in systolic function.  She reports that she has felt more fatigued and has had more dyspnea on exertion since I last saw her.  She also reports she has had problems with her lungs over the winter.  She is been on a prolonged course of prednisone.  She has not had chest pain or chest discomfort.  She has noted episodes of atrial fibrillation, or palpitations that she thinks is atrial fibrillation.  These episodes were relatively infrequent and do not last very long.  They are not " associated with associated symptoms and she has not found them to be overly troublesome.  She has had no prolonged episodes where she felt the need to consider seeking medical treatment.    September 25, 2018: her angiogram showed no obstructive coronary artery disease. She reports two episodes of pain between her shoulders, lasting 10 minutes.     No palpitations. Flecainide stopped due to drop in systolic function.     Has had a few problems with her COPD.     October 23, 2018: Ms. Cameron reports that she has been feeling somewhat better since switching to the torsemide.  Her swelling is better.  She continues to work or cleaning jobs.  She reports no significant episodes of atrial fibrillation.  She has occasional palpitations but nothing that has been very troublesome.  Her echocardiogram shows an improvement in her ejection fraction.  Her CT aortogram showed evidence of atherosclerosis but no dissections or aneurysms.    April 23, 2019: She was seen in the emergency department on March 19, 2019 complaining of a productive cough.  She was diagnosed with acute bronchitis.  She was also noted to be in atrial fibrillation with rapid ventricular rate.  She has had 2 courses of antibiotics for her acute bronchitis.    She reports her breathing is back to baseline.  She reports if she feels like it takes her longer to get things done and she takes more breaks but she does not find this particularly troublesome.    June 11, 2019: She reports that her breathing is about the same. She has not noted an increase in her heart failure symptoms. Her Holter shows poor heart rate control.    July 23, 2019: On the higer dose of diltiazem average heart rate dropped to 92 bpm from 111 bpm. She does feel a little better but not much.    She complains of a productive cough that started last night. No fever/chills.    January 28, 2020 Interval history: We had discussed possible cardioversion at our last visit. In Sept last year she  had an episode of BRBPR and her apixaban was held.       Current Outpatient Medications   Medication Sig Dispense Refill     acetaminophen (TYLENOL) 500 MG tablet Take 500-1,000 mg by mouth every 6 hours as needed for mild pain       ADVAIR -21 MCG/ACT inhaler INHALE 2 PUFFS INTO THE LUNGS TWICE DAILY 36 g 0     albuterol (2.5 MG/3ML) 0.083% neb solution Take 1 vial (2.5 mg) by nebulization every 6 hours as needed for shortness of breath / dyspnea or wheezing 25 vial 1     aspirin 81 MG EC tablet Take 81 mg by mouth daily       atorvastatin (LIPITOR) 10 MG tablet TAKE 1 TABLET BY MOUTH EVERY NIGHT AT BEDTIME 90 tablet 0     Calcium Carb-Cholecalciferol (CALTRATE 600+D3 SOFT PO) Take 600 mg by mouth 2 times daily       cloNIDine (CATAPRES) 0.1 MG tablet TAKE 2 TABLETS BY MOUTH EVERY NIGHT AT BEDTIME 180 tablet 0     COMBIVENT RESPIMAT  MCG/ACT inhaler Inhale 1 puff into the lungs 4 times daily        diltiazem ER COATED BEADS (CARDIZEM CD) 360 MG 24 hr capsule Take 1 capsule (360 mg) by mouth daily 90 capsule 3     diphenhydrAMINE (BENADRYL) 25 MG tablet Take 1-2 tablets (25-50 mg) by mouth every 6 hours as needed for itching or allergies 60 tablet 1     furosemide (LASIX) 40 MG tablet TAKE 1 TABLET BY MOUTH TWICE DAILY. 180 tablet 1     ipratropium - albuterol 0.5 mg/2.5 mg/3 mL (DUONEB) 0.5-2.5 (3) MG/3ML neb solution USE 1 VIAL PER NEBULIZER FOUR TIMES DAILY 1620 mL 0     levothyroxine (SYNTHROID/LEVOTHROID) 112 MCG tablet TAKE 1 TABLET(112 MCG) BY MOUTH DAILY 90 tablet 0     losartan (COZAAR) 50 MG tablet TAKE 1 TABLET BY MOUTH TWICE DAILY 120 tablet 0     OTHER MEDICAL SUPPLIES Apply one pair to help with edema 1 each 0     potassium chloride ER (K-DUR/KLOR-CON M) 20 MEQ CR tablet Take 1 tablet (20 mEq) by mouth 2 times daily 180 tablet 3     predniSONE (DELTASONE) 20 MG tablet TAKE 3 TABLETS BY MOUTH X 3 DAYS, 2 TABLETS X 3 DAYS, 1 TABLET X 3 DAYS, AND 0.5 TABLETS X 3 DAYS 20 tablet 0      "sulfamethoxazole-trimethoprim (BACTRIM DS/SEPTRA DS) 800-160 MG tablet as needed         Past Medical History:   Diagnosis Date     Asthma      Atrial fibrillation (H)      COPD (chronic obstructive pulmonary disease) (H)      Depression      High cholesterol      HTN (hypertension)      Thyroid disease        Past Surgical History:   Procedure Laterality Date     BACK SURGERY  2006     CARDIAC SURGERY  2018    [facility]giogram at [facility]     COLONOSCOPY N/A 2016    Procedure: COLONOSCOPY;  Surgeon: Waldemar Bob MD;  Location: [facility] OR     HYSTERECTOMY      partial     SLING BLADDER SUSPENSION WITH FASCIA LINNETTE         Family History   Problem Relation Age of Onset     Prostate Cancer Father      Hypertension Father      Heart Failure Father         CHF     Asthma Mother      Cancer Mother         ovarian     Hypertension Mother      Asthma Brother      Hypertension Sister      Hypertension Brother        Social History     Tobacco Use     Smoking status: Former Smoker     Packs/day: 0.50     Years: 41.00     Pack years: 20.50     Types: Cigarettes     Start date: 1966     Last attempt to quit: 2007     Years since quittin.0     Smokeless tobacco: Never Used   Substance Use Topics     Alcohol use: No     Alcohol/week: 0.0 standard drinks       Allergies   Allergen Reactions     Amlodipine Besylate Cough     Norvasc     Lisinopril Cough       ROS: ten system review negative except as noted above.     Physical Examination:  /80 (BP Location: Right arm, Patient Position: Sitting, Cuff Size: Adult Regular)   Pulse 130   Temp 98.7  F (37.1  C) (Tympanic)   Resp 16   Ht 1.626 m (5' 4\")   Wt 64.9 kg (143 lb)   SpO2 96%   BMI 24.55 kg/m    GENERAL APPEARANCE: healthy, alert and no distress  HEENT: no icterus, no xanthelasmas  NECK:  JVP is not visible, left CEA scar  CHEST:  no rales or rhonchi, breath sounds are diminished throughout, expiratory phase is prolonged, no " wheezing  CARDIOVASCULAR: irregular rhythm, S1S2 split, no S3 or S4 and no murmur, click or rub, precordium quiet  ABDOMEN: soft, non tender, bowel sounds normal, no abdominal bruits  EXTREMITIES: some edema, but also adipose    Laboratory and diagnostic data reviewed 2020:    Results for LANCE VALLEJO (MRN [id number]) as of 2020 09:52   Ref. Range 2019 10:18   T4 Free Latest Ref Range: 0.76 - 1.46 ng/dL 1.36   TSH Latest Ref Range: 0.40 - 4.00 mU/L 0.16 (L)       On diltiazem 360 mg/day:                I reviewed her ECG from 2019 that shows atrial fibrillation with rapid ventricular rate.    [facility],[place]  Echocardiography Laboratory  [address] [place] [address]     Name: LANCE VALLEJO  MRN: [id number]  : 1950  Study Date: 10/11/2018 08:57 AM  Age: 68 yrs  Gender: Female  Patient Location: [facility]  Reason For Study: , Bicuspid aortic valve  History: Bicuspid Aortic Valve  Ordering Physician: TODD JAMES  Referring Physician: TODD JAMES  Performed By: Genevieve Contreras     BSA: 1.8 m2  Height: 65 in  Weight: 160 lb  _____________________________________________________________________________  __     _____________________________________________________________________________  __        Interpretation Summary  No pericardial effusion is present.  Mild left ventricular dilation is present.  The Ejection Fraction is estimated at 45-50%.  The right ventricle is normal size.  Global right ventricular function is normal.  Mild left atrial enlargement is present.  Mild mitral insufficiency is present.  Cannot exclude a bicuspid aortic valve.  Mild aortic valve sclerosis is present.  Mild aortic insufficiency is present.  The peak aortic velocity is 2.28 m/sec.  The mean gradient across the aortic valve is11.8 mmHg.  Mild tricuspid insufficiency is present.  Right ventricular systolic pressure is 32.4mmHg above the right  atrial  pressure.  The pulmonic valve is normal.  The aorta root is normal.  ____________________________________________________________________        CTA CHEST ABDOMEN WITH CONTRAST    HISTORY: 68 yearsFemale ; Bicuspid aortic valve; Ascending aorta  dilatation (H)    TECHNIQUE: Axial CT imaging of the chest abdomen and pelvis was  performed with intervenous contrast contrast. Coronal and sagittal  reconstructions were obtained.    COMPARISON: None    FINDINGS:    CT CHEST: The ace ascending thoracic aorta is ectatic measuring 4.4 cm  transversely and 4.3 cm in AP dimension. The descending thoracic aorta  is 2.5 x 2.2 cm respectively. There is calcified atherosclerotic  disease of the thoracic aorta without evidence of dissection. There is  calcification of the aortic valve.  There is no mediastinal or hilar or axillary lymphadenopathy.    There is mild linear atelectasis at both lung bases. There is  cardiomegaly.         No concerning osseous lesions are identified    IMPRESSION CHEST: There is ectasia of the ascending thoracic aorta  measuring 4.4 x 4.3 cm in diameter.    There is cardiomegaly.      CT ABDOMEN AND PELVIS: There is atherosclerotic disease of the  abdominal aorta without evidence of aneurysm.     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 The residual lumen measures 2 mm in diameter. There is  approximate 50% stenosis of the origin of the superior mesenteric  artery, residual vessel lumen measures 3.5 to 4 mm.    There are 2 patent right renal arteries. There are 2 left renal  arteries with a dominant vessel originating slightly below the smaller  accessory vessel. There is moderate to severe stenosis of the dominant  left renal artery. There is moderate to severe stenosis of the origin  of the inferior mesenteric artery.    The visualized solid organs are unremarkable. No abnormally distended  loops of bowel are evident. The appendix is unremarkable.    IMPRESSION  ABDOMEN AND PELVIS:     Atherosclerotic disease of the abdominal aorta without evidence of  aneurysm or dissection.    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 See description above.    MARINA BRICE MD              Assessment and recommendations:    1) Persistent atrial fibrillation (recurrent) -at this point she is off oral anticoagulation due to bright red blood per rectum.  She had been very ambivalent about a repeat cardioversion attempt.  We discussed that in the absence of anticoagulation we would not be able to try a cardioversion in any case.    I discussed with her that rate control will become the primary issue.  She has not responded well to fairly aggressive AV tiffany blocking therapy.  I discussed with her AV node ablation and pacing.  We had discussed pacing using the leadless pacemaker.  However, her ejection fraction is mildly reduced and if we follow this course I would recommend a transvenous system with either biventricular pacing or attempted His bundle pacing.    She has difficulty getting to the [facility] and she is not sure that she is ready to consider the above.  If she is, and its impossible for her to get the [facility], we could have her see Dr. Gardiner at [facility].    We also discussed possible Watchman device for prevention of stroke.  She would need to be back on oral anticoagulation for a period of time.  I am not sure if this is being done at [facility] or not.    She will consider the above and be back in touch with us.    2) Bicuspid aortic valve - previous echocardiogram does not show significant aortic stenosis or insufficiency. CT aortogram shows no dilation of descending aorta.     -Valve function is stable    3)  Hypertension - controlled    4) Systolic dysfunction - no coronary artery disease found     - echocardiogram with stable EF, if anything slightly improved      I appreciate the chance to help with  Mrs. Avendanole's care. Please let me know if you have any questions or concerns.    Portions of this note were dictated using speech recognition software. The note has been proofread but errors in the text may have been overlooked. Please contact me if there are any concerns regarding the accuracy of the dictation.

## 2020-01-28 NOTE — PATIENT INSTRUCTIONS
You were seen by Dr. Myers, 01/28/20.     1.  If you decide to do the cardioversion, pacemaker, or the ablation please let us know and we will help arrange this for you.      2. Watchman for the stroke.    3. If you would like these things done at [facility] let us know and we will place the referral.     4. If you develop new or worsening symptoms, please call the cardiology office as you may need to be evaluated.     5. Please continue all other medications as they have been prescribed.     You will follow up with Dr. Myers in 3 months.       Please call the cardiology office with problems, questions, or concerns at [phone].    If you experience chest pain, chest pressure, chest tightness, shortness of breath, fainting, lightheadedness, nausea, vomiting, or other concerning symptoms, please report to the Emergency Department or call 911. These symptoms may be emergent, and best treated in the Emergency Department.       Denisse DIXON RN  Cardiology   [facility]  [phone]

## 2020-01-28 NOTE — NURSING NOTE
"Chief Complaint   Patient presents with     Follow Up     6 month, patient reports bactrim is no longer covered by insurance       Initial /80 (BP Location: Right arm, Patient Position: Sitting, Cuff Size: Adult Regular)   Pulse 130   Temp 98.7  F (37.1  C) (Tympanic)   Resp 16   Ht 1.626 m (5' 4\")   Wt 64.9 kg (143 lb)   SpO2 96%   BMI 24.55 kg/m   Estimated body mass index is 24.55 kg/m  as calculated from the following:    Height as of this encounter: 1.626 m (5' 4\").    Weight as of this encounter: 64.9 kg (143 lb).  Medication Reconciliation: complete  Aniya Koo LPN    "

## 2020-01-29 ENCOUNTER — TELEPHONE (OUTPATIENT)
Dept: CARDIOLOGY | Facility: OTHER | Age: 70
End: 2020-01-29

## 2020-01-29 DIAGNOSIS — I48.19 PERSISTENT ATRIAL FIBRILLATION (H): Primary | ICD-10-CM

## 2020-01-29 NOTE — TELEPHONE ENCOUNTER
Patient called and states that she would like to proceed with getting a pacemaker. She states she would like this done at [facility] in [place].   Please advise with how to proceed. Thank you.   [phone]

## 2020-02-03 NOTE — TELEPHONE ENCOUNTER
Eliezer Myers MD  You 17 hours ago (2:41 PM)     Whoever her PCP simply needs to put in a referral to [facility]. Thanks    Routing comment

## 2020-02-03 NOTE — TELEPHONE ENCOUNTER
Patient would like a referral to see Dr. Russell at [facility] in [place] to get a pacemaker.

## 2020-02-17 NOTE — PROGRESS NOTES
Subjective     Mary Alice Cameron is a 69 year old female who presents to clinic today for the following health issues:    HPI   Rash      Duration: 5 days    Description  Location: upper thighs both legs, both arms and behind the right ear  Itching: severe    Intensity:  moderate    Accompanying signs and symptoms: None    History (similar episodes/previous evaluation): None    Precipitating or alleviating factors:  New exposures:  None  Recent travel: no      Therapies tried and outcome: lubriderm lotion    Depression and Anxiety Follow-Up    How are you doing with your depression since your last visit? Improved     How are you doing with your anxiety since your last visit?  Improved     Are you having other symptoms that might be associated with depression or anxiety? No    Have you had a significant life event? No     Do you have any concerns with your use of alcohol or other drugs? No    Social History     Tobacco Use     Smoking status: Former Smoker     Packs/day: 0.50     Years: 41.00     Pack years: 20.50     Types: Cigarettes     Start date: 1966     Last attempt to quit: 2007     Years since quittin.0     Smokeless tobacco: Never Used   Substance Use Topics     Alcohol use: No     Alcohol/week: 0.0 standard drinks     Drug use: No     PHQ 10/29/2018 12/10/2018 2019   PHQ-9 Total Score 0 1 2   Q9: Thoughts of better off dead/self-harm past 2 weeks Not at all Not at all Not at all     ANKITA-7 SCORE 2018 10/29/2018 12/10/2018   Total Score 2 0 0           Suicide Assessment Five-step Evaluation and Treatment (SAFE-T)      Patient Active Problem List   Diagnosis     Persistent atrial fibrillation     Pulmonary emphysema (H)     Bicuspid aortic valve     Mild major depression (H)     Acute bronchitis     Hypothyroidism, postablative     Advance care planning     Essential hypertension     Long-term (current) use of anticoagulants [Z79.01]     Acute on chronic respiratory failure (H)     Health  Care Home     Status post coronary angiogram     Coronary artery disease involving native coronary artery of native heart without angina pectoris     Acute cystitis without hematuria     Small bowel obstruction (H)     Acute renal failure (H)     Hypokalemia     Past Surgical History:   Procedure Laterality Date     BACK SURGERY  2006     CARDIAC SURGERY  2018    Angiogram at [facility]     COLONOSCOPY N/A 2016    Procedure: COLONOSCOPY;  Surgeon: Waldemar Bob MD;  Location: [facility]     HYSTERECTOMY      partial     SLING BLADDER SUSPENSION WITH FASCIA LINNETTE         Social History     Tobacco Use     Smoking status: Former Smoker     Packs/day: 0.50     Years: 41.00     Pack years: 20.50     Types: Cigarettes     Start date: 1966     Last attempt to quit: 2007     Years since quittin.0     Smokeless tobacco: Never Used   Substance Use Topics     Alcohol use: No     Alcohol/week: 0.0 standard drinks     Family History   Problem Relation Age of Onset     Prostate Cancer Father      Hypertension Father      Heart Failure Father         CHF     Asthma Mother      Cancer Mother         ovarian     Hypertension Mother      Asthma Brother      Hypertension Sister      Hypertension Brother          Current Outpatient Medications   Medication Sig Dispense Refill     acetaminophen (TYLENOL) 500 MG tablet Take 500-1,000 mg by mouth every 6 hours as needed for mild pain       ADVAIR -21 MCG/ACT inhaler INHALE 2 PUFFS INTO THE LUNGS TWICE DAILY 36 g 0     albuterol (2.5 MG/3ML) 0.083% neb solution Take 1 vial (2.5 mg) by nebulization every 6 hours as needed for shortness of breath / dyspnea or wheezing 25 vial 1     aspirin 81 MG EC tablet Take 81 mg by mouth daily       atorvastatin (LIPITOR) 10 MG tablet TAKE 1 TABLET BY MOUTH EVERY NIGHT AT BEDTIME 90 tablet 0     Calcium Carb-Cholecalciferol (CALTRATE 600+D3 SOFT PO) Take 600 mg by mouth 2 times daily       cloNIDine  (CATAPRES) 0.1 MG tablet TAKE 2 TABLETS BY MOUTH EVERY NIGHT AT BEDTIME 180 tablet 0     COMBIVENT RESPIMAT  MCG/ACT inhaler Inhale 1 puff into the lungs 4 times daily        diltiazem ER COATED BEADS (CARDIZEM CD) 360 MG 24 hr capsule Take 1 capsule (360 mg) by mouth daily 90 capsule 3     diphenhydrAMINE (BENADRYL) 25 MG tablet Take 1-2 tablets (25-50 mg) by mouth every 6 hours as needed for itching or allergies 60 tablet 1     furosemide (LASIX) 40 MG tablet TAKE 1 TABLET BY MOUTH TWICE DAILY. 180 tablet 1     ipratropium - albuterol 0.5 mg/2.5 mg/3 mL (DUONEB) 0.5-2.5 (3) MG/3ML neb solution USE 1 VIAL PER NEBULIZER FOUR TIMES DAILY 1620 mL 0     levothyroxine (SYNTHROID/LEVOTHROID) 112 MCG tablet TAKE 1 TABLET(112 MCG) BY MOUTH DAILY 90 tablet 0     losartan (COZAAR) 50 MG tablet TAKE 1 TABLET BY MOUTH TWICE DAILY 120 tablet 0     OTHER MEDICAL SUPPLIES Apply one pair to help with edema 1 each 0     potassium chloride ER (K-DUR/KLOR-CON M) 20 MEQ CR tablet Take 1 tablet (20 mEq) by mouth 2 times daily 180 tablet 3     predniSONE (DELTASONE) 10 MG tablet 40 mg daily for 3 days, then 30 mg daily for 3 days, then 20 mg daily for 3 days, then 10 mg daily for 3 days 30 tablet 0     sulfamethoxazole-trimethoprim (BACTRIM DS) 800-160 MG tablet Take 1 tablet by mouth 2 times daily for 10 days 20 tablet 0     predniSONE (DELTASONE) 20 MG tablet TAKE 3 TABLETS BY MOUTH X 3 DAYS, 2 TABLETS X 3 DAYS, 1 TABLET X 3 DAYS, AND 0.5 TABLETS X 3 DAYS (Patient not taking: Reported on 2/18/2020) 20 tablet 0     Allergies   Allergen Reactions     Amlodipine Besylate Cough     Norvasc     Lisinopril Cough         Reviewed and updated as needed this visit by Provider         Review of Systems   ROS COMP: Constitutional, HEENT, cardiovascular, pulmonary, gi and gu systems are negative, except as otherwise noted.      Objective    There were no vitals taken for this visit.  There is no height or weight on file to calculate  BMI.  Physical Exam   Physical Exam:  Constitutional: healthy, alert and no distress  Skin:Scattered patches of small erythematous papules arms, torso, legs  Psych: Mood is euthymic with corresponding affect              Assessment & Plan     (L23.9) Allergic dermatitis  (primary encounter diagnosis)  Comment: Appears to be sensitivity allergic reaction. Due to widespread will rx Prednisone taper as directed. Switch . Good moisturizing.  Plan: predniSONE (DELTASONE) 10 MG tablet            (J44.1) COPD exacerbation (H)  Comment: RF  Plan: sulfamethoxazole-trimethoprim (BACTRIM DS)         800-160 MG tablet            (E89.0) Hypothyroidism, postablative  Comment: lab  Plan: CBC with platelets differential, Basic         metabolic panel, TSH with free T4 reflex                   Follow up if symptoms persist or worsen.      No follow-ups on file.    ALCON Block  [facility]

## 2020-02-18 ENCOUNTER — OFFICE VISIT (OUTPATIENT)
Dept: FAMILY MEDICINE | Facility: OTHER | Age: 70
End: 2020-02-18
Attending: PHYSICIAN ASSISTANT
Payer: MEDICARE

## 2020-02-18 VITALS
SYSTOLIC BLOOD PRESSURE: 126 MMHG | HEART RATE: 88 BPM | OXYGEN SATURATION: 95 % | BODY MASS INDEX: 25.4 KG/M2 | DIASTOLIC BLOOD PRESSURE: 70 MMHG | WEIGHT: 148 LBS

## 2020-02-18 DIAGNOSIS — L23.9 ALLERGIC DERMATITIS: Primary | ICD-10-CM

## 2020-02-18 DIAGNOSIS — E89.0 HYPOTHYROIDISM, POSTABLATIVE: ICD-10-CM

## 2020-02-18 DIAGNOSIS — J44.1 COPD EXACERBATION (H): ICD-10-CM

## 2020-02-18 LAB
ANION GAP SERPL CALCULATED.3IONS-SCNC: 8 MMOL/L (ref 3–14)
BASOPHILS # BLD AUTO: 0 10E9/L (ref 0–0.2)
BASOPHILS NFR BLD AUTO: 0.5 %
BUN SERPL-MCNC: 11 MG/DL (ref 7–30)
CALCIUM SERPL-MCNC: 9.1 MG/DL (ref 8.5–10.1)
CHLORIDE SERPL-SCNC: 103 MMOL/L (ref 94–109)
CO2 SERPL-SCNC: 28 MMOL/L (ref 20–32)
CREAT SERPL-MCNC: 0.78 MG/DL (ref 0.52–1.04)
DIFFERENTIAL METHOD BLD: NORMAL
EOSINOPHIL # BLD AUTO: 0.2 10E9/L (ref 0–0.7)
EOSINOPHIL NFR BLD AUTO: 2.9 %
ERYTHROCYTE [DISTWIDTH] IN BLOOD BY AUTOMATED COUNT: 13.7 % (ref 10–15)
GFR SERPL CREATININE-BSD FRML MDRD: 77 ML/MIN/{1.73_M2}
GLUCOSE SERPL-MCNC: 113 MG/DL (ref 70–99)
HCT VFR BLD AUTO: 42 % (ref 35–47)
HGB BLD-MCNC: 13.5 G/DL (ref 11.7–15.7)
LYMPHOCYTES # BLD AUTO: 0.9 10E9/L (ref 0.8–5.3)
LYMPHOCYTES NFR BLD AUTO: 13.6 %
MCH RBC QN AUTO: 31.8 PG (ref 26.5–33)
MCHC RBC AUTO-ENTMCNC: 32.1 G/DL (ref 31.5–36.5)
MCV RBC AUTO: 99 FL (ref 78–100)
MONOCYTES # BLD AUTO: 0.5 10E9/L (ref 0–1.3)
MONOCYTES NFR BLD AUTO: 7.4 %
NEUTROPHILS # BLD AUTO: 5 10E9/L (ref 1.6–8.3)
NEUTROPHILS NFR BLD AUTO: 75.6 %
PLATELET # BLD AUTO: 258 10E9/L (ref 150–450)
POTASSIUM SERPL-SCNC: 3.6 MMOL/L (ref 3.4–5.3)
RBC # BLD AUTO: 4.24 10E12/L (ref 3.8–5.2)
SODIUM SERPL-SCNC: 139 MMOL/L (ref 133–144)
TSH SERPL DL<=0.005 MIU/L-ACNC: 0.67 MU/L (ref 0.4–4)
WBC # BLD AUTO: 6.6 10E9/L (ref 4–11)

## 2020-02-18 PROCEDURE — 99213 OFFICE O/P EST LOW 20 MIN: CPT | Performed by: PHYSICIAN ASSISTANT

## 2020-02-18 PROCEDURE — 80048 BASIC METABOLIC PNL TOTAL CA: CPT | Mod: ZL | Performed by: PHYSICIAN ASSISTANT

## 2020-02-18 PROCEDURE — 84443 ASSAY THYROID STIM HORMONE: CPT | Mod: ZL | Performed by: PHYSICIAN ASSISTANT

## 2020-02-18 PROCEDURE — 85025 COMPLETE CBC W/AUTO DIFF WBC: CPT | Mod: ZL | Performed by: PHYSICIAN ASSISTANT

## 2020-02-18 PROCEDURE — G0463 HOSPITAL OUTPT CLINIC VISIT: HCPCS

## 2020-02-18 PROCEDURE — 36415 COLL VENOUS BLD VENIPUNCTURE: CPT | Mod: ZL | Performed by: PHYSICIAN ASSISTANT

## 2020-02-18 RX ORDER — PREDNISONE 10 MG/1
TABLET ORAL
Qty: 30 TABLET | Refills: 0 | Status: SHIPPED | OUTPATIENT
Start: 2020-02-18 | End: 2020-03-18

## 2020-02-18 RX ORDER — SULFAMETHOXAZOLE/TRIMETHOPRIM 800-160 MG
1 TABLET ORAL 2 TIMES DAILY
Qty: 20 TABLET | Refills: 0 | Status: SHIPPED | OUTPATIENT
Start: 2020-02-18 | End: 2020-06-08

## 2020-02-18 ASSESSMENT — PAIN SCALES - GENERAL: PAINLEVEL: NO PAIN (0)

## 2020-02-18 ASSESSMENT — ANXIETY QUESTIONNAIRES
6. BECOMING EASILY ANNOYED OR IRRITABLE: NOT AT ALL
2. NOT BEING ABLE TO STOP OR CONTROL WORRYING: NOT AT ALL
GAD7 TOTAL SCORE: 2
3. WORRYING TOO MUCH ABOUT DIFFERENT THINGS: NOT AT ALL
1. FEELING NERVOUS, ANXIOUS, OR ON EDGE: NOT AT ALL
4. TROUBLE RELAXING: SEVERAL DAYS
7. FEELING AFRAID AS IF SOMETHING AWFUL MIGHT HAPPEN: NOT AT ALL
5. BEING SO RESTLESS THAT IT IS HARD TO SIT STILL: SEVERAL DAYS

## 2020-02-18 ASSESSMENT — PATIENT HEALTH QUESTIONNAIRE - PHQ9: SUM OF ALL RESPONSES TO PHQ QUESTIONS 1-9: 0

## 2020-02-18 NOTE — NURSING NOTE
"Chief Complaint   Patient presents with     Derm Problem       Initial /70   Pulse 88   Wt 67.1 kg (148 lb)   SpO2 95%   BMI 25.40 kg/m   Estimated body mass index is 25.4 kg/m  as calculated from the following:    Height as of 1/28/20: 1.626 m (5' 4\").    Weight as of this encounter: 67.1 kg (148 lb).  Medication Reconciliation: complete  Bernadine Story MA    "

## 2020-02-19 DIAGNOSIS — I10 ESSENTIAL HYPERTENSION: ICD-10-CM

## 2020-02-19 ASSESSMENT — ANXIETY QUESTIONNAIRES: GAD7 TOTAL SCORE: 2

## 2020-02-20 DIAGNOSIS — I10 ESSENTIAL HYPERTENSION: ICD-10-CM

## 2020-02-20 RX ORDER — LOSARTAN POTASSIUM 50 MG/1
TABLET ORAL
Qty: 180 TABLET | Refills: 0 | Status: SHIPPED | OUTPATIENT
Start: 2020-02-20 | End: 2020-05-11

## 2020-02-20 RX ORDER — LOSARTAN POTASSIUM 50 MG/1
TABLET ORAL
Qty: 120 TABLET | Refills: 0 | Status: SHIPPED | OUTPATIENT
Start: 2020-02-20 | End: 2020-02-20

## 2020-03-01 ENCOUNTER — HEALTH MAINTENANCE LETTER (OUTPATIENT)
Age: 70
End: 2020-03-01

## 2020-03-03 ENCOUNTER — TRANSFERRED RECORDS (OUTPATIENT)
Dept: HEALTH INFORMATION MANAGEMENT | Facility: CLINIC | Age: 70
End: 2020-03-03

## 2020-03-05 ENCOUNTER — MYC MEDICAL ADVICE (OUTPATIENT)
Dept: FAMILY MEDICINE | Facility: OTHER | Age: 70
End: 2020-03-05

## 2020-03-05 NOTE — TELEPHONE ENCOUNTER
F/u with me in a couple of weeks so we can reevaluate and also discuss this recommendation.  Thanks.   Braydon Yen MD

## 2020-03-11 RX ORDER — METOPROLOL SUCCINATE 100 MG/1
200 TABLET, EXTENDED RELEASE ORAL DAILY
COMMUNITY
Start: 2020-03-03 | End: 2020-06-23

## 2020-03-11 NOTE — PROGRESS NOTES
"Mary Alice Cameron is a 69 year old female who is being evaluated via a billable telephone visit.      The patient has been notified of following:     \"This telephone visit will be conducted via a call between you and your physician/provider. We have found that certain health care needs can be provided without the need for a physical exam.  This service lets us provide the care you need with a short phone conversation.  If a prescription is necessary we can send it directly to your pharmacy.  If lab work is needed we can place an order for that and you can then stop by our lab to have the test done at a later time.    If during the course of the call the physician/provider feels a telephone visit is not appropriate, you will not be charged for this service.\"       Mary Alice Cameron complains of    Chief Complaint   Patient presents with     RECHECK       I have reviewed and updated the patient's Past Medical History, Social History, Family History and Medication List.    ALLERGIES  Amlodipine besylate and Lisinopril    Joy Franklin, JARED      Additional provider notes: review today.  Stable.  Doing great.  Started toprol, stopped diltiazem, off asa and on eliquis.  Updated in chart.  Breathing stable.  Reviewed high risk patient status regarding breathing, lungs, and virus, and stressed social distancing, etc.  See below.         Assessment/Plan:  (J43.9) Pulmonary emphysema, unspecified emphysema type (H)  (primary encounter diagnosis)  Comment: stable.   Plan: no increase in breathing difficulties with the addition of beta blocker.  Continue without change.      (I48.19) Persistent atrial fibrillation  Comment: as above.   Plan: as above.  On the eliquis and beta blocker.      I have reviewed the note as documented above.  This accurately captures the substance of my conversation with the patient.  Braydon Yen MD      Phone call contact time  Call Started at [phone]  Call Ended at [phone]    Braydon Yen MD    "

## 2020-03-12 DIAGNOSIS — I10 ESSENTIAL HYPERTENSION: ICD-10-CM

## 2020-03-13 RX ORDER — CLONIDINE HYDROCHLORIDE 0.1 MG/1
TABLET ORAL
Qty: 180 TABLET | Refills: 0 | Status: SHIPPED | OUTPATIENT
Start: 2020-03-13 | End: 2020-06-10

## 2020-03-18 ENCOUNTER — OFFICE VISIT (OUTPATIENT)
Dept: FAMILY MEDICINE | Facility: OTHER | Age: 70
End: 2020-03-18
Attending: FAMILY MEDICINE
Payer: COMMERCIAL

## 2020-03-18 DIAGNOSIS — I48.19 PERSISTENT ATRIAL FIBRILLATION (H): ICD-10-CM

## 2020-03-18 DIAGNOSIS — J43.9 PULMONARY EMPHYSEMA, UNSPECIFIED EMPHYSEMA TYPE (H): Primary | ICD-10-CM

## 2020-03-18 PROCEDURE — 99213 OFFICE O/P EST LOW 20 MIN: CPT | Mod: 95 | Performed by: FAMILY MEDICINE

## 2020-04-11 DIAGNOSIS — I10 ESSENTIAL HYPERTENSION, BENIGN: ICD-10-CM

## 2020-04-11 DIAGNOSIS — E78.2 MIXED HYPERLIPIDEMIA: ICD-10-CM

## 2020-04-11 DIAGNOSIS — I25.10 CORONARY ARTERY DISEASE INVOLVING NATIVE CORONARY ARTERY OF NATIVE HEART WITHOUT ANGINA PECTORIS: ICD-10-CM

## 2020-04-13 NOTE — TELEPHONE ENCOUNTER
Lipitor  Last Written Prescription Date: 1/15/20  Last Fill Quantity: 90 # of Refills: 0  Last Office Visit: 3/18/20    Lasix  Last Written Prescription Date: 10/15/19  Last Fill Quantity: 180 # of Refills: 1  Last Office Visit: 3/18/20

## 2020-04-14 RX ORDER — FUROSEMIDE 40 MG
TABLET ORAL
Qty: 180 TABLET | Refills: 0 | Status: SHIPPED | OUTPATIENT
Start: 2020-04-14 | End: 2020-07-13

## 2020-04-14 RX ORDER — ATORVASTATIN CALCIUM 10 MG/1
TABLET, FILM COATED ORAL
Qty: 90 TABLET | Refills: 3 | Status: SHIPPED | OUTPATIENT
Start: 2020-04-14 | End: 2021-01-12

## 2020-04-14 NOTE — TELEPHONE ENCOUNTER
Atorvastatin failed due to LDL.   LDL Cholesterol Calculated   Date Value Ref Range Status   10/29/2018 37 <100 mg/dL Final     Comment:     Desirable:       <100 mg/dl

## 2020-05-11 DIAGNOSIS — I10 ESSENTIAL HYPERTENSION: ICD-10-CM

## 2020-05-11 RX ORDER — LOSARTAN POTASSIUM 50 MG/1
TABLET ORAL
Qty: 180 TABLET | Refills: 0 | Status: SHIPPED | OUTPATIENT
Start: 2020-05-11 | End: 2020-08-19

## 2020-05-26 ENCOUNTER — MYC REFILL (OUTPATIENT)
Dept: FAMILY MEDICINE | Facility: OTHER | Age: 70
End: 2020-05-26

## 2020-05-26 ENCOUNTER — TELEPHONE (OUTPATIENT)
Dept: CARDIOLOGY | Facility: OTHER | Age: 70
End: 2020-05-26

## 2020-05-26 DIAGNOSIS — J44.1 COPD EXACERBATION (H): ICD-10-CM

## 2020-05-26 DIAGNOSIS — E89.0 HYPOTHYROIDISM, POSTABLATIVE: ICD-10-CM

## 2020-05-26 RX ORDER — LEVOTHYROXINE SODIUM 112 UG/1
112 TABLET ORAL DAILY
Qty: 90 TABLET | Refills: 0 | Status: SHIPPED | OUTPATIENT
Start: 2020-05-26 | End: 2020-10-05

## 2020-05-26 NOTE — TELEPHONE ENCOUNTER
Patient was no show for her virtual visit with Dr. Myers 05/26/20. Please call to offer new appointment. Thank you.

## 2020-05-29 ENCOUNTER — MYC MEDICAL ADVICE (OUTPATIENT)
Dept: FAMILY MEDICINE | Facility: OTHER | Age: 70
End: 2020-05-29

## 2020-05-29 RX ORDER — PREDNISONE 20 MG/1
TABLET ORAL
Qty: 20 TABLET | Refills: 0 | OUTPATIENT
Start: 2020-05-29

## 2020-05-29 NOTE — TELEPHONE ENCOUNTER
prednisone      Last Written Prescription Date:  1/24/2020  Last Fill Quantity: 20,   # refills: 0  Last Office Visit: 3/18/2020

## 2020-06-05 NOTE — PROGRESS NOTES
"Mary Alice Cameron is a 69 year old female who is being evaluated via a billable telephone visit.      The patient has been notified of following:     \"This telephone visit will be conducted via a call between you and your physician/provider. We have found that certain health care needs can be provided without the need for a physical exam.  This service lets us provide the care you need with a short phone conversation.  If a prescription is necessary we can send it directly to your pharmacy.  If lab work is needed we can place an order for that and you can then stop by our lab to have the test done at a later time.    Telephone visits are billed at different rates depending on your insurance coverage. During this emergency period, for some insurers they may be billed the same as an in-person visit.  Please reach out to your insurance provider with any questions.    If during the course of the call the physician/provider feels a telephone visit is not appropriate, you will not be charged for this service.\"    Patient has given verbal consent for Telephone visit?  Yes    What phone number would you like to be contacted at? [phone]    How would you like to obtain your AVS? Afshan Patel     Mary Alice Cameron is a 69 year old female who presents via phone visit today for the following health issues:    HPI  COPD Follow-Up    Overall, how are your COPD symptoms since your last clinic visit?  Slightly worse    How much fatigue or shortness of breath do you have when you are walking?  More than usual    How much shortness of breath do you have when you are resting?  Same as usual    How often do you cough? Rarely    Have you noticed any change in your sputum/phlegm?  No    Have you experienced a recent fever? No    Please describe how far you can walk without stopping to rest:  The length of 3-5 rooms    How many flights of stairs are you able to walk up without stopping?  None    Have you had any Emergency Room " Visits, Urgent Care Visits, or Hospital Admissions because of your COPD since your last office visit?  No    History   Smoking Status     Former Smoker     Packs/day: 0.50     Years: 41.00     Types: Cigarettes     Start date: 1966     Quit date: 2007   Smokeless Tobacco     Never Used     No results found for: FEV1, GMB5RIQ           PROBLEMS TO ADD ON...having more sob.  She had her toprol increased in March that is the only change.  Not currently ill.  Needs meds refilled for breakthrough exacerbation.      Patient Active Problem List   Diagnosis     Persistent atrial fibrillation     Pulmonary emphysema (H)     Bicuspid aortic valve     Mild major depression (H)     Acute bronchitis     Hypothyroidism, postablative     Advance care planning     Essential hypertension     Long-term (current) use of anticoagulants [Z79.01]     Acute on chronic respiratory failure (H)     Health Care Home     Status post coronary angiogram     Coronary artery disease involving native coronary artery of native heart without angina pectoris     Acute cystitis without hematuria     Small bowel obstruction (H)     Acute renal failure (H)     Hypokalemia     Past Surgical History:   Procedure Laterality Date     BACK SURGERY       CARDIAC SURGERY  2018    [facility]gi[facility] at [facility]     COLONOSCOPY N/A 2016    Procedure: COLONOSCOPY;  Surgeon: Waldemar Bob MD;  Location: [facility]     HYSTERECTOMY      partial     SLING BLADDER SUSPENSION WITH FASCIA LINNETTE         Social History     Tobacco Use     Smoking status: Former Smoker     Packs/day: 0.50     Years: 41.00     Pack years: 20.50     Types: Cigarettes     Start date: 1966     Last attempt to quit: 2007     Years since quittin.3     Smokeless tobacco: Never Used   Substance Use Topics     Alcohol use: No     Alcohol/week: 0.0 standard drinks     Family History   Problem Relation Age of Onset     Prostate Cancer Father       Hypertension Father      Heart Failure Father         CHF     Asthma Mother      Cancer Mother         ovarian     Hypertension Mother      Asthma Brother      Hypertension Sister      Hypertension Brother          Current Outpatient Medications   Medication Sig Dispense Refill     acetaminophen (TYLENOL) 500 MG tablet Take 500-1,000 mg by mouth every 6 hours as needed for mild pain       ADVAIR -21 MCG/ACT inhaler INHALE 2 PUFFS INTO THE LUNGS TWICE DAILY 36 g 0     albuterol (2.5 MG/3ML) 0.083% neb solution Take 1 vial (2.5 mg) by nebulization every 6 hours as needed for shortness of breath / dyspnea or wheezing 25 vial 1     atorvastatin (LIPITOR) 10 MG tablet TAKE 1 TABLET BY MOUTH EVERY NIGHT AT BEDTIME 90 tablet 3     Calcium Carb-Cholecalciferol (CALTRATE 600+D3 SOFT PO) Take 600 mg by mouth 2 times daily       cloNIDine (CATAPRES) 0.1 MG tablet TAKE 2 TABLETS BY MOUTH EVERY NIGHT AT BEDTIME 180 tablet 0     COMBIVENT RESPIMAT  MCG/ACT inhaler Inhale 1 puff into the lungs 4 times daily        diphenhydrAMINE (BENADRYL) 25 MG tablet Take 1-2 tablets (25-50 mg) by mouth every 6 hours as needed for itching or allergies 60 tablet 1     ELIQUIS ANTICOAGULANT 5 MG tablet TAKE 1 TABLET BY MOUTH TWICE DAILY 180 tablet 3     fluticasone-salmeterol (ADVAIR-HFA) 230-21 MCG/ACT inhaler Inhale 2 puffs into the lungs 2 times daily 3 Inhaler 3     furosemide (LASIX) 40 MG tablet TAKE 1 TABLET BY MOUTH TWICE DAILY. 180 tablet 0     ipratropium - albuterol 0.5 mg/2.5 mg/3 mL (DUONEB) 0.5-2.5 (3) MG/3ML neb solution USE 1 VIAL PER NEBULIZER FOUR TIMES DAILY 1620 mL 0     levothyroxine (SYNTHROID/LEVOTHROID) 112 MCG tablet Take 1 tablet (112 mcg) by mouth daily 90 tablet 0     losartan (COZAAR) 50 MG tablet TAKE 1 TABLET BY MOUTH TWICE DAILY 180 tablet 0     metoprolol succinate ER (TOPROL-XL) 100 MG 24 hr tablet Take 200 mg by mouth daily        OTHER MEDICAL SUPPLIES Apply one pair to help with edema 1 each 0      "potassium chloride ER (K-DUR/KLOR-CON M) 20 MEQ CR tablet Take 1 tablet (20 mEq) by mouth 2 times daily 180 tablet 3     sulfamethoxazole-trimethoprim (BACTRIM DS) 800-160 MG tablet Take 1 tablet by mouth 2 times daily 20 tablet 0     predniSONE (DELTASONE) 20 MG tablet TAKE 3 TABLETS BY MOUTH X 3 DAYS, 2 TABLETS X 3 DAYS, 1 TABLET X 3 DAYS, AND 0.5 TABLETS X 3 DAYS 20 tablet 0     Allergies   Allergen Reactions     Amlodipine Besylate Cough     Norvasc     Lisinopril Cough       Reviewed and updated as needed this visit by Provider         Review of Systems   Constitutional, HEENT, cardiovascular, pulmonary, gi and gu systems are negative, except as otherwise noted.       Objective   Reported vitals:  Ht 1.626 m (5' 4\")   Wt 65.8 kg (145 lb)   BMI 24.89 kg/m     healthy, alert and no distress  PSYCH: Alert and oriented times 3; coherent speech, normal   rate and volume, able to articulate logical thoughts, able   to abstract reason, no tangential thoughts, no hallucinations   or delusions  Her affect is normal  RESP: No cough, no audible wheezing, able to talk in full sentences  Remainder of exam unable to be completed due to telephone visits    Diagnostic Test Results:  Labs reviewed in Epic        Assessment/Plan:  1. COPD exacerbation (H)  Discussed at some length.  Perhaps increased beta blocker contributes to worsening copd.  She is going to work with cardiology (from [facility]) on that as I didn't want to change.  Increase strength of advair.  meds sent for on hand.    - fluticasone-salmeterol (ADVAIR-HFA) 230-21 MCG/ACT inhaler; Inhale 2 puffs into the lungs 2 times daily  Dispense: 3 Inhaler; Refill: 3  - predniSONE (DELTASONE) 20 MG tablet; TAKE 3 TABLETS BY MOUTH X 3 DAYS, 2 TABLETS X 3 DAYS, 1 TABLET X 3 DAYS, AND 0.5 TABLETS X 3 DAYS  Dispense: 20 tablet; Refill: 0  - sulfamethoxazole-trimethoprim (BACTRIM DS) 800-160 MG tablet; Take 1 tablet by mouth 2 times daily  Dispense: 20 tablet; Refill: " 0    2. Persistent atrial fibrillation  As above.  Consider beta blockade as possible contributor to worsening breathing.        No follow-ups on file.      Phone call duration:  8 minutes    Braydon Yen MD

## 2020-06-08 ENCOUNTER — VIRTUAL VISIT (OUTPATIENT)
Dept: FAMILY MEDICINE | Facility: OTHER | Age: 70
End: 2020-06-08
Attending: FAMILY MEDICINE
Payer: COMMERCIAL

## 2020-06-08 VITALS — BODY MASS INDEX: 24.75 KG/M2 | WEIGHT: 145 LBS | HEIGHT: 64 IN

## 2020-06-08 DIAGNOSIS — I48.19 PERSISTENT ATRIAL FIBRILLATION (H): Primary | ICD-10-CM

## 2020-06-08 DIAGNOSIS — J44.1 COPD EXACERBATION (H): ICD-10-CM

## 2020-06-08 PROCEDURE — 99213 OFFICE O/P EST LOW 20 MIN: CPT | Mod: TEL | Performed by: FAMILY MEDICINE

## 2020-06-08 RX ORDER — PREDNISONE 20 MG/1
TABLET ORAL
Qty: 20 TABLET | Refills: 0 | Status: SHIPPED | OUTPATIENT
Start: 2020-06-08 | End: 2021-01-27

## 2020-06-08 RX ORDER — SULFAMETHOXAZOLE/TRIMETHOPRIM 800-160 MG
1 TABLET ORAL 2 TIMES DAILY
Qty: 20 TABLET | Refills: 0 | Status: SHIPPED | OUTPATIENT
Start: 2020-06-08 | End: 2021-01-27

## 2020-06-08 RX ORDER — FLUTICASONE PROPIONATE AND SALMETEROL XINAFOATE 230; 21 UG/1; UG/1
2 AEROSOL, METERED RESPIRATORY (INHALATION) 2 TIMES DAILY
Qty: 3 INHALER | Refills: 3 | Status: SHIPPED | OUTPATIENT
Start: 2020-06-08 | End: 2020-09-08

## 2020-06-08 ASSESSMENT — PAIN SCALES - GENERAL: PAINLEVEL: NO PAIN (0)

## 2020-06-08 ASSESSMENT — MIFFLIN-ST. JEOR: SCORE: 1162.72

## 2020-06-08 NOTE — NURSING NOTE
"Chief Complaint   Patient presents with     COPD       Initial Ht 1.626 m (5' 4\")   Wt 65.8 kg (145 lb)   BMI 24.89 kg/m   Estimated body mass index is 24.89 kg/m  as calculated from the following:    Height as of this encounter: 1.626 m (5' 4\").    Weight as of this encounter: 65.8 kg (145 lb).  Medication Reconciliation: complete  Malgorzata Saldana LPN  "

## 2020-06-10 DIAGNOSIS — I10 ESSENTIAL HYPERTENSION: ICD-10-CM

## 2020-06-10 RX ORDER — CLONIDINE HYDROCHLORIDE 0.1 MG/1
TABLET ORAL
Qty: 180 TABLET | Refills: 1 | Status: SHIPPED | OUTPATIENT
Start: 2020-06-10 | End: 2020-09-15

## 2020-06-23 ENCOUNTER — OFFICE VISIT (OUTPATIENT)
Dept: CARDIOLOGY | Facility: OTHER | Age: 70
End: 2020-06-23
Attending: PEDIATRICS
Payer: COMMERCIAL

## 2020-06-23 VITALS
OXYGEN SATURATION: 96 % | BODY MASS INDEX: 24.75 KG/M2 | WEIGHT: 145 LBS | DIASTOLIC BLOOD PRESSURE: 75 MMHG | HEART RATE: 114 BPM | HEIGHT: 64 IN | SYSTOLIC BLOOD PRESSURE: 140 MMHG | TEMPERATURE: 97.4 F

## 2020-06-23 DIAGNOSIS — I10 ESSENTIAL HYPERTENSION: ICD-10-CM

## 2020-06-23 DIAGNOSIS — Q23.81 BICUSPID AORTIC VALVE: ICD-10-CM

## 2020-06-23 DIAGNOSIS — I48.19 PERSISTENT ATRIAL FIBRILLATION (H): Primary | ICD-10-CM

## 2020-06-23 PROCEDURE — 99214 OFFICE O/P EST MOD 30 MIN: CPT | Performed by: INTERNAL MEDICINE

## 2020-06-23 RX ORDER — DILTIAZEM HYDROCHLORIDE 360 MG/1
360 CAPSULE, EXTENDED RELEASE ORAL DAILY
Qty: 90 CAPSULE | Refills: 3 | Status: SHIPPED | OUTPATIENT
Start: 2020-06-23 | End: 2021-06-11

## 2020-06-23 ASSESSMENT — MIFFLIN-ST. JEOR: SCORE: 1162.72

## 2020-06-23 ASSESSMENT — PAIN SCALES - GENERAL: PAINLEVEL: NO PAIN (0)

## 2020-06-23 NOTE — NURSING NOTE
"Chief Complaint   Patient presents with     Consult     Decreased Breathing with Regular Activities; this issue started off and on about 5 months       Initial BP (!) 140/75 (BP Location: Right arm, Patient Position: Chair, Cuff Size: Adult Regular)   Pulse 114   Temp 97.4  F (36.3  C) (Tympanic)   Ht 1.626 m (5' 4\")   Wt 65.8 kg (145 lb)   SpO2 96%   BMI 24.89 kg/m   Estimated body mass index is 24.89 kg/m  as calculated from the following:    Height as of this encounter: 1.626 m (5' 4\").    Weight as of this encounter: 65.8 kg (145 lb).  Medication Reconciliation: complete  Elidia Mohan LPN    "

## 2020-06-23 NOTE — PATIENT INSTRUCTIONS
You were seen by Dr. Myers, 06/23/20.     1.  Please stop/hold your metoprolol.      2. Restart diltiazem 360 mg daily.     3. We will fax this progress note to Dr. Virgen at [facility] in [place].     4. Please let us know if you do not feel well on the diltiazem and wish to restart metoprolol.    5. If you develop new or worsening symptoms, please call the cardiology office as you may need to be evaluated.     You will follow up with Dr. Myers in 3 months.       Please call the cardiology office with problems, questions, or concerns at [phone].    If you experience chest pain, chest pressure, chest tightness, shortness of breath, fainting, lightheadedness, nausea, vomiting, or other concerning symptoms, please report to the Emergency Department or call 911. These symptoms may be emergent, and best treated in the Emergency Department.       Hardik DIXON RN  Cardiology   [facility]  [phone]\

## 2020-06-23 NOTE — TELEPHONE ENCOUNTER
Faxed office note 06/23/20 to Dr. Davis Virgen at [facility].     Tel: [phone]  Fax: [phone]

## 2020-06-23 NOTE — PROGRESS NOTES
"Chief Complaint: atrial fibrillation, bicuspid valve    HPI: I was happy to see Mrs. Cameron for the above. She has seen several cardiologists over the past year for these problems. Her  was ill and they moved to be closer to his daughters. He  in October and she has settled in [place]. Her atrial fibrillation began in the past year. In reviewing the old records both  and  are reported as when the atrial fibrillation began and she is no longer sure. This has been associated with shortness of breath but as was discussed with her by one of the cardiologist she has multiple reasons for shortness of breath including COPD. She is finishing treatment for a COPD exacerbation at this time. She reports the plan had been to start her on warfarin and then cardiovert her. With all the chaos in her life the past few months this never happened. She was found to have hyperthyroidism in August and was treated with Iodine ablation. She also has a bicuspid aortic valve and mild aortic root dilation (per report of echocardiogram in old records). The echocardiogram did not report significant aortic stenosis or insufficiency and her systolic function was normal making it less likely that her shortness of breath was related to her valvular heart disease. She was told she would need periodic f/u of her valve and aorta. She reports two angiograms done at [facility] and that they were \"okay\". I do not see copies of those reports in the old records. They will be requested.    She underwent DC cardioversion on 2014. She reports feeling better afterward. She feels like she needs to use her inhaler less often and has less shortness of breath. However, the ECG today shows she is back in atrial fibrillation.    I have reviewed the records sent from [facility]. There is an echocardiogram report from  and records regarding back surgery. I see no cath results.    A repeat cardioversion in July failed to restore sinus " "rhythm. A stress study in April (see below) showed no ischemia or infarction.    [id number]:  She had recurrent atrial fibrillation was started on flecainide and scheduled for cardioversion. When she arrived for the cardioversion she was in sinus.     A CT aortogram showed atherosclerosis but not dilation of the descending aoota.    [id number]:    She was admitted in March 2015 with a COPD exacerbation. Her breathing is at baseline with no fever, sputum or wheezing.    She has not noted any atrial fibrillation, no palpitations, chest pain/discomfort, unusual dyspnea on exertion, bleeding or neurologic symptoms.    [id number]: Her follow-up echocardiogram (see lab section) showed no change in aortic valve function. She report rare \"flutters\" but no sustained palpitations like with her atrial fibrillation. She reports no significant bleeding episodes on warfarin. She denies any neurologic symptoms suggestive of CVA or TIA. Edema improved when she switched her diltiazem to bedtime.    Her primary health problem has been her COPD. She uses oxygen at night. She has had had any recent symptoms of upper or lower respiratory infection.    [id number]: She had two admissions this past winter for COPD exacerbations. Overall, she feels her dyspnea on exertion is slowly getting worse. She has not had exertional chest pain/discomfort.     She reports no increased edema, orthopnea or paroxysmal nocturnal dyspnea.    [id number]: echocardiogram shows a decline in systolic function.  She reports that she has felt more fatigued and has had more dyspnea on exertion since I last saw her.  She also reports she has had problems with her lungs over the winter.  She is been on a prolonged course of prednisone.  She has not had chest pain or chest discomfort.  She has noted episodes of atrial fibrillation, or palpitations that she thinks is atrial fibrillation.  These episodes were relatively infrequent and do not last very long.  They are not " associated with associated symptoms and she has not found them to be overly troublesome.  She has had no prolonged episodes where she felt the need to consider seeking medical treatment.    September 25, 2018: her angiogram showed no obstructive coronary artery disease. She reports two episodes of pain between her shoulders, lasting 10 minutes.     No palpitations. Flecainide stopped due to drop in systolic function.     Has had a few problems with her COPD.     October 23, 2018: Ms. Cameron reports that she has been feeling somewhat better since switching to the torsemide.  Her swelling is better.  She continues to work or cleaning jobs.  She reports no significant episodes of atrial fibrillation.  She has occasional palpitations but nothing that has been very troublesome.  Her echocardiogram shows an improvement in her ejection fraction.  Her CT aortogram showed evidence of atherosclerosis but no dissections or aneurysms.    April 23, 2019: She was seen in the emergency department on March 19, 2019 complaining of a productive cough.  She was diagnosed with acute bronchitis.  She was also noted to be in atrial fibrillation with rapid ventricular rate.  She has had 2 courses of antibiotics for her acute bronchitis.    She reports her breathing is back to baseline.  She reports if she feels like it takes her longer to get things done and she takes more breaks but she does not find this particularly troublesome.    June 11, 2019: She reports that her breathing is about the same. She has not noted an increase in her heart failure symptoms. Her Holter shows poor heart rate control.    July 23, 2019: On the higer dose of diltiazem average heart rate dropped to 92 bpm from 111 bpm. She does feel a little better but not much.    She complains of a productive cough that started last night. No fever/chills.    January 28, 2020: We had discussed possible cardioversion at our last visit. In Sept last year she had an episode of  BRBPR and her apixaban was held.     June 23, 2020 Interval history: since I saw her last she was seen at [facility]. They were successful in convincing her to resume apixaban and hold the aspirin, which is excellent.     With regards to AV node ablation they opted to try metoprolol first. She feels her breathing is a little worse on the metoprolol. That had been a concern with beta blocker, her COPD.     Current Outpatient Medications   Medication Sig Dispense Refill     acetaminophen (TYLENOL) 500 MG tablet Take 500-1,000 mg by mouth every 6 hours as needed for mild pain       ADVAIR -21 MCG/ACT inhaler INHALE 2 PUFFS INTO THE LUNGS TWICE DAILY 36 g 0     albuterol (2.5 MG/3ML) 0.083% neb solution Take 1 vial (2.5 mg) by nebulization every 6 hours as needed for shortness of breath / dyspnea or wheezing 25 vial 1     atorvastatin (LIPITOR) 10 MG tablet TAKE 1 TABLET BY MOUTH EVERY NIGHT AT BEDTIME 90 tablet 3     Calcium Carb-Cholecalciferol (CALTRATE 600+D3 SOFT PO) Take 600 mg by mouth 2 times daily       cloNIDine (CATAPRES) 0.1 MG tablet TAKE 2 TABLETS BY MOUTH EVERY NIGHT AT BEDTIME 180 tablet 1     COMBIVENT RESPIMAT  MCG/ACT inhaler Inhale 1 puff into the lungs 4 times daily        diphenhydrAMINE (BENADRYL) 25 MG tablet Take 1-2 tablets (25-50 mg) by mouth every 6 hours as needed for itching or allergies 60 tablet 1     ELIQUIS ANTICOAGULANT 5 MG tablet TAKE 1 TABLET BY MOUTH TWICE DAILY 180 tablet 3     fluticasone-salmeterol (ADVAIR-HFA) 230-21 MCG/ACT inhaler Inhale 2 puffs into the lungs 2 times daily 3 Inhaler 3     furosemide (LASIX) 40 MG tablet TAKE 1 TABLET BY MOUTH TWICE DAILY. 180 tablet 0     ipratropium - albuterol 0.5 mg/2.5 mg/3 mL (DUONEB) 0.5-2.5 (3) MG/3ML neb solution USE 1 VIAL PER NEBULIZER FOUR TIMES DAILY 1620 mL 0     levothyroxine (SYNTHROID/LEVOTHROID) 112 MCG tablet Take 1 tablet (112 mcg) by mouth daily 90 tablet 0     losartan (COZAAR) 50 MG tablet TAKE 1 TABLET BY  MOUTH TWICE DAILY 180 tablet 0     metoprolol succinate ER (TOPROL-XL) 100 MG 24 hr tablet Take 200 mg by mouth daily        OTHER MEDICAL SUPPLIES Apply one pair to help with edema 1 each 0     potassium chloride ER (K-DUR/KLOR-CON M) 20 MEQ CR tablet Take 1 tablet (20 mEq) by mouth 2 times daily 180 tablet 3     predniSONE (DELTASONE) 20 MG tablet TAKE 3 TABLETS BY MOUTH X 3 DAYS, 2 TABLETS X 3 DAYS, 1 TABLET X 3 DAYS, AND 0.5 TABLETS X 3 DAYS 20 tablet 0     sulfamethoxazole-trimethoprim (BACTRIM DS) 800-160 MG tablet Take 1 tablet by mouth 2 times daily 20 tablet 0       Past Medical History:   Diagnosis Date     Asthma      Atrial fibrillation (H)      COPD (chronic obstructive pulmonary disease) (H)      Depression      High cholesterol      HTN (hypertension)      Thyroid disease        Past Surgical History:   Procedure Laterality Date     BACK SURGERY       CARDIAC SURGERY  2018    [facility]gi[facility] at [facility]     COLONOSCOPY N/A 2016    Procedure: COLONOSCOPY;  Surgeon: Waldemar Bob MD;  Location: [facility] OR     HYSTERECTOMY      partial     SLING BLADDER SUSPENSION WITH FASCIA LINNETTE         Family History   Problem Relation Age of Onset     Prostate Cancer Father      Hypertension Father      Heart Failure Father         CHF     Asthma Mother      Cancer Mother         ovarian     Hypertension Mother      Asthma Brother      Hypertension Sister      Hypertension Brother        Social History     Tobacco Use     Smoking status: Former Smoker     Packs/day: 0.50     Years: 41.00     Pack years: 20.50     Types: Cigarettes     Start date: 1966     Last attempt to quit: 2007     Years since quittin.4     Smokeless tobacco: Never Used   Substance Use Topics     Alcohol use: No     Alcohol/week: 0.0 standard drinks       Allergies   Allergen Reactions     Amlodipine Besylate Cough     Norvasc     Lisinopril Cough       ROS: ten system review negative except as noted above.  "    Physical Examination:  BP (!) 140/75 (BP Location: Right arm, Patient Position: Chair, Cuff Size: Adult Regular)   Pulse 114   Temp 97.4  F (36.3  C) (Tympanic)   Ht 1.626 m (5' 4\")   Wt 65.8 kg (145 lb)   SpO2 96%   BMI 24.89 kg/m    GENERAL APPEARANCE: healthy, alert and no distress  HEENT: no icterus, no xanthelasmas  NECK:  JVP is not visible, left CEA scar  CHEST:  no rales or rhonchi, breath sounds are diminished throughout, expiratory phase is prolonged, no wheezing  CARDIOVASCULAR: irregular rhythm, tachycardic, S1S2 split, no S3 or S4 and no murmur, click or rub, precordium quiet  ABDOMEN: soft, non tender, bowel sounds normal, no abdominal bruits  EXTREMITIES: some edema, but also adipose    Laboratory and diagnostic data reviewed June 23, 2020:    Results for LANCE VALLEJO (MRN [id number]) as of 6/23/2020 08:31   Ref. Range 2/18/2020 10:27   Sodium Latest Ref Range: 133 - 144 mmol/L 139   Potassium Latest Ref Range: 3.4 - 5.3 mmol/L 3.6   Chloride Latest Ref Range: 94 - 109 mmol/L 103   Carbon Dioxide Latest Ref Range: 20 - 32 mmol/L 28   Urea Nitrogen Latest Ref Range: 7 - 30 mg/dL 11   Creatinine Latest Ref Range: 0.52 - 1.04 mg/dL 0.78   GFR Estimate Latest Ref Range: >60 mL/min/1.73_m2 77   GFR Estimate If Black Latest Ref Range: >60 mL/min/1.73_m2 89   Calcium Latest Ref Range: 8.5 - 10.1 mg/dL 9.1   Anion Gap Latest Ref Range: 3 - 14 mmol/L 8   TSH Latest Ref Range: 0.40 - 4.00 mU/L 0.67   Glucose Latest Ref Range: 70 - 99 mg/dL 113 (H)   WBC Latest Ref Range: 4.0 - 11.0 10e9/L 6.6   Hemoglobin Latest Ref Range: 11.7 - 15.7 g/dL 13.5   Hematocrit Latest Ref Range: 35.0 - 47.0 % 42.0   Platelet Count Latest Ref Range: 150 - 450 10e9/L 258   RBC Count Latest Ref Range: 3.8 - 5.2 10e12/L 4.24   MCV Latest Ref Range: 78 - 100 fl 99   MCH Latest Ref Range: 26.5 - 33.0 pg 31.8   MCHC Latest Ref Range: 31.5 - 36.5 g/dL 32.1   RDW Latest Ref Range: 10.0 - 15.0 % 13.7   Diff Method Unknown " Automated Method   % Neutrophils Latest Units: % 75.6   % Lymphocytes Latest Units: % 13.6   % Monocytes Latest Units: % 7.4   % Eosinophils Latest Units: % 2.9   % Basophils Latest Units: % 0.5   Absolute Neutrophil Latest Ref Range: 1.6 - 8.3 10e9/L 5.0   Absolute Lymphocytes Latest Ref Range: 0.8 - 5.3 10e9/L 0.9   Absolute Monocytes Latest Ref Range: 0.0 - 1.3 10e9/L 0.5   Absolute Eosinophils Latest Ref Range: 0.0 - 0.7 10e9/L 0.2   Absolute Basophils Latest Ref Range: 0.0 - 0.2 10e9/L 0.0         On diltiazem 360 mg/day:                I reviewed her ECG from 2019 that shows atrial fibrillation with rapid ventricular rate.    [facility],[place]  Echocardiography Laboratory  [address] [place] [address]     Name: LANCE VALLEJO  MRN: [id number]  : 1950  Study Date: 10/11/2018 08:57 AM  Age: 68 yrs  Gender: Female  Patient Location: [facility]  Reason For Study: , Bicuspid aortic valve  History: Bicuspid Aortic Valve  Ordering Physician: TODD JAMES  Referring Physician: TODD JAMES  Performed By: Genevieve Contreras     BSA: 1.8 m2  Height: 65 in  Weight: 160 lb  _____________________________________________________________________________  __     _____________________________________________________________________________  __        Interpretation Summary  No pericardial effusion is present.  Mild left ventricular dilation is present.  The Ejection Fraction is estimated at 45-50%.  The right ventricle is normal size.  Global right ventricular function is normal.  Mild left atrial enlargement is present.  Mild mitral insufficiency is present.  Cannot exclude a bicuspid aortic valve.  Mild aortic valve sclerosis is present.  Mild aortic insufficiency is present.  The peak aortic velocity is 2.28 m/sec.  The mean gradient across the aortic valve is11.8 mmHg.  Mild tricuspid insufficiency is present.  Right ventricular systolic pressure is 32.4mmHg  above the right atrial  pressure.  The pulmonic valve is normal.  The aorta root is normal.  ____________________________________________________________________        CTA CHEST ABDOMEN WITH CONTRAST    HISTORY: 68 yearsFemale ; Bicuspid aortic valve; Ascending aorta  dilatation (H)    TECHNIQUE: Axial CT imaging of the chest abdomen and pelvis was  performed with intervenous contrast contrast. Coronal and sagittal  reconstructions were obtained.    COMPARISON: None    FINDINGS:    CT CHEST: The ace ascending thoracic aorta is ectatic measuring 4.4 cm  transversely and 4.3 cm in AP dimension. The descending thoracic aorta  is 2.5 x 2.2 cm respectively. There is calcified atherosclerotic  disease of the thoracic aorta without evidence of dissection. There is  calcification of the aortic valve.  There is no mediastinal or hilar or axillary lymphadenopathy.    There is mild linear atelectasis at both lung bases. There is  cardiomegaly.         No concerning osseous lesions are identified    IMPRESSION CHEST: There is ectasia of the ascending thoracic aorta  measuring 4.4 x 4.3 cm in diameter.    There is cardiomegaly.      CT ABDOMEN AND PELVIS: There is atherosclerotic disease of the  abdominal aorta without evidence of aneurysm.     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 The residual lumen measures 2 mm in diameter. There is  approximate 50% stenosis of the origin of the superior mesenteric  artery, residual vessel lumen measures 3.5 to 4 mm.    There are 2 patent right renal arteries. There are 2 left renal  arteries with a dominant vessel originating slightly below the smaller  accessory vessel. There is moderate to severe stenosis of the dominant  left renal artery. There is moderate to severe stenosis of the origin  of the inferior mesenteric artery.    The visualized solid organs are unremarkable. No abnormally distended  loops of bowel are evident. The appendix is  "unremarkable.    IMPRESSION ABDOMEN AND PELVIS:     Atherosclerotic disease of the abdominal aorta without evidence of  aneurysm or dissection.    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 See description above.    MARINA BRICE MD              Assessment and recommendations:    1) Persistent atrial fibrillation (recurrent) - because of the difficulty of getting to the [facility], she decided to see someone at [facility]. They were able to get her back on oral anticoagulation. They elected to try metoprolol. She feels her breathing has been a little worse but I don't hear any wheezing on exam.     On 200 mg daily of metoprolol her resting heart rate remains in the 110-120s.     She remains unsure if she is willing to consider AV node ablation and pacemaker. She reports she is leaning towards \"doing nothing\".     We discussed that we would need to keep an eye of her systolic heart function given the sub-optimal heart rate control despite aggressive attempts at heart rate control.    She would like to go back to the diltiazem, which is fine.     We discussed:    - nothing beyond switch back to diltiazem    - adding digoxin to diltiazem, we discussed it is not very good at rate control but might help; level would need to be followed carefully    - adding a low dose beta blocker to the diltiazem; in general tend to avoid this combo given risk of two negative iontropes    If the above were unsuccessful in controlling heart rate, ablation/pacing remains an option. Would recommend cardiac resynchronization therapy or His bundle. She could have this done at [facility] if she prefers.       2) Bicuspid aortic valve - previous echocardiogram does not show significant aortic stenosis or insufficiency. CT aortogram shows no dilation of descending aorta.     -Valve function is stable    3)  Hypertension - at goal    4) Systolic dysfunction - no coronary " artery disease found     - echocardiogram with stable EF, if anything slightly improved      I appreciate the chance to help with Mrs. Cameron's care. Please let me know if you have any questions or concerns.    Portions of this note were dictated using speech recognition software. The note has been proofread but errors in the text may have been overlooked. Please contact me if there are any concerns regarding the accuracy of the dictation.       CC  Dr. Davis Virgen MD   [address] [address]

## 2020-06-23 NOTE — TELEPHONE ENCOUNTER
----- Message from Denisse Harris RN sent at 6/23/2020  9:30 AM CDT -----  Please fax today's notes from Dr. Myers to Dr. Davis Virgen at [facility] in [place].   Thank you.

## 2020-06-23 NOTE — LETTER
"2020      RE: Mary Alice Cameron  [address] [facility] [address]  [facility] [address]       Patient seen by Dr. Myers, 20.   See note.         Chief Complaint: atrial fibrillation, bicuspid valve    HPI: I was happy to see Mrs. Cameron for the above. She has seen several cardiologists over the past year for these problems. Her  was ill and they moved to be closer to his daughters. He  in October and she has settled in [place]. Her atrial fibrillation began in the past year. In reviewing the old records both  and  are reported as when the atrial fibrillation began and she is no longer sure. This has been associated with shortness of breath but as was discussed with her by one of the cardiologist she has multiple reasons for shortness of breath including COPD. She is finishing treatment for a COPD exacerbation at this time. She reports the plan had been to start her on warfarin and then cardiovert her. With all the chaos in her life the past few months this never happened. She was found to have hyperthyroidism in August and was treated with Iodine ablation. She also has a bicuspid aortic valve and mild aortic root dilation (per report of echocardiogram in old records). The echocardiogram did not report significant aortic stenosis or insufficiency and her systolic function was normal making it less likely that her shortness of breath was related to her valvular heart disease. She was told she would need periodic f/u of her valve and aorta. She reports two angiograms done at [facility] and that they were \"okay\". I do not see copies of those reports in the old records. They will be requested.    She underwent DC cardioversion on 2014. She reports feeling better afterward. She feels like she needs to use her inhaler less often and has less shortness of breath. However, the ECG today shows she is back in atrial fibrillation.    I have reviewed the records sent from [facility]. There is an " "echocardiogram report from 2002 and records regarding back surgery. I see no cath results.    A repeat cardioversion in July failed to restore sinus rhythm. A stress study in April (see below) showed no ischemia or infarction.    [id number]:  She had recurrent atrial fibrillation was started on flecainide and scheduled for cardioversion. When she arrived for the cardioversion she was in sinus.     A CT aortogram showed atherosclerosis but not dilation of the descending aoota.    [id number]:    She was admitted in March 2015 with a COPD exacerbation. Her breathing is at baseline with no fever, sputum or wheezing.    She has not noted any atrial fibrillation, no palpitations, chest pain/discomfort, unusual dyspnea on exertion, bleeding or neurologic symptoms.    [id number]: Her follow-up echocardiogram (see lab section) showed no change in aortic valve function. She report rare \"flutters\" but no sustained palpitations like with her atrial fibrillation. She reports no significant bleeding episodes on warfarin. She denies any neurologic symptoms suggestive of CVA or TIA. Edema improved when she switched her diltiazem to bedtime.    Her primary health problem has been her COPD. She uses oxygen at night. She has had had any recent symptoms of upper or lower respiratory infection.    [id number]: She had two admissions this past winter for COPD exacerbations. Overall, she feels her dyspnea on exertion is slowly getting worse. She has not had exertional chest pain/discomfort.     She reports no increased edema, orthopnea or paroxysmal nocturnal dyspnea.    [id number]: echocardiogram shows a decline in systolic function.  She reports that she has felt more fatigued and has had more dyspnea on exertion since I last saw her.  She also reports she has had problems with her lungs over the winter.  She is been on a prolonged course of prednisone.  She has not had chest pain or chest discomfort.  She has noted episodes of atrial " fibrillation, or palpitations that she thinks is atrial fibrillation.  These episodes were relatively infrequent and do not last very long.  They are not associated with associated symptoms and she has not found them to be overly troublesome.  She has had no prolonged episodes where she felt the need to consider seeking medical treatment.    September 25, 2018: her angiogram showed no obstructive coronary artery disease. She reports two episodes of pain between her shoulders, lasting 10 minutes.     No palpitations. Flecainide stopped due to drop in systolic function.     Has had a few problems with her COPD.     October 23, 2018: Ms. Cameron reports that she has been feeling somewhat better since switching to the torsemide.  Her swelling is better.  She continues to work or cleaning jobs.  She reports no significant episodes of atrial fibrillation.  She has occasional palpitations but nothing that has been very troublesome.  Her echocardiogram shows an improvement in her ejection fraction.  Her CT aortogram showed evidence of atherosclerosis but no dissections or aneurysms.    April 23, 2019: She was seen in the emergency department on March 19, 2019 complaining of a productive cough.  She was diagnosed with acute bronchitis.  She was also noted to be in atrial fibrillation with rapid ventricular rate.  She has had 2 courses of antibiotics for her acute bronchitis.    She reports her breathing is back to baseline.  She reports if she feels like it takes her longer to get things done and she takes more breaks but she does not find this particularly troublesome.    June 11, 2019: She reports that her breathing is about the same. She has not noted an increase in her heart failure symptoms. Her Holter shows poor heart rate control.    July 23, 2019: On the higer dose of diltiazem average heart rate dropped to 92 bpm from 111 bpm. She does feel a little better but not much.    She complains of a productive cough that  started last night. No fever/chills.    January 28, 2020: We had discussed possible cardioversion at our last visit. In Sept last year she had an episode of BRBPR and her apixaban was held.     June 23, 2020 Interval history: since I saw her last she was seen at [facility]. They were successful in convincing her to resume apixaban and hold the aspirin, which is excellent.     With regards to AV node ablation they opted to try metoprolol first. She feels her breathing is a little worse on the metoprolol. That had been a concern with beta blocker, her COPD.     Current Outpatient Medications   Medication Sig Dispense Refill     acetaminophen (TYLENOL) 500 MG tablet Take 500-1,000 mg by mouth every 6 hours as needed for mild pain       ADVAIR -21 MCG/ACT inhaler INHALE 2 PUFFS INTO THE LUNGS TWICE DAILY 36 g 0     albuterol (2.5 MG/3ML) 0.083% neb solution Take 1 vial (2.5 mg) by nebulization every 6 hours as needed for shortness of breath / dyspnea or wheezing 25 vial 1     atorvastatin (LIPITOR) 10 MG tablet TAKE 1 TABLET BY MOUTH EVERY NIGHT AT BEDTIME 90 tablet 3     Calcium Carb-Cholecalciferol (CALTRATE 600+D3 SOFT PO) Take 600 mg by mouth 2 times daily       cloNIDine (CATAPRES) 0.1 MG tablet TAKE 2 TABLETS BY MOUTH EVERY NIGHT AT BEDTIME 180 tablet 1     COMBIVENT RESPIMAT  MCG/ACT inhaler Inhale 1 puff into the lungs 4 times daily        diphenhydrAMINE (BENADRYL) 25 MG tablet Take 1-2 tablets (25-50 mg) by mouth every 6 hours as needed for itching or allergies 60 tablet 1     ELIQUIS ANTICOAGULANT 5 MG tablet TAKE 1 TABLET BY MOUTH TWICE DAILY 180 tablet 3     fluticasone-salmeterol (ADVAIR-HFA) 230-21 MCG/ACT inhaler Inhale 2 puffs into the lungs 2 times daily 3 Inhaler 3     furosemide (LASIX) 40 MG tablet TAKE 1 TABLET BY MOUTH TWICE DAILY. 180 tablet 0     ipratropium - albuterol 0.5 mg/2.5 mg/3 mL (DUONEB) 0.5-2.5 (3) MG/3ML neb solution USE 1 VIAL PER NEBULIZER FOUR TIMES DAILY 1620 mL 0      levothyroxine (SYNTHROID/LEVOTHROID) 112 MCG tablet Take 1 tablet (112 mcg) by mouth daily 90 tablet 0     losartan (COZAAR) 50 MG tablet TAKE 1 TABLET BY MOUTH TWICE DAILY 180 tablet 0     metoprolol succinate ER (TOPROL-XL) 100 MG 24 hr tablet Take 200 mg by mouth daily        OTHER MEDICAL SUPPLIES Apply one pair to help with edema 1 each 0     potassium chloride ER (K-DUR/KLOR-CON M) 20 MEQ CR tablet Take 1 tablet (20 mEq) by mouth 2 times daily 180 tablet 3     predniSONE (DELTASONE) 20 MG tablet TAKE 3 TABLETS BY MOUTH X 3 DAYS, 2 TABLETS X 3 DAYS, 1 TABLET X 3 DAYS, AND 0.5 TABLETS X 3 DAYS 20 tablet 0     sulfamethoxazole-trimethoprim (BACTRIM DS) 800-160 MG tablet Take 1 tablet by mouth 2 times daily 20 tablet 0       Past Medical History:   Diagnosis Date     Asthma      Atrial fibrillation (H)      COPD (chronic obstructive pulmonary disease) (H)      Depression      High cholesterol      HTN (hypertension)      Thyroid disease        Past Surgical History:   Procedure Laterality Date     BACK SURGERY       CARDIAC SURGERY  2018    Angiogram at [facility]     COLONOSCOPY N/A 2016    Procedure: COLONOSCOPY;  Surgeon: Waldemar Bob MD;  Location: [facility]     HYSTERECTOMY      partial     SLING BLADDER SUSPENSION WITH FASCIA LINNETTE         Family History   Problem Relation Age of Onset     Prostate Cancer Father      Hypertension Father      Heart Failure Father         CHF     Asthma Mother      Cancer Mother         ovarian     Hypertension Mother      Asthma Brother      Hypertension Sister      Hypertension Brother        Social History     Tobacco Use     Smoking status: Former Smoker     Packs/day: 0.50     Years: 41.00     Pack years: 20.50     Types: Cigarettes     Start date: 1966     Last attempt to quit: 2007     Years since quittin.4     Smokeless tobacco: Never Used   Substance Use Topics     Alcohol use: No     Alcohol/week: 0.0 standard drinks  "      Allergies   Allergen Reactions     Amlodipine Besylate Cough     Norvasc     Lisinopril Cough       ROS: ten system review negative except as noted above.     Physical Examination:  BP (!) 140/75 (BP Location: Right arm, Patient Position: Chair, Cuff Size: Adult Regular)   Pulse 114   Temp 97.4  F (36.3  C) (Tympanic)   Ht 1.626 m (5' 4\")   Wt 65.8 kg (145 lb)   SpO2 96%   BMI 24.89 kg/m    GENERAL APPEARANCE: healthy, alert and no distress  HEENT: no icterus, no xanthelasmas  NECK:  JVP is not visible, left CEA scar  CHEST:  no rales or rhonchi, breath sounds are diminished throughout, expiratory phase is prolonged, no wheezing  CARDIOVASCULAR: irregular rhythm, tachycardic, S1S2 split, no S3 or S4 and no murmur, click or rub, precordium quiet  ABDOMEN: soft, non tender, bowel sounds normal, no abdominal bruits  EXTREMITIES: some edema, but also adipose    Laboratory and diagnostic data reviewed June 23, 2020:    Results for LANCE VALLEJO MERLE (MRN [id number]) as of 6/23/2020 08:31   Ref. Range 2/18/2020 10:27   Sodium Latest Ref Range: 133 - 144 mmol/L 139   Potassium Latest Ref Range: 3.4 - 5.3 mmol/L 3.6   Chloride Latest Ref Range: 94 - 109 mmol/L 103   Carbon Dioxide Latest Ref Range: 20 - 32 mmol/L 28   Urea Nitrogen Latest Ref Range: 7 - 30 mg/dL 11   Creatinine Latest Ref Range: 0.52 - 1.04 mg/dL 0.78   GFR Estimate Latest Ref Range: >60 mL/min/1.73_m2 77   GFR Estimate If Black Latest Ref Range: >60 mL/min/1.73_m2 89   Calcium Latest Ref Range: 8.5 - 10.1 mg/dL 9.1   Anion Gap Latest Ref Range: 3 - 14 mmol/L 8   TSH Latest Ref Range: 0.40 - 4.00 mU/L 0.67   Glucose Latest Ref Range: 70 - 99 mg/dL 113 (H)   WBC Latest Ref Range: 4.0 - 11.0 10e9/L 6.6   Hemoglobin Latest Ref Range: 11.7 - 15.7 g/dL 13.5   Hematocrit Latest Ref Range: 35.0 - 47.0 % 42.0   Platelet Count Latest Ref Range: 150 - 450 10e9/L 258   RBC Count Latest Ref Range: 3.8 - 5.2 10e12/L 4.24   MCV Latest Ref Range: 78 - 100 fl 99 "   MCH Latest Ref Range: 26.5 - 33.0 pg 31.8   MCHC Latest Ref Range: 31.5 - 36.5 g/dL 32.1   RDW Latest Ref Range: 10.0 - 15.0 % 13.7   Diff Method Unknown Automated Method   % Neutrophils Latest Units: % 75.6   % Lymphocytes Latest Units: % 13.6   % Monocytes Latest Units: % 7.4   % Eosinophils Latest Units: % 2.9   % Basophils Latest Units: % 0.5   Absolute Neutrophil Latest Ref Range: 1.6 - 8.3 10e9/L 5.0   Absolute Lymphocytes Latest Ref Range: 0.8 - 5.3 10e9/L 0.9   Absolute Monocytes Latest Ref Range: 0.0 - 1.3 10e9/L 0.5   Absolute Eosinophils Latest Ref Range: 0.0 - 0.7 10e9/L 0.2   Absolute Basophils Latest Ref Range: 0.0 - 0.2 10e9/L 0.0         On diltiazem 360 mg/day:                I reviewed her ECG from 2019 that shows atrial fibrillation with rapid ventricular rate.    [facility]  [facility]  [address] [place] [id number]     Name: LANCE VALLEJO  MRN: [id number]  : 1950  Study Date: 10/11/2018 08:57 AM  Age: 68 yrs  Gender: Female  Patient Location: [facility]  Reason For Study: , Bicuspid aortic valve  History: Bicuspid Aortic Valve  Ordering Physician: TODD JAMES  Referring Physician: TODD JAMES  Performed By: Genevieve Contreras     BSA: 1.8 m2  Height: 65 in  Weight: 160 lb  _____________________________________________________________________________  __     _____________________________________________________________________________  __        Interpretation Summary  No pericardial effusion is present.  Mild left ventricular dilation is present.  The Ejection Fraction is estimated at 45-50%.  The right ventricle is normal size.  Global right ventricular function is normal.  Mild left atrial enlargement is present.  Mild mitral insufficiency is present.  Cannot exclude a bicuspid aortic valve.  Mild aortic valve sclerosis is present.  Mild aortic insufficiency is present.  The peak aortic velocity is 2.28  m/sec.  The mean gradient across the aortic valve is11.8 mmHg.  Mild tricuspid insufficiency is present.  Right ventricular systolic pressure is 32.4mmHg above the right atrial  pressure.  The pulmonic valve is normal.  The aorta root is normal.  ____________________________________________________________________        CTA CHEST ABDOMEN WITH CONTRAST    HISTORY: 68 yearsFemale ; Bicuspid aortic valve; Ascending aorta  dilatation (H)    TECHNIQUE: Axial CT imaging of the chest abdomen and pelvis was  performed with intervenous contrast contrast. Coronal and sagittal  reconstructions were obtained.    COMPARISON: None    FINDINGS:    CT CHEST: The ace ascending thoracic aorta is ectatic measuring 4.4 cm  transversely and 4.3 cm in AP dimension. The descending thoracic aorta  is 2.5 x 2.2 cm respectively. There is calcified atherosclerotic  disease of the thoracic aorta without evidence of dissection. There is  calcification of the aortic valve.  There is no mediastinal or hilar or axillary lymphadenopathy.    There is mild linear atelectasis at both lung bases. There is  cardiomegaly.         No concerning osseous lesions are identified    IMPRESSION CHEST: There is ectasia of the ascending thoracic aorta  measuring 4.4 x 4.3 cm in diameter.    There is cardiomegaly.      CT ABDOMEN AND PELVIS: There is atherosclerotic disease of the  abdominal aorta without evidence of aneurysm.     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 The residual lumen measures 2 mm in diameter. There is  approximate 50% stenosis of the origin of the superior mesenteric  artery, residual vessel lumen measures 3.5 to 4 mm.    There are 2 patent right renal arteries. There are 2 left renal  arteries with a dominant vessel originating slightly below the smaller  accessory vessel. There is moderate to severe stenosis of the dominant  left renal artery. There is moderate to severe stenosis of the origin  of the  "inferior mesenteric artery.    The visualized solid organs are unremarkable. No abnormally distended  loops of bowel are evident. The appendix is unremarkable.    IMPRESSION ABDOMEN AND PELVIS:     Atherosclerotic disease of the abdominal aorta without evidence of  aneurysm or dissection.    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 See description above.    MARINA BRICE MD              Assessment and recommendations:    1) Persistent atrial fibrillation (recurrent) - because of the difficulty of getting to the [facility], she decided to see someone at [facility]. They were able to get her back on oral anticoagulation. They elected to try metoprolol. She feels her breathing has been a little worse but I don't hear any wheezing on exam.     On 200 mg daily of metoprolol her resting heart rate remains in the 110-120s.     She remains unsure if she is willing to consider AV node ablation and pacemaker. She reports she is leaning towards \"doing nothing\".     We discussed that we would need to keep an eye of her systolic heart function given the sub-optimal heart rate control despite aggressive attempts at heart rate control.    She would like to go back to the diltiazem, which is fine.     We discussed:    - nothing beyond switch back to diltiazem    - adding digoxin to diltiazem, we discussed it is not very good at rate control but might help; level would need to be followed carefully    - adding a low dose beta blocker to the diltiazem; in general tend to avoid this combo given risk of two negative iontropes    If the above were unsuccessful in controlling heart rate, ablation/pacing remains an option. Would recommend cardiac resynchronization therapy or His bundle. She could have this done at [facility] if she prefers.       2) Bicuspid aortic valve - previous echocardiogram does not show significant aortic stenosis or insufficiency. CT " aortogram shows no dilation of descending aorta.     -Valve function is stable    3)  Hypertension - at goal    4) Systolic dysfunction - no coronary artery disease found     - echocardiogram with stable EF, if anything slightly improved      I appreciate the chance to help with Mrs. Cameron's care. Please let me know if you have any questions or concerns.    Portions of this note were dictated using speech recognition software. The note has been proofread but errors in the text may have been overlooked. Please contact me if there are any concerns regarding the accuracy of the dictation.       CC  Dr. Davis Virgen MD   [address] [address]      Eliezer Myers MD

## 2020-07-10 DIAGNOSIS — I10 ESSENTIAL HYPERTENSION, BENIGN: ICD-10-CM

## 2020-07-13 RX ORDER — FUROSEMIDE 40 MG
TABLET ORAL
Qty: 180 TABLET | Refills: 0 | Status: SHIPPED | OUTPATIENT
Start: 2020-07-13 | End: 2020-10-13

## 2020-08-19 DIAGNOSIS — I10 ESSENTIAL HYPERTENSION: ICD-10-CM

## 2020-08-19 RX ORDER — LOSARTAN POTASSIUM 50 MG/1
TABLET ORAL
Qty: 180 TABLET | Refills: 0 | Status: SHIPPED | OUTPATIENT
Start: 2020-08-19 | End: 2020-11-04

## 2020-08-19 NOTE — TELEPHONE ENCOUNTER
losartan (COZAAR) 50 MG tablet       Last Written Prescription Date:  5-  Last Fill Quantity: 180,   # refills: 0  Last Office Visit: 6-  Future Office visit:    Next 5 appointments (look out 90 days)    Sep 22, 2020  8:30 AM CDT  (Arrive by 8:15 AM)  Return Visit with Eliezer Myers MD  [facility] ([facility] [place] ) [address]  [place] [address]  [phone]           Protocol failed due to    Last blood pressure under 140/90 in past 12 months     BP Readings from Last 3 Encounters:   06/23/20 (!) 140/75   02/18/20 126/70   01/28/20 130/80

## 2020-09-02 ENCOUNTER — MEDICAL CORRESPONDENCE (OUTPATIENT)
Dept: HEALTH INFORMATION MANAGEMENT | Facility: CLINIC | Age: 70
End: 2020-09-02

## 2020-09-08 ENCOUNTER — TELEPHONE (OUTPATIENT)
Dept: FAMILY MEDICINE | Facility: OTHER | Age: 70
End: 2020-09-08

## 2020-09-08 DIAGNOSIS — J44.1 COPD EXACERBATION (H): ICD-10-CM

## 2020-09-08 RX ORDER — ALBUTEROL SULFATE 90 UG/1
2 POWDER, METERED RESPIRATORY (INHALATION) EVERY 6 HOURS PRN
Qty: 2 EACH | Refills: 3 | Status: SHIPPED | OUTPATIENT
Start: 2020-09-08 | End: 2020-11-10

## 2020-09-08 RX ORDER — FLUTICASONE PROPIONATE AND SALMETEROL XINAFOATE 230; 21 UG/1; UG/1
2 AEROSOL, METERED RESPIRATORY (INHALATION) 2 TIMES DAILY
Qty: 3 INHALER | Refills: 4 | Status: SHIPPED | OUTPATIENT
Start: 2020-09-08 | End: 2021-03-25

## 2020-09-08 NOTE — TELEPHONE ENCOUNTER
Insurance will not cover albuterol inhaler, only proair. Pt also need a refill of adviar, rx pended.

## 2020-09-08 NOTE — TELEPHONE ENCOUNTER
11:05 AM    Reason for Call: OVERBOOK    Patient is having the following symptoms: MEDICATION RENEWAL for 0 days. PT'S MEDICATION NEEDS RENEWAL BY END OF WEEK. INSURANCE CHANGE WITH MEDICATION.     The patient is requesting an appointment for OVERBOOK THIS WEEK  with DR. WALTON.    Was an appointment offered for this call? Yes  If yes : Appointment type              Date 10/22/2020 NEXT AVAIL, PT DECLINED    Preferred method for responding to this message: Telephone Call  What is your phone number ? [phone]    If we cannot reach you directly, may we leave a detailed response at the number you provided? Yes    Can this message wait until your PCP/provider returns, if unavailable today? ELISE Pretty

## 2020-09-09 DIAGNOSIS — E87.6 HYPOKALEMIA: ICD-10-CM

## 2020-09-10 RX ORDER — POTASSIUM CHLORIDE 1500 MG/1
TABLET, EXTENDED RELEASE ORAL
Qty: 180 TABLET | Refills: 3 | Status: SHIPPED | OUTPATIENT
Start: 2020-09-10 | End: 2020-11-18

## 2020-09-13 DIAGNOSIS — I10 ESSENTIAL HYPERTENSION: ICD-10-CM

## 2020-09-15 RX ORDER — CLONIDINE HYDROCHLORIDE 0.1 MG/1
TABLET ORAL
Qty: 180 TABLET | Refills: 1 | Status: SHIPPED | OUTPATIENT
Start: 2020-09-15 | End: 2021-05-14

## 2020-09-15 NOTE — TELEPHONE ENCOUNTER
cloNIDine (CATAPRES) 0.1 MG tablet      Last Written Prescription Date:  6/10/2020  Last Fill Quantity: 180,   # refills: 1  Last Office Visit: 6/23/2020 Dr. Myers and 6/8/2020 Dr. Yen    Future Office visit:    Next 5 appointments (look out 90 days)    Nov 10, 2020 11:00 AM CST  (Arrive by 10:45 AM)  Return Visit with Eliezer Myers MD  [facility] [address]  [place] [address]  [phone]

## 2020-10-02 DIAGNOSIS — E89.0 HYPOTHYROIDISM, POSTABLATIVE: ICD-10-CM

## 2020-10-05 RX ORDER — LEVOTHYROXINE SODIUM 112 UG/1
TABLET ORAL
Qty: 90 TABLET | Refills: 1 | Status: SHIPPED | OUTPATIENT
Start: 2020-10-05 | End: 2021-02-01

## 2020-10-12 ENCOUNTER — OFFICE VISIT (OUTPATIENT)
Dept: OTOLARYNGOLOGY | Facility: OTHER | Age: 70
End: 2020-10-12
Attending: OTOLARYNGOLOGY
Payer: MEDICARE

## 2020-10-12 VITALS
SYSTOLIC BLOOD PRESSURE: 120 MMHG | DIASTOLIC BLOOD PRESSURE: 70 MMHG | BODY MASS INDEX: 24.89 KG/M2 | WEIGHT: 145 LBS | HEART RATE: 90 BPM | OXYGEN SATURATION: 96 % | TEMPERATURE: 97.8 F

## 2020-10-12 DIAGNOSIS — C44.311 BASAL CELL CARCINOMA (BCC) OF SKIN OF NOSE: Primary | ICD-10-CM

## 2020-10-12 DIAGNOSIS — Z23 NEED FOR PROPHYLACTIC VACCINATION AND INOCULATION AGAINST INFLUENZA: ICD-10-CM

## 2020-10-12 PROCEDURE — 88305 TISSUE EXAM BY PATHOLOGIST: CPT | Mod: TC | Performed by: OTOLARYNGOLOGY

## 2020-10-12 PROCEDURE — G0008 ADMIN INFLUENZA VIRUS VAC: HCPCS

## 2020-10-12 PROCEDURE — 11642 EXC F/E/E/N/L MAL+MRG 1.1-2: CPT | Performed by: OTOLARYNGOLOGY

## 2020-10-12 PROCEDURE — 90682 RIV4 VACC RECOMBINANT DNA IM: CPT | Performed by: OTOLARYNGOLOGY

## 2020-10-12 PROCEDURE — 11643 EXC F/E/E/N/L MAL+MRG 2.1-3: CPT | Performed by: OTOLARYNGOLOGY

## 2020-10-12 PROCEDURE — 90662 IIV NO PRSV INCREASED AG IM: CPT | Performed by: OTOLARYNGOLOGY

## 2020-10-12 PROCEDURE — G0463 HOSPITAL OUTPT CLINIC VISIT: HCPCS | Mod: 25

## 2020-10-12 PROCEDURE — 99203 OFFICE O/P NEW LOW 30 MIN: CPT | Mod: 25 | Performed by: OTOLARYNGOLOGY

## 2020-10-12 ASSESSMENT — PAIN SCALES - GENERAL: PAINLEVEL: NO PAIN (0)

## 2020-10-12 NOTE — LETTER
10/12/2020         RE: Mary Alice Cameron  [address] [place] [address] [address]  [facility] [address]        Dear Colleague,    Thank you for referring your patient, Mary Alice Cameron, to the [facility]. Please see a copy of my visit note below.    Otolaryngology Consultation    Patient: Mary Alice Cameron  : 1950    CC:  Lesion on nose  Referral Dr. Cavanaugh oral surgery    HPI    This patient presents today for a left nasal skin lesion.  Present 6 months, changing and growing.  No known history of carcinoma skin or melanoma.  She denies excess sun exposure with sun burns throughout life. No immunodeficiency.  There is no history of solid organ transplant.    Hx of CHF            Current Outpatient Rx   Medication Sig Dispense Refill     acetaminophen (TYLENOL) 500 MG tablet Take 500-1,000 mg by mouth every 6 hours as needed for mild pain       albuterol (2.5 MG/3ML) 0.083% neb solution Take 1 vial (2.5 mg) by nebulization every 6 hours as needed for shortness of breath / dyspnea or wheezing 25 vial 1     albuterol (PROAIR RESPICLICK) 108 (90 Base) MCG/ACT inhaler Inhale 2 puffs into the lungs every 6 hours as needed for shortness of breath / dyspnea or wheezing 2 each 3     atorvastatin (LIPITOR) 10 MG tablet TAKE 1 TABLET BY MOUTH EVERY NIGHT AT BEDTIME 90 tablet 3     Calcium Carb-Cholecalciferol (CALTRATE 600+D3 SOFT PO) Take 600 mg by mouth 2 times daily       cloNIDine (CATAPRES) 0.1 MG tablet TAKE 2 TABLETS BY MOUTH EVERY NIGHT AT BEDTIME 180 tablet 1     COMBIVENT RESPIMAT  MCG/ACT inhaler Inhale 1 puff into the lungs 4 times daily        diltiazem ER (TIAZAC) 360 MG 24 hr ER beaded capsule Take 1 capsule (360 mg) by mouth daily 90 capsule 3     diphenhydrAMINE (BENADRYL) 25 MG tablet Take 1-2 tablets (25-50 mg) by mouth every 6 hours as needed for itching or allergies 60 tablet 1     ELIQUIS ANTICOAGULANT 5 MG tablet TAKE 1 TABLET BY MOUTH TWICE DAILY 180 tablet 3      fluticasone-salmeterol (ADVAIR-HFA) 230-21 MCG/ACT inhaler Inhale 2 puffs into the lungs 2 times daily 3 Inhaler 4     furosemide (LASIX) 40 MG tablet TAKE 1 TABLET BY MOUTH TWICE DAILY 180 tablet 0     ipratropium - albuterol 0.5 mg/2.5 mg/3 mL (DUONEB) 0.5-2.5 (3) MG/3ML neb solution USE 1 VIAL PER NEBULIZER FOUR TIMES DAILY 1620 mL 0     levothyroxine (SYNTHROID/LEVOTHROID) 112 MCG tablet TAKE 1 TABLET(112 MCG) BY MOUTH DAILY 90 tablet 1     losartan (COZAAR) 50 MG tablet TAKE 1 TABLET BY MOUTH TWICE DAILY 180 tablet 0     potassium chloride ER (KLOR-CON M) 20 MEQ CR tablet TAKE 1 TABLET(20 MEQ) BY MOUTH TWICE DAILY 180 tablet 3     predniSONE (DELTASONE) 20 MG tablet TAKE 3 TABLETS BY MOUTH X 3 DAYS, 2 TABLETS X 3 DAYS, 1 TABLET X 3 DAYS, AND 0.5 TABLETS X 3 DAYS 20 tablet 0     sulfamethoxazole-trimethoprim (BACTRIM DS) 800-160 MG tablet Take 1 tablet by mouth 2 times daily 20 tablet 0     OTHER MEDICAL SUPPLIES Apply one pair to help with edema 1 each 0       Allergies: Amlodipine besylate and Lisinopril     Past Medical History:   Diagnosis Date     Asthma      Atrial fibrillation (H)      COPD (chronic obstructive pulmonary disease) (H)      Depression      High cholesterol      HTN (hypertension)      Thyroid disease        Past Surgical History:   Procedure Laterality Date     BACK SURGERY       CARDIAC SURGERY  2018    Angiogram at [facility]     COLONOSCOPY N/A 2016    Procedure: COLONOSCOPY;  Surgeon: Waldemar Bob MD;  Location: [facility]     HYSTERECTOMY      partial     SLING BLADDER SUSPENSION WITH FASCIA LINNETTE         ENT family history reviewed    Social History     Tobacco Use     Smoking status: Former Smoker     Packs/day: 0.50     Years: 41.00     Pack years: 20.50     Types: Cigarettes     Start date: 1966     Quit date: 2007     Years since quittin.7     Smokeless tobacco: Never Used   Substance Use Topics     Alcohol use: No     Alcohol/week: 0.0  standard drinks     Drug use: No       Review of Systems  ROS: 10 point ROS neg other than the symptoms noted above in the HPI and palpitations edematous feet wheezing coughing shortness of breath on exertion sneezing and itchy eyes hot and cold intolerance weight gain dry skin depression    Physical Exam  /70   Pulse 90   Temp 97.8  F (36.6  C) (Tympanic)   Wt 65.8 kg (145 lb)   SpO2 96%   BMI 24.89 kg/m    General - The patient is well nourished and well developed, and appears to have good nutritional status.  Alert and oriented to person and place, answers questions and cooperates with examination appropriately.   Head and Face - Normocephalic and atraumatic, with no gross asymmetry noted.  The facial nerve is intact, with strong symmetric movements.  Voice and Breathing - The patient was breathing comfortably without the use of accessory muscles. There was no wheezing, stridor, or stertor.  The patients voice was clear and strong, and had appropriate pitch and quality.  No char peripheral digital clubbing or cyanosis   Eyes - Extraocular movements intact, and the pupils were reactive to light.  Sclera were not icteric or injected, conjunctiva were pink and moist.  Neck - No palpable enlarged fixed cervical lymph nodes.  No neck cysts or unusual tenderness to palpation.   No palpable fixed thyroid nodules or concerning goiter.  The trachea is grossly midline.   Nose - There is a 1.2 x 1.2 cm raised pearly irregular lesion involving most of the left ala.  This lies within 2 mm of the alar rim.  External contour is symmetric, no gross deflection or scars.  Nasal mucosa is pink and moist with no abnormal mucus.  No intranasal extension of the alar lesion       Office Procedure:   I discussed the risks and complications of skin lesion excision, including local anesthesia, bleeding, infection, injury to the facial nerve, scar formation, hypertrophic healing or keloid, numbness to area, recurrence of  lesion, benign versus malignant pathology and possible need for further surgery. All questions were answered.    After informed consent was discussed and a time- out taken, I proceeded to cleanse the skin around the lesion with alcohol.  I then demarcated the lesion.  The area was prepped and draped in the normal sterile fashion.  I then infiltrated the skin with 1% lidocaine with 1:200,000 epinephrine.  I  used a 15-blade to create a circular incision around the lesion.   I then elevated the skin ellipse and a thin cuff of underlying subdermal fat with curved iris scissors.  I dissected down to normal appearing alar cartilage.  The lesion was marked at 12:00 and 6:00.  After the lesion was completely removed, I then placed it in formalin for permanent section.  Hemostasis was achieved with direct pressure and hand held cautery. I then irrigated the wound and gently suctioned the area.  I advanced the adjacent skin for tension free closure using tenotomy scissors.  The total length of the incision was  1.2 x 1.2  cm.  I then proceeded to close the incision by using simple interrupted 4-0 nylon, simple interrupted sutures.  Antibiotic ointment was then applied and the wound was dressed in an airtight fashion.  The patient tolerated the procedure without any difficulty.    Impression/Plan      ICD-10-CM    1. Basal cell carcinoma (BCC) of skin of nose  C44.311        Additional surgery will be needed based on final pathology.  MOHS referral discussed versus surgical excision with frozens  I encouraged a MOHS referral due to proximity to alar rim.  This was discussed today.    Further procedures may include skin excision with frozens and flap repair.    The risks of surgery were discussed, if further surgery is necessary.  These include but are not limited to anesthesia, scar or keloid formation, infection, flap failure, change in appearance of the nose, possible forehead incision and forehead scar, numbness to the  skin.   Temporary weakness to the face may occur with the use of local anesthesia.    The patient expressed understanding.  All questions were answered.  Photos were taken.      I have instructed the patient on wound care and signs of infection.  Written instructions provided.  We will contact the patient with pathology results.    Sunscreen use and skin cancer preventive measures were reinforced    Referral to Dr. Gomez in dermatology has been arranged    I appreciate this consultation.    Elizabeth Bhatti D.O.  Otolaryngology/Head and Neck Surgery  Allergy        Again, thank you for allowing me to participate in the care of your patient.        Sincerely,        Elizabeth Bhatti MD

## 2020-10-12 NOTE — NURSING NOTE
"Chief Complaint   Patient presents with     Consult     Spot on Nose; Self Referral?       Initial /70   Pulse 90   Temp 97.8  F (36.6  C) (Tympanic)   Wt 65.8 kg (145 lb)   SpO2 96%   BMI 24.89 kg/m   Estimated body mass index is 24.89 kg/m  as calculated from the following:    Height as of 6/23/20: 1.626 m (5' 4\").    Weight as of this encounter: 65.8 kg (145 lb).  Medication Reconciliation: complete  Soo Briones LPN  "

## 2020-10-12 NOTE — PATIENT INSTRUCTIONS
Thank you for allowing Dr. Bhatti and our ENT team to participate in your care.  If your medications are too expensive, please give the nurse a call.  We can possibly change this medication.  If you have a scheduling or an appointment question please contact our Health Unit Coordinator at their direct line [phone].   ALL nursing questions or concerns can be directed to your ENT nurse at: [phone] - Elidia    Post operative Instructions after MOHS or open facial wounds:  Change bandage once daily:    1. Gently clean your surgical site with warm water and soap. Rinse and then pat dry.  Do not use hydrogen peroxide.  2. Apply a thin layer of Vaseline, Aquaphor or antibacterial ointment (bacitracin or bactroban NO neosporin) to a piece of non-stick dressing or pad  3. Secure the non-stick dressing with paper tape to obtain an airtight bandage. A Band-Aid does not provide an airtight bandage.  4. Do not soak the wound under water until directed.  You may shower normally but do not rub the site aggressively, clean gently as directed above.  NO heavy lifting, bending, stretching or exercise until directed  Take tylenol alternated with ibuprofen as needed for discomfort

## 2020-10-12 NOTE — PROGRESS NOTES
Otolaryngology Consultation    Patient: Mary Alice Cameron  : 1950    CC:  Lesion on nose  Referral Dr. Cavanaugh oral surgery    HPI    This patient presents today for a left nasal skin lesion.  Present 6 months, changing and growing.  No known history of carcinoma skin or melanoma.  She denies excess sun exposure with sun burns throughout life. No immunodeficiency.  There is no history of solid organ transplant.    Hx of CHF            Current Outpatient Rx   Medication Sig Dispense Refill     acetaminophen (TYLENOL) 500 MG tablet Take 500-1,000 mg by mouth every 6 hours as needed for mild pain       albuterol (2.5 MG/3ML) 0.083% neb solution Take 1 vial (2.5 mg) by nebulization every 6 hours as needed for shortness of breath / dyspnea or wheezing 25 vial 1     albuterol (PROAIR RESPICLICK) 108 (90 Base) MCG/ACT inhaler Inhale 2 puffs into the lungs every 6 hours as needed for shortness of breath / dyspnea or wheezing 2 each 3     atorvastatin (LIPITOR) 10 MG tablet TAKE 1 TABLET BY MOUTH EVERY NIGHT AT BEDTIME 90 tablet 3     Calcium Carb-Cholecalciferol (CALTRATE 600+D3 SOFT PO) Take 600 mg by mouth 2 times daily       cloNIDine (CATAPRES) 0.1 MG tablet TAKE 2 TABLETS BY MOUTH EVERY NIGHT AT BEDTIME 180 tablet 1     COMBIVENT RESPIMAT  MCG/ACT inhaler Inhale 1 puff into the lungs 4 times daily        diltiazem ER (TIAZAC) 360 MG 24 hr ER beaded capsule Take 1 capsule (360 mg) by mouth daily 90 capsule 3     diphenhydrAMINE (BENADRYL) 25 MG tablet Take 1-2 tablets (25-50 mg) by mouth every 6 hours as needed for itching or allergies 60 tablet 1     ELIQUIS ANTICOAGULANT 5 MG tablet TAKE 1 TABLET BY MOUTH TWICE DAILY 180 tablet 3     fluticasone-salmeterol (ADVAIR-HFA) 230-21 MCG/ACT inhaler Inhale 2 puffs into the lungs 2 times daily 3 Inhaler 4     furosemide (LASIX) 40 MG tablet TAKE 1 TABLET BY MOUTH TWICE DAILY 180 tablet 0     ipratropium - albuterol 0.5 mg/2.5 mg/3 mL (DUONEB) 0.5-2.5 (3) MG/3ML neb  solution USE 1 VIAL PER NEBULIZER FOUR TIMES DAILY 1620 mL 0     levothyroxine (SYNTHROID/LEVOTHROID) 112 MCG tablet TAKE 1 TABLET(112 MCG) BY MOUTH DAILY 90 tablet 1     losartan (COZAAR) 50 MG tablet TAKE 1 TABLET BY MOUTH TWICE DAILY 180 tablet 0     potassium chloride ER (KLOR-CON M) 20 MEQ CR tablet TAKE 1 TABLET(20 MEQ) BY MOUTH TWICE DAILY 180 tablet 3     predniSONE (DELTASONE) 20 MG tablet TAKE 3 TABLETS BY MOUTH X 3 DAYS, 2 TABLETS X 3 DAYS, 1 TABLET X 3 DAYS, AND 0.5 TABLETS X 3 DAYS 20 tablet 0     sulfamethoxazole-trimethoprim (BACTRIM DS) 800-160 MG tablet Take 1 tablet by mouth 2 times daily 20 tablet 0     OTHER MEDICAL SUPPLIES Apply one pair to help with edema 1 each 0       Allergies: Amlodipine besylate and Lisinopril     Past Medical History:   Diagnosis Date     Asthma      Atrial fibrillation (H)      COPD (chronic obstructive pulmonary disease) (H)      Depression      High cholesterol      HTN (hypertension)      Thyroid disease        Past Surgical History:   Procedure Laterality Date     BACK SURGERY       CARDIAC SURGERY  2018    Angi[facility] at [facility]     COLONOSCOPY N/A 2016    Procedure: COLONOSCOPY;  Surgeon: Waldemar Bob MD;  Location: [facility]     HYSTERECTOMY      partial     SLING BLADDER SUSPENSION WITH FASCIA LINNETTE         ENT family history reviewed    Social History     Tobacco Use     Smoking status: Former Smoker     Packs/day: 0.50     Years: 41.00     Pack years: 20.50     Types: Cigarettes     Start date: 1966     Quit date: 2007     Years since quittin.7     Smokeless tobacco: Never Used   Substance Use Topics     Alcohol use: No     Alcohol/week: 0.0 standard drinks     Drug use: No       Review of Systems  ROS: 10 point ROS neg other than the symptoms noted above in the HPI and palpitations edematous feet wheezing coughing shortness of breath on exertion sneezing and itchy eyes hot and cold intolerance weight gain dry skin  depression    Physical Exam  /70   Pulse 90   Temp 97.8  F (36.6  C) (Tympanic)   Wt 65.8 kg (145 lb)   SpO2 96%   BMI 24.89 kg/m    General - The patient is well nourished and well developed, and appears to have good nutritional status.  Alert and oriented to person and place, answers questions and cooperates with examination appropriately.   Head and Face - Normocephalic and atraumatic, with no gross asymmetry noted.  The facial nerve is intact, with strong symmetric movements.  Voice and Breathing - The patient was breathing comfortably without the use of accessory muscles. There was no wheezing, stridor, or stertor.  The patients voice was clear and strong, and had appropriate pitch and quality.  No char peripheral digital clubbing or cyanosis   Eyes - Extraocular movements intact, and the pupils were reactive to light.  Sclera were not icteric or injected, conjunctiva were pink and moist.  Neck - No palpable enlarged fixed cervical lymph nodes.  No neck cysts or unusual tenderness to palpation.   No palpable fixed thyroid nodules or concerning goiter.  The trachea is grossly midline.   Nose - There is a 1.2 x 1.2 cm raised pearly irregular lesion involving most of the left ala.  This lies within 2 mm of the alar rim.  External contour is symmetric, no gross deflection or scars.  Nasal mucosa is pink and moist with no abnormal mucus.  No intranasal extension of the alar lesion       Office Procedure:   I discussed the risks and complications of skin lesion excision, including local anesthesia, bleeding, infection, injury to the facial nerve, scar formation, hypertrophic healing or keloid, numbness to area, recurrence of lesion, benign versus malignant pathology and possible need for further surgery. All questions were answered.    After informed consent was discussed and a time- out taken, I proceeded to cleanse the skin around the lesion with alcohol.  I then demarcated the lesion.  The area was  prepped and draped in the normal sterile fashion.  I then infiltrated the skin with 1% lidocaine with 1:200,000 epinephrine.  I  used a 15-blade to create a circular incision around the lesion.   I then elevated the skin ellipse and a thin cuff of underlying subdermal fat with curved iris scissors.  I dissected down to normal appearing alar cartilage.  The lesion was marked at 12:00 and 6:00.  After the lesion was completely removed, I then placed it in formalin for permanent section.  Hemostasis was achieved with direct pressure and hand held cautery. I then irrigated the wound and gently suctioned the area.  I advanced the adjacent skin for tension free closure using tenotomy scissors.  The total length of the incision was  1.2 x 1.2  cm.  I then proceeded to close the incision by using simple interrupted 4-0 nylon, simple interrupted sutures.  Antibiotic ointment was then applied and the wound was dressed in an airtight fashion.  The patient tolerated the procedure without any difficulty.    Impression/Plan      ICD-10-CM    1. Basal cell carcinoma (BCC) of skin of nose  C44.311        Additional surgery will be needed based on final pathology.  MOHS referral discussed versus surgical excision with frozens  I encouraged a MOHS referral due to proximity to alar rim.  This was discussed today.    Further procedures may include skin excision with frozens and flap repair.    The risks of surgery were discussed, if further surgery is necessary.  These include but are not limited to anesthesia, scar or keloid formation, infection, flap failure, change in appearance of the nose, possible forehead incision and forehead scar, numbness to the skin.   Temporary weakness to the face may occur with the use of local anesthesia.    The patient expressed understanding.  All questions were answered.  Photos were taken.      I have instructed the patient on wound care and signs of infection.  Written instructions provided.  We  will contact the patient with pathology results.    Sunscreen use and skin cancer preventive measures were reinforced    Referral to Dr. Gomez in dermatology has been arranged    I appreciate this consultation.    Elizabeth Bhatti D.O.  Otolaryngology/Head and Neck Surgery  Allergy

## 2020-10-13 ENCOUNTER — TELEPHONE (OUTPATIENT)
Dept: OTOLARYNGOLOGY | Facility: OTHER | Age: 70
End: 2020-10-13

## 2020-10-13 DIAGNOSIS — C44.311 BASAL CELL CARCINOMA (BCC) OF SKIN OF NOSE: Primary | ICD-10-CM

## 2020-10-13 DIAGNOSIS — I10 ESSENTIAL HYPERTENSION, BENIGN: ICD-10-CM

## 2020-10-13 LAB — COPATH REPORT: NORMAL

## 2020-10-13 RX ORDER — FUROSEMIDE 40 MG
40 TABLET ORAL 2 TIMES DAILY
Qty: 180 TABLET | Refills: 0 | Status: SHIPPED | OUTPATIENT
Start: 2020-10-13 | End: 2021-01-12

## 2020-10-13 NOTE — TELEPHONE ENCOUNTER
----- Message from Elizabeth Bhatti MD sent at 10/13/2020  3:15 PM CDT -----  Refer to [facility] dermatologist Jesus or Rocio mccoy

## 2020-10-13 NOTE — TELEPHONE ENCOUNTER
Lasix       Last Written Prescription Date:  7/13/2020  Last Fill Quantity: 180,   # refills: 0  Last Office Visit: 6/8/2020  Future Office visit:    Next 5 appointments (look out 90 days)    Oct 19, 2020  9:30 AM  (Arrive by 9:15 AM)  Nurse Only with HC ENT NURSE  [facility] [place] ([facility] ) [address]  [place] [address]  [phone]   Nov 10, 2020 11:00 AM  (Arrive by 10:45 AM)  Return Visit with Eliezer Myers MD  [facility] ([facility] ) [address]  [place] [address]  [phone]

## 2020-10-19 ENCOUNTER — ALLIED HEALTH/NURSE VISIT (OUTPATIENT)
Dept: OTOLARYNGOLOGY | Facility: OTHER | Age: 70
End: 2020-10-19
Attending: OTOLARYNGOLOGY
Payer: MEDICARE

## 2020-10-19 DIAGNOSIS — C44.311 BASAL CELL CARCINOMA (BCC) OF SKIN OF NOSE: Primary | ICD-10-CM

## 2020-10-19 NOTE — PROGRESS NOTES
The patient presents today for a suture removal. The area appears crusted over. The area was cleaned and the crust was removed. All sutures were removed without difficulty. The incision appears clean and intact underneath. After the sutures were removed, bacitracin ointment was applied to the area. Pictures were taken. She will follow up with  MOHs surgeon for further excision.

## 2020-11-04 ENCOUNTER — MYC REFILL (OUTPATIENT)
Dept: CARDIOLOGY | Facility: OTHER | Age: 70
End: 2020-11-04

## 2020-11-04 DIAGNOSIS — I10 ESSENTIAL HYPERTENSION: ICD-10-CM

## 2020-11-04 RX ORDER — LOSARTAN POTASSIUM 50 MG/1
50 TABLET ORAL 2 TIMES DAILY
Qty: 180 TABLET | Refills: 0 | Status: SHIPPED | OUTPATIENT
Start: 2020-11-04 | End: 2021-02-11

## 2020-11-10 ENCOUNTER — VIRTUAL VISIT (OUTPATIENT)
Dept: CARDIOLOGY | Facility: OTHER | Age: 70
End: 2020-11-10
Attending: INTERNAL MEDICINE
Payer: COMMERCIAL

## 2020-11-10 VITALS
BODY MASS INDEX: 26.61 KG/M2 | WEIGHT: 155 LBS | SYSTOLIC BLOOD PRESSURE: 111 MMHG | DIASTOLIC BLOOD PRESSURE: 70 MMHG | HEART RATE: 85 BPM

## 2020-11-10 DIAGNOSIS — I10 ESSENTIAL HYPERTENSION: ICD-10-CM

## 2020-11-10 DIAGNOSIS — Q23.81 BICUSPID AORTIC VALVE: ICD-10-CM

## 2020-11-10 DIAGNOSIS — I48.19 PERSISTENT ATRIAL FIBRILLATION (H): Primary | ICD-10-CM

## 2020-11-10 DIAGNOSIS — R60.0 BILATERAL LOWER EXTREMITY EDEMA: ICD-10-CM

## 2020-11-10 PROCEDURE — 99214 OFFICE O/P EST MOD 30 MIN: CPT | Mod: 95 | Performed by: INTERNAL MEDICINE

## 2020-11-10 RX ORDER — ALBUTEROL SULFATE 90 UG/1
POWDER, METERED RESPIRATORY (INHALATION)
COMMUNITY
Start: 2020-09-10 | End: 2021-03-25

## 2020-11-10 ASSESSMENT — PAIN SCALES - GENERAL: PAINLEVEL: NO PAIN (0)

## 2020-11-10 NOTE — PATIENT INSTRUCTIONS
Thank you for allowing Dr. Myers and our team to participate in your care. Please call our office at [phone] with scheduling questions or if you need to cancel or change your appointment. With any other questions or concerns you may call [facility] cardiology nurse at [phone].       If you experience chest pain, chest pressure, chest tightness, shortness of breath, fainting, lightheadedness, nausea, vomiting, or other concerning symptoms, please report to the Emergency Department or call 911. These symptoms may be emergent, and best treated in the Emergency Department.      - Clinic should call to arrange for an echocardiogram   - When you come in for the echocardiogram will have you get blood drawn  - If echocardiogram shows a drop in heart function, I encourage you to follow-up with the cardiologists at [facility] to arrange for the ablation and pacemaker procedure.  - after we get the blood work back we can discuss increasing your Lasix dose

## 2020-11-10 NOTE — LETTER
"11/10/2020      RE: Mary Alice Cameron  [address] [place] [address] [address]  [facility] [address]       Mary Alice Cameron is a 70 year old female who is being evaluated via a billable telephone visit.      The patient has been notified of following:     \"This telephone visit will be conducted via a call between you and your physician/provider. We have found that certain health care needs can be provided without the need for a physical exam.  This service lets us provide the care you need with a short phone conversation.  If a prescription is necessary we can send it directly to your pharmacy.  If lab work is needed we can place an order for that and you can then stop by our lab to have the test done at a later time.    Telephone visits are billed at different rates depending on your insurance coverage. During this emergency period, for some insurers they may be billed the same as an in-person visit.  Please reach out to your insurance provider with any questions.    If during the course of the call the physician/provider feels a telephone visit is not appropriate, you will not be charged for this service.\"    Patient has given verbal consent for Telephone visit?  Yes    What phone number would you like to be contacted at? [phone]    How would you like to obtain your AVS? MyChart    Additional Provider Notes:    Chief Complaint: atrial fibrillation, bicuspid valve    HPI: I was happy to see Mrs. Cameron for the above. She has seen several cardiologists over the past year for these problems. Her  was ill and they moved to be closer to his daughters. He  in October and she has settled in [place]. Her atrial fibrillation began in the past year. In reviewing the old records both  and  are reported as when the atrial fibrillation began and she is no longer sure. This has been associated with shortness of breath but as was discussed with her by one of the cardiologist she has multiple reasons for shortness of " "breath including COPD. She is finishing treatment for a COPD exacerbation at this time. She reports the plan had been to start her on warfarin and then cardiovert her. With all the chaos in her life the past few months this never happened. She was found to have hyperthyroidism in August and was treated with Iodine ablation. She also has a bicuspid aortic valve and mild aortic root dilation (per report of echocardiogram in old records). The echocardiogram did not report significant aortic stenosis or insufficiency and her systolic function was normal making it less likely that her shortness of breath was related to her valvular heart disease. She was told she would need periodic f/u of her valve and aorta. She reports two angiograms done at [facility] and that they were \"okay\". I do not see copies of those reports in the old records. They will be requested.    She underwent DC cardioversion on 07Mar2014. She reports feeling better afterward. She feels like she needs to use her inhaler less often and has less shortness of breath. However, the ECG today shows she is back in atrial fibrillation.    I have reviewed the records sent from [facility]. There is an echocardiogram report from 2002 and records regarding back surgery. I see no cath results.    A repeat cardioversion in July failed to restore sinus rhythm. A stress study in April (see below) showed no ischemia or infarction.    11Nov2014:  She had recurrent atrial fibrillation was started on flecainide and scheduled for cardioversion. When she arrived for the cardioversion she was in sinus.     A CT aortogram showed atherosclerosis but not dilation of the descending aoota.    [id number]:    She was admitted in March 2015 with a COPD exacerbation. Her breathing is at baseline with no fever, sputum or wheezing.    She has not noted any atrial fibrillation, no palpitations, chest pain/discomfort, unusual dyspnea on exertion, bleeding or neurologic " "symptoms.    [id number]: Her follow-up echocardiogram (see lab section) showed no change in aortic valve function. She report rare \"flutters\" but no sustained palpitations like with her atrial fibrillation. She reports no significant bleeding episodes on warfarin. She denies any neurologic symptoms suggestive of CVA or TIA. Edema improved when she switched her diltiazem to bedtime.    Her primary health problem has been her COPD. She uses oxygen at night. She has had had any recent symptoms of upper or lower respiratory infection.    [id number]: She had two admissions this past winter for COPD exacerbations. Overall, she feels her dyspnea on exertion is slowly getting worse. She has not had exertional chest pain/discomfort.     She reports no increased edema, orthopnea or paroxysmal nocturnal dyspnea.    [id number]: echocardiogram shows a decline in systolic function.  She reports that she has felt more fatigued and has had more dyspnea on exertion since I last saw her.  She also reports she has had problems with her lungs over the winter.  She is been on a prolonged course of prednisone.  She has not had chest pain or chest discomfort.  She has noted episodes of atrial fibrillation, or palpitations that she thinks is atrial fibrillation.  These episodes were relatively infrequent and do not last very long.  They are not associated with associated symptoms and she has not found them to be overly troublesome.  She has had no prolonged episodes where she felt the need to consider seeking medical treatment.    September 25, 2018: her angiogram showed no obstructive coronary artery disease. She reports two episodes of pain between her shoulders, lasting 10 minutes.     No palpitations. Flecainide stopped due to drop in systolic function.     Has had a few problems with her COPD.     October 23, 2018: Ms. Cameron reports that she has been feeling somewhat better since switching to the torsemide.  Her swelling is better.  She " continues to work or cleaning jobs.  She reports no significant episodes of atrial fibrillation.  She has occasional palpitations but nothing that has been very troublesome.  Her echocardiogram shows an improvement in her ejection fraction.  Her CT aortogram showed evidence of atherosclerosis but no dissections or aneurysms.    April 23, 2019: She was seen in the emergency department on March 19, 2019 complaining of a productive cough.  She was diagnosed with acute bronchitis.  She was also noted to be in atrial fibrillation with rapid ventricular rate.  She has had 2 courses of antibiotics for her acute bronchitis.    She reports her breathing is back to baseline.  She reports if she feels like it takes her longer to get things done and she takes more breaks but she does not find this particularly troublesome.    June 11, 2019: She reports that her breathing is about the same. She has not noted an increase in her heart failure symptoms. Her Holter shows poor heart rate control.    July 23, 2019: On the higer dose of diltiazem average heart rate dropped to 92 bpm from 111 bpm. She does feel a little better but not much.    She complains of a productive cough that started last night. No fever/chills.    January 28, 2020: We had discussed possible cardioversion at our last visit. In Sept last year she had an episode of BRBPR and her apixaban was held.     June 23, 2020: since I saw her last she was seen at [facility]. They were successful in convincing her to resume apixaban and hold the aspirin, which is excellent.     With regards to AV node ablation they opted to try metoprolol first. She feels her breathing is a little worse on the metoprolol. That had been a concern with beta blocker, her COPD.     November 10, 2020 Interval history (telephone visit): she is back on diltiazem after metoprolol resulted in worsening of COPD. She is not, as before, aware of palpitations but she has noticed more ankle and lower leg  swelling.     Current Outpatient Medications   Medication Sig Dispense Refill     acetaminophen (TYLENOL) 500 MG tablet Take 500-1,000 mg by mouth every 6 hours as needed for mild pain       atorvastatin (LIPITOR) 10 MG tablet TAKE 1 TABLET BY MOUTH EVERY NIGHT AT BEDTIME 90 tablet 3     Calcium Carb-Cholecalciferol (CALTRATE 600+D3 SOFT PO) Take 600 mg by mouth 2 times daily       cloNIDine (CATAPRES) 0.1 MG tablet TAKE 2 TABLETS BY MOUTH EVERY NIGHT AT BEDTIME 180 tablet 1     COMBIVENT RESPIMAT  MCG/ACT inhaler Inhale 1 puff into the lungs 4 times daily        diltiazem ER (TIAZAC) 360 MG 24 hr ER beaded capsule Take 1 capsule (360 mg) by mouth daily 90 capsule 3     diphenhydrAMINE (BENADRYL) 25 MG tablet Take 1-2 tablets (25-50 mg) by mouth every 6 hours as needed for itching or allergies 60 tablet 1     ELIQUIS ANTICOAGULANT 5 MG tablet TAKE 1 TABLET BY MOUTH TWICE DAILY 180 tablet 3     fluticasone-salmeterol (ADVAIR-HFA) 230-21 MCG/ACT inhaler Inhale 2 puffs into the lungs 2 times daily 3 Inhaler 4     furosemide (LASIX) 40 MG tablet Take 1 tablet (40 mg) by mouth 2 times daily 180 tablet 0     levothyroxine (SYNTHROID/LEVOTHROID) 112 MCG tablet TAKE 1 TABLET(112 MCG) BY MOUTH DAILY 90 tablet 1     losartan (COZAAR) 50 MG tablet Take 1 tablet (50 mg) by mouth 2 times daily 180 tablet 0     OTHER MEDICAL SUPPLIES Apply one pair to help with edema 1 each 0     potassium chloride ER (KLOR-CON M) 20 MEQ CR tablet TAKE 1 TABLET(20 MEQ) BY MOUTH TWICE DAILY 180 tablet 3     predniSONE (DELTASONE) 20 MG tablet TAKE 3 TABLETS BY MOUTH X 3 DAYS, 2 TABLETS X 3 DAYS, 1 TABLET X 3 DAYS, AND 0.5 TABLETS X 3 DAYS 20 tablet 0     PROAIR RESPICLICK 108 (90 Base) MCG/ACT inhaler INL 2 PFS ITL Q 6 H PRF SOB OR DIFFICULT BREATHING OR WHZ       sulfamethoxazole-trimethoprim (BACTRIM DS) 800-160 MG tablet Take 1 tablet by mouth 2 times daily (Patient not taking: Reported on 11/10/2020) 20 tablet 0       Past Medical  History:   Diagnosis Date     Asthma      Atrial fibrillation (H)      COPD (chronic obstructive pulmonary disease) (H)      Depression      High cholesterol      HTN (hypertension)      Thyroid disease        Past Surgical History:   Procedure Laterality Date     BACK SURGERY  2006     CARDIAC SURGERY  2018    Angiogram at [facility]     COLONOSCOPY N/A 2016    Procedure: COLONOSCOPY;  Surgeon: Waldemar Bob MD;  Location: [facility]     HYSTERECTOMY      partial     SLING BLADDER SUSPENSION WITH FASCIA LINNETTE         Family History   Problem Relation Age of Onset     Prostate Cancer Father      Hypertension Father      Heart Failure Father         CHF     Asthma Mother      Cancer Mother         ovarian     Hypertension Mother      Asthma Brother      Hypertension Sister      Hypertension Brother        Social History     Tobacco Use     Smoking status: Former Smoker     Packs/day: 0.50     Years: 41.00     Pack years: 20.50     Types: Cigarettes     Start date: 1966     Quit date: 2007     Years since quittin.7     Smokeless tobacco: Never Used   Substance Use Topics     Alcohol use: No     Alcohol/week: 0.0 standard drinks       Allergies   Allergen Reactions     Amlodipine Besylate Cough     Norvasc     Lisinopril Cough     Ace Inhibitors Cough       ROS: ten system review negative except as noted above.       Laboratory and diagnostic data reviewed:        On diltiazem 360 mg/day:                I reviewed her ECG from 2019 that shows atrial fibrillation with rapid ventricular rate.    [facility]  [facility]ch[facility]  [address] [place] [address]     Name: LANCE VALLEJO  MRN: [id number]  : 1950  Study Date: 10/11/2018 08:57 AM  Age: 68 yrs  Gender: Female  Patient Location: [facility]  Reason For Study: , Bicuspid aortic valve  History: Bicuspid Aortic Valve  Ordering Physician: JACOB  TODD  Referring Physician: TODD JAMES  Performed By: Genevieve Contreras     BSA: 1.8 m2  Height: 65 in  Weight: 160 lb  _____________________________________________________________________________  __     _____________________________________________________________________________  __        Interpretation Summary  No pericardial effusion is present.  Mild left ventricular dilation is present.  The Ejection Fraction is estimated at 45-50%.  The right ventricle is normal size.  Global right ventricular function is normal.  Mild left atrial enlargement is present.  Mild mitral insufficiency is present.  Cannot exclude a bicuspid aortic valve.  Mild aortic valve sclerosis is present.  Mild aortic insufficiency is present.  The peak aortic velocity is 2.28 m/sec.  The mean gradient across the aortic valve is11.8 mmHg.  Mild tricuspid insufficiency is present.  Right ventricular systolic pressure is 32.4mmHg above the right atrial  pressure.  The pulmonic valve is normal.  The aorta root is normal.  ____________________________________________________________________        CTA CHEST ABDOMEN WITH CONTRAST    HISTORY: 68 yearsFemale ; Bicuspid aortic valve; Ascending aorta  dilatation (H)    TECHNIQUE: Axial CT imaging of the chest abdomen and pelvis was  performed with intervenous contrast contrast. Coronal and sagittal  reconstructions were obtained.    COMPARISON: None    FINDINGS:    CT CHEST: The ace ascending thoracic aorta is ectatic measuring 4.4 cm  transversely and 4.3 cm in AP dimension. The descending thoracic aorta  is 2.5 x 2.2 cm respectively. There is calcified atherosclerotic  disease of the thoracic aorta without evidence of dissection. There is  calcification of the aortic valve.  There is no mediastinal or hilar or axillary lymphadenopathy.    There is mild linear atelectasis at both lung bases. There is  cardiomegaly.         No concerning osseous lesions are identified    IMPRESSION CHEST:  There is ectasia of the ascending thoracic aorta  measuring 4.4 x 4.3 cm in diameter.    There is cardiomegaly.      CT ABDOMEN AND PELVIS: There is atherosclerotic disease of the  abdominal aorta without evidence of aneurysm.     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 The residual lumen measures 2 mm in diameter. There is  approximate 50% stenosis of the origin of the superior mesenteric  artery, residual vessel lumen measures 3.5 to 4 mm.    There are 2 patent right renal arteries. There are 2 left renal  arteries with a dominant vessel originating slightly below the smaller  accessory vessel. There is moderate to severe stenosis of the dominant  left renal artery. There is moderate to severe stenosis of the origin  of the inferior mesenteric artery.    The visualized solid organs are unremarkable. No abnormally distended  loops of bowel are evident. The appendix is unremarkable.    IMPRESSION ABDOMEN AND PELVIS:     Atherosclerotic disease of the abdominal aorta without evidence of  aneurysm or dissection.    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 See description above.    MARINA BRICE MD              Assessment and recommendations:    1) Persistent atrial fibrillation (recurrent) - back on diltiazem which we know was not adequate for heart rate control. She remains reluctant to consider AVN ablation and pacing but is worried about increased edema.     - follow-up echocardiogram ordered to ensure she is not developing tachycardia-induced cardiomyopathy    If EF has fallen, I've strongly suggested she reconsider ablation/pacing for heart rate control. Would recommend cardiac resynchronization therapy or His bundle. She could have this done at [facility] if she prefers.    She would like to increase her furosemide dose. I've asked her to get a BMP first.      2) Bicuspid aortic valve - previous  echocardiogram does not show significant aortic stenosis or insufficiency. CT aortogram shows no dilation of descending aorta.     -Valve function is stable    3)  Hypertension - at goal last time BP checked in clinic    4) Systolic dysfunction - follow-up echocardiogram ordered    I appreciate the chance to help with Mrs. Cameron's care. Please let me know if you have any questions or concerns.    Portions of this note were dictated using speech recognition software. The note has been proofread but errors in the text may have been overlooked. Please contact me if there are any concerns regarding the accuracy of the dictation.       CC  Dr. Davis Virgen MD   [address] [address]      Phone call duration: 7:30 minutes    MD Eliezer Allen MD

## 2020-11-10 NOTE — PROGRESS NOTES
"Mary Alice Cameron is a 70 year old female who is being evaluated via a billable telephone visit.      The patient has been notified of following:     \"This telephone visit will be conducted via a call between you and your physician/provider. We have found that certain health care needs can be provided without the need for a physical exam.  This service lets us provide the care you need with a short phone conversation.  If a prescription is necessary we can send it directly to your pharmacy.  If lab work is needed we can place an order for that and you can then stop by our lab to have the test done at a later time.    Telephone visits are billed at different rates depending on your insurance coverage. During this emergency period, for some insurers they may be billed the same as an in-person visit.  Please reach out to your insurance provider with any questions.    If during the course of the call the physician/provider feels a telephone visit is not appropriate, you will not be charged for this service.\"    Patient has given verbal consent for Telephone visit?  Yes    What phone number would you like to be contacted at? [phone]    How would you like to obtain your AVS? Afshan    Additional Provider Notes:    Chief Complaint: atrial fibrillation, bicuspid valve    HPI: I was happy to see Mrs. Cameron for the above. She has seen several cardiologists over the past year for these problems. Her  was ill and they moved to be closer to his daughters. He  in October and she has settled in [place]. Her atrial fibrillation began in the past year. In reviewing the old records both  and  are reported as when the atrial fibrillation began and she is no longer sure. This has been associated with shortness of breath but as was discussed with her by one of the cardiologist she has multiple reasons for shortness of breath including COPD. She is finishing treatment for a COPD exacerbation at this time. She reports " "the plan had been to start her on warfarin and then cardiovert her. With all the chaos in her life the past few months this never happened. She was found to have hyperthyroidism in August and was treated with Iodine ablation. She also has a bicuspid aortic valve and mild aortic root dilation (per report of echocardiogram in old records). The echocardiogram did not report significant aortic stenosis or insufficiency and her systolic function was normal making it less likely that her shortness of breath was related to her valvular heart disease. She was told she would need periodic f/u of her valve and aorta. She reports two angiograms done at [facility] and that they were \"okay\". I do not see copies of those reports in the old records. They will be requested.    She underwent DC cardioversion on 07Mar2014. She reports feeling better afterward. She feels like she needs to use her inhaler less often and has less shortness of breath. However, the ECG today shows she is back in atrial fibrillation.    I have reviewed the records sent from [facility]. There is an echocardiogram report from 2002 and records regarding back surgery. I see no cath results.    A repeat cardioversion in July failed to restore sinus rhythm. A stress study in April (see below) showed no ischemia or infarction.    11Nov2014:  She had recurrent atrial fibrillation was started on flecainide and scheduled for cardioversion. When she arrived for the cardioversion she was in sinus.     A CT aortogram showed atherosclerosis but not dilation of the descending aoota.    [id number]:    She was admitted in March 2015 with a COPD exacerbation. Her breathing is at baseline with no fever, sputum or wheezing.    She has not noted any atrial fibrillation, no palpitations, chest pain/discomfort, unusual dyspnea on exertion, bleeding or neurologic symptoms.    [id number]: Her follow-up echocardiogram (see lab section) showed no change in aortic valve function. " "She report rare \"flutters\" but no sustained palpitations like with her atrial fibrillation. She reports no significant bleeding episodes on warfarin. She denies any neurologic symptoms suggestive of CVA or TIA. Edema improved when she switched her diltiazem to bedtime.    Her primary health problem has been her COPD. She uses oxygen at night. She has had had any recent symptoms of upper or lower respiratory infection.    [id number]: She had two admissions this past winter for COPD exacerbations. Overall, she feels her dyspnea on exertion is slowly getting worse. She has not had exertional chest pain/discomfort.     She reports no increased edema, orthopnea or paroxysmal nocturnal dyspnea.    [id number]: echocardiogram shows a decline in systolic function.  She reports that she has felt more fatigued and has had more dyspnea on exertion since I last saw her.  She also reports she has had problems with her lungs over the winter.  She is been on a prolonged course of prednisone.  She has not had chest pain or chest discomfort.  She has noted episodes of atrial fibrillation, or palpitations that she thinks is atrial fibrillation.  These episodes were relatively infrequent and do not last very long.  They are not associated with associated symptoms and she has not found them to be overly troublesome.  She has had no prolonged episodes where she felt the need to consider seeking medical treatment.    September 25, 2018: her angiogram showed no obstructive coronary artery disease. She reports two episodes of pain between her shoulders, lasting 10 minutes.     No palpitations. Flecainide stopped due to drop in systolic function.     Has had a few problems with her COPD.     October 23, 2018: Ms. Cameron reports that she has been feeling somewhat better since switching to the torsemide.  Her swelling is better.  She continues to work or cleaning jobs.  She reports no significant episodes of atrial fibrillation.  She has " occasional palpitations but nothing that has been very troublesome.  Her echocardiogram shows an improvement in her ejection fraction.  Her CT aortogram showed evidence of atherosclerosis but no dissections or aneurysms.    April 23, 2019: She was seen in the emergency department on March 19, 2019 complaining of a productive cough.  She was diagnosed with acute bronchitis.  She was also noted to be in atrial fibrillation with rapid ventricular rate.  She has had 2 courses of antibiotics for her acute bronchitis.    She reports her breathing is back to baseline.  She reports if she feels like it takes her longer to get things done and she takes more breaks but she does not find this particularly troublesome.    June 11, 2019: She reports that her breathing is about the same. She has not noted an increase in her heart failure symptoms. Her Holter shows poor heart rate control.    July 23, 2019: On the higer dose of diltiazem average heart rate dropped to 92 bpm from 111 bpm. She does feel a little better but not much.    She complains of a productive cough that started last night. No fever/chills.    January 28, 2020: We had discussed possible cardioversion at our last visit. In Sept last year she had an episode of BRBPR and her apixaban was held.     June 23, 2020: since I saw her last she was seen at [facility]. They were successful in convincing her to resume apixaban and hold the aspirin, which is excellent.     With regards to AV node ablation they opted to try metoprolol first. She feels her breathing is a little worse on the metoprolol. That had been a concern with beta blocker, her COPD.     November 10, 2020 Interval history (telephone visit): she is back on diltiazem after metoprolol resulted in worsening of COPD. She is not, as before, aware of palpitations but she has noticed more ankle and lower leg swelling.     Current Outpatient Medications   Medication Sig Dispense Refill     acetaminophen (TYLENOL)  500 MG tablet Take 500-1,000 mg by mouth every 6 hours as needed for mild pain       atorvastatin (LIPITOR) 10 MG tablet TAKE 1 TABLET BY MOUTH EVERY NIGHT AT BEDTIME 90 tablet 3     Calcium Carb-Cholecalciferol (CALTRATE 600+D3 SOFT PO) Take 600 mg by mouth 2 times daily       cloNIDine (CATAPRES) 0.1 MG tablet TAKE 2 TABLETS BY MOUTH EVERY NIGHT AT BEDTIME 180 tablet 1     COMBIVENT RESPIMAT  MCG/ACT inhaler Inhale 1 puff into the lungs 4 times daily        diltiazem ER (TIAZAC) 360 MG 24 hr ER beaded capsule Take 1 capsule (360 mg) by mouth daily 90 capsule 3     diphenhydrAMINE (BENADRYL) 25 MG tablet Take 1-2 tablets (25-50 mg) by mouth every 6 hours as needed for itching or allergies 60 tablet 1     ELIQUIS ANTICOAGULANT 5 MG tablet TAKE 1 TABLET BY MOUTH TWICE DAILY 180 tablet 3     fluticasone-salmeterol (ADVAIR-HFA) 230-21 MCG/ACT inhaler Inhale 2 puffs into the lungs 2 times daily 3 Inhaler 4     furosemide (LASIX) 40 MG tablet Take 1 tablet (40 mg) by mouth 2 times daily 180 tablet 0     levothyroxine (SYNTHROID/LEVOTHROID) 112 MCG tablet TAKE 1 TABLET(112 MCG) BY MOUTH DAILY 90 tablet 1     losartan (COZAAR) 50 MG tablet Take 1 tablet (50 mg) by mouth 2 times daily 180 tablet 0     OTHER MEDICAL SUPPLIES Apply one pair to help with edema 1 each 0     potassium chloride ER (KLOR-CON M) 20 MEQ CR tablet TAKE 1 TABLET(20 MEQ) BY MOUTH TWICE DAILY 180 tablet 3     predniSONE (DELTASONE) 20 MG tablet TAKE 3 TABLETS BY MOUTH X 3 DAYS, 2 TABLETS X 3 DAYS, 1 TABLET X 3 DAYS, AND 0.5 TABLETS X 3 DAYS 20 tablet 0     PROAIR RESPICLICK 108 (90 Base) MCG/ACT inhaler INL 2 PFS ITL Q 6 H PRF SOB OR DIFFICULT BREATHING OR WHZ       sulfamethoxazole-trimethoprim (BACTRIM DS) 800-160 MG tablet Take 1 tablet by mouth 2 times daily (Patient not taking: Reported on 11/10/2020) 20 tablet 0       Past Medical History:   Diagnosis Date     Asthma      Atrial fibrillation (H)      COPD (chronic obstructive pulmonary  disease) (H)      Depression      High cholesterol      HTN (hypertension)      Thyroid disease        Past Surgical History:   Procedure Laterality Date     BACK SURGERY  2006     CARDIAC SURGERY  2018    Angiogram at [facility] in [place]     COLONOSCOPY N/A 2016    Procedure: COLONOSCOPY;  Surgeon: Waldemar Bob MD;  Location: [facility]     HYSTERECTOMY      partial     SLING BLADDER SUSPENSION WITH FASCIA LINNETTE         Family History   Problem Relation Age of Onset     Prostate Cancer Father      Hypertension Father      Heart Failure Father         CHF     Asthma Mother      Cancer Mother         ovarian     Hypertension Mother      Asthma Brother      Hypertension Sister      Hypertension Brother        Social History     Tobacco Use     Smoking status: Former Smoker     Packs/day: 0.50     Years: 41.00     Pack years: 20.50     Types: Cigarettes     Start date: 1966     Quit date: 2007     Years since quittin.7     Smokeless tobacco: Never Used   Substance Use Topics     Alcohol use: No     Alcohol/week: 0.0 standard drinks       Allergies   Allergen Reactions     Amlodipine Besylate Cough     Norvasc     Lisinopril Cough     Ace Inhibitors Cough       ROS: ten system review negative except as noted above.       Laboratory and diagnostic data reviewed:        On diltiazem 360 mg/day:                I reviewed her ECG from 2019 that shows atrial fibrillation with rapid ventricular rate.    [facility]  Echocardiography Laboratory  [address] [place] [address]     Name: LANCE VALLEJO  MRN: [id number]  : 1950  Study Date: 10/11/2018 08:57 AM  Age: 68 yrs  Gender: Female  Patient Location: [facility]  Reason For Study: , Bicuspid aortic valve  History: Bicuspid Aortic Valve  Ordering Physician: TODD JAMES  Referring Physician: TODD JAMES  Performed By: Genevieve Contreras     BSA: 1.8 m2  Height: 65 in  Weight:  160 lb  _____________________________________________________________________________  __     _____________________________________________________________________________  __        Interpretation Summary  No pericardial effusion is present.  Mild left ventricular dilation is present.  The Ejection Fraction is estimated at 45-50%.  The right ventricle is normal size.  Global right ventricular function is normal.  Mild left atrial enlargement is present.  Mild mitral insufficiency is present.  Cannot exclude a bicuspid aortic valve.  Mild aortic valve sclerosis is present.  Mild aortic insufficiency is present.  The peak aortic velocity is 2.28 m/sec.  The mean gradient across the aortic valve is11.8 mmHg.  Mild tricuspid insufficiency is present.  Right ventricular systolic pressure is 32.4mmHg above the right atrial  pressure.  The pulmonic valve is normal.  The aorta root is normal.  ____________________________________________________________________        CTA CHEST ABDOMEN WITH CONTRAST    HISTORY: 68 yearsFemale ; Bicuspid aortic valve; Ascending aorta  dilatation (H)    TECHNIQUE: Axial CT imaging of the chest abdomen and pelvis was  performed with intervenous contrast contrast. Coronal and sagittal  reconstructions were obtained.    COMPARISON: None    FINDINGS:    CT CHEST: The ace ascending thoracic aorta is ectatic measuring 4.4 cm  transversely and 4.3 cm in AP dimension. The descending thoracic aorta  is 2.5 x 2.2 cm respectively. There is calcified atherosclerotic  disease of the thoracic aorta without evidence of dissection. There is  calcification of the aortic valve.  There is no mediastinal or hilar or axillary lymphadenopathy.    There is mild linear atelectasis at both lung bases. There is  cardiomegaly.         No concerning osseous lesions are identified    IMPRESSION CHEST: There is ectasia of the ascending thoracic aorta  measuring 4.4 x 4.3 cm in diameter.    There is cardiomegaly.      CT  ABDOMEN AND PELVIS: There is atherosclerotic disease of the  abdominal aorta without evidence of aneurysm.     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 The residual lumen measures 2 mm in diameter. There is  approximate 50% stenosis of the origin of the superior mesenteric  artery, residual vessel lumen measures 3.5 to 4 mm.    There are 2 patent right renal arteries. There are 2 left renal  arteries with a dominant vessel originating slightly below the smaller  accessory vessel. There is moderate to severe stenosis of the dominant  left renal artery. There is moderate to severe stenosis of the origin  of the inferior mesenteric artery.    The visualized solid organs are unremarkable. No abnormally distended  loops of bowel are evident. The appendix is unremarkable.    IMPRESSION ABDOMEN AND PELVIS:     Atherosclerotic disease of the abdominal aorta without evidence of  aneurysm or dissection.    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 See description above.    MARINA BRICE MD              Assessment and recommendations:    1) Persistent atrial fibrillation (recurrent) - back on diltiazem which we know was not adequate for heart rate control. She remains reluctant to consider AVN ablation and pacing but is worried about increased edema.     - follow-up echocardiogram ordered to ensure she is not developing tachycardia-induced cardiomyopathy    If EF has fallen, I've strongly suggested she reconsider ablation/pacing for heart rate control. Would recommend cardiac resynchronization therapy or His bundle. She could have this done at [facility] if she prefers.    She would like to increase her furosemide dose. I've asked her to get a BMP first.      2) Bicuspid aortic valve - previous echocardiogram does not show significant aortic stenosis or insufficiency. CT aortogram shows no dilation of descending  aorta.     -Valve function is stable    3)  Hypertension - at goal last time BP checked in clinic    4) Systolic dysfunction - follow-up echocardiogram ordered    I appreciate the chance to help with Mrs. Cameron's care. Please let me know if you have any questions or concerns.    Portions of this note were dictated using speech recognition software. The note has been proofread but errors in the text may have been overlooked. Please contact me if there are any concerns regarding the accuracy of the dictation.       CC  Dr. Davis Virgen MD   [address] [address]      Phone call duration: 7:30 minutes    Eliezer Myers MD

## 2020-11-10 NOTE — NURSING NOTE
"Chief Complaint   Patient presents with     RECHECK       Initial /70   Pulse 85   Wt 70.3 kg (155 lb)   BMI 26.61 kg/m   Estimated body mass index is 26.61 kg/m  as calculated from the following:    Height as of 6/23/20: 1.626 m (5' 4\").    Weight as of this encounter: 70.3 kg (155 lb).  Medication Reconciliation: complete  Sagrario Buckner LPN    "

## 2020-11-12 ENCOUNTER — HOSPITAL ENCOUNTER (OUTPATIENT)
Dept: CARDIOLOGY | Facility: HOSPITAL | Age: 70
End: 2020-11-12
Attending: INTERNAL MEDICINE
Payer: MEDICARE

## 2020-11-12 DIAGNOSIS — I48.19 PERSISTENT ATRIAL FIBRILLATION (H): ICD-10-CM

## 2020-11-12 DIAGNOSIS — I10 ESSENTIAL HYPERTENSION: ICD-10-CM

## 2020-11-12 DIAGNOSIS — Q23.81 BICUSPID AORTIC VALVE: ICD-10-CM

## 2020-11-12 DIAGNOSIS — R60.0 BILATERAL LOWER EXTREMITY EDEMA: ICD-10-CM

## 2020-11-12 LAB
ANION GAP SERPL CALCULATED.3IONS-SCNC: 7 MMOL/L (ref 3–14)
BUN SERPL-MCNC: 10 MG/DL (ref 7–30)
CALCIUM SERPL-MCNC: 9.4 MG/DL (ref 8.5–10.1)
CHLORIDE SERPL-SCNC: 100 MMOL/L (ref 94–109)
CO2 SERPL-SCNC: 29 MMOL/L (ref 20–32)
CREAT SERPL-MCNC: 0.88 MG/DL (ref 0.52–1.04)
GFR SERPL CREATININE-BSD FRML MDRD: 66 ML/MIN/{1.73_M2}
GLUCOSE SERPL-MCNC: 114 MG/DL (ref 70–99)
POTASSIUM SERPL-SCNC: 3.6 MMOL/L (ref 3.4–5.3)
SODIUM SERPL-SCNC: 136 MMOL/L (ref 133–144)

## 2020-11-12 PROCEDURE — 80048 BASIC METABOLIC PNL TOTAL CA: CPT | Mod: ZL | Performed by: INTERNAL MEDICINE

## 2020-11-12 PROCEDURE — 93306 TTE W/DOPPLER COMPLETE: CPT | Mod: 26 | Performed by: INTERNAL MEDICINE

## 2020-11-12 PROCEDURE — 36415 COLL VENOUS BLD VENIPUNCTURE: CPT | Mod: ZL | Performed by: INTERNAL MEDICINE

## 2020-11-12 PROCEDURE — 93306 TTE W/DOPPLER COMPLETE: CPT

## 2020-11-18 DIAGNOSIS — E87.6 HYPOKALEMIA: ICD-10-CM

## 2020-11-18 RX ORDER — POTASSIUM CHLORIDE 1500 MG/1
20 TABLET, EXTENDED RELEASE ORAL 3 TIMES DAILY
Qty: 270 TABLET | Refills: 3 | Status: SHIPPED | OUTPATIENT
Start: 2020-11-18 | End: 2021-12-27

## 2020-11-20 ENCOUNTER — TELEPHONE (OUTPATIENT)
Dept: CARDIOLOGY | Facility: CLINIC | Age: 70
End: 2020-11-20

## 2020-11-20 DIAGNOSIS — R09.89 CAROTID BRUIT, UNSPECIFIED LATERALITY: ICD-10-CM

## 2020-11-20 DIAGNOSIS — I35.0 AORTIC VALVE STENOSIS, ETIOLOGY OF CARDIAC VALVE DISEASE UNSPECIFIED: Primary | ICD-10-CM

## 2020-11-20 NOTE — TELEPHONE ENCOUNTER
I left detailed message that we have received her referral and that we will be calling her to schedule her appointments and imaging. also let her know Genevieve dudley will be her RN point person . Left my contact information.

## 2020-11-20 NOTE — PROGRESS NOTES
Date: 11/20/2020    Time of Call: 2:37 PM     Diagnosis:  Severe aortic stenosis     [ TORB ] Ordering provider: Domo Sarah MD  Order:   CBC and CMP  TAVR CT  Carotid US     Order received by: Treva Patel RN     Follow-up/additional notes:   Referral to the Valve clinic from Dr. Myers. Pt will be scheduled for a Virtual visit, and the appropriate imaging.

## 2020-12-04 DIAGNOSIS — E89.0 HYPOTHYROIDISM, POSTABLATIVE: ICD-10-CM

## 2020-12-04 DIAGNOSIS — E89.0 HYPOTHYROIDISM, POSTABLATIVE: Primary | ICD-10-CM

## 2020-12-04 DIAGNOSIS — E87.6 HYPOKALEMIA: ICD-10-CM

## 2020-12-04 LAB
ANION GAP SERPL CALCULATED.3IONS-SCNC: 5 MMOL/L (ref 3–14)
BUN SERPL-MCNC: 9 MG/DL (ref 7–30)
CALCIUM SERPL-MCNC: 9.3 MG/DL (ref 8.5–10.1)
CHLORIDE SERPL-SCNC: 103 MMOL/L (ref 94–109)
CO2 SERPL-SCNC: 30 MMOL/L (ref 20–32)
CREAT SERPL-MCNC: 0.86 MG/DL (ref 0.52–1.04)
GFR SERPL CREATININE-BSD FRML MDRD: 69 ML/MIN/{1.73_M2}
GLUCOSE SERPL-MCNC: 82 MG/DL (ref 70–99)
POTASSIUM SERPL-SCNC: 4.1 MMOL/L (ref 3.4–5.3)
SODIUM SERPL-SCNC: 138 MMOL/L (ref 133–144)
T4 FREE SERPL-MCNC: 1.5 NG/DL (ref 0.76–1.46)
TSH SERPL DL<=0.005 MIU/L-ACNC: 0.08 MU/L (ref 0.4–4)

## 2020-12-04 PROCEDURE — 36415 COLL VENOUS BLD VENIPUNCTURE: CPT | Mod: ZL | Performed by: INTERNAL MEDICINE

## 2020-12-04 PROCEDURE — 80048 BASIC METABOLIC PNL TOTAL CA: CPT | Mod: ZL | Performed by: INTERNAL MEDICINE

## 2020-12-04 PROCEDURE — 84443 ASSAY THYROID STIM HORMONE: CPT | Mod: ZL | Performed by: INTERNAL MEDICINE

## 2020-12-04 PROCEDURE — 84439 ASSAY OF FREE THYROXINE: CPT | Mod: ZL | Performed by: INTERNAL MEDICINE

## 2020-12-04 RX ORDER — LEVOTHYROXINE SODIUM 100 UG/1
100 TABLET ORAL DAILY
Qty: 60 TABLET | Refills: 1 | Status: SHIPPED | OUTPATIENT
Start: 2020-12-04 | End: 2020-12-07

## 2020-12-07 DIAGNOSIS — E89.0 HYPOTHYROIDISM, POSTABLATIVE: ICD-10-CM

## 2020-12-07 RX ORDER — LEVOTHYROXINE SODIUM 100 UG/1
100 TABLET ORAL DAILY
Qty: 60 TABLET | Refills: 1 | Status: SHIPPED | OUTPATIENT
Start: 2020-12-07 | End: 2021-03-26

## 2020-12-07 NOTE — TELEPHONE ENCOUNTER
Pt requesting a 90 day supply  Levothyroxine 100mcg     Last Written Prescription Date:  12/4/20  Last Fill Quantity: 60 tablet,   # refills: 1  Last Office Visit: 3/18/20  Future Office visit:

## 2021-01-07 ENCOUNTER — VIRTUAL VISIT (OUTPATIENT)
Dept: CARDIOLOGY | Facility: CLINIC | Age: 71
End: 2021-01-07
Attending: INTERNAL MEDICINE
Payer: COMMERCIAL

## 2021-01-07 ENCOUNTER — VIRTUAL VISIT (OUTPATIENT)
Dept: CARDIOLOGY | Facility: CLINIC | Age: 71
End: 2021-01-07
Attending: INTERNAL MEDICINE
Payer: MEDICARE

## 2021-01-07 DIAGNOSIS — I35.0 NONRHEUMATIC AORTIC VALVE STENOSIS: Primary | ICD-10-CM

## 2021-01-07 DIAGNOSIS — I35.0 AORTIC VALVE STENOSIS, ETIOLOGY OF CARDIAC VALVE DISEASE UNSPECIFIED: Primary | ICD-10-CM

## 2021-01-07 PROCEDURE — 99205 OFFICE O/P NEW HI 60 MIN: CPT | Mod: 95 | Performed by: INTERNAL MEDICINE

## 2021-01-07 NOTE — LETTER
1/7/2021      RE: Mary Alice Cameron  [address] [place] [address] [address]  [facility] [id number]       Dear Colleague,    Thank you for the opportunity to participate in the care of your patient, Mary Alice Cameron, at the [facility]. Please see a copy of my visit note below.    I did not see the patient during this visit.      Please do not hesitate to contact me if you have any questions/concerns.     Sincerely,     Jaciel Strauss MD

## 2021-01-07 NOTE — LETTER
"1/7/2021      RE: Mary Alice Cameron  [address] [place] [address] [address]  [facility] [id number]       Dear Colleague,    Thank you for the opportunity to participate in the care of your patient, Mary Alice Cameron, at the [facility] [place] [facility]. Please see a copy of my visit note below.    Mary Alice is a 70 year old who is being evaluated via a billable telephone visit.      What phone number would you like to be contacted at? [phone]  How would you like to obtain your AVS? Mail a copy    Vitals - Patient Reported  Systolic (Patient Reported): 96  Diastolic (Patient Reported): 64  Weight (Patient Reported): 71.7 kg (158 lb)  Height (Patient Reported): 505 cm (16' 6.82\")  BMI (Based on Pt Reported Ht/Wt): 2.81  Pulse (Patient Reported): 90  Pain Score: No Pain (0)    STRUCTURAL INTERVENTIONAL CARDIOLOGY CLINIC INITIAL CONSULTATION  Telephone visit    PRIMARY CARDIOLOGIST: Dr. Myers      PERTINENT CLINICAL HISTORY:     This is a telephone visit note.    Mary Alice Cameron is a very pleasant 70 year old female with severe aortic valvular stenosis who is referred to our clinic for evaluation and consideration for potential transcatheter aortic valve replacement (TAVR).     She has past medical history of severe COPD on supplemental oxygen mainly at night, nonobstructive CAD, hypertension, chronic atrial fibrillation on apixaban.    She has known bicuspid aortic valve.  Recently echocardiogram showed worsening aortic valve stenosis.  She has experienced lower legs edema and increased weight to 158 pounds.  She gained weight about 15 pounds over a year.  She has baseline shortness of breath symptoms and she thinks it is not much changed over a few years at least.    The patient has been followed up by Dr. Myers about proximal A. fib.  Currently she is on apixaban and diltiazem for heart rate control strategies.  She does not have much palpitation " symptoms.    Echocardiogram, November 2020  Normal EF of 55 to 60%  Moderate aortic stenosis with mean gradient of 32 mmHg, peak velocity of 3.7 m/s, calculated MATIAS of 0.35 cm .  SV(LVOT): 27.8 ml  SI(LVOT): 15.8 ml/m2  AV Parveen Ratio (DI): 0.20  LVOT diam: 1.5 cm    In October 2018 echocardiogram showed peak velocity of 2.3 m/s, mean gradient of 12 mmHg, and MATIAS of 1.0 cm .  Dimensionless index of 0.27.    The patient was diagnosed as bicuspid aortic valve more than 15 years ago at [facility].  She has had coronary angiography in early 2000 which showed nonobstructive coronary artery disease.       PAST MEDICAL HISTORY:     Past Medical History:   Diagnosis Date     Asthma      Atrial fibrillation (H)      COPD (chronic obstructive pulmonary disease) (H)      Depression      High cholesterol      HTN (hypertension)      Thyroid disease         PAST SURGICAL HISTORY:     Past Surgical History:   Procedure Laterality Date     BACK SURGERY  2006     CARDIAC SURGERY  06/12/2018    Angiogram at [facility]     COLONOSCOPY N/A 1/19/2016    Procedure: COLONOSCOPY;  Surgeon: Waldemar Bob MD;  Location: [facility]     HYSTERECTOMY  1980    partial     SLING BLADDER SUSPENSION WITH FASCIA LINNETTE          CURRENT MEDICATIONS:     Current Outpatient Medications   Medication Sig Dispense Refill     atorvastatin (LIPITOR) 10 MG tablet TAKE 1 TABLET BY MOUTH EVERY NIGHT AT BEDTIME 90 tablet 3     Calcium Carb-Cholecalciferol (CALTRATE 600+D3 SOFT PO) Take 600 mg by mouth 2 times daily       cloNIDine (CATAPRES) 0.1 MG tablet TAKE 2 TABLETS BY MOUTH EVERY NIGHT AT BEDTIME 180 tablet 1     COMBIVENT RESPIMAT  MCG/ACT inhaler Inhale 1 puff into the lungs 4 times daily        diltiazem ER (TIAZAC) 360 MG 24 hr ER beaded capsule Take 1 capsule (360 mg) by mouth daily 90 capsule 3     diphenhydrAMINE (BENADRYL) 25 MG tablet Take 1-2 tablets (25-50 mg) by mouth every 6 hours as needed for itching or  allergies 60 tablet 1     ELIQUIS ANTICOAGULANT 5 MG tablet TAKE 1 TABLET BY MOUTH TWICE DAILY 180 tablet 3     fluticasone-salmeterol (ADVAIR-HFA) 230-21 MCG/ACT inhaler Inhale 2 puffs into the lungs 2 times daily 3 Inhaler 4     furosemide (LASIX) 40 MG tablet Take 1 tablet (40 mg) by mouth 2 times daily 180 tablet 0     levothyroxine (SYNTHROID/LEVOTHROID) 100 MCG tablet Take 1 tablet (100 mcg) by mouth daily 60 tablet 1     losartan (COZAAR) 50 MG tablet Take 1 tablet (50 mg) by mouth 2 times daily 180 tablet 0     OTHER MEDICAL SUPPLIES Apply one pair to help with edema 1 each 0     potassium chloride ER (KLOR-CON M) 20 MEQ CR tablet Take 1 tablet (20 mEq) by mouth 3 times daily 270 tablet 3     predniSONE (DELTASONE) 20 MG tablet TAKE 3 TABLETS BY MOUTH X 3 DAYS, 2 TABLETS X 3 DAYS, 1 TABLET X 3 DAYS, AND 0.5 TABLETS X 3 DAYS 20 tablet 0     sulfamethoxazole-trimethoprim (BACTRIM DS) 800-160 MG tablet Take 1 tablet by mouth 2 times daily 20 tablet 0     acetaminophen (TYLENOL) 500 MG tablet Take 500-1,000 mg by mouth every 6 hours as needed for mild pain       levothyroxine (SYNTHROID/LEVOTHROID) 112 MCG tablet TAKE 1 TABLET(112 MCG) BY MOUTH DAILY (Patient not taking: Reported on 1/7/2021) 90 tablet 1     PROAIR RESPICLICK 108 (90 Base) MCG/ACT inhaler INL 2 PFS ITL Q 6 H PRF SOB OR DIFFICULT BREATHING OR WHZ          ALLERGIES:     Allergies   Allergen Reactions     Amlodipine Besylate Cough     Norvasc     Lisinopril Cough     Ace Inhibitors Cough        FAMILY HISTORY:     Family History   Problem Relation Age of Onset     Prostate Cancer Father      Hypertension Father      Heart Failure Father         CHF     Asthma Mother      Cancer Mother         ovarian     Hypertension Mother      Asthma Brother      Hypertension Sister      Hypertension Brother         SOCIAL HISTORY:     Social History     Socioeconomic History     Marital status:      Spouse name: None     Number of children: None      Years of education: None     Highest education level: None   Occupational History     Employer: RETIRED     Comment: disabled   Social Needs     Financial resource strain: None     Food insecurity     Worry: None     Inability: None     Transportation needs     Medical: None     Non-medical: None   Tobacco Use     Smoking status: Former Smoker     Packs/day: 0.50     Years: 41.00     Pack years: 20.50     Types: Cigarettes     Start date: 1966     Quit date: 2007     Years since quittin.9     Smokeless tobacco: Never Used   Substance and Sexual Activity     Alcohol use: No     Alcohol/week: 0.0 standard drinks     Drug use: No     Sexual activity: Never     Comment:    Lifestyle     Physical activity     Days per week: None     Minutes per session: None     Stress: None   Relationships     Social connections     Talks on phone: None     Gets together: None     Attends Denominational service: None     Active member of club or organization: None     Attends meetings of clubs or organizations: None     Relationship status: None     Intimate partner violence     Fear of current or ex partner: None     Emotionally abused: None     Physically abused: None     Forced sexual activity: None   Other Topics Concern      Service No     Blood Transfusions Yes     Comment: Permits if needed     Caffeine Concern Yes     Comment: 2 cups coffee daily     Occupational Exposure Not Asked     Hobby Hazards Not Asked     Sleep Concern Not Asked     Stress Concern Not Asked     Weight Concern Not Asked     Special Diet Not Asked     Back Care Not Asked     Exercise Not Asked     Bike Helmet Not Asked     Seat Belt Yes     Self-Exams Not Asked     Parent/sibling w/ CABG, MI or angioplasty before 65F 55M? No   Social History Narrative     None        REVIEW OF SYSTEMS:     Constitutional: No fevers or chills  Skin: No new rash or itching  Eyes: No acute change in vision  Ears/Nose/Throat: No purulent rhinorrhea, new  hearing loss, or new vertigo  Respiratory: No cough or hemoptysis  Cardiovascular: See HPI  Gastrointestinal: No change in appetite, vomiting, hematemesis or diarrhea  Genitourinary: No dysuria or hematuria  Musculoskeletal: No new back pain, neck pain or muscle pain  Neurologic: No new headaches, focal weakness or behavior changes  Psychiatric: No hallucinations, excessive alcohol consumption or illegal drug usage  Hematologic/Lymphatic/Immunologic: No bleeding, chills, fever, night sweats or weight loss  Endocrine: No new cold intolerance, heat intolerance, polyphagia, polydipsia or polyuria      PHYSICAL EXAMINATION:     There were no vitals taken for this visit.    Unable to obtain vital signs and unable to perform physical examination due to telephone visit     LABORATORY DATA:     LIPID RESULTS:  Lab Results   Component Value Date    CHOL 162 10/29/2018    HDL 97 10/29/2018    LDL 37 10/29/2018    TRIG 139 10/29/2018    CHOLHDLRATIO 1.9 08/25/2015       LIVER ENZYME RESULTS:  Lab Results   Component Value Date    AST 15 09/13/2019    ALT 12 09/13/2019       CBC RESULTS:  Lab Results   Component Value Date    WBC 6.6 02/18/2020    RBC 4.24 02/18/2020    HGB 13.5 02/18/2020    HCT 42.0 02/18/2020    MCV 99 02/18/2020    MCH 31.8 02/18/2020    MCHC 32.1 02/18/2020    RDW 13.7 02/18/2020     02/18/2020       BMP RESULTS:  Lab Results   Component Value Date     12/04/2020    POTASSIUM 4.1 12/04/2020    CHLORIDE 103 12/04/2020    CO2 30 12/04/2020    ANIONGAP 5 12/04/2020    GLC 82 12/04/2020    BUN 9 12/04/2020    CR 0.86 12/04/2020    GFRESTIMATED 69 12/04/2020    GFRESTBLACK 80 12/04/2020    KOSTA 9.3 12/04/2020        A1C RESULTS:  Lab Results   Component Value Date    A1C 5.9 03/09/2016       INR RESULTS:  Lab Results   Component Value Date    INR 1.4 (H) 07/23/2018    INR 4.4 (H) 07/20/2018    INR 2.14 (H) 03/22/2018    INR 3.2 (A) 12/13/2017    INR 2.9 (A) 12/06/2017    INR 2.16 (H) 03/25/2017           PROCEDURES & FURTHER ASSESSMENTS:         Echocardiogram:  Echocardiogram, November 2020  Normal EF of 55 to 60%  Moderate aortic stenosis with mean gradient of 32 mmHg, peak velocity of 3.7 m/s, calculated MATIAS of 0.35 cm .  SV(LVOT): 27.8 ml  SI(LVOT): 15.8 ml/m2  AV Parveen Ratio (DI): 0.20  LVOT diam: 1.5 cm    In October 2018 echocardiogram showed peak velocity of 2.3 m/s, mean gradient of 12 mmHg, and MATIAS of 1.0 cm .  Dimensionless index of 0.27.    CT TAVR: Pending    Coronary Angiogram and Right Heart Catheterization: Pending    PFTs:    Carotid Ultrasound:      STS RISK SCORE: 4.5 %  FRAILTY SCORE: Pending  NYHA CLASS: 3     CLINICAL IMPRESSION:     Mary Alice Cameron is a 70 year old lady with severe COPD, hypertension, chronic A. fib on apixaban who is referred for structural heart intervention clinic for potential TAVR procedure.  Recently echocardiogram showed severe aortic stenosis.  It is consistent with paradoxical low flow low gradient severe aortic stenosis.    aortic stenosis with mean gradient of 32 mmHg, peak velocity of 3.7 m/s, calculated MATIAS of 0.35 cm .  SV(LVOT): 27.8 ml  SI(LVOT): 15.8 ml/m2  AV Parveen Ratio (DI): 0.20  LVOT diam: 1.5 cm    Patient has class III symptoms dyspnea however she has severe COPD with supplemental oxygen.  It is hard to distinguish it from lung origin.  The patient states that the symptom has not much changed however she noticed gaining weight and legs edema.    We offered her to perform coronary angiography and RHC and TAVR CT at [facility] however the patient prefers to wait until April because the patient lives in [place] area.    We would like to refer echocardiogram cardiogram reader at [facility] to confirm it is severe arctic stenosis.  Otherwise she will have follow-up echocardiogram in [facility].    If it is confirmed as severe arctic stenosis, we will see TAVR work-up including right heart cath and coronary angiography  plus/minus left heart cath.  And also TAVR CT will be done.      Plan Summary:  1) paradoxical low flow low gradient severe Aortic Stenosis:  Repeat echocardiogram  TAVR CT and RHC/LHC/coronary angiography      Patient was evaluated in clinic with Domo Sarah MD of interventional cardiology.    Nicholas Basurto MD  Interventional Cardiology Fellow [phone]          Patient Care Team:  Braydon Yen MD as PCP - General (Family Practice)  Braydon Yen MD as Assigned PCP  Eliezer Myers MD as MD (Clinical Cardiac Electrophysiology)  Pipkin, Lauren N, RN as Registered Nurse (Primary Care - CC)  Pau Nelson APRN CNP as Nurse Practitioner (Nurse Practitioner - Family)  Elizabeth Bhatti MD as Assigned Surgical Provider      Patient seen and examined with Cardiovascular fellow and agree with the assessment and plan described above.     Domo Sarah M.D.  Interventional Cardiology  [facility]         Please do not hesitate to contact me if you have any questions/concerns.     Sincerely,     [facility]

## 2021-01-07 NOTE — PROGRESS NOTES
"Mary Alice is a 70 year old who is being evaluated via a billable telephone visit.      What phone number would you like to be contacted at? [phone]  How would you like to obtain your AVS? Mail a copy    Vitals - Patient Reported  Systolic (Patient Reported): 96  Diastolic (Patient Reported): 64  Weight (Patient Reported): 71.7 kg (158 lb)  Height (Patient Reported): 505 cm (16' 6.82\")  BMI (Based on Pt Reported Ht/Wt): 2.81  Pulse (Patient Reported): 90  Pain Score: No Pain (0)    STRUCTURAL INTERVENTIONAL CARDIOLOGY CLINIC INITIAL CONSULTATION  Telephone visit    PRIMARY CARDIOLOGIST: Dr. Myers      PERTINENT CLINICAL HISTORY:     This is a telephone visit note.    Mary Alice Cameron is a very pleasant 70 year old female with severe aortic valvular stenosis who is referred to our clinic for evaluation and consideration for potential transcatheter aortic valve replacement (TAVR).     She has past medical history of severe COPD on supplemental oxygen mainly at night, nonobstructive CAD, hypertension, chronic atrial fibrillation on apixaban.    She has known bicuspid aortic valve.  Recently echocardiogram showed worsening aortic valve stenosis.  She has experienced lower legs edema and increased weight to 158 pounds.  She gained weight about 15 pounds over a year.  She has baseline shortness of breath symptoms and she thinks it is not much changed over a few years at least.    The patient has been followed up by Dr. Myers about proximal A. fib.  Currently she is on apixaban and diltiazem for heart rate control strategies.  She does not have much palpitation symptoms.    Echocardiogram, November 2020  Normal EF of 55 to 60%  Moderate aortic stenosis with mean gradient of 32 mmHg, peak velocity of 3.7 m/s, calculated MATIAS of 0.35 cm .  SV(LVOT): 27.8 ml  SI(LVOT): 15.8 ml/m2  AV Parveen Ratio (DI): 0.20  LVOT diam: 1.5 cm    In October 2018 echocardiogram showed peak velocity of 2.3 m/s, mean gradient of 12 mmHg, and MATIAS " of 1.0 cm .  Dimensionless index of 0.27.    The patient was diagnosed as bicuspid aortic valve more than 15 years ago at [facility].  She has had coronary angiography in early 2000 which showed nonobstructive coronary artery disease.       PAST MEDICAL HISTORY:     Past Medical History:   Diagnosis Date     Asthma      Atrial fibrillation (H)      COPD (chronic obstructive pulmonary disease) (H)      Depression      High cholesterol      HTN (hypertension)      Thyroid disease         PAST SURGICAL HISTORY:     Past Surgical History:   Procedure Laterality Date     BACK SURGERY  2006     CARDIAC SURGERY  06/12/2018    [facility] at [facility]     COLONOSCOPY N/A 1/19/2016    Procedure: COLONOSCOPY;  Surgeon: Waldemar Bob MD;  Location: [facility]     HYSTERECTOMY  1980    partial     SLING BLADDER SUSPENSION WITH FASCIA LINNETTE          CURRENT MEDICATIONS:     Current Outpatient Medications   Medication Sig Dispense Refill     atorvastatin (LIPITOR) 10 MG tablet TAKE 1 TABLET BY MOUTH EVERY NIGHT AT BEDTIME 90 tablet 3     Calcium Carb-Cholecalciferol (CALTRATE 600+D3 SOFT PO) Take 600 mg by mouth 2 times daily       cloNIDine (CATAPRES) 0.1 MG tablet TAKE 2 TABLETS BY MOUTH EVERY NIGHT AT BEDTIME 180 tablet 1     COMBIVENT RESPIMAT  MCG/ACT inhaler Inhale 1 puff into the lungs 4 times daily        diltiazem ER (TIAZAC) 360 MG 24 hr ER beaded capsule Take 1 capsule (360 mg) by mouth daily 90 capsule 3     diphenhydrAMINE (BENADRYL) 25 MG tablet Take 1-2 tablets (25-50 mg) by mouth every 6 hours as needed for itching or allergies 60 tablet 1     ELIQUIS ANTICOAGULANT 5 MG tablet TAKE 1 TABLET BY MOUTH TWICE DAILY 180 tablet 3     fluticasone-salmeterol (ADVAIR-HFA) 230-21 MCG/ACT inhaler Inhale 2 puffs into the lungs 2 times daily 3 Inhaler 4     furosemide (LASIX) 40 MG tablet Take 1 tablet (40 mg) by mouth 2 times daily 180 tablet 0     levothyroxine (SYNTHROID/LEVOTHROID) 100 MCG  tablet Take 1 tablet (100 mcg) by mouth daily 60 tablet 1     losartan (COZAAR) 50 MG tablet Take 1 tablet (50 mg) by mouth 2 times daily 180 tablet 0     OTHER MEDICAL SUPPLIES Apply one pair to help with edema 1 each 0     potassium chloride ER (KLOR-CON M) 20 MEQ CR tablet Take 1 tablet (20 mEq) by mouth 3 times daily 270 tablet 3     predniSONE (DELTASONE) 20 MG tablet TAKE 3 TABLETS BY MOUTH X 3 DAYS, 2 TABLETS X 3 DAYS, 1 TABLET X 3 DAYS, AND 0.5 TABLETS X 3 DAYS 20 tablet 0     sulfamethoxazole-trimethoprim (BACTRIM DS) 800-160 MG tablet Take 1 tablet by mouth 2 times daily 20 tablet 0     acetaminophen (TYLENOL) 500 MG tablet Take 500-1,000 mg by mouth every 6 hours as needed for mild pain       levothyroxine (SYNTHROID/LEVOTHROID) 112 MCG tablet TAKE 1 TABLET(112 MCG) BY MOUTH DAILY (Patient not taking: Reported on 1/7/2021) 90 tablet 1     PROAIR RESPICLICK 108 (90 Base) MCG/ACT inhaler INL 2 PFS ITL Q 6 H PRF SOB OR DIFFICULT BREATHING OR WHZ          ALLERGIES:     Allergies   Allergen Reactions     Amlodipine Besylate Cough     Norvasc     Lisinopril Cough     Ace Inhibitors Cough        FAMILY HISTORY:     Family History   Problem Relation Age of Onset     Prostate Cancer Father      Hypertension Father      Heart Failure Father         CHF     Asthma Mother      Cancer Mother         ovarian     Hypertension Mother      Asthma Brother      Hypertension Sister      Hypertension Brother         SOCIAL HISTORY:     Social History     Socioeconomic History     Marital status:      Spouse name: None     Number of children: None     Years of education: None     Highest education level: None   Occupational History     Employer: RETIRED     Comment: disabled   Social Needs     Financial resource strain: None     Food insecurity     Worry: None     Inability: None     Transportation needs     Medical: None     Non-medical: None   Tobacco Use     Smoking status: Former Smoker     Packs/day: 0.50      Years: 41.00     Pack years: 20.50     Types: Cigarettes     Start date: 1966     Quit date: 2007     Years since quittin.9     Smokeless tobacco: Never Used   Substance and Sexual Activity     Alcohol use: No     Alcohol/week: 0.0 standard drinks     Drug use: No     Sexual activity: Never     Comment:    Lifestyle     Physical activity     Days per week: None     Minutes per session: None     Stress: None   Relationships     Social connections     Talks on phone: None     Gets together: None     Attends Faith service: None     Active member of club or organization: None     Attends meetings of clubs or organizations: None     Relationship status: None     Intimate partner violence     Fear of current or ex partner: None     Emotionally abused: None     Physically abused: None     Forced sexual activity: None   Other Topics Concern      Service No     Blood Transfusions Yes     Comment: Permits if needed     Caffeine Concern Yes     Comment: 2 cups coffee daily     Occupational Exposure Not Asked     Hobby Hazards Not Asked     Sleep Concern Not Asked     Stress Concern Not Asked     Weight Concern Not Asked     Special Diet Not Asked     Back Care Not Asked     Exercise Not Asked     Bike Helmet Not Asked     Seat Belt Yes     Self-Exams Not Asked     Parent/sibling w/ CABG, MI or angioplasty before 65F 55M? No   Social History Narrative     None        REVIEW OF SYSTEMS:     Constitutional: No fevers or chills  Skin: No new rash or itching  Eyes: No acute change in vision  Ears/Nose/Throat: No purulent rhinorrhea, new hearing loss, or new vertigo  Respiratory: No cough or hemoptysis  Cardiovascular: See HPI  Gastrointestinal: No change in appetite, vomiting, hematemesis or diarrhea  Genitourinary: No dysuria or hematuria  Musculoskeletal: No new back pain, neck pain or muscle pain  Neurologic: No new headaches, focal weakness or behavior changes  Psychiatric: No hallucinations,  excessive alcohol consumption or illegal drug usage  Hematologic/Lymphatic/Immunologic: No bleeding, chills, fever, night sweats or weight loss  Endocrine: No new cold intolerance, heat intolerance, polyphagia, polydipsia or polyuria      PHYSICAL EXAMINATION:     There were no vitals taken for this visit.    Unable to obtain vital signs and unable to perform physical examination due to telephone visit     LABORATORY DATA:     LIPID RESULTS:  Lab Results   Component Value Date    CHOL 162 10/29/2018    HDL 97 10/29/2018    LDL 37 10/29/2018    TRIG 139 10/29/2018    CHOLHDLRATIO 1.9 08/25/2015       LIVER ENZYME RESULTS:  Lab Results   Component Value Date    AST 15 09/13/2019    ALT 12 09/13/2019       CBC RESULTS:  Lab Results   Component Value Date    WBC 6.6 02/18/2020    RBC 4.24 02/18/2020    HGB 13.5 02/18/2020    HCT 42.0 02/18/2020    MCV 99 02/18/2020    MCH 31.8 02/18/2020    MCHC 32.1 02/18/2020    RDW 13.7 02/18/2020     02/18/2020       BMP RESULTS:  Lab Results   Component Value Date     12/04/2020    POTASSIUM 4.1 12/04/2020    CHLORIDE 103 12/04/2020    CO2 30 12/04/2020    ANIONGAP 5 12/04/2020    GLC 82 12/04/2020    BUN 9 12/04/2020    CR 0.86 12/04/2020    GFRESTIMATED 69 12/04/2020    GFRESTBLACK 80 12/04/2020    KOSTA 9.3 12/04/2020        A1C RESULTS:  Lab Results   Component Value Date    A1C 5.9 03/09/2016       INR RESULTS:  Lab Results   Component Value Date    INR 1.4 (H) 07/23/2018    INR 4.4 (H) 07/20/2018    INR 2.14 (H) 03/22/2018    INR 3.2 (A) 12/13/2017    INR 2.9 (A) 12/06/2017    INR 2.16 (H) 03/25/2017          PROCEDURES & FURTHER ASSESSMENTS:         Echocardiogram:  Echocardiogram, November 2020  Normal EF of 55 to 60%  Moderate aortic stenosis with mean gradient of 32 mmHg, peak velocity of 3.7 m/s, calculated MATIAS of 0.35 cm .  SV(LVOT): 27.8 ml  SI(LVOT): 15.8 ml/m2  AV Parveen Ratio (DI): 0.20  LVOT diam: 1.5 cm    In October 2018 echocardiogram showed peak  velocity of 2.3 m/s, mean gradient of 12 mmHg, and MATIAS of 1.0 cm .  Dimensionless index of 0.27.    CT TAVR: Pending    Coronary Angiogram and Right Heart Catheterization: Pending    PFTs:    Carotid Ultrasound:      STS RISK SCORE: 4.5 %  FRAILTY SCORE: Pending  NYHA CLASS: 3     CLINICAL IMPRESSION:     Mary Alice Cameron is a 70 year old lady with severe COPD, hypertension, chronic A. fib on apixaban who is referred for structural heart intervention clinic for potential TAVR procedure.  Recently echocardiogram showed severe aortic stenosis.  It is consistent with paradoxical low flow low gradient severe aortic stenosis.    aortic stenosis with mean gradient of 32 mmHg, peak velocity of 3.7 m/s, calculated MATIAS of 0.35 cm .  SV(LVOT): 27.8 ml  SI(LVOT): 15.8 ml/m2  AV Parveen Ratio (DI): 0.20  LVOT diam: 1.5 cm    Patient has class III symptoms dyspnea however she has severe COPD with supplemental oxygen.  It is hard to distinguish it from lung origin.  The patient states that the symptom has not much changed however she noticed gaining weight and legs edema.    We offered her to perform coronary angiography and RHC and TAVR CT at [facility] however the patient prefers to wait until April because the patient lives in [place] area.    We would like to refer echocardiogram cardiogram reader at [facility] to confirm it is severe arctic stenosis.  Otherwise she will have follow-up echocardiogram in [facility].    If it is confirmed as severe arctic stenosis, we will see TAVR work-up including right heart cath and coronary angiography plus/minus left heart cath.  And also TAVR CT will be done.      Plan Summary:  1) paradoxical low flow low gradient severe Aortic Stenosis:  Repeat echocardiogram  TAVR CT and RHC/LHC/coronary angiography      Patient was evaluated in clinic with Domo Sarah MD of interventional cardiology.    Nicholas Basurto MD  Interventional Cardiology Fellow  [id number]          Patient Care Team:  Braydon Yen MD as PCP - General (Family Practice)  Braydon Yen MD as Assigned PCP  Eliezer Myers MD as MD (Clinical Cardiac Electrophysiology)  Pipkin, Lauren N, RN as Registered Nurse (Primary Care - CC)  Pau Nelson APRN CNP as Nurse Practitioner (Nurse Practitioner - Family)  Elizabeth Bhatti MD as Assigned Surgical Provider      Patient seen and examined with Cardiovascular fellow and agree with the assessment and plan described above.     Domo Sarah M.D.  Interventional Cardiology  [facility]

## 2021-01-07 NOTE — NURSING NOTE
"Chief Complaint   Patient presents with     New Patient     Per Dr. Myers \"Bicuspid aortic valve - previous echocardiogram does not show significant aortic stenosis or insufficiency. CT aortogram shows no dilation of descending aorta.\"      Vitals were taken and medications were reconciled.    Lisa Vargas,RMA  11:51 AM    "

## 2021-01-07 NOTE — NURSING NOTE
"Chief Complaint   Patient presents with     New Patient     Per Dr. Myers \"Bicuspid aortic valve - previous echocardiogram does not show significant aortic stenosis or insufficiency. CT aortogram shows no dilation of descending aorta.\"      Vitals were taken and medications were reconciled.     Lisa Vargas,RMA  11:43 AM  "

## 2021-01-08 ENCOUNTER — CARE COORDINATION (OUTPATIENT)
Dept: CARDIOLOGY | Facility: CLINIC | Age: 71
End: 2021-01-08

## 2021-01-08 NOTE — PATIENT INSTRUCTIONS
You were seen today in the Cardiovascular Clinic at the [facility].      Cardiology Providers you saw during your visit:  Dr. Domo Sarah MD    Recommendations:    --We would like your echocardiogram repeated at [facility] to determine the severity of your valve.  --Dr. Sarah will review this to see if it is safe for you to proceed with your TAVR/aortic valve work up in the Spring.      After all of your imaging/testing is completed, your case will be discussed at our Valve conference.  After this conference, the recommendations will be discussed with you, along with the appropriate follow up appointments.        For questions, you may call the following numbers:    1.  [phone]:  [place], Structural Heart Lead CMA  2.  Nursing questions: Genevieve Patel RN:  [phone]  3.  For emergencies call 911.    It was a pleasure to see you in clinic.  If you have any questions at all, please feel free to give me a call.      Genevieve Patel RN  Structural Heart Care Coordinator   TAVR and MitraClip Programs  [facility]  Office: [phone]    [facility]  [address]  Cardiology Clinic [address]  [place] [address]

## 2021-01-11 ENCOUNTER — CARE COORDINATION (OUTPATIENT)
Dept: CARDIOLOGY | Facility: CLINIC | Age: 71
End: 2021-01-11

## 2021-01-11 NOTE — PROGRESS NOTES
Dr. Sarah requested that Mary Alice's echo be repeated. This is to determine severity of aortic valve stenosis, and if it is safe for Mary Alice to wait until spring before having her TAVR procedure.  An echo will be scheduled for Mary Alice, as well as   to check to see if her insurance will cover another echo.

## 2021-01-12 ENCOUNTER — TELEPHONE (OUTPATIENT)
Dept: CARDIOLOGY | Facility: CLINIC | Age: 71
End: 2021-01-12

## 2021-01-12 DIAGNOSIS — I25.10 CORONARY ARTERY DISEASE INVOLVING NATIVE CORONARY ARTERY OF NATIVE HEART WITHOUT ANGINA PECTORIS: ICD-10-CM

## 2021-01-12 DIAGNOSIS — E78.2 MIXED HYPERLIPIDEMIA: ICD-10-CM

## 2021-01-12 DIAGNOSIS — I10 ESSENTIAL HYPERTENSION, BENIGN: ICD-10-CM

## 2021-01-12 RX ORDER — ATORVASTATIN CALCIUM 10 MG/1
10 TABLET, FILM COATED ORAL AT BEDTIME
Qty: 90 TABLET | Refills: 3 | Status: SHIPPED | OUTPATIENT
Start: 2021-01-12 | End: 2022-01-24

## 2021-01-12 RX ORDER — FUROSEMIDE 40 MG
40 TABLET ORAL 2 TIMES DAILY
Qty: 180 TABLET | Refills: 0 | Status: SHIPPED | OUTPATIENT
Start: 2021-01-12 | End: 2021-04-12

## 2021-01-12 NOTE — TELEPHONE ENCOUNTER
lipitor      Last Written Prescription Date:  4/14/20  Last Fill Quantity: 90,   # refills: 3  Last Office Visit: 6/8/20  Future Office visit:       Routing refill request to provider for review/approval because:

## 2021-01-12 NOTE — TELEPHONE ENCOUNTER
Called and left francine PAK that I spoke with WALLY carlin and she said patient will have to call her insurance company herself to get the information if the echo is covered in [facility]. I also let her know when she finds out this information she can call me to schedule the echo at [facility]. Left my contact information to call me back.

## 2021-01-13 ENCOUNTER — TELEPHONE (OUTPATIENT)
Dept: CARDIOLOGY | Facility: CLINIC | Age: 71
End: 2021-01-13

## 2021-01-13 NOTE — TELEPHONE ENCOUNTER
Left [facility] radiology a VM to contact us to schedule an echo sooner vs later for the patient. Left vm with francine to let her know a VM was left for [facility].

## 2021-01-21 ENCOUNTER — HOSPITAL ENCOUNTER (OUTPATIENT)
Dept: CARDIOLOGY | Facility: OTHER | Age: 71
Discharge: HOME | End: 2021-01-21
Attending: INTERNAL MEDICINE | Admitting: FAMILY MEDICINE
Payer: MEDICARE

## 2021-01-21 VITALS — DIASTOLIC BLOOD PRESSURE: 90 MMHG | SYSTOLIC BLOOD PRESSURE: 150 MMHG

## 2021-01-21 DIAGNOSIS — I35.0 AORTIC VALVE STENOSIS, ETIOLOGY OF CARDIAC VALVE DISEASE UNSPECIFIED: ICD-10-CM

## 2021-01-21 PROCEDURE — 93321 DOPPLER ECHO F-UP/LMTD STD: CPT | Mod: 26 | Performed by: STUDENT IN AN ORGANIZED HEALTH CARE EDUCATION/TRAINING PROGRAM

## 2021-01-21 PROCEDURE — 93308 TTE F-UP OR LMTD: CPT | Mod: 26 | Performed by: STUDENT IN AN ORGANIZED HEALTH CARE EDUCATION/TRAINING PROGRAM

## 2021-01-21 PROCEDURE — 93325 DOPPLER ECHO COLOR FLOW MAPG: CPT | Mod: 26 | Performed by: STUDENT IN AN ORGANIZED HEALTH CARE EDUCATION/TRAINING PROGRAM

## 2021-01-21 PROCEDURE — 93325 DOPPLER ECHO COLOR FLOW MAPG: CPT

## 2021-01-22 ENCOUNTER — CARE COORDINATION (OUTPATIENT)
Dept: CARDIOLOGY | Facility: CLINIC | Age: 71
End: 2021-01-22

## 2021-01-22 NOTE — PROGRESS NOTES
Recent echo results were reviewed with Mary Alice.  Echo shows valve area as severe aortic stenosis= 0.8 cm2; prior echo shows MATIAS= 0.35 cm2.  Mary Alice would like to wait until spring before moving forward with the TAVR work up.  New echo will be reviewed with Dr. Sarah, with his recommendations on timing, and I will follow up back up with Mary Alice.

## 2021-01-25 ENCOUNTER — CARE COORDINATION (OUTPATIENT)
Dept: CARDIOLOGY | Facility: CLINIC | Age: 71
End: 2021-01-25

## 2021-01-25 DIAGNOSIS — J44.1 COPD EXACERBATION (H): ICD-10-CM

## 2021-01-26 NOTE — NURSING NOTE
Structural Heart Coordinator visit:  Provided additional education regarding TAVR procedure, after being present for discussion with physician. Explained the work-up process and next steps for patient. Patient provided our direct contact number and instructed to call with any questions.     Completed KCCQ.   Unable to complete frailty testing, due to video visit      KCCQ Results:   1a. 5  1b. 3  1c. 1  2. 3  3. 4  4. 4  5. 4  6. 3  7. 2  8a. 3  8b. 2  8c. 1    STS Risk Score: 4.5%        Treva Patel  Structural Heart Coordinator  [facility]

## 2021-01-27 RX ORDER — PREDNISONE 20 MG/1
TABLET ORAL
Qty: 20 TABLET | Refills: 0 | Status: SHIPPED | OUTPATIENT
Start: 2021-01-27 | End: 2021-02-01

## 2021-01-27 RX ORDER — SULFAMETHOXAZOLE/TRIMETHOPRIM 800-160 MG
1 TABLET ORAL 2 TIMES DAILY
Qty: 20 TABLET | Refills: 0 | Status: SHIPPED | OUTPATIENT
Start: 2021-01-27 | End: 2021-03-09

## 2021-01-27 NOTE — TELEPHONE ENCOUNTER
Prednisone 20 mg      Last Written Prescription Date:  6/8/20  Last Fill Quantity: 20,   # refills: 0  Last Office Visit: 6/8/20  Future Office visit:       Routing refill request to provider for review/approval because:      Bactrim -160 mg    Last Written Prescription Date:  6/8/20  Last Fill Quantity: 20,   # refills: 0  Last Office Visit: 6/8/20  Future Office visit:       Routing refill request to provider for review/approval because:

## 2021-02-01 ENCOUNTER — OFFICE VISIT (OUTPATIENT)
Dept: FAMILY MEDICINE | Facility: OTHER | Age: 71
End: 2021-02-01
Attending: FAMILY MEDICINE
Payer: MEDICARE

## 2021-02-01 ENCOUNTER — ANCILLARY PROCEDURE (OUTPATIENT)
Dept: GENERAL RADIOLOGY | Facility: OTHER | Age: 71
End: 2021-02-01
Attending: FAMILY MEDICINE
Payer: MEDICARE

## 2021-02-01 VITALS
OXYGEN SATURATION: 94 % | DIASTOLIC BLOOD PRESSURE: 66 MMHG | TEMPERATURE: 99.8 F | SYSTOLIC BLOOD PRESSURE: 134 MMHG | HEART RATE: 74 BPM

## 2021-02-01 DIAGNOSIS — J22 LRTI (LOWER RESPIRATORY TRACT INFECTION): ICD-10-CM

## 2021-02-01 DIAGNOSIS — J44.1 COPD EXACERBATION (H): ICD-10-CM

## 2021-02-01 DIAGNOSIS — J22 LRTI (LOWER RESPIRATORY TRACT INFECTION): Primary | ICD-10-CM

## 2021-02-01 LAB
BASOPHILS # BLD AUTO: 0 10E9/L (ref 0–0.2)
BASOPHILS NFR BLD AUTO: 0.2 %
DIFFERENTIAL METHOD BLD: NORMAL
EOSINOPHIL # BLD AUTO: 0 10E9/L (ref 0–0.7)
EOSINOPHIL NFR BLD AUTO: 0.7 %
ERYTHROCYTE [DISTWIDTH] IN BLOOD BY AUTOMATED COUNT: 12.8 % (ref 10–15)
HCT VFR BLD AUTO: 40.2 % (ref 35–47)
HGB BLD-MCNC: 13.3 G/DL (ref 11.7–15.7)
LYMPHOCYTES # BLD AUTO: 1 10E9/L (ref 0.8–5.3)
LYMPHOCYTES NFR BLD AUTO: 19 %
MCH RBC QN AUTO: 32.8 PG (ref 26.5–33)
MCHC RBC AUTO-ENTMCNC: 33.1 G/DL (ref 31.5–36.5)
MCV RBC AUTO: 99 FL (ref 78–100)
MONOCYTES # BLD AUTO: 0.7 10E9/L (ref 0–1.3)
MONOCYTES NFR BLD AUTO: 12.5 %
NEUTROPHILS # BLD AUTO: 3.7 10E9/L (ref 1.6–8.3)
NEUTROPHILS NFR BLD AUTO: 67.6 %
PLATELET # BLD AUTO: 156 10E9/L (ref 150–450)
RBC # BLD AUTO: 4.05 10E12/L (ref 3.8–5.2)
WBC # BLD AUTO: 5.5 10E9/L (ref 4–11)

## 2021-02-01 PROCEDURE — 71046 X-RAY EXAM CHEST 2 VIEWS: CPT | Mod: TC,FY

## 2021-02-01 PROCEDURE — G0463 HOSPITAL OUTPT CLINIC VISIT: HCPCS | Mod: 25

## 2021-02-01 PROCEDURE — 87635 SARS-COV-2 COVID-19 AMP PRB: CPT | Mod: ZL | Performed by: FAMILY MEDICINE

## 2021-02-01 PROCEDURE — 85025 COMPLETE CBC W/AUTO DIFF WBC: CPT | Mod: ZL | Performed by: FAMILY MEDICINE

## 2021-02-01 PROCEDURE — G0463 HOSPITAL OUTPT CLINIC VISIT: HCPCS

## 2021-02-01 PROCEDURE — 36415 COLL VENOUS BLD VENIPUNCTURE: CPT | Mod: ZL | Performed by: FAMILY MEDICINE

## 2021-02-01 PROCEDURE — 99214 OFFICE O/P EST MOD 30 MIN: CPT | Performed by: FAMILY MEDICINE

## 2021-02-01 RX ORDER — METHYLPREDNISOLONE SODIUM SUCCINATE 40 MG/ML
80 INJECTION, POWDER, LYOPHILIZED, FOR SOLUTION INTRAMUSCULAR; INTRAVENOUS ONCE
Status: COMPLETED | OUTPATIENT
Start: 2021-02-01 | End: 2021-02-01

## 2021-02-01 RX ORDER — PREDNISONE 20 MG/1
TABLET ORAL
Qty: 20 TABLET | Refills: 0 | Status: SHIPPED | OUTPATIENT
Start: 2021-02-01 | End: 2021-03-09

## 2021-02-01 RX ORDER — AZITHROMYCIN 250 MG/1
TABLET, FILM COATED ORAL
Qty: 6 TABLET | Refills: 0 | Status: SHIPPED | OUTPATIENT
Start: 2021-02-01 | End: 2021-02-06

## 2021-02-01 RX ADMIN — METHYLPREDNISOLONE SODIUM SUCCINATE 80 MG: 40 INJECTION, POWDER, FOR SOLUTION INTRAMUSCULAR; INTRAVENOUS at 15:58

## 2021-02-01 ASSESSMENT — PAIN SCALES - GENERAL: PAINLEVEL: NO PAIN (0)

## 2021-02-01 NOTE — PROGRESS NOTES
Assessment & Plan     LRTI (lower respiratory tract infection)  With purulent sputum and copd flare.  Normal cxr and wbc.  Assume bronchitis flare.  Went with azithro and the steroid burst and taper.    - Symptomatic COVID-19 Virus (Coronavirus) by PCR; Future  - CBC with platelets and differential  - XR CHEST 2 VW (Clinic Performed); Future  - Symptomatic COVID-19 Virus (Coronavirus) by PCR  - azithromycin (ZITHROMAX) 250 MG tablet; Take 2 tablets (500 mg) by mouth daily for 1 day, THEN 1 tablet (250 mg) daily for 4 days.  - methylPREDNISolone sodium succinate (solu-MEDROL) injection 80 mg    COPD exacerbation (H)  As above.  Steroid, abx, and close f/u with ongoing concerns.    - predniSONE (DELTASONE) 20 MG tablet; TAKE 3 TABLETS BY MOUTH X 3 DAYS, 2 TABLETS X 3 DAYS, 1 TABLET X 3 DAYS, AND 0.5 TABLETS X 3 DAYS  - azithromycin (ZITHROMAX) 250 MG tablet; Take 2 tablets (500 mg) by mouth daily for 1 day, THEN 1 tablet (250 mg) daily for 4 days.  - methylPREDNISolone sodium succinate (solu-MEDROL) injection 80 mg                             No follow-ups on file.    Braydon Yen MD  [facility] 70 [facility] who presents to clinic today for the following health issues     HPI       RESPIRATORY SYMPTOMS      Duration: 9 days    Description  cough, fever, chills and fatigue/malaise. Shortness of breath , head aches.      Severity: moderate    Accompanying signs and symptoms: None    History (predisposing factors):  none    Precipitating or alleviating factors: None    Therapies tried and outcome:  Nebs 4 times a day, a lot of rest            Review of Systems   Constitutional, HEENT, cardiovascular, pulmonary, gi and gu systems are negative, except as otherwise noted.      Objective    /66   Pulse 74   Temp 99.8  F (37.7  C)   SpO2 94%   There is no height or weight on file to calculate BMI.  Physical Exam   GENERAL: healthy, alert and no  distress  EYES: Eyes grossly normal to inspection, PERRL and conjunctivae and sclerae normal  HENT: ear canals and TM's normal, nose and mouth without ulcers or lesions  NECK: no adenopathy, no asymmetry, masses, or scars and thyroid normal to palpation  RESP: lungs clear to auscultation - no rales, rhonchi or wheezes  CV: regular rate and rhythm, normal S1 S2, no S3 or S4, no murmur, click or rub, no peripheral edema and peripheral pulses strong  ABDOMEN: soft, nontender, no hepatosplenomegaly, no masses and bowel sounds normal  MS: no gross musculoskeletal defects noted, no edema    cxr no infiltrate.  I read it and reviewed it with her.  Cbc normal.  Oxygen low at onset, came up to 94 without intervention.

## 2021-02-01 NOTE — PROGRESS NOTES
Clinic Administered Medication Documentation      Injectable Medication Documentation    Patient was given  Solu-medrol 40mg X2 vials for 80mg dose. Prior to medication administration, verified patients identity using patient s name and date of birth. Please see MAR and medication order for additional information. Patient instructed to remain in clinic for 15 minutes.      Was entire vial of medication used? Yes  Vial/Syringe: Single dose vial  Expiration Date:  02/2021  Was this medication supplied by the patient? No     JARED Irene

## 2021-02-02 LAB
SARS-COV-2 RNA RESP QL NAA+PROBE: NORMAL
SPECIMEN SOURCE: NORMAL

## 2021-02-03 ENCOUNTER — TELEPHONE (OUTPATIENT)
Dept: FAMILY MEDICINE | Facility: OTHER | Age: 71
End: 2021-02-03

## 2021-02-03 DIAGNOSIS — Z11.59 ENCOUNTER FOR SCREENING FOR OTHER VIRAL DISEASES: ICD-10-CM

## 2021-02-03 LAB
LABORATORY COMMENT REPORT: ABNORMAL
SARS-COV-2 RNA RESP QL NAA+PROBE: POSITIVE
SPECIMEN SOURCE: ABNORMAL

## 2021-02-03 NOTE — TELEPHONE ENCOUNTER
"Coronavirus (COVID-19) Notification    Caller Name (Patient, parent, daughter/son, grandparent, etc)  Patient    Reason for call  Notify of Positive Coronavirus (COVID-19) lab results, assess symptoms,  review  [employer] [facility] recommendations    Lab Result    Lab test:  2019-nCoV rRt-PCR or SARS-CoV-2 PCR    Oropharyngeal AND/OR nasopharyngeal swabs is POSITIVE for 2019-nCoV RNA/SARS-COV-2 PCR (COVID-19 virus)    RN Recommendations/Instructions per [facility] Coronavirus COVID-19 recommendations    Brief introduction script  Introduce self then review script:  \"I am calling on behalf of [facility].  We were notified that your Coronavirus test (COVID-19) for was POSITIVE for the virus.  I have some information to relay to you but first I wanted to mention that the [place] Dept of Health will be contacting you shortly [it's possible MD already called Patient] to talk to you more about how you are feeling and other people you have had contact with who might now also have the virus.  Also,  [employer] is Partnering with the [facility] for Covid-19 research, you may be contacted directly by research staff.\"    Assessment (Inquire about Patient's current symptoms)   Assessment   Current Symptoms at time of phone call: (if no symptoms, document No symptoms] Shortness of breath, fatigue   Symptoms onset (if applicable) 1/21/21     If at time of call, Patients symptoms hare worsened, the Patient should contact 911 or have someone drive them to Emergency Dept promptly:      If Patient calling 911, inform 911 personal that you have tested positive for the Coronavirus (COVID-19).  Place mask on and await 911 to arrive.    If Emergency Dept, If possible, please have another adult drive you to the Emergency Dept but you need to wear mask when in contact with other people.      Monoclonal Antibody Administration    You may be eligible to receive a new treatment with a monoclonal antibody for preventing " "hospitalization in patients at high risk for complications from COVID-19.   This medication is still experimental and available on a limited basis; it is given through an IV and must be given at an infusion center. Please note that not all people who are eligible will receive the medication since it is in limited supply.     Are you interested in being considered for this medication?  Yes Is the patient 18 years of age or older? Yes.  Does the patient use supplemental oxygen? Yes. Patient does not qualify.  Does the patient fit the criteria: Yes: COVID-19 Monoclonal Antibody Referral completed.  Diagnosis code: COVID-19  U07.1    If patient qualifies based on above criteria:  \"We will contact you if you are selected to receive the medication in the next 1-2 days.   This is time sensitive and if you are not selected in the next 1-2 days, you will not receive the medication.  If you do not receive a call to schedule, you have not been selected.\"    Review information with Patient    Your result was positive. This means you have COVID-19 (coronavirus).  We have sent you a letter that reviews the information that I'll be reviewing with you now.    How can I protect others?    If you have symptoms: stay home and away from others (self-isolate) until:    You've had no fever--and no medicine that reduces fever--for 1 full day (24 hours). And       Your other symptoms have gotten better. For example, your cough or breathing has improved. And     At least 10 days have passed since your symptoms started. (If you've been told by a doctor that you have a weak immune system, wait 20 days.)     If you don't have symptoms: Stay home and away from others (self-isolate) until at least 10 days have passed since your first positive COVID-19 test. (Date test collected)    During this time:    Stay in your own room, including for meals. Use your own bathroom if you can.    Stay away from others in your home. No hugging, kissing or " shaking hands. No visitors.     Don't go to work, school or anywhere else.     Clean  high touch  surfaces often (doorknobs, counters, handles, etc.). Use a household cleaning spray or wipes. You'll find a full list on the EPA website at www.epa.gov/pesticide-registration/list-n-disinfectants-use-against-sars-cov-2.     Cover your mouth and nose with a mask, tissue or other face covering to avoid spreading germs.    Wash your hands and face often with soap and water.    Caregivers in these groups are at risk for severe illness due to COVID-19:  o People 65 years and older  o People who live in a nursing home or long-term care facility  o People with chronic disease (lung, heart, cancer, diabetes, kidney, liver, immunologic)  o People who have a weakened immune system, including those who:  - Are in cancer treatment  - Take medicine that weakens the immune system, such as corticosteroids  - Had a bone marrow or organ transplant  - Have an immune deficiency  - Have poorly controlled HIV or AIDS  - Are obese (body mass index of 40 or higher)  - Smoke regularly    Caregivers should wear gloves while washing dishes, handling laundry and cleaning bedrooms and bathrooms.    Wash and dry laundry with special caution. Don't shake dirty laundry, and use the warmest water setting you can.    If you have a weakened immune system, ask your doctor about other actions you should take.    For more tips, go to www.cdc.gov/coronavirus/2019-ncov/downloads/10Things.pdf.    You should not go back to work until you meet the guidelines above for ending your home isolation. You don't need to be retested for COVID-19 before going back to work--studies show that you won't spread the virus if it's been at least 10 days since your symptoms started (or 20 days, if you have a weak immune system).    Employers: This document serves as formal notice of your employee's medical guidelines for going back to work. They must meet the above guidelines  before going back to work in person.    How can I take care of myself?    1. Get lots of rest. Drink extra fluids (unless a doctor has told you not to).    2. Take Tylenol (acetaminophen) for fever or pain. If you have liver or kidney problems, ask your family doctor if it's okay to take Tylenol.     Take either:     650 mg (two 325 mg pills) every 4 to 6 hours, or     1,000 mg (two 500 mg pills) every 8 hours as needed.     Note: Don't take more than 3,000 mg in one day. Acetaminophen is found in many medicines (both prescribed and over-the-counter medicines). Read all labels to be sure you don't take too much.    For children, check the Tylenol bottle for the right dose (based on their age or weight).    3. If you have other health problems (like cancer, heart failure, an organ transplant or severe kidney disease): Call your specialty clinic if you don't feel better in the next 2 days.    4. Know when to call 911: Emergency warning signs include:    Trouble breathing or shortness of breath    Pain or pressure in the chest that doesn't go away    Feeling confused like you haven't felt before, or not being able to wake up    Bluish-colored lips or face    5. Sign up for [employer]. We know it's scary to hear that you have COVID-19. We want to track your symptoms to make sure you're okay over the next 2 weeks. Please look for an email from [employer]--this is a free, online program that we'll use to keep in touch. To sign up, follow the link in the email. Learn more at [email or web address].    Where can I get more information?    [facility]: [email or web address]    Coronavirus Basics: www.health.[facility].[place]/diseases/coronavirus/basics.html    What to Do If You're Sick: www.cdc.gov/coronavirus/2019-ncov/about/steps-when-sick.html    Ending Home Isolation: www.cdc.gov/coronavirus/2019-ncov/hcp/disposition-in-home-patients.html     Caring for Someone with COVID-19:  www.cdc.gov/coronavirus/2019-ncov/if-you-are-sick/care-for-someone.html     [facility] clinical trials (COVID-19 research studies): clinicalaffairs.[facility]-clinical-trials     A Positive COVID-19 letter will be sent via [employer] or the mail. (Exception, no letters sent to Presurgerical/Preprocedure Patients)    Rajni Lynn LPN

## 2021-02-04 DIAGNOSIS — U07.1 INFECTION DUE TO 2019 NOVEL CORONAVIRUS: ICD-10-CM

## 2021-02-04 RX ORDER — HEPARIN SODIUM (PORCINE) LOCK FLUSH IV SOLN 100 UNIT/ML 100 UNIT/ML
5 SOLUTION INTRAVENOUS
Status: CANCELLED | OUTPATIENT
Start: 2021-02-04

## 2021-02-04 RX ORDER — HEPARIN SODIUM,PORCINE 10 UNIT/ML
5 VIAL (ML) INTRAVENOUS
Status: CANCELLED | OUTPATIENT
Start: 2021-02-04

## 2021-02-05 ENCOUNTER — TELEPHONE (OUTPATIENT)
Dept: INFUSION THERAPY | Facility: OTHER | Age: 71
End: 2021-02-05

## 2021-02-05 NOTE — TELEPHONE ENCOUNTER
Received referral for bamlanivimab, however with patient charted symptom onset date of 1-21-21, she is far outside her 10 day window for receiving infusion. Note was routed back to referral pool updating that patient can not/will not be called at this time to schedule infusion, due to this.

## 2021-02-11 DIAGNOSIS — I10 ESSENTIAL HYPERTENSION: ICD-10-CM

## 2021-02-11 NOTE — TELEPHONE ENCOUNTER
losartan      Last Written Prescription Date:  11/4/20  Last Fill Quantity: 180,   # refills: 0  Last Office Visit: 21/21  Future Office visit:

## 2021-02-12 RX ORDER — LOSARTAN POTASSIUM 50 MG/1
50 TABLET ORAL 2 TIMES DAILY
Qty: 180 TABLET | Refills: 1 | Status: SHIPPED | OUTPATIENT
Start: 2021-02-12 | End: 2021-08-09

## 2021-02-19 ENCOUNTER — MYC MEDICAL ADVICE (OUTPATIENT)
Dept: FAMILY MEDICINE | Facility: OTHER | Age: 71
End: 2021-02-19

## 2021-02-19 NOTE — LETTER
February 22, 2021      Mary Alice Cameron  [address] [place] [address]   [facility] [id number]        To Whom It May Concern:    Mary Alice Cameron was seen in our clinic. She may return to work without restrictions on March 1, 2021.      Sincerely,        Braydon Yen MD

## 2021-02-19 NOTE — LETTER
February 19, 2021      Mary Alice Cameron  [address] [place] [address]   [facility] [id number]        To Whom It May Concern,        Mary Alice is able to return to work 3/1/2021 .           Sincerely,        Braydon Yen MD

## 2021-03-08 ENCOUNTER — CARE COORDINATION (OUTPATIENT)
Dept: CARDIOLOGY | Facility: CLINIC | Age: 71
End: 2021-03-08

## 2021-03-08 NOTE — PROGRESS NOTES
Follow up telephone call made to Mary Alice, regarding when to move forward with imaging related to TAVR.  In reviewing chart, it was noted that beginning of February she suffered from COVID. She states that she is feeling much better, but has a lingering sinus infection that she is now taking medicine for.  She stated that she is anxious to move forward with the TAVR procedure, but is hoping to feel better in a couple of weeks.  Mary Alice requested a telephone call the beginning of April to discuss the process of moving forward with TAVR.    Will continue to follow up.

## 2021-03-09 ENCOUNTER — OFFICE VISIT (OUTPATIENT)
Dept: FAMILY MEDICINE | Facility: OTHER | Age: 71
End: 2021-03-09
Attending: FAMILY MEDICINE
Payer: COMMERCIAL

## 2021-03-09 ENCOUNTER — NURSE TRIAGE (OUTPATIENT)
Dept: FAMILY MEDICINE | Facility: OTHER | Age: 71
End: 2021-03-09

## 2021-03-09 VITALS
BODY MASS INDEX: 25.75 KG/M2 | OXYGEN SATURATION: 96 % | WEIGHT: 150 LBS | DIASTOLIC BLOOD PRESSURE: 64 MMHG | SYSTOLIC BLOOD PRESSURE: 120 MMHG | HEART RATE: 78 BPM | TEMPERATURE: 98.9 F

## 2021-03-09 DIAGNOSIS — J01.40 ACUTE PANSINUSITIS, RECURRENCE NOT SPECIFIED: Primary | ICD-10-CM

## 2021-03-09 PROCEDURE — G0463 HOSPITAL OUTPT CLINIC VISIT: HCPCS

## 2021-03-09 PROCEDURE — 99213 OFFICE O/P EST LOW 20 MIN: CPT | Performed by: FAMILY MEDICINE

## 2021-03-09 ASSESSMENT — PAIN SCALES - GENERAL: PAINLEVEL: MODERATE PAIN (5)

## 2021-03-09 NOTE — NURSING NOTE
"Chief Complaint   Patient presents with     Sinus Problem       Initial /64   Pulse 78   Temp 98.9  F (37.2  C)   Wt 68 kg (150 lb)   SpO2 96%   BMI 25.75 kg/m   Estimated body mass index is 25.75 kg/m  as calculated from the following:    Height as of 6/23/20: 1.626 m (5' 4\").    Weight as of this encounter: 68 kg (150 lb).  Medication Reconciliation: complete  Joy Franklin LPN  "

## 2021-03-09 NOTE — PROGRESS NOTES
Assessment & Plan     Acute pansinusitis, recurrence not specified  Reviewed.  Facial pain settling into the maxillary sinuses.  Cover with augmentin with her hx.  F/u with ongoing concerns.    - amoxicillin-clavulanate (AUGMENTIN) 875-125 MG tablet; Take 1 tablet by mouth 2 times daily for 10 days                 No follow-ups on file.    Braydon Yen MD  [facility]    Jorge Wheat is a 70 year old who presents for the following health issues     HPI       Sinus problem       Duration: Sunday    Description (location/character/radiation): sinus    Intensity:  moderate    Accompanying signs and symptoms: facial pain/pressure, SOB, headache, chills    History (similar episodes/previous evaluation): None    Precipitating or alleviating factors: None    Therapies tried and outcome: tylenol, benadryl, heat, cold             Review of Systems   Constitutional, HEENT, cardiovascular, pulmonary, gi and gu systems are negative, except as otherwise noted.      Objective    /64   Pulse 78   Temp 98.9  F (37.2  C)   Wt 68 kg (150 lb)   SpO2 96%   BMI 25.75 kg/m    Body mass index is 25.75 kg/m .  Physical Exam   GENERAL: healthy, alert and no distress  EYES: Eyes grossly normal to inspection, PERRL and conjunctivae and sclerae normal  HENT: ear canals and TM's normal, nose and mouth without ulcers or lesions  NECK: no adenopathy, no asymmetry, masses, or scars and thyroid normal to palpation  RESP: lungs clear to auscultation - no rales, rhonchi or wheezes.  Decreased breath sounds throughout.    CV: regular rate and rhythm, normal S1 S2, no S3 or S4, no murmur, click or rub, no peripheral edema and peripheral pulses strong  ABDOMEN: soft, nontender, no hepatosplenomegaly, no masses and bowel sounds normal  MS: no gross musculoskeletal defects noted, no edema

## 2021-03-09 NOTE — TELEPHONE ENCOUNTER
Pt tested positive for COVID on 2/1/21, pt stated she recovered. Pt reports the following sx started on 3/7/21 T 99.1, chills, HA. Pt requests to be seen. Routing overbook request to NA pool. Pt reports she has a 1:30 appt in [place] today would like to see PCP today.   Ph: [phone]

## 2021-03-12 PROBLEM — I35.9 AORTIC VALVE DISORDER: Status: ACTIVE | Noted: 2021-03-12

## 2021-03-12 PROBLEM — I05.9 MITRAL VALVE DISORDER: Status: ACTIVE | Noted: 2021-03-12

## 2021-03-12 PROBLEM — I51.7 CARDIOMEGALY: Status: ACTIVE | Noted: 2021-03-12

## 2021-03-25 ENCOUNTER — MYC MEDICAL ADVICE (OUTPATIENT)
Dept: FAMILY MEDICINE | Facility: OTHER | Age: 71
End: 2021-03-25

## 2021-03-25 DIAGNOSIS — J44.1 COPD EXACERBATION (H): ICD-10-CM

## 2021-03-25 DIAGNOSIS — E89.0 HYPOTHYROIDISM, POSTABLATIVE: Primary | ICD-10-CM

## 2021-03-25 RX ORDER — FLUTICASONE PROPIONATE AND SALMETEROL XINAFOATE 230; 21 UG/1; UG/1
2 AEROSOL, METERED RESPIRATORY (INHALATION) 2 TIMES DAILY
Qty: 8 G | Refills: 3 | Status: SHIPPED | OUTPATIENT
Start: 2021-03-25 | End: 2021-07-23

## 2021-03-26 DIAGNOSIS — E89.0 HYPOTHYROIDISM, POSTABLATIVE: ICD-10-CM

## 2021-03-26 LAB — TSH SERPL DL<=0.005 MIU/L-ACNC: 0.4 MU/L (ref 0.4–4)

## 2021-03-26 PROCEDURE — 36415 COLL VENOUS BLD VENIPUNCTURE: CPT | Mod: ZL | Performed by: FAMILY MEDICINE

## 2021-03-26 PROCEDURE — 84443 ASSAY THYROID STIM HORMONE: CPT | Mod: ZL | Performed by: FAMILY MEDICINE

## 2021-03-26 RX ORDER — LEVOTHYROXINE SODIUM 100 UG/1
100 TABLET ORAL DAILY
Qty: 90 TABLET | Refills: 3 | Status: SHIPPED | OUTPATIENT
Start: 2021-03-26 | End: 2022-04-04

## 2021-04-04 ENCOUNTER — MYC MEDICAL ADVICE (OUTPATIENT)
Dept: FAMILY MEDICINE | Facility: OTHER | Age: 71
End: 2021-04-04

## 2021-04-04 DIAGNOSIS — L29.9 ITCHING: Primary | ICD-10-CM

## 2021-04-05 DIAGNOSIS — I35.0 AORTIC VALVE STENOSIS, ETIOLOGY OF CARDIAC VALVE DISEASE UNSPECIFIED: Primary | ICD-10-CM

## 2021-04-05 NOTE — PROGRESS NOTES
Date: 4/5/2021    Time of Call: 2:23 PM     Diagnosis:  Severe aortic stenosis     [ TORB ] Ordering provider: Domo Sarah MD  Order: CT TAVR     Order received by: Vanessa Moreira RN     Follow-up/additional notes:

## 2021-04-07 RX ORDER — HYDROXYZINE HYDROCHLORIDE 25 MG/1
25 TABLET, FILM COATED ORAL 3 TIMES DAILY PRN
Qty: 60 TABLET | Refills: 0 | Status: SHIPPED | OUTPATIENT
Start: 2021-04-07 | End: 2022-03-28

## 2021-04-07 NOTE — TELEPHONE ENCOUNTER
Let's get a message to her cardiology team to try and get the ball rolling.  Try atarax 25 tid prn itch.  Warn about sedation.  Ask her to keep trying to find something new she is exposed to, med, food, etc.  Thanks.  Braydon Yen MD

## 2021-04-08 ENCOUNTER — TELEPHONE (OUTPATIENT)
Dept: FAMILY MEDICINE | Facility: OTHER | Age: 71
End: 2021-04-08

## 2021-04-08 NOTE — TELEPHONE ENCOUNTER
Received a PA from [employer] for Hydroxyzine HCI 25mg tablets. Submitted on [facility]. Received an instant APPROVAL. Effective 03/09/2021 to 04/08/2022. Forms scanned to Epic.

## 2021-04-11 DIAGNOSIS — I10 ESSENTIAL HYPERTENSION, BENIGN: ICD-10-CM

## 2021-04-11 NOTE — TELEPHONE ENCOUNTER
Lasix       Last Written Prescription Date:  1/12/2021  Last Fill Quantity: 180,   # refills: 0  Last Office Visit: 3/9/2021  Future Office visit:

## 2021-04-12 RX ORDER — FUROSEMIDE 40 MG
TABLET ORAL
Qty: 180 TABLET | Refills: 0 | Status: SHIPPED | OUTPATIENT
Start: 2021-04-12 | End: 2021-08-30

## 2021-04-15 ENCOUNTER — CARE COORDINATION (OUTPATIENT)
Dept: CARDIOLOGY | Facility: CLINIC | Age: 71
End: 2021-04-15

## 2021-04-15 DIAGNOSIS — I25.10 CORONARY ARTERY DISEASE INVOLVING NATIVE CORONARY ARTERY OF NATIVE HEART WITHOUT ANGINA PECTORIS: Primary | ICD-10-CM

## 2021-04-15 DIAGNOSIS — I51.89 OTHER ILL-DEFINED HEART DISEASES: ICD-10-CM

## 2021-04-15 DIAGNOSIS — I35.0 AORTIC VALVE STENOSIS, ETIOLOGY OF CARDIAC VALVE DISEASE UNSPECIFIED: ICD-10-CM

## 2021-04-15 NOTE — PROGRESS NOTES
Date: 4/15/2021    Time of Call: 2:20 PM     Diagnosis:  Severe aortic stenosis     [ TORB ] Ordering provider: Domo Sarah MD  Order:   CASE REQUEST:  [facility]/C/C     Order received by: Treva Patel RN     Follow-up/additional notes:   Orders entered for upcoming imaging for TAVR.

## 2021-04-16 ENCOUNTER — CARE COORDINATION (OUTPATIENT)
Dept: CARDIOLOGY | Facility: CLINIC | Age: 71
End: 2021-04-16

## 2021-04-16 DIAGNOSIS — I51.89 OTHER ILL-DEFINED HEART DISEASES: ICD-10-CM

## 2021-04-16 DIAGNOSIS — I35.0 AORTIC VALVE STENOSIS, ETIOLOGY OF CARDIAC VALVE DISEASE UNSPECIFIED: ICD-10-CM

## 2021-04-16 DIAGNOSIS — I25.10 CORONARY ARTERY DISEASE INVOLVING NATIVE CORONARY ARTERY OF NATIVE HEART WITHOUT ANGINA PECTORIS: Primary | ICD-10-CM

## 2021-04-16 RX ORDER — LIDOCAINE 40 MG/G
CREAM TOPICAL
Status: CANCELLED | OUTPATIENT
Start: 2021-04-16

## 2021-04-16 RX ORDER — POTASSIUM CHLORIDE 1500 MG/1
20 TABLET, EXTENDED RELEASE ORAL
Status: CANCELLED | OUTPATIENT
Start: 2021-04-16

## 2021-04-16 RX ORDER — SODIUM CHLORIDE 9 MG/ML
INJECTION, SOLUTION INTRAVENOUS CONTINUOUS
Status: CANCELLED | OUTPATIENT
Start: 2021-04-16

## 2021-04-16 RX ORDER — POTASSIUM CHLORIDE 1500 MG/1
40 TABLET, EXTENDED RELEASE ORAL
Status: CANCELLED | OUTPATIENT
Start: 2021-04-16

## 2021-04-16 NOTE — PROGRESS NOTES
Date: 4/16/2021    Time of Call: 11:57 AM     Diagnosis:  Severe aortic stenosis     [ TORB ] Ordering provider: Domo Sarah MD  Order:   Pre-procedure coronary angiogram orders     Order received by: Treva Patel RN     Follow-up/additional notes:   Orders entered for angiogram/rhc/lhc scheduled on 4/30/2021.

## 2021-04-18 ENCOUNTER — HEALTH MAINTENANCE LETTER (OUTPATIENT)
Age: 71
End: 2021-04-18

## 2021-04-19 DIAGNOSIS — Z11.59 ENCOUNTER FOR SCREENING FOR OTHER VIRAL DISEASES: ICD-10-CM

## 2021-04-29 ENCOUNTER — TELEPHONE (OUTPATIENT)
Dept: CARDIOLOGY | Facility: CLINIC | Age: 71
End: 2021-04-29

## 2021-04-29 NOTE — TELEPHONE ENCOUNTER
ADDENDUM: Patient called back and verbalizes understanding of all pre-procedure instructions. Patient has not taken Eliquis since 4/28/2021, and will continue to hold it until her procedure.  Travel screen negative for any symptoms, exposure, or travel related to Covid.  Patient is updated on visitor policy.    Left voicemail for patient to complete Travel Screen for Cardiac Cath Lab appointment on 4/30/2021 and inform patient of updated Visitor Policy.

## 2021-04-30 ENCOUNTER — HOSPITAL ENCOUNTER (OUTPATIENT)
Dept: ULTRASOUND IMAGING | Facility: CLINIC | Age: 71
End: 2021-04-30
Attending: INTERNAL MEDICINE
Payer: MEDICARE

## 2021-04-30 ENCOUNTER — APPOINTMENT (OUTPATIENT)
Dept: LAB | Facility: CLINIC | Age: 71
End: 2021-04-30
Attending: INTERNAL MEDICINE
Payer: MEDICARE

## 2021-04-30 ENCOUNTER — HOSPITAL ENCOUNTER (OUTPATIENT)
Facility: CLINIC | Age: 71
Discharge: HOME | End: 2021-04-30
Attending: INTERNAL MEDICINE | Admitting: INTERNAL MEDICINE
Payer: MEDICARE

## 2021-04-30 ENCOUNTER — HOSPITAL ENCOUNTER (OUTPATIENT)
Dept: CT IMAGING | Facility: CLINIC | Age: 71
End: 2021-04-30
Attending: INTERNAL MEDICINE
Payer: MEDICARE

## 2021-04-30 ENCOUNTER — APPOINTMENT (OUTPATIENT)
Dept: MEDSURG UNIT | Facility: CLINIC | Age: 71
End: 2021-04-30
Attending: INTERNAL MEDICINE
Payer: MEDICARE

## 2021-04-30 VITALS
OXYGEN SATURATION: 94 % | WEIGHT: 150 LBS | BODY MASS INDEX: 24.11 KG/M2 | DIASTOLIC BLOOD PRESSURE: 72 MMHG | TEMPERATURE: 98.3 F | SYSTOLIC BLOOD PRESSURE: 140 MMHG | HEART RATE: 85 BPM | RESPIRATION RATE: 18 BRPM | HEIGHT: 66 IN

## 2021-04-30 DIAGNOSIS — I51.89 OTHER ILL-DEFINED HEART DISEASES: ICD-10-CM

## 2021-04-30 DIAGNOSIS — I25.10 CORONARY ARTERY DISEASE INVOLVING NATIVE CORONARY ARTERY OF NATIVE HEART WITHOUT ANGINA PECTORIS: ICD-10-CM

## 2021-04-30 DIAGNOSIS — R09.89 CAROTID BRUIT, UNSPECIFIED LATERALITY: ICD-10-CM

## 2021-04-30 DIAGNOSIS — I35.0 AORTIC VALVE STENOSIS, ETIOLOGY OF CARDIAC VALVE DISEASE UNSPECIFIED: ICD-10-CM

## 2021-04-30 PROBLEM — Z98.890 STATUS POST CORONARY ANGIOGRAM: Status: ACTIVE | Noted: 2018-06-19

## 2021-04-30 LAB
ANION GAP SERPL CALCULATED.3IONS-SCNC: 6 MMOL/L (ref 3–14)
BUN SERPL-MCNC: 12 MG/DL (ref 7–30)
CALCIUM SERPL-MCNC: 9.7 MG/DL (ref 8.5–10.1)
CHLORIDE SERPL-SCNC: 106 MMOL/L (ref 94–109)
CO2 SERPL-SCNC: 27 MMOL/L (ref 20–32)
CREAT SERPL-MCNC: 0.92 MG/DL (ref 0.52–1.04)
ERYTHROCYTE [DISTWIDTH] IN BLOOD BY AUTOMATED COUNT: 12.5 % (ref 10–15)
GFR SERPL CREATININE-BSD FRML MDRD: 62 ML/MIN/{1.73_M2}
GLUCOSE SERPL-MCNC: 102 MG/DL (ref 70–99)
HCT VFR BLD AUTO: 41.9 % (ref 35–47)
HGB BLD-MCNC: 12.7 G/DL (ref 11.7–15.7)
HGB BLD-MCNC: 12.7 G/DL (ref 11.7–15.7)
HGB BLD-MCNC: 13.8 G/DL (ref 11.7–15.7)
INR PPP: 1.08 (ref 0.86–1.14)
INTERPRETATION ECG - MUSE: NORMAL
MCH RBC QN AUTO: 34 PG (ref 26.5–33)
MCHC RBC AUTO-ENTMCNC: 32.9 G/DL (ref 31.5–36.5)
MCV RBC AUTO: 103 FL (ref 78–100)
OXYHGB MFR BLD: 60 % (ref 92–100)
OXYHGB MFR BLD: 90 % (ref 92–100)
PLATELET # BLD AUTO: 230 10E9/L (ref 150–450)
POTASSIUM SERPL-SCNC: 4.5 MMOL/L (ref 3.4–5.3)
RBC # BLD AUTO: 4.06 10E12/L (ref 3.8–5.2)
SODIUM SERPL-SCNC: 140 MMOL/L (ref 133–144)
WBC # BLD AUTO: 10.3 10E9/L (ref 4–11)

## 2021-04-30 PROCEDURE — 71275 CT ANGIOGRAPHY CHEST: CPT

## 2021-04-30 PROCEDURE — 71275 CT ANGIOGRAPHY CHEST: CPT | Mod: 26 | Performed by: STUDENT IN AN ORGANIZED HEALTH CARE EDUCATION/TRAINING PROGRAM

## 2021-04-30 PROCEDURE — 85018 HEMOGLOBIN: CPT

## 2021-04-30 PROCEDURE — C1894 INTRO/SHEATH, NON-LASER: HCPCS | Performed by: INTERNAL MEDICINE

## 2021-04-30 PROCEDURE — 82810 BLOOD GASES O2 SAT ONLY: CPT

## 2021-04-30 PROCEDURE — 93456 R HRT CORONARY ARTERY ANGIO: CPT | Performed by: INTERNAL MEDICINE

## 2021-04-30 PROCEDURE — 250N000011 HC RX IP 250 OP 636: Performed by: INTERNAL MEDICINE

## 2021-04-30 PROCEDURE — 93880 EXTRACRANIAL BILAT STUDY: CPT | Mod: 26 | Performed by: RADIOLOGY

## 2021-04-30 PROCEDURE — 80048 BASIC METABOLIC PNL TOTAL CA: CPT | Performed by: INTERNAL MEDICINE

## 2021-04-30 PROCEDURE — 93880 EXTRACRANIAL BILAT STUDY: CPT

## 2021-04-30 PROCEDURE — 85610 PROTHROMBIN TIME: CPT | Performed by: INTERNAL MEDICINE

## 2021-04-30 PROCEDURE — 74174 CTA ABD&PLVS W/CONTRAST: CPT | Mod: 26 | Performed by: STUDENT IN AN ORGANIZED HEALTH CARE EDUCATION/TRAINING PROGRAM

## 2021-04-30 PROCEDURE — 99213 OFFICE O/P EST LOW 20 MIN: CPT | Mod: 25 | Performed by: NURSE PRACTITIONER

## 2021-04-30 PROCEDURE — 258N000003 HC RX IP 258 OP 636: Performed by: INTERNAL MEDICINE

## 2021-04-30 PROCEDURE — 93010 ELECTROCARDIOGRAM REPORT: CPT | Mod: 59 | Performed by: INTERNAL MEDICINE

## 2021-04-30 PROCEDURE — 250N000013 HC RX MED GY IP 250 OP 250 PS 637: Performed by: INTERNAL MEDICINE

## 2021-04-30 PROCEDURE — 99153 MOD SED SAME PHYS/QHP EA: CPT | Performed by: INTERNAL MEDICINE

## 2021-04-30 PROCEDURE — 99152 MOD SED SAME PHYS/QHP 5/>YRS: CPT | Performed by: INTERNAL MEDICINE

## 2021-04-30 PROCEDURE — 999N000142 HC STATISTIC PROCEDURE PREP ONLY

## 2021-04-30 PROCEDURE — 272N000001 HC OR GENERAL SUPPLY STERILE: Performed by: INTERNAL MEDICINE

## 2021-04-30 PROCEDURE — 250N000011 HC RX IP 250 OP 636: Performed by: STUDENT IN AN ORGANIZED HEALTH CARE EDUCATION/TRAINING PROGRAM

## 2021-04-30 PROCEDURE — C1887 CATHETER, GUIDING: HCPCS | Performed by: INTERNAL MEDICINE

## 2021-04-30 PROCEDURE — 250N000009 HC RX 250: Performed by: INTERNAL MEDICINE

## 2021-04-30 PROCEDURE — 85027 COMPLETE CBC AUTOMATED: CPT | Performed by: INTERNAL MEDICINE

## 2021-04-30 RX ORDER — LIDOCAINE 40 MG/G
CREAM TOPICAL
Status: DISCONTINUED | OUTPATIENT
Start: 2021-04-30 | End: 2021-04-30 | Stop reason: HOSPADM

## 2021-04-30 RX ORDER — NALOXONE HYDROCHLORIDE 0.4 MG/ML
0.4 INJECTION, SOLUTION INTRAMUSCULAR; INTRAVENOUS; SUBCUTANEOUS
Status: DISCONTINUED | OUTPATIENT
Start: 2021-04-30 | End: 2021-04-30 | Stop reason: CLARIF

## 2021-04-30 RX ORDER — DOPAMINE HYDROCHLORIDE 160 MG/100ML
2-20 INJECTION, SOLUTION INTRAVENOUS CONTINUOUS PRN
Status: DISCONTINUED | OUTPATIENT
Start: 2021-04-30 | End: 2021-04-30 | Stop reason: HOSPADM

## 2021-04-30 RX ORDER — EPTIFIBATIDE 2 MG/ML
180 INJECTION, SOLUTION INTRAVENOUS EVERY 10 MIN PRN
Status: DISCONTINUED | OUTPATIENT
Start: 2021-04-30 | End: 2021-04-30 | Stop reason: HOSPADM

## 2021-04-30 RX ORDER — HEPARIN SODIUM 10000 [USP'U]/100ML
100-1000 INJECTION, SOLUTION INTRAVENOUS CONTINUOUS PRN
Status: DISCONTINUED | OUTPATIENT
Start: 2021-04-30 | End: 2021-04-30 | Stop reason: HOSPADM

## 2021-04-30 RX ORDER — NALOXONE HYDROCHLORIDE 0.4 MG/ML
0.4 INJECTION, SOLUTION INTRAMUSCULAR; INTRAVENOUS; SUBCUTANEOUS
Status: DISCONTINUED | OUTPATIENT
Start: 2021-04-30 | End: 2021-04-30 | Stop reason: HOSPADM

## 2021-04-30 RX ORDER — FLUMAZENIL 0.1 MG/ML
0.2 INJECTION, SOLUTION INTRAVENOUS
Status: DISCONTINUED | OUTPATIENT
Start: 2021-04-30 | End: 2021-04-30 | Stop reason: HOSPADM

## 2021-04-30 RX ORDER — NALOXONE HYDROCHLORIDE 0.4 MG/ML
0.2 INJECTION, SOLUTION INTRAMUSCULAR; INTRAVENOUS; SUBCUTANEOUS
Status: DISCONTINUED | OUTPATIENT
Start: 2021-04-30 | End: 2021-04-30 | Stop reason: HOSPADM

## 2021-04-30 RX ORDER — EPTIFIBATIDE 2 MG/ML
2 INJECTION, SOLUTION INTRAVENOUS CONTINUOUS PRN
Status: DISCONTINUED | OUTPATIENT
Start: 2021-04-30 | End: 2021-04-30 | Stop reason: HOSPADM

## 2021-04-30 RX ORDER — NITROGLYCERIN 5 MG/ML
VIAL (ML) INTRAVENOUS
Status: DISCONTINUED | OUTPATIENT
Start: 2021-04-30 | End: 2021-04-30 | Stop reason: HOSPADM

## 2021-04-30 RX ORDER — FENTANYL CITRATE 50 UG/ML
INJECTION, SOLUTION INTRAMUSCULAR; INTRAVENOUS
Status: DISCONTINUED | OUTPATIENT
Start: 2021-04-30 | End: 2021-04-30 | Stop reason: HOSPADM

## 2021-04-30 RX ORDER — NICARDIPINE HYDROCHLORIDE 2.5 MG/ML
INJECTION INTRAVENOUS
Status: DISCONTINUED | OUTPATIENT
Start: 2021-04-30 | End: 2021-04-30 | Stop reason: HOSPADM

## 2021-04-30 RX ORDER — IOPAMIDOL 755 MG/ML
INJECTION, SOLUTION INTRAVASCULAR
Status: DISCONTINUED | OUTPATIENT
Start: 2021-04-30 | End: 2021-04-30 | Stop reason: HOSPADM

## 2021-04-30 RX ORDER — NITROGLYCERIN 20 MG/100ML
10-200 INJECTION INTRAVENOUS CONTINUOUS PRN
Status: DISCONTINUED | OUTPATIENT
Start: 2021-04-30 | End: 2021-04-30 | Stop reason: HOSPADM

## 2021-04-30 RX ORDER — ATROPINE SULFATE 0.1 MG/ML
0.5 INJECTION INTRAVENOUS
Status: DISCONTINUED | OUTPATIENT
Start: 2021-04-30 | End: 2021-04-30 | Stop reason: HOSPADM

## 2021-04-30 RX ORDER — NALOXONE HYDROCHLORIDE 0.4 MG/ML
0.2 INJECTION, SOLUTION INTRAMUSCULAR; INTRAVENOUS; SUBCUTANEOUS
Status: DISCONTINUED | OUTPATIENT
Start: 2021-04-30 | End: 2021-04-30 | Stop reason: CLARIF

## 2021-04-30 RX ORDER — SODIUM CHLORIDE 9 MG/ML
INJECTION, SOLUTION INTRAVENOUS CONTINUOUS
Status: DISCONTINUED | OUTPATIENT
Start: 2021-04-30 | End: 2021-04-30 | Stop reason: HOSPADM

## 2021-04-30 RX ORDER — FENTANYL CITRATE 50 UG/ML
25-50 INJECTION, SOLUTION INTRAMUSCULAR; INTRAVENOUS
Status: ACTIVE | OUTPATIENT
Start: 2021-04-30 | End: 2021-04-30

## 2021-04-30 RX ORDER — HEPARIN SODIUM 1000 [USP'U]/ML
INJECTION, SOLUTION INTRAVENOUS; SUBCUTANEOUS
Status: DISCONTINUED | OUTPATIENT
Start: 2021-04-30 | End: 2021-04-30 | Stop reason: HOSPADM

## 2021-04-30 RX ORDER — ARGATROBAN 1 MG/ML
150 INJECTION, SOLUTION INTRAVENOUS
Status: DISCONTINUED | OUTPATIENT
Start: 2021-04-30 | End: 2021-04-30 | Stop reason: HOSPADM

## 2021-04-30 RX ORDER — POTASSIUM CHLORIDE 750 MG/1
20 TABLET, EXTENDED RELEASE ORAL
Status: DISCONTINUED | OUTPATIENT
Start: 2021-04-30 | End: 2021-04-30 | Stop reason: HOSPADM

## 2021-04-30 RX ORDER — POTASSIUM CHLORIDE 750 MG/1
40 TABLET, EXTENDED RELEASE ORAL
Status: DISCONTINUED | OUTPATIENT
Start: 2021-04-30 | End: 2021-04-30 | Stop reason: HOSPADM

## 2021-04-30 RX ORDER — DOBUTAMINE HYDROCHLORIDE 200 MG/100ML
2-20 INJECTION INTRAVENOUS CONTINUOUS PRN
Status: DISCONTINUED | OUTPATIENT
Start: 2021-04-30 | End: 2021-04-30 | Stop reason: HOSPADM

## 2021-04-30 RX ORDER — ACETAMINOPHEN 325 MG/1
650 TABLET ORAL EVERY 4 HOURS PRN
Status: DISCONTINUED | OUTPATIENT
Start: 2021-04-30 | End: 2021-04-30 | Stop reason: HOSPADM

## 2021-04-30 RX ORDER — ARGATROBAN 1 MG/ML
350 INJECTION, SOLUTION INTRAVENOUS
Status: DISCONTINUED | OUTPATIENT
Start: 2021-04-30 | End: 2021-04-30 | Stop reason: HOSPADM

## 2021-04-30 RX ORDER — IOPAMIDOL 755 MG/ML
120 INJECTION, SOLUTION INTRAVASCULAR ONCE
Status: COMPLETED | OUTPATIENT
Start: 2021-04-30 | End: 2021-04-30

## 2021-04-30 RX ADMIN — IOPAMIDOL 120 ML: 755 INJECTION, SOLUTION INTRAVENOUS at 11:31

## 2021-04-30 RX ADMIN — ASPIRIN 325 MG: 325 TABLET, COATED ORAL at 14:35

## 2021-04-30 RX ADMIN — SODIUM CHLORIDE: 9 INJECTION, SOLUTION INTRAVENOUS at 14:36

## 2021-04-30 ASSESSMENT — MIFFLIN-ST. JEOR: SCORE: 1209.21

## 2021-04-30 NOTE — PROGRESS NOTES
D/I/A: Pt roomed on 3C in [facility].  Arrived via litter and accompanied by RN on monitor.  VSSA.  Rhythm upon arrival  on monitor.  Denies pain or sob.  Reviewed activity restrictions and when to notify RN, ie-changes to breathing or increased chest pressure or chest pain.  CCL access:  Right TR band in place with 13 ml of air.Right internal jugular site CDI no bleeding no hematoma. Pt arrived alert and oriented with 1 belongings bag. Pt instructed on activity restrictions. Pt instructed on POC.  P: Continue to monitor status.  Discharge to home once meeting criteria.

## 2021-04-30 NOTE — H&P
History and Physical: Cardiology Cath Lab Service    Mary Alice Cameron MRN# [id number]   YOB: 1950 Age: 70 year old         Assessment and plan:   Ms. Cameron is a pleasant 70 y.o with severe aortic stenosis here for coronary angiogram, LHC, and RHC as part of evaluation for transcatheter aortic valve replacement.     # Severe aortic stenosis  Characterized by MATIAS 0.8 cm2, MG 45 mmHg, and peak velocity 4.1 m/s and LVEF 55-60% on recent TTE in 1/21/21.   - No contraindications noted, will move forward.  - Patient will be admitted as OP to Obs unit post procedure, with plans to discharge home after post procedure orders have been met    Patient seen and discussed with Dr. Walsh.     Tamela Adamson DNP, APRN, CNP  [facility] Cardiology Cath Lab Services  [phone]      HPI: Mary Alice Cameron is a very pleasant 70 year old female with severe aortic valvular stenosis who for evaluation and consideration for potential transcatheter aortic valve replacement (TAVR).      She has past medical history of severe COPD on supplemental oxygen mainly at night, nonobstructive CAD, hypertension, chronic atrial fibrillation on apixaban.     She has known bicuspid aortic valve although tricuspid on recent TTE.  Recently echocardiogram showed worsening aortic valve stenosis.  She has experienced lower legs edema and increased weight to 158 pounds.  She gained weight about 15 pounds over a year.  She has baseline shortness of breath symptoms and she thinks it is not much changed over a few years at least.    Patient reports feeling well today, denies recent illness, chest pain, shortness of breath, abdominal pain, headache, vision change, or weakness. No major changes in health history since previous visit.     Past Medical History:   Diagnosis Date     Asthma      Atrial fibrillation (H)      COPD (chronic obstructive pulmonary disease) (H)      Depression      High cholesterol      HTN (hypertension)      Thyroid disease         Past Surgical History:   Procedure Laterality Date     BACK SURGERY  2006     CARDIAC SURGERY  06/12/2018    Angiogram at [facility] in [place]     COLONOSCOPY N/A 1/19/2016    Procedure: COLONOSCOPY;  Surgeon: Waldemar Bob MD;  Location: [facility] OR     HYSTERECTOMY  1980    partial     SLING BLADDER SUSPENSION WITH FASCIA LINNETTE         No current facility-administered medications on file prior to encounter.   acetaminophen (TYLENOL) 500 MG tablet, Take 500-1,000 mg by mouth every 6 hours as needed for mild pain  atorvastatin (LIPITOR) 10 MG tablet, Take 1 tablet (10 mg) by mouth At Bedtime  Calcium Carb-Cholecalciferol (CALTRATE 600+D3 SOFT PO), Take 600 mg by mouth 2 times daily  cloNIDine (CATAPRES) 0.1 MG tablet, TAKE 2 TABLETS BY MOUTH EVERY NIGHT AT BEDTIME  COMBIVENT RESPIMAT  MCG/ACT inhaler, Inhale 1 puff into the lungs 4 times daily   Cyanocobalamin (VITAMIN B-12 PO), Take 1,000 mg by mouth daily  diltiazem ER (TIAZAC) 360 MG 24 hr ER beaded capsule, Take 1 capsule (360 mg) by mouth daily  diphenhydrAMINE (BENADRYL) 25 MG tablet, Take 1-2 tablets (25-50 mg) by mouth every 6 hours as needed for itching or allergies  ELIQUIS ANTICOAGULANT 5 MG tablet, TAKE 1 TABLET BY MOUTH TWICE DAILY  fluticasone-salmeterol (ADVAIR-HFA) 230-21 MCG/ACT inhaler, Inhale 2 puffs into the lungs 2 times daily  furosemide (LASIX) 40 MG tablet, TAKE 1 TABLET(40 MG) BY MOUTH TWICE DAILY  hydrOXYzine (ATARAX) 25 MG tablet, Take 1 tablet (25 mg) by mouth 3 times daily as needed for itching  levothyroxine (SYNTHROID/LEVOTHROID) 100 MCG tablet, Take 1 tablet (100 mcg) by mouth daily  losartan (COZAAR) 50 MG tablet, Take 1 tablet (50 mg) by mouth 2 times daily  OTHER MEDICAL SUPPLIES, Apply one pair to help with edema  potassium chloride ER (KLOR-CON M) 20 MEQ CR tablet, Take 1 tablet (20 mEq) by mouth 3 times daily        Family History   Problem Relation Age of Onset     Prostate Cancer Father      Hypertension Father       "Heart Failure Father         CHF     Asthma Mother      Cancer Mother         ovarian     Hypertension Mother      Asthma Brother      Hypertension Sister      Hypertension Brother        Social History     Tobacco Use     Smoking status: Former Smoker     Packs/day: 0.50     Years: 41.00     Pack years: 20.50     Types: Cigarettes     Start date: 1966     Quit date: 2007     Years since quittin.2     Smokeless tobacco: Never Used   Substance Use Topics     Alcohol use: No     Alcohol/week: 0.0 standard drinks       Allergies   Allergen Reactions     Amlodipine Besylate Cough     Norvasc     Lisinopril Cough     Ace Inhibitors Cough         ROS:   Skin: negative  Respiratory: No shortness of breath, dyspnea on exertion, cough, or hemoptysis  Cardiovascular: negative  Gastrointestinal: negative  Genitourinary: negative  Musculoskeletal: negative  Neurologic: negative  Hematologic/Lymphatic/Immunologic: negative  Endocrine: negative    Physical Examination:  Vitals: /67 (BP Location: Right arm)   Pulse 81   Temp 98  F (36.7  C) (Oral)   Resp 18   Ht 1.664 m (5' 5.5\")   Wt 68 kg (150 lb)   SpO2 95%   BMI 24.58 kg/m    BMI= Body mass index is 24.58 kg/m .    GENERAL APPEARANCE: healthy, alert and no distress  HEENT: no icterus, no xanthelasmas, normal pupil size and reaction, normal palate, mucosa moist  NECK: JVP 0 cm, brisk carotid upstroke bilaterally  CHEST: lungs clear to auscultation without rales, rhonchi or wheezes, no use of accessory muscles, no retractions  CARDIOVASCULAR: regular rhythm, normal S1 and S2, no S3 or S4 and no murmur, click or rub.  ABDOMEN: soft, non tender, without hepatosplenomegaly, no masses palpable, bowel sounds normal  EXTREMITIES: warm, no edema, DP/PT pulses 2+ bilaterally, no clubbing or cyanosis   NEURO: alert and oriented to person/place/time, normal speech, gait and affect  SKIN: no ecchymoses, no rashes      Laboratory:  CMP  Recent Labs   Lab " 04/30/21  1113      POTASSIUM 4.5   CHLORIDE 106   CO2 27   ANIONGAP 6   *   BUN 12   CR 0.92   GFRESTIMATED 62   GFRESTBLACK 72   KOSTA 9.7     CBC  Recent Labs   Lab 04/30/21  1113   WBC 10.3   RBC 4.06   HGB 13.8   HCT 41.9   *   MCH 34.0*   MCHC 32.9   RDW 12.5          Lab Results   Component Value Date    TROPI <0.015 09/03/2019    TROPI <0.015 03/19/2019    TROPI  03/25/2017     <0.015  The 99th percentile for upper reference range is 0.045 ug/L.  Troponin values in   the range of 0.045 - 0.120 ug/L may be associated with risks of adverse   clinical events.         TTE: 1/21/21  There is severe aortic stenosis (MATIAS 0.8 cm2) with mild aortic insufficiency  despite a low stroke volume index.  Global and regional left ventricular function is normal with an EF of 55-60%.  Global right ventricular function is normal. The right ventricle is normal  size.  There is moderate dilation of the ascending aorta (4.2 cm, indexed value 2.3  cm/m2).  IVC diameter >2.1 cm collapsing <50% with sniff suggests a high RA pressure  estimated at 15 mmHg or greater.  This study was compared with the study from 11/12/2020. The aortic stenosis is  probably similar in severity but the AV hemodynamics are better visualized.

## 2021-04-30 NOTE — DISCHARGE INSTRUCTIONS
Going Home after an Angioplasty or Stent Placement (Cardiac)  ______________________________________________      After you go home:    Have an adult stay with you for 24 hours.    Drink plenty of fluids.    You may eat your normal diet, unless your doctor tells you otherwise.    For 24 hours:    Relax and take it easy.    Do NOT smoke.    Do NOT make any important or legal decisions.    Do NOT drive or operate machines at home or at work.    Do NOT drink alcohol.    Remove the Band-Aid after 24 hours. If there is minor oozing, apply another Band-aid and remove it after 12 hours.    For 2 days, do NOT have sex or do any heavy exercise.    Do NOT take a bath, or use a hot tub or pool for at least 3 days. You may shower.          .    Care of wrist or arm site  It is normal to have soreness at the puncture site and mild tingling in your hand for up to 3 days.    For 2 days, do not use your hand or arm to support your weight (such as rising from a chair) or bend your wrist (such as lifting a garage door).    For 2 days, do not lift more than 5 pounds or exercise your arm (tennis, golf or bowling).    If you start bleeding from the site in your arm:    Sit down and press firmly on the site with your fingers for 10 minutes. Call your doctor as soon as you can.    If the bleeding stops, sit still and keep your wrist straight for 2 hours.    Medicines    If you have started taking Plavix or Effient, do not stop taking it until you talk to your heart doctor (cardiologist).    If you are on metformin (Glucophage), do not restart it until you have blood tests (within 2 to 3 days after discharge). When your doctor tells you it is safe, you may restart the metformin.    If you have stopped any other medicines, check with your nurse or provider about when to restart them.    Call 911 right away if you have bleeding that is heavy or does not stop.    Call your doctor if:    You have a large or growing hard lump around the  site.    The site is red, swollen, hot or tender.    Blood or fluid is draining from the site.    You have chills or a fever greater than 101 F (38 C).    Your leg or arm feels numb or cool.    You have hives, a rash or unusual itching.      [facility] [place]:  [phone] (7 days a week)

## 2021-04-30 NOTE — PROGRESS NOTES
Arrived to [address] for coronary angiogram, RHC, LHC procedures in CCL. AFVSS. Denies pain. ECG complete. Contrast reviewed. Consent done. Ana Laura pp doppler. Pt's Ksenia lam in [facility]. Pre procedural cares complete.

## 2021-05-01 NOTE — PROGRESS NOTES
D/I/A:  Patient is tolerating liquids and foods, ambulating, urinating, puncture sites are stable ( no bleeding and no hematoma) and patient has a .  A+O x4 and making needs known.  CCL access sites C/D/I; no bleeding or hematoma; CMS intact.  VSSA.  Afib on monitor.  IV access removed.  Education completed and outlined in AVS or handout: medications reviewed with patient.  Questions answered prior to discharge.  Belongings returned to patient at discharge.    P: Discharged to self care.  Patient to follow up with appts as per discharge instruction.    ADD: Pt instructed on activity restrictions and post procedural site care. Pt denied questions or complaints. Pt escorted to front door via WC safely

## 2021-05-04 ENCOUNTER — OFFICE VISIT (OUTPATIENT)
Dept: FAMILY MEDICINE | Facility: OTHER | Age: 71
End: 2021-05-04
Attending: FAMILY MEDICINE
Payer: COMMERCIAL

## 2021-05-04 ENCOUNTER — MYC MEDICAL ADVICE (OUTPATIENT)
Dept: FAMILY MEDICINE | Facility: OTHER | Age: 71
End: 2021-05-04

## 2021-05-04 VITALS
SYSTOLIC BLOOD PRESSURE: 106 MMHG | TEMPERATURE: 98.5 F | HEART RATE: 77 BPM | WEIGHT: 150 LBS | OXYGEN SATURATION: 95 % | DIASTOLIC BLOOD PRESSURE: 62 MMHG | BODY MASS INDEX: 24.58 KG/M2

## 2021-05-04 DIAGNOSIS — I25.10 CORONARY ARTERY DISEASE INVOLVING NATIVE CORONARY ARTERY OF NATIVE HEART WITHOUT ANGINA PECTORIS: Primary | ICD-10-CM

## 2021-05-04 PROCEDURE — 99213 OFFICE O/P EST LOW 20 MIN: CPT | Performed by: FAMILY MEDICINE

## 2021-05-04 PROCEDURE — G0463 HOSPITAL OUTPT CLINIC VISIT: HCPCS

## 2021-05-04 ASSESSMENT — PAIN SCALES - GENERAL: PAINLEVEL: SEVERE PAIN (6)

## 2021-05-04 NOTE — NURSING NOTE
"Chief Complaint   Patient presents with     RECHECK       Initial /62   Pulse 77   Temp 98.5  F (36.9  C)   Wt 68 kg (150 lb)   SpO2 95%   BMI 24.58 kg/m   Estimated body mass index is 24.58 kg/m  as calculated from the following:    Height as of 4/30/21: 1.664 m (5' 5.5\").    Weight as of this encounter: 68 kg (150 lb).  Medication Reconciliation: complete  Joy Franklin LPN  "

## 2021-05-04 NOTE — PROGRESS NOTES
Assessment & Plan     Coronary artery disease involving native coronary artery of native heart without angina pectoris  S/p angiogram right wrist with grape size lump in the wrist.  Here for check.  No pulsatile nature.  Seems ok, though bigger than a pea which is why she became slightly concerned about it.  She was also concerned about a blood clot moving somewhere, and I explained how this is a simple hematoma and as long as it doesn't get larger we can observe.  I marked the size/area of firmness which represents the hematoma (non pulsatile).                     No follow-ups on file.    Braydon Yen MD  [facility]   Mary Alice is a 70 year old who presents for the following health issues     HPI     Recheck       Duration: 4/30/21    Description (location/character/radiation): right arm     Intensity:  moderate    Accompanying signs and symptoms: grape sized lump, aching     History (similar episodes/previous evaluation): angiogram     Precipitating or alleviating factors: None    Therapies tried and outcome: None            Review of Systems   Constitutional, HEENT, cardiovascular, pulmonary, gi and gu systems are negative, except as otherwise noted.      Objective    /62   Pulse 77   Temp 98.5  F (36.9  C)   Wt 68 kg (150 lb)   SpO2 95%   BMI 24.58 kg/m    Body mass index is 24.58 kg/m .  Physical Exam   GENERAL: healthy, alert and no distress  MS: right wrist proximal to the site for angiogram there is a hematoma, non pulsatile, about 2-3 cm in diameter.  I marked it.  No concerns for infection, etc.    SKIN: no suspicious lesions or rashes  NEURO: Normal strength and tone, mentation intact and speech normal

## 2021-05-13 DIAGNOSIS — I10 ESSENTIAL HYPERTENSION: ICD-10-CM

## 2021-05-14 RX ORDER — CLONIDINE HYDROCHLORIDE 0.1 MG/1
TABLET ORAL
Qty: 180 TABLET | Refills: 1 | Status: SHIPPED | OUTPATIENT
Start: 2021-05-14 | End: 2021-09-09

## 2021-05-20 ENCOUNTER — OFFICE VISIT (OUTPATIENT)
Dept: FAMILY MEDICINE | Facility: OTHER | Age: 71
End: 2021-05-20
Attending: FAMILY MEDICINE
Payer: COMMERCIAL

## 2021-05-20 VITALS
BODY MASS INDEX: 24.58 KG/M2 | HEART RATE: 80 BPM | SYSTOLIC BLOOD PRESSURE: 102 MMHG | OXYGEN SATURATION: 95 % | WEIGHT: 150 LBS | DIASTOLIC BLOOD PRESSURE: 54 MMHG | TEMPERATURE: 99 F

## 2021-05-20 DIAGNOSIS — Z12.31 ENCOUNTER FOR SCREENING MAMMOGRAM FOR BREAST CANCER: Primary | ICD-10-CM

## 2021-05-20 DIAGNOSIS — J02.0 ACUTE STREPTOCOCCAL PHARYNGITIS: ICD-10-CM

## 2021-05-20 PROCEDURE — 99213 OFFICE O/P EST LOW 20 MIN: CPT | Performed by: FAMILY MEDICINE

## 2021-05-20 PROCEDURE — G0463 HOSPITAL OUTPT CLINIC VISIT: HCPCS

## 2021-05-20 RX ORDER — AZITHROMYCIN 250 MG/1
TABLET, FILM COATED ORAL
Qty: 6 TABLET | Refills: 0 | Status: SHIPPED | OUTPATIENT
Start: 2021-05-20 | End: 2021-05-25

## 2021-05-20 ASSESSMENT — ANXIETY QUESTIONNAIRES
3. WORRYING TOO MUCH ABOUT DIFFERENT THINGS: NOT AT ALL
IF YOU CHECKED OFF ANY PROBLEMS ON THIS QUESTIONNAIRE, HOW DIFFICULT HAVE THESE PROBLEMS MADE IT FOR YOU TO DO YOUR WORK, TAKE CARE OF THINGS AT HOME, OR GET ALONG WITH OTHER PEOPLE: NOT DIFFICULT AT ALL
GAD7 TOTAL SCORE: 0
2. NOT BEING ABLE TO STOP OR CONTROL WORRYING: NOT AT ALL
6. BECOMING EASILY ANNOYED OR IRRITABLE: NOT AT ALL
1. FEELING NERVOUS, ANXIOUS, OR ON EDGE: NOT AT ALL
5. BEING SO RESTLESS THAT IT IS HARD TO SIT STILL: NOT AT ALL
7. FEELING AFRAID AS IF SOMETHING AWFUL MIGHT HAPPEN: NOT AT ALL

## 2021-05-20 ASSESSMENT — PATIENT HEALTH QUESTIONNAIRE - PHQ9
SUM OF ALL RESPONSES TO PHQ QUESTIONS 1-9: 0
5. POOR APPETITE OR OVEREATING: NOT AT ALL

## 2021-05-20 ASSESSMENT — PAIN SCALES - GENERAL: PAINLEVEL: SEVERE PAIN (6)

## 2021-05-20 NOTE — PROGRESS NOTES
Assessment & Plan     Encounter for screening mammogram for breast cancer  Reviewed.  Update.    - MA Screen Bilateral w/Anthony; Future    Acute streptococcal pharyngitis  Discussed options.  Red throat, sudden, trouble swallowing, no fever (possibly slight) and no URI sx.  Tender adenopathy as well.  azithro coverage.  F/u with any ongoing concerns.  She understands.  I did not test and just treated given the clinical scenario.    - azithromycin (ZITHROMAX) 250 MG tablet; Take 2 tablets (500 mg) by mouth daily for 1 day, THEN 1 tablet (250 mg) daily for 4 days.                 No follow-ups on file.    Braydon Yen MD  [facility]    Jorge Wheat is a 70 year old who presents for the following health issues     HPI     Throat pain      Duration: yesterday    Description (location/character/radiation): throat    Intensity:  moderate    Accompanying signs and symptoms: right side of throat, right ear, SOB    History (similar episodes/previous evaluation): None    Precipitating or alleviating factors: None    Therapies tried and outcome: benadryl, ASA            Review of Systems   Constitutional, HEENT, cardiovascular, pulmonary, gi and gu systems are negative, except as otherwise noted.      Objective    /54   Pulse 80   Temp 99  F (37.2  C)   Wt 68 kg (150 lb)   SpO2 95%   BMI 24.58 kg/m    Body mass index is 24.58 kg/m .  Physical Exam   GENERAL: healthy, alert and no distress  EYES: Eyes grossly normal to inspection, PERRL and conjunctivae and sclerae normal  HENT: normal cephalic/atraumatic, ear canals and TM's normal, nose and mouth without ulcers or lesions, oropharynx clear, oral mucous membranes moist and significant pharyngeal erythema, diffuse.    NECK: cervical adenopathy right side tender.  Left enlarged but non tender, no asymmetry, masses, or scars and thyroid normal to palpation  RESP: lungs clear to auscultation - no rales, rhonchi or wheezes  CV:  regular rate and rhythm, normal S1 S2, no S3 or S4, no murmur, click or rub, no peripheral edema and peripheral pulses strong  ABDOMEN: soft, nontender, no hepatosplenomegaly, no masses and bowel sounds normal  MS: no gross musculoskeletal defects noted, no edema

## 2021-05-20 NOTE — NURSING NOTE
"Chief Complaint   Patient presents with     Throat Pain       Initial /54   Pulse 80   Temp 99  F (37.2  C)   Wt 68 kg (150 lb)   SpO2 95%   BMI 24.58 kg/m   Estimated body mass index is 24.58 kg/m  as calculated from the following:    Height as of 4/30/21: 1.664 m (5' 5.5\").    Weight as of this encounter: 68 kg (150 lb).  Medication Reconciliation: complete  Joy Franklin LPN  "

## 2021-05-21 ENCOUNTER — ANCILLARY PROCEDURE (OUTPATIENT)
Dept: MAMMOGRAPHY | Facility: OTHER | Age: 71
End: 2021-05-21
Attending: FAMILY MEDICINE
Payer: MEDICARE

## 2021-05-21 DIAGNOSIS — Z12.31 ENCOUNTER FOR SCREENING MAMMOGRAM FOR BREAST CANCER: ICD-10-CM

## 2021-05-21 PROCEDURE — 77063 BREAST TOMOSYNTHESIS BI: CPT | Mod: TC

## 2021-05-21 ASSESSMENT — ANXIETY QUESTIONNAIRES: GAD7 TOTAL SCORE: 0

## 2021-06-04 ENCOUNTER — CARE COORDINATION (OUTPATIENT)
Dept: CARDIOLOGY | Facility: CLINIC | Age: 71
End: 2021-06-04

## 2021-06-04 DIAGNOSIS — I35.0 AORTIC VALVE STENOSIS, ETIOLOGY OF CARDIAC VALVE DISEASE UNSPECIFIED: Primary | ICD-10-CM

## 2021-06-04 DIAGNOSIS — I50.42 CHRONIC COMBINED SYSTOLIC AND DIASTOLIC HEART FAILURE (H): ICD-10-CM

## 2021-06-04 RX ORDER — SODIUM CHLORIDE 9 MG/ML
INJECTION, SOLUTION INTRAVENOUS CONTINUOUS
Status: CANCELLED | OUTPATIENT
Start: 2021-06-04

## 2021-06-04 RX ORDER — LIDOCAINE 40 MG/G
CREAM TOPICAL
Status: CANCELLED | OUTPATIENT
Start: 2021-06-04

## 2021-06-04 RX ORDER — CEFAZOLIN SODIUM 2 G/50ML
2 SOLUTION INTRAVENOUS
Status: CANCELLED | OUTPATIENT
Start: 2021-06-04

## 2021-06-04 NOTE — PROGRESS NOTES
Date: 6/4/2021    Time of Call: 3:40 PM     Diagnosis:  Severe aortic stenosis     [ TORB ] Ordering provider: Domo Sarah MD  Order:   CASE REQUEST:  TAVR  COVID 19 testing order  TAVR pre-procedure orders  Echo  NT-pro BNP  CBC, BMP, INR, ABO/TS    Order received by: Treva Patel RN  --------------------------------------------------------------------------------------------------------  Mary Alice's case was discussed at Valve conference.    The plan will be for Mary Alice to proceed with TAVR   [place] Valve  Approach: Transfemoral, or, trans-axillary  The access vessels will be re-measured to determine the approach to be used.    Mary Alice was called and the discussion information of the conference was reviewed with her.  -----------------------------------------------------------------------------------------------------------  TAVR Procedure:  June 23, Wednesday, 2021, at 7:30 a.m.    Check in to the hospital, admitting area on the main floor at 5:30 a.m.    [facility], [place] [address]  [address] [place]  [address]     -   parking is available in front of the hospital from 5:15 am to 8:00 pm  -  Stop at the Information Desk in the lobby  -   Inform the information person that you are here for surgery. An escort to 3c will be provided. If you would not like an escort, please proceed to 3C on the 3rd floor. [phone]   -  Bring your ID and insurance card.    * Check in to the hospital front entrance, at the registration area by 5:30 a.m.   * They will direct you to the 3rd floor, pre-op area after checking in.    * After leaving the registration check-in area, there will be TWO visitors allowed until the time of discharge.    * When patients leave the pre-op area for their procedure, ONE visitor may wait in the family waiting room.  This is subject to change based on the latest hospital visitor policy during COVID 19 restriction time.  * After the procedure is  finished, the MD will call the visitor and update them on the procedure.     * When the patient arrives to the unit where they will stay until discharge, the visitors are allowed to come visit.      Nothing to eat after midnight; water, apple juice or 7up/Sprite is OK up to two hours prior to your scheduled procedure.  You may take your medications in the morning with a sip of water.      *Please use the special cleansing wipes, received at your pre-op History and Physical at the [facility], the night before and the morning of your procedure.  Please focus the use of these wipes from neck to groin.      If you did NOT receive these special cleansing wipes at your pre-op History and Physical, OR, had your History and Physical at another facility, then:   * Take a shower, with antibacterial soap, the night before the procedure, and the morning of the procedure.    * Please use a soap such as hibicleanse or chlorhexadine.  This can be purchased, over the counter, at a pharmacy.    * Focus the use of this soap from neck to groin.        Medications Instructions:  --REGARDING YOUR ELIQUIS:  Please HOLD this medication 48 hours prior to your procedure.  LAST dose will be Sunday, June 20.  NO Eliquis starting Monday, June 21.  --Please HOLD your lasix the morning of your procedure.  --Please HOLD your vitamin supplements the morning of your procedure.  --PLEASE take Aspirin 325 mg, the night before and the morning of your procedure.    If any questions please contact:  Genevieve Patel RN  Structural Heart Care Coordinator  [facility]  Office: [phone]  Pager: [phone]

## 2021-06-07 ENCOUNTER — HOSPITAL ENCOUNTER (INPATIENT)
Facility: CLINIC | Age: 71
Setting detail: SURGERY ADMIT
End: 2021-06-07
Attending: INTERNAL MEDICINE | Admitting: INTERNAL MEDICINE
Payer: MEDICARE

## 2021-06-07 DIAGNOSIS — I35.0 AORTIC VALVE STENOSIS, ETIOLOGY OF CARDIAC VALVE DISEASE UNSPECIFIED: ICD-10-CM

## 2021-06-08 NOTE — PROGRESS NOTES
[place] [address] [address]  [facility] [address]  Phone: [phone]  Primary Provider: Braydon Yen  Pre-op Performing Provider: JAMEY KING      PREOPERATIVE EVALUATION:  Today's date: 6/11/2021    Mary Alice Cameron is a 71 year old female who presents for a preoperative evaluation.    Surgical Information:  Surgery/Procedure: transcatheter amezcua aortic valve replacement   [facility]  Surgeon: Dr. Walsh   Surgery Date: 6/23/21  Time of Surgery: 0730  Where patient plans to recover: At home alone  Fax number for surgical facility:     Type of Anesthesia Anticipated: to be determined    Assessment & Plan     The proposed surgical procedure is considered INTERMEDIATE risk.    (Z01.818) Preop general physical exam  (primary encounter diagnosis)  Comment: check labs, UA, EKG, and chest XRAY  Plan: CBC with platelets differential, Comprehensive         metabolic panel, XR CHEST 2 VW (Clinic         Performed), *UA reflex to Microscopic and         Culture - [facility], Lipid Profile, EKG         12-lead complete w/read - Clinics            (I25.10) Coronary artery disease involving native coronary artery of native heart without angina pectoris  Comment: due for lipids  Plan: Lipid Profile (Chol, Trig, HDL, LDL calc)            (Z23) Need for vaccination  Comment: vaccine today  Plan: TD PRSERV FREE >=7 YRS ADS IM [[id number]]              Risks and Recommendations:  The patient has the following additional risks and recommendations for perioperative complications:  Pulmonary:    - Incentive spirometry post-op   - Consider Respiratory Therapy (Respiratory Care IP Consult) post-op    Medication Instructions:  Patient is to take all scheduled medications on the day of surgery EXCEPT for modifications listed below:  hold vitamin 1 day prior to surgery   - apixaban (Eliquis), edoxaban (Savaysa), rivaroxaban (Xarelto): Bleeding risk is moderate or high for this  procedure AND CrCl  (>=) 50 mL/min. HOLD 2 days before surgery.    - Diuretics: HOLD on the day of surgery.    RECOMMENDATION:  APPROVAL GIVEN to proceed with proposed procedure pending review of diagnostic evaluation. Awaiting EKG and chest XRAY.   }    Subjective     HPI related to upcoming procedure: aortic valve stenosis       Preop Questions 6/11/2021   1. Have you ever had a heart attack or stroke? No   2. Have you ever had surgery on your heart or blood vessels, such as a stent placement, a coronary artery bypass, or surgery on an artery in your head, neck, heart, or legs? No   3. Do you have chest pain with activity? No   4. Do you have a history of  heart failure? No   5. Do you currently have a cold, bronchitis or symptoms of other infection? No   6. Do you have a cough, shortness of breath, or wheezing? No   7. Do you or anyone in your family have previous history of blood clots? No   8. Do you or does anyone in your family have a serious bleeding problem such as prolonged bleeding following surgeries or cuts? No   9. Have you ever had problems with anemia or been told to take iron pills? No   10. Have you had any abnormal blood loss such as black, tarry or bloody stools, or abnormal vaginal bleeding? No   11. Have you ever had a blood transfusion? No   12. Are you willing to have a blood transfusion if it is medically needed before, during, or after your surgery? Yes   13. Have you or any of your relatives ever had problems with anesthesia? No   14. Do you have sleep apnea, excessive snoring or daytime drowsiness? No   15. Do you have any artifical heart valves or other implanted medical devices like a pacemaker, defibrillator, or continuous glucose monitor? No   16. Do you have artificial joints? No   17. Are you allergic to latex? No     METS 4+    Health Care Directive:  Patient has a Health Care Directive on file      Preoperative Review of :   reviewed - no record of controlled substances  prescribed.      Status of Chronic Conditions:  See problem list for active medical problems.  Problems all longstanding and stable, except as noted/documented.  See ROS for pertinent symptoms related to these conditions.      Review of Systems  CONSTITUTIONAL: NEGATIVE for fever, chills, change in weight  INTEGUMENTARY/SKIN: NEGATIVE for worrisome rashes, moles or lesions  EYES: NEGATIVE for vision changes or irritation  ENT/MOUTH: NEGATIVE for ear, mouth and throat problems  RESP: NEGATIVE for significant cough or SOB. +HX COPD. +Asthma  BREAST: NEGATIVE for masses, tenderness or discharge  CV: NEGATIVE for chest pain, palpitations or peripheral edema. +HX atrial fibrillation. +HX aortic stenosis. +HX HTN. +HX CAD  GI: NEGATIVE for nausea, abdominal pain, heartburn, or change in bowel habits  : NEGATIVE for frequency, dysuria, or hematuria  MUSCULOSKELETAL: NEGATIVE for significant arthralgias or myalgia  NEURO: NEGATIVE for weakness, dizziness or paresthesias  ENDOCRINE: NEGATIVE for temperature intolerance, skin/hair changes. +HX hypothyroidism   HEME: NEGATIVE for bleeding problems. +HX long term use of anticoagulants   PSYCHIATRIC: NEGATIVE for changes in mood or affect. +HX depression    Patient Active Problem List    Diagnosis Date Noted     Other ill-defined heart diseases 04/15/2021     Priority: Medium     Added automatically from request for surgery [id number]       Aortic valve stenosis, etiology of cardiac valve disease unspecified 04/15/2021     Priority: Medium     Added automatically from request for surgery [id number]       Aortic valve disorder 03/12/2021     Priority: Medium     Formatting of this note might be different from the original.  BiCuspid Ao Valve       Mitral valve disorder 03/12/2021     Priority: Medium     Cardiomegaly 03/12/2021     Priority: Medium     Infection due to 2019 novel coronavirus 02/04/2021     Priority: Medium     Hypokalemia 09/08/2019     Priority: Medium     Acute  cystitis without hematuria 09/03/2019     Priority: Medium     Small bowel obstruction (H) 09/03/2019     Priority: Medium     Acute renal failure (H) 09/03/2019     Priority: Medium     Status post coronary angiogram 06/19/2018     Priority: Medium     Coronary artery disease involving native coronary artery of native heart without angina pectoris 06/19/2018     Priority: Medium     Health Care Home 01/26/2017     Priority: Medium     Acute on chronic respiratory failure (H) 01/02/2017     Priority: Medium     Long-term (current) use of anticoagulants [Z79.01] 08/03/2016     Priority: Medium     Essential hypertension 06/29/2016     Priority: Medium     Advance care planning 02/08/2016     Priority: Medium     Advance Care Planning 2/8/2016: Receipt of ACP document:  Received: Health Care Directive which was witnessed or notarized on 1/5/16.  Document previously scanned on 1/22/16.  Validation form completed and sent to be scanned.  Code Status needs to be updated to reflect choices in most recent ACP document.  1/5/16 Health Care Directive indicates preference for DNR code; recommend conversation about goals of care and completion of a POLST.  Confirmed/documented designated decision maker(s).  Added by Shobha Jang             Hypothyroidism, postablative 03/23/2015     Priority: Medium     Problem list name updated by automated process. Provider to review       Acute bronchitis 03/22/2015     Priority: Medium     Mild major depression (H) 08/27/2014     Priority: Medium     Bicuspid aortic valve 03/25/2014     Priority: Medium     Persistent atrial fibrillation (H) 01/17/2014     Priority: Medium     Pulmonary emphysema (H) 01/17/2014     Priority: Medium     Carotid stenosis 03/29/2012     Priority: Medium     Aortic aneurysm (H) 06/02/2010     Priority: Medium     COPD (chronic obstructive pulmonary disease) (H) 08/19/2009     Priority: Medium     Formatting of this note might be different from the  original.  Has been steroid dependent;  Recently hospitalized with Flair and noted Oximetry 88% at rest 11/18/2009 with Respiratory Therapist at [facility].  Medically Disabled due to COPD.       Edema 08/14/2008     Priority: Medium     Depressive disorder 04/10/2007     Priority: Medium      Past Medical History:   Diagnosis Date     Asthma      Atrial fibrillation (H)      COPD (chronic obstructive pulmonary disease) (H)      Depression      High cholesterol      HTN (hypertension)      Thyroid disease      Past Surgical History:   Procedure Laterality Date     BACK SURGERY  2006     CARDIAC SURGERY  06/12/2018    [facility]gi[facility] at [facility]     COLONOSCOPY N/A 1/19/2016    Procedure: COLONOSCOPY;  Surgeon: Waldemar Bob MD;  Location: [facility]     CV CORONARY ANGIOGRAM N/A 4/30/2021    Procedure: CV CORONARY ANGIOGRAM;  Surgeon: Poli Walsh MD;  [facility]     CV LEFT HEART CATH N/A 4/30/2021    Procedure: CV LEFT HEART CATH;  Surgeon: Poli Walsh MD;  [facility]     CV RIGHT HEART CATH MEASUREMENTS RECORDED N/A 4/30/2021    Procedure: CV RIGHT HEART CATH;  Surgeon: Poli Walsh MD;  [facility]     HYSTERECTOMY  1980    partial     SLING BLADDER SUSPENSION WITH FASCIA LINNETTE       Current Outpatient Medications   Medication Sig Dispense Refill     acetaminophen (TYLENOL) 500 MG tablet Take 500-1,000 mg by mouth every 6 hours as needed for mild pain       atorvastatin (LIPITOR) 10 MG tablet Take 1 tablet (10 mg) by mouth At Bedtime 90 tablet 3     Calcium Carb-Cholecalciferol (CALTRATE 600+D3 SOFT PO) Take 600 mg by mouth 2 times daily       cloNIDine (CATAPRES) 0.1 MG tablet TAKE 2 TABLETS BY MOUTH EVERY NIGHT AT BEDTIME 180 tablet 1     COMBIVENT RESPIMAT  MCG/ACT inhaler Inhale 1 puff into the lungs 4 times daily        Cyanocobalamin (VITAMIN B-12 PO) Take 1,000 mg by mouth daily        diltiazem ER (TIAZAC) 360 MG 24 hr ER beaded capsule Take 1 capsule (360 mg) by mouth daily 90 capsule 3     diphenhydrAMINE (BENADRYL) 25 MG tablet Take 1-2 tablets (25-50 mg) by mouth every 6 hours as needed for itching or allergies 60 tablet 1     ELIQUIS ANTICOAGULANT 5 MG tablet TAKE 1 TABLET BY MOUTH TWICE DAILY 180 tablet 1     fluticasone-salmeterol (ADVAIR-HFA) 230-21 MCG/ACT inhaler Inhale 2 puffs into the lungs 2 times daily 8 g 3     furosemide (LASIX) 40 MG tablet TAKE 1 TABLET(40 MG) BY MOUTH TWICE DAILY 180 tablet 0     levothyroxine (SYNTHROID/LEVOTHROID) 100 MCG tablet Take 1 tablet (100 mcg) by mouth daily 90 tablet 3     losartan (COZAAR) 50 MG tablet Take 1 tablet (50 mg) by mouth 2 times daily 180 tablet 1     potassium chloride ER (KLOR-CON M) 20 MEQ CR tablet Take 1 tablet (20 mEq) by mouth 3 times daily 270 tablet 3     hydrOXYzine (ATARAX) 25 MG tablet Take 1 tablet (25 mg) by mouth 3 times daily as needed for itching (Patient not taking: Reported on 2021) 60 tablet 0     OTHER MEDICAL SUPPLIES Apply one pair to help with edema (Patient not taking: Reported on 2021) 1 each 0       Allergies   Allergen Reactions     Amlodipine Besylate Cough     Norvasc     Lisinopril Cough     Ace Inhibitors Cough        Social History     Tobacco Use     Smoking status: Former Smoker     Packs/day: 0.50     Years: 41.00     Pack years: 20.50     Types: Cigarettes     Start date: 1966     Quit date: 2007     Years since quittin.3     Smokeless tobacco: Never Used   Substance Use Topics     Alcohol use: No     Alcohol/week: 0.0 standard drinks     Family History   Problem Relation Age of Onset     Prostate Cancer Father      Hypertension Father      Heart Failure Father         CHF     Asthma Mother      Cancer Mother         ovarian     Hypertension Mother      Asthma Brother      Hypertension Sister      Hypertension Brother      History   Drug Use No         Objective     /74  "(BP Location: Right arm, Patient Position: Sitting, Cuff Size: Adult Regular)   Pulse 81   Temp 98.2  F (36.8  C) (Tympanic)   Resp 14   Ht 1.605 m (5' 3.2\")   Wt 68.9 kg (152 lb)   SpO2 97%   BMI 26.76 kg/m      Physical Exam    GENERAL APPEARANCE: healthy, alert and no distress     EYES: EOMI, PERRL     HENT: ear canals and TM's normal and nose and mouth without ulcers or lesions     NECK: no adenopathy, no asymmetry, masses, or scars and thyroid normal to palpation     RESP: lungs clear to auscultation - no rales, rhonchi or wheezes     CV: regular rates and irregular rhythm (afib), normal S1 S2, no S3 or S4, click or rub. +murmur     ABDOMEN:  soft, nontender, no HSM or masses and bowel sounds normal     MS: extremities normal- no gross deformities noted, no evidence of inflammation in joints, FROM in all extremities.     SKIN: no suspicious lesions or rashes     NEURO: Normal strength and tone, sensory exam grossly normal, mentation intact and speech normal     PSYCH: mentation appears normal. and affect normal/bright     LYMPHATICS: No cervical adenopathy      Diagnostics:  Recent Results (from the past 24 hour(s))   CBC with platelets differential    Collection Time: 06/11/21 10:11 AM   Result Value Ref Range    WBC 5.5 4.0 - 11.0 10e9/L    RBC Count 4.20 3.8 - 5.2 10e12/L    Hemoglobin 14.3 11.7 - 15.7 g/dL    Hematocrit 42.9 35.0 - 47.0 %     (H) 78 - 100 fl    MCH 34.0 (H) 26.5 - 33.0 pg    MCHC 33.3 31.5 - 36.5 g/dL    RDW 11.9 10.0 - 15.0 %    Platelet Count 193 150 - 450 10e9/L    % Neutrophils 70.9 %    % Lymphocytes 17.6 %    % Monocytes 10.0 %    % Eosinophils 1.1 %    % Basophils 0.4 %    Absolute Neutrophil 3.9 1.6 - 8.3 10e9/L    Absolute Lymphocytes 1.0 0.8 - 5.3 10e9/L    Absolute Monocytes 0.6 0.0 - 1.3 10e9/L    Absolute Eosinophils 0.1 0.0 - 0.7 10e9/L    Absolute Basophils 0.0 0.0 - 0.2 10e9/L    Diff Method Automated Method    Comprehensive metabolic panel    Collection Time: " 06/11/21 10:11 AM   Result Value Ref Range    Sodium 139 133 - 144 mmol/L    Potassium 3.9 3.4 - 5.3 mmol/L    Chloride 104 94 - 109 mmol/L    Carbon Dioxide 27 20 - 32 mmol/L    Anion Gap 8 3 - 14 mmol/L    Glucose 101 (H) 70 - 99 mg/dL    Urea Nitrogen 13 7 - 30 mg/dL    Creatinine 0.91 0.52 - 1.04 mg/dL    GFR Estimate 63 >60 mL/min/[1.73_m2]    GFR Estimate If Black 74 >60 mL/min/[1.73_m2]    Calcium 9.5 8.5 - 10.1 mg/dL    Bilirubin Total 0.7 0.2 - 1.3 mg/dL    Albumin 3.7 3.4 - 5.0 g/dL    Protein Total 7.1 6.8 - 8.8 g/dL    Alkaline Phosphatase 41 40 - 150 U/L    ALT 16 0 - 50 U/L    AST 18 0 - 45 U/L   Lipid Profile    Collection Time: 06/11/21 10:11 AM   Result Value Ref Range    Cholesterol 201 (H) <200 mg/dL    Triglycerides 71 <150 mg/dL    HDL Cholesterol 109 >49 mg/dL    LDL Cholesterol Calculated 78 <100 mg/dL    Non HDL Cholesterol 92 <130 mg/dL   *UA [facility] Microscopic and [facility]    Collection Time: 06/11/21 10:12 AM    Specimen: Midstream Urine   Result Value Ref Range    Color Urine Yellow     Appearance Urine Clear     Glucose Urine Negative NEG^Negative mg/dL    Bilirubin Urine Negative NEG^Negative    Ketones Urine Negative NEG^Negative mg/dL    Specific Gravity Urine <=1.005 1.003 - 1.035    Blood Urine Negative NEG^Negative    pH Urine 7.0 5.0 - 7.0 pH    Protein Albumin Urine Negative NEG^Negative mg/dL    Urobilinogen Urine 0.2 0.2 - 1.0 EU/dL    Nitrite Urine Negative NEG^Negative    Leukocyte Esterase Urine Negative NEG^Negative    Source Midstream Urine       EKG shows A.fib - chronic. CXR: lungs clear. Cardiomegaly as previous.    Revised Cardiac Risk Index (RCRI):  The patient has the following serious cardiovascular risks for perioperative complications:   - High risk surgery (>5% cardiac complication risk) = 1 point     RCRI Interpretation: 1 point: Class II (low risk - 0.9% complication rate)        Signed Electronically by: Tammy Madera NP  Copy of  this evaluation report is provided to requesting physician.

## 2021-06-08 NOTE — PATIENT INSTRUCTIONS

## 2021-06-11 ENCOUNTER — OFFICE VISIT (OUTPATIENT)
Dept: FAMILY MEDICINE | Facility: OTHER | Age: 71
End: 2021-06-11
Attending: NURSE PRACTITIONER
Payer: MEDICARE

## 2021-06-11 VITALS
WEIGHT: 152 LBS | DIASTOLIC BLOOD PRESSURE: 74 MMHG | TEMPERATURE: 98.2 F | BODY MASS INDEX: 26.93 KG/M2 | OXYGEN SATURATION: 97 % | HEART RATE: 81 BPM | RESPIRATION RATE: 14 BRPM | SYSTOLIC BLOOD PRESSURE: 112 MMHG | HEIGHT: 63 IN

## 2021-06-11 DIAGNOSIS — I48.19 PERSISTENT ATRIAL FIBRILLATION (H): ICD-10-CM

## 2021-06-11 DIAGNOSIS — Z01.818 PREOP GENERAL PHYSICAL EXAM: Primary | ICD-10-CM

## 2021-06-11 DIAGNOSIS — Z23 NEED FOR VACCINATION: ICD-10-CM

## 2021-06-11 DIAGNOSIS — I25.10 CORONARY ARTERY DISEASE INVOLVING NATIVE CORONARY ARTERY OF NATIVE HEART WITHOUT ANGINA PECTORIS: ICD-10-CM

## 2021-06-11 DIAGNOSIS — I10 ESSENTIAL HYPERTENSION: ICD-10-CM

## 2021-06-11 LAB
ALBUMIN SERPL-MCNC: 3.7 G/DL (ref 3.4–5)
ALBUMIN UR-MCNC: NEGATIVE MG/DL
ALP SERPL-CCNC: 41 U/L (ref 40–150)
ALT SERPL W P-5'-P-CCNC: 16 U/L (ref 0–50)
ANION GAP SERPL CALCULATED.3IONS-SCNC: 8 MMOL/L (ref 3–14)
APPEARANCE UR: CLEAR
AST SERPL W P-5'-P-CCNC: 18 U/L (ref 0–45)
BASOPHILS # BLD AUTO: 0 10E9/L (ref 0–0.2)
BASOPHILS NFR BLD AUTO: 0.4 %
BILIRUB SERPL-MCNC: 0.7 MG/DL (ref 0.2–1.3)
BILIRUB UR QL STRIP: NEGATIVE
BUN SERPL-MCNC: 13 MG/DL (ref 7–30)
CALCIUM SERPL-MCNC: 9.5 MG/DL (ref 8.5–10.1)
CHLORIDE SERPL-SCNC: 104 MMOL/L (ref 94–109)
CHOLEST SERPL-MCNC: 201 MG/DL
CO2 SERPL-SCNC: 27 MMOL/L (ref 20–32)
COLOR UR AUTO: YELLOW
CREAT SERPL-MCNC: 0.91 MG/DL (ref 0.52–1.04)
DIFFERENTIAL METHOD BLD: ABNORMAL
EOSINOPHIL # BLD AUTO: 0.1 10E9/L (ref 0–0.7)
EOSINOPHIL NFR BLD AUTO: 1.1 %
ERYTHROCYTE [DISTWIDTH] IN BLOOD BY AUTOMATED COUNT: 11.9 % (ref 10–15)
GFR SERPL CREATININE-BSD FRML MDRD: 63 ML/MIN/{1.73_M2}
GLUCOSE SERPL-MCNC: 101 MG/DL (ref 70–99)
GLUCOSE UR STRIP-MCNC: NEGATIVE MG/DL
HCT VFR BLD AUTO: 42.9 % (ref 35–47)
HDLC SERPL-MCNC: 109 MG/DL
HGB BLD-MCNC: 14.3 G/DL (ref 11.7–15.7)
HGB UR QL STRIP: NEGATIVE
KETONES UR STRIP-MCNC: NEGATIVE MG/DL
LDLC SERPL CALC-MCNC: 78 MG/DL
LEUKOCYTE ESTERASE UR QL STRIP: NEGATIVE
LYMPHOCYTES # BLD AUTO: 1 10E9/L (ref 0.8–5.3)
LYMPHOCYTES NFR BLD AUTO: 17.6 %
MCH RBC QN AUTO: 34 PG (ref 26.5–33)
MCHC RBC AUTO-ENTMCNC: 33.3 G/DL (ref 31.5–36.5)
MCV RBC AUTO: 102 FL (ref 78–100)
MONOCYTES # BLD AUTO: 0.6 10E9/L (ref 0–1.3)
MONOCYTES NFR BLD AUTO: 10 %
NEUTROPHILS # BLD AUTO: 3.9 10E9/L (ref 1.6–8.3)
NEUTROPHILS NFR BLD AUTO: 70.9 %
NITRATE UR QL: NEGATIVE
NONHDLC SERPL-MCNC: 92 MG/DL
PH UR STRIP: 7 PH (ref 5–7)
PLATELET # BLD AUTO: 193 10E9/L (ref 150–450)
POTASSIUM SERPL-SCNC: 3.9 MMOL/L (ref 3.4–5.3)
PROT SERPL-MCNC: 7.1 G/DL (ref 6.8–8.8)
RBC # BLD AUTO: 4.2 10E12/L (ref 3.8–5.2)
SODIUM SERPL-SCNC: 139 MMOL/L (ref 133–144)
SOURCE: NORMAL
SP GR UR STRIP: <=1.005 (ref 1–1.03)
TRIGL SERPL-MCNC: 71 MG/DL
UROBILINOGEN UR STRIP-ACNC: 0.2 EU/DL (ref 0.2–1)
WBC # BLD AUTO: 5.5 10E9/L (ref 4–11)

## 2021-06-11 PROCEDURE — 80053 COMPREHEN METABOLIC PANEL: CPT | Mod: ZL | Performed by: NURSE PRACTITIONER

## 2021-06-11 PROCEDURE — G0463 HOSPITAL OUTPT CLINIC VISIT: HCPCS | Mod: 25

## 2021-06-11 PROCEDURE — 85025 COMPLETE CBC W/AUTO DIFF WBC: CPT | Mod: ZL | Performed by: NURSE PRACTITIONER

## 2021-06-11 PROCEDURE — 80061 LIPID PANEL: CPT | Mod: ZL | Performed by: NURSE PRACTITIONER

## 2021-06-11 PROCEDURE — 99214 OFFICE O/P EST MOD 30 MIN: CPT | Performed by: NURSE PRACTITIONER

## 2021-06-11 PROCEDURE — G0463 HOSPITAL OUTPT CLINIC VISIT: HCPCS

## 2021-06-11 PROCEDURE — 36415 COLL VENOUS BLD VENIPUNCTURE: CPT | Mod: ZL | Performed by: NURSE PRACTITIONER

## 2021-06-11 PROCEDURE — 90471 IMMUNIZATION ADMIN: CPT

## 2021-06-11 PROCEDURE — 81003 URINALYSIS AUTO W/O SCOPE: CPT | Mod: ZL | Performed by: NURSE PRACTITIONER

## 2021-06-11 RX ORDER — DILTIAZEM HYDROCHLORIDE 360 MG/1
360 CAPSULE, EXTENDED RELEASE ORAL DAILY
Qty: 90 CAPSULE | Refills: 3 | Status: SHIPPED | OUTPATIENT
Start: 2021-06-11 | End: 2022-03-28

## 2021-06-11 ASSESSMENT — MIFFLIN-ST. JEOR: SCORE: 1176.77

## 2021-06-11 ASSESSMENT — PAIN SCALES - GENERAL: PAINLEVEL: NO PAIN (0)

## 2021-06-11 NOTE — NURSING NOTE
"Chief Complaint   Patient presents with     Pre-Op Exam       Initial /74 (BP Location: Right arm, Patient Position: Sitting, Cuff Size: Adult Regular)   Pulse 81   Temp 98.2  F (36.8  C) (Tympanic)   Resp 14   Ht 1.605 m (5' 3.2\")   Wt 68.9 kg (152 lb)   SpO2 97%   BMI 26.76 kg/m   Estimated body mass index is 26.76 kg/m  as calculated from the following:    Height as of this encounter: 1.605 m (5' 3.2\").    Weight as of this encounter: 68.9 kg (152 lb).  Medication Reconciliation: complete  Maria Victoria Reyes  "

## 2021-06-11 NOTE — TELEPHONE ENCOUNTER
Diltiazem      Last Written Prescription Date:  6/23/2020  Last Fill Quantity: 90,   # refills: 3  Last Office Visit: 5/20/2021  Future Office visit:    Next 5 appointments (look out 90 days)    Jun 11, 2021  9:45 AM  (Arrive by 9:30 AM)  Pre-Op physical with Tammy Madera NP  [facility] ([facility] ) [address] [place] [address]  [phone]   Jun 20, 2021 10:00 AM  (Arrive by 9:45 AM)  SHORT with [facility] ([facility] [place] ) [address]  [place] [address]  [phone]           PCP is out

## 2021-06-14 ENCOUNTER — TELEPHONE (OUTPATIENT)
Dept: FAMILY MEDICINE | Facility: OTHER | Age: 71
End: 2021-06-14

## 2021-06-14 ENCOUNTER — ANCILLARY PROCEDURE (OUTPATIENT)
Dept: GENERAL RADIOLOGY | Facility: OTHER | Age: 71
End: 2021-06-14
Attending: NURSE PRACTITIONER
Payer: MEDICARE

## 2021-06-14 PROCEDURE — 93005 ELECTROCARDIOGRAM TRACING: CPT

## 2021-06-14 PROCEDURE — 71046 X-RAY EXAM CHEST 2 VIEWS: CPT | Mod: TC,FY

## 2021-06-14 NOTE — TELEPHONE ENCOUNTER
Faxed preop fx# [phone] [facility]  Surgery 6/23/21 Dr Walsh Cardiology fxd demo,med list, H & P 6/11/21 Tammy aMdera NP ,labs, Chest xray report 6/11/21, EKG  Dx: procedure/ transcatheter amezcua aoritic valve replacement  Shobha Faye

## 2021-06-16 ENCOUNTER — CARE COORDINATION (OUTPATIENT)
Dept: CARDIOLOGY | Facility: CLINIC | Age: 71
End: 2021-06-16

## 2021-06-16 DIAGNOSIS — I35.9 AORTIC VALVE DISORDER: Primary | ICD-10-CM

## 2021-06-16 DIAGNOSIS — I35.0 AORTIC VALVE STENOSIS, ETIOLOGY OF CARDIAC VALVE DISEASE UNSPECIFIED: ICD-10-CM

## 2021-06-16 NOTE — PROGRESS NOTES
Mary Alice's TAVR CT images were reviewed extensively by the structural heart team.  After careful re-measuring, the conclusion was reached that femoral, subclavian, axillary and carotid access will not be appropriate given the size of her vessels.  The decision has been made to proceed with an [place] valve, using Transcaval access.  Mary Alice's previously scheduled procedure for June 23 has been postponed and will be rescheduled.  This information was explained to Mary Alice.  She was very disappointed that her has been postponed, however did understand the reason for this.  A manley will be requested for the procedure, and her case will be rescheduled.      A new case request will be made for the rescheduling of the case:    Date: 6/16/2021    Time of Call: 1:36 PM     Diagnosis:  Severe aortic stenosis     [ TORB ] Ordering provider: Domo Sarah MD  Order:   CASE REQUEST:  TAVR     Order received by: Treva Patel RN

## 2021-06-17 ENCOUNTER — VIRTUAL VISIT (OUTPATIENT)
Dept: CARDIOLOGY | Facility: CLINIC | Age: 71
End: 2021-06-17
Attending: THORACIC SURGERY (CARDIOTHORACIC VASCULAR SURGERY)
Payer: COMMERCIAL

## 2021-06-17 DIAGNOSIS — I35.0 AORTIC VALVE STENOSIS, ETIOLOGY OF CARDIAC VALVE DISEASE UNSPECIFIED: ICD-10-CM

## 2021-06-17 DIAGNOSIS — I71.20 THORACIC AORTIC ANEURYSM WITHOUT RUPTURE (H): ICD-10-CM

## 2021-06-17 DIAGNOSIS — I25.10 CORONARY ARTERY DISEASE INVOLVING NATIVE CORONARY ARTERY OF NATIVE HEART WITHOUT ANGINA PECTORIS: ICD-10-CM

## 2021-06-17 DIAGNOSIS — Q23.81 BICUSPID AORTIC VALVE: ICD-10-CM

## 2021-06-17 DIAGNOSIS — I48.19 PERSISTENT ATRIAL FIBRILLATION (H): Primary | ICD-10-CM

## 2021-06-17 PROCEDURE — 99204 OFFICE O/P NEW MOD 45 MIN: CPT | Mod: 95 | Performed by: SURGERY

## 2021-06-17 NOTE — PROGRESS NOTES
Cardiovascular and Thoracic Surgery Consultation     Mary Alice Cameron MRN# [id number]   YOB: 1950 Age: 71 year old   Date of Admission: (Not on file)        Mary Alice is a 71 year old who is being evaluated via a billable video visit.      Video Start Time: 15:40          HPI     Review of Systems         Physical Exam             Video-Visit Details    Type of service:  Video Visit    Video End Time:16:01    Originating Location (pt. Location): Home    Distant Location (provider location):  [facility] [employer]     Platform used for Video Visit: [employer]     Reason for consult: I was asked by Dr. Davis Virgen to evaluate this patient for severe aortic stenosis.           Assessment and Plan:   Ms. Cameron is a 71-year-old woman with severe aortic stenosis that is symptomatic. I discussed the option of open surgical AVR. Because of his advanced age and intermediate STS risk, I do not recommend this unless he has severe left main or proximal LAD coronary artery disease (in which case I would recommend AVR and coronary artery bypass grafting). Instead, I recommend transcatheter aortic valve replacement. I discussed the technical details of this with the patient as well. I also discussed the risks and benefits. The risks include but are not limited to bleeding, infection, heart block requiring pacemaker, stroke, cardiac perforation with hemopericardium, aortic dissection and root rupture.      He is a candidate for subxiphoid window if he has a wire perforation. He is a candidate for femoral arterial repair if necessary. He would be high risk for repair in the rare event that he has an aortic dissection or root rupture, but this is still reasonable. I would recommend repair in this situation, and he is in agreement with this recommendation.     Alternate access to be determined               Chief Complaint:   Ms. Cameron is a 71-year-old woman with severe aortic stenosis that is symptomatic.     History  is obtained from the patient         History of Present Illness:   This patient is a 71 year old female who presents with dyspnea on exertion. She has known bicuspid aortic valve and echocardiography now demonstrates severe aortic stenosis. She has gained 15 pounds the past year and she has lower extremity edema. She also has paroxysmal atrial fibrillation and she is on anticoagulation for this. In addition, she has comorbidities including COPD requiring supplemental oxygen at night, and she has hypertension and non-obstructive CAD.    [place] [address] [place] [facility]. She is near her grandchildren.            Past Medical History:     Past Medical History:   Diagnosis Date     Asthma      Atrial fibrillation (H)      COPD (chronic obstructive pulmonary disease) (H)      Depression      High cholesterol      HTN (hypertension)      Thyroid disease              Past Surgical History:     Past Surgical History:   Procedure Laterality Date     BACK SURGERY  2006     CARDIAC SURGERY  06/12/2018    Angiogram at [facility]     COLONOSCOPY N/A 1/19/2016    Procedure: COLONOSCOPY;  Surgeon: Waldemar Bob MD;  Location: [facility]     CV CORONARY ANGIOGRAM N/A 4/30/2021    Procedure: CV CORONARY ANGIOGRAM;  Surgeon: Poli Walsh MD;  [facility]     CV LEFT HEART CATH N/A 4/30/2021    Procedure: CV LEFT HEART CATH;  Surgeon: Poli Walsh MD;  [facility]     CV RIGHT HEART CATH MEASUREMENTS RECORDED N/A 4/30/2021    Procedure: CV RIGHT HEART CATH;  Surgeon: Poli Walsh MD;  [facility]     HYSTERECTOMY  1980    partial     SLING BLADDER SUSPENSION WITH FASCIA LINNETTE              Social History:     Social History     Tobacco Use     Smoking status: Former Smoker     Packs/day: 0.50     Years: 41.00     Pack years: 20.50     Types: Cigarettes     Start date: 1/1/1966     Quit  date: 2007     Years since quittin.3     Smokeless tobacco: Never Used   Substance Use Topics     Alcohol use: No     Alcohol/week: 0.0 standard drinks             Family History:     Family History   Problem Relation Age of Onset     Prostate Cancer Father      Hypertension Father      Heart Failure Father         CHF     Asthma Mother      Cancer Mother         ovarian     Hypertension Mother      Asthma Brother      Hypertension Sister      Hypertension Brother              Immunizations:     Immunization History   Administered Date(s) Administered     Influenza (High Dose) 3 valent vaccine 10/15/2015, 10/20/2016, 10/12/2017, 10/10/2018, 10/10/2019     Influenza (IIV3) PF 2004     Influenza Quad, Recombinant, p-free (RIV4) 10/12/2020     Influenza Vaccine IM > 6 months Valent IIV4 10/15/2014     Pneumo Conj 13-V (2010&after) 2016     Pneumococcal 23 valent 10/31/2002, 09/10/2010, 2011, 2017     TD (ADULT, 7+) 1997, 2007, 2021     Tdap (Adacel,Boostrix) 09/10/2010     Zoster vaccine, live 2011             Allergies:     Allergies   Allergen Reactions     Amlodipine Besylate Cough     Norvasc     Lisinopril Cough     Ace Inhibitors Cough             Medications:     Current Outpatient Medications Ordered in Epic   Medication     acetaminophen (TYLENOL) 500 MG tablet     atorvastatin (LIPITOR) 10 MG tablet     Calcium Carb-Cholecalciferol (CALTRATE 600+D3 SOFT PO)     cloNIDine (CATAPRES) 0.1 MG tablet     COMBIVENT RESPIMAT  MCG/ACT inhaler     Cyanocobalamin (VITAMIN B-12 PO)     diltiazem ER (TIAZAC) 360 MG 24 hr ER beaded capsule     diphenhydrAMINE (BENADRYL) 25 MG tablet     ELIQUIS ANTICOAGULANT 5 MG tablet     fluticasone-salmeterol (ADVAIR-HFA) 230-21 MCG/ACT inhaler     furosemide (LASIX) 40 MG tablet     hydrOXYzine (ATARAX) 25 MG tablet     levothyroxine (SYNTHROID/LEVOTHROID) 100 MCG tablet     losartan (COZAAR) 50 MG tablet     OTHER  MEDICAL SUPPLIES     potassium chloride ER (KLOR-CON M) 20 MEQ CR tablet     No current Epic-ordered facility-administered medications on file.              Review of Systems:   The 10 point Review of Systems is negative other than noted in the HPI           Physical Exam:     Deferred due to video visit          Data:   All laboratory data reviewed  All cardiac studies reviewed by me.  All imaging studies reviewed by me.    ECHOCARDIOGRAM:  No pericardial effusion is present.  Global and regional left ventricular function is normal with an EF of 55-60%.  The right ventricle is normal size.  Mild to moderate left atrial enlargement is present.  Mild to moderate right atrial enlargement is present.  Mild mitral insufficiency is present.  Moderate aortic valve calcification is present.  Cannot exclude a bicuspid aortic valve.  Mild to moderate aortic insufficiency is present.  The peak aortic velocity is 3.68 m/sec.  The mean gradient across the aortic valve is32.2 mmHg.  Peak gradient AoV 54.4 mm Hg  Moderate aortic stenosis is present.  The Aortic Stenosis has progressed from previous ECHO done on 10/11/2018  Mild tricuspid insufficiency is present.  Right ventricular systolic pressure is 29mmHg above the right atrial pressure.  The pulmonic valve is normal.  The aorta root is normal.    CARDIAC CATHETERIZATION:  Left Main   The vessel is moderate in size and is angiographically normal.   Left Anterior Descending   The vessel is moderate in size.   Left Circumflex   The vessel is moderate in size and is angiographically normal.   First Obtuse Marginal Branch   Second Obtuse Marginal Branch   Third Obtuse Marginal Branch   Right Coronary Artery   The vessel is moderate in size and is angiographically normal.     Pressures Phase: Baseline     Time Systolic (mmHg) Diastolic (mmHg) Mean (mmHg) A Wave (mmHg) V Wave (mmHg) EDP (mmHg) Max dp/dt (mmHg/sec) HR (bpm) Content (mL/dL) SAT (%)   RA Pressures  3:17 PM   8    9     9      88        RV Pressures  2:57 PM       384           3:17 PM 51    1       10     88        PA Pressures  3:19 PM 51    28    38        90        PCW Pressures  3:18 PM   18    20    18      86        AO Pressures  3:34     67    85        89        Blood Flow Results Phase: Baseline     Time Results  Indexed Values (L/min/m2)   QP  2:57 PM 3.78 L/min    2.15      QS  2:57 PM 3.78 L/min    2.15      Blood Oximetry Phase: Baseline     Time Hb  SAT(%)  PO2  Content (mL/dL) PA Sat (%)   PA  2:57 PM  59.9 %      59.9      Art  2:57 PM  100 %     17.27           CTA TAVR:  1.  Severely calcified Kasia 1  bicuspid aortic valve that exhibits  fusion of the left and right coronary cusps. The aortic valve calcium  score is 3486. The LVOT is minimally calcified. The ascending aorta is  mildly calcified, aortic arch is  moderately calcified, the descending  thoracic aorta is moderately calcified. There is no mitral annular  calcification.  2.  There is moderate calcification of the annulus.  3.  There are significant native coronary calcifications.   4.  The arch vessel branching pattern is normal.   5.  The suprarenal abdominal aorta is moderately calcified; infrarenal  abdominal aorta is severely calcified. Both the suprarenal and  infrarenal abdominal aorta also exhibit significant quantities of  noncalcific atherosclerotic plaque, consistent with extensive  peripheral arterial disease extending from the abdominal aorta to the  distal portions of the lower extremity vasculature.  6.  Moderately calcified origins of celiac trunk, superior mesenteric  artery and inferior mesenteric artery.  There is an accessory right  renal artery and bifurcation of the left renal artery shortly after  its origin from the abdominal aorta. The origins of all three renal  arteries are mildly calcified.   7.  There is no acute aortic pathology, such as dissection, intramural  hematoma, or contained rupture.      MEASUREMENTS:    Representative dimensions of the thoracoabdominal aorta are as  follows:     1. AORTIC ANNULUS MEASUREMENTS:     Motion artifact was evident in the sequences used to evaluate the  aortic annulus.     1.  The average aortic annulus diameter is 27.6 mm, (long diameter is  32.5 mm and short diameter is 24.2 mm)  2.  Aortic annulus area is 5.98 cm2  3.  Aortic annulus perimeter is 88.8 mm  4.  Suggested 3-cusp coaxial angle for aortic valve is (Faroese 37 , CAU  5) and alternate A-P coaxial angle is (Faroese 0 , CAU 63), these  angles  will vary depending upon the patient's body position  5.  Aortic root angle is 67 degrees  6.  Left main - Annulus distance: 13.7 mm, RCA - Annulus distance:  13.8 mm     2. LVOT MEASUREMENTS:     1.  The average LVOT diameter (measured 4 mm below annular plane) is  27.9 mm  2.  LVOT area is 6.11 cm2  3.  LVOT perimeter is 90.9 mm     3. AORTA MEASUREMENTS     1.  The aortic root at the sinuses of Valsalva: 38.3 mm x  33.2 mm x  33.1 mm  2.  The sinotubular junction:  35.2 mm x 35.0 mm  3.  Sinotubular junction height: 24.2 mm x 19.0 mm  4.  Proximal ascending aorta: 43.7 mm x 43.4 mm  5.  Distal abdominal aorta proximal to the bifurcation: 10.5 mm x 6.9  mm (average diameter 8.2 mm).  6.  Innominate artery 3 cm from ostium: 11.3 mm x 10.3 mm.   7.  Left common carotid artery 3 cm from ostium: 7.4 mm x 7.3 mm     4. ILIOFEMORAL MEASUREMENTS:     RIGHT:  1.  Right common iliac artery: 5.8 mm x 4.8 mm  (average diameter 5.2  mm due to stenosis from both calcified and soft atherosclerotic  plaques)  2.  Right external iliac artery: 6.0 mm x 5.2 mm (average diameter 4.9  mm)  3.  Right common femoral artery: 5.8 mm x 4.1 mm   4.  Tortuosity: mild   5.  Calcification: moderate with extensive noncalcific atherosclerotic  plaque     LEFT:  1.  Left common iliac artery: 5.7 mm x  5.4 mm (average diameter 4.9  mm)  2.  Left external iliac artery: 5.9 mm x 4.3 mm (average diameter 4.9  mm)  3.  Left  common femoral artery: 6.0 mm x 4.2 mm (average diameter 4.8  mm)  4.  Tortuosity: mild   5.  Calcification: moderate with extensive noncalcific atherosclerotic  plaque     5. SUBCLAVIAN MEASUREMENTS:     RIGHT:  1. Minimal luminal diameter: 7.1 mm x  6.4 mm  2. Tortuosity: mild   3. Calcification: moderate with a focal area of significant  calcification in the distal innominate artery (luminal diameter 10.3 x  6.4 mm, average diameter 8.0 mm), just before it bifurcates to give  rise to the right subclavian and right common carotid arteries.      LEFT:  1. Minimal luminal diameter: 6.1 mm x  5.8 mm (average diameter 5.8  mm)  2. Tortuosity: mild   3. Calcification: moderate

## 2021-06-17 NOTE — LETTER
6/17/2021      RE: Mary Alice Cameron  [address]  [address]  [facility] [address]       Dear Colleague,    Thank you for the opportunity to participate in the care of your patient, Mary Alice Cameron, at the [facility]. Please see a copy of my visit note below.    Cardiovascular and Thoracic Surgery Consultation     Mary Alice Cameron MRN# [id number]   YOB: 1950 Age: 71 year old   Date of Admission: (Not on file)        Mary Alice is a 71 year old who is being evaluated via a billable video visit.      Video Start Time: 15:40          HPI     Review of Systems         Physical Exam             Video-Visit Details    Type of service:  Video Visit    Video End Time:16:01    Originating Location (pt. Location): Home    Distant Location (provider location):  [facility]     Platform used for Video Visit: [employer]     Reason for consult: I was asked by Dr. Davis Virgen to evaluate this patient for severe aortic stenosis.           Assessment and Plan:   Ms. Cameron is a 71-year-old woman with severe aortic stenosis that is symptomatic. I discussed the option of open surgical AVR. Because of his advanced age and intermediate STS risk, I do not recommend this unless he has severe left main or proximal LAD coronary artery disease (in which case I would recommend AVR and coronary artery bypass grafting). Instead, I recommend transcatheter aortic valve replacement. I discussed the technical details of this with the patient as well. I also discussed the risks and benefits. The risks include but are not limited to bleeding, infection, heart block requiring pacemaker, stroke, cardiac perforation with hemopericardium, aortic dissection and root rupture.      He is a candidate for subxiphoid window if he has a wire perforation. He is a candidate for femoral arterial repair if necessary. He would be high risk for repair in  the rare event that he has an aortic dissection or root rupture, but this is still reasonable. I would recommend repair in this situation, and he is in agreement with this recommendation.     Alternate access to be determined               Chief Complaint:   Ms. Cameron is a 71-year-old woman with severe aortic stenosis that is symptomatic.     History is obtained from the patient         History of Present Illness:   This patient is a 71 year old female who presents with dyspnea on exertion. She has known bicuspid aortic valve and echocardiography now demonstrates severe aortic stenosis. She has gained 15 pounds the past year and she has lower extremity edema. She also has paroxysmal atrial fibrillation and she is on anticoagulation for this. In addition, she has comorbidities including COPD requiring supplemental oxygen at night, and she has hypertension and non-obstructive CAD.    [place] [address] [place] [facility]. She is near her grandchildren.            Past Medical History:     Past Medical History:   Diagnosis Date     Asthma      Atrial fibrillation (H)      COPD (chronic obstructive pulmonary disease) (H)      Depression      High cholesterol      HTN (hypertension)      Thyroid disease              Past Surgical History:     Past Surgical History:   Procedure Laterality Date     BACK SURGERY  2006     CARDIAC SURGERY  06/12/2018    Angiogram at [facility]     COLONOSCOPY N/A 1/19/2016    Procedure: COLONOSCOPY;  Surgeon: Waldemar Bob MD;  Location: [facility] OR     CV CORONARY ANGIOGRAM N/A 4/30/2021    Procedure: CV CORONARY ANGIOGRAM;  Surgeon: Poli Walsh MD;  Location:  HEART CARDIAC CATH LAB     CV LEFT HEART CATH N/A 4/30/2021    Procedure: CV LEFT HEART CATH;  Surgeon: Poli Walsh MD;  Location:  HEART CARDIAC CATH LAB     CV RIGHT HEART CATH MEASUREMENTS RECORDED N/A 4/30/2021    Procedure: CV RIGHT HEART CATH;  Surgeon:  Poli Walsh MD;  Location:  HEART CARDIAC CATH LAB     HYSTERECTOMY      partial     SLING BLADDER SUSPENSION WITH FASCIA LINNETTE              Social History:     Social History     Tobacco Use     Smoking status: Former Smoker     Packs/day: 0.50     Years: 41.00     Pack years: 20.50     Types: Cigarettes     Start date: 1966     Quit date: 2007     Years since quittin.3     Smokeless tobacco: Never Used   Substance Use Topics     Alcohol use: No     Alcohol/week: 0.0 standard drinks             Family History:     Family History   Problem Relation Age of Onset     Prostate Cancer Father      Hypertension Father      Heart Failure Father         CHF     Asthma Mother      Cancer Mother         ovarian     Hypertension Mother      Asthma Brother      Hypertension Sister      Hypertension Brother              Immunizations:     Immunization History   Administered Date(s) Administered     Influenza (High Dose) 3 valent vaccine 10/15/2015, 10/20/2016, 10/12/2017, 10/10/2018, 10/10/2019     Influenza (IIV3) PF 2004     Influenza Quad, Recombinant, p-free (RIV4) 10/12/2020     Influenza Vaccine IM > 6 months Valent IIV4 10/15/2014     Pneumo Conj 13-V (2010&after) 2016     Pneumococcal 23 valent 10/31/2002, 09/10/2010, 2011, 2017     TD (ADULT, 7+) 1997, 2007, 2021     Tdap (Adacel,Boostrix) 09/10/2010     Zoster vaccine, live 2011             Allergies:     Allergies   Allergen Reactions     Amlodipine Besylate Cough     Norvasc     Lisinopril Cough     Ace Inhibitors Cough             Medications:     Current Outpatient Medications Ordered in Epic   Medication     acetaminophen (TYLENOL) 500 MG tablet     atorvastatin (LIPITOR) 10 MG tablet     Calcium Carb-Cholecalciferol (CALTRATE 600+D3 SOFT PO)     cloNIDine (CATAPRES) 0.1 MG tablet     COMBIVENT RESPIMAT  MCG/ACT inhaler     Cyanocobalamin (VITAMIN B-12 PO)     diltiazem ER (TIAZAC)  360 MG 24 hr ER beaded capsule     diphenhydrAMINE (BENADRYL) 25 MG tablet     ELIQUIS ANTICOAGULANT 5 MG tablet     fluticasone-salmeterol (ADVAIR-HFA) 230-21 MCG/ACT inhaler     furosemide (LASIX) 40 MG tablet     hydrOXYzine (ATARAX) 25 MG tablet     levothyroxine (SYNTHROID/LEVOTHROID) 100 MCG tablet     losartan (COZAAR) 50 MG tablet     OTHER MEDICAL SUPPLIES     potassium chloride ER (KLOR-CON M) 20 MEQ CR tablet     No current Epic-ordered facility-administered medications on file.              Review of Systems:   The 10 point Review of Systems is negative other than noted in the HPI           Physical Exam:     Deferred due to video visit          Data:   All laboratory data reviewed  All cardiac studies reviewed by me.  All imaging studies reviewed by me.    ECHOCARDIOGRAM:  No pericardial effusion is present.  Global and regional left ventricular function is normal with an EF of 55-60%.  The right ventricle is normal size.  Mild to moderate left atrial enlargement is present.  Mild to moderate right atrial enlargement is present.  Mild mitral insufficiency is present.  Moderate aortic valve calcification is present.  Cannot exclude a bicuspid aortic valve.  Mild to moderate aortic insufficiency is present.  The peak aortic velocity is 3.68 m/sec.  The mean gradient across the aortic valve is32.2 mmHg.  Peak gradient AoV 54.4 mm Hg  Moderate aortic stenosis is present.  The Aortic Stenosis has progressed from previous ECHO done on 10/11/2018  Mild tricuspid insufficiency is present.  Right ventricular systolic pressure is 29mmHg above the right atrial pressure.  The pulmonic valve is normal.  The aorta root is normal.    CARDIAC CATHETERIZATION:  Left Main   The vessel is moderate in size and is angiographically normal.   Left Anterior Descending   The vessel is moderate in size.   Left Circumflex   The vessel is moderate in size and is angiographically normal.   First Obtuse Marginal Branch   Second  Obtuse Marginal Branch   Third Obtuse Marginal Branch   Right Coronary Artery   The vessel is moderate in size and is angiographically normal.     Pressures Phase: Baseline     Time Systolic (mmHg) Diastolic (mmHg) Mean (mmHg) A Wave (mmHg) V Wave (mmHg) EDP (mmHg) Max dp/dt (mmHg/sec) HR (bpm) Content (mL/dL) SAT (%)   RA Pressures  3:17 PM   8    9    9      88        RV Pressures  2:57 PM       384           3:17 PM 51    1       10     88        PA Pressures  3:19 PM 51    28    38        90        PCW Pressures  3:18 PM   18    20    18      86        AO Pressures  3:34     67    85        89        Blood Flow Results Phase: Baseline     Time Results  Indexed Values (L/min/m2)   QP  2:57 PM 3.78 L/min    2.15      QS  2:57 PM 3.78 L/min    2.15      Blood Oximetry Phase: Baseline     Time Hb  SAT(%)  PO2  Content (mL/dL) PA Sat (%)   PA  2:57 PM  59.9 %      59.9      Art  2:57 PM  100 %     17.27           CTA TAVR:  1.  Severely calcified Kasia 1  bicuspid aortic valve that exhibits  fusion of the left and right coronary cusps. The aortic valve calcium  score is 3486. The LVOT is minimally calcified. The ascending aorta is  mildly calcified, aortic arch is  moderately calcified, the descending  thoracic aorta is moderately calcified. There is no mitral annular  calcification.  2.  There is moderate calcification of the annulus.  3.  There are significant native coronary calcifications.   4.  The arch vessel branching pattern is normal.   5.  The suprarenal abdominal aorta is moderately calcified; infrarenal  abdominal aorta is severely calcified. Both the suprarenal and  infrarenal abdominal aorta also exhibit significant quantities of  noncalcific atherosclerotic plaque, consistent with extensive  peripheral arterial disease extending from the abdominal aorta to the  distal portions of the lower extremity vasculature.  6.  Moderately calcified origins of celiac trunk, superior mesenteric  artery and  inferior mesenteric artery.  There is an accessory right  renal artery and bifurcation of the left renal artery shortly after  its origin from the abdominal aorta. The origins of all three renal  arteries are mildly calcified.   7.  There is no acute aortic pathology, such as dissection, intramural  hematoma, or contained rupture.      MEASUREMENTS:   Representative dimensions of the thoracoabdominal aorta are as  follows:     1. AORTIC ANNULUS MEASUREMENTS:     Motion artifact was evident in the sequences used to evaluate the  aortic annulus.     1.  The average aortic annulus diameter is 27.6 mm, (long diameter is  32.5 mm and short diameter is 24.2 mm)  2.  Aortic annulus area is 5.98 cm2  3.  Aortic annulus perimeter is 88.8 mm  4.  Suggested 3-cusp coaxial angle for aortic valve is (Korean 37 , CAU  5) and alternate A-P coaxial angle is (Korean 0 , CAU 63), these  angles  will vary depending upon the patient's body position  5.  Aortic root angle is 67 degrees  6.  Left main - Annulus distance: 13.7 mm, RCA - Annulus distance:  13.8 mm     2. LVOT MEASUREMENTS:     1.  The average LVOT diameter (measured 4 mm below annular plane) is  27.9 mm  2.  LVOT area is 6.11 cm2  3.  LVOT perimeter is 90.9 mm     3. AORTA MEASUREMENTS     1.  The aortic root at the sinuses of Valsalva: 38.3 mm x  33.2 mm x  33.1 mm  2.  The sinotubular junction:  35.2 mm x 35.0 mm  3.  Sinotubular junction height: 24.2 mm x 19.0 mm  4.  Proximal ascending aorta: 43.7 mm x 43.4 mm  5.  Distal abdominal aorta proximal to the bifurcation: 10.5 mm x 6.9  mm (average diameter 8.2 mm).  6.  Innominate artery 3 cm from ostium: 11.3 mm x 10.3 mm.   7.  Left common carotid artery 3 cm from ostium: 7.4 mm x 7.3 mm     4. ILIOFEMORAL MEASUREMENTS:     RIGHT:  1.  Right common iliac artery: 5.8 mm x 4.8 mm  (average diameter 5.2  mm due to stenosis from both calcified and soft atherosclerotic  plaques)  2.  Right external iliac artery: 6.0 mm x 5.2 mm  "(average diameter 4.9  mm)  3.  Right common femoral artery: 5.8 mm x 4.1 mm   4.  Tortuosity: mild   5.  Calcification: moderate with extensive noncalcific atherosclerotic  plaque     LEFT:  1.  Left common iliac artery: 5.7 mm x  5.4 mm (average diameter 4.9  mm)  2.  Left external iliac artery: 5.9 mm x 4.3 mm (average diameter 4.9  mm)  3.  Left common femoral artery: 6.0 mm x 4.2 mm (average diameter 4.8  mm)  4.  Tortuosity: mild   5.  Calcification: moderate with extensive noncalcific atherosclerotic  plaque     5. SUBCLAVIAN MEASUREMENTS:     RIGHT:  1. Minimal luminal diameter: 7.1 mm x  6.4 mm  2. Tortuosity: mild   3. Calcification: moderate with a focal area of significant  calcification in the distal innominate artery (luminal diameter 10.3 x  6.4 mm, average diameter 8.0 mm), just before it bifurcates to give  rise to the right subclavian and right common carotid arteries.      LEFT:  1. Minimal luminal diameter: 6.1 mm x  5.8 mm (average diameter 5.8  mm)  2. Tortuosity: mild   3. Calcification: moderate          Mary Alice is a 71 year old who is being evaluated via a billable video visit.      How would you like to obtain your AVS? MyChart  If the video visit is dropped, the invitation should be resent by: Text to cell phone: [phone]  Will anyone else be joining your video visit? No    Vitals - Patient Reported  Weight (Patient Reported): 68 kg (150 lb)  Height (Patient Reported): 165.1 cm (5' 5\")  BMI (Based on Pt Reported Ht/Wt): 24.96  Pain Score: No Pain (0)  Pain Loc: Chest            Please do not hesitate to contact me if you have any questions/concerns.     Sincerely,     Fly Smith MD    "

## 2021-07-02 ENCOUNTER — TELEPHONE (OUTPATIENT)
Dept: FAMILY MEDICINE | Facility: OTHER | Age: 71
End: 2021-07-02

## 2021-07-02 ENCOUNTER — CARE COORDINATION (OUTPATIENT)
Dept: CARDIOLOGY | Facility: CLINIC | Age: 71
End: 2021-07-02

## 2021-07-02 DIAGNOSIS — I35.0 AORTIC VALVE STENOSIS, ETIOLOGY OF CARDIAC VALVE DISEASE UNSPECIFIED: Primary | ICD-10-CM

## 2021-07-02 RX ORDER — SODIUM CHLORIDE 9 MG/ML
INJECTION, SOLUTION INTRAVENOUS CONTINUOUS
Status: CANCELLED | OUTPATIENT
Start: 2021-07-02

## 2021-07-02 RX ORDER — CEFAZOLIN SODIUM 2 G/50ML
2 SOLUTION INTRAVENOUS
Status: CANCELLED | OUTPATIENT
Start: 2021-07-02

## 2021-07-02 RX ORDER — LIDOCAINE 40 MG/G
CREAM TOPICAL
Status: CANCELLED | OUTPATIENT
Start: 2021-07-02

## 2021-07-02 NOTE — TELEPHONE ENCOUNTER
Left message to patient that brigida will call back Tuesday morning to try get her in for an apt next week for overbook for preop .     JARED Irene

## 2021-07-02 NOTE — PROGRESS NOTES
Mary Alice will be undergoing TAVR procedure, via transcaval route.  A manley has been secured and the case will be scheduled on July 14.    The following are preop instructions:    TAVR Procedure:  July 14, Wednesday, 7:30 a.m.    Check in to the hospital, admitting area on the main floor at 5:30 a.m.    [facility] [address]  [address] [place]  [address]     -   parking is available in front of the hospital from 5:15 am to 8:00 pm  -  Stop at the Information Desk in the lobby  -   Inform the information person that you are here for surgery. An escort to 3c will be provided. If you would not like an escort, please proceed to 3C on the 3rd floor. [phone]   -  Bring your ID and insurance card.    * Check in to the hospital front entrance, at the registration area by 5:30 a.m.   * They will direct you to the 3rd floor, pre-op area after checking in.    * After leaving the registration check-in area, there will be ONE visitor allowed until the time of discharge.    * When patients leave the pre-op area for their procedure, the visitor will leave the hospital.    * After the procedure is finished, the MD will call the visitor and update them on the procedure.     * When the patient arrives to the unit where they will stay until discharge, the visitor is allowed to come visit.      Nothing to eat after midnight; water, apple juice or 7up/Sprite is OK up to two hours prior to your scheduled procedure.  You may take your medications in the morning with a sip of water.      *Please use the special cleansing wipes, received at your pre-op History and Physical, the night before and the morning of your procedure.  Please focus the use of these wipes from neck to groin.      If you did NOT receive these special cleansing wipes at your pre-op History and Physical, then:   * Take a shower, with antibacterial soap, the night before the procedure, and the morning of the procedure.     * Please use a soap such as hibicleanse or chlorhexadine.  This can be purchased, over the counter, at a pharmacy.    * Focus the use of this soap from neck to groin.        Medications Instructions:  --Please HOLD your Eliquis 48 hours prior to your procedure.  LAST dose of Eliquis is Sunday, July 11.  NO Eliquis starting Monday, July 12.    --Please take Aspirin 325 mg, by mouth, the night before AND the morning of your procedure.    --Any other medication instructions will be discussed with you at your pre-op History and Physical exam.      If any questions please contact:  Genevieve Patel RN  Structural Heart Care Coordinator  [facility]  Office: [phone]  Pager: [phone]  ____________________________________________________________________________________________    Date: 7/2/2021    Time of Call: 12:00 PM     Diagnosis:  Severe aortic stenosis     [ TORB ] Ordering provider: Domo Sarah MD  Order:   TAVR pre-procedure orders     Order received by: Treva Patel RN

## 2021-07-02 NOTE — TELEPHONE ENCOUNTER
2:38 PM    Reason for Call: OVERBOOK    Patient is having surgery and needs a pre op pre op / DOS 7/14/21 [facility]/Dr. Dr Sarah/heart/valve replacment , pt is not willing to go to [facility] and there is no provider avail sooner, then her surgery, please c/b and advise and to schedule an appt     The patient is requesting an appointment for preop asap  with Dr Yen or Tammy Madera .    Was an appointment offered for this call? No  If yes : Appointment type              Date    Preferred method for responding to this message: Telephone Call  What is your phone number ? [phone]    If we cannot reach you directly, may we leave a detailed response at the number you provided? Yes     Can this message wait until your PCP/provider returns, if unavailable today? Luz Pretty

## 2021-07-05 DIAGNOSIS — Z11.59 ENCOUNTER FOR SCREENING FOR OTHER VIRAL DISEASES: Primary | ICD-10-CM

## 2021-07-07 NOTE — PROGRESS NOTES
[address]  [facility] [place] [address]  Phone: [phone]  Primary Provider: Braydon Yen        PREOPERATIVE EVALUATION:  Today's date: 7/8/2021    Mary Alice Cameron is a 71 year old female who presents for a preoperative evaluation.    Surgical Information:  Surgery/Procedure: transcaval trancatheter aortic valve replacement  Surgery Location: [facility]  Surgeon: Dr Sarah  Surgery Date: 7/14/21  Time of Surgery: 0730  Where patient plans to recover:   Fax number for surgical facility:     Type of Anesthesia Anticipated: to be determined    Assessment & Plan     The proposed surgical procedure is considered LOW risk.    Preop general physical exam  Doing well.    - EKG 12-lead complete w/read - (Clinic Performed)  - CBC with platelets and differential  - Basic metabolic panel    Aortic valve stenosis, etiology of cardiac valve disease unspecified  Upcoming TAVR  - EKG 12-lead complete w/read - (Clinic Performed)  - CBC with platelets and differential  - Basic metabolic panel  - XR CHEST 2 VW (Clinic Performed); Future    Persistent atrial fibrillation (H)  Stable.  No CHF sx.    - CBC with platelets and differential  - Basic metabolic panel    Coronary artery disease involving native coronary artery of native heart without angina pectoris  Stable without any sx.    - CBC with platelets and differential  - Basic metabolic panel         Risks and Recommendations:  The patient has the following additional risks and recommendations for perioperative complications:   - No identified additional risk factors other than previously addressed        RECOMMENDATION:  APPROVAL GIVEN to proceed with proposed procedure, without further diagnostic evaluation.                      Subjective     HPI related to upcoming procedure: upcoming TAVR      Health Care Directive:  Patient has a Health Care Directive on file      Preoperative Review of :        Status of Chronic Conditions:  See  problem list for active medical problems.  Problems all longstanding and stable, except as noted/documented.  See ROS for pertinent symptoms related to these conditions.      Review of Systems  CONSTITUTIONAL: NEGATIVE for fever, chills, change in weight  INTEGUMENTARY/SKIN: NEGATIVE for worrisome rashes, moles or lesions  EYES: NEGATIVE for vision changes or irritation  ENT/MOUTH: NEGATIVE for ear, mouth and throat problems  RESP: NEGATIVE for significant cough or SOB  BREAST: NEGATIVE for masses, tenderness or discharge  CV: NEGATIVE for chest pain, palpitations or peripheral edema  GI: NEGATIVE for nausea, abdominal pain, heartburn, or change in bowel habits  : NEGATIVE for frequency, dysuria, or hematuria  MUSCULOSKELETAL: NEGATIVE for significant arthralgias or myalgia  NEURO: NEGATIVE for weakness, dizziness or paresthesias  ENDOCRINE: NEGATIVE for temperature intolerance, skin/hair changes  HEME: NEGATIVE for bleeding problems  PSYCHIATRIC: NEGATIVE for changes in mood or affect    Patient Active Problem List    Diagnosis Date Noted     Other ill-defined heart diseases 04/15/2021     Priority: Medium     Added automatically from request for surgery [id number]       Aortic valve stenosis, etiology of cardiac valve disease unspecified 04/15/2021     Priority: Medium     Added automatically from request for surgery [id number]       Aortic valve disorder 03/12/2021     Priority: Medium     Formatting of this note might be different from the original.  BiCuspid Ao Valve       Mitral valve disorder 03/12/2021     Priority: Medium     Cardiomegaly 03/12/2021     Priority: Medium     Infection due to 2019 novel coronavirus 02/04/2021     Priority: Medium     Hypokalemia 09/08/2019     Priority: Medium     Acute cystitis without hematuria 09/03/2019     Priority: Medium     Small bowel obstruction (H) 09/03/2019     Priority: Medium     Acute renal failure (H) 09/03/2019     Priority: Medium     Status post coronary  angiogram 06/19/2018     Priority: Medium     Coronary artery disease involving native coronary artery of native heart without angina pectoris 06/19/2018     Priority: Medium     Health Care Home 01/26/2017     Priority: Medium     Acute on chronic respiratory failure (H) 01/02/2017     Priority: Medium     Long-term (current) use of anticoagulants [Z79.01] 08/03/2016     Priority: Medium     Essential hypertension 06/29/2016     Priority: Medium     Advance care planning 02/08/2016     Priority: Medium     Advance Care Planning 2/8/2016: Receipt of ACP document:  Received: Health Care Directive which was witnessed or notarized on 1/5/16.  Document previously scanned on 1/22/16.  Validation form completed and sent to be scanned.  Code Status needs to be updated to reflect choices in most recent ACP document.  1/5/16 Health Care Directive indicates preference for DNR code; recommend conversation about goals of care and completion of a POLST.  Confirmed/documented designated decision maker(s).  Added by Shobha Jang             Hypothyroidism, postablative 03/23/2015     Priority: Medium     Problem list name updated by automated process. Provider to review       Acute bronchitis 03/22/2015     Priority: Medium     Mild major depression (H) 08/27/2014     Priority: Medium     Bicuspid aortic valve 03/25/2014     Priority: Medium     Persistent atrial fibrillation (H) 01/17/2014     Priority: Medium     Pulmonary emphysema (H) 01/17/2014     Priority: Medium     Carotid stenosis 03/29/2012     Priority: Medium     Aortic aneurysm (H) 06/02/2010     Priority: Medium     COPD (chronic obstructive pulmonary disease) (H) 08/19/2009     Priority: Medium     Formatting of this note might be different from the original.  Has been steroid dependent;  Recently hospitalized with Flair and noted Oximetry 88% at rest 11/18/2009 with Respiratory Therapist at [facility]  Medically Disabled due to COPD.       Edema  08/14/2008     Priority: Medium     Depressive disorder 04/10/2007     Priority: Medium      Past Medical History:   Diagnosis Date     Asthma      Atrial fibrillation (H)      COPD (chronic obstructive pulmonary disease) (H)      Depression      High cholesterol      HTN (hypertension)      Thyroid disease      Past Surgical History:   Procedure Laterality Date     BACK SURGERY  2006     CARDIAC SURGERY  06/12/2018    [facility] at [facility]     COLONOSCOPY N/A 1/19/2016    Procedure: COLONOSCOPY;  Surgeon: Waldemar Bob MD;  Location: [facility]     CV CORONARY ANGIOGRAM N/A 4/30/2021    Procedure: CV CORONARY ANGIOGRAM;  Surgeon: Poli Walsh MD;  [facility]     CV LEFT HEART CATH N/A 4/30/2021    Procedure: CV LEFT HEART CATH;  Surgeon: Poli Walsh MD;  [facility]     CV RIGHT HEART CATH MEASUREMENTS RECORDED N/A 4/30/2021    Procedure: CV RIGHT HEART CATH;  Surgeon: Poli Walsh MD;  [facility]     HYSTERECTOMY  1980    partial     SLING BLADDER SUSPENSION WITH FASCIA LINNETTE       Current Outpatient Medications   Medication Sig Dispense Refill     acetaminophen (TYLENOL) 500 MG tablet Take 500-1,000 mg by mouth every 6 hours as needed for mild pain       atorvastatin (LIPITOR) 10 MG tablet Take 1 tablet (10 mg) by mouth At Bedtime 90 tablet 3     Calcium Carb-Cholecalciferol (CALTRATE 600+D3 SOFT PO) Take 600 mg by mouth 2 times daily       cloNIDine (CATAPRES) 0.1 MG tablet TAKE 2 TABLETS BY MOUTH EVERY NIGHT AT BEDTIME 180 tablet 1     COMBIVENT RESPIMAT  MCG/ACT inhaler Inhale 1 puff into the lungs 4 times daily        Cyanocobalamin (VITAMIN B-12 PO) Take 1,000 mg by mouth daily       diltiazem ER (TIAZAC) 360 MG 24 hr ER beaded capsule Take 1 capsule (360 mg) by mouth daily 90 capsule 3     diphenhydrAMINE (BENADRYL) 25 MG tablet Take 1-2 tablets (25-50 mg) by mouth every 6 hours as  needed for itching or allergies 60 tablet 1     ELIQUIS ANTICOAGULANT 5 MG tablet TAKE 1 TABLET BY MOUTH TWICE DAILY 180 tablet 1     fluticasone-salmeterol (ADVAIR-HFA) 230-21 MCG/ACT inhaler Inhale 2 puffs into the lungs 2 times daily 8 g 3     furosemide (LASIX) 40 MG tablet TAKE 1 TABLET(40 MG) BY MOUTH TWICE DAILY 180 tablet 0     hydrOXYzine (ATARAX) 25 MG tablet Take 1 tablet (25 mg) by mouth 3 times daily as needed for itching (Patient not taking: Reported on 2021) 60 tablet 0     levothyroxine (SYNTHROID/LEVOTHROID) 100 MCG tablet Take 1 tablet (100 mcg) by mouth daily 90 tablet 3     losartan (COZAAR) 50 MG tablet Take 1 tablet (50 mg) by mouth 2 times daily 180 tablet 1     OTHER MEDICAL SUPPLIES Apply one pair to help with edema (Patient not taking: Reported on 2021) 1 each 0     potassium chloride ER (KLOR-CON M) 20 MEQ CR tablet Take 1 tablet (20 mEq) by mouth 3 times daily 270 tablet 3       Allergies   Allergen Reactions     Amlodipine Besylate Cough     Norvasc     Lisinopril Cough     Ace Inhibitors Cough        Social History     Tobacco Use     Smoking status: Former Smoker     Packs/day: 0.50     Years: 41.00     Pack years: 20.50     Types: Cigarettes     Start date: 1966     Quit date: 2007     Years since quittin.4     Smokeless tobacco: Never Used   Substance Use Topics     Alcohol use: No     Alcohol/week: 0.0 standard drinks       History   Drug Use No         Objective     There were no vitals taken for this visit.    Physical Exam    GENERAL APPEARANCE: healthy, alert and no distress     EYES: EOMI, PERRL     HENT: ear canals and TM's normal and nose and mouth without ulcers or lesions     NECK: no adenopathy, no asymmetry, masses, or scars and thyroid normal to palpation     RESP: lungs clear to auscultation - no rales, rhonchi or wheezes     CV: irregularly irregular rhythm     ABDOMEN:  soft, nontender, no HSM or masses and bowel sounds normal     MS:  extremities normal- no gross deformities noted, no evidence of inflammation in joints, FROM in all extremities.     SKIN: no suspicious lesions or rashes     NEURO: Normal strength and tone, sensory exam grossly normal, mentation intact and speech normal     PSYCH: mentation appears normal. and affect normal/bright     LYMPHATICS: No cervical adenopathy    Recent Labs   Lab Test 06/11/21  1011 04/30/21  1518 04/30/21  1113 04/30/21  1113   HGB 14.3 12.7   < > 13.8     --   --  230   INR  --   --   --  1.08     --   --  140   POTASSIUM 3.9  --   --  4.5   CR 0.91  --   --  0.92    < > = values in this interval not displayed.        Diagnostics:  Cbc stable.  CXR clear with cardiomegaly ongoing.  BMP pending and will be available.     EKG: atrial fibrillation, rate leftward axis, leftward axis, normal axis, normal intervals, no acute ST/T changes c/w ischemia, unchanged from previous tracings    Revised Cardiac Risk Index (RCRI):  The patient has the following serious cardiovascular risks for perioperative complications:   - No serious cardiac risks = 0 points     RCRI Interpretation: 0 points: Class I (very low risk - 0.4% complication rate)           Signed Electronically by: Braydon Yen MD  Copy of this evaluation report is provided to requesting physician.

## 2021-07-08 ENCOUNTER — ANESTHESIA EVENT (OUTPATIENT)
Dept: SURGERY | Facility: CLINIC | Age: 71
DRG: 266 | End: 2021-07-08
Payer: MEDICARE

## 2021-07-08 ENCOUNTER — ANCILLARY PROCEDURE (OUTPATIENT)
Dept: GENERAL RADIOLOGY | Facility: OTHER | Age: 71
End: 2021-07-08
Attending: FAMILY MEDICINE
Payer: MEDICARE

## 2021-07-08 ENCOUNTER — OFFICE VISIT (OUTPATIENT)
Dept: FAMILY MEDICINE | Facility: OTHER | Age: 71
End: 2021-07-08
Attending: FAMILY MEDICINE
Payer: COMMERCIAL

## 2021-07-08 VITALS
DIASTOLIC BLOOD PRESSURE: 78 MMHG | OXYGEN SATURATION: 94 % | WEIGHT: 154 LBS | BODY MASS INDEX: 27.29 KG/M2 | HEART RATE: 76 BPM | RESPIRATION RATE: 18 BRPM | HEIGHT: 63 IN | TEMPERATURE: 98.9 F | SYSTOLIC BLOOD PRESSURE: 122 MMHG

## 2021-07-08 DIAGNOSIS — I35.0 AORTIC VALVE STENOSIS, ETIOLOGY OF CARDIAC VALVE DISEASE UNSPECIFIED: ICD-10-CM

## 2021-07-08 DIAGNOSIS — I48.19 PERSISTENT ATRIAL FIBRILLATION (H): ICD-10-CM

## 2021-07-08 DIAGNOSIS — Z01.818 PREOP GENERAL PHYSICAL EXAM: Primary | ICD-10-CM

## 2021-07-08 DIAGNOSIS — I25.10 CORONARY ARTERY DISEASE INVOLVING NATIVE CORONARY ARTERY OF NATIVE HEART WITHOUT ANGINA PECTORIS: ICD-10-CM

## 2021-07-08 LAB
BASOPHILS # BLD AUTO: 0 10E9/L (ref 0–0.2)
BASOPHILS NFR BLD AUTO: 0.3 %
DIFFERENTIAL METHOD BLD: ABNORMAL
EOSINOPHIL # BLD AUTO: 0.1 10E9/L (ref 0–0.7)
EOSINOPHIL NFR BLD AUTO: 0.8 %
ERYTHROCYTE [DISTWIDTH] IN BLOOD BY AUTOMATED COUNT: 12.9 % (ref 10–15)
HCT VFR BLD AUTO: 42.3 % (ref 35–47)
HGB BLD-MCNC: 14.4 G/DL (ref 11.7–15.7)
LYMPHOCYTES # BLD AUTO: 1 10E9/L (ref 0.8–5.3)
LYMPHOCYTES NFR BLD AUTO: 12.7 %
MCH RBC QN AUTO: 33.7 PG (ref 26.5–33)
MCHC RBC AUTO-ENTMCNC: 34 G/DL (ref 31.5–36.5)
MCV RBC AUTO: 99 FL (ref 78–100)
MONOCYTES # BLD AUTO: 0.8 10E9/L (ref 0–1.3)
MONOCYTES NFR BLD AUTO: 10.3 %
NEUTROPHILS # BLD AUTO: 5.9 10E9/L (ref 1.6–8.3)
NEUTROPHILS NFR BLD AUTO: 75.9 %
PLATELET # BLD AUTO: 225 10E9/L (ref 150–450)
RBC # BLD AUTO: 4.27 10E12/L (ref 3.8–5.2)
WBC # BLD AUTO: 7.7 10E9/L (ref 4–11)

## 2021-07-08 PROCEDURE — 71046 X-RAY EXAM CHEST 2 VIEWS: CPT | Mod: TC,FY

## 2021-07-08 PROCEDURE — 85025 COMPLETE CBC W/AUTO DIFF WBC: CPT | Mod: ZL | Performed by: FAMILY MEDICINE

## 2021-07-08 PROCEDURE — 99214 OFFICE O/P EST MOD 30 MIN: CPT | Mod: 25 | Performed by: FAMILY MEDICINE

## 2021-07-08 PROCEDURE — 36415 COLL VENOUS BLD VENIPUNCTURE: CPT | Mod: ZL | Performed by: FAMILY MEDICINE

## 2021-07-08 PROCEDURE — G0463 HOSPITAL OUTPT CLINIC VISIT: HCPCS | Mod: 25

## 2021-07-08 PROCEDURE — 93010 ELECTROCARDIOGRAM REPORT: CPT | Mod: 77 | Performed by: INTERNAL MEDICINE

## 2021-07-08 PROCEDURE — 93005 ELECTROCARDIOGRAM TRACING: CPT

## 2021-07-08 PROCEDURE — 80048 BASIC METABOLIC PNL TOTAL CA: CPT | Mod: ZL | Performed by: FAMILY MEDICINE

## 2021-07-08 ASSESSMENT — MIFFLIN-ST. JEOR: SCORE: 1185.84

## 2021-07-08 ASSESSMENT — PAIN SCALES - GENERAL: PAINLEVEL: NO PAIN (0)

## 2021-07-08 NOTE — NURSING NOTE
"Chief Complaint   Patient presents with     Pre-Op Exam       Initial /78 (BP Location: Right arm, Patient Position: Sitting, Cuff Size: Adult Regular)   Pulse 76   Temp 98.9  F (37.2  C)   Resp 18   Ht 1.605 m (5' 3.2\")   Wt 69.9 kg (154 lb)   SpO2 94%   BMI 27.11 kg/m   Estimated body mass index is 27.11 kg/m  as calculated from the following:    Height as of this encounter: 1.605 m (5' 3.2\").    Weight as of this encounter: 69.9 kg (154 lb).  Medication Reconciliation: complete  Maria Victoria Reyes  "

## 2021-07-09 ENCOUNTER — TELEPHONE (OUTPATIENT)
Dept: FAMILY MEDICINE | Facility: OTHER | Age: 71
End: 2021-07-09

## 2021-07-09 LAB
ANION GAP SERPL CALCULATED.3IONS-SCNC: 10 MMOL/L (ref 3–14)
BUN SERPL-MCNC: 20 MG/DL (ref 7–30)
CALCIUM SERPL-MCNC: 9.8 MG/DL (ref 8.5–10.1)
CHLORIDE SERPL-SCNC: 101 MMOL/L (ref 94–109)
CO2 SERPL-SCNC: 25 MMOL/L (ref 20–32)
CREAT SERPL-MCNC: 1 MG/DL (ref 0.52–1.04)
GFR SERPL CREATININE-BSD FRML MDRD: 57 ML/MIN/{1.73_M2}
GLUCOSE SERPL-MCNC: 92 MG/DL (ref 70–99)
POTASSIUM SERPL-SCNC: 4.2 MMOL/L (ref 3.4–5.3)
SODIUM SERPL-SCNC: 136 MMOL/L (ref 133–144)

## 2021-07-09 NOTE — TELEPHONE ENCOUNTER
Faxed preop Surgery 7/14/21 [facility] Dr Sarah  Fx# [phone]  fxd demo,med list, H & P 7/8/21 Dr Yen,labs,EKG  Dx: procedure /trancatheter aortic valve replacement  Shobha Faye

## 2021-07-11 ENCOUNTER — OFFICE VISIT (OUTPATIENT)
Dept: FAMILY MEDICINE | Facility: OTHER | Age: 71
End: 2021-07-11
Attending: INTERNAL MEDICINE
Payer: MEDICARE

## 2021-07-11 DIAGNOSIS — I35.0 AORTIC VALVE STENOSIS, ETIOLOGY OF CARDIAC VALVE DISEASE UNSPECIFIED: ICD-10-CM

## 2021-07-11 LAB — SARS-COV-2 RNA RESP QL NAA+PROBE: NEGATIVE

## 2021-07-11 PROCEDURE — U0003 INFECTIOUS AGENT DETECTION BY NUCLEIC ACID (DNA OR RNA); SEVERE ACUTE RESPIRATORY SYNDROME CORONAVIRUS 2 (SARS-COV-2) (CORONAVIRUS DISEASE [COVID-19]), AMPLIFIED PROBE TECHNIQUE, MAKING USE OF HIGH THROUGHPUT TECHNOLOGIES AS DESCRIBED BY CMS-2020-01-R: HCPCS | Mod: ZL

## 2021-07-13 ENCOUNTER — LAB (OUTPATIENT)
Dept: LAB | Facility: CLINIC | Age: 71
End: 2021-07-13
Payer: COMMERCIAL

## 2021-07-13 DIAGNOSIS — I35.0 AORTIC VALVE STENOSIS, ETIOLOGY OF CARDIAC VALVE DISEASE UNSPECIFIED: ICD-10-CM

## 2021-07-13 DIAGNOSIS — I50.42 CHRONIC COMBINED SYSTOLIC AND DIASTOLIC HEART FAILURE (H): ICD-10-CM

## 2021-07-13 LAB
ABO/RH(D): NORMAL
ANION GAP SERPL CALCULATED.3IONS-SCNC: 9 MMOL/L (ref 3–14)
ANTIBODY SCREEN: NEGATIVE
BUN SERPL-MCNC: 15 MG/DL (ref 7–30)
CALCIUM SERPL-MCNC: 9.5 MG/DL (ref 8.5–10.1)
CHLORIDE BLD-SCNC: 101 MMOL/L (ref 94–109)
CO2 SERPL-SCNC: 24 MMOL/L (ref 20–32)
CREAT SERPL-MCNC: 1.01 MG/DL (ref 0.52–1.04)
ERYTHROCYTE [DISTWIDTH] IN BLOOD BY AUTOMATED COUNT: 12.7 % (ref 10–15)
GFR SERPL CREATININE-BSD FRML MDRD: 56 ML/MIN/1.73M2
GLUCOSE BLD-MCNC: 107 MG/DL (ref 70–99)
HCT VFR BLD AUTO: 43.9 % (ref 35–47)
HGB BLD-MCNC: 14.6 G/DL (ref 11.7–15.7)
INR PPP: 1.06 (ref 0.85–1.15)
MCH RBC QN AUTO: 32.9 PG (ref 26.5–33)
MCHC RBC AUTO-ENTMCNC: 33.3 G/DL (ref 31.5–36.5)
MCV RBC AUTO: 99 FL (ref 78–100)
NT-PROBNP SERPL-MCNC: 2659 PG/ML (ref 0–125)
PLATELET # BLD AUTO: 215 10E3/UL (ref 150–450)
POTASSIUM BLD-SCNC: 4.7 MMOL/L (ref 3.4–5.3)
RBC # BLD AUTO: 4.44 10E6/UL (ref 3.8–5.2)
SODIUM SERPL-SCNC: 134 MMOL/L (ref 133–144)
SPECIMEN EXPIRATION DATE: NORMAL
WBC # BLD AUTO: 7.3 10E3/UL (ref 4–11)

## 2021-07-13 PROCEDURE — 86850 RBC ANTIBODY SCREEN: CPT | Performed by: PATHOLOGY

## 2021-07-13 PROCEDURE — 85027 COMPLETE CBC AUTOMATED: CPT | Performed by: PATHOLOGY

## 2021-07-13 PROCEDURE — 86900 BLOOD TYPING SEROLOGIC ABO: CPT | Performed by: PATHOLOGY

## 2021-07-13 PROCEDURE — 86901 BLOOD TYPING SEROLOGIC RH(D): CPT | Performed by: PATHOLOGY

## 2021-07-13 PROCEDURE — 80048 BASIC METABOLIC PNL TOTAL CA: CPT | Performed by: PATHOLOGY

## 2021-07-13 PROCEDURE — 36415 COLL VENOUS BLD VENIPUNCTURE: CPT | Performed by: PATHOLOGY

## 2021-07-13 PROCEDURE — 85610 PROTHROMBIN TIME: CPT | Performed by: PATHOLOGY

## 2021-07-13 PROCEDURE — 83880 ASSAY OF NATRIURETIC PEPTIDE: CPT | Performed by: PATHOLOGY

## 2021-07-13 ASSESSMENT — ENCOUNTER SYMPTOMS: DYSRHYTHMIAS: 1

## 2021-07-13 ASSESSMENT — COPD QUESTIONNAIRES
COPD: 1
CAT_SEVERITY: SEVERE

## 2021-07-13 NOTE — H&P
[facility]  Mary Alice Cameron MRN: [id number]  1950  Date of Admission:(Not on file)  Primary care provider: Braydon Yen      Assessment and Plan:     Mary Alice Cameron is a 71 year old female with a past medical history significant for bicuspid AV c/b severe aortic stenosis, paroxysmal atrial fibrillation, COPD requiring O2 with sleep (MADDISON?), HTN, and non-obstructive CAD. Patient was previously evaluated in the structural clinic for TAVR candidacy. At that time, she reported worsening weight gain (15 pounds in one year), lower extremity edema, and dyspnea with activity.     #Bicuspid AV c/b Severe symptomatic aortic stenosis now s/p 29mm ES3 via transcaval approach  ##Acute on chronic diastolic Heart failure  ##Complete heart block s/p PPM   Now s/p Transcatheter Aortic Valve Replacement. Prior to procedure, pt noted to have a mean gradient 45 mmHG, MATIAS 0.8 cm^2, PV 4.1 m/s. Due to access issues driven by PAD in the femoral and carotid arteries, transcaval TAVR was performed. Course complicated by apparent complete heart block requiring placement of temporary pacer, then PPM. IVC/aorta closed with amplatzer occluder device. LFA access site achieved hemostasis with manual compression as access was slightly above the bifurcatin. Intraoperative Preop BNP 2659 (previously 1969). Sheath sites soft without hematoma, mild oozing from right FV site, right radial art line still in place in PACU (to be pulled prior to transfer). Per TAVR team, will hold eliquis and plan for aspirin + plavix until it is safe to resume eliquis. No PVL during case.  - Bedrest per protocol.    - Neuro checks, per protocol.  - Echocardiogram tomorrow.   - Monitor groin sites.   - EKG in morning.   - Tyler can be removed once patient is out of bed.   - continue right radial TR band. Let down per protocol  - hold eliquis as below. Aspirin 81mg daily + plavix for now given amplatzer, TAVR. Plan to  discontinue plavix once safe to resume eliquis  - Cardiac rehab/PT/OT.  - Diurese as needed/able.   - Daily weights.   - Strict intake/output.       #Atrial fibrillation, possibly persistent  #HTN  Follows with Dr. Myers. Reportedly rates are well controlled at baseline. Experienced prolonged complete heart block during case requiring placement of PPM. Will hold eliquis for 48h post-placement due to risk of pocket bleeding.  - resume diltiazem ER 360mg pending BP  - hold PTA eliquis 5mg bid    #COPD   - resume advair  - resume combivent    #Hypothyroidism  - PTA levothyroxine 100mcg    FEN: NPO for now, low sodium after bed rest  Prophylaxis:  DVT: PCDs  Disposition: Admission to [facility].   Code Status: FULL CODE      Hoang Thurman PA-C  [facility] Cardiology Team             Chief Complaint:   Short of breath         History of Present Illness:   Mary Alice Cameron is a very pleasant 71 year old female with a past medical history significant for bicuspid AV c/b severe aortic stenosis, paroxysmal atrial fibrillation, COPD requiring O2 with sleep, HTN, and non-obstructive CAD. Patient was previously evaluated in the structural clinic for TAVR candidacy. At that time, she reported worsening weight gain (15 pounds in one year), lower extremity edema, and dyspnea with activity. While she does have co-existing conditions that are likely contributing, it is felt that her severe AS is likely driving her hypervolemia and likely contributing to some of her dyspnea. She was evaluated by CVTS, and given her age and intermediate STS score, she was deemed a less than ideal candidate for valve surgery unless she were to have prox LAD or LM disease on cath.     She presents today for TAVR.     Patient visited in the PACU. She feels well overall, but does have some low back pain from the bed. She denies headache, vision change, dizziness, dyspnea, chest pain, numbness or weakness. She denies any abdominal discomfort.          Review of  Systems:    10 point review of systems negative except for stated above in HPI.          Past Medical History:   Medical History reviewed.   Past Medical History:   Diagnosis Date     Asthma      Atrial fibrillation (H)      COPD (chronic obstructive pulmonary disease) (H)      Depression      High cholesterol      HTN (hypertension)      Oxygen dependent      Thyroid disease              Past Surgical History:   Surgical History reviewed.   Past Surgical History:   Procedure Laterality Date     BACK SURGERY       CARDIAC SURGERY  2018    Angiogram at [facility]     COLONOSCOPY N/A 2016    Procedure: COLONOSCOPY;  Surgeon: Waldemar Bob MD;  Location: [facility]     CV CORONARY ANGIOGRAM N/A 2021    Procedure: CV CORONARY ANGIOGRAM;  Surgeon: Poli Walsh MD;  [facility]     CV LEFT HEART CATH N/A 2021    Procedure: CV LEFT HEART CATH;  Surgeon: Poli Walsh MD;  [facility]     CV RIGHT HEART CATH MEASUREMENTS RECORDED N/A 2021    Procedure: CV RIGHT HEART CATH;  Surgeon: Poli Walsh MD;  [facility]     HYSTERECTOMY      partial     SLING BLADDER SUSPENSION WITH FASCIA LINNETTE               Social History:   Social History reviewed.  Social History     Tobacco Use     Smoking status: Former Smoker     Packs/day: 0.50     Years: 41.00     Pack years: 20.50     Types: Cigarettes     Start date: 1966     Quit date: 2007     Years since quittin.4     Smokeless tobacco: Never Used   Substance Use Topics     Alcohol use: No     Alcohol/week: 0.0 standard drinks             Family History:   Family History reviewed.   Family History   Problem Relation Age of Onset     Prostate Cancer Father      Hypertension Father      Heart Failure Father         CHF     Asthma Mother      Cancer Mother         ovarian     Hypertension Mother      Asthma Brother      Hypertension  Sister      Hypertension Brother              Allergies:     Allergies   Allergen Reactions     Amlodipine Besylate Cough     Norvasc     Lisinopril Cough     Ace Inhibitors Cough             Medications:   Medications Reviewed.   No current facility-administered medications for this encounter.     Current Outpatient Medications   Medication Sig     acetaminophen (TYLENOL) 500 MG tablet Take 500-1,000 mg by mouth every 6 hours as needed for mild pain     aspirin (ASA) 325 MG EC tablet Take 325 mg by mouth Patient to take 325mg the night before and am of surgery     atorvastatin (LIPITOR) 10 MG tablet Take 1 tablet (10 mg) by mouth At Bedtime     Calcium Carb-Cholecalciferol (CALTRATE 600+D3 SOFT PO) Take 600 mg by mouth 2 times daily     cloNIDine (CATAPRES) 0.1 MG tablet TAKE 2 TABLETS BY MOUTH EVERY NIGHT AT BEDTIME     COMBIVENT RESPIMAT  MCG/ACT inhaler Inhale 1 puff into the lungs 4 times daily      Cyanocobalamin (VITAMIN B-12 PO) Take 1,000 mg by mouth daily     diltiazem ER (TIAZAC) 360 MG 24 hr ER beaded capsule Take 1 capsule (360 mg) by mouth daily     diphenhydrAMINE (BENADRYL) 25 MG tablet Take 1-2 tablets (25-50 mg) by mouth every 6 hours as needed for itching or allergies     ELIQUIS ANTICOAGULANT 5 MG tablet TAKE 1 TABLET BY MOUTH TWICE DAILY     fluticasone-salmeterol (ADVAIR-HFA) 230-21 MCG/ACT inhaler Inhale 2 puffs into the lungs 2 times daily     furosemide (LASIX) 40 MG tablet TAKE 1 TABLET(40 MG) BY MOUTH TWICE DAILY     hydrOXYzine (ATARAX) 25 MG tablet Take 1 tablet (25 mg) by mouth 3 times daily as needed for itching     levothyroxine (SYNTHROID/LEVOTHROID) 100 MCG tablet Take 1 tablet (100 mcg) by mouth daily     losartan (COZAAR) 50 MG tablet Take 1 tablet (50 mg) by mouth 2 times daily     potassium chloride ER (KLOR-CON M) 20 MEQ CR tablet Take 1 tablet (20 mEq) by mouth 3 times daily     OTHER MEDICAL SUPPLIES Apply one pair to help with edema             Physical Exam:   Vitals  were reviewed.  not currently breastfeeding.    General: AAOx3, NAD  Skin: Not jaundiced, no rash, no ecchymoses  HEENT: MMM, PERRLA, EOM intact  CV: RRR, normal S1S2, no murmur, clicks, rubs. Sara size blood spot on bandage overlying pocket site  Resp: limited exam due to bed rest. Clear to auscultation bilaterally, no wheezes, rhonchi  Abd: Soft, non-tender, BS+, no masses appreciated  Extremities: warm and well perfused, palpable pulses, no edema. Mild oozing from RFV access site. No bleeding noted on bandage over LFA. No palpable hematoma on exam  Neuro: No lateralizing symptoms or focal neurologic deficits        Labs:   Routine Labs:  Lab Results   Component Value Date    TROPI <0.015 09/03/2019    TROPI <0.015 03/19/2019    TROPI  03/25/2017     <0.015  The 99th percentile for upper reference range is 0.045 ug/L.  Troponin values in   the range of 0.045 - 0.120 ug/L may be associated with risks of adverse   clinical events.      TROPI  03/21/2015     <0.015  The 99th percentile for upper reference range is 0.045 ug/L.  Troponin values in   the range of 0.045 - 0.120 ug/L may be associated with risks of adverse   clinical events.   Effective 7/30/2014, the reference range for this assay has changed to reflect   new instrumentation/methodology.       CMP  Recent Labs   Lab 07/08/21  1538      POTASSIUM 4.2   CHLORIDE 101   CO2 25   ANIONGAP 10   GLC 92   BUN 20   CR 1.00   GFRESTIMATED 57*   GFRESTBLACK 66   KOSTA 9.8     CBC  Recent Labs   Lab 07/08/21  1538   WBC 7.7   RBC 4.27   HGB 14.4   HCT 42.3   MCV 99   MCH 33.7*   MCHC 34.0   RDW 12.9        INRNo lab results found in last 7 days.        Diagnostics:      Echo result w/o MOPS: Interpretation SummaryThere is severe aortic stenosis (MATIAS 0.8 cm2) with mild aortic insufficiencydespite a low stroke volume index.Global and regional left ventricular function is normal with an EF of 55-60%.Global right ventricular function is normal. The right  ventricle is normalsize.There is moderate dilation of the ascending aorta (4.2 cm, indexed value 2.3cm/m2).IVC diameter >2.1 cm collapsing <50% with sniff suggests a high RA pressureestimated at 15 mmHg or greater.This study was compared with the study from 11/12/2020. The aortic stenosis isprobably similar in severity but the AV hemodynamics are better visualized.        I have seen and examined the patient and agree with the finding and plan.       Domo Sarah MD  Cardiology-I  [phone]

## 2021-07-13 NOTE — ANESTHESIA PREPROCEDURE EVALUATION
Anesthesia Pre-Procedure Evaluation    Patient: Mary Alice Cameron   MRN: [id number] : 1950        Preoperative Diagnosis: Aortic valve stenosis, etiology of cardiac valve disease unspecified [I35.0]   Procedure : Procedure(s):  Transcaval transcatheter aortic valve replacement (SUMMERS)  with possible emergency open heart bypass and or balloon pump placement, and any indicated procedure.     Past Medical History:   Diagnosis Date     Asthma      Atrial fibrillation (H)      COPD (chronic obstructive pulmonary disease) (H)      Depression      High cholesterol      HTN (hypertension)      Oxygen dependent      Thyroid disease       Past Surgical History:   Procedure Laterality Date     BACK SURGERY       CARDIAC SURGERY  2018    Angiogram at [facility]     COLONOSCOPY N/A 2016    Procedure: COLONOSCOPY;  Surgeon: Waldemar Bob MD;  Location: [facility]     CV CORONARY ANGIOGRAM N/A 2021    Procedure: CV CORONARY ANGIOGRAM;  Surgeon: Poli Walsh MD;  Location: [facility]     CV LEFT HEART CATH N/A 2021    Procedure: CV LEFT HEART CATH;  Surgeon: Poli Walsh MD;  Location: [facility]     CV RIGHT HEART CATH MEASUREMENTS RECORDED N/A 2021    Procedure: CV RIGHT HEART CATH;  Surgeon: Poli Walsh MD;  Location: [facility]     HYSTERECTOMY      partial     SLING BLADDER SUSPENSION WITH FASCIA LINNETTE        Allergies   Allergen Reactions     Amlodipine Besylate Cough     Norvasc     Lisinopril Cough     Ace Inhibitors Cough      Social History     Tobacco Use     Smoking status: Former Smoker     Packs/day: 0.50     Years: 41.00     Pack years: 20.50     Types: Cigarettes     Start date: 1966     Quit date: 2007     Years since quittin.4     Smokeless tobacco: Never Used   Substance Use Topics     Alcohol use: No     Alcohol/week: 0.0 standard drinks      Wt Readings from Last 1  Encounters:   07/08/21 69.9 kg (154 lb)        Anesthesia Evaluation            ROS/MED HX  ENT/Pulmonary:     (+) MADDISON risk factors, severe,  COPD, O2 dependent, during Nighttime, 2.5 liters/min,     Neurologic:       Cardiovascular: Comment: Ascending aortic dilation 4.2cm     (+) Dyslipidemia hypertension--CAD ---dysrhythmias, a-fib, valvular problems/murmurs type: AS severe.     METS/Exercise Tolerance:     Hematologic: Comments: On eliquis for Afib       Musculoskeletal:       GI/Hepatic:       Renal/Genitourinary:       Endo:       Psychiatric/Substance Use:       Infectious Disease:       Malignancy:       Other:            Physical Exam    Airway        Mallampati: II   TM distance: > 3 FB   Neck ROM: full     Respiratory Devices and Support         Dental         B=Bridge, C=Chipped, L=Loose, M=Missing    Cardiovascular          Rhythm and rate: regular and normal   (+) murmur       Pulmonary           (+) decreased breath sounds           OUTSIDE LABS:  CBC:   Lab Results   Component Value Date    WBC 7.7 07/08/2021    WBC 5.5 06/11/2021    HGB 14.4 07/08/2021    HGB 14.3 06/11/2021    HCT 42.3 07/08/2021    HCT 42.9 06/11/2021     07/08/2021     06/11/2021     BMP:   Lab Results   Component Value Date     07/08/2021     06/11/2021    POTASSIUM 4.2 07/08/2021    POTASSIUM 3.9 06/11/2021    CHLORIDE 101 07/08/2021    CHLORIDE 104 06/11/2021    CO2 25 07/08/2021    CO2 27 06/11/2021    BUN 20 07/08/2021    BUN 13 06/11/2021    CR 1.00 07/08/2021    CR 0.91 06/11/2021    GLC 92 07/08/2021     (H) 06/11/2021     COAGS:   Lab Results   Component Value Date    INR 1.08 04/30/2021     POC: No results found for: BGM, HCG, HCGS  HEPATIC:   Lab Results   Component Value Date    ALBUMIN 3.7 06/11/2021    PROTTOTAL 7.1 06/11/2021    ALT 16 06/11/2021    AST 18 06/11/2021    ALKPHOS 41 06/11/2021    BILITOTAL 0.7 06/11/2021     OTHER:   Lab Results   Component Value Date    PH 7.39  01/02/2017    LACT 2.3 (H) 09/13/2019    A1C 5.9 03/09/2016    KOSTA 9.8 07/08/2021    PHOS 2.5 09/09/2019    PHOS 2.5 09/09/2019    MAG 1.8 09/09/2019    TSH 0.40 03/26/2021    T4 1.50 (H) 12/04/2020    .0 (H) 09/03/2019       Anesthesia Plan    ASA Status:  4      Anesthesia Type: General.     - Airway: ETT   Induction: Intravenous.   Maintenance: Balanced.   Techniques and Equipment:     - Lines/Monitors: 2nd IV, Arterial Line     - Blood: Blood in Room     Consents    Anesthesia Plan(s) and associated risks, benefits, and realistic alternatives discussed. Questions answered and patient/representative(s) expressed understanding.     - Discussed with:  Patient      - Extended Intubation/Ventilatory Support Discussed: No.      - Patient is DNR/DNI Status: No    Use of blood products discussed: Yes.     - Discussed with: Patient.     - Consented: consented to blood products            Reason for refusal: other.     Postoperative Care    Pain management: IV analgesics.   PONV prophylaxis: Ondansetron (or other 5HT-3)     Comments:                Jordon Andres MD

## 2021-07-14 ENCOUNTER — HOSPITAL ENCOUNTER (INPATIENT)
Facility: CLINIC | Age: 71
LOS: 1 days | Discharge: HOME | DRG: 266 | End: 2021-07-15
Attending: INTERNAL MEDICINE | Admitting: THORACIC SURGERY (CARDIOTHORACIC VASCULAR SURGERY)
Payer: MEDICARE

## 2021-07-14 ENCOUNTER — HOSPITAL ENCOUNTER (OUTPATIENT)
Dept: CARDIOLOGY | Facility: CLINIC | Age: 71
DRG: 266 | End: 2021-07-14
Attending: INTERNAL MEDICINE | Admitting: INTERNAL MEDICINE
Payer: MEDICARE

## 2021-07-14 ENCOUNTER — ANESTHESIA (OUTPATIENT)
Dept: SURGERY | Facility: CLINIC | Age: 71
DRG: 266 | End: 2021-07-14
Payer: MEDICARE

## 2021-07-14 DIAGNOSIS — I35.0 AORTIC VALVE STENOSIS, ETIOLOGY OF CARDIAC VALVE DISEASE UNSPECIFIED: ICD-10-CM

## 2021-07-14 DIAGNOSIS — Z95.0 CARDIAC PACEMAKER IN SITU: ICD-10-CM

## 2021-07-14 DIAGNOSIS — I44.2 ATRIOVENTRICULAR BLOCK, COMPLETE (H): Primary | ICD-10-CM

## 2021-07-14 DIAGNOSIS — Z95.2 S/P TAVR (TRANSCATHETER AORTIC VALVE REPLACEMENT): Primary | ICD-10-CM

## 2021-07-14 DIAGNOSIS — Z95.0 S/P PLACEMENT OF CARDIAC PACEMAKER: ICD-10-CM

## 2021-07-14 LAB
ACT BLD: 110 SECONDS (ref 74–150)
ACT BLD: 231 SECONDS (ref 74–150)
ACT BLD: 275 SECONDS (ref 74–150)
ACT BLD: 300 SECONDS (ref 74–150)
ACT BLD: 352 SECONDS (ref 74–150)
ACT BLD: 90 SECONDS (ref 74–150)
BASE EXCESS BLDA CALC-SCNC: -3.2 MMOL/L (ref -9.6–2)
BLD PROD TYP BPU: NORMAL
BLD PROD TYP BPU: NORMAL
BLOOD COMPONENT TYPE: NORMAL
BLOOD COMPONENT TYPE: NORMAL
CA-I BLD-MCNC: 4.6 MG/DL (ref 4.4–5.2)
CODING SYSTEM: NORMAL
CODING SYSTEM: NORMAL
CROSSMATCH: NORMAL
CROSSMATCH: NORMAL
GLUCOSE BLD-MCNC: 112 MG/DL (ref 70–99)
GLUCOSE BLDC GLUCOMTR-MCNC: 85 MG/DL (ref 70–99)
HCO3 BLDA-SCNC: 24 MMOL/L (ref 21–28)
HGB BLD-MCNC: 12.2 G/DL (ref 11.7–15.7)
ISSUE DATE AND TIME: NORMAL
ISSUE DATE AND TIME: NORMAL
LACTATE BLD-SCNC: 0.5 MMOL/L
LVEF ECHO: NORMAL
OXYHGB MFR BLDA: 97 % (ref 92–100)
PCO2 BLDA: 48 MM HG (ref 35–45)
PH BLDA: 7.3 [PH] (ref 7.35–7.45)
PO2 BLDA: 138 MM HG (ref 80–105)
POTASSIUM BLD-SCNC: 4.5 MMOL/L (ref 3.5–5)
SODIUM BLD-SCNC: 133 MMOL/L (ref 133–144)
UNIT ABO/RH: NORMAL
UNIT ABO/RH: NORMAL
UNIT NUMBER: NORMAL
UNIT NUMBER: NORMAL
UNIT STATUS: NORMAL
UNIT STATUS: NORMAL
UNIT TYPE ISBT: 9500
UNIT TYPE ISBT: 9500

## 2021-07-14 PROCEDURE — 250N000011 HC RX IP 250 OP 636: Performed by: INTERNAL MEDICINE

## 2021-07-14 PROCEDURE — 82962 GLUCOSE BLOOD TEST: CPT

## 2021-07-14 PROCEDURE — 82803 BLOOD GASES ANY COMBINATION: CPT

## 2021-07-14 PROCEDURE — 93355 ECHO TRANSESOPHAGEAL (TEE): CPT | Performed by: STUDENT IN AN ORGANIZED HEALTH CARE EDUCATION/TRAINING PROGRAM

## 2021-07-14 PROCEDURE — 258N000003 HC RX IP 258 OP 636: Performed by: NURSE ANESTHETIST, CERTIFIED REGISTERED

## 2021-07-14 PROCEDURE — C1760 CLOSURE DEV, VASC: HCPCS | Performed by: INTERNAL MEDICINE

## 2021-07-14 PROCEDURE — 250N000013 HC RX MED GY IP 250 OP 250 PS 637: Performed by: PHYSICIAN ASSISTANT

## 2021-07-14 PROCEDURE — 82810 BLOOD GASES O2 SAT ONLY: CPT

## 2021-07-14 PROCEDURE — 250N000009 HC RX 250: Performed by: INTERNAL MEDICINE

## 2021-07-14 PROCEDURE — 999N000141 HC STATISTIC PRE-PROCEDURE NURSING ASSESSMENT: Performed by: INTERNAL MEDICINE

## 2021-07-14 PROCEDURE — C1730 CATH, EP, 19 OR FEW ELECT: HCPCS | Performed by: INTERNAL MEDICINE

## 2021-07-14 PROCEDURE — 410N000003 HC PER-PERFUSION 1ST 30 MIN: Performed by: INTERNAL MEDICINE

## 2021-07-14 PROCEDURE — 33207 INSERT HEART PM VENTRICULAR: CPT | Mod: KX | Performed by: INTERNAL MEDICINE

## 2021-07-14 PROCEDURE — 214N000001 HC R&B CCU UMMC

## 2021-07-14 PROCEDURE — 250N000011 HC RX IP 250 OP 636: Performed by: STUDENT IN AN ORGANIZED HEALTH CARE EDUCATION/TRAINING PROGRAM

## 2021-07-14 PROCEDURE — C1894 INTRO/SHEATH, NON-LASER: HCPCS | Performed by: INTERNAL MEDICINE

## 2021-07-14 PROCEDURE — 82330 ASSAY OF CALCIUM: CPT

## 2021-07-14 PROCEDURE — 93325 DOPPLER ECHO COLOR FLOW MAPG: CPT

## 2021-07-14 PROCEDURE — 360N000079 HC SURGERY LEVEL 6, PER MIN: Performed by: INTERNAL MEDICINE

## 2021-07-14 PROCEDURE — 250N000009 HC RX 250: Performed by: ANESTHESIOLOGY

## 2021-07-14 PROCEDURE — 999N000015 HC STATISTIC ARTERIAL MONITORING DAILY

## 2021-07-14 PROCEDURE — 250N000025 HC SEVOFLURANE, PER MIN: Performed by: INTERNAL MEDICINE

## 2021-07-14 PROCEDURE — 710N000010 HC RECOVERY PHASE 1, LEVEL 2, PER MIN: Performed by: INTERNAL MEDICINE

## 2021-07-14 PROCEDURE — 272N000002 HC OR SUPPLY OTHER OPNP: Performed by: INTERNAL MEDICINE

## 2021-07-14 PROCEDURE — 250N000013 HC RX MED GY IP 250 OP 250 PS 637: Performed by: STUDENT IN AN ORGANIZED HEALTH CARE EDUCATION/TRAINING PROGRAM

## 2021-07-14 PROCEDURE — 5A1223Z PERFORMANCE OF CARDIAC PACING, CONTINUOUS: ICD-10-PCS | Performed by: THORACIC SURGERY (CARDIOTHORACIC VASCULAR SURGERY)

## 2021-07-14 PROCEDURE — 86923 COMPATIBILITY TEST ELECTRIC: CPT

## 2021-07-14 PROCEDURE — 272N000001 HC OR GENERAL SUPPLY STERILE: Performed by: INTERNAL MEDICINE

## 2021-07-14 PROCEDURE — 85347 COAGULATION TIME ACTIVATED: CPT

## 2021-07-14 PROCEDURE — 250N000011 HC RX IP 250 OP 636: Performed by: NURSE ANESTHETIST, CERTIFIED REGISTERED

## 2021-07-14 PROCEDURE — 93010 ELECTROCARDIOGRAM REPORT: CPT | Mod: 76 | Performed by: INTERNAL MEDICINE

## 2021-07-14 PROCEDURE — 999N000054 HC STATISTIC EKG NON-CHARGEABLE

## 2021-07-14 PROCEDURE — C1898 LEAD, PMKR, OTHER THAN TRANS: HCPCS | Performed by: INTERNAL MEDICINE

## 2021-07-14 PROCEDURE — 250N000009 HC RX 250: Performed by: NURSE ANESTHETIST, CERTIFIED REGISTERED

## 2021-07-14 PROCEDURE — 33207 INSERT HEART PM VENTRICULAR: CPT | Performed by: INTERNAL MEDICINE

## 2021-07-14 PROCEDURE — C1887 CATHETER, GUIDING: HCPCS | Performed by: INTERNAL MEDICINE

## 2021-07-14 PROCEDURE — C1786 PMKR, SINGLE, RATE-RESP: HCPCS | Performed by: INTERNAL MEDICINE

## 2021-07-14 PROCEDURE — P9016 RBC LEUKOCYTES REDUCED: HCPCS

## 2021-07-14 PROCEDURE — 999N000157 HC STATISTIC RCP TIME EA 10 MIN

## 2021-07-14 PROCEDURE — C1769 GUIDE WIRE: HCPCS | Performed by: INTERNAL MEDICINE

## 2021-07-14 PROCEDURE — 278N000051 HC OR IMPLANT GENERAL: Performed by: INTERNAL MEDICINE

## 2021-07-14 PROCEDURE — 99223 1ST HOSP IP/OBS HIGH 75: CPT | Performed by: PHYSICIAN ASSISTANT

## 2021-07-14 PROCEDURE — 370N000017 HC ANESTHESIA TECHNICAL FEE, PER MIN: Performed by: INTERNAL MEDICINE

## 2021-07-14 PROCEDURE — 93005 ELECTROCARDIOGRAM TRACING: CPT

## 2021-07-14 PROCEDURE — 02RF38Z REPLACEMENT OF AORTIC VALVE WITH ZOOPLASTIC TISSUE, PERCUTANEOUS APPROACH: ICD-10-PCS | Performed by: THORACIC SURGERY (CARDIOTHORACIC VASCULAR SURGERY)

## 2021-07-14 DEVICE — VALVE HEART SAPIEN 3 29MM 9600TFX29A: Type: IMPLANTABLE DEVICE | Site: CHEST | Status: FUNCTIONAL

## 2021-07-14 DEVICE — LEAD PACEMAKER SELECTSECURE 69CM MD  383069: Type: IMPLANTABLE DEVICE | Site: CHEST | Status: FUNCTIONAL

## 2021-07-14 DEVICE — PACEMAKER AZURE XT SR MRI SYS: Type: IMPLANTABLE DEVICE | Site: CHEST | Status: FUNCTIONAL

## 2021-07-14 DEVICE — IMPLANTABLE DEVICE: Type: IMPLANTABLE DEVICE | Site: AORTA | Status: FUNCTIONAL

## 2021-07-14 RX ORDER — NITROGLYCERIN 0.4 MG/1
0.4 TABLET SUBLINGUAL EVERY 5 MIN PRN
Status: DISCONTINUED | OUTPATIENT
Start: 2021-07-14 | End: 2021-07-15 | Stop reason: HOSPADM

## 2021-07-14 RX ORDER — ONDANSETRON 2 MG/ML
4 INJECTION INTRAMUSCULAR; INTRAVENOUS EVERY 30 MIN PRN
Status: DISCONTINUED | OUTPATIENT
Start: 2021-07-14 | End: 2021-07-14 | Stop reason: HOSPADM

## 2021-07-14 RX ORDER — CEPHALEXIN 500 MG/1
500 CAPSULE ORAL 3 TIMES DAILY
Status: DISCONTINUED | OUTPATIENT
Start: 2021-07-15 | End: 2021-07-15 | Stop reason: HOSPADM

## 2021-07-14 RX ORDER — LIDOCAINE 40 MG/G
CREAM TOPICAL
Status: DISCONTINUED | OUTPATIENT
Start: 2021-07-14 | End: 2021-07-14 | Stop reason: HOSPADM

## 2021-07-14 RX ORDER — HYDROMORPHONE HYDROCHLORIDE 1 MG/ML
0.2 INJECTION, SOLUTION INTRAMUSCULAR; INTRAVENOUS; SUBCUTANEOUS EVERY 5 MIN PRN
Status: ACTIVE | OUTPATIENT
Start: 2021-07-14 | End: 2021-07-14

## 2021-07-14 RX ORDER — LIDOCAINE HYDROCHLORIDE 10 MG/ML
INJECTION, SOLUTION INFILTRATION; PERINEURAL
Status: COMPLETED | OUTPATIENT
Start: 2021-07-14 | End: 2021-07-14

## 2021-07-14 RX ORDER — ASPIRIN 81 MG/1
81 TABLET, CHEWABLE ORAL DAILY
Status: DISCONTINUED | OUTPATIENT
Start: 2021-07-15 | End: 2021-07-14

## 2021-07-14 RX ORDER — DEXAMETHASONE SODIUM PHOSPHATE 4 MG/ML
INJECTION, SOLUTION INTRA-ARTICULAR; INTRALESIONAL; INTRAMUSCULAR; INTRAVENOUS; SOFT TISSUE PRN
Status: DISCONTINUED | OUTPATIENT
Start: 2021-07-14 | End: 2021-07-14

## 2021-07-14 RX ORDER — ONDANSETRON 4 MG/1
4 TABLET, ORALLY DISINTEGRATING ORAL EVERY 30 MIN PRN
Status: DISCONTINUED | OUTPATIENT
Start: 2021-07-14 | End: 2021-07-14 | Stop reason: HOSPADM

## 2021-07-14 RX ORDER — SODIUM CHLORIDE, SODIUM LACTATE, POTASSIUM CHLORIDE, CALCIUM CHLORIDE 600; 310; 30; 20 MG/100ML; MG/100ML; MG/100ML; MG/100ML
INJECTION, SOLUTION INTRAVENOUS CONTINUOUS PRN
Status: DISCONTINUED | OUTPATIENT
Start: 2021-07-14 | End: 2021-07-14

## 2021-07-14 RX ORDER — ATORVASTATIN CALCIUM 10 MG/1
10 TABLET, FILM COATED ORAL AT BEDTIME
Status: DISCONTINUED | OUTPATIENT
Start: 2021-07-14 | End: 2021-07-15 | Stop reason: HOSPADM

## 2021-07-14 RX ORDER — NOREPINEPHRINE BITARTRATE 0.06 MG/ML
.01-.6 INJECTION, SOLUTION INTRAVENOUS CONTINUOUS
Status: DISCONTINUED | OUTPATIENT
Start: 2021-07-14 | End: 2021-07-14 | Stop reason: HOSPADM

## 2021-07-14 RX ORDER — HEPARIN SODIUM 1000 [USP'U]/ML
INJECTION, SOLUTION INTRAVENOUS; SUBCUTANEOUS PRN
Status: DISCONTINUED | OUTPATIENT
Start: 2021-07-14 | End: 2021-07-14

## 2021-07-14 RX ORDER — IOPAMIDOL 755 MG/ML
INJECTION, SOLUTION INTRAVASCULAR PRN
Status: DISCONTINUED | OUTPATIENT
Start: 2021-07-14 | End: 2021-07-14 | Stop reason: HOSPADM

## 2021-07-14 RX ORDER — ONDANSETRON 2 MG/ML
INJECTION INTRAMUSCULAR; INTRAVENOUS PRN
Status: DISCONTINUED | OUTPATIENT
Start: 2021-07-14 | End: 2021-07-14

## 2021-07-14 RX ORDER — SODIUM CHLORIDE, SODIUM LACTATE, POTASSIUM CHLORIDE, CALCIUM CHLORIDE 600; 310; 30; 20 MG/100ML; MG/100ML; MG/100ML; MG/100ML
INJECTION, SOLUTION INTRAVENOUS CONTINUOUS
Status: DISCONTINUED | OUTPATIENT
Start: 2021-07-14 | End: 2021-07-14 | Stop reason: HOSPADM

## 2021-07-14 RX ORDER — CEFAZOLIN SODIUM 1 G/3ML
1 INJECTION, POWDER, FOR SOLUTION INTRAMUSCULAR; INTRAVENOUS EVERY 8 HOURS
Status: COMPLETED | OUTPATIENT
Start: 2021-07-14 | End: 2021-07-15

## 2021-07-14 RX ORDER — CLOPIDOGREL BISULFATE 75 MG/1
75 TABLET ORAL DAILY
Status: DISCONTINUED | OUTPATIENT
Start: 2021-07-15 | End: 2021-07-15 | Stop reason: HOSPADM

## 2021-07-14 RX ORDER — FENTANYL CITRATE 50 UG/ML
50 INJECTION, SOLUTION INTRAMUSCULAR; INTRAVENOUS EVERY 5 MIN PRN
Status: ACTIVE | OUTPATIENT
Start: 2021-07-14 | End: 2021-07-14

## 2021-07-14 RX ORDER — SODIUM CHLORIDE, SODIUM GLUCONATE, SODIUM ACETATE, POTASSIUM CHLORIDE AND MAGNESIUM CHLORIDE 526; 502; 368; 37; 30 MG/100ML; MG/100ML; MG/100ML; MG/100ML; MG/100ML
INJECTION, SOLUTION INTRAVENOUS CONTINUOUS PRN
Status: DISCONTINUED | OUTPATIENT
Start: 2021-07-14 | End: 2021-07-14

## 2021-07-14 RX ORDER — CEFAZOLIN SODIUM 2 G/100ML
2 INJECTION, SOLUTION INTRAVENOUS
Status: COMPLETED | OUTPATIENT
Start: 2021-07-14 | End: 2021-07-14

## 2021-07-14 RX ORDER — BUPIVACAINE HYDROCHLORIDE 2.5 MG/ML
INJECTION, SOLUTION EPIDURAL; INFILTRATION; INTRACAUDAL PRN
Status: DISCONTINUED | OUTPATIENT
Start: 2021-07-14 | End: 2021-07-14 | Stop reason: HOSPADM

## 2021-07-14 RX ORDER — PROTAMINE SULFATE 10 MG/ML
INJECTION, SOLUTION INTRAVENOUS PRN
Status: DISCONTINUED | OUTPATIENT
Start: 2021-07-14 | End: 2021-07-14

## 2021-07-14 RX ORDER — ASPIRIN 81 MG/1
81 TABLET, CHEWABLE ORAL DAILY
Status: DISCONTINUED | OUTPATIENT
Start: 2021-07-15 | End: 2021-07-15 | Stop reason: HOSPADM

## 2021-07-14 RX ORDER — LEVOTHYROXINE SODIUM 100 UG/1
100 TABLET ORAL
Status: DISCONTINUED | OUTPATIENT
Start: 2021-07-15 | End: 2021-07-15 | Stop reason: HOSPADM

## 2021-07-14 RX ORDER — HYDRALAZINE HYDROCHLORIDE 20 MG/ML
10 INJECTION INTRAMUSCULAR; INTRAVENOUS
Status: DISCONTINUED | OUTPATIENT
Start: 2021-07-14 | End: 2021-07-15 | Stop reason: HOSPADM

## 2021-07-14 RX ORDER — LIDOCAINE HYDROCHLORIDE 20 MG/ML
INJECTION, SOLUTION INFILTRATION; PERINEURAL PRN
Status: DISCONTINUED | OUTPATIENT
Start: 2021-07-14 | End: 2021-07-14

## 2021-07-14 RX ORDER — PROPOFOL 10 MG/ML
INJECTION, EMULSION INTRAVENOUS PRN
Status: DISCONTINUED | OUTPATIENT
Start: 2021-07-14 | End: 2021-07-14

## 2021-07-14 RX ORDER — FENTANYL CITRATE 50 UG/ML
INJECTION, SOLUTION INTRAMUSCULAR; INTRAVENOUS PRN
Status: DISCONTINUED | OUTPATIENT
Start: 2021-07-14 | End: 2021-07-14

## 2021-07-14 RX ORDER — ACETAMINOPHEN 325 MG/1
650 TABLET ORAL EVERY 4 HOURS PRN
Status: DISCONTINUED | OUTPATIENT
Start: 2021-07-14 | End: 2021-07-15 | Stop reason: HOSPADM

## 2021-07-14 RX ORDER — SODIUM CHLORIDE 9 MG/ML
INJECTION, SOLUTION INTRAVENOUS CONTINUOUS
Status: DISCONTINUED | OUTPATIENT
Start: 2021-07-14 | End: 2021-07-14 | Stop reason: HOSPADM

## 2021-07-14 RX ORDER — CLOPIDOGREL BISULFATE 75 MG/1
300 TABLET ORAL ONCE
Status: COMPLETED | OUTPATIENT
Start: 2021-07-14 | End: 2021-07-14

## 2021-07-14 RX ORDER — CLONIDINE HYDROCHLORIDE 0.1 MG/1
0.1 TABLET ORAL AT BEDTIME
Status: DISCONTINUED | OUTPATIENT
Start: 2021-07-14 | End: 2021-07-15 | Stop reason: HOSPADM

## 2021-07-14 RX ORDER — HYDROXYZINE HYDROCHLORIDE 25 MG/1
25 TABLET, FILM COATED ORAL 3 TIMES DAILY PRN
Status: DISCONTINUED | OUTPATIENT
Start: 2021-07-14 | End: 2021-07-15 | Stop reason: HOSPADM

## 2021-07-14 RX ORDER — FUROSEMIDE 40 MG
40 TABLET ORAL
Status: DISCONTINUED | OUTPATIENT
Start: 2021-07-15 | End: 2021-07-15 | Stop reason: HOSPADM

## 2021-07-14 RX ADMIN — SODIUM CHLORIDE, SODIUM GLUCONATE, SODIUM ACETATE, POTASSIUM CHLORIDE AND MAGNESIUM CHLORIDE: 526; 502; 368; 37; 30 INJECTION, SOLUTION INTRAVENOUS at 07:44

## 2021-07-14 RX ADMIN — SUGAMMADEX 200 MG: 100 INJECTION, SOLUTION INTRAVENOUS at 14:11

## 2021-07-14 RX ADMIN — NOREPINEPHRINE BITARTRATE 12.8 MCG: 1 INJECTION, SOLUTION, CONCENTRATE INTRAVENOUS at 10:36

## 2021-07-14 RX ADMIN — HEPARIN SODIUM 2000 UNITS: 1000 INJECTION INTRAVENOUS; SUBCUTANEOUS at 10:13

## 2021-07-14 RX ADMIN — PHENYLEPHRINE HYDROCHLORIDE 100 MCG: 10 INJECTION INTRAVENOUS at 14:00

## 2021-07-14 RX ADMIN — ATORVASTATIN CALCIUM 10 MG: 10 TABLET, FILM COATED ORAL at 22:02

## 2021-07-14 RX ADMIN — ROCURONIUM BROMIDE 30 MG: 10 INJECTION INTRAVENOUS at 10:10

## 2021-07-14 RX ADMIN — PROTAMINE SULFATE 75 MG: 10 INJECTION, SOLUTION INTRAVENOUS at 11:03

## 2021-07-14 RX ADMIN — FENTANYL CITRATE 50 MCG: 50 INJECTION, SOLUTION INTRAMUSCULAR; INTRAVENOUS at 13:56

## 2021-07-14 RX ADMIN — CEFAZOLIN 1 G: 1 INJECTION, POWDER, FOR SOLUTION INTRAMUSCULAR; INTRAVENOUS at 20:54

## 2021-07-14 RX ADMIN — PHENYLEPHRINE HYDROCHLORIDE 200 MCG: 10 INJECTION INTRAVENOUS at 14:08

## 2021-07-14 RX ADMIN — ONDANSETRON 4 MG: 2 INJECTION INTRAMUSCULAR; INTRAVENOUS at 11:19

## 2021-07-14 RX ADMIN — CLONIDINE HYDROCHLORIDE 0.1 MG: 0.1 TABLET ORAL at 22:02

## 2021-07-14 RX ADMIN — Medication 1 ML: at 10:38

## 2021-07-14 RX ADMIN — PHENYLEPHRINE HYDROCHLORIDE 100 MCG: 10 INJECTION INTRAVENOUS at 09:12

## 2021-07-14 RX ADMIN — SODIUM CHLORIDE, POTASSIUM CHLORIDE, SODIUM LACTATE AND CALCIUM CHLORIDE: 600; 310; 30; 20 INJECTION, SOLUTION INTRAVENOUS at 07:44

## 2021-07-14 RX ADMIN — NOREPINEPHRINE BITARTRATE 6.4 MCG: 1 INJECTION, SOLUTION, CONCENTRATE INTRAVENOUS at 10:39

## 2021-07-14 RX ADMIN — ROCURONIUM BROMIDE 50 MG: 10 INJECTION INTRAVENOUS at 08:04

## 2021-07-14 RX ADMIN — NOREPINEPHRINE BITARTRATE 12.8 MCG: 1 INJECTION, SOLUTION, CONCENTRATE INTRAVENOUS at 10:38

## 2021-07-14 RX ADMIN — DEXAMETHASONE SODIUM PHOSPHATE 4 MG: 4 INJECTION, SOLUTION INTRA-ARTICULAR; INTRALESIONAL; INTRAMUSCULAR; INTRAVENOUS; SOFT TISSUE at 08:14

## 2021-07-14 RX ADMIN — Medication 2 G: at 08:39

## 2021-07-14 RX ADMIN — PROPOFOL 70 MG: 10 INJECTION, EMULSION INTRAVENOUS at 10:34

## 2021-07-14 RX ADMIN — CLOPIDOGREL BISULFATE 300 MG: 75 TABLET ORAL at 17:41

## 2021-07-14 RX ADMIN — LIDOCAINE HYDROCHLORIDE 2 ML: 10 INJECTION, SOLUTION INFILTRATION; PERINEURAL at 08:35

## 2021-07-14 RX ADMIN — Medication 0.1 MCG/KG/MIN: at 08:30

## 2021-07-14 RX ADMIN — FENTANYL CITRATE 100 MCG: 50 INJECTION, SOLUTION INTRAMUSCULAR; INTRAVENOUS at 08:04

## 2021-07-14 RX ADMIN — HEPARIN SODIUM 10000 UNITS: 1000 INJECTION INTRAVENOUS; SUBCUTANEOUS at 09:38

## 2021-07-14 RX ADMIN — UMECLIDINIUM 1 PUFF: 62.5 AEROSOL, POWDER ORAL at 22:02

## 2021-07-14 RX ADMIN — PHENYLEPHRINE HYDROCHLORIDE 150 MCG: 10 INJECTION INTRAVENOUS at 11:29

## 2021-07-14 RX ADMIN — PHENYLEPHRINE HYDROCHLORIDE 150 MCG: 10 INJECTION INTRAVENOUS at 09:54

## 2021-07-14 RX ADMIN — LIDOCAINE HYDROCHLORIDE 100 MG: 20 INJECTION, SOLUTION INFILTRATION; PERINEURAL at 08:04

## 2021-07-14 RX ADMIN — SODIUM CHLORIDE, SODIUM GLUCONATE, SODIUM ACETATE, POTASSIUM CHLORIDE AND MAGNESIUM CHLORIDE: 526; 502; 368; 37; 30 INJECTION, SOLUTION INTRAVENOUS at 13:39

## 2021-07-14 RX ADMIN — Medication 2 G: at 12:38

## 2021-07-14 RX ADMIN — Medication 1 ML: at 10:39

## 2021-07-14 RX ADMIN — ACETAMINOPHEN 650 MG: 325 TABLET, FILM COATED ORAL at 22:01

## 2021-07-14 RX ADMIN — FLUTICASONE FUROATE AND VILANTEROL TRIFENATATE 1 PUFF: 200; 25 POWDER RESPIRATORY (INHALATION) at 22:02

## 2021-07-14 RX ADMIN — PROPOFOL 80 MG: 10 INJECTION, EMULSION INTRAVENOUS at 10:43

## 2021-07-14 RX ADMIN — PHENYLEPHRINE HYDROCHLORIDE 100 MCG: 10 INJECTION INTRAVENOUS at 08:04

## 2021-07-14 RX ADMIN — PHENYLEPHRINE HYDROCHLORIDE 150 MCG: 10 INJECTION INTRAVENOUS at 13:38

## 2021-07-14 ASSESSMENT — ACTIVITIES OF DAILY LIVING (ADL): ADLS_ACUITY_SCORE: 16

## 2021-07-14 ASSESSMENT — MIFFLIN-ST. JEOR: SCORE: 1190

## 2021-07-14 ASSESSMENT — VISUAL ACUITY: OU: NORMAL ACUITY

## 2021-07-14 NOTE — OP NOTE
OPERATIVE DATE: 7/14/2021    PRE-OPERATIVE DIAGNOSIS:  1) Severe aortic stenosis  Patient Active Problem List   Diagnosis     Persistent atrial fibrillation (H)     Pulmonary emphysema (H)     Bicuspid aortic valve     Mild major depression (H)     Acute bronchitis     Hypothyroidism, postablative     Advance care planning     Essential hypertension     Long-term (current) use of anticoagulants [Z79.01]     Acute on chronic respiratory failure (H)     Health Care Home     Status post coronary angiogram     Coronary artery disease involving native coronary artery of native heart without angina pectoris     Acute cystitis without hematuria     Small bowel obstruction (H)     Acute renal failure (H)     Hypokalemia     Infection due to 2019 novel coronavirus     Aortic aneurysm (H)     Aortic valve disorder     Mitral valve disorder     Cardiomegaly     Carotid stenosis     Depressive disorder     Edema     COPD (chronic obstructive pulmonary disease) (H)     Other ill-defined heart diseases     Aortic valve stenosis, etiology of cardiac valve disease unspecified     POST-OPERATIVE DIAGNOSIS:  1) Severe aortic stenosis  Patient Active Problem List   Diagnosis     Persistent atrial fibrillation (H)     Pulmonary emphysema (H)     Bicuspid aortic valve     Mild major depression (H)     Acute bronchitis     Hypothyroidism, postablative     Advance care planning     Essential hypertension     Long-term (current) use of anticoagulants [Z79.01]     Acute on chronic respiratory failure (H)     Health Care Home     Status post coronary angiogram     Coronary artery disease involving native coronary artery of native heart without angina pectoris     Acute cystitis without hematuria     Small bowel obstruction (H)     Acute renal failure (H)     Hypokalemia     Infection due to 2019 novel coronavirus     Aortic aneurysm (H)     Aortic valve disorder     Mitral valve disorder     Cardiomegaly     Carotid stenosis     Depressive  disorder     Edema     COPD (chronic obstructive pulmonary disease) (H)     Other ill-defined heart diseases     Aortic valve stenosis, etiology of cardiac valve disease unspecified     PROCEDURE:  1) Temporary pacemaker insertion  2) Iliofemoral artery angiography  3) Ascending aorta angiography  4) Left heart catheterization  5) Transfemoral transcatheter aortic valve replacement with 26mm Tamy S3 valve    SURGEON: Jaciel Strauss MD    CARDIOLOGIST: Domo Sarah MD    ANESTHESIA: GETA    ESTIMATED BLOOD LOSS: 10 cc    INDICATIONS:  Ms. Mary Alice Cameron is a 71 year old year old female admitted with severe aortic stenosis.  We were asked to evaluate for TAVR.  Risks and benefits of the operation were explained to the patient and their family including, but not limited to, bleeding, infection, stroke and even death.  They understood these risks and agreed to proceed electively.    OPERATIVE REPORT:  The patient was transferred to the operating room and positioned supine on the OR table.  Appropriate support and monitoring devices were placed and managed by anesthesia.  The patients abdomen and bilateral lower extremities were clipped, prepped and draped in sterile fashion.  A pre-procedure time-out was performed confirming the correct patient, correct site and correct procedure.  Intravenous heparin was administered.  Arterial access of the right and left femoral arteries and left common femoral vein was performed by interventional cardiology.  Two Perclose Proglide devices were deployed on the side used for valve deployment.  A temporary transvenous pacemaker was placed by the cardiology team.  A Lunderquist wire was advanced to support the Kingston sheath insertion.  A pigtail catheter was advanced to the aortic root and angiogram performed to confirm optimal implant angle.  The pigtail was placed in the non-coronary cusp.  The LV was accessed using an AL-2 catheter over a straight wire.  The pigtail  catheter was advanced into the LV.  The pigtail catheter was used to advance a Lunderquist Safari wire into the LV and the pigtail catheter was removed.  The Tamy S3 bioprosthetic valve was positioned across the native aortic valve and deployed using rapid pacing and ventilatory pause.  Transesophageal and transthoracic echocardiography showed trace paravalvular insufficiency.  The deployment system and wires were removed.  The femoral access was made hemostatic.  A final iliofemoral angiogram showed no extravasation or stenosis.  The patient was then transferred from the operating bed to an ICU bed and transferred to the ICU in critical, but stable, condition.    All needle, sponge and instrument counts were correct at the end of the case.    Jaciel Strauss  Cardiothoracic Surgery  Pager: [phone]

## 2021-07-14 NOTE — ANESTHESIA PROCEDURE NOTES
Arterial Line Procedure Note  Pre-Procedure   Staff -        Anesthesiologist:  Jordon Andres MD       Performed By: SRNA and anesthesiologist       Location: OR       Pre-Anesthestic Checklist: patient identified, IV checked, risks and benefits discussed, informed consent, monitors and equipment checked and pre-op evaluation  Timeout:       Correct Patient: Yes        Correct Procedure: Yes        Correct Site: Yes        Correct Position: Yes   Procedure   Procedure: arterial line       Laterality: right       Insertion Site: radial.  Sterile Prep        Standard elements of sterile barrier followed       Skin prep: Chloraprep  Insertion/Injection        Technique: ultrasound guided        1. Ultrasound was used to evaluate the access site.       2. Artery evaluated via ultrasound for patency/adequacy.       3. Using real-time ultrasound the needle/catheter was observed entering the artery/vein.       Catheter Type/Size: 20 G, 12 cm  Narrative         Secured by: other       Tegaderm dressing used.       Complications: None apparent,        Arterial waveform: Yes        IBP within 10% of NIBP: Yes

## 2021-07-14 NOTE — Clinical Note
AL1 catheter inserted RFV. Straight wire inserted through AL1. AL1 advanced to LV and straight wire removed.

## 2021-07-14 NOTE — PRE-PROCEDURE
GENERAL PRE-PROCEDURE:   Procedure:  Transcaval transcatheter aortic valve replacement  Date/Time:  7/14/2021 8:14 AM    Written consent obtained?: Yes    Risks and benefits: Risks, benefits and alternatives were discussed    Consent given by:  Patient  Patient states understanding of procedure being performed: Yes    Patient's understanding of procedure matches consent: Yes    Procedure consent matches procedure scheduled: Yes    Expected level of sedation:  Moderate  Appropriately NPO:  Yes  ASA Class:  Class 2- mild systemic disease, no acute problems, no functional limitations  Mallampati  :  Grade 1- soft palate, uvula, tonsillar pillars, and posterior pharyngeal wall visible  Lungs:  Lungs clear with good breath sounds bilaterally  Heart:  Normal heart sounds and rate, systolic murmur and a-fib  History & Physical reviewed:  History and physical reviewed and no updates needed  Statement of review:  I have reviewed the lab findings, diagnostic data, medications, and the plan for sedation

## 2021-07-14 NOTE — Clinical Note
Potential access sites were evaluated for patency using ultrasound.   The left subclavian vein was selected. Access was obtained under with Sonosite guidance using a standard 18 guage needle with direct visualization of needle entry.

## 2021-07-14 NOTE — PROGRESS NOTES
SPIRITUAL HEALTH SERVICES  [facility] 3c   PRE-SURGERY VISIT    Had pre-surgery visit with pt and friend Ksenia.  Provided spiritual support, prayer. Oriented to [facility] availability during hospital stay.      Citlali Livingston  Chaplain Resident  Pager: [phone]

## 2021-07-14 NOTE — ANESTHESIA PROCEDURE NOTES
Airway       Patient location during procedure: OR       Procedure Start/Stop Times: 7/14/2021 8:11 AM  Staff -        Anesthesiologist:  Jordon Andres MD       CRNA: Yue Gutierrez APRN CRNA       Other Anesthesia Staff: Misti De Leon       Performed By: SRNA  Consent for Airway        Urgency: elective  Indications and Patient Condition       Indications for airway management: olive-procedural       Induction type:intravenous       Mask difficulty assessment: 2 - vent by mask + OA or adjuvant +/- NMBA    Final Airway Details       Final airway type: endotracheal airway       Successful airway: ETT - single  Endotracheal Airway Details        ETT size (mm): 7.0       Cuffed: yes       Successful intubation technique: direct laryngoscopy       DL Blade Type: MAC 3       Grade View of Cords: 2       Adjucts: stylet       Position: Right       Measured from: lips       Secured at (cm): 22    Post intubation assessment        Placement verified by: capnometry, equal breath sounds and chest rise        Number of attempts at approach: 1       Number of other approaches attempted: 0       Secured with: pink tape and plastic tape       Ease of procedure: easy       Dentition: Intact and Unchanged

## 2021-07-14 NOTE — ANESTHESIA POSTPROCEDURE EVALUATION
Patient: Mary Alice Cameron    Procedure(s):  Right femoral Transcaval transcatheter aortic valve replacement (SUMMERS) size 29mm.  Transesophageal echocardiogram per anesthesia  Cardiopulmonary standby.  insertion of Permanent Pacemaker    Diagnosis:Aortic valve stenosis, etiology of cardiac valve disease unspecified [I35.0]  Diagnosis Additional Information: No value filed.    Anesthesia Type:  General    Note:  Disposition: Admission   Postop Pain Control: Uneventful            Sign Out: Well controlled pain   PONV: No   Neuro/Psych: Uneventful            Sign Out: Acceptable/Baseline neuro status   Airway/Respiratory: Uneventful            Sign Out: Acceptable/Baseline resp. status   CV/Hemodynamics: Uneventful            Sign Out: Acceptable CV status; No obvious hypovolemia; No obvious fluid overload   Other NRE: NONE   DID A NON-ROUTINE EVENT OCCUR? No           Last vitals:  Vitals:    07/14/21 1430 07/14/21 1440 07/14/21 1450   BP: 114/72 114/55 114/59   Pulse: 60 60 60   Resp: 13 12 14   Temp:      SpO2: 100% 99% 99%       Last vitals prior to Anesthesia Care Transfer:  CRNA VITALS  7/14/2021 1347 - 7/14/2021 1447      7/14/2021             EKG:  V Paced          Electronically Signed By: Jordon Andres MD  July 14, 2021  3:12 PM

## 2021-07-14 NOTE — OR NURSING
Patient's chart and procedure reviewed for preporation of PACU care.  Patient has gone directly to the ICU.

## 2021-07-15 ENCOUNTER — APPOINTMENT (OUTPATIENT)
Dept: OCCUPATIONAL THERAPY | Facility: CLINIC | Age: 71
DRG: 266 | End: 2021-07-15
Attending: STUDENT IN AN ORGANIZED HEALTH CARE EDUCATION/TRAINING PROGRAM
Payer: MEDICARE

## 2021-07-15 ENCOUNTER — APPOINTMENT (OUTPATIENT)
Dept: GENERAL RADIOLOGY | Facility: CLINIC | Age: 71
DRG: 266 | End: 2021-07-15
Attending: PHYSICIAN ASSISTANT
Payer: MEDICARE

## 2021-07-15 ENCOUNTER — ANCILLARY PROCEDURE (OUTPATIENT)
Dept: CARDIOLOGY | Facility: CLINIC | Age: 71
DRG: 266 | End: 2021-07-15
Attending: INTERNAL MEDICINE
Payer: MEDICARE

## 2021-07-15 ENCOUNTER — APPOINTMENT (OUTPATIENT)
Dept: CARDIOLOGY | Facility: CLINIC | Age: 71
DRG: 266 | End: 2021-07-15
Attending: STUDENT IN AN ORGANIZED HEALTH CARE EDUCATION/TRAINING PROGRAM
Payer: MEDICARE

## 2021-07-15 VITALS
BODY MASS INDEX: 25.89 KG/M2 | RESPIRATION RATE: 16 BRPM | TEMPERATURE: 98.8 F | SYSTOLIC BLOOD PRESSURE: 140 MMHG | DIASTOLIC BLOOD PRESSURE: 64 MMHG | WEIGHT: 151.68 LBS | HEART RATE: 60 BPM | HEIGHT: 64 IN | OXYGEN SATURATION: 96 %

## 2021-07-15 LAB
ANION GAP SERPL CALCULATED.3IONS-SCNC: 8 MMOL/L (ref 3–14)
ATRIAL RATE - MUSE: 357 BPM
ATRIAL RATE - MUSE: 66 BPM
ATRIAL RATE - MUSE: 96 BPM
BUN SERPL-MCNC: 22 MG/DL (ref 7–30)
CALCIUM SERPL-MCNC: 8.8 MG/DL (ref 8.5–10.1)
CHLORIDE BLD-SCNC: 101 MMOL/L (ref 94–109)
CO2 SERPL-SCNC: 26 MMOL/L (ref 20–32)
CREAT SERPL-MCNC: 0.97 MG/DL (ref 0.52–1.04)
DIASTOLIC BLOOD PRESSURE - MUSE: NORMAL MMHG
ERYTHROCYTE [DISTWIDTH] IN BLOOD BY AUTOMATED COUNT: 13 % (ref 10–15)
GFR SERPL CREATININE-BSD FRML MDRD: 59 ML/MIN/1.73M2
GLUCOSE BLD-MCNC: 95 MG/DL (ref 70–99)
HCT VFR BLD AUTO: 37.8 % (ref 35–47)
HGB BLD-MCNC: 12.1 G/DL (ref 11.7–15.7)
INTERPRETATION ECG - MUSE: NORMAL
LVEF ECHO: NORMAL
MAGNESIUM SERPL-MCNC: 2.4 MG/DL (ref 1.6–2.3)
MCH RBC QN AUTO: 32.9 PG (ref 26.5–33)
MCHC RBC AUTO-ENTMCNC: 32 G/DL (ref 31.5–36.5)
MCV RBC AUTO: 103 FL (ref 78–100)
P AXIS - MUSE: NORMAL DEGREES
PHOSPHATE SERPL-MCNC: 4.1 MG/DL (ref 2.5–4.5)
PLATELET # BLD AUTO: 150 10E3/UL (ref 150–450)
POTASSIUM BLD-SCNC: 4.6 MMOL/L (ref 3.4–5.3)
PR INTERVAL - MUSE: NORMAL MS
QRS DURATION - MUSE: 126 MS
QRS DURATION - MUSE: 132 MS
QRS DURATION - MUSE: 78 MS
QT - MUSE: 400 MS
QT - MUSE: 490 MS
QT - MUSE: 498 MS
QTC - MUSE: 464 MS
QTC - MUSE: 490 MS
QTC - MUSE: 501 MS
R AXIS - MUSE: -13 DEGREES
R AXIS - MUSE: 130 DEGREES
R AXIS - MUSE: 131 DEGREES
RBC # BLD AUTO: 3.68 10E6/UL (ref 3.8–5.2)
SODIUM SERPL-SCNC: 135 MMOL/L (ref 133–144)
SYSTOLIC BLOOD PRESSURE - MUSE: NORMAL MMHG
T AXIS - MUSE: -21 DEGREES
T AXIS - MUSE: 202 DEGREES
T AXIS - MUSE: 225 DEGREES
VENTRICULAR RATE- MUSE: 60 BPM
VENTRICULAR RATE- MUSE: 61 BPM
VENTRICULAR RATE- MUSE: 81 BPM
WBC # BLD AUTO: 8.9 10E3/UL (ref 4–11)

## 2021-07-15 PROCEDURE — 93325 DOPPLER ECHO COLOR FLOW MAPG: CPT

## 2021-07-15 PROCEDURE — 99239 HOSP IP/OBS DSCHRG MGMT >30: CPT | Mod: 25 | Performed by: INTERNAL MEDICINE

## 2021-07-15 PROCEDURE — 71046 X-RAY EXAM CHEST 2 VIEWS: CPT

## 2021-07-15 PROCEDURE — 93279 PRGRMG DEV EVAL PM/LDLS PM: CPT

## 2021-07-15 PROCEDURE — 93279 PRGRMG DEV EVAL PM/LDLS PM: CPT | Mod: 26 | Performed by: INTERNAL MEDICINE

## 2021-07-15 PROCEDURE — 93321 DOPPLER ECHO F-UP/LMTD STD: CPT | Mod: 26 | Performed by: INTERNAL MEDICINE

## 2021-07-15 PROCEDURE — 85027 COMPLETE CBC AUTOMATED: CPT | Performed by: STUDENT IN AN ORGANIZED HEALTH CARE EDUCATION/TRAINING PROGRAM

## 2021-07-15 PROCEDURE — 97165 OT EVAL LOW COMPLEX 30 MIN: CPT | Mod: GO

## 2021-07-15 PROCEDURE — 93005 ELECTROCARDIOGRAM TRACING: CPT

## 2021-07-15 PROCEDURE — 02H63JZ INSERTION OF PACEMAKER LEAD INTO RIGHT ATRIUM, PERCUTANEOUS APPROACH: ICD-10-PCS | Performed by: INTERNAL MEDICINE

## 2021-07-15 PROCEDURE — 71046 X-RAY EXAM CHEST 2 VIEWS: CPT | Mod: 26 | Performed by: RADIOLOGY

## 2021-07-15 PROCEDURE — 80048 BASIC METABOLIC PNL TOTAL CA: CPT | Performed by: STUDENT IN AN ORGANIZED HEALTH CARE EDUCATION/TRAINING PROGRAM

## 2021-07-15 PROCEDURE — 93010 ELECTROCARDIOGRAM REPORT: CPT | Mod: 59 | Performed by: INTERNAL MEDICINE

## 2021-07-15 PROCEDURE — 250N000013 HC RX MED GY IP 250 OP 250 PS 637: Performed by: STUDENT IN AN ORGANIZED HEALTH CARE EDUCATION/TRAINING PROGRAM

## 2021-07-15 PROCEDURE — 250N000011 HC RX IP 250 OP 636: Performed by: STUDENT IN AN ORGANIZED HEALTH CARE EDUCATION/TRAINING PROGRAM

## 2021-07-15 PROCEDURE — 02HK3JZ INSERTION OF PACEMAKER LEAD INTO RIGHT VENTRICLE, PERCUTANEOUS APPROACH: ICD-10-PCS | Performed by: INTERNAL MEDICINE

## 2021-07-15 PROCEDURE — 93308 TTE F-UP OR LMTD: CPT | Mod: 26 | Performed by: INTERNAL MEDICINE

## 2021-07-15 PROCEDURE — 84100 ASSAY OF PHOSPHORUS: CPT | Performed by: STUDENT IN AN ORGANIZED HEALTH CARE EDUCATION/TRAINING PROGRAM

## 2021-07-15 PROCEDURE — 250N000013 HC RX MED GY IP 250 OP 250 PS 637: Performed by: PHYSICIAN ASSISTANT

## 2021-07-15 PROCEDURE — 97110 THERAPEUTIC EXERCISES: CPT | Mod: GO

## 2021-07-15 PROCEDURE — 0JH634Z INSERTION OF PACEMAKER, SINGLE CHAMBER INTO CHEST SUBCUTANEOUS TISSUE AND FASCIA, PERCUTANEOUS APPROACH: ICD-10-PCS | Performed by: INTERNAL MEDICINE

## 2021-07-15 PROCEDURE — 93325 DOPPLER ECHO COLOR FLOW MAPG: CPT | Mod: 26 | Performed by: INTERNAL MEDICINE

## 2021-07-15 PROCEDURE — 83735 ASSAY OF MAGNESIUM: CPT | Performed by: STUDENT IN AN ORGANIZED HEALTH CARE EDUCATION/TRAINING PROGRAM

## 2021-07-15 RX ORDER — CEPHALEXIN 500 MG/1
500 CAPSULE ORAL 3 TIMES DAILY
Qty: 21 CAPSULE | Refills: 0 | Status: SHIPPED | OUTPATIENT
Start: 2021-07-15 | End: 2021-10-12

## 2021-07-15 RX ORDER — LOSARTAN POTASSIUM 50 MG/1
50 TABLET ORAL 2 TIMES DAILY
Status: DISCONTINUED | OUTPATIENT
Start: 2021-07-15 | End: 2021-07-15 | Stop reason: HOSPADM

## 2021-07-15 RX ORDER — DILTIAZEM HYDROCHLORIDE 360 MG/1
360 CAPSULE, EXTENDED RELEASE ORAL DAILY
Status: DISCONTINUED | OUTPATIENT
Start: 2021-07-15 | End: 2021-07-15 | Stop reason: HOSPADM

## 2021-07-15 RX ADMIN — CEFAZOLIN 1 G: 1 INJECTION, POWDER, FOR SOLUTION INTRAMUSCULAR; INTRAVENOUS at 05:37

## 2021-07-15 RX ADMIN — FLUTICASONE FUROATE AND VILANTEROL TRIFENATATE 1 PUFF: 200; 25 POWDER RESPIRATORY (INHALATION) at 09:30

## 2021-07-15 RX ADMIN — FUROSEMIDE 40 MG: 40 TABLET ORAL at 09:22

## 2021-07-15 RX ADMIN — UMECLIDINIUM 1 PUFF: 62.5 AEROSOL, POWDER ORAL at 09:30

## 2021-07-15 RX ADMIN — LEVOTHYROXINE SODIUM 100 MCG: 100 TABLET ORAL at 09:22

## 2021-07-15 RX ADMIN — CLOPIDOGREL BISULFATE 75 MG: 75 TABLET ORAL at 09:22

## 2021-07-15 RX ADMIN — ASPIRIN 81 MG: 81 TABLET, COATED ORAL at 09:21

## 2021-07-15 RX ADMIN — CEFAZOLIN 1 G: 1 INJECTION, POWDER, FOR SOLUTION INTRAMUSCULAR; INTRAVENOUS at 13:08

## 2021-07-15 RX ADMIN — LOSARTAN POTASSIUM 50 MG: 50 TABLET, FILM COATED ORAL at 09:30

## 2021-07-15 RX ADMIN — DILTIAZEM HYDROCHLORIDE 360 MG: 360 CAPSULE, EXTENDED RELEASE ORAL at 11:42

## 2021-07-15 ASSESSMENT — ACTIVITIES OF DAILY LIVING (ADL)
ADLS_ACUITY_SCORE: 16
IADL_COMMENTS: IND PRIOR
PREVIOUS_RESPONSIBILITIES: MEAL PREP;HOUSEKEEPING;LAUNDRY;SHOPPING;MEDICATION MANAGEMENT;FINANCES;DRIVING

## 2021-07-15 ASSESSMENT — MIFFLIN-ST. JEOR: SCORE: 1188

## 2021-07-15 NOTE — PROGRESS NOTES
Admission   Diagnosis: TAVR and permanent pacemaker placement  Admitted from: PACU   Via: stretcher   Accompanied by: niece   Belongings: Placed in closet; valuables sent home with family   Admission paperwork: complete   Teaching: Call don't fall, use of console, meal times, visiting hours, orientation to unit, when to call for the RN (angina/sob/dizzyness, etc.), and the importance of safety.   Access: PIV   Telemetry:Placed on pt   Ht./Wt.: complete     2 RN head to toe skin assessment performed by Paulette Bhatt and Mendez Morse.  Noted to have bilateral groin puncture sites and left upper chest incision.  No other skin issues noted.

## 2021-07-15 NOTE — DISCHARGE INSTRUCTIONS
Home Care after a Pacemaker Implant    Wound care:  Keep your incision (surgery wound) dry for 3 days.  After 3 days, you may remove the outer bandage.  Keep the strips of tape on.  They will be removed at your clinic visit.  Check for signs of infection each day.  These include increased redness, swelling, drainage, bleeding or a fever over 101 F (38.3 C).  Call us immediately if you see any of these signs.  If there are no signs of infection, you may shower in 3 days.  Do not submerge the incision (in a bath tub, hot tub, or swimming pool) until fully healed.    Pain:   You may have mild to moderate pain for 3 to 5 days.  Take acetaminophen (Tylenol) or ibuprofen (Advil) for the pain.  Call us if the pain is severe or lasts more than 5 days.    Activity:  After 24 hours, slowly return to your normal activities.   Healing will take 4 to 6 weeks.  No driving for 3 days  Avoid climbing a ladder alone.  It is best to stay within 4 feet of the ground.  Avoid anything that may cause rough contact or a hard hit to your chest.  This includes football, hockey, and other contact sports.  For at least 4 weeks:  Do not raise your affected arm above your shoulder.  Do not use your affected arm to push, pull, or lift anything over 10 pounds.  Avoid repetitive upper body activities for 6 weeks (ie: golf, swimming, and weight lifting)    Follow Up Visits:  Return to the clinic in 7 to 10 days to have your device and wound checked.      Telling others about your device:  Before you have any medical tests or treatments, tell the doctors, dentists, and other care providers about your device.  There are a few tests and treatments that may interfere with your device.  (These include MRI, radiation therapy, electrocautery, and others.)  Your care team may need to take special steps to keep you safe.  Before you leave the hospital, you will receive a temporary ID card.  A permanent card will be mailed to you about 6 to 8 weeks later.   Always carry the ID card with you.  It has important details about your device.  You should also get a MedicAlert ID.  Please ask us for a MedicAlert brochure, or go to [email or web address].    Safety near electrical equipment:  All of these are safe to use when in good repair:    Microwaves    Radios    Cordless phone    Remote controls    Small electrical tools  Cell phones: Keep cell phones at least 6 inches from your device.  Do not carry the phone in a pocket near your device.  Security bradley: It is okay to walk through security bradley at the airports and department stores.  Tell airport security that you have a pacemaker.  They should keep the screening wand at least 6 inches from your device.  Full-body scanners are safe.  Avoid the following:    MRI tests in the hospital unless you have a MRI safe pacemaker.           Arc welding, chain saws and high-powered industrial or commercial tools.    Power lines, power plants and large power generators.    Electric body fat scales.    Magnetic mattress pads or pillow.    Questions?  Please call [facility].    Device Nurse:          Business Hours:  [phone]                          After Hours:  [phone]   Choose option 4, then ask for the on-call device nurse at job code [id number].    Please schedule an appointment to see your primary care physician in 7-10 days for an incision check.   We will do a remote device device check on 7/21/2021. We will call you with the results.  You are scheduled for an in person device check at [facility] on 10/12/21 at 9:30 AM.    [facility]  [facility]  [address] [place] [address]  [id number]        Going home after Transcatheter Aortic Valve Replacement (TAVR)  Femoral Access    You may have small amounts of bruising or discomfort, this is expected. If you develop worse swelling, redness and warmth that does not go away, fever and chills, Increasing  numbness in your legs, worsening pain at the site then you need to contact your nurse coordinator.    You may shower, but do not soak in a bath tub or pool or apply lotions, ointments, powder, etc for 3-5 days or until sites look healed.     Activity: No lifting, pushing, pulling more than 10 pounds (examples to avoid: groceries, vacuuming, gardening, golfing): For one week with a procedure through the groin.    After the initial healing process of the access site, we recommend cardiac rehabilitation for all TAVR patients. Cardiac rehabilitation will help you:  - Rebuild stamina, strength and balance.  - Learn how to participate in activities safely, as well as help you regain confidence to do so.  - Return to activities of daily living and leisure.  Please contact their central scheduling to arrange at [phone]    We prefer you to follow up with your primary care provider within 2 weeks. You will be arranged to see the TAVR clinic in month. At that time you will have a repeat ultrasound of the heart to look at the valve.     Should you have any questions or concerns please contact your assigned TAVR nurse coordinator:  Genevieve [phone] (at the first prompt enter #1, second prompt enter #3, then ask the triage nurse if you can speak with Genevieve).      You may resume eliquis on 7/16/2021 in the morning.

## 2021-07-15 NOTE — CONSULTS
Care Management Follow Up    Length of Stay (days): 1    Expected Discharge Date: 07/15/2021     Concerns to be Addressed:   Discharge planning    Patient plan of care discussed at interdisciplinary rounds: Yes    Anticipated Discharge Disposition:  home with outpatient cardiac rehab    Additional Information:  Pt is a 71-year-old female admitted to [facility] 7/14/21 following TAVR. CM/SW auto consulted for discharge planning. OT evaluated pt and recommends home with outpatient cardiac rehab. No CM/SW needs identified at this time. CM/SW to follow for potential needs.    NICO Junior, Stephens Memorial HospitalSW  6C   [facility]  Pager [phone]  Phone [phone]

## 2021-07-15 NOTE — PLAN OF CARE
Patient POD 0 after TAVR and PPM placement, PMH of atrial fibrillation, emphysema, depression, hypothyroidism, HTN, CAD, aortic aneurysm, and COPD.     Neuro: A&O x4, denied dizziness.  No slurred speech, hearing or vision deficits, or facial droop noted.  Had strong and equal movement of bilateral arms and legs.  Pulses equal by Doppler in feet, palpable and equal in wrists.  Initially reported decreased sensation in left leg vs right, but resolved on reassessment.  MD team notified of temporary decrease in sensation.  Respiratory:  Had MOROCHO, lung sounds diminished in right lung base, fine crackles auscultated in left lung base.  Cardiac: No abnormal heart sounds noted.  Had 1+ edema in legs.    GI: Reported no BM since 7/11, stated she normally has BM every 1-4 days, denied abdominal discomfort.  Patient denied passing flatus, bowel sounds hyperactive.  : Had good urine output via cueto catheter, see I&O flowsheet.  Skin: Left groin puncture site covered with primipore, no drainage noted.  Right groin site open to air with liquid bandage, scant sanguinous drainage noted.  Left upper chest incision covered by primipore, dressing marked and unchanged.  VS: In paced rhythm with HR 50s-60s, SBP 120s-130s, SaO2 96-99% on room air.    Drips:  None.    Electrolytes: No replacements ordered.  Pain: Reported a headache that was managed with prn acetaminophen.  Tests/procedures: None performed since arrival on unit.    Mobility: Repositioned in bed with minimal 1-person assist.  Social: Niece visited during shift.    Plan:  Continue to monitor pain, neurological status, VS, heart rhythm, skin and surgical site integrity, fluid status, bowel status, cardiac and respiratory status.  Notify care team of changes in patient condition or other concerns.  Continue neuro assessments q2hr until 1400 tomorrow.

## 2021-07-15 NOTE — PROGRESS NOTES
RN to RN report received from Citlali, PACU RN, phone [phone].  All questions answered at this time.  Patient will go to room 6-420, bed 2.

## 2021-07-15 NOTE — PROGRESS NOTES
Patient seen *** for evaluation and iterative programming of their pacemaker per MD orders.    Device: ***  Normal Device Function***  Mode: ***  AP: ***%  : ***%  BVP: ***%  Intrinsic Rhythm: ***  Short V-V intervals: ***  Estimated battery longevity to RRT = *** years.***/Battery voltage = ***V.   Atrial Arrhythmia: ***  Ventricular Arrhythmia: ***  Setting Changes: ***  ***Incision is covered and dry.  Pacemaker discharge teaching provided in written and verbal form. Patient verbalized understanding of instructions.  Plan: Pt will return to clinic in 7-10 days for an incision and device check.  ***, RN    *** lead pacemaker    I have reviewed and interpreted the device interrogation, settings, programming and nurse's summary. The device is functioning within normal device parameters. I agree with the current findings, assessment and plan.

## 2021-07-15 NOTE — PLAN OF CARE
Discharge  A&Ox4.B/P runs slightly high.Denied pain. Chst incision site dressing small area with old blood marked.Up walking in holguin with OT. Tyler was removed this morning; pt voided x2 afterwards.  Discharge instruction is revised with pt and discharge paper work given to pt.  Pt to follow up with clinic appointments and to continue to take medication per discharge instruction.Friend to give pt ride home.Pt to pick discharge meds from discharge pharmacy.Pt left floor with her belongings via a wheelchair escorted by nursing staff.

## 2021-07-15 NOTE — PROGRESS NOTES
07/15/21 1141   Quick Adds   Type of Visit Initial Occupational Therapy Evaluation   Living Environment   People in home alone   Current Living Arrangements apartment   Home Accessibility no concerns   Transportation Anticipated car, drives self   Living Environment Comments niece planning to provide transportation home. IND prior with all ADL/IADLs. does use walker occasionally.    Self-Care   Usual Activity Tolerance good   Current Activity Tolerance moderate   Regular Exercise Yes   Activity/Exercise Type walking   Exercise Amount/Frequency 3-5 times/wk   Equipment Currently Used at Home walker, standard   Activity/Exercise/Self-Care Comment uses walker occasionally. Walks to post office and back daily 4 blocks total.    Instrumental Activities of Daily Living (IADL)   Previous Responsibilities meal prep;housekeeping;laundry;shopping;medication management;finances;driving   IADL Comments IND prior    Disability/Function   Hearing Difficulty or Deaf no   Wear Glasses or Blind no   Concentrating, Remembering or Making Decisions Difficulty no   Difficulty Communicating no   Difficulty Eating/Swallowing no   Walking or Climbing Stairs Difficulty yes   Walking or Climbing Stairs ambulation difficulty, requires equipment   Mobility Management uses walker occasionally    Dressing/Bathing Difficulty no   Toileting issues no   Doing Errands Independently Difficulty (such as shopping) no   Fall history within last six months no   Change in Functional Status Since Onset of Current Illness/Injury no   General Information   Onset of Illness/Injury or Date of Surgery 07/14/21   Referring Physician Tyrel   Patient/Family Therapy Goal Statement (OT) return home today    Additional Occupational Profile Info/Pertinent History of Current Problem TAVR   Existing Precautions/Restrictions   (10# lifting restriction)   Heart Disease Risk Factors Medical history;Age;Family history;High blood pressure   Cognitive Status  Examination   Orientation Status orientation to person, place and time   Affect/Mental Status (Cognitive) WNL   Follows Commands WNL   Visual Perception   Visual Impairment/Limitations WNL   Sensory   Sensory Quick Adds No deficits were identified   Pain Assessment   Patient Currently in Pain No   Integumentary/Edema   Integumentary/Edema no deficits were identifed   Posture   Posture not impaired   Range of Motion Comprehensive   General Range of Motion no range of motion deficits identified   Strength Comprehensive (MMT)   General Manual Muscle Testing (MMT) Assessment no strength deficits identified   Muscle Tone Assessment   Muscle Tone Quick Adds No deficits were identified   Coordination   Upper Extremity Coordination No deficits were identified   Clinical Impression   Criteria for Skilled Therapeutic Interventions Met (OT) yes   OT Diagnosis need to monitor safety with mobility and monitoring of CV response with ax    OT Problem List-Impairments impacting ADL activity tolerance impaired   Assessment of Occupational Performance 1-3 Performance Deficits   Identified Performance Deficits exercise   Planned Therapy Interventions (OT) ADL retraining;progressive activity/exercise;home program guidelines   Clinical Decision Making Complexity (OT) low complexity   Therapy Frequency (OT) 3x/week   Predicted Duration of Therapy 3 days   Risk & Benefits of therapy have been explained evaluation/treatment results reviewed;care plan/treatment goals reviewed;risks/benefits reviewed;current/potential barriers reviewed;patient   OT Discharge Planning    OT Discharge Recommendation (DC Rec) home with outpatient cardiac rehab   OT Rationale for DC Rec pt safe to d/c to home. Recommend OP CR to continue to progress ax tolerance and monitor CV response with exercise.    OT Brief overview of current status  IND    Total Evaluation Time (Minutes)   Total Evaluation Time (Minutes) 3       Occupational Therapy Discharge  Summary    Reason for therapy discharge:    Discharged to home with outpatient therapy.    Progress towards therapy goal(s). See goals on Care Plan [facility] health record for goal details.  Goals partially met.  Barriers to achieving goals:   discharge on same date as initial evaluation.    Therapy recommendation(s):    Continued therapy is recommended.  Rationale/Recommendations:  OP CR. Pt will benefit from OP CR for progressive monitored physical activity for overall cardiac health.

## 2021-07-15 NOTE — PROGRESS NOTES
Major shift events:  No significant events overnight.  Pt a/o x 4.  Able to move all 4 extremities.  Denies numbness or tingling.  Pedal pulses strong with dopplers to LE.  Primaport dressing C/D/I to L groin.  Liquid bandage to R groin has minimal scant dried blood.  R leg more edematous than L leg.  Pt afebrile.  VS stable.  Denies pain.      Plan of care:  Continue to monitor pain, VS, heart rhythm, and surgical sites.  Continue with neuro checks q 2 hours until 1400 today/per Cardiology.  Notify team of any changes.

## 2021-07-15 NOTE — PROCEDURES
Pt is s/p singel chamber pacer maker placement using a LBB lead.    Medtronic device: RV: Unipolar, pulse width is 0.4ms, pacing at 0.5 v in 0.4ms,  impedence of 608.

## 2021-07-15 NOTE — DISCHARGE SUMMARY
[address] [address]  p: [phone]    Discharge Summary: Cardiology Service    Mary Alice Cameron MRN# [id number]   YOB: 1950 Age: 71 year old       Admission Date: 7/14/2021  Discharge Date: 07/15/2021      Discharge Diagnoses:  1. Bicuspid AV c/b severe aortic stenosis, now s/p 29mm ES3 TAVR  2. Complete heart block s/p PPM placement   3. Atrial fibrillation   4. HTN  5. COPD  6. Hypothyroidism     Pertinent Procedures:  1. TAVR  2. PPM placement     Consults:  Electrophysiology     Imaging with results:  TTE 7/15/2021  Interpretation Summary  s/p ES3 Ultra TAVR prosthesis on 7/14/21.  Valve is well seated. MG 11mmHg, PV 2.3m/s; trace valvular and paravalvular  AI.  Global and regional left ventricular function is normal with an EF of 60-65%.  Global right ventricular function is normal.  Estimated pulmonary artery systolic pressure is 37 mmHg plus right atrial  pressure.  IVC diameter >2.1 cm collapsing <50% with sniff suggests a high RA pressure  estimated at 15 mmHg or greater.  No pericardial effusion is present.  ______________________________________________________________________________  Left Ventricle  Global and regional left ventricular function is normal with an EF of 60-65%.  Left ventricular size is normal. Borderline concentric wall thickening  consistent with left ventricular hypertrophy is present. Diastolic function  not assessed due to atrial fibrillation.     Right Ventricle  The right ventricle is normal size. Global right ventricular function is  normal.     Mitral Valve  The mitral valve is normal. Trace mitral insufficiency is present.     Aortic Valve  A bioprosthetic aortic valve is present. Doppler interrogation of the aortic  valve is normal. Valve is well seated. Trace valvular and paravalvular AI. The  peak velocity across the aortic valve is 2.3 m/sec. The mean gradient is 11  mmHg.     Tricuspid  Valve  The tricuspid valve is normal. Trace tricuspid insufficiency is present.  Estimated pulmonary artery systolic pressure is 37 mmHg plus right atrial  pressure.     Vessels  IVC diameter >2.1 cm collapsing <50% with sniff suggests a high RA pressure  estimated at 15 mmHg or greater.     Pericardium  No pericardial effusion is present.    Brief HPI:  Mary Alice Cameron is a 71 year old female with a past medical history significant for bicuspid AV c/b severe aortic stenosis, paroxysmal atrial fibrillation, COPD requiring O2 with sleep (MADDISON?), HTN, and non-obstructive CAD. Patient was previously evaluated in the structural clinic for TAVR candidacy. At that time, she reported worsening weight gain (15 pounds in one year), lower extremity edema, and dyspnea with activity.    Hospital Course by Diagnosis:  #Bicuspid AV c/b Severe symptomatic aortic stenosis now s/p 29mm ES3 via transcaval approach c/b complete heart block  ##Acute on chronic diastolic Heart failure  ##Complete heart block s/p PPM   Now s/p Transcatheter Aortic Valve Replacement. Prior to procedure, pt noted to have a mean gradient 45 mmHG, MATIAS 0.8 cm^2, PV 4.1 m/s. Due to access issues driven by PAD in the femoral and carotid arteries, transcaval TAVR was performed. Course complicated by apparent complete heart block requiring placement of temporary pacer, then PPM. IVC/aorta closed with amplatzer occluder device. LFA access site achieved hemostasis with manual compression as access was slightly above the bifurcation. Intraoperative Preop BNP 2659 (previously 1969). Sheath sites soft without hematoma, mild oozing from right FV site. Given transcaval approach and placement of PPM, eliquis was held and patient started on plavix x48 (amplatzer closure prophylaxis and valve prophylaxis). Patient okay to resume eliquis AM of 7/16/2021, and will discontinue plavix. POD 1 TTE with trace valvular and paravalvular leak, MG of 11mmHg, PV of 2.3 m/s.   - s/p IV  lasix 20mg x1. IVC remains dilated, but not requiring oxygen when awake thus will continue to diurese with PO as an outpatient  - discontinue plavix  - resume eliquis 5mg bid 7/16 + aspirin 81mg daily  - resume lasix 40mg bid  - PCP visit in one week for a wound check re: ICD placement.   - Follow up with Dr. Myers in about 3 months, or sooner if indicated      #Atrial fibrillation, possibly persistent  #HTN  Follows with Dr. Myers. Reportedly rates are well controlled at baseline. Experienced prolonged complete heart block during case requiring placement of PPM. Will hold eliquis for 48h post-placement due to risk of pocket bleeding.  - resume diltiazem ER 360mg  - resume PTA losartan  - resume PTA eliquis 5mg 7/16/2021     #COPD   - resume advair  - resume combivent     #Hypothyroidism  - PTA levothyroxine 100mcg      Condition on discharge  Temp:  [97.5  F (36.4  C)-99.4  F (37.4  C)] 97.8  F (36.6  C)  Pulse:  [51-60] 59  Resp:  [12-20] 20  BP: (112-149)/(54-94) 149/68  MAP:  [30 mmHg-93 mmHg] 85 mmHg  Arterial Line BP: ()/(18-82) 131/59  SpO2:  [95 %-100 %] 100 %  General: Alert, interactive, NAD  Eyes: sclera anicteric, EOMI  Neck: JVP ~10cm, carotid 2+ bilaterally  Cardiovascular: paced, normal S1 and S2, no murmurs, gallops, or rubs  Resp: clear to auscultation bilaterally, no rales, wheezes, or rhonchi  GI: Soft, nontender, nondistended. +BS.  No HSM or masses, no rebound or guarding.  Extremities: trace edema, no cyanosis or clubbing, dorsalis pedis and posterior tibialis pulses 2+ bilaterally. Groin sites are c/d/i. Trace amount of dried blood on right groin.  Skin: Warm and dry, no jaundice or rash  Neuro: CN 2-12 intact, moves all extremities equally  Psych: Alert & oriented x 3      Medication Changes:    Discharge medications:   Current Discharge Medication List      START taking these medications    Details   cephALEXin (KEFLEX) 500 MG capsule Take 1 capsule (500 mg) by mouth 3 times  daily  Qty: 21 capsule, Refills: 0    Associated Diagnoses: S/P placement of cardiac pacemaker         CONTINUE these medications which have CHANGED    Details   aspirin (ASA) 81 MG EC tablet Take 1 tablet (81 mg) by mouth daily  Qty: 90 tablet, Refills: 1    Associated Diagnoses: S/P TAVR (transcatheter aortic valve replacement)         CONTINUE these medications which have NOT CHANGED    Details   acetaminophen (TYLENOL) 500 MG tablet Take 500-1,000 mg by mouth every 6 hours as needed for mild pain      atorvastatin (LIPITOR) 10 MG tablet Take 1 tablet (10 mg) by mouth At Bedtime  Qty: 90 tablet, Refills: 3    Associated Diagnoses: Coronary artery disease involving native coronary artery of native heart without angina pectoris; Mixed hyperlipidemia      Calcium Carb-Cholecalciferol (CALTRATE 600+D3 SOFT PO) Take 600 mg by mouth 2 times daily      cloNIDine (CATAPRES) 0.1 MG tablet TAKE 2 TABLETS BY MOUTH EVERY NIGHT AT BEDTIME  Qty: 180 tablet, Refills: 1    Associated Diagnoses: Essential hypertension      COMBIVENT RESPIMAT  MCG/ACT inhaler Inhale 1 puff into the lungs 4 times daily       diltiazem ER (TIAZAC) 360 MG 24 hr ER beaded capsule Take 1 capsule (360 mg) by mouth daily  Qty: 90 capsule, Refills: 3    Associated Diagnoses: Persistent atrial fibrillation (H); Essential hypertension      ELIQUIS ANTICOAGULANT 5 MG tablet TAKE 1 TABLET BY MOUTH TWICE DAILY  Qty: 180 tablet, Refills: 1    Associated Diagnoses: Persistent atrial fibrillation (H)      fluticasone-salmeterol (ADVAIR-HFA) 230-21 MCG/ACT inhaler Inhale 2 puffs into the lungs 2 times daily  Qty: 8 g, Refills: 3    Associated Diagnoses: COPD exacerbation (H)      furosemide (LASIX) 40 MG tablet TAKE 1 TABLET(40 MG) BY MOUTH TWICE DAILY  Qty: 180 tablet, Refills: 0    Associated Diagnoses: Essential hypertension, benign      levothyroxine (SYNTHROID/LEVOTHROID) 100 MCG tablet Take 1 tablet (100 mcg) by mouth daily  Qty: 90 tablet, Refills: 3     Associated Diagnoses: Hypothyroidism, postablative      losartan (COZAAR) 50 MG tablet Take 1 tablet (50 mg) by mouth 2 times daily  Qty: 180 tablet, Refills: 1    Associated Diagnoses: Essential hypertension      potassium chloride ER (KLOR-CON M) 20 MEQ CR tablet Take 1 tablet (20 mEq) by mouth 3 times daily  Qty: 270 tablet, Refills: 3    Associated Diagnoses: Hypokalemia      diphenhydrAMINE (BENADRYL) 25 MG tablet Take 1-2 tablets (25-50 mg) by mouth every 6 hours as needed for itching or allergies  Qty: 60 tablet, Refills: 1      hydrOXYzine (ATARAX) 25 MG tablet Take 1 tablet (25 mg) by mouth 3 times daily as needed for itching  Qty: 60 tablet, Refills: 0    Associated Diagnoses: Itching      OTHER MEDICAL SUPPLIES Apply one pair to help with edema  Qty: 1 each, Refills: 0    Associated Diagnoses: Edema; Atrial fibrillation, persistent (H)         STOP taking these medications       Cyanocobalamin (VITAMIN B-12 PO) Comments:   Reason for Stopping:               Labs or imaging requiring follow-up after discharge:  TTE with structural clinic in about 1 month      Follow-up:  PCP visit in one week for ICD pocket wound check  Structural clinic in about 1 week, then again in 1 month  EP clinic in about 3 months with Dr. Myers    Code status:  FULL    45 minutes spent in discharge, including >50% in counseling and coordination of care, medication review and plan of care recommended on follow up. Questions were answered.   It was our pleasure to care for Mary Alice Cameron during this hospitalization. Please do not hesitate to contact me should there be questions regarding the hospital course or discharge plan.      Patient discussed with Dr. Sarah.     Hoang Thurman PA-C  [facility] Cardiology Team

## 2021-07-15 NOTE — PROGRESS NOTES
"  SPIRITUAL HEALTH SERVICES Progress Note  Unit 6C    Saw pt Mary Alice BLOOM Nisha per consult order.        Illness Narrative - Had surgery for her heart and to put in a pacemaker      Distress - No distress, happy that the surgery went as well as it did and that she is going home.       Coping - She has support from her niece, Ksenia, from her [employer] through a prayer chain, and from her apartment building, everyone checking in on her.       Meaning-Making - Laura is very important, pt told niece yesterday \"It's all up to God.\"      Plan - [facility] remains available for spiritual/ emotional support for pt, family, and staff    Jaci Hein   Intern  Pager [phone]    "

## 2021-07-16 LAB
MDC_IDC_LEAD_IMPLANT_DT: NORMAL
MDC_IDC_LEAD_LOCATION: NORMAL
MDC_IDC_LEAD_LOCATION_DETAIL_1: NORMAL
MDC_IDC_LEAD_MFG: NORMAL
MDC_IDC_LEAD_MODEL: NORMAL
MDC_IDC_LEAD_POLARITY_TYPE: NORMAL
MDC_IDC_LEAD_SERIAL: NORMAL
MDC_IDC_LEAD_SPECIAL_FUNCTION: NORMAL
MDC_IDC_MSMT_BATTERY_DTM: NORMAL
MDC_IDC_MSMT_BATTERY_RRT_TRIGGER: 2.62
MDC_IDC_MSMT_BATTERY_STATUS: NORMAL
MDC_IDC_MSMT_BATTERY_VOLTAGE: 3.21 V
MDC_IDC_MSMT_LEADCHNL_RV_IMPEDANCE_VALUE: 513 OHM
MDC_IDC_MSMT_LEADCHNL_RV_IMPEDANCE_VALUE: 646 OHM
MDC_IDC_MSMT_LEADCHNL_RV_PACING_THRESHOLD_AMPLITUDE: 0.5 V
MDC_IDC_MSMT_LEADCHNL_RV_PACING_THRESHOLD_PULSEWIDTH: 0.4 MS
MDC_IDC_PG_IMPLANT_DTM: NORMAL
MDC_IDC_PG_MFG: NORMAL
MDC_IDC_PG_MODEL: NORMAL
MDC_IDC_PG_SERIAL: NORMAL
MDC_IDC_PG_TYPE: NORMAL
MDC_IDC_SESS_CLINIC_NAME: NORMAL
MDC_IDC_SESS_DTM: NORMAL
MDC_IDC_SESS_TYPE: NORMAL
MDC_IDC_SET_BRADY_HYSTRATE: NORMAL
MDC_IDC_SET_BRADY_LOWRATE: 60 {BEATS}/MIN
MDC_IDC_SET_BRADY_MAX_SENSOR_RATE: 130 {BEATS}/MIN
MDC_IDC_SET_BRADY_MODE: NORMAL
MDC_IDC_SET_LEADCHNL_RV_PACING_AMPLITUDE: 3.5 V
MDC_IDC_SET_LEADCHNL_RV_PACING_ANODE_ELECTRODE_1: NORMAL
MDC_IDC_SET_LEADCHNL_RV_PACING_CAPTURE_MODE: NORMAL
MDC_IDC_SET_LEADCHNL_RV_PACING_CATHODE_ELECTRODE_1: NORMAL
MDC_IDC_SET_LEADCHNL_RV_PACING_CATHODE_LOCATION_1: NORMAL
MDC_IDC_SET_LEADCHNL_RV_PACING_POLARITY: NORMAL
MDC_IDC_SET_LEADCHNL_RV_PACING_PULSEWIDTH: 0.4 MS
MDC_IDC_SET_LEADCHNL_RV_SENSING_ANODE_ELECTRODE_1: NORMAL
MDC_IDC_SET_LEADCHNL_RV_SENSING_ANODE_LOCATION_1: NORMAL
MDC_IDC_SET_LEADCHNL_RV_SENSING_CATHODE_ELECTRODE_1: NORMAL
MDC_IDC_SET_LEADCHNL_RV_SENSING_CATHODE_LOCATION_1: NORMAL
MDC_IDC_SET_LEADCHNL_RV_SENSING_POLARITY: NORMAL
MDC_IDC_SET_LEADCHNL_RV_SENSING_SENSITIVITY: 2 MV
MDC_IDC_SET_ZONE_DETECTION_INTERVAL: 360 MS
MDC_IDC_SET_ZONE_TYPE: NORMAL
MDC_IDC_STAT_AT_BURDEN_PERCENT: 0 %
MDC_IDC_STAT_BRADY_DTM_END: NORMAL
MDC_IDC_STAT_BRADY_DTM_START: NORMAL
MDC_IDC_STAT_BRADY_RV_PERCENT_PACED: 96.81 %
MDC_IDC_STAT_EPISODE_RECENT_COUNT: 0
MDC_IDC_STAT_EPISODE_RECENT_COUNT: 0
MDC_IDC_STAT_EPISODE_RECENT_COUNT_DTM_END: NORMAL
MDC_IDC_STAT_EPISODE_RECENT_COUNT_DTM_END: NORMAL
MDC_IDC_STAT_EPISODE_RECENT_COUNT_DTM_START: NORMAL
MDC_IDC_STAT_EPISODE_RECENT_COUNT_DTM_START: NORMAL
MDC_IDC_STAT_EPISODE_TOTAL_COUNT: 0
MDC_IDC_STAT_EPISODE_TOTAL_COUNT: 0
MDC_IDC_STAT_EPISODE_TOTAL_COUNT_DTM_END: NORMAL
MDC_IDC_STAT_EPISODE_TOTAL_COUNT_DTM_END: NORMAL
MDC_IDC_STAT_EPISODE_TOTAL_COUNT_DTM_START: NORMAL
MDC_IDC_STAT_EPISODE_TOTAL_COUNT_DTM_START: NORMAL
MDC_IDC_STAT_EPISODE_TYPE: NORMAL

## 2021-07-18 ENCOUNTER — MYC MEDICAL ADVICE (OUTPATIENT)
Dept: FAMILY MEDICINE | Facility: OTHER | Age: 71
End: 2021-07-18

## 2021-07-19 DIAGNOSIS — I35.8 OTHER NONRHEUMATIC AORTIC VALVE DISORDERS: ICD-10-CM

## 2021-07-19 DIAGNOSIS — Z95.2 S/P TAVR (TRANSCATHETER AORTIC VALVE REPLACEMENT): Primary | ICD-10-CM

## 2021-07-19 NOTE — PROGRESS NOTES
"    Assessment & Plan     (I48.19) Persistent atrial fibrillation (H)  (primary encounter diagnosis)  Comment: stable.    Plan: follow.      (Z95.2) History of transcatheter aortic valve replacement (TAVR)  Comment: great.   Plan: I can't really hear a murmur.  Groins look good.  Pacemaker site looks good without issue.  F/u routine.                 BMI:   Estimated body mass index is 25.92 kg/m  as calculated from the following:    Height as of 7/14/21: 1.626 m (5' 4\").    Weight as of this encounter: 68.5 kg (151 lb).           No follow-ups on file.    Braydon Yen MD  [facility]   Mary Alice is a 71 year old who presents for the following health issues     HPI     Hospital Followup:    Facility:  [facility]  Date of visit: 7/14-7/15/21  Reason for visit: TAVR  Current Status: wound check            Review of Systems   Constitutional, HEENT, cardiovascular, pulmonary, gi and gu systems are negative, except as otherwise noted.      Objective    /76   Pulse 75   Temp 98.6  F (37  C)   Wt 68.5 kg (151 lb)   SpO2 95%   BMI 25.92 kg/m    Body mass index is 25.92 kg/m .  Physical Exam   GENERAL: healthy, alert and no distress  NECK: no adenopathy, no asymmetry, masses, or scars and thyroid normal to palpation  RESP: lungs clear to auscultation - no rales, rhonchi or wheezes  CV: regular rate and rhythm, normal S1 S2, no S3 or S4, no murmur, click or rub, no peripheral edema and peripheral pulses strong  ABDOMEN: soft, nontender, no hepatosplenomegaly, no masses and bowel sounds normal  MS: no gross musculoskeletal defects noted, no edema  SKIN: bruising and healing around pacer site left chest.  Both groins healing nicely without mass or other concern.  No infection at any of the sites.                  "

## 2021-07-19 NOTE — PROGRESS NOTES
Date: 7/19/2021    Time of Call: 10:46 AM     Diagnosis:  S/p TAVR, transcaval access     [ TORB ] Ordering provider: Thelma Pichardo NP  Order:   Echo  EKG  CBC and BMP  CTA abdomen and pelvis  CTA heart      Order received by: Treva Patel RN     Follow-up/additional notes:   S/p TAVR with transcaval access imaging appointments    Hospital follow up telephone call made to Mary Alice.  No questions on medications, or incisions.  She states she is feeling well and walking in her apartment building as the weather is too hot outside.

## 2021-07-20 ENCOUNTER — OFFICE VISIT (OUTPATIENT)
Dept: FAMILY MEDICINE | Facility: OTHER | Age: 71
End: 2021-07-20
Attending: FAMILY MEDICINE
Payer: COMMERCIAL

## 2021-07-20 VITALS
SYSTOLIC BLOOD PRESSURE: 134 MMHG | TEMPERATURE: 98.6 F | BODY MASS INDEX: 25.92 KG/M2 | DIASTOLIC BLOOD PRESSURE: 76 MMHG | HEART RATE: 75 BPM | OXYGEN SATURATION: 95 % | WEIGHT: 151 LBS

## 2021-07-20 DIAGNOSIS — Z95.2 HISTORY OF TRANSCATHETER AORTIC VALVE REPLACEMENT (TAVR): ICD-10-CM

## 2021-07-20 DIAGNOSIS — I48.19 PERSISTENT ATRIAL FIBRILLATION (H): Primary | ICD-10-CM

## 2021-07-20 PROCEDURE — 99213 OFFICE O/P EST LOW 20 MIN: CPT | Performed by: FAMILY MEDICINE

## 2021-07-20 PROCEDURE — G0463 HOSPITAL OUTPT CLINIC VISIT: HCPCS

## 2021-07-20 ASSESSMENT — PAIN SCALES - GENERAL: PAINLEVEL: NO PAIN (0)

## 2021-07-20 NOTE — NURSING NOTE
"Chief Complaint   Patient presents with     Hospital F/U       Initial /76   Pulse 75   Temp 98.6  F (37  C)   Wt 68.5 kg (151 lb)   SpO2 95%   BMI 25.92 kg/m   Estimated body mass index is 25.92 kg/m  as calculated from the following:    Height as of 7/14/21: 1.626 m (5' 4\").    Weight as of this encounter: 68.5 kg (151 lb).  Medication Reconciliation: complete  Joy Franklin LPN  "

## 2021-07-21 ENCOUNTER — ANCILLARY PROCEDURE (OUTPATIENT)
Dept: CARDIOLOGY | Facility: CLINIC | Age: 71
End: 2021-07-21
Attending: INTERNAL MEDICINE
Payer: MEDICARE

## 2021-07-21 DIAGNOSIS — Z95.0 CARDIAC PACEMAKER IN SITU: ICD-10-CM

## 2021-07-21 DIAGNOSIS — I44.2 ATRIOVENTRICULAR BLOCK, COMPLETE (H): ICD-10-CM

## 2021-07-21 PROCEDURE — 93296 REM INTERROG EVL PM/IDS: CPT

## 2021-07-21 PROCEDURE — 93294 REM INTERROG EVL PM/LDLS PM: CPT | Performed by: INTERNAL MEDICINE

## 2021-07-23 DIAGNOSIS — Z95.2 S/P TAVR (TRANSCATHETER AORTIC VALVE REPLACEMENT): Primary | ICD-10-CM

## 2021-07-25 NOTE — PROGRESS NOTES
[facility] HEART [facility]  CARDIOVASCULAR DIVISION    VALVE CLINIC RETURN VISIT    PRIMARY CARDIOLOGIST: Dr. Myers      PERTINENT CLINICAL HISTORY:     Visit was converted to telephone due to technical difficulties.     Mary Alice Cameron is a very pleasant 71 year old female with paroxysmal atrial fibrillation, COPD requiring O2 at night, HTN, non-obstructive CAD, HFpEF and severe bicuspid aortic stenosis who underwent transcaval transcatheter aortic valve replacement (TAVR) with a 29mm Kingston Tamy Valve on 7/14/21 by Destinee Sarah, Lisadnro, Master and Carlos A. The IVC/aorta was closed with amplatzer occluder device. The TAVR and post-procedural course were notable for development of complete heart block that required permanent pacemaker implantation. POD 1 TTE with trace valvular and paravalvular leak, MG of 11mmHg, PV of 2.3 m/s. Given transcaval approach and placement of PPM, Eliquis was held and patient started on plavix x48 (amplatzer closure prophylaxis and valve prophylaxis) with plans to resume eliquis and discontinue Plavix AM of 7/16/2021.    Mary Alice says she has no specific cardiovascular concerns. She has been feeling well since her valve replacement with improvement in bilateral lower extremity swelling and shortness of breath. She says she hasn't had to use her inhalers as much. She also says that her fatigue is better. She has been active walking the halls in her apartment 4 times a day, she estimates it to be 1/8-1/4 mile each time. She denies chest pain, palpitations, lightheadedness or syncope. She says her pacemaker and groin sites are healing well. She is tolerating Eliquis and ASA without any major bleeding. She would like to participate in cardiac rehab in [place].     PAST MEDICAL HISTORY:     Past Medical History:   Diagnosis Date     Asthma      Atrial fibrillation (H)      COPD (chronic obstructive pulmonary disease) (H)      Depression      High cholesterol      HTN (hypertension)       Oxygen dependent      Thyroid disease    Severe aortic stenosis s/p TAVR 7/14/21  Complete heart block s/p PPM 7/14/21       PAST SURGICAL HISTORY:     Past Surgical History:   Procedure Laterality Date     BACK SURGERY  2006     CARDIAC SURGERY  06/12/2018    Angiogram at [facility]     COLONOSCOPY N/A 1/19/2016    Procedure: COLONOSCOPY;  Surgeon: Waldemar Bob MD;  Location: [facility]     CV CORONARY ANGIOGRAM N/A 4/30/2021    Procedure: CV CORONARY ANGIOGRAM;  Surgeon: Poli Walsh MD;  Location: [facility] HE[facility] CARDIAC CATH LAB     CV LEFT HEART CATH N/A 4/30/2021    Procedure: CV LEFT HEART CATH;  Surgeon: Poli Walsh MD;  Location: [facility] C[facility] CATH LAB     CV RIGHT HEART CATH MEASUREMENTS RECORDED N/A 4/30/2021    Procedure: CV RIGHT HEART CATH;  Surgeon: Poli Walsh MD;  Location: [facility] CARD[facility]AC CATH LAB     CV TRANSCATHETER AORTIC VALVE REPLACEMENT N/A 7/14/2021    Procedure: Right femoral Transcaval transcatheter aortic valve replacement (SUMMERS) size 29mm.  Transesophageal echocardiogram per anesthesia;  Surgeon: Domo Sarah MD;  Location: [facility] OR     EP PPM INSERT OF NEW OR REPL W/VENT LEAD N/A 7/14/2021    Procedure: insertion of Permanent Pacemaker;  Surgeon: Eliezer Myers MD;  Location: [facility] OR     HEART CATH FEMORAL CANNULIZATION WITH OPEN STANDBY REPAIR AORTIC VALVE N/A 7/14/2021    Procedure: Cardiopulmonary standby.;  Surgeon: Fly Smith MD;  Location: [facility] OR     HYSTERECTOMY  1980    partial     SLING BLADDER SUSPENSION WITH FASCIA LINNETTE          CURRENT MEDICATIONS:     Current Outpatient Medications   Medication Sig Dispense Refill     acetaminophen (TYLENOL) 500 MG tablet Take 500-1,000 mg by mouth every 6 hours as needed for mild pain       ADVAIR -21 MCG/ACT inhaler INHALE 2 PUFFS INTO THE LUNGS TWICE DAILY 12 g 3     aspirin (ASA) 81 MG EC tablet Take 1 tablet (81 mg) by mouth daily 90 tablet 1     atorvastatin  (LIPITOR) 10 MG tablet Take 1 tablet (10 mg) by mouth At Bedtime 90 tablet 3     Calcium Carb-Cholecalciferol (CALTRATE 600+D3 SOFT PO) Take 600 mg by mouth 2 times daily       cephALEXin (KEFLEX) 500 MG capsule Take 1 capsule (500 mg) by mouth 3 times daily 21 capsule 0     cloNIDine (CATAPRES) 0.1 MG tablet TAKE 2 TABLETS BY MOUTH EVERY NIGHT AT BEDTIME 180 tablet 1     COMBIVENT RESPIMAT  MCG/ACT inhaler Inhale 1 puff into the lungs 4 times daily        diltiazem ER (TIAZAC) 360 MG 24 hr ER beaded capsule Take 1 capsule (360 mg) by mouth daily 90 capsule 3     diphenhydrAMINE (BENADRYL) 25 MG tablet Take 1-2 tablets (25-50 mg) by mouth every 6 hours as needed for itching or allergies 60 tablet 1     ELIQUIS ANTICOAGULANT 5 MG tablet TAKE 1 TABLET BY MOUTH TWICE DAILY 180 tablet 1     furosemide (LASIX) 40 MG tablet TAKE 1 TABLET(40 MG) BY MOUTH TWICE DAILY 180 tablet 0     hydrOXYzine (ATARAX) 25 MG tablet Take 1 tablet (25 mg) by mouth 3 times daily as needed for itching 60 tablet 0     levothyroxine (SYNTHROID/LEVOTHROID) 100 MCG tablet Take 1 tablet (100 mcg) by mouth daily 90 tablet 3     losartan (COZAAR) 50 MG tablet Take 1 tablet (50 mg) by mouth 2 times daily 180 tablet 1     OTHER MEDICAL SUPPLIES Apply one pair to help with edema 1 each 0     potassium chloride ER (KLOR-CON M) 20 MEQ CR tablet Take 1 tablet (20 mEq) by mouth 3 times daily 270 tablet 3        ALLERGIES:     Allergies   Allergen Reactions     Amlodipine Besylate Cough     Norvasc     Lisinopril Cough     Ace Inhibitors Cough      FAMILY HISTORY:     Family History   Problem Relation Age of Onset     Prostate Cancer Father      Hypertension Father      Heart Failure Father         CHF     Asthma Mother      Cancer Mother         ovarian     Hypertension Mother      Asthma Brother      Hypertension Sister      Hypertension Brother         SOCIAL HISTORY:     Social History     Socioeconomic History     Marital status:       Spouse name: Not on file     Number of children: Not on file     Years of education: Not on file     Highest education level: Not on file   Occupational History     Employer: RETIRED     Comment: disabled   Tobacco Use     Smoking status: Former Smoker     Packs/day: 0.50     Years: 41.00     Pack years: 20.50     Types: Cigarettes     Start date: 1966     Quit date: 2007     Years since quittin.4     Smokeless tobacco: Never Used   Substance and Sexual Activity     Alcohol use: No     Alcohol/week: 0.0 standard drinks     Drug use: No     Sexual activity: Never     Comment:    Other Topics Concern      Service No     Blood Transfusions Yes     Comment: Permits if needed     Caffeine Concern Yes     Comment: 2 cups coffee daily     Occupational Exposure Not Asked     Hobby Hazards Not Asked     Sleep Concern Not Asked     Stress Concern Not Asked     Weight Concern Not Asked     Special Diet Not Asked     Back Care Not Asked     Exercise Not Asked     Bike Helmet Not Asked     Seat Belt Yes     Self-Exams Not Asked     Parent/sibling w/ CABG, MI or angioplasty before 65F 55M? No   Social History Narrative     Not on file     Social Determinants of Health     Financial Resource Strain:      Difficulty of Paying Living Expenses:    Food Insecurity:      Worried About Running Out of Food in the Last Year:      Ran Out of Food in the Last Year:    Transportation Needs:      Lack of Transportation (Medical):      Lack of Transportation (Non-Medical):    Physical Activity:      Days of Exercise per Week:      Minutes of Exercise per Session:    Stress:      Feeling of Stress :    Social Connections:      Frequency of Communication with Friends and Family:      Frequency of Social Gatherings with Friends and Family:      Attends Rastafarian Services:      Active Member of Clubs or Organizations:      Attends Club or Organization Meetings:      Marital Status:    Intimate Partner Violence:      Fear  of Current or Ex-Partner:      Emotionally Abused:      Physically Abused:      Sexually Abused:         REVIEW OF SYSTEMS:     Constitutional: No fevers or chills  Skin: No new rash or itching  Eyes: No acute change in vision  Ears/Nose/Throat: No purulent rhinorrhea, new hearing loss, or new vertigo  Respiratory: No cough or hemoptysis  Cardiovascular: See HPI  Gastrointestinal: No change in appetite, vomiting, hematemesis or diarrhea  Genitourinary: No dysuria or hematuria  Musculoskeletal: No new back pain, neck pain or muscle pain  Neurologic: No new headaches, focal weakness or behavior changes  Psychiatric: No hallucinations, excessive alcohol consumption or illegal drug usage  Hematologic/Lymphatic/Immunologic: No bleeding, chills, fever, night sweats or weight loss  Endocrine: No new cold intolerance, heat intolerance, polyphagia, polydipsia or polyuria      PHYSICAL EXAMINATION:     There were no vitals taken for this visit. or physical exam due to phone visit.      LABORATORY DATA:   Personally reviewed and interpreted discharge labs.  LIPID RESULTS:  Lab Results   Component Value Date    CHOL 201 (H) 06/11/2021     06/11/2021    LDL 78 06/11/2021    TRIG 71 06/11/2021    CHOLHDLRATIO 1.9 08/25/2015       LIVER ENZYME RESULTS:  Lab Results   Component Value Date    AST 18 06/11/2021    ALT 16 06/11/2021       CBC RESULTS:  Lab Results   Component Value Date    WBC 8.9 07/15/2021    WBC 7.7 07/08/2021    RBC 3.68 (L) 07/15/2021    RBC 4.27 07/08/2021    HGB 12.1 07/15/2021    HGB 14.4 07/08/2021    HCT 37.8 07/15/2021    HCT 42.3 07/08/2021     (H) 07/15/2021    MCV 99 07/08/2021    MCH 32.9 07/15/2021    MCH 33.7 (H) 07/08/2021    MCHC 32.0 07/15/2021    MCHC 34.0 07/08/2021    RDW 13.0 07/15/2021    RDW 12.9 07/08/2021     07/15/2021     07/08/2021       BMP RESULTS:  Lab Results   Component Value Date     07/15/2021     07/08/2021    POTASSIUM 4.6 07/15/2021     POTASSIUM 4.2 07/08/2021    CHLORIDE 101 07/15/2021    CHLORIDE 101 07/08/2021    CO2 26 07/15/2021    CO2 25 07/08/2021    ANIONGAP 8 07/15/2021    ANIONGAP 10 07/08/2021    GLC 95 07/15/2021    GLC 92 07/08/2021    BUN 22 07/15/2021    BUN 20 07/08/2021    CR 0.97 07/15/2021    CR 1.00 07/08/2021    GFRESTIMATED 59 (L) 07/15/2021    GFRESTIMATED 57 (L) 07/08/2021    GFRESTBLACK 66 07/08/2021    KOSTA 8.8 07/15/2021    KOSTA 9.8 07/08/2021      A1C RESULTS:  Lab Results   Component Value Date    A1C 5.9 03/09/2016     INR RESULTS:  Lab Results   Component Value Date    INR 1.06 07/13/2021    INR 1.08 04/30/2021    INR 1.4 (H) 07/23/2018    INR 4.4 (H) 07/20/2018    INR 2.14 (H) 03/22/2018      PROCEDURES & FURTHER ASSESSMENTS:     ECG dated 7/15/21:  Personally reviewed and interpreted  V-paced HR 61    Echocardiogram dated 7/15/21:  Interpretation Summary  s/p ES3 Ultra TAVR prosthesis on 7/14/21.  Valve is well seated. MG 11mmHg, PV 2.3m/s; trace valvular and paravalvular  AI.  Global and regional left ventricular function is normal with an EF of 60-65%.  Global right ventricular function is normal.  Estimated pulmonary artery systolic pressure is 37 mmHg plus right atrial  pressure.  IVC diameter >2.1 cm collapsing <50% with sniff suggests a high RA pressure  estimated at 15 mmHg or greater.  No pericardial effusion is present.  ______________________________________________________________________________  Left Ventricle  Global and regional left ventricular function is normal with an EF of 60-65%.  Left ventricular size is normal. Borderline concentric wall thickening  consistent with left ventricular hypertrophy is present. Diastolic function  not assessed due to atrial fibrillation.     Right Ventricle  The right ventricle is normal size. Global right ventricular function is  normal.     Mitral Valve  The mitral valve is normal. Trace mitral insufficiency is present.     Aortic Valve  A bioprosthetic aortic  valve is present. Doppler interrogation of the aortic  valve is normal. Valve is well seated. Trace valvular and paravalvular AI. The  peak velocity across the aortic valve is 2.3 m/sec. The mean gradient is 11  mmHg.     Tricuspid Valve  The tricuspid valve is normal. Trace tricuspid insufficiency is present.  Estimated pulmonary artery systolic pressure is 37 mmHg plus right atrial  pressure.     Vessels  IVC diameter >2.1 cm collapsing <50% with sniff suggests a high RA pressure  estimated at 15 mmHg or greater.     Pericardium  No pericardial effusion is present.    NYHA Class: II     CLINICAL IMPRESSION:   Mary Alice Cameron is a very pleasant 71 year old female with paroxysmal atrial fibrillation, COPD requiring O2 at night, HTN, non-obstructive CAD, HFpEF and severe bicuspid aortic stenosis who underwent transcaval transcatheter aortic valve replacement (TAVR) with a 29mm Kingston Tamy Valve on 7/14/21 by Destinee Sarah, Master Mcmahon and Carlos A. The IVC/aorta was closed with amplatzer occluder device. The TAVR and post-procedural course were notable for development of complete heart block that required permanent pacemaker implantation. POD 1 TTE with trace valvular and paravalvular leak, MG of 11mmHg, PV of 2.3 m/s. Given transcaval approach and placement of PPM, Eliquis was held and patient started on plavix x48 (amplatzer closure prophylaxis and valve prophylaxis) with plans to resume eliquis and discontinue Plavix AM of 7/16/2021.    Patient reports feeling well since her valve replacement with improvement in exertional dyspnea, leg swelling and fatigue. Her groin sites and pacer site are healing well. She is tolerating Eliquis and ASA without bleeding concerns. Plan to continue current medication regimen and follow-up in 1 month. Patient can follow with her primary cardiologist for management of his chronic cardiovascular issues in the meantime as needed.    Plan Summary:  1) Aspirin 81 mg daily  lifelong  2) Continue Eliquis for a-fib  3) Lifelong antibiotic prophylaxis prior to all dental procedures.  4) Start cardiac rehab in [place] - will fax orders  5) Return to clinic in 1 month with ECHO, CT, EKG and labs prior     Call time: 9:56 -10:10    CESAR Casas, CNP  [facility] Cardiology Team    CC  Patient Care Team:  Braydon Yen MD as PCP - General (Family Practice)  Braydon Yen MD as Assigned PCP  Eliezer Myers MD as MD (Clinical Cardiac Electrophysiology)  Pau Nelson APRN CNP as Nurse Practitioner (Nurse Practitioner - Family)  Elizabeth Bhatti MD as Assigned Surgical Provider  Eliezer Myers MD as Assigned Heart and Vascular Provider

## 2021-07-26 ENCOUNTER — VIRTUAL VISIT (OUTPATIENT)
Dept: CARDIOLOGY | Facility: CLINIC | Age: 71
End: 2021-07-26
Attending: NURSE PRACTITIONER
Payer: COMMERCIAL

## 2021-07-26 DIAGNOSIS — Z95.2 S/P TAVR (TRANSCATHETER AORTIC VALVE REPLACEMENT): Primary | ICD-10-CM

## 2021-07-26 PROCEDURE — 99442 PR PHYSICIAN TELEPHONE EVALUATION 11-20 MIN: CPT | Performed by: NURSE PRACTITIONER

## 2021-07-26 RX ORDER — AMOXICILLIN 500 MG/1
2000 CAPSULE ORAL
Qty: 4 CAPSULE | Refills: 3 | Status: SHIPPED | OUTPATIENT
Start: 2021-07-26

## 2021-07-26 NOTE — PROGRESS NOTES
Mary Alice Cameron is a 71 year old who is being evaluated via a billable video visit.      How would you like to obtain your AVS? MyChart  If the video visit is dropped, the invitation should be resent by: Text to cell phone: [phone]  Will anyone else be joining your video visit? No      Vitals - Patient Reported  Weight (Patient Reported): 68 kg (150 lb)  Pain Score: No Pain (0) (No SOB)    Vitals were taken and medications reconciled.    Derian Friend, EMT  9:28 AM

## 2021-07-26 NOTE — LETTER
7/26/2021      RE: Mary Alice Cameron  [address]  [address]  [facility] [address]       Dear Colleague,    Thank you for the opportunity to participate in the care of your patient, Mary Alice Cameron, at the [facility] [place] at [facility]. Please see a copy of my visit note below.        [facility] HEART [facility]  CARDIOVASCULAR DIVISION    VALVE CLINIC RETURN VISIT    PRIMARY CARDIOLOGIST: Dr. Myers      PERTINENT CLINICAL HISTORY:     Visit was converted to telephone due to technical difficulties.     Mary Alice Cameron is a very pleasant 71 year old female with paroxysmal atrial fibrillation, COPD requiring O2 at night, HTN, non-obstructive CAD, HFpEF and severe bicuspid aortic stenosis who underwent transcaval transcatheter aortic valve replacement (TAVR) with a 29mm [place] Tamy Valve on 7/14/21 by Destinee Sarah, Lisandro, Master and Carlos A. The IVC/aorta was closed with amplatzer occluder device. The TAVR and post-procedural course were notable for development of complete heart block that required permanent pacemaker implantation. POD 1 TTE with trace valvular and paravalvular leak, MG of 11mmHg, PV of 2.3 m/s. Given transcaval approach and placement of PPM, Eliquis was held and patient started on plavix x48 (amplatzer closure prophylaxis and valve prophylaxis) with plans to resume eliquis and discontinue Plavix AM of 7/16/2021.    Mary Alice says she has no specific cardiovascular concerns. She has been feeling well since her valve replacement with improvement in bilateral lower extremity swelling and shortness of breath. She says she hasn't had to use her inhalers as much. She also says that her fatigue is better. She has been active walking the halls in her apartment 4 times a day, she estimates it to be 1/8-1/4 mile each time. She denies chest pain, palpitations, lightheadedness or syncope. She says her pacemaker and groin sites are healing well. She  is tolerating Eliquis and ASA without any major bleeding. She would like to participate in cardiac rehab in [place].     PAST MEDICAL HISTORY:     Past Medical History:   Diagnosis Date     Asthma      Atrial fibrillation (H)      COPD (chronic obstructive pulmonary disease) (H)      Depression      High cholesterol      HTN (hypertension)      Oxygen dependent      Thyroid disease    Severe aortic stenosis s/p TAVR 7/14/21  Complete heart block s/p PPM 7/14/21       PAST SURGICAL HISTORY:     Past Surgical History:   Procedure Laterality Date     BACK SURGERY  2006     CARDIAC SURGERY  06/12/2018    Angi[facility] at [facility]     COLONOSCOPY N/A 1/19/2016    Procedure: COLONOSCOPY;  Surgeon: Waldemar Bob MD;  Location: [facility] OR     CV CORONARY ANGIOGRAM N/A 4/30/2021    Procedure: CV CORONARY ANGIOGRAM;  Surgeon: Poli Walsh MD;  Location:  HEART CARDIAC CATH LAB     CV LEFT HEART CATH N/A 4/30/2021    Procedure: CV LEFT HEART CATH;  Surgeon: Poli Walsh MD;  [facility]     CV RIGHT HEART CATH MEASUREMENTS RECORDED N/A 4/30/2021    Procedure: CV RIGHT HEART CATH;  Surgeon: Poli Walsh MD;  Location:  HEART CARDIAC CATH LAB     CV TRANSCATHETER AORTIC VALVE REPLACEMENT N/A 7/14/2021    Procedure: Right femoral Transcaval transcatheter aortic valve replacement (SUMMERS) size 29mm.  Transesophageal echocardiogram per anesthesia;  Surgeon: Domo Sarah MD;  Location: [facility] OR     EP PPM INSERT OF NEW OR REPL W/VENT LEAD N/A 7/14/2021    Procedure: insertion of Permanent Pacemaker;  Surgeon: Eliezer Myers MD;  Location: [facility] OR     HEART CATH FEMORAL CANNULIZATION WITH OPEN STANDBY REPAIR AORTIC VALVE N/A 7/14/2021    Procedure: Cardiopulmonary standby.;  Surgeon: Fly Smith MD;  Location: [facility] OR     HYSTERECTOMY  1980    partial     SLING BLADDER SUSPENSION WITH FASCIA LINNETTE          CURRENT MEDICATIONS:     Current  Outpatient Medications   Medication Sig Dispense Refill     acetaminophen (TYLENOL) 500 MG tablet Take 500-1,000 mg by mouth every 6 hours as needed for mild pain       ADVAIR -21 MCG/ACT inhaler INHALE 2 PUFFS INTO THE LUNGS TWICE DAILY 12 g 3     aspirin (ASA) 81 MG EC tablet Take 1 tablet (81 mg) by mouth daily 90 tablet 1     atorvastatin (LIPITOR) 10 MG tablet Take 1 tablet (10 mg) by mouth At Bedtime 90 tablet 3     Calcium Carb-Cholecalciferol (CALTRATE 600+D3 SOFT PO) Take 600 mg by mouth 2 times daily       cephALEXin (KEFLEX) 500 MG capsule Take 1 capsule (500 mg) by mouth 3 times daily 21 capsule 0     cloNIDine (CATAPRES) 0.1 MG tablet TAKE 2 TABLETS BY MOUTH EVERY NIGHT AT BEDTIME 180 tablet 1     COMBIVENT RESPIMAT  MCG/ACT inhaler Inhale 1 puff into the lungs 4 times daily        diltiazem ER (TIAZAC) 360 MG 24 hr ER beaded capsule Take 1 capsule (360 mg) by mouth daily 90 capsule 3     diphenhydrAMINE (BENADRYL) 25 MG tablet Take 1-2 tablets (25-50 mg) by mouth every 6 hours as needed for itching or allergies 60 tablet 1     ELIQUIS ANTICOAGULANT 5 MG tablet TAKE 1 TABLET BY MOUTH TWICE DAILY 180 tablet 1     furosemide (LASIX) 40 MG tablet TAKE 1 TABLET(40 MG) BY MOUTH TWICE DAILY 180 tablet 0     hydrOXYzine (ATARAX) 25 MG tablet Take 1 tablet (25 mg) by mouth 3 times daily as needed for itching 60 tablet 0     levothyroxine (SYNTHROID/LEVOTHROID) 100 MCG tablet Take 1 tablet (100 mcg) by mouth daily 90 tablet 3     losartan (COZAAR) 50 MG tablet Take 1 tablet (50 mg) by mouth 2 times daily 180 tablet 1     OTHER MEDICAL SUPPLIES Apply one pair to help with edema 1 each 0     potassium chloride ER (KLOR-CON M) 20 MEQ CR tablet Take 1 tablet (20 mEq) by mouth 3 times daily 270 tablet 3        ALLERGIES:     Allergies   Allergen Reactions     Amlodipine Besylate Cough     Norvasc     Lisinopril Cough     Ace Inhibitors Cough      FAMILY HISTORY:     Family History   Problem Relation Age  of Onset     Prostate Cancer Father      Hypertension Father      Heart Failure Father         CHF     Asthma Mother      Cancer Mother         ovarian     Hypertension Mother      Asthma Brother      Hypertension Sister      Hypertension Brother         SOCIAL HISTORY:     Social History     Socioeconomic History     Marital status:      Spouse name: Not on file     Number of children: Not on file     Years of education: Not on file     Highest education level: Not on file   Occupational History     Employer: RETIRED     Comment: disabled   Tobacco Use     Smoking status: Former Smoker     Packs/day: 0.50     Years: 41.00     Pack years: 20.50     Types: Cigarettes     Start date: 1966     Quit date: 2007     Years since quittin.4     Smokeless tobacco: Never Used   Substance and Sexual Activity     Alcohol use: No     Alcohol/week: 0.0 standard drinks     Drug use: No     Sexual activity: Never     Comment:    Other Topics Concern      Service No     Blood Transfusions Yes     Comment: Permits if needed     Caffeine Concern Yes     Comment: 2 cups coffee daily     Occupational Exposure Not Asked     Hobby Hazards Not Asked     Sleep Concern Not Asked     Stress Concern Not Asked     Weight Concern Not Asked     Special Diet Not Asked     Back Care Not Asked     Exercise Not Asked     Bike Helmet Not Asked     Seat Belt Yes     Self-Exams Not Asked     Parent/sibling w/ CABG, MI or angioplasty before 65F 55M? No   Social History Narrative     Not on file     Social Determinants of Health     Financial Resource Strain:      Difficulty of Paying Living Expenses:    Food Insecurity:      Worried About Running Out of Food in the Last Year:      Ran Out of Food in the Last Year:    Transportation Needs:      Lack of Transportation (Medical):      Lack of Transportation (Non-Medical):    Physical Activity:      Days of Exercise per Week:      Minutes of Exercise per Session:    Stress:       Feeling of Stress :    Social Connections:      Frequency of Communication with Friends and Family:      Frequency of Social Gatherings with Friends and Family:      Attends Church Services:      Active Member of Clubs or Organizations:      Attends Club or Organization Meetings:      Marital Status:    Intimate Partner Violence:      Fear of Current or Ex-Partner:      Emotionally Abused:      Physically Abused:      Sexually Abused:         REVIEW OF SYSTEMS:     Constitutional: No fevers or chills  Skin: No new rash or itching  Eyes: No acute change in vision  Ears/Nose/Throat: No purulent rhinorrhea, new hearing loss, or new vertigo  Respiratory: No cough or hemoptysis  Cardiovascular: See HPI  Gastrointestinal: No change in appetite, vomiting, hematemesis or diarrhea  Genitourinary: No dysuria or hematuria  Musculoskeletal: No new back pain, neck pain or muscle pain  Neurologic: No new headaches, focal weakness or behavior changes  Psychiatric: No hallucinations, excessive alcohol consumption or illegal drug usage  Hematologic/Lymphatic/Immunologic: No bleeding, chills, fever, night sweats or weight loss  Endocrine: No new cold intolerance, heat intolerance, polyphagia, polydipsia or polyuria      PHYSICAL EXAMINATION:     There were no vitals taken for this visit. or physical exam due to phone visit.      LABORATORY DATA:   Personally reviewed and interpreted discharge labs.  LIPID RESULTS:  Lab Results   Component Value Date    CHOL 201 (H) 06/11/2021     06/11/2021    LDL 78 06/11/2021    TRIG 71 06/11/2021    CHOLHDLRATIO 1.9 08/25/2015       LIVER ENZYME RESULTS:  Lab Results   Component Value Date    AST 18 06/11/2021    ALT 16 06/11/2021       CBC RESULTS:  Lab Results   Component Value Date    WBC 8.9 07/15/2021    WBC 7.7 07/08/2021    RBC 3.68 (L) 07/15/2021    RBC 4.27 07/08/2021    HGB 12.1 07/15/2021    HGB 14.4 07/08/2021    HCT 37.8 07/15/2021    HCT 42.3 07/08/2021     (H)  07/15/2021    MCV 99 07/08/2021    MCH 32.9 07/15/2021    MCH 33.7 (H) 07/08/2021    MCHC 32.0 07/15/2021    MCHC 34.0 07/08/2021    RDW 13.0 07/15/2021    RDW 12.9 07/08/2021     07/15/2021     07/08/2021       BMP RESULTS:  Lab Results   Component Value Date     07/15/2021     07/08/2021    POTASSIUM 4.6 07/15/2021    POTASSIUM 4.2 07/08/2021    CHLORIDE 101 07/15/2021    CHLORIDE 101 07/08/2021    CO2 26 07/15/2021    CO2 25 07/08/2021    ANIONGAP 8 07/15/2021    ANIONGAP 10 07/08/2021    GLC 95 07/15/2021    GLC 92 07/08/2021    BUN 22 07/15/2021    BUN 20 07/08/2021    CR 0.97 07/15/2021    CR 1.00 07/08/2021    GFRESTIMATED 59 (L) 07/15/2021    GFRESTIMATED 57 (L) 07/08/2021    GFRESTBLACK 66 07/08/2021    KOSTA 8.8 07/15/2021    KOSTA 9.8 07/08/2021      A1C RESULTS:  Lab Results   Component Value Date    A1C 5.9 03/09/2016     INR RESULTS:  Lab Results   Component Value Date    INR 1.06 07/13/2021    INR 1.08 04/30/2021    INR 1.4 (H) 07/23/2018    INR 4.4 (H) 07/20/2018    INR 2.14 (H) 03/22/2018      PROCEDURES & FURTHER ASSESSMENTS:     ECG dated 7/15/21:  Personally reviewed and interpreted  V-paced HR 61    Echocardiogram dated 7/15/21:  Interpretation Summary  s/p ES3 Ultra TAVR prosthesis on 7/14/21.  Valve is well seated. MG 11mmHg, PV 2.3m/s; trace valvular and paravalvular  AI.  Global and regional left ventricular function is normal with an EF of 60-65%.  Global right ventricular function is normal.  Estimated pulmonary artery systolic pressure is 37 mmHg plus right atrial  pressure.  IVC diameter >2.1 cm collapsing <50% with sniff suggests a high RA pressure  estimated at 15 mmHg or greater.  No pericardial effusion is present.  ______________________________________________________________________________  Left Ventricle  Global and regional left ventricular function is normal with an EF of 60-65%.  Left ventricular size is normal. Borderline concentric wall  thickening  consistent with left ventricular hypertrophy is present. Diastolic function  not assessed due to atrial fibrillation.     Right Ventricle  The right ventricle is normal size. Global right ventricular function is  normal.     Mitral Valve  The mitral valve is normal. Trace mitral insufficiency is present.     Aortic Valve  A bioprosthetic aortic valve is present. Doppler interrogation of the aortic  valve is normal. Valve is well seated. Trace valvular and paravalvular AI. The  peak velocity across the aortic valve is 2.3 m/sec. The mean gradient is 11  mmHg.     Tricuspid Valve  The tricuspid valve is normal. Trace tricuspid insufficiency is present.  Estimated pulmonary artery systolic pressure is 37 mmHg plus right atrial  pressure.     Vessels  IVC diameter >2.1 cm collapsing <50% with sniff suggests a high RA pressure  estimated at 15 mmHg or greater.     Pericardium  No pericardial effusion is present.    NYHA Class: II     CLINICAL IMPRESSION:   Mary Alice Cameron is a very pleasant 71 year old female with paroxysmal atrial fibrillation, COPD requiring O2 at night, HTN, non-obstructive CAD, HFpEF and severe bicuspid aortic stenosis who underwent transcaval transcatheter aortic valve replacement (TAVR) with a 29mm [place] Tamy Valve on 7/14/21 by Lisandro Goodson, Master and Carlos A. The IVC/aorta was closed with amplatzer occluder device. The TAVR and post-procedural course were notable for development of complete heart block that required permanent pacemaker implantation. POD 1 TTE with trace valvular and paravalvular leak, MG of 11mmHg, PV of 2.3 m/s. Given transcaval approach and placement of PPM, Eliquis was held and patient started on plavix x48 (amplatzer closure prophylaxis and valve prophylaxis) with plans to resume eliquis and discontinue Plavix AM of 7/16/2021.    Patient reports feeling well since her valve replacement with improvement in exertional dyspnea, leg swelling and fatigue.  Her groin sites and pacer site are healing well. She is tolerating Eliquis and ASA without bleeding concerns. Plan to continue current medication regimen and follow-up in 1 month. Patient can follow with her primary cardiologist for management of his chronic cardiovascular issues in the meantime as needed.    Plan Summary:  1) Aspirin 81 mg daily lifelong  2) Continue Eliquis for a-fib  3) Lifelong antibiotic prophylaxis prior to all dental procedures.  4) Start cardiac rehab in [place] - will fax orders  5) Return to clinic in 1 month with ECHO, CT, EKG and labs prior     Call time: 9:56 -10:10    CESAR Casas, CNP  [facility] Cardiology Team    CC  Patient Care Team:  Braydon Yen MD as PCP - General (Family Practice)  Eliezer Myers MD as MD (Clinical Cardiac Electrophysiology)  Pau Nelson APRN CNP as Nurse Practitioner (Nurse Practitioner - Family)  Elizabeth Bhatti MD as Assigned Surgical Provider

## 2021-07-26 NOTE — PATIENT INSTRUCTIONS
You were seen today in the Structural Heart Clinic at the [facility].    Cardiology provider you saw during your visit: Thelma Pichardo NP    Instructions:   1. Continue aspirin 81 mg daily lifelong.  2. Delay any nonurgent dental procedures for the first 6 month post TAVR.  3. For all future dental cleanings and procedures you will need to take antibiotics prior - see instructions below.   4. Follow-up in Valve Clinic in 1 year with echo, CT, labs and ECG prior   5. Cardiac rehab orders will be faxed to [facility]. If you don't hear from them by the end of the week please call us.    Prevention of Infective (Bacterial) Endocarditis:  You are at increased risk for developing adverse outcomes from infective endocarditis (IE), also known as bacterial endocarditis (BE) because of the new device in your heart. The guidelines for prevention of IE are to give patients antibiotics prior to any dental procedures that involve manipulation of gingival tissue or the periapical region of teeth, or perforation of the oral mucosa:      It is recommended to take Amoxicillin 2 gm by mouth as a single dose 30 to 60 minutes before procedure.     OR if allergic to Penicillin or Ampicillin:     Cephalexin 2 gm by mouth, or  Clindamycin 600 mg by mouth, or  Azithromycin or Clarithromycin 500 mg PO       Questions and scheduling:   First call: Structural Heart  Modesta Vargas [phone]    General scheduling line: [phone].   First press #1 for the University and then press #3 for Medical Questions to reach the Cardiology triage nurse.     On Call Cardiologist for after hours or on weekends: [phone], press option #4 and ask to speak to the on-call Cardiologist.

## 2021-07-30 ENCOUNTER — TELEPHONE (OUTPATIENT)
Dept: CARDIAC REHAB | Facility: HOSPITAL | Age: 71
End: 2021-07-30

## 2021-07-30 NOTE — TELEPHONE ENCOUNTER
Message was left for patient to call [phone] in regards to scheduling Phase II Cardiac Rehab.   Penny Hernandez RT on 7/30/2021 at 12:35 PM

## 2021-07-31 NOTE — NURSING NOTE
Adan perkins. The following medication was given:     MEDICATION: Solumedrol  ROUTE: IM  SITE: RUQ - Gluteus  DOSE: 2 ml  LOT #: [id number]  :  [employer]  EXPIRATION DATE:  2/2020  NDC#: 5294-1457-57  Prior to injection, verified patient identity using patient's name and date of birth.  Joy Franklin

## 2021-08-09 DIAGNOSIS — I10 ESSENTIAL HYPERTENSION: ICD-10-CM

## 2021-08-09 RX ORDER — LOSARTAN POTASSIUM 50 MG/1
TABLET ORAL
Qty: 180 TABLET | Refills: 3 | Status: SHIPPED | OUTPATIENT
Start: 2021-08-09 | End: 2022-03-28

## 2021-08-11 ENCOUNTER — TELEPHONE (OUTPATIENT)
Dept: CARDIOLOGY | Facility: CLINIC | Age: 71
End: 2021-08-11

## 2021-08-11 NOTE — TELEPHONE ENCOUNTER
Left message that patient is scheduled with pacemake on 10/12/21 at 3:00 and then Dr. Myers afterwards.   I went and sent her a letter also.

## 2021-08-11 NOTE — TELEPHONE ENCOUNTER
----- Message from Terese Saxena sent at 8/10/2021  1:28 PM CDT -----  Regarding: [facility] schedule Pacemaker check and an Appt with Dr. Myers in October or November.  Crow Tamayo,    Please call patient and help her schedule her for Pacemaker check and Louis in Oct or Nov in [place]. I will cancel the appt she has schedule in [place].    Thank you!

## 2021-08-18 ENCOUNTER — HOSPITAL ENCOUNTER (OUTPATIENT)
Dept: CT IMAGING | Facility: CLINIC | Age: 71
End: 2021-08-18
Attending: NURSE PRACTITIONER
Payer: MEDICARE

## 2021-08-18 ENCOUNTER — LAB (OUTPATIENT)
Dept: LAB | Facility: CLINIC | Age: 71
End: 2021-08-18
Attending: INTERNAL MEDICINE
Payer: MEDICARE

## 2021-08-18 ENCOUNTER — HOSPITAL ENCOUNTER (OUTPATIENT)
Dept: CARDIOLOGY | Facility: CLINIC | Age: 71
Discharge: HOME | End: 2021-08-18
Attending: NURSE PRACTITIONER | Admitting: NURSE PRACTITIONER
Payer: MEDICARE

## 2021-08-18 DIAGNOSIS — Z95.2 S/P TAVR (TRANSCATHETER AORTIC VALVE REPLACEMENT): ICD-10-CM

## 2021-08-18 DIAGNOSIS — I35.8 OTHER NONRHEUMATIC AORTIC VALVE DISORDERS: ICD-10-CM

## 2021-08-18 LAB
ANION GAP SERPL CALCULATED.3IONS-SCNC: 4 MMOL/L (ref 3–14)
BUN SERPL-MCNC: 16 MG/DL (ref 7–30)
CALCIUM SERPL-MCNC: 9.8 MG/DL (ref 8.5–10.1)
CHLORIDE BLD-SCNC: 106 MMOL/L (ref 94–109)
CO2 SERPL-SCNC: 30 MMOL/L (ref 20–32)
CREAT SERPL-MCNC: 0.92 MG/DL (ref 0.52–1.04)
ERYTHROCYTE [DISTWIDTH] IN BLOOD BY AUTOMATED COUNT: 12.8 % (ref 10–15)
GFR SERPL CREATININE-BSD FRML MDRD: 63 ML/MIN/1.73M2
GLUCOSE BLD-MCNC: 94 MG/DL (ref 70–99)
HCT VFR BLD AUTO: 43.8 % (ref 35–47)
HGB BLD-MCNC: 14 G/DL (ref 11.7–15.7)
LVEF ECHO: NORMAL
MCH RBC QN AUTO: 32.7 PG (ref 26.5–33)
MCHC RBC AUTO-ENTMCNC: 32 G/DL (ref 31.5–36.5)
MCV RBC AUTO: 102 FL (ref 78–100)
MDC_IDC_LEAD_IMPLANT_DT: NORMAL
MDC_IDC_LEAD_LOCATION: NORMAL
MDC_IDC_LEAD_LOCATION_DETAIL_1: NORMAL
MDC_IDC_LEAD_MFG: NORMAL
MDC_IDC_LEAD_MODEL: NORMAL
MDC_IDC_LEAD_POLARITY_TYPE: NORMAL
MDC_IDC_LEAD_SERIAL: NORMAL
MDC_IDC_LEAD_SPECIAL_FUNCTION: NORMAL
MDC_IDC_MSMT_BATTERY_DTM: NORMAL
MDC_IDC_MSMT_BATTERY_REMAINING_LONGEVITY: 152 MO
MDC_IDC_MSMT_BATTERY_RRT_TRIGGER: 2.62
MDC_IDC_MSMT_BATTERY_STATUS: NORMAL
MDC_IDC_MSMT_BATTERY_VOLTAGE: 3.23 V
MDC_IDC_MSMT_LEADCHNL_RV_IMPEDANCE_VALUE: 494 OHM
MDC_IDC_MSMT_LEADCHNL_RV_IMPEDANCE_VALUE: 608 OHM
MDC_IDC_MSMT_LEADCHNL_RV_PACING_THRESHOLD_AMPLITUDE: 0.5 V
MDC_IDC_MSMT_LEADCHNL_RV_PACING_THRESHOLD_PULSEWIDTH: 0.4 MS
MDC_IDC_MSMT_LEADCHNL_RV_SENSING_INTR_AMPL: 25.75 MV
MDC_IDC_PG_IMPLANT_DTM: NORMAL
MDC_IDC_PG_MFG: NORMAL
MDC_IDC_PG_MODEL: NORMAL
MDC_IDC_PG_SERIAL: NORMAL
MDC_IDC_PG_TYPE: NORMAL
MDC_IDC_SESS_CLINIC_NAME: NORMAL
MDC_IDC_SESS_DTM: NORMAL
MDC_IDC_SESS_TYPE: NORMAL
MDC_IDC_SET_BRADY_HYSTRATE: NORMAL
MDC_IDC_SET_BRADY_LOWRATE: 60 {BEATS}/MIN
MDC_IDC_SET_BRADY_MAX_SENSOR_RATE: 130 {BEATS}/MIN
MDC_IDC_SET_BRADY_MODE: NORMAL
MDC_IDC_SET_LEADCHNL_RV_PACING_AMPLITUDE: 3.5 V
MDC_IDC_SET_LEADCHNL_RV_PACING_ANODE_ELECTRODE_1: NORMAL
MDC_IDC_SET_LEADCHNL_RV_PACING_CAPTURE_MODE: NORMAL
MDC_IDC_SET_LEADCHNL_RV_PACING_CATHODE_ELECTRODE_1: NORMAL
MDC_IDC_SET_LEADCHNL_RV_PACING_CATHODE_LOCATION_1: NORMAL
MDC_IDC_SET_LEADCHNL_RV_PACING_POLARITY: NORMAL
MDC_IDC_SET_LEADCHNL_RV_PACING_PULSEWIDTH: 0.4 MS
MDC_IDC_SET_LEADCHNL_RV_SENSING_ANODE_ELECTRODE_1: NORMAL
MDC_IDC_SET_LEADCHNL_RV_SENSING_ANODE_LOCATION_1: NORMAL
MDC_IDC_SET_LEADCHNL_RV_SENSING_CATHODE_ELECTRODE_1: NORMAL
MDC_IDC_SET_LEADCHNL_RV_SENSING_CATHODE_LOCATION_1: NORMAL
MDC_IDC_SET_LEADCHNL_RV_SENSING_POLARITY: NORMAL
MDC_IDC_SET_LEADCHNL_RV_SENSING_SENSITIVITY: 2 MV
MDC_IDC_SET_ZONE_DETECTION_INTERVAL: 360 MS
MDC_IDC_SET_ZONE_TYPE: NORMAL
MDC_IDC_STAT_AT_BURDEN_PERCENT: 0 %
MDC_IDC_STAT_BRADY_DTM_END: NORMAL
MDC_IDC_STAT_BRADY_DTM_START: NORMAL
MDC_IDC_STAT_BRADY_RV_PERCENT_PACED: 99.63 %
MDC_IDC_STAT_EPISODE_RECENT_COUNT: 0
MDC_IDC_STAT_EPISODE_RECENT_COUNT: 0
MDC_IDC_STAT_EPISODE_RECENT_COUNT_DTM_END: NORMAL
MDC_IDC_STAT_EPISODE_RECENT_COUNT_DTM_END: NORMAL
MDC_IDC_STAT_EPISODE_RECENT_COUNT_DTM_START: NORMAL
MDC_IDC_STAT_EPISODE_RECENT_COUNT_DTM_START: NORMAL
MDC_IDC_STAT_EPISODE_TOTAL_COUNT: 0
MDC_IDC_STAT_EPISODE_TOTAL_COUNT: 0
MDC_IDC_STAT_EPISODE_TOTAL_COUNT_DTM_END: NORMAL
MDC_IDC_STAT_EPISODE_TOTAL_COUNT_DTM_END: NORMAL
MDC_IDC_STAT_EPISODE_TOTAL_COUNT_DTM_START: NORMAL
MDC_IDC_STAT_EPISODE_TOTAL_COUNT_DTM_START: NORMAL
MDC_IDC_STAT_EPISODE_TYPE: NORMAL
PLATELET # BLD AUTO: 179 10E3/UL (ref 150–450)
POTASSIUM BLD-SCNC: 4.9 MMOL/L (ref 3.4–5.3)
RBC # BLD AUTO: 4.28 10E6/UL (ref 3.8–5.2)
SODIUM SERPL-SCNC: 140 MMOL/L (ref 133–144)
WBC # BLD AUTO: 6.7 10E3/UL (ref 4–11)

## 2021-08-18 PROCEDURE — 85027 COMPLETE CBC AUTOMATED: CPT

## 2021-08-18 PROCEDURE — 74175 CTA ABDOMEN W/CONTRAST: CPT | Mod: 26 | Performed by: RADIOLOGY

## 2021-08-18 PROCEDURE — 36415 COLL VENOUS BLD VENIPUNCTURE: CPT

## 2021-08-18 PROCEDURE — 93306 TTE W/DOPPLER COMPLETE: CPT | Mod: 26 | Performed by: INTERNAL MEDICINE

## 2021-08-18 PROCEDURE — 75572 CT HRT W/3D IMAGE: CPT | Mod: 26 | Performed by: INTERNAL MEDICINE

## 2021-08-18 PROCEDURE — 93306 TTE W/DOPPLER COMPLETE: CPT

## 2021-08-18 PROCEDURE — 80048 BASIC METABOLIC PNL TOTAL CA: CPT

## 2021-08-18 PROCEDURE — 75572 CT HRT W/3D IMAGE: CPT

## 2021-08-18 PROCEDURE — 999N000128 HC STATISTIC PERIPHERAL IV START W/O US GUIDANCE

## 2021-08-18 PROCEDURE — 250N000011 HC RX IP 250 OP 636: Performed by: INTERNAL MEDICINE

## 2021-08-18 PROCEDURE — 74175 CTA ABDOMEN W/CONTRAST: CPT

## 2021-08-18 RX ORDER — IOPAMIDOL 755 MG/ML
120 INJECTION, SOLUTION INTRAVASCULAR ONCE
Status: COMPLETED | OUTPATIENT
Start: 2021-08-18 | End: 2021-08-18

## 2021-08-18 RX ADMIN — IOPAMIDOL 120 ML: 755 INJECTION, SOLUTION INTRAVENOUS at 10:18

## 2021-08-19 ENCOUNTER — HOSPITAL ENCOUNTER (OUTPATIENT)
Dept: CARDIAC REHAB | Facility: HOSPITAL | Age: 71
Setting detail: THERAPIES SERIES
End: 2021-08-19
Attending: STUDENT IN AN ORGANIZED HEALTH CARE EDUCATION/TRAINING PROGRAM
Payer: MEDICARE

## 2021-08-19 VITALS — WEIGHT: 152 LBS | BODY MASS INDEX: 25.95 KG/M2 | HEIGHT: 64 IN

## 2021-08-19 PROCEDURE — 999N000109 HC STATISTIC OP CR VISIT

## 2021-08-19 PROCEDURE — 93798 PHYS/QHP OP CAR RHAB W/ECG: CPT

## 2021-08-19 ASSESSMENT — 6 MINUTE WALK TEST (6MWT)
MALE CALC: 1454.87
GENDER SELECTION: FEMALE
PREDICTED: 1463.75
FEMALE CALC: 1447.8
TOTAL DISTANCE WALKED (FT): 925

## 2021-08-19 ASSESSMENT — MIFFLIN-ST. JEOR: SCORE: 1189.47

## 2021-08-19 NOTE — PROGRESS NOTES
"   08/19/21 1000   Session   Session Initial Evaluation and Exercise Prescription   Certified through this date 09/17/21   Cardiac Rehab Assessment   Cardiac Rehab Assessment 8/19: Pt completes initial session of Phase II rehab following TAVR procedure in July of 2021. Since discharge pt notes they are not experiencing SOB and lower leg swelling as previously noted. Pt has been provided JAVIER socks but they have decided to discontinue wearing the stockings as they are difficult to maneuver. During evaluation, staff notes 1+ piting edema in bilateral legs and has encouraged pt to elevate their lower extremities when they are seated. Pt understands and verbally agrees.     Reviewing cardiac history, pt is noted to have an extensive work up in the last few years with EKGs, angioplasty's and cardiolite stress tests. Pt has known atrial fibrillation and was following up with Dr. Myers for continued cardiac care. Around 6 months ago, cardiologist suggested pt to have an ablation performed for the atrial fib but pt denied. Cardiology continued to discuss further interventions and was finally able to suggest TAVR procedure, to which pt agreed to upon watching Turbocoating. Pt states, \"[they] watched Turbocoating and called for flier to be sent\" for further understanding of the procedure. After pt had a chance to review the pamphlet they agreed to the TAVR procedure and was scheduled for July 14th, 2021 and discharged with PPM on the 15th of the July. Since returning home, pt denies SOB, oxygen needs at night or atrial fibrillation. During initial 6MWT, staff disclosed that pt is still in a-fib but their rate is in the normal parameters. Staff will continue to monitor and assess.     Pt has the desire to return to work as an  at the [employer] but has not been given a date to return. Staff mentioned they will contact PCP, with their approval, to provide them a date of return. Pt verbally okay's the " parameters.     Pt will return to cardiac rehab program 3x weekly and will be progressed on exercise equipment as hemodynamics allow.    The patient's history and clinical status including hemodynamics and ECG were evaluated. The patient was assessed to be stable and appropriate to begin exercise.   The patient's functional capacity and exercise prescription were determined by the completion of the 6 minute walk test. See results above. The patient was oriented to the program.  Risk factor profile was completed. Goals and objectives were discussed. CV response was WNL. No symptoms, complaints or pain were reported. Good prognosis for reaching goals below. Skilled therapy is necessary in order to monitor CV response to exercise, to provide education on risk factors and behavior change counseling needed to achieve patient's goals.  Plan to progress to 30-40 minutes of exercise prior to discharge from cardiac rehab.  Initial THR of 20-30 beats above RHR; Effort rating of 4-6. Initiate muscle conditioning as appropriate. Provide risk factor education and behavior change counseling.    General Information   Treatment Diagnosis LA   Date of Treatment Diagnosis 07/14/21   Secondary Treatment Diagnosis Other (see comments)  (PPM)   Significant Past CV History   (Multiple angioplasty's)   Comorbidities Pulmonary Disease   Other Medical History Hypothyroidism   Lead up symptoms 8/19: Over the last few years pt has been seeing Dr. Myers for their cardiac care, specifically due to their ongoing atrial fibrillation. Pt has experienced an increase of SOB and lower leg swelling that was noticeable over the last year prior to TAVR. Around 6 months ago, cardiology had made the recommendation to pt that they should have an ablation done to correct the atrial fibrillation but pt refused to have the procedure. Cardiology continued to suggest procedures that would benefit the pt and finally after reviewing medical commercial on the  "TAVR procedure, pt stated they were ready \"to take the plunge\" and was scheduled at the [facility] on July 14th, 2021. Following discharge from the hospital, pt denies SOB and has noticed the swelling in their lower extremities has decreased.    Hospital Location [facility]   Hospital Discharge Date 07/15/21   Signs and Symptoms Post Hospital Discharge None   Comments 8/19: Pt denies SOB but still experiences swelling in the lower extremities. Pt understands that they should be wearing their TEDs but has decided to discontinue the usage of the garment on their own.    Outpatient Cardiac Rehab Start Date 08/19/21   Primary Physician Dr. Yen   Primary Physician Follow Up Advised to schedule appointment   Specialist Dr. Myers   Specialist Follow Up Scheduled  (oCT 12TH, 2021))   Cardiologist Dr. Myers    Cardiologist Follow Up Scheduled  (Oct 12th, 2021)   Ejection Fraction 55-60%   Risk Stratification High   Summary of Cath Report   Summary of Cath Report No information available   Living and Work Status    Living Arrangements and Social Status apartment   Support System Live alone, family in area;Check-in help as needed   Return to Employment Yes   Occupation  ([employer])   Preventative Medications   CMS recommended medications Anticoagulants;Antiplatelets;Lipid Lowering   Fall Risk Screen   Fall screen completed by Cardiac Rehab   Have you fallen 2 or more times in the past year? No   Have you fallen and had an injury in the past year? No   Timed Up and Go score (seconds) NA   Is patient a fall risk? No   Fall screen comments 8/19: Pt is not at risk for falls and does not need a PT referral at this time. Staff will continue to monitor and assess.    Abuse Screen (yes response referral indicated)   Feels Unsafe at Home or Work/School no   Feels Threatened by Someone no   Does Anyone Try to Keep You From Having Contact with Others or Doing Things Outside Your Home? no " "  Physical Signs of Abuse Present no   Pain   Patient Currently in Pain No   Physical Assessments   Incisions WNL   Edema +1 Trace   Right Lung Sounds not assessed   Left Lung Sounds not assessed   Limitations Surgical   Comments 8/19: Staff will observe arm restrictions until August 25th, 2021.   Individualized Treatment Plan   Monitored Sessions Scheduled 25   Monitored Sessions Attended 1   Oxygen   Supplemental Oxygen needed No   Nutrition Management - Weight Management   Assessment Initial Assessment   Age 71   Weight 68.9 kg (152 lb)   Height 1.626 m (5' 4\")   BMI (Calculated) 26.09   Initial Rate Your Plate Score. Dietary tool to assess eating patterns. Scores range from 24 to 72. The higher the score the healthier the eating pattern. 48   Weight Management Comments 8/19: Pt is content with current weight and denies the need intentionally lose weight.   Nutrition Management - Lipids   Lipids Labs Not Available   Prescribed Lipid Medication Yes   Statin Intensity Moderate Intensity   Lipid Comments 8/19: Pt is compliant with lipid medication as prescribed. Staff will continue to monitor and assess.    Nutrition Management - Diabetes   Diabetes No   Nutrition Management Summary   Dietary Recommendations Low Fat;Low Cholesterol;Low Sodium   Stages of Change for Diet Compliance Preparation   Interventions Planned Attend Nutrition Education Class(es);Instruct on Label Reading   Nutrition Summary Comments 8/19: Pt states they do not consume many animal products due to the \"change\" in taste. Pt does not find the product appetizing anymore.     Nutrition Target Outcome BMI < 25   Psychosocial Management   Psychosocial Assessment Initial   Is there history of clinical depression or increased risk of depression? History of clinical depression   Current Level of Stress per Patient Report Denies   Current Coping Skills Has Positive Support System   Initial Patient Health Questionnaire -9 Score (PHQ-9) for depression. 5-9 " Minimal symptoms, 10-14 Minor depression, 15-19 Major depression, moderately severe, > 20 Major depression, severe  0   Initial [facility] [place] Survey score.  Quality of Life:   If total score > 25 review individual areas where patient rated a 4 or 5.  Consider patients current medical condition and what role that plays on the score.   Adjust treatment protocol to improve areas of concern.  Consider the following:  PHQ9 score, DASI, and re-assessment within the next 30 days to assist with developing treatments.  22   Stages of Change Preparation   Interventions Planned Patient to verbalize understanding of behavioral assessment results   Interventions In Progress or Completed Patient verbalizes understanding of behavioral assessment scores   Patient Goal No   Psychosocial Comments 8/19: Staff will provide psychosocial assessments as needed if change in affect appears. Currently, staff does not see the need to provide a 30 day re-assessment.    Psychosocial Target Outcome Identify absence or presence of depression using valid screening tool   Other Core Components - Hypertension   History of or Diagnosis of Hypertension Yes   Currently taking Anti-Hypertensives Yes;CCB   Hypertension Comments 8/19: Pt cannot be administered ACE inhibitors due to their allergies with the medications.    Other Core Components - Tobacco   History of Tobacco Use Yes   Quit Date or Planned Quit Date 01/28/07   Tobacco Use Status Former (Quit > 6 mo ago)   Tobacco Habit Cigarettes   Tobacco Use per Day (average) 0.5   Years of Tobacco Use 20.5   Stages of Change Maintenance   Tobacco Comments 8/19: Pt states they will not return to smoking since they quit in 2007.    Other Core Components Summary   Interventions Planned None   Patient Goals No   Other Core Components Comments 8/19: Pt does not need additional assessments pertaining to core components.    Other Core Components Target Outcome BP < 140/90 or < 130/80 with DM or CKD    Activity/Exercise History   Activity/Exercise Assessment Initial   Activity/Exercise Status prior to event? Was Physically Active   Number of Days Currently participating in Moderate Physical Activity? 0   Number of Days Currently performing  Aerobic Exercise (including rehab)? 0   Number of Minutes per Session Currently of Aerobic Exercise (average)? 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    Goal #1 Target Date 10/19/21   Goal #1 Progress Towards Goal 8/19: Pt will be initiated into the Phase II program on August 23rd for their 1st cardio session.    Activity/Exercise Comments 8/19: Currently, pt is ambulating in their hallways 3x a day, eqauting to 3 city blocks in total.    Activity/Exercise Target Outcome An Accumulation of 150  Minutes of Aerobic Activity per Week   Exercise Assessment   6 Minute Walk Predicted - Gender Selection Female   6 Minute Walk Predicted (Male) 1454.87   6 Minute Walk Predicted (Female) 1447.8   Initial 6 Minute Walk Distance (Feet) 925 ft   Resting HR 80 bpm   Exercise  bpm   Post Exercise HR 78 bpm   Resting /80   Exercise /75   Post Exercise /74   Pre SpO2 95   While Exercising SpO2 90   Post SpO2 97   Effort Rating 5   Current MET Level 2.3   MET Level Goal 4-5   ECG Rhythm Atrial fibrillation   Ectopy Other (Comments)  (Paced rhythm)   Current Symptoms Dyspnea   Limitations/Restrictions Pacemaker Precautions;Other (see comments)  (TAVR)   Exercise Prescription   Mode Treadmill;Nustep;Recumbant bike;Calisthenics;Ambulation   Duration/Time Intermittent bouts;15-30 min   Frequency 3 daysweek   THR (85% of age predicted max HR) 126.65   OMNI Effort Rating (0-10 Scale) 4-6/10   Progression  Intermittent bouts;Total exercise time of 20-30 minutes   Comments 8/19: Staff will increase exercises as hemodynamics allow.    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   Current Home Exercise   Type of Exercise Walking   Frequency (days per week) 4-6   Duration (minutes per session) 10-20   Follow-up/On-going Support   Provider follow-up needed on the following Other (see comments)  (Return to work)   Comments 8/19: Pt has granted persmission for staff to contact PCP and discuss return to work as a .    Learning Assessment   Learner Patient   Primary Language English   Preferred Learning Style Listening;Reading;Demonstration;Pictures/Video   Barriers to Learning No barriers noted   Patient Education   Title of Program Cardiac Rehab Education   Education recommended Anatomy and Physiology of the Heart;Blood Pressure;Exercise Principles;Medication Overview;Muscle Conditioning;Nutrition;Risk Factors;Stress Management   Education Comments 8/19: Staff will provide educcaitonal material to pt as they attend rehab sessions.   Physician cosignature/electronic signature indicates approval of this ITP document. I have established, reviewed and made necessary changes to the individualized treatment plan and exercise prescription for this patient.

## 2021-08-23 ENCOUNTER — HOSPITAL ENCOUNTER (OUTPATIENT)
Dept: CARDIAC REHAB | Facility: HOSPITAL | Age: 71
Setting detail: THERAPIES SERIES
End: 2021-08-23
Attending: FAMILY MEDICINE
Payer: MEDICARE

## 2021-08-23 PROCEDURE — 999N000109 HC STATISTIC OP CR VISIT

## 2021-08-23 PROCEDURE — 93798 PHYS/QHP OP CAR RHAB W/ECG: CPT

## 2021-08-25 ENCOUNTER — HOSPITAL ENCOUNTER (OUTPATIENT)
Dept: CARDIAC REHAB | Facility: HOSPITAL | Age: 71
Setting detail: THERAPIES SERIES
End: 2021-08-25
Attending: FAMILY MEDICINE
Payer: MEDICARE

## 2021-08-25 PROCEDURE — 999N000109 HC STATISTIC OP CR VISIT

## 2021-08-25 PROCEDURE — 93798 PHYS/QHP OP CAR RHAB W/ECG: CPT

## 2021-08-27 ENCOUNTER — HOSPITAL ENCOUNTER (OUTPATIENT)
Dept: CARDIAC REHAB | Facility: HOSPITAL | Age: 71
Setting detail: THERAPIES SERIES
End: 2021-08-27
Attending: FAMILY MEDICINE
Payer: MEDICARE

## 2021-08-27 PROCEDURE — 93798 PHYS/QHP OP CAR RHAB W/ECG: CPT

## 2021-08-27 PROCEDURE — 999N000109 HC STATISTIC OP CR VISIT

## 2021-08-29 DIAGNOSIS — I10 ESSENTIAL HYPERTENSION, BENIGN: ICD-10-CM

## 2021-08-30 ENCOUNTER — VIRTUAL VISIT (OUTPATIENT)
Dept: CARDIOLOGY | Facility: CLINIC | Age: 71
End: 2021-08-30
Attending: NURSE PRACTITIONER
Payer: COMMERCIAL

## 2021-08-30 ENCOUNTER — HOSPITAL ENCOUNTER (OUTPATIENT)
Dept: CARDIAC REHAB | Facility: HOSPITAL | Age: 71
Setting detail: THERAPIES SERIES
End: 2021-08-30
Attending: FAMILY MEDICINE
Payer: MEDICARE

## 2021-08-30 DIAGNOSIS — Z95.2 S/P TAVR (TRANSCATHETER AORTIC VALVE REPLACEMENT): Primary | ICD-10-CM

## 2021-08-30 PROCEDURE — 999N000109 HC STATISTIC OP CR VISIT

## 2021-08-30 PROCEDURE — 99213 OFFICE O/P EST LOW 20 MIN: CPT | Mod: 95 | Performed by: NURSE PRACTITIONER

## 2021-08-30 PROCEDURE — 93798 PHYS/QHP OP CAR RHAB W/ECG: CPT

## 2021-08-30 RX ORDER — FUROSEMIDE 40 MG
TABLET ORAL
Qty: 180 TABLET | Refills: 3 | Status: SHIPPED | OUTPATIENT
Start: 2021-08-30 | End: 2022-01-10

## 2021-08-30 NOTE — LETTER
8/30/2021      RE: Mary Alice Cameron  [address]  [address]  [facility] [id number]       Dear Colleague,    Thank you for the opportunity to participate in the care of your patient, Mary Alice Cameron, at the [facility] [place] [facility]. Please see a copy of my visit note below.          [facility]  CARDIOVASCULAR DIVISION    VALVE CLINIC RETURN VISIT    PRIMARY CARDIOLOGIST: Dr. Myers      PERTINENT CLINICAL HISTORY:     Video visit was converted to telephone due to technical difficulties.     Mary Alice Cameron is a very pleasant 71 year old female who presents for 1 month TAVR follow-up. She has a history of paroxysmal atrial fibrillation, severe COPD on O2 PRN,  HTN, non-obstructive CAD, HFpEF and severe bicuspid aortic stenosis who underwent transcaval transcatheter aortic valve replacement (TAVR) with a 29mm Kingston Tamy Valve on 7/14/21 by Destinee Sarah, Lisandro, Master and Carlos A. The IVC/aorta was closed with amplatzer occluder device. The TAVR and post-procedural course were notable for development of complete heart block that required permanent pacemaker implantation. POD 1 TTE showed trace valvular and paravalvular leak, MG of 11mmHg, PV of 2.3 m/s. She was discharged on ASA and Plavix, with plans to discontinue Plavix and resume Eliquis 48 hours post discharge.     Mary Alice has been feeling well since her valve replacement with improvement in shortness of breath and lower extremity swelling. Her oxygenation has also improved and she is no longer requiring oxygen at night. She started cardiac rehab and has been going 3 days a week. She is able to ride the stationary bike, rowing and walking on the treadmill for at least 10 minutes, sometimes longer. She denies chest pain, worsening shortness of breath, palpitations, lightheadedness or syncope. She has no orthopnea, PND, leg swelling or weight gain. She says her pacemaker and  groin sites are healing well. She is tolerating Eliquis and ASA without any major bleeding.      PAST MEDICAL HISTORY:     Past Medical History:   Diagnosis Date     Asthma      Atrial fibrillation (H)      COPD (chronic obstructive pulmonary disease) (H)      Depression      High cholesterol      HTN (hypertension)      Oxygen dependent      Thyroid disease    Severe aortic stenosis s/p TAVR 7/14/21  Complete heart block s/p PPM 7/14/21       PAST SURGICAL HISTORY:     Past Surgical History:   Procedure Laterality Date     BACK SURGERY  2006     CARDIAC SURGERY  06/12/2018    Angiogram at [facility]     COLONOSCOPY N/A 1/19/2016    Procedure: COLONOSCOPY;  Surgeon: Waldemar Bob MD;  Location: [facility] OR     CV CORONARY ANGIOGRAM N/A 4/30/2021    Procedure: CV CORONARY ANGIOGRAM;  Surgeon: Poli Walsh MD;  Location:  HEART CARDIAC CATH LAB     CV LEFT HEART CATH N/A 4/30/2021    Procedure: CV LEFT HEART CATH;  Surgeon: Poli Walsh MD;  Location:  HEART CARDIAC CATH LAB     CV RIGHT HEART CATH MEASUREMENTS RECORDED N/A 4/30/2021    Procedure: CV RIGHT HEART CATH;  Surgeon: Poli Walsh MD;  Location:  HEART CARDIAC CATH LAB     CV TRANSCATHETER AORTIC VALVE REPLACEMENT N/A 7/14/2021    Procedure: Right femoral Transcaval transcatheter aortic valve replacement (SUMMERS) size 29mm.  Transesophageal echocardiogram per anesthesia;  Surgeon: Domo Sarah MD;  Location: [facility] OR     EP PPM INSERT OF NEW OR REPL W/VENT LEAD N/A 7/14/2021    Procedure: insertion of Permanent Pacemaker;  Surgeon: Eliezer Myers MD;  Location: [facility] OR     HEART CATH FEMORAL CANNULIZATION WITH OPEN STANDBY REPAIR AORTIC VALVE N/A 7/14/2021    Procedure: Cardiopulmonary standby.;  Surgeon: Fly Smith MD;  Location: [facility] OR     HYSTERECTOMY  1980    partial     SLING BLADDER SUSPENSION WITH FASCIA LINNETTE          CURRENT MEDICATIONS:     Current Outpatient Medications    Medication Sig Dispense Refill     acetaminophen (TYLENOL) 500 MG tablet Take 500-1,000 mg by mouth every 6 hours as needed for mild pain       ADVAIR -21 MCG/ACT inhaler INHALE 2 PUFFS INTO THE LUNGS TWICE DAILY 12 g 3     amoxicillin (AMOXIL) 500 MG capsule Take 4 capsules (2,000 mg) by mouth once as needed (SBE prophylaxis take 30-60 minutes prior to dental procedure/cleaning) 4 capsule 3     aspirin (ASA) 81 MG EC tablet Take 1 tablet (81 mg) by mouth daily 90 tablet 1     atorvastatin (LIPITOR) 10 MG tablet Take 1 tablet (10 mg) by mouth At Bedtime 90 tablet 3     Calcium Carb-Cholecalciferol (CALTRATE 600+D3 SOFT PO) Take 600 mg by mouth 2 times daily       cephALEXin (KEFLEX) 500 MG capsule Take 1 capsule (500 mg) by mouth 3 times daily (Patient not taking: Reported on 7/26/2021) 21 capsule 0     cloNIDine (CATAPRES) 0.1 MG tablet TAKE 2 TABLETS BY MOUTH EVERY NIGHT AT BEDTIME 180 tablet 1     COMBIVENT RESPIMAT  MCG/ACT inhaler Inhale 1 puff into the lungs 4 times daily        diltiazem ER (TIAZAC) 360 MG 24 hr ER beaded capsule Take 1 capsule (360 mg) by mouth daily 90 capsule 3     diphenhydrAMINE (BENADRYL) 25 MG tablet Take 1-2 tablets (25-50 mg) by mouth every 6 hours as needed for itching or allergies 60 tablet 1     ELIQUIS ANTICOAGULANT 5 MG tablet TAKE 1 TABLET BY MOUTH TWICE DAILY 180 tablet 1     furosemide (LASIX) 40 MG tablet TAKE 1 TABLET(40 MG) BY MOUTH TWICE DAILY 180 tablet 0     hydrOXYzine (ATARAX) 25 MG tablet Take 1 tablet (25 mg) by mouth 3 times daily as needed for itching 60 tablet 0     levothyroxine (SYNTHROID/LEVOTHROID) 100 MCG tablet Take 1 tablet (100 mcg) by mouth daily 90 tablet 3     losartan (COZAAR) 50 MG tablet TAKE 1 TABLET(50 MG) BY MOUTH TWICE DAILY 180 tablet 3     OTHER MEDICAL SUPPLIES Apply one pair to help with edema 1 each 0     potassium chloride ER (KLOR-CON M) 20 MEQ CR tablet Take 1 tablet (20 mEq) by mouth 3 times daily 270 tablet 3         ALLERGIES:     Allergies   Allergen Reactions     Amlodipine Besylate Cough     Norvasc     Lisinopril Cough     Ace Inhibitors Cough      FAMILY HISTORY:     Family History   Problem Relation Age of Onset     Prostate Cancer Father      Hypertension Father      Heart Failure Father         CHF     Asthma Mother      Cancer Mother         ovarian     Hypertension Mother      Asthma Brother      Hypertension Sister      Hypertension Brother         SOCIAL HISTORY:     Social History     Socioeconomic History     Marital status:      Spouse name: Not on file     Number of children: Not on file     Years of education: Not on file     Highest education level: Not on file   Occupational History     Employer: RETIRED     Comment: disabled   Tobacco Use     Smoking status: Former Smoker     Packs/day: 0.50     Years: 41.00     Pack years: 20.50     Types: Cigarettes     Start date: 1966     Quit date: 2007     Years since quittin.4     Smokeless tobacco: Never Used   Substance and Sexual Activity     Alcohol use: No     Alcohol/week: 0.0 standard drinks     Drug use: No     Sexual activity: Never     Comment:    Other Topics Concern      Service No     Blood Transfusions Yes     Comment: Permits if needed     Caffeine Concern Yes     Comment: 2 cups coffee daily     Occupational Exposure Not Asked     Hobby Hazards Not Asked     Sleep Concern Not Asked     Stress Concern Not Asked     Weight Concern Not Asked     Special Diet Not Asked     Back Care Not Asked     Exercise Not Asked     Bike Helmet Not Asked     Seat Belt Yes     Self-Exams Not Asked     Parent/sibling w/ CABG, MI or angioplasty before 65F 55M? No   Social History Narrative     Not on file     Social Determinants of Health     Financial Resource Strain:      Difficulty of Paying Living Expenses:    Food Insecurity:      Worried About Running Out of Food in the Last Year:      Ran Out of Food in the Last Year:     Transportation Needs:      Lack of Transportation (Medical):      Lack of Transportation (Non-Medical):    Physical Activity:      Days of Exercise per Week:      Minutes of Exercise per Session:    Stress:      Feeling of Stress :    Social Connections:      Frequency of Communication with Friends and Family:      Frequency of Social Gatherings with Friends and Family:      Attends Denominational Services:      Active Member of Clubs or Organizations:      Attends Club or Organization Meetings:      Marital Status:    Intimate Partner Violence:      Fear of Current or Ex-Partner:      Emotionally Abused:      Physically Abused:      Sexually Abused:         REVIEW OF SYSTEMS:     Constitutional: No fevers or chills  Skin: No new rash or itching  Eyes: No acute change in vision  Ears/Nose/Throat: No purulent rhinorrhea, new hearing loss, or new vertigo  Respiratory: No cough or hemoptysis  Cardiovascular: See HPI  Gastrointestinal: No change in appetite, vomiting, hematemesis or diarrhea  Genitourinary: No dysuria or hematuria  Musculoskeletal: No new back pain, neck pain or muscle pain  Neurologic: No new headaches, focal weakness or behavior changes  Psychiatric: No hallucinations, excessive alcohol consumption or illegal drug usage  Hematologic/Lymphatic/Immunologic: No bleeding, chills, fever, night sweats or weight loss  Endocrine: No new cold intolerance, heat intolerance, polyphagia, polydipsia or polyuria      PHYSICAL EXAMINATION:     There were no vitals taken for this visit. or physical exam due to phone visit.      LABORATORY DATA:   Personally reviewed and interpreted labs.  CBC RESULTS:  Lab Results   Component Value Date    WBC 6.7 08/18/2021    WBC 7.7 07/08/2021    RBC 4.28 08/18/2021    RBC 4.27 07/08/2021    HGB 14.0 08/18/2021    HGB 14.4 07/08/2021    HCT 43.8 08/18/2021    HCT 42.3 07/08/2021     (H) 08/18/2021    MCV 99 07/08/2021    MCH 32.7 08/18/2021    MCH 33.7 (H) 07/08/2021    [facility]  32.0 08/18/2021    MCHC 34.0 07/08/2021    RDW 12.8 08/18/2021    RDW 12.9 07/08/2021     08/18/2021     07/08/2021       BMP RESULTS:  Lab Results   Component Value Date     08/18/2021     07/08/2021    POTASSIUM 4.9 08/18/2021    POTASSIUM 4.2 07/08/2021    CHLORIDE 106 08/18/2021    CHLORIDE 101 07/08/2021    CO2 30 08/18/2021    CO2 25 07/08/2021    ANIONGAP 4 08/18/2021    ANIONGAP 10 07/08/2021    GLC 94 08/18/2021    GLC 92 07/08/2021    BUN 16 08/18/2021    BUN 20 07/08/2021    CR 0.92 08/18/2021    CR 1.00 07/08/2021    GFRESTIMATED 63 08/18/2021    GFRESTIMATED 57 (L) 07/08/2021    GFRESTBLACK 66 07/08/2021    KOSTA 9.8 08/18/2021    KOSTA 9.8 07/08/2021      A1C RESULTS:  Lab Results   Component Value Date    A1C 5.9 03/09/2016     INR RESULTS:  Lab Results   Component Value Date    INR 1.06 07/13/2021    INR 1.08 04/30/2021    INR 1.4 (H) 07/23/2018    INR 4.4 (H) 07/20/2018    INR 2.14 (H) 03/22/2018      PROCEDURES & FURTHER ASSESSMENTS:     EKG today: Pending     ECHO 8/18/21:  Interpretation Summary  S/P TAVR with 29mm ES3 ultra valve. Mean aortic gradient 8mmHg. Trivial  valvular/paravalvular AI.  No significant changes noted.    CT cardiac 8/18/21:   1.  There is a 29mm Kingston Tamy III Ultra transcatheter valve in  the aortic position. It is well-seated.  2.  There is minimal hypoattenuating leaflet thickening involving all  three leaflets.There is unrestricted leaflet opening.   3.  The cardiac chambers demonstrate normal atrioventricular and  ventriculoarterial concordance.  4.  Coronary arteries originate from their respective cusps.   5.  There are mild coronary artery calcifications.  6.  The pericardium is unremarkable. There is no pericardial effusion.     7.  There is no intracardiac thrombus.    CTA abdomen 8/18/21:  IMPRESSION:     1. Postoperative changes of transcaval approach TAVR with Amplatzer  closure device from the aorta to the infrarenal IVC at the site  of  transcaval puncture, with successful occlusion of the fistula and no  evidence of retroperitoneal hematoma.     2. Ectasia of the ascending aorta measuring 4.5 cm, previously 4.4 cm  in 2018.     3. Cardiomegaly with biatrial enlargement.     4. Mild upper lobe predominant centrilobular emphysematous change.     PPM Interrogation 8/10/21:  Remote pacemaker transmission received and reviewed. Device transmission sent per MD orders.     Device: Medtronic [id number] Betsy XT SR MRI  Normal Device Function.  Mode: VVIR 60 - 130 bpm  : 99.6%  Presenting EGM:  @ 60 bpm  Short V-V intervals: 0  Lead Trends Appear Stable: Yes  Estimated battery longevity to RRT = 12.6 years.  Ventricular Arrhythmia: 0  Pt Notified of Transmission Results.     Plan: RTC in 3 months and send a remote transmission in 3 months.    ECG dated 7/15/21:  Personally reviewed and interpreted  V-paced HR 61    Echocardiogram dated 7/15/21:  Interpretation Summary  s/p ES3 Ultra TAVR prosthesis on 7/14/21.  Valve is well seated. MG 11mmHg, PV 2.3m/s; trace valvular and paravalvular  AI.  Global and regional left ventricular function is normal with an EF of 60-65%.  Global right ventricular function is normal.  Estimated pulmonary artery systolic pressure is 37 mmHg plus right atrial  pressure.  IVC diameter >2.1 cm collapsing <50% with sniff suggests a high RA pressure  estimated at 15 mmHg or greater.  No pericardial effusion is present.  ______________________________________________________________________________  Left Ventricle  Global and regional left ventricular function is normal with an EF of 60-65%.  Left ventricular size is normal. Borderline concentric wall thickening  consistent with left ventricular hypertrophy is present. Diastolic function  not assessed due to atrial fibrillation.     Right Ventricle  The right ventricle is normal size. Global right ventricular function is  normal.     Mitral Valve  The mitral valve is normal.  Trace mitral insufficiency is present.     Aortic Valve  A bioprosthetic aortic valve is present. Doppler interrogation of the aortic  valve is normal. Valve is well seated. Trace valvular and paravalvular AI. The  peak velocity across the aortic valve is 2.3 m/sec. The mean gradient is 11  mmHg.     Tricuspid Valve  The tricuspid valve is normal. Trace tricuspid insufficiency is present.  Estimated pulmonary artery systolic pressure is 37 mmHg plus right atrial  pressure.     Vessels  IVC diameter >2.1 cm collapsing <50% with sniff suggests a high RA pressure  estimated at 15 mmHg or greater.     Pericardium  No pericardial effusion is present.    NYHA Class: II     CLINICAL IMPRESSION:   Mary Alice Cameron is a very pleasant 71 year old female who presents for 1 month TAVR follow-up. Her history is notable for paroxysmal atrial fibrillation, COPD requiring O2 at night, HTN, non-obstructive CAD, HFpEF and severe bicuspid aortic stenosis who underwent transcaval transcatheter aortic valve replacement (TAVR) with a 29mm Kingston Tamy Valve on 7/14/21 by Destinee Sarah, Lisandro, Master and Carlos A. The TAVR and post-procedural course were notable successful closure of IVC/aorta with amplatzer occluder device. She did developed complete heart block requiring permanent pacemaker implantation. POD 1 TTE with trace valvular and paravalvular leak, MG of 11mmHg, PV of 2.3 m/s. She was placed on ASA and Eliquis.    Patient reports feeling well since her valve replacement with improvement in exertional dyspnea, leg swelling and fatigue. Her pacer and groin sites are healing well. She is tolerating Eliquis and ASA without bleeding concerns. Her 1 month TAVR echo shows normal valve function with mean PG of 8 mmHg with trivial PVL. Her CT demonstrates minimal leaflet thickening consistent with HALT; however, no restricted leaflet motion. Will plan to continue Eliquis therapy which patient takes for a-fib. Her abdominal CT shows  successful occlusion of the IVC/aorta transcaval puncture site. Overall no concerns findings of recent imaging. Plan to continue current medical therapy.     Plan Summary:  1) Aspirin 81 mg daily lifelong  2) Continue Eliquis for a-fib  3) Lifelong antibiotic prophylaxis prior to all dental procedures.  4) Continue cardiac rehab  5) Follow-up with Dr. Myers in October 2021  6) Follow-up valve clinic 6 months with echo prior     Call time: 2:29 - 2:42    CESAR Casas, CNP  [facility] Cardiology Team    CC  Patient Care Team:  Braydon Yen MD as PCP - General (Family Practice)  Eliezer Myers MD as MD (Clinical Cardiac Electrophysiology)  Pau Nelson APRN CNP as Nurse Practitioner (Nurse Practitioner - Family)  Elizabeth Bhatti MD as Assigned Surgical Provider  Eliezer Myers MD as Assigned Heart and Vascular Provider

## 2021-08-30 NOTE — PROGRESS NOTES
[facility] HEART [facility]  CARDIOVASCULAR DIVISION    VALVE CLINIC RETURN VISIT    PRIMARY CARDIOLOGIST: Dr. Myers      PERTINENT CLINICAL HISTORY:     Video visit was converted to telephone due to technical difficulties.     Mary Alice Cameron is a very pleasant 71 year old female who presents for 1 month TAVR follow-up. She has a history of paroxysmal atrial fibrillation, severe COPD on O2 PRN,  HTN, non-obstructive CAD, HFpEF and severe bicuspid aortic stenosis who underwent transcaval transcatheter aortic valve replacement (TAVR) with a 29mm Kingston Tamy Valve on 7/14/21 by Destinee Sarah, Lisandro, Master and Carlos A. The IVC/aorta was closed with amplatzer occluder device. The TAVR and post-procedural course were notable for development of complete heart block that required permanent pacemaker implantation. POD 1 TTE showed trace valvular and paravalvular leak, MG of 11mmHg, PV of 2.3 m/s. She was discharged on ASA and Plavix, with plans to discontinue Plavix and resume Eliquis 48 hours post discharge.     Mary Alice has been feeling well since her valve replacement with improvement in shortness of breath and lower extremity swelling. Her oxygenation has also improved and she is no longer requiring oxygen at night. She started cardiac rehab and has been going 3 days a week. She is able to ride the stationary bike, rowing and walking on the treadmill for at least 10 minutes, sometimes longer. She denies chest pain, worsening shortness of breath, palpitations, lightheadedness or syncope. She has no orthopnea, PND, leg swelling or weight gain. She says her pacemaker and groin sites are healing well. She is tolerating Eliquis and ASA without any major bleeding.      PAST MEDICAL HISTORY:     Past Medical History:   Diagnosis Date     Asthma      Atrial fibrillation (H)      COPD (chronic obstructive pulmonary disease) (H)      Depression      High cholesterol      HTN (hypertension)      Oxygen dependent      Thyroid  disease    Severe aortic stenosis s/p TAVR 7/14/21  Complete heart block s/p PPM 7/14/21       PAST SURGICAL HISTORY:     Past Surgical History:   Procedure Laterality Date     BACK SURGERY  2006     CARDIAC SURGERY  06/12/2018    Angiogram at [facility]     COLONOSCOPY N/A 1/19/2016    Procedure: COLONOSCOPY;  Surgeon: Waldemar Bob MD;  Location: [facility] OR     CV CORONARY ANGIOGRAM N/A 4/30/2021    Procedure: CV CORONARY ANGIOGRAM;  Surgeon: Poli Walsh MD;  [facility] C[facility]AC [facility]     CV LEFT HEART CATH N/A 4/30/2021    Procedure: CV LEFT HEART CATH;  Surgeon: Poli Walsh MD;  [facility]     CV RIGHT HEART CATH MEASUREMENTS RECORDED N/A 4/30/2021    Procedure: CV RIGHT HEART CATH;  Surgeon: Poli Walsh MD;  [facility]     CV TRANSCATHETER AORTIC VALVE REPLACEMENT N/A 7/14/2021    Procedure: Right femoral Transcaval transcatheter aortic valve replacement (SUMMERS) size 29mm.  Transesophageal echocardiogram per anesthesia;  Surgeon: Domo Sarah MD;  [facility]     EP PPM INSERT OF NEW OR REPL W/VENT LEAD N/A 7/14/2021    Procedure: insertion of Permanent Pacemaker;  Surgeon: Eliezer Myers MD;  Location: [facility] OR     HEART CATH FEMORAL CANNULIZATION WITH OPEN STANDBY REPAIR AORTIC VALVE N/A 7/14/2021    Procedure: Cardiopulmonary standby.;  Surgeon: Fly Smith MD;  Location: [facility] OR     HYSTERECTOMY  1980    partial     SLING BLADDER SUSPENSION WITH FASCIA LINNETTE          CURRENT MEDICATIONS:     Current Outpatient Medications   Medication Sig Dispense Refill     acetaminophen (TYLENOL) 500 MG tablet Take 500-1,000 mg by mouth every 6 hours as needed for mild pain       ADVAIR -21 MCG/ACT inhaler INHALE 2 PUFFS INTO THE LUNGS TWICE DAILY 12 g 3     amoxicillin (AMOXIL) 500 MG capsule Take 4 capsules (2,000 mg) by mouth once as needed (SBE prophylaxis take 30-60 minutes prior  to dental procedure/cleaning) 4 capsule 3     aspirin (ASA) 81 MG EC tablet Take 1 tablet (81 mg) by mouth daily 90 tablet 1     atorvastatin (LIPITOR) 10 MG tablet Take 1 tablet (10 mg) by mouth At Bedtime 90 tablet 3     Calcium Carb-Cholecalciferol (CALTRATE 600+D3 SOFT PO) Take 600 mg by mouth 2 times daily       cephALEXin (KEFLEX) 500 MG capsule Take 1 capsule (500 mg) by mouth 3 times daily (Patient not taking: Reported on 7/26/2021) 21 capsule 0     cloNIDine (CATAPRES) 0.1 MG tablet TAKE 2 TABLETS BY MOUTH EVERY NIGHT AT BEDTIME 180 tablet 1     COMBIVENT RESPIMAT  MCG/ACT inhaler Inhale 1 puff into the lungs 4 times daily        diltiazem ER (TIAZAC) 360 MG 24 hr ER beaded capsule Take 1 capsule (360 mg) by mouth daily 90 capsule 3     diphenhydrAMINE (BENADRYL) 25 MG tablet Take 1-2 tablets (25-50 mg) by mouth every 6 hours as needed for itching or allergies 60 tablet 1     ELIQUIS ANTICOAGULANT 5 MG tablet TAKE 1 TABLET BY MOUTH TWICE DAILY 180 tablet 1     furosemide (LASIX) 40 MG tablet TAKE 1 TABLET(40 MG) BY MOUTH TWICE DAILY 180 tablet 0     hydrOXYzine (ATARAX) 25 MG tablet Take 1 tablet (25 mg) by mouth 3 times daily as needed for itching 60 tablet 0     levothyroxine (SYNTHROID/LEVOTHROID) 100 MCG tablet Take 1 tablet (100 mcg) by mouth daily 90 tablet 3     losartan (COZAAR) 50 MG tablet TAKE 1 TABLET(50 MG) BY MOUTH TWICE DAILY 180 tablet 3     OTHER MEDICAL SUPPLIES Apply one pair to help with edema 1 each 0     potassium chloride ER (KLOR-CON M) 20 MEQ CR tablet Take 1 tablet (20 mEq) by mouth 3 times daily 270 tablet 3        ALLERGIES:     Allergies   Allergen Reactions     Amlodipine Besylate Cough     Norvasc     Lisinopril Cough     Ace Inhibitors Cough      FAMILY HISTORY:     Family History   Problem Relation Age of Onset     Prostate Cancer Father      Hypertension Father      Heart Failure Father         CHF     Asthma Mother      Cancer Mother         ovarian     Hypertension  Mother      Asthma Brother      Hypertension Sister      Hypertension Brother         SOCIAL HISTORY:     Social History     Socioeconomic History     Marital status:      Spouse name: Not on file     Number of children: Not on file     Years of education: Not on file     Highest education level: Not on file   Occupational History     Employer: RETIRED     Comment: disabled   Tobacco Use     Smoking status: Former Smoker     Packs/day: 0.50     Years: 41.00     Pack years: 20.50     Types: Cigarettes     Start date: 1966     Quit date: 2007     Years since quittin.4     Smokeless tobacco: Never Used   Substance and Sexual Activity     Alcohol use: No     Alcohol/week: 0.0 standard drinks     Drug use: No     Sexual activity: Never     Comment:    Other Topics Concern      Service No     Blood Transfusions Yes     Comment: Permits if needed     Caffeine Concern Yes     Comment: 2 cups coffee daily     Occupational Exposure Not Asked     Hobby Hazards Not Asked     Sleep Concern Not Asked     Stress Concern Not Asked     Weight Concern Not Asked     Special Diet Not Asked     Back Care Not Asked     Exercise Not Asked     Bike Helmet Not Asked     Seat Belt Yes     Self-Exams Not Asked     Parent/sibling w/ CABG, MI or angioplasty before 65F 55M? No   Social History Narrative     Not on file     Social Determinants of Health     Financial Resource Strain:      Difficulty of Paying Living Expenses:    Food Insecurity:      Worried About Running Out of Food in the Last Year:      Ran Out of Food in the Last Year:    Transportation Needs:      Lack of Transportation (Medical):      Lack of Transportation (Non-Medical):    Physical Activity:      Days of Exercise per Week:      Minutes of Exercise per Session:    Stress:      Feeling of Stress :    Social Connections:      Frequency of Communication with Friends and Family:      Frequency of Social Gatherings with Friends and Family:       Attends Zoroastrianism Services:      Active Member of Clubs or Organizations:      Attends Club or Organization Meetings:      Marital Status:    Intimate Partner Violence:      Fear of Current or Ex-Partner:      Emotionally Abused:      Physically Abused:      Sexually Abused:         REVIEW OF SYSTEMS:     Constitutional: No fevers or chills  Skin: No new rash or itching  Eyes: No acute change in vision  Ears/Nose/Throat: No purulent rhinorrhea, new hearing loss, or new vertigo  Respiratory: No cough or hemoptysis  Cardiovascular: See HPI  Gastrointestinal: No change in appetite, vomiting, hematemesis or diarrhea  Genitourinary: No dysuria or hematuria  Musculoskeletal: No new back pain, neck pain or muscle pain  Neurologic: No new headaches, focal weakness or behavior changes  Psychiatric: No hallucinations, excessive alcohol consumption or illegal drug usage  Hematologic/Lymphatic/Immunologic: No bleeding, chills, fever, night sweats or weight loss  Endocrine: No new cold intolerance, heat intolerance, polyphagia, polydipsia or polyuria      PHYSICAL EXAMINATION:     There were no vitals taken for this visit. or physical exam due to phone visit.      LABORATORY DATA:   Personally reviewed and interpreted labs.  CBC RESULTS:  Lab Results   Component Value Date    WBC 6.7 08/18/2021    WBC 7.7 07/08/2021    RBC 4.28 08/18/2021    RBC 4.27 07/08/2021    HGB 14.0 08/18/2021    HGB 14.4 07/08/2021    HCT 43.8 08/18/2021    HCT 42.3 07/08/2021     (H) 08/18/2021    MCV 99 07/08/2021    MCH 32.7 08/18/2021    MCH 33.7 (H) 07/08/2021    MCHC 32.0 08/18/2021    MCHC 34.0 07/08/2021    RDW 12.8 08/18/2021    RDW 12.9 07/08/2021     08/18/2021     07/08/2021       BMP RESULTS:  Lab Results   Component Value Date     08/18/2021     07/08/2021    POTASSIUM 4.9 08/18/2021    POTASSIUM 4.2 07/08/2021    CHLORIDE 106 08/18/2021    CHLORIDE 101 07/08/2021    CO2 30 08/18/2021    CO2 25 07/08/2021     ANIONGAP 4 08/18/2021    ANIONGAP 10 07/08/2021    GLC 94 08/18/2021    GLC 92 07/08/2021    BUN 16 08/18/2021    BUN 20 07/08/2021    CR 0.92 08/18/2021    CR 1.00 07/08/2021    GFRESTIMATED 63 08/18/2021    GFRESTIMATED 57 (L) 07/08/2021    GFRESTBLACK 66 07/08/2021    KOSTA 9.8 08/18/2021    KOSTA 9.8 07/08/2021      A1C RESULTS:  Lab Results   Component Value Date    A1C 5.9 03/09/2016     INR RESULTS:  Lab Results   Component Value Date    INR 1.06 07/13/2021    INR 1.08 04/30/2021    INR 1.4 (H) 07/23/2018    INR 4.4 (H) 07/20/2018    INR 2.14 (H) 03/22/2018      PROCEDURES & FURTHER ASSESSMENTS:     EKG today: Pending     ECHO 8/18/21:  Interpretation Summary  S/P TAVR with 29mm ES3 ultra valve. Mean aortic gradient 8mmHg. Trivial  valvular/paravalvular AI.  No significant changes noted.    CT cardiac 8/18/21:   1.  There is a 29mm Kingston Tamy III Ultra transcatheter valve in  the aortic position. It is well-seated.  2.  There is minimal hypoattenuating leaflet thickening involving all  three leaflets.There is unrestricted leaflet opening.   3.  The cardiac chambers demonstrate normal atrioventricular and  ventriculoarterial concordance.  4.  Coronary arteries originate from their respective cusps.   5.  There are mild coronary artery calcifications.  6.  The pericardium is unremarkable. There is no pericardial effusion.     7.  There is no intracardiac thrombus.    CTA abdomen 8/18/21:  IMPRESSION:     1. Postoperative changes of transcaval approach TAVR with Amplatzer  closure device from the aorta to the infrarenal IVC at the site of  transcaval puncture, with successful occlusion of the fistula and no  evidence of retroperitoneal hematoma.     2. Ectasia of the ascending aorta measuring 4.5 cm, previously 4.4 cm  in 2018.     3. Cardiomegaly with biatrial enlargement.     4. Mild upper lobe predominant centrilobular emphysematous change.     PPM Interrogation 8/10/21:  Remote pacemaker transmission  received and reviewed. Device transmission sent per MD orders.     Device: Medtronic [id number] Ruby XT SR MRI  Normal Device Function.  Mode: VVIR 60 - 130 bpm  : 99.6%  Presenting EGM:  @ 60 bpm  Short V-V intervals: 0  Lead Trends Appear Stable: Yes  Estimated battery longevity to RRT = 12.6 years.  Ventricular Arrhythmia: 0  Pt Notified of Transmission Results.     Plan: RTC in 3 months and send a remote transmission in 3 months.    ECG dated 7/15/21:  Personally reviewed and interpreted  V-paced HR 61    Echocardiogram dated 7/15/21:  Interpretation Summary  s/p ES3 Ultra TAVR prosthesis on 7/14/21.  Valve is well seated. MG 11mmHg, PV 2.3m/s; trace valvular and paravalvular  AI.  Global and regional left ventricular function is normal with an EF of 60-65%.  Global right ventricular function is normal.  Estimated pulmonary artery systolic pressure is 37 mmHg plus right atrial  pressure.  IVC diameter >2.1 cm collapsing <50% with sniff suggests a high RA pressure  estimated at 15 mmHg or greater.  No pericardial effusion is present.  ______________________________________________________________________________  Left Ventricle  Global and regional left ventricular function is normal with an EF of 60-65%.  Left ventricular size is normal. Borderline concentric wall thickening  consistent with left ventricular hypertrophy is present. Diastolic function  not assessed due to atrial fibrillation.     Right Ventricle  The right ventricle is normal size. Global right ventricular function is  normal.     Mitral Valve  The mitral valve is normal. Trace mitral insufficiency is present.     Aortic Valve  A bioprosthetic aortic valve is present. Doppler interrogation of the aortic  valve is normal. Valve is well seated. Trace valvular and paravalvular AI. The  peak velocity across the aortic valve is 2.3 m/sec. The mean gradient is 11  mmHg.     Tricuspid Valve  The tricuspid valve is normal. Trace tricuspid insufficiency  is present.  Estimated pulmonary artery systolic pressure is 37 mmHg plus right atrial  pressure.     Vessels  IVC diameter >2.1 cm collapsing <50% with sniff suggests a high RA pressure  estimated at 15 mmHg or greater.     Pericardium  No pericardial effusion is present.    NYHA Class: II     CLINICAL IMPRESSION:   Mary Alice Cameron is a very pleasant 71 year old female who presents for 1 month TAVR follow-up. Her history is notable for paroxysmal atrial fibrillation, COPD requiring O2 at night, HTN, non-obstructive CAD, HFpEF and severe bicuspid aortic stenosis who underwent transcaval transcatheter aortic valve replacement (TAVR) with a 29mm Kingston Atmy Valve on 7/14/21 by Destinee Sarah, Lisandro, Master and Carlos A. The TAVR and post-procedural course were notable successful closure of IVC/aorta with amplatzer occluder device. She did developed complete heart block requiring permanent pacemaker implantation. POD 1 TTE with trace valvular and paravalvular leak, MG of 11mmHg, PV of 2.3 m/s. She was placed on ASA and Eliquis.    Patient reports feeling well since her valve replacement with improvement in exertional dyspnea, leg swelling and fatigue. Her pacer and groin sites are healing well. She is tolerating Eliquis and ASA without bleeding concerns. Her 1 month TAVR echo shows normal valve function with mean PG of 8 mmHg with trivial PVL. Her CT demonstrates minimal leaflet thickening consistent with HALT; however, no restricted leaflet motion. Will plan to continue Eliquis therapy which patient takes for a-fib. Her abdominal CT shows successful occlusion of the IVC/aorta transcaval puncture site. Overall no concerns findings of recent imaging. Plan to continue current medical therapy.     Plan Summary:  1) Aspirin 81 mg daily lifelong  2) Continue Eliquis for a-fib  3) Lifelong antibiotic prophylaxis prior to all dental procedures.  4) Continue cardiac rehab  5) Follow-up with Dr. Myers in October 2021  6)  Follow-up valve clinic 6 months with echo prior     Call time: 2:29 - 2:42    CESAR Casas, CNP  [facility] Cardiology Team    CC  Patient Care Team:  Braydon Yen MD as PCP - General (Family Practice)  Braydon Yen MD as Assigned PCP  Eliezer Myers MD as MD (Clinical Cardiac Electrophysiology)  Pau Nelson APRN CNP as Nurse Practitioner (Nurse Practitioner - Family)  Elizabeth Bhatti MD as Assigned Surgical Provider  Eliezer Myers MD as Assigned Heart and Vascular Provider

## 2021-08-30 NOTE — PROGRESS NOTES
"Mary Alice is a 71 year old who is being evaluated via a billable telephone visit.      What phone number would you like to be contacted at? [phone]  How would you like to obtain your AVS? Afshan    Vitals - Patient Reported  Systolic (Patient Reported): 122  Diastolic (Patient Reported): 78  Weight (Patient Reported): 69.9 kg (154 lb)  Height (Patient Reported): 162.6 cm (5' 4\")  BMI (Based on Pt Reported Ht/Wt): 26.43  Pain Score: No Pain (0) (No SOB)      Vitals were taken and medications were reconciled.   Lamin Ross, EMT  1:40 PM      "

## 2021-09-01 ENCOUNTER — HOSPITAL ENCOUNTER (OUTPATIENT)
Dept: CARDIAC REHAB | Facility: HOSPITAL | Age: 71
Setting detail: THERAPIES SERIES
End: 2021-09-01
Attending: FAMILY MEDICINE
Payer: MEDICARE

## 2021-09-01 PROCEDURE — 999N000109 HC STATISTIC OP CR VISIT

## 2021-09-01 PROCEDURE — 93798 PHYS/QHP OP CAR RHAB W/ECG: CPT

## 2021-09-03 ENCOUNTER — HOSPITAL ENCOUNTER (OUTPATIENT)
Dept: CARDIAC REHAB | Facility: HOSPITAL | Age: 71
Setting detail: THERAPIES SERIES
End: 2021-09-03
Attending: FAMILY MEDICINE
Payer: MEDICARE

## 2021-09-03 PROCEDURE — 93798 PHYS/QHP OP CAR RHAB W/ECG: CPT

## 2021-09-03 PROCEDURE — 999N000109 HC STATISTIC OP CR VISIT

## 2021-09-07 NOTE — PATIENT INSTRUCTIONS
You were seen today in the Structural Heart Clinic at the [facility].    Cardiology provider you saw during your visit: Thelma Pichardo NP    Summary:  Your ECHO shows normal TAVR function. Your CT also shows normal function with good leaflet opening. One of the leaflets is minimally thickened, treatment for this is Eliquis which you are already taking. We will continue this. Overall great news!    Plan:   1. Continue aspirin 81 mg daily lifelong.  2. Continue Eliquis therapy for a-fib  3. Delay any nonurgent dental procedures for the first 6 month post TAVR.  4. For all future dental cleanings and procedures you will need to take antibiotics prior - see instructions below.   5. Continue cardiac rehab  6. Follow-up w/Dr. Myers this fall  7. Follow-up valve clinic 6 months with echo prior     Prevention of Infective (Bacterial) Endocarditis:  You are at increased risk for developing adverse outcomes from infective endocarditis (IE), also known as bacterial endocarditis (BE) because of the new device in your heart. The guidelines for prevention of IE are to give patients antibiotics prior to any dental procedures that involve manipulation of gingival tissue or the periapical region of teeth, or perforation of the oral mucosa:      It is recommended to take Amoxicillin 2 gm by mouth as a single dose 30 to 60 minutes before procedure.     OR if allergic to Penicillin or Ampicillin:     Cephalexin 2 gm by mouth, or  Clindamycin 600 mg by mouth, or  Azithromycin or Clarithromycin 500 mg PO       Questions and scheduling:   First call: Structural Heart  Modesta Vargas [phone]    General scheduling line: [phone].   First press #1 for the MailTime and then press #3 for Medical Questions to reach the Cardiology triage nurse.     On Call Cardiologist for after hours or on weekends: [phone], press option #4 and ask to speak to the on-call Cardiologist.

## 2021-09-08 ENCOUNTER — HOSPITAL ENCOUNTER (OUTPATIENT)
Dept: CARDIAC REHAB | Facility: HOSPITAL | Age: 71
Setting detail: THERAPIES SERIES
End: 2021-09-08
Attending: FAMILY MEDICINE
Payer: MEDICARE

## 2021-09-08 DIAGNOSIS — I10 ESSENTIAL HYPERTENSION: ICD-10-CM

## 2021-09-08 PROCEDURE — 93798 PHYS/QHP OP CAR RHAB W/ECG: CPT

## 2021-09-08 PROCEDURE — 999N000109 HC STATISTIC OP CR VISIT

## 2021-09-09 RX ORDER — CLONIDINE HYDROCHLORIDE 0.1 MG/1
TABLET ORAL
Qty: 180 TABLET | Refills: 3 | Status: SHIPPED | OUTPATIENT
Start: 2021-09-09 | End: 2022-03-28

## 2021-09-10 ENCOUNTER — TELEPHONE (OUTPATIENT)
Dept: CARDIOLOGY | Facility: CLINIC | Age: 71
End: 2021-09-10

## 2021-09-10 ENCOUNTER — DOCUMENTATION ONLY (OUTPATIENT)
Dept: CARDIOLOGY | Facility: CLINIC | Age: 71
End: 2021-09-10

## 2021-09-10 DIAGNOSIS — Z95.3 STATUS POST TRANSCATHETER AORTIC VALVE REPLACEMENT (TAVR) USING BIOPROSTHESIS: Primary | ICD-10-CM

## 2021-09-10 NOTE — TELEPHONE ENCOUNTER
Called [facility] and they gave me number to cardiology to have them schedule a 12 lead EKG when she goes to rehab on Monday. Called number [phone] but no way to leave a VM . Called patient and left vm to see if she can get a hold of cardiology and schedule an EKG appointment. Also left my contact for her to call me back to go over some questions on our [facility] .

## 2021-09-10 NOTE — PROGRESS NOTES
Patient called needed EKG post Tavr per NP( Thelma Pichardo ) , order EKG and will need to email results to provider when done. Patient to call [facility] when done to confirm EKG done.   Sagrario Buckner LPN

## 2021-09-10 NOTE — PROGRESS NOTES
Structural Heart Coordinator visit:  Provided additional education regarding TAVR/MitraClip procedure, after being present for discussion with physician. Explained the work-up process and next steps for patient. Patient provided our direct contact number and instructed to call with any questions.     30 day S/P TAVR    K[facility]Q Results:   1a. 5  1b. 5  1c. 5  2. 3  3. 7  4. 7  5. 5  6. 5  7. 4  8a. 5  8b. 5  8c. 5      Lisa Vargas CMA  Structural Heart   [facility]

## 2021-09-13 ENCOUNTER — HOSPITAL ENCOUNTER (OUTPATIENT)
Dept: CARDIAC REHAB | Facility: HOSPITAL | Age: 71
Setting detail: THERAPIES SERIES
End: 2021-09-13
Attending: FAMILY MEDICINE
Payer: MEDICARE

## 2021-09-13 PROCEDURE — 93798 PHYS/QHP OP CAR RHAB W/ECG: CPT

## 2021-09-13 PROCEDURE — 999N000109 HC STATISTIC OP CR VISIT

## 2021-09-15 ENCOUNTER — HOSPITAL ENCOUNTER (OUTPATIENT)
Dept: CARDIAC REHAB | Facility: HOSPITAL | Age: 71
Setting detail: THERAPIES SERIES
End: 2021-09-15
Attending: FAMILY MEDICINE
Payer: MEDICARE

## 2021-09-15 PROCEDURE — 93798 PHYS/QHP OP CAR RHAB W/ECG: CPT

## 2021-09-15 PROCEDURE — 999N000109 HC STATISTIC OP CR VISIT

## 2021-09-16 VITALS — HEIGHT: 64 IN | WEIGHT: 152 LBS | BODY MASS INDEX: 25.95 KG/M2

## 2021-09-16 ASSESSMENT — 6 MINUTE WALK TEST (6MWT)
PREDICTED: 1463.75
GENDER SELECTION: FEMALE
TOTAL DISTANCE WALKED (FT): 925
FEMALE CALC: 1447.8
MALE CALC: 1454.87

## 2021-09-16 ASSESSMENT — MIFFLIN-ST. JEOR: SCORE: 1189.47

## 2021-09-16 NOTE — PROGRESS NOTES
"   09/16/21 1400   Session   Session 30 Day Individualized Treatment Plan   Certified through this date 10/15/21   Cardiac Rehab Assessment   Cardiac Rehab Assessment 9/16: Patient has attended 10 sessions thus far in Phase II Cardiac Rehab. Overall, she is doing quite well. She is up to 2.5 METs at this time. She has been able to initiate using her arms including the arm ergometer. She has been gradually increasing workloads over time. She is encouraged to increase her intensity.   Continued monitored rehab is necessary to help patient continue to safely advance to higher levels of activity performance.    General Information   Treatment Diagnosis LA   Date of Treatment Diagnosis 07/14/21   Secondary Treatment Diagnosis Other (see comments)  (PPM)   Significant Past CV History   (Multiple angioplasty's)   Comorbidities Pulmonary Disease   Other Medical History Hypothyroidism   Lead up symptoms 8/19: Over the last few years pt has been seeing Dr. Myers for their cardiac care, specifically due to their ongoing atrial fibrillation. Pt has experienced an increase of SOB and lower leg swelling that was noticeable over the last year prior to TAVR. Around 6 months ago, cardiology had made the recommendation to pt that they should have an ablation done to correct the atrial fibrillation but pt refused to have the procedure. Cardiology continued to suggest procedures that would benefit the pt and finally after reviewing medical commercial on the TAVR procedure, pt stated they were ready \"to take the plunge\" and was scheduled at the [facility] on July 14th, 2021. Following discharge from the hospital, pt denies SOB and has noticed the swelling in their lower extremities has decreased.    Hospital Location [facility]   Hospital Discharge Date 07/15/21   Signs and Symptoms Post Hospital Discharge None   Comments 9/16: Patient has been regularly attending CR 2 - 3 times per week as her schedule allows.  " "  Outpatient Cardiac Rehab Start Date 08/19/21   Primary Physician Dr. Yen   Primary Physician Follow Up Advised to schedule appointment   Specialist Dr. Myers   Specialist Follow Up Scheduled  (oCT 12TH, 2021))   Cardiologist Dr. Myers    Cardiologist Follow Up Scheduled  (Oct 12th, 2021)   Ejection Fraction 55-60%   Risk Stratification High   Summary of Cath Report   Summary of Cath Report No information available   Living and Work Status    Living Arrangements and Social Status apartment   Support System Live alone, family in area;Check-in help as needed   Return to Employment Yes   Occupation  (Hetal)   Preventative Medications   CMS recommended medications Anticoagulants;Antiplatelets;Lipid Lowering   Fall Risk Screen   Fall screen completed by Cardiac Rehab   Have you fallen 2 or more times in the past year? No   Have you fallen and had an injury in the past year? No   Timed Up and Go score (seconds) NA   Is patient a fall risk? No   Fall screen comments 9/16: Patient has not had any recent falls.    Abuse Screen (yes response referral indicated)   Feels Unsafe at Home or Work/School no   Feels Threatened by Someone no   Does Anyone Try to Keep You From Having Contact with Others or Doing Things Outside Your Home? no   Physical Signs of Abuse Present no   Pain   Patient Currently in Pain No   Physical Assessments   Incisions Not assessed   Edema Not assessed   Right Lung Sounds not assessed   Left Lung Sounds not assessed   Limitations No limitations   Comments 9/16: Patient has started using her arms in Phase II.    Individualized Treatment Plan   Monitored Sessions Scheduled 25   Monitored Sessions Attended 10   Oxygen   Supplemental Oxygen needed No   Nutrition Management - Weight Management   Assessment Re-assessment   Age 71   Weight 68.9 kg (152 lb)   Height 1.626 m (5' 4\")   BMI (Calculated) 26.09   Initial Rate Your Plate Score. Dietary tool to assess eating " "patterns. Scores range from 24 to 72. The higher the score the healthier the eating pattern. 48   Weight Management Comments 9/16: Pt is content with current weight and denies the need intentionally lose weight.   Nutrition Management - Lipids   Lipids Labs Not Available   Prescribed Lipid Medication Yes   Statin Intensity Moderate Intensity   Lipid Comments 9/16: Patient remains compliant with medications.    Nutrition Management - Diabetes   Diabetes No   Nutrition Management Summary   Dietary Recommendations Low Fat;Low Cholesterol;Low Sodium   Stages of Change for Diet Compliance Preparation   Interventions Planned Attend Nutrition Education Class(es);Instruct on Label Reading   Nutrition Summary Comments 9/16: Pt states they do not consume many animal products due to the \"change\" in taste. Pt does not find the product appetizing anymore.     Nutrition Target Outcome BMI < 25   Psychosocial Management   Psychosocial Assessment Re-assessment   Is there history of clinical depression or increased risk of depression? History of clinical depression   Current Level of Stress per Patient Report Denies   Current Coping Skills Has Positive Support System   Initial Patient Health Questionnaire -9 Score (PHQ-9) for depression. 5-9 Minimal symptoms, 10-14 Minor depression, 15-19 Major depression, moderately severe, > 20 Major depression, severe  0   Initial [facility] Survey score.  Quality of Life:   If total score > 25 review individual areas where patient rated a 4 or 5.  Consider patients current medical condition and what role that plays on the score.   Adjust treatment protocol to improve areas of concern.  Consider the following:  PHQ9 score, DASI, and re-assessment within the next 30 days to assist with developing treatments.  22   Stages of Change Preparation   Interventions Planned Patient to verbalize understanding of behavioral assessment results   Interventions In Progress or Completed Patient verbalizes " understanding of behavioral assessment scores   Patient Goal No   Psychosocial Comments 9/16: Patient will be reassessed as needed.    Psychosocial Target Outcome Identify absence or presence of depression using valid screening tool   Other Core Components - Hypertension   History of or Diagnosis of Hypertension Yes   Currently taking Anti-Hypertensives Yes;CCB   Hypertension Comments 9/16: Patient has an allergy to ACE inhibitors.    Other Core Components - Tobacco   History of Tobacco Use Yes   Quit Date or Planned Quit Date 01/28/07   Tobacco Use Status Former (Quit > 6 mo ago)   Tobacco Habit Cigarettes   Tobacco Use per Day (average) 0.5   Years of Tobacco Use 20.5   Stages of Change Maintenance   Tobacco Comments 9/16: Pt states they will not return to smoking since they quit in 2007.    Other Core Components Summary   Interventions Planned None   Patient Goals No   Other Core Components Comments 9/16: Patient does not have other core component needs at this time.    Other Core Components Target Outcome BP < 140/90 or < 130/80 with DM or CKD   Activity/Exercise History   Activity/Exercise Assessment Re-assessment   Activity/Exercise Status prior to event? Was Physically Active   Number of Days Currently participating in Moderate Physical Activity? 2-3   Number of Days Currently performing  Aerobic Exercise (including rehab)? 2-3   Number of Minutes per Session Currently of Aerobic Exercise (average)? 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    Goal #1 Target Date 10/19/21   Goal #1 Progress Towards Goal 9/16: Patient is attending Phase II regularly 2-3 times per week.   Activity/Exercise Comments 9/16: Patient continues walking the halls at home.     Activity/Exercise Target Outcome An Accumulation of 150  Minutes of Aerobic Activity per Week   Exercise Assessment   6 Minute Walk Predicted - Gender Selection Female   6 Minute Walk Predicted (Male) 1454.87   6 Minute Walk Predicted (Female) 1447.8   Initial 6 Minute Walk Distance (Feet) 925 ft   Resting HR 78 bpm   Exercise  bpm   Post Exercise HR 80 bpm   Resting /84   Exercise /64   Post Exercise /74   Effort Rating 5   Current MET Level 2.5   MET Level Goal 4-5   ECG Rhythm Atrial fibrillation   Ectopy Other (Comments)  (Paced rhythm)   Current Symptoms Dyspnea   Limitations/Restrictions Pacemaker Precautions;Other (see comments)  (TAVR)   Exercise Prescription   Mode Treadmill;Nustep;Recumbant bike;Calisthenics;Ambulation   Duration/Time Intermittent bouts;15-30 min   Frequency 3 daysweek   THR (85% of age predicted max HR) 126.65   OMNI Effort Rating (0-10 Scale) 4-6/10   Progression Intermittent bouts;Total exercise time of 20-30 minutes   Comments 9/16: Staff will increase exercises as patient tolerates.    Recommended Home Exercise   Type of Exercise Walking   Frequency (days per week) 4-6   Duration (minutes per session) Intermittent;15-30 min   Effort Rating Recommended 4-6/10   30 Day Exercise Plan 9/16: Pt has been encouraged to continued HEP of walking the apartment building halls.   Current Home Exercise   Type of Exercise Walking   Frequency (days per week) 4-6   Duration (minutes per session) 10-20   Follow-up/On-going Support   Provider follow-up needed on the following Other (see comments)  (Return to work)   Comments 9/13: Pt has granted persmission for staff to contact PCP and discuss return to work as a .    Learning Assessment   Learner Patient   Primary Language English   Preferred Learning Style Listening;Reading;Demonstration;Pictures/Video   Barriers to Learning No barriers noted   Patient Education   Title of Program Cardiac Rehab Education   Education  recommended Anatomy and Physiology of the Heart;Blood Pressure;Exercise Principles;Medication Overview;Muscle Conditioning;Nutrition;Risk Factors;Stress Management   Education classes attended Blood Pressure;Exercise Principles;Medication Overview;Risk Factors   Education Comments 9/16: Patient has been given educational material weekly pertaining to her personal risk factors.    Physician cosignature/electronic signature indicates approval of this ITP document. I have established, reviewed and made necessary changes to the individualized treatment plan and exercise prescription for this patient.

## 2021-09-20 ENCOUNTER — HOSPITAL ENCOUNTER (OUTPATIENT)
Dept: CARDIAC REHAB | Facility: HOSPITAL | Age: 71
Setting detail: THERAPIES SERIES
End: 2021-09-20
Attending: FAMILY MEDICINE
Payer: MEDICARE

## 2021-09-20 PROCEDURE — 999N000109 HC STATISTIC OP CR VISIT

## 2021-09-20 PROCEDURE — 93798 PHYS/QHP OP CAR RHAB W/ECG: CPT

## 2021-09-22 ENCOUNTER — HOSPITAL ENCOUNTER (OUTPATIENT)
Dept: CARDIAC REHAB | Facility: HOSPITAL | Age: 71
Setting detail: THERAPIES SERIES
End: 2021-09-22
Attending: FAMILY MEDICINE
Payer: MEDICARE

## 2021-09-22 PROCEDURE — 999N000109 HC STATISTIC OP CR VISIT

## 2021-09-22 PROCEDURE — 93798 PHYS/QHP OP CAR RHAB W/ECG: CPT

## 2021-09-24 ENCOUNTER — HOSPITAL ENCOUNTER (OUTPATIENT)
Dept: CARDIAC REHAB | Facility: HOSPITAL | Age: 71
Setting detail: THERAPIES SERIES
End: 2021-09-24
Attending: FAMILY MEDICINE
Payer: MEDICARE

## 2021-09-24 PROCEDURE — 93798 PHYS/QHP OP CAR RHAB W/ECG: CPT

## 2021-09-24 PROCEDURE — 999N000109 HC STATISTIC OP CR VISIT

## 2021-09-27 DIAGNOSIS — I48.19 PERSISTENT ATRIAL FIBRILLATION (H): ICD-10-CM

## 2021-09-28 NOTE — TELEPHONE ENCOUNTER
ELIQUIS ANTICOAGULANT 5 MG tablet      Last Written Prescription Date:  03/12/2021  Last Fill Quantity: 180,   # refills: 1  Last Office Visit: 07/20/2021  Future Office visit:    Next 5 appointments (look out 90 days)    Oct 12, 2021  3:30 PM  (Arrive by 3:15 PM)  Return Visit with Eliezer Myers MD  [facility] ([facility] [place] ) [address]  [place] [address]  [phone]           Routing refill request to provider for review/approval because:

## 2021-09-29 ENCOUNTER — HOSPITAL ENCOUNTER (OUTPATIENT)
Dept: CARDIAC REHAB | Facility: HOSPITAL | Age: 71
Setting detail: THERAPIES SERIES
End: 2021-09-29
Attending: FAMILY MEDICINE
Payer: MEDICARE

## 2021-09-29 PROCEDURE — 999N000109 HC STATISTIC OP CR VISIT

## 2021-09-29 PROCEDURE — 93798 PHYS/QHP OP CAR RHAB W/ECG: CPT

## 2021-09-30 RX ORDER — APIXABAN 5 MG/1
TABLET, FILM COATED ORAL
Qty: 180 TABLET | Refills: 1 | Status: SHIPPED | OUTPATIENT
Start: 2021-09-30 | End: 2022-03-28

## 2021-10-01 ENCOUNTER — HOSPITAL ENCOUNTER (OUTPATIENT)
Dept: CARDIAC REHAB | Facility: HOSPITAL | Age: 71
Setting detail: THERAPIES SERIES
End: 2021-10-01
Attending: FAMILY MEDICINE
Payer: MEDICARE

## 2021-10-01 PROCEDURE — 999N000109 HC STATISTIC OP CR VISIT

## 2021-10-01 PROCEDURE — 93798 PHYS/QHP OP CAR RHAB W/ECG: CPT

## 2021-10-04 ENCOUNTER — HOSPITAL ENCOUNTER (OUTPATIENT)
Dept: CARDIAC REHAB | Facility: HOSPITAL | Age: 71
Setting detail: THERAPIES SERIES
End: 2021-10-04
Attending: FAMILY MEDICINE
Payer: MEDICARE

## 2021-10-04 PROCEDURE — 93798 PHYS/QHP OP CAR RHAB W/ECG: CPT

## 2021-10-04 PROCEDURE — 999N000109 HC STATISTIC OP CR VISIT

## 2021-10-06 ENCOUNTER — HOSPITAL ENCOUNTER (OUTPATIENT)
Dept: CARDIAC REHAB | Facility: HOSPITAL | Age: 71
Setting detail: THERAPIES SERIES
End: 2021-10-06
Attending: FAMILY MEDICINE
Payer: MEDICARE

## 2021-10-06 PROCEDURE — 999N000109 HC STATISTIC OP CR VISIT

## 2021-10-06 PROCEDURE — 93798 PHYS/QHP OP CAR RHAB W/ECG: CPT

## 2021-10-08 ENCOUNTER — HOSPITAL ENCOUNTER (OUTPATIENT)
Dept: CARDIAC REHAB | Facility: HOSPITAL | Age: 71
Setting detail: THERAPIES SERIES
End: 2021-10-08
Attending: FAMILY MEDICINE
Payer: MEDICARE

## 2021-10-08 VITALS — WEIGHT: 151 LBS | BODY MASS INDEX: 25.78 KG/M2 | HEIGHT: 64 IN

## 2021-10-08 PROCEDURE — 93798 PHYS/QHP OP CAR RHAB W/ECG: CPT

## 2021-10-08 PROCEDURE — 999N000109 HC STATISTIC OP CR VISIT

## 2021-10-08 ASSESSMENT — 6 MINUTE WALK TEST (6MWT)
TOTAL DISTANCE WALKED (FT): 980
FEMALE CALC: 1451.22
MALE CALC: 1457.5
GENDER SELECTION: FEMALE
TOTAL DISTANCE WALKED (FT): 925
PREDICTED: 1466.39

## 2021-10-08 ASSESSMENT — MIFFLIN-ST. JEOR: SCORE: 1184.93

## 2021-10-08 NOTE — PROGRESS NOTES
"   10/08/21 1100   Session   Session Discharge Note   Certified through this date 10/08/21   Cardiac Rehab Assessment   Cardiac Rehab Assessment 10/8:  Patient is discharged from Phase II Cardiac Rehab today after completing 18 exercise sessions. 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 She has been doing this for the last three weeks without difficulty.   Pt made gains in exercise tolerance. Initially patient tolerated 11 minutes at 2.3 METs, now tolerating 55 minutes at 4 METs intermittently. Patient also increased 6-minute walk test by 6% (an increase of 55 feet.) The PT was given instructions on frequency, intensity, and duration for continued exercise as well as muscle conditioning and stretching exercises.  Your PT plans to continue walking in the halls of her apartment building and walking to and from her place of employment for exercise.      General Information   Treatment Diagnosis LA   Date of Treatment Diagnosis 07/14/21   Secondary Treatment Diagnosis Other (see comments)  (PPM)   Significant Past CV History   (Multiple angioplasty's)   Comorbidities Pulmonary Disease   Other Medical History Hypothyroidism   Lead up symptoms 8/19: Over the last few years pt has been seeing Dr. Myers for their cardiac care, specifically due to their ongoing atrial fibrillation. Pt has experienced an increase of SOB and lower leg swelling that was noticeable over the last year prior to TAVR. Around 6 months ago, cardiology had made the recommendation to pt that they should have an ablation done to correct the atrial fibrillation but pt refused to have the procedure. Cardiology continued to suggest procedures that would benefit the pt and finally after reviewing medical commercial on the TAVR procedure, pt stated they were ready \"to take the plunge\" and was scheduled at the [facility] on July 14th, 2021. Following discharge " from the hospital, pt denies SOB and has noticed the swelling in their lower extremities has decreased.    Hospital Location [facility]   Hospital Discharge Date 07/15/21   Signs and Symptoms Post Hospital Discharge None   Comments 10/8: Patient has returned to work.    Outpatient Cardiac Rehab Start Date 08/19/21   Primary Physician Dr. Yen   Primary Physician Follow Up Advised to schedule appointment   Specialist Dr. Myers   Specialist Follow Up Scheduled  (oCT 12TH, 2021))   Cardiologist Dr. Myers    Cardiologist Follow Up Scheduled  (Oct 12th, 2021)   Ejection Fraction 55-60%   Risk Stratification High   Summary of Cath Report   Summary of Cath Report No information available   Living and Work Status    Living Arrangements and Social Status apartment   Support System Live alone, family in area;Check-in help as needed   Return to Employment Yes   Occupation  (Hetal)   Preventative Medications   CMS recommended medications Anticoagulants;Antiplatelets;Lipid Lowering   Fall Risk Screen   Fall screen completed by Cardiac Rehab   Have you fallen 2 or more times in the past year? No   Have you fallen and had an injury in the past year? No   Timed Up and Go score (seconds) NA   Is patient a fall risk? No   Fall screen comments 10/8: Patient does feel unsteady at times, but she has been able to maintain her balance.    Abuse Screen (yes response referral indicated)   Feels Unsafe at Home or Work/School no   Feels Threatened by Someone no   Does Anyone Try to Keep You From Having Contact with Others or Doing Things Outside Your Home? no   Physical Signs of Abuse Present no   Pain   Patient Currently in Pain No   Physical Assessments   Incisions Not assessed   Edema +1 Trace   Right Lung Sounds not assessed   Left Lung Sounds not assessed   Limitations No limitations   Comments 10/8: Patient has returned to her previous activities.    Individualized Treatment Plan  "  Monitored Sessions Scheduled 25   Monitored Sessions Attended 18   Oxygen   Supplemental Oxygen needed No   Nutrition Management - Weight Management   Assessment Discharge   Age 71   Weight 68.5 kg (151 lb)   Height 1.626 m (5' 4\")   BMI (Calculated) 25.92   Initial Rate Your Plate Score. Dietary tool to assess eating patterns. Scores range from 24 to 72. The higher the score the healthier the eating pattern. 48   Discharge Rate Your Plate Score 484   Weight Management Comments 10/8: Patient continues to avoid most meats. She has been able to maintain her weight throughout her time in Cardiac Rehab.    Nutrition Management - Lipids   Lipids Labs Not Available   Prescribed Lipid Medication Yes   Statin Intensity Moderate Intensity   Lipid Comments 10/8: Patient continues to take medications as prescribed.    Nutrition Management - Diabetes   Diabetes No   Nutrition Management Summary   Dietary Recommendations Low Fat;Low Cholesterol;Low Sodium   Stages of Change for Diet Compliance Preparation   Interventions Planned Attend Nutrition Education Class(es);Instruct on Label Reading   Nutrition Summary Comments 10/8: Pt states they do not consume many animal products due to the \"change\" in taste. Pt does not find the product appetizing anymore.     Nutrition Target Outcome BMI < 25   Psychosocial Management   Psychosocial Assessment Discharge   Is there history of clinical depression or increased risk of depression? History of clinical depression   Current Level of Stress per Patient Report Denies   Current Coping Skills Has Positive Support System   Initial Patient Health Questionnaire -9 Score (PHQ-9) for depression. 5-9 Minimal symptoms, 10-14 Minor depression, 15-19 Major depression, moderately severe, > 20 Major depression, severe  0   Discharge PHQ-9 Score for Depression 2   Initial [facility] Survey score.  Quality of Life:   If total score > 25 review individual areas where patient rated a 4 or 5.  Consider " patients current medical condition and what role that plays on the score.   Adjust treatment protocol to improve areas of concern.  Consider the following:  PHQ9 score, DASI, and re-assessment within the next 30 days to assist with developing treatments.  22   Discharge [facility] Survey Score 16   Stages of Change Preparation   Interventions Planned Patient to verbalize understanding of behavioral assessment results   Interventions In Progress or Completed Patient verbalizes understanding of behavioral assessment scores   Patient Goal No   Psychosocial Comments 10/8: Patient has not needed further workup reguarding [place] pschosocial needs.    Psychosocial Target Outcome Identify absence or presence of depression using valid screening tool   Other Core Components - Hypertension   History of or Diagnosis of Hypertension Yes   Currently taking Anti-Hypertensives Yes;CCB   Hypertension Comments 10/8: Patient continues to take her CCB as prescribed.    Other Core Components - Tobacco   History of Tobacco Use Yes   Quit Date or Planned Quit Date 01/28/07   Tobacco Use Status Former (Quit > 6 mo ago)   Tobacco Habit Cigarettes   Tobacco Use per Day (average) 0.5   Years of Tobacco Use 20.5   Stages of Change Maintenance   Tobacco Comments 10/8: Patient does not have a desire to start smoking at this point in her life.    Other Core Components Summary   Interventions Planned None   Patient Goals No   Other Core Components Comments 10/8: Patient does not have other core component needs.    Other Core Components Target Outcome BP < 140/90 or < 130/80 with DM or CKD   Activity/Exercise History   Activity/Exercise Assessment Discharge   Activity/Exercise Status prior to event? Was Physically Active   Number of Days Currently participating in Moderate Physical Activity? 2-3   Number of Days Currently performing  Aerobic Exercise (including rehab)? 2-3   Number of Minutes per Session Currently of Aerobic Exercise (average)?  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    Goal #1 Target Date 10/19/21   Goal #1 Date Met 10/08/21   Goal #1 Progress Towards Goal 10/8: Patient was able to exercise at 4 METs for a short period of time today. 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    Activity/Exercise Comments 10/8: Patient is walking the halls in her building and walking to and from her place of employment.    Activity/Exercise Target Outcome An Accumulation of 150  Minutes of Aerobic Activity per Week   Exercise Assessment   6 Minute Walk Predicted - Gender Selection Female   6 Minute Walk Predicted (Male) 1457.5   6 Minute Walk Predicted (Female) 1451.22   Initial 6 Minute Walk Distance (Feet) 925 ft   Discharge 6 Minute Walk Distance (Feet) 980   Resting HR 79 bpm   Exercise  bpm   Post Exercise HR 80 bpm   Resting /75   Exercise /72   Post Exercise /68   Pre SpO2 98   While Exercising SpO2 93   Post SpO2 98   Effort Rating 3-5   Current MET Level 4   MET Level Goal 4-5   ECG Rhythm Atrial fibrillation   Ectopy Other (Comments)  (Paced rhythm)   Current Symptoms Dyspnea   Limitations/Restrictions Pacemaker Precautions;Other (see comments)  (TAVR)   Exercise Prescription   Mode Treadmill;Nustep;Recumbant bike;Calisthenics;Ambulation   Duration/Time Intermittent bouts;15-30 min   Frequency 3 daysweek   THR (85% of age predicted max HR) 126.65   OMNI Effort Rating (0-10 Scale) 4-6/10   Progression Intermittent bouts;Total exercise time of 20-30 minutes   Comments 10/8: Staff will increase exercises as patient tolerates.    Recommended Home Exercise   Type of Exercise Walking   Frequency (days per week) 4-6   Duration (minutes per  session) Intermittent;15-30 min   Effort Rating Recommended 4-6/10   30 Day Exercise Plan 10/8: Patient has added walking to and from work to her HEP. She states she will dress warmer when the weather gets colder.    Current Home Exercise   Type of Exercise Walking   Frequency (days per week) 4-6   Duration (minutes per session) 10-20   Follow-up/On-going Support   Provider follow-up needed on the following No follow-up needed   Comments 10/8: Patient has returned to work.    Learning Assessment   Learner Patient   Primary Language English   Preferred Learning Style Listening;Reading;Demonstration;Pictures/Video   Barriers to Learning No barriers noted   Patient Education   Title of Program Cardiac Rehab Education   Education recommended Anatomy and Physiology of the Heart;Blood Pressure;Exercise Principles;Medication Overview;Muscle Conditioning;Nutrition;Risk Factors;Stress Management   Education classes attended Blood Pressure;Exercise Principles;Medication Overview;Risk Factors;Stress Management   Education Comments 10/8: Patient has beeen given educational materials weekly.    Physician cosignature/electronic signature indicates agreements with the ITP document and approval of discharge.

## 2021-10-12 ENCOUNTER — OFFICE VISIT (OUTPATIENT)
Dept: CARDIOLOGY | Facility: OTHER | Age: 71
End: 2021-10-12
Attending: INTERNAL MEDICINE
Payer: COMMERCIAL

## 2021-10-12 ENCOUNTER — ANCILLARY PROCEDURE (OUTPATIENT)
Dept: CARDIOLOGY | Facility: OTHER | Age: 71
End: 2021-10-12
Attending: INTERNAL MEDICINE
Payer: MEDICARE

## 2021-10-12 VITALS
BODY MASS INDEX: 29.84 KG/M2 | DIASTOLIC BLOOD PRESSURE: 82 MMHG | SYSTOLIC BLOOD PRESSURE: 116 MMHG | OXYGEN SATURATION: 96 % | HEART RATE: 71 BPM | HEIGHT: 60 IN | WEIGHT: 152 LBS | TEMPERATURE: 98.7 F

## 2021-10-12 DIAGNOSIS — Q23.81 BICUSPID AORTIC VALVE: ICD-10-CM

## 2021-10-12 DIAGNOSIS — Z95.2 HISTORY OF TRANSCATHETER AORTIC VALVE REPLACEMENT (TAVR): ICD-10-CM

## 2021-10-12 DIAGNOSIS — I48.21 PERMANENT ATRIAL FIBRILLATION (H): Primary | ICD-10-CM

## 2021-10-12 DIAGNOSIS — Z95.0 CARDIAC PACEMAKER IN SITU: ICD-10-CM

## 2021-10-12 DIAGNOSIS — I44.2 ATRIOVENTRICULAR BLOCK, COMPLETE (H): ICD-10-CM

## 2021-10-12 DIAGNOSIS — Z95.0 ARTIFICIAL CARDIAC PACEMAKER: ICD-10-CM

## 2021-10-12 PROCEDURE — G0463 HOSPITAL OUTPT CLINIC VISIT: HCPCS | Mod: 25

## 2021-10-12 PROCEDURE — 93279 PRGRMG DEV EVAL PM/LDLS PM: CPT | Performed by: INTERNAL MEDICINE

## 2021-10-12 PROCEDURE — 99214 OFFICE O/P EST MOD 30 MIN: CPT | Mod: 25 | Performed by: INTERNAL MEDICINE

## 2021-10-12 PROCEDURE — G0463 HOSPITAL OUTPT CLINIC VISIT: HCPCS

## 2021-10-12 ASSESSMENT — PAIN SCALES - GENERAL: PAINLEVEL: NO PAIN (0)

## 2021-10-12 ASSESSMENT — MIFFLIN-ST. JEOR: SCORE: 1125.97

## 2021-10-12 NOTE — PATIENT INSTRUCTIONS
Thank you for allowing Dr. Myers and our team to participate in your care. Please call our office at [phone] with scheduling questions or if you need to cancel or change your appointment. With any other questions or concerns you may call [facility] cardiology nurse at [phone].       If you experience chest pain, chest pressure, chest tightness, shortness of breath, fainting, lightheadedness, nausea, vomiting, or other concerning symptoms, please report to the Emergency Department or call 911. These symptoms may be emergent, and best treated in the Emergency Department.    Follow up in with pacemaker and Dr. Myers in 6 months.

## 2021-10-12 NOTE — NURSING NOTE
Chief Complaint   Patient presents with     RECHECK     Follow up-Finished cardiac rehab       Initial /82 (BP Location: Right arm, Patient Position: Chair, Cuff Size: Adult Large)   Pulse 71   Temp 98.7  F (37.1  C) (Tympanic)   Ht 1.524 m (5')   Wt 68.9 kg (152 lb)   SpO2 96%   BMI 29.69 kg/m   Estimated body mass index is 29.69 kg/m  as calculated from the following:    Height as of this encounter: 1.524 m (5').    Weight as of this encounter: 68.9 kg (152 lb).  Medication Reconciliation: complete  THADDEUS DELONG LPN

## 2021-10-12 NOTE — LETTER
"10/12/2021      RE: Mary Alice Cameron  [address]  [address]  [facility] [address]         Chief Complaint: atrial fibrillation, bicuspid valve    HPI: I was happy to see Mrs. Cameron for the above. She has seen several cardiologists over the past year for these problems. Her  was ill and they moved to be closer to his daughters. He  in October and she has settled in [place]. Her atrial fibrillation began in the past year. In reviewing the old records both  and  are reported as when the atrial fibrillation began and she is no longer sure. This has been associated with shortness of breath but as was discussed with her by one of the cardiologist she has multiple reasons for shortness of breath including COPD. She is finishing treatment for a COPD exacerbation at this time. She reports the plan had been to start her on warfarin and then cardiovert her. With all the chaos in her life the past few months this never happened. She was found to have hyperthyroidism in August and was treated with Iodine ablation. She also has a bicuspid aortic valve and mild aortic root dilation (per report of echocardiogram in old records). The echocardiogram did not report significant aortic stenosis or insufficiency and her systolic function was normal making it less likely that her shortness of breath was related to her valvular heart disease. She was told she would need periodic f/u of her valve and aorta. She reports two angiograms done at [facility] and that they were \"okay\". I do not see copies of those reports in the old records. They will be requested.    She underwent DC cardioversion on 2014. She reports feeling better afterward. She feels like she needs to use her inhaler less often and has less shortness of breath. However, the ECG today shows she is back in atrial fibrillation.    I have reviewed the records sent from [facility]. There is an echocardiogram report from  and records regarding " "back surgery. I see no cath results.    A repeat cardioversion in July failed to restore sinus rhythm. A stress study in April (see below) showed no ischemia or infarction.    [id number]:  She had recurrent atrial fibrillation was started on flecainide and scheduled for cardioversion. When she arrived for the cardioversion she was in sinus.     A CT aortogram showed atherosclerosis but not dilation of the descending aoota.    [id number]:    She was admitted in March 2015 with a COPD exacerbation. Her breathing is at baseline with no fever, sputum or wheezing.    She has not noted any atrial fibrillation, no palpitations, chest pain/discomfort, unusual dyspnea on exertion, bleeding or neurologic symptoms.    [id number]: Her follow-up echocardiogram (see lab section) showed no change in aortic valve function. She report rare \"flutters\" but no sustained palpitations like with her atrial fibrillation. She reports no significant bleeding episodes on warfarin. She denies any neurologic symptoms suggestive of CVA or TIA. Edema improved when she switched her diltiazem to bedtime.    Her primary health problem has been her COPD. She uses oxygen at night. She has had had any recent symptoms of upper or lower respiratory infection.    [id number]: She had two admissions this past winter for COPD exacerbations. Overall, she feels her dyspnea on exertion is slowly getting worse. She has not had exertional chest pain/discomfort.     She reports no increased edema, orthopnea or paroxysmal nocturnal dyspnea.    [id number]: echocardiogram shows a decline in systolic function.  She reports that she has felt more fatigued and has had more dyspnea on exertion since I last saw her.  She also reports she has had problems with her lungs over the winter.  She is been on a prolonged course of prednisone.  She has not had chest pain or chest discomfort.  She has noted episodes of atrial fibrillation, or palpitations that she thinks is atrial " fibrillation.  These episodes were relatively infrequent and do not last very long.  They are not associated with associated symptoms and she has not found them to be overly troublesome.  She has had no prolonged episodes where she felt the need to consider seeking medical treatment.    September 25, 2018: her angiogram showed no obstructive coronary artery disease. She reports two episodes of pain between her shoulders, lasting 10 minutes.     No palpitations. Flecainide stopped due to drop in systolic function.     Has had a few problems with her COPD.     October 23, 2018: Ms. Cameron reports that she has been feeling somewhat better since switching to the torsemide.  Her swelling is better.  She continues to work or cleaning jobs.  She reports no significant episodes of atrial fibrillation.  She has occasional palpitations but nothing that has been very troublesome.  Her echocardiogram shows an improvement in her ejection fraction.  Her CT aortogram showed evidence of atherosclerosis but no dissections or aneurysms.    April 23, 2019: She was seen in the emergency department on March 19, 2019 complaining of a productive cough.  She was diagnosed with acute bronchitis.  She was also noted to be in atrial fibrillation with rapid ventricular rate.  She has had 2 courses of antibiotics for her acute bronchitis.    She reports her breathing is back to baseline.  She reports if she feels like it takes her longer to get things done and she takes more breaks but she does not find this particularly troublesome.    June 11, 2019: She reports that her breathing is about the same. She has not noted an increase in her heart failure symptoms. Her Holter shows poor heart rate control.    July 23, 2019: On the higer dose of diltiazem average heart rate dropped to 92 bpm from 111 bpm. She does feel a little better but not much.    She complains of a productive cough that started last night. No fever/chills.    January 28, 2020: We  had discussed possible cardioversion at our last visit. In Sept last year she had an episode of BRBPR and her apixaban was held.     June 23, 2020: since I saw her last she was seen at [facility]. They were successful in convincing her to resume apixaban and hold the aspirin, which is excellent.     With regards to AV node ablation they opted to try metoprolol first. She feels her breathing is a little worse on the metoprolol. That had been a concern with beta blocker, her COPD.     November 10, 2020(telephone visit): she is back on diltiazem after metoprolol resulted in worsening of COPD. She is not, as before, aware of palpitations but she has noticed more ankle and lower leg swelling.     October 12, 2021 Interval history: Since our last visit she underwent TAVR on 07/14/2021. She developed complete heart block and underwent VVI pacing via LBB pacing. An ECG done in Sep showed some recovery of conduction.     She reports feel much better since the TAVR. She has completed cardiac rehab. She had no problems with healing of the pacer site.    Current Outpatient Medications   Medication Sig Dispense Refill     acetaminophen (TYLENOL) 500 MG tablet Take 500-1,000 mg by mouth every 6 hours as needed for mild pain       ADVAIR -21 MCG/ACT inhaler INHALE 2 PUFFS INTO THE LUNGS TWICE DAILY 12 g 3     aspirin (ASA) 81 MG EC tablet Take 1 tablet (81 mg) by mouth daily 90 tablet 1     atorvastatin (LIPITOR) 10 MG tablet Take 1 tablet (10 mg) by mouth At Bedtime 90 tablet 3     Calcium Carb-Cholecalciferol (CALTRATE 600+D3 SOFT PO) Take 600 mg by mouth 2 times daily       cloNIDine (CATAPRES) 0.1 MG tablet TAKE 2 TABLETS BY MOUTH EVERY NIGHT AT BEDTIME 180 tablet 3     COMBIVENT RESPIMAT  MCG/ACT inhaler Inhale 1 puff into the lungs 4 times daily        diltiazem ER (TIAZAC) 360 MG 24 hr ER beaded capsule Take 1 capsule (360 mg) by mouth daily 90 capsule 3     diphenhydrAMINE (BENADRYL) 25 MG tablet Take 1-2  tablets (25-50 mg) by mouth every 6 hours as needed for itching or allergies 60 tablet 1     ELIQUIS ANTICOAGULANT 5 MG tablet TAKE 1 TABLET BY MOUTH TWICE DAILY 180 tablet 1     furosemide (LASIX) 40 MG tablet TAKE 1 TABLET(40 MG) BY MOUTH TWICE DAILY 180 tablet 3     hydrOXYzine (ATARAX) 25 MG tablet Take 1 tablet (25 mg) by mouth 3 times daily as needed for itching 60 tablet 0     levothyroxine (SYNTHROID/LEVOTHROID) 100 MCG tablet Take 1 tablet (100 mcg) by mouth daily 90 tablet 3     losartan (COZAAR) 50 MG tablet TAKE 1 TABLET(50 MG) BY MOUTH TWICE DAILY 180 tablet 3     OTHER MEDICAL SUPPLIES Apply one pair to help with edema 1 each 0     potassium chloride ER (KLOR-CON M) 20 MEQ CR tablet Take 1 tablet (20 mEq) by mouth 3 times daily 270 tablet 3     amoxicillin (AMOXIL) 500 MG capsule Take 4 capsules (2,000 mg) by mouth once as needed (SBE prophylaxis take 30-60 minutes prior to dental procedure/cleaning) (Patient not taking: Reported on 10/12/2021) 4 capsule 3       Past Medical History:   Diagnosis Date     Asthma      Atrial fibrillation (H)      COPD (chronic obstructive pulmonary disease) (H)      Depression      High cholesterol      HTN (hypertension)      Oxygen dependent      Thyroid disease        Past Surgical History:   Procedure Laterality Date     BACK SURGERY  2006     CARDIAC SURGERY  06/12/2018    Angiogram at [facility]     COLONOSCOPY N/A 1/19/2016    Procedure: COLONOSCOPY;  Surgeon: Waldemar Bob MD;  Location: [facility] OR     CV CORONARY ANGIOGRAM N/A 4/30/2021    Procedure: CV CORONARY ANGIOGRAM;  Surgeon: Poli Walsh MD;  Location:  HEART CARDIAC CATH LAB     CV LEFT HEART CATH N/A 4/30/2021    Procedure: CV LEFT HEART CATH;  Surgeon: Poli Walsh MD;  Location:  HEART CARDIAC CATH LAB     CV RIGHT HEART CATH MEASUREMENTS RECORDED N/A 4/30/2021    Procedure: CV RIGHT HEART CATH;  Surgeon: Poli Walsh MD;  Location:  HEART CARDIAC CATH  LAB     CV TRANSCATHETER AORTIC VALVE REPLACEMENT N/A 2021    Procedure: Right femoral Transcaval transcatheter aortic valve replacement (SUMMERS) size 29mm.  Transesophageal echocardiogram per anesthesia;  Surgeon: Domo Sarah MD;  Location: [facility] OR     EP PPM INSERT OF NEW OR REPL W/VENT LEAD N/A 2021    Procedure: insertion of Permanent Pacemaker;  Surgeon: Eliezer Myers MD;  Location: [facility] OR     HEART CATH FEMORAL CANNULIZATION WITH OPEN STANDBY REPAIR AORTIC VALVE N/A 2021    Procedure: Cardiopulmonary standby.;  Surgeon: Fly Smith MD;  Location: [facility] OR     HYSTERECTOMY      partial     SLING BLADDER SUSPENSION WITH FASCIA LINNETTE         Family History   Problem Relation Age of Onset     Prostate Cancer Father      Hypertension Father      Heart Failure Father         CHF     Asthma Mother      Cancer Mother         ovarian     Hypertension Mother      Asthma Brother      Hypertension Sister      Hypertension Brother        Social History     Tobacco Use     Smoking status: Former Smoker     Packs/day: 0.50     Years: 41.00     Pack years: 20.50     Types: Cigarettes     Start date: 1966     Quit date: 2007     Years since quittin.7     Smokeless tobacco: Never Used   Substance Use Topics     Alcohol use: No     Alcohol/week: 0.0 standard drinks       Allergies   Allergen Reactions     Amlodipine Besylate Cough     Norvasc     Lisinopril Cough     Ace Inhibitors Cough       Physical examination:  /82 (BP Location: Right arm, Patient Position: Chair, Cuff Size: Adult Large)   Pulse 71   Temp 98.7  F (37.1  C) (Tympanic)   Ht 1.524 m (5')   Wt 68.9 kg (152 lb)   SpO2 96%   BMI 29.69 kg/m    GENERAL APPEARANCE: healthy, alert and no distress  NECK: no venous distention  RESPIRATORY: lungs clear to auscultation - no rales, rhonchi or wheezes  CARDIOVASCULAR: regular, normal S1 S2, no S3, S4, murmur, click or rub, precordium quiet with well  healed pacer site  ABDOMEN: soft, non tender with normal bowel sounds   EXTREMITIES: no edema    Laboratory and diagnostic data personally reviewed 2021:    I have reviewed her pacer interrogation. She paces in the ventricle approximately 30% of the time. The paced QRS appears to be narrow-ting.     Results for LANCE VALLEJO (MRN [id number]) as of 10/12/2021 15:22   Ref. Range 2021 09:23   Sodium Latest Ref Range: 133 - 144 mmol/L 140   Potassium Latest Ref Range: 3.4 - 5.3 mmol/L 4.9   Chloride Latest Ref Range: 94 - 109 mmol/L 106   Carbon Dioxide Latest Ref Range: 20 - 32 mmol/L 30   Urea Nitrogen Latest Ref Range: 7 - 30 mg/dL 16   Creatinine Latest Ref Range: 0.52 - 1.04 mg/dL 0.92     Results for LANCE VALLEJO (MRN [id number]) as of 10/12/2021 15:22   Ref. Range 2021 09:23   WBC Latest Ref Range: 4.0 - 11.0 10e3/uL 6.7   Hemoglobin Latest Ref Range: 11.7 - 15.7 g/dL 14.0   Hematocrit Latest Ref Range: 35.0 - 47.0 % 43.8   Platelet Count Latest Ref Range: 150 - 450 10e3/uL 179       Name: LANCE VALLEJO  MRN: [id number]  : 1950  Study Date: 2021 08:37 AM  Age: 71 yrs  Gender: Female  Patient Location: [facility]  Reason For Study: S/P TAVR (30 day s/p)  Ordering Physician: COLLINS AMTAO  Referring Physician: COLLINS AMATO  Performed By: Jean Pierre Quintanilla RDCS     BSA: 1.7 m2  Height: 64 in  Weight: 150 lb  HR: 100  BP: 141/70 mmHg  ______________________________________________________________________________  Procedure  Echocardiogram with two-dimensional, color and spectral Doppler performed.  ______________________________________________________________________________  Interpretation Summary  S/P TAVR with 29mm ES3 ultra valve. Mean aortic gradient 8mmHg. Trivial  valvular/paravalvular AI.  No significant changes noted.  ______________________________________________________________________________  Left Ventricle  Global and regional left ventricular function  is normal with an EF of 55-60%.  Left ventricular size is normal. Mild concentric wall thickening consistent  with left ventricular hypertrophy is present. Left ventricular diastolic  function is not assessable. No regional wall motion abnormalities are seen.     Right Ventricle  Right ventricular function, chamber size, wall motion, and thickness are  normal. A pacemaker lead is noted in the right ventricle.     Atria  Both atria appear normal.     Mitral Valve  The mitral valve is normal.     Aortic Valve  S/P TAVR with 29mm ES3 ultra valve. Mean aortic gradient 8mmHg. Trivial  valvular/paravalvular AI.     Tricuspid Valve  The tricuspid valve is normal. Mild tricuspid insufficiency is present.     Pulmonic Valve  The valve leaflets are not well visualized. On Doppler interrogation, there is  no significant stenosis or regurgitation.     Vessels  The thoracic aorta cannot be assessed. The pulmonary artery cannot be  assessed. The inferior vena cava is normal.     Pericardium  No pericardial effusion is present. Prominent epicardial fat is noted.     Compared to Previous Study  No significant changes noted.              Procedures:  Singel chamber PPM device implantation.  US left axillary access.      Attending: Dr Myers  EP Fellow: Nikita Walton MD,  Dr Eli  Procedure Date: 7/15/2021     Pre-operative Diagnosis: cardiac asystolic after TAVR  Device Indication: Cardiac asystoli  Post-operative diagnosis:   Successful implantation of single chamber LBB Pacer  Complications: None.     Fluoroscopy time/dose: see procedure log         Patient Information    Name MRN Description   Mary Alice Cameron [id number] 71 year old female       Physicians    Panel Physicians Referring Physician Case Authorizing Physician   Domo Sarah MD (Primary)  Domo Sarah MD Biring, Timinder Singh, MD (Assisting)     Dexter Thao MD (Fellow - Assisting)     Shayna Eli MD (Fellow - Assisting)        Procedures    Right femoral Transcaval transcatheter aortic valve replacement (SUMMERS) size 29mm.  Transesophageal echocardiogram per anesthesia     Indications    Aortic valve stenosis, etiology of cardiac valve disease unspecified [I35.0 (ICD-10-CM)]     Comments/Patient Narrative    71F with HTN, permanent afib on apixaban, left carotid endarterectomy (2012), PAD, severe COPD on nighttime supplemental oxygen, bicuspid aortic valve with progressive symptomatic severe aortic stenosis who presents for elective transcatheter aortic valve replacement. Her vascular access is limited due to small vessel anatomy, therefore transcaval approach was planned.     Pre Procedure Diagnosis    Aortic valve stenosis, etiology of cardiac valve disease unspecified [I35.0]         Conclusion      Successful transcaval transcatheter aortic valve replacement with a 29mm Summers Tamy 3 valve    Successful aorto-caval fistula closure with a 10/8 mm Amplatzer ductal occluder with no residual fistula.    Complete heart block following valve deployment. EP consulted for pacemaker placement.         CTA CHEST ABDOMEN WITH CONTRAST    HISTORY: 68 yearsFemale ; Bicuspid aortic valve; Ascending aorta  dilatation (H)    TECHNIQUE: Axial CT imaging of the chest abdomen and pelvis was  performed with intervenous contrast contrast. Coronal and sagittal  reconstructions were obtained.    COMPARISON: None    FINDINGS:    CT CHEST: The ace ascending thoracic aorta is ectatic measuring 4.4 cm  transversely and 4.3 cm in AP dimension. The descending thoracic aorta  is 2.5 x 2.2 cm respectively. There is calcified atherosclerotic  disease of the thoracic aorta without evidence of dissection. There is  calcification of the aortic valve.  There is no mediastinal or hilar or axillary lymphadenopathy.    There is mild linear atelectasis at both lung bases. There is  cardiomegaly.         No concerning osseous lesions are identified    IMPRESSION  CHEST: There is ectasia of the ascending thoracic aorta  measuring 4.4 x 4.3 cm in diameter.    There is cardiomegaly.      CT ABDOMEN AND PELVIS: There is atherosclerotic disease of the  abdominal aorta without evidence of aneurysm.     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 The residual lumen measures 2 mm in diameter. There is  approximate 50% stenosis of the origin of the superior mesenteric  artery, residual vessel lumen measures 3.5 to 4 mm.    There are 2 patent right renal arteries. There are 2 left renal  arteries with a dominant vessel originating slightly below the smaller  accessory vessel. There is moderate to severe stenosis of the dominant  left renal artery. There is moderate to severe stenosis of the origin  of the inferior mesenteric artery.    The visualized solid organs are unremarkable. No abnormally distended  loops of bowel are evident. The appendix is unremarkable.    IMPRESSION ABDOMEN AND PELVIS:     Atherosclerotic disease of the abdominal aorta without evidence of  aneurysm or dissection.    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 See description above.    MARINA BRICE MD              Assessment and recommendations:    1) Perament atrial fibrillation - she does have return of AV node conduction and at time has rapid ventricular rates but overall is feeling much better. Will monitor for now.    2) Bicuspid aortic valve, s/p TAVR - CT showed normal valve function despite some hypoattenuating leaflet thickening.    3)  Hypertension - at goal    4) Systolic dysfunction - follow-up echocardiogram ordered    I appreciate the chance to help with Mrs. Cameron's care. Please let me know if you have any questions or concerns.          Eliezer Myers MD

## 2021-10-12 NOTE — PATIENT INSTRUCTIONS
It was a pleasure to see you in clinic today.  Please do not hesitate to call with any questions or concerns.  You are scheduled for a remote transmission on 1/13/22.  We look forward to seeing you in clinic at your next device check in 6 months.    Rajni An, RN, MS, CCRN  Electrophysiology Nurse Clinician  [facility]    During Business Hours Please Call:  [phone]  After Hours Please Call:  [phone] - select option #[phone] and ask for job code [phone]            '

## 2021-10-12 NOTE — PROGRESS NOTES
"  Chief Complaint: atrial fibrillation, bicuspid valve    HPI: I was happy to see Mrs. Cameron for the above. She has seen several cardiologists over the past year for these problems. Her  was ill and they moved to be closer to his daughters. He  in October and she has settled in [place]. Her atrial fibrillation began in the past year. In reviewing the old records both  and  are reported as when the atrial fibrillation began and she is no longer sure. This has been associated with shortness of breath but as was discussed with her by one of the cardiologist she has multiple reasons for shortness of breath including COPD. She is finishing treatment for a COPD exacerbation at this time. She reports the plan had been to start her on warfarin and then cardiovert her. With all the chaos in her life the past few months this never happened. She was found to have hyperthyroidism in August and was treated with Iodine ablation. She also has a bicuspid aortic valve and mild aortic root dilation (per report of echocardiogram in old records). The echocardiogram did not report significant aortic stenosis or insufficiency and her systolic function was normal making it less likely that her shortness of breath was related to her valvular heart disease. She was told she would need periodic f/u of her valve and aorta. She reports two angiograms done at [facility] and that they were \"okay\". I do not see copies of those reports in the old records. They will be requested.    She underwent DC cardioversion on 2014. She reports feeling better afterward. She feels like she needs to use her inhaler less often and has less shortness of breath. However, the ECG today shows she is back in atrial fibrillation.    I have reviewed the records sent from [facility]. There is an echocardiogram report from  and records regarding back surgery. I see no cath results.    A repeat cardioversion in July failed to restore sinus " "rhythm. A stress study in April (see below) showed no ischemia or infarction.    [id number]:  She had recurrent atrial fibrillation was started on flecainide and scheduled for cardioversion. When she arrived for the cardioversion she was in sinus.     A CT aortogram showed atherosclerosis but not dilation of the descending aoota.    [id number]:    She was admitted in March 2015 with a COPD exacerbation. Her breathing is at baseline with no fever, sputum or wheezing.    She has not noted any atrial fibrillation, no palpitations, chest pain/discomfort, unusual dyspnea on exertion, bleeding or neurologic symptoms.    [id number]: Her follow-up echocardiogram (see lab section) showed no change in aortic valve function. She report rare \"flutters\" but no sustained palpitations like with her atrial fibrillation. She reports no significant bleeding episodes on warfarin. She denies any neurologic symptoms suggestive of CVA or TIA. Edema improved when she switched her diltiazem to bedtime.    Her primary health problem has been her COPD. She uses oxygen at night. She has had had any recent symptoms of upper or lower respiratory infection.    [id number]: She had two admissions this past winter for COPD exacerbations. Overall, she feels her dyspnea on exertion is slowly getting worse. She has not had exertional chest pain/discomfort.     She reports no increased edema, orthopnea or paroxysmal nocturnal dyspnea.    [id number]: echocardiogram shows a decline in systolic function.  She reports that she has felt more fatigued and has had more dyspnea on exertion since I last saw her.  She also reports she has had problems with her lungs over the winter.  She is been on a prolonged course of prednisone.  She has not had chest pain or chest discomfort.  She has noted episodes of atrial fibrillation, or palpitations that she thinks is atrial fibrillation.  These episodes were relatively infrequent and do not last very long.  They are not " associated with associated symptoms and she has not found them to be overly troublesome.  She has had no prolonged episodes where she felt the need to consider seeking medical treatment.    September 25, 2018: her angiogram showed no obstructive coronary artery disease. She reports two episodes of pain between her shoulders, lasting 10 minutes.     No palpitations. Flecainide stopped due to drop in systolic function.     Has had a few problems with her COPD.     October 23, 2018: Ms. Cameron reports that she has been feeling somewhat better since switching to the torsemide.  Her swelling is better.  She continues to work or cleaning jobs.  She reports no significant episodes of atrial fibrillation.  She has occasional palpitations but nothing that has been very troublesome.  Her echocardiogram shows an improvement in her ejection fraction.  Her CT aortogram showed evidence of atherosclerosis but no dissections or aneurysms.    April 23, 2019: She was seen in the emergency department on March 19, 2019 complaining of a productive cough.  She was diagnosed with acute bronchitis.  She was also noted to be in atrial fibrillation with rapid ventricular rate.  She has had 2 courses of antibiotics for her acute bronchitis.    She reports her breathing is back to baseline.  She reports if she feels like it takes her longer to get things done and she takes more breaks but she does not find this particularly troublesome.    June 11, 2019: She reports that her breathing is about the same. She has not noted an increase in her heart failure symptoms. Her Holter shows poor heart rate control.    July 23, 2019: On the higer dose of diltiazem average heart rate dropped to 92 bpm from 111 bpm. She does feel a little better but not much.    She complains of a productive cough that started last night. No fever/chills.    January 28, 2020: We had discussed possible cardioversion at our last visit. In Sept last year she had an episode of  BRBPR and her apixaban was held.     June 23, 2020: since I saw her last she was seen at [facility]. They were successful in convincing her to resume apixaban and hold the aspirin, which is excellent.     With regards to AV node ablation they opted to try metoprolol first. She feels her breathing is a little worse on the metoprolol. That had been a concern with beta blocker, her COPD.     November 10, 2020(telephone visit): she is back on diltiazem after metoprolol resulted in worsening of COPD. She is not, as before, aware of palpitations but she has noticed more ankle and lower leg swelling.     October 12, 2021 Interval history: Since our last visit she underwent TAVR on 07/14/2021. She developed complete heart block and underwent VVI pacing via LBB pacing. An ECG done in Sep showed some recovery of conduction.     She reports feel much better since the TAVR. She has completed cardiac rehab. She had no problems with healing of the pacer site.    Current Outpatient Medications   Medication Sig Dispense Refill     acetaminophen (TYLENOL) 500 MG tablet Take 500-1,000 mg by mouth every 6 hours as needed for mild pain       ADVAIR -21 MCG/ACT inhaler INHALE 2 PUFFS INTO THE LUNGS TWICE DAILY 12 g 3     aspirin (ASA) 81 MG EC tablet Take 1 tablet (81 mg) by mouth daily 90 tablet 1     atorvastatin (LIPITOR) 10 MG tablet Take 1 tablet (10 mg) by mouth At Bedtime 90 tablet 3     Calcium Carb-Cholecalciferol (CALTRATE 600+D3 SOFT PO) Take 600 mg by mouth 2 times daily       cloNIDine (CATAPRES) 0.1 MG tablet TAKE 2 TABLETS BY MOUTH EVERY NIGHT AT BEDTIME 180 tablet 3     COMBIVENT RESPIMAT  MCG/ACT inhaler Inhale 1 puff into the lungs 4 times daily        diltiazem ER (TIAZAC) 360 MG 24 hr ER beaded capsule Take 1 capsule (360 mg) by mouth daily 90 capsule 3     diphenhydrAMINE (BENADRYL) 25 MG tablet Take 1-2 tablets (25-50 mg) by mouth every 6 hours as needed for itching or allergies 60 tablet 1     ELIQUIS  ANTICOAGULANT 5 MG tablet TAKE 1 TABLET BY MOUTH TWICE DAILY 180 tablet 1     furosemide (LASIX) 40 MG tablet TAKE 1 TABLET(40 MG) BY MOUTH TWICE DAILY 180 tablet 3     hydrOXYzine (ATARAX) 25 MG tablet Take 1 tablet (25 mg) by mouth 3 times daily as needed for itching 60 tablet 0     levothyroxine (SYNTHROID/LEVOTHROID) 100 MCG tablet Take 1 tablet (100 mcg) by mouth daily 90 tablet 3     losartan (COZAAR) 50 MG tablet TAKE 1 TABLET(50 MG) BY MOUTH TWICE DAILY 180 tablet 3     OTHER MEDICAL SUPPLIES Apply one pair to help with edema 1 each 0     potassium chloride ER (KLOR-CON M) 20 MEQ CR tablet Take 1 tablet (20 mEq) by mouth 3 times daily 270 tablet 3     amoxicillin (AMOXIL) 500 MG capsule Take 4 capsules (2,000 mg) by mouth once as needed (SBE prophylaxis take 30-60 minutes prior to dental procedure/cleaning) (Patient not taking: Reported on 10/12/2021) 4 capsule 3       Past Medical History:   Diagnosis Date     Asthma      Atrial fibrillation (H)      COPD (chronic obstructive pulmonary disease) (H)      Depression      High cholesterol      HTN (hypertension)      Oxygen dependent      Thyroid disease        Past Surgical History:   Procedure Laterality Date     BACK SURGERY  2006     CARDIAC SURGERY  06/12/2018    Angiogram at [facility]     COLONOSCOPY N/A 1/19/2016    Procedure: COLONOSCOPY;  Surgeon: Waldemar Bbo MD;  Location: [facility] OR     CV CORONARY ANGIOGRAM N/A 4/30/2021    Procedure: CV CORONARY ANGIOGRAM;  Surgeon: Poli Walsh MD;  Location:  HEART CARDIAC CATH LAB     CV LEFT HEART CATH N/A 4/30/2021    Procedure: CV LEFT HEART CATH;  Surgeon: Poli Walsh MD;  Location:  HEART CARDIAC CATH LAB     CV RIGHT HEART CATH MEASUREMENTS RECORDED N/A 4/30/2021    Procedure: CV RIGHT HEART CATH;  Surgeon: Poli Walsh MD;  Location:  HEART CARDIAC CATH LAB     CV TRANSCATHETER AORTIC VALVE REPLACEMENT N/A 7/14/2021    Procedure: Right femoral Transcaval  transcatheter aortic valve replacement (SUMMERS) size 29mm.  Transesophageal echocardiogram per anesthesia;  Surgeon: Domo Sarah MD;  Location: [facility] OR     EP PPM INSERT OF NEW OR REPL W/VENT LEAD N/A 2021    Procedure: insertion of Permanent Pacemaker;  Surgeon: Eliezer Myers MD;  Location: [facility] OR     HEART CATH FEMORAL CANNULIZATION WITH OPEN STANDBY REPAIR AORTIC VALVE N/A 2021    Procedure: Cardiopulmonary standby.;  Surgeon: Fly Smith MD;  Location: [facility] OR     HYSTERECTOMY      partial     SLING BLADDER SUSPENSION WITH FASCIA LINNETTE         Family History   Problem Relation Age of Onset     Prostate Cancer Father      Hypertension Father      Heart Failure Father         CHF     Asthma Mother      Cancer Mother         ovarian     Hypertension Mother      Asthma Brother      Hypertension Sister      Hypertension Brother        Social History     Tobacco Use     Smoking status: Former Smoker     Packs/day: 0.50     Years: 41.00     Pack years: 20.50     Types: Cigarettes     Start date: 1966     Quit date: 2007     Years since quittin.7     Smokeless tobacco: Never Used   Substance Use Topics     Alcohol use: No     Alcohol/week: 0.0 standard drinks       Allergies   Allergen Reactions     Amlodipine Besylate Cough     Norvasc     Lisinopril Cough     Ace Inhibitors Cough       Physical examination:  /82 (BP Location: Right arm, Patient Position: Chair, Cuff Size: Adult Large)   Pulse 71   Temp 98.7  F (37.1  C) (Tympanic)   Ht 1.524 m (5')   Wt 68.9 kg (152 lb)   SpO2 96%   BMI 29.69 kg/m    GENERAL APPEARANCE: healthy, alert and no distress  NECK: no venous distention  RESPIRATORY: lungs clear to auscultation - no rales, rhonchi or wheezes  CARDIOVASCULAR: regular, normal S1 S2, no S3, S4, murmur, click or rub, precordium quiet with well healed pacer site  ABDOMEN: soft, non tender with normal bowel sounds   EXTREMITIES: no edema    Laboratory  and diagnostic data personally reviewed 2021:    I have reviewed her pacer interrogation. She paces in the ventricle approximately 30% of the time. The paced QRS appears to be narrow-ting.     Results for LANCE VALLEJO (MRN [id number]) as of 10/12/2021 15:22   Ref. Range 2021 09:23   Sodium Latest Ref Range: 133 - 144 mmol/L 140   Potassium Latest Ref Range: 3.4 - 5.3 mmol/L 4.9   Chloride Latest Ref Range: 94 - 109 mmol/L 106   Carbon Dioxide Latest Ref Range: 20 - 32 mmol/L 30   Urea Nitrogen Latest Ref Range: 7 - 30 mg/dL 16   Creatinine Latest Ref Range: 0.52 - 1.04 mg/dL 0.92     Results for LANCE VALLEJO (MRN [id number]) as of 10/12/2021 15:22   Ref. Range 2021 09:23   WBC Latest Ref Range: 4.0 - 11.0 10e3/uL 6.7   Hemoglobin Latest Ref Range: 11.7 - 15.7 g/dL 14.0   Hematocrit Latest Ref Range: 35.0 - 47.0 % 43.8   Platelet Count Latest Ref Range: 150 - 450 10e3/uL 179       Name: LANCE VALLEJO  MRN: [id number]  : 1950  Study Date: 2021 08:37 AM  Age: 71 yrs  Gender: Female  Patient [facility]: [facility]  Reason For Study: S/P TAVR (30 day s/p)  Ordering Physician: COLLINS AMATO  Referring Physician: COLLINS AMATO  Performed By: Jean Pierre Quintanilla RDCS     BSA: 1.7 m2  Height: 64 in  Weight: 150 lb  HR: 100  BP: 141/70 mmHg  ______________________________________________________________________________  Procedure  Echocardiogram with two-dimensional, color and spectral Doppler performed.  ______________________________________________________________________________  Interpretation Summary  S/P TAVR with 29mm ES3 ultra valve. Mean aortic gradient 8mmHg. Trivial  valvular/paravalvular AI.  No significant changes noted.  ______________________________________________________________________________  Left Ventricle  Global and regional left ventricular function is normal with an EF of 55-60%.  Left ventricular size is normal. Mild concentric wall thickening  consistent  with left ventricular hypertrophy is present. Left ventricular diastolic  function is not assessable. No regional wall motion abnormalities are seen.     Right Ventricle  Right ventricular function, chamber size, wall motion, and thickness are  normal. A pacemaker lead is noted in the right ventricle.     Atria  Both atria appear normal.     Mitral Valve  The mitral valve is normal.     Aortic Valve  S/P TAVR with 29mm ES3 ultra valve. Mean aortic gradient 8mmHg. Trivial  valvular/paravalvular AI.     Tricuspid Valve  The tricuspid valve is normal. Mild tricuspid insufficiency is present.     Pulmonic Valve  The valve leaflets are not well visualized. On Doppler interrogation, there is  no significant stenosis or regurgitation.     Vessels  The thoracic aorta cannot be assessed. The pulmonary artery cannot be  assessed. The inferior vena cava is normal.     Pericardium  No pericardial effusion is present. Prominent epicardial fat is noted.     Compared to Previous Study  No significant changes noted.              Procedures:  Singel chamber PPM device implantation.  US left axillary access.      Attending: Dr Myers  EP Fellow: Nikita Walton MD,  Dr Eil  Procedure Date: 7/15/2021     Pre-operative Diagnosis: cardiac asystolic after TAVR  Device Indication: Cardiac asystoli  Post-operative diagnosis:   Successful implantation of single chamber LBB Pacer  Complications: None.     Fluoroscopy time/dose: see procedure log         Patient Information    Name MRN Description   Mary Alice Cameron [id number] 71 year old female       Physicians    Panel Physicians Referring Physician Case Authorizing Physician   Domo Sarah MD (Primary)  Domo Sarah MD Biring, Timinder Singh, MD (Assisting)     Dexter Thao MD (Fellow - Assisting)     Shayna Eli MD (Fellow - Assisting)       Procedures    Right femoral Transcaval transcatheter aortic valve replacement (SUMMERS) size 29mm.   Transesophageal echocardiogram per anesthesia     Indications    Aortic valve stenosis, etiology of cardiac valve disease unspecified [I35.0 (ICD-10-CM)]     Comments/Patient Narrative    71F with HTN, permanent afib on apixaban, left carotid endarterectomy (2012), PAD, severe COPD on nighttime supplemental oxygen, bicuspid aortic valve with progressive symptomatic severe aortic stenosis who presents for elective transcatheter aortic valve replacement. Her vascular access is limited due to small vessel anatomy, therefore transcaval approach was planned.     Pre Procedure Diagnosis    Aortic valve stenosis, etiology of cardiac valve disease unspecified [I35.0]         Conclusion      Successful transcaval transcatheter aortic valve replacement with a 29mm [place] Tamy 3 valve    Successful aorto-caval fistula closure with a 10/8 mm Amplatzer ductal occluder with no residual fistula.    Complete heart block following valve deployment. EP consulted for pacemaker placement.         CTA CHEST ABDOMEN WITH CONTRAST    HISTORY: 68 yearsFemale ; Bicuspid aortic valve; Ascending aorta  dilatation (H)    TECHNIQUE: Axial CT imaging of the chest abdomen and pelvis was  performed with intervenous contrast contrast. Coronal and sagittal  reconstructions were obtained.    COMPARISON: None    FINDINGS:    CT CHEST: The ace ascending thoracic aorta is ectatic measuring 4.4 cm  transversely and 4.3 cm in AP dimension. The descending thoracic aorta  is 2.5 x 2.2 cm respectively. There is calcified atherosclerotic  disease of the thoracic aorta without evidence of dissection. There is  calcification of the aortic valve.  There is no mediastinal or hilar or axillary lymphadenopathy.    There is mild linear atelectasis at both lung bases. There is  cardiomegaly.         No concerning osseous lesions are identified    IMPRESSION CHEST: There is ectasia of the ascending thoracic aorta  measuring 4.4 x 4.3 cm in diameter.    There is  cardiomegaly.      CT ABDOMEN AND PELVIS: There is atherosclerotic disease of the  abdominal aorta without evidence of aneurysm.     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 The residual lumen measures 2 mm in diameter. There is  approximate 50% stenosis of the origin of the superior mesenteric  artery, residual vessel lumen measures 3.5 to 4 mm.    There are 2 patent right renal arteries. There are 2 left renal  arteries with a dominant vessel originating slightly below the smaller  accessory vessel. There is moderate to severe stenosis of the dominant  left renal artery. There is moderate to severe stenosis of the origin  of the inferior mesenteric artery.    The visualized solid organs are unremarkable. No abnormally distended  loops of bowel are evident. The appendix is unremarkable.    IMPRESSION ABDOMEN AND PELVIS:     Atherosclerotic disease of the abdominal aorta without evidence of  aneurysm or dissection.    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 See description above.    MARINA BRICE MD              Assessment and recommendations:    1) Perament atrial fibrillation - she does have return of AV node conduction and at time has rapid ventricular rates but overall is feeling much better. Will monitor for now.    2) Bicuspid aortic valve, s/p TAVR - CT showed normal valve function despite some hypoattenuating leaflet thickening.    3)  Hypertension - at goal    4) Systolic dysfunction - follow-up echocardiogram ordered    I appreciate the chance to help with Mrs. Cameron's care. Please let me know if you have any questions or concerns.

## 2021-10-15 LAB
MDC_IDC_EPISODE_DTM: NORMAL
MDC_IDC_EPISODE_DURATION: 0 S
MDC_IDC_EPISODE_DURATION: 1 S
MDC_IDC_EPISODE_DURATION: 2 S
MDC_IDC_EPISODE_DURATION: 2 S
MDC_IDC_EPISODE_ID: 108
MDC_IDC_EPISODE_ID: 109
MDC_IDC_EPISODE_ID: 110
MDC_IDC_EPISODE_ID: 111
MDC_IDC_EPISODE_ID: 112
MDC_IDC_EPISODE_ID: 113
MDC_IDC_EPISODE_ID: 114
MDC_IDC_EPISODE_ID: 115
MDC_IDC_EPISODE_ID: 116
MDC_IDC_EPISODE_ID: 117
MDC_IDC_EPISODE_ID: 118
MDC_IDC_EPISODE_ID: 119
MDC_IDC_EPISODE_ID: 120
MDC_IDC_EPISODE_ID: 121
MDC_IDC_EPISODE_ID: 122
MDC_IDC_EPISODE_TYPE: NORMAL
MDC_IDC_LEAD_IMPLANT_DT: NORMAL
MDC_IDC_LEAD_LOCATION: NORMAL
MDC_IDC_LEAD_LOCATION_DETAIL_1: NORMAL
MDC_IDC_LEAD_MFG: NORMAL
MDC_IDC_LEAD_MODEL: NORMAL
MDC_IDC_LEAD_POLARITY_TYPE: NORMAL
MDC_IDC_LEAD_SERIAL: NORMAL
MDC_IDC_LEAD_SPECIAL_FUNCTION: NORMAL
MDC_IDC_MSMT_BATTERY_DTM: NORMAL
MDC_IDC_MSMT_BATTERY_REMAINING_LONGEVITY: 180 MO
MDC_IDC_MSMT_BATTERY_RRT_TRIGGER: 2.62
MDC_IDC_MSMT_BATTERY_STATUS: NORMAL
MDC_IDC_MSMT_BATTERY_VOLTAGE: 3.22 V
MDC_IDC_MSMT_LEADCHNL_RV_IMPEDANCE_VALUE: 513 OHM
MDC_IDC_MSMT_LEADCHNL_RV_IMPEDANCE_VALUE: 646 OHM
MDC_IDC_MSMT_LEADCHNL_RV_PACING_THRESHOLD_AMPLITUDE: 0.5 V
MDC_IDC_MSMT_LEADCHNL_RV_PACING_THRESHOLD_PULSEWIDTH: 0.4 MS
MDC_IDC_MSMT_LEADCHNL_RV_SENSING_INTR_AMPL: 12.62 MV
MDC_IDC_MSMT_LEADCHNL_RV_SENSING_INTR_AMPL: 13.38 MV
MDC_IDC_PG_IMPLANT_DTM: NORMAL
MDC_IDC_PG_MFG: NORMAL
MDC_IDC_PG_MODEL: NORMAL
MDC_IDC_PG_SERIAL: NORMAL
MDC_IDC_PG_TYPE: NORMAL
MDC_IDC_SESS_CLINIC_NAME: NORMAL
MDC_IDC_SESS_DTM: NORMAL
MDC_IDC_SESS_TYPE: NORMAL
MDC_IDC_SET_BRADY_HYSTRATE: NORMAL
MDC_IDC_SET_BRADY_LOWRATE: 60 {BEATS}/MIN
MDC_IDC_SET_BRADY_MAX_SENSOR_RATE: 130 {BEATS}/MIN
MDC_IDC_SET_BRADY_MODE: NORMAL
MDC_IDC_SET_LEADCHNL_RV_PACING_AMPLITUDE: 2 V
MDC_IDC_SET_LEADCHNL_RV_PACING_ANODE_ELECTRODE_1: NORMAL
MDC_IDC_SET_LEADCHNL_RV_PACING_CAPTURE_MODE: NORMAL
MDC_IDC_SET_LEADCHNL_RV_PACING_CATHODE_ELECTRODE_1: NORMAL
MDC_IDC_SET_LEADCHNL_RV_PACING_CATHODE_LOCATION_1: NORMAL
MDC_IDC_SET_LEADCHNL_RV_PACING_POLARITY: NORMAL
MDC_IDC_SET_LEADCHNL_RV_PACING_PULSEWIDTH: 0.4 MS
MDC_IDC_SET_LEADCHNL_RV_SENSING_ANODE_ELECTRODE_1: NORMAL
MDC_IDC_SET_LEADCHNL_RV_SENSING_ANODE_LOCATION_1: NORMAL
MDC_IDC_SET_LEADCHNL_RV_SENSING_CATHODE_ELECTRODE_1: NORMAL
MDC_IDC_SET_LEADCHNL_RV_SENSING_CATHODE_LOCATION_1: NORMAL
MDC_IDC_SET_LEADCHNL_RV_SENSING_POLARITY: NORMAL
MDC_IDC_SET_LEADCHNL_RV_SENSING_SENSITIVITY: 2 MV
MDC_IDC_SET_ZONE_DETECTION_INTERVAL: 360 MS
MDC_IDC_SET_ZONE_TYPE: NORMAL
MDC_IDC_STAT_BRADY_DTM_END: NORMAL
MDC_IDC_STAT_BRADY_DTM_START: NORMAL
MDC_IDC_STAT_BRADY_RV_PERCENT_PACED: 29.09 %
MDC_IDC_STAT_EPISODE_RECENT_COUNT: 0
MDC_IDC_STAT_EPISODE_RECENT_COUNT: 122
MDC_IDC_STAT_EPISODE_RECENT_COUNT_DTM_END: NORMAL
MDC_IDC_STAT_EPISODE_RECENT_COUNT_DTM_END: NORMAL
MDC_IDC_STAT_EPISODE_RECENT_COUNT_DTM_START: NORMAL
MDC_IDC_STAT_EPISODE_RECENT_COUNT_DTM_START: NORMAL
MDC_IDC_STAT_EPISODE_TOTAL_COUNT: 0
MDC_IDC_STAT_EPISODE_TOTAL_COUNT: 122
MDC_IDC_STAT_EPISODE_TOTAL_COUNT_DTM_END: NORMAL
MDC_IDC_STAT_EPISODE_TOTAL_COUNT_DTM_END: NORMAL
MDC_IDC_STAT_EPISODE_TOTAL_COUNT_DTM_START: NORMAL
MDC_IDC_STAT_EPISODE_TOTAL_COUNT_DTM_START: NORMAL
MDC_IDC_STAT_EPISODE_TYPE: NORMAL

## 2021-10-27 ENCOUNTER — OFFICE VISIT (OUTPATIENT)
Dept: FAMILY MEDICINE | Facility: OTHER | Age: 71
End: 2021-10-27
Attending: FAMILY MEDICINE
Payer: MEDICARE

## 2021-10-27 ENCOUNTER — ANCILLARY PROCEDURE (OUTPATIENT)
Dept: GENERAL RADIOLOGY | Facility: OTHER | Age: 71
End: 2021-10-27
Attending: FAMILY MEDICINE
Payer: MEDICARE

## 2021-10-27 VITALS
WEIGHT: 155 LBS | BODY MASS INDEX: 30.27 KG/M2 | HEART RATE: 84 BPM | OXYGEN SATURATION: 96 % | TEMPERATURE: 97.5 F | SYSTOLIC BLOOD PRESSURE: 134 MMHG | DIASTOLIC BLOOD PRESSURE: 86 MMHG

## 2021-10-27 DIAGNOSIS — J06.9 UPPER RESPIRATORY TRACT INFECTION, UNSPECIFIED TYPE: ICD-10-CM

## 2021-10-27 DIAGNOSIS — J44.9 CHRONIC OBSTRUCTIVE PULMONARY DISEASE, UNSPECIFIED COPD TYPE (H): Primary | ICD-10-CM

## 2021-10-27 DIAGNOSIS — J44.9 CHRONIC OBSTRUCTIVE PULMONARY DISEASE, UNSPECIFIED COPD TYPE (H): ICD-10-CM

## 2021-10-27 LAB
BASOPHILS # BLD AUTO: 0 10E3/UL (ref 0–0.2)
BASOPHILS NFR BLD AUTO: 1 %
EOSINOPHIL # BLD AUTO: 0.1 10E3/UL (ref 0–0.7)
EOSINOPHIL NFR BLD AUTO: 1 %
ERYTHROCYTE [DISTWIDTH] IN BLOOD BY AUTOMATED COUNT: 12.7 % (ref 10–15)
HCT VFR BLD AUTO: 42.5 % (ref 35–47)
HGB BLD-MCNC: 14.4 G/DL (ref 11.7–15.7)
HOLD SPECIMEN: NORMAL
LYMPHOCYTES # BLD AUTO: 1.7 10E3/UL (ref 0.8–5.3)
LYMPHOCYTES NFR BLD AUTO: 20 %
MCH RBC QN AUTO: 33.4 PG (ref 26.5–33)
MCHC RBC AUTO-ENTMCNC: 33.9 G/DL (ref 31.5–36.5)
MCV RBC AUTO: 99 FL (ref 78–100)
MONOCYTES # BLD AUTO: 1 10E3/UL (ref 0–1.3)
MONOCYTES NFR BLD AUTO: 12 %
NEUTROPHILS # BLD AUTO: 5.9 10E3/UL (ref 1.6–8.3)
NEUTROPHILS NFR BLD AUTO: 68 %
PLATELET # BLD AUTO: 208 10E3/UL (ref 150–450)
RBC # BLD AUTO: 4.31 10E6/UL (ref 3.8–5.2)
WBC # BLD AUTO: 8.8 10E3/UL (ref 4–11)

## 2021-10-27 PROCEDURE — 71046 X-RAY EXAM CHEST 2 VIEWS: CPT | Mod: TC,FY

## 2021-10-27 PROCEDURE — G0463 HOSPITAL OUTPT CLINIC VISIT: HCPCS | Mod: 25

## 2021-10-27 PROCEDURE — 85025 COMPLETE CBC W/AUTO DIFF WBC: CPT | Mod: ZL | Performed by: FAMILY MEDICINE

## 2021-10-27 PROCEDURE — G0463 HOSPITAL OUTPT CLINIC VISIT: HCPCS

## 2021-10-27 PROCEDURE — U0003 INFECTIOUS AGENT DETECTION BY NUCLEIC ACID (DNA OR RNA); SEVERE ACUTE RESPIRATORY SYNDROME CORONAVIRUS 2 (SARS-COV-2) (CORONAVIRUS DISEASE [COVID-19]), AMPLIFIED PROBE TECHNIQUE, MAKING USE OF HIGH THROUGHPUT TECHNOLOGIES AS DESCRIBED BY CMS-2020-01-R: HCPCS | Mod: ZL | Performed by: FAMILY MEDICINE

## 2021-10-27 PROCEDURE — 99214 OFFICE O/P EST MOD 30 MIN: CPT | Performed by: FAMILY MEDICINE

## 2021-10-27 PROCEDURE — 36415 COLL VENOUS BLD VENIPUNCTURE: CPT | Mod: ZL | Performed by: FAMILY MEDICINE

## 2021-10-27 RX ORDER — PREDNISONE 20 MG/1
TABLET ORAL
Qty: 20 TABLET | Refills: 0 | Status: SHIPPED | OUTPATIENT
Start: 2021-10-27 | End: 2023-07-20

## 2021-10-27 RX ORDER — DOXYCYCLINE 100 MG/1
100 CAPSULE ORAL 2 TIMES DAILY
Qty: 20 CAPSULE | Refills: 0 | Status: SHIPPED | OUTPATIENT
Start: 2021-10-27 | End: 2022-03-30

## 2021-10-27 ASSESSMENT — PAIN SCALES - GENERAL: PAINLEVEL: SEVERE PAIN (6)

## 2021-10-27 NOTE — NURSING NOTE
Chief Complaint   Patient presents with     URI       Initial /86   Pulse 84   Temp 97.5  F (36.4  C)   Wt 70.3 kg (155 lb)   SpO2 96%   BMI 30.27 kg/m   Estimated body mass index is 30.27 kg/m  as calculated from the following:    Height as of 10/12/21: 1.524 m (5').    Weight as of this encounter: 70.3 kg (155 lb).  Medication Reconciliation: complete  Joy Franklin LPN

## 2021-10-27 NOTE — PROGRESS NOTES
Assessment & Plan     Chronic obstructive pulmonary disease, unspecified COPD type (H)  With a flare.  Steroid and abx.  Cbc stable.   cxr clear.  Doxy, prednisone taper, and f/u with ongoing concerns.    - CBC with platelets and differential; Future  - XR CHEST 2 VW (Clinic Performed); Future    Upper respiratory tract infection, unspecified type  As above.  We did the covid even though she's 2 weeks out as a curiosity and to help contact trace if needed.    - Symptomatic COVID-19 Virus (Coronavirus) by PCR Nose; Future  - CBC with platelets and differential; Future  - XR CHEST 2 VW (Clinic Performed); Future             BMI:   Estimated body mass index is 30.27 kg/m  as calculated from the following:    Height as of 10/12/21: 1.524 m (5').    Weight as of this encounter: 70.3 kg (155 lb).           No follow-ups on file.    Braydon Yen MD  [facility]   Mary Alice is a 71 year old who presents for the following health issues     HPI     RESPIRATORY SYMPTOMS      Duration: 2 weeks    Description  cough, wheezing and myalgias    Severity: moderate    Accompanying signs and symptoms: SOB    History (predisposing factors):  COPD    Precipitating or alleviating factors: None    Therapies tried and outcome:  Prednisone and bactrim           Review of Systems   Constitutional, HEENT, cardiovascular, pulmonary, gi and gu systems are negative, except as otherwise noted.      Objective    /86   Pulse 84   Temp 97.5  F (36.4  C)   Wt 70.3 kg (155 lb)   SpO2 96%   BMI 30.27 kg/m    Body mass index is 30.27 kg/m .  Physical Exam   GENERAL: healthy, alert and no distress  EYES: Eyes grossly normal to inspection, PERRL and conjunctivae and sclerae normal  HENT: ear canals and TM's normal, nose and mouth without ulcers or lesions  NECK: no adenopathy, no asymmetry, masses, or scars and thyroid normal to palpation  RESP: lungs clear to auscultation - no rales, rhonchi or wheezes  and decreased breath sounds throughout  CV: regular rate and rhythm, normal S1 S2, no S3 or S4, no murmur, click or rub, no peripheral edema and peripheral pulses strong  ABDOMEN: soft, nontender, no hepatosplenomegaly, no masses and bowel sounds normal  MS: no gross musculoskeletal defects noted, no edema    CXR - Reviewed and interpreted by me Normal- no infiltrates, effusions, pneumothoraces, cardiomegaly or masses    Cbc stable.

## 2021-10-29 LAB — SARS-COV-2 RNA RESP QL NAA+PROBE: NEGATIVE

## 2021-10-30 ENCOUNTER — ANCILLARY PROCEDURE (OUTPATIENT)
Dept: CARDIOLOGY | Facility: CLINIC | Age: 71
End: 2021-10-30
Attending: INTERNAL MEDICINE
Payer: MEDICARE

## 2021-10-30 DIAGNOSIS — Z95.0 CARDIAC PACEMAKER IN SITU: ICD-10-CM

## 2021-10-30 DIAGNOSIS — I44.2 ATRIOVENTRICULAR BLOCK, COMPLETE (H): ICD-10-CM

## 2021-10-30 PROCEDURE — 93296 REM INTERROG EVL PM/IDS: CPT

## 2021-10-30 PROCEDURE — 93294 REM INTERROG EVL PM/LDLS PM: CPT | Performed by: INTERNAL MEDICINE

## 2021-11-08 LAB
MDC_IDC_EPISODE_DTM: NORMAL
MDC_IDC_EPISODE_DURATION: 0 S
MDC_IDC_EPISODE_DURATION: 1 S
MDC_IDC_EPISODE_DURATION: 2 S
MDC_IDC_EPISODE_DURATION: 2 S
MDC_IDC_EPISODE_DURATION: 242 S
MDC_IDC_EPISODE_ID: 468
MDC_IDC_EPISODE_ID: 574
MDC_IDC_EPISODE_ID: 575
MDC_IDC_EPISODE_ID: 576
MDC_IDC_EPISODE_ID: 577
MDC_IDC_EPISODE_ID: 578
MDC_IDC_EPISODE_ID: 579
MDC_IDC_EPISODE_ID: 580
MDC_IDC_EPISODE_ID: 581
MDC_IDC_EPISODE_ID: 582
MDC_IDC_EPISODE_ID: 583
MDC_IDC_EPISODE_ID: 584
MDC_IDC_EPISODE_ID: 585
MDC_IDC_EPISODE_ID: 586
MDC_IDC_EPISODE_ID: 587
MDC_IDC_EPISODE_ID: 588
MDC_IDC_EPISODE_TYPE: NORMAL
MDC_IDC_LEAD_IMPLANT_DT: NORMAL
MDC_IDC_LEAD_LOCATION: NORMAL
MDC_IDC_LEAD_LOCATION_DETAIL_1: NORMAL
MDC_IDC_LEAD_MFG: NORMAL
MDC_IDC_LEAD_MODEL: NORMAL
MDC_IDC_LEAD_POLARITY_TYPE: NORMAL
MDC_IDC_LEAD_SERIAL: NORMAL
MDC_IDC_LEAD_SPECIAL_FUNCTION: NORMAL
MDC_IDC_MSMT_BATTERY_DTM: NORMAL
MDC_IDC_MSMT_BATTERY_REMAINING_LONGEVITY: 177 MO
MDC_IDC_MSMT_BATTERY_RRT_TRIGGER: 2.62
MDC_IDC_MSMT_BATTERY_STATUS: NORMAL
MDC_IDC_MSMT_BATTERY_VOLTAGE: 3.22 V
MDC_IDC_MSMT_LEADCHNL_RV_IMPEDANCE_VALUE: 513 OHM
MDC_IDC_MSMT_LEADCHNL_RV_IMPEDANCE_VALUE: 665 OHM
MDC_IDC_MSMT_LEADCHNL_RV_PACING_THRESHOLD_AMPLITUDE: 0.5 V
MDC_IDC_MSMT_LEADCHNL_RV_PACING_THRESHOLD_PULSEWIDTH: 0.4 MS
MDC_IDC_MSMT_LEADCHNL_RV_SENSING_INTR_AMPL: 12.25 MV
MDC_IDC_MSMT_LEADCHNL_RV_SENSING_INTR_AMPL: 12.25 MV
MDC_IDC_PG_IMPLANT_DTM: NORMAL
MDC_IDC_PG_MFG: NORMAL
MDC_IDC_PG_MODEL: NORMAL
MDC_IDC_PG_SERIAL: NORMAL
MDC_IDC_PG_TYPE: NORMAL
MDC_IDC_SESS_CLINIC_NAME: NORMAL
MDC_IDC_SESS_DTM: NORMAL
MDC_IDC_SESS_TYPE: NORMAL
MDC_IDC_SET_BRADY_HYSTRATE: NORMAL
MDC_IDC_SET_BRADY_LOWRATE: 60 {BEATS}/MIN
MDC_IDC_SET_BRADY_MAX_SENSOR_RATE: 130 {BEATS}/MIN
MDC_IDC_SET_BRADY_MODE: NORMAL
MDC_IDC_SET_LEADCHNL_RV_PACING_AMPLITUDE: 3.5 V
MDC_IDC_SET_LEADCHNL_RV_PACING_ANODE_ELECTRODE_1: NORMAL
MDC_IDC_SET_LEADCHNL_RV_PACING_CAPTURE_MODE: NORMAL
MDC_IDC_SET_LEADCHNL_RV_PACING_CATHODE_ELECTRODE_1: NORMAL
MDC_IDC_SET_LEADCHNL_RV_PACING_CATHODE_LOCATION_1: NORMAL
MDC_IDC_SET_LEADCHNL_RV_PACING_POLARITY: NORMAL
MDC_IDC_SET_LEADCHNL_RV_PACING_PULSEWIDTH: 0.4 MS
MDC_IDC_SET_LEADCHNL_RV_SENSING_ANODE_ELECTRODE_1: NORMAL
MDC_IDC_SET_LEADCHNL_RV_SENSING_ANODE_LOCATION_1: NORMAL
MDC_IDC_SET_LEADCHNL_RV_SENSING_CATHODE_ELECTRODE_1: NORMAL
MDC_IDC_SET_LEADCHNL_RV_SENSING_CATHODE_LOCATION_1: NORMAL
MDC_IDC_SET_LEADCHNL_RV_SENSING_POLARITY: NORMAL
MDC_IDC_SET_LEADCHNL_RV_SENSING_SENSITIVITY: 2 MV
MDC_IDC_SET_ZONE_DETECTION_INTERVAL: 360 MS
MDC_IDC_SET_ZONE_TYPE: NORMAL
MDC_IDC_STAT_BRADY_DTM_END: NORMAL
MDC_IDC_STAT_BRADY_DTM_START: NORMAL
MDC_IDC_STAT_BRADY_RV_PERCENT_PACED: 11.61 %
MDC_IDC_STAT_EPISODE_RECENT_COUNT: 0
MDC_IDC_STAT_EPISODE_RECENT_COUNT: 1
MDC_IDC_STAT_EPISODE_RECENT_COUNT: 465
MDC_IDC_STAT_EPISODE_RECENT_COUNT_DTM_END: NORMAL
MDC_IDC_STAT_EPISODE_RECENT_COUNT_DTM_START: NORMAL
MDC_IDC_STAT_EPISODE_TOTAL_COUNT: 0
MDC_IDC_STAT_EPISODE_TOTAL_COUNT: 1
MDC_IDC_STAT_EPISODE_TOTAL_COUNT: 587
MDC_IDC_STAT_EPISODE_TOTAL_COUNT_DTM_END: NORMAL
MDC_IDC_STAT_EPISODE_TOTAL_COUNT_DTM_START: NORMAL
MDC_IDC_STAT_EPISODE_TYPE: NORMAL

## 2021-11-23 ENCOUNTER — VIRTUAL VISIT (OUTPATIENT)
Dept: CARDIOLOGY | Facility: OTHER | Age: 71
End: 2021-11-23
Attending: INTERNAL MEDICINE
Payer: COMMERCIAL

## 2021-11-23 VITALS
DIASTOLIC BLOOD PRESSURE: 79 MMHG | SYSTOLIC BLOOD PRESSURE: 96 MMHG | WEIGHT: 155 LBS | BODY MASS INDEX: 30.27 KG/M2 | HEART RATE: 79 BPM

## 2021-11-23 DIAGNOSIS — I44.2 POSTOPERATIVE COMPLETE HEART BLOCK (H): ICD-10-CM

## 2021-11-23 DIAGNOSIS — Z95.2 S/P TAVR (TRANSCATHETER AORTIC VALVE REPLACEMENT): ICD-10-CM

## 2021-11-23 DIAGNOSIS — I48.21 PERMANENT ATRIAL FIBRILLATION (H): ICD-10-CM

## 2021-11-23 DIAGNOSIS — Q23.81 BICUSPID AORTIC VALVE: Primary | ICD-10-CM

## 2021-11-23 DIAGNOSIS — Z95.0 CARDIAC PACEMAKER IN SITU: ICD-10-CM

## 2021-11-23 DIAGNOSIS — I97.89 POSTOPERATIVE COMPLETE HEART BLOCK (H): ICD-10-CM

## 2021-11-23 PROCEDURE — G0463 HOSPITAL OUTPT CLINIC VISIT: HCPCS | Mod: TEL,95

## 2021-11-23 PROCEDURE — 99214 OFFICE O/P EST MOD 30 MIN: CPT | Mod: 95 | Performed by: INTERNAL MEDICINE

## 2021-11-23 ASSESSMENT — PAIN SCALES - GENERAL: PAINLEVEL: NO PAIN (0)

## 2021-11-23 NOTE — PROGRESS NOTES
"Mary Alice is a 71 year old who is being evaluated via a billable telephone visit.      What phone number would you like to be contacted at? [phone]  How would you like to obtain your AVS? Afshan        Chief Complaint: atrial fibrillation, bicuspid valve    HPI: I was happy to see Mrs. Cameron for the above. She has seen several cardiologists over the past year for these problems. Her  was ill and they moved to be closer to his daughters. He  in October and she has settled in [place]. Her atrial fibrillation began in the past year. In reviewing the old records both  and  are reported as when the atrial fibrillation began and she is no longer sure. This has been associated with shortness of breath but as was discussed with her by one of the cardiologist she has multiple reasons for shortness of breath including COPD. She is finishing treatment for a COPD exacerbation at this time. She reports the plan had been to start her on warfarin and then cardiovert her. With all the chaos in her life the past few months this never happened. She was found to have hyperthyroidism in August and was treated with Iodine ablation. She also has a bicuspid aortic valve and mild aortic root dilation (per report of echocardiogram in old records). The echocardiogram did not report significant aortic stenosis or insufficiency and her systolic function was normal making it less likely that her shortness of breath was related to her valvular heart disease. She was told she would need periodic f/u of her valve and aorta. She reports two angiograms done at [facility] and that they were \"okay\". I do not see copies of those reports in the old records. They will be requested.    She underwent DC cardioversion on 2014. She reports feeling better afterward. She feels like she needs to use her inhaler less often and has less shortness of breath. However, the ECG today shows she is back in atrial fibrillation.    I have " "reviewed the records sent from [facility]. There is an echocardiogram report from 2002 and records regarding back surgery. I see no cath results.    A repeat cardioversion in July failed to restore sinus rhythm. A stress study in April (see below) showed no ischemia or infarction.    [id number]:  She had recurrent atrial fibrillation was started on flecainide and scheduled for cardioversion. When she arrived for the cardioversion she was in sinus.     A CT aortogram showed atherosclerosis but not dilation of the descending aoota.    [id number]:    She was admitted in March 2015 with a COPD exacerbation. Her breathing is at baseline with no fever, sputum or wheezing.    She has not noted any atrial fibrillation, no palpitations, chest pain/discomfort, unusual dyspnea on exertion, bleeding or neurologic symptoms.    [id number]: Her follow-up echocardiogram (see lab section) showed no change in aortic valve function. She report rare \"flutters\" but no sustained palpitations like with her atrial fibrillation. She reports no significant bleeding episodes on warfarin. She denies any neurologic symptoms suggestive of CVA or TIA. Edema improved when she switched her diltiazem to bedtime.    Her primary health problem has been her COPD. She uses oxygen at night. She has had had any recent symptoms of upper or lower respiratory infection.    [id number]: She had two admissions this past winter for COPD exacerbations. Overall, she feels her dyspnea on exertion is slowly getting worse. She has not had exertional chest pain/discomfort.     She reports no increased edema, orthopnea or paroxysmal nocturnal dyspnea.    [id number]: echocardiogram shows a decline in systolic function.  She reports that she has felt more fatigued and has had more dyspnea on exertion since I last saw her.  She also reports she has had problems with her lungs over the winter.  She is been on a prolonged course of prednisone.  She has not had chest pain or chest " discomfort.  She has noted episodes of atrial fibrillation, or palpitations that she thinks is atrial fibrillation.  These episodes were relatively infrequent and do not last very long.  They are not associated with associated symptoms and she has not found them to be overly troublesome.  She has had no prolonged episodes where she felt the need to consider seeking medical treatment.    September 25, 2018: her angiogram showed no obstructive coronary artery disease. She reports two episodes of pain between her shoulders, lasting 10 minutes.     No palpitations. Flecainide stopped due to drop in systolic function.     Has had a few problems with her COPD.     October 23, 2018: Ms. Cameron reports that she has been feeling somewhat better since switching to the torsemide.  Her swelling is better.  She continues to work or cleaning jobs.  She reports no significant episodes of atrial fibrillation.  She has occasional palpitations but nothing that has been very troublesome.  Her echocardiogram shows an improvement in her ejection fraction.  Her CT aortogram showed evidence of atherosclerosis but no dissections or aneurysms.    April 23, 2019: She was seen in the emergency department on March 19, 2019 complaining of a productive cough.  She was diagnosed with acute bronchitis.  She was also noted to be in atrial fibrillation with rapid ventricular rate.  She has had 2 courses of antibiotics for her acute bronchitis.    She reports her breathing is back to baseline.  She reports if she feels like it takes her longer to get things done and she takes more breaks but she does not find this particularly troublesome.    June 11, 2019: She reports that her breathing is about the same. She has not noted an increase in her heart failure symptoms. Her Holter shows poor heart rate control.    July 23, 2019: On the higer dose of diltiazem average heart rate dropped to 92 bpm from 111 bpm. She does feel a little better but not  much.    She complains of a productive cough that started last night. No fever/chills.    January 28, 2020: We had discussed possible cardioversion at our last visit. In Sept last year she had an episode of BRBPR and her apixaban was held.     June 23, 2020: since I saw her last she was seen at [facility]. They were successful in convincing her to resume apixaban and hold the aspirin, which is excellent.     With regards to AV node ablation they opted to try metoprolol first. She feels her breathing is a little worse on the metoprolol. That had been a concern with beta blocker, her COPD.     November 10, 2020(telephone visit): she is back on diltiazem after metoprolol resulted in worsening of COPD. She is not, as before, aware of palpitations but she has noticed more ankle and lower leg swelling.     October 12, 2021: Since our last visit she underwent TAVR on 07/14/2021. She developed complete heart block and underwent VVI pacing via LBB pacing. An ECG done in Sep showed some recovery of conduction.     She reports feel much better since the TAVR. She has completed cardiac rehab. She had no problems with healing of the pacer site.    November 23, 2021 Telephone Visit: doing well, feels like she is getting her strength back, dyspnea better after TAVR.       Current Outpatient Medications   Medication Sig Dispense Refill     acetaminophen (TYLENOL) 500 MG tablet Take 500-1,000 mg by mouth every 6 hours as needed for mild pain       ADVAIR -21 MCG/ACT inhaler INHALE 2 PUFFS INTO THE LUNGS TWICE DAILY 12 g 3     amoxicillin (AMOXIL) 500 MG capsule Take 4 capsules (2,000 mg) by mouth once as needed (SBE prophylaxis take 30-60 minutes prior to dental procedure/cleaning) 4 capsule 3     aspirin (ASA) 81 MG EC tablet Take 1 tablet (81 mg) by mouth daily 90 tablet 1     atorvastatin (LIPITOR) 10 MG tablet Take 1 tablet (10 mg) by mouth At Bedtime 90 tablet 3     Calcium Carb-Cholecalciferol (CALTRATE 600+D3 SOFT PO)  Take 600 mg by mouth 2 times daily       cloNIDine (CATAPRES) 0.1 MG tablet TAKE 2 TABLETS BY MOUTH EVERY NIGHT AT BEDTIME 180 tablet 3     COMBIVENT RESPIMAT  MCG/ACT inhaler Inhale 1 puff into the lungs 4 times daily        diltiazem ER (TIAZAC) 360 MG 24 hr ER beaded capsule Take 1 capsule (360 mg) by mouth daily 90 capsule 3     diphenhydrAMINE (BENADRYL) 25 MG tablet Take 1-2 tablets (25-50 mg) by mouth every 6 hours as needed for itching or allergies 60 tablet 1     doxycycline hyclate (VIBRAMYCIN) 100 MG capsule Take 1 capsule (100 mg) by mouth 2 times daily 20 capsule 0     ELIQUIS ANTICOAGULANT 5 MG tablet TAKE 1 TABLET BY MOUTH TWICE DAILY 180 tablet 1     furosemide (LASIX) 40 MG tablet TAKE 1 TABLET(40 MG) BY MOUTH TWICE DAILY 180 tablet 3     hydrOXYzine (ATARAX) 25 MG tablet Take 1 tablet (25 mg) by mouth 3 times daily as needed for itching 60 tablet 0     levothyroxine (SYNTHROID/LEVOTHROID) 100 MCG tablet Take 1 tablet (100 mcg) by mouth daily 90 tablet 3     losartan (COZAAR) 50 MG tablet TAKE 1 TABLET(50 MG) BY MOUTH TWICE DAILY 180 tablet 3     OTHER MEDICAL SUPPLIES Apply one pair to help with edema 1 each 0     potassium chloride ER (KLOR-CON M) 20 MEQ CR tablet Take 1 tablet (20 mEq) by mouth 3 times daily 270 tablet 3     predniSONE (DELTASONE) 20 MG tablet Take 3 tabs by mouth daily x 3 days, then 2 tabs daily x 3 days, then 1 tab daily x 3 days, then 1/2 tab daily x 3 days. 20 tablet 0       Past Medical History:   Diagnosis Date     Asthma      Atrial fibrillation (H)      COPD (chronic obstructive pulmonary disease) (H)      Depression      High cholesterol      HTN (hypertension)      Oxygen dependent      Thyroid disease        Past Surgical History:   Procedure Laterality Date     BACK SURGERY  2006     CARDIAC SURGERY  06/12/2018    Angiogram at [facility]     COLONOSCOPY N/A 1/19/2016    Procedure: COLONOSCOPY;  Surgeon: Waldemar Bob MD;  Location: [facility] OR     CV  CORONARY ANGIOGRAM N/A 2021    Procedure: CV CORONARY ANGIOGRAM;  Surgeon: Poli Walsh MD;  Location:  HEART CARDIAC CATH LAB     CV LEFT HEART CATH N/A 2021    Procedure: CV LEFT HEART CATH;  Surgeon: Poli Walsh MD;  Location:  HEART CARDIAC CATH LAB     CV RIGHT HEART CATH MEASUREMENTS RECORDED N/A 2021    Procedure: CV RIGHT HEART CATH;  Surgeon: Poli Walsh MD;  Location:  HEART CARDIAC CATH LAB     CV TRANSCATHETER AORTIC VALVE REPLACEMENT N/A 2021    Procedure: Right femoral Transcaval transcatheter aortic valve replacement (SUMMERS) size 29mm.  Transesophageal echocardiogram per anesthesia;  Surgeon: Domo Sarah MD;  Location: [facility] OR     EP PPM INSERT OF NEW OR REPL W/VENT LEAD N/A 2021    Procedure: insertion of Permanent Pacemaker;  Surgeon: Eliezer Myers MD;  Location: [facility] OR     HEART CATH FEMORAL CANNULIZATION WITH OPEN STANDBY REPAIR AORTIC VALVE N/A 2021    Procedure: Cardiopulmonary standby.;  Surgeon: Fly Smith MD;  Location: [facility] OR     HYSTERECTOMY  1980    partial     SLING BLADDER SUSPENSION WITH FASCIA LINNETTE         Family History   Problem Relation Age of Onset     Prostate Cancer Father      Hypertension Father      Heart Failure Father         CHF     Asthma Mother      Cancer Mother         ovarian     Hypertension Mother      Asthma Brother      Hypertension Sister      Hypertension Brother        Social History     Tobacco Use     Smoking status: Former Smoker     Packs/day: 0.50     Years: 41.00     Pack years: 20.50     Types: Cigarettes     Start date: 1966     Quit date: 2007     Years since quittin.8     Smokeless tobacco: Never Used   Substance Use Topics     Alcohol use: No     Alcohol/week: 0.0 standard drinks       Allergies   Allergen Reactions     Amlodipine Besylate Cough     Norvasc     Lisinopril Cough     Ace Inhibitors Cough         Laboratory and diagnostic data  personally reviewed 2021:    Exam Information    Exam Date Exam Time Exam Date Exam Time Accession # Performing Department Results    10/30/21  4:04 PM 11/3/21  8:41 AM [id number] [facility]        Narrative & Impression    Patient Alert, 1 Monitored VT, 465 VT-NS.     Remote pacemaker transmission received and reviewed. Device transmission sent per MD orders.     Device: Medtronic Betsy XT DR  Normal Device Function  Mode: VVIR  bpm  : 12%  Presenting EGM: Irregular -150 bpm w/ occ  secondary to pause.  Short V-V intervals: 0  Lead Trends Appear Stable: yes  Estimated battery longevity to RRT = 15 years. Battery voltage = 3.22V.   AF Lakin: 100%. Hx of permanent Afib.  Anticoagulant: Eliquis  Ventricular Arrhythmia: 15 VT-NS episodes recorded on 10/30/21. All episodes lasted 1-2 seconds. EGMs show irregular, narrow complex rhythm with short runs of wide complex rhythm.  1 VT-mon occurred on 10/30/21. Episode 468. Episode lasted 4 minutes. EGM shows extended time with wide complex rhythm.    Pt Notified of Transmission Results: via [employer]         Name: LANCE VALLEJO  MRN: [id number]  : 1950  Study Date: 2021 08:37 AM  Age: 71 yrs  Gender: Female  Patient Location: [facility]  Reason For Study: S/P TAVR (30 day s/p)  Ordering Physician: COLLINS AMATO  Referring Physician: COLLINS AMATO  Performed By: Jean Pierre Quintanilla RDCS     BSA: 1.7 m2  Height: 64 in  Weight: 150 lb  HR: 100  BP: 141/70 mmHg  ______________________________________________________________________________  Procedure  Echocardiogram with two-dimensional, color and spectral Doppler performed.  ______________________________________________________________________________  Interpretation Summary  S/P TAVR with 29mm ES3 ultra valve. Mean aortic gradient 8mmHg. Trivial  valvular/paravalvular AI.  No significant changes  noted.  ______________________________________________________________________________  Left Ventricle  Global and regional left ventricular function is normal with an EF of 55-60%.  Left ventricular size is normal. Mild concentric wall thickening consistent  with left ventricular hypertrophy is present. Left ventricular diastolic  function is not assessable. No regional wall motion abnormalities are seen.     Right Ventricle  Right ventricular function, chamber size, wall motion, and thickness are  normal. A pacemaker lead is noted in the right ventricle.     Atria  Both atria appear normal.     Mitral Valve  The mitral valve is normal.     Aortic Valve  S/P TAVR with 29mm ES3 ultra valve. Mean aortic gradient 8mmHg. Trivial  valvular/paravalvular AI.     Tricuspid Valve  The tricuspid valve is normal. Mild tricuspid insufficiency is present.     Pulmonic Valve  The valve leaflets are not well visualized. On Doppler interrogation, there is  no significant stenosis or regurgitation.     Vessels  The thoracic aorta cannot be assessed. The pulmonary artery cannot be  assessed. The inferior vena cava is normal.     Pericardium  No pericardial effusion is present. Prominent epicardial fat is noted.     Compared to Previous Study  No significant changes noted.              Procedures:  Singel chamber PPM device implantation.  US left axillary access.      Attending: Dr Myers  EP Fellow: Nikita Walton MD,  Dr Eli  Procedure Date: 7/15/2021     Pre-operative Diagnosis: cardiac asystolic after TAVR  Device Indication: Cardiac asystoli  Post-operative diagnosis:   Successful implantation of single chamber LBB Pacer  Complications: None.     Fluoroscopy time/dose: see procedure log         Patient Information    Name MRN Description   Mary Alice Cameron [id number] 71 year old female       Physicians    Panel Physicians Referring Physician Case Authorizing Physician   Domo Sarah MD (Primary)  Domo Sarah,  Karely Martinez MD (Assisting)     Dexter Thao MD (Fellow - Assisting)     Shayna Eli MD (Fellow - Assisting)       Procedures    Right femoral Transcaval transcatheter aortic valve replacement ([place]) size 29mm.  Transesophageal echocardiogram per anesthesia     Indications    Aortic valve stenosis, etiology of cardiac valve disease unspecified [I35.0 (ICD-10-CM)]     Comments/Patient Narrative    71F with HTN, permanent afib on apixaban, left carotid endarterectomy (2012), PAD, severe COPD on nighttime supplemental oxygen, bicuspid aortic valve with progressive symptomatic severe aortic stenosis who presents for elective transcatheter aortic valve replacement. Her vascular access is limited due to small vessel anatomy, therefore transcaval approach was planned.     Pre Procedure Diagnosis    Aortic valve stenosis, etiology of cardiac valve disease unspecified [I35.0]         Conclusion      Successful transcaval transcatheter aortic valve replacement with a 29mm [place] Tamy 3 valve    Successful aorto-caval fistula closure with a 10/8 mm Amplatzer ductal occluder with no residual fistula.    Complete heart block following valve deployment. EP consulted for pacemaker placement.         CTA CHEST ABDOMEN WITH CONTRAST    HISTORY: 68 yearsFemale ; Bicuspid aortic valve; Ascending aorta  dilatation (H)    TECHNIQUE: Axial CT imaging of the chest abdomen and pelvis was  performed with intervenous contrast contrast. Coronal and sagittal  reconstructions were obtained.    COMPARISON: None    FINDINGS:    CT CHEST: The ace ascending thoracic aorta is ectatic measuring 4.4 cm  transversely and 4.3 cm in AP dimension. The descending thoracic aorta  is 2.5 x 2.2 cm respectively. There is calcified atherosclerotic  disease of the thoracic aorta without evidence of dissection. There is  calcification of the aortic valve.  There is no mediastinal or hilar or axillary lymphadenopathy.    There is mild  linear atelectasis at both lung bases. There is  cardiomegaly.         No concerning osseous lesions are identified    IMPRESSION CHEST: There is ectasia of the ascending thoracic aorta  measuring 4.4 x 4.3 cm in diameter.    There is cardiomegaly.      CT ABDOMEN AND PELVIS: There is atherosclerotic disease of the  abdominal aorta without evidence of aneurysm.     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 The residual lumen measures 2 mm in diameter. There is  approximate 50% stenosis of the origin of the superior mesenteric  artery, residual vessel lumen measures 3.5 to 4 mm.    There are 2 patent right renal arteries. There are 2 left renal  arteries with a dominant vessel originating slightly below the smaller  accessory vessel. There is moderate to severe stenosis of the dominant  left renal artery. There is moderate to severe stenosis of the origin  of the inferior mesenteric artery.    The visualized solid organs are unremarkable. No abnormally distended  loops of bowel are evident. The appendix is unremarkable.    IMPRESSION ABDOMEN AND PELVIS:     Atherosclerotic disease of the abdominal aorta without evidence of  aneurysm or dissection.    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 See description above.    MARINA BRICE MD              Assessment and recommendations:    1) Perament atrial fibrillation - as is often the case after aortic valve procedures her AV conduction has returned.     He ventricular rates are again higher than I would like to see but she is feeling well and EF is okay on echocardiogram done in August.      - repeat limited echocardiogram prior to scheduled TAVR follow-up visit with Ms. Pichardo    If the EF has dropped will likely need AV node ablation.    2) Bicuspid aortic valve, s/p TAVR - CT showed normal valve function despite some hypoattenuating leaflet thickening. Normal  systolic function on echocardiogram.    3) S/P VVI pacer - this was done with left bundle branch pacing, normal device function.    I appreciate the chance to help with Mrs. Cameron's care. Please let me know if you have any questions or concerns.    Phone call duration: 7:30 minutes

## 2021-11-23 NOTE — NURSING NOTE
Chief Complaint   Patient presents with     RECHECK       Initial BP 96/79   Pulse 79   Wt 70.3 kg (155 lb)   BMI 30.27 kg/m   Estimated body mass index is 30.27 kg/m  as calculated from the following:    Height as of 10/12/21: 1.524 m (5').    Weight as of this encounter: 70.3 kg (155 lb).  Medication Reconciliation: complete  Sagrario Buckner LPN

## 2021-11-23 NOTE — PATIENT INSTRUCTIONS
Thank you for allowing P[facility] Omar and our team to participate in your care. Please call our office at [phone] with scheduling questions or if you need to cancel or change your appointment. With any other questions or concerns you may call [facility] cardiology nurse at [phone].       If you experience chest pain, chest pressure, chest tightness, shortness of breath, fainting, lightheadedness, nausea, vomiting, or other concerning symptoms, please report to the Emergency Department or call 911. These symptoms may be emergent, and best treated in the Emergency Department.    Follow up in 6 months with Dr. Helms and [facility]    Echocardiogram in December, prior to follow-up virtual visit with Thelma Pichardo NP.     Call if any concerns about your heart or pacemaker.

## 2021-11-23 NOTE — LETTER
"2021      RE: Mary Alice Cameron  [address]  [address]  [facility] [address]       Mary Alice is a 71 year old who is being evaluated via a billable telephone visit.      What phone number would you like to be contacted at? [phone]  How would you like to obtain your AVS? Afshan        Chief Complaint: atrial fibrillation, bicuspid valve    HPI: I was happy to see Mrs. Cameron for the above. She has seen several cardiologists over the past year for these problems. Her  was ill and they moved to be closer to his daughters. He  in October and she has settled in [place]. Her atrial fibrillation began in the past year. In reviewing the old records both  and  are reported as when the atrial fibrillation began and she is no longer sure. This has been associated with shortness of breath but as was discussed with her by one of the cardiologist she has multiple reasons for shortness of breath including COPD. She is finishing treatment for a COPD exacerbation at this time. She reports the plan had been to start her on warfarin and then cardiovert her. With all the chaos in her life the past few months this never happened. She was found to have hyperthyroidism in August and was treated with Iodine ablation. She also has a bicuspid aortic valve and mild aortic root dilation (per report of echocardiogram in old records). The echocardiogram did not report significant aortic stenosis or insufficiency and her systolic function was normal making it less likely that her shortness of breath was related to her valvular heart disease. She was told she would need periodic f/u of her valve and aorta. She reports two angiograms done at [facility] and that they were \"okay\". I do not see copies of those reports in the old records. They will be requested.    She underwent DC cardioversion on 2014. She reports feeling better afterward. She feels like she needs to use her inhaler less often and has less " "shortness of breath. However, the ECG today shows she is back in atrial fibrillation.    I have reviewed the records sent from [facility]. There is an echocardiogram report from 2002 and records regarding back surgery. I see no cath results.    A repeat cardioversion in July failed to restore sinus rhythm. A stress study in April (see below) showed no ischemia or infarction.    [id number]:  She had recurrent atrial fibrillation was started on flecainide and scheduled for cardioversion. When she arrived for the cardioversion she was in sinus.     A CT aortogram showed atherosclerosis but not dilation of the descending aoota.    [id number]:    She was admitted in March 2015 with a COPD exacerbation. Her breathing is at baseline with no fever, sputum or wheezing.    She has not noted any atrial fibrillation, no palpitations, chest pain/discomfort, unusual dyspnea on exertion, bleeding or neurologic symptoms.    [id number]: Her follow-up echocardiogram (see lab section) showed no change in aortic valve function. She report rare \"flutters\" but no sustained palpitations like with her atrial fibrillation. She reports no significant bleeding episodes on warfarin. She denies any neurologic symptoms suggestive of CVA or TIA. Edema improved when she switched her diltiazem to bedtime.    Her primary health problem has been her COPD. She uses oxygen at night. She has had had any recent symptoms of upper or lower respiratory infection.    [id number]: She had two admissions this past winter for COPD exacerbations. Overall, she feels her dyspnea on exertion is slowly getting worse. She has not had exertional chest pain/discomfort.     She reports no increased edema, orthopnea or paroxysmal nocturnal dyspnea.    [id number]: echocardiogram shows a decline in systolic function.  She reports that she has felt more fatigued and has had more dyspnea on exertion since I last saw her.  She also reports she has had problems with her lungs over the " winter.  She is been on a prolonged course of prednisone.  She has not had chest pain or chest discomfort.  She has noted episodes of atrial fibrillation, or palpitations that she thinks is atrial fibrillation.  These episodes were relatively infrequent and do not last very long.  They are not associated with associated symptoms and she has not found them to be overly troublesome.  She has had no prolonged episodes where she felt the need to consider seeking medical treatment.    September 25, 2018: her angiogram showed no obstructive coronary artery disease. She reports two episodes of pain between her shoulders, lasting 10 minutes.     No palpitations. Flecainide stopped due to drop in systolic function.     Has had a few problems with her COPD.     October 23, 2018: Ms. Cameron reports that she has been feeling somewhat better since switching to the torsemide.  Her swelling is better.  She continues to work or cleaning jobs.  She reports no significant episodes of atrial fibrillation.  She has occasional palpitations but nothing that has been very troublesome.  Her echocardiogram shows an improvement in her ejection fraction.  Her CT aortogram showed evidence of atherosclerosis but no dissections or aneurysms.    April 23, 2019: She was seen in the emergency department on March 19, 2019 complaining of a productive cough.  She was diagnosed with acute bronchitis.  She was also noted to be in atrial fibrillation with rapid ventricular rate.  She has had 2 courses of antibiotics for her acute bronchitis.    She reports her breathing is back to baseline.  She reports if she feels like it takes her longer to get things done and she takes more breaks but she does not find this particularly troublesome.    June 11, 2019: She reports that her breathing is about the same. She has not noted an increase in her heart failure symptoms. Her Holter shows poor heart rate control.    July 23, 2019: On the higer dose of diltiazem  average heart rate dropped to 92 bpm from 111 bpm. She does feel a little better but not much.    She complains of a productive cough that started last night. No fever/chills.    January 28, 2020: We had discussed possible cardioversion at our last visit. In Sept last year she had an episode of BRBPR and her apixaban was held.     June 23, 2020: since I saw her last she was seen at [facility]. They were successful in convincing her to resume apixaban and hold the aspirin, which is excellent.     With regards to AV node ablation they opted to try metoprolol first. She feels her breathing is a little worse on the metoprolol. That had been a concern with beta blocker, her COPD.     November 10, 2020(telephone visit): she is back on diltiazem after metoprolol resulted in worsening of COPD. She is not, as before, aware of palpitations but she has noticed more ankle and lower leg swelling.     October 12, 2021: Since our last visit she underwent TAVR on 07/14/2021. She developed complete heart block and underwent VVI pacing via LBB pacing. An ECG done in Sep showed some recovery of conduction.     She reports feel much better since the TAVR. She has completed cardiac rehab. She had no problems with healing of the pacer site.    November 23, 2021 Telephone Visit: doing well, feels like she is getting her strength back, dyspnea better after TAVR.       Current Outpatient Medications   Medication Sig Dispense Refill     acetaminophen (TYLENOL) 500 MG tablet Take 500-1,000 mg by mouth every 6 hours as needed for mild pain       ADVAIR -21 MCG/ACT inhaler INHALE 2 PUFFS INTO THE LUNGS TWICE DAILY 12 g 3     amoxicillin (AMOXIL) 500 MG capsule Take 4 capsules (2,000 mg) by mouth once as needed (SBE prophylaxis take 30-60 minutes prior to dental procedure/cleaning) 4 capsule 3     aspirin (ASA) 81 MG EC tablet Take 1 tablet (81 mg) by mouth daily 90 tablet 1     atorvastatin (LIPITOR) 10 MG tablet Take 1 tablet (10 mg) by  mouth At Bedtime 90 tablet 3     Calcium Carb-Cholecalciferol (CALTRATE 600+D3 SOFT PO) Take 600 mg by mouth 2 times daily       cloNIDine (CATAPRES) 0.1 MG tablet TAKE 2 TABLETS BY MOUTH EVERY NIGHT AT BEDTIME 180 tablet 3     COMBIVENT RESPIMAT  MCG/ACT inhaler Inhale 1 puff into the lungs 4 times daily        diltiazem ER (TIAZAC) 360 MG 24 hr ER beaded capsule Take 1 capsule (360 mg) by mouth daily 90 capsule 3     diphenhydrAMINE (BENADRYL) 25 MG tablet Take 1-2 tablets (25-50 mg) by mouth every 6 hours as needed for itching or allergies 60 tablet 1     doxycycline hyclate (VIBRAMYCIN) 100 MG capsule Take 1 capsule (100 mg) by mouth 2 times daily 20 capsule 0     ELIQUIS ANTICOAGULANT 5 MG tablet TAKE 1 TABLET BY MOUTH TWICE DAILY 180 tablet 1     furosemide (LASIX) 40 MG tablet TAKE 1 TABLET(40 MG) BY MOUTH TWICE DAILY 180 tablet 3     hydrOXYzine (ATARAX) 25 MG tablet Take 1 tablet (25 mg) by mouth 3 times daily as needed for itching 60 tablet 0     levothyroxine (SYNTHROID/LEVOTHROID) 100 MCG tablet Take 1 tablet (100 mcg) by mouth daily 90 tablet 3     losartan (COZAAR) 50 MG tablet TAKE 1 TABLET(50 MG) BY MOUTH TWICE DAILY 180 tablet 3     OTHER MEDICAL SUPPLIES Apply one pair to help with edema 1 each 0     potassium chloride ER (KLOR-CON M) 20 MEQ CR tablet Take 1 tablet (20 mEq) by mouth 3 times daily 270 tablet 3     predniSONE (DELTASONE) 20 MG tablet Take 3 tabs by mouth daily x 3 days, then 2 tabs daily x 3 days, then 1 tab daily x 3 days, then 1/2 tab daily x 3 days. 20 tablet 0       Past Medical History:   Diagnosis Date     Asthma      Atrial fibrillation (H)      COPD (chronic obstructive pulmonary disease) (H)      Depression      High cholesterol      HTN (hypertension)      Oxygen dependent      Thyroid disease        Past Surgical History:   Procedure Laterality Date     BACK SURGERY  2006     CARDIAC SURGERY  06/12/2018    Angiogram at [facility]     COLONOSCOPY N/A  2016    Procedure: COLONOSCOPY;  Surgeon: Waldemar Bob MD;  Location: [facility] OR     CV CORONARY ANGIOGRAM N/A 2021    Procedure: CV CORONARY ANGIOGRAM;  Surgeon: Poli Walsh MD;  [facility]     CV LEFT HEART CATH N/A 2021    Procedure: CV LEFT HEART CATH;  Surgeon: Poli Walsh MD;  [facility]     CV RIGHT HEART CATH MEASUREMENTS RECORDED N/A 2021    Procedure: CV RIGHT HEART CATH;  Surgeon: Poli Walsh MD;  [facility]     CV TRANSCATHETER AORTIC VALVE REPLACEMENT N/A 2021    Procedure: Right femoral Transcaval transcatheter aortic valve replacement (SUMMERS) size 29mm.  Transesophageal echocardiogram per anesthesia;  Surgeon: Domo Sarah MD;  Location[facility]     EP PPM INSERT OF NEW OR REPL W/VENT LEAD N/A 2021    Procedure: insertion of Permanent Pacemaker;  Surgeon: Eliezer Myers MD;  Location: [facility] OR     HEART CATH FEMORAL CANNULIZATION WITH OPEN STANDBY REPAIR AORTIC VALVE N/A 2021    Procedure: Cardiopulmonary standby.;  Surgeon: Fly Smith MD;  Location: [facility] OR     HYSTERECTOMY  1980    partial     SLING BLADDER SUSPENSION WITH FASCIA LINNETTE         Family History   Problem Relation Age of Onset     Prostate Cancer Father      Hypertension Father      Heart Failure Father         CHF     Asthma Mother      Cancer Mother         ovarian     Hypertension Mother      Asthma Brother      Hypertension Sister      Hypertension Brother        Social History     Tobacco Use     Smoking status: Former Smoker     Packs/day: 0.50     Years: 41.00     Pack years: 20.50     Types: Cigarettes     Start date: 1966     Quit date: 2007     Years since quittin.8     Smokeless tobacco: Never Used   Substance Use Topics     Alcohol use: No     Alcohol/week: 0.0 standard drinks       Allergies   Allergen Reactions     Amlodipine Besylate Cough      Norvasc     Lisinopril Cough     Ace Inhibitors Cough         Laboratory and diagnostic data personally reviewed 2021:    Exam Information    Exam Date Exam Time Exam Date Exam Time Accession # Performing Department Results    10/30/21  4:04 PM 11/3/21  8:41 AM [id number] [facility]        Narrative & Impression    Patient Alert, 1 Monitored VT, 465 VT-NS.     Remote pacemaker transmission received and reviewed. Device transmission sent per MD orders.     Device: Medtronic Betsy XT DR  Normal Device Function  Mode: VVIR  bpm  : 12%  Presenting EGM: Irregular -150 bpm w/ occ  secondary to pause.  Short V-V intervals: 0  Lead Trends Appear Stable: yes  Estimated battery longevity to RRT = 15 years. Battery voltage = 3.22V.   AF [place]: 100%. Hx of permanent Afib.  Anticoagulant: Eliquis  Ventricular Arrhythmia: 15 VT-NS episodes recorded on 10/30/21. All episodes lasted 1-2 seconds. EGMs show irregular, narrow complex rhythm with short runs of wide complex rhythm.  1 VT-mon occurred on 10/30/21. Episode 468. Episode lasted 4 minutes. EGM shows extended time with wide complex rhythm.    Pt Notified of Transmission Results: via [employer]         Name: LANCE VALLEJO  MRN: [id number]  : 1950  Study Date: 2021 08:37 AM  Age: 71 yrs  Gender: Female  Patient Location: [facility]  Reason For Study: S/P TAVR (30 day s/p)  Ordering Physician: COLLINS AMATO  Referring Physician: COLLINS AMATO  Performed By: Jean Pierre Quintanilla RDCS     BSA: 1.7 m2  Height: 64 in  Weight: 150 lb  HR: 100  BP: 141/70 mmHg  ______________________________________________________________________________  Procedure  Echocardiogram with two-dimensional, color and spectral Doppler performed.  ______________________________________________________________________________  Interpretation Summary  S/P TAVR with 29mm ES3 ultra valve. Mean aortic gradient 8mmHg.  Trivial  valvular/paravalvular AI.  No significant changes noted.  ______________________________________________________________________________  Left Ventricle  Global and regional left ventricular function is normal with an EF of 55-60%.  Left ventricular size is normal. Mild concentric wall thickening consistent  with left ventricular hypertrophy is present. Left ventricular diastolic  function is not assessable. No regional wall motion abnormalities are seen.     Right Ventricle  Right ventricular function, chamber size, wall motion, and thickness are  normal. A pacemaker lead is noted in the right ventricle.     Atria  Both atria appear normal.     Mitral Valve  The mitral valve is normal.     Aortic Valve  S/P TAVR with 29mm ES3 ultra valve. Mean aortic gradient 8mmHg. Trivial  valvular/paravalvular AI.     Tricuspid Valve  The tricuspid valve is normal. Mild tricuspid insufficiency is present.     Pulmonic Valve  The valve leaflets are not well visualized. On Doppler interrogation, there is  no significant stenosis or regurgitation.     Vessels  The thoracic aorta cannot be assessed. The pulmonary artery cannot be  assessed. The inferior vena cava is normal.     Pericardium  No pericardial effusion is present. Prominent epicardial fat is noted.     Compared to Previous Study  No significant changes noted.              Procedures:  Singel chamber PPM device implantation.  US left axillary access.      Attending: Dr Louis BOWSER Fellow: Nikita Walton MD,  Dr Eli  Procedure Date: 7/15/2021     Pre-operative Diagnosis: cardiac asystolic after TAVR  Device Indication: Cardiac asystoli  Post-operative diagnosis:   Successful implantation of single chamber LBB Pacer  Complications: None.     Fluoroscopy time/dose: see procedure log         Patient Information    Name MRN Description   Mary Alice Cameron [id number] 71 year old female       Physicians    Panel Physicians Referring Physician Case Authorizing  Physician   Domo Sarah MD (Primary)  Domo Sarah MD Biring, Timinder Singh, MD (Assisting)     Dexter Thao MD (Fellow - Assisting)     Shayna Eli MD (Fellow - Assisting)       Procedures    Right femoral Transcaval transcatheter aortic valve replacement ([place]) size 29mm.  Transesophageal echocardiogram per anesthesia     Indications    Aortic valve stenosis, etiology of cardiac valve disease unspecified [I35.0 (ICD-10-CM)]     Comments/Patient Narrative    71F with HTN, permanent afib on apixaban, left carotid endarterectomy (2012), PAD, severe COPD on nighttime supplemental oxygen, bicuspid aortic valve with progressive symptomatic severe aortic stenosis who presents for elective transcatheter aortic valve replacement. Her vascular access is limited due to small vessel anatomy, therefore transcaval approach was planned.     Pre Procedure Diagnosis    Aortic valve stenosis, etiology of cardiac valve disease unspecified [I35.0]         Conclusion      Successful transcaval transcatheter aortic valve replacement with a 29mm [place] Tamy 3 valve    Successful aorto-caval fistula closure with a 10/8 mm Amplatzer ductal occluder with no residual fistula.    Complete heart block following valve deployment. EP consulted for pacemaker placement.         CTA CHEST ABDOMEN WITH CONTRAST    HISTORY: 68 yearsFemale ; Bicuspid aortic valve; Ascending aorta  dilatation (H)    TECHNIQUE: Axial CT imaging of the chest abdomen and pelvis was  performed with intervenous contrast contrast. Coronal and sagittal  reconstructions were obtained.    COMPARISON: None    FINDINGS:    CT CHEST: The ace ascending thoracic aorta is ectatic measuring 4.4 cm  transversely and 4.3 cm in AP dimension. The descending thoracic aorta  is 2.5 x 2.2 cm respectively. There is calcified atherosclerotic  disease of the thoracic aorta without evidence of dissection. There is  calcification of the aortic valve.  There is no  mediastinal or hilar or axillary lymphadenopathy.    There is mild linear atelectasis at both lung bases. There is  cardiomegaly.         No concerning osseous lesions are identified    IMPRESSION CHEST: There is ectasia of the ascending thoracic aorta  measuring 4.4 x 4.3 cm in diameter.    There is cardiomegaly.      CT ABDOMEN AND PELVIS: There is atherosclerotic disease of the  abdominal aorta without evidence of aneurysm.     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 The residual lumen measures 2 mm in diameter. There is  approximate 50% stenosis of the origin of the superior mesenteric  artery, residual vessel lumen measures 3.5 to 4 mm.    There are 2 patent right renal arteries. There are 2 left renal  arteries with a dominant vessel originating slightly below the smaller  accessory vessel. There is moderate to severe stenosis of the dominant  left renal artery. There is moderate to severe stenosis of the origin  of the inferior mesenteric artery.    The visualized solid organs are unremarkable. No abnormally distended  loops of bowel are evident. The appendix is unremarkable.    IMPRESSION ABDOMEN AND PELVIS:     Atherosclerotic disease of the abdominal aorta without evidence of  aneurysm or dissection.    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 See description above.    MARINA BRICE MD              Assessment and recommendations:    1) Perament atrial fibrillation - as is often the case after aortic valve procedures her AV conduction has returned.     He ventricular rates are again higher than I would like to see but she is feeling well and EF is okay on echocardiogram done in August.      - repeat limited echocardiogram prior to scheduled TAVR follow-up visit with MsAviva Kimballnn    If the EF has dropped will likely need AV node ablation.    2) Bicuspid aortic valve, s/p TAVR - CT showed normal valve  function despite some hypoattenuating leaflet thickening. Normal systolic function on echocardiogram.    3) S/P VVI pacer - this was done with left bundle branch pacing, normal device function.    I appreciate the chance to help with Mrs. Cameron's care. Please let me know if you have any questions or concerns.    Phone call duration: 7:30 minutes        Eliezer Myers MD

## 2021-11-28 PROBLEM — I44.2 POSTOPERATIVE COMPLETE HEART BLOCK (H): Status: ACTIVE | Noted: 2021-11-28

## 2021-11-28 PROBLEM — I97.89 POSTOPERATIVE COMPLETE HEART BLOCK (H): Status: ACTIVE | Noted: 2021-11-28

## 2021-11-28 PROBLEM — Z95.0 CARDIAC PACEMAKER IN SITU: Status: ACTIVE | Noted: 2021-11-28

## 2021-11-29 ENCOUNTER — MYC MEDICAL ADVICE (OUTPATIENT)
Dept: FAMILY MEDICINE | Facility: OTHER | Age: 71
End: 2021-11-29
Payer: COMMERCIAL

## 2021-12-01 NOTE — PROGRESS NOTES
Assessment & Plan     Left cervical lymphadenopathy  Ongoing.  Cbc shows slightly elevated wbc.  The node was there a couple of months ago and hasn't resolved.  US pending.  Follow this closely.    - CBC with platelets and differential; Future  - US Head Neck Soft Tissue; Future  - CBC with platelets and differential             BMI:   Estimated body mass index is 29.69 kg/m  as calculated from the following:    Height as of 10/12/21: 1.524 m (5').    Weight as of this encounter: 68.9 kg (152 lb).           No follow-ups on file.    Braydon Yen MD  [facility]   Mary Alice is a 71 year old who presents for the following health issues     HPI     Lump       Duration: 10/2021    Description (location/character/radiation): left side of neck    Intensity:  mild    Accompanying signs and symptoms: none    History (similar episodes/previous evaluation): None    Precipitating or alleviating factors: None    Therapies tried and outcome: None            Review of Systems   Constitutional, HEENT, cardiovascular, pulmonary, gi and gu systems are negative, except as otherwise noted.      Objective    There were no vitals taken for this visit.  There is no height or weight on file to calculate BMI.  Physical Exam   GENERAL: healthy, alert and no distress  EYES: Eyes grossly normal to inspection, PERRL and conjunctivae and sclerae normal  HENT: ear canals and TM's normal, nose and mouth without ulcers or lesions  NECK: cervical adenopathy left sided, masses, or scars and thyroid normal to palpation  RESP: lungs clear to auscultation - no rales, rhonchi or wheezes  CV: regular rate and rhythm, normal S1 S2, no S3 or S4, no murmur, click or rub, no peripheral edema and peripheral pulses strong  MS: no gross musculoskeletal defects noted, no edema

## 2021-12-02 ENCOUNTER — OFFICE VISIT (OUTPATIENT)
Dept: FAMILY MEDICINE | Facility: OTHER | Age: 71
End: 2021-12-02
Attending: FAMILY MEDICINE
Payer: COMMERCIAL

## 2021-12-02 VITALS
WEIGHT: 152 LBS | HEART RATE: 100 BPM | TEMPERATURE: 98.5 F | DIASTOLIC BLOOD PRESSURE: 60 MMHG | SYSTOLIC BLOOD PRESSURE: 120 MMHG | OXYGEN SATURATION: 90 % | BODY MASS INDEX: 29.69 KG/M2 | RESPIRATION RATE: 20 BRPM

## 2021-12-02 DIAGNOSIS — R59.0 LEFT CERVICAL LYMPHADENOPATHY: Primary | ICD-10-CM

## 2021-12-02 DIAGNOSIS — D72.829 LEUKOCYTOSIS, UNSPECIFIED TYPE: Primary | ICD-10-CM

## 2021-12-02 LAB
BASOPHILS # BLD AUTO: 0 10E3/UL (ref 0–0.2)
BASOPHILS NFR BLD AUTO: 0 %
EOSINOPHIL # BLD AUTO: 0 10E3/UL (ref 0–0.7)
EOSINOPHIL NFR BLD AUTO: 0 %
ERYTHROCYTE [DISTWIDTH] IN BLOOD BY AUTOMATED COUNT: 13.5 % (ref 10–15)
HCT VFR BLD AUTO: 39.1 % (ref 35–47)
HGB BLD-MCNC: 12.7 G/DL (ref 11.7–15.7)
LYMPHOCYTES # BLD AUTO: 1.2 10E3/UL (ref 0.8–5.3)
LYMPHOCYTES NFR BLD AUTO: 8 %
MCH RBC QN AUTO: 32.5 PG (ref 26.5–33)
MCHC RBC AUTO-ENTMCNC: 32.5 G/DL (ref 31.5–36.5)
MCV RBC AUTO: 100 FL (ref 78–100)
MONOCYTES # BLD AUTO: 1.2 10E3/UL (ref 0–1.3)
MONOCYTES NFR BLD AUTO: 8 %
NEUTROPHILS # BLD AUTO: 12.8 10E3/UL (ref 1.6–8.3)
NEUTROPHILS NFR BLD AUTO: 83 %
PLATELET # BLD AUTO: 312 10E3/UL (ref 150–450)
RBC # BLD AUTO: 3.91 10E6/UL (ref 3.8–5.2)
WBC # BLD AUTO: 15.3 10E3/UL (ref 4–11)

## 2021-12-02 PROCEDURE — 36415 COLL VENOUS BLD VENIPUNCTURE: CPT | Mod: ZL | Performed by: FAMILY MEDICINE

## 2021-12-02 PROCEDURE — 85025 COMPLETE CBC W/AUTO DIFF WBC: CPT | Mod: ZL | Performed by: FAMILY MEDICINE

## 2021-12-02 PROCEDURE — G0463 HOSPITAL OUTPT CLINIC VISIT: HCPCS

## 2021-12-02 PROCEDURE — 99214 OFFICE O/P EST MOD 30 MIN: CPT | Performed by: FAMILY MEDICINE

## 2021-12-02 ASSESSMENT — PAIN SCALES - GENERAL: PAINLEVEL: NO PAIN (0)

## 2021-12-02 ASSESSMENT — ANXIETY QUESTIONNAIRES
GAD7 TOTAL SCORE: 0
5. BEING SO RESTLESS THAT IT IS HARD TO SIT STILL: NOT AT ALL
2. NOT BEING ABLE TO STOP OR CONTROL WORRYING: NOT AT ALL
1. FEELING NERVOUS, ANXIOUS, OR ON EDGE: NOT AT ALL
4. TROUBLE RELAXING: NOT AT ALL
3. WORRYING TOO MUCH ABOUT DIFFERENT THINGS: NOT AT ALL
7. FEELING AFRAID AS IF SOMETHING AWFUL MIGHT HAPPEN: NOT AT ALL
6. BECOMING EASILY ANNOYED OR IRRITABLE: NOT AT ALL

## 2021-12-02 NOTE — NURSING NOTE
Chief Complaint   Patient presents with     Mass       Initial /60   Pulse 100   Temp 98.5  F (36.9  C) (Tympanic)   Resp 20   Wt 68.9 kg (152 lb)   SpO2 90%   BMI 29.69 kg/m   Estimated body mass index is 29.69 kg/m  as calculated from the following:    Height as of 10/12/21: 1.524 m (5').    Weight as of this encounter: 68.9 kg (152 lb).  Medication Reconciliation: complete  Whit Dunn LPN

## 2021-12-02 NOTE — PATIENT INSTRUCTIONS
Patient Education     Lymphadenopathy  Lymphadenopathy is swelling of the lymph nodes. Lymph nodes are small, bean-shaped glands around the body.  What are lymph nodes?  Lymph nodes are part of your immune system. These glands are found in your neck, over your clavicle, armpits, groin, chest, and belly (abdomen). They act as filters for lymph fluid as it flows through your body. Lymph fluid contains white blood cells (lymphocytes) that help the body fight infection and disease.   Why lymph nodes swell  Lymphadenopathy is very common. The glands often get larger during a viral or bacterial infection. It can happen during a cold, the flu, or strep throat. The nodes may swell in just one area of the body, such as the neck (localized). Or nodes may swell all over the body (generalized). The neck (cervical) lymph nodes are the most common site of lymphadenopathy.  What causes lymphadenopathy?  Dead cells and fluid build up in the lymph nodes as they help fight infection or disease. This causes them to swell in size. Enlarged lymph nodes are often near the source of infection. This can help to find the cause of an infection. For example, swollen lymph nodes around the jaw may be because of an infection in the teeth or mouth. But lymphadenopathy may also be generalized. This is common in some viral illnesses such as infectious mononucleosis, HIV, or chickenpox (varicella).  Lymphadenopathy can also be caused by:    Infection of a lymph node or small group of nodes (lymphadenitis)    Cancer    Reactions to medicines such as antibiotics and certain blood pressure, gout, and seizure medicines    Other health conditions, such as lupus or sarcoidosis  Symptoms of lymphadenopathy  Lymphadenopathy can cause symptoms such as:    Lumps under the jaw, on the sides or back of the neck, in the armpits, in the groin, or in the chest or belly (abdomen)    Pain or soreness in any of these areas    Redness or warmth in any of these  areas  You may also have symptoms from an infection causing the swollen glands. These symptoms may include fever, sore throat, body aches, or cough.  Diagnosing lymphadenopathy  Your healthcare provider will ask about your health history and symptoms. He or she will give you a physical exam and check the areas where lymph nodes are enlarged. Your provider will check the size, texture, and location of the nodes. He or she will ask how long they have been swollen and if they are painful. You may be advised to have diagnostic tests and referral to specialists may be advised. The tests may include:    Blood tests. These are done to check for signs of infection and other problems.    Urine test. This is also done to check for infection and other problems.    Chest X-ray, ultrasound, CT scan, or MRI scans. These tests can show enlarged lymph nodes or other problems.    Lymph node biopsy. The cause of enlarged lymph nodes may be checked with a biopsy. Small samples of lymph node tissue are taken and checked in a lab for signs of infection, cancer, and other causes. You may be referred to other specialists for their opinion as well.  Treatment for lymphadenopathy  The treatment of enlarged lymph nodes depends on the cause. Enlarged lymph nodes are often harmless and go away without any treatment. Treatment is most often done on the cause of the enlarged nodes and may include:    Antibiotic or antiviral medicine to treat a bacterial or viral infection    Incision and drainage of a lymph node for lymphadenitis    Other medicines or procedures to treat the cause of the enlarged nodes  You may need a follow-up exam in 3 to 4 weeks to recheck enlarged nodes.  When to call your healthcare provider  Call your healthcare provider if:    Your symptoms get worse    You have new symptoms    You have a fever of 100.4 F (38 C) or higher, or as directed by your provider    Your lymph nodes that are still swollen after 3 to 4  weeks    [employer] last reviewed this educational content on 6/1/2019 2000-2021 [employer]. All rights reserved. This information is not intended as a substitute for professional medical care. Always follow your healthcare professional's instructions.

## 2021-12-03 ASSESSMENT — ANXIETY QUESTIONNAIRES: GAD7 TOTAL SCORE: 0

## 2021-12-03 ASSESSMENT — PATIENT HEALTH QUESTIONNAIRE - PHQ9: SUM OF ALL RESPONSES TO PHQ QUESTIONS 1-9: 0

## 2021-12-10 ENCOUNTER — HOSPITAL ENCOUNTER (OUTPATIENT)
Dept: ULTRASOUND IMAGING | Facility: HOSPITAL | Age: 71
End: 2021-12-10
Attending: FAMILY MEDICINE
Payer: MEDICARE

## 2021-12-10 ENCOUNTER — HOSPITAL ENCOUNTER (OUTPATIENT)
Dept: CARDIOLOGY | Facility: HOSPITAL | Age: 71
End: 2021-12-10
Attending: INTERNAL MEDICINE
Payer: MEDICARE

## 2021-12-10 DIAGNOSIS — Z95.2 S/P TAVR (TRANSCATHETER AORTIC VALVE REPLACEMENT): ICD-10-CM

## 2021-12-10 DIAGNOSIS — R59.0 LEFT CERVICAL LYMPHADENOPATHY: ICD-10-CM

## 2021-12-10 DIAGNOSIS — I48.21 PERMANENT ATRIAL FIBRILLATION (H): ICD-10-CM

## 2021-12-10 DIAGNOSIS — Q23.81 BICUSPID AORTIC VALVE: ICD-10-CM

## 2021-12-10 LAB — LVEF ECHO: NORMAL

## 2021-12-10 PROCEDURE — 93308 TTE F-UP OR LMTD: CPT | Mod: 26 | Performed by: INTERNAL MEDICINE

## 2021-12-10 PROCEDURE — 93325 DOPPLER ECHO COLOR FLOW MAPG: CPT | Mod: 26 | Performed by: INTERNAL MEDICINE

## 2021-12-10 PROCEDURE — 76536 US EXAM OF HEAD AND NECK: CPT

## 2021-12-10 PROCEDURE — 93321 DOPPLER ECHO F-UP/LMTD STD: CPT | Mod: 26 | Performed by: INTERNAL MEDICINE

## 2021-12-10 PROCEDURE — 93321 DOPPLER ECHO F-UP/LMTD STD: CPT

## 2021-12-21 DIAGNOSIS — J44.1 COPD EXACERBATION (H): ICD-10-CM

## 2021-12-22 RX ORDER — FLUTICASONE PROPIONATE AND SALMETEROL XINAFOATE 230; 21 UG/1; UG/1
AEROSOL, METERED RESPIRATORY (INHALATION)
Qty: 12 G | Refills: 3 | Status: SHIPPED | OUTPATIENT
Start: 2021-12-22 | End: 2022-03-28

## 2021-12-22 NOTE — TELEPHONE ENCOUNTER
ADVAIR /21MCG ORAL 'S  Last Written Prescription Date:  7/23/2021  Last Fill Quantity: 12 g,   # refills: 3  Last Office Visit: 12/2/2021  Future Office visit:       Routing refill request to provider for review/approval because:

## 2021-12-27 DIAGNOSIS — E87.6 HYPOKALEMIA: ICD-10-CM

## 2021-12-27 RX ORDER — POTASSIUM CHLORIDE 1500 MG/1
20 TABLET, EXTENDED RELEASE ORAL 3 TIMES DAILY
Qty: 270 TABLET | Refills: 3 | Status: SHIPPED | OUTPATIENT
Start: 2021-12-27 | End: 2022-03-28

## 2021-12-27 NOTE — TELEPHONE ENCOUNTER
potassium      Last Written Prescription Date:  11/18/21  Last Fill Quantity: 270,   # refills: 3  Last Office Visit: 12/2/21  Future Office visit:

## 2022-01-09 NOTE — PROGRESS NOTES
[facility] HEART [facility]  CARDIOVASCULAR DIVISION    VALVE CLINIC RETURN VISIT    PRIMARY CARDIOLOGIST: Dr. Myers      PERTINENT CLINICAL HISTORY:     Mary Alice Cameron is a very pleasant 71 year old female who presents for 6 month TAVR follow-up. She has a history of paroxysmal atrial fibrillation, severe COPD on O2 PRN,  HTN, non-obstructive CAD, HFpEF and severe bicuspid aortic stenosis who underwent transcaval transcatheter aortic valve replacement (TAVR) with a 29mm Kingston Tamy Valve on 7/14/21 by Destinee Sarah, Lisandro, Master and Carlos A. The IVC/aorta was closed with amplatzer occluder device. The TAVR and post-procedural course were notable for development of complete heart block that required permanent pacemaker implantation. POD 1 TTE showed trace valvular and paravalvular leak, MG of 11mmHg, PV of 2.3 m/s. She was discharged on ASA and Plavix, with plans to discontinue Plavix and resume Eliquis 48 hours post discharge. She was seen 1 month post TAVR and reported doing well. ECHO showed normal valve function, CT showed mild HALT, anticoagulation was continued.     Mary Alice continues to feel well. Her breathing is better since her valve replacement. She has noticed increased leg swelling over the past two months. She says it isn't all the time, maybe a few times a week she will noticed leg swelling that coincides with a 2-3 lbs weight gain. When the swelling occurs she has severe itching and trouble getting her shoes on. Swelling and weight gain usually resolves overnight. She reports occasional lightheadedness, associated hot flashes and dry heaves, occurs about twice a week. She has checked her blood pressure during the episodes and it is normal. She denies angina, palpitations, syncope. She is tolerating Eliquis and ASA without any major bleeding. She asks what she can take for her cough related to COPD and inquires about portable oxygen.      PAST MEDICAL HISTORY:     Past Medical History:    Diagnosis Date     Asthma      Atrial fibrillation (H)      COPD (chronic obstructive pulmonary disease) (H)      Depression      High cholesterol      HTN (hypertension)      Oxygen dependent      Thyroid disease    Severe aortic stenosis s/p TAVR 7/14/21  Complete heart block s/p PPM 7/14/21       PAST SURGICAL HISTORY:     Past Surgical History:   Procedure Laterality Date     BACK SURGERY  2006     CARDIAC SURGERY  06/12/2018    Angiogram at [facility]     COLONOSCOPY N/A 1/19/2016    Procedure: COLONOSCOPY;  Surgeon: Waldemar Bob MD;  Location: [facility] OR     CV CORONARY ANGIOGRAM N/A 4/30/2021    Procedure: CV CORONARY ANGIOGRAM;  Surgeon: Poli Walsh MD;  Location:  HEART CARDIAC CATH LAB     CV LEFT HEART CATH N/A 4/30/2021    Procedure: CV LEFT HEART CATH;  Surgeon: Poli Walsh MD;  Location:  HEART CARDIAC CATH LAB     CV RIGHT HEART CATH MEASUREMENTS RECORDED N/A 4/30/2021    Procedure: CV RIGHT HEART CATH;  Surgeon: Poli Walsh MD;  Location:  HEART CARDIAC CATH LAB     CV TRANSCATHETER AORTIC VALVE REPLACEMENT N/A 7/14/2021    Procedure: Right femoral Transcaval transcatheter aortic valve replacement (SUMMERS) size 29mm.  Transesophageal echocardiogram per anesthesia;  Surgeon: Domo Sarah MD;  Location: [facility] OR     EP PPM INSERT OF NEW OR REPL W/VENT LEAD N/A 7/14/2021    Procedure: insertion of Permanent Pacemaker;  Surgeon: Eliezer Myers MD;  Location: [facility] OR     HEART CATH FEMORAL CANNULIZATION WITH OPEN STANDBY REPAIR AORTIC VALVE N/A 7/14/2021    Procedure: Cardiopulmonary standby.;  Surgeon: Fly Smith MD;  Location: [facility] OR     HYSTERECTOMY  1980    partial     SLING BLADDER SUSPENSION WITH FASCIA LINNETTE          CURRENT MEDICATIONS:     Current Outpatient Medications   Medication Sig Dispense Refill     acetaminophen (TYLENOL) 500 MG tablet Take 500-1,000 mg by mouth every 6 hours as needed for mild pain       ADVAIR  -21 MCG/ACT inhaler INHALE 2 PUFFS INTO THE LUNGS TWICE DAILY 12 g 3     amoxicillin (AMOXIL) 500 MG capsule Take 4 capsules (2,000 mg) by mouth once as needed (SBE prophylaxis take 30-60 minutes prior to dental procedure/cleaning) 4 capsule 3     aspirin (ASA) 81 MG EC tablet Take 1 tablet (81 mg) by mouth daily 90 tablet 1     atorvastatin (LIPITOR) 10 MG tablet Take 1 tablet (10 mg) by mouth At Bedtime 90 tablet 3     Calcium Carb-Cholecalciferol (CALTRATE 600+D3 SOFT PO) Take 600 mg by mouth 2 times daily       cloNIDine (CATAPRES) 0.1 MG tablet TAKE 2 TABLETS BY MOUTH EVERY NIGHT AT BEDTIME 180 tablet 3     COMBIVENT RESPIMAT  MCG/ACT inhaler Inhale 1 puff into the lungs 4 times daily        diltiazem ER (TIAZAC) 360 MG 24 hr ER beaded capsule Take 1 capsule (360 mg) by mouth daily 90 capsule 3     diphenhydrAMINE (BENADRYL) 25 MG tablet Take 1-2 tablets (25-50 mg) by mouth every 6 hours as needed for itching or allergies 60 tablet 1     doxycycline hyclate (VIBRAMYCIN) 100 MG capsule Take 1 capsule (100 mg) by mouth 2 times daily 20 capsule 0     ELIQUIS ANTICOAGULANT 5 MG tablet TAKE 1 TABLET BY MOUTH TWICE DAILY 180 tablet 1     furosemide (LASIX) 40 MG tablet TAKE 1 TABLET(40 MG) BY MOUTH TWICE DAILY 180 tablet 3     hydrOXYzine (ATARAX) 25 MG tablet Take 1 tablet (25 mg) by mouth 3 times daily as needed for itching 60 tablet 0     levothyroxine (SYNTHROID/LEVOTHROID) 100 MCG tablet Take 1 tablet (100 mcg) by mouth daily 90 tablet 3     losartan (COZAAR) 50 MG tablet TAKE 1 TABLET(50 MG) BY MOUTH TWICE DAILY 180 tablet 3     OTHER MEDICAL SUPPLIES Apply one pair to help with edema 1 each 0     potassium chloride ER (KLOR-CON M) 20 MEQ CR tablet Take 1 tablet (20 mEq) by mouth 3 times daily 270 tablet 3     predniSONE (DELTASONE) 20 MG tablet Take 3 tabs by mouth daily x 3 days, then 2 tabs daily x 3 days, then 1 tab daily x 3 days, then 1/2 tab daily x 3 days. 20 tablet 0        ALLERGIES:      Allergies   Allergen Reactions     Amlodipine Besylate Cough     Norvasc     Lisinopril Cough     Ace Inhibitors Cough      FAMILY HISTORY:     Family History   Problem Relation Age of Onset     Prostate Cancer Father      Hypertension Father      Heart Failure Father         CHF     Asthma Mother      Cancer Mother         ovarian     Hypertension Mother      Asthma Brother      Hypertension Sister      Hypertension Brother         SOCIAL HISTORY:     Social History     Socioeconomic History     Marital status:      Spouse name: Not on file     Number of children: Not on file     Years of education: Not on file     Highest education level: Not on file   Occupational History     Employer: RETIRED     Comment: disabled   Tobacco Use     Smoking status: Former Smoker     Packs/day: 0.50     Years: 41.00     Pack years: 20.50     Types: Cigarettes     Start date: 1966     Quit date: 2007     Years since quittin.4     Smokeless tobacco: Never Used   Substance and Sexual Activity     Alcohol use: No     Alcohol/week: 0.0 standard drinks     Drug use: No     Sexual activity: Never     Comment:    Other Topics Concern      Service No     Blood Transfusions Yes     Comment: Permits if needed     Caffeine Concern Yes     Comment: 2 cups coffee daily     Occupational Exposure Not Asked     Hobby Hazards Not Asked     Sleep Concern Not Asked     Stress Concern Not Asked     Weight Concern Not Asked     Special Diet Not Asked     Back Care Not Asked     Exercise Not Asked     Bike Helmet Not Asked     Seat Belt Yes     Self-Exams Not Asked     Parent/sibling w/ CABG, MI or angioplasty before 65F 55M? No   Social History Narrative     Not on file     Social Determinants of Health     Financial Resource Strain:      Difficulty of Paying Living Expenses:    Food Insecurity:      Worried About Running Out of Food in the Last Year:      Ran Out of Food in the Last Year:    Transportation Needs:       Lack of Transportation (Medical):      Lack of Transportation (Non-Medical):    Physical Activity:      Days of Exercise per Week:      Minutes of Exercise per Session:    Stress:      Feeling of Stress :    Social Connections:      Frequency of Communication with Friends and Family:      Frequency of Social Gatherings with Friends and Family:      Attends Islam Services:      Active Member of Clubs or Organizations:      Attends Club or Organization Meetings:      Marital Status:    Intimate Partner Violence:      Fear of Current or Ex-Partner:      Emotionally Abused:      Physically Abused:      Sexually Abused:         REVIEW OF SYSTEMS:     Constitutional: No fevers or chills  Skin: No new rash or itching  Eyes: No acute change in vision  Ears/Nose/Throat: No purulent rhinorrhea, new hearing loss, or new vertigo  Respiratory: No cough or hemoptysis  Cardiovascular: See HPI  Gastrointestinal: No change in appetite, vomiting, hematemesis or diarrhea  Genitourinary: No dysuria or hematuria  Musculoskeletal: No new back pain, neck pain or muscle pain  Neurologic: No new headaches, focal weakness or behavior changes  Psychiatric: No hallucinations, excessive alcohol consumption or illegal drug usage  Hematologic/Lymphatic/Immunologic: No bleeding, chills, fever, night sweats or weight loss  Endocrine: No new cold intolerance, heat intolerance, polyphagia, polydipsia or polyuria      PHYSICAL EXAMINATION:     There were no vitals taken for this visit.   Physical Exam  Constitutional:       Appearance: Normal appearance.   HENT:      Head: Normocephalic.   Eyes:      Extraocular Movements: Extraocular movements intact.   Pulmonary:      Effort: Pulmonary effort is normal. No respiratory distress.   Musculoskeletal:         General: Normal range of motion.      Cervical back: Normal range of motion.   Neurological:      General: No focal deficit present.      Mental Status: She is alert and oriented to person,  place, and time.        LABORATORY DATA:   Personally reviewed and interpreted labs.  CBC RESULTS:  Lab Results   Component Value Date    WBC 15.3 (H) 12/02/2021    WBC 7.7 07/08/2021    RBC 3.91 12/02/2021    RBC 4.27 07/08/2021    HGB 12.7 12/02/2021    HGB 14.4 07/08/2021    HCT 39.1 12/02/2021    HCT 42.3 07/08/2021     12/02/2021    MCV 99 07/08/2021    MCH 32.5 12/02/2021    MCH 33.7 (H) 07/08/2021    MCHC 32.5 12/02/2021    MCHC 34.0 07/08/2021    RDW 13.5 12/02/2021    RDW 12.9 07/08/2021     12/02/2021     07/08/2021       BMP RESULTS:  Lab Results   Component Value Date     08/18/2021     07/08/2021    POTASSIUM 4.9 08/18/2021    POTASSIUM 4.2 07/08/2021    CHLORIDE 106 08/18/2021    CHLORIDE 101 07/08/2021    CO2 30 08/18/2021    CO2 25 07/08/2021    ANIONGAP 4 08/18/2021    ANIONGAP 10 07/08/2021    GLC 94 08/18/2021    GLC 92 07/08/2021    BUN 16 08/18/2021    BUN 20 07/08/2021    CR 0.92 08/18/2021    CR 1.00 07/08/2021    GFRESTIMATED 63 08/18/2021    GFRESTIMATED 57 (L) 07/08/2021    GFRESTBLACK 66 07/08/2021    KOSTA 9.8 08/18/2021    KOSTA 9.8 07/08/2021       PROCEDURES & FURTHER ASSESSMENTS:     ECHO 12/10/21:  Interpretation Summary  Global and regional left ventricular function is normal with an EF of 55-60%.  The right ventricle is normal size.  S/P TAVR with 29mm ES3 ultra valve.  The peak velocity across the aortic valve is 2.38 m/sec.  The mean gradient is 11 mmHg.  Trace to mild aortic insufficiency is present.  Ascending aorta 4.1 cm.    EKG 9/13/21:  Personally reviewed and   Francisco Javier with HR 90    ECHO 8/18/21:  Interpretation Summary  S/P TAVR with 29mm ES3 ultra valve. Mean aortic gradient 8mmHg. Trivial  valvular/paravalvular AI.  No significant changes noted.    CT cardiac 8/18/21:   1.  There is a 29mm Kingston Tamy III Ultra transcatheter valve in  the aortic position. It is well-seated.  2.  There is minimal hypoattenuating leaflet thickening  involving all  three leaflets.There is unrestricted leaflet opening.   3.  The cardiac chambers demonstrate normal atrioventricular and  ventriculoarterial concordance.  4.  Coronary arteries originate from their respective cusps.   5.  There are mild coronary artery calcifications.  6.  The pericardium is unremarkable. There is no pericardial effusion.     7.  There is no intracardiac thrombus.    CTA abdomen 8/18/21:  IMPRESSION:     1. Postoperative changes of transcaval approach TAVR with Amplatzer  closure device from the aorta to the infrarenal IVC at the site of  transcaval puncture, with successful occlusion of the fistula and no  evidence of retroperitoneal hematoma.     2. Ectasia of the ascending aorta measuring 4.5 cm, previously 4.4 cm  in 2018.     3. Cardiomegaly with biatrial enlargement.     4. Mild upper lobe predominant centrilobular emphysematous change.     PPM Interrogation 8/10/21:  Remote pacemaker transmission received and reviewed. Device transmission sent per MD orders.     Device: Medtronic [id number] Betsy XT SR MRI  Normal Device Function.  Mode: VVIR 60 - 130 bpm  : 99.6%  Presenting EGM:  @ 60 bpm  Short V-V intervals: 0  Lead Trends Appear Stable: Yes  Estimated battery longevity to RRT = 12.6 years.  Ventricular Arrhythmia: 0  Pt Notified of Transmission Results.     Plan: RTC in 3 months and send a remote transmission in 3 months.    ECG dated 7/15/21:  Personally reviewed and interpreted  V-paced HR 61    Echocardiogram dated 7/15/21:  Interpretation Summary  s/p ES3 Ultra TAVR prosthesis on 7/14/21.  Valve is well seated. MG 11mmHg, PV 2.3m/s; trace valvular and paravalvular  AI.  Global and regional left ventricular function is normal with an EF of 60-65%.  Global right ventricular function is normal.  Estimated pulmonary artery systolic pressure is 37 mmHg plus right atrial  pressure.  IVC diameter >2.1 cm collapsing <50% with sniff suggests a high RA pressure  estimated at 15  mmHg or greater.  No pericardial effusion is present.  ______________________________________________________________________________  Left Ventricle  Global and regional left ventricular function is normal with an EF of 60-65%.  Left ventricular size is normal. Borderline concentric wall thickening  consistent with left ventricular hypertrophy is present. Diastolic function  not assessed due to atrial fibrillation.     Right Ventricle  The right ventricle is normal size. Global right ventricular function is  normal.     Mitral Valve  The mitral valve is normal. Trace mitral insufficiency is present.     Aortic Valve  A bioprosthetic aortic valve is present. Doppler interrogation of the aortic  valve is normal. Valve is well seated. Trace valvular and paravalvular AI. The  peak velocity across the aortic valve is 2.3 m/sec. The mean gradient is 11  mmHg.     Tricuspid Valve  The tricuspid valve is normal. Trace tricuspid insufficiency is present.  Estimated pulmonary artery systolic pressure is 37 mmHg plus right atrial  pressure.     Vessels  IVC diameter >2.1 cm collapsing <50% with sniff suggests a high RA pressure  estimated at 15 mmHg or greater.     Pericardium  No pericardial effusion is present.    NYHA Class: II     CLINICAL IMPRESSION:   Mary Alice Cameron is a very pleasant 71 year old female who presents for 1 month TAVR follow-up. Her history is notable for paroxysmal atrial fibrillation, COPD requiring O2 at night, HTN, non-obstructive CAD, HFpEF and severe bicuspid aortic stenosis who underwent transcaval transcatheter aortic valve replacement (TAVR) with a 29mm Kingston Tamy Valve on 7/14/21 by Lisandro Goodson Patel and Carlos A. The TAVR and post-procedural course were notable successful closure of IVC/aorta with amplatzer occluder device. She did developed complete heart block requiring permanent pacemaker implantation. POD 1 TTE with trace valvular and paravalvular leak, MG of 11mmHg, PV of  2.3 m/s. She was placed on ASA and Eliquis. Her echo at one month post TAVR showed normal prosthetic valve function, CT showed evidence of mild HALT, anticoagulation continued.     Continues to feel well with improvement in exertional dyspnea since her valve replacement. She has noticed leg swelling a few times a week, seems to coincide with 2-3 lb weight gain. Leg swelling improves with elevation and weight comes down the next day. She otherwise denies significant heart failure symptoms. Her 6 month ECHO shows normal valve function. Her a-fib is stable. Plan to increase lasix to 60 mg a.m./40 mg p.m. Repeat labs in a week. I advised her to take an extra 20 mg lasix in the evening as needed for leg swelling or weight gain. Establish care with Dr. Helms in April. Valve clinic visit July 2022 with echo and labs prior to visit.     Plan Summary:  1) Aspirin 81 mg daily lifelong  2) Continue Eliquis for a-fib  3) Lifelong antibiotic prophylaxis prior to all dental procedures.  4) Increase lasix as above - repeat BMP 1 week  5) No restrictions on what she can take for cough - follow-up with PCP regarding COPD management   6) Establish care with Dr. Helms in April  7) Valve clinic virutal in July with echo and labs prior     Thelma CESAR Pichardo, CNP  [facility] Cardiology Team    CC  Patient Care Team:  Braydon Yen MD as PCP - General (Family Practice)  Braydon Yen MD as Assigned PCP  Eliezer Myers MD as MD (Clinical Cardiac Electrophysiology)  Pau Nelson APRN CNP as Nurse Practitioner (Nurse Practitioner - Family)  Elizabeth Bhatti MD as Assigned Surgical Provider  Eliezer Myers MD as Assigned Heart and Vascular Provider

## 2022-01-10 ENCOUNTER — VIRTUAL VISIT (OUTPATIENT)
Dept: CARDIOLOGY | Facility: CLINIC | Age: 72
End: 2022-01-10
Attending: NURSE PRACTITIONER
Payer: COMMERCIAL

## 2022-01-10 DIAGNOSIS — I10 ESSENTIAL HYPERTENSION, BENIGN: ICD-10-CM

## 2022-01-10 DIAGNOSIS — Z95.2 S/P TAVR (TRANSCATHETER AORTIC VALVE REPLACEMENT): Primary | ICD-10-CM

## 2022-01-10 DIAGNOSIS — I50.32 CHRONIC DIASTOLIC HEART FAILURE (H): ICD-10-CM

## 2022-01-10 PROCEDURE — 99214 OFFICE O/P EST MOD 30 MIN: CPT | Mod: 95 | Performed by: NURSE PRACTITIONER

## 2022-01-10 RX ORDER — FUROSEMIDE 40 MG
TABLET ORAL
Qty: 180 TABLET | Refills: 3 | Status: SHIPPED | OUTPATIENT
Start: 2022-01-10 | End: 2022-09-06

## 2022-01-10 NOTE — NURSING NOTE
Chief Complaint   Patient presents with     Follow Up     4 month follow up     Pamela St, Visit Facilitator/MA.

## 2022-01-10 NOTE — PROGRESS NOTES
Mary Alice is a 71 year old who is being evaluated via a billable video visit.      How would you like to obtain your AVS? MyChart  If the video visit is dropped, the invitation should be resent by: Text to cell phone: [phone]  Will anyone else be joining your video visit? No       Pamela St Visit Facilitator/MA.        Video-Visit Details    Type of service:  Video Visit    Video Start Time: 10:10    Video End Time:10:28    Originating Location (pt. Location): Home    Distant Location (provider location):  [facility] HEART [facility]     Platform used for Video Visit: Celi[employer]

## 2022-01-10 NOTE — PATIENT INSTRUCTIONS
You were seen today in the Structural Heart Clinic at the [facility].    Cardiology provider you saw during your visit: Thelma Pichardo NP    Plan:   1. Continue aspirin 81 mg daily lifelong.  2. Continue Eliquis therapy for a-fib  3. Delay any nonurgent dental procedures for the first 6 month post TAVR.  4. For all future dental cleanings and procedures you will need to take antibiotics prior - see instructions below.   5. Increase lasix to 60 mg in the morning/40 mg in the afternoon  6. Repeat labs in 1 week  7. Okay to take an additional 20 mg of lasix in the afternoon as needed for leg swelling or weight gain  8. Follow-up w/Dr. Helms in April 2022  9. Follow-up virtual valve clinic visit 6 months with echo and labs prior     Prevention of Infective (Bacterial) Endocarditis:  You are at increased risk for developing adverse outcomes from infective endocarditis (IE), also known as bacterial endocarditis (BE) because of the new device in your heart. The guidelines for prevention of IE are to give patients antibiotics prior to any dental procedures that involve manipulation of gingival tissue or the periapical region of teeth, or perforation of the oral mucosa:      It is recommended to take Amoxicillin 2 gm by mouth as a single dose 30 to 60 minutes before procedure.     OR if allergic to Penicillin or Ampicillin:     Cephalexin 2 gm by mouth, or  Clindamycin 600 mg by mouth, or  Azithromycin or Clarithromycin 500 mg PO       Questions and scheduling:   First call: Structural Heart  Modesta Vargas [phone]    General scheduling line: [phone].   First press #1 for the [employer] and then press #3 for Medical Questions to reach the Cardiology triage nurse.     On Call Cardiologist for after hours or on weekends: [phone], press option #4 and ask to speak to the on-call Cardiologist.

## 2022-01-10 NOTE — LETTER
1/10/2022      RE: Mary Alice Cameron  [address]  [address]  [facility] [address]       Dear Colleague,    Thank you for the opportunity to participate in the care of your patient, Mary Alice Cameron, at the [facility] [place] at [facility]. Please see a copy of my visit note below.          [facility] HEART CARE  CARDIOVASCULAR DIVISION    VALVE CLINIC RETURN VISIT    PRIMARY CARDIOLOGIST: Dr. Myers      PERTINENT CLINICAL HISTORY:     Mary Alice Cameron is a very pleasant 71 year old female who presents for 6 month TAVR follow-up. She has a history of paroxysmal atrial fibrillation, severe COPD on O2 PRN,  HTN, non-obstructive CAD, HFpEF and severe bicuspid aortic stenosis who underwent transcaval transcatheter aortic valve replacement (TAVR) with a 29mm Kingston Tamy Valve on 7/14/21 by Destinee Sarah, Lisandro, Master and Carlos A. The IVC/aorta was closed with amplatzer occluder device. The TAVR and post-procedural course were notable for development of complete heart block that required permanent pacemaker implantation. POD 1 TTE showed trace valvular and paravalvular leak, MG of 11mmHg, PV of 2.3 m/s. She was discharged on ASA and Plavix, with plans to discontinue Plavix and resume Eliquis 48 hours post discharge. She was seen 1 month post TAVR and reported doing well. ECHO showed normal valve function, CT showed mild HALT, anticoagulation was continued.     Mary Alice continues to feel well. Her breathing is better since her valve replacement. She has noticed increased leg swelling over the past two months. She says it isn't all the time, maybe a few times a week she will noticed leg swelling that coincides with a 2-3 lbs weight gain. When the swelling occurs she has severe itching and trouble getting her shoes on. Swelling and weight gain usually resolves overnight. She reports occasional lightheadedness, associated hot flashes and dry heaves, occurs  about twice a week. She has checked her blood pressure during the episodes and it is normal. She denies angina, palpitations, syncope. She is tolerating Eliquis and ASA without any major bleeding. She asks what she can take for her cough related to COPD and inquires about portable oxygen.      PAST MEDICAL HISTORY:     Past Medical History:   Diagnosis Date     Asthma      Atrial fibrillation (H)      COPD (chronic obstructive pulmonary disease) (H)      Depression      High cholesterol      HTN (hypertension)      Oxygen dependent      Thyroid disease    Severe aortic stenosis s/p TAVR 7/14/21  Complete heart block s/p PPM 7/14/21       PAST SURGICAL HISTORY:     Past Surgical History:   Procedure Laterality Date     BACK SURGERY  2006     CARDIAC SURGERY  06/12/2018    Angiogram at [facility]     COLONOSCOPY N/A 1/19/2016    Procedure: COLONOSCOPY;  Surgeon: Waldemar Bob MD;  Location: [facility] OR     CV CORONARY ANGIOGRAM N/A 4/30/2021    Procedure: CV CORONARY ANGIOGRAM;  Surgeon: Poli Walsh MD;  Location:  HEART CARDIAC CATH LAB     CV LEFT HEART CATH N/A 4/30/2021    Procedure: CV LEFT HEART CATH;  Surgeon: Poli Walsh MD;  Location:  HEART CARDIAC CATH LAB     CV RIGHT HEART CATH MEASUREMENTS RECORDED N/A 4/30/2021    Procedure: CV RIGHT HEART CATH;  Surgeon: Poli Walsh MD;  Location:  HEART CARDIAC CATH LAB     CV TRANSCATHETER AORTIC VALVE REPLACEMENT N/A 7/14/2021    Procedure: Right femoral Transcaval transcatheter aortic valve replacement (SUMMERS) size 29mm.  Transesophageal echocardiogram per anesthesia;  Surgeon: Domo Sarah MD;  Location: [facility] OR     EP PPM INSERT OF NEW OR REPL W/VENT LEAD N/A 7/14/2021    Procedure: insertion of Permanent Pacemaker;  Surgeon: Eliezer Myers MD;  Location: [facility] OR     HEART CATH FEMORAL CANNULIZATION WITH OPEN STANDBY REPAIR AORTIC VALVE N/A 7/14/2021    Procedure: Cardiopulmonary standby.;  Surgeon:  Fly Smith MD;  Location: [facility] OR     HYSTERECTOMY  1980    partial     SLING BLADDER SUSPENSION WITH FASCIA LINNETTE          CURRENT MEDICATIONS:     Current Outpatient Medications   Medication Sig Dispense Refill     acetaminophen (TYLENOL) 500 MG tablet Take 500-1,000 mg by mouth every 6 hours as needed for mild pain       ADVAIR -21 MCG/ACT inhaler INHALE 2 PUFFS INTO THE LUNGS TWICE DAILY 12 g 3     amoxicillin (AMOXIL) 500 MG capsule Take 4 capsules (2,000 mg) by mouth once as needed (SBE prophylaxis take 30-60 minutes prior to dental procedure/cleaning) 4 capsule 3     aspirin (ASA) 81 MG EC tablet Take 1 tablet (81 mg) by mouth daily 90 tablet 1     atorvastatin (LIPITOR) 10 MG tablet Take 1 tablet (10 mg) by mouth At Bedtime 90 tablet 3     Calcium Carb-Cholecalciferol (CALTRATE 600+D3 SOFT PO) Take 600 mg by mouth 2 times daily       cloNIDine (CATAPRES) 0.1 MG tablet TAKE 2 TABLETS BY MOUTH EVERY NIGHT AT BEDTIME 180 tablet 3     COMBIVENT RESPIMAT  MCG/ACT inhaler Inhale 1 puff into the lungs 4 times daily        diltiazem ER (TIAZAC) 360 MG 24 hr ER beaded capsule Take 1 capsule (360 mg) by mouth daily 90 capsule 3     diphenhydrAMINE (BENADRYL) 25 MG tablet Take 1-2 tablets (25-50 mg) by mouth every 6 hours as needed for itching or allergies 60 tablet 1     doxycycline hyclate (VIBRAMYCIN) 100 MG capsule Take 1 capsule (100 mg) by mouth 2 times daily 20 capsule 0     ELIQUIS ANTICOAGULANT 5 MG tablet TAKE 1 TABLET BY MOUTH TWICE DAILY 180 tablet 1     furosemide (LASIX) 40 MG tablet TAKE 1 TABLET(40 MG) BY MOUTH TWICE DAILY 180 tablet 3     hydrOXYzine (ATARAX) 25 MG tablet Take 1 tablet (25 mg) by mouth 3 times daily as needed for itching 60 tablet 0     levothyroxine (SYNTHROID/LEVOTHROID) 100 MCG tablet Take 1 tablet (100 mcg) by mouth daily 90 tablet 3     losartan (COZAAR) 50 MG tablet TAKE 1 TABLET(50 MG) BY MOUTH TWICE DAILY 180 tablet 3     OTHER MEDICAL SUPPLIES Apply  one pair to help with edema 1 each 0     potassium chloride ER (KLOR-CON M) 20 MEQ CR tablet Take 1 tablet (20 mEq) by mouth 3 times daily 270 tablet 3     predniSONE (DELTASONE) 20 MG tablet Take 3 tabs by mouth daily x 3 days, then 2 tabs daily x 3 days, then 1 tab daily x 3 days, then 1/2 tab daily x 3 days. 20 tablet 0        ALLERGIES:     Allergies   Allergen Reactions     Amlodipine Besylate Cough     Norvasc     Lisinopril Cough     Ace Inhibitors Cough      FAMILY HISTORY:     Family History   Problem Relation Age of Onset     Prostate Cancer Father      Hypertension Father      Heart Failure Father         CHF     Asthma Mother      Cancer Mother         ovarian     Hypertension Mother      Asthma Brother      Hypertension Sister      Hypertension Brother         SOCIAL HISTORY:     Social History     Socioeconomic History     Marital status:      Spouse name: Not on file     Number of children: Not on file     Years of education: Not on file     Highest education level: Not on file   Occupational History     Employer: RETIRED     Comment: disabled   Tobacco Use     Smoking status: Former Smoker     Packs/day: 0.50     Years: 41.00     Pack years: 20.50     Types: Cigarettes     Start date: 1966     Quit date: 2007     Years since quittin.4     Smokeless tobacco: Never Used   Substance and Sexual Activity     Alcohol use: No     Alcohol/week: 0.0 standard drinks     Drug use: No     Sexual activity: Never     Comment:    Other Topics Concern      Service No     Blood Transfusions Yes     Comment: Permits if needed     Caffeine Concern Yes     Comment: 2 cups coffee daily     Occupational Exposure Not Asked     Hobby Hazards Not Asked     Sleep Concern Not Asked     Stress Concern Not Asked     Weight Concern Not Asked     Special Diet Not Asked     Back Care Not Asked     Exercise Not Asked     Bike Helmet Not Asked     Seat Belt Yes     Self-Exams Not Asked      Parent/sibling w/ CABG, MI or angioplasty before 65F 55M? No   Social History Narrative     Not on file     Social Determinants of Health     Financial Resource Strain:      Difficulty of Paying Living Expenses:    Food Insecurity:      Worried About Running Out of Food in the Last Year:      Ran Out of Food in the Last Year:    Transportation Needs:      Lack of Transportation (Medical):      Lack of Transportation (Non-Medical):    Physical Activity:      Days of Exercise per Week:      Minutes of Exercise per Session:    Stress:      Feeling of Stress :    Social Connections:      Frequency of Communication with Friends and Family:      Frequency of Social Gatherings with Friends and Family:      Attends Rastafari Services:      Active Member of Clubs or Organizations:      Attends Club or Organization Meetings:      Marital Status:    Intimate Partner Violence:      Fear of Current or Ex-Partner:      Emotionally Abused:      Physically Abused:      Sexually Abused:         REVIEW OF SYSTEMS:     Constitutional: No fevers or chills  Skin: No new rash or itching  Eyes: No acute change in vision  Ears/Nose/Throat: No purulent rhinorrhea, new hearing loss, or new vertigo  Respiratory: No cough or hemoptysis  Cardiovascular: See HPI  Gastrointestinal: No change in appetite, vomiting, hematemesis or diarrhea  Genitourinary: No dysuria or hematuria  Musculoskeletal: No new back pain, neck pain or muscle pain  Neurologic: No new headaches, focal weakness or behavior changes  Psychiatric: No hallucinations, excessive alcohol consumption or illegal drug usage  Hematologic/Lymphatic/Immunologic: No bleeding, chills, fever, night sweats or weight loss  Endocrine: No new cold intolerance, heat intolerance, polyphagia, polydipsia or polyuria      PHYSICAL EXAMINATION:     There were no vitals taken for this visit.   Physical Exam  Constitutional:       Appearance: Normal appearance.   HENT:      Head: Normocephalic.   Eyes:       Extraocular Movements: Extraocular movements intact.   Pulmonary:      Effort: Pulmonary effort is normal. No respiratory distress.   Musculoskeletal:         General: Normal range of motion.      Cervical back: Normal range of motion.   Neurological:      General: No focal deficit present.      Mental Status: She is alert and oriented to person, place, and time.        LABORATORY DATA:   Personally reviewed and interpreted labs.  CBC RESULTS:  Lab Results   Component Value Date    WBC 15.3 (H) 12/02/2021    WBC 7.7 07/08/2021    RBC 3.91 12/02/2021    RBC 4.27 07/08/2021    HGB 12.7 12/02/2021    HGB 14.4 07/08/2021    HCT 39.1 12/02/2021    HCT 42.3 07/08/2021     12/02/2021    MCV 99 07/08/2021    MCH 32.5 12/02/2021    MCH 33.7 (H) 07/08/2021    MCHC 32.5 12/02/2021    MCHC 34.0 07/08/2021    RDW 13.5 12/02/2021    RDW 12.9 07/08/2021     12/02/2021     07/08/2021       BMP RESULTS:  Lab Results   Component Value Date     08/18/2021     07/08/2021    POTASSIUM 4.9 08/18/2021    POTASSIUM 4.2 07/08/2021    CHLORIDE 106 08/18/2021    CHLORIDE 101 07/08/2021    CO2 30 08/18/2021    CO2 25 07/08/2021    ANIONGAP 4 08/18/2021    ANIONGAP 10 07/08/2021    GLC 94 08/18/2021    GLC 92 07/08/2021    BUN 16 08/18/2021    BUN 20 07/08/2021    CR 0.92 08/18/2021    CR 1.00 07/08/2021    GFRESTIMATED 63 08/18/2021    GFRESTIMATED 57 (L) 07/08/2021    GFRESTBLACK 66 07/08/2021    KOSTA 9.8 08/18/2021    KOSTA 9.8 07/08/2021       PROCEDURES & FURTHER ASSESSMENTS:     ECHO 12/10/21:  Interpretation Summary  Global and regional left ventricular function is normal with an EF of 55-60%.  The right ventricle is normal size.  S/P TAVR with 29mm ES3 ultra valve.  The peak velocity across the aortic valve is 2.38 m/sec.  The mean gradient is 11 mmHg.  Trace to mild aortic insufficiency is present.  Ascending aorta 4.1 cm.    EKG 9/13/21:  Personally reviewed and   Francisco Javier with HR 90    ECHO  8/18/21:  Interpretation Summary  S/P TAVR with 29mm ES3 ultra valve. Mean aortic gradient 8mmHg. Trivial  valvular/paravalvular AI.  No significant changes noted.    CT cardiac 8/18/21:   1.  There is a 29mm Kingston Tamy III Ultra transcatheter valve in  the aortic position. It is well-seated.  2.  There is minimal hypoattenuating leaflet thickening involving all  three leaflets.There is unrestricted leaflet opening.   3.  The cardiac chambers demonstrate normal atrioventricular and  ventriculoarterial concordance.  4.  Coronary arteries originate from their respective cusps.   5.  There are mild coronary artery calcifications.  6.  The pericardium is unremarkable. There is no pericardial effusion.     7.  There is no intracardiac thrombus.    CTA abdomen 8/18/21:  IMPRESSION:     1. Postoperative changes of transcaval approach TAVR with Amplatzer  closure device from the aorta to the infrarenal IVC at the site of  transcaval puncture, with successful occlusion of the fistula and no  evidence of retroperitoneal hematoma.     2. Ectasia of the ascending aorta measuring 4.5 cm, previously 4.4 cm  in 2018.     3. Cardiomegaly with biatrial enlargement.     4. Mild upper lobe predominant centrilobular emphysematous change.     PPM Interrogation 8/10/21:  Remote pacemaker transmission received and reviewed. Device transmission sent per MD orders.     Device: Medtronic [id number] [place] XT SR MRI  Normal Device Function.  Mode: VVIR 60 - 130 bpm  : 99.6%  Presenting EGM:  @ 60 bpm  Short V-V intervals: 0  Lead Trends Appear Stable: Yes  Estimated battery longevity to RRT = 12.6 years.  Ventricular Arrhythmia: 0  Pt Notified of Transmission Results.     Plan: RTC in 3 months and send a remote transmission in 3 months.    ECG dated 7/15/21:  Personally reviewed and interpreted  V-paced HR 61    Echocardiogram dated 7/15/21:  Interpretation Summary  s/p ES3 Ultra TAVR prosthesis on 7/14/21.  Valve is well seated. MG  11mmHg, PV 2.3m/s; trace valvular and paravalvular  AI.  Global and regional left ventricular function is normal with an EF of 60-65%.  Global right ventricular function is normal.  Estimated pulmonary artery systolic pressure is 37 mmHg plus right atrial  pressure.  IVC diameter >2.1 cm collapsing <50% with sniff suggests a high RA pressure  estimated at 15 mmHg or greater.  No pericardial effusion is present.  ______________________________________________________________________________  Left Ventricle  Global and regional left ventricular function is normal with an EF of 60-65%.  Left ventricular size is normal. Borderline concentric wall thickening  consistent with left ventricular hypertrophy is present. Diastolic function  not assessed due to atrial fibrillation.     Right Ventricle  The right ventricle is normal size. Global right ventricular function is  normal.     Mitral Valve  The mitral valve is normal. Trace mitral insufficiency is present.     Aortic Valve  A bioprosthetic aortic valve is present. Doppler interrogation of the aortic  valve is normal. Valve is well seated. Trace valvular and paravalvular AI. The  peak velocity across the aortic valve is 2.3 m/sec. The mean gradient is 11  mmHg.     Tricuspid Valve  The tricuspid valve is normal. Trace tricuspid insufficiency is present.  Estimated pulmonary artery systolic pressure is 37 mmHg plus right atrial  pressure.     Vessels  IVC diameter >2.1 cm collapsing <50% with sniff suggests a high RA pressure  estimated at 15 mmHg or greater.     Pericardium  No pericardial effusion is present.    NYHA Class: II     CLINICAL IMPRESSION:   Mary Alice Cameron is a very pleasant 71 year old female who presents for 1 month TAVR follow-up. Her history is notable for paroxysmal atrial fibrillation, COPD requiring O2 at night, HTN, non-obstructive CAD, HFpEF and severe bicuspid aortic stenosis who underwent transcaval transcatheter aortic valve replacement (TAVR)  with a 29mm Kingston Tamy Valve on 7/14/21 by Lisandro Goodson, Master and Carlos A. The TAVR and post-procedural course were notable successful closure of IVC/aorta with amplatzer occluder device. She did developed complete heart block requiring permanent pacemaker implantation. POD 1 TTE with trace valvular and paravalvular leak, MG of 11mmHg, PV of 2.3 m/s. She was placed on ASA and Eliquis. Her echo at one month post TAVR showed normal prosthetic valve function, CT showed evidence of mild HALT, anticoagulation continued.     Continues to feel well with improvement in exertional dyspnea since her valve replacement. She has noticed leg swelling a few times a week, seems to coincide with 2-3 lb weight gain. Leg swelling improves with elevation and weight comes down the next day. She otherwise denies significant heart failure symptoms. Her 6 month ECHO shows normal valve function. Her a-fib is stable. Plan to increase lasix to 60 mg a.m./40 mg p.m. Repeat labs in a week. I advised her to take an extra 20 mg lasix in the evening as needed for leg swelling or weight gain. Establish care with Dr. Helms in April. Valve clinic visit July 2022 with echo and labs prior to visit.     Plan Summary:  1) Aspirin 81 mg daily lifelong  2) Continue Eliquis for a-fib  3) Lifelong antibiotic prophylaxis prior to all dental procedures.  4) Increase lasix as above - repeat BMP 1 week  5) No restrictions on what she can take for cough - follow-up with PCP regarding COPD management   6) Establish care with Dr. Helms in April  7) Valve clinic viruta in July with echo and labs prior     Thelma CESAR Pichardo, CNP  [facility] Cardiology Team    CC  Patient Care Team:  Braydon Yen MD as PCP - General (Family Practice)  Eliezer Myers MD as MD (Clinical Cardiac Electrophysiology)  Pau Nelson APRN CNP as Nurse Practitioner (Nurse Practitioner - Family)  Elizabeth Bhatti MD as Assigned Surgical Provider

## 2022-01-13 ENCOUNTER — ANCILLARY PROCEDURE (OUTPATIENT)
Dept: CARDIOLOGY | Facility: CLINIC | Age: 72
End: 2022-01-13
Attending: INTERNAL MEDICINE
Payer: MEDICARE

## 2022-01-13 DIAGNOSIS — I44.2 ATRIOVENTRICULAR BLOCK, COMPLETE (H): ICD-10-CM

## 2022-01-13 DIAGNOSIS — Z95.0 CARDIAC PACEMAKER IN SITU: ICD-10-CM

## 2022-01-13 PROCEDURE — 99207 CARDIAC DEVICE CHECK - REMOTE: CPT | Performed by: INTERNAL MEDICINE

## 2022-01-13 PROCEDURE — 93296 REM INTERROG EVL PM/IDS: CPT

## 2022-01-22 DIAGNOSIS — E78.2 MIXED HYPERLIPIDEMIA: ICD-10-CM

## 2022-01-22 DIAGNOSIS — I25.10 CORONARY ARTERY DISEASE INVOLVING NATIVE CORONARY ARTERY OF NATIVE HEART WITHOUT ANGINA PECTORIS: ICD-10-CM

## 2022-01-24 RX ORDER — ATORVASTATIN CALCIUM 10 MG/1
TABLET, FILM COATED ORAL
Qty: 90 TABLET | Refills: 1 | Status: SHIPPED | OUTPATIENT
Start: 2022-01-24 | End: 2022-03-28

## 2022-02-08 ENCOUNTER — TRANSFERRED RECORDS (OUTPATIENT)
Dept: HEALTH INFORMATION MANAGEMENT | Facility: CLINIC | Age: 72
End: 2022-02-08
Payer: COMMERCIAL

## 2022-02-11 ENCOUNTER — RESEARCH ENCOUNTER (OUTPATIENT)
Dept: CARDIOLOGY | Facility: CLINIC | Age: 72
End: 2022-02-11
Payer: COMMERCIAL

## 2022-02-11 ENCOUNTER — TELEPHONE (OUTPATIENT)
Dept: CARDIOLOGY | Facility: OTHER | Age: 72
End: 2022-02-11
Payer: COMMERCIAL

## 2022-02-11 NOTE — PROGRESS NOTES
Research Consent Note:    Study Title: Aortic Annulus Dilatation after Variable Pressure Inflation on Multi Slice ComputedTomography for the balloon expandable Transcatheter Aortic Valve Replacement (ADaPT - TAVR)  IRB # [id number]    : Gerardo Michael, [phone]  : Haley Wilson RN, [phone]     Estimated duration of study: 12 months    The IRB approved consent form was reviewed with the patient over the phone. The purpose, risks, and benefits of study participation were discussed and study requirements for participation. Confidentiality issues and release of Personal Health Information were reviewed. It was discussed that study participation is voluntary and that refusal to participate will involve no penalty or decrease benefits. The patient had the opportunity to read the entire electronic consent, ask questions, and offered sufficient time to consider the research trial. Subject verbalized understanding and was able to state what study participation involved. Patient signed the electronic consent/HIPAA form prior to study participation and was provided an electronic copy.    I attest that patient verbally reported signing consent.

## 2022-02-11 NOTE — TELEPHONE ENCOUNTER
Received message from Tiffany at [facility].  Patient is having an in office surgical procedure and then going to a plastic surgeon for closure.  Patient will be under general anesthesia.  They are wanting to know if a magnet is needed for her pacemaker during bipolar during cauterization. Call back # [phone]  Nicole Fuller on 2/11/2022 at 1:45 PM

## 2022-02-11 NOTE — TELEPHONE ENCOUNTER
Dermatology from [facility] will be doing a procedure and concerned about her pacemaker due to potential electronic interference. Please call nurse Tiffany back at [phone].

## 2022-02-14 NOTE — TELEPHONE ENCOUNTER
Returned call to Beryl at [facility]:  Reviewed message from device nurse.      They should not have to do anything with patient's pacemaker. She is not dependent on her pacemaker. Her pacemaker is set VVIR  bpm. Ventricular pacing = 8.9%. Intrinsic rhythm on last remote dated 1/13/22 = irregular ventricular rhythm ~ 90 bpm. Medtronic [id number] [place] XT SR MRI. Please let me know if you have further questions. Thank you! Rajni    Message text      Tete Hamilton RN ,....................  2/14/2022   2:08 PM

## 2022-02-15 NOTE — TELEPHONE ENCOUNTER
Called Nurse Back LM no answer, left number to our pacemaker clinic and writers direct number if she needed to speak with our department. LM that PRADEEP will be out remaining of this week.

## 2022-02-18 LAB
MDC_IDC_EPISODE_DTM: NORMAL
MDC_IDC_EPISODE_DURATION: 0 S
MDC_IDC_EPISODE_DURATION: 1 S
MDC_IDC_EPISODE_ID: 2028
MDC_IDC_EPISODE_ID: 2029
MDC_IDC_EPISODE_ID: 2030
MDC_IDC_EPISODE_ID: 2031
MDC_IDC_EPISODE_ID: 2032
MDC_IDC_EPISODE_ID: 2033
MDC_IDC_EPISODE_ID: 2034
MDC_IDC_EPISODE_ID: 2035
MDC_IDC_EPISODE_ID: 2036
MDC_IDC_EPISODE_ID: 2037
MDC_IDC_EPISODE_ID: 2038
MDC_IDC_EPISODE_ID: 2039
MDC_IDC_EPISODE_ID: 2040
MDC_IDC_EPISODE_ID: 2041
MDC_IDC_EPISODE_ID: 2042
MDC_IDC_EPISODE_TYPE: NORMAL
MDC_IDC_LEAD_IMPLANT_DT: NORMAL
MDC_IDC_LEAD_LOCATION: NORMAL
MDC_IDC_LEAD_LOCATION_DETAIL_1: NORMAL
MDC_IDC_LEAD_MFG: NORMAL
MDC_IDC_LEAD_MODEL: NORMAL
MDC_IDC_LEAD_POLARITY_TYPE: NORMAL
MDC_IDC_LEAD_SERIAL: NORMAL
MDC_IDC_LEAD_SPECIAL_FUNCTION: NORMAL
MDC_IDC_MSMT_BATTERY_DTM: NORMAL
MDC_IDC_MSMT_BATTERY_REMAINING_LONGEVITY: 182 MO
MDC_IDC_MSMT_BATTERY_RRT_TRIGGER: 2.62
MDC_IDC_MSMT_BATTERY_STATUS: NORMAL
MDC_IDC_MSMT_BATTERY_VOLTAGE: 3.22 V
MDC_IDC_MSMT_LEADCHNL_RV_IMPEDANCE_VALUE: 456 OHM
MDC_IDC_MSMT_LEADCHNL_RV_IMPEDANCE_VALUE: 570 OHM
MDC_IDC_MSMT_LEADCHNL_RV_PACING_THRESHOLD_AMPLITUDE: 0.5 V
MDC_IDC_MSMT_LEADCHNL_RV_PACING_THRESHOLD_PULSEWIDTH: 0.4 MS
MDC_IDC_MSMT_LEADCHNL_RV_SENSING_INTR_AMPL: 13.38 MV
MDC_IDC_MSMT_LEADCHNL_RV_SENSING_INTR_AMPL: 13.38 MV
MDC_IDC_PG_IMPLANT_DTM: NORMAL
MDC_IDC_PG_MFG: NORMAL
MDC_IDC_PG_MODEL: NORMAL
MDC_IDC_PG_SERIAL: NORMAL
MDC_IDC_PG_TYPE: NORMAL
MDC_IDC_SESS_CLINIC_NAME: NORMAL
MDC_IDC_SESS_DTM: NORMAL
MDC_IDC_SESS_TYPE: NORMAL
MDC_IDC_SET_BRADY_HYSTRATE: NORMAL
MDC_IDC_SET_BRADY_LOWRATE: 60 {BEATS}/MIN
MDC_IDC_SET_BRADY_MAX_SENSOR_RATE: 130 {BEATS}/MIN
MDC_IDC_SET_BRADY_MODE: NORMAL
MDC_IDC_SET_LEADCHNL_RV_PACING_AMPLITUDE: 2 V
MDC_IDC_SET_LEADCHNL_RV_PACING_ANODE_ELECTRODE_1: NORMAL
MDC_IDC_SET_LEADCHNL_RV_PACING_CAPTURE_MODE: NORMAL
MDC_IDC_SET_LEADCHNL_RV_PACING_CATHODE_ELECTRODE_1: NORMAL
MDC_IDC_SET_LEADCHNL_RV_PACING_CATHODE_LOCATION_1: NORMAL
MDC_IDC_SET_LEADCHNL_RV_PACING_POLARITY: NORMAL
MDC_IDC_SET_LEADCHNL_RV_PACING_PULSEWIDTH: 0.4 MS
MDC_IDC_SET_LEADCHNL_RV_SENSING_ANODE_ELECTRODE_1: NORMAL
MDC_IDC_SET_LEADCHNL_RV_SENSING_ANODE_LOCATION_1: NORMAL
MDC_IDC_SET_LEADCHNL_RV_SENSING_CATHODE_ELECTRODE_1: NORMAL
MDC_IDC_SET_LEADCHNL_RV_SENSING_CATHODE_LOCATION_1: NORMAL
MDC_IDC_SET_LEADCHNL_RV_SENSING_POLARITY: NORMAL
MDC_IDC_SET_LEADCHNL_RV_SENSING_SENSITIVITY: 2 MV
MDC_IDC_SET_ZONE_DETECTION_INTERVAL: 360 MS
MDC_IDC_SET_ZONE_TYPE: NORMAL
MDC_IDC_STAT_BRADY_DTM_END: NORMAL
MDC_IDC_STAT_BRADY_DTM_START: NORMAL
MDC_IDC_STAT_BRADY_RV_PERCENT_PACED: 8.91 %
MDC_IDC_STAT_EPISODE_RECENT_COUNT: 0
MDC_IDC_STAT_EPISODE_RECENT_COUNT: 0
MDC_IDC_STAT_EPISODE_RECENT_COUNT: 1454
MDC_IDC_STAT_EPISODE_RECENT_COUNT_DTM_END: NORMAL
MDC_IDC_STAT_EPISODE_RECENT_COUNT_DTM_START: NORMAL
MDC_IDC_STAT_EPISODE_TOTAL_COUNT: 0
MDC_IDC_STAT_EPISODE_TOTAL_COUNT: 1
MDC_IDC_STAT_EPISODE_TOTAL_COUNT: 2041
MDC_IDC_STAT_EPISODE_TOTAL_COUNT_DTM_END: NORMAL
MDC_IDC_STAT_EPISODE_TOTAL_COUNT_DTM_START: NORMAL
MDC_IDC_STAT_EPISODE_TYPE: NORMAL

## 2022-03-23 NOTE — PATIENT INSTRUCTIONS
Preparing for Your Surgery  Getting started  A nurse will call you to review your health history and instructions. They will give you an arrival time based on your scheduled surgery time. Please be ready to share:    Your doctor's clinic name and phone number    Your medical, surgical and anesthesia history    A list of allergies and sensitivities    A list of medicines, including herbal treatments and over-the-counter drugs    Whether the patient has a legal guardian (ask how to send us the papers in advance)  Please tell us if you're pregnant--or if there's any chance you might be pregnant. Some surgeries may injure a fetus (unborn baby), so they require a pregnancy test. Surgeries that are safe for a fetus don't always need a test, and you can choose whether to have one.   If you have a child who's having surgery, please ask for a copy of Preparing for Your Child's Surgery.    Preparing for surgery    Within 30 days of surgery: Have a pre-op exam (sometimes called an H&P, or History and Physical). This can be done at a clinic or pre-operative center.  ? If you're having a , you may not need this exam. Talk to your care team.    At your pre-op exam, talk to your care team about all medicines you take. If you need to stop any medicines before surgery, ask when to start taking them again.  ? We do this for your safety. Many medicines can make you bleed too much during surgery. Some change how well surgery (anesthesia) drugs work.    Call your insurance company to let them know you're having surgery. (If you don't have insurance, call [phone].)    Call your clinic if there's any change in your health. This includes signs of a cold or flu (sore throat, runny nose, cough, rash, fever). It also includes a scrape or scratch near the surgery site.    If you have questions on the day of surgery, call your hospital or surgery center.  COVID testing  You may need to be tested for COVID-19 before having  surgery. If so, your surgical team will give you instructions for scheduling this test, separate from your preoperative history and physical.  Eating and drinking guidelines  For your safety: Unless your surgeon tells you otherwise, follow the guidelines below.    Eat and drink as usual until 8 hours before surgery. After that, no food or milk.    Drink clear liquids until 2 hours before surgery. These are liquids you can see through, like water, Gatorade and Propel Water. You may also have black coffee and tea (no cream or milk).    Nothing by mouth within 2 hours of surgery. This includes gum, candy and breath mints.    If you drink alcohol: Stop drinking it the night before surgery.    If your care team tells you to take medicine on the morning of surgery, it's okay to take it with a sip of water.  Preventing infection    Shower or bathe the night before and morning of your surgery. Follow the instructions your clinic gave you. (If no instructions, use regular soap.)    Don't shave or clip hair near your surgery site. We'll remove the hair if needed.    Don't smoke or vape the morning of surgery. You may chew nicotine gum up to 2 hours before surgery. A nicotine patch is okay.  ? Note: Some surgeries require you to completely quit smoking and nicotine. Check with your surgeon.    Your care team will make every effort to keep you safe from infection. We will:  ? Clean our hands often with soap and water (or an alcohol-based hand rub).  ? Clean the skin at your surgery site with a special soap that kills germs.  ? Give you a special gown to keep you warm. (Cold raises the risk of infection.)  ? Wear special hair covers, masks, gowns and gloves during surgery.  ? Give antibiotic medicine, if prescribed. Not all surgeries need antibiotics.  What to bring on the day of surgery    Photo ID and insurance card    Copy of your health care directive, if you have one    Glasses and hearing aides (bring cases)  ? You can't  wear contacts during surgery    Inhaler and eye drops, if you use them (tell us about these when you arrive)    CPAP machine or breathing device, if you use them    A few personal items, if spending the night    If you have . . .  ? A pacemaker, ICD (cardiac defibrillator) or other implant: Bring the ID card.  ? An implanted stimulator: Bring the remote control.  ? A legal guardian: Bring a copy of the certified (court-stamped) guardianship papers.  Please remove any jewelry, including body piercings. Leave jewelry and other valuables at home.  If you're going home the day of surgery    You must have a responsible adult drive you home. They should stay with you overnight as well.    If you don't have someone to stay with you, and you aren't safe to go home alone, we may keep you overnight. Insurance often won't pay for this.  After surgery  If it's hard to control your pain or you need more pain medicine, please call your surgeon's office.  Questions?   If you have any questions for your care team, list them here: _________________________________________________________________________________________________________________________________________________________________________ ____________________________________ ____________________________________ ____________________________________  For informational purposes only. Not to replace the advice of your health care provider. Copyright   2003, 2019 [facility]. All rights reserved. Clinically reviewed by Bhavana Worthington MD. EDAN [id number] - REV 07/21.

## 2022-03-23 NOTE — PROGRESS NOTES
[address]  [facility] [address]  Phone: [phone]  Primary Provider: Braydon Yen        PREOPERATIVE EVALUATION:  Today's date: 3/30/2022    Mary Alice Cameron is a 71 year old female who presents for a preoperative evaluation.    Surgical Information:  Surgery/Procedure: MOHS and nasal reconstruction   Surgery Location: [facility]   Surgeon: Dr Jaciel Sheriff   Surgery Date: 4/5/22 and 4/7/22  Time of Surgery: TBD  Where patient plans to recover: At home with family  Fax number for surgical facility:     Type of Anesthesia Anticipated: to be determined    Assessment & Plan     The proposed surgical procedure is considered LOW risk.    Preop general physical exam  Doing well.  Update labs and follow.    - Basic metabolic panel; Future  - CBC with Platelets & Differential; Future  - EKG 12-lead complete w/read - (Clinic Performed)  - Basic metabolic panel  - CBC with Platelets & Differential    Hypothyroidism, postablative  Update.   - TSH with free T4 reflex; Future  - TSH with free T4 reflex    Malignant neoplasm of skin of face  Upcoming MOHS procedure.      Permanent atrial fibrillation (H)  Stable.  On the eliquis.  Ok to stop 2-3 days prior with restart when able.         Implanted Device:   - Type of device: pacemaker Patient advised to bring device information on day of surgery.      Risks and Recommendations:  The patient has the following additional risks and recommendations for perioperative complications:   - No identified additional risk factors other than previously addressed        RECOMMENDATION:  APPROVAL GIVEN to proceed with proposed procedure, without further diagnostic evaluation.                      Subjective     HPI related to upcoming procedure: upcoming MOHS facial skin cancer.  I don't have path but I believe (as does Mary Alice) BCC.        Preop Questions 3/30/2022   1. Have you ever had a heart attack or stroke? No   2. Have you ever had surgery on  your heart or blood vessels, such as a stent placement, a coronary artery bypass, or surgery on an artery in your head, neck, heart, or legs? YES - pacemaker   3. Do you have chest pain with activity? No   4. Do you have a history of  heart failure? No   5. Do you currently have a cold, bronchitis or symptoms of other infection? No   6. Do you have a cough, shortness of breath, or wheezing? YES - cough pt has COPD   7. Do you or anyone in your family have previous history of blood clots? No   8. Do you or does anyone in your family have a serious bleeding problem such as prolonged bleeding following surgeries or cuts? No   9. Have you ever had problems with anemia or been told to take iron pills? No   10. Have you had any abnormal blood loss such as black, tarry or bloody stools, or abnormal vaginal bleeding? No   11. Have you ever had a blood transfusion? No   12. Are you willing to have a blood transfusion if it is medically needed before, during, or after your surgery? Yes   13. Have you or any of your relatives ever had problems with anesthesia? No   14. Do you have sleep apnea, excessive snoring or daytime drowsiness? No   15. Do you have any artifical heart valves or other implanted medical devices like a pacemaker, defibrillator, or continuous glucose monitor? YES -    15a. What type of device do you have? pacemaker   15b. Name of the clinic that manages your device:  [facility]   16. Do you have artificial joints? No   17. Are you allergic to latex? No     Health Care Directive:  Patient has a Health Care Directive on file      Preoperative Review of :        Status of Chronic Conditions:  See problem list for active medical problems.  Problems all longstanding and stable, except as noted/documented.  See ROS for pertinent symptoms related to these conditions.      Review of Systems  CONSTITUTIONAL: NEGATIVE for fever, chills, change in weight  INTEGUMENTARY/SKIN: NEGATIVE for worrisome rashes, moles or  lesions  EYES: NEGATIVE for vision changes or irritation  ENT/MOUTH: NEGATIVE for ear, mouth and throat problems  RESP: NEGATIVE for significant cough or SOB  CV: NEGATIVE for chest pain, palpitations or peripheral edema  GI: NEGATIVE for nausea, abdominal pain, heartburn, or change in bowel habits  : NEGATIVE for frequency, dysuria, or hematuria  MUSCULOSKELETAL: NEGATIVE for significant arthralgias or myalgia  NEURO: NEGATIVE for weakness, dizziness or paresthesias  ENDOCRINE: NEGATIVE for temperature intolerance, skin/hair changes  HEME: NEGATIVE for bleeding problems  PSYCHIATRIC: NEGATIVE for changes in mood or affect    Patient Active Problem List    Diagnosis Date Noted     Postoperative complete heart block (H) 11/28/2021     Priority: Medium     subsequent recovery of AV node conduction       Cardiac pacemaker in situ 11/28/2021     Priority: Medium     VVI; left bundle pacing       History of transcatheter aortic valve replacement (TAVR) 07/20/2021     Priority: Medium     Aortic stenosis, severe 07/14/2021     Priority: Medium     Other ill-defined heart diseases 04/15/2021     Priority: Medium     Added automatically from request for surgery [id number]       Aortic valve stenosis, etiology of cardiac valve disease unspecified 04/15/2021     Priority: Medium     Added automatically from request for surgery [id number]       Aortic valve disorder 03/12/2021     Priority: Medium     Formatting of this note might be different from the original.  BiCuspid Ao Valve       Mitral valve disorder 03/12/2021     Priority: Medium     Cardiomegaly 03/12/2021     Priority: Medium     Infection due to 2019 novel coronavirus 02/04/2021     Priority: Medium     Hypokalemia 09/08/2019     Priority: Medium     Acute cystitis without hematuria 09/03/2019     Priority: Medium     Small bowel obstruction (H) 09/03/2019     Priority: Medium     Acute renal failure (H) 09/03/2019     Priority: Medium     Status post coronary  angiogram 06/19/2018     Priority: Medium     Coronary artery disease involving native coronary artery of native heart without angina pectoris 06/19/2018     Priority: Medium     Health Care Home 01/26/2017     Priority: Medium     Acute on chronic respiratory failure (H) 01/02/2017     Priority: Medium     Long-term (current) use of anticoagulants [Z79.01] 08/03/2016     Priority: Medium     Essential hypertension 06/29/2016     Priority: Medium     Advance care planning 02/08/2016     Priority: Medium     Advance Care Planning 2/8/2016: Receipt of ACP document:  Received: Health Care Directive which was witnessed or notarized on 1/5/16.  Document previously scanned on 1/22/16.  Validation form completed and sent to be scanned.  Code Status needs to be updated to reflect choices in most recent ACP document.  1/5/16 Health Care Directive indicates preference for DNR code; recommend conversation about goals of care and completion of a POLST.  Confirmed/documented designated decision maker(s).  Added by Shobha Jang             Hypothyroidism, postablative 03/23/2015     Priority: Medium     Problem list name updated by automated process. Provider to review       Acute bronchitis 03/22/2015     Priority: Medium     Mild major depression (H) 08/27/2014     Priority: Medium     Bicuspid aortic valve 03/25/2014     Priority: Medium     Permanent atrial fibrillation (H) 01/17/2014     Priority: Medium     Pulmonary emphysema (H) 01/17/2014     Priority: Medium     Carotid stenosis 03/29/2012     Priority: Medium     Aortic aneurysm (H) 06/02/2010     Priority: Medium     COPD (chronic obstructive pulmonary disease) (H) 08/19/2009     Priority: Medium     Formatting of this note might be different from the original.  Has been steroid dependent;  Recently hospitalized with Flair and noted Oximetry 88% at rest 11/18/2009 with Respiratory Therapist at [facility].  Medically Disabled due to COPD.       Edema  08/14/2008     Priority: Medium     Depressive disorder 04/10/2007     Priority: Medium      Past Medical History:   Diagnosis Date     Asthma      Atrial fibrillation (H)      COPD (chronic obstructive pulmonary disease) (H)      Depression      High cholesterol      HTN (hypertension)      Oxygen dependent      Thyroid disease      Past Surgical History:   Procedure Laterality Date     BACK SURGERY  2006     CARDIAC SURGERY  06/12/2018    [facility]giogram at [facility]     COLONOSCOPY N/A 1/19/2016    Procedure: COLONOSCOPY;  Surgeon: Waldemar Bob MD;  Location: [facility] OR     CV CORONARY ANGIOGRAM N/A 4/30/2021    Procedure: CV CORONARY ANGIOGRAM;  Surgeon: Poli Walsh MD;  Location:  HEART CARDIAC CATH LAB     CV LEFT HEART CATH N/A 4/30/2021    Procedure: CV LEFT HEART CATH;  Surgeon: Poli Walsh MD;  Location:  HEART CARDIAC CATH LAB     CV RIGHT HEART CATH MEASUREMENTS RECORDED N/A 4/30/2021    Procedure: CV RIGHT HEART CATH;  Surgeon: Poli Walsh MD;  Location:  HEART CARDIAC CATH LAB     CV TRANSCATHETER AORTIC VALVE REPLACEMENT N/A 7/14/2021    Procedure: Right femoral Transcaval transcatheter aortic valve replacement (SUMMERS) size 29mm.  Transesophageal echocardiogram per anesthesia;  Surgeon: Domo Sarah MD;  Location: [facility] OR     EP PPM INSERT OF NEW OR REPL W/VENT LEAD N/A 7/14/2021    Procedure: insertion of Permanent Pacemaker;  Surgeon: Eliezer Myers MD;  Location: [facility] OR     HEART CATH FEMORAL CANNULIZATION WITH OPEN STANDBY REPAIR AORTIC VALVE N/A 7/14/2021    Procedure: Cardiopulmonary standby.;  Surgeon: Fly Smith MD;  Location: [facility] OR     HYSTERECTOMY  1980    partial     SLING BLADDER SUSPENSION WITH FASCIA LINNETTE       Current Outpatient Medications   Medication Sig Dispense Refill     acetaminophen (TYLENOL) 500 MG tablet Take 500-1,000 mg by mouth every 6 hours as needed for mild pain       albuterol (PROAIR  HFA/PROVENTIL HFA/VENTOLIN HFA) 108 (90 Base) MCG/ACT inhaler Inhale 2 puffs into the lungs every 6 hours 18 g 3     amoxicillin (AMOXIL) 500 MG capsule Take 4 capsules (2,000 mg) by mouth once as needed (SBE prophylaxis take 30-60 minutes prior to dental procedure/cleaning) 4 capsule 3     apixaban ANTICOAGULANT (ELIQUIS ANTICOAGULANT) 5 MG tablet Take 1 tablet (5 mg) by mouth 2 times daily 180 tablet 1     aspirin (ASA) 81 MG EC tablet Take 1 tablet (81 mg) by mouth daily 90 tablet 1     atorvastatin (LIPITOR) 10 MG tablet Take 1 tablet (10 mg) by mouth daily 90 tablet 1     Calcium Carb-Cholecalciferol (CALTRATE 600+D3 SOFT PO) Take 600 mg by mouth 2 times daily       cloNIDine (CATAPRES) 0.1 MG tablet Take 2 tablets (0.2 mg) by mouth At Bedtime 180 tablet 1     COMBIVENT RESPIMAT  MCG/ACT inhaler Inhale 1 puff into the lungs 4 times daily 16 g 1     diltiazem ER (TIAZAC) 360 MG 24 hr ER beaded capsule Take 1 capsule (360 mg) by mouth daily 90 capsule 1     diphenhydrAMINE (BENADRYL) 25 MG tablet Take 1-2 tablets (25-50 mg) by mouth every 6 hours as needed for itching or allergies 60 tablet 1     fluticasone-salmeterol (ADVAIR HFA) 230-21 MCG/ACT inhaler Inhale 2 puffs into the lungs 2 times daily 12 g 3     furosemide (LASIX) 40 MG tablet TAKE 1.5 TABLET(60 MG) in the a.m. and 1 TABLET (40 MG) in the afternoon 180 tablet 3     hydrOXYzine (ATARAX) 25 MG tablet Take 1 tablet (25 mg) by mouth 3 times daily as needed for itching 60 tablet 0     levothyroxine (SYNTHROID/LEVOTHROID) 100 MCG tablet Take 1 tablet (100 mcg) by mouth daily 90 tablet 3     losartan (COZAAR) 50 MG tablet Take 1 tablet (50 mg) by mouth 2 times daily 180 tablet 3     OTHER MEDICAL SUPPLIES Apply one pair to help with edema 1 each 0     potassium chloride ER (KLOR-CON M) 20 MEQ CR tablet Take 1 tablet (20 mEq) by mouth 3 times daily 270 tablet 1     predniSONE (DELTASONE) 20 MG tablet Take 3 tabs by mouth daily x 3 days, then 2 tabs  daily x 3 days, then 1 tab daily x 3 days, then 1/2 tab daily x 3 days. 20 tablet 0       Allergies   Allergen Reactions     Amlodipine Besylate Cough     Norvasc     Lisinopril Cough     Ace Inhibitors Cough        Social History     Tobacco Use     Smoking status: Former Smoker     Packs/day: 0.50     Years: 41.00     Pack years: 20.50     Types: Cigarettes     Start date: 1/1/1966     Quit date: 1/28/2007     Years since quitting: 15.1     Smokeless tobacco: Never Used   Substance Use Topics     Alcohol use: No     Alcohol/week: 0.0 standard drinks     Family History   Problem Relation Age of Onset     Prostate Cancer Father      Hypertension Father      Heart Failure Father         CHF     Asthma Mother      Cancer Mother         ovarian     Hypertension Mother      Asthma Brother      Hypertension Sister      Hypertension Brother      History   Drug Use No         Objective     /72   Pulse 88   Temp 98.3  F (36.8  C)   Wt 65.3 kg (144 lb)   SpO2 95%   BMI 28.12 kg/m      Physical Exam    GENERAL APPEARANCE: healthy, alert and no distress     EYES: EOMI, PERRL     HENT: ear canals and TM's normal and nose and mouth without ulcers or lesions     NECK: no adenopathy, no asymmetry, masses, or scars and thyroid normal to palpation     RESP: lungs clear to auscultation - no rales, rhonchi or wheezes     CV: regular rates and rhythm, normal S1 S2, no S3 or S4 and no murmur, click or rub     ABDOMEN:  soft, nontender, no HSM or masses and bowel sounds normal     MS: extremities normal- no gross deformities noted, no evidence of inflammation in joints, FROM in all extremities.     SKIN: no suspicious lesions or rashes     NEURO: Normal strength and tone, sensory exam grossly normal, mentation intact and speech normal     PSYCH: mentation appears normal. and affect normal/bright     LYMPHATICS: No cervical adenopathy    Recent Labs   Lab Test 12/02/21  1040 10/27/21  0927 08/18/21  0923 07/15/21  0611  07/14/21  1145 07/13/21  1711 04/30/21  1517 04/30/21  1113   HGB 12.7 14.4 14.0 12.1   < > 14.6   < > 13.8    208 179 150  --  215   < > 230   INR  --   --   --   --   --  1.06  --  1.08   NA  --   --  140 135   < > 134   < > 140   POTASSIUM  --   --  4.9 4.6   < > 4.7   < > 4.5   CR  --   --  0.92 0.97  --  1.01   < > 0.92    < > = values in this interval not displayed.        Diagnostics:  Cbc stable.  tsh and BMP pending.    EKG: atrial fibrillation, rate stable, normal axis, normal intervals, no acute ST/T changes c/w ischemia, no LVH by voltage criteria, unchanged from previous tracings    Revised Cardiac Risk Index (RCRI):  The patient has the following serious cardiovascular risks for perioperative complications:   - No serious cardiac risks = 0 points     RCRI Interpretation: 0 points: Class I (very low risk - 0.4% complication rate)           Signed Electronically by: Braydon Yen MD  Copy of this evaluation report is provided to requesting physician.

## 2022-03-30 ENCOUNTER — OFFICE VISIT (OUTPATIENT)
Dept: FAMILY MEDICINE | Facility: OTHER | Age: 72
End: 2022-03-30
Attending: FAMILY MEDICINE
Payer: COMMERCIAL

## 2022-03-30 VITALS
DIASTOLIC BLOOD PRESSURE: 72 MMHG | WEIGHT: 144 LBS | BODY MASS INDEX: 28.12 KG/M2 | HEART RATE: 88 BPM | TEMPERATURE: 98.3 F | OXYGEN SATURATION: 95 % | SYSTOLIC BLOOD PRESSURE: 120 MMHG

## 2022-03-30 DIAGNOSIS — Z01.818 PREOP GENERAL PHYSICAL EXAM: Primary | ICD-10-CM

## 2022-03-30 DIAGNOSIS — C44.300 MALIGNANT NEOPLASM OF SKIN OF FACE: ICD-10-CM

## 2022-03-30 DIAGNOSIS — I48.21 PERMANENT ATRIAL FIBRILLATION (H): ICD-10-CM

## 2022-03-30 DIAGNOSIS — E89.0 HYPOTHYROIDISM, POSTABLATIVE: ICD-10-CM

## 2022-03-30 LAB
ANION GAP SERPL CALCULATED.3IONS-SCNC: 7 MMOL/L (ref 3–14)
BASOPHILS # BLD AUTO: 0 10E3/UL (ref 0–0.2)
BASOPHILS NFR BLD AUTO: 1 %
BUN SERPL-MCNC: 15 MG/DL (ref 7–30)
CALCIUM SERPL-MCNC: 9.3 MG/DL (ref 8.5–10.1)
CHLORIDE BLD-SCNC: 102 MMOL/L (ref 94–109)
CO2 SERPL-SCNC: 25 MMOL/L (ref 20–32)
CREAT SERPL-MCNC: 0.88 MG/DL (ref 0.52–1.04)
EOSINOPHIL # BLD AUTO: 0.1 10E3/UL (ref 0–0.7)
EOSINOPHIL NFR BLD AUTO: 1 %
ERYTHROCYTE [DISTWIDTH] IN BLOOD BY AUTOMATED COUNT: 13.2 % (ref 10–15)
GFR SERPL CREATININE-BSD FRML MDRD: 70 ML/MIN/1.73M2
GLUCOSE BLD-MCNC: 111 MG/DL (ref 70–99)
HCT VFR BLD AUTO: 44.6 % (ref 35–47)
HGB BLD-MCNC: 13.9 G/DL (ref 11.7–15.7)
LYMPHOCYTES # BLD AUTO: 1 10E3/UL (ref 0.8–5.3)
LYMPHOCYTES NFR BLD AUTO: 11 %
MCH RBC QN AUTO: 30.1 PG (ref 26.5–33)
MCHC RBC AUTO-ENTMCNC: 31.2 G/DL (ref 31.5–36.5)
MCV RBC AUTO: 97 FL (ref 78–100)
MONOCYTES # BLD AUTO: 0.8 10E3/UL (ref 0–1.3)
MONOCYTES NFR BLD AUTO: 9 %
NEUTROPHILS # BLD AUTO: 6.9 10E3/UL (ref 1.6–8.3)
NEUTROPHILS NFR BLD AUTO: 78 %
PLATELET # BLD AUTO: 241 10E3/UL (ref 150–450)
POTASSIUM BLD-SCNC: 4.3 MMOL/L (ref 3.4–5.3)
RBC # BLD AUTO: 4.62 10E6/UL (ref 3.8–5.2)
SODIUM SERPL-SCNC: 134 MMOL/L (ref 133–144)
TSH SERPL DL<=0.005 MIU/L-ACNC: 0.6 MU/L (ref 0.4–4)
WBC # BLD AUTO: 8.9 10E3/UL (ref 4–11)

## 2022-03-30 PROCEDURE — 85025 COMPLETE CBC W/AUTO DIFF WBC: CPT | Mod: ZL | Performed by: FAMILY MEDICINE

## 2022-03-30 PROCEDURE — 99214 OFFICE O/P EST MOD 30 MIN: CPT | Mod: 25 | Performed by: FAMILY MEDICINE

## 2022-03-30 PROCEDURE — G0463 HOSPITAL OUTPT CLINIC VISIT: HCPCS | Mod: 25

## 2022-03-30 PROCEDURE — 36415 COLL VENOUS BLD VENIPUNCTURE: CPT | Mod: ZL | Performed by: FAMILY MEDICINE

## 2022-03-30 PROCEDURE — 93010 ELECTROCARDIOGRAM REPORT: CPT | Performed by: INTERNAL MEDICINE

## 2022-03-30 PROCEDURE — 82310 ASSAY OF CALCIUM: CPT | Mod: ZL | Performed by: FAMILY MEDICINE

## 2022-03-30 PROCEDURE — 93005 ELECTROCARDIOGRAM TRACING: CPT | Performed by: FAMILY MEDICINE

## 2022-03-30 PROCEDURE — 84443 ASSAY THYROID STIM HORMONE: CPT | Mod: ZL | Performed by: FAMILY MEDICINE

## 2022-03-30 ASSESSMENT — PAIN SCALES - GENERAL: PAINLEVEL: NO PAIN (0)

## 2022-03-30 NOTE — NURSING NOTE
Chief Complaint   Patient presents with     Pre-Op Exam       Initial /72   Pulse 88   Temp 98.3  F (36.8  C)   Wt 65.3 kg (144 lb)   SpO2 95%   BMI 28.12 kg/m   Estimated body mass index is 28.12 kg/m  as calculated from the following:    Height as of 10/12/21: 1.524 m (5').    Weight as of this encounter: 65.3 kg (144 lb).  Medication Reconciliation: complete  Joy Franklin LPN

## 2022-04-01 ENCOUNTER — MYC MEDICAL ADVICE (OUTPATIENT)
Dept: FAMILY MEDICINE | Facility: OTHER | Age: 72
End: 2022-04-01
Payer: COMMERCIAL

## 2022-04-01 DIAGNOSIS — E89.0 HYPOTHYROIDISM, POSTABLATIVE: ICD-10-CM

## 2022-04-04 RX ORDER — LEVOTHYROXINE SODIUM 100 UG/1
100 TABLET ORAL DAILY
Qty: 90 TABLET | Refills: 3 | Status: SHIPPED | OUTPATIENT
Start: 2022-04-04 | End: 2023-01-04

## 2022-04-05 ENCOUNTER — TRANSFERRED RECORDS (OUTPATIENT)
Dept: HEALTH INFORMATION MANAGEMENT | Facility: CLINIC | Age: 72
End: 2022-04-05
Payer: COMMERCIAL

## 2022-04-08 ENCOUNTER — TELEPHONE (OUTPATIENT)
Dept: FAMILY MEDICINE | Facility: OTHER | Age: 72
End: 2022-04-08
Payer: COMMERCIAL

## 2022-04-08 NOTE — TELEPHONE ENCOUNTER
" called from [facility]. Pt had surgrery and needs to speak with PCP or nurse on starting Eliquis on Monday.She is having some \"oozing\". Transferred to [place] to discuss.     cell [phone]    Damaris Vaughn RN    "

## 2022-04-13 NOTE — PROGRESS NOTES
Mary Alice called to let Dr. Farrell know that she was started on Prednisone on 1/23/18 she will take 60 mg for three days then 40 mg for three days etc.... And when she is done with this she is out of the prednisone that Dr. Farrell ordered for her to have when she starts having trouble. She will call when she gets to the end of this taper down.    Detail Level: Detailed Spironolactone Counseling: Patient advised regarding risks of diarrhea, abdominal pain, hyperkalemia, birth defects (for female patients), liver toxicity and renal toxicity. The patient may need blood work to monitor liver and kidney function and potassium levels while on therapy. The patient verbalized understanding of the proper use and possible adverse effects of spironolactone.  All of the patient's questions and concerns were addressed. Dapsone Pregnancy And Lactation Text: This medication is Pregnancy Category C and is not considered safe during pregnancy or breast feeding. Minocycline Counseling: Patient advised regarding possible photosensitivity and discoloration of the teeth, skin, lips, tongue and gums.  Patient instructed to avoid sunlight, if possible.  When exposed to sunlight, patients should wear protective clothing, sunglasses, and sunscreen.  The patient was instructed to call the office immediately if the following severe adverse effects occur:  hearing changes, easy bruising/bleeding, severe headache, or vision changes.  The patient verbalized understanding of the proper use and possible adverse effects of minocycline.  All of the patient's questions and concerns were addressed. Bactrim Pregnancy And Lactation Text: This medication is Pregnancy Category D and is known to cause fetal risk.  It is also excreted in breast milk. Azelaic Acid Counseling: Patient counseled that medicine may cause skin irritation and to avoid applying near the eyes.  In the event of skin irritation, the patient was advised to reduce the amount of the drug applied or use it less frequently.   The patient verbalized understanding of the proper use and possible adverse effects of azelaic acid.  All of the patient's questions and concerns were addressed. Benzoyl Peroxide Pregnancy And Lactation Text: This medication is Pregnancy Category C. It is unknown if benzoyl peroxide is excreted in breast milk. Isotretinoin Counseling: Patient should get monthly blood tests, not donate blood, not drive at night if vision affected, not share medication, and not undergo elective surgery for 6 months after tx completed. Side effects reviewed, pt to contact office should one occur. Spironolactone Pregnancy And Lactation Text: This medication can cause feminization of the male fetus and should be avoided during pregnancy. The active metabolite is also found in breast milk. Doxycycline Counseling:  Patient counseled regarding possible photosensitivity and increased risk for sunburn.  Patient instructed to avoid sunlight, if possible.  When exposed to sunlight, patients should wear protective clothing, sunglasses, and sunscreen.  The patient was instructed to call the office immediately if the following severe adverse effects occur:  hearing changes, easy bruising/bleeding, severe headache, or vision changes.  The patient verbalized understanding of the proper use and possible adverse effects of doxycycline.  All of the patient's questions and concerns were addressed. Detail Level: Zone Topical Clindamycin Counseling: Patient counseled that this medication may cause skin irritation or allergic reactions.  In the event of skin irritation, the patient was advised to reduce the amount of the drug applied or use it less frequently.   The patient verbalized understanding of the proper use and possible adverse effects of clindamycin.  All of the patient's questions and concerns were addressed. Minocycline Pregnancy And Lactation Text: This medication is Pregnancy Category D and not consider safe during pregnancy. It is also excreted in breast milk. Include Pregnancy/Lactation Warning?: No Birth Control Pills Counseling: Birth Control Pill Counseling: I discussed with the patient the potential side effects of OCPs including but not limited to increased risk of stroke, heart attack, thrombophlebitis, deep venous thrombosis, hepatic adenomas, breast changes, GI upset, headaches, and depression.  The patient verbalized understanding of the proper use and possible adverse effects of OCPs. All of the patient's questions and concerns were addressed. Topical Retinoid counseling:  Patient advised to apply a pea-sized amount only at bedtime and wait 30 minutes after washing their face before applying.  If too drying, patient may add a non-comedogenic moisturizer. The patient verbalized understanding of the proper use and possible adverse effects of retinoids.  All of the patient's questions and concerns were addressed. Isotretinoin Pregnancy And Lactation Text: This medication is Pregnancy Category X and is considered extremely dangerous during pregnancy. It is unknown if it is excreted in breast milk. Azelaic Acid Pregnancy And Lactation Text: This medication is considered safe during pregnancy and breast feeding. Tetracycline Counseling: Patient counseled regarding possible photosensitivity and increased risk for sunburn.  Patient instructed to avoid sunlight, if possible.  When exposed to sunlight, patients should wear protective clothing, sunglasses, and sunscreen.  The patient was instructed to call the office immediately if the following severe adverse effects occur:  hearing changes, easy bruising/bleeding, severe headache, or vision changes.  The patient verbalized understanding of the proper use and possible adverse effects of tetracycline.  All of the patient's questions and concerns were addressed. Patient understands to avoid pregnancy while on therapy due to potential birth defects. Doxycycline Pregnancy And Lactation Text: This medication is Pregnancy Category D and not consider safe during pregnancy. It is also excreted in breast milk but is considered safe for shorter treatment courses. Azithromycin Counseling:  I discussed with the patient the risks of azithromycin including but not limited to GI upset, allergic reaction, drug rash, diarrhea, and yeast infections. Topical Clindamycin Pregnancy And Lactation Text: This medication is Pregnancy Category B and is considered safe during pregnancy. It is unknown if it is excreted in breast milk. Sarecycline Counseling: Patient advised regarding possible photosensitivity and discoloration of the teeth, skin, lips, tongue and gums.  Patient instructed to avoid sunlight, if possible.  When exposed to sunlight, patients should wear protective clothing, sunglasses, and sunscreen.  The patient was instructed to call the office immediately if the following severe adverse effects occur:  hearing changes, easy bruising/bleeding, severe headache, or vision changes.  The patient verbalized understanding of the proper use and possible adverse effects of sarecycline.  All of the patient's questions and concerns were addressed. Topical Retinoid Pregnancy And Lactation Text: This medication is Pregnancy Category C. It is unknown if this medication is excreted in breast milk. Winlevi Counseling:  I discussed with the patient the risks of topical clascoterone including but not limited to erythema, scaling, itching, and stinging. Patient voiced their understanding. Birth Control Pills Pregnancy And Lactation Text: This medication should be avoided if pregnant and for the first 30 days post-partum. High Dose Vitamin A Counseling: Side effects reviewed, pt to contact office should one occur. Azithromycin Pregnancy And Lactation Text: This medication is considered safe during pregnancy and is also secreted in breast milk. Topical Sulfur Applications Counseling: Topical Sulfur Counseling: Patient counseled that this medication may cause skin irritation or allergic reactions.  In the event of skin irritation, the patient was advised to reduce the amount of the drug applied or use it less frequently.   The patient verbalized understanding of the proper use and possible adverse effects of topical sulfur application.  All of the patient's questions and concerns were addressed. Erythromycin Counseling:  I discussed with the patient the risks of erythromycin including but not limited to GI upset, allergic reaction, drug rash, diarrhea, increase in liver enzymes, and yeast infections. Tazorac Counseling:  Patient advised that medication is irritating and drying.  Patient may need to apply sparingly and wash off after an hour before eventually leaving it on overnight.  The patient verbalized understanding of the proper use and possible adverse effects of tazorac.  All of the patient's questions and concerns were addressed. Winlevi Pregnancy And Lactation Text: This medication is considered safe during pregnancy and breastfeeding. Dapsone Counseling: I discussed with the patient the risks of dapsone including but not limited to hemolytic anemia, agranulocytosis, rashes, methemoglobinemia, kidney failure, peripheral neuropathy, headaches, GI upset, and liver toxicity.  Patients who start dapsone require monitoring including baseline LFTs and weekly CBCs for the first month, then every month thereafter.  The patient verbalized understanding of the proper use and possible adverse effects of dapsone.  All of the patient's questions and concerns were addressed. Benzoyl Peroxide Counseling: Patient counseled that medicine may cause skin irritation and bleach clothing.  In the event of skin irritation, the patient was advised to reduce the amount of the drug applied or use it less frequently.   The patient verbalized understanding of the proper use and possible adverse effects of benzoyl peroxide.  All of the patient's questions and concerns were addressed. High Dose Vitamin A Pregnancy And Lactation Text: High dose vitamin A therapy is contraindicated during pregnancy and breast feeding. Bactrim Counseling:  I discussed with the patient the risks of sulfa antibiotics including but not limited to GI upset, allergic reaction, drug rash, diarrhea, dizziness, photosensitivity, and yeast infections.  Rarely, more serious reactions can occur including but not limited to aplastic anemia, agranulocytosis, methemoglobinemia, blood dyscrasias, liver or kidney failure, lung infiltrates or desquamative/blistering drug rashes. Topical Sulfur Applications Pregnancy And Lactation Text: This medication is Pregnancy Category C and has an unknown safety profile during pregnancy. It is unknown if this topical medication is excreted in breast milk. Erythromycin Pregnancy And Lactation Text: This medication is Pregnancy Category B and is considered safe during pregnancy. It is also excreted in breast milk. Tazorac Pregnancy And Lactation Text: This medication is not safe during pregnancy. It is unknown if this medication is excreted in breast milk. Detail Level: Generalized

## 2022-04-14 ENCOUNTER — TRANSFERRED RECORDS (OUTPATIENT)
Dept: HEALTH INFORMATION MANAGEMENT | Facility: CLINIC | Age: 72
End: 2022-04-14
Payer: COMMERCIAL

## 2022-04-18 ENCOUNTER — ANCILLARY PROCEDURE (OUTPATIENT)
Dept: CARDIOLOGY | Facility: CLINIC | Age: 72
End: 2022-04-18
Attending: INTERNAL MEDICINE
Payer: MEDICARE

## 2022-04-18 DIAGNOSIS — I44.2 ATRIOVENTRICULAR BLOCK, COMPLETE (H): ICD-10-CM

## 2022-04-18 DIAGNOSIS — Z95.0 CARDIAC PACEMAKER IN SITU: ICD-10-CM

## 2022-04-18 PROCEDURE — 93294 REM INTERROG EVL PM/LDLS PM: CPT | Performed by: INTERNAL MEDICINE

## 2022-04-18 PROCEDURE — 93296 REM INTERROG EVL PM/IDS: CPT

## 2022-04-22 ENCOUNTER — TRANSFERRED RECORDS (OUTPATIENT)
Dept: HEALTH INFORMATION MANAGEMENT | Facility: CLINIC | Age: 72
End: 2022-04-22
Payer: COMMERCIAL

## 2022-04-29 ENCOUNTER — TRANSFERRED RECORDS (OUTPATIENT)
Dept: HEALTH INFORMATION MANAGEMENT | Facility: CLINIC | Age: 72
End: 2022-04-29
Payer: COMMERCIAL

## 2022-05-06 ENCOUNTER — TRANSFERRED RECORDS (OUTPATIENT)
Dept: HEALTH INFORMATION MANAGEMENT | Facility: CLINIC | Age: 72
End: 2022-05-06
Payer: COMMERCIAL

## 2022-05-14 ENCOUNTER — HEALTH MAINTENANCE LETTER (OUTPATIENT)
Age: 72
End: 2022-05-14

## 2022-05-16 ENCOUNTER — TRANSFERRED RECORDS (OUTPATIENT)
Dept: HEALTH INFORMATION MANAGEMENT | Facility: CLINIC | Age: 72
End: 2022-05-16
Payer: COMMERCIAL

## 2022-05-24 LAB
MDC_IDC_EPISODE_DTM: NORMAL
MDC_IDC_EPISODE_DURATION: 0 S
MDC_IDC_EPISODE_DURATION: 0 S
MDC_IDC_EPISODE_DURATION: 1 S
MDC_IDC_EPISODE_DURATION: 2 S
MDC_IDC_EPISODE_ID: 3039
MDC_IDC_EPISODE_ID: 3040
MDC_IDC_EPISODE_ID: 3041
MDC_IDC_EPISODE_ID: 3042
MDC_IDC_EPISODE_ID: 3043
MDC_IDC_EPISODE_ID: 3044
MDC_IDC_EPISODE_ID: 3045
MDC_IDC_EPISODE_ID: 3046
MDC_IDC_EPISODE_ID: 3047
MDC_IDC_EPISODE_ID: 3048
MDC_IDC_EPISODE_ID: 3049
MDC_IDC_EPISODE_ID: 3050
MDC_IDC_EPISODE_ID: 3051
MDC_IDC_EPISODE_ID: 3052
MDC_IDC_EPISODE_ID: 3053
MDC_IDC_EPISODE_TYPE: NORMAL
MDC_IDC_LEAD_IMPLANT_DT: NORMAL
MDC_IDC_LEAD_LOCATION: NORMAL
MDC_IDC_LEAD_LOCATION_DETAIL_1: NORMAL
MDC_IDC_LEAD_MFG: NORMAL
MDC_IDC_LEAD_MODEL: NORMAL
MDC_IDC_LEAD_POLARITY_TYPE: NORMAL
MDC_IDC_LEAD_SERIAL: NORMAL
MDC_IDC_LEAD_SPECIAL_FUNCTION: NORMAL
MDC_IDC_MSMT_BATTERY_DTM: NORMAL
MDC_IDC_MSMT_BATTERY_REMAINING_LONGEVITY: 180 MO
MDC_IDC_MSMT_BATTERY_RRT_TRIGGER: 2.62
MDC_IDC_MSMT_BATTERY_STATUS: NORMAL
MDC_IDC_MSMT_BATTERY_VOLTAGE: 3.2 V
MDC_IDC_MSMT_LEADCHNL_RV_IMPEDANCE_VALUE: 456 OHM
MDC_IDC_MSMT_LEADCHNL_RV_IMPEDANCE_VALUE: 551 OHM
MDC_IDC_MSMT_LEADCHNL_RV_PACING_THRESHOLD_AMPLITUDE: 0.5 V
MDC_IDC_MSMT_LEADCHNL_RV_PACING_THRESHOLD_PULSEWIDTH: 0.4 MS
MDC_IDC_MSMT_LEADCHNL_RV_SENSING_INTR_AMPL: 13.25 MV
MDC_IDC_MSMT_LEADCHNL_RV_SENSING_INTR_AMPL: 13.25 MV
MDC_IDC_PG_IMPLANT_DTM: NORMAL
MDC_IDC_PG_MFG: NORMAL
MDC_IDC_PG_MODEL: NORMAL
MDC_IDC_PG_SERIAL: NORMAL
MDC_IDC_PG_TYPE: NORMAL
MDC_IDC_SESS_CLINIC_NAME: NORMAL
MDC_IDC_SESS_DTM: NORMAL
MDC_IDC_SESS_TYPE: NORMAL
MDC_IDC_SET_BRADY_HYSTRATE: NORMAL
MDC_IDC_SET_BRADY_LOWRATE: 60 {BEATS}/MIN
MDC_IDC_SET_BRADY_MAX_SENSOR_RATE: 130 {BEATS}/MIN
MDC_IDC_SET_BRADY_MODE: NORMAL
MDC_IDC_SET_LEADCHNL_RV_PACING_AMPLITUDE: 2 V
MDC_IDC_SET_LEADCHNL_RV_PACING_ANODE_ELECTRODE_1: NORMAL
MDC_IDC_SET_LEADCHNL_RV_PACING_CAPTURE_MODE: NORMAL
MDC_IDC_SET_LEADCHNL_RV_PACING_CATHODE_ELECTRODE_1: NORMAL
MDC_IDC_SET_LEADCHNL_RV_PACING_CATHODE_LOCATION_1: NORMAL
MDC_IDC_SET_LEADCHNL_RV_PACING_POLARITY: NORMAL
MDC_IDC_SET_LEADCHNL_RV_PACING_PULSEWIDTH: 0.4 MS
MDC_IDC_SET_LEADCHNL_RV_SENSING_ANODE_ELECTRODE_1: NORMAL
MDC_IDC_SET_LEADCHNL_RV_SENSING_ANODE_LOCATION_1: NORMAL
MDC_IDC_SET_LEADCHNL_RV_SENSING_CATHODE_ELECTRODE_1: NORMAL
MDC_IDC_SET_LEADCHNL_RV_SENSING_CATHODE_LOCATION_1: NORMAL
MDC_IDC_SET_LEADCHNL_RV_SENSING_POLARITY: NORMAL
MDC_IDC_SET_LEADCHNL_RV_SENSING_SENSITIVITY: 2 MV
MDC_IDC_SET_ZONE_DETECTION_INTERVAL: 360 MS
MDC_IDC_SET_ZONE_TYPE: NORMAL
MDC_IDC_STAT_BRADY_DTM_END: NORMAL
MDC_IDC_STAT_BRADY_DTM_START: NORMAL
MDC_IDC_STAT_BRADY_RV_PERCENT_PACED: 12.44 %
MDC_IDC_STAT_EPISODE_RECENT_COUNT: 0
MDC_IDC_STAT_EPISODE_RECENT_COUNT: 0
MDC_IDC_STAT_EPISODE_RECENT_COUNT: 1011
MDC_IDC_STAT_EPISODE_RECENT_COUNT_DTM_END: NORMAL
MDC_IDC_STAT_EPISODE_RECENT_COUNT_DTM_START: NORMAL
MDC_IDC_STAT_EPISODE_TOTAL_COUNT: 0
MDC_IDC_STAT_EPISODE_TOTAL_COUNT: 1
MDC_IDC_STAT_EPISODE_TOTAL_COUNT: 3052
MDC_IDC_STAT_EPISODE_TOTAL_COUNT_DTM_END: NORMAL
MDC_IDC_STAT_EPISODE_TOTAL_COUNT_DTM_START: NORMAL
MDC_IDC_STAT_EPISODE_TYPE: NORMAL

## 2022-06-06 ENCOUNTER — TRANSFERRED RECORDS (OUTPATIENT)
Dept: HEALTH INFORMATION MANAGEMENT | Facility: CLINIC | Age: 72
End: 2022-06-06

## 2022-06-21 ENCOUNTER — TELEPHONE (OUTPATIENT)
Dept: CARDIOLOGY | Facility: OTHER | Age: 72
End: 2022-06-21
Payer: COMMERCIAL

## 2022-06-21 NOTE — TELEPHONE ENCOUNTER
lvm for pt to call back to either reschedule for next available new pt with steven tony in [place] or can schedule with new pt with different cardiologist in [place] for sooner

## 2022-06-21 NOTE — PROGRESS NOTES
Mary Alice BLOOM Nisha    January 12, 2017    Chief Complaint   Patient presents with     Hospital F/U     Pt is in for a FU after hospitalization on 001/02/17 for Bronchitis, COPD and A-fib. Pt has a cough with think yellow mucus. Pt is on continuous O2.       SUBJECTIVE:  Here for f/u.  2 days after hospital got sick again and coughing uncontrollably.  Some sob.  Purulent sputum.  Lengthy discussion today.      Past Medical History   Diagnosis Date     Asthma      Depression      Atrial fibrillation (H)      COPD (chronic obstructive pulmonary disease) (H)      High cholesterol      HTN (hypertension)      Thyroid disease        Past Surgical History   Procedure Laterality Date     Sling bladder suspension with fascia scar       Back surgery  2006     Hysterectomy  1980     partial     Colonoscopy N/A 1/19/2016     Procedure: COLONOSCOPY;  Surgeon: Waldemar Bob MD;  Location: [facility] OR       Current Outpatient Prescriptions   Medication Sig Dispense Refill     predniSONE (DELTASONE) 20 MG tablet Take 3 tabs (60 mg) by mouth daily x 3 days, 2 tabs (40 mg) daily x 3 days, 1 tab (20 mg) daily x 3 days, then 1/2 tab (10 mg) x 3 days. 20 tablet 0     doxycycline (VIBRAMYCIN) 100 MG capsule Take 1 capsule (100 mg) by mouth 2 times daily 28 capsule 0     furosemide (LASIX) 40 MG tablet TAKE 1 TABLET BY MOUTH TWICE DAILY. 180 tablet 2     ipratropium - albuterol 0.5 mg/2.5 mg/3 mL (DUONEB) 0.5-2.5 (3) MG/3ML neb solution USE 1 VIAL PER NEBULIZER FOUR TIMES DAILY 6 Box 3     albuterol (2.5 MG/3ML) 0.083% neb solution Take 1 vial (2.5 mg) by nebulization every 6 hours as needed for shortness of breath / dyspnea or wheezing 25 vial 1     albuterol (VENTOLIN HFA) 108 (90 BASE) MCG/ACT inhaler Inhale 2 puffs into the lungs every 4 hours as needed for shortness of breath / dyspnea or wheezing 3 Inhaler 0     cloNIDine (CATAPRES) 0.1 MG tablet Take 1 tablet (0.1 mg) by mouth every morning 90 tablet 0     hydrALAZINE (APRESOLINE) 25 MG  tablet TAKE 3 TABLETS BY MOUTH THREE TIMES DAILY 270 tablet 5     cloNIDine (CATAPRES) 0.1 MG tablet Take 3 tablets (0.3 mg) by mouth every evening 90 tablet 0     warfarin (COUMADIN) 2 MG tablet Take 1 tablet (2 mg) by mouth daily 90 tablet 3     triamcinolone (KENALOG) 0.1 % ointment Apply bid and hs left hand and legs - for dermatitis 454 g 3     flecainide (TAMBOCOR) 100 MG tablet Take 1 tablet (100 mg) by mouth 2 times daily 180 tablet 3     simvastatin (ZOCOR) 10 MG tablet TAKE 1 TABLET(10 MG) BY MOUTH AT BEDTIME 90 tablet 3     levothyroxine (SYNTHROID,LEVOTHROID) 100 MCG tablet Take 1 tablet (100 mcg) by mouth daily 90 tablet 3     potassium chloride SA (K-DUR,KLOR-CON M) 20 MEQ tablet Take 1 tablet (20 mEq) by mouth 2 times daily 180 tablet 3     diphenhydrAMINE (BENADRYL) 25 MG tablet Take 1-2 tablets (25-50 mg) by mouth every 6 hours as needed for itching or allergies 60 tablet 1     acetaminophen (TYLENOL) 500 MG tablet Take 500-1,000 mg by mouth every 6 hours as needed for mild pain       OTHER MEDICAL SUPPLIES Apply one pair to help with edema 1 each 0     LORazepam (ATIVAN) 1 MG tablet Take 0.5-1 tablets by mouth every 6 hours as needed         Allergies   Allergen Reactions     Amlodipine Besylate Cough     Norvasc     Lisinopril Cough       Family History   Problem Relation Age of Onset     Asthma Mother      Asthma Brother      CANCER Mother      ovarian     Prostate Cancer Father      Hypertension Father      Hypertension Mother      Hypertension Sister      Hypertension Brother      Heart Failure Father      CHF       Social History     Social History     Marital Status:      Spouse Name: N/A     Number of Children: N/A     Years of Education: N/A     Occupational History      Retired     disabled     Social History Main Topics     Smoking status: Former Smoker -- 48 years     Types: Cigarettes     Quit date: 01/28/2007     Smokeless tobacco: Never Used     Alcohol Use: No     Drug Use: No      Sexual Activity: No      Comment:      Other Topics Concern      Service No     Blood Transfusions Yes     Permits if needed     Caffeine Concern Yes     2 cups coffee daily     Seat Belt Yes     Parent/Sibling W/ Cabg, Mi Or Angioplasty Before 65f 55m? No     Social History Narrative       5 point ROS negative except as noted above in HPI, including Gen., Resp., CV, GI &  system review.     OBJECTIVE:  B/P: 108/57, T: 98.6, P: 65, R: 16    GENERAL APPEARANCE: Alert, no acute distress  CV: regular rate and rhythm, no murmur, rub or gallop  RESP: lungs clear to auscultation bilaterally with diffuse harsh wheeze.    ABDOMEN: normal bowel sounds, soft, nontender, no hepatosplenomegaly or other masses  SKIN: no suspicious lesions or rashes to visualized skin  NEURO: Alert, oriented x 3, speech and mentation normal    ASSESSMENT and PLAN:  (J44.1) COPD exacerbation (H)  (primary encounter diagnosis)  Comment: worsening.    Plan: predniSONE (DELTASONE) 20 MG tablet,         doxycycline (VIBRAMYCIN) 100 MG capsule        Restart steroid and cover with doxy.  Let CC know.  F/u with ongoing concerns or any worsening.      (F41.9) Anxiety  Comment: discussed.    Plan: no change.  She will use melatonin at night.      (I48.1) Atrial fibrillation, persistent (H)  Comment: stable.   Plan: no change.          Glycopyrrolate Pregnancy And Lactation Text: This medication is Pregnancy Category B and is considered safe during pregnancy. It is unknown if it is excreted breast milk.

## 2022-06-23 ENCOUNTER — APPOINTMENT (OUTPATIENT)
Dept: GENERAL RADIOLOGY | Facility: OTHER | Age: 72
End: 2022-06-23
Attending: FAMILY MEDICINE
Payer: MEDICARE

## 2022-06-23 ENCOUNTER — APPOINTMENT (OUTPATIENT)
Dept: LAB | Facility: OTHER | Age: 72
End: 2022-06-23
Attending: INTERNAL MEDICINE
Payer: MEDICARE

## 2022-06-23 ENCOUNTER — PATIENT OUTREACH (OUTPATIENT)
Dept: CARE COORDINATION | Facility: OTHER | Age: 72
End: 2022-06-23

## 2022-06-23 ENCOUNTER — OFFICE VISIT (OUTPATIENT)
Dept: CARDIOLOGY | Facility: OTHER | Age: 72
End: 2022-06-23
Attending: INTERNAL MEDICINE
Payer: COMMERCIAL

## 2022-06-23 VITALS
HEIGHT: 64 IN | TEMPERATURE: 98.8 F | RESPIRATION RATE: 18 BRPM | BODY MASS INDEX: 24.07 KG/M2 | WEIGHT: 141 LBS | HEART RATE: 93 BPM | SYSTOLIC BLOOD PRESSURE: 114 MMHG | DIASTOLIC BLOOD PRESSURE: 74 MMHG | OXYGEN SATURATION: 96 %

## 2022-06-23 DIAGNOSIS — E87.6 HYPOKALEMIA: ICD-10-CM

## 2022-06-23 DIAGNOSIS — I10 ESSENTIAL HYPERTENSION: ICD-10-CM

## 2022-06-23 DIAGNOSIS — I48.21 PERMANENT ATRIAL FIBRILLATION (H): ICD-10-CM

## 2022-06-23 DIAGNOSIS — R60.9 EDEMA, UNSPECIFIED TYPE: Primary | ICD-10-CM

## 2022-06-23 DIAGNOSIS — J44.9 CHRONIC OBSTRUCTIVE PULMONARY DISEASE, UNSPECIFIED COPD TYPE (H): ICD-10-CM

## 2022-06-23 DIAGNOSIS — Z95.2 S/P TAVR (TRANSCATHETER AORTIC VALVE REPLACEMENT): ICD-10-CM

## 2022-06-23 DIAGNOSIS — R06.2 WHEEZING: ICD-10-CM

## 2022-06-23 DIAGNOSIS — Q23.81 BICUSPID AORTIC VALVE: ICD-10-CM

## 2022-06-23 DIAGNOSIS — J44.9 COPD (CHRONIC OBSTRUCTIVE PULMONARY DISEASE) (H): ICD-10-CM

## 2022-06-23 DIAGNOSIS — E78.5 HYPERLIPIDEMIA WITH TARGET LDL LESS THAN 100: ICD-10-CM

## 2022-06-23 DIAGNOSIS — I65.23 CAROTID STENOSIS, ASYMPTOMATIC, BILATERAL: ICD-10-CM

## 2022-06-23 DIAGNOSIS — Z79.899 ON POTASSIUM WASTING DIURETIC THERAPY: ICD-10-CM

## 2022-06-23 DIAGNOSIS — J43.9 PULMONARY EMPHYSEMA (H): ICD-10-CM

## 2022-06-23 DIAGNOSIS — Z79.01 ANTICOAGULATED BY ANTICOAGULATION TREATMENT: ICD-10-CM

## 2022-06-23 DIAGNOSIS — I95.1 AUTONOMIC ORTHOSTATIC HYPOTENSION: ICD-10-CM

## 2022-06-23 DIAGNOSIS — I27.20 PULMONARY HYPERTENSION (H): ICD-10-CM

## 2022-06-23 DIAGNOSIS — R60.9 EDEMA: Primary | ICD-10-CM

## 2022-06-23 LAB
ALBUMIN SERPL-MCNC: 3.5 G/DL (ref 3.4–5)
ALP SERPL-CCNC: 52 U/L (ref 40–150)
ALT SERPL W P-5'-P-CCNC: 14 U/L (ref 0–50)
ANION GAP SERPL CALCULATED.3IONS-SCNC: 7 MMOL/L (ref 3–14)
AST SERPL W P-5'-P-CCNC: 18 U/L (ref 0–45)
BILIRUB SERPL-MCNC: 0.7 MG/DL (ref 0.2–1.3)
BUN SERPL-MCNC: 14 MG/DL (ref 7–30)
CALCIUM SERPL-MCNC: 9.7 MG/DL (ref 8.5–10.1)
CHLORIDE BLD-SCNC: 102 MMOL/L (ref 94–109)
CO2 SERPL-SCNC: 28 MMOL/L (ref 20–32)
CREAT SERPL-MCNC: 0.97 MG/DL (ref 0.52–1.04)
GFR SERPL CREATININE-BSD FRML MDRD: 62 ML/MIN/1.73M2
GLUCOSE BLD-MCNC: 126 MG/DL (ref 70–99)
MAGNESIUM SERPL-MCNC: 2 MG/DL (ref 1.6–2.3)
NT-PROBNP SERPL-MCNC: 1940 PG/ML (ref 0–900)
POTASSIUM BLD-SCNC: 4.6 MMOL/L (ref 3.4–5.3)
PROT SERPL-MCNC: 7.1 G/DL (ref 6.8–8.8)
SODIUM SERPL-SCNC: 137 MMOL/L (ref 133–144)

## 2022-06-23 PROCEDURE — 93010 ELECTROCARDIOGRAM REPORT: CPT | Mod: 77 | Performed by: INTERNAL MEDICINE

## 2022-06-23 PROCEDURE — 80053 COMPREHEN METABOLIC PANEL: CPT | Mod: ZL | Performed by: INTERNAL MEDICINE

## 2022-06-23 PROCEDURE — G0463 HOSPITAL OUTPT CLINIC VISIT: HCPCS | Mod: 25

## 2022-06-23 PROCEDURE — 36415 COLL VENOUS BLD VENIPUNCTURE: CPT | Mod: ZL | Performed by: INTERNAL MEDICINE

## 2022-06-23 PROCEDURE — 83735 ASSAY OF MAGNESIUM: CPT | Mod: ZL | Performed by: INTERNAL MEDICINE

## 2022-06-23 PROCEDURE — 99215 OFFICE O/P EST HI 40 MIN: CPT | Mod: 25 | Performed by: INTERNAL MEDICINE

## 2022-06-23 PROCEDURE — 83880 ASSAY OF NATRIURETIC PEPTIDE: CPT | Mod: ZL | Performed by: INTERNAL MEDICINE

## 2022-06-23 PROCEDURE — 93005 ELECTROCARDIOGRAM TRACING: CPT

## 2022-06-23 PROCEDURE — 80061 LIPID PANEL: CPT | Mod: ZL | Performed by: INTERNAL MEDICINE

## 2022-06-23 ASSESSMENT — ENCOUNTER SYMPTOMS
ALLERGIC/IMMUNOLOGIC NEGATIVE: 1
DYSURIA: 0
BRUISES/BLEEDS EASILY: 1
FREQUENCY: 1
CONSTITUTIONAL NEGATIVE: 1
ORTHOPNEA: 0
SYNCOPE: 0
MUSCULOSKELETAL NEGATIVE: 1
ENDOCRINE NEGATIVE: 1
PALPITATIONS: 0
SHORTNESS OF BREATH: 1
EYES NEGATIVE: 1
COUGH: 0
GASTROINTESTINAL NEGATIVE: 1
WHEEZING: 1
NEAR-SYNCOPE: 0
PSYCHIATRIC NEGATIVE: 1
PND: 0
SPUTUM PRODUCTION: 1
NEUROLOGICAL NEGATIVE: 1
HEMOPTYSIS: 0
SUSPICIOUS LESIONS: 0
DYSPNEA ON EXERTION: 0
CLAUDICATION: 0
FLANK PAIN: 0
IRREGULAR HEARTBEAT: 1
SLEEP DISTURBANCES DUE TO BREATHING: 1

## 2022-06-23 ASSESSMENT — PAIN SCALES - GENERAL: PAINLEVEL: NO PAIN (0)

## 2022-06-23 NOTE — NURSING NOTE
"Chief Complaint   Patient presents with     Heart Problem     Follow up from Dr. Myers.  A-fib, bicuspid valve       Initial /74 (BP Location: Right arm, Patient Position: Sitting, Cuff Size: Adult Regular)   Pulse 93   Temp 98.8  F (37.1  C) (Tympanic)   Resp 18   Ht 1.626 m (5' 4\")   Wt 64 kg (141 lb)   SpO2 96%   BMI 24.20 kg/m   Estimated body mass index is 24.2 kg/m  as calculated from the following:    Height as of this encounter: 1.626 m (5' 4\").    Weight as of this encounter: 64 kg (141 lb).  Medication Reconciliation: complete  ROSHNI MACEDO LPN    "

## 2022-06-23 NOTE — PROGRESS NOTES
CARDIOLOGY PROGRESS NOTE     Chief Complaint   Patient presents with     Heart Problem     Follow up from Dr. Myers.  A-fib, bicuspid valve      .  Chief Complaint: 72-year-old white female seen for cardiac follow-up regarding primarily,    HPI:     #1 permanent atrial fibrillation, Patient has had chronic permanent atrial fibrillation for several years, she was previously on warfarin therapy she is currently on Eliquis 5 mg twice a day, additionally she is on aspirin 81 mg a day since her TAVR procedure.  She is questioning why she is on both as was myself.  She is nearly 1 year post TAVR aortic valve replacement procedure.  Patient has noted increased bruisability.  She is in contact with the [facility] team that performed the TAVR procedure and is scheduled for an echo follow-up.  I told her to relate a question to the nurse coordinator to check with the performing interventionalist ,whether she still needs to be on the aspirin 81 mg.  Patient denies associated palpitations her rate seems to be well controlled.    #2: Status post TAVR  4 critical aortic stenosis, procedure was performed in July 2021.  I personally reviewed all of the documentation and diagnostic data leading up to the implantation.  The aortic valve area was in the range of 0.8 cm and the patient was becoming more symptomatic and her LV function was somewhat compromised.  Patient stated that she is actually feeling better, is less short of breath and having less issues with her COPD.  She is still troubled by leg edema.      #3:  Systemic anticoagulation with Eliquis, patient also on aspirin low-dose. The patient has noted increased bruising, as noted above is questioning why she needs to be on the aspirin she will check with the team at the [facility] whether she needs to continue the aspirin.    #4: Labile  Hypertension with likely underlying degree of Hypertensive Heart Disease, patient is on multiple medications  including clonidine 0.2 mg at bedtime, Tiazac 360 mg long-acting diltiazem once a day, losartan 50 mg twice a day.  Additionally she is on Lasix and potassium for the edema.    #5: Edema, as noted below, still problematic.  Patient unable to wear compression stockings due to compromise of circulation.    #6: Coronary artery disease by history from the chart, careful review of the coronary artery angiogram reveals description of clean coronary arteries.  The patient does have diffuse peripheral vascular disease including carotid aortic and pelvic atherosclerotic sclerotic PVD.    #7: Carotid artery disease, noncritical asymptomatic last checkup well over a year ago.    #8: Peripheral vascular disease, including aortic and pelvic atherosclerotic disease as described by pre-TAVR TVA work-up asymptomatic    #9: Hyperlipidemia patient is on Lipitor last checked June 2021 total cholesterol 201 she did have an elevated HDL of 109 which was good LDL was therapeutic at 78 triglycerides 71 patient needs recheck.    #10: COPD, as per PFTs from 2018 was moderately severe the patient is feeling better clinically she is actually reduced her Combivent inhaler from 4 times a day to more like twice a day.  When I brought up the issue of repeating the study she was actually interested for her own knowledge to see if its improved.  It may well have improved with the release of the likely elevated pressures in the LV from the aortic stenosis.  We will recheck PFTs    #11: Status post permanent pacemaker for complete heart block following TAVR procedure clinically stable post permanent pacemaker with normal function.        Diagnosis:    (R60.9) Edema, unspecified type, still problematic patient requires a diuretic, and cannot wear compression stockings due to the compromise of circulation.                      (I48.21) Permanent atrial fibrillation (H)  Commen  Plan: EKG 12-lead complete w/read - (Clinic         Performed),      (Z79.899) On potassium wasting diuretic therapy  Comment:  Plan: EKG 12-lead complete w/read - (Clinic         Performed), Magnesium,     (I27.20) Pulmonary hypertension (H)  Comment  Plan: Magnesium,    (E78.5) Hyperlipidemia with target LDL less than 100  Comment  Plan: Comprehensive metabolic panel, Lipid panel         reflex to direct LDL Fasting, CANCELED: BNP-N         terminal pro            (Z79.01) Anticoagulated by anticoagulation treatment  Comment:    (Z95.2) S/P TAVR (transcatheter aortic valve replacement)  Commen    (J44.9) Chronic obstructive pulmonary disease, unspecified COPD type (H)  Comment    (R06.2) Wheezing   Comment:      Reviewed  Past Medical History:   Diagnosis Date     Asthma      Atrial fibrillation (H)      COPD (chronic obstructive pulmonary disease) (H)      Depression      High cholesterol      HTN (hypertension)      Oxygen dependent      Thyroid disease      Reviewed  Family History   Problem Relation Age of Onset     Prostate Cancer Father      Hypertension Father      Heart Failure Father         CHF     Asthma Mother      Cancer Mother         ovarian     Hypertension Mother      Asthma Brother      Hypertension Sister      Hypertension Brother        Review of Systems   Constitutional: Negative.   HENT: Negative.    Eyes: Negative.    Cardiovascular: Positive for irregular heartbeat and leg swelling. Negative for chest pain, claudication, cyanosis, dyspnea on exertion, near-syncope, orthopnea, palpitations, paroxysmal nocturnal dyspnea and syncope.   Respiratory: Positive for shortness of breath, sleep disturbances due to breathing, sputum production and wheezing. Negative for cough and hemoptysis.         While the patient still has some respiratory symptomatology with regards to her COPD she relates that it is improved.  Less spontaneous wheezing less use of inhalers.  She has not needed prednisone in some time.   Endocrine: Negative.    Hematologic/Lymphatic:  Bruises/bleeds easily.   Skin: Positive for dry skin. Negative for suspicious lesions.        Patient relates bruising and edema   Musculoskeletal: Negative.    Gastrointestinal: Negative.    Genitourinary: Positive for frequency. Negative for dysuria, flank pain and pelvic pain.   Neurological: Negative.    Psychiatric/Behavioral: Negative.    Allergic/Immunologic: Negative.      Reviewed and discussed  Current Outpatient Medications   Medication Sig Dispense Refill     acetaminophen (TYLENOL) 500 MG tablet Take 500-1,000 mg by mouth every 6 hours as needed for mild pain       albuterol (PROAIR HFA/PROVENTIL HFA/VENTOLIN HFA) 108 (90 Base) MCG/ACT inhaler Inhale 2 puffs into the lungs every 6 hours 18 g 3     apixaban ANTICOAGULANT (ELIQUIS ANTICOAGULANT) 5 MG tablet Take 1 tablet (5 mg) by mouth 2 times daily 180 tablet 1     aspirin (ASA) 81 MG EC tablet Take 1 tablet (81 mg) by mouth daily 90 tablet 1     atorvastatin (LIPITOR) 10 MG tablet Take 1 tablet (10 mg) by mouth daily 90 tablet 1     Calcium Carb-Cholecalciferol (CALTRATE 600+D3 SOFT PO) Take 600 mg by mouth 2 times daily       cloNIDine (CATAPRES) 0.1 MG tablet Take 2 tablets (0.2 mg) by mouth At Bedtime 180 tablet 1     COMBIVENT RESPIMAT  MCG/ACT inhaler Inhale 1 puff into the lungs 4 times daily 16 g 1     diltiazem ER (TIAZAC) 360 MG 24 hr ER beaded capsule Take 1 capsule (360 mg) by mouth daily 90 capsule 1     diphenhydrAMINE (BENADRYL) 25 MG tablet Take 1-2 tablets (25-50 mg) by mouth every 6 hours as needed for itching or allergies 60 tablet 1     fluticasone-salmeterol (ADVAIR HFA) 230-21 MCG/ACT inhaler Inhale 2 puffs into the lungs 2 times daily 12 g 3     furosemide (LASIX) 40 MG tablet TAKE 1.5 TABLET(60 MG) in the a.m. and 1 TABLET (40 MG) in the afternoon 180 tablet 3     hydrOXYzine (ATARAX) 25 MG tablet Take 1 tablet (25 mg) by mouth 3 times daily as needed for itching 60 tablet 0     levothyroxine (SYNTHROID/LEVOTHROID) 100 MCG  tablet Take 1 tablet (100 mcg) by mouth daily 90 tablet 3     losartan (COZAAR) 50 MG tablet Take 1 tablet (50 mg) by mouth 2 times daily 180 tablet 3     potassium chloride ER (KLOR-CON M) 20 MEQ CR tablet Take 1 tablet (20 mEq) by mouth 3 times daily 270 tablet 1     amoxicillin (AMOXIL) 500 MG capsule Take 4 capsules (2,000 mg) by mouth once as needed (SBE prophylaxis take 30-60 minutes prior to dental procedure/cleaning) (Patient not taking: Reported on 6/23/2022) 4 capsule 3     OTHER MEDICAL SUPPLIES Apply one pair to help with edema (Patient not taking: Reported on 6/23/2022) 1 each 0     predniSONE (DELTASONE) 20 MG tablet Take 3 tabs by mouth daily x 3 days, then 2 tabs daily x 3 days, then 1 tab daily x 3 days, then 1/2 tab daily x 3 days. (Patient not taking: Reported on 6/23/2022) 20 tablet 0     Reviewed  Past Surgical History:   Procedure Laterality Date     BACK SURGERY  2006     CARDIAC SURGERY  06/12/2018    Angiogram at [facility]     COLONOSCOPY N/A 1/19/2016    Procedure: COLONOSCOPY;  Surgeon: Waldemar Bob MD;  Location: [facility] OR     CV CORONARY ANGIOGRAM N/A 4/30/2021    Procedure: CV CORONARY ANGIOGRAM;  Surgeon: Poli Walsh MD;  Location:  HEART CARDIAC CATH LAB     CV LEFT HEART CATH N/A 4/30/2021    Procedure: CV LEFT HEART CATH;  Surgeon: Poli Walsh MD;  Location:  HEART CARDIAC CATH LAB     CV RIGHT HEART CATH MEASUREMENTS RECORDED N/A 4/30/2021    Procedure: CV RIGHT HEART CATH;  Surgeon: Poli Walsh MD;  Location:  HEART CARDIAC CATH LAB     CV TRANSCATHETER AORTIC VALVE REPLACEMENT N/A 7/14/2021    Procedure: Right femoral Transcaval transcatheter aortic valve replacement (SUMMERS) size 29mm.  Transesophageal echocardiogram per anesthesia;  Surgeon: Domo Sarah MD;  Location: [facility] OR     EP PPM INSERT OF NEW OR REPL W/VENT LEAD N/A 7/14/2021    Procedure: insertion of Permanent Pacemaker;  Surgeon: Eliezer Myers MD;   Location: [facility] OR     HEART CATH FEMORAL CANNULIZATION WITH OPEN STANDBY REPAIR AORTIC VALVE N/A 7/14/2021    Procedure: Cardiopulmonary standby.;  Surgeon: Fly Smith MD;  Location: [facility] OR     HYSTERECTOMY  1980    partial     SLING BLADDER SUSPENSION WITH FASCIA LINNETTE       Reviewed  Allergies   Allergen Reactions     Amlodipine Besylate Cough     Norvasc     Lisinopril Cough     Ace Inhibitors Cough       Vital signs: Height 5 foot 4, weight 141, blood pressure 114/74, pulse 93, respirations 18, temperature 98.8, oxygen saturation 96%, patient describes no pain blood pressure by physician 132/78, 130/80 lying, 124/76,      Physical examination:  Physical Exam   Constitutional: She appears healthy. No distress.   HENT:   Nose: No nasal discharge.   Mouth/Throat: Pharynx is normal.   Patient has a healed scar on the left side of her nose by history she had a Mohs procedure with a flap performed here   Eyes: Pupils are equal, round, and reactive to light. Conjunctivae are normal.   Neck: Thyroid normal. No JVD present. No neck adenopathy. No thyromegaly present.   Pulmonary/Chest: She has bibasilar rales and scattered wheezes. She exhibits no tenderness.   Patient had diminished breath sounds there were few fine crepitant rales at the bases which cleared with cough   Abdominal: Soft. Bowel sounds are normal. She exhibits no distension and no mass. There is no splenomegaly or hepatomegaly. There is no abdominal tenderness. No hernia.   Musculoskeletal:         General: Edema present. No tenderness or deformity.      Cervical back: Normal range of motion and neck supple.      Comments: Patient's legs were swollen but the pitting was not noted patient states it can be worse at times almost more like a lymphedema though by physical exam   Skin: Skin is warm and dry. No rash noted. No cyanosis. There is plethora. No jaundice or pallor. Nails show no clubbing.   Patient was noted to have periodic bruising on  the arms and legs not large or deep seeded superficial in nature.       LAB RESULTS: Noted   No visits with results within 2 Month(s) from this visit.   Latest known visit with results is:   Ancillary Procedure on 04/18/2022   Component Date Value Ref Range Status     Date Time Interrogation Session 04/18/2022 [id number]   Final     Implantable Pulse Generator Manufa* 04/18/2022 Medtronic   Final     Implantable Pulse Generator Model 04/18/2022 [id number] [place] XT SR MRI   Final     Implantable Pulse Generator Serial* 04/18/2022 [id number]   Final     Type Interrogation Session 04/18/2022 Remote    Final     Clinic Name 04/18/2022 [facility]   Final     Implantable Pulse Generator Type 04/18/2022 Pacemaker   Final     Implantable Pulse Generator Implan* 04/18/2022 [id number]   Final     Implantable Lead  04/18/2022 Medtronic   Final     Implantable Lead Model 04/18/2022 3830 SelectSecure MRI SureScan   Final     Implantable Lead Serial Number 04/18/2022 [id number]   Final     Implantable Lead Implant Date 04/18/2022 [id number]   Final     Implantable Lead Polarity Type 04/18/2022 Bipolar Lead   Final     Implantable Lead Location Detail 1 04/18/2022 UNKNOWN   Final     Implantable Lead Special Function 04/18/2022 69 cm   Final     Implantable Lead Location 04/18/2022 Right Ventricle   Final     Chaz Setting Mode (NBG Code) 04/18/2022 VVIR   Final     Chaz Setting Lower Rate Limit 04/18/2022 60  [beats]/min Final     Chaz Setting Maximum Sensor Rate 04/18/2022 130  [beats]/min Final     Chaz Setting Hysterisis Rate 04/18/2022 DISABLED   Final     Lead Channel Setting Sensing Polar* 04/18/2022 Bipolar   Final     Lead Channel Setting Sensing Anode* 04/18/2022 Right Ventricle   Final     Lead Channel Setting Sensing Anode* 04/18/2022 Ring   Final     Lead Channel Setting Sensing Catho* 04/18/2022 Right Ventricle   Final     Lead Channel Setting Sensing Catho* 04/18/2022 Tip   Final      Lead Channel Setting Sensing Sensi* 04/18/2022 2  mV Final     Lead Channel Setting Pacing Polari* 04/18/2022 Unipolar   Final     Lead Channel Setting Pacing Anode * 04/18/2022 Can   Final     Lead Channel Setting Sensing Catho* 04/18/2022 Right Ventricle   Final     Lead Channel Setting Sensing Catho* 04/18/2022 Tip   Final     Lead Channel Setting Pacing Pulse * 04/18/2022 0.4  ms Final     Lead Channel Setting Pacing Amplit* 04/18/2022 2  V Final     Lead Channel Setting Pacing Captur* 04/18/2022 Adaptive   Final     Zone Setting Type Category 04/18/2022 VF   Final     Zone Setting Type Category 04/18/2022 VT   Final     Zone Setting Type Category 04/18/2022 VT   Final     Zone Setting Type Category 04/18/2022 VT   Final     Zone Setting Detection Interval 04/18/2022 360  ms Final     Zone Setting Type Category 04/18/2022 ATRIAL_FIBRILLATION   Final     Zone Setting Type Category 04/18/2022 AT/AF   Final     Lead Channel Impedance Value 04/18/2022 551  ohm Final     Lead Channel Impedance Value 04/18/2022 456  ohm Final     Lead Channel Sensing Intrinsic Amp* 04/18/2022 13.25  mV Final     Lead Channel Sensing Intrinsic Amp* 04/18/2022 13.25  mV Final     Lead Channel Pacing Threshold Ampl* 04/18/2022 0.5  V Final     Lead Channel Pacing Threshold Puls* 04/18/2022 0.4  ms Final     Battery Date Time of Measurements 04/18/2022 20220417231502   Final     Battery Status 04/18/2022 OK   Final     Battery RRT Trigger 04/18/2022 2.625   Final     Battery Remaining Longevity 04/18/2022 180  mo Final     Battery Voltage 04/18/2022 3.20  V Final     Chaz Statistic Date Time Start 04/18/2022 20220113005750   Final     Chaz Statistic Date Time End 04/18/2022 20220417235352   Final     Chaz Statistic RV Percent Paced 04/18/2022 12.44  % Final     Episode Statistic Recent Count 04/18/2022 0   Final     Episode Statistic Type Category 04/18/2022 Patient Activated   Final     Episode Statistic Recent Count 04/18/2022  1,011   Final     Episode Statistic Type Category 04/18/2022 VT   Final     Episode Statistic Recent Count 04/18/2022 0   Final     Episode Statistic Type Category 04/18/2022 VT   Final     Episode Statistic Recent Date Time* 04/18/2022 20220113005750   Final     Episode Statistic Recent Date Time* 04/18/2022 52612613386926   Final     Episode Statistic Recent Date Time* 04/18/2022 20220113005750   Final     Episode Statistic Recent Date Time* 04/18/2022 20220417235352   Final     Episode Statistic Recent Date Time* 04/18/2022 20220113005750   Final     Episode Statistic Recent Date Time* 04/18/2022 20220417235352   Final     Episode Statistic Total Count 04/18/2022 0   Final     Episode Statistic Type Category 04/18/2022 Patient Activated   Final     Episode Statistic Total Count 04/18/2022 3,052   Final     Episode Statistic Type Category 04/18/2022 VT   Final     Episode Statistic Total Count 04/18/2022 1   Final     Episode Statistic Type Category 04/18/2022 VT   Final     Episode Statistic Total Date Time * 04/18/2022 20210714140321   Final     Episode Statistic Total Date Time * 04/18/2022 20220417235352   Final     Episode Statistic Total Date Time * 04/18/2022 20210714140321   Final     Episode Statistic Total Date Time * 04/18/2022 31201512626831   Final     Episode Statistic Total Date Time * 04/18/2022 20210714140321   Final     Episode Statistic Total Date Time * 04/18/2022 20220417235352   Final     Episode Identifier 04/18/2022 3,053   Final     Episode Type Category 04/18/2022 VT   Final     Episode Date Time 04/18/2022 20220410124235   Final     Episode Duration 04/18/2022 1  s Final     Episode Identifier 04/18/2022 3,052   Final     Episode Type Category 04/18/2022 VT   Final     Episode Date Time 04/18/2022 [id number]   Final     Episode Duration 04/18/2022 1  s Final     Episode Identifier 04/18/2022 3,051   Final     Episode Type Category 04/18/2022 VT   Final     Episode Date Time  04/18/2022 20220410123038   Final     Episode Duration 04/18/2022 1  s Final     Episode Identifier 04/18/2022 3,050   Final     Episode Type Category 04/18/2022 VT   Final     Episode Date Time 04/18/2022 20220410120957   Final     Episode Duration 04/18/2022 1  s Final     Episode Identifier 04/18/2022 3,049   Final     Episode Type Category 04/18/2022 VT   Final     Episode Date Time 04/18/2022 20220409185657   Final     Episode Duration 04/18/2022 1  s Final     Episode Identifier 04/18/2022 3,048   Final     Episode Type Category 04/18/2022 VT   Final     Episode Date Time 04/18/2022 20220409185557   Final     Episode Duration 04/18/2022 1  s Final     Episode Identifier 04/18/2022 3,047   Final     Episode Type Category 04/18/2022 VT   Final     Episode Date Time 04/18/2022 20220409185517   Final     Episode Duration 04/18/2022 1  s Final     Episode Identifier 04/18/2022 3,046   Final     Episode Type Category 04/18/2022 VT   Final     Episode Date Time 04/18/2022 20220409100629   Final     Episode Duration 04/18/2022 0  s Final     Episode Identifier 04/18/2022 3,045   Final     Episode Type Category 04/18/2022 VT   Final     Episode Date Time 04/18/2022 20220408222116   Final     Episode Duration 04/18/2022 1  s Final     Episode Identifier 04/18/2022 3,044   Final     Episode Type Category 04/18/2022 VT   Final     Episode Date Time 04/18/2022 20220408221812   Final     Episode Duration 04/18/2022 0  s Final     Episode Identifier 04/18/2022 3,043   Final     Episode Type Category 04/18/2022 VT   Final     Episode Date Time 04/18/2022 20220408221734   Final     Episode Duration 04/18/2022 1  s Final     Episode Identifier 04/18/2022 3,042   Final     Episode Type Category 04/18/2022 VT   Final     Episode Date Time 04/18/2022 20220408221653   Final     Episode Duration 04/18/2022 2  s Final     Episode Identifier 04/18/2022 3,041   Final     Episode Type Category 04/18/2022 VT   Final     Episode Date Time  04/18/2022 20220408221421   Final     Episode Duration 04/18/2022 1  s Final     Episode Identifier 04/18/2022 3,040   Final     Episode Type Category 04/18/2022 VT   Final     Episode Date Time 04/18/2022 20220408221321   Final     Episode Duration 04/18/2022 1  s Final     Episode Identifier 04/18/2022 3,039   Final     Episode Type Category 04/18/2022 VT   Final     Episode Date Time 04/18/2022 20220408221317   Final     Episode Duration 04/18/2022 1  s Final        Relevant testing: EKG today, atrial fibrillation with controlled ventricular response occasional paced beats noted evidence of poor anterior R wave progression in the septum possible septal infarct.     Laboratory performed today: The magnesium level, comprehensive metabolic profile, lipid profile, are pending..    PFTs performed: July 31, 2018: Revealed significant airway disease described as reduced diffusing capacity the DLCO was down to 40% of predicted severe airway of obstruction and overinflation characteristic of emphysema.  Given the patient's improvement in clinical symptomatology a repeat PFT study is warranted patient is interested in going this route.     CT angiogram of the chest: From April 30, 2021 was reviewed and is available in the chart giving the measurements regarding the TAVR and also documenting a peripheral vascular disease  2D echo echo: Performed in January 21 revealed ejection fraction of 55 to 60% no definitive evidence of left ventricular wall thickness being abnormal left ventricular size was described as normal there was severe biatrial enlargement calculated valve area was 0.7 cm  by telemetry 0.82 square V-max across the valve was 4.1 in the high area compatible with severe to critical aortic stenosis.  [facility] valve replacement team has contacted the patient and a follow-up echo is pending    Carotid ultrasound and Doppler: Previously performed more than a year and a half ago revealed bilateral  carotid artery stenosis of less than 50% the velocities were not elevated the patient is on antihyperlipidemics dated the study was April 2021..    Coronary angiogram: Revealed conclusion moderately elevated pulmonary artery hypertension, left-sided filling pressures mildly elevated, normal cardiac output level,, interestingly enough normal coronary arteries were described.      Impression/discussion/plan:    #1: Permanent atrial fibrillation, clinically stable on current medical regimen.  Rate appears controlled.  Patient is asymptomatic denies palpitations.  Systemically anticoagulated with Eliquis.  Patient is also on aspirin she will contact the TAVR team at the [facility] with regards to continuing the aspirin in the face of systemic anticoagulation with Eliquis.    #2: Status post TAVR, patient received permanent pacemaker due to complete heart block postprocedure.  Pacemaker is functioning well.  Patient is clinically improved after having the TAVR function of the prosthesis is under monitoring at the [place]..    #3: Coronary artery disease, seemingly ruled out by 4/30/2021 angiogram, nevertheless the patient has PVD and is on lipid therapy    #4 and 5: Carotid artery disease and peripheral vascular disease, clinically stable.  Patient is on systemic anticoagulation and aspirin and antilipid therapy.  Patient is asymptomatic.  Again she will find out whether she should continue to stay on the aspirin due to the risks as discussed.    #6: COPD with diffusion defect, moderately severe on previous PFTs, however the patient is clinically improved has not used prednisone in some time.  Given the relief of likely pulmonary congestion from the aortic stenosis which has been relieved, we will recheck the patient's PFTs to document the subjective improvement with regards to objective findings.    #7: Hypertension labile, suspected hypertensive heart disease, however the LVH not well documented on  echocardiography.  Patient has had very labile hypertension in the past there was no documented renal vascular disease despite the aortic and pelvic arterial sclerotic disease.  Nevertheless the patient's blood pressure remains controlled and we are reluctant to reduce the medication at this time although we may be able to in the future.     #8: Edema, waxing and waning patient on diuretic and potassium supplement.  She is unable to wear compression stockings and despite the TAVR and release of the aortic stenosis and relatively preserved LV function seems she will need to remain on the diuretic.    #9: Hyperlipidemia, previously well treated.  Given the patient's carotid and peripheral vascular disease as well as a propensity perhaps to develop coronary artery disease continue treatment is indicated we will recheck the patient's lipid profile and LFTs as these have not been checked in some time.    #10: Status post permanent pacemaker for complete heart block post TAVR good pacemaker function.  Permanent pacemaker follow-up as per permanent pacemaker clinic staff good function recently documented.    Current plans check the patient's EKG, done today.  Check her lipid profile, CMP, BNP, CBC, because of the systemic anticoagulation.  Recheck the 2D echo as per the plans of the [facility] valve replacement team.  Recheck the patient's carotid ultrasound and Doppler check for progression of disease last check was April 2021.    Return to cardiac clinic to see myself or the staff, in 3 months or as needed.    Ramirez Roberts MD             Thank you for allowing me to participate in the care of your patient. Please do not hesitate to contact me if you have any questions.       Ramirez Roberts MD

## 2022-06-24 LAB
CHOLEST SERPL-MCNC: 168 MG/DL
HDLC SERPL-MCNC: 90 MG/DL
LDLC SERPL CALC-MCNC: 65 MG/DL
NONHDLC SERPL-MCNC: 78 MG/DL
TRIGL SERPL-MCNC: 67 MG/DL

## 2022-07-08 ENCOUNTER — OFFICE VISIT (OUTPATIENT)
Dept: PODIATRY | Facility: OTHER | Age: 72
End: 2022-07-08
Attending: PODIATRIST
Payer: COMMERCIAL

## 2022-07-08 VITALS
SYSTOLIC BLOOD PRESSURE: 112 MMHG | OXYGEN SATURATION: 95 % | TEMPERATURE: 98.3 F | DIASTOLIC BLOOD PRESSURE: 65 MMHG | HEART RATE: 85 BPM

## 2022-07-08 DIAGNOSIS — Z79.01 LONG TERM CURRENT USE OF ANTICOAGULANT THERAPY: ICD-10-CM

## 2022-07-08 DIAGNOSIS — L60.3 ONYCHODYSTROPHY: Primary | ICD-10-CM

## 2022-07-08 DIAGNOSIS — L85.3 XEROSIS OF SKIN: ICD-10-CM

## 2022-07-08 DIAGNOSIS — B35.3 TINEA PEDIS OF BOTH FEET: ICD-10-CM

## 2022-07-08 DIAGNOSIS — M21.41 PES PLANUS OF BOTH FEET: ICD-10-CM

## 2022-07-08 DIAGNOSIS — M20.12 HALLUX VALGUS (ACQUIRED), LEFT FOOT: ICD-10-CM

## 2022-07-08 DIAGNOSIS — R60.0 BILATERAL LOWER EXTREMITY EDEMA: ICD-10-CM

## 2022-07-08 DIAGNOSIS — M21.42 PES PLANUS OF BOTH FEET: ICD-10-CM

## 2022-07-08 PROCEDURE — 11721 DEBRIDE NAIL 6 OR MORE: CPT | Performed by: PODIATRIST

## 2022-07-08 PROCEDURE — G0463 HOSPITAL OUTPT CLINIC VISIT: HCPCS | Mod: 25

## 2022-07-08 PROCEDURE — 99203 OFFICE O/P NEW LOW 30 MIN: CPT | Mod: 25 | Performed by: PODIATRIST

## 2022-07-08 ASSESSMENT — PAIN SCALES - GENERAL: PAINLEVEL: NO PAIN (0)

## 2022-07-08 NOTE — NURSING NOTE
"Chief Complaint   Patient presents with     Toenail     trimming       Initial /65 (BP Location: Left arm, Patient Position: Sitting, Cuff Size: Adult Regular)   Pulse 85   Temp 98.3  F (36.8  C) (Tympanic)   SpO2 95%  Estimated body mass index is 24.2 kg/m  as calculated from the following:    Height as of 6/23/22: 1.626 m (5' 4\").    Weight as of 6/23/22: 64 kg (141 lb).  Medication Reconciliation: velma Dey  "

## 2022-07-08 NOTE — PROGRESS NOTES
Chief complaint: Patient presents with:  Toenail: trimming      History of Present Illness: This 72 year old female is seen at the request of No ref. provider found for evaluation and suggestions of management of elongated, thickened toenails. She is unable to trim her toenails and she used to get pedicures, but they would not trim her toenails short enough.    She is on Eliquis for a-fib.     She gets burning, tingling, and numbness in her feet.    She also gets a lot of swelling in her ankles. When they swell, she elevates her feet. The swelling does sometimes cause pain. She tried compression socks in the past but they caused pain in the ankles and she could not get them on her legs.    Additionally, she gets callused, flaking skin on her feet. She has tried multiple OTC lotions but nothing has helped. Eucerin cream also did not help and she is not sure how to control the flaking.      No further pedal complaints today.     /65 (BP Location: Left arm, Patient Position: Sitting, Cuff Size: Adult Regular)   Pulse 85   Temp 98.3  F (36.8  C) (Tympanic)   SpO2 95%     Patient Active Problem List   Diagnosis     Permanent atrial fibrillation (H)     Pulmonary emphysema (H)     Bicuspid aortic valve     Mild major depression (H)     Acute bronchitis     Hypothyroidism, postablative     Advance care planning     Essential hypertension     Long-term (current) use of anticoagulants [Z79.01]     Acute on chronic respiratory failure (H)     Health Care Home     Status post coronary angiogram     Coronary artery disease involving native coronary artery of native heart without angina pectoris     Acute cystitis without hematuria     Small bowel obstruction (H)     Acute renal failure (H)     Hypokalemia     Infection due to 2019 novel coronavirus     Aortic aneurysm (H)     Aortic valve disorder     Mitral valve disorder     Cardiomegaly     Carotid stenosis     Depressive disorder     Edema     COPD (chronic  obstructive pulmonary disease) (H)     Other ill-defined heart diseases     Aortic valve stenosis, etiology of cardiac valve disease unspecified     Aortic stenosis, severe     History of transcatheter aortic valve replacement (TAVR)     Postoperative complete heart block (H)     Cardiac pacemaker in situ       Past Surgical History:   Procedure Laterality Date     BACK SURGERY  2006     CARDIAC SURGERY  06/12/2018    Angiogram at [facility]     COLONOSCOPY N/A 1/19/2016    Procedure: COLONOSCOPY;  Surgeon: Waldemar Bob MD;  Location: [facility]     CV CORONARY ANGIOGRAM N/A 4/30/2021    Procedure: CV CORONARY ANGIOGRAM;  Surgeon: Poli Walsh MD;  Location:  HEART CARDIAC CATH LAB     CV LEFT HEART CATH N/A 4/30/2021    Procedure: CV LEFT HEART CATH;  Surgeon: Poli Walsh MD;  [facility]     CV RIGHT HEART CATH MEASUREMENTS RECORDED N/A 4/30/2021    Procedure: CV RIGHT HEART CATH;  Surgeon: Poli Walsh MD;  [facility]     CV TRANSCATHETER AORTIC VALVE REPLACEMENT N/A 7/14/2021    Procedure: Right femoral Transcaval transcatheter aortic valve replacement (SUMMERS) size 29mm.  Transesophageal echocardiogram per anesthesia;  Surgeon: Domo Sarah MD;  Location: [facility]     EP PPM INSERT OF NEW OR REPL W/VENT LEAD N/A 7/14/2021    Procedure: insertion of Permanent Pacemaker;  Surgeon: Eliezer Myers MD;  Location: [facility] OR     HEART CATH FEMORAL CANNULIZATION WITH OPEN STANDBY REPAIR AORTIC VALVE N/A 7/14/2021    Procedure: Cardiopulmonary standby.;  Surgeon: Fly Smith MD;  Location: [facility] OR     HYSTERECTOMY  1980    partial     SLING BLADDER SUSPENSION WITH FASCIA LINNETTE         Current Outpatient Medications   Medication     acetaminophen (TYLENOL) 500 MG tablet     albuterol (PROAIR HFA/PROVENTIL HFA/VENTOLIN HFA) 108 (90 Base) MCG/ACT inhaler     amoxicillin (AMOXIL) 500 MG capsule     apixaban  ANTICOAGULANT (ELIQUIS ANTICOAGULANT) 5 MG tablet     atorvastatin (LIPITOR) 10 MG tablet     Calcium Carb-Cholecalciferol (CALTRATE 600+D3 SOFT PO)     cloNIDine (CATAPRES) 0.1 MG tablet     COMBIVENT RESPIMAT  MCG/ACT inhaler     diltiazem ER (TIAZAC) 360 MG 24 hr ER beaded capsule     diphenhydrAMINE (BENADRYL) 25 MG tablet     fluticasone-salmeterol (ADVAIR HFA) 230-21 MCG/ACT inhaler     furosemide (LASIX) 40 MG tablet     hydrOXYzine (ATARAX) 25 MG tablet     levothyroxine (SYNTHROID/LEVOTHROID) 100 MCG tablet     losartan (COZAAR) 50 MG tablet     OTHER MEDICAL SUPPLIES     potassium chloride ER (KLOR-CON M) 20 MEQ CR tablet     predniSONE (DELTASONE) 20 MG tablet     No current facility-administered medications for this visit.          Allergies   Allergen Reactions     Amlodipine Besylate Cough     Norvasc     Lisinopril Cough     Ace Inhibitors Cough       Family History   Problem Relation Age of Onset     Prostate Cancer Father      Hypertension Father      Heart Failure Father         CHF     Asthma Mother      Cancer Mother         ovarian     Hypertension Mother      Asthma Brother      Hypertension Sister      Hypertension Brother        Social History     Socioeconomic History     Marital status:      Spouse name: None     Number of children: None     Years of education: None     Highest education level: None   Occupational History     Employer: RETIRED     Comment: disabled   Tobacco Use     Smoking status: Former Smoker     Packs/day: 0.50     Years: 41.00     Pack years: 20.50     Types: Cigarettes     Start date: 1/1/1966     Quit date: 1/28/2007     Years since quitting: 15.4     Smokeless tobacco: Never Used   Substance and Sexual Activity     Alcohol use: No     Alcohol/week: 0.0 standard drinks     Drug use: No     Sexual activity: Never     Comment:    Other Topics Concern      Service No     Blood Transfusions Yes     Comment: Permits if needed     Caffeine Concern  Yes     Comment: 2 cups coffee daily     Seat Belt Yes     Parent/sibling w/ CABG, MI or angioplasty before 65F 55M? No       ROS: 10 point ROS neg other than the symptoms noted above in the HPI.  EXAM  Constitutional: healthy, alert and no distress    Psychiatric: mentation appears normal and affect normal/bright    VASCULAR:  -Dorsalis pedis pulse +2/4 b/l  -Posterior tibial pulse +2/4 b/l  -Capillary refill time < 3 seconds to b/l hallux  -Hair growth Absent to b/l anterior legs and ankles  -Varicosities and telangiectasias to foot, bilaterally   -Mild 1+ pitting edema to bilateral foot and moderate 2+ pitting edema to bilateral ankle    NEURO:  -Protective sensation intact with SWM +10/10 RIGHT and +9/10 LEFT on 07/08/2022  -Light touch sensation intact to b/l plantar forefoot  DERM:  -Skin temperature, texture and turgor WNL b/l  -Skin mild-to-moderately erythematous with flaking of skin to bilateral plantar feet in a moccasin distribution  -Toenails elongated, thickened, dystrophic and discolored x 10  MSK:  -Lateral deviation of hallux with medial deviation of 1st metatarsal, LEFT   -Prominent bony prominence to dorsal and medial 1st metatarsal head, bilaterally   -Moderate decrease in arch height while patient is NWB, bilaterally     -Muscle strength of ankles +5/5 for dorsiflexion, plantarflexion, ABDUction and ADDuction b/l    ============================================================    ASSESSMENT:  (L60.3) Onychodystrophy  (primary encounter diagnosis)    (L85.3) Xerosis of skin    (M20.12) Hallux valgus (acquired), left foot    (B35.3) Tinea pedis of both feet    (R60.0) Bilateral lower extremity edema    (M21.41,  M21.42) Pes planus of both feet    (Z79.01) Long-term (current) use of anticoagulants [Z79.01]        PLAN:  -Patient evaluated and examined. Treatment options discussed with no educational barriers noted.  -Toenail debridement x 10 toenails without incident    -Foot Education provided.  This included checking the feet daily looking for new new blisters or wounds, wearing shoes at all times when walking including around the house, and avoiding lotion application between the toes. If there are any signs of infection, the patient should present to the ED as soon as possible. Infections of the foot can be life threatening or lead to amputations of the foot or leg.    -Discussed Athlete's Foot and treatment regimens. Treatment plan:  ---Apply an anti-fungal foot cream to the feet every morning and evening  ---Apply an anti-fungal foot powder to the feet daily prior to applying white, cotton socks  ---Switch out socks with a fresh powder [facility] through the day if socks are moist  ---Spray an anti-fungal spray or Lysol spray to shoes daily   ---Spray tub/shower with a bleach based shower  daily after use    Lower extremity edema:  -Discussed LOWER EXTREMITY edema including etiology and treatment options.  -Patient has multiple telangiectasias and varicosities. This usually indicates incompetence of the valves in the veins that help push blood flow back to the heart. Blood pools in the legs and causes inflammation.   -Blood pooling can can brown discoloration to the foot, ankle or leg (hemosiderin deposition).  -Chronic LOWER EXTREMITY edema can cause pain or open ulcers.  -Treatment consists of compression socks and elevation of the legs above the level of the heart. Treatment of the inflammation is like wearing glasses in that it's not reversing the problem that is causing the edema, but it is decreasing the inflammation during the time of treatment.  -Patient advised to avoid idle standing and attempt to keep moving when standing. The muscles in the legs act as natural pumps to promote blood flow back to the heart.  -When sitting, attempt to elevate legs above the level of the heart.  -No additional elevation is required when sleeping flat on a bed. Compression socks should also be removed at  night when lying down flat on a bed.    -Patient in agreement with the above treatment plan and all of patient's questions were answered.      RTC 63+ days for nail debridement        Alessandra Wilkins DPM, DPM

## 2022-07-08 NOTE — PATIENT INSTRUCTIONS
-Discussed Athlete's Foot and treatment regimens. Treatment plan:  ---Apply an anti-fungal foot cream to the feet every morning and evening  ---Apply an anti-fungal foot powder to the feet daily prior to applying white, cotton socks  ---Switch out socks with a fresh powder [facility] through the day if socks are moist  ---Spray an anti-fungal spray or Lysol spray to shoes daily   ---Spray tub/shower with a bleach based shower  daily after use

## 2022-07-11 ENCOUNTER — TRANSFERRED RECORDS (OUTPATIENT)
Dept: HEALTH INFORMATION MANAGEMENT | Facility: CLINIC | Age: 72
End: 2022-07-11

## 2022-07-12 ENCOUNTER — HOSPITAL ENCOUNTER (OUTPATIENT)
Dept: CARDIOLOGY | Facility: HOSPITAL | Age: 72
Discharge: HOME | End: 2022-07-12
Attending: NURSE PRACTITIONER
Payer: MEDICARE

## 2022-07-12 ENCOUNTER — HOSPITAL ENCOUNTER (OUTPATIENT)
Dept: ULTRASOUND IMAGING | Facility: HOSPITAL | Age: 72
Discharge: HOME | End: 2022-07-12
Attending: INTERNAL MEDICINE
Payer: MEDICARE

## 2022-07-12 DIAGNOSIS — I50.32 CHRONIC DIASTOLIC HEART FAILURE (H): ICD-10-CM

## 2022-07-12 DIAGNOSIS — I65.23 CAROTID STENOSIS, ASYMPTOMATIC, BILATERAL: ICD-10-CM

## 2022-07-12 DIAGNOSIS — Z95.2 S/P TAVR (TRANSCATHETER AORTIC VALVE REPLACEMENT): ICD-10-CM

## 2022-07-12 LAB — LVEF ECHO: NORMAL

## 2022-07-12 PROCEDURE — 93306 TTE W/DOPPLER COMPLETE: CPT

## 2022-07-12 PROCEDURE — 93880 EXTRACRANIAL BILAT STUDY: CPT

## 2022-07-13 ENCOUNTER — ANCILLARY PROCEDURE (OUTPATIENT)
Dept: CARDIOLOGY | Facility: OTHER | Age: 72
End: 2022-07-13
Attending: INTERNAL MEDICINE
Payer: MEDICARE

## 2022-07-13 DIAGNOSIS — Z95.0 CARDIAC PACEMAKER IN SITU: ICD-10-CM

## 2022-07-13 DIAGNOSIS — I44.2 ATRIOVENTRICULAR BLOCK, COMPLETE (H): ICD-10-CM

## 2022-07-13 PROCEDURE — 93279 PRGRMG DEV EVAL PM/LDLS PM: CPT | Performed by: INTERNAL MEDICINE

## 2022-07-13 NOTE — PATIENT INSTRUCTIONS
It was a pleasure to see you in clinic today.  Please do not hesitate to call with any questions or concerns.  You are scheduled for a remote transmission on 10/13/22.  We look forward to seeing you in clinic at your next device check in 12 months.    Maco Calderon, RN  Electrophysiology Nurse Clinician  [facility]    During Business Hours Please Call:  [phone]  After Hours Please Call:  [phone] - select option #4 and ask for job code [phone]

## 2022-07-16 ENCOUNTER — TELEPHONE (OUTPATIENT)
Dept: CARDIOLOGY | Facility: CLINIC | Age: 72
End: 2022-07-16

## 2022-08-02 DIAGNOSIS — J44.1 COPD EXACERBATION (H): ICD-10-CM

## 2022-08-02 RX ORDER — FLUTICASONE PROPIONATE AND SALMETEROL XINAFOATE 230; 21 UG/1; UG/1
AEROSOL, METERED RESPIRATORY (INHALATION)
Qty: 12 G | Refills: 4 | Status: SHIPPED | OUTPATIENT
Start: 2022-08-02 | End: 2022-11-30 | Stop reason: ALTCHOICE

## 2022-08-10 ENCOUNTER — ANCILLARY PROCEDURE (OUTPATIENT)
Dept: MAMMOGRAPHY | Facility: OTHER | Age: 72
End: 2022-08-10
Attending: FAMILY MEDICINE
Payer: MEDICARE

## 2022-08-10 ENCOUNTER — TELEPHONE (OUTPATIENT)
Dept: MAMMOGRAPHY | Facility: OTHER | Age: 72
End: 2022-08-10

## 2022-08-10 DIAGNOSIS — Z12.31 VISIT FOR SCREENING MAMMOGRAM: ICD-10-CM

## 2022-08-10 PROCEDURE — 77067 SCR MAMMO BI INCL CAD: CPT | Mod: TC

## 2022-08-12 ENCOUNTER — HOSPITAL ENCOUNTER (OUTPATIENT)
Dept: RESPIRATORY THERAPY | Facility: HOSPITAL | Age: 72
Discharge: HOME | End: 2022-08-12
Attending: INTERNAL MEDICINE | Admitting: INTERNAL MEDICINE
Payer: MEDICARE

## 2022-08-12 DIAGNOSIS — R06.2 WHEEZING: ICD-10-CM

## 2022-08-12 DIAGNOSIS — J44.9 CHRONIC OBSTRUCTIVE PULMONARY DISEASE, UNSPECIFIED COPD TYPE (H): ICD-10-CM

## 2022-08-12 LAB — HGB BLD-MCNC: 13.5 G/DL (ref 11.7–15.7)

## 2022-08-12 PROCEDURE — 94060 EVALUATION OF WHEEZING: CPT

## 2022-08-12 PROCEDURE — 94060 EVALUATION OF WHEEZING: CPT | Mod: 26 | Performed by: INTERNAL MEDICINE

## 2022-08-12 PROCEDURE — 94729 DIFFUSING CAPACITY: CPT

## 2022-08-12 PROCEDURE — 250N000009 HC RX 250: Performed by: INTERNAL MEDICINE

## 2022-08-12 PROCEDURE — 94729 DIFFUSING CAPACITY: CPT | Mod: 26 | Performed by: INTERNAL MEDICINE

## 2022-08-12 PROCEDURE — 36415 COLL VENOUS BLD VENIPUNCTURE: CPT | Performed by: INTERNAL MEDICINE

## 2022-08-12 PROCEDURE — 85018 HEMOGLOBIN: CPT | Performed by: INTERNAL MEDICINE

## 2022-08-12 RX ORDER — ALBUTEROL SULFATE 0.83 MG/ML
2.5 SOLUTION RESPIRATORY (INHALATION)
Status: COMPLETED | OUTPATIENT
Start: 2022-08-12 | End: 2022-08-12

## 2022-08-12 RX ADMIN — ALBUTEROL SULFATE 2.5 MG: 2.5 SOLUTION RESPIRATORY (INHALATION) at 12:26

## 2022-08-26 LAB
MDC_IDC_EPISODE_DTM: NORMAL
MDC_IDC_EPISODE_DURATION: 0 S
MDC_IDC_EPISODE_DURATION: 1 S
MDC_IDC_EPISODE_ID: 3474
MDC_IDC_EPISODE_ID: 3475
MDC_IDC_EPISODE_ID: 3476
MDC_IDC_EPISODE_ID: 3477
MDC_IDC_EPISODE_ID: 3478
MDC_IDC_EPISODE_ID: 3479
MDC_IDC_EPISODE_ID: 3480
MDC_IDC_EPISODE_ID: 3481
MDC_IDC_EPISODE_ID: 3482
MDC_IDC_EPISODE_ID: 3483
MDC_IDC_EPISODE_ID: 3484
MDC_IDC_EPISODE_ID: 3485
MDC_IDC_EPISODE_ID: 3486
MDC_IDC_EPISODE_ID: 3487
MDC_IDC_EPISODE_ID: 3488
MDC_IDC_EPISODE_TYPE: NORMAL
MDC_IDC_LEAD_IMPLANT_DT: NORMAL
MDC_IDC_LEAD_LOCATION: NORMAL
MDC_IDC_LEAD_LOCATION_DETAIL_1: NORMAL
MDC_IDC_LEAD_MFG: NORMAL
MDC_IDC_LEAD_MODEL: NORMAL
MDC_IDC_LEAD_POLARITY_TYPE: NORMAL
MDC_IDC_LEAD_SERIAL: NORMAL
MDC_IDC_LEAD_SPECIAL_FUNCTION: NORMAL
MDC_IDC_MSMT_BATTERY_DTM: NORMAL
MDC_IDC_MSMT_BATTERY_REMAINING_LONGEVITY: 179 MO
MDC_IDC_MSMT_BATTERY_RRT_TRIGGER: 2.62
MDC_IDC_MSMT_BATTERY_STATUS: NORMAL
MDC_IDC_MSMT_BATTERY_VOLTAGE: 3.18 V
MDC_IDC_MSMT_LEADCHNL_RV_IMPEDANCE_VALUE: 456 OHM
MDC_IDC_MSMT_LEADCHNL_RV_IMPEDANCE_VALUE: 589 OHM
MDC_IDC_MSMT_LEADCHNL_RV_PACING_THRESHOLD_AMPLITUDE: 0.5 V
MDC_IDC_MSMT_LEADCHNL_RV_PACING_THRESHOLD_PULSEWIDTH: 0.4 MS
MDC_IDC_MSMT_LEADCHNL_RV_SENSING_INTR_AMPL: 13.4 MV
MDC_IDC_PG_IMPLANT_DTM: NORMAL
MDC_IDC_PG_MFG: NORMAL
MDC_IDC_PG_MODEL: NORMAL
MDC_IDC_PG_SERIAL: NORMAL
MDC_IDC_PG_TYPE: NORMAL
MDC_IDC_SESS_CLINIC_NAME: NORMAL
MDC_IDC_SESS_DTM: NORMAL
MDC_IDC_SESS_TYPE: NORMAL
MDC_IDC_SET_BRADY_HYSTRATE: NORMAL
MDC_IDC_SET_BRADY_LOWRATE: 60 {BEATS}/MIN
MDC_IDC_SET_BRADY_MAX_SENSOR_RATE: 130 {BEATS}/MIN
MDC_IDC_SET_BRADY_MODE: NORMAL
MDC_IDC_SET_LEADCHNL_RV_PACING_AMPLITUDE: 2 V
MDC_IDC_SET_LEADCHNL_RV_PACING_ANODE_ELECTRODE_1: NORMAL
MDC_IDC_SET_LEADCHNL_RV_PACING_CAPTURE_MODE: NORMAL
MDC_IDC_SET_LEADCHNL_RV_PACING_CATHODE_ELECTRODE_1: NORMAL
MDC_IDC_SET_LEADCHNL_RV_PACING_CATHODE_LOCATION_1: NORMAL
MDC_IDC_SET_LEADCHNL_RV_PACING_POLARITY: NORMAL
MDC_IDC_SET_LEADCHNL_RV_PACING_PULSEWIDTH: 0.4 MS
MDC_IDC_SET_LEADCHNL_RV_SENSING_ANODE_ELECTRODE_1: NORMAL
MDC_IDC_SET_LEADCHNL_RV_SENSING_ANODE_LOCATION_1: NORMAL
MDC_IDC_SET_LEADCHNL_RV_SENSING_CATHODE_ELECTRODE_1: NORMAL
MDC_IDC_SET_LEADCHNL_RV_SENSING_CATHODE_LOCATION_1: NORMAL
MDC_IDC_SET_LEADCHNL_RV_SENSING_POLARITY: NORMAL
MDC_IDC_SET_LEADCHNL_RV_SENSING_SENSITIVITY: 2 MV
MDC_IDC_SET_ZONE_DETECTION_INTERVAL: 360 MS
MDC_IDC_SET_ZONE_TYPE: NORMAL
MDC_IDC_STAT_BRADY_DTM_END: NORMAL
MDC_IDC_STAT_BRADY_DTM_START: NORMAL
MDC_IDC_STAT_BRADY_RV_PERCENT_PACED: 12.32 %
MDC_IDC_STAT_EPISODE_RECENT_COUNT: 1
MDC_IDC_STAT_EPISODE_RECENT_COUNT: 3365
MDC_IDC_STAT_EPISODE_RECENT_COUNT_DTM_END: NORMAL
MDC_IDC_STAT_EPISODE_RECENT_COUNT_DTM_END: NORMAL
MDC_IDC_STAT_EPISODE_RECENT_COUNT_DTM_START: NORMAL
MDC_IDC_STAT_EPISODE_RECENT_COUNT_DTM_START: NORMAL
MDC_IDC_STAT_EPISODE_TOTAL_COUNT: 1
MDC_IDC_STAT_EPISODE_TOTAL_COUNT: 3487
MDC_IDC_STAT_EPISODE_TOTAL_COUNT_DTM_END: NORMAL
MDC_IDC_STAT_EPISODE_TOTAL_COUNT_DTM_END: NORMAL
MDC_IDC_STAT_EPISODE_TOTAL_COUNT_DTM_START: NORMAL
MDC_IDC_STAT_EPISODE_TOTAL_COUNT_DTM_START: NORMAL
MDC_IDC_STAT_EPISODE_TYPE: NORMAL

## 2022-08-31 ENCOUNTER — TELEPHONE (OUTPATIENT)
Dept: CARDIOLOGY | Facility: OTHER | Age: 72
End: 2022-08-31

## 2022-08-31 NOTE — TELEPHONE ENCOUNTER
lvm for pt to call back to reschedule 9/13 appt with shadia provider not at facility anymore so needs to be reschedule with another provider can be with suad leal

## 2022-09-03 ENCOUNTER — HEALTH MAINTENANCE LETTER (OUTPATIENT)
Age: 72
End: 2022-09-03

## 2022-09-06 ENCOUNTER — OFFICE VISIT (OUTPATIENT)
Dept: CARDIOLOGY | Facility: OTHER | Age: 72
End: 2022-09-06
Attending: NURSE PRACTITIONER
Payer: COMMERCIAL

## 2022-09-06 VITALS
TEMPERATURE: 98.5 F | DIASTOLIC BLOOD PRESSURE: 83 MMHG | HEART RATE: 86 BPM | OXYGEN SATURATION: 95 % | SYSTOLIC BLOOD PRESSURE: 129 MMHG | BODY MASS INDEX: 25.3 KG/M2 | WEIGHT: 148.2 LBS | HEIGHT: 64 IN

## 2022-09-06 DIAGNOSIS — R60.0 BILATERAL LEG EDEMA: ICD-10-CM

## 2022-09-06 DIAGNOSIS — Z95.3 STATUS POST TRANSCATHETER AORTIC VALVE REPLACEMENT (TAVR) USING BIOPROSTHESIS: ICD-10-CM

## 2022-09-06 DIAGNOSIS — E78.5 HYPERLIPIDEMIA WITH TARGET LDL LESS THAN 100: ICD-10-CM

## 2022-09-06 DIAGNOSIS — Z79.899 ON POTASSIUM WASTING DIURETIC THERAPY: ICD-10-CM

## 2022-09-06 DIAGNOSIS — I10 ESSENTIAL HYPERTENSION, BENIGN: ICD-10-CM

## 2022-09-06 DIAGNOSIS — I48.21 PERMANENT ATRIAL FIBRILLATION (H): ICD-10-CM

## 2022-09-06 DIAGNOSIS — I50.32 CHRONIC DIASTOLIC HEART FAILURE (H): Primary | ICD-10-CM

## 2022-09-06 DIAGNOSIS — I44.2 ATRIOVENTRICULAR BLOCK, COMPLETE (H): ICD-10-CM

## 2022-09-06 DIAGNOSIS — Z79.01 ANTICOAGULATED BY ANTICOAGULATION TREATMENT: ICD-10-CM

## 2022-09-06 DIAGNOSIS — Z95.0 CARDIAC PACEMAKER IN SITU: ICD-10-CM

## 2022-09-06 DIAGNOSIS — J44.9 CHRONIC OBSTRUCTIVE PULMONARY DISEASE, UNSPECIFIED COPD TYPE (H): ICD-10-CM

## 2022-09-06 PROCEDURE — 99214 OFFICE O/P EST MOD 30 MIN: CPT | Performed by: NURSE PRACTITIONER

## 2022-09-06 PROCEDURE — G0463 HOSPITAL OUTPT CLINIC VISIT: HCPCS

## 2022-09-06 ASSESSMENT — PAIN SCALES - GENERAL: PAINLEVEL: NO PAIN (0)

## 2022-09-06 NOTE — PATIENT INSTRUCTIONS
Thank you for allowing Julisa Villegas CNP and our  team to participate in your care. Please call our office at [phone] with scheduling questions or if you need to cancel or change your appointment. With any other questions or concerns you may call cardiology nurse at [phone].       If you experience chest pain, chest pressure, chest tightness, shortness of breath, fainting, lightheadedness, nausea, vomiting, or other concerning symptoms, please report to the Emergency Department or call 911. These symptoms may be emergent, and best treated in the Emergency Department.    Stop furosemide  Start torsemide 40 mg once daily.   Continue to monitor salt intake (restrict to 2,500 mg or 2.5 grams daily). Monitor fluid intake (restrict to 2,500 mL or 2.5 liters). Continue to monitor daily weights (if you increase 3 pounds in one day or 5 pounds in one week call to update us.)  Follow-up in 6 weeks. We will re-check kidney functioning and electrolytes.     Julisa Villegas CNP

## 2022-09-06 NOTE — NURSING NOTE
"  Chief Complaint   Patient presents with     RECHECK     Follow up- patient of Dr Roberts's- last visit 6/23/22-still having swelling in ankles       Initial /83 (BP Location: Right arm, Patient Position: Chair, Cuff Size: Adult Large)   Pulse 86   Temp 98.5  F (36.9  C) (Tympanic)   Ht 1.626 m (5' 4\")   Wt 67.2 kg (148 lb 3.2 oz)   SpO2 95%   BMI 25.44 kg/m   Estimated body mass index is 25.44 kg/m  as calculated from the following:    Height as of this encounter: 1.626 m (5' 4\").    Weight as of this encounter: 67.2 kg (148 lb 3.2 oz).  Medication Reconciliation: complete  THADDEUS DELONG LPN    "

## 2022-09-08 NOTE — PROGRESS NOTES
[facility] HEART CARE   CARDIOLOGY PROGRESS NOTE     Chief Complaint   Patient presents with     RECHECK     Follow up- patient of Dr Roberts's- last visit 6/23/22-still having swelling in ankles          Diagnosis:    ICD-10-CM    1. Chronic diastolic heart failure (H) HFpEF  I50.32 torsemide 40 MG TABS   2. Bilateral leg edema  R60.0 torsemide 40 MG TABS   3. Atrioventricular block, complete (H)  I44.2    4. Cardiac pacemaker in situ  Z95.0    5. Permanent atrial fibrillation (H)  I48.21    6. On potassium wasting diuretic therapy  Z79.899    7. Hyperlipidemia with target LDL less than 100  E78.5    8. Anticoagulated by anticoagulation treatment  Z79.01    9. Chronic obstructive pulmonary disease, unspecified COPD type (H)  J44.9    10. Essential hypertension, benign  I10    11. Status post transcatheter aortic valve replacement (TAVR) using bioprosthesis  Z95.3          Assessment/Plan:    1. Permanent atrial fibrillation: She is largely asymptomatic at this time.  She does take diltiazem extended release 360 mg once daily.    2. [id number] >= 2.  She is appropriately anticoagulated on Eliquis 5 mg twice daily. Denies any bleeding concerns at this time.    3. History of complete heart block s/p pacemaker placement: She will continue to follow with the device clinic.    4. Hyperlipidemia with LDL goal less than 100, controlled: She is on atorvastatin 10 mg once daily.    5. Essential hypertension, controlled: She will continue with losartan 50 mg once daily, clonidine 0.2 mg at bedtime.  History of adverse reaction of cough to ACEi.    6. COPD, dyspnea: Dyspnea likely multifactorial with history of smoking/severe COPD on recent PFTs as well as diastolic dysfunction.  She has had recent weight gain and increased lower extremity edema with furosemide 60 mg in the morning and 40 mg in the afternoon.  She does monitor salt intake.  She does follow fluid restrictions.  She should continue management of COPD with  primary care provider    7. HFpEF (Echo showed diastolic dysfunction grade I back on echo in 2018, mildly elevated left-sided filling pressures on cardiac cath in 2021. Symptoms of LE edema, dyspnea)  The mainstays of therapy for HFpEF include volume management, blood pressure control, treating underlying sleep apnea if present, treatment of underlying CAD if within the goals of care, weight management, exercise training, rate control for atrial fibrillation as well as consideration for a rhythm control strategy, and consideration for clinical trials. Consideration of spironolactone in low risk patients should be taken given the positive CHF results in the TOPCAT trial.    BP: controlled   Fluid status Hypervolemic with increased bilateral LE edema and weight gain of 4-5#.  We will discontinue her furosemide.  We will start torsemide which can have better bioavailability than the furosemide.  She was started at torsemide 40 mg once daily.  We will titrate based on her symptoms.  Aldosterone antagonist: should consider at follow-up  SGLT-2: Could be considered at follow-up  Ischemia evaluation: Completed in 2021- cardiac catheterization showed no CAD  ACEi/ARB/ARNI: n/a, no evidence for use in HFpEF. History of cough with ACEi  BB: n/a, no evidence for use in HFpEF   SCD prophylaxis: n/a, no evidence for use in HFpEF  NSAID use: Contraindicated  Sleep apnea evaluation: Refused    8. Severe aortic stenosis status post TAVR in July 2021: Echocardiogram 1 year postop shows normal function of valve.  Also shows normal heart functioning.    She was instructed to stop aspirin by valve clinic.    She will continue with Eliquis 5 mg twice daily.    She will need dental prophylaxis with amoxicillin 1 g prior to dental work.    9. She will follow-up with cardiology in 6 weeks.  We will recheck a BMP at that time.  We should consider spironolactone initiation at that time as well.        Interval history:  Mrs. Cameron presents  today for cardiology follow-up.    Recall she is s/p TAVR for severe aortic stenosis in July 2021.  She had been on Eliquis 5 mg twice daily for atrial fibrillation as well as aspirin 81 mg once daily.  She did follow-up with TAVR clinic with recent echocardiogram showing that the valve is functioning well.  Echocardiogram also showed mild concentric wall thickening consistent with left ventricular hypertrophy, normal left ventricular function with LVEF of 55 to 60%.  She was instructed that she could stop use of daily aspirin 81 mg.  She had cardiac catheterization April 30, 2021 as part of TAVR work-up.  No coronary artery disease.    History of complete heart block status post pacemaker placement, permanent atrial fibrillation.  She denies any recent palpitations, fluttering, skipping sensation in chest.  She denies any lightheaded, dizziness, near-syncope or syncopal episodes.  She continues to work.  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  She enjoys being able to be physically active.    She continues with dyspnea, dyspnea on exertion.  Symptoms seem to wax and wane with variation in weather.  She has noticed that the humidity does increase her dyspnea.  It was thought that her pulmonary function testing should be repeated after her TAVR and some improvement in symptoms.  She did repeat pulmonary function testing on 1220 this did show severe COPD.    Smoking history: Started smoking at age 16 years old.  She quit smoking in 2007.  Endorse that she probably smoked 0.25 packs/day.    Sleep history: she does have a hospital bed at home that she elevates at least 30 degrees due to orthopnea.  She sometimes uses home oxygen but this is rare.  Denies PND.  She has had some increased lower extremity edema recently.  She also endorses weight gain of 4 to 5 pounds.  She does endorse compliance with furosemide 60 mg in the morning and 40 mg in the afternoon.      HPI:    #1  permanent atrial fibrillation, Patient has had chronic permanent atrial fibrillation for several years, she was previously on warfarin therapy she is currently on Eliquis 5 mg twice a day, additionally she is on aspirin 81 mg a day since her TAVR procedure.  She is questioning why she is on both as was myself.  She is nearly 1 year post TAVR aortic valve replacement procedure.  Patient has noted increased bruisability.  She is in contact with the [facility] team that performed the TAVR procedure and is scheduled for an echo follow-up.  I told her to relate a question to the nurse coordinator to check with the performing interventionalist ,whether she still needs to be on the aspirin 81 mg.  Patient denies associated palpitations her rate seems to be well controlled.    #2: Status post TAVR  4 critical aortic stenosis, procedure was performed in July 2021.  I personally reviewed all of the documentation and diagnostic data leading up to the implantation.  The aortic valve area was in the range of 0.8 cm and the patient was becoming more symptomatic and her LV function was somewhat compromised.  Patient stated that she is actually feeling better, is less short of breath and having less issues with her COPD.  She is still troubled by leg edema.      #3:  Systemic anticoagulation with Eliquis, patient also on aspirin low-dose. The patient has noted increased bruising, as noted above is questioning why she needs to be on the aspirin she will check with the team at the [facility] whether she needs to continue the aspirin.    #4: Labile  Hypertension with likely underlying degree of Hypertensive Heart Disease, patient is on multiple medications including clonidine 0.2 mg at bedtime, Tiazac 360 mg long-acting diltiazem once a day, losartan 50 mg twice a day.  Additionally she is on Lasix and potassium for the edema.    #5: Edema, as noted below, still problematic.  Patient unable to wear compression  stockings due to compromise of circulation.    #6: Coronary artery disease by history from the chart, careful review of the coronary artery angiogram reveals description of clean coronary arteries.  The patient does have diffuse peripheral vascular disease including carotid aortic and pelvic atherosclerotic sclerotic PVD.    #7: Carotid artery disease, noncritical asymptomatic last checkup well over a year ago.    #8: Peripheral vascular disease, including aortic and pelvic atherosclerotic disease as described by pre-TAVR TVA work-up asymptomatic    #9: Hyperlipidemia patient is on Lipitor last checked June 2021 total cholesterol 201 she did have an elevated HDL of 109 which was good LDL was therapeutic at 78 triglycerides 71 patient needs recheck.    #10: COPD, as per PFTs from 2018 was moderately severe the patient is feeling better clinically she is actually reduced her Combivent inhaler from 4 times a day to more like twice a day.  When I brought up the issue of repeating the study she was actually interested for her own knowledge to see if its improved.  It may well have improved with the release of the likely elevated pressures in the LV from the aortic stenosis.  We will recheck PFTs    #11: Status post permanent pacemaker for complete heart block following TAVR procedure clinically stable post permanent pacemaker with normal function.        RELEVANT TESTING:  PFTs 8/12/2022:  The FVC, FEV1, FEV1-FVC ratio and FEF 25-75% are reduced indicating airway obstruction.  The slow vital capacity is reduced.  Following administration of bronchodilators, there is no significant response.  Pulmonary function diagnosis: Severe obstructive airway disease.    CT angiogram of the chest: From April 30, 2021 was reviewed and is available in the chart giving the measurements regarding the TAVR and also documenting a peripheral vascular disease  2D echo echo: Performed in January 21 revealed ejection fraction of 55 to 60% no  definitive evidence of left ventricular wall thickness being abnormal left ventricular size was described as normal there was severe biatrial enlargement calculated valve area was 0.7 cm  by telemetry 0.82 square V-max across the valve was 4.1 in the high area compatible with severe to critical aortic stenosis.  [facility] valve replacement team has contacted the patient and a follow-up echo is pending    Carotid ultrasound and Doppler: Previously performed more than a year and a half ago revealed bilateral carotid artery stenosis of less than 50% the velocities were not elevated the patient is on antihyperlipidemics dated the study was April 2021..    Coronary angiogram: Revealed conclusion moderately elevated pulmonary artery hypertension, left-sided filling pressures mildly elevated, normal cardiac output level,, interestingly enough normal coronary arteries were described.        Past Medical History:   Diagnosis Date     Asthma      Atrial fibrillation (H)      COPD (chronic obstructive pulmonary disease) (H)      Depression      High cholesterol      HTN (hypertension)      Oxygen dependent      Thyroid disease        Past Surgical History:   Procedure Laterality Date     BACK SURGERY  2006     CARDIAC SURGERY  06/12/2018    Angiogram at [facility]     COLONOSCOPY N/A 01/19/2016    Procedure: COLONOSCOPY;  Surgeon: Waldemar Bob MD;  Location: [facility] OR     CV CORONARY ANGIOGRAM N/A 04/30/2021    Procedure: CV CORONARY ANGIOGRAM;  Surgeon: Poli Walsh MD;  Location:  HEART CARDIAC CATH LAB     CV LEFT HEART CATH N/A 04/30/2021    Procedure: CV LEFT HEART CATH;  Surgeon: Poli Walsh MD;  Location:  HEART CARDIAC CATH LAB     CV RIGHT HEART CATH MEASUREMENTS RECORDED N/A 04/30/2021    Procedure: CV RIGHT HEART CATH;  Surgeon: Poli Walsh MD;  Location:  HEART CARDIAC CATH LAB     CV TRANSCATHETER AORTIC VALVE REPLACEMENT N/A 07/14/2021    Procedure:  Right femoral Transcaval transcatheter aortic valve replacement (SUMMERS) size 29mm.  Transesophageal echocardiogram per anesthesia;  Surgeon: Domo Sarah MD;  Location: [facility] OR     EP PPM INSERT OF NEW OR REPL W/VENT LEAD N/A 07/14/2021    Procedure: insertion of Permanent Pacemaker;  Surgeon: Eliezer Myers MD;  Location: [facility] OR     HEART CATH FEMORAL CANNULIZATION WITH OPEN STANDBY REPAIR AORTIC VALVE N/A 07/14/2021    Procedure: Cardiopulmonary standby.;  Surgeon: Fly Smith MD;  Location: [facility] OR     HYSTERECTOMY  1980    partial     SLING BLADDER SUSPENSION WITH FASCIA LINNETTE         Allergies   Allergen Reactions     Amlodipine Besylate Cough     Norvasc     Lisinopril Cough     Ace Inhibitors Cough       Current Outpatient Medications   Medication Sig Dispense Refill     acetaminophen (TYLENOL) 500 MG tablet Take 500-1,000 mg by mouth every 6 hours as needed for mild pain       ADVAIR -21 MCG/ACT inhaler Inhale 2 puffs by mouth twice daily 12 g 4     albuterol (PROAIR HFA/PROVENTIL HFA/VENTOLIN HFA) 108 (90 Base) MCG/ACT inhaler Inhale 2 puffs into the lungs every 6 hours 18 g 3     amoxicillin (AMOXIL) 500 MG capsule Take 4 capsules (2,000 mg) by mouth once as needed (SBE prophylaxis take 30-60 minutes prior to dental procedure/cleaning) 4 capsule 3     apixaban ANTICOAGULANT (ELIQUIS ANTICOAGULANT) 5 MG tablet Take 1 tablet (5 mg) by mouth 2 times daily 180 tablet 1     atorvastatin (LIPITOR) 10 MG tablet Take 1 tablet (10 mg) by mouth daily 90 tablet 1     Calcium Carb-Cholecalciferol (CALTRATE 600+D3 SOFT PO) Take 600 mg by mouth 2 times daily       cloNIDine (CATAPRES) 0.1 MG tablet Take 2 tablets (0.2 mg) by mouth At Bedtime 180 tablet 1     COMBIVENT RESPIMAT  MCG/ACT inhaler Inhale 1 puff into the lungs 4 times daily 16 g 1     diltiazem ER (TIAZAC) 360 MG 24 hr ER beaded capsule Take 1 capsule (360 mg) by mouth daily 90 capsule 1     diphenhydrAMINE (BENADRYL) 25  MG tablet Take 1-2 tablets (25-50 mg) by mouth every 6 hours as needed for itching or allergies 60 tablet 1     hydrOXYzine (ATARAX) 25 MG tablet Take 1 tablet (25 mg) by mouth 3 times daily as needed for itching 60 tablet 0     levothyroxine (SYNTHROID/LEVOTHROID) 100 MCG tablet Take 1 tablet (100 mcg) by mouth daily 90 tablet 3     losartan (COZAAR) 50 MG tablet Take 1 tablet (50 mg) by mouth 2 times daily 180 tablet 3     OTHER MEDICAL SUPPLIES Apply one pair to help with edema 1 each 0     potassium chloride ER (KLOR-CON M) 20 MEQ CR tablet Take 1 tablet (20 mEq) by mouth 3 times daily 270 tablet 1     predniSONE (DELTASONE) 20 MG tablet Take 3 tabs by mouth daily x 3 days, then 2 tabs daily x 3 days, then 1 tab daily x 3 days, then 1/2 tab daily x 3 days. 20 tablet 0     torsemide 40 MG TABS Take 40 mg by mouth daily for 90 days 90 tablet 1       Social History     Socioeconomic History     Marital status:      Spouse name: Not on file     Number of children: Not on file     Years of education: Not on file     Highest education level: Not on file   Occupational History     Employer: RETIRED     Comment: disabled   Tobacco Use     Smoking status: Former Smoker     Packs/day: 0.50     Years: 41.00     Pack years: 20.50     Types: Cigarettes     Start date: 1/1/1966     Quit date: 1/28/2007     Years since quitting: 15.6     Smokeless tobacco: Never Used   Substance and Sexual Activity     Alcohol use: No     Alcohol/week: 0.0 standard drinks     Drug use: No     Sexual activity: Never     Comment:    Other Topics Concern      Service No     Blood Transfusions Yes     Comment: Permits if needed     Caffeine Concern Yes     Comment: 2 cups coffee daily     Occupational Exposure Not Asked     Hobby Hazards Not Asked     Sleep Concern Not Asked     Stress Concern Not Asked     Weight Concern Not Asked     Special Diet Not Asked     Back Care Not Asked     Exercise Not Asked     Bike Helmet Not  Asked     Seat Belt Yes     Self-Exams Not Asked     Parent/sibling w/ CABG, MI or angioplasty before 65F 55M? No   Social History Narrative     Not on file     Social Determinants of Health     Financial Resource Strain: Not on file   Food Insecurity: Not on file   Transportation Needs: Not on file   Physical Activity: Not on file   Stress: Not on file   Social Connections: Not on file   Intimate Partner Violence: Not on file   Housing Stability: Not on file       LAB RESULTS:   Ancillary Procedure on 07/13/2022   Component Date Value Ref Range Status     Date Time Interrogation Session 07/13/2022 [id number]   Final     Implantable Pulse Generator Manufa* 07/13/2022 Medtronic   Final     Implantable Pulse Generator Model 07/13/2022 [id number] [place] XT SR MRI   Final     Implantable Pulse Generator Serial* 07/13/2022 [id number]   Final     Type Interrogation Session 07/13/2022 In Clinic   Final     Clinic Name 07/13/2022 [facility]   Final     Implantable Pulse Generator Type 07/13/2022 Pacemaker   Final     Implantable Pulse Generator Implan* 07/13/2022 [id number]   Final     Implantable Lead  07/13/2022 Medtronic   Final     Implantable Lead Model 07/13/2022 3830 SelectSecure MRI SureScan   Final     Implantable Lead Serial Number 07/13/2022 [id number]   Final     Implantable Lead Implant Date 07/13/2022 [id number]   Final     Implantable Lead Polarity Type 07/13/2022 Bipolar Lead   Final     Implantable Lead Location Detail 1 07/13/2022 UNKNOWN   Final     Implantable Lead Special Function 07/13/2022 69 cm   Final     Implantable Lead Location 07/13/2022 Right Ventricle   Final     Chaz Setting Mode (NBG Code) 07/13/2022 VVIR   Final     Chaz Setting Lower Rate Limit 07/13/2022 60  [beats]/min Final     Chaz Setting Maximum Sensor Rate 07/13/2022 130  [beats]/min Final     Chaz Setting Hysterisis Rate 07/13/2022 DISABLED   Final     Lead Channel Setting Sensing Polar* 07/13/2022  Bipolar   Final     Lead Channel Setting Sensing Anode* 07/13/2022 Right Ventricle   Final     Lead Channel Setting Sensing Anode* 07/13/2022 Ring   Final     Lead Channel Setting Sensing Catho* 07/13/2022 Right Ventricle   Final     Lead Channel Setting Sensing Catho* 07/13/2022 Tip   Final     Lead Channel Setting Sensing Sensi* 07/13/2022 2  mV Final     Lead Channel Setting Pacing Polari* 07/13/2022 Unipolar   Final     Lead Channel Setting Pacing Anode * 07/13/2022 Can   Final     Lead Channel Setting Sensing Catho* 07/13/2022 Right Ventricle   Final     Lead Channel Setting Sensing Catho* 07/13/2022 Tip   Final     Lead Channel Setting Pacing Pulse * 07/13/2022 0.4  ms Final     Lead Channel Setting Pacing Amplit* 07/13/2022 2  V Final     Lead Channel Setting Pacing Captur* 07/13/2022 Adaptive   Final     Zone Setting Type Category 07/13/2022 VF   Final     Zone Setting Type Category 07/13/2022 VT   Final     Zone Setting Type Category 07/13/2022 VT   Final     Zone Setting Type Category 07/13/2022 VT   Final     Zone Setting Detection Interval 07/13/2022 360  ms Final     Zone Setting Type Category 07/13/2022 ATRIAL_FIBRILLATION   Final     Zone Setting Type Category 07/13/2022 AT/AF   Final     Lead Channel Impedance Value 07/13/2022 589  ohm Final     Lead Channel Impedance Value 07/13/2022 456  ohm Final     Lead Channel Sensing Intrinsic Amp* 07/13/2022 13.4  mV Final     Lead Channel Pacing Threshold Ampl* 07/13/2022 0.5  V Final     Lead Channel Pacing Threshold Puls* 07/13/2022 0.4  ms Final     Battery Date Time of Measurements 07/13/2022 20220713111502   Final     Battery Status 07/13/2022 OK   Final     Battery RRT Trigger 07/13/2022 2.625   Final     Battery Remaining Longevity 07/13/2022 179  mo Final     Battery Voltage 07/13/2022 3.18  V Final     Chaz Statistic Date Time Start 07/13/2022 20211012144754   Final     Chaz Statistic Date Time End 07/13/2022 20220713135652   Final     Chaz  Statistic RV Percent Paced 07/13/2022 12.32  % Final     Episode Statistic Recent Count 07/13/2022 3,365   Final     Episode Statistic Type Category 07/13/2022 VT   Final     Episode Statistic Recent Count 07/13/2022 1   Final     Episode Statistic Type Category 07/13/2022 VT   Final     Episode Statistic Recent Date Time* 07/13/2022 20973982328221   Final     Episode Statistic Recent Date Time* 07/13/2022 34666853073599   Final     Episode Statistic Recent Date Time* 07/13/2022 87724328016989   Final     Episode Statistic Recent Date Time* 07/13/2022 83417592643540   Final     Episode Statistic Total Count 07/13/2022 3,487   Final     Episode Statistic Type Category 07/13/2022 VT   Final     Episode Statistic Total Count 07/13/2022 1   Final     Episode Statistic Type Category 07/13/2022 VT   Final     Episode Statistic Total Date Time * 07/13/2022 19536006148159   Final     Episode Statistic Total Date Time * 07/13/2022 20220713135652   Final     Episode Statistic Total Date Time * 07/13/2022 20210714140321   Final     Episode Statistic Total Date Time * 07/13/2022 63736446571880   Final     Episode Identifier 07/13/2022 3,488   Final     Episode Type Category 07/13/2022 VT   Final     Episode Date Time 07/13/2022 20220710100614   Final     Episode Duration 07/13/2022 0  s Final     Episode Identifier 07/13/2022 3,487   Final     Episode Type Category 07/13/2022 VT   Final     Episode Date Time 07/13/2022 [id number]   Final     Episode Duration 07/13/2022 0  s Final     Episode Identifier 07/13/2022 3,486   Final     Episode Type Category 07/13/2022 VT   Final     Episode Date Time 07/13/2022 20220626095721   Final     Episode Duration 07/13/2022 0  s Final     Episode Identifier 07/13/2022 3,485   Final     Episode Type Category 07/13/2022 VT   Final     Episode Date Time 07/13/2022 20220626095702   Final     Episode Duration 07/13/2022 1  s Final     Episode Identifier 07/13/2022 3,484   Final     Episode  Type Category 07/13/2022 VT   Final     Episode Date Time 07/13/2022 20220626095349   Final     Episode Duration 07/13/2022 1  s Final     Episode Identifier 07/13/2022 3,483   Final     Episode Type Category 07/13/2022 VT   Final     Episode Date Time 07/13/2022 20220625213653   Final     Episode Duration 07/13/2022 1  s Final     Episode Identifier 07/13/2022 3,482   Final     Episode Type Category 07/13/2022 VT   Final     Episode Date Time 07/13/2022 20220625213331   Final     Episode Duration 07/13/2022 1  s Final     Episode Identifier 07/13/2022 3,481   Final     Episode Type Category 07/13/2022 VT   Final     Episode Date Time 07/13/2022 20220625100819   Final     Episode Duration 07/13/2022 1  s Final     Episode Identifier 07/13/2022 3,480   Final     Episode Type Category 07/13/2022 VT   Final     Episode Date Time 07/13/2022 20220620093907   Final     Episode Duration 07/13/2022 0  s Final     Episode Identifier 07/13/2022 3,479   Final     Episode Type Category 07/13/2022 VT   Final     Episode Date Time 07/13/2022 20220620093818   Final     Episode Duration 07/13/2022 1  s Final     Episode Identifier 07/13/2022 3,478   Final     Episode Type Category 07/13/2022 VT   Final     Episode Date Time 07/13/2022 20220620093432   Final     Episode Duration 07/13/2022 0  s Final     Episode Identifier 07/13/2022 3,477   Final     Episode Type Category 07/13/2022 VT   Final     Episode Date Time 07/13/2022 20220620092750   Final     Episode Duration 07/13/2022 1  s Final     Episode Identifier 07/13/2022 3,476   Final     Episode Type Category 07/13/2022 VT   Final     Episode Date Time 07/13/2022 20220619112609   Final     Episode Duration 07/13/2022 0  s Final     Episode Identifier 07/13/2022 3,475   Final     Episode Type Category 07/13/2022 VT   Final     Episode Date Time 07/13/2022 20220619111915   Final     Episode Duration 07/13/2022 1  s Final     Episode Identifier 07/13/2022 3,474   Final     Episode  "Type Category 07/13/2022 VT   Final     Episode Date Time 07/13/2022 [id number]   Final     Episode Duration 07/13/2022 1  s Final        Review of systems: Negative except that which was noted in the HPI.    Physical examination:  /83 (BP Location: Right arm, Patient Position: Chair, Cuff Size: Adult Large)   Pulse 86   Temp 98.5  F (36.9  C) (Tympanic)   Ht 1.626 m (5' 4\")   Wt 67.2 kg (148 lb 3.2 oz)   SpO2 95%   BMI 25.44 kg/m      GENERAL APPEARANCE: healthy, alert and no distress  HEENT: no icterus, no xanthelasmas, normal pupil size and reaction, no cyanosis.  NECK: no adenopathy, no asymmetry, masses.  CHEST: lungs clear to auscultation - no rales, rhonchi or wheezes, no use of accessory muscles, no retractions, respirations are unlabored, normal respiratory rate  CARDIOVASCULAR: Irregularly irregular rhythm, normal S1 with physiologic split S2, no S3 or S4 and no murmur, click or rub  EXTREMITIES: +2 Bilateral lower extremity edema. no clubbing, cyanosis.  NEURO: alert and oriented normal speech, and affect  VASC: No vascular bruits heard.  SKIN: no ecchymoses, no rashes        Thank you for allowing me to participate in the care of your patient. Please do not hesitate to contact me if you have any questions.       Julisa Villegas, ELO    "

## 2022-09-09 PROBLEM — Z95.3 STATUS POST TRANSCATHETER AORTIC VALVE REPLACEMENT (TAVR) USING BIOPROSTHESIS: Status: ACTIVE | Noted: 2021-07-20

## 2022-09-13 ENCOUNTER — OFFICE VISIT (OUTPATIENT)
Dept: PODIATRY | Facility: OTHER | Age: 72
End: 2022-09-13
Attending: PODIATRIST
Payer: MEDICARE

## 2022-09-13 VITALS
OXYGEN SATURATION: 94 % | TEMPERATURE: 97.2 F | HEART RATE: 87 BPM | SYSTOLIC BLOOD PRESSURE: 113 MMHG | DIASTOLIC BLOOD PRESSURE: 73 MMHG

## 2022-09-13 DIAGNOSIS — L60.3 ONYCHODYSTROPHY: Primary | ICD-10-CM

## 2022-09-13 DIAGNOSIS — B35.3 TINEA PEDIS OF BOTH FEET: ICD-10-CM

## 2022-09-13 DIAGNOSIS — Z79.01 LONG TERM CURRENT USE OF ANTICOAGULANT THERAPY: ICD-10-CM

## 2022-09-13 DIAGNOSIS — R60.0 BILATERAL LOWER EXTREMITY EDEMA: ICD-10-CM

## 2022-09-13 DIAGNOSIS — M21.41 PES PLANUS OF BOTH FEET: ICD-10-CM

## 2022-09-13 DIAGNOSIS — M21.42 PES PLANUS OF BOTH FEET: ICD-10-CM

## 2022-09-13 DIAGNOSIS — M20.12 HALLUX VALGUS (ACQUIRED), LEFT FOOT: ICD-10-CM

## 2022-09-13 DIAGNOSIS — L85.3 XEROSIS OF SKIN: ICD-10-CM

## 2022-09-13 PROCEDURE — 11721 DEBRIDE NAIL 6 OR MORE: CPT | Mod: GZ | Performed by: PODIATRIST

## 2022-09-13 PROCEDURE — G0463 HOSPITAL OUTPT CLINIC VISIT: HCPCS | Mod: 25

## 2022-09-13 ASSESSMENT — PAIN SCALES - GENERAL: PAINLEVEL: NO PAIN (0)

## 2022-09-13 NOTE — NURSING NOTE
"Chief Complaint   Patient presents with     Toenail     Trimming       Initial /73 (BP Location: Left arm, Patient Position: Sitting, Cuff Size: Adult Regular)   Pulse 87   Temp 97.2  F (36.2  C) (Tympanic)   SpO2 94%  Estimated body mass index is 25.44 kg/m  as calculated from the following:    Height as of 9/6/22: 1.626 m (5' 4\").    Weight as of 9/6/22: 67.2 kg (148 lb 3.2 oz).  Medication Reconciliation: complete  Carmen Dey  "

## 2022-09-13 NOTE — PROGRESS NOTES
Chief complaint: Patient presents with:  Toenail: Trimming      History of Present Illness: This 72 year old female is seen for follow-up management of elongated, thickened toenails and Athletes' foot.    She has been alternating the use of Athlete's Foot cream and spray every day.     She says her ingrown toenails are coming back and they are causing her pain. She would like them trimmed today. She is unable to trim her toenails and she used to get pedicures.    She is on Eliquis for a-fib.     She gets burning, tingling, and numbness in her feet.    She still gets a lot of swelling in her ankles. She was placed on Lasix. She tried compression socks in the past but they caused pain in the ankles and she could not get them on her legs.      No further pedal complaints today.       /73 (BP Location: Left arm, Patient Position: Sitting, Cuff Size: Adult Regular)   Pulse 87   Temp 97.2  F (36.2  C) (Tympanic)   SpO2 94%     Patient Active Problem List   Diagnosis     Permanent atrial fibrillation (H)     Pulmonary emphysema (H)     Bicuspid aortic valve     Mild major depression (H)     Acute bronchitis     Hypothyroidism, postablative     Advance care planning     Essential hypertension     Long-term (current) use of anticoagulants [Z79.01]     Acute on chronic respiratory failure (H)     Health Care Home     Status post coronary angiogram     Coronary artery disease involving native coronary artery of native heart without angina pectoris     Acute cystitis without hematuria     Small bowel obstruction (H)     Acute renal failure (H)     Hypokalemia     Infection due to 2019 novel coronavirus     Aortic aneurysm (H)     Aortic valve disorder     Mitral valve disorder     Cardiomegaly     Carotid stenosis     Depressive disorder     Edema     COPD (chronic obstructive pulmonary disease) (H)     Other ill-defined heart diseases     Aortic valve stenosis, etiology of cardiac valve disease unspecified     Aortic  stenosis, severe     Status post transcatheter aortic valve replacement (TAVR) using bioprosthesis     Postoperative complete heart block (H)     Cardiac pacemaker in situ       Past Surgical History:   Procedure Laterality Date     BACK SURGERY  2006     CARDIAC SURGERY  06/12/2018    [facility]     COLONOSCOPY N/A 01/19/2016    Procedure: COLONOSCOPY;  Surgeon: Waldemar Bob MD;  Location: [facility]     CV CORONARY ANGIOGRAM N/A 04/30/2021    Procedure: CV CORONARY ANGIOGRAM;  Surgeon: Poli Walsh MD;  Location: [facility]     CV LEFT HEART CATH N/A 04/30/2021    Procedure: CV LEFT HEART CATH;  Surgeon: Poli Walsh MD;  Location: [facility]     CV RIGHT HEART CATH MEASUREMENTS RECORDED N/A 04/30/2021    Procedure: CV RIGHT HEART CATH;  Surgeon: Poli Walsh MD;  Location: [facility]     CV TRANSCATHETER AORTIC VALVE REPLACEMENT N/A 07/14/2021    Procedure: Right femoral Transcaval transcatheter aortic valve replacement (SUMMERS) size 29mm.  Transesophageal echocardiogram per anesthesia;  Surgeon: Domo Sarah MD;  Location: [facility]     EP PPM INSERT OF NEW OR REPL W/VENT LEAD N/A 07/14/2021    Procedure: insertion of Permanent Pacemaker;  Surgeon: Eliezer Myers MD;  Location: [facility]     HEART CATH FEMORAL CANNULIZATION WITH OPEN STANDBY REPAIR AORTIC VALVE N/A 07/14/2021    Procedure: Cardiopulmonary standby.;  Surgeon: Fly Smith MD;  Location: [facility]     HYSTERECTOMY  1980    partial     SLING BLADDER SUSPENSION WITH FASCIA LINNETTE         Current Outpatient Medications   Medication     acetaminophen (TYLENOL) 500 MG tablet     ADVAIR -21 MCG/ACT inhaler     albuterol (PROAIR HFA/PROVENTIL HFA/VENTOLIN HFA) 108 (90 Base) MCG/ACT inhaler     amoxicillin (AMOXIL) 500 MG capsule     apixaban ANTICOAGULANT (ELIQUIS ANTICOAGULANT) 5 MG tablet     atorvastatin (LIPITOR) 10 MG tablet      Calcium Carb-Cholecalciferol (CALTRATE 600+D3 SOFT PO)     cloNIDine (CATAPRES) 0.1 MG tablet     COMBIVENT RESPIMAT  MCG/ACT inhaler     diltiazem ER (TIAZAC) 360 MG 24 hr ER beaded capsule     diphenhydrAMINE (BENADRYL) 25 MG tablet     hydrOXYzine (ATARAX) 25 MG tablet     levothyroxine (SYNTHROID/LEVOTHROID) 100 MCG tablet     losartan (COZAAR) 50 MG tablet     OTHER MEDICAL SUPPLIES     potassium chloride ER (KLOR-CON M) 20 MEQ CR tablet     predniSONE (DELTASONE) 20 MG tablet     torsemide 40 MG TABS     No current facility-administered medications for this visit.          Allergies   Allergen Reactions     Amlodipine Besylate Cough     Norvasc     Lisinopril Cough     Ace Inhibitors Cough       Family History   Problem Relation Age of Onset     Ovarian Cancer Mother 72     Asthma Mother      Cancer Mother         ovarian     Hypertension Mother      Prostate Cancer Father      Hypertension Father      Heart Failure Father         CHF     Hypertension Sister      Asthma Brother      Hypertension Brother        Social History     Socioeconomic History     Marital status:      Spouse name: None     Number of children: None     Years of education: None     Highest education level: None   Occupational History     Employer: RETIRED     Comment: disabled   Tobacco Use     Smoking status: Former Smoker     Packs/day: 0.50     Years: 41.00     Pack years: 20.50     Types: Cigarettes     Start date: 1/1/1966     Quit date: 1/28/2007     Years since quitting: 15.4     Smokeless tobacco: Never Used   Substance and Sexual Activity     Alcohol use: No     Alcohol/week: 0.0 standard drinks     Drug use: No     Sexual activity: Never     Comment:    Other Topics Concern      Service No     Blood Transfusions Yes     Comment: Permits if needed     Caffeine Concern Yes     Comment: 2 cups coffee daily     Seat Belt Yes     Parent/sibling w/ CABG, MI or angioplasty before 65F 55M? No       ROS: 10  point ROS neg other than the symptoms noted above in the HPI.  EXAM  Constitutional: healthy, alert and no distress    Psychiatric: mentation appears normal and affect normal/bright    VASCULAR:  -Dorsalis pedis pulse +2/4 b/l  -Posterior tibial pulse +2/4 b/l  -Capillary refill time < 3 seconds to b/l hallux  -Hair growth Absent to b/l anterior legs and ankles  -Varicosities and telangiectasias to foot, bilaterally   -Mild 1+ pitting edema to bilateral foot and moderate 2+ pitting edema to bilateral ankle    NEURO:  -Protective sensation intact with SWM +10/10 RIGHT and +9/10 LEFT on 07/08/2022  -Light touch sensation intact to b/l plantar forefoot  DERM:  -Skin temperature, texture and turgor WNL b/l  -Skin mild-to-moderately erythematous with flaking of skin to bilateral plantar feet in a moccasin distribution  -Toenails elongated, thickened, dystrophic and discolored x 10  MSK:  -Lateral deviation of hallux with medial deviation of 1st metatarsal, LEFT   -Prominent bony prominence to dorsal and medial 1st metatarsal head, bilaterally   -Moderate decrease in arch height while patient is NWB, bilaterally     -Muscle strength of ankles +5/5 for dorsiflexion, plantarflexion, ABDUction and ADDuction b/l    ============================================================    ASSESSMENT:  (L60.3) Onychodystrophy  (primary encounter diagnosis)    (L85.3) Xerosis of skin    (M20.12) Hallux valgus (acquired), left foot    (B35.3) Tinea pedis of both feet    (R60.0) Bilateral lower extremity edema    (M21.41,  M21.42) Pes planus of both feet    (Z79.01) Long-term (current) use of anticoagulants [Z79.01]        PLAN:  -Patient evaluated and examined. Treatment options discussed with no educational barriers noted.  -Toenail debridement x 10 toenails without incident    -Foot Education provided. This included checking the feet daily looking for new new blisters or wounds, wearing shoes at all times when walking including around  the house, and avoiding lotion application between the toes. If there are any signs of infection, the patient should present to the ED as soon as possible. Infections of the foot can be life threatening or lead to amputations of the foot or leg.    -Athlete's Foot  ---Improved and not itchy. Patient to continue with anti-fungal spray only if it is starting to worsen with flaking skin.    Lower extremity edema:  -Patient was started on Lasix recently. She is following up with her PCP for this. She started in early September, 2022.      -Patient in agreement with the above treatment plan and all of patient's questions were answered.      RTC 63+ days for nail debridement        Alessandra Wilkins, NGUYEN, DPM

## 2022-10-01 DIAGNOSIS — I25.10 CORONARY ARTERY DISEASE INVOLVING NATIVE CORONARY ARTERY OF NATIVE HEART WITHOUT ANGINA PECTORIS: ICD-10-CM

## 2022-10-01 DIAGNOSIS — E78.2 MIXED HYPERLIPIDEMIA: ICD-10-CM

## 2022-10-03 ENCOUNTER — TRANSFERRED RECORDS (OUTPATIENT)
Dept: HEALTH INFORMATION MANAGEMENT | Facility: CLINIC | Age: 72
End: 2022-10-03

## 2022-10-03 RX ORDER — ATORVASTATIN CALCIUM 10 MG/1
TABLET, FILM COATED ORAL
Qty: 90 TABLET | Refills: 3 | Status: SHIPPED | OUTPATIENT
Start: 2022-10-03 | End: 2023-09-01

## 2022-10-08 DIAGNOSIS — J44.1 COPD EXACERBATION (H): ICD-10-CM

## 2022-10-11 DIAGNOSIS — I10 ESSENTIAL HYPERTENSION: ICD-10-CM

## 2022-10-11 RX ORDER — ALBUTEROL SULFATE 90 UG/1
AEROSOL, METERED RESPIRATORY (INHALATION)
Qty: 18 G | Refills: 3 | Status: SHIPPED | OUTPATIENT
Start: 2022-10-11

## 2022-10-11 RX ORDER — CLONIDINE HYDROCHLORIDE 0.1 MG/1
TABLET ORAL
Qty: 180 TABLET | Refills: 0 | Status: SHIPPED | OUTPATIENT
Start: 2022-10-11 | End: 2022-12-27

## 2022-10-12 ENCOUNTER — ANCILLARY PROCEDURE (OUTPATIENT)
Dept: CARDIOLOGY | Facility: CLINIC | Age: 72
End: 2022-10-12
Attending: INTERNAL MEDICINE
Payer: MEDICARE

## 2022-10-12 DIAGNOSIS — I44.2 ATRIOVENTRICULAR BLOCK, COMPLETE (H): ICD-10-CM

## 2022-10-12 DIAGNOSIS — Z95.0 CARDIAC PACEMAKER IN SITU: ICD-10-CM

## 2022-10-12 PROCEDURE — 93296 REM INTERROG EVL PM/IDS: CPT

## 2022-10-12 PROCEDURE — 93294 REM INTERROG EVL PM/LDLS PM: CPT | Performed by: INTERNAL MEDICINE

## 2022-10-18 LAB
MDC_IDC_EPISODE_DTM: NORMAL
MDC_IDC_EPISODE_DURATION: 0 S
MDC_IDC_EPISODE_DURATION: 1 S
MDC_IDC_EPISODE_ID: 3575
MDC_IDC_EPISODE_ID: 3576
MDC_IDC_EPISODE_ID: 3577
MDC_IDC_EPISODE_ID: 3578
MDC_IDC_EPISODE_ID: 3579
MDC_IDC_EPISODE_ID: 3580
MDC_IDC_EPISODE_ID: 3581
MDC_IDC_EPISODE_ID: 3582
MDC_IDC_EPISODE_ID: 3583
MDC_IDC_EPISODE_ID: 3584
MDC_IDC_EPISODE_ID: 3585
MDC_IDC_EPISODE_ID: 3586
MDC_IDC_EPISODE_ID: 3587
MDC_IDC_EPISODE_ID: 3588
MDC_IDC_EPISODE_ID: 3589
MDC_IDC_EPISODE_TYPE: NORMAL
MDC_IDC_LEAD_IMPLANT_DT: NORMAL
MDC_IDC_LEAD_LOCATION: NORMAL
MDC_IDC_LEAD_LOCATION_DETAIL_1: NORMAL
MDC_IDC_LEAD_MFG: NORMAL
MDC_IDC_LEAD_MODEL: NORMAL
MDC_IDC_LEAD_POLARITY_TYPE: NORMAL
MDC_IDC_LEAD_SERIAL: NORMAL
MDC_IDC_LEAD_SPECIAL_FUNCTION: NORMAL
MDC_IDC_MSMT_BATTERY_DTM: NORMAL
MDC_IDC_MSMT_BATTERY_REMAINING_LONGEVITY: 174 MO
MDC_IDC_MSMT_BATTERY_RRT_TRIGGER: 2.62
MDC_IDC_MSMT_BATTERY_STATUS: NORMAL
MDC_IDC_MSMT_BATTERY_VOLTAGE: 3.14 V
MDC_IDC_MSMT_LEADCHNL_RV_IMPEDANCE_VALUE: 456 OHM
MDC_IDC_MSMT_LEADCHNL_RV_IMPEDANCE_VALUE: 589 OHM
MDC_IDC_MSMT_LEADCHNL_RV_PACING_THRESHOLD_AMPLITUDE: 0.38 V
MDC_IDC_MSMT_LEADCHNL_RV_PACING_THRESHOLD_PULSEWIDTH: 0.4 MS
MDC_IDC_MSMT_LEADCHNL_RV_SENSING_INTR_AMPL: 11.75 MV
MDC_IDC_PG_IMPLANT_DTM: NORMAL
MDC_IDC_PG_MFG: NORMAL
MDC_IDC_PG_MODEL: NORMAL
MDC_IDC_PG_SERIAL: NORMAL
MDC_IDC_PG_TYPE: NORMAL
MDC_IDC_SESS_CLINIC_NAME: NORMAL
MDC_IDC_SESS_DTM: NORMAL
MDC_IDC_SESS_TYPE: NORMAL
MDC_IDC_SET_BRADY_HYSTRATE: NORMAL
MDC_IDC_SET_BRADY_LOWRATE: 60 {BEATS}/MIN
MDC_IDC_SET_BRADY_MAX_SENSOR_RATE: 130 {BEATS}/MIN
MDC_IDC_SET_BRADY_MODE: NORMAL
MDC_IDC_SET_LEADCHNL_RV_PACING_AMPLITUDE: 2 V
MDC_IDC_SET_LEADCHNL_RV_PACING_ANODE_ELECTRODE_1: NORMAL
MDC_IDC_SET_LEADCHNL_RV_PACING_CAPTURE_MODE: NORMAL
MDC_IDC_SET_LEADCHNL_RV_PACING_CATHODE_ELECTRODE_1: NORMAL
MDC_IDC_SET_LEADCHNL_RV_PACING_CATHODE_LOCATION_1: NORMAL
MDC_IDC_SET_LEADCHNL_RV_PACING_POLARITY: NORMAL
MDC_IDC_SET_LEADCHNL_RV_PACING_PULSEWIDTH: 0.4 MS
MDC_IDC_SET_LEADCHNL_RV_SENSING_ANODE_ELECTRODE_1: NORMAL
MDC_IDC_SET_LEADCHNL_RV_SENSING_ANODE_LOCATION_1: NORMAL
MDC_IDC_SET_LEADCHNL_RV_SENSING_CATHODE_ELECTRODE_1: NORMAL
MDC_IDC_SET_LEADCHNL_RV_SENSING_CATHODE_LOCATION_1: NORMAL
MDC_IDC_SET_LEADCHNL_RV_SENSING_POLARITY: NORMAL
MDC_IDC_SET_LEADCHNL_RV_SENSING_SENSITIVITY: 2 MV
MDC_IDC_SET_ZONE_DETECTION_INTERVAL: 360 MS
MDC_IDC_SET_ZONE_TYPE: NORMAL
MDC_IDC_STAT_BRADY_DTM_END: NORMAL
MDC_IDC_STAT_BRADY_DTM_START: NORMAL
MDC_IDC_STAT_BRADY_RV_PERCENT_PACED: 17.48 %
MDC_IDC_STAT_EPISODE_RECENT_COUNT: 0
MDC_IDC_STAT_EPISODE_RECENT_COUNT: 0
MDC_IDC_STAT_EPISODE_RECENT_COUNT: 101
MDC_IDC_STAT_EPISODE_RECENT_COUNT_DTM_END: NORMAL
MDC_IDC_STAT_EPISODE_RECENT_COUNT_DTM_START: NORMAL
MDC_IDC_STAT_EPISODE_TOTAL_COUNT: 0
MDC_IDC_STAT_EPISODE_TOTAL_COUNT: 1
MDC_IDC_STAT_EPISODE_TOTAL_COUNT: 3588
MDC_IDC_STAT_EPISODE_TOTAL_COUNT_DTM_END: NORMAL
MDC_IDC_STAT_EPISODE_TOTAL_COUNT_DTM_START: NORMAL
MDC_IDC_STAT_EPISODE_TYPE: NORMAL

## 2022-10-18 NOTE — PROGRESS NOTES
[facility] HEART CARE   CARDIOLOGY PROGRESS NOTE     Chief Complaint   Patient presents with     Heart Problem          Diagnosis:    ICD-10-CM    1. Chronic diastolic heart failure (H)  I50.32 Basic metabolic panel     Basic metabolic panel     spironolactone (ALDACTONE) 25 MG tablet     Basic metabolic panel      2. Bilateral leg edema  R60.0 Basic metabolic panel     Basic metabolic panel      3. On potassium wasting diuretic therapy  Z79.899 Basic metabolic panel     Basic metabolic panel     Basic metabolic panel      4. Essential hypertension, benign  I10 Basic metabolic panel     Basic metabolic panel     Basic metabolic panel      5. Pulmonary hypertension (H)  I27.20 Basic metabolic panel     Basic metabolic panel      6. Permanent atrial fibrillation (H)  I48.21 diltiazem ER (DILT-XR) 240 MG 24 hr ER beaded capsule            Assessment/Plan:    1. Permanent atrial fibrillation    She is largely asymptomatic at this time.      Reviewed recent device check with average rate of 100-120 bpm, several episodes of atrial fibrillation with RVR    Increase diltiazem ER from 360 mg once daily to 480 mg once daily    2. [id number] >= 2.      She is appropriately anticoagulated on Eliquis 5 mg twice daily for stroke prophylaxis with atrial fibrillation    Denies any bleeding concerns at this time.    3. History of complete heart block s/p pacemaker placement    Reviewed recent device interrogation     She will continue to follow with the device clinic.    4. Hyperlipidemia with LDL goal less than 100, controlled  1. Continue atorvastatin 10 mg once daily.  Recent Labs   Lab Test 06/23/22  1342 06/11/21  1011 12/12/17  0856 08/25/15  0850 08/27/14  1139   CHOL 168 201*   < > 207* 210*   HDL 90 109   < > 108 92   LDL 65 78   < > 88 99   TRIG 67 71   < > 56 93   CHOLHDLRATIO  --   --   --  1.9 2.3    < > = values in this interval not displayed.       5. Essential hypertension, controlled  1. Continue with losartan 50 mg  twice daily  2. Since we are increasing her diltiazem for improved rate control and adding on spironolactone for HFpEF we will have her stop the Clonidine 0.2 mg at bedtime  3. May consider decreasing losartan if further diuresis is needed and lower blood pressures  4. History of adverse reaction of cough to ACEi.    6. COPD, dyspnea, pulmonary hypertension    Dyspnea likely multifactorial with history of smoking/severe COPD on recent PFTs as well as diastolic dysfunction.      Dyspnea has been stable since stopping furosemide and starting the torsemide     She does monitor salt intake.      She does follow fluid restrictions.     She should continue management of COPD with primary care provider    7. HFpEF (Echo showed diastolic dysfunction grade I back on echo in 2018, mildly elevated left-sided filling pressures on cardiac cath in 2021, LVH. Symptoms of LE edema, dyspnea)      BP: controlled     Fluid status: Hypervolemic at prior visit with increased bilateral LE edema and weight gain of 4-5#.  Continues with edema but improved. We discontinued her furosemide og 60 mg in AM and 40 mg in the afternoon. We started torsemide 40 mg daily and she has had improvement in her LE edema. Mild decline in renal functioning so we will decrease to 20 mg once daily and have her monitor symptoms.     Aldosterone antagonist: Starting spironolactone 25 mg once daily today. We will also stop her potassium supplement with starting this.     SGLT-2: Could be considered at follow-up    Ischemia evaluation: Completed in 2021- cardiac catheterization showed no CAD    ACEi/ARB/ARNI: n/a, no evidence for use in HFpEF. History of cough with ACEi    BB: n/a, no evidence for use in HFpEF     SCD prophylaxis: n/a, no evidence for use in HFpEF    NSAID use: Contraindicated    Sleep apnea evaluation: Refused    8. Severe aortic stenosis status post TAVR in July 2021: Echocardiogram 1 year postop shows normal function of valve.  Also shows  normal heart functioning.    She was instructed to stop aspirin by valve clinic.    She will continue with Eliquis 5 mg twice daily.     Should she need to stop anticoagulation for any reason in the future she should re-start aspirin 81 mg once daily.     She will need dental prophylaxis with amoxicillin 1 g prior to dental work.    9. She will follow-up with cardiology in 6 weeks.  We will recheck a BMP when she sees PCP in November Interval history:  Mrs. Cameron presents today for cardiology follow-up. At prior visit we stopped her furosemide 60 mg in the Am and 40 mg in the afternoon. We started her on torsemide 40 mg once daily.     Recall she is s/p TAVR for severe aortic stenosis in July 2021.  She had been on Eliquis 5 mg twice daily for atrial fibrillation as well as aspirin 81 mg once daily.  She did follow-up with TAVR clinic with recent echocardiogram showing that the valve is functioning well.  Echocardiogram also showed mild concentric wall thickening consistent with left ventricular hypertrophy, normal left ventricular function with LVEF of 55 to 60%.  She was instructed that she could stop use of daily aspirin 81 mg.  She had cardiac catheterization April 30, 2021 as part of TAVR work-up.  No coronary artery disease.    History of complete heart block status post pacemaker placement, permanent atrial fibrillation.  She denies any recent palpitations, fluttering, skipping sensation in chest.  She denies any lightheaded, dizziness, near-syncope or syncopal episodes.  She continues to work.  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  She enjoys being able to be physically active.      Bilateral LE has improved significantly improved since starting torsemide. She is now able to pull on her socks and fit her feet in her shoes. Discomfort from swelling in legs has improved. She denies any lightheadedness, dizziness ,near-syncope or syncope.     She continues with  dyspnea, dyspnea on exertion- might be a little better with switch from lasix to torsemide.  Symptoms seem to wax and wane with variation in weather.  She has noticed that the humidity does increase her dyspnea.  It was thought that her pulmonary function testing should be repeated after her TAVR and some improvement in symptoms.  She did repeat pulmonary function testing on 1220 this did show severe COPD.    Smoking history: Started smoking at age 16 years old.  She quit smoking in 2007.  Endorse that she probably smoked 0.25 packs/day.    Sleep history: she does have a hospital bed at home that she elevates at least 30 degrees due to orthopnea.  She sometimes uses home oxygen but this is rare.  Denies PND.  She has had some increased lower extremity edema recently.  She also endorses weight gain of 4 to 5 pounds.  She does endorse compliance with furosemide 60 mg in the morning and 40 mg in the afternoon.      HPI:    #1 permanent atrial fibrillation, Patient has had chronic permanent atrial fibrillation for several years, she was previously on warfarin therapy she is currently on Eliquis 5 mg twice a day, additionally she is on aspirin 81 mg a day since her TAVR procedure.  She is questioning why she is on both as was myself.  She is nearly 1 year post TAVR aortic valve replacement procedure.  Patient has noted increased bruisability.  She is in contact with the [facility] team that performed the TAVR procedure and is scheduled for an echo follow-up.  I told her to relate a question to the nurse coordinator to check with the performing interventionalist ,whether she still needs to be on the aspirin 81 mg.  Patient denies associated palpitations her rate seems to be well controlled.    #2: Status post TAVR  4 critical aortic stenosis, procedure was performed in July 2021.  I personally reviewed all of the documentation and diagnostic data leading up to the implantation.  The aortic valve area was in the  range of 0.8 cm and the patient was becoming more symptomatic and her LV function was somewhat compromised.  Patient stated that she is actually feeling better, is less short of breath and having less issues with her COPD.  She is still troubled by leg edema.      #3:  Systemic anticoagulation with Eliquis, patient also on aspirin low-dose. The patient has noted increased bruising, as noted above is questioning why she needs to be on the aspirin she will check with the team at the [facility] whether she needs to continue the aspirin.    #4: Labile  Hypertension with likely underlying degree of Hypertensive Heart Disease, patient is on multiple medications including clonidine 0.2 mg at bedtime, Tiazac 360 mg long-acting diltiazem once a day, losartan 50 mg twice a day.  Additionally she is on Lasix and potassium for the edema.    #5: Edema, as noted below, still problematic.  Patient unable to wear compression stockings due to compromise of circulation.    #6: Coronary artery disease by history from the chart, careful review of the coronary artery angiogram reveals description of clean coronary arteries.  The patient does have diffuse peripheral vascular disease including carotid aortic and pelvic atherosclerotic sclerotic PVD.    #7: Carotid artery disease, noncritical asymptomatic last checkup well over a year ago.    #8: Peripheral vascular disease, including aortic and pelvic atherosclerotic disease as described by pre-TAVR TVA work-up asymptomatic    #9: Hyperlipidemia patient is on Lipitor last checked June 2021 total cholesterol 201 she did have an elevated HDL of 109 which was good LDL was therapeutic at 78 triglycerides 71 patient needs recheck.    #10: COPD, as per PFTs from 2018 was moderately severe the patient is feeling better clinically she is actually reduced her Combivent inhaler from 4 times a day to more like twice a day.  When I brought up the issue of repeating the study she was  actually interested for her own knowledge to see if its improved.  It may well have improved with the release of the likely elevated pressures in the LV from the aortic stenosis.  We will recheck PFTs    #11: Status post permanent pacemaker for complete heart block following TAVR procedure clinically stable post permanent pacemaker with normal function.        RELEVANT TESTING:  PFTs 8/12/2022:  The FVC, FEV1, FEV1-FVC ratio and FEF 25-75% are reduced indicating airway obstruction.  The slow vital capacity is reduced.  Following administration of bronchodilators, there is no significant response.  Pulmonary function diagnosis: Severe obstructive airway disease.    CT angiogram of the chest: From April 30, 2021 was reviewed and is available in the chart giving the measurements regarding the TAVR and also documenting a peripheral vascular disease  2D echo echo: Performed in January 21 revealed ejection fraction of 55 to 60% no definitive evidence of left ventricular wall thickness being abnormal left ventricular size was described as normal there was severe biatrial enlargement calculated valve area was 0.7 cm  by telemetry 0.82 square V-max across the valve was 4.1 in the high area compatible with severe to critical aortic stenosis.  [facility] valve replacement team has contacted the patient and a follow-up echo is pending    Carotid ultrasound and Doppler: Previously performed more than a year and a half ago revealed bilateral carotid artery stenosis of less than 50% the velocities were not elevated the patient is on antihyperlipidemics dated the study was April 2021..    Coronary angiogram:   Conclusion    Right sided filling pressures are normal.    Moderately elevated pulmonary artery hypertension.    Left sided filling pressures are mildly elevated.    Normal cardiac output level.    Hemodynamic data has been modified in Epic per physician review.    Normal coronary arteries.          Past Medical  History:   Diagnosis Date     Asthma      Atrial fibrillation (H)      COPD (chronic obstructive pulmonary disease) (H)      Depression      High cholesterol      HTN (hypertension)      Oxygen dependent      Thyroid disease        Past Surgical History:   Procedure Laterality Date     BACK SURGERY  2006     CARDIAC SURGERY  06/12/2018    [facility]giogram at [facility]     COLONOSCOPY N/A 01/19/2016    Procedure: COLONOSCOPY;  Surgeon: Waldemar Bob MD;  Location: [facility] OR     CV CORONARY ANGIOGRAM N/A 04/30/2021    Procedure: CV CORONARY ANGIOGRAM;  Surgeon: Poli Walsh MD;  [facility]     CV LEFT HEART CATH N/A 04/30/2021    Procedure: CV LEFT HEART CATH;  Surgeon: Poli Walsh MD;  [facility]     CV RIGHT HEART CATH MEASUREMENTS RECORDED N/A 04/30/2021    Procedure: CV RIGHT HEART CATH;  Surgeon: Poli Walsh MD;  [facility]     CV TRANSCATHETER AORTIC VALVE REPLACEMENT N/A 07/14/2021    Procedure: Right femoral Transcaval transcatheter aortic valve replacement (SUMMERS) size 29mm.  Transesophageal echocardiogram per anesthesia;  Surgeon: Domo Sarah MD;  [facility]     EP PPM INSERT OF NEW OR REPL W/VENT LEAD N/A 07/14/2021    Procedure: insertion of Permanent Pacemaker;  Surgeon: Eliezer Myers MD;  Location: [facility] OR     HEART CATH FEMORAL CANNULIZATION WITH OPEN STANDBY REPAIR AORTIC VALVE N/A 07/14/2021    Procedure: Cardiopulmonary standby.;  Surgeon: Fly mSith MD;  Location: [facility] OR     HYSTERECTOMY  1980    partial     SLING BLADDER SUSPENSION WITH FASCIA LINNETTE         Allergies   Allergen Reactions     Amlodipine Besylate Cough     Norvasc     Lisinopril Cough     Ace Inhibitors Cough       Current Outpatient Medications   Medication Sig Dispense Refill     acetaminophen (TYLENOL) 500 MG tablet Take 500-1,000 mg by mouth every 6 hours as needed for mild pain        ADVAIR -21 MCG/ACT inhaler Inhale 2 puffs by mouth twice daily 12 g 4     albuterol (PROAIR HFA/PROVENTIL HFA/VENTOLIN HFA) 108 (90 Base) MCG/ACT inhaler INHALE 2 PUFFS BY MOUTH EVERY 6 HOURS 18 g 3     amoxicillin (AMOXIL) 500 MG capsule Take 4 capsules (2,000 mg) by mouth once as needed (SBE prophylaxis take 30-60 minutes prior to dental procedure/cleaning) 4 capsule 3     apixaban ANTICOAGULANT (ELIQUIS ANTICOAGULANT) 5 MG tablet Take 1 tablet (5 mg) by mouth 2 times daily 180 tablet 1     atorvastatin (LIPITOR) 10 MG tablet Take 1 tablet by mouth once daily 90 tablet 3     Calcium Carb-Cholecalciferol (CALTRATE 600+D3 SOFT PO) Take 600 mg by mouth 2 times daily       cloNIDine (CATAPRES) 0.1 MG tablet TAKE 2 TABLETS BY MOUTH AT BEDTIME 180 tablet 0     COMBIVENT RESPIMAT  MCG/ACT inhaler Inhale 1 puff into the lungs 4 times daily 16 g 1     diltiazem ER (DILT-XR) 240 MG 24 hr ER beaded capsule Take 2 capsules (480 mg) by mouth daily for 90 days 180 capsule 0     diltiazem ER (TIAZAC) 360 MG 24 hr ER beaded capsule Take 1 capsule (360 mg) by mouth daily 90 capsule 1     diphenhydrAMINE (BENADRYL) 25 MG tablet Take 1-2 tablets (25-50 mg) by mouth every 6 hours as needed for itching or allergies 60 tablet 1     hydrOXYzine (ATARAX) 25 MG tablet Take 1 tablet (25 mg) by mouth 3 times daily as needed for itching 60 tablet 0     levothyroxine (SYNTHROID/LEVOTHROID) 100 MCG tablet Take 1 tablet (100 mcg) by mouth daily 90 tablet 3     losartan (COZAAR) 50 MG tablet Take 1 tablet (50 mg) by mouth 2 times daily 180 tablet 3     OTHER MEDICAL SUPPLIES Apply one pair to help with edema 1 each 0     predniSONE (DELTASONE) 20 MG tablet Take 3 tabs by mouth daily x 3 days, then 2 tabs daily x 3 days, then 1 tab daily x 3 days, then 1/2 tab daily x 3 days. 20 tablet 0     spironolactone (ALDACTONE) 25 MG tablet Take 1 tablet (25 mg) by mouth daily for 90 days 90 tablet 3     torsemide 40 MG TABS Take 40 mg by  mouth daily for 90 days (Patient taking differently: Take 20 mg by mouth daily Pt takes two tablets daily (40 mg)) 90 tablet 1       Social History     Socioeconomic History     Marital status:      Spouse name: Not on file     Number of children: Not on file     Years of education: Not on file     Highest education level: Not on file   Occupational History     Employer: RETIRED     Comment: disabled   Tobacco Use     Smoking status: Former     Packs/day: 0.50     Years: 41.00     Pack years: 20.50     Types: Cigarettes     Start date: 1/1/1966     Quit date: 1/28/2007     Years since quitting: 15.7     Smokeless tobacco: Never   Substance and Sexual Activity     Alcohol use: No     Alcohol/week: 0.0 standard drinks     Drug use: No     Sexual activity: Never     Comment:    Other Topics Concern      Service No     Blood Transfusions Yes     Comment: Permits if needed     Caffeine Concern Yes     Comment: 2 cups coffee daily     Occupational Exposure Not Asked     Hobby Hazards Not Asked     Sleep Concern Not Asked     Stress Concern Not Asked     Weight Concern Not Asked     Special Diet Not Asked     Back Care Not Asked     Exercise Not Asked     Bike Helmet Not Asked     Seat Belt Yes     Self-Exams Not Asked     Parent/sibling w/ CABG, MI or angioplasty before 65F 55M? No   Social History Narrative     Not on file     Social Determinants of Health     Financial Resource Strain: Not on file   Food Insecurity: Not on file   Transportation Needs: Not on file   Physical Activity: Not on file   Stress: Not on file   Social Connections: Not on file   Intimate Partner Violence: Not on file   Housing Stability: Not on file       LAB RESULTS:   Ancillary Procedure on 07/13/2022   Component Date Value Ref Range Status     Date Time Interrogation Session 07/13/2022 [id number]   Final     Implantable Pulse Generator Manu* 07/13/2022 Medtronic   Final     Implantable Pulse Generator Model 07/13/2022  [id number] Betsy XT SR MRI   Final     Implantable Pulse Generator Serial* 07/13/2022 [id number]   Final     Type Interrogation Session 07/13/2022 In Clinic   Final     Clinic Name 07/13/2022 [facility]   Final     Implantable Pulse Generator Type 07/13/2022 Pacemaker   Final     Implantable Pulse Generator Implan* 07/13/2022 [id number]   Final     Implantable Lead  07/13/2022 Medtronic   Final     Implantable Lead Model 07/13/2022 3830 SelectSecure MRI SureScan   Final     Implantable Lead Serial Number 07/13/2022 [id number]   Final     Implantable Lead Implant Date 07/13/2022 [id number]   Final     Implantable Lead Polarity Type 07/13/2022 Bipolar Lead   Final     Implantable Lead Location Detail 1 07/13/2022 UNKNOWN   Final     Implantable Lead Special Function 07/13/2022 69 cm   Final     Implantable Lead Location 07/13/2022 Right Ventricle   Final     Chaz Setting Mode (NBG Code) 07/13/2022 VVIR   Final     Chaz Setting Lower Rate Limit 07/13/2022 60  [beats]/min Final     Chaz Setting Maximum Sensor Rate 07/13/2022 130  [beats]/min Final     Chaz Setting Hysterisis Rate 07/13/2022 DISABLED   Final     Lead Channel Setting Sensing Polar* 07/13/2022 Bipolar   Final     Lead Channel Setting Sensing Anode* 07/13/2022 Right Ventricle   Final     Lead Channel Setting Sensing Anode* 07/13/2022 Ring   Final     Lead Channel Setting Sensing Catho* 07/13/2022 Right Ventricle   Final     Lead Channel Setting Sensing Catho* 07/13/2022 Tip   Final     Lead Channel Setting Sensing Sensi* 07/13/2022 2  mV Final     Lead Channel Setting Pacing Polari* 07/13/2022 Unipolar   Final     Lead Channel Setting Pacing Anode * 07/13/2022 Can   Final     Lead Channel Setting Sensing Catho* 07/13/2022 Right Ventricle   Final     Lead Channel Setting Sensing Catho* 07/13/2022 Tip   Final     Lead Channel Setting Pacing Pulse * 07/13/2022 0.4  ms Final     Lead Channel Setting Pacing Amplit* 07/13/2022 2  V Final      Lead Channel Setting Pacing Captur* 07/13/2022 Adaptive   Final     Zone Setting Type Category 07/13/2022 VF   Final     Zone Setting Type Category 07/13/2022 VT   Final     Zone Setting Type Category 07/13/2022 VT   Final     Zone Setting Type Category 07/13/2022 VT   Final     Zone Setting Detection Interval 07/13/2022 360  ms Final     Zone Setting Type Category 07/13/2022 ATRIAL_FIBRILLATION   Final     Zone Setting Type Category 07/13/2022 AT/AF   Final     Lead Channel Impedance Value 07/13/2022 589  ohm Final     Lead Channel Impedance Value 07/13/2022 456  ohm Final     Lead Channel Sensing Intrinsic Amp* 07/13/2022 13.4  mV Final     Lead Channel Pacing Threshold Ampl* 07/13/2022 0.5  V Final     Lead Channel Pacing Threshold Puls* 07/13/2022 0.4  ms Final     Battery Date Time of Measurements 07/13/2022 20220713111502   Final     Battery Status 07/13/2022 OK   Final     Battery RRT Trigger 07/13/2022 2.625   Final     Battery Remaining Longevity 07/13/2022 179  mo Final     Battery Voltage 07/13/2022 3.18  V Final     Chaz Statistic Date Time Start 07/13/2022 20211012144754   Final     Chaz Statistic Date Time End 07/13/2022 20220713135652   Final     Chaz Statistic RV Percent Paced 07/13/2022 12.32  % Final     Episode Statistic Recent Count 07/13/2022 3,365   Final     Episode Statistic Type Category 07/13/2022 VT   Final     Episode Statistic Recent Count 07/13/2022 1   Final     Episode Statistic Type Category 07/13/2022 VT   Final     Episode Statistic Recent Date Time* 07/13/2022 20211012144754   Final     Episode Statistic Recent Date Time* 07/13/2022 20220713135652   Final     Episode Statistic Recent Date Time* 07/13/2022 20211012144754   Final     Episode Statistic Recent Date Time* 07/13/2022 20220713135652   Final     Episode Statistic Total Count 07/13/2022 3,487   Final     Episode Statistic Type Category 07/13/2022 VT   Final     Episode Statistic Total Count 07/13/2022 1   Final      Episode Statistic Type Category 07/13/2022 VT   Final     Episode Statistic Total Date Time * 07/13/2022 20210714140321   Final     Episode Statistic Total Date Time * 07/13/2022 20220713135652   Final     Episode Statistic Total Date Time * 07/13/2022 20210714140321   Final     Episode Statistic Total Date Time * 07/13/2022 20220713135652   Final     Episode Identifier 07/13/2022 3,488   Final     Episode Type Category 07/13/2022 VT   Final     Episode Date Time 07/13/2022 20220710100614   Final     Episode Duration 07/13/2022 0  s Final     Episode Identifier 07/13/2022 3,487   Final     Episode Type Category 07/13/2022 VT   Final     Episode Date Time 07/13/2022 20220709105121   Final     Episode Duration 07/13/2022 0  s Final     Episode Identifier 07/13/2022 3,486   Final     Episode Type Category 07/13/2022 VT   Final     Episode Date Time 07/13/2022 20220626095721   Final     Episode Duration 07/13/2022 0  s Final     Episode Identifier 07/13/2022 3,485   Final     Episode Type Category 07/13/2022 VT   Final     Episode Date Time 07/13/2022 20220626095702   Final     Episode Duration 07/13/2022 1  s Final     Episode Identifier 07/13/2022 3,484   Final     Episode Type Category 07/13/2022 VT   Final     Episode Date Time 07/13/2022 20220626095349   Final     Episode Duration 07/13/2022 1  s Final     Episode Identifier 07/13/2022 3,483   Final     Episode Type Category 07/13/2022 VT   Final     Episode Date Time 07/13/2022 [id number]   Final     Episode Duration 07/13/2022 1  s Final     Episode Identifier 07/13/2022 3,482   Final     Episode Type Category 07/13/2022 VT   Final     Episode Date Time 07/13/2022 [id number]   Final     Episode Duration 07/13/2022 1  s Final     Episode Identifier 07/13/2022 3,481   Final     Episode Type Category 07/13/2022 VT   Final     Episode Date Time 07/13/2022 20220625100819   Final     Episode Duration 07/13/2022 1  s Final     Episode Identifier 07/13/2022 3,480  "  Final     Episode Type Category 07/13/2022 VT   Final     Episode Date Time 07/13/2022 20220620093907   Final     Episode Duration 07/13/2022 0  s Final     Episode Identifier 07/13/2022 3,479   Final     Episode Type Category 07/13/2022 VT   Final     Episode Date Time 07/13/2022 20220620093818   Final     Episode Duration 07/13/2022 1  s Final     Episode Identifier 07/13/2022 3,478   Final     Episode Type Category 07/13/2022 VT   Final     Episode Date Time 07/13/2022 20220620093432   Final     Episode Duration 07/13/2022 0  s Final     Episode Identifier 07/13/2022 3,477   Final     Episode Type Category 07/13/2022 VT   Final     Episode Date Time 07/13/2022 [id number]   Final     Episode Duration 07/13/2022 1  s Final     Episode Identifier 07/13/2022 3,476   Final     Episode Type Category 07/13/2022 VT   Final     Episode Date Time 07/13/2022 [id number]   Final     Episode Duration 07/13/2022 0  s Final     Episode Identifier 07/13/2022 3,475   Final     Episode Type Category 07/13/2022 VT   Final     Episode Date Time 07/13/2022 [id number]   Final     Episode Duration 07/13/2022 1  s Final     Episode Identifier 07/13/2022 3,474   Final     Episode Type Category 07/13/2022 VT   Final     Episode Date Time 07/13/2022 [id number]   Final     Episode Duration 07/13/2022 1  s Final        Review of systems: Negative except that which was noted in the HPI.    Physical examination:  /70 (BP Location: Right arm, Patient Position: Chair, Cuff Size: Adult Regular)   Pulse 101   Temp 98.9  F (37.2  C)   Resp 18   Ht 1.626 m (5' 4\")   Wt 67.6 kg (149 lb)   SpO2 92%   BMI 25.58 kg/m      GENERAL APPEARANCE: healthy, alert and no distress  HEENT: no icterus, no xanthelasmas, normal pupil size and reaction, no cyanosis.  NECK: no adenopathy, no asymmetry, masses.  CHEST: lungs clear to auscultation - no rales, rhonchi or wheezes, no use of accessory muscles, no retractions, respirations are " unlabored, normal respiratory rate  CARDIOVASCULAR: Irregularly irregular rhythm, tachycardic rate. normal S1 with physiologic split S2, no S3 or S4 and no murmur, click or rub  EXTREMITIES: +1 Bilateral lower extremity edema. no clubbing, cyanosis.  NEURO: alert and oriented normal speech, and affect  VASC: No vascular bruits heard.  SKIN: no ecchymoses, no rashes        Thank you for allowing me to participate in the care of your patient. Please do not hesitate to contact me if you have any questions.       Julisa Villegas, CNP

## 2022-10-19 ENCOUNTER — OFFICE VISIT (OUTPATIENT)
Dept: CARDIOLOGY | Facility: OTHER | Age: 72
End: 2022-10-19
Attending: NURSE PRACTITIONER
Payer: COMMERCIAL

## 2022-10-19 VITALS
TEMPERATURE: 98.9 F | SYSTOLIC BLOOD PRESSURE: 108 MMHG | HEIGHT: 64 IN | BODY MASS INDEX: 25.44 KG/M2 | DIASTOLIC BLOOD PRESSURE: 70 MMHG | OXYGEN SATURATION: 92 % | HEART RATE: 101 BPM | WEIGHT: 149 LBS | RESPIRATION RATE: 18 BRPM

## 2022-10-19 DIAGNOSIS — I10 ESSENTIAL HYPERTENSION, BENIGN: ICD-10-CM

## 2022-10-19 DIAGNOSIS — I48.21 PERMANENT ATRIAL FIBRILLATION (H): ICD-10-CM

## 2022-10-19 DIAGNOSIS — Z79.899 ON POTASSIUM WASTING DIURETIC THERAPY: ICD-10-CM

## 2022-10-19 DIAGNOSIS — R60.0 BILATERAL LEG EDEMA: ICD-10-CM

## 2022-10-19 DIAGNOSIS — I27.20 PULMONARY HYPERTENSION (H): ICD-10-CM

## 2022-10-19 DIAGNOSIS — I50.32 CHRONIC DIASTOLIC HEART FAILURE (H): Primary | ICD-10-CM

## 2022-10-19 LAB
ANION GAP SERPL CALCULATED.3IONS-SCNC: 10 MMOL/L (ref 7–15)
BUN SERPL-MCNC: 15.3 MG/DL (ref 8–23)
CALCIUM SERPL-MCNC: 9.7 MG/DL (ref 8.8–10.2)
CHLORIDE SERPL-SCNC: 100 MMOL/L (ref 98–107)
CREAT SERPL-MCNC: 1.16 MG/DL (ref 0.51–0.95)
DEPRECATED HCO3 PLAS-SCNC: 27 MMOL/L (ref 22–29)
GFR SERPL CREATININE-BSD FRML MDRD: 50 ML/MIN/1.73M2
GLUCOSE SERPL-MCNC: 103 MG/DL (ref 70–99)
POTASSIUM SERPL-SCNC: 4.6 MMOL/L (ref 3.4–5.3)
SODIUM SERPL-SCNC: 137 MMOL/L (ref 136–145)

## 2022-10-19 PROCEDURE — 36415 COLL VENOUS BLD VENIPUNCTURE: CPT | Mod: ZL | Performed by: NURSE PRACTITIONER

## 2022-10-19 PROCEDURE — 82947 ASSAY GLUCOSE BLOOD QUANT: CPT | Mod: ZL | Performed by: NURSE PRACTITIONER

## 2022-10-19 PROCEDURE — G0463 HOSPITAL OUTPT CLINIC VISIT: HCPCS

## 2022-10-19 PROCEDURE — 99214 OFFICE O/P EST MOD 30 MIN: CPT | Performed by: NURSE PRACTITIONER

## 2022-10-19 RX ORDER — SPIRONOLACTONE 25 MG/1
25 TABLET ORAL DAILY
Qty: 90 TABLET | Refills: 3 | Status: SHIPPED | OUTPATIENT
Start: 2022-10-19 | End: 2023-06-08

## 2022-10-19 RX ORDER — DILTIAZEM HYDROCHLORIDE 240 MG/1
480 CAPSULE, EXTENDED RELEASE ORAL DAILY
Qty: 180 CAPSULE | Refills: 0 | Status: SHIPPED | OUTPATIENT
Start: 2022-10-19 | End: 2023-01-15

## 2022-10-19 ASSESSMENT — PAIN SCALES - GENERAL: PAINLEVEL: NO PAIN (0)

## 2022-10-19 NOTE — PATIENT INSTRUCTIONS
Thank you for allowing Julisa Villegas CNP and our  team to participate in your care. Please call our office at [phone] with scheduling questions or if you need to cancel or change your appointment. With any other questions or concerns you may call cardiology nurse at [phone].       If you experience chest pain, chest pressure, chest tightness, shortness of breath, fainting, lightheadedness, nausea, vomiting, or other concerning symptoms, please report to the Emergency Department or call 911. These symptoms may be emergent, and best treated in the Emergency Department.      Permanent atrial fibrillation  She is largely asymptomatic at this time.    Reviewed recent device check with average rate of 100-120 bpm, several episodes of atrial fibrillation with RVR  Increase diltiazem ER from 360 mg once daily to 480 mg once daily      2. Essential hypertension, controlled  Continue with losartan 50 mg once daily,   Since we are increasing her diltiazem for improved rate control and adding on spironolactone for HFpEF we will have her stop the Clonidine 0.2 mg at bedtime  History of adverse reaction of cough to ACEi.    3. HFpEF (Echo showed diastolic dysfunction grade I back on echo in 2018, mildly elevated left-sided filling pressures on cardiac cath in 2021, LVH. Symptoms of LE edema, dyspnea)    BP: controlled   Fluid status Hypervolemic with increased bilateral LE edema and weight gain of 4-5#.  We will discontinue her furosemide.  We will start torsemide which can have better bioavailability than the furosemide.  She was started at torsemide 40 mg once daily.  We will titrate based on her symptoms.  Aldosterone antagonist: Starting spironolactone 25 mg once daily today. We will leave torsemide dosing as is with starting this. We will also stop her potassium supplement with starting this.   SGLT-2: Could be considered at follow-up  Ischemia evaluation: Completed in 2021- cardiac catheterization showed no  CAD  ACEi/ARB/ARNI: n/a, no evidence for use in HFpEF. History of cough with ACEi  BB: n/a, no evidence for use in HFpEF   SCD prophylaxis: n/a, no evidence for use in HFpEF  NSAID use: Contraindicated  Sleep apnea evaluation: Refused      You will have a lab draw when you see Dr. Yen on November 11, 2022 to check electrolytes with stopping potassium, starting spironolactone  Follow-up with cardiology in 6-8 weeks      Julisa Villegas CNP

## 2022-10-19 NOTE — NURSING NOTE
"Chief Complaint   Patient presents with     Heart Problem       Initial /70 (BP Location: Right arm, Patient Position: Chair, Cuff Size: Adult Regular)   Pulse 101   Temp 98.9  F (37.2  C)   Resp 18   Ht 1.626 m (5' 4\")   Wt 67.6 kg (149 lb)   SpO2 92%   BMI 25.58 kg/m   Estimated body mass index is 25.58 kg/m  as calculated from the following:    Height as of this encounter: 1.626 m (5' 4\").    Weight as of this encounter: 67.6 kg (149 lb).  Medication Reconciliation: complete  MINAL BRADEN LPN    "

## 2022-11-23 ASSESSMENT — ANXIETY QUESTIONNAIRES
IF YOU CHECKED OFF ANY PROBLEMS ON THIS QUESTIONNAIRE, HOW DIFFICULT HAVE THESE PROBLEMS MADE IT FOR YOU TO DO YOUR WORK, TAKE CARE OF THINGS AT HOME, OR GET ALONG WITH OTHER PEOPLE: NOT DIFFICULT AT ALL
2. NOT BEING ABLE TO STOP OR CONTROL WORRYING: NOT AT ALL
GAD7 TOTAL SCORE: 0
6. BECOMING EASILY ANNOYED OR IRRITABLE: NOT AT ALL
7. FEELING AFRAID AS IF SOMETHING AWFUL MIGHT HAPPEN: NOT AT ALL
8. IF YOU CHECKED OFF ANY PROBLEMS, HOW DIFFICULT HAVE THESE MADE IT FOR YOU TO DO YOUR WORK, TAKE CARE OF THINGS AT HOME, OR GET ALONG WITH OTHER PEOPLE?: NOT DIFFICULT AT ALL
1. FEELING NERVOUS, ANXIOUS, OR ON EDGE: NOT AT ALL
3. WORRYING TOO MUCH ABOUT DIFFERENT THINGS: NOT AT ALL
5. BEING SO RESTLESS THAT IT IS HARD TO SIT STILL: NOT AT ALL
GAD7 TOTAL SCORE: 0
7. FEELING AFRAID AS IF SOMETHING AWFUL MIGHT HAPPEN: NOT AT ALL
GAD7 TOTAL SCORE: 0
4. TROUBLE RELAXING: NOT AT ALL

## 2022-11-23 ASSESSMENT — PATIENT HEALTH QUESTIONNAIRE - PHQ9
SUM OF ALL RESPONSES TO PHQ QUESTIONS 1-9: 2
10. IF YOU CHECKED OFF ANY PROBLEMS, HOW DIFFICULT HAVE THESE PROBLEMS MADE IT FOR YOU TO DO YOUR WORK, TAKE CARE OF THINGS AT HOME, OR GET ALONG WITH OTHER PEOPLE: VERY DIFFICULT
SUM OF ALL RESPONSES TO PHQ QUESTIONS 1-9: 2

## 2022-11-25 DIAGNOSIS — J44.1 COPD EXACERBATION (H): ICD-10-CM

## 2022-11-28 RX ORDER — IPRATROPIUM BROMIDE AND ALBUTEROL 20; 100 UG/1; UG/1
SPRAY, METERED RESPIRATORY (INHALATION)
Qty: 16 G | Refills: 0 | Status: SHIPPED | OUTPATIENT
Start: 2022-11-28 | End: 2022-11-30 | Stop reason: ALTCHOICE

## 2022-11-28 NOTE — TELEPHONE ENCOUNTER
Combivent       Last Written Prescription Date:  3/28/22  Last Fill Quantity: 16g,   # refills: 1  Last Office Visit: 3/30/22  Future Office visit:    Next 5 appointments (look out 90 days)    Nov 30, 2022 12:30 PM  (Arrive by 12:15 PM)  Return Visit with Julisa Villegas CNP  [facility] ([facility] ) [address] [place] [id number]  [phone]   Nov 30, 2022  4:15 PM  (Arrive by 4:00 PM)  [facility]YSICAL with Braydon Yen MD  [facility] [address]  [facility] [address]  [phone]   Dec 20, 2022 12:30 PM  (Arrive by 12:15 PM)  Return Visit with Alessandra Wilkins DPM  [facility] ([facility] ) [address] [place] [id number] [phone]

## 2022-11-30 ENCOUNTER — ANCILLARY PROCEDURE (OUTPATIENT)
Dept: GENERAL RADIOLOGY | Facility: OTHER | Age: 72
End: 2022-11-30
Attending: NURSE PRACTITIONER
Payer: MEDICARE

## 2022-11-30 ENCOUNTER — OFFICE VISIT (OUTPATIENT)
Dept: CARDIOLOGY | Facility: OTHER | Age: 72
End: 2022-11-30
Attending: NURSE PRACTITIONER
Payer: COMMERCIAL

## 2022-11-30 ENCOUNTER — OFFICE VISIT (OUTPATIENT)
Dept: FAMILY MEDICINE | Facility: OTHER | Age: 72
End: 2022-11-30
Attending: FAMILY MEDICINE
Payer: MEDICARE

## 2022-11-30 VITALS
DIASTOLIC BLOOD PRESSURE: 69 MMHG | OXYGEN SATURATION: 93 % | WEIGHT: 148 LBS | SYSTOLIC BLOOD PRESSURE: 123 MMHG | HEART RATE: 80 BPM | BODY MASS INDEX: 25.4 KG/M2 | TEMPERATURE: 98.7 F

## 2022-11-30 VITALS
BODY MASS INDEX: 25.27 KG/M2 | OXYGEN SATURATION: 92 % | SYSTOLIC BLOOD PRESSURE: 118 MMHG | HEART RATE: 80 BPM | TEMPERATURE: 98.5 F | HEIGHT: 64 IN | WEIGHT: 148 LBS | DIASTOLIC BLOOD PRESSURE: 64 MMHG

## 2022-11-30 DIAGNOSIS — I10 ESSENTIAL HYPERTENSION: ICD-10-CM

## 2022-11-30 DIAGNOSIS — R05.1 ACUTE COUGH: Primary | ICD-10-CM

## 2022-11-30 DIAGNOSIS — J44.9 CHRONIC OBSTRUCTIVE PULMONARY DISEASE, UNSPECIFIED COPD TYPE (H): ICD-10-CM

## 2022-11-30 DIAGNOSIS — Z78.0 ASYMPTOMATIC MENOPAUSAL STATE: ICD-10-CM

## 2022-11-30 DIAGNOSIS — I50.32 CHRONIC DIASTOLIC HEART FAILURE (H): ICD-10-CM

## 2022-11-30 DIAGNOSIS — I10 ESSENTIAL HYPERTENSION, BENIGN: ICD-10-CM

## 2022-11-30 DIAGNOSIS — R05.1 ACUTE COUGH: ICD-10-CM

## 2022-11-30 DIAGNOSIS — Z00.00 ENCOUNTER FOR MEDICARE ANNUAL WELLNESS EXAM: Primary | ICD-10-CM

## 2022-11-30 DIAGNOSIS — Z79.899 ON POTASSIUM WASTING DIURETIC THERAPY: ICD-10-CM

## 2022-11-30 LAB
ANION GAP SERPL CALCULATED.3IONS-SCNC: 11 MMOL/L (ref 7–15)
BUN SERPL-MCNC: 12.1 MG/DL (ref 8–23)
CALCIUM SERPL-MCNC: 10.1 MG/DL (ref 8.8–10.2)
CHLORIDE SERPL-SCNC: 96 MMOL/L (ref 98–107)
CREAT SERPL-MCNC: 1.11 MG/DL (ref 0.51–0.95)
DEPRECATED HCO3 PLAS-SCNC: 28 MMOL/L (ref 22–29)
GFR SERPL CREATININE-BSD FRML MDRD: 53 ML/MIN/1.73M2
GLUCOSE SERPL-MCNC: 108 MG/DL (ref 70–99)
POTASSIUM SERPL-SCNC: 3.6 MMOL/L (ref 3.4–5.3)
SODIUM SERPL-SCNC: 135 MMOL/L (ref 136–145)

## 2022-11-30 PROCEDURE — G0463 HOSPITAL OUTPT CLINIC VISIT: HCPCS

## 2022-11-30 PROCEDURE — 80048 BASIC METABOLIC PNL TOTAL CA: CPT | Mod: ZL | Performed by: NURSE PRACTITIONER

## 2022-11-30 PROCEDURE — 36415 COLL VENOUS BLD VENIPUNCTURE: CPT | Mod: ZL | Performed by: NURSE PRACTITIONER

## 2022-11-30 PROCEDURE — 99214 OFFICE O/P EST MOD 30 MIN: CPT | Performed by: NURSE PRACTITIONER

## 2022-11-30 PROCEDURE — G0438 PPPS, INITIAL VISIT: HCPCS | Performed by: FAMILY MEDICINE

## 2022-11-30 PROCEDURE — 71046 X-RAY EXAM CHEST 2 VIEWS: CPT | Mod: TC

## 2022-11-30 RX ORDER — LOSARTAN POTASSIUM 50 MG/1
50 TABLET ORAL DAILY
Qty: 180 TABLET | Refills: 3 | COMMUNITY
Start: 2022-11-30 | End: 2023-01-04

## 2022-11-30 RX ORDER — FLUTICASONE FUROATE, UMECLIDINIUM BROMIDE AND VILANTEROL TRIFENATATE 200; 62.5; 25 UG/1; UG/1; UG/1
1 POWDER RESPIRATORY (INHALATION) DAILY
Qty: 60 EACH | Refills: 11 | Status: SHIPPED | OUTPATIENT
Start: 2022-11-30 | End: 2023-08-31

## 2022-11-30 ASSESSMENT — ENCOUNTER SYMPTOMS
PARESTHESIAS: 0
ABDOMINAL PAIN: 0
COUGH: 1
JOINT SWELLING: 0
PALPITATIONS: 0
FEVER: 0
WEAKNESS: 0
BREAST MASS: 0
CONSTIPATION: 1
NAUSEA: 0
FREQUENCY: 0
SHORTNESS OF BREATH: 1
HEARTBURN: 0
EYE PAIN: 0
HEMATURIA: 0
HEADACHES: 0
DIZZINESS: 0
HEMATOCHEZIA: 0
ARTHRALGIAS: 0
CHILLS: 0
DYSURIA: 0
MYALGIAS: 0
NERVOUS/ANXIOUS: 0
SORE THROAT: 0
DIARRHEA: 0

## 2022-11-30 ASSESSMENT — ACTIVITIES OF DAILY LIVING (ADL): CURRENT_FUNCTION: NO ASSISTANCE NEEDED

## 2022-11-30 ASSESSMENT — PAIN SCALES - GENERAL
PAINLEVEL: NO PAIN (0)
PAINLEVEL: NO PAIN (0)

## 2022-11-30 NOTE — PATIENT INSTRUCTIONS
Patient Education   Personalized Prevention Plan  You are due for the preventive services outlined below.  Your care team is available to assist you in scheduling these services.  If you have already completed any of these items, please share that information with your care team to update in your medical record.  Health Maintenance Due   Topic Date Due     COVID-19 Vaccine (1) Never done     Zoster (Shingles) Vaccine (2 of 3) 09/16/2011     Annual Wellness Visit  Never done     Osteoporosis Screening  07/13/2018

## 2022-11-30 NOTE — PROGRESS NOTES
[facility] HEART CARE   CARDIOLOGY PROGRESS NOTE     Chief Complaint   Patient presents with     Follow Up          Diagnosis:    ICD-10-CM    1. Acute cough  R05.1 X-ray Chest 2 vws*      2. Chronic diastolic heart failure (H)  I50.32 Basic metabolic panel      3. On potassium wasting diuretic therapy  Z79.899 Basic metabolic panel      4. Essential hypertension, benign  I10 Basic metabolic panel      5. Essential hypertension  I10 losartan (COZAAR) 50 MG tablet            Assessment/Plan:    1. Permanent atrial fibrillation    She is largely asymptomatic at this time.      Reviewed recent device check with average rate of 100-120 bpm, several episodes of atrial fibrillation with RVR    Continue diltiazem  mg once daily    2. [id number] >= 2.      She is appropriately anticoagulated on Eliquis 5 mg twice daily for stroke prophylaxis with atrial fibrillation    Denies any bleeding concerns at this time.    3. History of complete heart block s/p pacemaker placement    Reviewed recent device interrogation     She will continue to follow with the device clinic.    4. Hyperlipidemia with LDL goal less than 100, controlled    Continue atorvastatin 10 mg once daily.  Recent Labs   Lab Test 06/23/22  1342 06/11/21  1011 12/12/17  0856 08/25/15  0850 08/27/14  1139   CHOL 168 201*   < > 207* 210*   HDL 90 109   < > 108 92   LDL 65 78   < > 88 99   TRIG 67 71   < > 56 93   CHOLHDLRATIO  --   --   --  1.9 2.3    < > = values in this interval not displayed.       5. Essential hypertension, controlled  1. Decrease losartan 50 mg twice daily to losartan 50 mg once daily with symptomatic low blood pressures at home.   2. Continue diltiazem  mg once daily  3. Continue spironolactone 25 mg once daily for HFpEF   4. History of adverse reaction of cough to ACEi.  BP Readings from Last 6 Encounters:   11/30/22 123/69   10/19/22 108/70   09/13/22 113/73   09/06/22 129/83   07/08/22 112/65   06/23/22 114/74        6. COPD  7. pulmonary hypertension  8. dyspnea    Dyspnea likely multifactorial with history of smoking/severe COPD on recent PFTs as well as diastolic dysfunction.      Continue torsemide 40 mg once daily    She does monitor salt intake.      She does follow fluid restrictions.     She should continue management of COPD with primary care provider- discuss switching maintenance inhalers to something like Trelegy with PCP.      9. Cough    Cough for about 4 weeks, increased cough- clear phlegm, no wheezing, feeling more dyspneic. Plan for CXR here and she is following up with PCP after this and can review CXR, possible pneumonia, post-viral cough, COPD exacerbation.      10. HFpEF (Echo showed diastolic dysfunction grade I back on echo in 2018, mildly elevated left-sided filling pressures on cardiac cath in 2021, LVH. Symptoms of LE edema, dyspnea)      BP: controlled     Fluid status: Hypervolemic at prior visit with increased bilateral LE edema and weight gain of 4-5#.  Continues with edema but improved. We discontinued her furosemide og 60 mg in AM and 40 mg in the afternoon. We started torsemide 40 mg daily and she has had improvement in her LE edema. Mild decline in renal functioning so we will decrease to 20 mg once daily and have her monitor symptoms.     Aldosterone antagonist: Starting spironolactone 25 mg once daily today. We will also stop her potassium supplement with starting this.     SGLT-2: Could be considered at follow-up    Ischemia evaluation: Completed in 2021- cardiac catheterization showed no CAD    ACEi/ARB/ARNI: n/a, no evidence for use in HFpEF. History of cough with ACEi    BB: n/a, no evidence for use in HFpEF     SCD prophylaxis: n/a, no evidence for use in HFpEF    NSAID use: Contraindicated    Sleep apnea evaluation: Refused    11. Severe aortic stenosis status post TAVR in July 2021: Echocardiogram 1 year postop shows normal function of valve.  Also shows normal heart  functioning.    She was instructed to stop aspirin by valve clinic.    She will continue with Eliquis 5 mg twice daily.     Should she need to stop anticoagulation for any reason in the future she should re-start aspirin 81 mg once daily.     She will need dental prophylaxis with amoxicillin 1 g prior to dental work.    Follow-up with cardiology in 6 months, certainly sooner with acute concerns.         Interval history:  Mrs. Cameron presents today for cardiology follow-up. At prior visit we stopped her furosemide 60 mg in the Am and 40 mg in the afternoon. We started her on torsemide 40 mg once daily.     Recall she is s/p TAVR for severe aortic stenosis in July 2021.  She had been on Eliquis 5 mg twice daily for atrial fibrillation as well as aspirin 81 mg once daily.  She did follow-up with TAVR clinic with recent echocardiogram showing that the valve is functioning well.  Echocardiogram also showed mild concentric wall thickening consistent with left ventricular hypertrophy, normal left ventricular function with LVEF of 55 to 60%.  She was instructed that she could stop use of daily aspirin 81 mg.  She had cardiac catheterization April 30, 2021 as part of TAVR work-up.  No coronary artery disease.    History of complete heart block status post pacemaker placement, permanent atrial fibrillation.  She denies any recent palpitations, fluttering, skipping sensation in chest.  She denies any lightheaded, dizziness, near-syncope or syncopal episodes.  She continues to work.  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  She enjoys being able to be physically active. At prior visit we reviewed device check which showed several episodes of atrial fibrillation with RVR. She has been tolerating this without side effects.     Bilateral LE has improved significantly improved since starting torsemide. No increased weights at home.     Hypertension: clonidine stopped at prior visit due to it  making her feel too tired. Home blood pressures have been controlled since stopping this and she is feeling better.     She continues with dyspnea, dyspnea on exertion- currently using Advair and Combivent inhalers, albuterol PRN. Since early November she has had a nagging cough, non-productive and increased dyspnea. No fever, chills.         Smoking history: Started smoking at age 16 years old.  She quit smoking in 2007.  Endorse that she probably smoked 0.25 packs/day.    Sleep history: she does have a hospital bed at home that she elevates at least 30 degrees due to orthopnea.  She sometimes uses home oxygen but this is rare.  Denies PND.  She has had some increased lower extremity edema recently.  She also endorses weight gain of 4 to 5 pounds.  She does endorse compliance with furosemide 60 mg in the morning and 40 mg in the afternoon.      HPI:    #1 permanent atrial fibrillation, Patient has had chronic permanent atrial fibrillation for several years, she was previously on warfarin therapy she is currently on Eliquis 5 mg twice a day, additionally she is on aspirin 81 mg a day since her TAVR procedure.  She is questioning why she is on both as was myself.  She is nearly 1 year post TAVR aortic valve replacement procedure.  Patient has noted increased bruisability.  She is in contact with the [facility] team that performed the TAVR procedure and is scheduled for an echo follow-up.  I told her to relate a question to the nurse coordinator to check with the performing interventionalist ,whether she still needs to be on the aspirin 81 mg.  Patient denies associated palpitations her rate seems to be well controlled.    #2: Status post TAVR  4 critical aortic stenosis, procedure was performed in July 2021.  I personally reviewed all of the documentation and diagnostic data leading up to the implantation.  The aortic valve area was in the range of 0.8 cm and the patient was becoming more symptomatic and  her LV function was somewhat compromised.  Patient stated that she is actually feeling better, is less short of breath and having less issues with her COPD.  She is still troubled by leg edema.      #3:  Systemic anticoagulation with Eliquis, patient also on aspirin low-dose. The patient has noted increased bruising, as noted above is questioning why she needs to be on the aspirin she will check with the team at the [facility] whether she needs to continue the aspirin.    #4: Labile  Hypertension with likely underlying degree of Hypertensive Heart Disease, patient is on multiple medications including clonidine 0.2 mg at bedtime, Tiazac 360 mg long-acting diltiazem once a day, losartan 50 mg twice a day.  Additionally she is on Lasix and potassium for the edema.    #5: Edema, as noted below, still problematic.  Patient unable to wear compression stockings due to compromise of circulation.    #6: Coronary artery disease by history from the chart, careful review of the coronary artery angiogram reveals description of clean coronary arteries.  The patient does have diffuse peripheral vascular disease including carotid aortic and pelvic atherosclerotic sclerotic PVD.    #7: Carotid artery disease, noncritical asymptomatic last checkup well over a year ago.    #8: Peripheral vascular disease, including aortic and pelvic atherosclerotic disease as described by pre-TAVR TVA work-up asymptomatic    #9: Hyperlipidemia patient is on Lipitor last checked June 2021 total cholesterol 201 she did have an elevated HDL of 109 which was good LDL was therapeutic at 78 triglycerides 71 patient needs recheck.    #10: COPD, as per PFTs from 2018 was moderately severe the patient is feeling better clinically she is actually reduced her Combivent inhaler from 4 times a day to more like twice a day.  When I brought up the issue of repeating the study she was actually interested for her own knowledge to see if its improved.  It  may well have improved with the release of the likely elevated pressures in the LV from the aortic stenosis.  We will recheck PFTs    #11: Status post permanent pacemaker for complete heart block following TAVR procedure clinically stable post permanent pacemaker with normal function.        RELEVANT TESTING:  PFTs 8/12/2022:  The FVC, FEV1, FEV1-FVC ratio and FEF 25-75% are reduced indicating airway obstruction.  The slow vital capacity is reduced.  Following administration of bronchodilators, there is no significant response.  Pulmonary function diagnosis: Severe obstructive airway disease.    CT angiogram of the chest: From April 30, 2021 was reviewed and is available in the chart giving the measurements regarding the TAVR and also documenting a peripheral vascular disease  2D echo echo: Performed in January 21 revealed ejection fraction of 55 to 60% no definitive evidence of left ventricular wall thickness being abnormal left ventricular size was described as normal there was severe biatrial enlargement calculated valve area was 0.7 cm  by telemetry 0.82 square V-max across the valve was 4.1 in the high area compatible with severe to critical aortic stenosis.  [facility] valve replacement team has contacted the patient and a follow-up echo is pending    Carotid ultrasound and Doppler: Previously performed more than a year and a half ago revealed bilateral carotid artery stenosis of less than 50% the velocities were not elevated the patient is on antihyperlipidemics dated the study was April 2021..    Coronary angiogram:   Conclusion    Right sided filling pressures are normal.    Moderately elevated pulmonary artery hypertension.    Left sided filling pressures are mildly elevated.    Normal cardiac output level.    Hemodynamic data has been modified in Epic per physician review.    Normal coronary arteries.          Past Medical History:   Diagnosis Date     Asthma      Atrial fibrillation (H)       COPD (chronic obstructive pulmonary disease) (H)      Depression      High cholesterol      HTN (hypertension)      Oxygen dependent      Thyroid disease        Past Surgical History:   Procedure Laterality Date     BACK SURGERY  2006     CARDIAC SURGERY  06/12/2018    [facility]     COLONOSCOPY N/A 01/19/2016    Procedure: COLONOSCOPY;  Surgeon: Waldemar Bob MD;  Location: [facility] OR     CV CORONARY ANGIOGRAM N/A 04/30/2021    Procedure: CV CORONARY ANGIOGRAM;  Surgeon: Poli Walsh MD;  [facility]     CV LEFT HEART CATH N/A 04/30/2021    Procedure: CV LEFT HEART CATH;  Surgeon: Poli Walsh MD;  [facility]     CV RIGHT HEART CATH MEASUREMENTS RECORDED N/A 04/30/2021    Procedure: CV RIGHT HEART CATH;  Surgeon: Poli Walsh MD;  [facility]     CV TRANSCATHETER AORTIC VALVE REPLACEMENT N/A 07/14/2021    Procedure: Right femoral Transcaval transcatheter aortic valve replacement (SUMMERS) size 29mm.  Transesophageal echocardiogram per anesthesia;  Surgeon: Domo Sarah MD;  Location[facility]     EP PPM INSERT OF NEW OR REPL W/VENT LEAD N/A 07/14/2021    Procedure: insertion of Permanent Pacemaker;  Surgeon: Eliezer Myers MD;  Location: [facility] OR     HEART CATH FEMORAL CANNULIZATION WITH OPEN STANDBY REPAIR AORTIC VALVE N/A 07/14/2021    Procedure: Cardiopulmonary standby.;  Surgeon: Fly Smith MD;  Location: [facility] OR     HYSTERECTOMY  1980    partial     SLING BLADDER SUSPENSION WITH FASCIA LINNETTE         Allergies   Allergen Reactions     Amlodipine Besylate Cough     Norvasc     Lisinopril Cough     Ace Inhibitors Cough       Current Outpatient Medications   Medication Sig Dispense Refill     acetaminophen (TYLENOL) 500 MG tablet Take 500-1,000 mg by mouth every 6 hours as needed for mild pain       ADVAIR -21 MCG/ACT inhaler Inhale 2 puffs by mouth twice daily  12 g 4     albuterol (PROAIR HFA/PROVENTIL HFA/VENTOLIN HFA) 108 (90 Base) MCG/ACT inhaler INHALE 2 PUFFS BY MOUTH EVERY 6 HOURS 18 g 3     amoxicillin (AMOXIL) 500 MG capsule Take 4 capsules (2,000 mg) by mouth once as needed (SBE prophylaxis take 30-60 minutes prior to dental procedure/cleaning) 4 capsule 3     apixaban ANTICOAGULANT (ELIQUIS ANTICOAGULANT) 5 MG tablet Take 1 tablet (5 mg) by mouth 2 times daily 180 tablet 1     atorvastatin (LIPITOR) 10 MG tablet Take 1 tablet by mouth once daily 90 tablet 3     Calcium Carb-Cholecalciferol (CALTRATE 600+D3 SOFT PO) Take 600 mg by mouth 2 times daily       cloNIDine (CATAPRES) 0.1 MG tablet TAKE 2 TABLETS BY MOUTH AT BEDTIME 180 tablet 0     COMBIVENT RESPIMAT  MCG/ACT inhaler INHALE 1 PUFF BY MOUTH 4 TIMES DAILY 16 g 0     diltiazem ER (DILT-XR) 240 MG 24 hr ER beaded capsule Take 2 capsules (480 mg) by mouth daily for 90 days 180 capsule 0     diphenhydrAMINE (BENADRYL) 25 MG tablet Take 1-2 tablets (25-50 mg) by mouth every 6 hours as needed for itching or allergies 60 tablet 1     hydrOXYzine (ATARAX) 25 MG tablet Take 1 tablet (25 mg) by mouth 3 times daily as needed for itching 60 tablet 0     levothyroxine (SYNTHROID/LEVOTHROID) 100 MCG tablet Take 1 tablet (100 mcg) by mouth daily 90 tablet 3     losartan (COZAAR) 50 MG tablet Take 1 tablet (50 mg) by mouth daily 180 tablet 3     OTHER MEDICAL SUPPLIES Apply one pair to help with edema 1 each 0     predniSONE (DELTASONE) 20 MG tablet Take 3 tabs by mouth daily x 3 days, then 2 tabs daily x 3 days, then 1 tab daily x 3 days, then 1/2 tab daily x 3 days. 20 tablet 0     spironolactone (ALDACTONE) 25 MG tablet Take 1 tablet (25 mg) by mouth daily for 90 days 90 tablet 3     torsemide 40 MG TABS Take 40 mg by mouth daily for 90 days (Patient taking differently: Take 20 mg by mouth daily Pt takes two tablets daily (40 mg)) 90 tablet 1       Social History     Socioeconomic History     Marital status:       Spouse name: Not on file     Number of children: Not on file     Years of education: Not on file     Highest education level: Not on file   Occupational History     Employer: RETIRED     Comment: disabled   Tobacco Use     Smoking status: Former     Packs/day: 0.50     Years: 41.00     Pack years: 20.50     Types: Cigarettes     Start date: 1/1/1966     Quit date: 1/28/2007     Years since quitting: 15.8     Smokeless tobacco: Never   Substance and Sexual Activity     Alcohol use: No     Alcohol/week: 0.0 standard drinks     Drug use: No     Sexual activity: Never     Comment:    Other Topics Concern      Service No     Blood Transfusions Yes     Comment: Permits if needed     Caffeine Concern Yes     Comment: 2 cups coffee daily     Occupational Exposure Not Asked     Hobby Hazards Not Asked     Sleep Concern Not Asked     Stress Concern Not Asked     Weight Concern Not Asked     Special Diet Not Asked     Back Care Not Asked     Exercise Not Asked     Bike Helmet Not Asked     Seat Belt Yes     Self-Exams Not Asked     Parent/sibling w/ CABG, MI or angioplasty before 65F 55M? No   Social History Narrative     Not on file     Social Determinants of Health     Financial Resource Strain: Not on file   Food Insecurity: Not on file   Transportation Needs: Not on file   Physical Activity: Not on file   Stress: Not on file   Social Connections: Not on file   Intimate Partner Violence: Not on file   Housing Stability: Not on file       LAB RESULTS:   Ancillary Procedure on 07/13/2022   Component Date Value Ref Range Status     Date Time Interrogation Session 07/13/2022 [id number]   Final     Implantable Pulse Generator Manufa* 07/13/2022 Medtronic   Final     Implantable Pulse Generator Model 07/13/2022 [id number] Betsy XT SR MRI   Final     Implantable Pulse Generator Serial* 07/13/2022 [id number]   Final     Type Interrogation Session 07/13/2022 In Clinic   Final     Clinic Name 07/13/2022  [facility]   Final     Implantable Pulse Generator Type 07/13/2022 Pacemaker   Final     Implantable Pulse Generator Implan* 07/13/2022 [id number]   Final     Implantable Lead  07/13/2022 Medtronic   Final     Implantable Lead Model 07/13/2022 3830 SelectSecure MRI SureScan   Final     Implantable Lead Serial Number 07/13/2022 [id number]   Final     Implantable Lead Implant Date 07/13/2022 [id number]   Final     Implantable Lead Polarity Type 07/13/2022 Bipolar Lead   Final     Implantable Lead Location Detail 1 07/13/2022 UNKNOWN   Final     Implantable Lead Special Function 07/13/2022 69 cm   Final     Implantable Lead Location 07/13/2022 Right Ventricle   Final     Chaz Setting Mode (NBG Code) 07/13/2022 VVIR   Final     Chaz Setting Lower Rate Limit 07/13/2022 60  [beats]/min Final     Chaz Setting Maximum Sensor Rate 07/13/2022 130  [beats]/min Final     Chaz Setting Hysterisis Rate 07/13/2022 DISABLED   Final     Lead Channel Setting Sensing Polar* 07/13/2022 Bipolar   Final     Lead Channel Setting Sensing Anode* 07/13/2022 Right Ventricle   Final     Lead Channel Setting Sensing Anode* 07/13/2022 Ring   Final     Lead Channel Setting Sensing Catho* 07/13/2022 Right Ventricle   Final     Lead Channel Setting Sensing Catho* 07/13/2022 Tip   Final     Lead Channel Setting Sensing Sensi* 07/13/2022 2  mV Final     Lead Channel Setting Pacing Polari* 07/13/2022 Unipolar   Final     Lead Channel Setting Pacing Anode * 07/13/2022 Can   Final     Lead Channel Setting Sensing Catho* 07/13/2022 Right Ventricle   Final     Lead Channel Setting Sensing Catho* 07/13/2022 Tip   Final     Lead Channel Setting Pacing Pulse * 07/13/2022 0.4  ms Final     Lead Channel Setting Pacing Amplit* 07/13/2022 2  V Final     Lead Channel Setting Pacing Captur* 07/13/2022 Adaptive   Final     Zone Setting Type Category 07/13/2022 VF   Final     Zone Setting Type Category 07/13/2022 VT   Final     Zone  Setting Type Category 07/13/2022 VT   Final     Zone Setting Type Category 07/13/2022 VT   Final     Zone Setting Detection Interval 07/13/2022 360  ms Final     Zone Setting Type Category 07/13/2022 ATRIAL_FIBRILLATION   Final     Zone Setting Type Category 07/13/2022 AT/AF   Final     Lead Channel Impedance Value 07/13/2022 589  ohm Final     Lead Channel Impedance Value 07/13/2022 456  ohm Final     Lead Channel Sensing Intrinsic Amp* 07/13/2022 13.4  mV Final     Lead Channel Pacing Threshold Ampl* 07/13/2022 0.5  V Final     Lead Channel Pacing Threshold Puls* 07/13/2022 0.4  ms Final     Battery Date Time of Measurements 07/13/2022 20220713111502   Final     Battery Status 07/13/2022 OK   Final     Battery RRT Trigger 07/13/2022 2.625   Final     Battery Remaining Longevity 07/13/2022 179  mo Final     Battery Voltage 07/13/2022 3.18  V Final     Chaz Statistic Date Time Start 07/13/2022 20211012144754   Final     Chaz Statistic Date Time End 07/13/2022 [id number]   Final     Chaz Statistic RV Percent Paced 07/13/2022 12.32  % Final     Episode Statistic Recent Count 07/13/2022 3,365   Final     Episode Statistic Type Category 07/13/2022 VT   Final     Episode Statistic Recent Count 07/13/2022 1   Final     Episode Statistic Type Category 07/13/2022 VT   Final     Episode Statistic Recent Date Time* 07/13/2022 [id number]   Final     Episode Statistic Recent Date Time* 07/13/2022 20220713135652   Final     Episode Statistic Recent Date Time* 07/13/2022 20211012144754   Final     Episode Statistic Recent Date Time* 07/13/2022 20220713135652   Final     Episode Statistic Total Count 07/13/2022 3,487   Final     Episode Statistic Type Category 07/13/2022 VT   Final     Episode Statistic Total Count 07/13/2022 1   Final     Episode Statistic Type Category 07/13/2022 VT   Final     Episode Statistic Total Date Time * 07/13/2022 20210714140321   Final     Episode Statistic Total Date Time * 07/13/2022  [id number]   Final     Episode Statistic Total Date Time * 07/13/2022 20210714140321   Final     Episode Statistic Total Date Time * 07/13/2022 20220713135652   Final     Episode Identifier 07/13/2022 3,488   Final     Episode Type Category 07/13/2022 VT   Final     Episode Date Time 07/13/2022 20220710100614   Final     Episode Duration 07/13/2022 0  s Final     Episode Identifier 07/13/2022 3,487   Final     Episode Type Category 07/13/2022 VT   Final     Episode Date Time 07/13/2022 20220709105121   Final     Episode Duration 07/13/2022 0  s Final     Episode Identifier 07/13/2022 3,486   Final     Episode Type Category 07/13/2022 VT   Final     Episode Date Time 07/13/2022 20220626095721   Final     Episode Duration 07/13/2022 0  s Final     Episode Identifier 07/13/2022 3,485   Final     Episode Type Category 07/13/2022 VT   Final     Episode Date Time 07/13/2022 20220626095702   Final     Episode Duration 07/13/2022 1  s Final     Episode Identifier 07/13/2022 3,484   Final     Episode Type Category 07/13/2022 VT   Final     Episode Date Time 07/13/2022 20220626095349   Final     Episode Duration 07/13/2022 1  s Final     Episode Identifier 07/13/2022 3,483   Final     Episode Type Category 07/13/2022 VT   Final     Episode Date Time 07/13/2022 20220625213653   Final     Episode Duration 07/13/2022 1  s Final     Episode Identifier 07/13/2022 3,482   Final     Episode Type Category 07/13/2022 VT   Final     Episode Date Time 07/13/2022 [id number]   Final     Episode Duration 07/13/2022 1  s Final     Episode Identifier 07/13/2022 3,481   Final     Episode Type Category 07/13/2022 VT   Final     Episode Date Time 07/13/2022 20220625100819   Final     Episode Duration 07/13/2022 1  s Final     Episode Identifier 07/13/2022 3,480   Final     Episode Type Category 07/13/2022 VT   Final     Episode Date Time 07/13/2022 20220620093907   Final     Episode Duration 07/13/2022 0  s Final     Episode Identifier  07/13/2022 3,479   Final     Episode Type Category 07/13/2022 VT   Final     Episode Date Time 07/13/2022 20220620093818   Final     Episode Duration 07/13/2022 1  s Final     Episode Identifier 07/13/2022 3,478   Final     Episode Type Category 07/13/2022 VT   Final     Episode Date Time 07/13/2022 20220620093432   Final     Episode Duration 07/13/2022 0  s Final     Episode Identifier 07/13/2022 3,477   Final     Episode Type Category 07/13/2022 VT   Final     Episode Date Time 07/13/2022 20220620092750   Final     Episode Duration 07/13/2022 1  s Final     Episode Identifier 07/13/2022 3,476   Final     Episode Type Category 07/13/2022 VT   Final     Episode Date Time 07/13/2022 [id number]   Final     Episode Duration 07/13/2022 0  s Final     Episode Identifier 07/13/2022 3,475   Final     Episode Type Category 07/13/2022 VT   Final     Episode Date Time 07/13/2022 [id number]   Final     Episode Duration 07/13/2022 1  s Final     Episode Identifier 07/13/2022 3,474   Final     Episode Type Category 07/13/2022 VT   Final     Episode Date Time 07/13/2022 [id number]   Final     Episode Duration 07/13/2022 1  s Final        Review of systems: Negative except that which was noted in the HPI.    Physical examination:  /69   Pulse 80   Temp 98.7  F (37.1  C) (Tympanic)   Wt 67.1 kg (148 lb)   SpO2 93%   BMI 25.40 kg/m      GENERAL APPEARANCE: healthy, alert and no distress  HEENT: no icterus, no xanthelasmas, normal pupil size and reaction, no cyanosis.  NECK: no adenopathy, no asymmetry, masses.  CHEST: lungs clear to auscultation - no rales, rhonchi or wheezes, no use of accessory muscles, no retractions, respirations are unlabored, normal respiratory rate  CARDIOVASCULAR: Irregularly irregular rhythm, tachycardic rate. normal S1 with physiologic split S2, no S3 or S4 and no murmur, click or rub  EXTREMITIES: +1 Bilateral lower extremity edema. no clubbing, cyanosis.  NEURO: alert and oriented  normal speech, and affect  VASC: No vascular bruits heard.  SKIN: no ecchymoses, no rashes        Thank you for allowing me to participate in the care of your patient. Please do not hesitate to contact me if you have any questions.       Julisa Villegas, CNP

## 2022-11-30 NOTE — PROGRESS NOTES
"SUBJECTIVE:   Mary Alice is a 72 year old who presents for Preventive Visit.    Are you in the first 12 months of your Medicare coverage?  No    Healthy Habits:     In general, how would you rate your overall health?  Good    Frequency of exercise:  2-3 days/week    Do you usually eat at least 4 servings of fruit and vegetables a day, include whole grains    & fiber and avoid regularly eating high fat or \"junk\" foods?  No    Taking medications regularly:  Yes    Medication side effects:  Muscle aches and Lightheadedness    Ability to successfully perform activities of daily living:  No assistance needed    Home Safety:  No safety concerns identified    Hearing Impairment:  No hearing concerns    In the past 6 months, have you been bothered by leaking of urine? Yes    In general, how would you rate your overall mental or emotional health?  Good      PHQ-2 Total Score: 0      Have you ever done Advance Care Planning? (For example, a Health Directive, POLST, or a discussion with a medical provider or your loved ones about your wishes): Yes, advance care planning is on file.       Fall risk  Fallen 2 or more times in the past year?: No  Any fall with injury in the past year?: No    Cognitive Screening   1) Repeat 3 items (Leader, Season, Table)    2) Clock draw: NORMAL  3) 3 item recall: Recalls 1 object   Results: NORMAL clock, 1-2 items recalled: COGNITIVE IMPAIRMENT LESS LIKELY    Mini-CogTM Copyright JOSSELIN Lopez. Licensed by the author for use in [facility]; reprinted with permission (wayne@.[facility]). All rights reserved.          Reviewed and updated as needed this visit by clinical staff   Tobacco  Allergies  Meds              Reviewed and updated as needed this visit by Provider                 Social History     Tobacco Use     Smoking status: Former     Packs/day: 0.50     Years: 41.00     Pack years: 20.50     Types: Cigarettes     Start date: 1/1/1966     Quit date: 1/28/2007     Years since quitting: " 15.8     Smokeless tobacco: Never   Substance Use Topics     Alcohol use: No     Alcohol/week: 0.0 standard drinks         Alcohol Use 11/30/2022   Prescreen: >3 drinks/day or >7 drinks/week? Not Applicable               Current providers sharing in care for this patient include:   Patient Care Team:  Braydon Yen MD as PCP - General (Family Practice)  Braydon Yen MD as Assigned PCP  Eliezer Myers MD as MD (Clinical Cardiac Electrophysiology)  Pau Nelson APRN CNP as Nurse Practitioner (Nurse Practitioner - Family)  Thelma Pichardo NP as Assigned Heart and Vascular Provider    The following health maintenance items are reviewed in Epic and correct as of today:  Health Maintenance   Topic Date Due     COVID-19 Vaccine (1) Never done     ZOSTER IMMUNIZATION (2 of 3) 09/16/2011     MEDICARE ANNUAL WELLNESS VISIT  Never done     DEXA  07/13/2018     TSH W/FREE T4 REFLEX  03/30/2023     ANKITA ASSESSMENT  05/30/2023     PHQ-9  05/30/2023     LIPID  06/23/2023     FALL RISK ASSESSMENT  11/30/2023     MAMMO SCREENING  08/10/2024     COLORECTAL CANCER SCREENING  01/19/2026     ADVANCE CARE PLANNING  11/30/2027     DTAP/TDAP/TD IMMUNIZATION (5 - Td or Tdap) 06/11/2031     SPIROMETRY  Completed     HEPATITIS C SCREENING  Completed     COPD ACTION PLAN  Completed     DEPRESSION ACTION PLAN  Completed     INFLUENZA VACCINE  Completed     Pneumococcal Vaccine: 65+ Years  Completed     IPV IMMUNIZATION  Aged Out     MENINGITIS IMMUNIZATION  Aged Out     LUNG CANCER SCREENING  Discontinued     Labs reviewed in EPIC          Review of Systems  CONSTITUTIONAL: NEGATIVE for fever, chills, change in weight  INTEGUMENTARY/SKIN: NEGATIVE for worrisome rashes, moles or lesions  EYES: NEGATIVE for vision changes or irritation  ENT/MOUTH: NEGATIVE for ear, mouth and throat problems  RESP: NEGATIVE for significant cough or SOB  BREAST: NEGATIVE for masses, tenderness or discharge  CV: NEGATIVE for chest pain,  "palpitations or peripheral edema  GI: NEGATIVE for nausea, abdominal pain, heartburn, or change in bowel habits  : NEGATIVE for frequency, dysuria, or hematuria  MUSCULOSKELETAL: NEGATIVE for significant arthralgias or myalgia  NEURO: NEGATIVE for weakness, dizziness or paresthesias  ENDOCRINE: NEGATIVE for temperature intolerance, skin/hair changes  HEME: NEGATIVE for bleeding problems  PSYCHIATRIC: NEGATIVE for changes in mood or affect    OBJECTIVE:   /64   Pulse 80   Temp 98.5  F (36.9  C) (Tympanic)   Ht 1.613 m (5' 3.5\")   Wt 67.1 kg (148 lb)   SpO2 92%   BMI 25.81 kg/m   Estimated body mass index is 25.81 kg/m  as calculated from the following:    Height as of this encounter: 1.613 m (5' 3.5\").    Weight as of this encounter: 67.1 kg (148 lb).  Physical Exam  GENERAL: healthy, alert and no distress  EYES: Eyes grossly normal to inspection, PERRL and conjunctivae and sclerae normal  HENT: ear canals and TM's normal, nose and mouth without ulcers or lesions  NECK: no adenopathy, no asymmetry, masses, or scars and thyroid normal to palpation  RESP: lungs clear to auscultation - no rales, rhonchi or wheezes  CV: regular rate and rhythm, normal S1 S2, no S3 or S4, no murmur, click or rub, no peripheral edema and peripheral pulses strong  ABDOMEN: soft, nontender, no hepatosplenomegaly, no masses and bowel sounds normal  MS: no gross musculoskeletal defects noted, no edema  SKIN: no suspicious lesions or rashes  NEURO: Normal strength and tone, mentation intact and speech normal  PSYCH: mentation appears normal, affect normal/bright    Diagnostic Test Results:  Labs reviewed in Epic    ASSESSMENT / PLAN:       ICD-10-CM    1. Encounter for Medicare annual wellness exam  Z00.00       2. Asymptomatic menopausal state  Z78.0       3. Chronic obstructive pulmonary disease, unspecified COPD type (H)  J44.9 Fluticasone-Umeclidin-Vilanterol (TRELEGY ELLIPTA) 200-62.5-25 MCG/ACT oral inhaler    " "            COUNSELING:  Reviewed preventive health counseling, as reflected in patient instructions      BMI:   Estimated body mass index is 25.81 kg/m  as calculated from the following:    Height as of this encounter: 1.613 m (5' 3.5\").    Weight as of this encounter: 67.1 kg (148 lb).         She reports that she quit smoking about 15 years ago. Her smoking use included cigarettes. She started smoking about 56 years ago. She has a 20.50 pack-year smoking history. She has never used smokeless tobacco.      Appropriate preventive services were discussed with this patient, including applicable screening as appropriate for cardiovascular disease, diabetes, osteopenia/osteoporosis, and glaucoma.  As appropriate for age/gender, discussed screening for colorectal cancer, prostate cancer, breast cancer, and cervical cancer. Checklist reviewing preventive services available has been given to the patient.    Reviewed patients plan of care and provided an AVS. The Basic Care Plan (routine screening as documented in Health Maintenance) for Mary Alice meets the Care Plan requirement. This Care Plan has been established and reviewed with the Patient.          Braydon Yen MD  [facility]    Identified Health Risks:  "

## 2022-11-30 NOTE — PATIENT INSTRUCTIONS
Thank you for allowing Julisa Villegas CNP and our  team to participate in your care. Please call our office at [phone] with scheduling questions or if you need to cancel or change your appointment. With any other questions or concerns you may call cardiology nurse at [phone].       If you experience chest pain, chest pressure, chest tightness, shortness of breath, fainting, lightheadedness, nausea, vomiting, or other concerning symptoms, please report to the Emergency Department or call 911. These symptoms may be emergent, and best treated in the Emergency Department.          Permanent atrial fibrillation  She is largely asymptomatic at this time.    Reviewed recent device check with average rate of 100-120 bpm, several episodes of atrial fibrillation with RVR  Continue diltiazem  mg once daily    [id number] >= 2.    She is appropriately anticoagulated on Eliquis 5 mg twice daily for stroke prophylaxis with atrial fibrillation  Denies any bleeding concerns at this time.    History of complete heart block s/p pacemaker placement  Reviewed recent device interrogation   She will continue to follow with the device clinic.    Hyperlipidemia with LDL goal less than 100, controlled  Continue atorvastatin 10 mg once daily.  Recent Labs   Lab Test 06/23/22  1342 06/11/21  1011 12/12/17  0856 08/25/15  0850 08/27/14  1139   CHOL 168 201*   < > 207* 210*   HDL 90 109   < > 108 92   LDL 65 78   < > 88 99   TRIG 67 71   < > 56 93   CHOLHDLRATIO  --   --   --  1.9 2.3    < > = values in this interval not displayed.       Essential hypertension, controlled  Decrease losartan 50 mg twice daily to losartan 50 mg once daily with symptomatic low blood pressures at home.   Continue diltiazem  mg once daily  Continue spironolactone 25 mg once daily for HFpEF   May consider decreasing losartan if further diuresis is needed and lower blood pressures  History of adverse reaction of cough to ACEi.  BP Readings  from Last 6 Encounters:   11/30/22 123/69   10/19/22 108/70   09/13/22 113/73   09/06/22 129/83   07/08/22 112/65   06/23/22 114/74       COPD  pulmonary hypertension  dyspnea  Dyspnea likely multifactorial with history of smoking/severe COPD on recent PFTs as well as diastolic dysfunction.    Continue torsemide 40 mg once daily  She does monitor salt intake.    She does follow fluid restrictions.   Cough for about 4 weeks, increased cough- clear phlegm, no wheezing, feeling more dyspneic. Plan for CXR here and she is following up with PCP after this and can review CXR, possible COPD exacerbation.  She should continue management of COPD with primary care provider- discuss switching maintenance inhalers to something like Trelegy with PCP.    HFpEF (Echo showed diastolic dysfunction grade I back on echo in 2018, mildly elevated left-sided filling pressures on cardiac cath in 2021, LVH. Symptoms of LE edema, dyspnea)    BP: controlled   Fluid status: Hypervolemic at prior visit with increased bilateral LE edema and weight gain of 4-5#.  Continues with edema but improved. We discontinued her furosemide og 60 mg in AM and 40 mg in the afternoon. We started torsemide 40 mg daily and she has had improvement in her LE edema. Mild decline in renal functioning so we will decrease to 20 mg once daily and have her monitor symptoms.   Aldosterone antagonist: Starting spironolactone 25 mg once daily today. We will also stop her potassium supplement with starting this.   SGLT-2: Could be considered at follow-up  Ischemia evaluation: Completed in 2021- cardiac catheterization showed no CAD  ACEi/ARB/ARNI: n/a, no evidence for use in HFpEF. History of cough with ACEi  BB: n/a, no evidence for use in HFpEF   SCD prophylaxis: n/a, no evidence for use in HFpEF  NSAID use: Contraindicated  Sleep apnea evaluation: Refused    Severe aortic stenosis status post TAVR in July 2021: Echocardiogram 1 year postop shows normal function of valve.   Also shows normal heart functioning.  She was instructed to stop aspirin by valve clinic.  She will continue with Eliquis 5 mg twice daily.   Should she need to stop anticoagulation for any reason in the future she should re-start aspirin 81 mg once daily.   She will need dental prophylaxis with amoxicillin 1 g prior to dental work.    Follow-up with cardiology in 6 months, certainly sooner with acute concerns.       Julisa Villegas, CNP

## 2022-12-11 DIAGNOSIS — E87.6 HYPOKALEMIA: Primary | ICD-10-CM

## 2022-12-11 DIAGNOSIS — I48.19 PERSISTENT ATRIAL FIBRILLATION (H): ICD-10-CM

## 2022-12-12 RX ORDER — APIXABAN 5 MG/1
TABLET, FILM COATED ORAL
Qty: 180 TABLET | Refills: 0 | Status: SHIPPED | OUTPATIENT
Start: 2022-12-12 | End: 2023-01-04

## 2022-12-13 RX ORDER — POTASSIUM CHLORIDE 1500 MG/1
TABLET, EXTENDED RELEASE ORAL
Qty: 270 TABLET | Refills: 0 | Status: SHIPPED | OUTPATIENT
Start: 2022-12-13 | End: 2022-12-15

## 2022-12-13 NOTE — TELEPHONE ENCOUNTER
Potassium       Last Written Prescription Date:  Request   Last Fill Quantity: 270,   # refills: 0  Last Office Visit: 11/30/22  Future Office visit:    Next 5 appointments (look out 90 days)    Dec 20, 2022 12:30 PM  (Arrive by 12:15 PM)  Return Visit with Alessandra Wilkins DPM  [facility] [address] [place] [id number] [phone]

## 2022-12-20 ENCOUNTER — OFFICE VISIT (OUTPATIENT)
Dept: PODIATRY | Facility: OTHER | Age: 72
End: 2022-12-20
Attending: PODIATRIST
Payer: COMMERCIAL

## 2022-12-20 VITALS
SYSTOLIC BLOOD PRESSURE: 126 MMHG | TEMPERATURE: 99 F | OXYGEN SATURATION: 94 % | DIASTOLIC BLOOD PRESSURE: 80 MMHG | HEART RATE: 95 BPM

## 2022-12-20 DIAGNOSIS — R60.0 BILATERAL LOWER EXTREMITY EDEMA: ICD-10-CM

## 2022-12-20 DIAGNOSIS — M20.12 HALLUX VALGUS (ACQUIRED), LEFT FOOT: ICD-10-CM

## 2022-12-20 DIAGNOSIS — M62.462 GASTROCNEMIUS EQUINUS OF LEFT LOWER EXTREMITY: ICD-10-CM

## 2022-12-20 DIAGNOSIS — Z79.01 LONG TERM CURRENT USE OF ANTICOAGULANT THERAPY: ICD-10-CM

## 2022-12-20 DIAGNOSIS — L85.3 XEROSIS OF SKIN: ICD-10-CM

## 2022-12-20 DIAGNOSIS — M62.461 GASTROCNEMIUS EQUINUS OF RIGHT LOWER EXTREMITY: ICD-10-CM

## 2022-12-20 DIAGNOSIS — B35.3 TINEA PEDIS OF BOTH FEET: ICD-10-CM

## 2022-12-20 DIAGNOSIS — L60.3 ONYCHODYSTROPHY: Primary | ICD-10-CM

## 2022-12-20 PROCEDURE — 11721 DEBRIDE NAIL 6 OR MORE: CPT | Performed by: PODIATRIST

## 2022-12-20 PROCEDURE — 99213 OFFICE O/P EST LOW 20 MIN: CPT | Mod: 25 | Performed by: PODIATRIST

## 2022-12-20 PROCEDURE — G0463 HOSPITAL OUTPT CLINIC VISIT: HCPCS | Mod: 25

## 2022-12-20 ASSESSMENT — PAIN SCALES - GENERAL: PAINLEVEL: NO PAIN (0)

## 2022-12-20 NOTE — PROGRESS NOTES
Chief complaint: Patient presents with:  Toenail: Trimming      History of Present Illness: This 72 year old female is seen for follow-up management of elongated, thickened toenails and Athletes' foot.    She has had a new pain on the RIGHT plantar 2nd metatarsal head. This has been consistently painful while standing and sitting for the past month since around early November, 2022. Nothing seems to make it better and different shoes don't seem to make a difference. It is not necessarily painful, but it is noticeable and feels like a marble in her shoe. She normally wears shoes around her apartment.    She is on Eliquis for a-fib.     She gets burning, tingling, and numbness in her feet.    She still gets a lot of swelling in her ankles. She was placed on Lasix. She tried compression socks in the past but they caused pain in the ankles and she could not get them on her legs.    No further pedal complaints today.       /80 (BP Location: Left arm, Patient Position: Sitting, Cuff Size: Adult Regular)   Pulse 95   Temp 99  F (37.2  C) (Tympanic)   SpO2 94%     Patient Active Problem List   Diagnosis     Permanent atrial fibrillation (H)     Pulmonary emphysema (H)     Bicuspid aortic valve     Mild major depression (H)     Acute bronchitis     Hypothyroidism, postablative     Advance care planning     Essential hypertension     Long-term (current) use of anticoagulants [Z79.01]     Acute on chronic respiratory failure (H)     Health Care Home     Status post coronary angiogram     Coronary artery disease involving native coronary artery of native heart without angina pectoris     Acute cystitis without hematuria     Small bowel obstruction (H)     Acute renal failure (H)     Hypokalemia     Infection due to 2019 novel coronavirus     Aortic aneurysm (H)     Aortic valve disorder     Mitral valve disorder     Cardiomegaly     Carotid stenosis     Depressive disorder     Edema     COPD (chronic obstructive  pulmonary disease) (H)     Other ill-defined heart diseases     Aortic valve stenosis, etiology of cardiac valve disease unspecified     Aortic stenosis, severe     Status post transcatheter aortic valve replacement (TAVR) using bioprosthesis     Postoperative complete heart block (H)     Cardiac pacemaker in situ       Past Surgical History:   Procedure Laterality Date     BACK SURGERY  2006     CARDIAC SURGERY  06/12/2018    Angiogram at [facility]     COLONOSCOPY N/A 01/19/2016    Procedure: COLONOSCOPY;  Surgeon: Waldemar Bob MD;  [facility]: [facility]     CV CORONARY ANGIOGRAM N/A 04/30/2021    Procedure: CV CORONARY ANGIOGRAM;  Surgeon: Poli Walsh MD;  Location:  HE[facility] CARDIAC CATH LAB     CV LEFT HEART CATH N/A 04/30/2021    Procedure: CV LEFT HEART CATH;  Surgeon: Poli Walsh MD;  [facility]     CV RIGHT HEART CATH MEASUREMENTS RECORDED N/A 04/30/2021    Procedure: CV RIGHT HEART CATH;  Surgeon: Poli Walsh MD;  [facility]     CV TRANSCATHETER AORTIC VALVE REPLACEMENT N/A 07/14/2021    Procedure: Right femoral Transcaval transcatheter aortic valve replacement (SUMMERS) size 29mm.  Transesophageal echocardiogram per anesthesia;  Surgeon: Domo Sarah MD;  [facility]     EP PPM INSERT OF NEW OR REPL W/VENT LEAD N/A 07/14/2021    Procedure: insertion of Permanent Pacemaker;  Surgeon: Eliezer Myers MD;  [facility]     HEART CATH FEMORAL CANNULIZATION WITH OPEN STANDBY REPAIR AORTIC VALVE N/A 07/14/2021    Procedure: Cardiopulmonary standby.;  Surgeon: Fly Smith MD;  [facility]     HYSTERECTOMY  1980    partial     SLING BLADDER SUSPENSION WITH FASCIA LINNETTE         Current Outpatient Medications   Medication     acetaminophen (TYLENOL) 500 MG tablet     albuterol (PROAIR HFA/PROVENTIL HFA/VENTOLIN HFA) 108 (90 Base) MCG/ACT inhaler     amoxicillin (AMOXIL) 500 MG capsule      atorvastatin (LIPITOR) 10 MG tablet     Calcium Carb-Cholecalciferol (CALTRATE 600+D3 SOFT PO)     cloNIDine (CATAPRES) 0.1 MG tablet     diltiazem ER (DILT-XR) 240 MG 24 hr ER beaded capsule     diphenhydrAMINE (BENADRYL) 25 MG tablet     ELIQUIS ANTICOAGULANT 5 MG tablet     Fluticasone-Umeclidin-Vilanterol (TRELEGY ELLIPTA) 200-62.5-25 MCG/ACT oral inhaler     hydrOXYzine (ATARAX) 25 MG tablet     levothyroxine (SYNTHROID/LEVOTHROID) 100 MCG tablet     losartan (COZAAR) 50 MG tablet     OTHER MEDICAL SUPPLIES     predniSONE (DELTASONE) 20 MG tablet     spironolactone (ALDACTONE) 25 MG tablet     torsemide 40 MG TABS     No current facility-administered medications for this visit.          Allergies   Allergen Reactions     Amlodipine Besylate Cough     Norvasc     Lisinopril Cough     Ace Inhibitors Cough       Family History   Problem Relation Age of Onset     Ovarian Cancer Mother 72     Asthma Mother      Cancer Mother         ovarian     Hypertension Mother      Prostate Cancer Father      Hypertension Father      Heart Failure Father         CHF     Hypertension Sister      Asthma Brother      Hypertension Brother        Social History     Socioeconomic History     Marital status:      Spouse name: None     Number of children: None     Years of education: None     Highest education level: None   Occupational History     Employer: RETIRED     Comment: disabled   Tobacco Use     Smoking status: Former Smoker     Packs/day: 0.50     Years: 41.00     Pack years: 20.50     Types: Cigarettes     Start date: 1/1/1966     Quit date: 1/28/2007     Years since quitting: 15.4     Smokeless tobacco: Never Used   Substance and Sexual Activity     Alcohol use: No     Alcohol/week: 0.0 standard drinks     Drug use: No     Sexual activity: Never     Comment:    Other Topics Concern      Service No     Blood Transfusions Yes     Comment: Permits if needed     Caffeine Concern Yes     Comment: 2 cups  coffee daily     Seat Belt Yes     Parent/sibling w/ CABG, MI or angioplasty before 65F 55M? No       ROS: 10 point ROS neg other than the symptoms noted above in the HPI.  EXAM  Constitutional: healthy, alert and no distress    Psychiatric: mentation appears normal and affect normal/bright    VASCULAR:  -Dorsalis pedis pulse +2/4 b/l  -Posterior tibial pulse +2/4 b/l  -Capillary refill time < 3 seconds to b/l hallux  -Hair growth Absent to b/l anterior legs and ankles  -Varicosities and telangiectasias to foot, bilaterally   -Mild 1+ pitting edema to bilateral foot and moderate 2+ pitting edema to bilateral ankle    NEURO:  -Protective sensation intact with SWM +10/10 RIGHT and +9/10 LEFT on 07/08/2022  -Light touch sensation intact to b/l plantar forefoot  DERM:  -Skin temperature, texture and turgor WNL b/l  -Skin mild-to-moderately erythematous with flaking of skin to bilateral plantar feet in a moccasin distribution  -Toenails elongated, thickened, dystrophic and discolored x 10  MSK:  -Tenderness on palpation to the RIGHT plantar 2nd metatarsal head  -Lateral deviation of hallux with medial deviation of 1st metatarsal, LEFT   -Prominent bony prominence to dorsal and medial 1st metatarsal head, bilaterally   -Moderate decrease in arch height while patient is NWB, bilaterally     -Muscle strength of ankles +5/5 for dorsiflexion, plantarflexion, ABDUction and ADDuction b/l  -DORSIFLEXION ROM limited to 90 degrees on the bilateral ankle    ============================================================    ASSESSMENT:  (L60.3) Onychodystrophy  (primary encounter diagnosis)    (L85.3) Xerosis of skin    (M20.12) Hallux valgus (acquired), left foot    (B35.3) Tinea pedis of both feet    (R60.0) Bilateral lower extremity edema    (M21.41,  M21.42) Pes planus of both feet    (Z79.01) Long-term (current) use of anticoagulants [Z79.01]    (M21.861) Gastrocnemius equinus of right lower extremity    (M21.862) Gastrocnemius  equinus of left lower extremity        PLAN:  -Patient evaluated and examined. Treatment options discussed with no educational barriers noted.    -Toenail debridement x 10 toenails without incident    Metatarsalgia and capsulitis:  -Discussed potential causes and treatment options for metatarsalgia. Pain along the metatarsals can be caused from a number of factors, but is commonly caused by an imbalance of the biomechanics. This can include but is not limited to a flat foot, high arched foot, tendon imbalance, gastrocnemius equinus, and compensation for pain in other areas of the foot. In this case, patient has pain on the plantar 2nd metatarsal head and plantar 2nd MTPJ. She has a gastroc equinus which is contributing to increased forefoot pressure. She also has a mild DORSIFLEXION contracture of the lesser MTPJs which contributes to overloading the capsular structures and increased pressure and pain on the distal 2nd metatarsal.   ---Conservative treatment options consist of wider shoe gear and orthotics +/- padding/splinting to correct the biomechanics of the foot. Consistently wearing supportive shoe gear can also decrease this pain (stability shoe gear involves wearing a stability shoe that bends at the toe, but has a solid midfoot shank and heel contour).   ---Dispensed a metatarsal pad to the patient. She had her boots with her and not an orthotic. Explained how she can lionel her 1st and 5th metatarsal heads on her foot with a skin marker and then stand on her orthotic. Explained where the metatarsal pad goes in relationship to this area of the foot. If she does not find the pad comfortable, she may call the clinic to discuss how to apply the metatarsal pad in the shoe. She does not want to try an orthotic (CMO or OTC) at this time and she would rather start with the metatarsal pad. If she is considering an orthotic, she may call the clinic and a referral can be made to her location of choice. Discussed  orthotist locations in the area with the patient.    -Foot Education provided. This included checking the feet daily looking for new new blisters or wounds, wearing shoes at all times when walking including around the house, and avoiding lotion application between the toes. If there are any signs of infection, the patient should present to the ED as soon as possible. Infections of the foot can be life threatening or lead to amputations of the foot or leg.    -Athlete's Foot  ---Improved and not itchy. Patient to continue with anti-fungal spray only if it is starting to worsen with flaking skin.    Lower extremity edema:  -Patient was started on Lasix recently. She is following up with her PCP for this. She started in early September, 2022.    Total time spent preparing to see the patient, review of chart, obtaining history and physical examination, review of treatment options, education, discussion with patient and documenting in Epic / EMR was 23 minutes. This did not include procedure time.  All time involved was spent on the day of service for the patient (the same day as the patient's appointment).    -Patient in agreement with the above treatment plan and all of patient's questions were answered.      RTC 63+ days for nail debridement        Alessandra Wilkins DPM

## 2023-01-04 DIAGNOSIS — E89.0 HYPOTHYROIDISM, POSTABLATIVE: ICD-10-CM

## 2023-01-04 DIAGNOSIS — I48.19 PERSISTENT ATRIAL FIBRILLATION (H): ICD-10-CM

## 2023-01-04 DIAGNOSIS — I10 ESSENTIAL HYPERTENSION: ICD-10-CM

## 2023-01-04 RX ORDER — APIXABAN 5 MG/1
TABLET, FILM COATED ORAL
Qty: 180 TABLET | Refills: 1 | Status: SHIPPED | OUTPATIENT
Start: 2023-01-04 | End: 2023-11-30

## 2023-01-04 RX ORDER — LOSARTAN POTASSIUM 50 MG/1
TABLET ORAL
Qty: 90 TABLET | Refills: 1 | Status: SHIPPED | OUTPATIENT
Start: 2023-01-04 | End: 2023-05-31

## 2023-01-04 RX ORDER — LEVOTHYROXINE SODIUM 100 UG/1
TABLET ORAL
Qty: 90 TABLET | Refills: 1 | Status: SHIPPED | OUTPATIENT
Start: 2023-01-04 | End: 2023-03-17

## 2023-01-04 RX ORDER — TORSEMIDE 20 MG/1
TABLET ORAL
Qty: 180 TABLET | Refills: 1 | Status: SHIPPED | OUTPATIENT
Start: 2023-01-04 | End: 2023-11-20

## 2023-01-15 ENCOUNTER — MYC REFILL (OUTPATIENT)
Dept: CARDIOLOGY | Facility: OTHER | Age: 73
End: 2023-01-15

## 2023-01-15 DIAGNOSIS — I48.21 PERMANENT ATRIAL FIBRILLATION (H): ICD-10-CM

## 2023-01-16 ENCOUNTER — MYC MEDICAL ADVICE (OUTPATIENT)
Dept: FAMILY MEDICINE | Facility: OTHER | Age: 73
End: 2023-01-16

## 2023-01-16 DIAGNOSIS — I48.21 PERMANENT ATRIAL FIBRILLATION (H): ICD-10-CM

## 2023-01-16 RX ORDER — DILTIAZEM HYDROCHLORIDE 240 MG/1
480 CAPSULE, EXTENDED RELEASE ORAL DAILY
Qty: 180 CAPSULE | Refills: 0 | Status: SHIPPED | OUTPATIENT
Start: 2023-01-16 | End: 2023-01-17

## 2023-01-17 ENCOUNTER — ANCILLARY PROCEDURE (OUTPATIENT)
Dept: CARDIOLOGY | Facility: CLINIC | Age: 73
End: 2023-01-17
Attending: INTERNAL MEDICINE
Payer: MEDICARE

## 2023-01-17 DIAGNOSIS — Z95.0 CARDIAC PACEMAKER IN SITU: ICD-10-CM

## 2023-01-17 DIAGNOSIS — I44.2 ATRIOVENTRICULAR BLOCK, COMPLETE (H): ICD-10-CM

## 2023-01-17 PROCEDURE — 93296 REM INTERROG EVL PM/IDS: CPT

## 2023-01-17 PROCEDURE — 93294 REM INTERROG EVL PM/LDLS PM: CPT | Performed by: INTERNAL MEDICINE

## 2023-01-17 RX ORDER — DILTIAZEM HYDROCHLORIDE 240 MG/1
480 CAPSULE, EXTENDED RELEASE ORAL DAILY
Qty: 180 CAPSULE | Refills: 3 | Status: SHIPPED | OUTPATIENT
Start: 2023-01-17 | End: 2023-09-27

## 2023-01-24 LAB
MDC_IDC_EPISODE_DTM: NORMAL
MDC_IDC_EPISODE_DURATION: 0 S
MDC_IDC_EPISODE_DURATION: 1 S
MDC_IDC_EPISODE_DURATION: 2 S
MDC_IDC_EPISODE_DURATION: 3 S
MDC_IDC_EPISODE_ID: 3660
MDC_IDC_EPISODE_ID: 3661
MDC_IDC_EPISODE_ID: 3662
MDC_IDC_EPISODE_ID: 3663
MDC_IDC_EPISODE_ID: 3664
MDC_IDC_EPISODE_ID: 3665
MDC_IDC_EPISODE_ID: 3666
MDC_IDC_EPISODE_ID: 3667
MDC_IDC_EPISODE_ID: 3668
MDC_IDC_EPISODE_ID: 3669
MDC_IDC_EPISODE_ID: 3670
MDC_IDC_EPISODE_ID: 3671
MDC_IDC_EPISODE_ID: 3672
MDC_IDC_EPISODE_ID: 3673
MDC_IDC_EPISODE_ID: 3674
MDC_IDC_EPISODE_TYPE: NORMAL
MDC_IDC_LEAD_IMPLANT_DT: NORMAL
MDC_IDC_LEAD_LOCATION: NORMAL
MDC_IDC_LEAD_LOCATION_DETAIL_1: NORMAL
MDC_IDC_LEAD_MFG: NORMAL
MDC_IDC_LEAD_MODEL: NORMAL
MDC_IDC_LEAD_POLARITY_TYPE: NORMAL
MDC_IDC_LEAD_SERIAL: NORMAL
MDC_IDC_LEAD_SPECIAL_FUNCTION: NORMAL
MDC_IDC_MSMT_BATTERY_DTM: NORMAL
MDC_IDC_MSMT_BATTERY_REMAINING_LONGEVITY: 171 MO
MDC_IDC_MSMT_BATTERY_RRT_TRIGGER: 2.62
MDC_IDC_MSMT_BATTERY_STATUS: NORMAL
MDC_IDC_MSMT_BATTERY_VOLTAGE: 3.1 V
MDC_IDC_MSMT_LEADCHNL_RV_IMPEDANCE_VALUE: 475 OHM
MDC_IDC_MSMT_LEADCHNL_RV_IMPEDANCE_VALUE: 608 OHM
MDC_IDC_MSMT_LEADCHNL_RV_PACING_THRESHOLD_AMPLITUDE: 0.5 V
MDC_IDC_MSMT_LEADCHNL_RV_PACING_THRESHOLD_PULSEWIDTH: 0.4 MS
MDC_IDC_MSMT_LEADCHNL_RV_SENSING_INTR_AMPL: 13.12 MV
MDC_IDC_PG_IMPLANT_DTM: NORMAL
MDC_IDC_PG_MFG: NORMAL
MDC_IDC_PG_MODEL: NORMAL
MDC_IDC_PG_SERIAL: NORMAL
MDC_IDC_PG_TYPE: NORMAL
MDC_IDC_SESS_CLINIC_NAME: NORMAL
MDC_IDC_SESS_DTM: NORMAL
MDC_IDC_SESS_TYPE: NORMAL
MDC_IDC_SET_BRADY_HYSTRATE: NORMAL
MDC_IDC_SET_BRADY_LOWRATE: 60 {BEATS}/MIN
MDC_IDC_SET_BRADY_MAX_SENSOR_RATE: 130 {BEATS}/MIN
MDC_IDC_SET_BRADY_MODE: NORMAL
MDC_IDC_SET_LEADCHNL_RV_PACING_AMPLITUDE: 2 V
MDC_IDC_SET_LEADCHNL_RV_PACING_ANODE_ELECTRODE_1: NORMAL
MDC_IDC_SET_LEADCHNL_RV_PACING_CAPTURE_MODE: NORMAL
MDC_IDC_SET_LEADCHNL_RV_PACING_CATHODE_ELECTRODE_1: NORMAL
MDC_IDC_SET_LEADCHNL_RV_PACING_CATHODE_LOCATION_1: NORMAL
MDC_IDC_SET_LEADCHNL_RV_PACING_POLARITY: NORMAL
MDC_IDC_SET_LEADCHNL_RV_PACING_PULSEWIDTH: 0.4 MS
MDC_IDC_SET_LEADCHNL_RV_SENSING_ANODE_ELECTRODE_1: NORMAL
MDC_IDC_SET_LEADCHNL_RV_SENSING_ANODE_LOCATION_1: NORMAL
MDC_IDC_SET_LEADCHNL_RV_SENSING_CATHODE_ELECTRODE_1: NORMAL
MDC_IDC_SET_LEADCHNL_RV_SENSING_CATHODE_LOCATION_1: NORMAL
MDC_IDC_SET_LEADCHNL_RV_SENSING_POLARITY: NORMAL
MDC_IDC_SET_LEADCHNL_RV_SENSING_SENSITIVITY: 2 MV
MDC_IDC_SET_ZONE_DETECTION_INTERVAL: 360 MS
MDC_IDC_SET_ZONE_TYPE: NORMAL
MDC_IDC_STAT_BRADY_DTM_END: NORMAL
MDC_IDC_STAT_BRADY_DTM_START: NORMAL
MDC_IDC_STAT_BRADY_RV_PERCENT_PACED: 20.38 %
MDC_IDC_STAT_EPISODE_RECENT_COUNT: 0
MDC_IDC_STAT_EPISODE_RECENT_COUNT: 0
MDC_IDC_STAT_EPISODE_RECENT_COUNT: 85
MDC_IDC_STAT_EPISODE_RECENT_COUNT_DTM_END: NORMAL
MDC_IDC_STAT_EPISODE_RECENT_COUNT_DTM_START: NORMAL
MDC_IDC_STAT_EPISODE_TOTAL_COUNT: 0
MDC_IDC_STAT_EPISODE_TOTAL_COUNT: 1
MDC_IDC_STAT_EPISODE_TOTAL_COUNT: 3673
MDC_IDC_STAT_EPISODE_TOTAL_COUNT_DTM_END: NORMAL
MDC_IDC_STAT_EPISODE_TOTAL_COUNT_DTM_START: NORMAL
MDC_IDC_STAT_EPISODE_TYPE: NORMAL

## 2023-03-02 ENCOUNTER — OFFICE VISIT (OUTPATIENT)
Dept: PODIATRY | Facility: OTHER | Age: 73
End: 2023-03-02
Attending: PODIATRIST
Payer: MEDICARE

## 2023-03-02 VITALS
TEMPERATURE: 98 F | DIASTOLIC BLOOD PRESSURE: 73 MMHG | OXYGEN SATURATION: 94 % | SYSTOLIC BLOOD PRESSURE: 111 MMHG | HEART RATE: 88 BPM

## 2023-03-02 DIAGNOSIS — Z79.01 LONG TERM CURRENT USE OF ANTICOAGULANT THERAPY: ICD-10-CM

## 2023-03-02 DIAGNOSIS — L60.3 ONYCHODYSTROPHY: Primary | ICD-10-CM

## 2023-03-02 DIAGNOSIS — R60.0 BILATERAL LOWER EXTREMITY EDEMA: ICD-10-CM

## 2023-03-02 DIAGNOSIS — L85.3 XEROSIS OF SKIN: ICD-10-CM

## 2023-03-02 DIAGNOSIS — M20.12 HALLUX VALGUS (ACQUIRED), LEFT FOOT: ICD-10-CM

## 2023-03-02 PROCEDURE — 99212 OFFICE O/P EST SF 10 MIN: CPT | Mod: 25 | Performed by: PODIATRIST

## 2023-03-02 PROCEDURE — G0463 HOSPITAL OUTPT CLINIC VISIT: HCPCS | Mod: 25

## 2023-03-02 PROCEDURE — 11721 DEBRIDE NAIL 6 OR MORE: CPT | Performed by: PODIATRIST

## 2023-03-02 ASSESSMENT — PAIN SCALES - GENERAL: PAINLEVEL: NO PAIN (0)

## 2023-03-02 NOTE — PROGRESS NOTES
Chief complaint: Patient presents with:  Toenail: Trimming       History of Present Illness: This 72 year old female is seen for follow-up management of elongated, thickened toenails and Athletes' foot.    She says she gets swelling in her ankles. She still gets a lot of swelling in her ankles. She was placed on Lasix. SHe is no longer on Lasix and it seems to help a little. She tried compression socks in the past but they caused pain in the ankles and she could not get them on her legs.    She is on Eliquis for a-fib.     She gets burning, tingling, and numbness in her feet although it has recently been more controlled.    Patient is complaining of dry skin on her feet. She says she gets a lot of flaking skin and nothing seems to be helping reduce the flaking. She would like lotion recommendations and ideas of how to treat the dry skin today.    No further pedal complaints today.       /73 (BP Location: Left arm, Patient Position: Sitting, Cuff Size: Adult Regular)   Pulse 88   Temp 98  F (36.7  C) (Tympanic)   SpO2 94%     Patient Active Problem List   Diagnosis     Permanent atrial fibrillation (H)     Pulmonary emphysema (H)     Bicuspid aortic valve     Mild major depression (H)     Acute bronchitis     Hypothyroidism, postablative     Advance care planning     Essential hypertension     Long-term (current) use of anticoagulants [Z79.01]     Acute on chronic respiratory failure (H)     Health Care Home     Status post coronary angiogram     Coronary artery disease involving native coronary artery of native heart without angina pectoris     Acute cystitis without hematuria     Small bowel obstruction (H)     Acute renal failure (H)     Hypokalemia     Infection due to 2019 novel coronavirus     Aortic aneurysm (H)     Aortic valve disorder     Mitral valve disorder     Cardiomegaly     Carotid stenosis     Depressive disorder     Edema     COPD (chronic obstructive pulmonary disease) (H)     Other  ill-defined heart diseases     Aortic valve stenosis, etiology of cardiac valve disease unspecified     Aortic stenosis, severe     Status post transcatheter aortic valve replacement (TAVR) using bioprosthesis     Postoperative complete heart block (H)     Cardiac pacemaker in situ       Past Surgical History:   Procedure Laterality Date     BACK SURGERY  2006     CARDIAC SURGERY  06/12/2018    Angiogram at [facility]     COLONOSCOPY N/A 01/19/2016    Procedure: COLONOSCOPY;  Surgeon: Waldemar Bob MD;  Location: [facility]     CV CORONARY ANGIOGRAM N/A 04/30/2021    Procedure: CV CORONARY ANGIOGRAM;  Surgeon: Poli Walsh MD;  Location:  HEART CARDIAC CATH LAB     CV LEFT HEART CATH N/A 04/30/2021    Procedure: CV LEFT HEART CATH;  Surgeon: Poli Walsh MD;  Location:  HEART CARDIAC CATH LAB     CV RIGHT HEART CATH MEASUREMENTS RECORDED N/A 04/30/2021    Procedure: CV RIGHT HEART CATH;  Surgeon: Poli Walsh MD;  [facility]     CV TRANSCATHETER AORTIC VALVE REPLACEMENT N/A 07/14/2021    Procedure: Right femoral Transcaval transcatheter aortic valve replacement (SUMMERS) size 29mm.  Transesophageal echocardiogram per anesthesia;  Surgeon: Domo Sarah MD;  [facility]     EP PPM INSERT OF NEW OR REPL W/VENT LEAD N/A 07/14/2021    Procedure: insertion of Permanent Pacemaker;  Surgeon: Eliezer Myers MD;  [facility]     HEART CATH FEMORAL CANNULIZATION WITH OPEN STANDBY REPAIR AORTIC VALVE N/A 07/14/2021    Procedure: Cardiopulmonary standby.;  Surgeon: Fly Smith MD;  [facility]     HYSTERECTOMY  1980    partial     SLING BLADDER SUSPENSION WITH FASCIA LINNETTE         Current Outpatient Medications   Medication     acetaminophen (TYLENOL) 500 MG tablet     albuterol (PROAIR HFA/PROVENTIL HFA/VENTOLIN HFA) 108 (90 Base) MCG/ACT inhaler     amoxicillin (AMOXIL) 500 MG capsule     atorvastatin (LIPITOR) 10 MG  tablet     Calcium Carb-Cholecalciferol (CALTRATE 600+D3 SOFT PO)     diltiazem ER (DILT-XR) 240 MG 24 hr ER beaded capsule     diphenhydrAMINE (BENADRYL) 25 MG tablet     ELIQUIS ANTICOAGULANT 5 MG tablet     Fluticasone-Umeclidin-Vilanterol (TRELEGY ELLIPTA) 200-62.5-25 MCG/ACT oral inhaler     hydrOXYzine (ATARAX) 25 MG tablet     levothyroxine (SYNTHROID/LEVOTHROID) 100 MCG tablet     losartan (COZAAR) 50 MG tablet     predniSONE (DELTASONE) 20 MG tablet     torsemide (DEMADEX) 20 MG tablet     OTHER MEDICAL SUPPLIES     spironolactone (ALDACTONE) 25 MG tablet     torsemide 40 MG TABS     No current facility-administered medications for this visit.          Allergies   Allergen Reactions     Amlodipine Besylate Cough     Norvasc     Lisinopril Cough     Ace Inhibitors Cough       Family History   Problem Relation Age of Onset     Ovarian Cancer Mother 72     Asthma Mother      Cancer Mother         ovarian     Hypertension Mother      Prostate Cancer Father      Hypertension Father      Heart Failure Father         CHF     Hypertension Sister      Asthma Brother      Hypertension Brother        Social History     Socioeconomic History     Marital status:      Spouse name: None     Number of children: None     Years of education: None     Highest education level: None   Occupational History     Employer: RETIRED     Comment: disabled   Tobacco Use     Smoking status: Former Smoker     Packs/day: 0.50     Years: 41.00     Pack years: 20.50     Types: Cigarettes     Start date: 1/1/1966     Quit date: 1/28/2007     Years since quitting: 15.4     Smokeless tobacco: Never Used   Substance and Sexual Activity     Alcohol use: No     Alcohol/week: 0.0 standard drinks     Drug use: No     Sexual activity: Never     Comment:    Other Topics Concern      Service No     Blood Transfusions Yes     Comment: Permits if needed     Caffeine Concern Yes     Comment: 2 cups coffee daily     Seat Belt Yes      Parent/sibling w/ CABG, MI or angioplasty before 65F 55M? No       ROS: 10 point ROS neg other than the symptoms noted above in the HPI.  EXAM  Constitutional: healthy, alert and no distress    Psychiatric: mentation appears normal and affect normal/bright    VASCULAR:  -Dorsalis pedis pulse +2/4 b/l  -Posterior tibial pulse +2/4 b/l  -Capillary refill time < 3 seconds to b/l hallux  -Hair growth Absent to b/l anterior legs and ankles  -Varicosities and telangiectasias to foot, bilaterally   -Moderate 2+ pitting edema to bilateral foot and ankle  NEURO:  -Epicritic and protective sensation diminished to the bilateral foot.  DERM:  -Skin temperature, texture and turgor WNL b/l  -Toenails elongated, thickened, dystrophic and discolored x 10  MSK:  -Lateral deviation of hallux with medial deviation of 1st metatarsal, LEFT   -Prominent bony prominence to dorsal and medial 1st metatarsal head, bilaterally   -Moderate decrease in arch height while patient is NWB, bilaterally     -Muscle strength of ankles +5/5 for dorsiflexion, plantarflexion, ABDUction and ADDuction b/l  -DORSIFLEXION ROM limited to 90 degrees on the bilateral ankle    ============================================================    ASSESSMENT:  (L60.3) Onychodystrophy  (primary encounter diagnosis)    (L85.3) Xerosis of skin    (M20.12) Hallux valgus (acquired), left foot    (R60.0) Bilateral lower extremity edema    (M21.41,  M21.42) Pes planus of both feet    (Z79.01) Long-term (current) use of anticoagulants [Z79.01]    (M21.861) Gastrocnemius equinus of right lower extremity    (M21.862) Gastrocnemius equinus of left lower extremity        PLAN:  -Patient evaluated and examined. Treatment options discussed with no educational barriers noted.    -Toenail debridement x 10 toenails without incident    -Foot Education provided. This included checking the feet daily looking for new new blisters or wounds, wearing shoes at all times when walking including  around the house, and avoiding lotion application between the toes. If there are any signs of infection, the patient should present to the ED as soon as possible. Infections of the foot can be life threatening or lead to amputations of the foot or leg.  ---Patient has dry skin which increases cracks and areas for potential infection on her feet. She has a bilateral gastroc equinus which increases plantar forefoot pressure. She has lower extremity edema which increases her risks of blisters.  -She is on Eliquis which increases her risks of bleeding with cuts on her feet. She understands the importance of checking her feet daily.    -Athlete's Foot  ---Improved and not itchy. Patient to continue with anti-fungal spray only if it is starting to worsen with flaking skin.    Lower extremity edema:  -Patient was started on Lasix recently. She is following up with her PCP for this. She started in early September, 2022. She does not tolerate compression socks.    -Xerosis of skin:   ---Discussed xerosis of the skin including the risks of dry, cracking skin creating ulcerations on the feet. Areas of skin breakdown can break through the skin layer and cause pain or can become infected which can then potentially lead to life threatening wounds or amputation.  ---Treatment of Xerosis can include application of lotion to the feet at least twice per day. Application of the lotion at night may be followed by the application of socks to prevent the lotion from rubbing off the foot on the floors or bedding. ---It is important to monitor and treat the feet daily to prevent the dry skin from starting to or from continuing to crack own. Patient expressed understanding and agreed with this plan.  ---Patient may try Eucerin cream, Cera Ve, Aveeno or Gold Flores amongst many other moisturizing lotions. She should use them consistently (not between the toes). If it not working well for her, then she is advised to call the clinic and a  prescription cream can be considered.    -Patient in agreement with the above treatment plan and all of patient's questions were answered.      RTC 63+ days for nail debridement        Alessandra Wilkins DPM

## 2023-03-15 DIAGNOSIS — E89.0 HYPOTHYROIDISM, POSTABLATIVE: ICD-10-CM

## 2023-03-16 NOTE — TELEPHONE ENCOUNTER
Levothyroxine       Last Written Prescription Date:  1/04/2023  Last Fill Quantity: 90,   # refills: 1  Last Office Visit: 11/30/2022  Future Office visit:    Next 5 appointments (look out 90 days)    May 09, 2023 11:00 AM  (Arrive by 10:45 AM)  Return Visit with Alessandra Wilkins DPM  [facility] ([facility] ) [address] [place] [id number] [phone]   May 30, 2023 12:30 PM  (Arrive by 12:15 PM)  Return Visit with Julisa Villegas CNP  [facility] ([facility] ) [address] [place] [id number]  [phone]

## 2023-03-17 RX ORDER — LEVOTHYROXINE SODIUM 100 UG/1
TABLET ORAL
Qty: 90 TABLET | Refills: 0 | Status: SHIPPED | OUTPATIENT
Start: 2023-03-17 | End: 2023-09-12

## 2023-04-11 NOTE — ED NOTES
Pt here via EMS from home due to bloody stools. Pt was recently discharged from the hospital. Pt states that since she developed lower abdominal pain on Wednesday and developed rectal bleeding shortly after. Pt states the bleeding is bright red in color and is small in amount. She notes diarrhea associated with the bleeding and states that she is incontinent of stool at times which is not usual for her. Pt rates abdominal pain 6/10. BLE's are noted to be extremely edematous with fluid filled blisters noted to skin on RLE.    never

## 2023-04-13 ENCOUNTER — TELEPHONE (OUTPATIENT)
Dept: CARDIOLOGY | Facility: OTHER | Age: 73
End: 2023-04-13

## 2023-04-13 NOTE — TELEPHONE ENCOUNTER
LVM for pt to reschedule 5/30/23 Bakari appt. Bakari is no longer working in the [facility] on Tuesdays.

## 2023-04-18 ENCOUNTER — ANCILLARY PROCEDURE (OUTPATIENT)
Dept: CARDIOLOGY | Facility: CLINIC | Age: 73
End: 2023-04-18
Attending: INTERNAL MEDICINE
Payer: MEDICARE

## 2023-04-18 DIAGNOSIS — I44.2 ATRIOVENTRICULAR BLOCK, COMPLETE (H): ICD-10-CM

## 2023-04-18 DIAGNOSIS — Z95.0 CARDIAC PACEMAKER IN SITU: ICD-10-CM

## 2023-04-18 PROCEDURE — 93296 REM INTERROG EVL PM/IDS: CPT

## 2023-04-18 PROCEDURE — 93294 REM INTERROG EVL PM/LDLS PM: CPT | Performed by: INTERNAL MEDICINE

## 2023-04-21 LAB
MDC_IDC_LEAD_IMPLANT_DT: NORMAL
MDC_IDC_LEAD_LOCATION: NORMAL
MDC_IDC_LEAD_LOCATION_DETAIL_1: NORMAL
MDC_IDC_LEAD_MFG: NORMAL
MDC_IDC_LEAD_MODEL: NORMAL
MDC_IDC_LEAD_POLARITY_TYPE: NORMAL
MDC_IDC_LEAD_SERIAL: NORMAL
MDC_IDC_LEAD_SPECIAL_FUNCTION: NORMAL
MDC_IDC_MSMT_BATTERY_DTM: NORMAL
MDC_IDC_MSMT_BATTERY_REMAINING_LONGEVITY: 167 MO
MDC_IDC_MSMT_BATTERY_RRT_TRIGGER: 2.62
MDC_IDC_MSMT_BATTERY_STATUS: NORMAL
MDC_IDC_MSMT_BATTERY_VOLTAGE: 3.07 V
MDC_IDC_MSMT_LEADCHNL_RV_IMPEDANCE_VALUE: 494 OHM
MDC_IDC_MSMT_LEADCHNL_RV_IMPEDANCE_VALUE: 627 OHM
MDC_IDC_MSMT_LEADCHNL_RV_PACING_THRESHOLD_AMPLITUDE: 0.5 V
MDC_IDC_MSMT_LEADCHNL_RV_PACING_THRESHOLD_PULSEWIDTH: 0.4 MS
MDC_IDC_MSMT_LEADCHNL_RV_SENSING_INTR_AMPL: 13.5 MV
MDC_IDC_PG_IMPLANT_DTM: NORMAL
MDC_IDC_PG_MFG: NORMAL
MDC_IDC_PG_MODEL: NORMAL
MDC_IDC_PG_SERIAL: NORMAL
MDC_IDC_PG_TYPE: NORMAL
MDC_IDC_SESS_CLINIC_NAME: NORMAL
MDC_IDC_SESS_DTM: NORMAL
MDC_IDC_SESS_TYPE: NORMAL
MDC_IDC_SET_BRADY_HYSTRATE: NORMAL
MDC_IDC_SET_BRADY_LOWRATE: 60 {BEATS}/MIN
MDC_IDC_SET_BRADY_MAX_SENSOR_RATE: 130 {BEATS}/MIN
MDC_IDC_SET_BRADY_MODE: NORMAL
MDC_IDC_SET_LEADCHNL_RV_PACING_AMPLITUDE: 2 V
MDC_IDC_SET_LEADCHNL_RV_PACING_ANODE_ELECTRODE_1: NORMAL
MDC_IDC_SET_LEADCHNL_RV_PACING_CAPTURE_MODE: NORMAL
MDC_IDC_SET_LEADCHNL_RV_PACING_CATHODE_ELECTRODE_1: NORMAL
MDC_IDC_SET_LEADCHNL_RV_PACING_CATHODE_LOCATION_1: NORMAL
MDC_IDC_SET_LEADCHNL_RV_PACING_POLARITY: NORMAL
MDC_IDC_SET_LEADCHNL_RV_PACING_PULSEWIDTH: 0.4 MS
MDC_IDC_SET_LEADCHNL_RV_SENSING_ANODE_ELECTRODE_1: NORMAL
MDC_IDC_SET_LEADCHNL_RV_SENSING_ANODE_LOCATION_1: NORMAL
MDC_IDC_SET_LEADCHNL_RV_SENSING_CATHODE_ELECTRODE_1: NORMAL
MDC_IDC_SET_LEADCHNL_RV_SENSING_CATHODE_LOCATION_1: NORMAL
MDC_IDC_SET_LEADCHNL_RV_SENSING_POLARITY: NORMAL
MDC_IDC_SET_LEADCHNL_RV_SENSING_SENSITIVITY: 2 MV
MDC_IDC_SET_ZONE_DETECTION_INTERVAL: 360 MS
MDC_IDC_SET_ZONE_TYPE: NORMAL
MDC_IDC_STAT_BRADY_DTM_END: NORMAL
MDC_IDC_STAT_BRADY_DTM_START: NORMAL
MDC_IDC_STAT_BRADY_RV_PERCENT_PACED: 34.98 %
MDC_IDC_STAT_EPISODE_RECENT_COUNT: 0
MDC_IDC_STAT_EPISODE_RECENT_COUNT_DTM_END: NORMAL
MDC_IDC_STAT_EPISODE_RECENT_COUNT_DTM_START: NORMAL
MDC_IDC_STAT_EPISODE_TOTAL_COUNT: 0
MDC_IDC_STAT_EPISODE_TOTAL_COUNT: 1
MDC_IDC_STAT_EPISODE_TOTAL_COUNT: 3673
MDC_IDC_STAT_EPISODE_TOTAL_COUNT_DTM_END: NORMAL
MDC_IDC_STAT_EPISODE_TOTAL_COUNT_DTM_START: NORMAL
MDC_IDC_STAT_EPISODE_TYPE: NORMAL

## 2023-05-11 ENCOUNTER — OFFICE VISIT (OUTPATIENT)
Dept: PODIATRY | Facility: OTHER | Age: 73
End: 2023-05-11
Attending: PODIATRIST
Payer: MEDICARE

## 2023-05-11 VITALS
DIASTOLIC BLOOD PRESSURE: 73 MMHG | TEMPERATURE: 97.3 F | OXYGEN SATURATION: 93 % | SYSTOLIC BLOOD PRESSURE: 116 MMHG | HEART RATE: 80 BPM

## 2023-05-11 DIAGNOSIS — Z79.01 LONG TERM CURRENT USE OF ANTICOAGULANT THERAPY: ICD-10-CM

## 2023-05-11 DIAGNOSIS — L60.3 ONYCHODYSTROPHY: Primary | ICD-10-CM

## 2023-05-11 PROCEDURE — 11721 DEBRIDE NAIL 6 OR MORE: CPT | Mod: GZ | Performed by: PODIATRIST

## 2023-05-11 PROCEDURE — G0463 HOSPITAL OUTPT CLINIC VISIT: HCPCS | Mod: 25

## 2023-05-11 ASSESSMENT — PAIN SCALES - GENERAL: PAINLEVEL: NO PAIN (0)

## 2023-05-11 NOTE — PROGRESS NOTES
Chief complaint: Patient presents with:  Toenail: Trimming      History of Present Illness: This 72 year old female is seen for follow-up management of elongated, thickened toenails and Athletes' foot.    She says she gets swelling in her ankles. She still gets a lot of swelling in her ankles. She tried compression socks in the past but they caused pain in the ankles and she could not get them on her legs. She says the Lasix helps unless she is on her feet a lot.    She is on Eliquis for a-fib.     She gets burning, tingling, and numbness in her feet although it has continued to be more controlled.    No further pedal complaints today.       /73 (BP Location: Left arm, Patient Position: Sitting, Cuff Size: Adult Regular)   Pulse 80   Temp 97.3  F (36.3  C) (Tympanic)   SpO2 93%     Patient Active Problem List   Diagnosis     Permanent atrial fibrillation (H)     Pulmonary emphysema (H)     Bicuspid aortic valve     Mild major depression (H)     Acute bronchitis     Hypothyroidism, postablative     Advance care planning     Essential hypertension     Long-term (current) use of anticoagulants [Z79.01]     Acute on chronic respiratory failure (H)     Health Care Home     Status post coronary angiogram     Coronary artery disease involving native coronary artery of native heart without angina pectoris     Acute cystitis without hematuria     Small bowel obstruction (H)     Acute renal failure (H)     Hypokalemia     Infection due to 2019 novel coronavirus     Aortic aneurysm (H)     Aortic valve disorder     Mitral valve disorder     Cardiomegaly     Carotid stenosis     Depressive disorder     Edema     COPD (chronic obstructive pulmonary disease) (H)     Other ill-defined heart diseases     Aortic valve stenosis, etiology of cardiac valve disease unspecified     Aortic stenosis, severe     Status post transcatheter aortic valve replacement (TAVR) using bioprosthesis     Postoperative complete heart block (H)      Cardiac pacemaker in situ       Past Surgical History:   Procedure Laterality Date     BACK SURGERY  2006     CARDIAC SURGERY  06/12/2018    Angiogram at [facility]     COLONOSCOPY N/A 01/19/2016    Procedure: COLONOSCOPY;  Surgeon: Waldemar Bob MD;  Location: [facility]     CV CORONARY ANGIOGRAM N/A 04/30/2021    Procedure: CV CORONARY ANGIOGRAM;  Surgeon: Poli Walsh MD;  [facility]     CV LEFT HEART CATH N/A 04/30/2021    Procedure: CV LEFT HEART CATH;  Surgeon: Poli Walsh MD;  [facility]     CV RIGHT HEART CATH MEASUREMENTS RECORDED N/A 04/30/2021    Procedure: CV RIGHT HEART CATH;  Surgeon: Poli Walsh MD;  [facility]     CV TRANSCATHETER AORTIC VALVE REPLACEMENT N/A 07/14/2021    Procedure: Right femoral Transcaval transcatheter aortic valve replacement (SUMMERS) size 29mm.  Transesophageal echocardiogram per anesthesia;  Surgeon: Domo Sarah MD;  Location: [facility]     EP PPM INSERT OF NEW OR REPL W/VENT LEAD N/A 07/14/2021    Procedure: insertion of Permanent Pacemaker;  Surgeon: Eliezer Myers MD;  Location: [facility]     HEART CATH FEMORAL CANNULIZATION WITH OPEN STANDBY REPAIR AORTIC VALVE N/A 07/14/2021    Procedure: Cardiopulmonary standby.;  Surgeon: Fly Smith MD;  Location: [facility]     HYSTERECTOMY  1980    partial     SLING BLADDER SUSPENSION WITH FASCIA LINNETTE         Current Outpatient Medications   Medication     acetaminophen (TYLENOL) 500 MG tablet     albuterol (PROAIR HFA/PROVENTIL HFA/VENTOLIN HFA) 108 (90 Base) MCG/ACT inhaler     amoxicillin (AMOXIL) 500 MG capsule     atorvastatin (LIPITOR) 10 MG tablet     Calcium Carb-Cholecalciferol (CALTRATE 600+D3 SOFT PO)     diltiazem ER (DILT-XR) 240 MG 24 hr ER beaded capsule     diphenhydrAMINE (BENADRYL) 25 MG tablet     ELIQUIS ANTICOAGULANT 5 MG tablet     Fluticasone-Umeclidin-Vilanterol (TRELEGY  ELLIPTA) 200-62.5-25 MCG/ACT oral inhaler     hydrOXYzine (ATARAX) 25 MG tablet     levothyroxine (SYNTHROID/LEVOTHROID) 100 MCG tablet     losartan (COZAAR) 50 MG tablet     OTHER MEDICAL SUPPLIES     predniSONE (DELTASONE) 20 MG tablet     torsemide (DEMADEX) 20 MG tablet     spironolactone (ALDACTONE) 25 MG tablet     torsemide 40 MG TABS     No current facility-administered medications for this visit.          Allergies   Allergen Reactions     Amlodipine Besylate Cough     Norvasc     Lisinopril Cough     Ace Inhibitors Cough       Family History   Problem Relation Age of Onset     Ovarian Cancer Mother 72     Asthma Mother      Cancer Mother         ovarian     Hypertension Mother      Prostate Cancer Father      Hypertension Father      Heart Failure Father         CHF     Hypertension Sister      Asthma Brother      Hypertension Brother        Social History     Socioeconomic History     Marital status:      Spouse name: None     Number of children: None     Years of education: None     Highest education level: None   Occupational History     Employer: RETIRED     Comment: disabled   Tobacco Use     Smoking status: Former Smoker     Packs/day: 0.50     Years: 41.00     Pack years: 20.50     Types: Cigarettes     Start date: 1/1/1966     Quit date: 1/28/2007     Years since quitting: 15.4     Smokeless tobacco: Never Used   Substance and Sexual Activity     Alcohol use: No     Alcohol/week: 0.0 standard drinks     Drug use: No     Sexual activity: Never     Comment:    Other Topics Concern      Service No     Blood Transfusions Yes     Comment: Permits if needed     Caffeine Concern Yes     Comment: 2 cups coffee daily     Seat Belt Yes     Parent/sibling w/ CABG, MI or angioplasty before 65F 55M? No       ROS: 10 point ROS neg other than the symptoms noted above in the HPI.  EXAM  Constitutional: healthy, alert and no distress    Psychiatric: mentation appears normal and affect  normal/bright    VASCULAR:  -Dorsalis pedis pulse +2/4 b/l  -Posterior tibial pulse +2/4 b/l  -Capillary refill time < 3 seconds to b/l hallux  -Hair growth Absent to b/l anterior legs and ankles  -Varicosities and telangiectasias to foot, bilaterally   -Moderate 2+ pitting edema to bilateral foot and ankle  NEURO:  -Epicritic and protective sensation diminished to the bilateral foot.  DERM:  -Skin temperature, texture and turgor WNL b/l  -Toenails elongated, thickened, dystrophic and discolored x 10  MSK:  -Lateral deviation of hallux with medial deviation of 1st metatarsal, LEFT   -Prominent bony prominence to dorsal and medial 1st metatarsal head, bilaterally   -Moderate decrease in arch height while patient is NWB, bilaterally     -Muscle strength of ankles +5/5 for dorsiflexion, plantarflexion, ABDUction and ADDuction b/l  -DORSIFLEXION ROM limited to 90 degrees on the bilateral ankle    ============================================================    ASSESSMENT:  (L60.3) Onychodystrophy  (primary encounter diagnosis)    (M21.41,  M21.42) Pes planus of both feet    (Z79.01) Long-term (current) use of anticoagulants [Z79.01]        PLAN:  -Patient evaluated and examined. Treatment options discussed with no educational barriers noted.    -Toenail debridement x 10 toenails without incident    -Foot Education provided. This included checking the feet daily looking for new new blisters or wounds, wearing shoes at all times when walking including around the house, and avoiding lotion application between the toes. If there are any signs of infection, the patient should present to the ED as soon as possible. Infections of the foot can be life threatening or lead to amputations of the foot or leg.  ---Patient has dry skin which increases cracks and areas for potential infection on her feet. She has a bilateral gastroc equinus which increases plantar forefoot pressure. She has lower extremity edema which increases her  risks of blisters.  -She is on Eliquis which increases her risks of bleeding with cuts on her feet. She understands the importance of checking her feet daily.    -Patient in agreement with the above treatment plan and all of patient's questions were answered.      RTC 63+ days for nail debridement        Alessandra Wilkins DPM

## 2023-05-31 ENCOUNTER — OFFICE VISIT (OUTPATIENT)
Dept: CARDIOLOGY | Facility: OTHER | Age: 73
End: 2023-05-31
Attending: NURSE PRACTITIONER
Payer: COMMERCIAL

## 2023-05-31 VITALS
WEIGHT: 151 LBS | SYSTOLIC BLOOD PRESSURE: 104 MMHG | OXYGEN SATURATION: 95 % | HEIGHT: 64 IN | RESPIRATION RATE: 20 BRPM | BODY MASS INDEX: 25.78 KG/M2 | DIASTOLIC BLOOD PRESSURE: 62 MMHG | HEART RATE: 101 BPM

## 2023-05-31 DIAGNOSIS — I50.32 CHRONIC DIASTOLIC HEART FAILURE (H): Primary | ICD-10-CM

## 2023-05-31 LAB
ANION GAP SERPL CALCULATED.3IONS-SCNC: 15 MMOL/L (ref 7–15)
BUN SERPL-MCNC: 26.9 MG/DL (ref 8–23)
CALCIUM SERPL-MCNC: 10.5 MG/DL (ref 8.8–10.2)
CHLORIDE SERPL-SCNC: 95 MMOL/L (ref 98–107)
CREAT SERPL-MCNC: 1.74 MG/DL (ref 0.51–0.95)
DEPRECATED HCO3 PLAS-SCNC: 24 MMOL/L (ref 22–29)
GFR SERPL CREATININE-BSD FRML MDRD: 31 ML/MIN/1.73M2
GLUCOSE SERPL-MCNC: 105 MG/DL (ref 70–99)
POTASSIUM SERPL-SCNC: 4.7 MMOL/L (ref 3.4–5.3)
SODIUM SERPL-SCNC: 134 MMOL/L (ref 136–145)

## 2023-05-31 PROCEDURE — 93010 ELECTROCARDIOGRAM REPORT: CPT | Mod: 77 | Performed by: INTERNAL MEDICINE

## 2023-05-31 PROCEDURE — 93005 ELECTROCARDIOGRAM TRACING: CPT | Performed by: NURSE PRACTITIONER

## 2023-05-31 PROCEDURE — 36415 COLL VENOUS BLD VENIPUNCTURE: CPT | Mod: ZL | Performed by: NURSE PRACTITIONER

## 2023-05-31 PROCEDURE — G0463 HOSPITAL OUTPT CLINIC VISIT: HCPCS

## 2023-05-31 PROCEDURE — 82310 ASSAY OF CALCIUM: CPT | Mod: ZL | Performed by: NURSE PRACTITIONER

## 2023-05-31 PROCEDURE — 99214 OFFICE O/P EST MOD 30 MIN: CPT | Performed by: NURSE PRACTITIONER

## 2023-05-31 ASSESSMENT — PAIN SCALES - GENERAL: PAINLEVEL: NO PAIN (0)

## 2023-05-31 NOTE — PROGRESS NOTES
[facility] HEART CARE   CARDIOLOGY PROGRESS NOTE     Chief Complaint   Patient presents with     Follow Up     Heart Failure          Diagnosis:    ICD-10-CM    1. Chronic diastolic heart failure (H)  I50.32 Basic metabolic panel     EKG 12-lead complete w/read - (Clinic Performed)     Basic metabolic panel            Assessment/Plan:    1. Permanent atrial fibrillation    She is largely asymptomatic at this time.      Reviewed recent device check with no episodes of atrial fibrillation    Continue diltiazem  mg once daily      2. [id number] >= 2.      She is appropriately anticoagulated on Eliquis 5 mg twice daily for stroke prophylaxis with atrial fibrillation    Denies any bleeding concerns at this time.      3. History of complete heart block s/p pacemaker placement    Reviewed recent device interrogation     She will continue to follow with the device clinic.      4. Hyperlipidemia with LDL goal less than 100, controlled    Continue atorvastatin 10 mg once daily.  Recent Labs   Lab Test 06/23/22  1342 06/11/21  1011 12/12/17  0856 08/25/15  0850 08/27/14  1139   CHOL 168 201*   < > 207* 210*   HDL 90 109   < > 108 92   LDL 65 78   < > 88 99   TRIG 67 71   < > 56 93   CHOLHDLRATIO  --   --   --  1.9 2.3    < > = values in this interval not displayed.       5. Essential hypertension, controlled  1. Decreased losartan 50 mg twice daily to losartan 50 mg once daily with symptomatic low blood pressures at home. Reviewed home blood pressures form home with systolic readings  and diastolic readings 50-74. Plan to stop losartan 50 mg once daily and have her continue to monitor home blood pressure readings.   2. Continue diltiazem  mg once daily  3. Decrease spironolactone 25 mg once daily for HFpEF to 12.5 mg once daily with acute on chronic renal injury.  4. History of adverse reaction of cough to ACEi.  BP Readings from Last 6 Encounters:   05/31/23 104/62   05/11/23 116/73   03/02/23 111/73    12/20/22 126/80   11/30/22 118/64   11/30/22 123/69       6. COPD  7. pulmonary hypertension  8. dyspnea    Dyspnea likely multifactorial with history of smoking/severe COPD on recent PFTs as well as diastolic dysfunction.      Changes to torsemide today- increasing    She does monitor salt intake.      She does follow fluid restrictions.     She should continue management of COPD with primary care provider- discuss switching maintenance inhalers to something like Trelegy with PCP.      9. HFpEF (Echo showed diastolic dysfunction grade I back on echo in 2018, mildly elevated left-sided filling pressures on cardiac cath in 2021, LVH. Symptoms of LE edema, dyspnea)      BP: controlled     Fluid status: Hypervolemic with increased bilateral LE edema and weight gain.  Increase torsemide 40 mg once daily to 60 mg once daily    Aldosterone antagonist: Decrease spironolactone 25 mg once daily to 12.5 mg once daily    SGLT-2: Could be considered at follow-up    Ischemia evaluation: Completed in 2021- cardiac catheterization showed no CAD    ACEi/ARB/ARNI: n/a, no evidence for use in HFpEF. History of cough with ACEi    BB: n/a, no evidence for use in HFpEF     SCD prophylaxis: n/a, no evidence for use in HFpEF    NSAID use: Contraindicated    Sleep apnea evaluation: Refused      She has been drinking fluid- four 16 ounces of water  She has had decreased urination- 3 to 4 times per day- in the last few months.    10. Severe aortic stenosis status post TAVR in July 2021: Echocardiogram 1 year postop shows normal function of valve.  Also shows normal heart functioning.    She was instructed to stop aspirin by valve clinic.    She will continue with Eliquis 5 mg twice daily.     Should she need to stop anticoagulation for any reason in the future she should re-start aspirin 81 mg once daily.     She will need dental prophylaxis with amoxicillin 1 g prior to dental work.    Follow-up with cardiology in 7-10 days with BMP  prior, certainly sooner with acute concerns.         Interval history:  Mrs. Cameron presents today for cardiology follow-up. At prior visit we stopped her furosemide 60 mg in the Am and 40 mg in the afternoon. We started her on torsemide 40 mg once daily.     Recall she is s/p TAVR for severe aortic stenosis in July 2021.  She had been on Eliquis 5 mg twice daily for atrial fibrillation as well as aspirin 81 mg once daily.  She did follow-up with TAVR clinic with recent echocardiogram showing that the valve is functioning well.  Echocardiogram also showed mild concentric wall thickening consistent with left ventricular hypertrophy, normal left ventricular function with LVEF of 55 to 60%.  She was instructed that she could stop use of daily aspirin 81 mg.  She had cardiac catheterization April 30, 2021 as part of TAVR work-up.  No coronary artery disease.    History of complete heart block status post pacemaker placement, permanent atrial fibrillation.  She denies any recent palpitations, fluttering, skipping sensation in chest.  She denies any lightheaded, dizziness, near-syncope or syncopal episodes.  She continues to work.  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  She enjoys being able to be physically active. At prior visit we reviewed device check which showed several episodes of atrial fibrillation with RVR. She has been tolerating this without side effects.       Today, Ms. Cameron is overall feeling well. She continues with bilateral LE edema which has been unchanged since prior visit. Her home weights previously were in the mid-140s but over the winter she found herself eating often when bored and her weight is now consistently in the 150-155 range at home. She has decreased her snacking while bored and hoping to lose weight. She has not had any chest pain or pressure. Dyspnea on exertion has been stable. No palpitations/racing/skipping/fluttering sensation in her chest.        Social history: Lives alone in [facility] apartments and [facility] building 2 days per week- she enjoys being able to stay active and doing this. She is not limited by LE edema, dyspnea, chest discomfort.     Smoking history: Started smoking at age 16 years old.  She quit smoking in 2007.  Endorse that she probably smoked 0.25 packs/day.    Sleep history: she does have a hospital bed at home that she elevates at least 30 degrees due to orthopnea.  She sometimes uses home oxygen but this is rare.  Denies PND.  She has had some increased lower extremity edema recently.  She also endorses weight gain of 4 to 5 pounds.  She does endorse compliance with furosemide 60 mg in the morning and 40 mg in the afternoon.      HPI:    #1 permanent atrial fibrillation, Patient has had chronic permanent atrial fibrillation for several years, she was previously on warfarin therapy she is currently on Eliquis 5 mg twice a day, additionally she is on aspirin 81 mg a day since her TAVR procedure.  She is questioning why she is on both as was myself.  She is nearly 1 year post TAVR aortic valve replacement procedure.  Patient has noted increased bruisability.  She is in contact with the [facility] team that performed the TAVR procedure and is scheduled for an echo follow-up.  I told her to relate a question to the nurse coordinator to check with the performing interventionalist ,whether she still needs to be on the aspirin 81 mg.  Patient denies associated palpitations her rate seems to be well controlled.    #2: Status post TAVR  4 critical aortic stenosis, procedure was performed in July 2021.  I personally reviewed all of the documentation and diagnostic data leading up to the implantation.  The aortic valve area was in the range of 0.8 cm and the patient was becoming more symptomatic and her LV function was somewhat compromised.  Patient stated that she is actually feeling better, is less short of breath and  having less issues with her COPD.  She is still troubled by leg edema.      #3:  Systemic anticoagulation with Eliquis, patient also on aspirin low-dose. The patient has noted increased bruising, as noted above is questioning why she needs to be on the aspirin she will check with the team at the [facility] whether she needs to continue the aspirin.    #4: Labile  Hypertension with likely underlying degree of Hypertensive Heart Disease, patient is on multiple medications including clonidine 0.2 mg at bedtime, Tiazac 360 mg long-acting diltiazem once a day, losartan 50 mg twice a day.  Additionally she is on Lasix and potassium for the edema.    #5: Edema, as noted below, still problematic.  Patient unable to wear compression stockings due to compromise of circulation.    #6: Coronary artery disease by history from the chart, careful review of the coronary artery angiogram reveals description of clean coronary arteries.  The patient does have diffuse peripheral vascular disease including carotid aortic and pelvic atherosclerotic sclerotic PVD.    #7: Carotid artery disease, noncritical asymptomatic last checkup well over a year ago.    #8: Peripheral vascular disease, including aortic and pelvic atherosclerotic disease as described by pre-TAVR TVA work-up asymptomatic    #9: Hyperlipidemia patient is on Lipitor last checked June 2021 total cholesterol 201 she did have an elevated HDL of 109 which was good LDL was therapeutic at 78 triglycerides 71 patient needs recheck.    #10: COPD, as per PFTs from 2018 was moderately severe the patient is feeling better clinically she is actually reduced her Combivent inhaler from 4 times a day to more like twice a day.  When I brought up the issue of repeating the study she was actually interested for her own knowledge to see if its improved.  It may well have improved with the release of the likely elevated pressures in the LV from the aortic stenosis.  We will  recheck PFTs    #11: Status post permanent pacemaker for complete heart block following TAVR procedure clinically stable post permanent pacemaker with normal function.        RELEVANT TESTING:  PFTs 8/12/2022:  The FVC, FEV1, FEV1-FVC ratio and FEF 25-75% are reduced indicating airway obstruction.  The slow vital capacity is reduced.  Following administration of bronchodilators, there is no significant response.  Pulmonary function diagnosis: Severe obstructive airway disease.    CT angiogram of the chest: From April 30, 2021 was reviewed and is available in the chart giving the measurements regarding the TAVR and also documenting a peripheral vascular disease  2D echo echo: Performed in January 21 revealed ejection fraction of 55 to 60% no definitive evidence of left ventricular wall thickness being abnormal left ventricular size was described as normal there was severe biatrial enlargement calculated valve area was 0.7 cm  by telemetry 0.82 square V-max across the valve was 4.1 in the high area compatible with severe to critical aortic stenosis.  [facility] valve replacement team has contacted the patient and a follow-up echo is pending    Carotid ultrasound and Doppler: Previously performed more than a year and a half ago revealed bilateral carotid artery stenosis of less than 50% the velocities were not elevated the patient is on antihyperlipidemics dated the study was April 2021..    Coronary angiogram:   Conclusion    Right sided filling pressures are normal.    Moderately elevated pulmonary artery hypertension.    Left sided filling pressures are mildly elevated.    Normal cardiac output level.    Hemodynamic data has been modified in Epic per physician review.    Normal coronary arteries.          Past Medical History:   Diagnosis Date     Asthma      Atrial fibrillation (H)      COPD (chronic obstructive pulmonary disease) (H)      Depression      High cholesterol      HTN (hypertension)       Oxygen dependent      Thyroid disease        Past Surgical History:   Procedure Laterality Date     BACK SURGERY  2006     CARDIAC SURGERY  06/12/2018    Angiogram at [facility]     COLONOSCOPY N/A 01/19/2016    Procedure: COLONOSCOPY;  Surgeon: Waldemar Bob MD;  Location: [facility] OR     CV CORONARY ANGIOGRAM N/A 04/30/2021    Procedure: CV CORONARY ANGIOGRAM;  Surgeon: Poli Walsh MD;  Location:  HEART CARDIAC CATH LAB     CV LEFT HEART CATH N/A 04/30/2021    Procedure: CV LEFT HEART CATH;  Surgeon: Poli Walsh MD;  Location:  HEART CARDIAC CATH LAB     CV RIGHT HEART CATH MEASUREMENTS RECORDED N/A 04/30/2021    Procedure: CV RIGHT HEART CATH;  Surgeon: Poli Walsh MD;  Location:  HEART CARDIAC CATH LAB     CV TRANSCATHETER AORTIC VALVE REPLACEMENT N/A 07/14/2021    Procedure: Right femoral Transcaval transcatheter aortic valve replacement (SUMMERS) size 29mm.  Transesophageal echocardiogram per anesthesia;  Surgeon: Domo Sarah MD;  Location: [facility] OR     EP PPM INSERT OF NEW OR REPL W/VENT LEAD N/A 07/14/2021    Procedure: insertion of Permanent Pacemaker;  Surgeon: Eliezer Myers MD;  Location: [facility] OR     HEART CATH FEMORAL CANNULIZATION WITH OPEN STANDBY REPAIR AORTIC VALVE N/A 07/14/2021    Procedure: Cardiopulmonary standby.;  Surgeon: Fly Smith MD;  Location: [facility] OR     HYSTERECTOMY  1980    partial     SLING BLADDER SUSPENSION WITH FASCIA LINNETTE         Allergies   Allergen Reactions     Amlodipine Besylate Cough     Norvasc     Lisinopril Cough     Ace Inhibitors Cough       Current Outpatient Medications   Medication Sig Dispense Refill     acetaminophen (TYLENOL) 500 MG tablet Take 500-1,000 mg by mouth every 6 hours as needed for mild pain       albuterol (PROAIR HFA/PROVENTIL HFA/VENTOLIN HFA) 108 (90 Base) MCG/ACT inhaler INHALE 2 PUFFS BY MOUTH EVERY 6 HOURS 18 g 3     amoxicillin (AMOXIL) 500 MG capsule Take 4 capsules (2,000  mg) by mouth once as needed (SBE prophylaxis take 30-60 minutes prior to dental procedure/cleaning) 4 capsule 3     atorvastatin (LIPITOR) 10 MG tablet Take 1 tablet by mouth once daily 90 tablet 3     Calcium Carb-Cholecalciferol (CALTRATE 600+D3 SOFT PO) Take 600 mg by mouth 2 times daily       diltiazem ER (DILT-XR) 240 MG 24 hr ER beaded capsule Take 2 capsules (480 mg) by mouth daily 180 capsule 3     diphenhydrAMINE (BENADRYL) 25 MG tablet Take 1-2 tablets (25-50 mg) by mouth every 6 hours as needed for itching or allergies 60 tablet 1     ELIQUIS ANTICOAGULANT 5 MG tablet TAKE 1 TABLET BY MOUTH TWICE DAILY 180 tablet 1     Fluticasone-Umeclidin-Vilanterol (TRELEGY ELLIPTA) 200-62.5-25 MCG/ACT oral inhaler Inhale 1 puff into the lungs daily 60 each 11     hydrOXYzine (ATARAX) 25 MG tablet Take 1 tablet (25 mg) by mouth 3 times daily as needed for itching 60 tablet 0     levothyroxine (SYNTHROID/LEVOTHROID) 100 MCG tablet Take 1 tablet by mouth once daily 90 tablet 0     OTHER MEDICAL SUPPLIES Apply one pair to help with edema 1 each 0     predniSONE (DELTASONE) 20 MG tablet Take 3 tabs by mouth daily x 3 days, then 2 tabs daily x 3 days, then 1 tab daily x 3 days, then 1/2 tab daily x 3 days. 20 tablet 0     torsemide (DEMADEX) 20 MG tablet TAKE 2 TABLETS BY MOUTH ONCE DAILY 180 tablet 1     spironolactone (ALDACTONE) 25 MG tablet Take 1 tablet (25 mg) by mouth daily for 90 days 90 tablet 3       Social History     Socioeconomic History     Marital status:      Spouse name: Not on file     Number of children: Not on file     Years of education: Not on file     Highest education level: Not on file   Occupational History     Employer: RETIRED     Comment: disabled   Tobacco Use     Smoking status: Former     Packs/day: 0.50     Years: 41.00     Pack years: 20.50     Types: Cigarettes     Start date: 1966     Quit date: 2007     Years since quittin.3     Smokeless tobacco: Never   Vaping Use      Vaping status: Not on file   Substance and Sexual Activity     Alcohol use: No     Alcohol/week: 0.0 standard drinks of alcohol     Drug use: No     Sexual activity: Never     Comment:    Other Topics Concern      Service No     Blood Transfusions Yes     Comment: Permits if needed     Caffeine Concern Yes     Comment: 2 cups coffee daily     Occupational Exposure Not Asked     Hobby Hazards Not Asked     Sleep Concern Not Asked     Stress Concern Not Asked     Weight Concern Not Asked     Special Diet Not Asked     Back Care Not Asked     Exercise Not Asked     Bike Helmet Not Asked     Seat Belt Yes     Self-Exams Not Asked     Parent/sibling w/ CABG, MI or angioplasty before 65F 55M? No   Social History Narrative     Not on file     Social Determinants of Health     Financial Resource Strain: Not on file   Food Insecurity: Not on file   Transportation Needs: Not on file   Physical Activity: Not on file   Stress: Not on file   Social Connections: Not on file   Intimate Partner Violence: Not on file   Housing Stability: Not on file       LAB RESULTS:   Ancillary Procedure on 07/13/2022   Component Date Value Ref Range Status     Date Time Interrogation Session 07/13/2022 [id number]   Final     Implantable Pulse Generator Manufa* 07/13/2022 Medtronic   Final     Implantable Pulse Generator Model 07/13/2022 [id number] Betsy XT SR MRI   Final     Implantable Pulse Generator Serial* 07/13/2022 [id number]   Final     Type Interrogation Session 07/13/2022 In Clinic   Final     Clinic Name 07/13/2022 [facility] - [facility]   Final     Implantable Pulse Generator Type 07/13/2022 Pacemaker   Final     Implantable Pulse Generator Implan* 07/13/2022 [id number]   Final     Implantable Lead  07/13/2022 Medtronic   Final     Implantable Lead Model 07/13/2022 3830 SelectSecure MRI SureScan   Final     Implantable Lead Serial Number 07/13/2022 [id number]   Final     Implantable Lead Implant Date  07/13/2022 [id number]   Final     Implantable Lead Polarity Type 07/13/2022 Bipolar Lead   Final     Implantable Lead Location Detail 1 07/13/2022 UNKNOWN   Final     Implantable Lead Special Function 07/13/2022 69 cm   Final     Implantable Lead Location 07/13/2022 Right Ventricle   Final     Chaz Setting Mode (NBG Code) 07/13/2022 VVIR   Final     Chaz Setting Lower Rate Limit 07/13/2022 60  [beats]/min Final     Chaz Setting Maximum Sensor Rate 07/13/2022 130  [beats]/min Final     Chaz Setting Hysterisis Rate 07/13/2022 DISABLED   Final     Lead Channel Setting Sensing Polar* 07/13/2022 Bipolar   Final     Lead Channel Setting Sensing Anode* 07/13/2022 Right Ventricle   Final     Lead Channel Setting Sensing Anode* 07/13/2022 Ring   Final     Lead Channel Setting Sensing Catho* 07/13/2022 Right Ventricle   Final     Lead Channel Setting Sensing Catho* 07/13/2022 Tip   Final     Lead Channel Setting Sensing Sensi* 07/13/2022 2  mV Final     Lead Channel Setting Pacing Polari* 07/13/2022 Unipolar   Final     Lead Channel Setting Pacing Anode * 07/13/2022 Can   Final     Lead Channel Setting Sensing Catho* 07/13/2022 Right Ventricle   Final     Lead Channel Setting Sensing Catho* 07/13/2022 Tip   Final     Lead Channel Setting Pacing Pulse * 07/13/2022 0.4  ms Final     Lead Channel Setting Pacing Amplit* 07/13/2022 2  V Final     Lead Channel Setting Pacing Captur* 07/13/2022 Adaptive   Final     Zone Setting Type Category 07/13/2022 VF   Final     Zone Setting Type Category 07/13/2022 VT   Final     Zone Setting Type Category 07/13/2022 VT   Final     Zone Setting Type Category 07/13/2022 VT   Final     Zone Setting Detection Interval 07/13/2022 360  ms Final     Zone Setting Type Category 07/13/2022 ATRIAL_FIBRILLATION   Final     Zone Setting Type Category 07/13/2022 AT/AF   Final     Lead Channel Impedance Value 07/13/2022 589  ohm Final     Lead Channel Impedance Value 07/13/2022 456  ohm Final     Lead  Channel Sensing Intrinsic Amp* 07/13/2022 13.4  mV Final     Lead Channel Pacing Threshold Ampl* 07/13/2022 0.5  V Final     Lead Channel Pacing Threshold Puls* 07/13/2022 0.4  ms Final     Battery Date Time of Measurements 07/13/2022 20220713111502   Final     Battery Status 07/13/2022 OK   Final     Battery RRT Trigger 07/13/2022 2.625   Final     Battery Remaining Longevity 07/13/2022 179  mo Final     Battery Voltage 07/13/2022 3.18  V Final     Chaz Statistic Date Time Start 07/13/2022 [id number]   Final     Chaz Statistic Date Time End 07/13/2022 [id number]   Final     Chaz Statistic RV Percent Paced 07/13/2022 12.32  % Final     Episode Statistic Recent Count 07/13/2022 3,365   Final     Episode Statistic Type Category 07/13/2022 VT   Final     Episode Statistic Recent Count 07/13/2022 1   Final     Episode Statistic Type Category 07/13/2022 VT   Final     Episode Statistic Recent Date Time* 07/13/2022 [id number]   Final     Episode Statistic Recent Date Time* 07/13/2022 14788217848099   Final     Episode Statistic Recent Date Time* 07/13/2022 20211012144754   Final     Episode Statistic Recent Date Time* 07/13/2022 20220713135652   Final     Episode Statistic Total Count 07/13/2022 3,487   Final     Episode Statistic Type Category 07/13/2022 VT   Final     Episode Statistic Total Count 07/13/2022 1   Final     Episode Statistic Type Category 07/13/2022 VT   Final     Episode Statistic Total Date Time * 07/13/2022 20210714140321   Final     Episode Statistic Total Date Time * 07/13/2022 11793717221916   Final     Episode Statistic Total Date Time * 07/13/2022 20210714140321   Final     Episode Statistic Total Date Time * 07/13/2022 91110020537707   Final     Episode Identifier 07/13/2022 3,488   Final     Episode Type Category 07/13/2022 VT   Final     Episode Date Time 07/13/2022 20220710100614   Final     Episode Duration 07/13/2022 0  s Final     Episode Identifier 07/13/2022 3,487   Final      Episode Type Category 07/13/2022 VT   Final     Episode Date Time 07/13/2022 [id number]   Final     Episode Duration 07/13/2022 0  s Final     Episode Identifier 07/13/2022 3,486   Final     Episode Type Category 07/13/2022 VT   Final     Episode Date Time 07/13/2022 20220626095721   Final     Episode Duration 07/13/2022 0  s Final     Episode Identifier 07/13/2022 3,485   Final     Episode Type Category 07/13/2022 VT   Final     Episode Date Time 07/13/2022 20220626095702   Final     Episode Duration 07/13/2022 1  s Final     Episode Identifier 07/13/2022 3,484   Final     Episode Type Category 07/13/2022 VT   Final     Episode Date Time 07/13/2022 20220626095349   Final     Episode Duration 07/13/2022 1  s Final     Episode Identifier 07/13/2022 3,483   Final     Episode Type Category 07/13/2022 VT   Final     Episode Date Time 07/13/2022 [id number]   Final     Episode Duration 07/13/2022 1  s Final     Episode Identifier 07/13/2022 3,482   Final     Episode Type Category 07/13/2022 VT   Final     Episode Date Time 07/13/2022 [id number]   Final     Episode Duration 07/13/2022 1  s Final     Episode Identifier 07/13/2022 3,481   Final     Episode Type Category 07/13/2022 VT   Final     Episode Date Time 07/13/2022 20220625100819   Final     Episode Duration 07/13/2022 1  s Final     Episode Identifier 07/13/2022 3,480   Final     Episode Type Category 07/13/2022 VT   Final     Episode Date Time 07/13/2022 20220620093907   Final     Episode Duration 07/13/2022 0  s Final     Episode Identifier 07/13/2022 3,479   Final     Episode Type Category 07/13/2022 VT   Final     Episode Date Time 07/13/2022 20220620093818   Final     Episode Duration 07/13/2022 1  s Final     Episode Identifier 07/13/2022 3,478   Final     Episode Type Category 07/13/2022 VT   Final     Episode Date Time 07/13/2022 20220620093432   Final     Episode Duration 07/13/2022 0  s Final     Episode Identifier 07/13/2022 3,477   Final  "    Episode Type Category 07/13/2022 VT   Final     Episode Date Time 07/13/2022 20220620092750   Final     Episode Duration 07/13/2022 1  s Final     Episode Identifier 07/13/2022 3,476   Final     Episode Type Category 07/13/2022 VT   Final     Episode Date Time 07/13/2022 20220619112609   Final     Episode Duration 07/13/2022 0  s Final     Episode Identifier 07/13/2022 3,475   Final     Episode Type Category 07/13/2022 VT   Final     Episode Date Time 07/13/2022 20220619111915   Final     Episode Duration 07/13/2022 1  s Final     Episode Identifier 07/13/2022 3,474   Final     Episode Type Category 07/13/2022 VT   Final     Episode Date Time 07/13/2022 [id number]   Final     Episode Duration 07/13/2022 1  s Final        Review of systems: Negative except that which was noted in the HPI.    Physical examination:  /62 (BP Location: Right arm, Patient Position: Sitting, Cuff Size: Adult Regular)   Pulse 101   Resp 20   Ht 1.613 m (5' 3.5\")   Wt 68.5 kg (151 lb)   SpO2 95%   BMI 26.33 kg/m      GENERAL APPEARANCE: healthy, alert and no distress  HEENT: no icterus, no xanthelasmas, normal pupil size and reaction, no cyanosis.  NECK: no adenopathy, no asymmetry, masses.  CHEST: lungs clear to auscultation - no rales, rhonchi or wheezes, no use of accessory muscles, no retractions, respirations are unlabored, normal respiratory rate  CARDIOVASCULAR: Irregularly irregular rhythm, tachycardic rate. normal S1 with physiologic split S2, no S3 or S4 and no murmur, click or rub  EXTREMITIES: +1 Bilateral lower extremity edema. no clubbing, cyanosis.  NEURO: alert and oriented normal speech, and affect  VASC: No vascular bruits heard.  SKIN: no ecchymoses, no rashes        Thank you for allowing me to participate in the care of your patient. Please do not hesitate to contact me if you have any questions.       Julisa Villegas, CNP    "

## 2023-05-31 NOTE — PATIENT INSTRUCTIONS
Thank you for allowing Julisa Villegas CNP and our  team to participate in your care. Please call our office at [phone] with scheduling questions or if you need to cancel or change your appointment. With any other questions or concerns you may call cardiology nurse at [phone] or [phone].       If you experience chest pain, chest pressure, chest tightness, shortness of breath, fainting, lightheadedness, nausea, vomiting, or other concerning symptoms, please report to the Emergency Department or call 911. These symptoms may be emergent, and best treated in the Emergency Department.      Stop losartan    Labs today- if kidney and electrolyte functioning looks good we will increase torsemide 40 mg once daily       Follow-up pending labs, medication adjustments.     Julisa Villegas CNP

## 2023-06-08 ENCOUNTER — OFFICE VISIT (OUTPATIENT)
Dept: CARDIOLOGY | Facility: OTHER | Age: 73
End: 2023-06-08
Attending: NURSE PRACTITIONER
Payer: COMMERCIAL

## 2023-06-08 VITALS
WEIGHT: 149 LBS | OXYGEN SATURATION: 94 % | DIASTOLIC BLOOD PRESSURE: 82 MMHG | HEIGHT: 62 IN | SYSTOLIC BLOOD PRESSURE: 118 MMHG | HEART RATE: 81 BPM | BODY MASS INDEX: 27.42 KG/M2

## 2023-06-08 DIAGNOSIS — Z95.2 S/P TAVR (TRANSCATHETER AORTIC VALVE REPLACEMENT): ICD-10-CM

## 2023-06-08 DIAGNOSIS — Z79.01 ANTICOAGULATED BY ANTICOAGULATION TREATMENT: ICD-10-CM

## 2023-06-08 DIAGNOSIS — I50.32 CHRONIC DIASTOLIC HEART FAILURE (H): Primary | ICD-10-CM

## 2023-06-08 DIAGNOSIS — I10 ESSENTIAL HYPERTENSION: ICD-10-CM

## 2023-06-08 DIAGNOSIS — I44.2 ATRIOVENTRICULAR BLOCK, COMPLETE (H): ICD-10-CM

## 2023-06-08 DIAGNOSIS — Z95.0 CARDIAC PACEMAKER IN SITU: ICD-10-CM

## 2023-06-08 DIAGNOSIS — N17.9 AKI (ACUTE KIDNEY INJURY) (H): ICD-10-CM

## 2023-06-08 DIAGNOSIS — R60.0 BILATERAL LEG EDEMA: ICD-10-CM

## 2023-06-08 LAB
ANION GAP SERPL CALCULATED.3IONS-SCNC: 11 MMOL/L (ref 7–15)
BUN SERPL-MCNC: 29.4 MG/DL (ref 8–23)
CALCIUM SERPL-MCNC: 10.1 MG/DL (ref 8.8–10.2)
CHLORIDE SERPL-SCNC: 96 MMOL/L (ref 98–107)
CREAT SERPL-MCNC: 1.49 MG/DL (ref 0.51–0.95)
DEPRECATED HCO3 PLAS-SCNC: 31 MMOL/L (ref 22–29)
GFR SERPL CREATININE-BSD FRML MDRD: 37 ML/MIN/1.73M2
GLUCOSE SERPL-MCNC: 155 MG/DL (ref 70–99)
POTASSIUM SERPL-SCNC: 3.5 MMOL/L (ref 3.4–5.3)
SODIUM SERPL-SCNC: 138 MMOL/L (ref 136–145)

## 2023-06-08 PROCEDURE — 80048 BASIC METABOLIC PNL TOTAL CA: CPT | Mod: ZL | Performed by: NURSE PRACTITIONER

## 2023-06-08 PROCEDURE — 99214 OFFICE O/P EST MOD 30 MIN: CPT | Performed by: NURSE PRACTITIONER

## 2023-06-08 PROCEDURE — 36415 COLL VENOUS BLD VENIPUNCTURE: CPT | Mod: ZL | Performed by: NURSE PRACTITIONER

## 2023-06-08 PROCEDURE — G0463 HOSPITAL OUTPT CLINIC VISIT: HCPCS

## 2023-06-08 RX ORDER — SPIRONOLACTONE 25 MG/1
12.5 TABLET ORAL DAILY
Qty: 90 TABLET | Refills: 3
Start: 2023-06-08 | End: 2023-09-05

## 2023-06-08 ASSESSMENT — PAIN SCALES - GENERAL: PAINLEVEL: NO PAIN (0)

## 2023-06-08 NOTE — PATIENT INSTRUCTIONS
Thank you for allowing Julisa Villegas CNP and our  team to participate in your care. Please call our office at [phone] with scheduling questions or if you need to cancel or change your appointment. With any other questions or concerns you may call cardiology nurse at [phone] or [phone].       If you experience chest pain, chest pressure, chest tightness, shortness of breath, fainting, lightheadedness, nausea, vomiting, or other concerning symptoms, please report to the Emergency Department or call 911. These symptoms may be emergent, and best treated in the Emergency Department.      Decrease torsemide 60 mg once daily to 40 mg once daily. If increased swelling, call and let us know.     Renal functioning improved with decreased torsemide. We will re-check in 3 months.       Follow-up with cardiology in 3 months, certainly sooner with acute concerns.     Julisa Villegas CNP

## 2023-06-08 NOTE — PROGRESS NOTES
[facility] HEART CARE   CARDIOLOGY PROGRESS NOTE     Chief Complaint   Patient presents with     Heart Problem     Follow up           Diagnosis:    ICD-10-CM    1. Chronic diastolic heart failure (H)  I50.32 Basic metabolic panel     spironolactone (ALDACTONE) 25 MG tablet     Basic metabolic panel      2. Bilateral leg edema  R60.0       3. Atrioventricular block, complete (H)  I44.2       4. Cardiac pacemaker in situ  Z95.0       5. S/P TAVR (transcatheter aortic valve replacement)  Z95.2       6. Anticoagulated by anticoagulation treatment  Z79.01       7. Essential hypertension  I10       8. DRE (acute kidney injury) (H)  N17.9             Assessment/Plan:    1. Acute on chronic renal failure    Improved with decreased diuretics and decreased torsemide dosing.     Encouraged sodium restriction and fluid restriction and compression to bilateral LEs to help minimize need for increased diuretics      2. Permanent atrial fibrillation    She is largely asymptomatic at this time.      Reviewed recent device check with no episodes of atrial fibrillation    Continue diltiazem  mg once daily      3. 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      She is appropriately anticoagulated on Eliquis 5 mg twice daily for stroke prophylaxis with atrial fibrillation    Denies any bleeding concerns at this time.      4. History of complete heart block s/p pacemaker placement    Reviewed recent device interrogation     She will continue to follow with the device clinic.      5. Hyperlipidemia with LDL goal less than 100, controlled    Continue atorvastatin 10 mg once daily.  Recent Labs   Lab Test 06/23/22  1342 06/11/21  1011 12/12/17  0856 08/25/15  0850 08/27/14  1139   CHOL 168 201*   < > 207* 210*   HDL 90 109   < > 108 92   LDL 65 78   < > 88 99   TRIG 67 71   < > 56 93   CHOLHDLRATIO  --   --   --  1.9 2.3    < > = values in this interval not displayed.       6. Essential hypertension, controlled    Continue diltiazem  mg once  daily    Continue spironolactone 12.5 mg once daily for HFpEF    History of adverse reaction of cough to ACEi.  BP Readings from Last 6 Encounters:   06/08/23 118/82   05/31/23 104/62   05/11/23 116/73   03/02/23 111/73   12/20/22 126/80   11/30/22 118/64       7. COPD  8. pulmonary hypertension  9. dyspnea    Dyspnea likely multifactorial with history of smoking/severe COPD on recent PFTs as well as diastolic dysfunction.      She should continue management of COPD with primary care provider- discuss switching maintenance inhalers to something like Trelegy with PCP.      10. HFpEF (Echo showed diastolic dysfunction grade I back on echo in 2018, mildly elevated left-sided filling pressures on cardiac cath in 2021, LVH. Symptoms of LE edema, dyspnea)      BP: controlled     Fluid status: Hypervolemic with chronic, stable LE edema. No increased dyspnea, orthopnea, PND. Continue torsemide 40 mg once daily    Aldosterone antagonist: Continue spironolactone 12.5 mg once daily    SGLT-2: Could be considered at follow-up    Ischemia evaluation: Completed in 2021- cardiac catheterization showed no CAD    ACEi/ARB/ARNI: n/a, no evidence for use in HFpEF. History of cough with ACEi    BB: n/a, no evidence for use in HFpEF     SCD prophylaxis: n/a, no evidence for use in HFpEF    NSAID use: Contraindicated    Sleep apnea evaluation: Refused    Wt Readings from Last 5 Encounters:   06/08/23 67.6 kg (149 lb)   05/31/23 68.5 kg (151 lb)   11/30/22 67.1 kg (148 lb)   11/30/22 67.1 kg (148 lb)   10/19/22 67.6 kg (149 lb)           11. Severe aortic stenosis status post TAVR in July 2021: Echocardiogram 1 year postop shows normal function of valve.  Also shows normal heart functioning.    She was instructed to stop aspirin by valve clinic.    She will continue with Eliquis 5 mg twice daily.     Should she need to stop anticoagulation for any reason in the future she should re-start aspirin 81 mg once daily.     She will need dental  prophylaxis with amoxicillin 1 g prior to dental work.    Follow-up with cardiology in 3 months, certainly sooner with acute concerns.         Interval history:  Mrs. Cameron presents today for cardiology follow-up.     Recall she is s/p TAVR for severe aortic stenosis in July 2021.  She had been on Eliquis 5 mg twice daily for atrial fibrillation as well as aspirin 81 mg once daily.  She did follow-up with TAVR clinic with recent echocardiogram showing that the valve is functioning well.  Echocardiogram also showed mild concentric wall thickening consistent with left ventricular hypertrophy, normal left ventricular function with LVEF of 55 to 60%.  She was instructed that she could stop use of daily aspirin 81 mg.  She had cardiac catheterization April 30, 2021 as part of TAVR work-up.  No coronary artery disease. She also has a history of complete heart block status post pacemaker placement, permanent atrial fibrillation, HFpEF       Today, Ms. Cameron is overall feeling well. She continues with bilateral LE edema which has been stable since prior visit. We decreased spironolactone and torsemide at prior visit and her LE edema and weight has been stable. She has not had any chest pain or pressure. Dyspnea on exertion has been stable. No palpitations/racing/skipping/fluttering sensation in her chest.       Social history: Lives alone in [place] [facility] apartments and [facility] building 2 days per week- she enjoys being able to stay active and doing this.     Smoking history: Started smoking at age 16 years old.  She quit smoking in 2007.  Endorse that she probably smoked 0.25 packs/day.    Sleep history: she does have a hospital bed at home that she elevates at least 30 degrees due to orthopnea.  She sometimes uses home oxygen but this is rare.  Denies PND.  She has had some increased lower extremity edema recently.  She also endorses weight gain of 4 to 5 pounds.  She does endorse compliance with furosemide 60 mg in  Daughter the morning and 40 mg in the afternoon.      HPI:    #1 permanent atrial fibrillation, Patient has had chronic permanent atrial fibrillation for several years, she was previously on warfarin therapy she is currently on Eliquis 5 mg twice a day, additionally she is on aspirin 81 mg a day since her TAVR procedure.  She is questioning why she is on both as was myself.  She is nearly 1 year post TAVR aortic valve replacement procedure.  Patient has noted increased bruisability.  She is in contact with the [facility] team that performed the TAVR procedure and is scheduled for an echo follow-up.  I told her to relate a question to the nurse coordinator to check with the performing interventionalist ,whether she still needs to be on the aspirin 81 mg.  Patient denies associated palpitations her rate seems to be well controlled.    #2: Status post TAVR  4 critical aortic stenosis, procedure was performed in July 2021.  I personally reviewed all of the documentation and diagnostic data leading up to the implantation.  The aortic valve area was in the range of 0.8 cm and the patient was becoming more symptomatic and her LV function was somewhat compromised.  Patient stated that she is actually feeling better, is less short of breath and having less issues with her COPD.  She is still troubled by leg edema.      #3:  Systemic anticoagulation with Eliquis, patient also on aspirin low-dose. The patient has noted increased bruising, as noted above is questioning why she needs to be on the aspirin she will check with the team at the [facility] whether she needs to continue the aspirin.    #4: Labile  Hypertension with likely underlying degree of Hypertensive Heart Disease, patient is on multiple medications including clonidine 0.2 mg at bedtime, Tiazac 360 mg long-acting diltiazem once a day, losartan 50 mg twice a day.  Additionally she is on Lasix and potassium for the edema.    #5: Edema, as noted below,  still problematic.  Patient unable to wear compression stockings due to compromise of circulation.    #6: Coronary artery disease by history from the chart, careful review of the coronary artery angiogram reveals description of clean coronary arteries.  The patient does have diffuse peripheral vascular disease including carotid aortic and pelvic atherosclerotic sclerotic PVD.    #7: Carotid artery disease, noncritical asymptomatic last checkup well over a year ago.    #8: Peripheral vascular disease, including aortic and pelvic atherosclerotic disease as described by pre-TAVR TVA work-up asymptomatic    #9: Hyperlipidemia patient is on Lipitor last checked June 2021 total cholesterol 201 she did have an elevated HDL of 109 which was good LDL was therapeutic at 78 triglycerides 71 patient needs recheck.    #10: COPD, as per PFTs from 2018 was moderately severe the patient is feeling better clinically she is actually reduced her Combivent inhaler from 4 times a day to more like twice a day.  When I brought up the issue of repeating the study she was actually interested for her own knowledge to see if its improved.  It may well have improved with the release of the likely elevated pressures in the LV from the aortic stenosis.  We will recheck PFTs    #11: Status post permanent pacemaker for complete heart block following TAVR procedure clinically stable post permanent pacemaker with normal function.        RELEVANT TESTING:  PFTs 8/12/2022:  The FVC, FEV1, FEV1-FVC ratio and FEF 25-75% are reduced indicating airway obstruction.  The slow vital capacity is reduced.  Following administration of bronchodilators, there is no significant response.  Pulmonary function diagnosis: Severe obstructive airway disease.    CT angiogram of the chest: From April 30, 2021 was reviewed and is available in the chart giving the measurements regarding the TAVR and also documenting a peripheral vascular disease  2D echo echo: Performed in  January 21 revealed ejection fraction of 55 to 60% no definitive evidence of left ventricular wall thickness being abnormal left ventricular size was described as normal there was severe biatrial enlargement calculated valve area was 0.7 cm  by telemetry 0.82 square V-max across the valve was 4.1 in the high area compatible with severe to critical aortic stenosis.  [facility] valve replacement team has contacted the patient and a follow-up echo is pending    Carotid ultrasound and Doppler: Previously performed more than a year and a half ago revealed bilateral carotid artery stenosis of less than 50% the velocities were not elevated the patient is on antihyperlipidemics dated the study was April 2021..    Coronary angiogram:   Conclusion    Right sided filling pressures are normal.    Moderately elevated pulmonary artery hypertension.    Left sided filling pressures are mildly elevated.    Normal cardiac output level.    Hemodynamic data has been modified in [facility] per physician review.    Normal coronary arteries.          Past Medical History:   Diagnosis Date     Asthma      Atrial fibrillation (H)      COPD (chronic obstructive pulmonary disease) (H)      Depression      High cholesterol      HTN (hypertension)      Oxygen dependent      Thyroid disease        Past Surgical History:   Procedure Laterality Date     BACK SURGERY  2006     CARDIAC SURGERY  06/12/2018    Angiogram at [facility]     COLONOSCOPY N/A 01/19/2016    Procedure: COLONOSCOPY;  Surgeon: Waldemar Bob MD;  Location: [facility] OR     CV CORONARY ANGIOGRAM N/A 04/30/2021    Procedure: CV CORONARY ANGIOGRAM;  Surgeon: Poli Walsh MD;  Location:  HEART CARDIAC CATH LAB     CV LEFT HEART CATH N/A 04/30/2021    Procedure: CV LEFT HEART CATH;  Surgeon: Poli Walsh MD;  [facility]ARD[facility]     CV RIGHT HEART CATH MEASUREMENTS RECORDED N/A 04/30/2021    Procedure: CV RIGHT HEART CATH;   Surgeon: Poli Walsh MD;  Location:  HEART CARDIAC CATH LAB     CV TRANSCATHETER AORTIC VALVE REPLACEMENT N/A 07/14/2021    Procedure: Right femoral Transcaval transcatheter aortic valve replacement (SUMMERS) size 29mm.  Transesophageal echocardiogram per anesthesia;  Surgeon: Domo Sarah MD;  Location: [facility] OR     EP PPM INSERT OF NEW OR REPL W/VENT LEAD N/A 07/14/2021    Procedure: insertion of Permanent Pacemaker;  Surgeon: Eliezer Myers MD;  Location: [facility] OR     HEART CATH FEMORAL CANNULIZATION WITH OPEN STANDBY REPAIR AORTIC VALVE N/A 07/14/2021    Procedure: Cardiopulmonary standby.;  Surgeon: Fly Smith MD;  Location: [facility] OR     HYSTERECTOMY  1980    partial     SLING BLADDER SUSPENSION WITH FASCIA LINNETTE         Allergies   Allergen Reactions     Amlodipine Besylate Cough     Norvasc     Lisinopril Cough     Ace Inhibitors Cough       Current Outpatient Medications   Medication Sig Dispense Refill     acetaminophen (TYLENOL) 500 MG tablet Take 500-1,000 mg by mouth every 6 hours as needed for mild pain       albuterol (PROAIR HFA/PROVENTIL HFA/VENTOLIN HFA) 108 (90 Base) MCG/ACT inhaler INHALE 2 PUFFS BY MOUTH EVERY 6 HOURS 18 g 3     amoxicillin (AMOXIL) 500 MG capsule Take 4 capsules (2,000 mg) by mouth once as needed (SBE prophylaxis take 30-60 minutes prior to dental procedure/cleaning) 4 capsule 3     atorvastatin (LIPITOR) 10 MG tablet Take 1 tablet by mouth once daily 90 tablet 3     Calcium Carb-Cholecalciferol (CALTRATE 600+D3 SOFT PO) Take 600 mg by mouth 2 times daily       diltiazem ER (DILT-XR) 240 MG 24 hr ER beaded capsule Take 2 capsules (480 mg) by mouth daily 180 capsule 3     diphenhydrAMINE (BENADRYL) 25 MG tablet Take 1-2 tablets (25-50 mg) by mouth every 6 hours as needed for itching or allergies 60 tablet 1     ELIQUIS ANTICOAGULANT 5 MG tablet TAKE 1 TABLET BY MOUTH TWICE DAILY 180 tablet 1     Fluticasone-Umeclidin-Vilanterol (TRELEGY ELLIPTA)  200-62.5-25 MCG/ACT oral inhaler Inhale 1 puff into the lungs daily 60 each 11     hydrOXYzine (ATARAX) 25 MG tablet Take 1 tablet (25 mg) by mouth 3 times daily as needed for itching 60 tablet 0     levothyroxine (SYNTHROID/LEVOTHROID) 100 MCG tablet Take 1 tablet by mouth once daily 90 tablet 0     OTHER MEDICAL SUPPLIES Apply one pair to help with edema 1 each 0     predniSONE (DELTASONE) 20 MG tablet Take 3 tabs by mouth daily x 3 days, then 2 tabs daily x 3 days, then 1 tab daily x 3 days, then 1/2 tab daily x 3 days. 20 tablet 0     spironolactone (ALDACTONE) 25 MG tablet Take 0.5 tablets (12.5 mg) by mouth daily 90 tablet 3     torsemide (DEMADEX) 20 MG tablet TAKE 2 TABLETS BY MOUTH ONCE DAILY 180 tablet 1       Social History     Socioeconomic History     Marital status:      Spouse name: Not on file     Number of children: Not on file     Years of education: Not on file     Highest education level: Not on file   Occupational History     Employer: RETIRED     Comment: disabled   Tobacco Use     Smoking status: Former     Packs/day: 0.50     Years: 41.00     Pack years: 20.50     Types: Cigarettes     Start date: 1966     Quit date: 2007     Years since quittin.3     Smokeless tobacco: Never   Vaping Use     Vaping status: Not on file   Substance and Sexual Activity     Alcohol use: No     Alcohol/week: 0.0 standard drinks of alcohol     Drug use: No     Sexual activity: Never     Comment:    Other Topics Concern      Service No     Blood Transfusions Yes     Comment: Permits if needed     Caffeine Concern Yes     Comment: 2 cups coffee daily     Occupational Exposure Not Asked     Hobby Hazards Not Asked     Sleep Concern Not Asked     Stress Concern Not Asked     Weight Concern Not Asked     Special Diet Not Asked     Back Care Not Asked     Exercise Not Asked     Bike Helmet Not Asked     Seat Belt Yes     Self-Exams Not Asked     Parent/sibling w/ CABG, MI or angioplasty  before 65F 55M? No   Social History Narrative     Not on file     Social Determinants of Health     Financial Resource Strain: Not on file   Food Insecurity: Not on file   Transportation Needs: Not on file   Physical Activity: Not on file   Stress: Not on file   Social Connections: Not on file   Intimate Partner Violence: Not on file   Housing Stability: Not on file       LAB RESULTS:   Ancillary Procedure on 07/13/2022   Component Date Value Ref Range Status     Date Time Interrogation Session 07/13/2022 [id number]   Final     Implantable Pulse Generator Manufa* 07/13/2022 Medtronic   Final     Implantable Pulse Generator Model 07/13/2022 [id number] Betsy XT SR MRI   Final     Implantable Pulse Generator Serial* 07/13/2022 [id number]   Final     Type Interrogation Session 07/13/2022 In Clinic   Final     Clinic Name 07/13/2022 [facility] - Heart Care   Final     Implantable Pulse Generator Type 07/13/2022 Pacemaker   Final     Implantable Pulse Generator Implan* 07/13/2022 [id number]   Final     Implantable Lead  07/13/2022 Medtronic   Final     Implantable Lead Model 07/13/2022 3830 SelectSecure MRI SureScan   Final     Implantable Lead Serial Number 07/13/2022 [id number]   Final     Implantable Lead Implant Date 07/13/2022 [id number]   Final     Implantable Lead Polarity Type 07/13/2022 Bipolar Lead   Final     Implantable Lead Location Detail 1 07/13/2022 UNKNOWN   Final     Implantable Lead Special Function 07/13/2022 69 cm   Final     Implantable Lead Location 07/13/2022 Right Ventricle   Final     Chaz Setting Mode (NBG Code) 07/13/2022 VVIR   Final     Chaz Setting Lower Rate Limit 07/13/2022 60  [beats]/min Final     Chaz Setting Maximum Sensor Rate 07/13/2022 130  [beats]/min Final     Chaz Setting Hysterisis Rate 07/13/2022 DISABLED   Final     Lead Channel Setting Sensing Polar* 07/13/2022 Bipolar   Final     Lead Channel Setting Sensing Anode* 07/13/2022 Right Ventricle   Final     Lead  Channel Setting Sensing Anode* 07/13/2022 Ring   Final     Lead Channel Setting Sensing Catho* 07/13/2022 Right Ventricle   Final     Lead Channel Setting Sensing Catho* 07/13/2022 Tip   Final     Lead Channel Setting Sensing Sensi* 07/13/2022 2  mV Final     Lead Channel Setting Pacing Polari* 07/13/2022 Unipolar   Final     Lead Channel Setting Pacing Anode * 07/13/2022 Can   Final     Lead Channel Setting Sensing Catho* 07/13/2022 Right Ventricle   Final     Lead Channel Setting Sensing Catho* 07/13/2022 Tip   Final     Lead Channel Setting Pacing Pulse * 07/13/2022 0.4  ms Final     Lead Channel Setting Pacing Amplit* 07/13/2022 2  V Final     Lead Channel Setting Pacing Captur* 07/13/2022 Adaptive   Final     Zone Setting Type Category 07/13/2022 VF   Final     Zone Setting Type Category 07/13/2022 VT   Final     Zone Setting Type Category 07/13/2022 VT   Final     Zone Setting Type Category 07/13/2022 VT   Final     Zone Setting Detection Interval 07/13/2022 360  ms Final     Zone Setting Type Category 07/13/2022 ATRIAL_FIBRILLATION   Final     Zone Setting Type Category 07/13/2022 AT/AF   Final     Lead Channel Impedance Value 07/13/2022 589  ohm Final     Lead Channel Impedance Value 07/13/2022 456  ohm Final     Lead Channel Sensing Intrinsic Amp* 07/13/2022 13.4  mV Final     Lead Channel Pacing Threshold Ampl* 07/13/2022 0.5  V Final     Lead Channel Pacing Threshold Puls* 07/13/2022 0.4  ms Final     Battery Date Time of Measurements 07/13/2022 20220713111502   Final     Battery Status 07/13/2022 OK   Final     Battery RRT Trigger 07/13/2022 2.625   Final     Battery Remaining Longevity 07/13/2022 179  mo Final     Battery Voltage 07/13/2022 3.18  V Final     Chaz Statistic Date Time Start 07/13/2022 20211012144754   Final     Chaz Statistic Date Time End 07/13/2022 20220713135652   Final     Chaz Statistic RV Percent Paced 07/13/2022 12.32  % Final     Episode Statistic Recent Count 07/13/2022 3,365    Final     Episode Statistic Type Category 07/13/2022 VT   Final     Episode Statistic Recent Count 07/13/2022 1   Final     Episode Statistic Type Category 07/13/2022 VT   Final     Episode Statistic Recent Date Time* 07/13/2022 [id number]   Final     Episode Statistic Recent Date Time* 07/13/2022 46917601891185   Final     Episode Statistic Recent Date Time* 07/13/2022 38638854473261   Final     Episode Statistic Recent Date Time* 07/13/2022 20220713135652   Final     Episode Statistic Total Count 07/13/2022 3,487   Final     Episode Statistic Type Category 07/13/2022 VT   Final     Episode Statistic Total Count 07/13/2022 1   Final     Episode Statistic Type Category 07/13/2022 VT   Final     Episode Statistic Total Date Time * 07/13/2022 46884952438159   Final     Episode Statistic Total Date Time * 07/13/2022 42062212675063   Final     Episode Statistic Total Date Time * 07/13/2022 20210714140321   Final     Episode Statistic Total Date Time * 07/13/2022 20220713135652   Final     Episode Identifier 07/13/2022 3,488   Final     Episode Type Category 07/13/2022 VT   Final     Episode Date Time 07/13/2022 20220710100614   Final     Episode Duration 07/13/2022 0  s Final     Episode Identifier 07/13/2022 3,487   Final     Episode Type Category 07/13/2022 VT   Final     Episode Date Time 07/13/2022 20220709105121   Final     Episode Duration 07/13/2022 0  s Final     Episode Identifier 07/13/2022 3,486   Final     Episode Type Category 07/13/2022 VT   Final     Episode Date Time 07/13/2022 20220626095721   Final     Episode Duration 07/13/2022 0  s Final     Episode Identifier 07/13/2022 3,485   Final     Episode Type Category 07/13/2022 VT   Final     Episode Date Time 07/13/2022 20220626095702   Final     Episode Duration 07/13/2022 1  s Final     Episode Identifier 07/13/2022 3,484   Final     Episode Type Category 07/13/2022 VT   Final     Episode Date Time 07/13/2022 [id number]   Final     Episode  Duration 07/13/2022 1  s Final     Episode Identifier 07/13/2022 3,483   Final     Episode Type Category 07/13/2022 VT   Final     Episode Date Time 07/13/2022 [id number]   Final     Episode Duration 07/13/2022 1  s Final     Episode Identifier 07/13/2022 3,482   Final     Episode Type Category 07/13/2022 VT   Final     Episode Date Time 07/13/2022 20220625213331   Final     Episode Duration 07/13/2022 1  s Final     Episode Identifier 07/13/2022 3,481   Final     Episode Type Category 07/13/2022 VT   Final     Episode Date Time 07/13/2022 20220625100819   Final     Episode Duration 07/13/2022 1  s Final     Episode Identifier 07/13/2022 3,480   Final     Episode Type Category 07/13/2022 VT   Final     Episode Date Time 07/13/2022 20220620093907   Final     Episode Duration 07/13/2022 0  s Final     Episode Identifier 07/13/2022 3,479   Final     Episode Type Category 07/13/2022 VT   Final     Episode Date Time 07/13/2022 20220620093818   Final     Episode Duration 07/13/2022 1  s Final     Episode Identifier 07/13/2022 3,478   Final     Episode Type Category 07/13/2022 VT   Final     Episode Date Time 07/13/2022 20220620093432   Final     Episode Duration 07/13/2022 0  s Final     Episode Identifier 07/13/2022 3,477   Final     Episode Type Category 07/13/2022 VT   Final     Episode Date Time 07/13/2022 20220620092750   Final     Episode Duration 07/13/2022 1  s Final     Episode Identifier 07/13/2022 3,476   Final     Episode Type Category 07/13/2022 VT   Final     Episode Date Time 07/13/2022 20220619112609   Final     Episode Duration 07/13/2022 0  s Final     Episode Identifier 07/13/2022 3,475   Final     Episode Type Category 07/13/2022 VT   Final     Episode Date Time 07/13/2022 20220619111915   Final     Episode Duration 07/13/2022 1  s Final     Episode Identifier 07/13/2022 3,474   Final     Episode Type Category 07/13/2022 VT   Final     Episode Date Time 07/13/2022 20220619103054   Final     Episode  "Duration 07/13/2022 1  s Final        Review of systems: Negative except that which was noted in the HPI.    Physical examination:  /82   Pulse 81   Ht 1.575 m (5' 2\")   Wt 67.6 kg (149 lb)   SpO2 94%   BMI 27.25 kg/m      GENERAL APPEARANCE: healthy, alert and no distress  HEENT: no icterus, no xanthelasmas, normal pupil size and reaction, no cyanosis.  NECK: no adenopathy, no asymmetry, masses.  CHEST: lungs clear to auscultation - no rales, rhonchi or wheezes, no use of accessory muscles, no retractions, respirations are unlabored, normal respiratory rate  CARDIOVASCULAR: Irregularly irregular rhythm, tachycardic rate. normal S1 with physiologic split S2, no S3 or S4 and no murmur, click or rub  EXTREMITIES: +1 Bilateral lower extremity edema. no clubbing, cyanosis.  NEURO: alert and oriented normal speech, and affect  VASC: No vascular bruits heard.  SKIN: no ecchymoses, no rashes        Thank you for allowing me to participate in the care of your patient. Please do not hesitate to contact me if you have any questions.       Julisa Villegas, ELO    "

## 2023-07-11 ENCOUNTER — ANCILLARY PROCEDURE (OUTPATIENT)
Dept: CARDIOLOGY | Facility: OTHER | Age: 73
End: 2023-07-11
Attending: INTERNAL MEDICINE
Payer: COMMERCIAL

## 2023-07-11 DIAGNOSIS — I44.2 ATRIOVENTRICULAR BLOCK, COMPLETE (H): ICD-10-CM

## 2023-07-11 DIAGNOSIS — Z95.0 CARDIAC PACEMAKER IN SITU: ICD-10-CM

## 2023-07-11 PROCEDURE — 93280 PM DEVICE PROGR EVAL DUAL: CPT | Performed by: INTERNAL MEDICINE

## 2023-07-11 PROCEDURE — 93280 PM DEVICE PROGR EVAL DUAL: CPT | Mod: 26 | Performed by: INTERNAL MEDICINE

## 2023-07-11 NOTE — PATIENT INSTRUCTIONS
It was a pleasure to see you in clinic today.  Please do not hesitate to call with any questions or concerns.  You are scheduled for remote transmissions on 10/11/23, 1/15/24 and 4/17/24.  We look forward to seeing you in clinic at your next device check in 1 year.    Rajni An, RN, MS, CCRN  Electrophysiology Nurse Clinician  [facility]    During Business Hours Please Call:  [phone]  After Hours Please Call:  [phone] - select option #4 and ask for job code [phone]

## 2023-07-20 ENCOUNTER — OFFICE VISIT (OUTPATIENT)
Dept: PODIATRY | Facility: OTHER | Age: 73
End: 2023-07-20
Attending: PODIATRIST
Payer: MEDICARE

## 2023-07-20 VITALS
SYSTOLIC BLOOD PRESSURE: 128 MMHG | OXYGEN SATURATION: 92 % | HEART RATE: 84 BPM | DIASTOLIC BLOOD PRESSURE: 72 MMHG | TEMPERATURE: 97.8 F

## 2023-07-20 DIAGNOSIS — Z79.01 LONG TERM CURRENT USE OF ANTICOAGULANT THERAPY: ICD-10-CM

## 2023-07-20 DIAGNOSIS — L60.3 ONYCHODYSTROPHY: Primary | ICD-10-CM

## 2023-07-20 PROCEDURE — 11721 DEBRIDE NAIL 6 OR MORE: CPT | Performed by: PODIATRIST

## 2023-07-20 PROCEDURE — G0463 HOSPITAL OUTPT CLINIC VISIT: HCPCS | Mod: 25

## 2023-07-20 RX ORDER — PREDNISOLONE ACETATE 10 MG/ML
1 SUSPENSION/ DROPS OPHTHALMIC 2 TIMES DAILY
COMMUNITY
Start: 2023-07-13 | End: 2023-09-08

## 2023-07-20 RX ORDER — MOXIFLOXACIN 5 MG/ML
SOLUTION/ DROPS OPHTHALMIC
COMMUNITY
Start: 2023-07-05 | End: 2023-09-08

## 2023-07-20 ASSESSMENT — ANXIETY QUESTIONNAIRES
2. NOT BEING ABLE TO STOP OR CONTROL WORRYING: NOT AT ALL
7. FEELING AFRAID AS IF SOMETHING AWFUL MIGHT HAPPEN: NOT AT ALL
GAD7 TOTAL SCORE: 0
5. BEING SO RESTLESS THAT IT IS HARD TO SIT STILL: NOT AT ALL
3. WORRYING TOO MUCH ABOUT DIFFERENT THINGS: NOT AT ALL
6. BECOMING EASILY ANNOYED OR IRRITABLE: NOT AT ALL
GAD7 TOTAL SCORE: 0
4. TROUBLE RELAXING: NOT AT ALL
1. FEELING NERVOUS, ANXIOUS, OR ON EDGE: NOT AT ALL

## 2023-07-20 ASSESSMENT — PAIN SCALES - GENERAL: PAINLEVEL: NO PAIN (0)

## 2023-07-20 ASSESSMENT — PATIENT HEALTH QUESTIONNAIRE - PHQ9: SUM OF ALL RESPONSES TO PHQ QUESTIONS 1-9: 0

## 2023-07-20 NOTE — PROGRESS NOTES
Chief complaint: Patient presents with:  toenails      History of Present Illness: This 73 year old female is seen for follow-up management of elongated, thickened toenails and Athletes' foot.    She says she still gets swelling in her ankles, but it is not as bad as it was. She is still on Furosemide.    She is on Eliquis for a-fib.     She gets burning, tingling, and numbness in her feet although it has continued to be more controlled.    No further pedal complaints today.       /72   Pulse 84   Temp 97.8  F (36.6  C)   SpO2 92%      Patient Active Problem List   Diagnosis     Permanent atrial fibrillation (H)     Pulmonary emphysema (H)     Bicuspid aortic valve     Mild major depression (H)     Acute bronchitis     Hypothyroidism, postablative     Advance care planning     Essential hypertension     Long-term (current) use of anticoagulants [Z79.01]     Acute on chronic respiratory failure (H)     Health Care Home     Status post coronary angiogram     Coronary artery disease involving native coronary artery of native heart without angina pectoris     Acute cystitis without hematuria     Small bowel obstruction (H)     Acute renal failure (H)     Hypokalemia     Infection due to 2019 novel coronavirus     Aortic aneurysm (H)     Aortic valve disorder     Mitral valve disorder     Cardiomegaly     Carotid stenosis     Depressive disorder     Edema     COPD (chronic obstructive pulmonary disease) (H)     Other ill-defined heart diseases     Aortic valve stenosis, etiology of cardiac valve disease unspecified     Aortic stenosis, severe     Status post transcatheter aortic valve replacement (TAVR) using bioprosthesis     Postoperative complete heart block (H)     Cardiac pacemaker in situ       Past Surgical History:   Procedure Laterality Date     BACK SURGERY  2006     CARDIAC SURGERY  06/12/2018    [facility] at [facility]     COLONOSCOPY N/A 01/19/2016    Procedure: COLONOSCOPY;  Surgeon:  Waldemar Bob MD;  Location: [facility]     CV CORONARY ANGIOGRAM N/A 04/30/2021    Procedure: CV CORONARY ANGIOGRAM;  Surgeon: Poli Walsh MD;  Location: [facility]     CV LEFT HEART CATH N/A 04/30/2021    Procedure: CV LEFT HEART CATH;  Surgeon: Poli Walsh MD;  Location: [facility]     CV RIGHT HEART CATH MEASUREMENTS RECORDED N/A 04/30/2021    Procedure: CV RIGHT HEART CATH;  Surgeon: Poli Walsh MD;  Location: [facility]     CV TRANSCATHETER AORTIC VALVE REPLACEMENT N/A 07/14/2021    Procedure: Right femoral Transcaval transcatheter aortic valve replacement (SUMMERS) size 29mm.  Transesophageal echocardiogram per anesthesia;  Surgeon: Domo Sarah MD;  Location: [facility] OR     EP PPM INSERT OF NEW OR REPL W/VENT LEAD N/A 07/14/2021    Procedure: insertion of Permanent Pacemaker;  Surgeon: Eliezer Myers MD;  Location: [facility]     HEART CATH FEMORAL CANNULIZATION WITH OPEN STANDBY REPAIR AORTIC VALVE N/A 07/14/2021    Procedure: Cardiopulmonary standby.;  Surgeon: Fly Smith MD;  Location: [facility]     HYSTERECTOMY  1980    partial     SLING BLADDER SUSPENSION WITH FASCIA LINNETTE         Current Outpatient Medications   Medication     acetaminophen (TYLENOL) 500 MG tablet     albuterol (PROAIR HFA/PROVENTIL HFA/VENTOLIN HFA) 108 (90 Base) MCG/ACT inhaler     atorvastatin (LIPITOR) 10 MG tablet     Calcium Carb-Cholecalciferol (CALTRATE 600+D3 SOFT PO)     diltiazem ER (DILT-XR) 240 MG 24 hr ER beaded capsule     diphenhydrAMINE (BENADRYL) 25 MG tablet     ELIQUIS ANTICOAGULANT 5 MG tablet     Fluticasone-Umeclidin-Vilanterol (TRELEGY ELLIPTA) 200-62.5-25 MCG/ACT oral inhaler     hydrOXYzine (ATARAX) 25 MG tablet     levothyroxine (SYNTHROID/LEVOTHROID) 100 MCG tablet     moxifloxacin (VIGAMOX) 0.5 % ophthalmic solution     prednisoLONE acetate (PRED FORTE) 1 % ophthalmic suspension     spironolactone (ALDACTONE) 25 MG  tablet     torsemide (DEMADEX) 20 MG tablet     amoxicillin (AMOXIL) 500 MG capsule     OTHER MEDICAL SUPPLIES     No current facility-administered medications for this visit.          Allergies   Allergen Reactions     Amlodipine Besylate Cough     Norvasc     Lisinopril Cough     Ace Inhibitors Cough       Family History   Problem Relation Age of Onset     Ovarian Cancer Mother 72     Asthma Mother      Cancer Mother         ovarian     Hypertension Mother      Prostate Cancer Father      Hypertension Father      Heart Failure Father         CHF     Hypertension Sister      Asthma Brother      Hypertension Brother        Social History     Socioeconomic History     Marital status:      Spouse name: None     Number of children: None     Years of education: None     Highest education level: None   Occupational History     Employer: RETIRED     Comment: disabled   Tobacco Use     Smoking status: Former Smoker     Packs/day: 0.50     Years: 41.00     Pack years: 20.50     Types: Cigarettes     Start date: 1/1/1966     Quit date: 1/28/2007     Years since quitting: 15.4     Smokeless tobacco: Never Used   Substance and Sexual Activity     Alcohol use: No     Alcohol/week: 0.0 standard drinks     Drug use: No     Sexual activity: Never     Comment:    Other Topics Concern      Service No     Blood Transfusions Yes     Comment: Permits if needed     Caffeine Concern Yes     Comment: 2 cups coffee daily     Seat Belt Yes     Parent/sibling w/ CABG, MI or angioplasty before 65F 55M? No       ROS: 10 point ROS neg other than the symptoms noted above in the HPI.  EXAM  Constitutional: healthy, alert and no distress    Psychiatric: mentation appears normal and affect normal/bright    VASCULAR:  -Dorsalis pedis pulse +2/4 b/l  -Posterior tibial pulse +2/4 b/l  -Capillary refill time < 3 seconds to b/l hallux  -Hair growth Absent to b/l anterior legs and ankles  -Varicosities and telangiectasias to foot,  bilaterally   -Moderate 2+ pitting edema to bilateral foot and ankle  NEURO:  -Epicritic and protective sensation diminished to the bilateral foot.  DERM:  -Skin temperature, texture and turgor WNL b/l  -Toenails elongated, thickened, dystrophic and discolored x 10  MSK:  -Lateral deviation of hallux with medial deviation of 1st metatarsal, LEFT   -Prominent bony prominence to dorsal and medial 1st metatarsal head, bilaterally   -Moderate decrease in arch height while patient is NWB, bilaterally     -Muscle strength of ankles +5/5 for dorsiflexion, plantarflexion, ABDUction and ADDuction b/l  -DORSIFLEXION ROM limited to 90 degrees on the bilateral ankle    ============================================================    ASSESSMENT:  (L60.3) Onychodystrophy  (primary encounter diagnosis)    (Z79.01) Long-term (current) use of anticoagulants [Z79.01]        PLAN:  -Patient evaluated and examined. Treatment options discussed with no educational barriers noted.    -Toenail debridement x 10 toenails without incident    -Foot Education provided. This included checking the feet daily looking for new new blisters or wounds, wearing shoes at all times when walking including around the house, and avoiding lotion application between the toes. If there are any signs of infection, the patient should present to the ED as soon as possible. Infections of the foot can be life threatening or lead to amputations of the foot or leg.  ---Patient has dry skin which increases cracks and areas for potential infection on her feet. She has a bilateral gastroc equinus which increases plantar forefoot pressure. She has lower extremity edema which increases her risks of blisters.  -She is on Eliquis which increases her risks of bleeding with cuts on her feet. She understands the importance of checking her feet daily.    -Patient in agreement with the above treatment plan and all of patient's questions were answered.      RTC 63+ days for nail  debridement        Alessandra Wilkins, DARRENM

## 2023-07-24 LAB
MDC_IDC_EPISODE_DTM: NORMAL
MDC_IDC_EPISODE_DURATION: 0 S
MDC_IDC_EPISODE_DURATION: 0 S
MDC_IDC_EPISODE_DURATION: 1 S
MDC_IDC_EPISODE_ID: 3675
MDC_IDC_EPISODE_ID: 3676
MDC_IDC_EPISODE_ID: 3677
MDC_IDC_EPISODE_ID: 3678
MDC_IDC_EPISODE_ID: 3679
MDC_IDC_EPISODE_TYPE: NORMAL
MDC_IDC_LEAD_IMPLANT_DT: NORMAL
MDC_IDC_LEAD_LOCATION: NORMAL
MDC_IDC_LEAD_LOCATION_DETAIL_1: NORMAL
MDC_IDC_LEAD_MFG: NORMAL
MDC_IDC_LEAD_MODEL: NORMAL
MDC_IDC_LEAD_POLARITY_TYPE: NORMAL
MDC_IDC_LEAD_SERIAL: NORMAL
MDC_IDC_LEAD_SPECIAL_FUNCTION: NORMAL
MDC_IDC_MSMT_BATTERY_DTM: NORMAL
MDC_IDC_MSMT_BATTERY_REMAINING_LONGEVITY: 164 MO
MDC_IDC_MSMT_BATTERY_RRT_TRIGGER: 2.62
MDC_IDC_MSMT_BATTERY_STATUS: NORMAL
MDC_IDC_MSMT_BATTERY_VOLTAGE: 3.05 V
MDC_IDC_MSMT_LEADCHNL_RV_IMPEDANCE_VALUE: 494 OHM
MDC_IDC_MSMT_LEADCHNL_RV_IMPEDANCE_VALUE: 646 OHM
MDC_IDC_MSMT_LEADCHNL_RV_PACING_THRESHOLD_AMPLITUDE: 0.5 V
MDC_IDC_MSMT_LEADCHNL_RV_PACING_THRESHOLD_PULSEWIDTH: 0.4 MS
MDC_IDC_MSMT_LEADCHNL_RV_SENSING_INTR_AMPL: 15.75 MV
MDC_IDC_MSMT_LEADCHNL_RV_SENSING_INTR_AMPL: 17.5 MV
MDC_IDC_PG_IMPLANT_DTM: NORMAL
MDC_IDC_PG_MFG: NORMAL
MDC_IDC_PG_MODEL: NORMAL
MDC_IDC_PG_SERIAL: NORMAL
MDC_IDC_PG_TYPE: NORMAL
MDC_IDC_SESS_CLINIC_NAME: NORMAL
MDC_IDC_SESS_DTM: NORMAL
MDC_IDC_SESS_TYPE: NORMAL
MDC_IDC_SET_BRADY_HYSTRATE: NORMAL
MDC_IDC_SET_BRADY_LOWRATE: 60 {BEATS}/MIN
MDC_IDC_SET_BRADY_MAX_SENSOR_RATE: 130 {BEATS}/MIN
MDC_IDC_SET_BRADY_MODE: NORMAL
MDC_IDC_SET_LEADCHNL_RV_PACING_AMPLITUDE: 2 V
MDC_IDC_SET_LEADCHNL_RV_PACING_ANODE_ELECTRODE_1: NORMAL
MDC_IDC_SET_LEADCHNL_RV_PACING_CAPTURE_MODE: NORMAL
MDC_IDC_SET_LEADCHNL_RV_PACING_CATHODE_ELECTRODE_1: NORMAL
MDC_IDC_SET_LEADCHNL_RV_PACING_CATHODE_LOCATION_1: NORMAL
MDC_IDC_SET_LEADCHNL_RV_PACING_POLARITY: NORMAL
MDC_IDC_SET_LEADCHNL_RV_PACING_PULSEWIDTH: 0.4 MS
MDC_IDC_SET_LEADCHNL_RV_SENSING_ANODE_ELECTRODE_1: NORMAL
MDC_IDC_SET_LEADCHNL_RV_SENSING_ANODE_LOCATION_1: NORMAL
MDC_IDC_SET_LEADCHNL_RV_SENSING_CATHODE_ELECTRODE_1: NORMAL
MDC_IDC_SET_LEADCHNL_RV_SENSING_CATHODE_LOCATION_1: NORMAL
MDC_IDC_SET_LEADCHNL_RV_SENSING_POLARITY: NORMAL
MDC_IDC_SET_LEADCHNL_RV_SENSING_SENSITIVITY: 2 MV
MDC_IDC_SET_ZONE_DETECTION_INTERVAL: 360 MS
MDC_IDC_SET_ZONE_TYPE: NORMAL
MDC_IDC_STAT_BRADY_DTM_END: NORMAL
MDC_IDC_STAT_BRADY_DTM_START: NORMAL
MDC_IDC_STAT_BRADY_RV_PERCENT_PACED: 25.34 %
MDC_IDC_STAT_EPISODE_RECENT_COUNT: 0
MDC_IDC_STAT_EPISODE_RECENT_COUNT: 5
MDC_IDC_STAT_EPISODE_RECENT_COUNT_DTM_END: NORMAL
MDC_IDC_STAT_EPISODE_RECENT_COUNT_DTM_END: NORMAL
MDC_IDC_STAT_EPISODE_RECENT_COUNT_DTM_START: NORMAL
MDC_IDC_STAT_EPISODE_RECENT_COUNT_DTM_START: NORMAL
MDC_IDC_STAT_EPISODE_TOTAL_COUNT: 1
MDC_IDC_STAT_EPISODE_TOTAL_COUNT: 3678
MDC_IDC_STAT_EPISODE_TOTAL_COUNT_DTM_END: NORMAL
MDC_IDC_STAT_EPISODE_TOTAL_COUNT_DTM_END: NORMAL
MDC_IDC_STAT_EPISODE_TOTAL_COUNT_DTM_START: NORMAL
MDC_IDC_STAT_EPISODE_TOTAL_COUNT_DTM_START: NORMAL
MDC_IDC_STAT_EPISODE_TYPE: NORMAL

## 2023-08-08 ENCOUNTER — LAB (OUTPATIENT)
Dept: LAB | Facility: OTHER | Age: 73
End: 2023-08-08
Payer: MEDICARE

## 2023-08-08 DIAGNOSIS — I50.32 CHRONIC DIASTOLIC HEART FAILURE (H): ICD-10-CM

## 2023-08-08 LAB
ANION GAP SERPL CALCULATED.3IONS-SCNC: 18 MMOL/L (ref 7–15)
BUN SERPL-MCNC: 16.6 MG/DL (ref 8–23)
CALCIUM SERPL-MCNC: 9.9 MG/DL (ref 8.8–10.2)
CHLORIDE SERPL-SCNC: 93 MMOL/L (ref 98–107)
CREAT SERPL-MCNC: 1.11 MG/DL (ref 0.51–0.95)
DEPRECATED HCO3 PLAS-SCNC: 27 MMOL/L (ref 22–29)
GFR SERPL CREATININE-BSD FRML MDRD: 52 ML/MIN/1.73M2
GLUCOSE SERPL-MCNC: 93 MG/DL (ref 70–99)
POTASSIUM SERPL-SCNC: 2.7 MMOL/L (ref 3.4–5.3)
SODIUM SERPL-SCNC: 138 MMOL/L (ref 136–145)

## 2023-08-08 PROCEDURE — 82374 ASSAY BLOOD CARBON DIOXIDE: CPT | Mod: ZL

## 2023-08-08 PROCEDURE — 82310 ASSAY OF CALCIUM: CPT | Mod: ZL

## 2023-08-08 PROCEDURE — 36415 COLL VENOUS BLD VENIPUNCTURE: CPT | Mod: ZL

## 2023-08-09 DIAGNOSIS — E87.6 HYPOKALEMIA: Primary | ICD-10-CM

## 2023-08-09 RX ORDER — POTASSIUM CHLORIDE 750 MG/1
20 TABLET, EXTENDED RELEASE ORAL 2 TIMES DAILY
Qty: 360 TABLET | Refills: 1 | Status: SHIPPED | OUTPATIENT
Start: 2023-08-09 | End: 2024-06-03

## 2023-08-31 DIAGNOSIS — J44.9 CHRONIC OBSTRUCTIVE PULMONARY DISEASE, UNSPECIFIED COPD TYPE (H): ICD-10-CM

## 2023-08-31 RX ORDER — FLUTICASONE FUROATE, UMECLIDINIUM BROMIDE AND VILANTEROL TRIFENATATE 200; 62.5; 25 UG/1; UG/1; UG/1
1 POWDER RESPIRATORY (INHALATION) DAILY
Qty: 60 EACH | Refills: 0 | Status: SHIPPED | OUTPATIENT
Start: 2023-08-31 | End: 2023-10-19

## 2023-08-31 NOTE — TELEPHONE ENCOUNTER
[facility]      Last Written Prescription Date:  6.23.23  Last Fill Quantity: #60,   # refills: 0  Last Office Visit: 11.30.22  Future Office visit:    Next 5 appointments (look out 90 days)      Sep 08, 2023  2:00 PM  (Arrive by 1:45 PM)  Return Visit with Julisa Villegas CNP  [facility] ([facility] ) [address] [place] [id number]  [phone]     Sep 27, 2023 10:30 AM  (Arrive by 10:15 AM)  Return Visit with Alessandra Wilkins DPM  [facility] ([facility] ) [address] [place] [phone]             Routing refill request to provider for review/approval because:  Drug not on the [facility], [facility] or [facility] refill protocol or controlled substance

## 2023-09-01 DIAGNOSIS — I25.10 CORONARY ARTERY DISEASE INVOLVING NATIVE CORONARY ARTERY OF NATIVE HEART WITHOUT ANGINA PECTORIS: ICD-10-CM

## 2023-09-01 DIAGNOSIS — E78.2 MIXED HYPERLIPIDEMIA: ICD-10-CM

## 2023-09-01 RX ORDER — ATORVASTATIN CALCIUM 10 MG/1
TABLET, FILM COATED ORAL
Qty: 90 TABLET | Refills: 0 | Status: SHIPPED | OUTPATIENT
Start: 2023-09-01 | End: 2023-12-04

## 2023-09-01 NOTE — TELEPHONE ENCOUNTER
atorvastatin (LIPITOR) 10 MG tablet       Last Written Prescription Date:  10/3/22  Last Fill Quantity: 90,   # refills: 3  Last Office Visit: 11/30/23  Future Office visit:    Next 5 appointments (look out 90 days)      Sep 08, 2023  2:00 PM  (Arrive by 1:45 PM)  Return Visit with Julisa Villegas CNP  [facility] ([facility] ) [address] [place] [id number]  [phone]     Sep 27, 2023 10:30 AM  (Arrive by 10:15 AM)  Return Visit with Alessandra Wilkins DPM  [facility] ([facility] ) [address] [place] [address] [phone]

## 2023-09-05 DIAGNOSIS — I50.32 CHRONIC DIASTOLIC HEART FAILURE (H): ICD-10-CM

## 2023-09-06 RX ORDER — SPIRONOLACTONE 25 MG/1
12.5 TABLET ORAL DAILY
Qty: 45 TABLET | Refills: 2 | Status: SHIPPED | OUTPATIENT
Start: 2023-09-06 | End: 2024-05-30

## 2023-09-06 NOTE — TELEPHONE ENCOUNTER
Aldactone dose decreased on 6.8.23.  Pharmacy asking for new RX.  Last office visit 6.82.3.  RX pended.  Please review and sign if appropriate.

## 2023-09-08 ENCOUNTER — OFFICE VISIT (OUTPATIENT)
Dept: CARDIOLOGY | Facility: OTHER | Age: 73
End: 2023-09-08
Attending: NURSE PRACTITIONER
Payer: COMMERCIAL

## 2023-09-08 VITALS
WEIGHT: 138 LBS | RESPIRATION RATE: 16 BRPM | HEIGHT: 62 IN | DIASTOLIC BLOOD PRESSURE: 76 MMHG | HEART RATE: 82 BPM | BODY MASS INDEX: 25.4 KG/M2 | OXYGEN SATURATION: 94 % | SYSTOLIC BLOOD PRESSURE: 112 MMHG

## 2023-09-08 DIAGNOSIS — Z95.2 S/P TAVR (TRANSCATHETER AORTIC VALVE REPLACEMENT): ICD-10-CM

## 2023-09-08 DIAGNOSIS — E87.6 HYPOKALEMIA: ICD-10-CM

## 2023-09-08 DIAGNOSIS — I10 ESSENTIAL HYPERTENSION: ICD-10-CM

## 2023-09-08 DIAGNOSIS — I48.21 PERMANENT ATRIAL FIBRILLATION (H): Primary | ICD-10-CM

## 2023-09-08 DIAGNOSIS — R60.0 BILATERAL LEG EDEMA: ICD-10-CM

## 2023-09-08 LAB
ANION GAP SERPL CALCULATED.3IONS-SCNC: 16 MMOL/L (ref 7–15)
BUN SERPL-MCNC: 24.6 MG/DL (ref 8–23)
CALCIUM SERPL-MCNC: 10.3 MG/DL (ref 8.8–10.2)
CHLORIDE SERPL-SCNC: 95 MMOL/L (ref 98–107)
CREAT SERPL-MCNC: 1.62 MG/DL (ref 0.51–0.95)
DEPRECATED HCO3 PLAS-SCNC: 23 MMOL/L (ref 22–29)
EGFRCR SERPLBLD CKD-EPI 2021: 33 ML/MIN/1.73M2
GLUCOSE SERPL-MCNC: 230 MG/DL (ref 70–99)
POTASSIUM SERPL-SCNC: 5.1 MMOL/L (ref 3.4–5.3)
SODIUM SERPL-SCNC: 134 MMOL/L (ref 136–145)

## 2023-09-08 PROCEDURE — 99214 OFFICE O/P EST MOD 30 MIN: CPT | Performed by: NURSE PRACTITIONER

## 2023-09-08 PROCEDURE — 84439 ASSAY OF FREE THYROXINE: CPT | Mod: ZL | Performed by: NURSE PRACTITIONER

## 2023-09-08 PROCEDURE — 80048 BASIC METABOLIC PNL TOTAL CA: CPT | Mod: ZL | Performed by: NURSE PRACTITIONER

## 2023-09-08 PROCEDURE — 84443 ASSAY THYROID STIM HORMONE: CPT | Mod: ZL | Performed by: NURSE PRACTITIONER

## 2023-09-08 PROCEDURE — G0463 HOSPITAL OUTPT CLINIC VISIT: HCPCS

## 2023-09-08 PROCEDURE — 36415 COLL VENOUS BLD VENIPUNCTURE: CPT | Mod: ZL | Performed by: NURSE PRACTITIONER

## 2023-09-08 ASSESSMENT — PAIN SCALES - GENERAL: PAINLEVEL: NO PAIN (0)

## 2023-09-08 NOTE — PATIENT INSTRUCTIONS
Thank you for allowing Julisa Villegas CNP and our  team to participate in your care. Please call our office at [phone] with scheduling questions or if you need to cancel or change your appointment. With any other questions or concerns you may call cardiology nurse at [phone] or [phone].       If you experience chest pain, chest pressure, chest tightness, shortness of breath, fainting, lightheadedness, nausea, vomiting, or other concerning symptoms, please report to the Emergency Department or call 911. These symptoms may be emergent, and best treated in the Emergency Department.      No changes today    Labs today    Follow-up in 6 months, certainly sooner with acute concerns      Julisa Villegas CNP

## 2023-09-08 NOTE — PROGRESS NOTES
[facility] HEART CARE   CARDIOLOGY PROGRESS NOTE     Chief Complaint   Patient presents with    Follow Up    Heart Problem          Diagnosis:    ICD-10-CM    1. Permanent atrial fibrillation (H)  I48.21 TSH with free T4 reflex      2. Hypokalemia  E87.6 Basic metabolic panel      3. S/P TAVR (transcatheter aortic valve replacement)  Z95.2       4. Essential hypertension  I10       5. Bilateral leg edema  R60.0               Assessment/Plan:    Acute on chronic renal failure  Improved with decreased diuretics and decreased spironolactone dosing. Re-check BMP today shows worsened renal function again.  We are going to stop spironolactone and decrease torsemide to 20 mg once daily.  She will decrease potassium to 20 mEq once daily.  Encouraged sodium restriction and fluid restriction and compression to bilateral LEs to help minimize need for increased diuretics      Permanent atrial fibrillation  She is largely asymptomatic at this time.    Continue diltiazem  mg once daily      [id number] >= 2.    She is appropriately anticoagulated on Eliquis 5 mg twice daily for stroke prophylaxis with atrial fibrillation  Denies any bleeding concerns at this time.      History of complete heart block s/p pacemaker placement  Reviewed recent device interrogation   She will continue to follow with the device clinic.      Hyperlipidemia with LDL goal less than 100, controlled  Continue atorvastatin 10 mg once daily.  Recent Labs   Lab Test 06/23/22  1342 06/11/21  1011 12/12/17  0856 08/25/15  0850 08/27/14  1139   CHOL 168 201*   < > 207* 210*   HDL 90 109   < > 108 92   LDL 65 78   < > 88 99   TRIG 67 71   < > 56 93   CHOLHDLRATIO  --   --   --  1.9 2.3    < > = values in this interval not displayed.       Essential hypertension, controlled  Continue diltiazem  mg once daily  Continue spironolactone 12.5 mg once daily for HFpEF  History of adverse reaction of cough to ACEi.  BP Readings from Last 6 Encounters:   09/08/23  112/76   07/20/23 128/72   06/08/23 118/82   05/31/23 104/62   05/11/23 116/73   03/02/23 111/73       COPD  pulmonary hypertension  dyspnea  Dyspnea likely multifactorial with history of smoking/severe COPD on recent PFTs as well as diastolic dysfunction.    She should continue management of COPD with primary care provider- discuss switching maintenance inhalers to something like Trelegy with PCP.      HFpEF (Echo showed diastolic dysfunction grade I back on echo in 2018, mildly elevated left-sided filling pressures on cardiac cath in 2021, LVH. Symptoms of LE edema, dyspnea)    BP:   controlled   Fluid status:  Hypervolemic with chronic, stable LE edema. No increased dyspnea, orthopnea, PND. Continue torsemide 40 mg once daily. Uncertain how long she has been on diltiazem. It seems diuretics don't necessarily help with LE edema. ?side effect to calcium channel blocker. We could consider switching to a beta blocker and seeing if there is improvement.   Aldosterone antagonist:   Continue spironolactone 12.5 mg once daily  SGLT-2:   Could be considered at follow-up  Ischemia evaluation:   Completed in 2021- cardiac catheterization showed no CAD  ACEi/ARB/ARNI:   n/a, no evidence for use in HFpEF. History of cough with ACEi  BB:   n/a, no evidence for use in HFpEF   SCD prophylaxis:   n/a, no evidence for use in HFpEF  NSAID use:   Contraindicated  Sleep apnea evaluation:   Refused    Wt Readings from Last 5 Encounters:   09/08/23 62.6 kg (138 lb)   06/08/23 67.6 kg (149 lb)   05/31/23 68.5 kg (151 lb)   11/30/22 67.1 kg (148 lb)   11/30/22 67.1 kg (148 lb)         Severe aortic stenosis status post TAVR in July 2021: Echocardiogram 1 year postop shows normal function of valve.  Also shows normal heart functioning.  She was instructed to stop aspirin by valve clinic.  She will continue with Eliquis 5 mg twice daily.   Should she need to stop anticoagulation for any reason in the future she should re-start aspirin 81  mg once daily.   She will need dental prophylaxis with amoxicillin 1 g prior to dental work.      Follow-up with cardiology in 6 months, certainly sooner with acute concerns          Interval history:  Mrs. Cameron presents today for cardiology follow-up.     Recall she is s/p TAVR for severe aortic stenosis in July 2021.  She had been on Eliquis 5 mg twice daily for atrial fibrillation as well as aspirin 81 mg once daily.  She did follow-up with TAVR clinic with recent echocardiogram showing that the valve is functioning well.  Echocardiogram also showed mild concentric wall thickening consistent with left ventricular hypertrophy, normal left ventricular function with LVEF of 55 to 60%.  She was instructed that she could stop use of daily aspirin 81 mg.  She had cardiac catheterization April 30, 2021 as part of TAVR work-up.  No coronary artery disease. She also has a history of complete heart block status post pacemaker placement, permanent atrial fibrillation, HFpEF       Today, Ms. Cameron is overall feeling well. She continues with bilateral LE edema which has been stable since prior visit. We decreased spironolactone and torsemide at prior visit and her LE edema has been stable and she has actually lost weight.  She thinks she has lost weight as she had shingles that went into her right eye and she is not able to cook much and did not have an appetite.. She has not had any chest pain or pressure. Dyspnea on exertion has been stable. No palpitations/racing/skipping/fluttering sensation in her chest.        Social history: Lives alone in [facility] apartments and city [place] building 2 days per week- she enjoys being able to stay active and doing this.     Smoking history: Started smoking at age 16 years old.  She quit smoking in 2007.  Endorse that she probably smoked 0.25 packs/day.    Sleep history: she does have a hospital bed at home that she elevates at least 30 degrees due to orthopnea.  She sometimes uses  home oxygen but this is rare.  Denies PND.  She has had some increased lower extremity edema recently.  She also endorses weight gain of 4 to 5 pounds.  She does endorse compliance with furosemide 60 mg in the morning and 40 mg in the afternoon.      HPI:    #1 permanent atrial fibrillation, Patient has had chronic permanent atrial fibrillation for several years, she was previously on warfarin therapy she is currently on Eliquis 5 mg twice a day, additionally she is on aspirin 81 mg a day since her TAVR procedure.  She is questioning why she is on both as was myself.  She is nearly 1 year post TAVR aortic valve replacement procedure.  Patient has noted increased bruisability.  She is in contact with the [facility] team that performed the TAVR procedure and is scheduled for an echo follow-up.  I told her to relate a question to the nurse coordinator to check with the performing interventionalist ,whether she still needs to be on the aspirin 81 mg.  Patient denies associated palpitations her rate seems to be well controlled.    #2: Status post TAVR  4 critical aortic stenosis, procedure was performed in July 2021.  I personally reviewed all of the documentation and diagnostic data leading up to the implantation.  The aortic valve area was in the range of 0.8 cm and the patient was becoming more symptomatic and her LV function was somewhat compromised.  Patient stated that she is actually feeling better, is less short of breath and having less issues with her COPD.  She is still troubled by leg edema.      #3:  Systemic anticoagulation with Eliquis, patient also on aspirin low-dose. The patient has noted increased bruising, as noted above is questioning why she needs to be on the aspirin she will check with the team at the [facility] whether she needs to continue the aspirin.    #4: Labile  Hypertension with likely underlying degree of Hypertensive Heart Disease, patient is on multiple  medications including clonidine 0.2 mg at bedtime, Tiazac 360 mg long-acting diltiazem once a day, losartan 50 mg twice a day.  Additionally she is on Lasix and potassium for the edema.    #5: Edema, as noted below, still problematic.  Patient unable to wear compression stockings due to compromise of circulation.    #6: Coronary artery disease by history from the chart, careful review of the coronary artery angiogram reveals description of clean coronary arteries.  The patient does have diffuse peripheral vascular disease including carotid aortic and pelvic atherosclerotic sclerotic PVD.    #7: Carotid artery disease, noncritical asymptomatic last checkup well over a year ago.    #8: Peripheral vascular disease, including aortic and pelvic atherosclerotic disease as described by pre-TAVR TVA work-up asymptomatic    #9: Hyperlipidemia patient is on Lipitor last checked June 2021 total cholesterol 201 she did have an elevated HDL of 109 which was good LDL was therapeutic at 78 triglycerides 71 patient needs recheck.    #10: COPD, as per PFTs from 2018 was moderately severe the patient is feeling better clinically she is actually reduced her Combivent inhaler from 4 times a day to more like twice a day.  When I brought up the issue of repeating the study she was actually interested for her own knowledge to see if its improved.  It may well have improved with the release of the likely elevated pressures in the LV from the aortic stenosis.  We will recheck PFTs    #11: Status post permanent pacemaker for complete heart block following TAVR procedure clinically stable post permanent pacemaker with normal function.        RELEVANT TESTING:  PFTs 8/12/2022:  The FVC, FEV1, FEV1-FVC ratio and FEF 25-75% are reduced indicating airway obstruction.  The slow vital capacity is reduced.  Following administration of bronchodilators, there is no significant response.  Pulmonary function diagnosis: Severe obstructive airway  disease.    CT angiogram of the chest: From April 30, 2021 was reviewed and is available in the chart giving the measurements regarding the TAVR and also documenting a peripheral vascular disease  2D echo echo: Performed in January 21 revealed ejection fraction of 55 to 60% no definitive evidence of left ventricular wall thickness being abnormal left ventricular size was described as normal there was severe biatrial enlargement calculated valve area was 0.7 cm  by telemetry 0.82 square V-max across the valve was 4.1 in the high area compatible with severe to critical aortic stenosis.  [facility] valve replacement team has contacted the patient and a follow-up echo is pending    Carotid ultrasound and Doppler: Previously performed more than a year and a half ago revealed bilateral carotid artery stenosis of less than 50% the velocities were not elevated the patient is on antihyperlipidemics dated the study was April 2021..    Coronary angiogram:   Conclusion  Right sided filling pressures are normal.  Moderately elevated pulmonary artery hypertension.  Left sided filling pressures are mildly elevated.  Normal cardiac output level.  Hemodynamic data has been modified in Epic per physician review.  Normal coronary arteries.          Past Medical History:   Diagnosis Date    Asthma     Atrial fibrillation (H)     COPD (chronic obstructive pulmonary disease) (H)     Depression     High cholesterol     HTN (hypertension)     Oxygen dependent     Thyroid disease        Past Surgical History:   Procedure Laterality Date    BACK SURGERY  2006    CARDIAC SURGERY  06/12/2018    Angiogram at [facility]    COLONOSCOPY N/A 01/19/2016    Procedure: COLONOSCOPY;  Surgeon: Wadlemar Bob MD;  Location: [facility] OR    CV CORONARY ANGIOGRAM N/A 04/30/2021    Procedure: CV CORONARY ANGIOGRAM;  Surgeon: Poli Walsh MD;  Location:  HEART CARDIAC CATH LAB    CV LEFT HEART CATH N/A 04/30/2021    Procedure:  CV LEFT HEART CATH;  Surgeon: Poli Walsh MD;  Location:  HEART CARDIAC CATH LAB    CV RIGHT HEART CATH MEASUREMENTS RECORDED N/A 04/30/2021    Procedure: CV RIGHT HEART CATH;  Surgeon: Poli Walsh MD;  Location:  HEART CARDIAC CATH LAB    CV TRANSCATHETER AORTIC VALVE REPLACEMENT N/A 07/14/2021    Procedure: Right femoral Transcaval transcatheter aortic valve replacement (SUMMERS) size 29mm.  Transesophageal echocardiogram per anesthesia;  Surgeon: Domo Sarah MD;  Location: [facility] OR    EP PPM INSERT OF NEW OR REPL W/VENT LEAD N/A 07/14/2021    Procedure: insertion of Permanent Pacemaker;  Surgeon: Eliezer Myers MD;  Location: [facility] OR    HEART CATH FEMORAL CANNULIZATION WITH OPEN STANDBY REPAIR AORTIC VALVE N/A 07/14/2021    Procedure: Cardiopulmonary standby.;  Surgeon: Fly Smith MD;  Location: [facility] OR    HYSTERECTOMY  1980    partial    SLING BLADDER SUSPENSION WITH FASCIA LINNETTE         Allergies   Allergen Reactions    Amlodipine Besylate Cough     Norvasc    Lisinopril Cough    Ace Inhibitors Cough       Current Outpatient Medications   Medication Sig Dispense Refill    acetaminophen (TYLENOL) 500 MG tablet Take 500-1,000 mg by mouth every 6 hours as needed for mild pain      albuterol (PROAIR HFA/PROVENTIL HFA/VENTOLIN HFA) 108 (90 Base) MCG/ACT inhaler INHALE 2 PUFFS BY MOUTH EVERY 6 HOURS 18 g 3    atorvastatin (LIPITOR) 10 MG tablet TAKE 1 TABLET BY MOUTH ONCE DAILY 90 tablet 0    Calcium Carb-Cholecalciferol (CALTRATE 600+D3 SOFT PO) Take 600 mg by mouth 2 times daily      diltiazem ER (DILT-XR) 240 MG 24 hr ER beaded capsule Take 2 capsules (480 mg) by mouth daily 180 capsule 3    diphenhydrAMINE (BENADRYL) 25 MG tablet Take 1-2 tablets (25-50 mg) by mouth every 6 hours as needed for itching or allergies 60 tablet 1    ELIQUIS ANTICOAGULANT 5 MG tablet TAKE 1 TABLET BY MOUTH TWICE DAILY 180 tablet 1    hydrOXYzine (ATARAX) 25 MG tablet Take 1 tablet (25  mg) by mouth 3 times daily as needed for itching 60 tablet 0    levothyroxine (SYNTHROID/LEVOTHROID) 100 MCG tablet Take 1 tablet by mouth once daily 90 tablet 0    potassium chloride ER (KLOR-CON M) 10 MEQ CR tablet Take 2 tablets (20 mEq) by mouth 2 times daily 360 tablet 1    spironolactone (ALDACTONE) 25 MG tablet Take 0.5 tablets (12.5 mg) by mouth daily 45 tablet 2    torsemide (DEMADEX) 20 MG tablet TAKE 2 TABLETS BY MOUTH ONCE DAILY 180 tablet 1    TRELEGY ELLIPTA 200-62.5-25 MCG/ACT oral inhaler INHALE 1 PUFF ONCE DAILY 60 each 0    amoxicillin (AMOXIL) 500 MG capsule Take 4 capsules (2,000 mg) by mouth once as needed (SBE prophylaxis take 30-60 minutes prior to dental procedure/cleaning) (Patient not taking: Reported on 2023) 4 capsule 3    OTHER MEDICAL SUPPLIES Apply one pair to help with edema (Patient not taking: Reported on 2023) 1 each 0       Social History     Socioeconomic History    Marital status:      Spouse name: Not on file    Number of children: Not on file    Years of education: Not on file    Highest education level: Not on file   Occupational History     Employer: RETIRED     Comment: disabled   Tobacco Use    Smoking status: Former     Packs/day: 0.50     Years: 41.00     Pack years: 20.50     Types: Cigarettes     Start date: 1966     Quit date: 2007     Years since quittin.6    Smokeless tobacco: Never   Substance and Sexual Activity    Alcohol use: No     Alcohol/week: 0.0 standard drinks of alcohol    Drug use: No    Sexual activity: Never     Comment:    Other Topics Concern     Service No    Blood Transfusions Yes     Comment: Permits if needed    Caffeine Concern Yes     Comment: 2 cups coffee daily    Occupational Exposure Not Asked    Hobby Hazards Not Asked    Sleep Concern Not Asked    Stress Concern Not Asked    Weight Concern Not Asked    Special Diet Not Asked    Back Care Not Asked    Exercise Not Asked    Bike Helmet Not Asked  "   Seat Belt Yes    Self-Exams Not Asked    Parent/sibling w/ CABG, MI or angioplasty before 65F 55M? No   Social History Narrative    Not on file     Social Determinants of Health     Financial Resource Strain: Not on file   Food Insecurity: Not on file   Transportation Needs: Not on file   Physical Activity: Not on file   Stress: Not on file   Social Connections: Not on file   Intimate Partner Violence: Not on file   Housing Stability: Not on file       LAB RESULTS:   Results for orders placed or performed in visit on 09/08/23   Basic metabolic panel     Status: Abnormal   Result Value Ref Range    Sodium 134 (L) 136 - 145 mmol/L    Potassium 5.1 3.4 - 5.3 mmol/L    Chloride 95 (L) 98 - 107 mmol/L    Carbon Dioxide (CO2) 23 22 - 29 mmol/L    Anion Gap 16 (H) 7 - 15 mmol/L    Urea Nitrogen 24.6 (H) 8.0 - 23.0 mg/dL    Creatinine 1.62 (H) 0.51 - 0.95 mg/dL    Calcium 10.3 (H) 8.8 - 10.2 mg/dL    Glucose 230 (H) 70 - 99 mg/dL    GFR Estimate 33 (L) >60 mL/min/1.73m2       Review of systems: Negative except that which was noted in the HPI.    Physical examination:  /76   Pulse 82   Resp 16   Ht 1.575 m (5' 2\")   Wt 62.6 kg (138 lb)   SpO2 94%   BMI 25.24 kg/m      GENERAL APPEARANCE: healthy, alert and no distress  HEENT: no icterus, no xanthelasmas, normal pupil size and reaction, no cyanosis.  NECK: no adenopathy, no asymmetry, masses.  CHEST: lungs clear to auscultation - no rales, rhonchi or wheezes, no use of accessory muscles, no retractions, respirations are unlabored, normal respiratory rate  CARDIOVASCULAR: Irregularly irregular rhythm, tachycardic rate. normal S1 with physiologic split S2, no S3 or S4 and no murmur, click or rub  EXTREMITIES: +1 Bilateral lower extremity edema. no clubbing, cyanosis.  NEURO: alert and oriented normal speech, and affect  VASC: No vascular bruits heard.  SKIN: no ecchymoses, no rashes        Thank you for allowing me to participate in the care of your patient. " Please do not hesitate to contact me if you have any questions.       Julisa Villegas, CNP

## 2023-09-11 LAB
T4 FREE SERPL-MCNC: 2.02 NG/DL (ref 0.9–1.7)
TSH SERPL DL<=0.005 MIU/L-ACNC: 0.08 UIU/ML (ref 0.3–4.2)

## 2023-09-12 DIAGNOSIS — E89.0 HYPOTHYROIDISM, POSTABLATIVE: ICD-10-CM

## 2023-09-12 RX ORDER — LEVOTHYROXINE SODIUM 88 UG/1
88 TABLET ORAL DAILY
Qty: 90 TABLET | Refills: 3 | Status: SHIPPED | OUTPATIENT
Start: 2023-09-12 | End: 2024-08-27

## 2023-09-22 ENCOUNTER — MYC MEDICAL ADVICE (OUTPATIENT)
Dept: CARDIOLOGY | Facility: OTHER | Age: 73
End: 2023-09-22

## 2023-09-22 DIAGNOSIS — I48.21 PERMANENT ATRIAL FIBRILLATION (H): ICD-10-CM

## 2023-09-22 DIAGNOSIS — I10 ESSENTIAL HYPERTENSION: Primary | ICD-10-CM

## 2023-09-27 ENCOUNTER — OFFICE VISIT (OUTPATIENT)
Dept: PODIATRY | Facility: OTHER | Age: 73
End: 2023-09-27
Attending: PODIATRIST
Payer: COMMERCIAL

## 2023-09-27 VITALS
SYSTOLIC BLOOD PRESSURE: 128 MMHG | BODY MASS INDEX: 25.97 KG/M2 | OXYGEN SATURATION: 95 % | DIASTOLIC BLOOD PRESSURE: 85 MMHG | TEMPERATURE: 98.3 F | WEIGHT: 142 LBS | HEART RATE: 84 BPM

## 2023-09-27 DIAGNOSIS — Z79.01 LONG TERM CURRENT USE OF ANTICOAGULANT THERAPY: ICD-10-CM

## 2023-09-27 DIAGNOSIS — I83.12 VARICOSE VEINS OF BOTH LOWER EXTREMITIES WITH INFLAMMATION: ICD-10-CM

## 2023-09-27 DIAGNOSIS — L60.3 ONYCHODYSTROPHY: Primary | ICD-10-CM

## 2023-09-27 DIAGNOSIS — I83.11 VARICOSE VEINS OF BOTH LOWER EXTREMITIES WITH INFLAMMATION: ICD-10-CM

## 2023-09-27 PROCEDURE — 99213 OFFICE O/P EST LOW 20 MIN: CPT | Mod: 25 | Performed by: PODIATRIST

## 2023-09-27 PROCEDURE — 11721 DEBRIDE NAIL 6 OR MORE: CPT | Performed by: PODIATRIST

## 2023-09-27 PROCEDURE — G0463 HOSPITAL OUTPT CLINIC VISIT: HCPCS | Mod: 25

## 2023-09-27 RX ORDER — METOPROLOL SUCCINATE 100 MG/1
100 TABLET, EXTENDED RELEASE ORAL DAILY
Qty: 90 TABLET | Refills: 1 | Status: SHIPPED | OUTPATIENT
Start: 2023-09-27 | End: 2024-03-07

## 2023-09-27 ASSESSMENT — PAIN SCALES - GENERAL: PAINLEVEL: NO PAIN (0)

## 2023-09-27 NOTE — ADDENDUM NOTE
Addended by: SALMA JURADO on: 9/27/2023 12:42 PM     Modules accepted: Orders     Regular diet as tolerated

## 2023-09-27 NOTE — PROGRESS NOTES
Chief complaint: Patient presents with:  Toenail: trimming      History of Present Illness: This 73 year old female is seen for follow-up management of elongated, thickened toenails and Athletes' foot.    She has had increased bilateral lower extremity through the summer. It is worse and it is causing her pain. She has been taking Torsemide but it is not greatly improving. She cannot find compression socks that fit her legs properly. She is wondering how else she can treat the edema.    She is on Eliquis for a-fib.     She gets burning, tingling, and numbness in her feet.    No further pedal complaints today.       /85 (BP Location: Left arm, Patient Position: Sitting, Cuff Size: Adult Regular)   Pulse 84   Temp 98.3  F (36.8  C) (Tympanic)   Wt 64.4 kg (142 lb)   SpO2 95%   BMI 25.97 kg/m       Patient Active Problem List   Diagnosis    Permanent atrial fibrillation (H)    Pulmonary emphysema (H)    Bicuspid aortic valve    Mild major depression (H)    Acute bronchitis    Hypothyroidism, postablative    Advance care planning    Essential hypertension    Long-term (current) use of anticoagulants [Z79.01]    Acute on chronic respiratory failure (H)    Health Care Home    Status post coronary angiogram    Coronary artery disease involving native coronary artery of native heart without angina pectoris    Acute cystitis without hematuria    Small bowel obstruction (H)    Acute renal failure (H)    Hypokalemia    Infection due to 2019 novel coronavirus    Aortic aneurysm (H)    Aortic valve disorder    Mitral valve disorder    Cardiomegaly    Carotid stenosis    Depressive disorder    Edema    COPD (chronic obstructive pulmonary disease) (H)    Other ill-defined heart diseases    Aortic valve stenosis, etiology of cardiac valve disease unspecified    Aortic stenosis, severe    Status post transcatheter aortic valve replacement (TAVR) using bioprosthesis    Postoperative complete heart block (H)    Cardiac  pacemaker in situ       Past Surgical History:   Procedure Laterality Date    BACK SURGERY  2006    CARDIAC SURGERY  06/12/2018    Angiogram at [facility] in [place]    COLONOSCOPY N/A 01/19/2016    Procedure: COLONOSCOPY;  Surgeon: Waldemar Bob MD;  Location: [facility] OR    CV CORONARY ANGIOGRAM N/A 04/30/2021    Procedure: CV CORONARY ANGIOGRAM;  Surgeon: Poli Walsh MD;  [facility]    CV LEFT HEART CATH N/A 04/30/2021    Procedure: CV LEFT HEART CATH;  Surgeon: Poli Walsh MD;  [facility]    CV RIGHT HEART CATH MEASUREMENTS RECORDED N/A 04/30/2021    Procedure: CV RIGHT HEART CATH;  Surgeon: Poli Walsh MD;  [facility]    CV TRANSCATHETER AORTIC VALVE REPLACEMENT N/A 07/14/2021    Procedure: Right femoral Transcaval transcatheter aortic valve replacement (SUMMERS) size 29mm.  Transesophageal echocardiogram per anesthesia;  Surgeon: Domo Sarah MD;  Location[facility]    EP PPM INSERT OF NEW OR REPL W/VENT LEAD N/A 07/14/2021    Procedure: insertion of Permanent Pacemaker;  Surgeon: Eliezer Myers MD;  [facility]    HEART CATH FEMORAL CANNULIZATION WITH OPEN STANDBY REPAIR AORTIC VALVE N/A 07/14/2021    Procedure: Cardiopulmonary standby.;  Surgeon: Fly Smith MD;  [facility]    HYSTERECTOMY  1980    partial    SLING BLADDER SUSPENSION WITH FASCIA LINNETTE         Current Outpatient Medications   Medication    acetaminophen (TYLENOL) 500 MG tablet    albuterol (PROAIR HFA/PROVENTIL HFA/VENTOLIN HFA) 108 (90 Base) MCG/ACT inhaler    amoxicillin (AMOXIL) 500 MG capsule    atorvastatin (LIPITOR) 10 MG tablet    Calcium Carb-Cholecalciferol (CALTRATE 600+D3 SOFT PO)    diltiazem ER (DILT-XR) 240 MG 24 hr ER beaded capsule    diphenhydrAMINE (BENADRYL) 25 MG tablet    ELIQUIS ANTICOAGULANT 5 MG tablet    hydrOXYzine (ATARAX) 25 MG tablet    levothyroxine (SYNTHROID/LEVOTHROID) 88  MCG tablet    potassium chloride ER (KLOR-CON M) 10 MEQ CR tablet    spironolactone (ALDACTONE) 25 MG tablet    torsemide (DEMADEX) 20 MG tablet    TRELEGY ELLIPTA 200-62.5-25 MCG/ACT oral inhaler    OTHER MEDICAL SUPPLIES     No current facility-administered medications for this visit.          Allergies   Allergen Reactions    Amlodipine Besylate Cough     Norvasc    Lisinopril Cough    Ace Inhibitors Cough       Family History   Problem Relation Age of Onset    Ovarian Cancer Mother 72    Asthma Mother     Cancer Mother         ovarian    Hypertension Mother     Prostate Cancer Father     Hypertension Father     Heart Failure Father         CHF    Hypertension Sister     Asthma Brother     Hypertension Brother        Social History     Socioeconomic History    Marital status:      Spouse name: None    Number of children: None    Years of education: None    Highest education level: None   Occupational History     Employer: RETIRED     Comment: disabled   Tobacco Use    Smoking status: Former Smoker     Packs/day: 0.50     Years: 41.00     Pack years: 20.50     Types: Cigarettes     Start date: 1/1/1966     Quit date: 1/28/2007     Years since quitting: 15.4    Smokeless tobacco: Never Used   Substance and Sexual Activity    Alcohol use: No     Alcohol/week: 0.0 standard drinks    Drug use: No    Sexual activity: Never     Comment:    Other Topics Concern     Service No    Blood Transfusions Yes     Comment: Permits if needed    Caffeine Concern Yes     Comment: 2 cups coffee daily    Seat Belt Yes    Parent/sibling w/ CABG, MI or angioplasty before 65F 55M? No       ROS: 10 point ROS neg other than the symptoms noted above in the HPI.  EXAM  Constitutional: healthy, alert and no distress    Psychiatric: mentation appears normal and affect normal/bright    VASCULAR:  -Dorsalis pedis pulse +2/4 b/l  -Posterior tibial pulse +1/4 b/l  -Capillary refill time < 3 seconds to b/l hallux  -Hair growth  Absent to b/l anterior legs and ankles  -Varicosities and telangiectasias to foot, bilaterally   -Moderate 2+ pitting edema to LEFT foot and 3+ to RIGHT foot foot and ankle  NEURO:  -Epicritic and protective sensation diminished to the bilateral foot.  DERM:  -Skin temperature, texture and turgor WNL b/l  -Toenails elongated, thickened, dystrophic and discolored x 10  MSK:  -Lateral deviation of hallux with medial deviation of 1st metatarsal, LEFT   -Prominent bony prominence to dorsal and medial 1st metatarsal head, bilaterally   -Moderate decrease in arch height while patient is NWB, bilaterally     -Muscle strength of ankles +5/5 for dorsiflexion, plantarflexion, ABDUction and ADDuction b/l  -DORSIFLEXION ROM limited to 90 degrees on the bilateral ankle    ============================================================    ASSESSMENT:  (L60.3) Onychodystrophy  (primary encounter diagnosis)    (Z79.01) Long-term (current) use of anticoagulants [Z79.01]        PLAN:  -Patient evaluated and examined. Treatment options discussed with no educational barriers noted.    -Toenail debridement x 10 toenails without incident    -Foot Education provided. This included checking the feet daily looking for new new blisters or wounds, wearing shoes at all times when walking including around the house, and avoiding lotion application between the toes. If there are any signs of infection, the patient should present to the ED as soon as possible. Infections of the foot can be life threatening or lead to amputations of the foot or leg.  ---Patient has dry skin which increases cracks and areas for potential infection on her feet. She has a bilateral gastroc equinus which increases plantar forefoot pressure. She has lower extremity edema which increases her risks of blisters.  -She is on Eliquis which increases her risks of bleeding with cuts on her feet. She understands the importance of checking her feet daily.    Lower extremity  edema:  -Discussed LOWER EXTREMITY edema including etiology and treatment options.  -Patient has multiple telangiectasias and varicosities. This usually indicates incompetence of the valves in the veins that help push blood flow back to the heart. Blood pools in the legs and causes inflammation.   -Blood pooling can can brown discoloration to the foot, ankle or leg (hemosiderin deposition).  -Chronic LOWER EXTREMITY edema can cause pain or open ulcers.  -Treatment consists of compression socks and elevation of the legs above the level of the heart. Treatment of the inflammation is like wearing glasses in that it's not reversing the problem that is causing the edema, but it is decreasing the inflammation during the time of treatment.  -Patient advised to avoid idle standing and attempt to keep moving when standing. The muscles in the legs act as natural pumps to promote blood flow back to the heart.  -When sitting, attempt to elevate legs above the level of the heart.  -No additional elevation is required when sleeping flat on a bed. Compression socks should also be removed at night when lying down flat on a bed.  ---Sent a message to the orthotist, Amie Muro, to see what compression socks are available for the patient. Patient will be called by Amie with options. Patient is in agreement with this plan since she has been unable to get compression socks to fit her legs.  -This is an acute, uncomplicated illness/injury with OTC treatment options reviewed.    -Patient in agreement with the above treatment plan and all of patient's questions were answered.      RTC 63+ days for nail debridement        Alessandra Wilkins DPM

## 2023-10-02 ENCOUNTER — TELEPHONE (OUTPATIENT)
Dept: FAMILY MEDICINE | Facility: OTHER | Age: 73
End: 2023-10-02

## 2023-10-02 NOTE — TELEPHONE ENCOUNTER
2:43 PM    Reason for Call: OVERBOOK    Patient is having the following symptoms: ankles swelling/wt gain    The patient is requesting an appointment for this week with Dr Yen.    Was an appointment offered for this call? No  If yes : Appointment type              Date    Preferred method for responding to this message: Telephone Call  What is your phone number ?[phone]     If we cannot reach you directly, may we leave a detailed response at the number you provided? Yes    Can this message wait until your PCP/provider returns, if unavailable today? Not applicable    Day Gomes

## 2023-10-03 ENCOUNTER — OFFICE VISIT (OUTPATIENT)
Dept: FAMILY MEDICINE | Facility: OTHER | Age: 73
End: 2023-10-03
Attending: FAMILY MEDICINE
Payer: COMMERCIAL

## 2023-10-03 VITALS
SYSTOLIC BLOOD PRESSURE: 124 MMHG | BODY MASS INDEX: 26.7 KG/M2 | OXYGEN SATURATION: 95 % | HEART RATE: 87 BPM | TEMPERATURE: 97.7 F | DIASTOLIC BLOOD PRESSURE: 76 MMHG | WEIGHT: 146 LBS

## 2023-10-03 DIAGNOSIS — I25.10 CORONARY ARTERY DISEASE INVOLVING NATIVE CORONARY ARTERY OF NATIVE HEART WITHOUT ANGINA PECTORIS: ICD-10-CM

## 2023-10-03 DIAGNOSIS — J44.9 CHRONIC OBSTRUCTIVE PULMONARY DISEASE, UNSPECIFIED COPD TYPE (H): ICD-10-CM

## 2023-10-03 DIAGNOSIS — R60.9 EDEMA, UNSPECIFIED TYPE: Primary | ICD-10-CM

## 2023-10-03 DIAGNOSIS — I10 ESSENTIAL HYPERTENSION: ICD-10-CM

## 2023-10-03 DIAGNOSIS — I48.21 PERMANENT ATRIAL FIBRILLATION (H): ICD-10-CM

## 2023-10-03 DIAGNOSIS — Z23 NEED FOR PROPHYLACTIC VACCINATION AND INOCULATION AGAINST INFLUENZA: ICD-10-CM

## 2023-10-03 PROCEDURE — 90662 IIV NO PRSV INCREASED AG IM: CPT

## 2023-10-03 PROCEDURE — G0463 HOSPITAL OUTPT CLINIC VISIT: HCPCS | Mod: 25

## 2023-10-03 PROCEDURE — 99214 OFFICE O/P EST MOD 30 MIN: CPT | Performed by: FAMILY MEDICINE

## 2023-10-03 ASSESSMENT — PAIN SCALES - GENERAL: PAINLEVEL: NO PAIN (0)

## 2023-10-03 NOTE — COMMUNITY RESOURCES LIST (ENGLISH)
10/03/2023   [facility]  N/A  For additional resource needs, please contact your health insurance member services or your primary care team.  Phone: [phone]   Email: N/A   Address: [facility] [place] [address]   Hours: N/A        Food and Nutrition       Food pantry  1  [facility] Distance: 12.32 miles      In-Person   [address] [place] [address]  Language: English  Hours: Mon - Thu 11:00 AM - 3:30 PM  Fees: Free   Phone: (861) 876-5288 Email: info@Edevate Website: https://Edevate     2  [facility] Distance: 17.13 miles      [facility]   [address] [place] [address]  Language: English  Hours: Mon 9:00 AM - 11:00 AM , Tue 1:00 PM - 3:00 PM , Wed - Thu 9:00 AM - 11:00 AM  Fees: Free, Self Pay   Phone: (527) 476-8502 Email: [email or web address].org Website: https://[facility].org/northern/[place]     SNAP application assistance  3  [facility] Distance: 13.83 miles      [address] [place] [address]  Language: English  Hours: Mon - Fri 8:00 AM - 4:30 PM  Fees: Free   Phone: (453) 168-3773 Website: https://www.[facility]./[facility]/Health-Human-Services     4  [facility] Distance: 13.83 miles      In-Person, Phone/Virtual   [address] [place] [address]  Language: English  Hours: Mon 8:00 AM - 4:30 PM Appt. Only, Tue - Thu 8:00 AM - 4:30 PM , Fri 8:00 AM - 4:30 PM Appt. Only  Fees: Free   Phone: (764) 752-7328 Website: http://www.[employer].org     Soup kitchen or free meals  5  [facility] [place] Distance: 17.13 miles      Pickup   [address] [place] [address]  Language: English  Hours: Mon - Fri 4:00 PM - 4:45 PM  Fees: Free   Phone: (395) 581-5623 Email: maryana@[facility]  Website: https://centralusa.salvationarmy.org/northern/[place]          Important Numbers & Websites       [facility]   211 211unitedway.org  Poison Control   (561) 909-1391 [place]po[place]  Suicide and Crisis Lifeline   983 988[facility] Center[facility].org  Childhelp [place] Child Abuse Hotline   302.186.7435 Childhelphotline.org  [place] Sexual Assault Hotline   (188) 316-2236 (HOPE) [facility] Runaway Safeline   (468) 918-1506 (RUNAWAY) [facility].org  Pregnancy & Postpartum Support [place]   Call/text 622-097-8155 Ppsupportmn.org  Substance Abuse National Helpline ([facility]   144-729-HELP (5675) Findtreatment.gov  Emergency Services   91

## 2023-10-03 NOTE — PROGRESS NOTES
"  Assessment & Plan     Edema, unspecified type  Ongoing.  Water pills keep it at bay but just not better.  Elevating.  We talked about the pathophys and added some compression.  She will go to   More right heart than anything.    - Miscellaneous Order for DME - ONLY FOR DME    Chronic obstructive pulmonary disease, unspecified COPD type (H)  Stable breathing.     Permanent atrial fibrillation (H)  Stable irregular.      Coronary artery disease involving native coronary artery of native heart without angina pectoris  Stable.  No new sx.      Essential hypertension  Stable.    - Miscellaneous Order for DME - ONLY FOR DME             BMI:   Estimated body mass index is 26.7 kg/m  as calculated from the following:    Height as of 9/8/23: 1.575 m (5' 2\").    Weight as of this encounter: 66.2 kg (146 lb).           No follow-ups on file.    Braydon Yen MD  [facility]   Mary Alice is a 73 year old, presenting for the following health issues:  Swelling      HPI     Swelling     Duration: 3 weeks   Description (location/character/radiation): swelling   Intensity:  moderate  Accompanying signs and symptoms: weight gain, swelling in both legs, SOB  History (similar episodes/previous evaluation): None  Precipitating or alleviating factors: None  Therapies tried and outcome: torsemide               Review of Systems   Constitutional, HEENT, cardiovascular, pulmonary, gi and gu systems are negative, except as otherwise noted.      Objective    /76   Pulse 87   Temp 97.7  F (36.5  C) (Tympanic)   Wt 66.2 kg (146 lb)   SpO2 95%   BMI 26.70 kg/m    Body mass index is 26.7 kg/m .  Physical Exam   GENERAL: healthy, alert and no distress  NECK: no adenopathy, no asymmetry, masses, or scars and thyroid normal to palpation  RESP: lungs clear to auscultation - no rales, rhonchi or wheezes  CV: irregular rates and rhythm, normal S1 S2, no S3 or S4, no murmur, click or rub, peripheral " pulses strong, and   ABDOMEN: soft, nontender, no hepatosplenomegaly, no masses and bowel sounds normal  MS: 1-2 + LE edema bilaterally to mid shin.          Nice review of all of this with Mary Alice.  Flu shot given.

## 2023-10-03 NOTE — COMMUNITY RESOURCES LIST (ENGLISH)
10/03/2023   [facility]  N/A  For questions about this resource list or additional care needs, please contact your primary care clinic or care manager.  Phone: [phone]   Email: N/A   Address: [address] [place] [address]   Hours: N/A        Food and Nutrition       Food pantry  1  [facility] Distance: 12.32 miles      In-Person   [address] [place] [address]  Language: English  Hours: Mon - Thu 11:00 AM - 3:30 PM  Fees: Free   Phone: (791) 112-9671 Email: info@PostPath Website: https://[employer].Tunepresto     2  [facility] Distance: 17.13 miles      [facility]   [address] [place] [address]  Language: English  Hours: Mon 9:00 AM - 11:00 AM , Tue 1:00 PM - 3:00 PM , Wed - Thu 9:00 AM - 11:00 AM  Fees: Free, Self Pay   Phone: (703) 865-7746 Email: [email or web address]@[facility].org Website: https://[facility].org/northern/[place]     SNAP application assistance  3  [facility] Distance: 13.83 miles      [address] [place] [address]  Language: English  Hours: Mon - Fri 8:00 AM - 4:30 PM  Fees: Free   Phone: (466) 768-8298 Website: https://www.[facility]./[facility]/Health-Human-Services     4  [facility] [employer] [facility] Distance: 13.83 miles      In-Person, Phone/Virtual   [address] [place] [address]  Language: English  Hours: Mon 8:00 AM - 4:30 PM Appt. Only, Tue - Thu 8:00 AM - 4:30 PM , Fri 8:00 AM - 4:30 PM Appt. Only  Fees: Free   Phone: (664) 501-2035 Website: http://www.Arara.org     Soup kitchen or free meals  5  [facility] [place] [facility] Distance: 17.13 miles      Pickup   [address] [place] [address]  Language: English  Hours: Mon - Fri 4:00 PM - 4:45 PM  Fees: Free   Phone: (851) 190-5765 Email: maryana@[facility]  Website: https://centralusa.salvationarmy.org/northern/[place]          Important Numbers & Websites       Emergency Services   911  [facility] Services   311  Poison Control   (897) 382-2546  Suicide Prevention Lifeline   (627) 461-4483 (TALK)  Child Abuse Hotline   (328) 794-7593 (4-A-Child)  Sexual Assault Hotline   (978) 775-8065 (HOPE)  National Runaway Safeline   (504) 222-8648 (RUNAWAY)  All-Options Talkline   (151) 774-7280  Substance Abuse Referral   (801) 151-1742 (HELP)

## 2023-10-11 ENCOUNTER — ANCILLARY PROCEDURE (OUTPATIENT)
Dept: CARDIOLOGY | Facility: CLINIC | Age: 73
End: 2023-10-11
Attending: INTERNAL MEDICINE
Payer: MEDICARE

## 2023-10-11 DIAGNOSIS — Z95.0 CARDIAC PACEMAKER IN SITU: ICD-10-CM

## 2023-10-11 DIAGNOSIS — I44.2 ATRIOVENTRICULAR BLOCK, COMPLETE (H): ICD-10-CM

## 2023-10-11 PROCEDURE — 93294 REM INTERROG EVL PM/LDLS PM: CPT | Performed by: INTERNAL MEDICINE

## 2023-10-11 PROCEDURE — 93296 REM INTERROG EVL PM/IDS: CPT

## 2023-10-15 LAB
MDC_IDC_EPISODE_DTM: NORMAL
MDC_IDC_EPISODE_DTM: NORMAL
MDC_IDC_EPISODE_DURATION: 0 S
MDC_IDC_EPISODE_DURATION: 1 S
MDC_IDC_EPISODE_ID: 3680
MDC_IDC_EPISODE_ID: 3681
MDC_IDC_EPISODE_TYPE: NORMAL
MDC_IDC_EPISODE_TYPE: NORMAL
MDC_IDC_LEAD_CONNECTION_STATUS: NORMAL
MDC_IDC_LEAD_IMPLANT_DT: NORMAL
MDC_IDC_LEAD_LOCATION: NORMAL
MDC_IDC_LEAD_LOCATION_DETAIL_1: NORMAL
MDC_IDC_LEAD_MFG: NORMAL
MDC_IDC_LEAD_MODEL: NORMAL
MDC_IDC_LEAD_POLARITY_TYPE: NORMAL
MDC_IDC_LEAD_SERIAL: NORMAL
MDC_IDC_LEAD_SPECIAL_FUNCTION: NORMAL
MDC_IDC_MSMT_BATTERY_DTM: NORMAL
MDC_IDC_MSMT_BATTERY_REMAINING_LONGEVITY: 160 MO
MDC_IDC_MSMT_BATTERY_RRT_TRIGGER: 2.62
MDC_IDC_MSMT_BATTERY_STATUS: NORMAL
MDC_IDC_MSMT_BATTERY_VOLTAGE: 3.04 V
MDC_IDC_MSMT_LEADCHNL_RV_IMPEDANCE_VALUE: 456 OHM
MDC_IDC_MSMT_LEADCHNL_RV_IMPEDANCE_VALUE: 589 OHM
MDC_IDC_MSMT_LEADCHNL_RV_PACING_THRESHOLD_AMPLITUDE: 0.38 V
MDC_IDC_MSMT_LEADCHNL_RV_PACING_THRESHOLD_PULSEWIDTH: 0.4 MS
MDC_IDC_MSMT_LEADCHNL_RV_SENSING_INTR_AMPL: 10.88 MV
MDC_IDC_PG_IMPLANT_DTM: NORMAL
MDC_IDC_PG_MFG: NORMAL
MDC_IDC_PG_MODEL: NORMAL
MDC_IDC_PG_SERIAL: NORMAL
MDC_IDC_PG_TYPE: NORMAL
MDC_IDC_SESS_CLINIC_NAME: NORMAL
MDC_IDC_SESS_DTM: NORMAL
MDC_IDC_SESS_TYPE: NORMAL
MDC_IDC_SET_BRADY_HYSTRATE: NORMAL
MDC_IDC_SET_BRADY_LOWRATE: 60 {BEATS}/MIN
MDC_IDC_SET_BRADY_MAX_SENSOR_RATE: 130 {BEATS}/MIN
MDC_IDC_SET_BRADY_MODE: NORMAL
MDC_IDC_SET_LEADCHNL_RV_PACING_AMPLITUDE: 2 V
MDC_IDC_SET_LEADCHNL_RV_PACING_ANODE_ELECTRODE_1: NORMAL
MDC_IDC_SET_LEADCHNL_RV_PACING_CAPTURE_MODE: NORMAL
MDC_IDC_SET_LEADCHNL_RV_PACING_CATHODE_ELECTRODE_1: NORMAL
MDC_IDC_SET_LEADCHNL_RV_PACING_CATHODE_LOCATION_1: NORMAL
MDC_IDC_SET_LEADCHNL_RV_PACING_POLARITY: NORMAL
MDC_IDC_SET_LEADCHNL_RV_PACING_PULSEWIDTH: 0.4 MS
MDC_IDC_SET_LEADCHNL_RV_SENSING_ANODE_ELECTRODE_1: NORMAL
MDC_IDC_SET_LEADCHNL_RV_SENSING_ANODE_LOCATION_1: NORMAL
MDC_IDC_SET_LEADCHNL_RV_SENSING_CATHODE_ELECTRODE_1: NORMAL
MDC_IDC_SET_LEADCHNL_RV_SENSING_CATHODE_LOCATION_1: NORMAL
MDC_IDC_SET_LEADCHNL_RV_SENSING_POLARITY: NORMAL
MDC_IDC_SET_LEADCHNL_RV_SENSING_SENSITIVITY: 2 MV
MDC_IDC_SET_ZONE_DETECTION_INTERVAL: 360 MS
MDC_IDC_SET_ZONE_STATUS: NORMAL
MDC_IDC_SET_ZONE_TYPE: NORMAL
MDC_IDC_SET_ZONE_VENDOR_TYPE: NORMAL
MDC_IDC_STAT_BRADY_DTM_END: NORMAL
MDC_IDC_STAT_BRADY_DTM_START: NORMAL
MDC_IDC_STAT_BRADY_RV_PERCENT_PACED: 25.86 %
MDC_IDC_STAT_EPISODE_RECENT_COUNT: 0
MDC_IDC_STAT_EPISODE_RECENT_COUNT: 0
MDC_IDC_STAT_EPISODE_RECENT_COUNT: 2
MDC_IDC_STAT_EPISODE_RECENT_COUNT_DTM_END: NORMAL
MDC_IDC_STAT_EPISODE_RECENT_COUNT_DTM_START: NORMAL
MDC_IDC_STAT_EPISODE_TOTAL_COUNT: 0
MDC_IDC_STAT_EPISODE_TOTAL_COUNT: 1
MDC_IDC_STAT_EPISODE_TOTAL_COUNT: 3680
MDC_IDC_STAT_EPISODE_TOTAL_COUNT_DTM_END: NORMAL
MDC_IDC_STAT_EPISODE_TOTAL_COUNT_DTM_START: NORMAL
MDC_IDC_STAT_EPISODE_TYPE: NORMAL

## 2023-10-17 DIAGNOSIS — J44.9 CHRONIC OBSTRUCTIVE PULMONARY DISEASE, UNSPECIFIED COPD TYPE (H): ICD-10-CM

## 2023-10-18 NOTE — TELEPHONE ENCOUNTER
Palak      Last Written Prescription Date:  8/31/23  Last Fill Quantity: 60,   # refills: 0  Last Office Visit: 10/3/23  Future Office visit:    Next 5 appointments (look out 90 days)      Dec 04, 2023 10:00 AM  (Arrive by 9:45 AM)  Return Visit with Alessandra Wilkins DPM  [facility] ([facility] ) [address] [place] [address] [phone]             Routing refill request to provider for review/approval because:

## 2023-10-19 RX ORDER — FLUTICASONE FUROATE, UMECLIDINIUM BROMIDE AND VILANTEROL TRIFENATATE 200; 62.5; 25 UG/1; UG/1; UG/1
POWDER RESPIRATORY (INHALATION)
Qty: 60 EACH | Refills: 0 | Status: SHIPPED | OUTPATIENT
Start: 2023-10-19 | End: 2023-11-16

## 2023-10-31 ENCOUNTER — ANCILLARY PROCEDURE (OUTPATIENT)
Dept: MAMMOGRAPHY | Facility: OTHER | Age: 73
End: 2023-10-31
Attending: FAMILY MEDICINE
Payer: MEDICARE

## 2023-10-31 ENCOUNTER — TELEPHONE (OUTPATIENT)
Dept: MAMMOGRAPHY | Facility: OTHER | Age: 73
End: 2023-10-31

## 2023-10-31 DIAGNOSIS — Z12.31 VISIT FOR SCREENING MAMMOGRAM: ICD-10-CM

## 2023-10-31 PROCEDURE — 77067 SCR MAMMO BI INCL CAD: CPT | Mod: TC

## 2023-11-01 ENCOUNTER — TELEPHONE (OUTPATIENT)
Dept: FAMILY MEDICINE | Facility: OTHER | Age: 73
End: 2023-11-01

## 2023-11-01 ENCOUNTER — OFFICE VISIT (OUTPATIENT)
Dept: FAMILY MEDICINE | Facility: OTHER | Age: 73
End: 2023-11-01
Attending: FAMILY MEDICINE
Payer: COMMERCIAL

## 2023-11-01 VITALS
TEMPERATURE: 98.3 F | OXYGEN SATURATION: 95 % | SYSTOLIC BLOOD PRESSURE: 128 MMHG | HEART RATE: 92 BPM | DIASTOLIC BLOOD PRESSURE: 80 MMHG | BODY MASS INDEX: 26.34 KG/M2 | WEIGHT: 144 LBS

## 2023-11-01 DIAGNOSIS — T14.8XXA HEMATOMA OF SKIN: Primary | ICD-10-CM

## 2023-11-01 PROCEDURE — 99213 OFFICE O/P EST LOW 20 MIN: CPT | Performed by: FAMILY MEDICINE

## 2023-11-01 PROCEDURE — G0463 HOSPITAL OUTPT CLINIC VISIT: HCPCS

## 2023-11-01 ASSESSMENT — PAIN SCALES - GENERAL: PAINLEVEL: SEVERE PAIN (7)

## 2023-11-01 NOTE — TELEPHONE ENCOUNTER
2:12 PM    Reason for Call: OVERBOOK    Patient is having the following symptoms: dropped shovel on leg was bruised now has a lump    The patient is requesting an appointment for this week with Dr Yen.    Was an appointment offered for this call? No  If yes : Appointment type              Date    Preferred method for responding to this message: Telephone Call  What is your phone number ?[phone]     If we cannot reach you directly, may we leave a detailed response at the number you provided? Yes    Can this message wait until your PCP/provider returns, if unavailable today? Not applicable    Day Gomes

## 2023-11-01 NOTE — PROGRESS NOTES
"  Assessment & Plan     Hematoma of skin  On eliquis.  Follow and review.               BMI:   Estimated body mass index is 26.34 kg/m  as calculated from the following:    Height as of 9/8/23: 1.575 m (5' 2\").    Weight as of this encounter: 65.3 kg (144 lb).           No follow-ups on file.    Braydon Yen MD  [facility]   Mary Alice is a 73 year old, presenting for the following health issues:  RECHECK      HPI     Lump     Duration: 1 1/2 weeks   Description (location/character/radiation): right lower leg  Intensity:  moderate  Accompanying signs and symptoms: bruised, swollen, hard lump, painful  History (similar episodes/previous evaluation): None  Precipitating or alleviating factors: dropped shovel on leg  Therapies tried and outcome: None              Review of Systems   Constitutional, HEENT, cardiovascular, pulmonary, gi and gu systems are negative, except as otherwise noted.      Objective    /80   Pulse 92   Temp 98.3  F (36.8  C) (Tympanic)   Wt 65.3 kg (144 lb)   SpO2 95%   BMI 26.34 kg/m    Body mass index is 26.34 kg/m .  Physical Exam   GENERAL: healthy, alert and no distress  MS: no gross musculoskeletal defects noted, no edema  MS: hematoma with ecchymosis right lateral shin.    SKIN: no suspicious lesions or rashes                      "

## 2023-11-16 DIAGNOSIS — J44.9 CHRONIC OBSTRUCTIVE PULMONARY DISEASE, UNSPECIFIED COPD TYPE (H): ICD-10-CM

## 2023-11-16 RX ORDER — FLUTICASONE FUROATE, UMECLIDINIUM BROMIDE AND VILANTEROL TRIFENATATE 200; 62.5; 25 UG/1; UG/1; UG/1
POWDER RESPIRATORY (INHALATION)
Qty: 60 EACH | Refills: 1 | Status: SHIPPED | OUTPATIENT
Start: 2023-11-16 | End: 2023-12-20

## 2023-11-16 NOTE — TELEPHONE ENCOUNTER
Palak       Last Written Prescription Date:  10/19/2023  Last Fill Quantity: 60,   # refills: 0  Last Office Visit: 11/01/2023  Future Office visit:    Next 5 appointments (look out 90 days)      Dec 04, 2023 10:00 AM  (Arrive by 9:45 AM)  Return Visit with Alessandra Wilkins DPM  [facility] ([facility] ) [address] [place] [address] [phone]

## 2023-11-19 DIAGNOSIS — I10 ESSENTIAL HYPERTENSION: ICD-10-CM

## 2023-11-20 RX ORDER — TORSEMIDE 20 MG/1
TABLET ORAL
Qty: 180 TABLET | Refills: 2 | Status: SHIPPED | OUTPATIENT
Start: 2023-11-20 | End: 2024-05-31

## 2023-11-20 NOTE — TELEPHONE ENCOUNTER
Torsemide      Last Written Prescription Date:  1/4/23  Last Fill Quantity: 180,   # refills: 1  Last Office Visit: 11/1/23  Future Office visit:    Next 5 appointments (look out 90 days)      Dec 04, 2023 10:00 AM  (Arrive by 9:45 AM)  Return Visit with Alessandra Wilkins DPM  [facility] ([facility] ) [address] [place] [address] [phone]             Routing refill request to provider for review/approval because:

## 2023-11-20 NOTE — TELEPHONE ENCOUNTER
Diuretics (Including Combos) Protocol Failed      Normal serum creatinine on file in past 12 months        Recent Labs   Lab Test 09/08/23  1420   CR 1.62*        Normal serum sodium on file in past 12 months        Recent Labs   Lab Test 09/08/23  1420   *

## 2023-11-28 NOTE — TELEPHONE ENCOUNTER
Patient called and reported that her angiogram when well, flow limiting blockages were not seen in the vessels.  Patient will follow up with Pau Nelson NP  6/22/18 at 0930.   Giana Andino RN-BSN     Spinal Block      Patient reassessed immediately prior to procedure    Patient location during procedure: OB  Preanesthetic Checklist  Completed: patient identified, IV checked, site marked, risks and benefits discussed, surgical consent, monitors and equipment checked, pre-op evaluation and timeout performed  Spinal Block Prep:  Sterile Tech:cap, gloves and sterile barriers  Patient Monitoring:EKG, continuous pulse oximetry and blood pressure monitoring    Spinal Block Procedure  Approach:midline  Guidance:landmark technique and palpation technique  Location:L3-L4  Needle Type:Sprotte  Needle Gauge:25 G  Placement of Spinal needle event:cerebrospinal fluid aspirated  Paresthesia: no  Fluid Appearance:clear     Post Assessment  Patient Tolerance:patient tolerated the procedure well with no apparent complications  Complications no

## 2023-11-30 DIAGNOSIS — I48.19 PERSISTENT ATRIAL FIBRILLATION (H): ICD-10-CM

## 2023-11-30 RX ORDER — APIXABAN 5 MG/1
TABLET, FILM COATED ORAL
Qty: 180 TABLET | Refills: 3 | Status: SHIPPED | OUTPATIENT
Start: 2023-11-30

## 2023-12-04 ENCOUNTER — OFFICE VISIT (OUTPATIENT)
Dept: PODIATRY | Facility: OTHER | Age: 73
End: 2023-12-04
Attending: PODIATRIST
Payer: MEDICARE

## 2023-12-04 VITALS
HEART RATE: 89 BPM | DIASTOLIC BLOOD PRESSURE: 92 MMHG | TEMPERATURE: 98.2 F | OXYGEN SATURATION: 92 % | SYSTOLIC BLOOD PRESSURE: 150 MMHG

## 2023-12-04 DIAGNOSIS — Z79.01 LONG TERM CURRENT USE OF ANTICOAGULANT THERAPY: ICD-10-CM

## 2023-12-04 DIAGNOSIS — I25.10 CORONARY ARTERY DISEASE INVOLVING NATIVE CORONARY ARTERY OF NATIVE HEART WITHOUT ANGINA PECTORIS: ICD-10-CM

## 2023-12-04 DIAGNOSIS — E78.2 MIXED HYPERLIPIDEMIA: ICD-10-CM

## 2023-12-04 DIAGNOSIS — L60.3 ONYCHODYSTROPHY: Primary | ICD-10-CM

## 2023-12-04 PROCEDURE — G0463 HOSPITAL OUTPT CLINIC VISIT: HCPCS

## 2023-12-04 PROCEDURE — 11721 DEBRIDE NAIL 6 OR MORE: CPT | Performed by: PODIATRIST

## 2023-12-04 RX ORDER — ATORVASTATIN CALCIUM 10 MG/1
10 TABLET, FILM COATED ORAL DAILY
Qty: 90 TABLET | Refills: 0 | Status: SHIPPED | OUTPATIENT
Start: 2023-12-04 | End: 2024-03-05

## 2023-12-04 ASSESSMENT — PAIN SCALES - GENERAL: PAINLEVEL: NO PAIN (0)

## 2023-12-04 NOTE — TELEPHONE ENCOUNTER
Lipitor      Last Written Prescription Date:  9/1/23  Last Fill Quantity: 90,   # refills: 0  Last Office Visit: 11/1/23  Future Office visit:    Next 5 appointments (look out 90 days)      Feb 08, 2024 10:15 AM  (Arrive by 10:00 AM)  Return Visit with Alessandra Wilkins DPM  [facility] ([facility] ) [address] [place] [address] [phone]             Routing refill request to provider for review/approval because:

## 2024-01-02 ENCOUNTER — TELEPHONE (OUTPATIENT)
Dept: FAMILY MEDICINE | Facility: OTHER | Age: 74
End: 2024-01-02

## 2024-01-02 NOTE — TELEPHONE ENCOUNTER
11:43 AM    Reason for Call: OVERBOOK    Patient is having the following symptoms: oxygen jaimie/contract for O2 has .    The patient is requesting an appointment for magdy with Dr Yen.    Was an appointment offered for this call? No  If yes : Appointment type              Date    Preferred method for responding to this message: Telephone Call  What is your phone number ?[phone]     If we cannot reach you directly, may we leave a detailed response at the number you provided? Yes    Can this message wait until your PCP/provider returns, if unavailable today? Not applicable    Day Gomes

## 2024-01-10 ASSESSMENT — ANXIETY QUESTIONNAIRES
8. IF YOU CHECKED OFF ANY PROBLEMS, HOW DIFFICULT HAVE THESE MADE IT FOR YOU TO DO YOUR WORK, TAKE CARE OF THINGS AT HOME, OR GET ALONG WITH OTHER PEOPLE?: SOMEWHAT DIFFICULT
3. WORRYING TOO MUCH ABOUT DIFFERENT THINGS: SEVERAL DAYS
5. BEING SO RESTLESS THAT IT IS HARD TO SIT STILL: SEVERAL DAYS
2. NOT BEING ABLE TO STOP OR CONTROL WORRYING: SEVERAL DAYS
6. BECOMING EASILY ANNOYED OR IRRITABLE: NOT AT ALL
GAD7 TOTAL SCORE: 6
GAD7 TOTAL SCORE: 6
1. FEELING NERVOUS, ANXIOUS, OR ON EDGE: NOT AT ALL
4. TROUBLE RELAXING: NEARLY EVERY DAY
7. FEELING AFRAID AS IF SOMETHING AWFUL MIGHT HAPPEN: NOT AT ALL
IF YOU CHECKED OFF ANY PROBLEMS ON THIS QUESTIONNAIRE, HOW DIFFICULT HAVE THESE PROBLEMS MADE IT FOR YOU TO DO YOUR WORK, TAKE CARE OF THINGS AT HOME, OR GET ALONG WITH OTHER PEOPLE: SOMEWHAT DIFFICULT
GAD7 TOTAL SCORE: 6
7. FEELING AFRAID AS IF SOMETHING AWFUL MIGHT HAPPEN: NOT AT ALL

## 2024-01-10 ASSESSMENT — PATIENT HEALTH QUESTIONNAIRE - PHQ9
SUM OF ALL RESPONSES TO PHQ QUESTIONS 1-9: 5
SUM OF ALL RESPONSES TO PHQ QUESTIONS 1-9: 5
10. IF YOU CHECKED OFF ANY PROBLEMS, HOW DIFFICULT HAVE THESE PROBLEMS MADE IT FOR YOU TO DO YOUR WORK, TAKE CARE OF THINGS AT HOME, OR GET ALONG WITH OTHER PEOPLE: SOMEWHAT DIFFICULT

## 2024-01-11 NOTE — PROGRESS NOTES
"  Assessment & Plan     Chronic obstructive pulmonary disease, unspecified COPD type (H)  Uses oxygen at night.  Borderline oxygen here.  Getting home oximetry to verify ongoing need and take it from there.  Sent. Doing well and breathing well right now.    - Home Oxygen Order for DME - ONLY FOR DME  - Overnight oximetry study for DME - ONLY FOR DME; Future            BMI  Estimated body mass index is 26.89 kg/m  as calculated from the following:    Height as of 9/8/23: 1.575 m (5' 2\").    Weight as of this encounter: 66.7 kg (147 lb).           No follow-ups on file.    Subjective   Mary Alice is a 73 year old, presenting for the following health issues:  Clinic Care Coordination - Face To Face      HPI     Face to face    Duration: annual   Description (location/character/radiation): oxygen recert  Intensity:  moderate  Accompanying signs and symptoms: SOB, apnea at night   History (similar episodes/previous evaluation): None  Precipitating or alleviating factors: None  Therapies tried and outcome: oxygen     Patient is currently on 2.5 liters oxygen at night via nasal cannula and should continue oxygen therapy.  Length of need is 99 months.      I certify that this patient, Mary Alice Cameron has been under my care (or a nurse practitioner or physican's assistant working with me). This is the face-to-face encounter for oxygen medical necessity.      At the time of this encounter supplemental oxygen is reasonable and necessary and is expected to improve the patient's condition in a home setting.       Patient has continued oxygen desaturation due to COPD J44.9.    If portability is ordered, is the patient mobile within the home? NA                Review of Systems   Constitutional, HEENT, cardiovascular, pulmonary, gi and gu systems are negative, except as otherwise noted.      Objective    /82   Pulse 91   Temp 97.2  F (36.2  C) (Tympanic)   Wt 66.7 kg (147 lb)   SpO2 93%   BMI 26.89 kg/m    Body mass index is " 26.89 kg/m .  Physical Exam   GENERAL: alert and no distress  NECK: no adenopathy, no asymmetry, masses, or scars  RESP: decreased breath sounds throughout  CV: regular rate and rhythm, normal S1 S2, no S3 or S4, no murmur, click or rub, no peripheral edema  ABDOMEN: soft, nontender, no hepatosplenomegaly, no masses and bowel sounds normal  MS: no gross musculoskeletal defects noted, no edema

## 2024-01-14 ENCOUNTER — ANCILLARY PROCEDURE (OUTPATIENT)
Dept: CARDIOLOGY | Facility: CLINIC | Age: 74
End: 2024-01-14
Attending: INTERNAL MEDICINE
Payer: MEDICARE

## 2024-01-14 DIAGNOSIS — Z95.0 CARDIAC PACEMAKER IN SITU: ICD-10-CM

## 2024-01-14 DIAGNOSIS — I44.2 ATRIOVENTRICULAR BLOCK, COMPLETE (H): ICD-10-CM

## 2024-01-14 PROCEDURE — 93294 REM INTERROG EVL PM/LDLS PM: CPT | Performed by: INTERNAL MEDICINE

## 2024-01-14 PROCEDURE — 93296 REM INTERROG EVL PM/IDS: CPT

## 2024-01-17 ENCOUNTER — OFFICE VISIT (OUTPATIENT)
Dept: FAMILY MEDICINE | Facility: OTHER | Age: 74
End: 2024-01-17
Attending: FAMILY MEDICINE
Payer: COMMERCIAL

## 2024-01-17 ENCOUNTER — DOCUMENTATION ONLY (OUTPATIENT)
Dept: FAMILY MEDICINE | Facility: OTHER | Age: 74
End: 2024-01-17

## 2024-01-17 VITALS
HEART RATE: 91 BPM | WEIGHT: 147 LBS | TEMPERATURE: 97.2 F | SYSTOLIC BLOOD PRESSURE: 124 MMHG | OXYGEN SATURATION: 93 % | BODY MASS INDEX: 26.89 KG/M2 | DIASTOLIC BLOOD PRESSURE: 82 MMHG

## 2024-01-17 DIAGNOSIS — J44.9 CHRONIC OBSTRUCTIVE PULMONARY DISEASE, UNSPECIFIED COPD TYPE (H): Primary | ICD-10-CM

## 2024-01-17 PROCEDURE — G0463 HOSPITAL OUTPT CLINIC VISIT: HCPCS

## 2024-01-17 PROCEDURE — 99213 OFFICE O/P EST LOW 20 MIN: CPT | Performed by: FAMILY MEDICINE

## 2024-01-17 ASSESSMENT — PAIN SCALES - GENERAL: PAINLEVEL: NO PAIN (0)

## 2024-01-18 ENCOUNTER — MYC MEDICAL ADVICE (OUTPATIENT)
Dept: PULMONOLOGY | Facility: OTHER | Age: 74
End: 2024-01-18

## 2024-01-18 ENCOUNTER — TELEPHONE (OUTPATIENT)
Dept: PULMONOLOGY | Facility: OTHER | Age: 74
End: 2024-01-18

## 2024-01-18 ASSESSMENT — SLEEP AND FATIGUE QUESTIONNAIRES
HOW LIKELY ARE YOU TO NOD OFF OR FALL ASLEEP WHILE SITTING QUIETLY AFTER LUNCH WITHOUT ALCOHOL: WOULD NEVER DOZE
HOW LIKELY ARE YOU TO NOD OFF OR FALL ASLEEP WHILE SITTING AND READING: SLIGHT CHANCE OF DOZING
HOW LIKELY ARE YOU TO NOD OFF OR FALL ASLEEP IN A CAR, WHILE STOPPED FOR A FEW MINUTES IN TRAFFIC: WOULD NEVER DOZE
HOW LIKELY ARE YOU TO NOD OFF OR FALL ASLEEP WHILE SITTING AND TALKING TO SOMEONE: WOULD NEVER DOZE
HOW LIKELY ARE YOU TO NOD OFF OR FALL ASLEEP WHILE LYING DOWN TO REST IN THE AFTERNOON WHEN CIRCUMSTANCES PERMIT: MODERATE CHANCE OF DOZING
HOW LIKELY ARE YOU TO NOD OFF OR FALL ASLEEP WHEN YOU ARE A PASSENGER IN A CAR FOR AN HOUR WITHOUT A BREAK: WOULD NEVER DOZE
HOW LIKELY ARE YOU TO NOD OFF OR FALL ASLEEP WHILE SITTING INACTIVE IN A PUBLIC PLACE: WOULD NEVER DOZE
HOW LIKELY ARE YOU TO NOD OFF OR FALL ASLEEP WHILE WATCHING TV: SLIGHT CHANCE OF DOZING

## 2024-01-18 NOTE — TELEPHONE ENCOUNTER
EXTERNAL ORDER FOR ROBBI, SENT QUESTIONNAIRE [facility]IA [employer] TO CALL FOR SCHEDULING. 01/18

## 2024-01-19 NOTE — PROGRESS NOTES
01/18/24 1515   Reason For Your Visit   Please briefly describe the main reason(s) for your sleep visit To find out if I can get the oxygen number down so I can keep my oxygen.   Approximately when did this problem start About 6-8 months ago   What are your goals for this visit I quit breathing while I am  sleeping at night, and need to know why.   Time in Bed - Work Or School Days   Do you work or go to school Yes   What time do you usually get into bed 11-12 midnight   About how long does it take you to fall asleep Usually 1 hour   How often do you have trouble falling asleep 4-5   How often do you wake up during the night 3-4   Do you work days/evenings/nights/rotating shifts Days;Nights   What wakes you up at night Uncertain   How often do you have trouble falling back to sleep 3-4   About how long does it take to fall back to sleep 1-2 hours   Do you use an alarm No   Time in Bed - Weekends/Non-work Days/All Other Days   What time do you usually get into bed Midnight   About how long does it take you to fall asleep 1-2 hours   What time do you usually get out of bed to start your day 7:30   Do you use an alarm No   Sleep Need   On average, about how much sleep do you think you get 7 hours   About how much sleep do you think you need 8 hours   Sleep Position   Which sleep positions do you prefer Side   How often do you take a nap on purpose 2 days   Do you feel better after naps Yes   How often do you doze off unintentionally 3 daysn   Have you ever had a driving accident or near-miss due to sleepiness/drowsiness No   Sleep Disruptions - Breathing/Snoring   Do you snore Yes   Do other people complain about your snoring Yes   Have you been told you stop breathing in your sleep Yes   Do you have issues with any of the following Morning mouth dryness;Stuffy nose when you wake up;Getting up to urinate more than once   Sleep Disruptions - Movement   Do you get pain, discomfort, with an urge to move No   Does it  happen when you are resting No   Does it get better if you move around Yes   Does it happen more at night No   Have you been told you kick your legs at night No   Sleep Disruptions - Behaviours in Sleep   Have you ever experienced any of the following during your sleep Teeth grinding;Night terrors (screaming,yelling or acting afraid but not recalling event)   Do you ever experience sudden muscle weakness during the day No   4) Is there anything else you would like your sleep provider to know No   Caffeine, Alcohol and Other Substances   How many caffeinated beverages (coffee, tea, soda, energy drinks) per day 1   What time of day is your last caffeine use 10amt   List any prescribed or over the counter stimulants that you take 0   List previous sleep medications you have tried Melatonin   Do you drink alcohol to help you sleep No   Do you drink alcohol near bedtime No   Family History   Has any family member been diagnosed with a sleep disorder No   In the last TWO WEEKS have you experienced any of the following symptoms?   Changes in Vision Yes   Sore Throat in Morning Yes   Dry Mouth in the Morning Yes   Shortness of Breath Lying Flat Yes   Awakening with Shortness of Breath Yes   Increased Cough Yes   Urinating More than Once at Night Yes           1/18/2024     3:21 PM    Arrington Sleepiness Scale ( GIACOMO Patrick  [id number]<br>ESS - [place]/English - Final version - 21 Nov 07 - [facility] [place].)   Sitting and reading Slight chance of dozing   Watching TV Slight chance of dozing   Sitting, inactive in a public place (e.g. a theatre or a meeting) Would never doze   As a passenger in a car for an hour without a break Would never doze   Lying down to rest in the afternoon when circumstances permit Moderate chance of dozing   Sitting and talking to someone Would never doze   Sitting quietly after a lunch without alcohol Would never doze   In a car, while stopped for a few minutes in traffic Would never doze   [place]  Score (MC) 4   Marlin Score (Sleep) 4         1/18/2024     3:18 PM   Insomnia Severity Index (NICKO)   Difficulty falling asleep 2   Difficulty staying asleep 2   Problems waking up too early 0   How SATISFIED/DISSATISFIED are you with your CURRENT sleep pattern? 2   How NOTICEABLE to others do you think your sleep problem is in terms of impairing the quality of your life? 0   How WORRIED/DISTRESSED are you about your current sleep problem? 1   To what extent do you consider your sleep problem to INTERFERE with your daily functioning (e.g. daytime fatigue, mood, ability to function at work/daily chores, concentration, memory, mood, etc.) CURRENTLY? 2   NICKO Total Score 9         1/18/2024     3:20 PM   STOP BANG Questionnaire (  2008, the American Society of Anesthesiologists, Inc. [employer]   1. Snoring - Do you snore loudly (louder than talking or loud enough to be heard through closed doors)? Yes   2. Tired - Do you often feel tired, fatigued, or sleepy during daytime? Yes   3. Observed - Has anyone observed you stop breathing during your sleep? Yes   4. Blood pressure - Do you have or are you being treated for high blood pressure? Yes   5. BMI - BMI more than 35 kg/m2? No   6. Age - Age over 50 yr old? Yes   7. Neck circumference - Neck circumference greater than 40 cm? Yes   8. Gender - Gender male? No   STOP BANG Score (MC): 7 (High risk of MADDISON)

## 2024-01-20 NOTE — TELEPHONE ENCOUNTER
Chart review prior to sleep testing.    Patient Summary:  73 year old female who is referred for need to document need for supplemental oxygen.    Patient Active Problem List    Diagnosis Date Noted    Postoperative complete heart block (H) 11/28/2021     Priority: Medium     subsequent recovery of AV node conduction      Cardiac pacemaker in situ 11/28/2021     Priority: Medium     VVI; left bundle pacing      Status post transcatheter aortic valve replacement (TAVR) using bioprosthesis 07/20/2021     Priority: Medium    Aortic stenosis, severe 07/14/2021     Priority: Medium    Other ill-defined heart diseases 04/15/2021     Priority: Medium     Added automatically from request for surgery [id number]      Aortic valve stenosis, etiology of cardiac valve disease unspecified 04/15/2021     Priority: Medium     Added automatically from request for surgery [id number]      Aortic valve disorder 03/12/2021     Priority: Medium     Formatting of this note might be different from the original.  BiCuspid Ao Valve      Mitral valve disorder 03/12/2021     Priority: Medium    Cardiomegaly 03/12/2021     Priority: Medium    Infection due to 2019 novel coronavirus 02/04/2021     Priority: Medium    Hypokalemia 09/08/2019     Priority: Medium    Acute cystitis without hematuria 09/03/2019     Priority: Medium    Small bowel obstruction (H) 09/03/2019     Priority: Medium    Acute renal failure (H24) 09/03/2019     Priority: Medium    Status post coronary angiogram 06/19/2018     Priority: Medium    Coronary artery disease involving native coronary artery of native heart without angina pectoris 06/19/2018     Priority: Medium    Health Care Home 01/26/2017     Priority: Medium    Acute on chronic respiratory failure (H) 01/02/2017     Priority: Medium    Long-term (current) use of anticoagulants [Z79.01] 08/03/2016     Priority: Medium    Essential hypertension 06/29/2016     Priority: Medium    Advance care planning 02/08/2016      Priority: Medium     Advance Care Planning 2/8/2016: Receipt of ACP document:  Received: Health Care Directive which was witnessed or notarized on 1/5/16.  Document previously scanned on 1/22/16.  Validation form completed and sent to be scanned.  Code Status needs to be updated to reflect choices in most recent ACP document.  1/5/16 Health Care Directive indicates preference for DNR code; recommend conversation about goals of care and completion of a POLST.  Confirmed/documented designated decision maker(s).  Added by Shobha Jang            Hypothyroidism, postablative 03/23/2015     Priority: Medium     Problem list name updated by automated process. Provider to review      Acute bronchitis 03/22/2015     Priority: Medium    Mild major depression (H) 08/27/2014     Priority: Medium    Bicuspid aortic valve 03/25/2014     Priority: Medium    Permanent atrial fibrillation (H) 01/17/2014     Priority: Medium    Pulmonary emphysema (H) 01/17/2014     Priority: Medium    Carotid stenosis 03/29/2012     Priority: Medium    Aortic aneurysm (H24) 06/02/2010     Priority: Medium    COPD (chronic obstructive pulmonary disease) (H) 08/19/2009     Priority: Medium     Formatting of this note might be different from the original.  Has been steroid dependent;  Recently hospitalized with Flair and noted Oximetry 88% at rest 11/18/2009 with Respiratory Therapist at [facility].  Medically Disabled due to COPD.      Edema 08/14/2008     Priority: Medium    Depressive disorder 04/10/2007     Priority: Medium       Current Outpatient Medications   Medication    acetaminophen (TYLENOL) 500 MG tablet    albuterol (PROAIR HFA/PROVENTIL HFA/VENTOLIN HFA) 108 (90 Base) MCG/ACT inhaler    amoxicillin (AMOXIL) 500 MG capsule    atorvastatin (LIPITOR) 10 MG tablet    Calcium Carb-Cholecalciferol (CALTRATE 600+D3 SOFT PO)    diphenhydrAMINE (BENADRYL) 25 MG tablet    ELIQUIS ANTICOAGULANT 5 MG tablet    hydrOXYzine (ATARAX) 25 MG  "tablet    levothyroxine (SYNTHROID/LEVOTHROID) 88 MCG tablet    metoprolol succinate ER (TOPROL XL) 100 MG 24 hr tablet    potassium chloride ER (KLOR-CON M) 10 MEQ CR tablet    spironolactone (ALDACTONE) 25 MG tablet    torsemide (DEMADEX) 20 MG tablet    TRELEGY ELLIPTA 200-62.5-25 MCG/ACT oral inhaler     No current facility-administered medications for this visit.       Pertinent PMHx of COPD, pulmonary emphysema, acute on chronic respiratory failure, permanent atrial fibrillation, CAD, HTN, post-operative complete heart block with pacer, MDD.    PFT's:  8/12/2022 - weight 144 lbs.  FVC 73%, FEV1 31%, ratio 42%.  SVC 72%.  Hgb 13.5.      Echo's:  7/12/2022 - S/P TAVR.  LVEF 55-60%.  Normal global RV function.  RVSP 33.2 mmHg + RA.    STOP-BANG score of 7, with unknown neck circumference.  [place] score of 4.  NICKO: 9    BMI of Estimated body mass index is 26.89 kg/m  as calculated from the following:    Height as of 9/8/23: 1.575 m (5' 2\").    Weight as of 1/17/24: 66.7 kg (147 lb).     Chief concern per questionnaire: \"To find out if I can get the oxygen number down so I can keep my oxygen. \"    Duration of symptoms:  \"About 6-8 months ago \"    Goals for visit per questionnaire: \"I quit breathing while I am sleeping at night, and need to know why. \"    Sleep pattern:  Workdays.  11pm - [place] to ?.  Weekends.  [place] - 7:30am.  Time to fall asleep: ~60 minutes.  Awakenings: 3-4 times per night, 1-2 hours to return to sleep.  Average total sleep time:  7 hours  Napping.  2 days per week, ? hours per nap.    No for RLS screen.  No for sleep walking.  No for dream enactment behavior.  Yes for bruxism.    No for morning headaches.  Yes for snoring.  Yes for observed apnea.  No for FHx of MADDISON.    Caffeine use:  No for 3+ per day.  No for within 6 hours of bed.    A/P:    1.)  High likelihood of MADDISON with STOP-BANG score of 7.   - Would appear to be candidate for either home sleep testing or in-lab PSG.    ---  This note was " written with the assistance of the Dragon voice-dictation technology software. The final document, although reviewed, may contain errors. For corrections, please contact the office.    Kye Emanuel MD    Sleep Medicine  [place], [place]  Main Office: [phone]  [facility] [address], [address]  Schedule visits: [phone]  Main Office: [phone]  Fax: [phone]

## 2024-01-30 LAB
MDC_IDC_EPISODE_DTM: NORMAL
MDC_IDC_EPISODE_DURATION: 0 S
MDC_IDC_EPISODE_DURATION: 1 S
MDC_IDC_EPISODE_ID: 3720
MDC_IDC_EPISODE_ID: 3721
MDC_IDC_EPISODE_ID: 3722
MDC_IDC_EPISODE_ID: 3723
MDC_IDC_EPISODE_ID: 3724
MDC_IDC_EPISODE_ID: 3725
MDC_IDC_EPISODE_ID: 3726
MDC_IDC_EPISODE_ID: 3727
MDC_IDC_EPISODE_ID: 3728
MDC_IDC_EPISODE_ID: 3729
MDC_IDC_EPISODE_ID: 3730
MDC_IDC_EPISODE_ID: 3731
MDC_IDC_EPISODE_ID: 3732
MDC_IDC_EPISODE_ID: 3733
MDC_IDC_EPISODE_ID: 3734
MDC_IDC_EPISODE_TYPE: NORMAL
MDC_IDC_LEAD_CONNECTION_STATUS: NORMAL
MDC_IDC_LEAD_IMPLANT_DT: NORMAL
MDC_IDC_LEAD_LOCATION: NORMAL
MDC_IDC_LEAD_LOCATION_DETAIL_1: NORMAL
MDC_IDC_LEAD_MFG: NORMAL
MDC_IDC_LEAD_MODEL: NORMAL
MDC_IDC_LEAD_POLARITY_TYPE: NORMAL
MDC_IDC_LEAD_SERIAL: NORMAL
MDC_IDC_LEAD_SPECIAL_FUNCTION: NORMAL
MDC_IDC_MSMT_BATTERY_DTM: NORMAL
MDC_IDC_MSMT_BATTERY_REMAINING_LONGEVITY: 158 MO
MDC_IDC_MSMT_BATTERY_RRT_TRIGGER: 2.62
MDC_IDC_MSMT_BATTERY_STATUS: NORMAL
MDC_IDC_MSMT_BATTERY_VOLTAGE: 3.04 V
MDC_IDC_MSMT_LEADCHNL_RV_IMPEDANCE_VALUE: 475 OHM
MDC_IDC_MSMT_LEADCHNL_RV_IMPEDANCE_VALUE: 608 OHM
MDC_IDC_MSMT_LEADCHNL_RV_PACING_THRESHOLD_AMPLITUDE: 0.38 V
MDC_IDC_MSMT_LEADCHNL_RV_PACING_THRESHOLD_PULSEWIDTH: 0.4 MS
MDC_IDC_MSMT_LEADCHNL_RV_SENSING_INTR_AMPL: 20 MV
MDC_IDC_PG_IMPLANT_DTM: NORMAL
MDC_IDC_PG_MFG: NORMAL
MDC_IDC_PG_MODEL: NORMAL
MDC_IDC_PG_SERIAL: NORMAL
MDC_IDC_PG_TYPE: NORMAL
MDC_IDC_SESS_CLINIC_NAME: NORMAL
MDC_IDC_SESS_DTM: NORMAL
MDC_IDC_SESS_TYPE: NORMAL
MDC_IDC_SET_BRADY_HYSTRATE: NORMAL
MDC_IDC_SET_BRADY_LOWRATE: 60 {BEATS}/MIN
MDC_IDC_SET_BRADY_MAX_SENSOR_RATE: 130 {BEATS}/MIN
MDC_IDC_SET_BRADY_MODE: NORMAL
MDC_IDC_SET_LEADCHNL_RV_PACING_AMPLITUDE: 2 V
MDC_IDC_SET_LEADCHNL_RV_PACING_ANODE_ELECTRODE_1: NORMAL
MDC_IDC_SET_LEADCHNL_RV_PACING_CAPTURE_MODE: NORMAL
MDC_IDC_SET_LEADCHNL_RV_PACING_CATHODE_ELECTRODE_1: NORMAL
MDC_IDC_SET_LEADCHNL_RV_PACING_CATHODE_LOCATION_1: NORMAL
MDC_IDC_SET_LEADCHNL_RV_PACING_POLARITY: NORMAL
MDC_IDC_SET_LEADCHNL_RV_PACING_PULSEWIDTH: 0.4 MS
MDC_IDC_SET_LEADCHNL_RV_SENSING_ANODE_ELECTRODE_1: NORMAL
MDC_IDC_SET_LEADCHNL_RV_SENSING_ANODE_LOCATION_1: NORMAL
MDC_IDC_SET_LEADCHNL_RV_SENSING_CATHODE_ELECTRODE_1: NORMAL
MDC_IDC_SET_LEADCHNL_RV_SENSING_CATHODE_LOCATION_1: NORMAL
MDC_IDC_SET_LEADCHNL_RV_SENSING_POLARITY: NORMAL
MDC_IDC_SET_LEADCHNL_RV_SENSING_SENSITIVITY: 2 MV
MDC_IDC_SET_ZONE_DETECTION_INTERVAL: 360 MS
MDC_IDC_SET_ZONE_STATUS: NORMAL
MDC_IDC_SET_ZONE_TYPE: NORMAL
MDC_IDC_SET_ZONE_VENDOR_TYPE: NORMAL
MDC_IDC_STAT_BRADY_DTM_END: NORMAL
MDC_IDC_STAT_BRADY_DTM_START: NORMAL
MDC_IDC_STAT_BRADY_RV_PERCENT_PACED: 12.57 %
MDC_IDC_STAT_EPISODE_RECENT_COUNT: 0
MDC_IDC_STAT_EPISODE_RECENT_COUNT: 0
MDC_IDC_STAT_EPISODE_RECENT_COUNT: 53
MDC_IDC_STAT_EPISODE_RECENT_COUNT_DTM_END: NORMAL
MDC_IDC_STAT_EPISODE_RECENT_COUNT_DTM_START: NORMAL
MDC_IDC_STAT_EPISODE_TOTAL_COUNT: 0
MDC_IDC_STAT_EPISODE_TOTAL_COUNT: 1
MDC_IDC_STAT_EPISODE_TOTAL_COUNT: 3733
MDC_IDC_STAT_EPISODE_TOTAL_COUNT_DTM_END: NORMAL
MDC_IDC_STAT_EPISODE_TOTAL_COUNT_DTM_START: NORMAL
MDC_IDC_STAT_EPISODE_TYPE: NORMAL

## 2024-02-06 DIAGNOSIS — J44.9 CHRONIC OBSTRUCTIVE PULMONARY DISEASE, UNSPECIFIED COPD TYPE (H): ICD-10-CM

## 2024-02-06 RX ORDER — FLUTICASONE FUROATE, UMECLIDINIUM BROMIDE AND VILANTEROL TRIFENATATE 200; 62.5; 25 UG/1; UG/1; UG/1
POWDER RESPIRATORY (INHALATION)
Qty: 60 EACH | Refills: 0 | Status: SHIPPED | OUTPATIENT
Start: 2024-02-06 | End: 2024-03-27

## 2024-02-06 NOTE — TELEPHONE ENCOUNTER
Trelegy ellipta 200-62.5-25 MCG/ACT INHALER      Last Written Prescription Date:  12-20-23  Last Fill Quantity: 60,   # refills: 0  Last Office Visit: 1-17-24  Future Office visit:    Next 5 appointments (look out 90 days)      Feb 08, 2024 10:15 AM  (Arrive by 10:00 AM)  Return Visit with Alessandra Wilkins DPM  [facility] ([facility] ) [address] [place] [id number] [phone]

## 2024-02-08 ENCOUNTER — OFFICE VISIT (OUTPATIENT)
Dept: PODIATRY | Facility: OTHER | Age: 74
End: 2024-02-08
Attending: PODIATRIST
Payer: MEDICARE

## 2024-02-08 VITALS
TEMPERATURE: 98.1 F | OXYGEN SATURATION: 95 % | SYSTOLIC BLOOD PRESSURE: 126 MMHG | DIASTOLIC BLOOD PRESSURE: 89 MMHG | HEART RATE: 84 BPM

## 2024-02-08 DIAGNOSIS — L60.3 ONYCHODYSTROPHY: Primary | ICD-10-CM

## 2024-02-08 DIAGNOSIS — Z79.01 LONG TERM CURRENT USE OF ANTICOAGULANT THERAPY: ICD-10-CM

## 2024-02-08 PROCEDURE — G0463 HOSPITAL OUTPT CLINIC VISIT: HCPCS

## 2024-02-08 PROCEDURE — 11721 DEBRIDE NAIL 6 OR MORE: CPT | Performed by: PODIATRIST

## 2024-02-08 ASSESSMENT — PAIN SCALES - GENERAL: PAINLEVEL: SEVERE PAIN (6)

## 2024-02-08 NOTE — PROGRESS NOTES
Chief complaint: Patient presents with:  Toenail: Trimming      History of Present Illness: This 73 year old female is seen for follow-up management of elongated, thickened toenails. She says her LEFT fifth toenail has been digging into the adjacent toe.    She says her lower extremity edema is more controlled. She takes Torsemide. She is not wearing compression socks because she says she cannot get them to fit.    She is on Eliquis for a-fib.     She previously had burning, tingling, and numbness in her feet, but this has been more controlled.    No further pedal complaints today.       /89 (BP Location: Left arm, Patient Position: Sitting, Cuff Size: Adult Regular)   Pulse 84   Temp 98.1  F (36.7  C) (Tympanic)   SpO2 95%      Patient Active Problem List   Diagnosis    Permanent atrial fibrillation (H)    Pulmonary emphysema (H)    Bicuspid aortic valve    Mild major depression (H)    Acute bronchitis    Hypothyroidism, postablative    Advance care planning    Essential hypertension    Long-term (current) use of anticoagulants [Z79.01]    Acute on chronic respiratory failure (H)    Health Care Home    Status post coronary angiogram    Coronary artery disease involving native coronary artery of native heart without angina pectoris    Acute cystitis without hematuria    Small bowel obstruction (H)    Acute renal failure (H24)    Hypokalemia    Infection due to 2019 novel coronavirus    Aortic aneurysm (H24)    Aortic valve disorder    Mitral valve disorder    Cardiomegaly    Carotid stenosis    Depressive disorder    Edema    COPD (chronic obstructive pulmonary disease) (H)    Other ill-defined heart diseases    Aortic valve stenosis, etiology of cardiac valve disease unspecified    Aortic stenosis, severe    Status post transcatheter aortic valve replacement (TAVR) using bioprosthesis    Postoperative complete heart block (H)    Cardiac pacemaker in situ       Past Surgical History:   Procedure Laterality  Date    BACK SURGERY  2006    CARDIAC SURGERY  06/12/2018    Angiogram at [facility] in [place]    COLONOSCOPY N/A 01/19/2016    Procedure: COLONOSCOPY;  Surgeon: Waldemar Bob MD;  Location: [facility]    CV CORONARY ANGIOGRAM N/A 04/30/2021    Procedure: CV CORONARY ANGIOGRAM;  Surgeon: Poli Walsh MD;  Location: [facility]    CV LEFT HEART CATH N/A 04/30/2021    Procedure: CV LEFT HEART CATH;  Surgeon: Poli Walsh MD;  Location: [facility]    CV RIGHT HEART CATH MEASUREMENTS RECORDED N/A 04/30/2021    Procedure: CV RIGHT HEART CATH;  Surgeon: Poli Walsh MD;  Location: [facility]    CV TRANSCATHETER AORTIC VALVE REPLACEMENT N/A 07/14/2021    Procedure: Right femoral Transcaval transcatheter aortic valve replacement (SUMMERS) size 29mm.  Transesophageal echocardiogram per anesthesia;  Surgeon: Domo Sarah MD;  Location: [facility] OR    EP PPM INSERT OF NEW OR REPL W/VENT LEAD N/A 07/14/2021    Procedure: insertion of Permanent Pacemaker;  Surgeon: Eliezer Myers MD;  Location: [facility] OR    HEART CATH FEMORAL CANNULIZATION WITH OPEN STANDBY REPAIR AORTIC VALVE N/A 07/14/2021    Procedure: Cardiopulmonary standby.;  Surgeon: Fly Smith MD;  Location: [facility] OR    HYSTERECTOMY  1980    partial    SLING BLADDER SUSPENSION WITH FASCIA LINNETTE         Current Outpatient Medications   Medication    acetaminophen (TYLENOL) 500 MG tablet    albuterol (PROAIR HFA/PROVENTIL HFA/VENTOLIN HFA) 108 (90 Base) MCG/ACT inhaler    amoxicillin (AMOXIL) 500 MG capsule    atorvastatin (LIPITOR) 10 MG tablet    Calcium Carb-Cholecalciferol (CALTRATE 600+D3 SOFT PO)    diphenhydrAMINE (BENADRYL) 25 MG tablet    ELIQUIS ANTICOAGULANT 5 MG tablet    hydrOXYzine (ATARAX) 25 MG tablet    levothyroxine (SYNTHROID/LEVOTHROID) 88 MCG tablet    metoprolol succinate ER (TOPROL XL) 100 MG 24 hr tablet    potassium chloride ER (KLOR-CON M) 10 MEQ CR tablet     spironolactone (ALDACTONE) 25 MG tablet    torsemide (DEMADEX) 20 MG tablet    TRELEGY ELLIPTA 200-62.5-25 MCG/ACT oral inhaler     No current facility-administered medications for this visit.          Allergies   Allergen Reactions    Amlodipine Besylate Cough     Norvasc    Lisinopril Cough    Ace Inhibitors Cough       Family History   Problem Relation Age of Onset    Ovarian Cancer Mother 72    Asthma Mother     Cancer Mother         ovarian    Hypertension Mother     Prostate Cancer Father     Hypertension Father     Heart Failure Father         CHF    Breast Cancer Sister     Hypertension Sister     Asthma Brother     Hypertension Brother        Social History     Socioeconomic History    Marital status:      Spouse name: None    Number of children: None    Years of education: None    Highest education level: None   Occupational History     Employer: RETIRED     Comment: disabled   Tobacco Use    Smoking status: Former Smoker     Packs/day: 0.50     Years: 41.00     Pack years: 20.50     Types: Cigarettes     Start date: 1/1/1966     Quit date: 1/28/2007     Years since quitting: 15.4    Smokeless tobacco: Never Used   Substance and Sexual Activity    Alcohol use: No     Alcohol/week: 0.0 standard drinks    Drug use: No    Sexual activity: Never     Comment:    Other Topics Concern     Service No    Blood Transfusions Yes     Comment: Permits if needed    Caffeine Concern Yes     Comment: 2 cups coffee daily    Seat Belt Yes    Parent/sibling w/ CABG, MI or angioplasty before 65F 55M? No       ROS: 10 point ROS neg other than the symptoms noted above in the HPI.  EXAM  Constitutional: healthy, alert and no distress    Psychiatric: mentation appears normal and affect normal/bright    VASCULAR:  -Dorsalis pedis pulse +2/4 b/l  -Posterior tibial pulse +1/4 b/l  -Capillary refill time < 3 seconds to b/l hallux  -Hair growth Absent to b/l anterior legs and ankles  -Varicosities and  telangiectasias to foot, bilaterally   -Mild-to-moderate 1+ pitting edema to bilateral foot and 2+ to bilateral leg  NEURO:  -Light touch sensation intact to bilateral foot  DERM:  -Skin temperature, texture and turgor WNL b/l  -Toenails elongated, thickened, dystrophic and discolored x 10  MSK:  -Mild tenderness on the bilateral toenail border of the bilateral hallux  -Lateral deviation of hallux with medial deviation of 1st metatarsal, LEFT   -Prominent bony prominence to dorsal and medial 1st metatarsal head, bilaterally   -Moderate decrease in arch height while patient is NWB, bilaterally     -Muscle strength of ankles +5/5 for dorsiflexion, plantarflexion, ABDUction and ADDuction b/l  -DORSIFLEXION ROM limited to 90 degrees on the bilateral ankle    ============================================================    ASSESSMENT:  (L60.3) Onychodystrophy  (primary encounter diagnosis)    (Z79.01) Long-term (current) use of anticoagulants [Z79.01]        PLAN:  -Patient evaluated and examined. Treatment options discussed with no educational barriers noted.    -Toenail debridement x 10 toenails without incident including reduction in height of the toenails.    -Foot Education provided. This included checking the feet daily looking for new new blisters or wounds, wearing shoes at all times when walking including around the house, and avoiding lotion application between the toes. If there are any signs of infection, the patient should present to the ED as soon as possible. Infections of the foot can be life threatening or lead to amputations of the foot or leg.  ---Patient has dry skin which increases cracks and areas for potential infection on her feet. She has a bilateral gastroc equinus which increases plantar forefoot pressure. She has lower extremity edema which increases her risks of blisters.  -She is on Eliquis which increases her risks of bleeding with cuts on her feet. She understands the importance of checking  her feet daily.    -Patient in agreement with the above treatment plan and all of patient's questions were answered.      RTC 63+ days for toenail debridement        Alessandra Wilkins DPM

## 2024-02-14 NOTE — PROGRESS NOTES
"Mary Alice is a 71 year old who is being evaluated via a billable video visit.      How would you like to obtain your AVS? MyChart  If the video visit is dropped, the invitation should be resent by: Text to cell phone: [phone]  Will anyone else be joining your video visit? No    Vitals - Patient Reported  Weight (Patient Reported): 68 kg (150 lb)  Height (Patient Reported): 165.1 cm (5' 5\")  BMI (Based on Pt Reported Ht/Wt): 24.96  Pain Score: No Pain (0)  Pain Loc: Chest        " Pt comes to the ED with  after visual changes that started 2/8/24 in his left eye- pt stated he regained vision in the eye on 2/10/24. Pt stated that today he began to have fuzziness in his left eye at 1415. BGL 82 mg/dl in triage.

## 2024-02-17 ENCOUNTER — HEALTH MAINTENANCE LETTER (OUTPATIENT)
Age: 74
End: 2024-02-17

## 2024-02-18 NOTE — PROGRESS NOTES
Mary Alice Cameron is a 73 year old female who is being evaluated via a billable telephone visit.       What phone number would you like to be contacted at?PHONE@ [phone]  Home Phone [phone]   Work Phone Not on file.   Mobile [phone]       How would you like to obtain your AVS? [place]       Telephone Virtual Visit Details     Type of service:  Telephone Virtual Visit     Originating Location (pt. Location): Home     Distant Location (provider location):  Off-site, [facility] - [place]      Start Time:  1100  End Time:  1120    Virtual visit for nocturnal hypoxemia and concern for sleep disordered breathing.     A/P:     1.)  Acute on chronic hypoxic respiratory failure in the setting of severe COPD  2.)  High pretest probability for obstructive sleep apnea with STOP-BANG score of 7.  -Comorbid atrial fibrillation, postoperative complete heart block with pacer, hypertension, coronary artery disease    Given her elevated STOP-BANG score as well as evidence of acute on chronic hypoxic respiratory failure, as well as potential for hypercapnic respiratory failure in setting of severe COPD, we did agree to proceed with all-night diagnostic and lab PSG and to start study with her on room air.  Okay to restart supplemental oxygen if SpO2 is less than or equal to 88% for a total of more than 5 minutes.  We will also order all-night transcutaneous CO2 monitoring and pre or post sleep study venous blood gas.    Recommended Sleep Testing/Procedures:    Adult, PSG/Diagnostic w/TCM (Bed 1), and Comments: With TCM and pre-study VBG.  Start study on room air, ok to restart supplemental oxygen if SpO2 is less than or equal to 88% for a total of 5 or more minutes.      SUBJECTIVE:  Mary Alice Cameron is a 73 year old female.    73 year old female who is referred for need to document need for supplemental oxygen.     Pertinent PMHx of COPD (severe, pulmonary emphysema, acute on chronic respiratory failure,  "permanent atrial fibrillation, CAD, HTN, post-operative complete heart block with pacer, MDD.     PFT's:  2022 - weight 144 lbs.  FVC 73%, FEV1 31%, ratio 42%.  SVC 72%.  Hgb 13.5.       Echo's:  2022 - S/P TAVR.  LVEF 55-60%.  Normal global RV function.  RVSP 33.2 mmHg + RA.     STOP-BANG score of 7, with unknown neck circumference.  Canmer score of 4.  NICKO: 9     BMI of Estimated body mass index is 26.89 kg/m  as calculated from the following:    Height as of 23: 1.575 m (5' 2\").    Weight as of 24: 66.7 kg (147 lb).      Today -we reviewed her sleep history more detail.  Her primary sleep concern is that over the last 6 months she has been waking up during the night with a sudden feeling of shortness of breath and almost a panic-like feeling.  This will seem to pass very quickly within a matter of seconds to at most a few minutes.  She has been using nocturnal submental oxygen at 2.5 L/min for at least 8 years, possibly longer.    She has been told by her now   that she did snore and stop breathing during sleep.    Chief concern per questionnaire: \"To find out if I can get the oxygen number down so I can keep my oxygen. \"     Duration of symptoms:  \"About 6-8 months ago \"     Goals for visit per questionnaire: \"I quit breathing while I am sleeping at night, and need to know why. \"     Sleep pattern:  [place]days.  11pm - [place] to ?.  Weekends.  [place] - 7:30am.  Time to fall asleep: ~60 minutes.  Awakenings: 3-4 times per night, 1-2 hours to return to sleep.  Average total sleep time:  7 hours  Napping.  2 days per week, ? hours per nap.     No for RLS screen.  No for sleep walking.  No for dream enactment behavior.  Yes for bruxism.     No for morning headaches.  Yes for snoring.  Yes for observed apnea.  No for FHx of MADDISON.     Caffeine use:  No for 3+ per day.  No for within 6 hours of bed.      Past medical history:    Patient Active Problem List    Diagnosis Date Noted    " Postoperative complete heart block (H) 11/28/2021     Priority: Medium     subsequent recovery of AV node conduction      Cardiac pacemaker in situ 11/28/2021     Priority: Medium     VVI; left bundle pacing      Status post transcatheter aortic valve replacement (TAVR) using bioprosthesis 07/20/2021     Priority: Medium    Aortic stenosis, severe 07/14/2021     Priority: Medium    Other ill-defined heart diseases 04/15/2021     Priority: Medium     Added automatically from request for surgery [id number]      Aortic valve stenosis, etiology of cardiac valve disease unspecified 04/15/2021     Priority: Medium     Added automatically from request for surgery [id number]      Aortic valve disorder 03/12/2021     Priority: Medium     Formatting of this note might be different from the original.  BiCuspid Ao Valve      Mitral valve disorder 03/12/2021     Priority: Medium    Cardiomegaly 03/12/2021     Priority: Medium    Infection due to 2019 novel coronavirus 02/04/2021     Priority: Medium    Hypokalemia 09/08/2019     Priority: Medium    Acute cystitis without hematuria 09/03/2019     Priority: Medium    Small bowel obstruction (H) 09/03/2019     Priority: Medium    Acute renal failure (H24) 09/03/2019     Priority: Medium    Status post coronary angiogram 06/19/2018     Priority: Medium    Coronary artery disease involving native coronary artery of native heart without angina pectoris 06/19/2018     Priority: Medium    Health Care Home 01/26/2017     Priority: Medium    Acute on chronic respiratory failure (H) 01/02/2017     Priority: Medium    Long-term (current) use of anticoagulants [Z79.01] 08/03/2016     Priority: Medium    Essential hypertension 06/29/2016     Priority: Medium    Advance care planning 02/08/2016     Priority: Medium     Advance Care Planning 2/8/2016: Receipt of ACP document:  Received: Health Care Directive which was witnessed or notarized on 1/5/16.  Document previously scanned on 1/22/16.   Validation form completed and sent to be scanned.  Code Status needs to be updated to reflect choices in most recent ACP document.  1/5/16 Health Care Directive indicates preference for DNR code; recommend conversation about goals of care and completion of a POLST.  Confirmed/documented designated decision maker(s).  Added by Shobha Jang            Hypothyroidism, postablative 03/23/2015     Priority: Medium     Problem list name updated by automated process. Provider to review      Acute bronchitis 03/22/2015     Priority: Medium    Mild major depression (H) 08/27/2014     Priority: Medium    Bicuspid aortic valve 03/25/2014     Priority: Medium    Permanent atrial fibrillation (H) 01/17/2014     Priority: Medium    Pulmonary emphysema (H) 01/17/2014     Priority: Medium    Carotid stenosis 03/29/2012     Priority: Medium    Aortic aneurysm (H24) 06/02/2010     Priority: Medium    COPD (chronic obstructive pulmonary disease) (H) 08/19/2009     Priority: Medium     Formatting of this note might be different from the original.  Has been steroid dependent;  Recently hospitalized with Flair and noted Oximetry 88% at rest 11/18/2009 with Respiratory Therapist at [facility].  Medically Disabled due to COPD.      Edema 08/14/2008     Priority: Medium    Depressive disorder 04/10/2007     Priority: Medium       10 point ROS of systems including Constitutional, Eyes, Respiratory, Cardiovascular, Gastroenterology, Genitourinary, Integumentary, Muscularskeletal, Psychiatric were all negative except for pertinent positives noted in my HPI.    Current Outpatient Medications   Medication Sig Dispense Refill    acetaminophen (TYLENOL) 500 MG tablet Take 500-1,000 mg by mouth every 6 hours as needed for mild pain      albuterol (PROAIR HFA/PROVENTIL HFA/VENTOLIN HFA) 108 (90 Base) MCG/ACT inhaler INHALE 2 PUFFS BY MOUTH EVERY 6 HOURS 18 g 3    amoxicillin (AMOXIL) 500 MG capsule Take 4 capsules (2,000 mg) by mouth once  as needed (SBE prophylaxis take 30-60 minutes prior to dental procedure/cleaning) 4 capsule 3    atorvastatin (LIPITOR) 10 MG tablet TAKE 1 TABLET BY MOUTH ONCEDAILY 90 tablet 0    Calcium Carb-Cholecalciferol (CALTRATE 600+D3 SOFT PO) Take 600 mg by mouth 2 times daily      diphenhydrAMINE (BENADRYL) 25 MG tablet Take 1-2 tablets (25-50 mg) by mouth every 6 hours as needed for itching or allergies 60 tablet 1    ELIQUIS ANTICOAGULANT 5 MG tablet TAKE 1 TABLET BY MOUTH TWICE DAILY 180 tablet 3    hydrOXYzine (ATARAX) 25 MG tablet Take 1 tablet (25 mg) by mouth 3 times daily as needed for itching 60 tablet 0    levothyroxine (SYNTHROID/LEVOTHROID) 88 MCG tablet Take 1 tablet (88 mcg) by mouth daily 90 tablet 3    metoprolol succinate ER (TOPROL XL) 100 MG 24 hr tablet Take 1 tablet (100 mg) by mouth daily 90 tablet 1    potassium chloride ER (KLOR-CON M) 10 MEQ CR tablet Take 2 tablets (20 mEq) by mouth 2 times daily 360 tablet 1    spironolactone (ALDACTONE) 25 MG tablet Take 0.5 tablets (12.5 mg) by mouth daily 45 tablet 2    torsemide (DEMADEX) 20 MG tablet TAKE 2 TABLETS BY MOUTH ONCE DAILY 180 tablet 2    TRELEGY ELLIPTA 200-62.5-25 MCG/ACT oral inhaler INHALE 1 PUFF INTO THE LUNGS ONCE DAILY 60 each 0       OBJECTIVE:  There were no vitals taken for this visit.    Physical Exam:  healthy, alert, and no distress  PSYCH: Alert and oriented times 3; coherent speech, normal   rate and volume, able to articulate logical thoughts, able   to abstract reason, no tangential thoughts, no hallucinations   or delusions  His affect is normal  RESP: No cough, no audible wheezing, able to talk in full sentences  Remainder of exam unable to be completed due to telephone visits    ---  This note was written with the assistance of the Dragon voice-dictation technology software. The final document, although reviewed, may contain errors. For corrections, please contact the office.    Total time spent in conversation with patient  on the phone today was 20 minutes.    Kye Emanuel MD    Sleep Medicine  [facility]  - [place], [place]  Main Office: [phone]  [facility] - [address], [address]  Schedule visits: [phone]  Main Office: [phone]  Fax: [phone]

## 2024-02-19 ENCOUNTER — VIRTUAL VISIT (OUTPATIENT)
Dept: PULMONOLOGY | Facility: OTHER | Age: 74
End: 2024-02-19
Attending: FAMILY MEDICINE
Payer: COMMERCIAL

## 2024-02-19 VITALS — BODY MASS INDEX: 24.49 KG/M2 | HEIGHT: 65 IN | WEIGHT: 147 LBS

## 2024-02-19 DIAGNOSIS — J96.21 ACUTE AND CHRONIC RESPIRATORY FAILURE WITH HYPOXIA (H): Primary | ICD-10-CM

## 2024-02-19 DIAGNOSIS — R06.81 APNEA: ICD-10-CM

## 2024-02-19 DIAGNOSIS — J44.9 CHRONIC OBSTRUCTIVE PULMONARY DISEASE, UNSPECIFIED COPD TYPE (H): ICD-10-CM

## 2024-02-19 DIAGNOSIS — R06.83 SNORING: ICD-10-CM

## 2024-02-19 PROCEDURE — 99442 PR PHYSICIAN TELEPHONE EVALUATION 11-20 MIN: CPT | Performed by: FAMILY MEDICINE

## 2024-02-19 ASSESSMENT — PAIN SCALES - GENERAL: PAINLEVEL: NO PAIN (0)

## 2024-02-19 NOTE — NURSING NOTE
Is the patient currently in the state of [place]? YES    Visit mode:TELEPHONE    If the visit is dropped, the patient can be reconnected by: TELEPHONE VISIT: Phone number: [phone]    Will anyone else be joining the visit? NO  (If patient encounters technical issues they should call [phone]    How would you like to obtain your AVS? MyChart    Are changes needed to the allergy or medication list? No    Reason for visit: LOY MATIAS

## 2024-02-28 ENCOUNTER — THERAPY VISIT (OUTPATIENT)
Dept: SLEEP MEDICINE | Facility: HOSPITAL | Age: 74
End: 2024-02-28
Attending: FAMILY MEDICINE
Payer: MEDICARE

## 2024-02-28 DIAGNOSIS — J44.9 CHRONIC OBSTRUCTIVE PULMONARY DISEASE, UNSPECIFIED COPD TYPE (H): ICD-10-CM

## 2024-02-28 DIAGNOSIS — R06.81 APNEA: ICD-10-CM

## 2024-02-28 DIAGNOSIS — R06.83 SNORING: ICD-10-CM

## 2024-02-28 DIAGNOSIS — J96.21 ACUTE AND CHRONIC RESPIRATORY FAILURE WITH HYPOXIA (H): ICD-10-CM

## 2024-02-28 LAB
BASE EXCESS BLDV CALC-SCNC: 6.3 MMOL/L (ref -3–3)
HCO3 BLDV-SCNC: 32 MMOL/L (ref 21–28)
O2/TOTAL GAS SETTING VFR VENT: 21 %
OXYHGB MFR BLDV: 63 % (ref 70–75)
PCO2 BLDV: 50 MM HG (ref 40–50)
PH BLDV: 7.42 [PH] (ref 7.32–7.43)
PO2 BLDV: 35 MM HG (ref 25–47)
SAO2 % BLDV: 64 % (ref 70–75)

## 2024-02-28 PROCEDURE — 82805 BLOOD GASES W/O2 SATURATION: CPT

## 2024-02-28 PROCEDURE — 95810 POLYSOM 6/> YRS 4/> PARAM: CPT

## 2024-02-28 PROCEDURE — 36415 COLL VENOUS BLD VENIPUNCTURE: CPT

## 2024-02-28 PROCEDURE — 95810 POLYSOM 6/> YRS 4/> PARAM: CPT | Mod: 26 | Performed by: FAMILY MEDICINE

## 2024-02-29 NOTE — PROGRESS NOTES
Patient is here with history of nocturnal hypoxemia. All stages of sleep were observed with an efficiency of 76.2. there was no snoring and a few respiratory events with an AHI of 4.4. low SPO2 was 81.9 and 8 minutes below 89%. CO2 remained with in normal limits. Patient tolerated test well

## 2024-03-03 NOTE — PROCEDURES
"-   SLEEP STUDY INTERPRETATION  DIAGNOSTIC POLYSOMNOGRAPHY REPORT      Patient: LANCE VALLEJO  YOB: 1950  Study Date: 2/28/2024  MRN: [id number]  Referring Provider: Braydon Yen MD  Ordering Provider: Kye Emanuel MD    Indications for Polysomnography: The patient is a 73 year old Female who is 5' 2\" and weighs 147.0 lbs. Her BMI is 27.1, Elrama sleepiness scale 4 and neck circumference is - cm. Relevant medical history includes COPD (severe, pulmonary emphysema, acute on chronic respiratory failure, permanent atrial fibrillation, CAD, HTN, post-operative complete heart block with pacer, MDD. A diagnostic polysomnogram was performed to evaluate for sleep apnea/hypoventilation/hypoxemia.    Polysomnogram Data: A full night polysomnogram recorded the standard physiologic parameters including EEG, EOG, EMG, ECG, nasal and oral airflow. Respiratory parameters of chest and abdominal movements were recorded with respiratory inductance plethysmography. Oxygen saturation was recorded by pulse oximetry. Hypopnea scoring rule used: 1B 4%.    Sleep Architecture: Mildly decreased sleep onset latency and delayed REM latency.  Decreased percentages of N3 and REM, no supine REM observed.  Increased arousal index.  The total recording time of the polysomnogram was 515.5 minutes. The total sleep time was 393.0 minutes. Sleep latency was mildly decreased at 9.4 minutes without the use of a sleep aid. REM latency was 397.5 minutes. Arousal index was increased at 18.0 arousals per hour. Sleep efficiency was mildly decreased at 76.2%. Wake after sleep onset was 113.0 minutes. The patient spent 6.7% of total sleep time in Stage N1, 77.4% in Stage N2, 3.3% in Stage N3, and 12.6% in REM. Time in REM supine was - minutes.    Respiration: No sleep-disordered breathing (AHI 4.4) with only mild sleep-associated hypoxemia (SpO2 <= 88% for 6.6 minutes), and I suspect this is over-estimated given oximetry artifact.  TCM " not suggestive of hypoventilation, pre-study VBG WNL.  Events ? The polysomnogram revealed a presence of 15 obstructive, - central, and 2 mixed apneas resulting in an apnea index of 2.6 events per hour. There were 12 obstructive hypopneas and - central hypopneas resulting in an obstructive hypopnea index of 1.8 and central hypopnea index of - events per hour. The combined apnea/hypopnea index was 4.4 events per hour (central apnea/hypopnea index was - events per hour). The REM AHI was 3.6 events per hour. The supine AHI was 4.9 events per hour. The RERA index was - events per hour.  The RDI was 4.4 events per hour.  Snoring - was reported as absent.  Respiratory rate and pattern - was notable for normal respiratory rate and pattern.  Sustained Sleep Associated Hypoventilation - Transcutaneous carbon dioxide monitoring was used, however significant hypoventilation was not present with a maximum change from ~8-9 mmHg and 0 minutes at or greater than 55 mmHg.  Pre-study VBG largely WNL (pH 7.42, pCO2 50, HCO3 35).  Sleep Associated Hypoxemia - (Greater than 5 minutes O2 sat at or below 88%) was present. Baseline oxygen saturation was 93.6%. Lowest oxygen saturation was 77.0%. Time spent less than or equal to 88% was 6.6 minutes. Time spent less than or equal to 89% was 7.9 minutes.    Movement Activity: Nothing of note  Periodic Limb Activity - There were - PLMs during the entire study. The PLM index was - movements per hour. The PLM Arousal Index was - per hour.  REM EMG Activity - Excessive transient/sustained muscle activity was not present.  Nocturnal Behavior - Abnormal sleep related behaviors were not noted during/arising out of NREM / REM sleep.   Bruxism - None apparent.    Cardiac Summary: Appears NSR  The average pulse rate was 80.6 bpm. The minimum pulse rate was 25.0 bpm while the maximum pulse rate was 134.0 bpm.      Assessment:   Mildly decreased sleep onset latency and delayed REM latency.  Decreased  percentages of N3 and REM, no supine REM observed.  Increased arousal index.  No sleep-disordered breathing (AHI 4.4) with only mild sleep-associated hypoxemia (SpO2 <= 88% for 6.6 minutes), and I suspect this is over-estimated given oximetry artifact.  TCM not suggestive of hypoventilation, pre-study VBG WNL.    Recommendations:  Advice regarding the risks of drowsy driving.  Suggest optimizing sleep schedule and avoiding sleep deprivation.    Diagnostic Codes:   Unspecified Sleep Disturbance G47.9     _____________________________________   Electronically Signed By: Kye Emanuel MD (3/3/2024)

## 2024-03-07 DIAGNOSIS — I48.21 PERMANENT ATRIAL FIBRILLATION (H): ICD-10-CM

## 2024-03-07 DIAGNOSIS — I10 ESSENTIAL HYPERTENSION: ICD-10-CM

## 2024-03-07 RX ORDER — METOPROLOL SUCCINATE 100 MG/1
100 TABLET, EXTENDED RELEASE ORAL DAILY
Qty: 90 TABLET | Refills: 1 | Status: SHIPPED | OUTPATIENT
Start: 2024-03-07 | End: 2024-08-19

## 2024-03-12 ENCOUNTER — MYC REFILL (OUTPATIENT)
Dept: FAMILY MEDICINE | Facility: OTHER | Age: 74
End: 2024-03-12

## 2024-03-12 DIAGNOSIS — L29.9 ITCHING: ICD-10-CM

## 2024-03-13 RX ORDER — HYDROXYZINE HYDROCHLORIDE 25 MG/1
25 TABLET, FILM COATED ORAL 3 TIMES DAILY PRN
Qty: 60 TABLET | Refills: 2 | Status: SHIPPED | OUTPATIENT
Start: 2024-03-13

## 2024-03-14 ENCOUNTER — OFFICE VISIT (OUTPATIENT)
Dept: SLEEP MEDICINE | Facility: HOSPITAL | Age: 74
End: 2024-03-14
Attending: FAMILY MEDICINE
Payer: MEDICARE

## 2024-03-14 DIAGNOSIS — G47.33 OSA (OBSTRUCTIVE SLEEP APNEA): Primary | ICD-10-CM

## 2024-03-15 ENCOUNTER — OFFICE VISIT (OUTPATIENT)
Dept: SLEEP MEDICINE | Facility: HOSPITAL | Age: 74
End: 2024-03-15
Attending: FAMILY MEDICINE
Payer: MEDICARE

## 2024-03-15 DIAGNOSIS — R09.02 HYPOXIA: Primary | ICD-10-CM

## 2024-03-18 ENCOUNTER — VIRTUAL VISIT (OUTPATIENT)
Dept: PULMONOLOGY | Facility: OTHER | Age: 74
End: 2024-03-18
Attending: FAMILY MEDICINE
Payer: COMMERCIAL

## 2024-03-18 VITALS — HEIGHT: 65 IN | BODY MASS INDEX: 24.83 KG/M2 | WEIGHT: 149 LBS

## 2024-03-18 DIAGNOSIS — J44.9 CHRONIC OBSTRUCTIVE PULMONARY DISEASE, UNSPECIFIED COPD TYPE (H): Primary | ICD-10-CM

## 2024-03-18 PROCEDURE — 99442 PR PHYSICIAN TELEPHONE EVALUATION 11-20 MIN: CPT | Performed by: FAMILY MEDICINE

## 2024-03-18 ASSESSMENT — PAIN SCALES - GENERAL: PAINLEVEL: NO PAIN (0)

## 2024-03-18 NOTE — PROGRESS NOTES
Virtual Visit Details    Type of service:  Telephone Visit   Phone call duration: 20 minutes   Originating Location (pt. Location): Home    Distant Location (provider location):  Off-site    Mary Alice Cameron is a 73 year old female who is being evaluated via a billable telephone visit.       What phone number would you like to be contacted at?PHONE@ [phone]  Home Phone [phone]   Work Phone Not on file.   Mobile [phone]       How would you like to obtain your AVS? MyChart       Telephone Virtual Visit Details     Type of service:  Telephone Virtual Visit     Originating Location (pt. Location): Home     Distant Location (provider location):  Off-site, [facility]      Start Time:  2:30pm  End Time:  2:50pm    Virtual visit for review of PSG and home overnight oximetry results.     A/P:     1.)  Acute on chronic hypoxic respiratory failure in the setting of severe COPD  2.)  Per PSG on 2/28/2024, No sleep-disordered breathing (AHI 4.4) with only mild sleep-associated hypoxemia (SpO2 <= 88% for 6.6 minutes), and I suspect this is over-estimated given oximetry artifact.  TCM not suggestive of hypoventilation, pre-study VBG WNL.  3.)  Overnight oximetry performed on room air on 3/18/2024 was consistent with PSG showing no sleep associated hypoxemia.    -Comorbid atrial fibrillation, postoperative complete heart block with pacer, hypertension, coronary artery disease     Currently, I would not see a need to continue nocturnal supplemental oxygen at this time.    I will place orders to discontinue nocturnal supplemental oxygen, this appeared to be provided through [facility].    Plan for follow-up in 6 months with repeat home overnight oximetry on room air.    SUBJECTIVE:  Mary Alice Cameron is a 73 year old female.    Pertinent PMHx of COPD (severe, pulmonary emphysema, acute on chronic respiratory failure, permanent atrial fibrillation, CAD, HTN, post-operative complete heart  "block with pacer, MDD.     PFT's:  2022 - weight 144 lbs.  FVC 73%, FEV1 31%, ratio 42%.  SVC 72%.  Hgb 13.5.       Echo's:  2022 - S/P TAVR.  LVEF 55-60%.  Normal global RV function.  RVSP 33.2 mmHg + RA.    2024 -we reviewed her sleep history more detail.  Her primary sleep concern is that over the last 6 months she has been waking up during the night with a sudden feeling of shortness of breath and almost a panic-like feeling.  This will seem to pass very quickly within a matter of seconds to at most a few minutes.  She has been using nocturnal submental oxygen at 2.5 L/min for at least 8 years, possibly longer.     She has been told by her now   that she did snore and stop breathing during sleep.     Chief concern per questionnaire: \"To find out if I can get the oxygen number down so I can keep my oxygen. \"     Duration of symptoms:  \"About 6-8 months ago \"     Goals for visit per questionnaire: \"I quit breathing while I am sleeping at night, and need to know why. \"     Sleep pattern:  [place]days.  11pm - [place] to ?.  Weekends.  [place] - 7:30am.  Time to fall asleep: ~60 minutes.  Awakenings: 3-4 times per night, 1-2 hours to return to sleep.  Average total sleep time:  7 hours  Napping.  2 days per week, ? hours per nap.     No for RLS screen.  No for sleep walking.  No for dream enactment behavior.  Yes for bruxism.     No for morning headaches.  Yes for snoring.  Yes for observed apnea.  No for FHx of MADDISON.     Caffeine use:  No for 3+ per day.  No for within 6 hours of bed.    A/P for in lab PSG with TCM and prestudy venous blood gas.  Plan to start study on room air.    Today -we reviewed her sleep study results in detail, as well as overnight oximetry.    In-lab PSG with TCM and VBG was largely WNL with only borderline mild sleep-associated hypoxemia, no hypercapnia, normal VBG.  ROBBI performed 3/18/2024 and was consistent with sleep testing showing no nocturnal hypoxemia, no sleep " "associated hypoxemia.    She feels that she is actually been sleeping well without the oxygen, so she is not highly concerned about discontinuation at this time.    SLEEP STUDY INTERPRETATION  DIAGNOSTIC POLYSOMNOGRAPHY REPORT        Patient: LANCE VALLEJO  YOB: 1950  Study Date: 2/28/2024  MRN: [id number]  Referring Provider: Braydon Yen MD  Ordering Provider: Kye Emanuel MD     Indications for Polysomnography: The patient is a 73 year old Female who is 5' 2\" and weighs 147.0 lbs. Her BMI is 27.1, [place] sleepiness scale 4 and neck circumference is - cm. Relevant medical history includes COPD (severe, pulmonary emphysema, acute on chronic respiratory failure, permanent atrial fibrillation, CAD, HTN, post-operative complete heart block with pacer, MDD. A diagnostic polysomnogram was performed to evaluate for sleep apnea/hypoventilation/hypoxemia.     Polysomnogram Data: A full night polysomnogram recorded the standard physiologic parameters including EEG, EOG, EMG, ECG, nasal and oral airflow. Respiratory parameters of chest and abdominal movements were recorded with respiratory inductance plethysmography. Oxygen saturation was recorded by pulse oximetry. Hypopnea scoring rule used: 1B 4%.     Sleep Architecture: Mildly decreased sleep onset latency and delayed REM latency.  Decreased percentages of N3 and REM, no supine REM observed.  Increased arousal index.  The total recording time of the polysomnogram was 515.5 minutes. The total sleep time was 393.0 minutes. Sleep latency was mildly decreased at 9.4 minutes without the use of a sleep aid. REM latency was 397.5 minutes. Arousal index was increased at 18.0 arousals per hour. Sleep efficiency was mildly decreased at 76.2%. Wake after sleep onset was 113.0 minutes. The patient spent 6.7% of total sleep time in Stage N1, 77.4% in Stage N2, 3.3% in Stage N3, and 12.6% in REM. Time in REM supine was - minutes.     Respiration: No " sleep-disordered breathing (AHI 4.4) with only mild sleep-associated hypoxemia (SpO2 <= 88% for 6.6 minutes), and I suspect this is over-estimated given oximetry artifact.  TCM not suggestive of hypoventilation, pre-study VBG WNL.  Events ? The polysomnogram revealed a presence of 15 obstructive, - central, and 2 mixed apneas resulting in an apnea index of 2.6 events per hour. There were 12 obstructive hypopneas and - central hypopneas resulting in an obstructive hypopnea index of 1.8 and central hypopnea index of - events per hour. The combined apnea/hypopnea index was 4.4 events per hour (central apnea/hypopnea index was - events per hour). The REM AHI was 3.6 events per hour. The supine AHI was 4.9 events per hour. The RERA index was - events per hour.  The RDI was 4.4 events per hour.  Snoring - was reported as absent.  Respiratory rate and pattern - was notable for normal respiratory rate and pattern.  Sustained Sleep Associated Hypoventilation - Transcutaneous carbon dioxide monitoring was used, however significant hypoventilation was not present with a maximum change from ~8-9 mmHg and 0 minutes at or greater than 55 mmHg.  Pre-study VBG largely WNL (pH 7.42, pCO2 50, HCO3 35).  Sleep Associated Hypoxemia - (Greater than 5 minutes O2 sat at or below 88%) was present. Baseline oxygen saturation was 93.6%. Lowest oxygen saturation was 77.0%. Time spent less than or equal to 88% was 6.6 minutes. Time spent less than or equal to 89% was 7.9 minutes.     Movement Activity: Nothing of note  Periodic Limb Activity - There were - PLMs during the entire study. The PLM index was - movements per hour. The PLM Arousal Index was - per hour.  REM EMG Activity - Excessive transient/sustained muscle activity was not present.  Nocturnal Behavior - Abnormal sleep related behaviors were not noted during/arising out of NREM / REM sleep.   Bruxism - None apparent.     Cardiac Summary: Appears NSR  The average pulse rate was 80.6  bpm. The minimum pulse rate was 25.0 bpm while the maximum pulse rate was 134.0 bpm.       Assessment:   Mildly decreased sleep onset latency and delayed REM latency.  Decreased percentages of N3 and REM, no supine REM observed.  Increased arousal index.  No sleep-disordered breathing (AHI 4.4) with only mild sleep-associated hypoxemia (SpO2 <= 88% for 6.6 minutes), and I suspect this is over-estimated given oximetry artifact.  TCM not suggestive of hypoventilation, pre-study VBG WNL.     Recommendations:  Advice regarding the risks of drowsy driving.  Suggest optimizing sleep schedule and avoiding sleep deprivation.     Diagnostic Codes:   Unspecified Sleep Disturbance G47.9     _____________________________________   Electronically Signed By: Kye Emanuel MD (3/3/2024)     Documenting results of home overnight pulse oximetry performed night of 3/18/2024.    Total recording time 10:08:44 with valid time of 9:58:44.  Lowest pulse rate 50 with average pulse rate of 76.5.  Lowest SpO2 88%, mean / baseline SpO2 93.3%.  SpO2 < 88% for 0 minutes.  Oxygen desaturation index (4% or more desaturation, lasting 10+ seconds) of 1.3 / hour.    A/P:  1.)  Normal overnight oximetry on room air without evidence of sleep associated hypoxemia    Kye Emanuel MD      Past medical history:    Patient Active Problem List    Diagnosis Date Noted    Postoperative complete heart block (H) 11/28/2021     Priority: Medium     subsequent recovery of AV node conduction      Cardiac pacemaker in situ 11/28/2021     Priority: Medium     VVI; left bundle pacing      Status post transcatheter aortic valve replacement (TAVR) using bioprosthesis 07/20/2021     Priority: Medium    Aortic stenosis, severe 07/14/2021     Priority: Medium    Other ill-defined heart diseases 04/15/2021     Priority: Medium     Added automatically from request for surgery [id number]      Aortic valve stenosis, etiology of cardiac valve disease unspecified  04/15/2021     Priority: Medium     Added automatically from request for surgery [id number]      Aortic valve disorder 03/12/2021     Priority: Medium     Formatting of this note might be different from the original.  BiCuspid Ao Valve      Mitral valve disorder 03/12/2021     Priority: Medium    Cardiomegaly 03/12/2021     Priority: Medium    Infection due to 2019 novel coronavirus 02/04/2021     Priority: Medium    Hypokalemia 09/08/2019     Priority: Medium    Acute cystitis without hematuria 09/03/2019     Priority: Medium    Small bowel obstruction (H) 09/03/2019     Priority: Medium    Acute renal failure (H24) 09/03/2019     Priority: Medium    Status post coronary angiogram 06/19/2018     Priority: Medium    Coronary artery disease involving native coronary artery of native heart without angina pectoris 06/19/2018     Priority: Medium    Health Care Home 01/26/2017     Priority: Medium    Acute on chronic respiratory failure (H) 01/02/2017     Priority: Medium    Long-term (current) use of anticoagulants [Z79.01] 08/03/2016     Priority: Medium    Essential hypertension 06/29/2016     Priority: Medium    Advance care planning 02/08/2016     Priority: Medium     Advance Care Planning 2/8/2016: Receipt of ACP document:  Received: Health Care Directive which was witnessed or notarized on 1/5/16.  Document previously scanned on 1/22/16.  Validation form completed and sent to be scanned.  Code Status needs to be updated to reflect choices in most recent ACP document.  1/5/16 Health Care Directive indicates preference for DNR code; recommend conversation about goals of care and completion of a POLST.  Confirmed/documented designated decision maker(s).  Added by Shobha Jang            Hypothyroidism, postablative 03/23/2015     Priority: Medium     Problem list name updated by automated process. Provider to review      Acute bronchitis 03/22/2015     Priority: Medium    Mild major depression (H) 08/27/2014      Priority: Medium    Bicuspid aortic valve 03/25/2014     Priority: Medium    Permanent atrial fibrillation (H) 01/17/2014     Priority: Medium    Pulmonary emphysema (H) 01/17/2014     Priority: Medium    Carotid stenosis 03/29/2012     Priority: Medium    Aortic aneurysm (H24) 06/02/2010     Priority: Medium    COPD (chronic obstructive pulmonary disease) (H) 08/19/2009     Priority: Medium     Formatting of this note might be different from the original.  Has been steroid dependent;  Recently hospitalized with Flair and noted Oximetry 88% at rest 11/18/2009 with Respiratory Therapist at [facility].  Medically Disabled due to COPD.      Edema 08/14/2008     Priority: Medium    Depressive disorder 04/10/2007     Priority: Medium       10 point ROS of systems including Constitutional, Eyes, Respiratory, Cardiovascular, Gastroenterology, Genitourinary, Integumentary, Muscularskeletal, Psychiatric were all negative except for pertinent positives noted in my HPI.    Current Outpatient Medications   Medication Sig Dispense Refill    acetaminophen (TYLENOL) 500 MG tablet Take 500-1,000 mg by mouth every 6 hours as needed for mild pain      albuterol (PROAIR HFA/PROVENTIL HFA/VENTOLIN HFA) 108 (90 Base) MCG/ACT inhaler INHALE 2 PUFFS BY MOUTH EVERY 6 HOURS 18 g 3    amoxicillin (AMOXIL) 500 MG capsule Take 4 capsules (2,000 mg) by mouth once as needed (SBE prophylaxis take 30-60 minutes prior to dental procedure/cleaning) 4 capsule 3    atorvastatin (LIPITOR) 10 MG tablet TAKE 1 TABLET BY MOUTH EVERY DAY 90 tablet 0    Calcium Carb-Cholecalciferol (CALTRATE 600+D3 SOFT PO) Take 600 mg by mouth 2 times daily      diphenhydrAMINE (BENADRYL) 25 MG tablet Take 1-2 tablets (25-50 mg) by mouth every 6 hours as needed for itching or allergies 60 tablet 1    ELIQUIS ANTICOAGULANT 5 MG tablet TAKE 1 TABLET BY MOUTH TWICE DAILY 180 tablet 3    hydrOXYzine HCl (ATARAX) 25 MG tablet Take 1 tablet (25 mg) by mouth 3 times daily as  needed for itching 60 tablet 2    levothyroxine (SYNTHROID/LEVOTHROID) 88 MCG tablet Take 1 tablet (88 mcg) by mouth daily 90 tablet 3    metoprolol succinate ER (TOPROL XL) 100 MG 24 hr tablet Take 1 tablet (100 mg) by mouth daily 90 tablet 1    potassium chloride ER (KLOR-CON M) 10 MEQ CR tablet Take 2 tablets (20 mEq) by mouth 2 times daily 360 tablet 1    spironolactone (ALDACTONE) 25 MG tablet Take 0.5 tablets (12.5 mg) by mouth daily 45 tablet 2    torsemide (DEMADEX) 20 MG tablet TAKE 2 TABLETS BY MOUTH ONCE DAILY 180 tablet 2    TRELEGY ELLIPTA 200-62.5-25 MCG/ACT oral inhaler INHALE 1 PUFF INTO THE LUNGS ONCE DAILY 60 each 0       OBJECTIVE:  There were no vitals taken for this visit.    Physical Exam:  healthy, alert, and no distress  PSYCH: Alert and oriented times 3; coherent speech, normal   rate and volume, able to articulate logical thoughts, able   to abstract reason, no tangential thoughts, no hallucinations   or delusions  His affect is normal  RESP: No cough, no audible wheezing, able to talk in full sentences  Remainder of exam unable to be completed due to telephone visits    ---  This note was written with the assistance of the Dragon voice-dictation technology software. The final document, although reviewed, may contain errors. For corrections, please contact the office.    Total time spent in conversation with patient on the phone today was 20 minutes.    Kye Emanuel MD    Sleep Medicine  [place], [place]  Main Office: [phone]  [facility] [address], [address]  Schedule visits: [phone]  Main Office: [phone]  Fax: [phone]

## 2024-03-18 NOTE — Clinical Note
This is a patient for home I am discontinuing nocturnal supplemental oxygen at home, I believe she received her concentrator through [facility].  I did enter a generic DME order documenting the discontinuation of home oxygen.

## 2024-03-18 NOTE — NURSING NOTE
Is the patient currently in the state of [place]? YES    Visit mode:TELEPHONE    If the visit is dropped, the patient can be reconnected by: TELEPHONE VISIT: Phone number:   Telephone Information:   Mobile [phone]       Will anyone else be joining the visit? NO  (If patient encounters technical issues they should call [phone]    How would you like to obtain your AVS? MyChart    Are changes needed to the allergy or medication list? Pt stated no changes to allergies and Pt stated no med changes    Reason for visit: Recheck  Has patient had flu shot for current/most recent flu season? If so, when? Yes: 10/03/2023    Elidia MATIAS

## 2024-03-18 NOTE — PROCEDURES
Documenting results of home overnight pulse oximetry performed night of 3/18/2024.    Total recording time 10:08:44 with valid time of 9:58:44.  Lowest pulse rate 50 with average pulse rate of 76.5.  Lowest SpO2 88%, mean / baseline SpO2 93.3%.  SpO2 < 88% for 0 minutes.  Oxygen desaturation index (4% or more desaturation, lasting 10+ seconds) of 1.3 / hour.    A/P:  1.)  Normal overnight oximetry on room air without evidence of sleep associated hypoxemia    Kye Emanuel MD

## 2024-03-22 ENCOUNTER — OFFICE VISIT (OUTPATIENT)
Dept: FAMILY MEDICINE | Facility: OTHER | Age: 74
End: 2024-03-22
Attending: STUDENT IN AN ORGANIZED HEALTH CARE EDUCATION/TRAINING PROGRAM
Payer: COMMERCIAL

## 2024-03-22 VITALS
TEMPERATURE: 98.8 F | RESPIRATION RATE: 16 BRPM | DIASTOLIC BLOOD PRESSURE: 89 MMHG | HEART RATE: 91 BPM | BODY MASS INDEX: 25.16 KG/M2 | WEIGHT: 151 LBS | OXYGEN SATURATION: 96 % | HEIGHT: 65 IN | SYSTOLIC BLOOD PRESSURE: 134 MMHG

## 2024-03-22 DIAGNOSIS — K04.7 DENTAL INFECTION: Primary | ICD-10-CM

## 2024-03-22 DIAGNOSIS — H92.01 RIGHT EAR PAIN: ICD-10-CM

## 2024-03-22 PROBLEM — I35.0 AORTIC STENOSIS, SEVERE: Chronic | Status: ACTIVE | Noted: 2021-07-14

## 2024-03-22 PROBLEM — I25.10 CORONARY ARTERY DISEASE INVOLVING NATIVE CORONARY ARTERY OF NATIVE HEART WITHOUT ANGINA PECTORIS: Chronic | Status: ACTIVE | Noted: 2018-06-19

## 2024-03-22 PROBLEM — I44.2 POSTOPERATIVE COMPLETE HEART BLOCK (H): Chronic | Status: ACTIVE | Noted: 2021-11-28

## 2024-03-22 PROBLEM — Z95.3 STATUS POST TRANSCATHETER AORTIC VALVE REPLACEMENT (TAVR) USING BIOPROSTHESIS: Chronic | Status: ACTIVE | Noted: 2021-07-20

## 2024-03-22 PROBLEM — Z95.0 CARDIAC PACEMAKER IN SITU: Chronic | Status: ACTIVE | Noted: 2021-11-28

## 2024-03-22 PROBLEM — U07.1 INFECTION DUE TO 2019 NOVEL CORONAVIRUS: Status: RESOLVED | Noted: 2021-02-04 | Resolved: 2024-03-22

## 2024-03-22 PROBLEM — I97.89 POSTOPERATIVE COMPLETE HEART BLOCK (H): Chronic | Status: ACTIVE | Noted: 2021-11-28

## 2024-03-22 PROCEDURE — 99213 OFFICE O/P EST LOW 20 MIN: CPT | Performed by: STUDENT IN AN ORGANIZED HEALTH CARE EDUCATION/TRAINING PROGRAM

## 2024-03-22 PROCEDURE — G0463 HOSPITAL OUTPT CLINIC VISIT: HCPCS

## 2024-03-22 RX ORDER — SACCHAROMYCES BOULARDII 250 MG
250 CAPSULE ORAL 2 TIMES DAILY
Qty: 28 CAPSULE | Refills: 0 | Status: SHIPPED | OUTPATIENT
Start: 2024-03-22

## 2024-03-22 ASSESSMENT — PAIN SCALES - GENERAL: PAINLEVEL: MODERATE PAIN (5)

## 2024-03-22 NOTE — PATIENT INSTRUCTIONS
will call about dentist.   Need to be seen right away if fevers, chills, facial swelling, face redness, etc.   Antibiotic [facility]   While on antibiotics, it's recommended you take probiotics, eat more yogurt or drink Kefir, to promote good bacteria in your gut. Doing this for 2 weeks after finishing the antibiotics is also recommended to minimize GI side effects, like diarrhea. For probiotics, ones that have >1,000,000 colony forming units (CFUs) are good to take.

## 2024-03-22 NOTE — PROGRESS NOTES
Assessment & Plan     Dental infection  Evidence of dental infection of right lower molar.  Abscess has resolved, currently draining on its own.  No significant tenderness to tapping of the teeth.  No signs of neck lymphadenopathy, facial swelling or crepitus.  Will treat cellulitis with Augmentin twice daily for 10 days.  Creatinine clearance is at 30, technically if 29 or lower would reduce to 1 tab twice daily, however for 30 or higher they can receive 2 tabs Augmentin twice daily.  Will proceed with the 2 tabs, as her creatinine clearance is 30.  Reviewed with pharmacy.  Recommend taking the Augmentin with food, taking Florastor.  Appreciate social work help in getting patient into a dentist ASAP.  Extensively reviewed signs or symptoms that indicate need for urgent follow-up, including facial swelling, fevers, worsening pain.    - amoxicillin-clavulanate (AUGMENTIN) 875-125 MG tablet; Take 1 tablet by mouth 2 times daily for 10 days  - saccharomyces boulardii (FLORASTOR) 250 MG capsule; Take 1 capsule (250 mg) by mouth 2 times daily    Right ear pain  Suspect the right ear pain is radiating from her tooth infection, as she has no signs of otitis media or otitis externa on examination.  Could also have an allergy component, as she has been sneezing more and there is some slight eustachian tube retraction but with the concurrent dental infection and starting at the same time, it is likely radiating.  - amoxicillin-clavulanate (AUGMENTIN) 875-125 MG tablet; Take 1 tablet by mouth 2 times daily for 10 days  - saccharomyces boulardii (FLORASTOR) 250 MG capsule; Take 1 capsule (250 mg) by mouth 2 times daily      Return if symptoms worsen or fail to improve.    Jorge Wheat is a 73 year old, presenting for the following health issues:  Otalgia        3/22/2024    12:43 PM   Additional Questions   Roomed by Mimi Valadez   Accompanied by None         3/22/2024    12:43 PM   Patient Reported Additional  "Medications   Patient reports taking the following new medications None     HPI     Estimated Creatinine Clearance: 30.1 mL/min (A) (based on SCr of 1.62 mg/dL (H)).      Acute Illness   Acute illness concerns: ear pain   Onset: 4 days ago   Fever: no   Chills/Sweats: no   Headache (location?): YES  Sinus Pressure:no  Conjunctivitis:  no  Ear Pain: YES: right  Rhinorrhea: YES  Congestion: no   Sore Throat: no    Cough: no  Wheeze: possible; reports they took her oxygen away  Decreased Appetite: no   Nausea: no   Vomiting: no   Diarrhea:  no   Dysuria/Freq.: no   Fatigue/Achiness: YES  Sick/Strep Exposure: no      Therapies Tried and outcome: salt water, prednisone that she had at home, tylenol      4 days ago right ear started hurting suddenly.   No history of ear infections. No Q tip use. No swimming.   Tooth on right lower gum has been hurting. Noticed this four days ago.   Hasn't been able to get into dentist  No facial swelling  No fevers          Objective    /89 (BP Location: Right arm, Patient Position: Sitting, Cuff Size: Adult Regular)   Pulse 91   Temp 98.8  F (37.1  C) (Tympanic)   Resp 16   Ht 1.651 m (5' 5\")   Wt 68.5 kg (151 lb)   SpO2 96%   BMI 25.13 kg/m    Body mass index is 25.13 kg/m ..    Physical Exam  Constitutional:       General: She is not in acute distress.     Appearance: Normal appearance. She is not ill-appearing.   HENT:      Head: Normocephalic and atraumatic.      Comments: No facial swelling, neck lymphadenopathy, or facial crepitus.     Right Ear: Tympanic membrane and ear canal normal.      Left Ear: Tympanic membrane and ear canal normal.      Nose: Nose normal. No congestion or rhinorrhea.      Mouth/Throat:      Mouth: Mucous membranes are moist.      Pharynx: Oropharynx is clear. No oropharyngeal exudate or posterior oropharyngeal erythema.        Comments: Area of erythema along right lower gum with tenderness on the gumline and some drainage, no abscess present " but suspect there was 1 prior.  No significant tenderness to tapping of the tooth.  Eyes:      Conjunctiva/sclera: Conjunctivae normal.      Pupils: Pupils are equal, round, and reactive to light.   Cardiovascular:      Rate and Rhythm: Normal rate and regular rhythm.   Pulmonary:      Effort: Pulmonary effort is normal.      Breath sounds: Normal breath sounds.   Musculoskeletal:      Cervical back: Neck supple.   Lymphadenopathy:      Cervical: No cervical adenopathy.   Skin:     General: Skin is warm and dry.      Capillary Refill: Capillary refill takes less than 2 seconds.   Neurological:      General: No focal deficit present.      Mental Status: She is alert and oriented to person, place, and time.   Psychiatric:         Mood and Affect: Mood normal.         Behavior: Behavior normal.                Signed Electronically by: Nena Joseph MD

## 2024-03-25 ENCOUNTER — TELEPHONE (OUTPATIENT)
Dept: FAMILY MEDICINE | Facility: OTHER | Age: 74
End: 2024-03-25

## 2024-03-25 ENCOUNTER — PATIENT OUTREACH (OUTPATIENT)
Dept: FAMILY MEDICINE | Facility: OTHER | Age: 74
End: 2024-03-25

## 2024-03-25 NOTE — Clinical Note
ODINI - So glad I was able to get her an appointment to be seen. Let me know if anything else comes up she is needing assistance with!

## 2024-03-25 NOTE — TELEPHONE ENCOUNTER
Clinic Care Coordination Contact  Care Team Conversations    SW received a call from [facility] in [place]. The  reports that she spoke to the dentist and he feels she should be seen ASAP and would be able to squeeze her in today at 3:40pm. SW informed them she would reach out to patient to see if she is able to make this work and then give them a call back.     Norma Evangelista, [facility]

## 2024-03-25 NOTE — TELEPHONE ENCOUNTER
Clinic Care Coordination Contact  Care Team Conversations    RUSS received call back from Mary Alice. She states she is able to make the appointment in [place] at [facility] at 3:40pm. SW provided address and directions for Mary Alice to get there. She reports she is very grateful to this SW for her help. No further questions or concerns at this time.     RUSS reached out to [facility] again to confirm appointment for Mary Alice today at 3:40pm.    Norma Evangelista, LUZMARIA  [facility]

## 2024-03-25 NOTE — TELEPHONE ENCOUNTER
Call from Dr Auguste he needs to remove one of pt's teeth and would like recommendations on her eliquis.

## 2024-03-25 NOTE — TELEPHONE ENCOUNTER
Clinic Care Coordination Contact  Care Team Conversations    SW reached out to [facility] following referral from PCP for dental services regarding patient's tooth infection. SW inquired if they would be able to get patient in for an appointment ASAP due to pain and infection. The staff there took down this SW's number and said they would talk to the dentist and then reach back out via phone call.     SW will await return call.     LUZMARIA Lr  [facility]

## 2024-03-25 NOTE — TELEPHONE ENCOUNTER
Clinic Care Coordination Contact  Care Team Conversations    SW reached out to patient to inform her that she has an available dentist appointment for her. No answer. SW left VM with request for call back.     LUZMARIA Lr  [facility]

## 2024-03-26 ENCOUNTER — PATIENT OUTREACH (OUTPATIENT)
Dept: FAMILY MEDICINE | Facility: OTHER | Age: 74
End: 2024-03-26

## 2024-03-26 NOTE — TELEPHONE ENCOUNTER
Clinic Care Coordination Contact  Care Team Conversations    SW reached out to Mary Alice to see if she was able to make it to her dentist appointment yesterday. She reports that yes she was able to make it and was very grateful to this SW for assisting her in getting an appointment. They were unable to pull her tooth as she is currently on Eliquis. She will need to be off of it for 72 hours prior to the procedure.     They have her scheduled for another appointment this coming Friday at 8:50AM to pull the tooth. She reports that she will plan to drive herself to the appointment. No further questions or concerns at this time.     Norma Evangelista, [facility]

## 2024-03-26 NOTE — TELEPHONE ENCOUNTER
For afib ok to hold for 2-3 days and then restart after.  No bridge needed for afib.  Thanks.  Braydon Yen MD

## 2024-03-27 DIAGNOSIS — J44.9 CHRONIC OBSTRUCTIVE PULMONARY DISEASE, UNSPECIFIED COPD TYPE (H): ICD-10-CM

## 2024-03-27 RX ORDER — FLUTICASONE FUROATE, UMECLIDINIUM BROMIDE AND VILANTEROL TRIFENATATE 200; 62.5; 25 UG/1; UG/1; UG/1
POWDER RESPIRATORY (INHALATION)
Qty: 60 EACH | Refills: 3 | Status: SHIPPED | OUTPATIENT
Start: 2024-03-27 | End: 2024-04-26

## 2024-03-27 NOTE — TELEPHONE ENCOUNTER
TRELEGY ELLIPTA 200-62.5-25 MCG/ACT oral inhaler 60 each 0 2/6/2024     Last Office Visit: 03/22/2024  Future Office visit:    Next 5 appointments (look out 90 days)      Apr 16, 2024 10:15 AM  (Arrive by 10:00 AM)  Return Visit with NGUYEN Garza [facility] ([facility] ) [address] [place] [id number] [phone]             Routing refill request to provider for review/approval because:

## 2024-04-01 ENCOUNTER — PATIENT OUTREACH (OUTPATIENT)
Dept: FAMILY MEDICINE | Facility: OTHER | Age: 74
End: 2024-04-01

## 2024-04-01 NOTE — TELEPHONE ENCOUNTER
Clinic Care Coordination Contact  Care Team Conversations    SW reached out to Mary Alice to inquire if she was able to make it to her appointment okay on Friday. She reports that yes she was. They were able to get her tooth pulled and she is feeling much better now. She shared that she is very grateful for the care she received there.    No further questions or concerns for this SW at this time. There will be no further outreach.     LUMZARIA Lr  [facility]

## 2024-04-12 NOTE — PROGRESS NOTES
"Chief Complaint: atrial fibrillation, bicuspid valve    HPI: I was happy to see Mrs. Cameron for the above. She has seen several cardiologists over the past year for these problems. Her  was ill and they moved to be closer to his daughters. He  in October and she has settled in [place]. Her atrial fibrillation began in the past year. In reviewing the old records both  and  are reported as when the atrial fibrillation began and she is no longer sure. This has been associated with shortness of breath but as was discussed with her by one of the cardiologist she has multiple reasons for shortness of breath including COPD. She is finishing treatment for a COPD exacerbation at this time. She reports the plan had been to start her on warfarin and then cardiovert her. With all the chaos in her life the past few months this never happened. She was found to have hyperthyroidism in August and was treated with Iodine ablation. She also has a bicuspid aortic valve and mild aortic root dilation (per report of echocardiogram in old records). The echocardiogram did not report significant aortic stenosis or insufficiency and her systolic function was normal making it less likely that her shortness of breath was related to her valvular heart disease. She was told she would need periodic f/u of her valve and aorta. She reports two angiograms done at [facility] and that they were \"okay\". I do not see copies of those reports in the old records. They will be requested.    She underwent DC cardioversion on 2014. She reports feeling better afterward. She feels like she needs to use her inhaler less often and has less shortness of breath. However, the ECG today shows she is back in atrial fibrillation.    I have reviewed the records sent from [facility]. There is an echocardiogram report from  and records regarding back surgery. I see no cath results.    A repeat cardioversion in July failed to restore sinus " "rhythm. A stress study in April (see below) showed no ischemia or infarction.    [id number]:  She had recurrent atrial fibrillation was started on flecainide and scheduled for cardioversion. When she arrived for the cardioversion she was in sinus.     A CT aortogram showed atherosclerosis but not dilation of the descending aoota.    [id number]:    She was admitted in March 2015 with a COPD exacerbation. Her breathing is at baseline with no fever, sputum or wheezing.    She has not noted any atrial fibrillation, no palpitations, chest pain/discomfort, unusual dyspnea on exertion, bleeding or neurologic symptoms.    [id number]: Her follow-up echocardiogram (see lab section) showed no change in aortic valve function. She report rare \"flutters\" but no sustained palpitations like with her atrial fibrillation. She reports no significant bleeding episodes on warfarin. She denies any neurologic symptoms suggestive of CVA or TIA. Edema improved when she switched her diltiazem to bedtime.    Her primary health problem has been her COPD. She uses oxygen at night. She has had had any recent symptoms of upper or lower respiratory infection.    [id number]: She had two admissions this past winter for COPD exacerbations. Overall, she feels her dyspnea on exertion is slowly getting worse. She has not had exertional chest pain/discomfort.     She reports no increased edema, orthopnea or paroxysmal nocturnal dyspnea.    [id number]: echocardiogram shows a decline in systolic function.  She reports that she has felt more fatigued and has had more dyspnea on exertion since I last saw her.  She also reports she has had problems with her lungs over the winter.  She is been on a prolonged course of prednisone.  She has not had chest pain or chest discomfort.  She has noted episodes of atrial fibrillation, or palpitations that she thinks is atrial fibrillation.  These episodes were relatively infrequent and do not last very long.  They are not " associated with associated symptoms and she has not found them to be overly troublesome.  She has had no prolonged episodes where she felt the need to consider seeking medical treatment.    September 25, 2018: her angiogram showed no obstructive coronary artery disease. She reports two episodes of pain between her shoulders, lasting 10 minutes.     No palpitations. Flecainide stopped due to drop in systolic function.     Has had a few problems with her COPD.     October 23, 2018: Ms. Cameron reports that she has been feeling somewhat better since switching to the torsemide.  Her swelling is better.  She continues to work or cleaning jobs.  She reports no significant episodes of atrial fibrillation.  She has occasional palpitations but nothing that has been very troublesome.  Her echocardiogram shows an improvement in her ejection fraction.  Her CT aortogram showed evidence of atherosclerosis but no dissections or aneurysms.    April 23, 2019: She was seen in the emergency department on March 19, 2019 complaining of a productive cough.  She was diagnosed with acute bronchitis.  She was also noted to be in atrial fibrillation with rapid ventricular rate.  She has had 2 courses of antibiotics for her acute bronchitis.    She reports her breathing is back to baseline.  She reports if she feels like it takes her longer to get things done and she takes more breaks but she does not find this particularly troublesome.    June 11, 2019: She reports that her breathing is about the same. She has not noted an increase in her heart failure symptoms. Her Holter shows poor heart rate control.    July 23, 2019 Interval history: On the higer dose of diltiazem average heart rate dropped to 92 bpm from 111 bpm. She does feel a little better but not much.    She complains of a productive cough that started last night. No fever/chills.      Current Outpatient Medications   Medication Sig Dispense Refill     acetaminophen (TYLENOL) 500 MG  tablet Take 500-1,000 mg by mouth every 6 hours as needed for mild pain       ADVAIR -21 MCG/ACT Inhaler INHALE 2 PUFFS INTO THE LUNGS TWICE DAILY 36 g 2     albuterol (2.5 MG/3ML) 0.083% neb solution Take 1 vial (2.5 mg) by nebulization every 6 hours as needed for shortness of breath / dyspnea or wheezing 25 vial 1     albuterol (PROAIR HFA/PROVENTIL HFA/VENTOLIN HFA) 108 (90 Base) MCG/ACT inhaler INHALE 2 PUFFS INTO THE LUNGS EVERY 4 HOURS AS NEEDED FOR SHORTNESS OF BREATH OR DIFFICULT BREATHING OR WHEEZING 54 g 0     atorvastatin (LIPITOR) 10 MG tablet TAKE 1 TABLET BY MOUTH EVERY NIGHT AT BEDTIME 90 tablet 0     Calcium Carb-Cholecalciferol (CALTRATE 600+D3 SOFT PO) Take 600 mg by mouth 2 times daily       cloNIDine (CATAPRES) 0.1 MG tablet TAKE 2 TABLETS BY MOUTH EVERY NIGHT AT BEDTIME 180 tablet 0     COMBIVENT RESPIMAT  MCG/ACT inhaler Inhale 1 puff into the lungs 4 times daily        diltiazem ER COATED BEADS (CARDIZEM CD) 360 MG 24 hr capsule Take 1 capsule (360 mg) by mouth daily 90 capsule 3     ELIQUIS 5 MG tablet TAKE 1 TABLET BY MOUTH TWICE DAILY 60 tablet 1     furosemide (LASIX) 40 MG tablet TAKE 1 TABLET BY MOUTH TWICE DAILY. 180 tablet 0     ipratropium - albuterol 0.5 mg/2.5 mg/3 mL (DUONEB) 0.5-2.5 (3) MG/3ML neb solution USE 1 VIAL PER NEBULIZER FOUR TIMES DAILY 1620 mL 0     levothyroxine (SYNTHROID/LEVOTHROID) 100 MCG tablet TAKE 1 TABLET(100 MCG) BY MOUTH DAILY 90 tablet 3     losartan (COZAAR) 50 MG tablet TAKE 1 TABLET BY MOUTH TWICE DAILY 60 tablet 5     potassium chloride SA (K-DUR/KLOR-CON M) 20 MEQ CR tablet Take 1 tablet (20 mEq) by mouth 2 times daily 180 tablet 3     predniSONE (DELTASONE) 20 MG tablet TAKE 3 TABLETS BY MOUTH X 3 DAYS, 2 TABLETS X 3 DAYS, 1 TABLET X 3 DAYS, AND 0.5 TABLETS X 3 DAYS 20 tablet 0     diphenhydrAMINE (BENADRYL) 25 MG tablet Take 1-2 tablets (25-50 mg) by mouth every 6 hours as needed for itching or allergies 60 tablet 1     OTHER MEDICAL  "SUPPLIES Apply one pair to help with edema (Patient not taking: Reported on 2019) 1 each 0     sulfamethoxazole-trimethoprim (BACTRIM DS/SEPTRA DS) 800-160 MG tablet as needed         Past Medical History:   Diagnosis Date     Asthma      Atrial fibrillation (H)      COPD (chronic obstructive pulmonary disease) (H)      Depression      High cholesterol      HTN (hypertension)      Thyroid disease        Past Surgical History:   Procedure Laterality Date     BACK SURGERY       CARDIAC SURGERY  2018    Angiogram at [facility]     COLONOSCOPY N/A 2016    Procedure: COLONOSCOPY;  Surgeon: Waldemar Bob MD;  Location: [facility]     HYSTERECTOMY      partial     SLING BLADDER SUSPENSION WITH FASCIA LINNETTE         Family History   Problem Relation Age of Onset     Prostate Cancer Father      Hypertension Father      Heart Failure Father         CHF     Asthma Mother      Cancer Mother         ovarian     Hypertension Mother      Asthma Brother      Hypertension Sister      Hypertension Brother        Social History     Tobacco Use     Smoking status: Former Smoker     Packs/day: 0.50     Years: 41.00     Pack years: 20.50     Types: Cigarettes     Start date: 1966     Last attempt to quit: 2007     Years since quittin.4     Smokeless tobacco: Never Used   Substance Use Topics     Alcohol use: No     Alcohol/week: 0.0 oz       Allergies   Allergen Reactions     Amlodipine Besylate Cough     Norvasc     Lisinopril Cough       ROS: ten system review negative except as noted above.     Physical Examination:  /68 (BP Location: Left arm, Patient Position: Chair, Cuff Size: Adult Regular)   Pulse 70   Temp 98.1  F (36.7  C) (Tympanic)   Resp 20   Ht 1.626 m (5' 4\")   Wt 67.1 kg (148 lb)   SpO2 96%   BMI 25.40 kg/m    GENERAL APPEARANCE: healthy, alert and no distress  HEENT: no icterus, no xanthelasmas  NECK:  JVP is not visible, left CEA scar  CHEST:  no rales or " rhonchi, breath sounds are diminished throughout, expiratory phase is prolonged, no wheezing  CARDIOVASCULAR: irregular rhythm, S1S2 split, no S3 or S4 and no murmur, click or rub, precordium quiet  ABDOMEN: soft, non tender, bowel sounds normal, no abdominal bruits  EXTREMITIES: some edema, but also adipose    Laboratory and diagnostic data reviewed 2019:    On diltiazem 360 mg/day:                I reviewed her ECG from 2019 that shows atrial fibrillation with rapid ventricular rate.    [facility],[place]  Echocardiography Laboratory  [address] [place] [address]     Name: LANCE VALLEJO  MRN: [id number]  : 1950  Study Date: 10/11/2018 08:57 AM  Age: 68 yrs  Gender: Female  Patient Location: [facility]  Reason For Study: , Bicuspid aortic valve  History: Bicuspid Aortic Valve  Ordering Physician: TODD JAMES  Referring Physician: TODD JAMES  Performed By: Genevieve Contreras     BSA: 1.8 m2  Height: 65 in  Weight: 160 lb  _____________________________________________________________________________  __     _____________________________________________________________________________  __        Interpretation Summary  No pericardial effusion is present.  Mild left ventricular dilation is present.  The Ejection Fraction is estimated at 45-50%.  The right ventricle is normal size.  Global right ventricular function is normal.  Mild left atrial enlargement is present.  Mild mitral insufficiency is present.  Cannot exclude a bicuspid aortic valve.  Mild aortic valve sclerosis is present.  Mild aortic insufficiency is present.  The peak aortic velocity is 2.28 m/sec.  The mean gradient across the aortic valve is11.8 mmHg.  Mild tricuspid insufficiency is present.  Right ventricular systolic pressure is 32.4mmHg above the right atrial  pressure.  The pulmonic valve is normal.  The aorta root is  normal.  ____________________________________________________________________        CTA CHEST ABDOMEN WITH CONTRAST    HISTORY: 68 yearsFemale ; Bicuspid aortic valve; Ascending aorta  dilatation (H)    TECHNIQUE: Axial CT imaging of the chest abdomen and pelvis was  performed with intervenous contrast contrast. Coronal and sagittal  reconstructions were obtained.    COMPARISON: None    FINDINGS:    CT CHEST: The ace ascending thoracic aorta is ectatic measuring 4.4 cm  transversely and 4.3 cm in AP dimension. The descending thoracic aorta  is 2.5 x 2.2 cm respectively. There is calcified atherosclerotic  disease of the thoracic aorta without evidence of dissection. There is  calcification of the aortic valve.  There is no mediastinal or hilar or axillary lymphadenopathy.    There is mild linear atelectasis at both lung bases. There is  cardiomegaly.         No concerning osseous lesions are identified    IMPRESSION CHEST: There is ectasia of the ascending thoracic aorta  measuring 4.4 x 4.3 cm in diameter.    There is cardiomegaly.      CT ABDOMEN AND PELVIS: There is atherosclerotic disease of the  abdominal aorta without evidence of aneurysm.     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 The residual lumen measures 2 mm in diameter. There is  approximate 50% stenosis of the origin of the superior mesenteric  artery, residual vessel lumen measures 3.5 to 4 mm.    There are 2 patent right renal arteries. There are 2 left renal  arteries with a dominant vessel originating slightly below the smaller  accessory vessel. There is moderate to severe stenosis of the dominant  left renal artery. There is moderate to severe stenosis of the origin  of the inferior mesenteric artery.    The visualized solid organs are unremarkable. No abnormally distended  loops of bowel are evident. The appendix is unremarkable.    IMPRESSION ABDOMEN AND PELVIS:     Atherosclerotic disease of the abdominal  aorta without evidence of  aneurysm or dissection.    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 See description above.    MARINA BRICE MD              Old records show a TSH of 1.87 in 11/13. Labs from 10/13 show a normal K, creat and estimated GFR.    Assessment and recommendations:    1) Persistent atrial fibrillation (recurrent) -we again reviewed that there to basic approaches to treating her atrial fibrillation.    The first approach would be to try to get her back in normal rhythm.  This would require starting an antiarrhythmic drug followed by cardioversion.  The other option would be pulmonary vein isolation/atrial fibrillation ablation.  The success rates are less when patients are in persistent atrial fibrillation.  In general would prefer to get her on the medication and see if we can get her back in sinus rhythm and then pursue PVI.    The other approach would be to leave her in atrial fibrillation (permanent atrial fibrillation) and focus on heart rate control.  Given the difficulties controlling her heart rate I recommended that we take this approach that we proceed with pacemaker placement to allow for increased medical therapy.  However, I suspect that she would require AV node ablation.  We discussed the pacing could be done using a trans-standard pacemaker, possibly with attempted His bundle pacing.  The other option would be a leadless pacemaker that would be inserted from the leg.    She remains somewhat unsure of how she wishes to proceed.  She is not very enthusiastic about the idea of another medication and a cardioversion attempt my impression is she is leaning more towards the rate control strategy.    However, she reports that she would not be able to get to the [facility] to have those procedures done.  She would like to explore the possibility of having this done at 1 of the hospitals in [place].  That would be fine  but she would need a referral from Dr. Yen.    She understands that if her exercise intolerance is primarily related to her lung disease that neither of the approach as above is likely result in significant functional improvement.  However, some of her exercise intolerance could well be related to both the heart rate and the lack of active atrial function (atrial kick).  If our belief was that this was entirely due to her lungs there would be no point in proceeding.  I do think it is worth consideration of rhythm control or rate control but there does remain the possibility that after restoration of sinus rhythm or possibly placement of a pacemaker with ablation of AV node that her functional status would not improve.    2) Bicuspid aortic valve - previous echocardiogram does not show significant aortic stenosis or insufficiency. CT aortogram shows no dilation of descending aorta.     -Valve function is stable    3)  Hypertension - controlled    4) Systolic dysfunction - no coronary artery disease found     - echocardiogram with stable EF, if anything slightly improved      I appreciate the chance to help with Mrs. Cameron's care. Please let me know if you have any questions or concerns. I will see her back in one year.    Portions of this note were dictated using speech recognition software. The note has been proofread but errors in the text may have been overlooked. Please contact me if there are any concerns regarding the accuracy of the dictation.     Eliezer Myers M.D.       15-Apr-2024

## 2024-04-16 ENCOUNTER — OFFICE VISIT (OUTPATIENT)
Dept: PODIATRY | Facility: OTHER | Age: 74
End: 2024-04-16
Attending: PODIATRIST
Payer: COMMERCIAL

## 2024-04-16 VITALS
DIASTOLIC BLOOD PRESSURE: 84 MMHG | TEMPERATURE: 97.3 F | HEART RATE: 73 BPM | OXYGEN SATURATION: 95 % | SYSTOLIC BLOOD PRESSURE: 122 MMHG

## 2024-04-16 DIAGNOSIS — Z79.01 LONG TERM CURRENT USE OF ANTICOAGULANT THERAPY: ICD-10-CM

## 2024-04-16 DIAGNOSIS — L60.3 ONYCHODYSTROPHY: Primary | ICD-10-CM

## 2024-04-16 DIAGNOSIS — L84 CALLUS OF FOOT: ICD-10-CM

## 2024-04-16 DIAGNOSIS — M77.42 METATARSALGIA OF LEFT FOOT: ICD-10-CM

## 2024-04-16 PROCEDURE — 11721 DEBRIDE NAIL 6 OR MORE: CPT | Performed by: PODIATRIST

## 2024-04-16 PROCEDURE — 99213 OFFICE O/P EST LOW 20 MIN: CPT | Mod: 25 | Performed by: PODIATRIST

## 2024-04-16 PROCEDURE — G0463 HOSPITAL OUTPT CLINIC VISIT: HCPCS

## 2024-04-16 PROCEDURE — G0463 HOSPITAL OUTPT CLINIC VISIT: HCPCS | Mod: 25

## 2024-04-16 ASSESSMENT — PAIN SCALES - GENERAL: PAINLEVEL: NO PAIN (0)

## 2024-04-16 NOTE — PROGRESS NOTES
Chief complaint: Patient presents with:  Toenail: DFE      History of Present Illness: This 73 year old female is seen for follow-up management of elongated, thickened toenails. She has difficulty trimming her toenails.    She has a new pain on the LEFT plantar forefoot. Pain started a week and a half ago (early April, 2024) and it hurts when she is walking. She is wondering what is causing the pain and how she can manage it.    She says her lower extremity edema is more controlled. She takes Torsemide. She is not wearing compression socks because she says she cannot get them to fit.    She is on Eliquis for a-fib.     She previously had burning, tingling, and numbness in her feet, but this has continued to be controlled.    No further pedal complaints today.       /84 (BP Location: Left arm, Patient Position: Sitting, Cuff Size: Adult Regular)   Pulse 73   Temp 97.3  F (36.3  C) (Tympanic)   SpO2 95%      Patient Active Problem List   Diagnosis    Permanent atrial fibrillation (H)    Pulmonary emphysema (H)    Bicuspid aortic valve    Mild major depression (H)    Hypothyroidism, postablative    Advance care planning    Essential hypertension    Long-term (current) use of anticoagulants [Z79.01]    Acute on chronic respiratory failure (H)    Health Care Home    Status post coronary angiogram    Coronary artery disease involving native coronary artery of native heart without angina pectoris    Acute cystitis without hematuria    Small bowel obstruction (H)    Acute renal failure (H24)    Hypokalemia    Aortic aneurysm (H24)    Aortic valve disorder    Mitral valve disorder    Cardiomegaly    Carotid stenosis    Depressive disorder    Edema    COPD (chronic obstructive pulmonary disease) (H)    Other ill-defined heart diseases    Aortic valve stenosis, etiology of cardiac valve disease unspecified    Aortic stenosis, severe    Status post transcatheter aortic valve replacement (TAVR) using bioprosthesis     Postoperative complete heart block (H)    Cardiac pacemaker in situ       Past Surgical History:   Procedure Laterality Date    BACK SURGERY  2006    CARDIAC SURGERY  06/12/2018    Angiogram at [facility]    COLONOSCOPY N/A 01/19/2016    Procedure: COLONOSCOPY;  Surgeon: Waldemar Bob MD;  Location: [facility]    CV CORONARY ANGIOGRAM N/A 04/30/2021    Procedure: CV CORONARY ANGIOGRAM;  Surgeon: Poli Walsh MD;  [facility]    CV LEFT HEART CATH N/A 04/30/2021    Procedure: CV LEFT HEART CATH;  Surgeon: Poli Walsh MD;  [facility]    CV RIGHT HEART CATH MEASUREMENTS RECORDED N/A 04/30/2021    Procedure: CV RIGHT HEART CATH;  Surgeon: Poli Walsh MD;  [facility]    CV TRANSCATHETER AORTIC VALVE REPLACEMENT N/A 07/14/2021    Procedure: Right femoral Transcaval transcatheter aortic valve replacement (SUMMERS) size 29mm.  Transesophageal echocardiogram per anesthesia;  Surgeon: Domo Sarah MD;  [facility]    EP PPM INSERT OF NEW OR REPL W/VENT LEAD N/A 07/14/2021    Procedure: insertion of Permanent Pacemaker;  Surgeon: Eliezer Myers MD;  [facility]    HEART CATH FEMORAL CANNULIZATION WITH OPEN STANDBY REPAIR AORTIC VALVE N/A 07/14/2021    Procedure: Cardiopulmonary standby.;  Surgeon: Fly Smith MD;  Location: [facility]    HYSTERECTOMY  1980    partial    SLING BLADDER SUSPENSION WITH FASCIA LINNETTE         Current Outpatient Medications   Medication Sig Dispense Refill    acetaminophen (TYLENOL) 500 MG tablet Take 500-1,000 mg by mouth every 6 hours as needed for mild pain      albuterol (PROAIR HFA/PROVENTIL HFA/VENTOLIN HFA) 108 (90 Base) MCG/ACT inhaler INHALE 2 PUFFS BY MOUTH EVERY 6 HOURS 18 g 3    amoxicillin (AMOXIL) 500 MG capsule Take 4 capsules (2,000 mg) by mouth once as needed (SBE prophylaxis take 30-60 minutes prior to dental procedure/cleaning) 4 capsule  3    atorvastatin (LIPITOR) 10 MG tablet TAKE 1 TABLET BY MOUTH EVERY DAY 90 tablet 0    Calcium Carb-Cholecalciferol (CALTRATE 600+D3 SOFT PO) Take 600 mg by mouth 2 times daily      diphenhydrAMINE (BENADRYL) 25 MG tablet Take 1-2 tablets (25-50 mg) by mouth every 6 hours as needed for itching or allergies 60 tablet 1    ELIQUIS ANTICOAGULANT 5 MG tablet TAKE 1 TABLET BY MOUTH TWICE DAILY 180 tablet 3    hydrOXYzine HCl (ATARAX) 25 MG tablet Take 1 tablet (25 mg) by mouth 3 times daily as needed for itching 60 tablet 2    levothyroxine (SYNTHROID/LEVOTHROID) 88 MCG tablet Take 1 tablet (88 mcg) by mouth daily 90 tablet 3    metoprolol succinate ER (TOPROL XL) 100 MG 24 hr tablet Take 1 tablet (100 mg) by mouth daily 90 tablet 1    potassium chloride ER (KLOR-CON M) 10 MEQ CR tablet Take 2 tablets (20 mEq) by mouth 2 times daily 360 tablet 1    saccharomyces boulardii (FLORASTOR) 250 MG capsule Take 1 capsule (250 mg) by mouth 2 times daily 28 capsule 0    spironolactone (ALDACTONE) 25 MG tablet Take 0.5 tablets (12.5 mg) by mouth daily 45 tablet 2    torsemide (DEMADEX) 20 MG tablet TAKE 2 TABLETS BY MOUTH ONCE DAILY 180 tablet 2    TRELEGY ELLIPTA 200-62.5-25 MCG/ACT oral inhaler INHALE 1 PUFF INTO THE LUNGS ONCE DAILY 60 each 3     No current facility-administered medications for this visit.          Allergies   Allergen Reactions    Amlodipine Besylate Cough     Norvasc    Lisinopril Cough    Ace Inhibitors Cough       Family History   Problem Relation Age of Onset    Ovarian Cancer Mother 72    Asthma Mother     Cancer Mother         ovarian    Hypertension Mother     Prostate Cancer Father     Hypertension Father     Heart Failure Father         CHF    Breast Cancer Sister     Hypertension Sister     Asthma Brother     Hypertension Brother        Social History     Socioeconomic History    Marital status:      Spouse name: None    Number of children: None    Years of education: None    Highest  education level: None   Occupational History     Employer: RETIRED     Comment: disabled   Tobacco Use    Smoking status: Former Smoker     Packs/day: 0.50     Years: 41.00     Pack years: 20.50     Types: Cigarettes     Start date: 1/1/1966     Quit date: 1/28/2007     Years since quitting: 15.4    Smokeless tobacco: Never Used   Substance and Sexual Activity    Alcohol use: No     Alcohol/week: 0.0 standard drinks    Drug use: No    Sexual activity: Never     Comment:    Other Topics Concern     Service No    Blood Transfusions Yes     Comment: Permits if needed    Caffeine Concern Yes     Comment: 2 cups coffee daily    Seat Belt Yes    Parent/sibling w/ CABG, MI or angioplasty before 65F 55M? No       ROS: 10 point ROS neg other than the symptoms noted above in the HPI.  EXAM  Constitutional: healthy, alert and no distress    Psychiatric: mentation appears normal and affect normal/bright    VASCULAR:  -Dorsalis pedis pulse +2/4 b/l  -Posterior tibial pulse +1/4 b/l  -Capillary refill time < 3 seconds to b/l hallux  -Hair growth Absent to b/l anterior legs and ankles  -Varicosities and telangiectasias to foot, bilaterally   -Mild-to-moderate 1+ pitting edema to bilateral foot and 2+ to bilateral leg  NEURO:  -Light touch sensation intact to bilateral foot  DERM:  -Skin temperature, texture and turgor WNL b/l  -Toenails elongated, thickened, dystrophic and discolored x 10  MSK:  -Tenderness on the LEFT plantar second metatarsal head and immediately distal to the metatarsal head    -Prominent bony prominence to dorsal and medial 1st metatarsal head, bilaterally   -Moderate decrease in arch height while patient is NWB, bilaterally     -Muscle strength of ankles +5/5 for dorsiflexion, plantarflexion, ABDUction and ADDuction b/l  -DORSIFLEXION ROM limited to 90 degrees on the bilateral ankle    ============================================================    ASSESSMENT:  (L60.3) Onychodystrophy  (primary  encounter diagnosis)    (L84) Callus of foot    (M77.42) Metatarsalgia of left foot    (Z79.01) Long-term (current) use of anticoagulants [Z79.01]        PLAN:  -Patient evaluated and examined. Treatment options discussed with no educational barriers noted.    -Toenail debridement x 10 toenails without incident including reduction in height of the toenails.    -Foot Education provided. This included checking the feet daily looking for new new blisters or wounds, wearing shoes at all times when walking including around the house, and avoiding lotion application between the toes. If there are any signs of infection, the patient should present to the ED as soon as possible. Infections of the foot can be life threatening or lead to amputations of the foot or leg.  -She is on Eliquis which increases her risks of bleeding with cuts on her feet. She understands the importance of checking her feet daily.    Metatarsalgia and painful callus of LEFT foot:  -Discussed potential causes and treatment options for metatarsalgia. She has a callus immediately distal to the LEFT second metatarsal head. Patient is advised to use a moisturizing lotion on the callus of the foot twice daily and gently file the callus daily with an cari board. This will soften the callus and may reduce the pain along the ball of the foot.  -The metatarsal heads are receiving increased pressure. She has a gastroc equinus which causes compensation with DORSIFLEXION of the lesser MTPJs and increases pressure on the ball of the foot. This is also creating the pinch callus that is just distal to the LEFT second metatarsal head. A metatarsal pad on an orthotic can slow the progression of the callus formation and reduce the pressure in the ball of the foot. If moisturizing lotion and the cari board do not reduce her pain, then she is advised to call the clinic and orthotic options will be reviewed in more detail. However, this is a new problem, so will see if  callus treatment will reduce the pain first. She is in agreement with this plan. Consider using Gold Flores, Cera Ve or Eucerin cream. Will order Ammonium Lactate if these are not decreasing the pressure of the callus.     -Patient in agreement with the above treatment plan and all of patient's questions were answered.      RTC 63+ days for toenail debridement and to evaluate metatarsalgia pain        Alessandra Wilkins DPM

## 2024-04-17 ENCOUNTER — ANCILLARY PROCEDURE (OUTPATIENT)
Dept: CARDIOLOGY | Facility: CLINIC | Age: 74
End: 2024-04-17
Attending: INTERNAL MEDICINE
Payer: MEDICARE

## 2024-04-17 DIAGNOSIS — I44.30 AV BLOCK: ICD-10-CM

## 2024-04-17 PROCEDURE — 93294 REM INTERROG EVL PM/LDLS PM: CPT | Performed by: INTERNAL MEDICINE

## 2024-04-17 PROCEDURE — 93296 REM INTERROG EVL PM/IDS: CPT

## 2024-04-22 LAB
MDC_IDC_EPISODE_DTM: NORMAL
MDC_IDC_EPISODE_DURATION: 0 S
MDC_IDC_EPISODE_DURATION: 1 S
MDC_IDC_EPISODE_ID: 3959
MDC_IDC_EPISODE_ID: 3960
MDC_IDC_EPISODE_ID: 3961
MDC_IDC_EPISODE_ID: 3962
MDC_IDC_EPISODE_ID: 3963
MDC_IDC_EPISODE_ID: 3964
MDC_IDC_EPISODE_ID: 3965
MDC_IDC_EPISODE_ID: 3966
MDC_IDC_EPISODE_ID: 3967
MDC_IDC_EPISODE_ID: 3968
MDC_IDC_EPISODE_ID: 3969
MDC_IDC_EPISODE_ID: 3970
MDC_IDC_EPISODE_ID: 3971
MDC_IDC_EPISODE_ID: 3972
MDC_IDC_EPISODE_ID: 3973
MDC_IDC_EPISODE_TYPE: NORMAL
MDC_IDC_LEAD_CONNECTION_STATUS: NORMAL
MDC_IDC_LEAD_IMPLANT_DT: NORMAL
MDC_IDC_LEAD_LOCATION: NORMAL
MDC_IDC_LEAD_LOCATION_DETAIL_1: NORMAL
MDC_IDC_LEAD_MFG: NORMAL
MDC_IDC_LEAD_MODEL: NORMAL
MDC_IDC_LEAD_POLARITY_TYPE: NORMAL
MDC_IDC_LEAD_SERIAL: NORMAL
MDC_IDC_LEAD_SPECIAL_FUNCTION: NORMAL
MDC_IDC_MSMT_BATTERY_DTM: NORMAL
MDC_IDC_MSMT_BATTERY_REMAINING_LONGEVITY: 155 MO
MDC_IDC_MSMT_BATTERY_RRT_TRIGGER: 2.62
MDC_IDC_MSMT_BATTERY_STATUS: NORMAL
MDC_IDC_MSMT_BATTERY_VOLTAGE: 3.04 V
MDC_IDC_MSMT_LEADCHNL_RV_IMPEDANCE_VALUE: 456 OHM
MDC_IDC_MSMT_LEADCHNL_RV_IMPEDANCE_VALUE: 589 OHM
MDC_IDC_MSMT_LEADCHNL_RV_PACING_THRESHOLD_AMPLITUDE: 0.38 V
MDC_IDC_MSMT_LEADCHNL_RV_PACING_THRESHOLD_PULSEWIDTH: 0.4 MS
MDC_IDC_MSMT_LEADCHNL_RV_SENSING_INTR_AMPL: 19.75 MV
MDC_IDC_PG_IMPLANT_DTM: NORMAL
MDC_IDC_PG_MFG: NORMAL
MDC_IDC_PG_MODEL: NORMAL
MDC_IDC_PG_SERIAL: NORMAL
MDC_IDC_PG_TYPE: NORMAL
MDC_IDC_SESS_CLINIC_NAME: NORMAL
MDC_IDC_SESS_DTM: NORMAL
MDC_IDC_SESS_TYPE: NORMAL
MDC_IDC_SET_BRADY_HYSTRATE: NORMAL
MDC_IDC_SET_BRADY_LOWRATE: 60 {BEATS}/MIN
MDC_IDC_SET_BRADY_MAX_SENSOR_RATE: 130 {BEATS}/MIN
MDC_IDC_SET_BRADY_MODE: NORMAL
MDC_IDC_SET_LEADCHNL_RV_PACING_AMPLITUDE: 2 V
MDC_IDC_SET_LEADCHNL_RV_PACING_ANODE_ELECTRODE_1: NORMAL
MDC_IDC_SET_LEADCHNL_RV_PACING_CAPTURE_MODE: NORMAL
MDC_IDC_SET_LEADCHNL_RV_PACING_CATHODE_ELECTRODE_1: NORMAL
MDC_IDC_SET_LEADCHNL_RV_PACING_CATHODE_LOCATION_1: NORMAL
MDC_IDC_SET_LEADCHNL_RV_PACING_POLARITY: NORMAL
MDC_IDC_SET_LEADCHNL_RV_PACING_PULSEWIDTH: 0.4 MS
MDC_IDC_SET_LEADCHNL_RV_SENSING_ANODE_ELECTRODE_1: NORMAL
MDC_IDC_SET_LEADCHNL_RV_SENSING_ANODE_LOCATION_1: NORMAL
MDC_IDC_SET_LEADCHNL_RV_SENSING_CATHODE_ELECTRODE_1: NORMAL
MDC_IDC_SET_LEADCHNL_RV_SENSING_CATHODE_LOCATION_1: NORMAL
MDC_IDC_SET_LEADCHNL_RV_SENSING_POLARITY: NORMAL
MDC_IDC_SET_LEADCHNL_RV_SENSING_SENSITIVITY: 2 MV
MDC_IDC_SET_ZONE_DETECTION_INTERVAL: 360 MS
MDC_IDC_SET_ZONE_STATUS: NORMAL
MDC_IDC_SET_ZONE_TYPE: NORMAL
MDC_IDC_SET_ZONE_VENDOR_TYPE: NORMAL
MDC_IDC_STAT_BRADY_DTM_END: NORMAL
MDC_IDC_STAT_BRADY_DTM_START: NORMAL
MDC_IDC_STAT_BRADY_RV_PERCENT_PACED: 13.22 %
MDC_IDC_STAT_EPISODE_RECENT_COUNT: 0
MDC_IDC_STAT_EPISODE_RECENT_COUNT: 0
MDC_IDC_STAT_EPISODE_RECENT_COUNT: 239
MDC_IDC_STAT_EPISODE_RECENT_COUNT_DTM_END: NORMAL
MDC_IDC_STAT_EPISODE_RECENT_COUNT_DTM_START: NORMAL
MDC_IDC_STAT_EPISODE_TOTAL_COUNT: 0
MDC_IDC_STAT_EPISODE_TOTAL_COUNT: 1
MDC_IDC_STAT_EPISODE_TOTAL_COUNT: 3972
MDC_IDC_STAT_EPISODE_TOTAL_COUNT_DTM_END: NORMAL
MDC_IDC_STAT_EPISODE_TOTAL_COUNT_DTM_START: NORMAL
MDC_IDC_STAT_EPISODE_TYPE: NORMAL

## 2024-05-07 NOTE — TELEPHONE ENCOUNTER
I called and it went to busy signal.   ventolin       Last Written Prescription Date: 11/23/16  Last Fill Quantity: 3, # refills: 0    Last Office Visit with G, P or [facility] prescribing provider:  3/30/17   Future Office Visit:    Next 5 appointments (look out 90 days)     May 23, 2017  8:30 AM CDT   (Arrive by 8:15 AM)   Return Visit with Eliezer Myers MD   [facility] ([facility])    [address] [place] [address]   [phone]            Mahendra 15, 2017 10:00 AM CDT   Return Visit with Hayes Farrell MD   [facility] ([facility] )    [address] [address]   [phone]                   Date of Last Asthma Action Plan Letter:   There are no preventive care reminders to display for this patient.   Asthma Control Test:   ACT Total Scores 12/10/2013   ACT TOTAL SCORE 9   ASTHMA ER VISITS 0 = None   ASTHMA HOSPITALIZATIONS 0 = None       Date of Last Spirometry Test:   No results found for this or any previous visit.

## 2024-05-13 ENCOUNTER — OFFICE VISIT (OUTPATIENT)
Dept: FAMILY MEDICINE | Facility: OTHER | Age: 74
End: 2024-05-13
Attending: STUDENT IN AN ORGANIZED HEALTH CARE EDUCATION/TRAINING PROGRAM
Payer: COMMERCIAL

## 2024-05-13 VITALS
OXYGEN SATURATION: 96 % | TEMPERATURE: 99 F | RESPIRATION RATE: 18 BRPM | HEIGHT: 64 IN | WEIGHT: 150.6 LBS | DIASTOLIC BLOOD PRESSURE: 76 MMHG | HEART RATE: 91 BPM | BODY MASS INDEX: 25.71 KG/M2 | SYSTOLIC BLOOD PRESSURE: 112 MMHG

## 2024-05-13 DIAGNOSIS — E78.2 MIXED HYPERLIPIDEMIA: ICD-10-CM

## 2024-05-13 DIAGNOSIS — I10 ESSENTIAL HYPERTENSION: ICD-10-CM

## 2024-05-13 DIAGNOSIS — E89.0 HYPOTHYROIDISM, POSTABLATIVE: Chronic | ICD-10-CM

## 2024-05-13 DIAGNOSIS — R42 DIZZINESS: Primary | ICD-10-CM

## 2024-05-13 LAB
BASOPHILS # BLD AUTO: 0 10E3/UL (ref 0–0.2)
BASOPHILS NFR BLD AUTO: 0 %
EOSINOPHIL # BLD AUTO: 0.1 10E3/UL (ref 0–0.7)
EOSINOPHIL NFR BLD AUTO: 1 %
ERYTHROCYTE [DISTWIDTH] IN BLOOD BY AUTOMATED COUNT: 12.7 % (ref 10–15)
HCT VFR BLD AUTO: 43.5 % (ref 35–47)
HGB BLD-MCNC: 14.6 G/DL (ref 11.7–15.7)
IMM GRANULOCYTES # BLD: 0 10E3/UL
IMM GRANULOCYTES NFR BLD: 0 %
LYMPHOCYTES # BLD AUTO: 1.2 10E3/UL (ref 0.8–5.3)
LYMPHOCYTES NFR BLD AUTO: 17 %
MCH RBC QN AUTO: 34 PG (ref 26.5–33)
MCHC RBC AUTO-ENTMCNC: 33.6 G/DL (ref 31.5–36.5)
MCV RBC AUTO: 101 FL (ref 78–100)
MONOCYTES # BLD AUTO: 0.7 10E3/UL (ref 0–1.3)
MONOCYTES NFR BLD AUTO: 11 %
NEUTROPHILS # BLD AUTO: 4.9 10E3/UL (ref 1.6–8.3)
NEUTROPHILS NFR BLD AUTO: 71 %
PLATELET # BLD AUTO: 156 10E3/UL (ref 150–450)
RBC # BLD AUTO: 4.3 10E6/UL (ref 3.8–5.2)
WBC # BLD AUTO: 6.9 10E3/UL (ref 4–11)

## 2024-05-13 PROCEDURE — 80053 COMPREHEN METABOLIC PANEL: CPT | Mod: ZL | Performed by: STUDENT IN AN ORGANIZED HEALTH CARE EDUCATION/TRAINING PROGRAM

## 2024-05-13 PROCEDURE — 80061 LIPID PANEL: CPT | Mod: ZL | Performed by: STUDENT IN AN ORGANIZED HEALTH CARE EDUCATION/TRAINING PROGRAM

## 2024-05-13 PROCEDURE — G0463 HOSPITAL OUTPT CLINIC VISIT: HCPCS

## 2024-05-13 PROCEDURE — 99214 OFFICE O/P EST MOD 30 MIN: CPT | Performed by: STUDENT IN AN ORGANIZED HEALTH CARE EDUCATION/TRAINING PROGRAM

## 2024-05-13 PROCEDURE — 85025 COMPLETE CBC W/AUTO DIFF WBC: CPT | Mod: ZL | Performed by: STUDENT IN AN ORGANIZED HEALTH CARE EDUCATION/TRAINING PROGRAM

## 2024-05-13 PROCEDURE — 84443 ASSAY THYROID STIM HORMONE: CPT | Mod: ZL | Performed by: STUDENT IN AN ORGANIZED HEALTH CARE EDUCATION/TRAINING PROGRAM

## 2024-05-13 PROCEDURE — 36415 COLL VENOUS BLD VENIPUNCTURE: CPT | Mod: ZL | Performed by: STUDENT IN AN ORGANIZED HEALTH CARE EDUCATION/TRAINING PROGRAM

## 2024-05-13 ASSESSMENT — PAIN SCALES - GENERAL: PAINLEVEL: SEVERE PAIN (6)

## 2024-05-13 NOTE — PROGRESS NOTES
"  Assessment & Plan     Dizziness  Very sporadic, fairly rare, brief episodes of dizziness without other neurologic deficits or red flag symptoms.  Maybe associated with head position changes?  Has a number of risk factors including A-fib, low normal BP, recent ear infection, but overall history feeling best with vestibular process at this point.  Requesting lab screening today and we will also set her up with vestibular PT.  Extensive review of S/S that warrant emergent evaluation.  - Physical Therapy  Referral; Future  - CBC with platelets and differential  - TSH  - Comprehensive metabolic panel (BMP + Alb, Alk Phos, ALT, AST, Total. Bili, TP)    Essential hypertension  Well-controlled.  Denies any orthostatic episodes although remains in the differential for some of her dizzy spells.  No change for now, continue close home monitoring, lab screen today.  - CBC with platelets and differential  - Comprehensive metabolic panel (BMP + Alb, Alk Phos, ALT, AST, Total. Bili, TP)  - Metoprolol  mg daily  - Spironolactone 12.5 mg daily  - Torsemide 40 mg daily    Hypothyroidism, postablative  - TSH  - Levothyroxine 88 mcg daily    Mixed hyperlipidemia  Nonfasting but would like to get labs up-to-date.  Discussed rescreening with fasting labs if significantly out of range, particularly triglycerides.  - Lipid Profile (Chol, Trig, HDL, LDL calc)  - Lipitor 10 mg daily    I spent a total of 35 minutes on the day of the visit.   Time spent by me doing chart review, history and exam, documentation and further activities per the note      BMI  Estimated body mass index is 25.85 kg/m  as calculated from the following:    Height as of this encounter: 1.626 m (5' 4\").    Weight as of this encounter: 68.3 kg (150 lb 9.6 oz).       Jorge Wheat is a 73 year old, presenting for the following health issues:  Dizziness and Back Pain        5/13/2024     2:51 PM   Additional Questions   Roomed by Debby GARSIA "     History of Present Illness       Back Pain:  She presents for follow up of back pain. Patient's back pain is a recurring problem.  Location of back pain:  Right lower back  Description of back pain: gnawing  Back pain spreads: right buttocks    Since patient first noticed back pain, pain is: always present, but gets better and worse  Does back pain interfere with her job:  No       Reason for visit:  Balance in walking  Symptom onset:  More than a month  Symptom intensity:  Moderate  Symptom progression:  Worsening  Had these symptoms before:  No  What makes it worse:  Sometimes cant keep balance and feels like vision is double  What makes it better:  Standing still until it passes    She eats 0-1 servings of fruits and vegetables daily.She consumes 0 sweetened beverage(s) daily.She exercises with enough effort to increase her heart rate 20 to 29 minutes per day.  She exercises with enough effort to increase her heart rate 3 or less days per week.   She is taking medications regularly.       Dizziness  Onset/Duration: x over 1 month  Description:   Do you feel faint: No  Does it feel like the surroundings (bed, room) are moving: No  Unsteady/off balance: YES  Have you passed out or fallen: No  Intensity: moderate, severe  Progression of Symptoms: worsening  Accompanying Signs & Symptoms:  Heart palpitations or chest pain: No  Nausea, vomiting: No  Weakness or lack of coordination in arms or legs: No  Vision or speech changes: YES- vision changes, denies speech changed  Numbness or tingling: No  Ringing in ears (Tinnitus): No  Hearing Loss: No  History:   Head trauma/concussion history: YES- surgery on eyes and head before, stated they messed it up  Previous similar symptoms: No  Recent bleeding history: No  Any new medications (BP?): No  Precipitating factors:   Worse with activity: YES- sometimes  Worse with head movement: YES  Alleviating factors:   Does staying in a fixed position give relief: YES  Therapies  "tried and outcome: only staying in a fixed position    -Brief episodes of dizziness ongoing for uncertain amount of time  -Random and fairly rare, maybe 1 episode per month  -Also very brief, only last for 5 to 10 minutes  -Last episode about 3 days ago  -Possible trigger or exacerbation with bending forward  -Improves/resolves if she holds still and fixes her gaze in a certain position  -No emesis  -No other focal neurologic deficits associated  -History of some eye issues but just had eye appointment everything was normal  -Also treated for dental infection and right ear infection about 6 weeks ago  -Has continued to use the Flonase  -No recent med changes or missed doses  -Does have A-fib and very aware when she flips in and out; denies any association with these random spells  -Also watches BP closely, typically around 118 over 50s  -Denies any orthostatic hypotension episodes    -Would like to get caught up in any recommended lab screening right now    Review of Systems  Constitutional, HEENT, cardiovascular, pulmonary, gi and gu systems are negative, except as otherwise noted.      Objective    /76 (BP Location: Right arm, Patient Position: Sitting, Cuff Size: Adult Regular)   Pulse 91   Temp 99  F (37.2  C) (Tympanic)   Resp 18   Ht 1.626 m (5' 4\")   Wt 68.3 kg (150 lb 9.6 oz)   SpO2 96%   BMI 25.85 kg/m    Body mass index is 25.85 kg/m .  Physical Exam  Vitals reviewed.   Constitutional:       General: She is not in acute distress.     Appearance: She is not ill-appearing or toxic-appearing.   HENT:      Head: Normocephalic and atraumatic.      Right Ear: Tympanic membrane, ear canal and external ear normal.      Left Ear: Tympanic membrane, ear canal and external ear normal.      Nose: No congestion.      Mouth/Throat:      Mouth: Mucous membranes are moist.      Pharynx: No oropharyngeal exudate or posterior oropharyngeal erythema.   Eyes:      Extraocular Movements: Extraocular movements " intact.      Pupils: Pupils are equal, round, and reactive to light.   Cardiovascular:      Rate and Rhythm: Normal rate. Rhythm irregularly irregular.   Pulmonary:      Breath sounds: Normal breath sounds.   Musculoskeletal:         General: Normal range of motion.      Cervical back: Normal range of motion.   Lymphadenopathy:      Cervical: No cervical adenopathy.   Skin:     General: Skin is warm and dry.   Neurological:      General: No focal deficit present.      Mental Status: She is alert and oriented to person, place, and time.      Cranial Nerves: No cranial nerve deficit.      Motor: No weakness.   Psychiatric:         Mood and Affect: Mood normal.         Behavior: Behavior normal.          Signed Electronically by: Barry Cleveland MD

## 2024-05-14 LAB
ALBUMIN SERPL BCG-MCNC: 4 G/DL (ref 3.5–5.2)
ALP SERPL-CCNC: 37 U/L (ref 40–150)
ALT SERPL W P-5'-P-CCNC: 16 U/L (ref 0–50)
ANION GAP SERPL CALCULATED.3IONS-SCNC: 14 MMOL/L (ref 7–15)
AST SERPL W P-5'-P-CCNC: 36 U/L (ref 0–45)
BILIRUB SERPL-MCNC: 0.8 MG/DL
BUN SERPL-MCNC: 22.2 MG/DL (ref 8–23)
CALCIUM SERPL-MCNC: 9.5 MG/DL (ref 8.8–10.2)
CHLORIDE SERPL-SCNC: 99 MMOL/L (ref 98–107)
CHOLEST SERPL-MCNC: 150 MG/DL
CREAT SERPL-MCNC: 1.2 MG/DL (ref 0.51–0.95)
DEPRECATED HCO3 PLAS-SCNC: 31 MMOL/L (ref 22–29)
EGFRCR SERPLBLD CKD-EPI 2021: 48 ML/MIN/1.73M2
FASTING STATUS PATIENT QL REPORTED: NO
FASTING STATUS PATIENT QL REPORTED: NO
GLUCOSE SERPL-MCNC: 77 MG/DL (ref 70–99)
HDLC SERPL-MCNC: 71 MG/DL
LDLC SERPL CALC-MCNC: 60 MG/DL
NONHDLC SERPL-MCNC: 79 MG/DL
POTASSIUM SERPL-SCNC: 3.4 MMOL/L (ref 3.4–5.3)
PROT SERPL-MCNC: 6.8 G/DL (ref 6.4–8.3)
SODIUM SERPL-SCNC: 144 MMOL/L (ref 135–145)
TRIGL SERPL-MCNC: 94 MG/DL
TSH SERPL DL<=0.005 MIU/L-ACNC: 0.43 UIU/ML (ref 0.3–4.2)

## 2024-05-17 NOTE — NURSING NOTE
"Chief Complaint   Patient presents with     URI       Initial /66   Pulse 74   Temp 99.8  F (37.7  C)   SpO2 (!) 88%  Estimated body mass index is 26.61 kg/m  as calculated from the following:    Height as of 6/23/20: 1.626 m (5' 4\").    Weight as of 11/10/20: 70.3 kg (155 lb).  Medication Reconciliation: complete  Maria Victoria Reyes  " Agnes Berry (:  1989) is a 35 y.o. female,Established patient, here for evaluation of the following chief complaint(s):  Weight Loss (Insurance doesn't cover previously prescribed medication Contrave, Insurance covers Weovy) and Discuss Medications (Is not taking Wellbutrin as it made her feel irritable, hot flashes, confusion)      ASSESSMENT/PLAN:  1. Anxiety  Assessment & Plan:  Not controlled  Did not tolerate BuSpar  Will continue hydroxyzine  Will start bupropion in addition to Prozac   Will obtain GeneSight testing  Continue BHT  2. Class 3 severe obesity due to excess calories without serious comorbidity with body mass index (BMI) of 40.0 to 44.9 in adult ([facility])  Assessment & Plan:  Not at goal  She is down 5 pounds  She did do well with Contrave but unfortunately was too expensive  Will start bupropion alone  Encouraged increase in exercise regimen  3. Major depressive disorder, recurrent episode, in full remission ([facility])  Assessment & Plan:  Stable, controlled  Continue Prozac      Return in about 1 week (around 2024).    SUBJECTIVE/OBJECTIVE:  HPI  Here for concern of worsening anxiety over the last couple of weeks.  States that she has a lot of stressors surrounding her job.  She has been taking the Prozac which has been working well.  She did not find any benefit with BuSpar.  States she feels it made her too groggy.  She is taking hydroxyzine 50 mg which is only mildly helpful.  She continues with BHT.  Denies any suicidal thoughts.  States that most of her symptoms are related to her anxiety.    Contrave was unfortunately not covered.  She did pay out-of-pocket for this for 30 days and felt that it was helpful.  She did notice appetite suppression and was able to adhere to healthier foods.  She has been working on exercise.  She is walking during her lunch break for about 20 minutes.  She is not getting any exercise outside of work.  She is down about 5 pounds in 3 months.    Current

## 2024-05-29 DIAGNOSIS — I10 ESSENTIAL HYPERTENSION: ICD-10-CM

## 2024-05-30 ENCOUNTER — TELEPHONE (OUTPATIENT)
Dept: CARDIOLOGY | Facility: OTHER | Age: 74
End: 2024-05-30

## 2024-05-30 DIAGNOSIS — I50.32 CHRONIC DIASTOLIC HEART FAILURE (H): ICD-10-CM

## 2024-05-30 RX ORDER — SPIRONOLACTONE 25 MG/1
12.5 TABLET ORAL DAILY
Qty: 45 TABLET | Refills: 2 | Status: SHIPPED | OUTPATIENT
Start: 2024-05-30

## 2024-05-30 NOTE — TELEPHONE ENCOUNTER
spironolactone (ALDACTONE) 25 MG tablet 45 tablet 2 9/6/2023     Last Office Visit: 09/08/2023  Future Office visit:    Next 5 appointments (look out 90 days)      Jun 25, 2024 10:15 AM  (Arrive by 10:00 AM)  Return Visit with Alessandra Wilkins DPM  [facility] ([facility] ) [address] [place] [id number] [phone]             Routing refill request to provider for review/approval because:

## 2024-05-30 NOTE — TELEPHONE ENCOUNTER
Torsemide      Last Written Prescription Date:  11/20/23  Last Fill Quantity: 180,   # refills: 2  Last Office Visit: 5/13/24  Future Office visit:    Next 5 appointments (look out 90 days)      Jun 25, 2024 10:15 AM  (Arrive by 10:00 AM)  Return Visit with Alessandra Wilkins DPM  [facility] ([facility] ) [address] [place] [id number] [phone]             Routing refill request to provider for review/approval because:

## 2024-05-31 RX ORDER — TORSEMIDE 20 MG/1
TABLET ORAL
Qty: 60 TABLET | Refills: 0 | Status: SHIPPED | OUTPATIENT
Start: 2024-05-31 | End: 2024-08-11

## 2024-05-31 NOTE — TELEPHONE ENCOUNTER
Diuretics (Including Combos) Protocol [id number]/29/2024 01:08 AM   Protocol Details Has GFR on file in past 12 months and most recent value is normal     GFR Estimate   Date Value Ref Range Status   05/13/2024 48 (L) >60 mL/min/1.73m2 Final   07/08/2021 57 (L) >60 mL/min/[1.73_m2] Final     Comment:     Non  GFR Calc  Starting 12/18/2018, serum creatinine based estimated GFR (eGFR) will be   calculated using the Chronic Kidney Disease Epidemiology Collaboration   (CKD-EPI) equation.

## 2024-06-02 DIAGNOSIS — I25.10 CORONARY ARTERY DISEASE INVOLVING NATIVE CORONARY ARTERY OF NATIVE HEART WITHOUT ANGINA PECTORIS: ICD-10-CM

## 2024-06-02 DIAGNOSIS — E78.2 MIXED HYPERLIPIDEMIA: ICD-10-CM

## 2024-06-03 ENCOUNTER — MYC REFILL (OUTPATIENT)
Dept: CARDIOLOGY | Facility: OTHER | Age: 74
End: 2024-06-03

## 2024-06-03 DIAGNOSIS — E87.6 HYPOKALEMIA: ICD-10-CM

## 2024-06-03 RX ORDER — ATORVASTATIN CALCIUM 10 MG/1
10 TABLET, FILM COATED ORAL DAILY
Qty: 90 TABLET | Refills: 3 | Status: SHIPPED | OUTPATIENT
Start: 2024-06-03

## 2024-06-03 NOTE — TELEPHONE ENCOUNTER
Potassium chloride ER (KLOR-CON M) 10 MEQ tablet      Last Written Prescription Date:  8/9/23  Last Fill Quantity: 360,   # refills: 1  Last Office Visit: 9/8/23  Future Office visit:    Next 5 appointments (look out 90 days)      Jun 25, 2024 10:15 AM  (Arrive by 10:00 AM)  Return Visit with Alessandra Wilkins DPM  [facility] ([facility] ) [address] [place] [id number] [phone]             Routing to provider due to medication interaction.

## 2024-06-03 NOTE — TELEPHONE ENCOUNTER
Disp Refills Start End GARY   potassium chloride ER (KLOR-CON M) 10 MEQ CR tablet 360 tablet 1 8/9/2023 -- --     Last Office Visit: 09/08/2023  Future Office visit:    Next 5 appointments (look out 90 days)      Jun 25, 2024 10:15 AM  (Arrive by 10:00 AM)  Return Visit with Alessandra Wilkins DPM  [facility] ([facility] ) [address] [place] [id number] [phone]             Routing refill request to provider for review/approval because:

## 2024-06-04 RX ORDER — POTASSIUM CHLORIDE 750 MG/1
10 TABLET, EXTENDED RELEASE ORAL DAILY
Qty: 90 TABLET | Refills: 1 | Status: SHIPPED | OUTPATIENT
Start: 2024-06-04

## 2024-06-17 ENCOUNTER — TELEPHONE (OUTPATIENT)
Dept: FAMILY MEDICINE | Facility: OTHER | Age: 74
End: 2024-06-17

## 2024-06-17 PROBLEM — Z76.89 HEALTH CARE HOME: Status: RESOLVED | Noted: 2017-01-26 | Resolved: 2024-06-17

## 2024-06-17 NOTE — TELEPHONE ENCOUNTER
1:45 PM    Reason for Call: Phone Call    Description: Letter written to be able own puppy as  in complex residence.     Was an appointment offered for this call: No    Preferred method for responding to this message: Telephone Call  What is your phone number: [phone]    If we cannot reach you directly, may we leave a detailed response at the number you provided? Yes      Rajwinder Holbrook

## 2024-06-25 ENCOUNTER — OFFICE VISIT (OUTPATIENT)
Dept: PODIATRY | Facility: OTHER | Age: 74
End: 2024-06-25
Attending: PODIATRIST
Payer: MEDICARE

## 2024-06-25 ENCOUNTER — THERAPY VISIT (OUTPATIENT)
Dept: PHYSICAL THERAPY | Facility: HOSPITAL | Age: 74
End: 2024-06-25
Attending: STUDENT IN AN ORGANIZED HEALTH CARE EDUCATION/TRAINING PROGRAM
Payer: MEDICARE

## 2024-06-25 VITALS
TEMPERATURE: 97.8 F | SYSTOLIC BLOOD PRESSURE: 143 MMHG | HEART RATE: 94 BPM | DIASTOLIC BLOOD PRESSURE: 75 MMHG | OXYGEN SATURATION: 92 %

## 2024-06-25 DIAGNOSIS — H81.12 BENIGN PAROXYSMAL POSITIONAL VERTIGO, LEFT: Primary | ICD-10-CM

## 2024-06-25 DIAGNOSIS — Z79.01 LONG TERM CURRENT USE OF ANTICOAGULANT THERAPY: ICD-10-CM

## 2024-06-25 DIAGNOSIS — Z13.89 SCREENING FOR DIABETIC PERIPHERAL NEUROPATHY: ICD-10-CM

## 2024-06-25 DIAGNOSIS — R42 DIZZINESS: ICD-10-CM

## 2024-06-25 DIAGNOSIS — L60.3 ONYCHODYSTROPHY: Primary | ICD-10-CM

## 2024-06-25 PROCEDURE — 999N000104 HC STATISTIC NO CHARGE: Performed by: PHYSICAL THERAPIST

## 2024-06-25 PROCEDURE — 97161 PT EVAL LOW COMPLEX 20 MIN: CPT | Mod: GP | Performed by: PHYSICAL THERAPIST

## 2024-06-25 PROCEDURE — 11721 DEBRIDE NAIL 6 OR MORE: CPT | Mod: GZ | Performed by: PODIATRIST

## 2024-06-25 PROCEDURE — 95992 CANALITH REPOSITIONING PROC: CPT | Mod: GP | Performed by: PHYSICAL THERAPIST

## 2024-06-25 PROCEDURE — G0463 HOSPITAL OUTPT CLINIC VISIT: HCPCS

## 2024-06-25 PROCEDURE — 99207 PR FOOT EXAM NO CHARGE: CPT | Performed by: PODIATRIST

## 2024-06-25 ASSESSMENT — PAIN SCALES - GENERAL: PAINLEVEL: NO PAIN (0)

## 2024-06-25 NOTE — PROGRESS NOTES
Chief complaint: Patient presents with:  Toenail: DFE      History of Present Illness: This 74 year old female is seen for follow-up management of elongated, thickened toenails. She has difficulty trimming her toenails.    She is wearing lightweight shoes but she says they are very comfortable.    She says her lower extremity edema is still controlled. She takes Torsemide. She is not wearing compression socks because she says she cannot get them to fit.    She is on Eliquis for a-fib.     She previously had burning, tingling, and numbness in her feet, but this has continued to be controlled.    No further pedal complaints today.       BP (!) 143/75 (BP Location: Left arm, Patient Position: Sitting, Cuff Size: Adult Regular)   Pulse 94   Temp 97.8  F (36.6  C) (Tympanic)   SpO2 92%      Patient Active Problem List   Diagnosis    Permanent atrial fibrillation (H)    Pulmonary emphysema (H)    Bicuspid aortic valve    Mild major depression (H)    Hypothyroidism, postablative    Advance care planning    Essential hypertension    Long-term (current) use of anticoagulants [Z79.01]    Acute on chronic respiratory failure (H)    Status post coronary angiogram    Coronary artery disease involving native coronary artery of native heart without angina pectoris    Acute cystitis without hematuria    Small bowel obstruction (H)    Acute renal failure (H24)    Hypokalemia    Aortic aneurysm (H24)    Aortic valve disorder    Mitral valve disorder    Cardiomegaly    Carotid stenosis    Depressive disorder    Edema    COPD (chronic obstructive pulmonary disease) (H)    Other ill-defined heart diseases    Aortic valve stenosis, etiology of cardiac valve disease unspecified    Aortic stenosis, severe    Status post transcatheter aortic valve replacement (TAVR) using bioprosthesis    Postoperative complete heart block (H)    Cardiac pacemaker in situ       Past Surgical History:   Procedure Laterality Date    BACK SURGERY  2006     CARDIAC SURGERY  06/12/2018    Angiogram at [facility] in [place]    CHOLECYSTECTOMY      COLONOSCOPY N/A 01/19/2016    Procedure: COLONOSCOPY;  Surgeon: Waldemar Bob MD;  Location: [facility]    CV CORONARY ANGIOGRAM N/A 04/30/2021    Procedure: CV CORONARY ANGIOGRAM;  Surgeon: Poli Walsh MD;  Location:  HEART CARDIAC CATH LAB    CV LEFT HEART CATH N/A 04/30/2021    Procedure: CV LEFT HEART CATH;  Surgeon: Poli Walsh MD;  Location:  HEART CARDIAC CATH LAB    CV RIGHT HEART CATH MEASUREMENTS RECORDED N/A 04/30/2021    Procedure: CV RIGHT HEART CATH;  Surgeon: Poli Walsh MD;  Location:  HEART CARDIAC CATH LAB    CV TRANSCATHETER AORTIC VALVE REPLACEMENT N/A 07/14/2021    Procedure: Right femoral Transcaval transcatheter aortic valve replacement (SUMMERS) size 29mm.  Transesophageal echocardiogram per anesthesia;  Surgeon: Domo Sarah MD;  Location: [facility] OR    ENT SURGERY  July 14 2022    Cancer on nose    EP PPM INSERT OF NEW OR REPL W/VENT LEAD N/A 07/14/2021    Procedure: insertion of Permanent Pacemaker;  Surgeon: Eliezer Myers MD;  Location: [facility] OR    HEAD & NECK SURGERY      Carotid artery on left side    HEART CATH FEMORAL CANNULIZATION WITH OPEN STANDBY REPAIR AORTIC VALVE N/A 07/14/2021    Procedure: Cardiopulmonary standby.;  Surgeon: Fly Smith MD;  Location: [facility] OR    HYSTERECTOMY  1980    partial    SLING BLADDER SUSPENSION WITH FASCIA LINNETTE         Current Outpatient Medications   Medication Sig Dispense Refill    acetaminophen (TYLENOL) 500 MG tablet Take 500-1,000 mg by mouth every 6 hours as needed for mild pain      albuterol (PROAIR HFA/PROVENTIL HFA/VENTOLIN HFA) 108 (90 Base) MCG/ACT inhaler INHALE 2 PUFFS BY MOUTH EVERY 6 HOURS 18 g 3    amoxicillin (AMOXIL) 500 MG capsule Take 4 capsules (2,000 mg) by mouth once as needed (SBE prophylaxis take 30-60 minutes prior to dental procedure/cleaning) 4 capsule 3    atorvastatin  (LIPITOR) 10 MG tablet TAKE 1 TABLET BY MOUTH EVERY DAY 90 tablet 3    Calcium Carb-Cholecalciferol (CALTRATE 600+D3 SOFT PO) Take 600 mg by mouth 2 times daily      diphenhydrAMINE (BENADRYL) 25 MG tablet Take 1-2 tablets (25-50 mg) by mouth every 6 hours as needed for itching or allergies 60 tablet 1    ELIQUIS ANTICOAGULANT 5 MG tablet TAKE 1 TABLET BY MOUTH TWICE DAILY 180 tablet 3    hydrOXYzine HCl (ATARAX) 25 MG tablet Take 1 tablet (25 mg) by mouth 3 times daily as needed for itching 60 tablet 2    levothyroxine (SYNTHROID/LEVOTHROID) 88 MCG tablet Take 1 tablet (88 mcg) by mouth daily 90 tablet 3    metoprolol succinate ER (TOPROL XL) 100 MG 24 hr tablet Take 1 tablet (100 mg) by mouth daily 90 tablet 1    potassium chloride trinity ER (KLOR-CON M10) 10 MEQ CR tablet Take 1 tablet (10 mEq) by mouth daily 90 tablet 1    saccharomyces boulardii (FLORASTOR) 250 MG capsule Take 1 capsule (250 mg) by mouth 2 times daily 28 capsule 0    spironolactone (ALDACTONE) 25 MG tablet Take 0.5 tablets (12.5 mg) by mouth daily 45 tablet 2    torsemide (DEMADEX) 20 MG tablet TAKE 2 TABLETS BY MOUTH ONCE DAILY 60 tablet 0    TRELEGY ELLIPTA 200-62.5-25 MCG/ACT oral inhaler INHALE 1 PUFF INTO THE LUNGS ONCE DAILY 60 each 0     No current facility-administered medications for this visit.          Allergies   Allergen Reactions    Amlodipine Besylate Cough     Norvasc    Lisinopril Cough    Ace Inhibitors Cough       Family History   Problem Relation Age of Onset    Ovarian Cancer Mother 72    Asthma Mother     Cancer Mother         ovarian    Hypertension Mother     Diabetes Mother     Other Cancer Mother     Depression Mother     Osteoporosis Mother     Prostate Cancer Father     Hypertension Father     Heart Failure Father         CHF    Diabetes Father     Breast Cancer Sister     Diabetes Sister     Hypertension Sister     Cerebrovascular Disease Sister     Depression Sister     Hypertension Sister     Hypertension Brother      Asthma Brother     Asthma Brother     Hypertension Brother     Diabetes Brother     Hypertension Brother     Depression Son     Anxiety Disorder Son     Asthma Son     Osteoporosis Son        Social History     Socioeconomic History    Marital status:      Spouse name: None    Number of children: None    Years of education: None    Highest education level: None   Occupational History     Employer: RETIRED     Comment: disabled   Tobacco Use    Smoking status: Former Smoker     Packs/day: 0.50     Years: 41.00     Pack years: 20.50     Types: Cigarettes     Start date: 1/1/1966     Quit date: 1/28/2007     Years since quitting: 15.4    Smokeless tobacco: Never Used   Substance and Sexual Activity    Alcohol use: No     Alcohol/week: 0.0 standard drinks    Drug use: No    Sexual activity: Never     Comment:    Other Topics Concern     Service No    Blood Transfusions Yes     Comment: Permits if needed    Caffeine Concern Yes     Comment: 2 cups coffee daily    Seat Belt Yes    Parent/sibling w/ CABG, MI or angioplasty before 65F 55M? No       ROS: 10 point ROS neg other than the symptoms noted above in the HPI.  EXAM  Constitutional: healthy, alert and no distress    Psychiatric: mentation appears normal and affect normal/bright    VASCULAR:  -Dorsalis pedis pulse +2/4 b/l  -Posterior tibial pulse +1/4 b/l  -Capillary refill time < 3 seconds to b/l hallux  -Hair growth Absent to b/l anterior legs and ankles  -Varicosities and telangiectasias to foot, bilaterally   -Mild-to-moderate 1+ pitting edema to bilateral foot and 2+ to bilateral leg  NEURO:  -Light touch sensation intact to bilateral foot  DERM:  -Skin temperature, texture and turgor WNL b/l  -Toenails elongated, thickened, dystrophic and discolored x 10  MSK:  -Tenderness on the LEFT plantar second metatarsal head and immediately distal to the metatarsal head    -Prominent bony prominence to dorsal and medial 1st metatarsal head,  bilaterally   -Moderate decrease in arch height while patient is NWB, bilaterally     -Muscle strength of ankles +5/5 for dorsiflexion, plantarflexion, ABDUction and ADDuction b/l  -DORSIFLEXION ROM limited to 90 degrees on the bilateral ankle    ============================================================    ASSESSMENT:  (L60.3) Onychodystrophy  (primary encounter diagnosis)    (Z79.01) Long-term (current) use of anticoagulants [Z79.01]        PLAN:  -Patient evaluated and examined. Treatment options discussed with no educational barriers noted.    -Toenail debridement x 10 toenails without incident including reduction in height of the toenails.    -Foot Education provided. This included checking the feet daily looking for new new blisters or wounds, wearing shoes at all times when walking including around the house, and avoiding lotion application between the toes. If there are any signs of infection, the patient should present to the ED as soon as possible. Infections of the foot can be life threatening or lead to amputations of the foot or leg.  -She is on Eliquis which increases her risks of bleeding with cuts on her feet. She understands the importance of checking her feet daily.     -Patient in agreement with the above treatment plan and all of patient's questions were answered.      RTC 63+ days for toenail debridement and to evaluate metatarsalgia pain        Alessandra Wilkins DPM

## 2024-06-25 NOTE — PROGRESS NOTES
PHYSICAL THERAPY EVALUATION  Type of Visit: Evaluation       Fall Risk Screen:  Fall screen completed by: PT  Have you fallen 2 or more times in the past year?: No  Have you fallen and had an injury in the past year?: No  Is patient a fall risk?: No  Fall screen comments: atttending PT for dizziness/balance    Subjective       Presenting condition or subjective complaint: dizziness  Date of onset: 05/13/24 (date of original PT order, symptoms started in 2019)    Relevant medical history: COPD; Emphysema; High blood pressure; Incontinence; Overweight; Pacemaker   Dates & types of surgery: Don't remember the dates    Prior diagnostic imaging/testing results: MRI; CT scan; X-ray     Prior therapy history for the same diagnosis, illness or injury: No      Prior Level of Function  Transfers: Independent  Ambulation: Assistive equipment  ADL: Independent  IADL:  independent    Living Environment  Social support: Alone   Type of home: Apartment/condo; 1 level   Stairs to enter the home: No       Ramp: No   Stairs inside the home: No       Help at home: Other  Equipment owned: Straight Cane     Employment: Yes   Hobbies/Interests: Puzzles, quilting    Patient goals for therapy: To get answers to the dizzy episodes    Pain assessment: reports pain in tailbone region     Objective      Cognitive Status Examination  Orientation:  x4    Level of Consciousness: Alert  Follows Commands and Answers Questions: 100% of the time  Personal Safety and Judgement: Intact  Memory: Impaired  pt unable to recall where she parked car to arrive to PT    OBSERVATION: no acute distress  INTEGUMENTARY:  Increased bilateral LE edema  POSTURE:  forward head, rounded shoulders  PALPATION: WNL  RANGE OF MOTION: LE ROM WNL  STRENGTH: LE Strength WFL  But weakness noted in bilateral hips and ankles    BED MOBILITY: Independent    TRANSFERS: Independent    WHEELCHAIR MOBILITY: NA    GAIT:   Level of [place]: WFL  Assistive Device(s): Cane  "(single end) states she uses this anytime she leaves her apartment.  Pt states she uses no assistive device within her apartment.  Pt does have a walker that she uses to haul her laundry down to the laundry area in her apartment.  Pt states she uses the cane for both pain and balance.   Gait Deviations:  slow tracee, ambulates with SEC  Gait Distance: 200'  Stairs: NT    BALANCE:  not formally assessed this session due to time constraints.  Demonstrates limitations and use of SEC.  Testing to be completed on future visits        COORDINATION: NT       VESTIBULAR EVALUATION  ADDITIONAL HISTORY:  Pt started in 2019 she gradually started to notice she would get dizziness which she describes as \"off balance\", states symptoms would last 3-5 minutes.  Pt states she would just stand still and let things pass.  Pt states currently around 2x/month she has episodes of dizziness.  Pt states sometimes it can happen when she lies down at night but states \"that's nothing like the big ones\".  Pt states the other big episodes involve her vision \"not being right\" and her balance being off.  Pt states she typically tries to stand or sit to wait it out until it passes.  Pt reports once it passes she goes back to feeling normal and states she can feel good for many days on end until the next episode happens.  Pt reports there is no real consistency in the activities that she is doing that brings on her symptoms.  Pt initially denied any eye or concussion history but with chart review it was noted she had an eye/skull surgery.  Pt states she had cancer by her nose which they needed to take a piece of bone from her scalp to replace it and states \"they messed it up\".   Description of symptoms: Off balance; Blurry or jumping vision  Dizzy attacks:   Start: 2019   Last attack: 2 weeks ago   Frequency of occurrences: Random   Length of attack: 5 minutes  Difficulty hearing:    Noise in ears? No    Alleviates symptoms: Sitting or standing " until gone  Worsens symptoms: Their just random  Activities that bring on symptoms: Bending over; Reaching up       Pertinent visual history: denies  Pertinent history of current vestibular problem: Depression  DHI: Total Score: 26    Cervicogenic Screen    Neck ROM Limitations in all ranges but WFL for testing   Vertebral Artery Test    Alar Ligament Test    Transverse Ligament Test    Distraction    Neck Torsion Test (head still, body rotating)    Neck Torsion Test (head and body rotating)         Oculomotor Screen    Ocular ROM Normal   Smooth Pursuit Abnormal saccadic intrusions identified   Saccades Normal   VOR Normal   VOR Cancellation Normal   Head Impulse Test Normal   Convergence Testing         Infrared Goggle Exam Vestibular Suppressant in Last 24 Hours? No  Exam Completed With: Infrared goggles   Spontaneous Nystagmus Negative   Gaze Evoked Nystagmus Negative   Head Shake Horizontal Nystagmus Negative   Positional Testing    Supine Head-Hanging Test     Left Right   [place]-Hallpike Upbeating L torsional Negative   Sidelying Test     HSCC Supine Roll Test Negative Negative   HSCC Forward Roll Test     Braggadocio and Lean Test -  Sitting Erect    Braggadocio and Lean Test - Seated, Head Bent 60 Degrees Forward    Braggadocio and Lean Test - Seated, Head Bent Backwards       BPPV Canal(s): L Posterior  BPPV Type: Canalithasis    Dynamic Visual Acuity (DVA)    Static Acuity (LogMar)    Horizontal Head Movement at 1 Hz (LogMar)    Horizontal Head Movement at 2 Hz (LogMar)           Assessment & Plan   CLINICAL IMPRESSIONS  Medical Diagnosis: dizziness, BPPV left    Treatment Diagnosis: intermittent dizziness with daily activities   Impression/Assessment: Patient is a 74 year old female with intermittent dizziness complaints.  The following significant findings have been identified: Decreased strength, Impaired balance, Decreased proprioception, Impaired gait, Impaired muscle performance, Decreased activity tolerance, Impaired  posture, Instability, Dizziness, Disequilibrium , and Impaired vision. These impairments interfere with their ability to perform self care tasks, recreational activities, household chores, household mobility, and community mobility as compared to previous level of function.   Pt demonstrates possible BPPV however due to very intermittent nature in symptoms, may be additional contributors to her dizziness.     Clinical Decision Making (Complexity):  Clinical Presentation: Stable/Uncomplicated  Clinical Presentation Rationale: based on medical and personal factors listed in PT evaluation  Clinical Decision Making (Complexity): Low complexity    PLAN OF CARE  Treatment Interventions:  Interventions: Gait Training, Manual Therapy, Neuromuscular Re-education, Therapeutic Activity, Therapeutic Exercise, Canalith Repositioning    Long Term Goals     PT Goal 1  Goal Identifier: LTG 1  Goal Description: Pt to tolerate bed mobility without dizziness.  Goal Progress: new  Target Date: 09/03/24  PT Goal 2  Goal Identifier: LTG 2  Goal Description: Pt to report at least 75% improvement in symptoms to tolerate her daily activities without dizziness.  Goal Progress: new  Target Date: 09/03/24      Frequency of Treatment: 1-2x/week  Duration of Treatment: 10 weeks    Recommended Referrals to Other Professionals:   Education Assessment:   Learner/Method: Patient;Listening (memory concerns)  Education Comments: Pt educated on test findings and POC.  Pt does demonstrates some memory deficits    Risks and benefits of evaluation/treatment have been explained.   Patient/Family/caregiver agrees with Plan of Care.     Evaluation Time:     PT Eval, Low Complexity Minutes (62665): 23       Signing Clinician: Jennifer Panchal PT        [facility]                                                                                   OUTPATIENT PHYSICAL THERAPY      PLAN OF TREATMENT FOR OUTPATIENT REHABILITATION    Patient's Last Name, First Name, Mary Alice Daily YOB: 1950   Provider's Name   [facility]   Medical Record No.  [id number]     Onset Date: 05/13/24 (date of original PT order, symptoms started in 2019)  Start of Care Date: 06/25/24     Medical Diagnosis:  dizziness, BPPV left      PT Treatment Diagnosis:  intermittent dizziness with daily activities Plan of Treatment  Frequency/Duration: 1-2x/week/ 10 weeks    Certification date from 06/25/24 to 09/03/24         See note for plan of treatment details and functional goals     Jennifer Ansley, PT                         I CERTIFY THE NEED FOR THESE SERVICES FURNISHED UNDER        THIS PLAN OF TREATMENT AND WHILE UNDER MY CARE .             Physician Signature               Date    X_____________________________________________________                  Referring Provider:  Barry Cleveland    Initial Assessment  See Epic Evaluation- Start of Care Date: 06/25/24

## 2024-07-02 ENCOUNTER — THERAPY VISIT (OUTPATIENT)
Dept: PHYSICAL THERAPY | Facility: HOSPITAL | Age: 74
End: 2024-07-02
Attending: STUDENT IN AN ORGANIZED HEALTH CARE EDUCATION/TRAINING PROGRAM
Payer: MEDICARE

## 2024-07-02 DIAGNOSIS — H81.12 BENIGN PAROXYSMAL POSITIONAL VERTIGO, LEFT: ICD-10-CM

## 2024-07-02 DIAGNOSIS — R42 DIZZINESS: Primary | ICD-10-CM

## 2024-07-02 PROCEDURE — 97750 PHYSICAL PERFORMANCE TEST: CPT | Mod: GP | Performed by: PHYSICAL THERAPIST

## 2024-07-02 NOTE — PROGRESS NOTES
07/02/24 0500   Appointment Info   Signing clinician's name / credentials Jennifer PeñakingPREM   Total/Authorized Visits 365   Visits Used 2 medicare   Medical Diagnosis dizziness, BPPV left   PT Tx Diagnosis intermittent dizziness with daily activities   Progress Note/Certification   Start of Care Date 06/25/24   Onset of illness/injury or Date of Surgery 05/13/24   Therapy Frequency 1-2x/week   Predicted Duration 10 weeks   Certification date from 06/25/24   Certification date to 09/03/24   Progress Note Completed Date 06/25/24   GOALS   PT Goals 2   PT Goal 1   Goal Identifier LTG 1   Goal Description Pt to tolerate bed mobility without dizziness.   Goal Progress met   Target Date 09/03/24   PT Goal 2   Goal Identifier LTG 2   Goal Description Pt to report at least 75% improvement in symptoms to tolerate her daily activities without dizziness.   Goal Progress met   Target Date 09/03/24   Subjective Report   Subjective Report Pt reports she has not had any dizziness since last session.  Pt states she has been able to do all her regular activities that were at times triggering her dizziness without any issues.  Pt reports feeling 100% and does not feel that she needs ongoing work on balance   Eval/Assessments   Assessments Physical Performance Test/Measures   Physical Performance Test/measures   Physical Performance Test/Measurement, Minutes (87481) 10   Physical Performance Test/Measurement Details repeat positional testing completed.  Bilateral rosalba hallpike negative for dizziness or nystagmus today.  Pt with no symptoms   Patient Response/Progress good   Plan   Plan for next session Follow up in 2 weeks, if still doing well will discharge PT   Total Session Time   Timed Code Treatment Minutes 10   Total Treatment Time (sum of timed and untimed services) 10

## 2024-07-07 ASSESSMENT — PATIENT HEALTH QUESTIONNAIRE - PHQ9
10. IF YOU CHECKED OFF ANY PROBLEMS, HOW DIFFICULT HAVE THESE PROBLEMS MADE IT FOR YOU TO DO YOUR WORK, TAKE CARE OF THINGS AT HOME, OR GET ALONG WITH OTHER PEOPLE: NOT DIFFICULT AT ALL
SUM OF ALL RESPONSES TO PHQ QUESTIONS 1-9: 9
SUM OF ALL RESPONSES TO PHQ QUESTIONS 1-9: 9

## 2024-07-08 ENCOUNTER — OFFICE VISIT (OUTPATIENT)
Dept: FAMILY MEDICINE | Facility: OTHER | Age: 74
End: 2024-07-08
Attending: FAMILY MEDICINE
Payer: COMMERCIAL

## 2024-07-08 VITALS
WEIGHT: 150 LBS | OXYGEN SATURATION: 95 % | TEMPERATURE: 99.1 F | DIASTOLIC BLOOD PRESSURE: 80 MMHG | HEART RATE: 67 BPM | BODY MASS INDEX: 25.75 KG/M2 | SYSTOLIC BLOOD PRESSURE: 128 MMHG

## 2024-07-08 DIAGNOSIS — F41.8 SITUATIONAL ANXIETY: Primary | ICD-10-CM

## 2024-07-08 PROCEDURE — G0463 HOSPITAL OUTPT CLINIC VISIT: HCPCS

## 2024-07-08 PROCEDURE — 99213 OFFICE O/P EST LOW 20 MIN: CPT | Performed by: FAMILY MEDICINE

## 2024-07-08 ASSESSMENT — ANXIETY QUESTIONNAIRES
1. FEELING NERVOUS, ANXIOUS, OR ON EDGE: SEVERAL DAYS
4. TROUBLE RELAXING: SEVERAL DAYS
7. FEELING AFRAID AS IF SOMETHING AWFUL MIGHT HAPPEN: NOT AT ALL
5. BEING SO RESTLESS THAT IT IS HARD TO SIT STILL: NOT AT ALL
GAD7 TOTAL SCORE: 2
6. BECOMING EASILY ANNOYED OR IRRITABLE: NOT AT ALL
GAD7 TOTAL SCORE: 2
IF YOU CHECKED OFF ANY PROBLEMS ON THIS QUESTIONNAIRE, HOW DIFFICULT HAVE THESE PROBLEMS MADE IT FOR YOU TO DO YOUR WORK, TAKE CARE OF THINGS AT HOME, OR GET ALONG WITH OTHER PEOPLE: SOMEWHAT DIFFICULT
2. NOT BEING ABLE TO STOP OR CONTROL WORRYING: NOT AT ALL
3. WORRYING TOO MUCH ABOUT DIFFERENT THINGS: NOT AT ALL

## 2024-07-08 ASSESSMENT — PAIN SCALES - GENERAL: PAINLEVEL: NO PAIN (0)

## 2024-07-08 NOTE — LETTER
July 8, 2024      Mary Alice Cameron  [address] [address]  [address]   [place] [address]        To Whom It May Concern,     I am writing to request that Mary Alice be granted permission to have an emotional support animal.  It is in my opinion that a support animal will assist her with coping with her mental health.      If you have any questions please call my office.       Sincerely,        Braydon Yen MD

## 2024-07-08 NOTE — PROGRESS NOTES
Assessment & Plan     Situational anxiety  Severe anxious and depressive sx are currently present now that she doesn't have her support dog at her apartment.  I wrote a letter explaining that this dog benefits her emotionally and I support her being able to have the dog there.  She is clearly having mental health issues associated with not having the dog making this letter easy to write.                No follow-ups on file.    Subjective   Mary Alice is a 74 year old, presenting for the following health issues:  Letter Request        7/8/2024     2:35 PM   Additional Questions   Roomed by Joy   Accompanied by self     HPI       Letter request     Duration: NA  Description (location/character/radiation): animal support   Intensity:  mild  Accompanying signs and symptoms: mental health issues   History (similar episodes/previous evaluation): None  Precipitating or alleviating factors: None  Therapies tried and outcome: None           Review of Systems  Constitutional, HEENT, cardiovascular, pulmonary, gi and gu systems are negative, except as otherwise noted.      Objective    /80   Pulse 67   Temp 99.1  F (37.3  C) (Tympanic)   Wt 68 kg (150 lb)   SpO2 95%   BMI 25.75 kg/m    Body mass index is 25.75 kg/m .  Physical Exam   GENERAL: alert and no distress  NECK: no adenopathy, no asymmetry, masses, or scars  RESP: lungs clear to auscultation - no rales, rhonchi or wheezes  CV: regular rate and rhythm, normal S1 S2, no S3 or S4, no murmur, click or rub, no peripheral edema  ABDOMEN: soft, nontender, no hepatosplenomegaly, no masses and bowel sounds normal  MS: no gross musculoskeletal defects noted, no edema    Letter done.          Signed Electronically by: Braydon Yen MD

## 2024-07-09 ENCOUNTER — ANCILLARY PROCEDURE (OUTPATIENT)
Dept: CARDIOLOGY | Facility: OTHER | Age: 74
End: 2024-07-09
Attending: INTERNAL MEDICINE
Payer: MEDICARE

## 2024-07-09 DIAGNOSIS — I44.2 ATRIOVENTRICULAR BLOCK, COMPLETE (H): ICD-10-CM

## 2024-07-09 DIAGNOSIS — Z95.0 CARDIAC PACEMAKER IN SITU: ICD-10-CM

## 2024-07-09 PROCEDURE — 93279 PRGRMG DEV EVAL PM/LDLS PM: CPT | Performed by: INTERNAL MEDICINE

## 2024-07-09 NOTE — PATIENT INSTRUCTIONS
It was a pleasure to see you in clinic today.  Please do not hesitate to call with any questions or concerns.  You are scheduled for remote transmissions on 10/9/24, 1/13/25 and 4/16/25.  We look forward to seeing you in clinic at your next device check in 1 year.    Rajni An, RN, MS, CCRN  Electrophysiology Nurse Clinician  [facility]    During Business Hours Please Call:  [phone]  After Hours Please Call:  [phone] - select option #[phone] and ask for job code [phone]

## 2024-07-15 ENCOUNTER — THERAPY VISIT (OUTPATIENT)
Dept: PHYSICAL THERAPY | Facility: HOSPITAL | Age: 74
End: 2024-07-15
Attending: STUDENT IN AN ORGANIZED HEALTH CARE EDUCATION/TRAINING PROGRAM
Payer: MEDICARE

## 2024-07-15 DIAGNOSIS — R42 DIZZINESS: Primary | ICD-10-CM

## 2024-07-15 DIAGNOSIS — H81.12 BENIGN PAROXYSMAL POSITIONAL VERTIGO, LEFT: ICD-10-CM

## 2024-07-15 PROCEDURE — 97750 PHYSICAL PERFORMANCE TEST: CPT | Mod: GP | Performed by: PHYSICAL THERAPIST

## 2024-07-15 NOTE — PROGRESS NOTES
07/15/24 0500   Appointment Info   Signing clinician's name / credentials Jennifer Peñaking PREM   Total/Authorized Visits 365   Visits Used 3 medicare   Medical Diagnosis dizziness, BPPV left   PT Tx Diagnosis intermittent dizziness with daily activities   Progress Note/Certification   Start of Care Date 06/25/24   Onset of illness/injury or Date of Surgery 05/13/24   Therapy Frequency 1-2x/week   Predicted Duration 10 weeks   Certification date from 06/25/24   Certification date to 09/03/24   Progress Note Completed Date 06/25/24   GOALS   PT Goals 2   PT Goal 1   Goal Identifier LTG 1   Goal Description Pt to tolerate bed mobility without dizziness.   Goal Progress met   Target Date 09/03/24   Date Met 07/02/24   PT Goal 2   Goal Identifier LTG 2   Goal Description Pt to report at least 75% improvement in symptoms to tolerate her daily activities without dizziness.   Goal Progress met   Target Date 09/03/24   Date Met 07/02/24   Subjective Report   Subjective Report Pt reports things are going really well.  Pt states she only had 1 episode of dizziness and states it wasn't anything like they ones she was having before.  Pt states she was in her kitchen doing something at the time.  Pt reports feeling 75% improved from start of PT to today.   Eval/Assessments   Assessments Physical Performance Test/Measures   Physical Performance Test/measures   Physical Performance Test/Measurement, Minutes (01456) 10   Physical Performance Test/Measurement Details repeat positional testing completed.  Bilateral rosalba hallpike negative for dizziness or nystagmus today.  Pt with no symptoms   Patient Response/Progress good   Plan   Plan for next session DC skilled PT, return if symptoms return.         DISCHARGE  Reason for Discharge: Patient has met all goals.    Equipment Issued: none    Discharge Plan: follow up prn if symptoms return.     Referring Provider:  Barry Cleveland

## 2024-07-22 LAB
MDC_IDC_EPISODE_DTM: NORMAL
MDC_IDC_EPISODE_DURATION: 0 S
MDC_IDC_EPISODE_DURATION: 1 S
MDC_IDC_EPISODE_ID: 3974
MDC_IDC_EPISODE_ID: 3975
MDC_IDC_EPISODE_ID: 3976
MDC_IDC_EPISODE_ID: 3977
MDC_IDC_EPISODE_ID: 3978
MDC_IDC_EPISODE_ID: 3979
MDC_IDC_EPISODE_ID: 3980
MDC_IDC_EPISODE_ID: 3981
MDC_IDC_EPISODE_ID: 3982
MDC_IDC_EPISODE_ID: 3983
MDC_IDC_EPISODE_ID: 3984
MDC_IDC_EPISODE_ID: 3985
MDC_IDC_EPISODE_TYPE: NORMAL
MDC_IDC_EPISODE_TYPE_INDUCED: NO
MDC_IDC_LEAD_CONNECTION_STATUS: NORMAL
MDC_IDC_LEAD_IMPLANT_DT: NORMAL
MDC_IDC_LEAD_LOCATION: NORMAL
MDC_IDC_LEAD_LOCATION_DETAIL_1: NORMAL
MDC_IDC_LEAD_MFG: NORMAL
MDC_IDC_LEAD_MODEL: NORMAL
MDC_IDC_LEAD_POLARITY_TYPE: NORMAL
MDC_IDC_LEAD_SERIAL: NORMAL
MDC_IDC_LEAD_SPECIAL_FUNCTION: NORMAL
MDC_IDC_MSMT_BATTERY_DTM: NORMAL
MDC_IDC_MSMT_BATTERY_REMAINING_LONGEVITY: 153 MO
MDC_IDC_MSMT_BATTERY_RRT_TRIGGER: 2.62
MDC_IDC_MSMT_BATTERY_STATUS: NORMAL
MDC_IDC_MSMT_BATTERY_VOLTAGE: 3.03 V
MDC_IDC_MSMT_LEADCHNL_RV_IMPEDANCE_VALUE: 494 OHM
MDC_IDC_MSMT_LEADCHNL_RV_IMPEDANCE_VALUE: 627 OHM
MDC_IDC_MSMT_LEADCHNL_RV_PACING_THRESHOLD_AMPLITUDE: 0.5 V
MDC_IDC_MSMT_LEADCHNL_RV_PACING_THRESHOLD_PULSEWIDTH: 0.4 MS
MDC_IDC_MSMT_LEADCHNL_RV_SENSING_INTR_AMPL: 20.25 MV
MDC_IDC_MSMT_LEADCHNL_RV_SENSING_INTR_AMPL: 22 MV
MDC_IDC_PG_IMPLANT_DTM: NORMAL
MDC_IDC_PG_MFG: NORMAL
MDC_IDC_PG_MODEL: NORMAL
MDC_IDC_PG_SERIAL: NORMAL
MDC_IDC_PG_TYPE: NORMAL
MDC_IDC_SESS_CLINIC_NAME: NORMAL
MDC_IDC_SESS_DTM: NORMAL
MDC_IDC_SESS_TYPE: NORMAL
MDC_IDC_SET_BRADY_HYSTRATE: NORMAL
MDC_IDC_SET_BRADY_LOWRATE: 60 {BEATS}/MIN
MDC_IDC_SET_BRADY_MAX_SENSOR_RATE: 130 {BEATS}/MIN
MDC_IDC_SET_BRADY_MODE: NORMAL
MDC_IDC_SET_LEADCHNL_RV_PACING_AMPLITUDE: 2 V
MDC_IDC_SET_LEADCHNL_RV_PACING_ANODE_ELECTRODE_1: NORMAL
MDC_IDC_SET_LEADCHNL_RV_PACING_CAPTURE_MODE: NORMAL
MDC_IDC_SET_LEADCHNL_RV_PACING_CATHODE_ELECTRODE_1: NORMAL
MDC_IDC_SET_LEADCHNL_RV_PACING_CATHODE_LOCATION_1: NORMAL
MDC_IDC_SET_LEADCHNL_RV_PACING_POLARITY: NORMAL
MDC_IDC_SET_LEADCHNL_RV_PACING_PULSEWIDTH: 0.4 MS
MDC_IDC_SET_LEADCHNL_RV_SENSING_ANODE_ELECTRODE_1: NORMAL
MDC_IDC_SET_LEADCHNL_RV_SENSING_ANODE_LOCATION_1: NORMAL
MDC_IDC_SET_LEADCHNL_RV_SENSING_CATHODE_ELECTRODE_1: NORMAL
MDC_IDC_SET_LEADCHNL_RV_SENSING_CATHODE_LOCATION_1: NORMAL
MDC_IDC_SET_LEADCHNL_RV_SENSING_POLARITY: NORMAL
MDC_IDC_SET_LEADCHNL_RV_SENSING_SENSITIVITY: 2 MV
MDC_IDC_SET_ZONE_DETECTION_INTERVAL: 360 MS
MDC_IDC_SET_ZONE_STATUS: NORMAL
MDC_IDC_SET_ZONE_TYPE: NORMAL
MDC_IDC_SET_ZONE_VENDOR_TYPE: NORMAL
MDC_IDC_STAT_BRADY_DTM_END: NORMAL
MDC_IDC_STAT_BRADY_DTM_START: NORMAL
MDC_IDC_STAT_BRADY_RV_PERCENT_PACED: 17.66 %
MDC_IDC_STAT_EPISODE_RECENT_COUNT: 0
MDC_IDC_STAT_EPISODE_RECENT_COUNT: 0
MDC_IDC_STAT_EPISODE_RECENT_COUNT: 12
MDC_IDC_STAT_EPISODE_RECENT_COUNT_DTM_END: NORMAL
MDC_IDC_STAT_EPISODE_RECENT_COUNT_DTM_START: NORMAL
MDC_IDC_STAT_EPISODE_TOTAL_COUNT: 0
MDC_IDC_STAT_EPISODE_TOTAL_COUNT: 1
MDC_IDC_STAT_EPISODE_TOTAL_COUNT: 3984
MDC_IDC_STAT_EPISODE_TOTAL_COUNT_DTM_END: NORMAL
MDC_IDC_STAT_EPISODE_TOTAL_COUNT_DTM_START: NORMAL
MDC_IDC_STAT_EPISODE_TYPE: NORMAL

## 2024-08-11 ENCOUNTER — MYC REFILL (OUTPATIENT)
Dept: FAMILY MEDICINE | Facility: OTHER | Age: 74
End: 2024-08-11

## 2024-08-11 DIAGNOSIS — J44.9 CHRONIC OBSTRUCTIVE PULMONARY DISEASE, UNSPECIFIED COPD TYPE (H): ICD-10-CM

## 2024-08-11 DIAGNOSIS — I10 ESSENTIAL HYPERTENSION: ICD-10-CM

## 2024-08-12 RX ORDER — TORSEMIDE 20 MG/1
40 TABLET ORAL DAILY
Qty: 60 TABLET | Refills: 0 | Status: SHIPPED | OUTPATIENT
Start: 2024-08-12 | End: 2024-09-16

## 2024-08-12 RX ORDER — FLUTICASONE FUROATE, UMECLIDINIUM BROMIDE AND VILANTEROL TRIFENATATE 200; 62.5; 25 UG/1; UG/1; UG/1
1 POWDER RESPIRATORY (INHALATION) DAILY
Qty: 60 EACH | Refills: 0 | Status: SHIPPED | OUTPATIENT
Start: 2024-08-12 | End: 2024-09-16

## 2024-08-12 NOTE — TELEPHONE ENCOUNTER
Palak      Last Written Prescription Date:  4/26/2024  Last Fill Quantity: 60,   # refills: 0  Last Office Visit: 7/08/2024  Future Office visit:    Next 5 appointments (look out 90 days)      Aug 27, 2024 10:30 AM  (Arrive by 10:15 AM)  Return Visit with Alessandra Wilkins DPM  [facility] ([facility] ) [address] [place] [id number] [phone]

## 2024-08-12 NOTE — TELEPHONE ENCOUNTER
Demadex       Last Written Prescription Date:  5/31/2024  Last Fill Quantity: 60,   # refills: 0  Last Office Visit: 7/08/2024  Future Office visit:    Next 5 appointments (look out 90 days)      Aug 27, 2024 10:30 AM  (Arrive by 10:15 AM)  Return Visit with Alessandra Wilkins DPM  [facility] ([facility] ) [address] [place] [id number] [phone]

## 2024-08-19 DIAGNOSIS — I10 ESSENTIAL HYPERTENSION: ICD-10-CM

## 2024-08-19 DIAGNOSIS — I48.21 PERMANENT ATRIAL FIBRILLATION (H): ICD-10-CM

## 2024-08-19 RX ORDER — METOPROLOL SUCCINATE 100 MG/1
100 TABLET, EXTENDED RELEASE ORAL DAILY
Qty: 90 TABLET | Refills: 0 | Status: SHIPPED | OUTPATIENT
Start: 2024-08-19

## 2024-08-27 ENCOUNTER — OFFICE VISIT (OUTPATIENT)
Dept: PODIATRY | Facility: OTHER | Age: 74
End: 2024-08-27
Attending: PODIATRIST
Payer: MEDICARE

## 2024-08-27 VITALS
OXYGEN SATURATION: 95 % | TEMPERATURE: 98 F | DIASTOLIC BLOOD PRESSURE: 92 MMHG | HEART RATE: 82 BPM | SYSTOLIC BLOOD PRESSURE: 142 MMHG

## 2024-08-27 DIAGNOSIS — E89.0 HYPOTHYROIDISM, POSTABLATIVE: ICD-10-CM

## 2024-08-27 DIAGNOSIS — Z13.89 SCREENING FOR DIABETIC PERIPHERAL NEUROPATHY: ICD-10-CM

## 2024-08-27 DIAGNOSIS — Z79.01 LONG TERM CURRENT USE OF ANTICOAGULANT THERAPY: ICD-10-CM

## 2024-08-27 DIAGNOSIS — L60.3 ONYCHODYSTROPHY: Primary | ICD-10-CM

## 2024-08-27 PROCEDURE — G0463 HOSPITAL OUTPT CLINIC VISIT: HCPCS

## 2024-08-27 PROCEDURE — 11721 DEBRIDE NAIL 6 OR MORE: CPT | Performed by: PODIATRIST

## 2024-08-27 RX ORDER — LEVOTHYROXINE SODIUM 88 UG/1
88 TABLET ORAL DAILY
Qty: 90 TABLET | Refills: 2 | Status: SHIPPED | OUTPATIENT
Start: 2024-08-27 | End: 2024-08-28

## 2024-08-27 ASSESSMENT — PAIN SCALES - GENERAL: PAINLEVEL: NO PAIN (0)

## 2024-08-27 NOTE — PROGRESS NOTES
Chief complaint: Patient presents with:  Toenail: Trimming      History of Present Illness: This 74 year old female is seen for follow-up management of elongated, thickened toenails. She has difficulty trimming her toenails.    She is wearing lightweight shoes but she says they are very comfortable and they don't rub on her bunions.    She says her lower extremity edema is still controlled. She takes Torsemide. She is not wearing compression socks because she says she cannot get them to fit.    She is on Eliquis for a-fib.     She previously had burning, tingling, and numbness in her feet, but this has continued to be controlled.    No further pedal complaints today.       BP (!) 142/92 (BP Location: Left arm, Patient Position: Sitting, Cuff Size: Adult Regular)   Pulse 82   Temp 98  F (36.7  C) (Tympanic)   SpO2 95%      Patient Active Problem List   Diagnosis    Permanent atrial fibrillation (H)    Pulmonary emphysema (H)    Bicuspid aortic valve    Mild major depression (H)    Hypothyroidism, postablative    Advance care planning    Essential hypertension    Long-term (current) use of anticoagulants [Z79.01]    Acute on chronic respiratory failure (H)    Status post coronary angiogram    Coronary artery disease involving native coronary artery of native heart without angina pectoris    Acute cystitis without hematuria    Small bowel obstruction (H)    Acute renal failure (H24)    Hypokalemia    Aortic aneurysm (H24)    Aortic valve disorder    Mitral valve disorder    Cardiomegaly    Carotid stenosis    Depressive disorder    Edema    COPD (chronic obstructive pulmonary disease) (H)    Other ill-defined heart diseases    Aortic valve stenosis, etiology of cardiac valve disease unspecified    Aortic stenosis, severe    Status post transcatheter aortic valve replacement (TAVR) using bioprosthesis    Postoperative complete heart block (H)    Cardiac pacemaker in situ       Past Surgical History:   Procedure  Laterality Date    BACK SURGERY  2006    CARDIAC SURGERY  06/12/2018    Angiogram at [facility] in [place]    CHOLECYSTECTOMY      COLONOSCOPY N/A 01/19/2016    Procedure: COLONOSCOPY;  Surgeon: Waldemar Bob MD;  [facility]: [facility]    CV CORONARY ANGIOGRAM N/A 04/30/2021    Procedure: CV CORONARY ANGIOGRAM;  Surgeon: Poli Walsh MD;  Location:  HEART CARDIAC CATH LAB    CV LEFT HEART CATH N/A 04/30/2021    Procedure: CV LEFT HEART CATH;  Surgeon: Poli Walsh MD;  Location:  HEART CARDIAC CATH LAB    CV RIGHT HEART CATH MEASUREMENTS RECORDED N/A 04/30/2021    Procedure: CV RIGHT HEART CATH;  Surgeon: Poli Walsh MD;  Location:  HEART CARDIAC CATH LAB    CV TRANSCATHETER AORTIC VALVE REPLACEMENT N/A 07/14/2021    Procedure: Right femoral Transcaval transcatheter aortic valve replacement (SUMMERS) size 29mm.  Transesophageal echocardiogram per anesthesia;  Surgeon: Domo Sarah MD;  [facility]    ENT SURGERY  July 14 2022    Cancer on nose    EP PPM INSERT OF NEW OR REPL W/VENT LEAD N/A 07/14/2021    Procedure: insertion of Permanent Pacemaker;  Surgeon: Eliezer Myers MD;  Location: [facility] OR    HEAD & NECK SURGERY      Carotid artery on left side    HEART CATH FEMORAL CANNULIZATION WITH OPEN STANDBY REPAIR AORTIC VALVE N/A 07/14/2021    Procedure: Cardiopulmonary standby.;  Surgeon: Fly Smith MD;  Location: [facility] OR    HYSTERECTOMY  1980    partial    SLING BLADDER SUSPENSION WITH FASCIA LINNETTE         Current Outpatient Medications   Medication Sig Dispense Refill    acetaminophen (TYLENOL) 500 MG tablet Take 500-1,000 mg by mouth every 6 hours as needed for mild pain      albuterol (PROAIR HFA/PROVENTIL HFA/VENTOLIN HFA) 108 (90 Base) MCG/ACT inhaler INHALE 2 PUFFS BY MOUTH EVERY 6 HOURS 18 g 3    amoxicillin (AMOXIL) 500 MG capsule Take 4 capsules (2,000 mg) by mouth once as needed (SBE prophylaxis take 30-60 minutes prior to dental  procedure/cleaning) 4 capsule 3    atorvastatin (LIPITOR) 10 MG tablet TAKE 1 TABLET BY MOUTH EVERY DAY 90 tablet 3    Calcium Carb-Cholecalciferol (CALTRATE 600+D3 SOFT PO) Take 600 mg by mouth 2 times daily      diphenhydrAMINE (BENADRYL) 25 MG tablet Take 1-2 tablets (25-50 mg) by mouth every 6 hours as needed for itching or allergies 60 tablet 1    ELIQUIS ANTICOAGULANT 5 MG tablet TAKE 1 TABLET BY MOUTH TWICE DAILY 180 tablet 3    Fluticasone-Umeclidin-Vilanterol (TRELEGY ELLIPTA) 200-62.5-25 MCG/ACT oral inhaler Inhale 1 puff into the lungs daily 60 each 0    hydrOXYzine HCl (ATARAX) 25 MG tablet Take 1 tablet (25 mg) by mouth 3 times daily as needed for itching 60 tablet 2    metoprolol succinate ER (TOPROL XL) 100 MG 24 hr tablet Take 1 tablet (100 mg) by mouth daily 90 tablet 0    potassium chloride trinity ER (KLOR-CON M10) 10 MEQ CR tablet Take 1 tablet (10 mEq) by mouth daily 90 tablet 1    saccharomyces boulardii (FLORASTOR) 250 MG capsule Take 1 capsule (250 mg) by mouth 2 times daily 28 capsule 0    spironolactone (ALDACTONE) 25 MG tablet Take 0.5 tablets (12.5 mg) by mouth daily 45 tablet 2    torsemide (DEMADEX) 20 MG tablet Take 2 tablets (40 mg) by mouth daily 60 tablet 0    levothyroxine (SYNTHROID/LEVOTHROID) 88 MCG tablet Take 1 tablet (88 mcg) by mouth daily. 90 tablet 2     No current facility-administered medications for this visit.          Allergies   Allergen Reactions    Amlodipine Besylate Cough     Norvasc    Lisinopril Cough    Ace Inhibitors Cough       Family History   Problem Relation Age of Onset    Ovarian Cancer Mother 72    Asthma Mother     Cancer Mother         ovarian    Hypertension Mother     Diabetes Mother     Other Cancer Mother     Depression Mother     Osteoporosis Mother     Prostate Cancer Father     Hypertension Father     Heart Failure Father         CHF    Diabetes Father     Breast Cancer Sister     Diabetes Sister     Hypertension Sister     Cerebrovascular  Disease Sister     Depression Sister     Hypertension Sister     Hypertension Brother     Asthma Brother     Asthma Brother     Hypertension Brother     Diabetes Brother     Hypertension Brother     Depression Son     Anxiety Disorder Son     Asthma Son     Osteoporosis Son        Social History     Socioeconomic History    Marital status:      Spouse name: None    Number of children: None    Years of education: None    Highest education level: None   Occupational History     Employer: RETIRED     Comment: disabled   Tobacco Use    Smoking status: Former Smoker     Packs/day: 0.50     Years: 41.00     Pack years: 20.50     Types: Cigarettes     Start date: 1/1/1966     Quit date: 1/28/2007     Years since quitting: 15.4    Smokeless tobacco: Never Used   Substance and Sexual Activity    Alcohol use: No     Alcohol/week: 0.0 standard drinks    Drug use: No    Sexual activity: Never     Comment:    Other Topics Concern     Service No    Blood Transfusions Yes     Comment: Permits if needed    Caffeine Concern Yes     Comment: 2 cups coffee daily    Seat Belt Yes    Parent/sibling w/ CABG, MI or angioplasty before 65F 55M? No       ROS: 10 point ROS neg other than the symptoms noted above in the HPI.  EXAM  Constitutional: healthy, alert and no distress    Psychiatric: mentation appears normal and affect normal/bright    VASCULAR:  -Dorsalis pedis pulse +2/4 b/l  -Posterior tibial pulse +1/4 b/l  -Capillary refill time < 3 seconds to b/l hallux  -Hair growth Absent to b/l anterior legs and ankles  -Varicosities and telangiectasias to foot, bilaterally   -Moderate 2+ pitting edema to bilateral foot and leg  NEURO:  -Light touch sensation intact to bilateral foot  DERM:  -Skin temperature, texture and turgor WNL b/l  -Toenails elongated, thickened, dystrophic and discolored x 10  MSK:  -Tenderness on the LEFT plantar second metatarsal head and immediately distal to the metatarsal head    -Prominent  bony prominence to dorsal and medial 1st metatarsal head, bilaterally   -Moderate decrease in arch height while patient is NWB, bilaterally     -Muscle strength of ankles +5/5 for dorsiflexion, plantarflexion, ABDUction and ADDuction b/l  -DORSIFLEXION ROM limited to 90 degrees on the bilateral ankle    ============================================================    ASSESSMENT:  (L60.3) Onychodystrophy  (primary encounter diagnosis)    (Z79.01) Long-term (current) use of anticoagulants [Z79.01]        PLAN:  -Patient evaluated and examined. Treatment options discussed with no educational barriers noted.    -Toenail debridement x 10 toenails without incident including reduction in height of the toenails.    -Foot Education provided. This included checking the feet daily looking for new new blisters or wounds, wearing shoes at all times when walking including around the house, and avoiding lotion application between the toes. If there are any signs of infection, the patient should present to the ED as soon as possible. Infections of the foot can be life threatening or lead to amputations of the foot or leg.  -She is on Eliquis which increases her risks of bleeding with cuts on her feet. She understands the importance of checking her feet daily.     -Patient in agreement with the above treatment plan and all of patient's questions were answered.      RTC 63+ days for toenail debridement        Alessandra Wilkins DPM

## 2024-08-28 RX ORDER — LEVOTHYROXINE SODIUM 88 UG/1
88 TABLET ORAL DAILY
Qty: 90 TABLET | Refills: 2 | Status: SHIPPED | OUTPATIENT
Start: 2024-08-28

## 2024-09-16 DIAGNOSIS — J44.9 CHRONIC OBSTRUCTIVE PULMONARY DISEASE, UNSPECIFIED COPD TYPE (H): ICD-10-CM

## 2024-09-16 DIAGNOSIS — I10 ESSENTIAL HYPERTENSION: ICD-10-CM

## 2024-09-16 RX ORDER — FLUTICASONE FUROATE, UMECLIDINIUM BROMIDE AND VILANTEROL TRIFENATATE 200; 62.5; 25 UG/1; UG/1; UG/1
1 POWDER RESPIRATORY (INHALATION) DAILY
Qty: 90 EACH | Refills: 3 | Status: SHIPPED | OUTPATIENT
Start: 2024-09-16

## 2024-09-16 RX ORDER — TORSEMIDE 20 MG/1
40 TABLET ORAL DAILY
Qty: 60 TABLET | Refills: 0 | Status: SHIPPED | OUTPATIENT
Start: 2024-09-16 | End: 2024-10-01

## 2024-09-16 NOTE — TELEPHONE ENCOUNTER
TORSEMIDE 20MG TABLET       Last Written Prescription Date:  8/12/24  Last Fill Quantity: 60,   # refills: 0  Last Office Visit: 7/8/24  Future Office visit:    Next 5 appointments (look out 90 days)      Oct 29, 2024 11:30 AM  (Arrive by 11:15 AM)  [facility] [address] [place] [id number] [phone]             Routing refill request to provider for review/approval because:    Diuretics (Including Combos) Protocol [id number]/16/2024 09:31 AM   Protocol Details   Blood pressure under 140/90 in past 12 months    Has GFR on file in past 12 months and most recent value is normal        BP Readings from Last 3 Encounters:   08/27/24 (!) 142/92   07/08/24 128/80   06/25/24 (!) 143/75     GFR Estimate   Date Value Ref Range Status   05/13/2024 48 (L) >60 mL/min/1.73m2 Final   07/08/2021 57 (L) >60 mL/min/[1.73_m2] Final     Comment:     Non  GFR Calc  Starting 12/18/2018, serum creatinine based estimated GFR (eGFR) will be   calculated using the Chronic Kidney Disease Epidemiology Collaboration   (CKD-EPI) equation.          TRELEGY ELLIPTA 200/62.5/25 IN       Last Written Prescription Date:  4/26/24  Last Fill Quantity: 60,   # refills: 0  Last Office Visit: 7/8/24  Future Office visit:    Next 5 appointments (look out 90 days)      Oct 29, 2024 11:30 AM  (Arrive by 11:15 AM)  [facility] [address] [place] [id number] [phone]             Routing refill request to provider for review/approval because:   Disp Refills Start End GARY   TRELEGY ELLIPTA 200-62.5-25 MCG/ACT oral inhaler (Discontinued) 60 each 0 4/26/2024 8/11/2024 No

## 2024-10-09 ENCOUNTER — ANCILLARY PROCEDURE (OUTPATIENT)
Dept: CARDIOLOGY | Facility: CLINIC | Age: 74
End: 2024-10-09
Attending: INTERNAL MEDICINE
Payer: MEDICARE

## 2024-10-09 DIAGNOSIS — I44.30 AV BLOCK: ICD-10-CM

## 2024-10-09 PROCEDURE — 93296 REM INTERROG EVL PM/IDS: CPT

## 2024-10-09 PROCEDURE — 93294 REM INTERROG EVL PM/LDLS PM: CPT | Performed by: INTERNAL MEDICINE

## 2024-10-11 ENCOUNTER — IMMUNIZATION (OUTPATIENT)
Dept: FAMILY MEDICINE | Facility: OTHER | Age: 74
End: 2024-10-11
Attending: FAMILY MEDICINE
Payer: MEDICARE

## 2024-10-11 PROCEDURE — G0008 ADMIN INFLUENZA VIRUS VAC: HCPCS

## 2024-10-12 LAB
MDC_IDC_LEAD_CONNECTION_STATUS: NORMAL
MDC_IDC_LEAD_IMPLANT_DT: NORMAL
MDC_IDC_LEAD_LOCATION: NORMAL
MDC_IDC_LEAD_LOCATION_DETAIL_1: NORMAL
MDC_IDC_LEAD_MFG: NORMAL
MDC_IDC_LEAD_MODEL: NORMAL
MDC_IDC_LEAD_POLARITY_TYPE: NORMAL
MDC_IDC_LEAD_SERIAL: NORMAL
MDC_IDC_LEAD_SPECIAL_FUNCTION: NORMAL
MDC_IDC_MSMT_BATTERY_DTM: NORMAL
MDC_IDC_MSMT_BATTERY_REMAINING_LONGEVITY: 149 MO
MDC_IDC_MSMT_BATTERY_RRT_TRIGGER: 2.62
MDC_IDC_MSMT_BATTERY_STATUS: NORMAL
MDC_IDC_MSMT_BATTERY_VOLTAGE: 3.03 V
MDC_IDC_MSMT_LEADCHNL_RV_IMPEDANCE_VALUE: 475 OHM
MDC_IDC_MSMT_LEADCHNL_RV_IMPEDANCE_VALUE: 589 OHM
MDC_IDC_MSMT_LEADCHNL_RV_PACING_THRESHOLD_AMPLITUDE: 0.38 V
MDC_IDC_MSMT_LEADCHNL_RV_PACING_THRESHOLD_PULSEWIDTH: 0.4 MS
MDC_IDC_MSMT_LEADCHNL_RV_SENSING_INTR_AMPL: 19.75 MV
MDC_IDC_PG_IMPLANT_DTM: NORMAL
MDC_IDC_PG_MFG: NORMAL
MDC_IDC_PG_MODEL: NORMAL
MDC_IDC_PG_SERIAL: NORMAL
MDC_IDC_PG_TYPE: NORMAL
MDC_IDC_SESS_CLINIC_NAME: NORMAL
MDC_IDC_SESS_DTM: NORMAL
MDC_IDC_SESS_TYPE: NORMAL
MDC_IDC_SET_BRADY_HYSTRATE: NORMAL
MDC_IDC_SET_BRADY_LOWRATE: 60 {BEATS}/MIN
MDC_IDC_SET_BRADY_MAX_SENSOR_RATE: 130 {BEATS}/MIN
MDC_IDC_SET_BRADY_MODE: NORMAL
MDC_IDC_SET_LEADCHNL_RV_PACING_AMPLITUDE: 2 V
MDC_IDC_SET_LEADCHNL_RV_PACING_ANODE_ELECTRODE_1: NORMAL
MDC_IDC_SET_LEADCHNL_RV_PACING_CAPTURE_MODE: NORMAL
MDC_IDC_SET_LEADCHNL_RV_PACING_CATHODE_ELECTRODE_1: NORMAL
MDC_IDC_SET_LEADCHNL_RV_PACING_CATHODE_LOCATION_1: NORMAL
MDC_IDC_SET_LEADCHNL_RV_PACING_POLARITY: NORMAL
MDC_IDC_SET_LEADCHNL_RV_PACING_PULSEWIDTH: 0.4 MS
MDC_IDC_SET_LEADCHNL_RV_SENSING_ANODE_ELECTRODE_1: NORMAL
MDC_IDC_SET_LEADCHNL_RV_SENSING_ANODE_LOCATION_1: NORMAL
MDC_IDC_SET_LEADCHNL_RV_SENSING_CATHODE_ELECTRODE_1: NORMAL
MDC_IDC_SET_LEADCHNL_RV_SENSING_CATHODE_LOCATION_1: NORMAL
MDC_IDC_SET_LEADCHNL_RV_SENSING_POLARITY: NORMAL
MDC_IDC_SET_LEADCHNL_RV_SENSING_SENSITIVITY: 2 MV
MDC_IDC_SET_ZONE_DETECTION_INTERVAL: 360 MS
MDC_IDC_SET_ZONE_STATUS: NORMAL
MDC_IDC_SET_ZONE_TYPE: NORMAL
MDC_IDC_SET_ZONE_VENDOR_TYPE: NORMAL
MDC_IDC_STAT_BRADY_DTM_END: NORMAL
MDC_IDC_STAT_BRADY_DTM_START: NORMAL
MDC_IDC_STAT_BRADY_RV_PERCENT_PACED: 24.53 %
MDC_IDC_STAT_EPISODE_RECENT_COUNT: 0
MDC_IDC_STAT_EPISODE_RECENT_COUNT_DTM_END: NORMAL
MDC_IDC_STAT_EPISODE_RECENT_COUNT_DTM_START: NORMAL
MDC_IDC_STAT_EPISODE_TOTAL_COUNT: 0
MDC_IDC_STAT_EPISODE_TOTAL_COUNT: 1
MDC_IDC_STAT_EPISODE_TOTAL_COUNT: 3984
MDC_IDC_STAT_EPISODE_TOTAL_COUNT_DTM_END: NORMAL
MDC_IDC_STAT_EPISODE_TOTAL_COUNT_DTM_START: NORMAL
MDC_IDC_STAT_EPISODE_TYPE: NORMAL

## 2024-10-22 DIAGNOSIS — I10 ESSENTIAL HYPERTENSION: ICD-10-CM

## 2024-10-22 RX ORDER — TORSEMIDE 20 MG/1
40 TABLET ORAL DAILY
Qty: 60 TABLET | Refills: 0 | Status: SHIPPED | OUTPATIENT
Start: 2024-10-22

## 2024-10-22 NOTE — TELEPHONE ENCOUNTER
Torsemide 20 mg      Last Written Prescription Date:  10/1/24  Last Fill Quantity: 60,   # refills: 0  Last Office Visit: 7/8/24  Future Office visit:    Next 5 appointments (look out 90 days)      Oct 29, 2024 11:30 AM  (Arrive by 11:15 AM)  Return Visit with Alessandra Wilkins DPM  [facility] ([facility] ) [address] [place] [id number] [phone]             Routing refill request to provider for review/approval because:

## 2024-10-29 ENCOUNTER — OFFICE VISIT (OUTPATIENT)
Dept: PODIATRY | Facility: OTHER | Age: 74
End: 2024-10-29
Attending: PODIATRIST
Payer: MEDICARE

## 2024-10-29 VITALS
OXYGEN SATURATION: 93 % | DIASTOLIC BLOOD PRESSURE: 82 MMHG | TEMPERATURE: 97.5 F | SYSTOLIC BLOOD PRESSURE: 147 MMHG | HEART RATE: 99 BPM

## 2024-10-29 DIAGNOSIS — L60.3 ONYCHODYSTROPHY: Primary | ICD-10-CM

## 2024-10-29 DIAGNOSIS — Z79.01 LONG TERM CURRENT USE OF ANTICOAGULANT THERAPY: ICD-10-CM

## 2024-10-29 PROCEDURE — 11721 DEBRIDE NAIL 6 OR MORE: CPT | Performed by: PODIATRIST

## 2024-10-29 PROCEDURE — G0463 HOSPITAL OUTPT CLINIC VISIT: HCPCS

## 2024-10-29 ASSESSMENT — PAIN SCALES - GENERAL: PAINLEVEL_OUTOF10: NO PAIN (0)

## 2024-10-29 NOTE — PROGRESS NOTES
Chief complaint: Patient presents with:  Toenail: Trimming      History of Present Illness: This 74 year old female is seen for follow-up management of elongated, thickened toenails. She has difficulty trimming her toenails.    She is still wearing lightweight shoes but she says they are very comfortable and they don't rub on her bunions.    She takes Torsemide which controls the swelling in her lower extremities. She wears compression socks but she is not wearing them today.    She is on Eliquis for a-fib.     She previously had burning, tingling, and numbness in her feet, but this has continued to be controlled.    No further pedal complaints today.       BP (!) 147/82 (BP Location: Left arm, Patient Position: Sitting, Cuff Size: Adult Regular)   Pulse 99   Temp 97.5  F (36.4  C) (Tympanic)   SpO2 93%      Patient Active Problem List   Diagnosis    Permanent atrial fibrillation (H)    Pulmonary emphysema (H)    Bicuspid aortic valve    Mild major depression (H)    Hypothyroidism, postablative    Advance care planning    Essential hypertension    Long-term (current) use of anticoagulants [Z79.01]    Acute on chronic respiratory failure (H)    Status post coronary angiogram    Coronary artery disease involving native coronary artery of native heart without angina pectoris    Acute cystitis without hematuria    Small bowel obstruction (H)    Acute renal failure (H)    Hypokalemia    Aortic aneurysm (H)    Aortic valve disorder    Mitral valve disorder    Cardiomegaly    Carotid stenosis    Depressive disorder    Edema    COPD (chronic obstructive pulmonary disease) (H)    Other ill-defined heart diseases    Aortic valve stenosis, etiology of cardiac valve disease unspecified    Aortic stenosis, severe    Status post transcatheter aortic valve replacement (TAVR) using bioprosthesis    Postoperative complete heart block (H)    Cardiac pacemaker in situ       Past Surgical History:   Procedure Laterality Date    BACK  SURGERY  2006    CARDIAC SURGERY  06/12/2018    Angiogram at [facility] in [place]    CHOLECYSTECTOMY      COLONOSCOPY N/A 01/19/2016    Procedure: COLONOSCOPY;  Surgeon: Waldemar Bob MD;  Location: [facility]    CV CORONARY ANGIOGRAM N/A 04/30/2021    Procedure: CV CORONARY ANGIOGRAM;  Surgeon: Poli Walsh MD;  Location:  HEART CARDIAC CATH LAB    CV LEFT HEART CATH N/A 04/30/2021    Procedure: CV LEFT HEART CATH;  Surgeon: Poli Walsh MD;  Location:  HEART CARDIAC CATH LAB    CV RIGHT HEART CATH MEASUREMENTS RECORDED N/A 04/30/2021    Procedure: CV RIGHT HEART CATH;  Surgeon: Poli Walsh MD;  Location:  HEART CARDIAC CATH LAB    CV TRANSCATHETER AORTIC VALVE REPLACEMENT N/A 07/14/2021    Procedure: Right femoral Transcaval transcatheter aortic valve replacement (SUMMERS) size 29mm.  Transesophageal echocardiogram per anesthesia;  Surgeon: Domo Sarah MD;  [facility]    ENT SURGERY  July 14 2022    Cancer on nose    EP PPM INSERT OF NEW OR REPL W/VENT LEAD N/A 07/14/2021    Procedure: insertion of Permanent Pacemaker;  Surgeon: Eliezer Myers MD;  [facility]    HEAD & NECK SURGERY      Carotid artery on left side    HEART CATH FEMORAL CANNULIZATION WITH OPEN STANDBY REPAIR AORTIC VALVE N/A 07/14/2021    Procedure: Cardiopulmonary standby.;  Surgeon: Fly Smith MD;  [facility]    HYSTERECTOMY  1980    partial    SLING BLADDER SUSPENSION WITH FASCIA LINNETTE         Current Outpatient Medications   Medication Sig Dispense Refill    acetaminophen (TYLENOL) 500 MG tablet Take 500-1,000 mg by mouth every 6 hours as needed for mild pain      albuterol (PROAIR HFA/PROVENTIL HFA/VENTOLIN HFA) 108 (90 Base) MCG/ACT inhaler INHALE 2 PUFFS BY MOUTH EVERY 6 HOURS 18 g 3    amoxicillin (AMOXIL) 500 MG capsule Take 4 capsules (2,000 mg) by mouth once as needed (SBE prophylaxis take 30-60 minutes prior to dental procedure/cleaning) 4 capsule 3     atorvastatin (LIPITOR) 10 MG tablet TAKE 1 TABLET BY MOUTH EVERY DAY 90 tablet 3    Calcium Carb-Cholecalciferol (CALTRATE 600+D3 SOFT PO) Take 600 mg by mouth 2 times daily      diphenhydrAMINE (BENADRYL) 25 MG tablet Take 1-2 tablets (25-50 mg) by mouth every 6 hours as needed for itching or allergies 60 tablet 1    ELIQUIS ANTICOAGULANT 5 MG tablet TAKE 1 TABLET BY MOUTH TWICE DAILY 180 tablet 3    Fluticasone-Umeclidin-Vilanterol (TRELEGY ELLIPTA) 200-62.5-25 MCG/ACT oral inhaler INHALE 1 PUFF INTO THE LUNGS DAILY 90 each 3    hydrOXYzine HCl (ATARAX) 25 MG tablet Take 1 tablet (25 mg) by mouth 3 times daily as needed for itching 60 tablet 2    levothyroxine (SYNTHROID/LEVOTHROID) 88 MCG tablet Take 1 tablet (88 mcg) by mouth daily. 90 tablet 2    metoprolol succinate ER (TOPROL XL) 100 MG 24 hr tablet Take 1 tablet (100 mg) by mouth daily 90 tablet 0    potassium chloride trinity ER (KLOR-CON M10) 10 MEQ CR tablet Take 1 tablet (10 mEq) by mouth daily 90 tablet 1    saccharomyces boulardii (FLORASTOR) 250 MG capsule Take 1 capsule (250 mg) by mouth 2 times daily 28 capsule 0    spironolactone (ALDACTONE) 25 MG tablet Take 0.5 tablets (12.5 mg) by mouth daily 45 tablet 2    torsemide (DEMADEX) 20 MG tablet TAKE 2 TABLETS (40 MG) BY MOUTH DAILY. 60 tablet 0     No current facility-administered medications for this visit.          Allergies   Allergen Reactions    Amlodipine Besylate Cough     Norvasc    Lisinopril Cough    Ace Inhibitors Cough       Family History   Problem Relation Age of Onset    Ovarian Cancer Mother 72    Asthma Mother     Cancer Mother         ovarian    Hypertension Mother     Diabetes Mother     Other Cancer Mother     Depression Mother     Osteoporosis Mother     Prostate Cancer Father     Hypertension Father     Heart Failure Father         CHF    Diabetes Father     Breast Cancer Sister     Diabetes Sister     Hypertension Sister     Cerebrovascular Disease Sister     Depression Sister      Hypertension Sister     Hypertension Brother     Asthma Brother     Asthma Brother     Hypertension Brother     Diabetes Brother     Hypertension Brother     Depression Son     Anxiety Disorder Son     Asthma Son     Osteoporosis Son        Social History     Socioeconomic History    Marital status:      Spouse name: None    Number of children: None    Years of education: None    Highest education level: None   Occupational History     Employer: RETIRED     Comment: disabled   Tobacco Use    Smoking status: Former Smoker     Packs/day: 0.50     Years: 41.00     Pack years: 20.50     Types: Cigarettes     Start date: 1/1/1966     Quit date: 1/28/2007     Years since quitting: 15.4    Smokeless tobacco: Never Used   Substance and Sexual Activity    Alcohol use: No     Alcohol/week: 0.0 standard drinks    Drug use: No    Sexual activity: Never     Comment:    Other Topics Concern     Service No    Blood Transfusions Yes     Comment: Permits if needed    Caffeine Concern Yes     Comment: 2 cups coffee daily    Seat Belt Yes    Parent/sibling w/ CABG, MI or angioplasty before 65F 55M? No       ROS: 10 point ROS neg other than the symptoms noted above in the HPI.  EXAM  Constitutional: healthy, alert and no distress    Psychiatric: mentation appears normal and affect normal/bright    VASCULAR:  -Dorsalis pedis pulse +2/4 b/l  -Posterior tibial pulse +1/4 b/l  -Capillary refill time < 3 seconds to b/l hallux  -Hair growth Absent to b/l anterior legs and ankles  -Varicosities and telangiectasias to foot, bilaterally   -Moderate 2+ pitting edema to bilateral foot and leg  NEURO:  -Light touch sensation intact to bilateral foot  DERM:  -Skin temperature, texture and turgor WNL b/l  -Toenails elongated, thickened, dystrophic and discolored x 10  MSK:    -Prominent bony prominence to dorsal and medial 1st metatarsal head, bilaterally   -Moderate decrease in arch height while patient is NWB, bilaterally      -Muscle strength of ankles +5/5 for dorsiflexion, plantarflexion, ABDUction and ADDuction b/l  -DORSIFLEXION ROM limited to 90 degrees on the bilateral ankle    ============================================================    ASSESSMENT:  (L60.3) Onychodystrophy  (primary encounter diagnosis)    (Z79.01) Long-term (current) use of anticoagulants [Z79.01]        PLAN:  -Patient evaluated and examined. Treatment options discussed with no educational barriers noted.    -Toenail debridement x 10 toenails without incident including reduction in height of the toenails.    -Foot Education provided. This included checking the feet daily looking for new new blisters or wounds, wearing shoes at all times when walking including around the house, and avoiding lotion application between the toes. If there are any signs of infection, the patient should present to the ED as soon as possible. Infections of the foot can be life threatening or lead to amputations of the foot or leg.  -She is on Eliquis which increases her risks of bleeding with cuts on her feet. She understands the importance of checking her feet daily.     -Patient in agreement with the above treatment plan and all of patient's questions were answered.      RTC 63+ days for toenail debridement        Alessandra Wilkins DPM

## 2024-10-31 NOTE — PROGRESS NOTES
"  Assessment & Plan     Recurrent major depressive disorder, in partial remission (H)  Severe at times.  Overall stable.  No need of meds right now (I offered).  She cannot lose her dog.  I wrote the form it helps her physical and mental health and I agree with her it is a detriment to her health to have a change there.      Permanent atrial fibrillation (H)  Stable.      Cardiac pacemaker in situ  S/p pacer.      Status post transcatheter aortic valve replacement (TAVR) using bioprosthesis  S/p replacement.      Chronic obstructive pulmonary disease, unspecified COPD type (H)  Stable.      With her heart and lungs, it's key to keep her moving and motivated, hence the form as well for the dog.            BMI  Estimated body mass index is 25.71 kg/m  as calculated from the following:    Height as of 5/13/24: 1.626 m (5' 4\").    Weight as of this encounter: 67.9 kg (149 lb 12.8 oz).             No follow-ups on file.    Jorge Wheat is a 74 year old, presenting for the following health issues:  Forms        11/11/2024    10:05 AM   Additional Questions   Roomed by Debby GARSIA     HPI       Forms   Description (location/character/radiation): HRA forms for having a new dog           Review of Systems  Constitutional, HEENT, cardiovascular, pulmonary, gi and gu systems are negative, except as otherwise noted.      Objective    /78 (BP Location: Left arm, Patient Position: Sitting, Cuff Size: Adult Regular)   Pulse 83   Temp 98  F (36.7  C) (Tympanic)   Wt 67.9 kg (149 lb 12.8 oz)   SpO2 94%   BMI 25.71 kg/m    Body mass index is 25.71 kg/m .  Physical Exam   GENERAL: alert and no distress  NECK: no adenopathy, no asymmetry, masses, or scars  RESP: lungs clear to auscultation - no rales, rhonchi or wheezes  CV: regular rate and rhythm, normal S1 S2, no S3 or S4, no murmur, click or rub, no peripheral edema  ABDOMEN: soft, nontender, no hepatosplenomegaly, no masses and bowel sounds normal  MS: no gross " musculoskeletal defects noted, no edema    Form filled out.  Visit and documentation and form took 30 minutes total to complete.          The longitudinal plan of care for the diagnosis(es)/condition(s) as documented were addressed during this visit. Due to the added complexity in care, I will continue to support Mary Alice in the subsequent management and with ongoing continuity of care.        Signed Electronically by: Braydon Yen MD

## 2024-11-11 ENCOUNTER — OFFICE VISIT (OUTPATIENT)
Dept: FAMILY MEDICINE | Facility: OTHER | Age: 74
End: 2024-11-11
Attending: FAMILY MEDICINE
Payer: MEDICARE

## 2024-11-11 VITALS
OXYGEN SATURATION: 94 % | HEART RATE: 83 BPM | TEMPERATURE: 98 F | DIASTOLIC BLOOD PRESSURE: 78 MMHG | WEIGHT: 149.8 LBS | SYSTOLIC BLOOD PRESSURE: 126 MMHG | BODY MASS INDEX: 25.71 KG/M2

## 2024-11-11 DIAGNOSIS — Z95.0 CARDIAC PACEMAKER IN SITU: Chronic | ICD-10-CM

## 2024-11-11 DIAGNOSIS — Z95.3 STATUS POST TRANSCATHETER AORTIC VALVE REPLACEMENT (TAVR) USING BIOPROSTHESIS: Chronic | ICD-10-CM

## 2024-11-11 DIAGNOSIS — F33.41 RECURRENT MAJOR DEPRESSIVE DISORDER, IN PARTIAL REMISSION (H): Primary | Chronic | ICD-10-CM

## 2024-11-11 DIAGNOSIS — I48.21 PERMANENT ATRIAL FIBRILLATION (H): Chronic | ICD-10-CM

## 2024-11-11 DIAGNOSIS — J44.9 CHRONIC OBSTRUCTIVE PULMONARY DISEASE, UNSPECIFIED COPD TYPE (H): Chronic | ICD-10-CM

## 2024-11-11 PROCEDURE — G0463 HOSPITAL OUTPT CLINIC VISIT: HCPCS

## 2024-11-11 ASSESSMENT — PAIN SCALES - GENERAL: PAINLEVEL_OUTOF10: NO PAIN (0)

## 2024-11-11 ASSESSMENT — PATIENT HEALTH QUESTIONNAIRE - PHQ9
SUM OF ALL RESPONSES TO PHQ QUESTIONS 1-9: 0
SUM OF ALL RESPONSES TO PHQ QUESTIONS 1-9: 0
10. IF YOU CHECKED OFF ANY PROBLEMS, HOW DIFFICULT HAVE THESE PROBLEMS MADE IT FOR YOU TO DO YOUR WORK, TAKE CARE OF THINGS AT HOME, OR GET ALONG WITH OTHER PEOPLE: NOT DIFFICULT AT ALL

## 2024-12-03 DIAGNOSIS — I48.21 PERMANENT ATRIAL FIBRILLATION (H): ICD-10-CM

## 2024-12-03 DIAGNOSIS — I10 ESSENTIAL HYPERTENSION: ICD-10-CM

## 2024-12-03 RX ORDER — METOPROLOL SUCCINATE 100 MG/1
100 TABLET, EXTENDED RELEASE ORAL DAILY
Qty: 90 TABLET | Refills: 0 | Status: SHIPPED | OUTPATIENT
Start: 2024-12-03

## 2024-12-03 NOTE — TELEPHONE ENCOUNTER
metoprolol succinate ER (TOPROL XL) 100 MG 24 hr tablet       Last Written Prescription Date:  8-19-24  Last Fill Quantity: 90,   # refills: 0  Last Office Visit: 9-8-23  Future Office visit:    Next 5 appointments (look out 90 days)      Jan 02, 2025 11:30 AM  (Arrive by 11:15 AM)  Return Visit with Alessandra Wilkins DPM  [facility] ([facility] ) [address] [place] [id number] [phone]             Routing refill request to provider for review/approval because:  Drug not on the [facility], [facility] or [facility] refill protocol or controlled substance

## 2024-12-15 ENCOUNTER — MYC REFILL (OUTPATIENT)
Dept: FAMILY MEDICINE | Facility: OTHER | Age: 74
End: 2024-12-15

## 2024-12-15 DIAGNOSIS — I10 ESSENTIAL HYPERTENSION: ICD-10-CM

## 2024-12-15 DIAGNOSIS — J44.9 CHRONIC OBSTRUCTIVE PULMONARY DISEASE, UNSPECIFIED COPD TYPE (H): ICD-10-CM

## 2024-12-16 RX ORDER — FLUTICASONE FUROATE, UMECLIDINIUM BROMIDE AND VILANTEROL TRIFENATATE 200; 62.5; 25 UG/1; UG/1; UG/1
1 POWDER RESPIRATORY (INHALATION) DAILY
Qty: 90 EACH | Refills: 3 | OUTPATIENT
Start: 2024-12-16

## 2024-12-16 RX ORDER — TORSEMIDE 20 MG/1
40 TABLET ORAL DAILY
Qty: 180 TABLET | Refills: 3 | Status: SHIPPED | OUTPATIENT
Start: 2024-12-16

## 2025-01-02 ENCOUNTER — OFFICE VISIT (OUTPATIENT)
Dept: PODIATRY | Facility: OTHER | Age: 75
End: 2025-01-02
Attending: PODIATRIST
Payer: MEDICARE

## 2025-01-02 VITALS
SYSTOLIC BLOOD PRESSURE: 139 MMHG | TEMPERATURE: 98.9 F | OXYGEN SATURATION: 93 % | DIASTOLIC BLOOD PRESSURE: 88 MMHG | HEART RATE: 85 BPM

## 2025-01-02 DIAGNOSIS — L60.3 ONYCHODYSTROPHY: Primary | ICD-10-CM

## 2025-01-02 DIAGNOSIS — L85.3 XEROSIS OF SKIN: ICD-10-CM

## 2025-01-02 DIAGNOSIS — Z79.01 LONG TERM CURRENT USE OF ANTICOAGULANT THERAPY: ICD-10-CM

## 2025-01-02 PROCEDURE — G0463 HOSPITAL OUTPT CLINIC VISIT: HCPCS

## 2025-01-02 PROCEDURE — 11721 DEBRIDE NAIL 6 OR MORE: CPT | Performed by: PODIATRIST

## 2025-01-02 RX ORDER — AMMONIUM LACTATE 12 G/100G
CREAM TOPICAL 2 TIMES DAILY
Qty: 385 G | Refills: 3 | Status: SHIPPED | OUTPATIENT
Start: 2025-01-02

## 2025-01-02 RX ORDER — AMMONIUM LACTATE 12 G/100G
CREAM TOPICAL 2 TIMES DAILY
Qty: 385 G | Refills: 3 | Status: SHIPPED | OUTPATIENT
Start: 2025-01-02 | End: 2025-01-02

## 2025-01-02 ASSESSMENT — PAIN SCALES - GENERAL: PAINLEVEL_OUTOF10: NO PAIN (0)

## 2025-01-02 NOTE — PROGRESS NOTES
Chief complaint: Patient presents with:  Toenail: trimming      History of Present Illness: This 74 year old female is seen for follow-up management of elongated, thickened toenails. She has difficulty trimming her toenails.    She says the skin on her legs and feet has been very dry and flaking. She uses Cera Ve and it is not helping. She would like to discuss treatment options.    She takes Torsemide which controls the swelling in her lower extremities. She wears compression socks but she is not wearing them today.    She is on Eliquis for a-fib.     She previously had burning, tingling, and numbness in her feet, but this has continued to be controlled.    No further pedal complaints today.       /88 (BP Location: Left arm, Patient Position: Sitting, Cuff Size: Adult Regular)   Pulse 85   Temp 98.9  F (37.2  C) (Tympanic)   SpO2 93%      Patient Active Problem List   Diagnosis    Permanent atrial fibrillation (H)    Pulmonary emphysema (H)    Bicuspid aortic valve    Mild major depression (H)    Hypothyroidism, postablative    Advance care planning    Essential hypertension    Long-term (current) use of anticoagulants [Z79.01]    Acute on chronic respiratory failure (H)    Status post coronary angiogram    Coronary artery disease involving native coronary artery of native heart without angina pectoris    Acute cystitis without hematuria    Small bowel obstruction (H)    Acute renal failure (H)    Hypokalemia    Aortic aneurysm (H)    Aortic valve disorder    Mitral valve disorder    Cardiomegaly    Carotid stenosis    Depressive disorder    Edema    COPD (chronic obstructive pulmonary disease) (H)    Other ill-defined heart diseases    Aortic valve stenosis, etiology of cardiac valve disease unspecified    Aortic stenosis, severe    Status post transcatheter aortic valve replacement (TAVR) using bioprosthesis    Postoperative complete heart block (H)    Cardiac pacemaker in situ       Past Surgical  History:   Procedure Laterality Date    BACK SURGERY  2006    CARDIAC SURGERY  06/12/2018    Angiogram at [facility] in [place]    CHOLECYSTECTOMY      COLONOSCOPY N/A 01/19/2016    Procedure: COLONOSCOPY;  Surgeon: Waldemar Bob MD;  Location: [facility] OR    CV CORONARY ANGIOGRAM N/A 04/30/2021    Procedure: CV CORONARY ANGIOGRAM;  Surgeon: Poli Walsh MD;  Location:  HEART CARDIAC CATH LAB    CV LEFT HEART CATH N/A 04/30/2021    Procedure: CV LEFT HEART CATH;  Surgeon: Poli Walsh MD;  Location:  HEART CARDIAC CATH LAB    CV RIGHT HEART CATH MEASUREMENTS RECORDED N/A 04/30/2021    Procedure: CV RIGHT HEART CATH;  Surgeon: Poli Walsh MD;  Location:  HEART CARDIAC CATH LAB    CV TRANSCATHETER AORTIC VALVE REPLACEMENT N/A 07/14/2021    Procedure: Right femoral Transcaval transcatheter aortic valve replacement (SUMMERS) size 29mm.  Transesophageal echocardiogram per anesthesia;  Surgeon: Domo Sarah MD;  Location: [facility] OR    ENT SURGERY  July 14 2022    Cancer on nose    EP PPM INSERT OF NEW OR REPL W/VENT LEAD N/A 07/14/2021    Procedure: insertion of Permanent Pacemaker;  Surgeon: Eliezer Myers MD;  Location: [facility] OR    HEAD & NECK SURGERY      Carotid artery on left side    HEART CATH FEMORAL CANNULIZATION WITH OPEN STANDBY REPAIR AORTIC VALVE N/A 07/14/2021    Procedure: Cardiopulmonary standby.;  Surgeon: Fly Smith MD;  Location: [facility] OR    HYSTERECTOMY  1980    partial    SLING BLADDER SUSPENSION WITH FASCIA LINNETTE         Current Outpatient Medications   Medication Sig Dispense Refill    acetaminophen (TYLENOL) 500 MG tablet Take 500-1,000 mg by mouth every 6 hours as needed for mild pain      albuterol (PROAIR HFA/PROVENTIL HFA/VENTOLIN HFA) 108 (90 Base) MCG/ACT inhaler INHALE 2 PUFFS BY MOUTH EVERY 6 HOURS 18 g 3    amoxicillin (AMOXIL) 500 MG capsule Take 4 capsules (2,000 mg) by mouth once as needed (SBE prophylaxis take 30-60 minutes prior to  dental procedure/cleaning) 4 capsule 3    apixaban ANTICOAGULANT (ELIQUIS ANTICOAGULANT) 5 MG tablet TAKE 1 TABLET BY MOUTH TWICE A  tablet 0    atorvastatin (LIPITOR) 10 MG tablet TAKE 1 TABLET BY MOUTH EVERY DAY 90 tablet 3    Calcium Carb-Cholecalciferol (CALTRATE 600+D3 SOFT PO) Take 600 mg by mouth 2 times daily      diphenhydrAMINE (BENADRYL) 25 MG tablet Take 1-2 tablets (25-50 mg) by mouth every 6 hours as needed for itching or allergies 60 tablet 1    Fluticasone-Umeclidin-Vilanterol (TRELEGY ELLIPTA) 200-62.5-25 MCG/ACT oral inhaler INHALE 1 PUFF INTO THE LUNGS DAILY 90 each 3    hydrOXYzine HCl (ATARAX) 25 MG tablet Take 1 tablet (25 mg) by mouth 3 times daily as needed for itching 60 tablet 2    levothyroxine (SYNTHROID/LEVOTHROID) 88 MCG tablet Take 1 tablet (88 mcg) by mouth daily. 90 tablet 2    metoprolol succinate ER (TOPROL XL) 100 MG 24 hr tablet Take 1 tablet (100 mg) by mouth daily. 90 tablet 0    potassium chloride trinity ER (KLOR-CON M10) 10 MEQ CR tablet Take 1 tablet (10 mEq) by mouth daily. 90 tablet 0    saccharomyces boulardii (FLORASTOR) 250 MG capsule Take 1 capsule (250 mg) by mouth 2 times daily 28 capsule 0    spironolactone (ALDACTONE) 25 MG tablet Take 0.5 tablets (12.5 mg) by mouth daily 45 tablet 2    torsemide (DEMADEX) 20 MG tablet Take 2 tablets (40 mg) by mouth daily. 180 tablet 3     No current facility-administered medications for this visit.          Allergies   Allergen Reactions    Amlodipine Besylate Cough     Norvasc    Lisinopril Cough    Ace Inhibitors Cough       Family History   Problem Relation Age of Onset    Ovarian Cancer Mother 72    Asthma Mother     Cancer Mother         ovarian    Hypertension Mother     Diabetes Mother     Other Cancer Mother     Depression Mother     Osteoporosis Mother     Prostate Cancer Father     Hypertension Father     Heart Failure Father         CHF    Diabetes Father     Breast Cancer Sister     Diabetes Sister      Hypertension Sister     Cerebrovascular Disease Sister     Depression Sister     Hypertension Sister     Hypertension Brother     Asthma Brother     Asthma Brother     Hypertension Brother     Diabetes Brother     Hypertension Brother     Depression Son     Anxiety Disorder Son     Asthma Son     Osteoporosis Son        Social History     Socioeconomic History    Marital status:      Spouse name: None    Number of children: None    Years of education: None    Highest education level: None   Occupational History     Employer: RETIRED     Comment: disabled   Tobacco Use    Smoking status: Former Smoker     Packs/day: 0.50     Years: 41.00     Pack years: 20.50     Types: Cigarettes     Start date: 1/1/1966     Quit date: 1/28/2007     Years since quitting: 15.4    Smokeless tobacco: Never Used   Substance and Sexual Activity    Alcohol use: No     Alcohol/week: 0.0 standard drinks    Drug use: No    Sexual activity: Never     Comment:    Other Topics Concern     Service No    Blood Transfusions Yes     Comment: Permits if needed    Caffeine Concern Yes     Comment: 2 cups coffee daily    Seat Belt Yes    Parent/sibling w/ CABG, MI or angioplasty before 65F 55M? No       ROS: 10 point ROS neg other than the symptoms noted above in the HPI.  EXAM  Constitutional: healthy, alert and no distress    Psychiatric: mentation appears normal and affect normal/bright    VASCULAR:  -Dorsalis pedis pulse +2/4 b/l  -Posterior tibial pulse +1/4 b/l  -Capillary refill time < 3 seconds to b/l hallux  -Hair growth Absent to b/l anterior legs and ankles  -Varicosities and telangiectasias to foot, bilaterally   -Moderate 2+ pitting edema to bilateral foot and leg  NEURO:  -Light touch sensation intact to bilateral foot  DERM:  -Skin temperature, texture and turgor WNL b/l  -Skin diffusely dry and flaking to bilateral foot and leg  -Toenails elongated, thickened, dystrophic and discolored x 10  MSK:  -Prominent bony  prominence to dorsal and medial 1st metatarsal head, bilaterally   -Moderate decrease in arch height while patient is NWB, bilaterally     -Muscle strength of ankles +5/5 for dorsiflexion, plantarflexion, ABDUction and ADDuction b/l  -DORSIFLEXION ROM limited to 90 degrees on the bilateral ankle    ============================================================    ASSESSMENT:  (L60.3) Onychodystrophy  (primary encounter diagnosis)    (Z79.01) Long-term (current) use of anticoagulants [Z79.01]    (L85.3) Xerosis of skin      PLAN:  -Patient evaluated and examined. Treatment options discussed with no educational barriers noted.    -Toenail debridement x 10 toenails without incident including reduction in height of the toenails.    -Foot Education provided. This included checking the feet daily looking for new new blisters or wounds, wearing shoes at all times when walking including around the house, and avoiding lotion application between the toes. If there are any signs of infection, the patient should present to the ED as soon as possible. Infections of the foot can be life threatening or lead to amputations of the foot or leg.  -She is on Eliquis which increases her risks of bleeding with cuts on her feet. She understands the importance of checking her feet daily.     -Xerosis of skin:   ---Discussed xerosis of the skin including the risks of dry, cracking skin creating ulcerations on the feet. Areas of skin breakdown can break through the skin layer and cause pain or can become infected which can then potentially lead to life threatening wounds or amputation.  ---Dry skin occurs when there is not enough moisture in the outer layers of the skin. Multiple factors can contribute to this including climates with low humidity (including [place] winters), dehydration, using harsh soaps on the skin, frequent exposure to chlorinated water, frequent bathing, or employment that requires a lot of walking in outdoor  environments or on very hard, cement surfaces.  ---Treatment of Xerosis can include application of lotion to the feet at least twice per day. Application of the lotion at night may be followed by the application of socks to prevent the lotion from rubbing off the foot on the floors or bedding. A pumice stone or Morro board may be used to remove excessive, flaking skin. Care should be taken not to be too aggressive and cause akin breakdown from scrubbing.  ---It is important to monitor and treat the feet daily to prevent the dry skin from starting to or from continuing to crack own. Patient expressed understanding and agreed with this plan.  ---Patient is advised to try a Diabetic Gold Bond or Cera Ve lotion. Will consider Ammonium Lactate if this does not improve the dry skin on the patient's feet.  ---Ammonium Lactate ordered on 01/02/2025. Patient has tried Cera Ve and it has not helped at all.   -This is an acute, uncomplicated illness/injury with OTC treatment options reviewed.     -Patient in agreement with the above treatment plan and all of patient's questions were answered.      RTC 63+ days for toenail debridement        Alessandra Wilkins DPM

## 2025-01-13 ENCOUNTER — ANCILLARY PROCEDURE (OUTPATIENT)
Dept: CARDIOLOGY | Facility: CLINIC | Age: 75
End: 2025-01-13
Attending: INTERNAL MEDICINE
Payer: MEDICARE

## 2025-01-13 DIAGNOSIS — I44.30 AV BLOCK: ICD-10-CM

## 2025-01-13 PROCEDURE — 93296 REM INTERROG EVL PM/IDS: CPT

## 2025-01-13 PROCEDURE — 93294 REM INTERROG EVL PM/LDLS PM: CPT | Performed by: INTERNAL MEDICINE

## 2025-01-22 ENCOUNTER — TELEPHONE (OUTPATIENT)
Dept: MAMMOGRAPHY | Facility: OTHER | Age: 75
End: 2025-01-22

## 2025-01-22 ENCOUNTER — ANCILLARY PROCEDURE (OUTPATIENT)
Dept: MAMMOGRAPHY | Facility: OTHER | Age: 75
End: 2025-01-22
Attending: FAMILY MEDICINE
Payer: MEDICARE

## 2025-01-22 DIAGNOSIS — Z12.31 VISIT FOR SCREENING MAMMOGRAM: ICD-10-CM

## 2025-01-22 LAB
MDC_IDC_LEAD_CONNECTION_STATUS: NORMAL
MDC_IDC_LEAD_IMPLANT_DT: NORMAL
MDC_IDC_LEAD_LOCATION: NORMAL
MDC_IDC_LEAD_LOCATION_DETAIL_1: NORMAL
MDC_IDC_LEAD_MFG: NORMAL
MDC_IDC_LEAD_MODEL: NORMAL
MDC_IDC_LEAD_POLARITY_TYPE: NORMAL
MDC_IDC_LEAD_SERIAL: NORMAL
MDC_IDC_LEAD_SPECIAL_FUNCTION: NORMAL
MDC_IDC_MSMT_BATTERY_DTM: NORMAL
MDC_IDC_MSMT_BATTERY_REMAINING_LONGEVITY: 146 MO
MDC_IDC_MSMT_BATTERY_RRT_TRIGGER: 2.62
MDC_IDC_MSMT_BATTERY_STATUS: NORMAL
MDC_IDC_MSMT_BATTERY_VOLTAGE: 3.03 V
MDC_IDC_MSMT_LEADCHNL_RV_IMPEDANCE_VALUE: 456 OHM
MDC_IDC_MSMT_LEADCHNL_RV_IMPEDANCE_VALUE: 589 OHM
MDC_IDC_MSMT_LEADCHNL_RV_PACING_THRESHOLD_AMPLITUDE: 0.38 V
MDC_IDC_MSMT_LEADCHNL_RV_PACING_THRESHOLD_PULSEWIDTH: 0.4 MS
MDC_IDC_MSMT_LEADCHNL_RV_SENSING_INTR_AMPL: 17.75 MV
MDC_IDC_PG_IMPLANT_DTM: NORMAL
MDC_IDC_PG_MFG: NORMAL
MDC_IDC_PG_MODEL: NORMAL
MDC_IDC_PG_SERIAL: NORMAL
MDC_IDC_PG_TYPE: NORMAL
MDC_IDC_SESS_CLINIC_NAME: NORMAL
MDC_IDC_SESS_DTM: NORMAL
MDC_IDC_SESS_TYPE: NORMAL
MDC_IDC_SET_BRADY_HYSTRATE: NORMAL
MDC_IDC_SET_BRADY_LOWRATE: 60 {BEATS}/MIN
MDC_IDC_SET_BRADY_MAX_SENSOR_RATE: 130 {BEATS}/MIN
MDC_IDC_SET_BRADY_MODE: NORMAL
MDC_IDC_SET_LEADCHNL_RV_PACING_AMPLITUDE: 2 V
MDC_IDC_SET_LEADCHNL_RV_PACING_ANODE_ELECTRODE_1: NORMAL
MDC_IDC_SET_LEADCHNL_RV_PACING_CAPTURE_MODE: NORMAL
MDC_IDC_SET_LEADCHNL_RV_PACING_CATHODE_ELECTRODE_1: NORMAL
MDC_IDC_SET_LEADCHNL_RV_PACING_CATHODE_LOCATION_1: NORMAL
MDC_IDC_SET_LEADCHNL_RV_PACING_POLARITY: NORMAL
MDC_IDC_SET_LEADCHNL_RV_PACING_PULSEWIDTH: 0.4 MS
MDC_IDC_SET_LEADCHNL_RV_SENSING_ANODE_ELECTRODE_1: NORMAL
MDC_IDC_SET_LEADCHNL_RV_SENSING_ANODE_LOCATION_1: NORMAL
MDC_IDC_SET_LEADCHNL_RV_SENSING_CATHODE_ELECTRODE_1: NORMAL
MDC_IDC_SET_LEADCHNL_RV_SENSING_CATHODE_LOCATION_1: NORMAL
MDC_IDC_SET_LEADCHNL_RV_SENSING_POLARITY: NORMAL
MDC_IDC_SET_LEADCHNL_RV_SENSING_SENSITIVITY: 2 MV
MDC_IDC_SET_ZONE_DETECTION_INTERVAL: 360 MS
MDC_IDC_SET_ZONE_STATUS: NORMAL
MDC_IDC_SET_ZONE_TYPE: NORMAL
MDC_IDC_SET_ZONE_VENDOR_TYPE: NORMAL
MDC_IDC_STAT_BRADY_DTM_END: NORMAL
MDC_IDC_STAT_BRADY_DTM_START: NORMAL
MDC_IDC_STAT_BRADY_RV_PERCENT_PACED: 23.18 %
MDC_IDC_STAT_EPISODE_RECENT_COUNT: 0
MDC_IDC_STAT_EPISODE_RECENT_COUNT_DTM_END: NORMAL
MDC_IDC_STAT_EPISODE_RECENT_COUNT_DTM_START: NORMAL
MDC_IDC_STAT_EPISODE_TOTAL_COUNT: 0
MDC_IDC_STAT_EPISODE_TOTAL_COUNT: 1
MDC_IDC_STAT_EPISODE_TOTAL_COUNT: 3984
MDC_IDC_STAT_EPISODE_TOTAL_COUNT_DTM_END: NORMAL
MDC_IDC_STAT_EPISODE_TOTAL_COUNT_DTM_START: NORMAL
MDC_IDC_STAT_EPISODE_TYPE: NORMAL

## 2025-01-22 PROCEDURE — 77063 BREAST TOMOSYNTHESIS BI: CPT | Mod: TC

## 2025-02-24 DIAGNOSIS — I50.32 CHRONIC DIASTOLIC HEART FAILURE (H): ICD-10-CM

## 2025-02-24 NOTE — TELEPHONE ENCOUNTER
Spironolactone (Aldactone) 25 MG tablet    Last Written Prescription Date:  05/30/2024  Last Fill Quantity: 45,   # refills: 0  Last Office Visit: 11/11/2024  Future Office visit:    Next 5 appointments (look out 90 days)      Mar 06, 2025 11:00 AM  (Arrive by 10:45 AM)  Return Visit with Alessandra Wilkins DPM  [facility] ([facility] ) [address] [place] [id number] [phone]             Routing refill request to provider for review/approval because:  Protocol failed on the Aldactone.

## 2025-02-26 RX ORDER — SPIRONOLACTONE 25 MG/1
12.5 TABLET ORAL DAILY
Qty: 45 TABLET | Refills: 0 | Status: SHIPPED | OUTPATIENT
Start: 2025-02-26

## 2025-02-27 DIAGNOSIS — E87.6 HYPOKALEMIA: ICD-10-CM

## 2025-02-27 DIAGNOSIS — I48.19 PERSISTENT ATRIAL FIBRILLATION (H): ICD-10-CM

## 2025-02-27 DIAGNOSIS — I10 ESSENTIAL HYPERTENSION: ICD-10-CM

## 2025-02-27 DIAGNOSIS — I48.21 PERMANENT ATRIAL FIBRILLATION (H): ICD-10-CM

## 2025-02-27 RX ORDER — APIXABAN 5 MG/1
5 TABLET, FILM COATED ORAL 2 TIMES DAILY
Qty: 180 TABLET | Refills: 0 | Status: SHIPPED | OUTPATIENT
Start: 2025-02-27

## 2025-02-27 RX ORDER — METOPROLOL SUCCINATE 100 MG/1
100 TABLET, EXTENDED RELEASE ORAL DAILY
Qty: 90 TABLET | Refills: 0 | Status: SHIPPED | OUTPATIENT
Start: 2025-02-27

## 2025-02-27 RX ORDER — POTASSIUM CHLORIDE 750 MG/1
10 TABLET, EXTENDED RELEASE ORAL DAILY
Qty: 90 TABLET | Refills: 2 | Status: SHIPPED | OUTPATIENT
Start: 2025-02-27

## 2025-02-27 NOTE — TELEPHONE ENCOUNTER
Eliquis      Last Written Prescription Date:  11/26/24  Last Fill Quantity: 180,   # refills: 0  Last Office Visit: 11/11/24  Future Office visit:    Next 5 appointments (look out 90 days)      Mar 06, 2025 11:00 AM  (Arrive by 10:45 AM)  Return Visit with Alessandra Wilkins [facility] ([facility] ) [address] [place] [id number] [phone]           Potassium      Last Written Prescription Date:  11/26/24  Last Fill Quantity: 90,   # refills: 0  Last Office Visit: 11/11/24  Future Office visit:    Next 5 appointments (look out 90 days)      Mar 06, 2025 11:00 AM  (Arrive by 10:45 AM)  Return Visit with Alessandra Wilkins [facility] ([facility] ) [address] [place] [id number] [phone]

## 2025-02-27 NOTE — TELEPHONE ENCOUNTER
ELIQUIS 5MG TABLET       Routing refill request to provider for review/approval because:  Anticoagulant Agents [id number]/27/2025 01:11 AM   Protocol Details   GFR on file in the past 12 months and most recent GFR is normal     GFR Estimate   Date Value Ref Range Status   05/13/2024 48 (L) >60 mL/min/1.73m2 Final   07/08/2021 57 (L) >60 mL/min/[1.73_m2] Final     Comment:     Non  GFR Calc  Starting 12/18/2018, serum creatinine based estimated GFR (eGFR) will be   calculated using the Chronic Kidney Disease Epidemiology Collaboration   (CKD-EPI) equation.

## 2025-02-27 NOTE — TELEPHONE ENCOUNTER
Toprol      Last Written Prescription Date:  12/03/24  Last Fill Quantity: 90,   # refills: 0  Last Office Visit: 11/11/24  Future Office visit:    Next 5 appointments (look out 90 days)      Mar 06, 2025 11:00 AM  (Arrive by 10:45 AM)  Return Visit with Alessandra Wilkins DPM  [facility] ([facility] ) [address] [place] [id number] [phone]

## 2025-03-04 DIAGNOSIS — I50.32 CHRONIC DIASTOLIC HEART FAILURE (H): ICD-10-CM

## 2025-03-04 RX ORDER — SPIRONOLACTONE 25 MG/1
12.5 TABLET ORAL DAILY
Qty: 45 TABLET | Refills: 0 | Status: SHIPPED | OUTPATIENT
Start: 2025-03-04

## 2025-03-04 NOTE — TELEPHONE ENCOUNTER
Spironolactone      Last Written Prescription Date:  02/26/25  Last Fill Quantity: 45,   # refills: 0  Duplicate refill request.

## 2025-03-04 NOTE — TELEPHONE ENCOUNTER
Routing refill request to provider for review/approval because:  Diuretics (Including Combos) Protocol Failed    Rerun Protocol (3/4/2025 11:03 AM)    Has GFR on file in past 12 months and most recent value is normal      Most recent blood pressure under 140/90 in past 12 months        BP Readings from Last 3 Encounters:   01/02/25 139/88   11/11/24 126/78   10/29/24 (!) 147/82      No data recorded

## 2025-03-04 NOTE — TELEPHONE ENCOUNTER
Left detailed message on personalized voicemail to schedule appt with cardiology , scheduling number given

## 2025-03-06 ENCOUNTER — OFFICE VISIT (OUTPATIENT)
Dept: PODIATRY | Facility: OTHER | Age: 75
End: 2025-03-06
Attending: PODIATRIST
Payer: MEDICARE

## 2025-03-06 VITALS
HEART RATE: 86 BPM | DIASTOLIC BLOOD PRESSURE: 72 MMHG | SYSTOLIC BLOOD PRESSURE: 129 MMHG | TEMPERATURE: 98.1 F | OXYGEN SATURATION: 95 %

## 2025-03-06 DIAGNOSIS — Z79.01 LONG TERM CURRENT USE OF ANTICOAGULANT THERAPY: ICD-10-CM

## 2025-03-06 DIAGNOSIS — L60.3 ONYCHODYSTROPHY: Primary | ICD-10-CM

## 2025-03-06 PROCEDURE — 11721 DEBRIDE NAIL 6 OR MORE: CPT | Performed by: PODIATRIST

## 2025-03-06 PROCEDURE — G0463 HOSPITAL OUTPT CLINIC VISIT: HCPCS

## 2025-03-06 NOTE — PROGRESS NOTES
Chief complaint: Patient presents with:  Toenail: Trimming      History of Present Illness: This 74 year old female is seen for follow-up management of elongated, thickened toenails. She has difficulty trimming her toenails.    She says the skin on her legs and feet has been very dry and flaking. She uses Cera Ve and it is now working better for her.    She takes Torsemide which controls the swelling in her lower extremities. She wears compression socks but she is not wearing them today. She says these help her edema a lot.    She is on Eliquis for a-fib.     She previously had burning, tingling, and numbness in her feet, but this has continued to be controlled.    No further pedal complaints today.       /72 (BP Location: Left arm, Patient Position: Sitting, Cuff Size: Adult Regular)   Pulse 86   Temp 98.1  F (36.7  C) (Tympanic)   SpO2 95%      Patient Active Problem List   Diagnosis    Permanent atrial fibrillation (H)    Pulmonary emphysema (H)    Bicuspid aortic valve    Mild major depression (H)    Hypothyroidism, postablative    Advance care planning    Essential hypertension    Long-term (current) use of anticoagulants [Z79.01]    Acute on chronic respiratory failure (H)    Status post coronary angiogram    Coronary artery disease involving native coronary artery of native heart without angina pectoris    Acute cystitis without hematuria    Small bowel obstruction (H)    Acute renal failure    Hypokalemia    Aortic aneurysm    Aortic valve disorder    Mitral valve disorder    Cardiomegaly    Carotid stenosis    Depressive disorder    Edema    COPD (chronic obstructive pulmonary disease) (H)    Other ill-defined heart diseases    Aortic valve stenosis, etiology of cardiac valve disease unspecified    Aortic stenosis, severe    Status post transcatheter aortic valve replacement (TAVR) using bioprosthesis    Postoperative complete heart block (H)    Cardiac pacemaker in situ       Past Surgical  History:   Procedure Laterality Date    BACK SURGERY  2006    CARDIAC SURGERY  06/12/2018    Angiogram at [facility] in [place]    CHOLECYSTECTOMY      COLONOSCOPY N/A 01/19/2016    Procedure: COLONOSCOPY;  Surgeon: Waldemar Bob MD;  Location: [facility] OR    CV CORONARY ANGIOGRAM N/A 04/30/2021    Procedure: CV CORONARY ANGIOGRAM;  Surgeon: Poli Walsh MD;  Location:  HE[facility] CARDIAC CATH LAB    CV LEFT HEART CATH N/A 04/30/2021    Procedure: CV LEFT HEART CATH;  Surgeon: Poli Walsh MD;  Location:  HE[facility]ARD[facility] CATH LAB    CV RIGHT HEART CATH MEASUREMENTS RECORDED N/A 04/30/2021    Procedure: CV RIGHT HEART CATH;  Surgeon: Poli Walsh MD;  Location:  HEART CARDIAC CATH LAB    CV TRANSCATHETER AORTIC VALVE REPLACEMENT N/A 07/14/2021    Procedure: Right femoral Transcaval transcatheter aortic valve replacement (SUMMERS) size 29mm.  Transesophageal echocardiogram per anesthesia;  Surgeon: Domo Sarah MD;  Location: [facility] OR    ENT SURGERY  July 14 2022    Cancer on nose    EP PPM INSERT OF NEW OR REPL W/VENT LEAD N/A 07/14/2021    Procedure: insertion of Permanent Pacemaker;  Surgeon: Eliezer Myers MD;  Location: [facility] OR    HEAD & NECK SURGERY      Carotid artery on left side    HEART CATH FEMORAL CANNULIZATION WITH OPEN STANDBY REPAIR AORTIC VALVE N/A 07/14/2021    Procedure: Cardiopulmonary standby.;  Surgeon: Fly Smith MD;  Location: [facility] OR    HYSTERECTOMY  1980    partial    SLING BLADDER SUSPENSION WITH FASCIA LINNETTE         Current Outpatient Medications   Medication Sig Dispense Refill    acetaminophen (TYLENOL) 500 MG tablet Take 500-1,000 mg by mouth every 6 hours as needed for mild pain      albuterol (PROAIR HFA/PROVENTIL HFA/VENTOLIN HFA) 108 (90 Base) MCG/ACT inhaler INHALE 2 PUFFS BY MOUTH EVERY 6 HOURS 18 g 3    ammonium lactate (AMLACTIN) 12 % external cream Apply topically 2 times daily. 385 g 3    amoxicillin (AMOXIL) 500 MG capsule Take 4  capsules (2,000 mg) by mouth once as needed (SBE prophylaxis take 30-60 minutes prior to dental procedure/cleaning) 4 capsule 3    atorvastatin (LIPITOR) 10 MG tablet TAKE 1 TABLET BY MOUTH EVERY DAY 90 tablet 3    Calcium Carb-Cholecalciferol (CALTRATE 600+D3 SOFT PO) Take 600 mg by mouth 2 times daily      diphenhydrAMINE (BENADRYL) 25 MG tablet Take 1-2 tablets (25-50 mg) by mouth every 6 hours as needed for itching or allergies 60 tablet 1    ELIQUIS ANTICOAGULANT 5 MG tablet TAKE 1 TABLET BY MOUTH TWICE A  tablet 0    Fluticasone-Umeclidin-Vilanterol (TRELEGY ELLIPTA) 200-62.5-25 MCG/ACT oral inhaler INHALE 1 PUFF INTO THE LUNGS DAILY 90 each 3    hydrOXYzine HCl (ATARAX) 25 MG tablet Take 1 tablet (25 mg) by mouth 3 times daily as needed for itching 60 tablet 2    levothyroxine (SYNTHROID/LEVOTHROID) 88 MCG tablet Take 1 tablet (88 mcg) by mouth daily. 90 tablet 2    metoprolol succinate ER (TOPROL XL) 100 MG 24 hr tablet Take 1 tablet (100 mg) by mouth daily. 90 tablet 0    potassium chloride trinity ER (KLOR-CON M10) 10 MEQ CR tablet TAKE 1 TABLET (10 MEQ) BY MOUTH DAILY. 90 tablet 2    saccharomyces boulardii (FLORASTOR) 250 MG capsule Take 1 capsule (250 mg) by mouth 2 times daily 28 capsule 0    spironolactone (ALDACTONE) 25 MG tablet Take 0.5 tablets (12.5 mg) by mouth daily. 45 tablet 0    torsemide (DEMADEX) 20 MG tablet Take 2 tablets (40 mg) by mouth daily. 180 tablet 3     No current facility-administered medications for this visit.          Allergies   Allergen Reactions    Amlodipine Besylate Cough     Norvasc    Lisinopril Cough    Ace Inhibitors Cough       Family History   Problem Relation Age of Onset    Ovarian Cancer Mother 72    Asthma Mother     Cancer Mother         ovarian    Hypertension Mother     Diabetes Mother     Other Cancer Mother     Depression Mother     Osteoporosis Mother     Prostate Cancer Father     Hypertension Father     Heart Failure Father         CHF    Diabetes  Father     Breast Cancer Sister     Diabetes Sister     Hypertension Sister     Cerebrovascular Disease Sister     Depression Sister     Hypertension Sister     Hypertension Brother     Asthma Brother     Asthma Brother     Hypertension Brother     Diabetes Brother     Hypertension Brother     Depression Son     Anxiety Disorder Son     Asthma Son     Osteoporosis Son        Social History     Socioeconomic History    Marital status:      Spouse name: None    Number of children: None    Years of education: None    Highest education level: None   Occupational History     Employer: RETIRED     Comment: disabled   Tobacco Use    Smoking status: Former Smoker     Packs/day: 0.50     Years: 41.00     Pack years: 20.50     Types: Cigarettes     Start date: 1/1/1966     Quit date: 1/28/2007     Years since quitting: 15.4    Smokeless tobacco: Never Used   Substance and Sexual Activity    Alcohol use: No     Alcohol/week: 0.0 standard drinks    Drug use: No    Sexual activity: Never     Comment:    Other Topics Concern     Service No    Blood Transfusions Yes     Comment: Permits if needed    Caffeine Concern Yes     Comment: 2 cups coffee daily    Seat Belt Yes    Parent/sibling w/ CABG, MI or angioplasty before 65F 55M? No       ROS: 10 point ROS neg other than the symptoms noted above in the HPI.  EXAM  Constitutional: healthy, alert and no distress    Psychiatric: mentation appears normal and affect normal/bright    VASCULAR:  -Dorsalis pedis pulse +2/4 b/l  -Posterior tibial pulse +1/4 b/l  -Capillary refill time < 3 seconds to b/l hallux  -Hair growth Absent to b/l anterior legs and ankles  -Varicosities and telangiectasias to foot, bilaterally   -Moderate 2+ pitting edema to bilateral foot and leg  NEURO:  -Light touch sensation intact to bilateral foot  DERM:  -Skin temperature, texture and turgor WNL b/l  -Skin diffusely dry and flaking to bilateral foot and leg  -Toenails elongated, thickened,  dystrophic and discolored x 10  MSK:  -Prominent bony prominence to dorsal and medial 1st metatarsal head, bilaterally   -Moderate decrease in arch height while patient is NWB, bilaterally     -Muscle strength of ankles +5/5 for dorsiflexion, plantarflexion, ABDUction and ADDuction b/l  -DORSIFLEXION ROM limited to 90 degrees on the bilateral ankle    ============================================================    ASSESSMENT:  (L60.3) Onychodystrophy  (primary encounter diagnosis)    (Z79.01) Long-term (current) use of anticoagulants [Z79.01]      PLAN:  -Patient evaluated and examined. Treatment options discussed with no educational barriers noted.    -Toenail debridement x 10 toenails without incident including reduction in height of the toenails.    -Foot Education provided. This included checking the feet daily looking for new new blisters or wounds, wearing shoes at all times when walking including around the house, and avoiding lotion application between the toes. If there are any signs of infection, the patient should present to the ED as soon as possible. Infections of the foot can be life threatening or lead to amputations of the foot or leg.  -She is on Eliquis which increases her risks of bleeding with cuts on her feet. She understands the importance of checking her feet daily.     -Patient in agreement with the above treatment plan and all of patient's questions were answered.      RTC 63+ days for toenail debridement        Alessandra Wilkins DPM

## 2025-03-08 ENCOUNTER — HEALTH MAINTENANCE LETTER (OUTPATIENT)
Age: 75
End: 2025-03-08

## 2025-03-12 ENCOUNTER — OFFICE VISIT (OUTPATIENT)
Dept: CARDIOLOGY | Facility: OTHER | Age: 75
End: 2025-03-12
Attending: NURSE PRACTITIONER
Payer: MEDICARE

## 2025-03-12 VITALS
HEIGHT: 64 IN | RESPIRATION RATE: 16 BRPM | SYSTOLIC BLOOD PRESSURE: 137 MMHG | BODY MASS INDEX: 24.24 KG/M2 | HEART RATE: 79 BPM | DIASTOLIC BLOOD PRESSURE: 92 MMHG | WEIGHT: 142 LBS | OXYGEN SATURATION: 94 %

## 2025-03-12 DIAGNOSIS — R60.0 BILATERAL LEG EDEMA: ICD-10-CM

## 2025-03-12 DIAGNOSIS — I10 ESSENTIAL HYPERTENSION: ICD-10-CM

## 2025-03-12 DIAGNOSIS — J44.9 CHRONIC OBSTRUCTIVE PULMONARY DISEASE, UNSPECIFIED COPD TYPE (H): Chronic | ICD-10-CM

## 2025-03-12 DIAGNOSIS — Z95.2 S/P TAVR (TRANSCATHETER AORTIC VALVE REPLACEMENT): ICD-10-CM

## 2025-03-12 DIAGNOSIS — I50.32 CHRONIC DIASTOLIC HEART FAILURE (H): Primary | ICD-10-CM

## 2025-03-12 DIAGNOSIS — Z79.01 ANTICOAGULATED BY ANTICOAGULATION TREATMENT: ICD-10-CM

## 2025-03-12 DIAGNOSIS — Z95.0 CARDIAC PACEMAKER IN SITU: ICD-10-CM

## 2025-03-12 DIAGNOSIS — I44.2 ATRIOVENTRICULAR BLOCK, COMPLETE (H): ICD-10-CM

## 2025-03-12 DIAGNOSIS — D64.9 ANEMIA, UNSPECIFIED TYPE: ICD-10-CM

## 2025-03-12 DIAGNOSIS — E83.52 HYPERCALCEMIA: ICD-10-CM

## 2025-03-12 DIAGNOSIS — N18.31 STAGE 3A CHRONIC KIDNEY DISEASE (H): ICD-10-CM

## 2025-03-12 DIAGNOSIS — I48.21 PERMANENT ATRIAL FIBRILLATION (H): ICD-10-CM

## 2025-03-12 LAB
ALBUMIN SERPL BCG-MCNC: 4.2 G/DL (ref 3.5–5.2)
ALP SERPL-CCNC: 39 U/L (ref 40–150)
ALT SERPL W P-5'-P-CCNC: 18 U/L (ref 0–50)
ANION GAP SERPL CALCULATED.3IONS-SCNC: 16 MMOL/L (ref 7–15)
AST SERPL W P-5'-P-CCNC: 42 U/L (ref 0–45)
ATRIAL RATE - MUSE: NORMAL BPM
BILIRUB SERPL-MCNC: 1 MG/DL
BUN SERPL-MCNC: 21.6 MG/DL (ref 8–23)
CALCIUM SERPL-MCNC: 10.6 MG/DL (ref 8.8–10.4)
CHLORIDE SERPL-SCNC: 99 MMOL/L (ref 98–107)
CREAT SERPL-MCNC: 1.24 MG/DL (ref 0.51–0.95)
DIASTOLIC BLOOD PRESSURE - MUSE: NORMAL MMHG
EGFRCR SERPLBLD CKD-EPI 2021: 45 ML/MIN/1.73M2
ERYTHROCYTE [DISTWIDTH] IN BLOOD BY AUTOMATED COUNT: 12.7 % (ref 10–15)
GLUCOSE SERPL-MCNC: 96 MG/DL (ref 70–99)
HCO3 SERPL-SCNC: 29 MMOL/L (ref 22–29)
HCT VFR BLD AUTO: 47.2 % (ref 35–47)
HGB BLD-MCNC: 15.5 G/DL (ref 11.7–15.7)
INTERPRETATION ECG - MUSE: NORMAL
MAGNESIUM SERPL-MCNC: 1.9 MG/DL (ref 1.7–2.3)
MCH RBC QN AUTO: 33.9 PG (ref 26.5–33)
MCHC RBC AUTO-ENTMCNC: 32.8 G/DL (ref 31.5–36.5)
MCV RBC AUTO: 103 FL (ref 78–100)
P AXIS - MUSE: NORMAL DEGREES
PLATELET # BLD AUTO: 147 10E3/UL (ref 150–450)
POTASSIUM SERPL-SCNC: 4.5 MMOL/L (ref 3.4–5.3)
PR INTERVAL - MUSE: NORMAL MS
PROT SERPL-MCNC: 7.1 G/DL (ref 6.4–8.3)
QRS DURATION - MUSE: 120 MS
QT - MUSE: 388 MS
QTC - MUSE: 510 MS
R AXIS - MUSE: -4 DEGREES
RBC # BLD AUTO: 4.57 10E6/UL (ref 3.8–5.2)
SODIUM SERPL-SCNC: 144 MMOL/L (ref 135–145)
SYSTOLIC BLOOD PRESSURE - MUSE: NORMAL MMHG
T AXIS - MUSE: -57 DEGREES
TSH SERPL DL<=0.005 MIU/L-ACNC: 1.49 UIU/ML (ref 0.3–4.2)
VENTRICULAR RATE- MUSE: 104 BPM
WBC # BLD AUTO: 7.3 10E3/UL (ref 4–11)

## 2025-03-12 PROCEDURE — 82306 VITAMIN D 25 HYDROXY: CPT | Mod: ZL | Performed by: NURSE PRACTITIONER

## 2025-03-12 PROCEDURE — 83735 ASSAY OF MAGNESIUM: CPT | Mod: ZL | Performed by: NURSE PRACTITIONER

## 2025-03-12 PROCEDURE — 85014 HEMATOCRIT: CPT | Mod: ZL | Performed by: NURSE PRACTITIONER

## 2025-03-12 PROCEDURE — 80053 COMPREHEN METABOLIC PANEL: CPT | Mod: ZL | Performed by: NURSE PRACTITIONER

## 2025-03-12 PROCEDURE — G0463 HOSPITAL OUTPT CLINIC VISIT: HCPCS

## 2025-03-12 PROCEDURE — 82040 ASSAY OF SERUM ALBUMIN: CPT | Mod: ZL | Performed by: NURSE PRACTITIONER

## 2025-03-12 PROCEDURE — 84443 ASSAY THYROID STIM HORMONE: CPT | Mod: ZL | Performed by: NURSE PRACTITIONER

## 2025-03-12 PROCEDURE — 93005 ELECTROCARDIOGRAM TRACING: CPT | Performed by: NURSE PRACTITIONER

## 2025-03-12 PROCEDURE — 36415 COLL VENOUS BLD VENIPUNCTURE: CPT | Mod: ZL | Performed by: NURSE PRACTITIONER

## 2025-03-12 ASSESSMENT — PAIN SCALES - GENERAL: PAINLEVEL_OUTOF10: NO PAIN (0)

## 2025-03-12 NOTE — PROGRESS NOTES
Health system HEART [facility]   CARDIOLOGY PROGRESS NOTE     Chief Complaint   Patient presents with    Follow Up          Diagnosis:    ICD-10-CM    1. Chronic diastolic heart failure (H)  I50.32 EKG 12-lead complete w/read - (Clinic Performed)     Comprehensive metabolic panel     TSH with free T4 reflex     CBC with platelets     Magnesium     Comprehensive metabolic panel     TSH with free T4 reflex     CBC with platelets     Magnesium      2. Bilateral leg edema  R60.0       3. Permanent atrial fibrillation (H)  I48.21 EKG 12-lead complete w/read - (Clinic Performed)     Comprehensive metabolic panel     TSH with free T4 reflex     CBC with platelets     Magnesium     Comprehensive metabolic panel     TSH with free T4 reflex     CBC with platelets     Magnesium      4. Anticoagulated by anticoagulation treatment  Z79.01       5. Essential hypertension  I10 EKG 12-lead complete w/read - (Clinic Performed)     Comprehensive metabolic panel     TSH with free T4 reflex     CBC with platelets     Magnesium     Comprehensive metabolic panel     TSH with free T4 reflex     CBC with platelets     Magnesium      6. Atrioventricular block, complete (H)  I44.2       7. S/P TAVR (transcatheter aortic valve replacement)  Z95.2 EKG 12-lead complete w/read - (Clinic Performed)      8. Cardiac pacemaker in situ  Z95.0 EKG 12-lead complete w/read - (Clinic Performed)      9. Anemia, unspecified type  D64.9 Vitamin B12     Vitamin B12      10. Hypercalcemia  E83.52 Vitamin D Deficiency     Vitamin D Deficiency      11. Stage 3a chronic kidney disease (H)  N18.31       12. Chronic obstructive pulmonary disease, unspecified COPD type (H)  J44.9               Assessment/Plan:    Chronic renal failure  Stable  Avoid nephrotic medications    Permanent atrial fibrillation  She is largely asymptomatic at this time.    Previously on diltiazem. We questioned if diltiazem was contributing to LE edema. Switch to metoprolol succinate. LE seemed to  improve. Has been doing well with metoprolol. Recent device check shows heart rate around 75 bpm.       [id number] >= 2.    Continue chronic oral anticoagulation: Eliquis 5 mg twice daily for stroke prophylaxis with atrial fibrillation  Denies any bleeding concerns at this time.      History of complete heart block s/p pacemaker placement  Reviewed recent device interrogation   She will continue to follow with the device clinic.      Hyperlipidemia with LDL goal less than 100, controlled  Continue atorvastatin 10 mg once daily.    Recent Labs   Lab Test 05/13/24  1531 06/23/22  1342   CHOL 150 168   HDL 71 90   LDL 60 65   TRIG 94 67     Essential hypertension, controlled  Continue spironolactone 12.5 mg once daily for HFpEF  History of adverse reaction of cough to ACEi.    BP Readings from Last 6 Encounters:   03/12/25 (!) 137/92   03/06/25 129/72   01/02/25 139/88   11/11/24 126/78   10/29/24 (!) 147/82   08/27/24 (!) 142/92       COPD  pulmonary hypertension  dyspnea  Dyspnea likely multifactorial with history of smoking/severe COPD on recent PFTs as well as diastolic dysfunction.    Continue management of COPD with PCP      HFpEF (Echo showed diastolic dysfunction grade I back on echo in 2018, mildly elevated left-sided filling pressures on cardiac cath in 2021, LVH. Symptoms of LE edema, dyspnea)    BP:   controlled   Fluid status:  Improved LE edema with switching diltiazem to metoprolol and she has been doing a lot of walking now with her puppy which she thinks has been helpful.   Discussed decreasing torsemide 40 mg once daily but symptoms have been so well controlled she would like to continue with this.   Aldosterone antagonist:   Continue spironolactone 12.5 mg once daily  SGLT-2:   Could be considered at follow-up  Ischemia evaluation:   Completed in 2021- cardiac catheterization showed no CAD  ACEi/ARB/ARNI:   n/a, no evidence for use in HFpEF. History of cough with ACEi  BB:   n/a, no evidence for  "use in HFpEF   SCD prophylaxis:   n/a, no evidence for use in HFpEF  NSAID use:   Contraindicated  Sleep apnea evaluation:   Refused    Wt Readings from Last 5 Encounters:   03/12/25 64.4 kg (142 lb)   11/11/24 67.9 kg (149 lb 12.8 oz)   07/08/24 68 kg (150 lb)   05/13/24 68.3 kg (150 lb 9.6 oz)   03/22/24 68.5 kg (151 lb)         Severe aortic stenosis status post TAVR in July 2021: Echocardiogram 1 year postop shows normal function of valve.  Also shows normal heart functioning.  She was instructed to stop aspirin by valve clinic.  She will continue with Eliquis 5 mg twice daily.   Should she need to stop anticoagulation for any reason in the future she should re-start aspirin 81 mg once daily.   She will need dental prophylaxis with amoxicillin 2 g prior to dental work.      Follow-up with cardiology in one year, certainly sooner with acute concerns          Interval history:  Mrs. Cameron presents today for cardiology follow-up.     Recall she is s/p TAVR for severe aortic stenosis in July 2021.  She had been on Eliquis 5 mg twice daily for atrial fibrillation as well as aspirin 81 mg once daily.  She did follow-up with TAVR clinic with recent echocardiogram showing that the valve is functioning well.  Echocardiogram also showed mild concentric wall thickening consistent with left ventricular hypertrophy, normal left ventricular function with LVEF of 55 to 60%.  She was instructed that she could stop use of daily aspirin 81 mg.  She had cardiac catheterization April 30, 2021 as part of TAVR work-up.  No coronary artery disease. She also has a history of complete heart block status post pacemaker placement, permanent atrial fibrillation, HFpEF       Today, Ms. Cameron is overall feeling well.   LE edema has significantly improved. We stopped diltiazem and started metoprolol but she also has a puppy- not quite 1 years old- small dog named \"Yuliyage\"  Has been doing a lot of walking and thinks this has helped with leg " swelling  Prior to puppy she was not doing much for activity  No chest pain or pressure  No dyspnea or dyspnea on exertion  No sensation of racing, skipping, fluttering in her chest.  She does occasionally experience some lightheadedness that occurs with postural changes from sitting to standing. No syncopal events.       Social history: [facility]- she enjoys being able to stay active and doing this.     Smoking history: Started smoking at age 16 years old.  She quit smoking in 2007.  Endorse that she probably smoked 0.25 packs/day.    Sleep history: she does have a hospital bed at home that she elevates at least 30 degrees due to orthopnea.  She sometimes uses home oxygen but this is rare.  Denies PND.  She has had some increased lower extremity edema recently.  She also endorses weight gain of 4 to 5 pounds.  She does endorse compliance with furosemide 60 mg in the morning and 40 mg in the afternoon.      HPI:    #1 permanent atrial fibrillation, Patient has had chronic permanent atrial fibrillation for several years, she was previously on warfarin therapy she is currently on Eliquis 5 mg twice a day, additionally she is on aspirin 81 mg a day since her TAVR procedure.  She is questioning why she is on both as was myself.  She is nearly 1 year post TAVR aortic valve replacement procedure.  Patient has noted increased bruisability.  She is in contact with the [facility] team that performed the TAVR procedure and is scheduled for an echo follow-up.  I told her to relate a question to the nurse coordinator to check with the performing interventionalist ,whether she still needs to be on the aspirin 81 mg.  Patient denies associated palpitations her rate seems to be well controlled.    #2: Status post TAVR  4 critical aortic stenosis, procedure was performed in July 2021.  I personally reviewed all of the documentation and diagnostic data  leading up to the implantation.  The aortic valve area was in the range of 0.8 cm and the patient was becoming more symptomatic and her LV function was somewhat compromised.  Patient stated that she is actually feeling better, is less short of breath and having less issues with her COPD.  She is still troubled by leg edema.      #3:  Systemic anticoagulation with Eliquis, patient also on aspirin low-dose. The patient has noted increased bruising, as noted above is questioning why she needs to be on the aspirin she will check with the team at the [facility] whether she needs to continue the aspirin.    #4: Labile  Hypertension with likely underlying degree of Hypertensive Heart Disease, patient is on multiple medications including clonidine 0.2 mg at bedtime, Tiazac 360 mg long-acting diltiazem once a day, losartan 50 mg twice a day.  Additionally she is on Lasix and potassium for the edema.    #5: Edema, as noted below, still problematic.  Patient unable to wear compression stockings due to compromise of circulation.    #6: Coronary artery disease by history from the chart, careful review of the coronary artery angiogram reveals description of clean coronary arteries.  The patient does have diffuse peripheral vascular disease including carotid aortic and pelvic atherosclerotic sclerotic PVD.    #7: Carotid artery disease, noncritical asymptomatic last checkup well over a year ago.    #8: Peripheral vascular disease, including aortic and pelvic atherosclerotic disease as described by pre-TAVR TVA work-up asymptomatic    #9: Hyperlipidemia patient is on Lipitor last checked June 2021 total cholesterol 201 she did have an elevated HDL of 109 which was good LDL was therapeutic at 78 triglycerides 71 patient needs recheck.    #10: COPD, as per PFTs from 2018 was moderately severe the patient is feeling better clinically she is actually reduced her Combivent inhaler from 4 times a day to more like twice a  day.  When I brought up the issue of repeating the study she was actually interested for her own knowledge to see if its improved.  It may well have improved with the release of the likely elevated pressures in the LV from the aortic stenosis.  We will recheck PFTs    #11: Status post permanent pacemaker for complete heart block following TAVR procedure clinically stable post permanent pacemaker with normal function.        RELEVANT TESTING:  Transthoracic Echocardiogram 7/12/2022  Interpretation Summary  Global and regional left ventricular function is normal with an EF of 55-60%.  Left ventricular size is normal. Mild concentric wall thickening consistent  with left ventricular hypertrophy is present. Left ventricular diastolic  function is indeterminate.  The right ventricle is normal size.  Global right ventricular function is normal.  S/P TAVR with 29mm ES3 ultra valve with normal Doppler interrogation.  Trace aortic insufficiency is present.  No pericardial effusion is present.    PFTs 8/12/2022:  The FVC, FEV1, FEV1-FVC ratio and FEF 25-75% are reduced indicating airway obstruction.  The slow vital capacity is reduced.  Following administration of bronchodilators, there is no significant response.  Pulmonary function diagnosis: Severe obstructive airway disease.    CT angiogram of the chest: From April 30, 2021 was reviewed and is available in the chart giving the measurements regarding the TAVR and also documenting a peripheral vascular disease  2D echo echo: Performed in January 21 revealed ejection fraction of 55 to 60% no definitive evidence of left ventricular wall thickness being abnormal left ventricular size was described as normal there was severe biatrial enlargement calculated valve area was 0.7 cm  by telemetry 0.82 square V-max across the valve was 4.1 in the high area compatible with severe to critical aortic stenosis.  [facility] valve replacement team has contacted the patient and  a follow-up echo is pending    Carotid ultrasound and Doppler: Previously performed more than a year and a half ago revealed bilateral carotid artery stenosis of less than 50% the velocities were not elevated the patient is on antihyperlipidemics dated the study was April 2021..    Coronary angiogram:   Conclusion  Right sided filling pressures are normal.  Moderately elevated pulmonary artery hypertension.  Left sided filling pressures are mildly elevated.  Normal cardiac output level.  Hemodynamic data has been modified in Epic per physician review.  Normal coronary arteries.          Past Medical History:   Diagnosis Date    Acute bronchitis     Asthma     Atrial fibrillation (H)     Congestive heart failure (H)     COPD (chronic obstructive pulmonary disease) (H)     Depression     High cholesterol     HTN (hypertension)     Infection due to 2019 novel coronavirus     Oxygen dependent     Thyroid disease        Past Surgical History:   Procedure Laterality Date    BACK SURGERY  2006    CARDIAC SURGERY  06/12/2018    Angiogram at [facility]    CHOLECYSTECTOMY      COLONOSCOPY N/A 01/19/2016    Procedure: COLONOSCOPY;  Surgeon: Waldemar Bob MD;  Location: [facility] OR    CV CORONARY ANGIOGRAM N/A 04/30/2021    Procedure: CV CORONARY ANGIOGRAM;  Surgeon: Poli Walsh MD;  Location: [facility] CARDIAC CATH LAB    CV LEFT HEART CATH N/A 04/30/2021    Procedure: CV LEFT HEART CATH;  Surgeon: Poli Walsh MD;  Location:  HEART CARDIAC CATH LAB    CV RIGHT HEART CATH MEASUREMENTS RECORDED N/A 04/30/2021    Procedure: CV RIGHT HEART CATH;  Surgeon: Poli Walsh MD;  Location:  HEART CARDIAC CATH LAB    CV TRANSCATHETER AORTIC VALVE REPLACEMENT N/A 07/14/2021    Procedure: Right femoral Transcaval transcatheter aortic valve replacement (SUMMERS) size 29mm.  Transesophageal echocardiogram per anesthesia;  Surgeon: Domo Sarah MD;  Location: [facility] OR    ENT SURGERY  July 14 2022     Cancer on nose    EP PPM INSERT OF NEW OR REPL W/VENT LEAD N/A 07/14/2021    Procedure: insertion of Permanent Pacemaker;  Surgeon: Eliezer Myers MD;  Location: [facility] OR    HEAD & NECK SURGERY      Carotid artery on left side    HEART CATH FEMORAL CANNULIZATION WITH OPEN STANDBY REPAIR AORTIC VALVE N/A 07/14/2021    Procedure: Cardiopulmonary standby.;  Surgeon: Fly Smith MD;  Location: [facility] OR    HYSTERECTOMY  1980    partial    SLING BLADDER SUSPENSION WITH FASCIA LINNETTE         Allergies   Allergen Reactions    Amlodipine Besylate Cough     Norvasc    Lisinopril Cough    Ace Inhibitors Cough       Current Outpatient Medications   Medication Sig Dispense Refill    acetaminophen (TYLENOL) 500 MG tablet Take 500-1,000 mg by mouth every 6 hours as needed for mild pain      albuterol (PROAIR HFA/PROVENTIL HFA/VENTOLIN HFA) 108 (90 Base) MCG/ACT inhaler INHALE 2 PUFFS BY MOUTH EVERY 6 HOURS 18 g 3    ammonium lactate (AMLACTIN) 12 % external cream Apply topically 2 times daily. 385 g 3    amoxicillin (AMOXIL) 500 MG capsule Take 4 capsules (2,000 mg) by mouth once as needed (SBE prophylaxis take 30-60 minutes prior to dental procedure/cleaning) 4 capsule 3    atorvastatin (LIPITOR) 10 MG tablet TAKE 1 TABLET BY MOUTH EVERY DAY 90 tablet 3    Calcium Carb-Cholecalciferol (CALTRATE 600+D3 SOFT PO) Take 600 mg by mouth 2 times daily      diphenhydrAMINE (BENADRYL) 25 MG tablet Take 1-2 tablets (25-50 mg) by mouth every 6 hours as needed for itching or allergies 60 tablet 1    ELIQUIS ANTICOAGULANT 5 MG tablet TAKE 1 TABLET BY MOUTH TWICE A  tablet 0    Fluticasone-Umeclidin-Vilanterol (TRELEGY ELLIPTA) 200-62.5-25 MCG/ACT oral inhaler INHALE 1 PUFF INTO THE LUNGS DAILY 90 each 3    hydrOXYzine HCl (ATARAX) 25 MG tablet Take 1 tablet (25 mg) by mouth 3 times daily as needed for itching 60 tablet 2    levothyroxine (SYNTHROID/LEVOTHROID) 88 MCG tablet Take 1 tablet (88 mcg) by mouth daily. 90  tablet 2    metoprolol succinate ER (TOPROL XL) 100 MG 24 hr tablet Take 1 tablet (100 mg) by mouth daily. 90 tablet 0    potassium chloride trinity ER (KLOR-CON M10) 10 MEQ CR tablet TAKE 1 TABLET (10 MEQ) BY MOUTH DAILY. 90 tablet 2    saccharomyces boulardii (FLORASTOR) 250 MG capsule Take 1 capsule (250 mg) by mouth 2 times daily 28 capsule 0    spironolactone (ALDACTONE) 25 MG tablet Take 0.5 tablets (12.5 mg) by mouth daily. 45 tablet 0    torsemide (DEMADEX) 20 MG tablet Take 2 tablets (40 mg) by mouth daily. 180 tablet 3       Social History     Socioeconomic History    Marital status:      Spouse name: Not on file    Number of children: Not on file    Years of education: Not on file    Highest education level: Not on file   Occupational History     Employer: RETIRED     Comment: disabled   Tobacco Use    Smoking status: Former     Current packs/day: 0.00     Average packs/day: 0.5 packs/day for 41.1 years (20.5 ttl pk-yrs)     Types: Cigarettes     Start date: 1966     Quit date: 2007     Years since quittin.1    Smokeless tobacco: Never   Vaping Use    Vaping status: Never Used   Substance and Sexual Activity    Alcohol use: No    Drug use: No    Sexual activity: Never     Comment:    Other Topics Concern     Service No    Blood Transfusions Yes     Comment: Permits if needed    Caffeine Concern Yes     Comment: 2 cups coffee daily    Occupational Exposure Not Asked    Hobby Hazards Not Asked    Sleep Concern Not Asked    Stress Concern Not Asked    Weight Concern Not Asked    Special Diet Not Asked    Back Care Not Asked    Exercise Not Asked    Bike Helmet Not Asked    Seat Belt Yes    Self-Exams Not Asked    Parent/sibling w/ CABG, MI or angioplasty before 65F 55M? Yes     Comment: Pascual Mora   Social History Narrative    Not on file     Social Drivers of Health     Financial Resource Strain: Low Risk  (1/10/2024)    Financial Resource Strain     Within the past 12  months, have you or your family members you live with been unable to get utilities (heat, electricity) when it was really needed?: No   Food Insecurity: Low Risk  (1/10/2024)    Food Insecurity     Within the past 12 months, did you worry that your food would run out before you got money to buy more?: No     Within the past 12 months, did the food you bought just not last and you didn t have money to get more?: No   Transportation Needs: Low Risk  (1/10/2024)    Transportation Needs     Within the past 12 months, has lack of transportation kept you from medical appointments, getting your medicines, non-medical meetings or appointments, work, or from getting things that you need?: No   Physical Activity: Not on file   Stress: Not on file   Social Connections: Not on file   Interpersonal Safety: Low Risk  (3/6/2025)    Interpersonal Safety     Do you feel physically and emotionally safe where you currently live?: Yes     Within the past 12 months, have you been hit, slapped, kicked or otherwise physically hurt by someone?: No     Within the past 12 months, have you been humiliated or emotionally abused in other ways by your partner or ex-partner?: No   Housing Stability: Low Risk  (1/10/2024)    Housing Stability     Do you have housing? : Yes     Are you worried about losing your housing?: No       LAB RESULTS:   Results for orders placed or performed in visit on 03/12/25   Comprehensive metabolic panel     Status: Abnormal   Result Value Ref Range    Sodium 144 135 - 145 mmol/L    Potassium 4.5 3.4 - 5.3 mmol/L    Carbon Dioxide (CO2) 29 22 - 29 mmol/L    Anion Gap 16 (H) 7 - 15 mmol/L    Urea Nitrogen 21.6 8.0 - 23.0 mg/dL    Creatinine 1.24 (H) 0.51 - 0.95 mg/dL    GFR Estimate 45 (L) >60 mL/min/1.73m2    Calcium 10.6 (H) 8.8 - 10.4 mg/dL    Chloride 99 98 - 107 mmol/L    Glucose 96 70 - 99 mg/dL    Alkaline Phosphatase 39 (L) 40 - 150 U/L    AST 42 0 - 45 U/L    ALT 18 0 - 50 U/L    Protein Total 7.1 6.4 - 8.3  "g/dL    Albumin 4.2 3.5 - 5.2 g/dL    Bilirubin Total 1.0 <=1.2 mg/dL   TSH with free T4 reflex     Status: Normal   Result Value Ref Range    TSH 1.49 0.30 - 4.20 uIU/mL   CBC with platelets     Status: Abnormal   Result Value Ref Range    WBC Count 7.3 4.0 - 11.0 10e3/uL    RBC Count 4.57 3.80 - 5.20 10e6/uL    Hemoglobin 15.5 11.7 - 15.7 g/dL    Hematocrit 47.2 (H) 35.0 - 47.0 %     (H) 78 - 100 fL    MCH 33.9 (H) 26.5 - 33.0 pg    MCHC 32.8 31.5 - 36.5 g/dL    RDW 12.7 10.0 - 15.0 %    Platelet Count 147 (L) 150 - 450 10e3/uL   Magnesium     Status: Normal   Result Value Ref Range    Magnesium 1.9 1.7 - 2.3 mg/dL   EKG 12-lead complete w/read - (Clinic Performed)     Status: None   Result Value Ref Range    Systolic Blood Pressure  mmHg    Diastolic Blood Pressure  mmHg    Ventricular Rate 104 BPM    Atrial Rate  BPM    MN Interval  ms    QRS Duration 120 ms     ms    QTc 510 ms    P Axis  degrees    R AXIS -4 degrees    T Axis -57 degrees    Interpretation ECG       Atrial fibrillation with rapid ventricular response  Right bundle branch block  T wave abnormality, consider inferior ischemia  Prolonged QTc  Left axis deviation  When compared with ECG of 15-Jul-2021 05:25,  Atrial fibrillation has replaced Electronic ventricular pacemaker  Vent. rate has increased by  43 bpm  Confirmed by MD LANGFORD DANIEL ([id number]) on 3/12/2025 12:24:30 PM         Review of systems: Negative except that which was noted in the HPI.    Physical examination:  BP (!) 137/92   Pulse 79   Resp 16   Ht 1.626 m (5' 4\")   Wt 64.4 kg (142 lb)   SpO2 94%   BMI 24.37 kg/m      CHEST: lungs clear to auscultation - no rales, rhonchi or wheezes, no use of accessory muscles, no retractions, respirations are unlabored, normal respiratory rate  CARDIOVASCULAR: Irregularly irregular rhythm, normal rate. normal S1 with physiologic split S2, no S3 or S4 and no murmur, click or rub  EXTREMITIES: +trace Bilateral lower extremity " edema. no clubbing, cyanosis.  NEURO: alert and oriented normal speech, and affect  VASC: No carotid bruits heard.    Thank you for allowing me to participate in the care of your patient. Please do not hesitate to contact me if you have any questions.       Julisa Villegas, CNP

## 2025-03-12 NOTE — PATIENT INSTRUCTIONS
Thank you for allowing Julisa Villegas CNP and our  team to participate in your care. Please call our office at [phone] with scheduling questions or if you need to cancel or change your appointment. With any other questions or concerns you may call cardiology nurse at  [phone].       If you experience chest pain, chest pressure, chest tightness, shortness of breath, fainting, lightheadedness, nausea, vomiting, or other concerning symptoms, please report to the Emergency Department or call 911. These symptoms may be emergent, and best treated in the Emergency Department.      No changes to medications today    Plan to update labs      Follow-up in 1 year, certainly sooner with acute concerns.    Julisa Villegas CNP

## 2025-03-13 LAB — VIT D+METAB SERPL-MCNC: 27 NG/ML (ref 20–50)

## 2025-03-16 LAB — VIT B12 SERPL-MCNC: 3071 PG/ML (ref 232–1245)

## 2025-03-27 ENCOUNTER — MYC REFILL (OUTPATIENT)
Dept: FAMILY MEDICINE | Facility: OTHER | Age: 75
End: 2025-03-27

## 2025-03-27 DIAGNOSIS — J44.9 CHRONIC OBSTRUCTIVE PULMONARY DISEASE, UNSPECIFIED COPD TYPE (H): ICD-10-CM

## 2025-03-27 RX ORDER — FLUTICASONE FUROATE, UMECLIDINIUM BROMIDE AND VILANTEROL TRIFENATATE 200; 62.5; 25 UG/1; UG/1; UG/1
1 POWDER RESPIRATORY (INHALATION) DAILY
Qty: 90 EACH | Refills: 3 | OUTPATIENT
Start: 2025-03-27

## 2025-04-01 ENCOUNTER — OFFICE VISIT (OUTPATIENT)
Dept: DERMATOLOGY | Facility: OTHER | Age: 75
End: 2025-04-01
Attending: DERMATOLOGY
Payer: MEDICARE

## 2025-04-01 VITALS
RESPIRATION RATE: 17 BRPM | HEIGHT: 64 IN | WEIGHT: 141.98 LBS | HEART RATE: 86 BPM | OXYGEN SATURATION: 92 % | BODY MASS INDEX: 24.24 KG/M2

## 2025-04-01 DIAGNOSIS — L85.8 CUTANEOUS HORN: ICD-10-CM

## 2025-04-01 PROCEDURE — 11102 TANGNTL BX SKIN SINGLE LES: CPT | Performed by: DERMATOLOGY

## 2025-04-01 PROCEDURE — G0463 HOSPITAL OUTPT CLINIC VISIT: HCPCS | Mod: 25

## 2025-04-01 PROCEDURE — 88305 TISSUE EXAM BY PATHOLOGIST: CPT | Mod: 26 | Performed by: PATHOLOGY

## 2025-04-01 ASSESSMENT — PAIN SCALES - GENERAL: PAINLEVEL_OUTOF10: NO PAIN (0)

## 2025-04-01 NOTE — LETTER
4/1/2025       RE: Mary Alice Cameron  [address]   [address]  [facility] [id number]     Dear Colleague,    Thank you for referring your patient, Mary Alice Cameron, to the [facility]. Please see a copy of my visit note below.    S: Return visit for Mary Alice who has developed a small lesion on the left side of the neck that is quite visible and bothersome for her.  She has no other complaints today.    O: Present on the left neck is a small cutaneous horn of about 5 mm in breath and 5 mm in height.  The base does not appear to represent any thing like a cancer or wart.    A: Benign cutaneous horn neck.  Noticed that and that has no evidence of sun damage in or around her face neck or immediately around the lesion described.    P: The lesion was anesthetized and shave removed.  Ferric chloride used for hemostasis.  The tissue beneath the lesion removed looked completely normal.  The lesion was submitted to pathology just to be safe.  Total time spent in preparing to see the patient, review of the chart for past visits to me and recent visits to other practitioners, obtaining interval history since the last visit to me, performing a physical exam of the skin, removing the lesion, reviewing treatment options, education, and documenting the above in Epic/EMR was 20 minutes. All time involved was spent on the day of service for the patient.       Again, thank you for allowing me to participate in the care of your patient.      Sincerely,    NICOLE Gomez MD

## 2025-04-03 NOTE — PROGRESS NOTES
S: Return visit for Mary Alice who has developed a small lesion on the left side of the neck that is quite visible and bothersome for her.  She has no other complaints today.    O: Present on the left neck is a small cutaneous horn of about 5 mm in breath and 5 mm in height.  The base does not appear to represent any thing like a cancer or wart.    A: Benign cutaneous horn neck.  Noticed that and that has no evidence of sun damage in or around her face neck or immediately around the lesion described.    P: The lesion was anesthetized and shave removed.  Ferric chloride used for hemostasis.  The tissue beneath the lesion removed looked completely normal.  The lesion was submitted to pathology just to be safe.  Total time spent in preparing to see the patient, review of the chart for past visits to me and recent visits to other practitioners, obtaining interval history since the last visit to me, performing a physical exam of the skin, removing the lesion, reviewing treatment options, education, and documenting the above in Epic/EMR was 20 minutes. All time involved was spent on the day of service for the patient.

## 2025-04-15 ENCOUNTER — ANCILLARY PROCEDURE (OUTPATIENT)
Dept: CARDIOLOGY | Facility: CLINIC | Age: 75
End: 2025-04-15
Attending: INTERNAL MEDICINE
Payer: MEDICARE

## 2025-04-15 DIAGNOSIS — I44.30 AV BLOCK: ICD-10-CM

## 2025-04-15 PROCEDURE — 93294 REM INTERROG EVL PM/LDLS PM: CPT | Performed by: INTERNAL MEDICINE

## 2025-04-15 PROCEDURE — 93296 REM INTERROG EVL PM/IDS: CPT

## 2025-04-16 LAB
MDC_IDC_LEAD_CONNECTION_STATUS: NORMAL
MDC_IDC_LEAD_IMPLANT_DT: NORMAL
MDC_IDC_LEAD_LOCATION: NORMAL
MDC_IDC_LEAD_LOCATION_DETAIL_1: NORMAL
MDC_IDC_LEAD_MFG: NORMAL
MDC_IDC_LEAD_MODEL: NORMAL
MDC_IDC_LEAD_POLARITY_TYPE: NORMAL
MDC_IDC_LEAD_SERIAL: NORMAL
MDC_IDC_LEAD_SPECIAL_FUNCTION: NORMAL
MDC_IDC_MSMT_BATTERY_DTM: NORMAL
MDC_IDC_MSMT_BATTERY_REMAINING_LONGEVITY: 143 MO
MDC_IDC_MSMT_BATTERY_RRT_TRIGGER: 2.62
MDC_IDC_MSMT_BATTERY_STATUS: NORMAL
MDC_IDC_MSMT_BATTERY_VOLTAGE: 3.03 V
MDC_IDC_MSMT_LEADCHNL_RV_IMPEDANCE_VALUE: 475 OHM
MDC_IDC_MSMT_LEADCHNL_RV_IMPEDANCE_VALUE: 608 OHM
MDC_IDC_MSMT_LEADCHNL_RV_PACING_THRESHOLD_AMPLITUDE: 0.38 V
MDC_IDC_MSMT_LEADCHNL_RV_PACING_THRESHOLD_PULSEWIDTH: 0.4 MS
MDC_IDC_MSMT_LEADCHNL_RV_SENSING_INTR_AMPL: 19.38 MV
MDC_IDC_MSMT_LEADCHNL_RV_SENSING_INTR_AMPL: 19.38 MV
MDC_IDC_PG_IMPLANT_DTM: NORMAL
MDC_IDC_PG_MFG: NORMAL
MDC_IDC_PG_MODEL: NORMAL
MDC_IDC_PG_SERIAL: NORMAL
MDC_IDC_PG_TYPE: NORMAL
MDC_IDC_SESS_CLINIC_NAME: NORMAL
MDC_IDC_SESS_DTM: NORMAL
MDC_IDC_SESS_TYPE: NORMAL
MDC_IDC_SET_BRADY_HYSTRATE: NORMAL
MDC_IDC_SET_BRADY_LOWRATE: 60 {BEATS}/MIN
MDC_IDC_SET_BRADY_MAX_SENSOR_RATE: 130 {BEATS}/MIN
MDC_IDC_SET_BRADY_MODE: NORMAL
MDC_IDC_SET_LEADCHNL_RV_PACING_AMPLITUDE: 2 V
MDC_IDC_SET_LEADCHNL_RV_PACING_ANODE_ELECTRODE_1: NORMAL
MDC_IDC_SET_LEADCHNL_RV_PACING_CAPTURE_MODE: NORMAL
MDC_IDC_SET_LEADCHNL_RV_PACING_CATHODE_ELECTRODE_1: NORMAL
MDC_IDC_SET_LEADCHNL_RV_PACING_CATHODE_LOCATION_1: NORMAL
MDC_IDC_SET_LEADCHNL_RV_PACING_POLARITY: NORMAL
MDC_IDC_SET_LEADCHNL_RV_PACING_PULSEWIDTH: 0.4 MS
MDC_IDC_SET_LEADCHNL_RV_SENSING_ANODE_ELECTRODE_1: NORMAL
MDC_IDC_SET_LEADCHNL_RV_SENSING_ANODE_LOCATION_1: NORMAL
MDC_IDC_SET_LEADCHNL_RV_SENSING_CATHODE_ELECTRODE_1: NORMAL
MDC_IDC_SET_LEADCHNL_RV_SENSING_CATHODE_LOCATION_1: NORMAL
MDC_IDC_SET_LEADCHNL_RV_SENSING_POLARITY: NORMAL
MDC_IDC_SET_LEADCHNL_RV_SENSING_SENSITIVITY: 2 MV
MDC_IDC_SET_ZONE_DETECTION_INTERVAL: 360 MS
MDC_IDC_SET_ZONE_STATUS: NORMAL
MDC_IDC_SET_ZONE_TYPE: NORMAL
MDC_IDC_SET_ZONE_VENDOR_TYPE: NORMAL
MDC_IDC_STAT_BRADY_DTM_END: NORMAL
MDC_IDC_STAT_BRADY_DTM_START: NORMAL
MDC_IDC_STAT_BRADY_RV_PERCENT_PACED: 22.5 %
MDC_IDC_STAT_EPISODE_RECENT_COUNT: 0
MDC_IDC_STAT_EPISODE_RECENT_COUNT_DTM_END: NORMAL
MDC_IDC_STAT_EPISODE_RECENT_COUNT_DTM_START: NORMAL
MDC_IDC_STAT_EPISODE_TOTAL_COUNT: 0
MDC_IDC_STAT_EPISODE_TOTAL_COUNT: 1
MDC_IDC_STAT_EPISODE_TOTAL_COUNT: 3984
MDC_IDC_STAT_EPISODE_TOTAL_COUNT_DTM_END: NORMAL
MDC_IDC_STAT_EPISODE_TOTAL_COUNT_DTM_START: NORMAL
MDC_IDC_STAT_EPISODE_TYPE: NORMAL

## 2025-05-05 ENCOUNTER — OFFICE VISIT (OUTPATIENT)
Dept: FAMILY MEDICINE | Facility: OTHER | Age: 75
End: 2025-05-05
Attending: FAMILY MEDICINE
Payer: MEDICARE

## 2025-05-05 ENCOUNTER — TELEPHONE (OUTPATIENT)
Dept: FAMILY MEDICINE | Facility: OTHER | Age: 75
End: 2025-05-05

## 2025-05-05 VITALS
OXYGEN SATURATION: 95 % | BODY MASS INDEX: 23.5 KG/M2 | DIASTOLIC BLOOD PRESSURE: 82 MMHG | WEIGHT: 137 LBS | HEART RATE: 83 BPM | TEMPERATURE: 99.4 F | SYSTOLIC BLOOD PRESSURE: 120 MMHG

## 2025-05-05 DIAGNOSIS — J44.9 CHRONIC OBSTRUCTIVE PULMONARY DISEASE, UNSPECIFIED COPD TYPE (H): ICD-10-CM

## 2025-05-05 DIAGNOSIS — I35.0 AORTIC STENOSIS, SEVERE: Chronic | ICD-10-CM

## 2025-05-05 DIAGNOSIS — I48.21 PERMANENT ATRIAL FIBRILLATION (H): Chronic | ICD-10-CM

## 2025-05-05 DIAGNOSIS — R55 SYNCOPE, UNSPECIFIED SYNCOPE TYPE: Primary | ICD-10-CM

## 2025-05-05 DIAGNOSIS — J32.9 SINUSITIS, UNSPECIFIED CHRONICITY, UNSPECIFIED LOCATION: ICD-10-CM

## 2025-05-05 DIAGNOSIS — Z95.0 CARDIAC PACEMAKER IN SITU: Chronic | ICD-10-CM

## 2025-05-05 DIAGNOSIS — I10 ESSENTIAL HYPERTENSION: Chronic | ICD-10-CM

## 2025-05-05 LAB
ANION GAP SERPL CALCULATED.3IONS-SCNC: 12 MMOL/L (ref 7–15)
BASOPHILS # BLD AUTO: 0 10E3/UL (ref 0–0.2)
BASOPHILS NFR BLD AUTO: 0 %
BUN SERPL-MCNC: 19.6 MG/DL (ref 8–23)
CALCIUM SERPL-MCNC: 9.8 MG/DL (ref 8.8–10.4)
CHLORIDE SERPL-SCNC: 94 MMOL/L (ref 98–107)
CREAT SERPL-MCNC: 1.29 MG/DL (ref 0.51–0.95)
EGFRCR SERPLBLD CKD-EPI 2021: 43 ML/MIN/1.73M2
EOSINOPHIL # BLD AUTO: 0.1 10E3/UL (ref 0–0.7)
EOSINOPHIL NFR BLD AUTO: 1 %
ERYTHROCYTE [DISTWIDTH] IN BLOOD BY AUTOMATED COUNT: 12.6 % (ref 10–15)
GLUCOSE SERPL-MCNC: 128 MG/DL (ref 70–99)
HCO3 SERPL-SCNC: 31 MMOL/L (ref 22–29)
HCT VFR BLD AUTO: 42.4 % (ref 35–47)
HGB BLD-MCNC: 14.1 G/DL (ref 11.7–15.7)
IMM GRANULOCYTES # BLD: 0 10E3/UL
IMM GRANULOCYTES NFR BLD: 0 %
LYMPHOCYTES # BLD AUTO: 1.1 10E3/UL (ref 0.8–5.3)
LYMPHOCYTES NFR BLD AUTO: 12 %
MCH RBC QN AUTO: 34.2 PG (ref 26.5–33)
MCHC RBC AUTO-ENTMCNC: 33.3 G/DL (ref 31.5–36.5)
MCV RBC AUTO: 103 FL (ref 78–100)
MONOCYTES # BLD AUTO: 0.8 10E3/UL (ref 0–1.3)
MONOCYTES NFR BLD AUTO: 9 %
NEUTROPHILS # BLD AUTO: 6.7 10E3/UL (ref 1.6–8.3)
NEUTROPHILS NFR BLD AUTO: 77 %
PLATELET # BLD AUTO: 172 10E3/UL (ref 150–450)
POTASSIUM SERPL-SCNC: 4 MMOL/L (ref 3.4–5.3)
RBC # BLD AUTO: 4.12 10E6/UL (ref 3.8–5.2)
SODIUM SERPL-SCNC: 137 MMOL/L (ref 135–145)
WBC # BLD AUTO: 8.7 10E3/UL (ref 4–11)

## 2025-05-05 PROCEDURE — 85004 AUTOMATED DIFF WBC COUNT: CPT | Mod: ZL | Performed by: FAMILY MEDICINE

## 2025-05-05 PROCEDURE — 80048 BASIC METABOLIC PNL TOTAL CA: CPT | Mod: ZL | Performed by: FAMILY MEDICINE

## 2025-05-05 PROCEDURE — G0463 HOSPITAL OUTPT CLINIC VISIT: HCPCS

## 2025-05-05 PROCEDURE — 36415 COLL VENOUS BLD VENIPUNCTURE: CPT | Mod: ZL | Performed by: FAMILY MEDICINE

## 2025-05-05 RX ORDER — FLUTICASONE FUROATE, UMECLIDINIUM BROMIDE AND VILANTEROL TRIFENATATE 200; 62.5; 25 UG/1; UG/1; UG/1
1 POWDER RESPIRATORY (INHALATION) DAILY
Qty: 60 EACH | Refills: 0 | Status: SHIPPED | OUTPATIENT
Start: 2025-05-05

## 2025-05-05 ASSESSMENT — ANXIETY QUESTIONNAIRES
6. BECOMING EASILY ANNOYED OR IRRITABLE: NOT AT ALL
GAD7 TOTAL SCORE: 0
GAD7 TOTAL SCORE: 0
4. TROUBLE RELAXING: NOT AT ALL
3. WORRYING TOO MUCH ABOUT DIFFERENT THINGS: NOT AT ALL
2. NOT BEING ABLE TO STOP OR CONTROL WORRYING: NOT AT ALL
IF YOU CHECKED OFF ANY PROBLEMS ON THIS QUESTIONNAIRE, HOW DIFFICULT HAVE THESE PROBLEMS MADE IT FOR YOU TO DO YOUR WORK, TAKE CARE OF THINGS AT HOME, OR GET ALONG WITH OTHER PEOPLE: NOT DIFFICULT AT ALL
7. FEELING AFRAID AS IF SOMETHING AWFUL MIGHT HAPPEN: NOT AT ALL
5. BEING SO RESTLESS THAT IT IS HARD TO SIT STILL: NOT AT ALL
1. FEELING NERVOUS, ANXIOUS, OR ON EDGE: NOT AT ALL
GAD7 TOTAL SCORE: 0
8. IF YOU CHECKED OFF ANY PROBLEMS, HOW DIFFICULT HAVE THESE MADE IT FOR YOU TO DO YOUR WORK, TAKE CARE OF THINGS AT HOME, OR GET ALONG WITH OTHER PEOPLE?: NOT DIFFICULT AT ALL
7. FEELING AFRAID AS IF SOMETHING AWFUL MIGHT HAPPEN: NOT AT ALL

## 2025-05-05 ASSESSMENT — PATIENT HEALTH QUESTIONNAIRE - PHQ9
SUM OF ALL RESPONSES TO PHQ QUESTIONS 1-9: 2
SUM OF ALL RESPONSES TO PHQ QUESTIONS 1-9: 2
10. IF YOU CHECKED OFF ANY PROBLEMS, HOW DIFFICULT HAVE THESE PROBLEMS MADE IT FOR YOU TO DO YOUR WORK, TAKE CARE OF THINGS AT HOME, OR GET ALONG WITH OTHER PEOPLE: NOT DIFFICULT AT ALL

## 2025-05-05 ASSESSMENT — PAIN SCALES - GENERAL: PAINLEVEL_OUTOF10: NO PAIN (0)

## 2025-05-05 NOTE — PROGRESS NOTES
Assessment & Plan     Syncope, unspecified syncope type  Happened on Saturday.  She cannot give me any events leading up to it.  She was at home, woke on the floor with dog licking skin tear.  See below.  10 days of URI sx not improving otherwise no other clues overall.    - EKG 12-lead complete w/read - (Clinic Performed)  - CBC with platelets and differential; Future  - Basic metabolic panel; Future  - ZIO PATCH 8-14 DAYS (additional cost to patient); Future  - ZIO PATCH 8-14 DAYS APPLICATION; Future    Aortic stenosis, severe  History of pacer and TAVR.  No murmur on exam today.      Cardiac pacemaker in situ  As aobve.      Essential hypertension  Stable.     Permanent atrial fibrillation (H)  Stable.  Afib, but stable rate.  Getting zio to look at rhythm for a longer time.    - ZIO PATCH 8-14 DAYS (additional cost to patient); Future  - ZIO PATCH 8-14 DAYS APPLICATION; Future    Sinusitis, unspecified chronicity, unspecified location  Presumed given history.  Augmentin and follow.  1 week followup due to syncope and moving parts here.   - amoxicillin-clavulanate (AUGMENTIN) 875-125 MG tablet; Take 1 tablet by mouth 2 times daily for 10 days.            Stable exam and workup as above.  Concern with the syncope and respiratory infection.  No xray here so I couldn't get film today but got the labs and close followup.      Jorge Wheat is a 74 year old, presenting for the following health issues:  Syncope    HPI        Syncope     Duration: Saturday   Description (location/character/radiation): syncope   Intensity:  mild  Accompanying signs and symptoms: was sitting in a chair and woke up on the floor  History (similar episodes/previous evaluation): None  Precipitating or alleviating factors: None  Therapies tried and outcome: None     RESPIRATORY SYMPTOMS    Duration: 10 days   Description  cough and fatigue/malaise  Severity: moderate  Accompanying signs and symptoms: None  History (predisposing  factors):  none  Precipitating or alleviating factors: None  Therapies tried and outcome:  none         Review of Systems  Constitutional, HEENT, cardiovascular, pulmonary, gi and gu systems are negative, except as otherwise noted.      Objective    /82   Pulse 83   Temp 99.4  F (37.4  C) (Tympanic)   Wt 62.1 kg (137 lb)   SpO2 95%   BMI 23.50 kg/m    Body mass index is 23.5 kg/m .  Physical Exam   GENERAL: alert and no distress  EYES: Eyes grossly normal to inspection, PERRL and conjunctivae and sclerae normal  HENT: ear canals and TM's normal, nose and mouth without ulcers or lesions  NECK: no adenopathy, no asymmetry, masses, or scars  RESP: lungs clear to auscultation - no rales, rhonchi or wheezes  CV: irregularly irregular rhythm, normal S1 S2, no S3 or S4, no murmur, click or rub, peripheral pulses strong, and no peripheral edema  ABDOMEN: soft, nontender, no hepatosplenomegaly, no masses and bowel sounds normal  MS: no gross musculoskeletal defects noted, no edema    Labs pending as above.  Ziopatch order sent.      The longitudinal plan of care for the diagnosis(es)/condition(s) as documented were addressed during this visit. Due to the added complexity in care, I will continue to support Mary Alice in the subsequent management and with ongoing continuity of care.        Signed Electronically by: Braydon Yen MD

## 2025-05-05 NOTE — TELEPHONE ENCOUNTER
[place]      Last Written Prescription Date:  3.28.25  Last Fill Quantity: #60,   # refills: 0  Last Office Visit: 11.11.24  Future Office visit:    Next 5 appointments (look out 90 days)      May 15, 2025 10:45 AM  (Arrive by 10:30 AM)  Return Visit with Alessandra Wilkins DPM  [facility] ([facility] ) [address] [place] [id number] [phone]     Jun 30, 2025 11:30 AM  (Arrive by 11:15 AM)  Return Visit with NICOLE Gomez MD  [facility] ([facility] ) [address] [place] [id number] [phone]             Routing refill request to provider for review/approval because:  Drug not on the [facility], [facility] or  Health refill protocol or controlled substance

## 2025-05-05 NOTE — TELEPHONE ENCOUNTER
9:23 AM    Reason for Call: OVERBOOK    Patient is having the following symptoms: fainted a couple days ago, not sure why.    The patient is requesting an appointment for today with Dr Yen.    Was an appointment offered for this call? No  If yes : Appointment type              Date    Preferred method for responding to this message: Telephone Call  What is your phone number ?[phone]     If we cannot reach you directly, may we leave a detailed response at the number you provided? Yes    Can this message wait until your PCP/provider returns, if unavailable today? Not applicable    Day Gomes

## 2025-05-06 ENCOUNTER — ALLIED HEALTH/NURSE VISIT (OUTPATIENT)
Dept: FAMILY MEDICINE | Facility: OTHER | Age: 75
End: 2025-05-06
Attending: FAMILY MEDICINE
Payer: MEDICARE

## 2025-05-06 DIAGNOSIS — R55 SYNCOPE, UNSPECIFIED SYNCOPE TYPE: ICD-10-CM

## 2025-05-06 DIAGNOSIS — I48.21 PERMANENT ATRIAL FIBRILLATION (H): Chronic | ICD-10-CM

## 2025-05-06 PROCEDURE — 93246 EXT ECG>7D<15D RECORDING: CPT

## 2025-05-06 NOTE — PROGRESS NOTES
Mary Alice Cameron arrived here on 5/6/2025 1:02 PM for 8-14 Days  Zio monitor placement per ordering provider Dr Yen for the diagnosis syncope and permanent atrial fibrillation.  Patient s skin was prepped per protocol.  Zio monitor was placed.  Instructions were reviewed with and given to the patient.  Patient verbalized understanding of wear, troubleshooting and monitor return instructions.

## 2025-05-08 LAB
ATRIAL RATE - MUSE: NORMAL BPM
DIASTOLIC BLOOD PRESSURE - MUSE: NORMAL MMHG
INTERPRETATION ECG - MUSE: NORMAL
P AXIS - MUSE: NORMAL DEGREES
PR INTERVAL - MUSE: NORMAL MS
QRS DURATION - MUSE: 126 MS
QT - MUSE: 376 MS
QTC - MUSE: 449 MS
R AXIS - MUSE: -14 DEGREES
SYSTOLIC BLOOD PRESSURE - MUSE: NORMAL MMHG
T AXIS - MUSE: -61 DEGREES
VENTRICULAR RATE- MUSE: 86 BPM

## 2025-05-08 NOTE — PROGRESS NOTES
Assessment & Plan     Vertigo  None since the day she had the monitor on.  It was classic vertigo that day.  No syncope since passing out before last check.      Syncope, unspecified syncope type  Improved.  Bp stable.  Sinuses stable.  No ongoing vertigo    Sinusitis, unspecified chronicity, unspecified location  Improved with the short course of augmentin she took.      Renal insufficiency  Update bmp with the sx today.  Stable.    - Basic metabolic panel; Future                Subjective   Mary Alice is a 74 year old, presenting for the following health issues:  Dizziness        5/12/2025     1:06 PM   Additional Questions   Roomed by Debby PAUL      Medication Followup of Augmentin   Taking Medication as prescribed: NO she stopped taking it on 5/7/25  Side Effects:  diarrhea all the time   Medication Helping Symptoms:  not applicable        Review of Systems  Constitutional, HEENT, cardiovascular, pulmonary, gi and gu systems are negative, except as otherwise noted.      Objective    /74 (BP Location: Left arm, Patient Position: Sitting, Cuff Size: Adult Regular)   Pulse 81   Temp 98.2  F (36.8  C) (Tympanic)   Wt 60.2 kg (132 lb 11.2 oz)   SpO2 97%   BMI 22.77 kg/m    Body mass index is 22.77 kg/m .  Physical Exam   GENERAL: alert and no distress  EYES: Eyes grossly normal to inspection, PERRL and conjunctivae and sclerae normal  HENT: ear canals and TM's normal, nose and mouth without ulcers or lesions  NECK: no adenopathy, no asymmetry, masses, or scars  RESP: lungs clear to auscultation - no rales, rhonchi or wheezes  CV: regular rate and rhythm, normal S1 S2, no S3 or S4, no murmur, click or rub, no peripheral edema  ABDOMEN: soft, nontender, no hepatosplenomegaly, no masses and bowel sounds normal  MS: no gross musculoskeletal defects noted, no edema    Bmp pending.      The longitudinal plan of care for the diagnosis(es)/condition(s) as documented were addressed during this visit. Due to  the added complexity in care, I will continue to support Mary Alice in the subsequent management and with ongoing continuity of care.        Signed Electronically by: Braydon Yen MD

## 2025-05-12 ENCOUNTER — RESULTS FOLLOW-UP (OUTPATIENT)
Dept: FAMILY MEDICINE | Facility: OTHER | Age: 75
End: 2025-05-12

## 2025-05-12 ENCOUNTER — OFFICE VISIT (OUTPATIENT)
Dept: FAMILY MEDICINE | Facility: OTHER | Age: 75
End: 2025-05-12
Attending: FAMILY MEDICINE
Payer: MEDICARE

## 2025-05-12 VITALS
HEART RATE: 81 BPM | SYSTOLIC BLOOD PRESSURE: 130 MMHG | BODY MASS INDEX: 22.77 KG/M2 | WEIGHT: 132.7 LBS | TEMPERATURE: 98.2 F | OXYGEN SATURATION: 97 % | DIASTOLIC BLOOD PRESSURE: 74 MMHG

## 2025-05-12 DIAGNOSIS — N28.9 RENAL INSUFFICIENCY: ICD-10-CM

## 2025-05-12 DIAGNOSIS — R42 VERTIGO: Primary | ICD-10-CM

## 2025-05-12 DIAGNOSIS — R55 SYNCOPE, UNSPECIFIED SYNCOPE TYPE: ICD-10-CM

## 2025-05-12 DIAGNOSIS — J32.9 SINUSITIS, UNSPECIFIED CHRONICITY, UNSPECIFIED LOCATION: ICD-10-CM

## 2025-05-12 LAB
ANION GAP SERPL CALCULATED.3IONS-SCNC: 11 MMOL/L (ref 7–15)
BUN SERPL-MCNC: 14.6 MG/DL (ref 8–23)
CALCIUM SERPL-MCNC: 10.2 MG/DL (ref 8.8–10.4)
CHLORIDE SERPL-SCNC: 96 MMOL/L (ref 98–107)
CREAT SERPL-MCNC: 1.22 MG/DL (ref 0.51–0.95)
EGFRCR SERPLBLD CKD-EPI 2021: 46 ML/MIN/1.73M2
GLUCOSE SERPL-MCNC: 90 MG/DL (ref 70–99)
HCO3 SERPL-SCNC: 32 MMOL/L (ref 22–29)
POTASSIUM SERPL-SCNC: 4.4 MMOL/L (ref 3.4–5.3)
SODIUM SERPL-SCNC: 139 MMOL/L (ref 135–145)

## 2025-05-12 PROCEDURE — G0463 HOSPITAL OUTPT CLINIC VISIT: HCPCS

## 2025-05-12 PROCEDURE — 82565 ASSAY OF CREATININE: CPT | Mod: ZL | Performed by: FAMILY MEDICINE

## 2025-05-12 PROCEDURE — 36415 COLL VENOUS BLD VENIPUNCTURE: CPT | Mod: ZL | Performed by: FAMILY MEDICINE

## 2025-05-12 ASSESSMENT — PAIN SCALES - GENERAL: PAINLEVEL_OUTOF10: NO PAIN (0)

## 2025-05-15 ENCOUNTER — OFFICE VISIT (OUTPATIENT)
Dept: PODIATRY | Facility: OTHER | Age: 75
End: 2025-05-15
Attending: PODIATRIST
Payer: MEDICARE

## 2025-05-15 VITALS
SYSTOLIC BLOOD PRESSURE: 136 MMHG | RESPIRATION RATE: 18 BRPM | HEART RATE: 81 BPM | TEMPERATURE: 97.9 F | DIASTOLIC BLOOD PRESSURE: 92 MMHG | OXYGEN SATURATION: 94 %

## 2025-05-15 DIAGNOSIS — L60.3 ONYCHODYSTROPHY: Primary | ICD-10-CM

## 2025-05-15 DIAGNOSIS — Z79.01 LONG TERM CURRENT USE OF ANTICOAGULANT THERAPY: ICD-10-CM

## 2025-05-15 PROCEDURE — G0463 HOSPITAL OUTPT CLINIC VISIT: HCPCS | Mod: 25

## 2025-05-15 PROCEDURE — 11721 DEBRIDE NAIL 6 OR MORE: CPT | Performed by: PODIATRIST

## 2025-05-15 ASSESSMENT — PAIN SCALES - GENERAL: PAINLEVEL_OUTOF10: NO PAIN (0)

## 2025-05-15 NOTE — PROGRESS NOTES
Chief complaint: Patient presents with:  Follow Up: toenails      History of Present Illness: This 74 year old female is seen for follow-up management of elongated, thickened toenails. She has difficulty trimming her toenails.    She says the skin on her legs and feet has been very dry and flaking. She uses Cera Ve on her skin.    She takes Torsemide which controls the swelling in her lower extremities. She wears compression socks but she is not wearing them today. She says these help her edema a lot.    She is on Eliquis for a-fib.     She previously had burning, tingling, and numbness in her feet, but this has continued to be controlled.    No further pedal complaints today.       BP (!) 136/92   Pulse 81   Temp 97.9  F (36.6  C)   Resp 18   SpO2 94%      Patient Active Problem List   Diagnosis    Permanent atrial fibrillation (H)    Pulmonary emphysema (H)    Bicuspid aortic valve    Mild major depression (H)    Hypothyroidism, postablative    Advance care planning    Essential hypertension    Long-term (current) use of anticoagulants [Z79.01]    Acute on chronic respiratory failure (H)    Status post coronary angiogram    Coronary artery disease involving native coronary artery of native heart without angina pectoris    Acute cystitis without hematuria    Small bowel obstruction (H)    Acute renal failure    Hypokalemia    Aortic aneurysm    Aortic valve disorder    Mitral valve disorder    Cardiomegaly    Carotid stenosis    Depressive disorder    Edema    COPD (chronic obstructive pulmonary disease) (H)    Other ill-defined heart diseases    Aortic valve stenosis, etiology of cardiac valve disease unspecified    Aortic stenosis, severe    Status post transcatheter aortic valve replacement (TAVR) using bioprosthesis    Postoperative complete heart block (H)    Cardiac pacemaker in situ       Past Surgical History:   Procedure Laterality Date    BACK SURGERY  2006    CARDIAC SURGERY  06/12/2018    Angiogram  at [facility]    CHOLECYSTECTOMY      COLONOSCOPY N/A 01/19/2016    Procedure: COLONOSCOPY;  Surgeon: Waldemar Bob MD;  Location: [facility] OR    CV CORONARY ANGIOGRAM N/A 04/30/2021    Procedure: CV CORONARY ANGIOGRAM;  Surgeon: Poli Walsh MD;  Location:  HEART CARDIAC CATH LAB    CV LEFT HEART CATH N/A 04/30/2021    Procedure: CV LEFT HEART CATH;  Surgeon: Poli Walsh MD;  Location:  HEART CARDIAC CATH LAB    CV RIGHT HEART CATH MEASUREMENTS RECORDED N/A 04/30/2021    Procedure: CV RIGHT HEART CATH;  Surgeon: Poli Walsh MD;  Location:  HEART CARDIAC CATH LAB    CV TRANSCATHETER AORTIC VALVE REPLACEMENT N/A 07/14/2021    Procedure: Right femoral Transcaval transcatheter aortic valve replacement (SUMMERS) size 29mm.  Transesophageal echocardiogram per anesthesia;  Surgeon: Domo Sarah MD;  Location: [facility] OR    ENT SURGERY  July 14 2022    Cancer on nose    EP PPM INSERT OF NEW OR REPL W/VENT LEAD N/A 07/14/2021    Procedure: insertion of Permanent Pacemaker;  Surgeon: Eliezer Myers MD;  Location: [facility] OR    HEAD & NECK SURGERY      Carotid artery on left side    HEART CATH FEMORAL CANNULIZATION WITH OPEN STANDBY REPAIR AORTIC VALVE N/A 07/14/2021    Procedure: Cardiopulmonary standby.;  Surgeon: Fly Smith MD;  Location: [facility] OR    HYSTERECTOMY  1980    partial    SLING BLADDER SUSPENSION WITH FASCIA LINNETTE         Current Outpatient Medications   Medication Sig Dispense Refill    acetaminophen (TYLENOL) 500 MG tablet Take 500-1,000 mg by mouth every 6 hours as needed for mild pain      albuterol (PROAIR HFA/PROVENTIL HFA/VENTOLIN HFA) 108 (90 Base) MCG/ACT inhaler INHALE 2 PUFFS BY MOUTH EVERY 6 HOURS 18 g 3    ammonium lactate (AMLACTIN) 12 % external cream Apply topically 2 times daily. 385 g 3    amoxicillin (AMOXIL) 500 MG capsule Take 4 capsules (2,000 mg) by mouth once as needed (SBE prophylaxis take 30-60 minutes prior to dental  procedure/cleaning) 4 capsule 3    amoxicillin-clavulanate (AUGMENTIN) 875-125 MG tablet Take 1 tablet by mouth 2 times daily for 10 days. 20 tablet 0    atorvastatin (LIPITOR) 10 MG tablet TAKE 1 TABLET BY MOUTH EVERY DAY 90 tablet 3    Calcium Carb-Cholecalciferol (CALTRATE 600+D3 SOFT PO) Take 600 mg by mouth 2 times daily      diphenhydrAMINE (BENADRYL) 25 MG tablet Take 1-2 tablets (25-50 mg) by mouth every 6 hours as needed for itching or allergies 60 tablet 1    ELIQUIS ANTICOAGULANT 5 MG tablet TAKE 1 TABLET BY MOUTH TWICE A  tablet 0    Fluticasone-Umeclidin-Vilanterol (TRELEGY ELLIPTA) 200-62.5-25 MCG/ACT oral inhaler Inhale 1 puff into the lungs daily. 60 each 0    hydrOXYzine HCl (ATARAX) 25 MG tablet Take 1 tablet (25 mg) by mouth 3 times daily as needed for itching 60 tablet 2    levothyroxine (SYNTHROID/LEVOTHROID) 88 MCG tablet Take 1 tablet (88 mcg) by mouth daily. 90 tablet 2    metoprolol succinate ER (TOPROL XL) 100 MG 24 hr tablet Take 1 tablet (100 mg) by mouth daily. 90 tablet 0    potassium chloride trinity ER (KLOR-CON M10) 10 MEQ CR tablet TAKE 1 TABLET (10 MEQ) BY MOUTH DAILY. 90 tablet 2    saccharomyces boulardii (FLORASTOR) 250 MG capsule Take 1 capsule (250 mg) by mouth 2 times daily 28 capsule 0    spironolactone (ALDACTONE) 25 MG tablet Take 0.5 tablets (12.5 mg) by mouth daily. 45 tablet 0    torsemide (DEMADEX) 20 MG tablet Take 2 tablets (40 mg) by mouth daily. 180 tablet 3     No current facility-administered medications for this visit.          Allergies   Allergen Reactions    Amlodipine Besylate Cough     Norvasc    Lisinopril Cough    Ace Inhibitors Cough       Family History   Problem Relation Age of Onset    Ovarian Cancer Mother 72    Asthma Mother     Cancer Mother         ovarian    Hypertension Mother     Diabetes Mother     Other Cancer Mother     Depression Mother     Osteoporosis Mother     Prostate Cancer Father     Hypertension Father     Heart Failure Father          CHF    Diabetes Father     Breast Cancer Sister     Diabetes Sister     Hypertension Sister     Cerebrovascular Disease Sister     Depression Sister     Hypertension Sister     Hypertension Brother     Asthma Brother     Asthma Brother     Hypertension Brother     Diabetes Brother     Hypertension Brother     Depression Son     Anxiety Disorder Son     Asthma Son     Osteoporosis Son     Hypertension Sister     Hypertension Brother     Asthma Brother        Social History     Socioeconomic History    Marital status:      Spouse name: None    Number of children: None    Years of education: None    Highest education level: None   Occupational History     Employer: RETIRED     Comment: disabled   Tobacco Use    Smoking status: Former Smoker     Packs/day: 0.50     Years: 41.00     Pack years: 20.50     Types: Cigarettes     Start date: 1/1/1966     Quit date: 1/28/2007     Years since quitting: 15.4    Smokeless tobacco: Never Used   Substance and Sexual Activity    Alcohol use: No     Alcohol/week: 0.0 standard drinks    Drug use: No    Sexual activity: Never     Comment:    Other Topics Concern     Service No    Blood Transfusions Yes     Comment: Permits if needed    Caffeine Concern Yes     Comment: 2 cups coffee daily    Seat Belt Yes    Parent/sibling w/ CABG, MI or angioplasty before 65F 55M? No       ROS: 10 point ROS neg other than the symptoms noted above in the HPI.  EXAM  Constitutional: healthy, alert and no distress    Psychiatric: mentation appears normal and affect normal/bright    VASCULAR:  -Dorsalis pedis pulse +2/4 b/l  -Posterior tibial pulse +1/4 b/l  -Capillary refill time < 3 seconds to b/l hallux  -Hair growth Absent to b/l anterior legs and ankles  -Varicosities and telangiectasias to foot, bilaterally   -Moderate 2+ pitting edema to bilateral foot and leg  NEURO:  -Light touch sensation intact to bilateral foot  DERM:  -Skin temperature, texture and turgor WNL  b/l  -Skin diffusely dry and flaking to bilateral foot and leg  -Toenails elongated, thickened, dystrophic and discolored x 10  MSK:  -Prominent bony prominence to dorsal and medial 1st metatarsal head, bilaterally   -Moderate decrease in arch height while patient is NWB, bilaterally     -Muscle strength of ankles +5/5 for dorsiflexion, plantarflexion, ABDUction and ADDuction b/l  -DORSIFLEXION ROM limited to 90 degrees on the bilateral ankle    ============================================================    ASSESSMENT:  (L60.3) Onychodystrophy  (primary encounter diagnosis)    (Z79.01) Long-term (current) use of anticoagulants [Z79.01]      PLAN:  -Patient evaluated and examined. Treatment options discussed with no educational barriers noted.    -Toenail debridement x 10 toenails without incident including reduction in height of the toenails.    -Foot Education provided. This included checking the feet daily looking for new new blisters or wounds, wearing shoes at all times when walking including around the house, and avoiding lotion application between the toes. If there are any signs of infection, the patient should present to the ED as soon as possible. Infections of the foot can be life threatening or lead to amputations of the foot or leg.  -She is on Eliquis which increases her risks of bleeding with cuts on her feet. She understands the importance of checking her feet daily.     -Patient in agreement with the above treatment plan and all of patient's questions were answered.      RTC 63+ days for toenail debridement        Alessandra Wilkins DPM

## 2025-05-24 DIAGNOSIS — E78.2 MIXED HYPERLIPIDEMIA: ICD-10-CM

## 2025-05-24 DIAGNOSIS — I25.10 CORONARY ARTERY DISEASE INVOLVING NATIVE CORONARY ARTERY OF NATIVE HEART WITHOUT ANGINA PECTORIS: ICD-10-CM

## 2025-05-24 DIAGNOSIS — E89.0 HYPOTHYROIDISM, POSTABLATIVE: ICD-10-CM

## 2025-05-27 LAB — CV ZIO PRELIM RESULTS: NORMAL

## 2025-05-27 RX ORDER — ATORVASTATIN CALCIUM 10 MG/1
10 TABLET, FILM COATED ORAL DAILY
Qty: 90 TABLET | Refills: 3 | Status: SHIPPED | OUTPATIENT
Start: 2025-05-27

## 2025-05-27 RX ORDER — LEVOTHYROXINE SODIUM 88 UG/1
88 TABLET ORAL DAILY
Qty: 90 TABLET | Refills: 3 | Status: SHIPPED | OUTPATIENT
Start: 2025-05-27

## 2025-05-28 ENCOUNTER — RESULTS FOLLOW-UP (OUTPATIENT)
Dept: FAMILY MEDICINE | Facility: OTHER | Age: 75
End: 2025-05-28

## 2025-05-28 DIAGNOSIS — I48.19 PERSISTENT ATRIAL FIBRILLATION (H): ICD-10-CM

## 2025-05-28 RX ORDER — APIXABAN 5 MG/1
5 TABLET, FILM COATED ORAL 2 TIMES DAILY
Qty: 180 TABLET | Refills: 3 | Status: SHIPPED | OUTPATIENT
Start: 2025-05-28

## 2025-06-09 ENCOUNTER — MYC MEDICAL ADVICE (OUTPATIENT)
Dept: FAMILY MEDICINE | Facility: OTHER | Age: 75
End: 2025-06-09

## 2025-06-09 DIAGNOSIS — J44.9 CHRONIC OBSTRUCTIVE PULMONARY DISEASE, UNSPECIFIED COPD TYPE (H): ICD-10-CM

## 2025-06-09 RX ORDER — FLUTICASONE FUROATE, UMECLIDINIUM BROMIDE AND VILANTEROL TRIFENATATE 200; 62.5; 25 UG/1; UG/1; UG/1
1 POWDER RESPIRATORY (INHALATION) DAILY
Qty: 60 EACH | Refills: 2 | Status: SHIPPED | OUTPATIENT
Start: 2025-06-09

## 2025-06-27 ENCOUNTER — TELEPHONE (OUTPATIENT)
Dept: CARDIOLOGY | Facility: OTHER | Age: 75
End: 2025-06-27

## 2025-06-27 DIAGNOSIS — I10 ESSENTIAL HYPERTENSION: ICD-10-CM

## 2025-06-27 DIAGNOSIS — I48.21 PERMANENT ATRIAL FIBRILLATION (H): ICD-10-CM

## 2025-06-27 NOTE — TELEPHONE ENCOUNTER
Requested Prescriptions   Pending Prescriptions Disp Refills    metoprolol succinate ER (TOPROL XL) 100 MG 24 hr tablet 90 tablet 0     Sig: Take 1 tablet (100 mg) by mouth daily.       Beta-Blockers Protocol Failed - 6/27/2025 12:57 PM        Failed - Most recent blood pressure under 140/90 in past 12 months     BP Readings from Last 3 Encounters:   05/15/25 (!) 136/92   05/12/25 130/74   05/05/25 120/82       No data recorded            Failed - Recent (12 month) or future (90 days) visit with authorizing provider's specialty (provided they have been seen in the past 15 months)     The patient must have completed an in-person or virtual visit within the past 12 months or has a future visit scheduled within the next 90 days with the authorizing provider s specialty.  Urgent care and e-visits do not qualify as an office visit for this protocol.

## 2025-06-27 NOTE — TELEPHONE ENCOUNTER
Reason for call:  Medication      Have you contacted your pharmacy? Yes   If patient has contacted Pharmacy and it has been over 72hrs, continue to #2  Medication  metoprolol succinate ER (TOPROL XL) 100 MG 24 hr tablet   What Pharmacy do you use? [facility]      (Please note that the turn-around-time for prescriptions is 72 business hours; I am sending your request at this time. SEND TO appropriate Care Team Pool )

## 2025-06-30 ENCOUNTER — OFFICE VISIT (OUTPATIENT)
Dept: DERMATOLOGY | Facility: OTHER | Age: 75
End: 2025-06-30
Attending: DERMATOLOGY
Payer: MEDICARE

## 2025-06-30 VITALS
RESPIRATION RATE: 16 BRPM | DIASTOLIC BLOOD PRESSURE: 86 MMHG | OXYGEN SATURATION: 95 % | HEIGHT: 64 IN | HEART RATE: 65 BPM | SYSTOLIC BLOOD PRESSURE: 132 MMHG | WEIGHT: 136 LBS | BODY MASS INDEX: 23.22 KG/M2

## 2025-06-30 DIAGNOSIS — L85.8 CUTANEOUS HORN: Primary | ICD-10-CM

## 2025-06-30 DIAGNOSIS — Z85.828 HISTORY OF NONMELANOMA SKIN CANCER: ICD-10-CM

## 2025-06-30 PROCEDURE — G0463 HOSPITAL OUTPT CLINIC VISIT: HCPCS

## 2025-06-30 ASSESSMENT — PAIN SCALES - GENERAL: PAINLEVEL_OUTOF10: NO PAIN (0)

## 2025-06-30 NOTE — PROGRESS NOTES
S: Recheck visit for Mary Alice my saw 2 months ago because of a lesion on her neck that presented as a cutaneous horn.  A biopsy was performed.  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  Because the biopsy I felt might have been adequate to remove the lesion I did not have her return right away for more surgery.  She comes in today so that I can recheck the site on her left neck.    O on the left neck is a flat macular lesion with a brown rim around the central healthy flat scar.  There is no evidence of recurrent tumor in my opinion.    A: Resolved cutaneous horn underneath a squamous cell in situ.    P: I recommended no treatment unless the area should begin to become bumpy or show evidence of new growth.    Total time spent in preparing to see the patient, review of the chart for past visits to me and recent visits to other practitioners, obtaining interval history since the last visit to me, performing a physical exam of the skin, reviewing treatment options, education, and documenting the above in Epic/EMR was 10 minutes. All time involved was spent on the day of service for the patient.

## 2025-06-30 NOTE — LETTER
6/30/2025       RE: Mary Alice Cameron  [address]   [address]  [facility] [id number]     Dear Colleague,    Thank you for referring your patient, Mary Alice Cameron, to the [facility]. Please see a copy of my visit note below.    S: Recheck visit for Mary Alice painting saw 2 months ago because of a lesion on her neck that presented as a cutaneous horn.  A biopsy was performed.  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  Because the biopsy I felt might have been adequate to remove the lesion I did not have her return right away for more surgery.  She comes in today so that I can recheck the site on her left neck.    O on the left neck is a flat macular lesion with a brown rim around the central healthy flat scar.  There is no evidence of recurrent tumor in my opinion.    A: Resolved cutaneous horn underneath a squamous cell in situ.    P: I recommended no treatment unless the area should begin to become bumpy or show evidence of new growth.    Total time spent in preparing to see the patient, review of the chart for past visits to me and recent visits to other practitioners, obtaining interval history since the last visit to me, performing a physical exam of the skin, reviewing treatment options, education, and documenting the above in Epic/EMR was 10 minutes. All time involved was spent on the day of service for the patient.     Again, thank you for allowing me to participate in the care of your patient.      Sincerely,    NICOLE Gomez MD

## 2025-07-01 RX ORDER — METOPROLOL SUCCINATE 100 MG/1
100 TABLET, EXTENDED RELEASE ORAL DAILY
Qty: 90 TABLET | Refills: 0 | Status: SHIPPED | OUTPATIENT
Start: 2025-07-01

## 2025-07-02 ENCOUNTER — TELEPHONE (OUTPATIENT)
Dept: PHARMACY | Facility: OTHER | Age: 75
End: 2025-07-02
Payer: COMMERCIAL

## 2025-07-02 NOTE — TELEPHONE ENCOUNTER
BRAYDEN Recruitment: [employer]     Referral outreach attempt #1 on July 2, 2025      Outcome: left voicemail- Call back number [phone] and Ayi Lailet message sent    BRAYDEN Zelaya

## 2025-07-08 ENCOUNTER — ANCILLARY PROCEDURE (OUTPATIENT)
Dept: CARDIOLOGY | Facility: OTHER | Age: 75
End: 2025-07-08
Attending: INTERNAL MEDICINE
Payer: MEDICARE

## 2025-07-08 ENCOUNTER — TELEPHONE (OUTPATIENT)
Dept: CARDIOLOGY | Facility: OTHER | Age: 75
End: 2025-07-08

## 2025-07-08 DIAGNOSIS — I44.2 ATRIOVENTRICULAR BLOCK, COMPLETE (H): ICD-10-CM

## 2025-07-08 DIAGNOSIS — Z95.0 CARDIAC PACEMAKER IN SITU: ICD-10-CM

## 2025-07-08 LAB
MDC_IDC_EPISODE_DTM: NORMAL
MDC_IDC_EPISODE_DURATION: 0 S
MDC_IDC_EPISODE_DURATION: 1 S
MDC_IDC_EPISODE_DURATION: 2 S
MDC_IDC_EPISODE_ID: 4259
MDC_IDC_EPISODE_ID: 4260
MDC_IDC_EPISODE_ID: 4261
MDC_IDC_EPISODE_ID: 4262
MDC_IDC_EPISODE_ID: 4263
MDC_IDC_EPISODE_ID: 4264
MDC_IDC_EPISODE_ID: 4265
MDC_IDC_EPISODE_ID: 4266
MDC_IDC_EPISODE_ID: 4267
MDC_IDC_EPISODE_ID: 4268
MDC_IDC_EPISODE_ID: 4269
MDC_IDC_EPISODE_ID: 4270
MDC_IDC_EPISODE_ID: 4271
MDC_IDC_EPISODE_ID: 4272
MDC_IDC_EPISODE_ID: 4273
MDC_IDC_EPISODE_TYPE: NORMAL
MDC_IDC_EPISODE_TYPE_INDUCED: NO
MDC_IDC_LEAD_CONNECTION_STATUS: NORMAL
MDC_IDC_LEAD_IMPLANT_DT: NORMAL
MDC_IDC_LEAD_LOCATION: NORMAL
MDC_IDC_LEAD_LOCATION_DETAIL_1: NORMAL
MDC_IDC_LEAD_MFG: NORMAL
MDC_IDC_LEAD_MODEL: NORMAL
MDC_IDC_LEAD_POLARITY_TYPE: NORMAL
MDC_IDC_LEAD_SERIAL: NORMAL
MDC_IDC_LEAD_SPECIAL_FUNCTION: NORMAL
MDC_IDC_MSMT_BATTERY_DTM: NORMAL
MDC_IDC_MSMT_BATTERY_REMAINING_LONGEVITY: 140 MO
MDC_IDC_MSMT_BATTERY_RRT_TRIGGER: 2.62
MDC_IDC_MSMT_BATTERY_STATUS: NORMAL
MDC_IDC_MSMT_BATTERY_VOLTAGE: 3.03 V
MDC_IDC_MSMT_LEADCHNL_RV_IMPEDANCE_VALUE: 475 OHM
MDC_IDC_MSMT_LEADCHNL_RV_IMPEDANCE_VALUE: 608 OHM
MDC_IDC_MSMT_LEADCHNL_RV_PACING_THRESHOLD_AMPLITUDE: 0.5 V
MDC_IDC_MSMT_LEADCHNL_RV_PACING_THRESHOLD_PULSEWIDTH: 0.4 MS
MDC_IDC_MSMT_LEADCHNL_RV_SENSING_INTR_AMPL: 18.88 MV
MDC_IDC_MSMT_LEADCHNL_RV_SENSING_INTR_AMPL: 20.5 MV
MDC_IDC_PG_IMPLANT_DTM: NORMAL
MDC_IDC_PG_MFG: NORMAL
MDC_IDC_PG_MODEL: NORMAL
MDC_IDC_PG_SERIAL: NORMAL
MDC_IDC_PG_TYPE: NORMAL
MDC_IDC_SESS_CLINIC_NAME: NORMAL
MDC_IDC_SESS_DTM: NORMAL
MDC_IDC_SESS_TYPE: NORMAL
MDC_IDC_SET_BRADY_HYSTRATE: NORMAL
MDC_IDC_SET_BRADY_LOWRATE: 60 {BEATS}/MIN
MDC_IDC_SET_BRADY_MAX_SENSOR_RATE: 130 {BEATS}/MIN
MDC_IDC_SET_BRADY_MODE: NORMAL
MDC_IDC_SET_LEADCHNL_RV_PACING_AMPLITUDE: 2 V
MDC_IDC_SET_LEADCHNL_RV_PACING_ANODE_ELECTRODE_1: NORMAL
MDC_IDC_SET_LEADCHNL_RV_PACING_CAPTURE_MODE: NORMAL
MDC_IDC_SET_LEADCHNL_RV_PACING_CATHODE_ELECTRODE_1: NORMAL
MDC_IDC_SET_LEADCHNL_RV_PACING_CATHODE_LOCATION_1: NORMAL
MDC_IDC_SET_LEADCHNL_RV_PACING_POLARITY: NORMAL
MDC_IDC_SET_LEADCHNL_RV_PACING_PULSEWIDTH: 0.4 MS
MDC_IDC_SET_LEADCHNL_RV_SENSING_ANODE_ELECTRODE_1: NORMAL
MDC_IDC_SET_LEADCHNL_RV_SENSING_ANODE_LOCATION_1: NORMAL
MDC_IDC_SET_LEADCHNL_RV_SENSING_CATHODE_ELECTRODE_1: NORMAL
MDC_IDC_SET_LEADCHNL_RV_SENSING_CATHODE_LOCATION_1: NORMAL
MDC_IDC_SET_LEADCHNL_RV_SENSING_POLARITY: NORMAL
MDC_IDC_SET_LEADCHNL_RV_SENSING_SENSITIVITY: 2 MV
MDC_IDC_SET_ZONE_DETECTION_INTERVAL: 360 MS
MDC_IDC_SET_ZONE_STATUS: NORMAL
MDC_IDC_SET_ZONE_TYPE: NORMAL
MDC_IDC_SET_ZONE_VENDOR_TYPE: NORMAL
MDC_IDC_STAT_BRADY_DTM_END: NORMAL
MDC_IDC_STAT_BRADY_DTM_START: NORMAL
MDC_IDC_STAT_BRADY_RV_PERCENT_PACED: 23 %
MDC_IDC_STAT_EPISODE_RECENT_COUNT: 0
MDC_IDC_STAT_EPISODE_RECENT_COUNT: 0
MDC_IDC_STAT_EPISODE_RECENT_COUNT: 288
MDC_IDC_STAT_EPISODE_RECENT_COUNT_DTM_END: NORMAL
MDC_IDC_STAT_EPISODE_RECENT_COUNT_DTM_START: NORMAL
MDC_IDC_STAT_EPISODE_TOTAL_COUNT: 0
MDC_IDC_STAT_EPISODE_TOTAL_COUNT: 1
MDC_IDC_STAT_EPISODE_TOTAL_COUNT: 4272
MDC_IDC_STAT_EPISODE_TOTAL_COUNT_DTM_END: NORMAL
MDC_IDC_STAT_EPISODE_TOTAL_COUNT_DTM_START: NORMAL
MDC_IDC_STAT_EPISODE_TYPE: NORMAL

## 2025-07-08 PROCEDURE — 93279 PRGRMG DEV EVAL PM/LDLS PM: CPT | Performed by: INTERNAL MEDICINE

## 2025-07-08 NOTE — PATIENT INSTRUCTIONS
It was a pleasure to see you in clinic today.  Please do not hesitate to call with any questions or concerns.  You are scheduled for remote transmissions on 10/8/25, 1/9/26 and 4/10/26.  We look forward to seeing you in clinic at your next device check in 1 year.    Rajni An, RN, MS, CCRN  Electrophysiology Nurse Clinician  [facility]    During Business Hours Please Call:  [phone]  After Hours Please Call:  [phone] - select option #4 and ask for job code [phone]

## 2025-07-08 NOTE — TELEPHONE ENCOUNTER
"----- Message from Vivian SCHWAB sent at 7/8/2025  1:03 PM CDT -----  Ana Paula,    Patient should see Julisa for a follow up visit.  She has a few different things going on.  Can you call and get her scheduled?  I can't remember what you said the \"dr only\" spots are for?    Long story short, the patient was in to see Rajni in the device clinic this morning.  Rajni called me down because this patient  said she had been having some SOB with exertion, her BLE swelling had gotten worse, and she hadn't had her metoprolol since she requested a refill at the end of June.     Rajni had also said her HR was 120-130 at rest.  When I checked her chart, I saw that Julisa had  already filled the medication last week.  I let her know her meds should be ready, she was totally unaware, but said she would go and pick it up.   She told Rajni the only medication she hadn't had was her metoprolol.  Rajni confirmed with her that she was still taking both her torsemide and spironolactone.     Thank you,    Hansa"

## 2025-07-08 NOTE — TELEPHONE ENCOUNTER
Spoke with patient advised Rajni felt she should follow up with Julisa Villegas. Patient declined to make an appt. Advised her to call back if she changes her mind.

## 2025-07-10 ENCOUNTER — OFFICE VISIT (OUTPATIENT)
Dept: CARDIOLOGY | Facility: OTHER | Age: 75
End: 2025-07-10
Attending: NURSE PRACTITIONER
Payer: MEDICARE

## 2025-07-10 VITALS
RESPIRATION RATE: 16 BRPM | HEIGHT: 64 IN | HEART RATE: 87 BPM | BODY MASS INDEX: 22.94 KG/M2 | SYSTOLIC BLOOD PRESSURE: 166 MMHG | OXYGEN SATURATION: 95 % | DIASTOLIC BLOOD PRESSURE: 83 MMHG | WEIGHT: 134.4 LBS

## 2025-07-10 DIAGNOSIS — Z79.01 ANTICOAGULATED BY ANTICOAGULATION TREATMENT: ICD-10-CM

## 2025-07-10 DIAGNOSIS — I44.2 ATRIOVENTRICULAR BLOCK, COMPLETE (H): ICD-10-CM

## 2025-07-10 DIAGNOSIS — J44.9 CHRONIC OBSTRUCTIVE PULMONARY DISEASE, UNSPECIFIED COPD TYPE (H): ICD-10-CM

## 2025-07-10 DIAGNOSIS — N18.31 STAGE 3A CHRONIC KIDNEY DISEASE (H): ICD-10-CM

## 2025-07-10 DIAGNOSIS — R60.0 BILATERAL LEG EDEMA: ICD-10-CM

## 2025-07-10 DIAGNOSIS — I50.32 CHRONIC DIASTOLIC HEART FAILURE (H): ICD-10-CM

## 2025-07-10 DIAGNOSIS — Z95.0 CARDIAC PACEMAKER IN SITU: ICD-10-CM

## 2025-07-10 DIAGNOSIS — I10 ESSENTIAL HYPERTENSION: ICD-10-CM

## 2025-07-10 DIAGNOSIS — I48.21 PERMANENT ATRIAL FIBRILLATION (H): Primary | ICD-10-CM

## 2025-07-10 DIAGNOSIS — Z95.2 S/P TAVR (TRANSCATHETER AORTIC VALVE REPLACEMENT): ICD-10-CM

## 2025-07-10 LAB
ATRIAL RATE - MUSE: NORMAL BPM
DIASTOLIC BLOOD PRESSURE - MUSE: NORMAL MMHG
INTERPRETATION ECG - MUSE: NORMAL
P AXIS - MUSE: NORMAL DEGREES
PR INTERVAL - MUSE: NORMAL MS
QRS DURATION - MUSE: 126 MS
QT - MUSE: 336 MS
QTC - MUSE: 440 MS
R AXIS - MUSE: -4 DEGREES
SYSTOLIC BLOOD PRESSURE - MUSE: NORMAL MMHG
T AXIS - MUSE: -43 DEGREES
VENTRICULAR RATE- MUSE: 103 BPM

## 2025-07-10 PROCEDURE — G0463 HOSPITAL OUTPT CLINIC VISIT: HCPCS | Mod: 25

## 2025-07-10 RX ORDER — FLUTICASONE FUROATE, UMECLIDINIUM BROMIDE AND VILANTEROL TRIFENATATE 200; 62.5; 25 UG/1; UG/1; UG/1
1 POWDER RESPIRATORY (INHALATION) DAILY
Qty: 180 EACH | Refills: 3 | Status: SHIPPED | OUTPATIENT
Start: 2025-07-10

## 2025-07-10 RX ORDER — METOPROLOL SUCCINATE 100 MG/1
100 TABLET, EXTENDED RELEASE ORAL DAILY
Qty: 90 TABLET | Refills: 3 | Status: SHIPPED | OUTPATIENT
Start: 2025-07-10

## 2025-07-10 RX ORDER — TORSEMIDE 20 MG/1
40 TABLET ORAL DAILY
Qty: 180 TABLET | Refills: 3 | Status: SHIPPED | OUTPATIENT
Start: 2025-07-10

## 2025-07-10 RX ORDER — SPIRONOLACTONE 25 MG/1
12.5 TABLET ORAL DAILY
Qty: 45 TABLET | Refills: 3 | Status: SHIPPED | OUTPATIENT
Start: 2025-07-10

## 2025-07-10 ASSESSMENT — PAIN SCALES - GENERAL: PAINLEVEL_OUTOF10: MODERATE PAIN (4)

## 2025-07-10 NOTE — PROGRESS NOTES
[facility] HEART CARE   CARDIOLOGY PROGRESS NOTE     Chief Complaint   Patient presents with    Follow Up          Diagnosis:    ICD-10-CM    1. Permanent atrial fibrillation (H)  I48.21 metoprolol succinate ER (TOPROL XL) 100 MG 24 hr tablet      2. Anticoagulated by anticoagulation treatment  Z79.01       3. Atrioventricular block, complete (H)  I44.2 EKG 12-lead complete w/read - (Clinic Performed)      4. Cardiac pacemaker in situ  Z95.0 EKG 12-lead complete w/read - (Clinic Performed)      5. Chronic diastolic heart failure (H)  I50.32 EKG 12-lead complete w/read - (Clinic Performed)     spironolactone (ALDACTONE) 25 MG tablet      6. Bilateral leg edema  R60.0       7. Essential hypertension  I10 metoprolol succinate ER (TOPROL XL) 100 MG 24 hr tablet     torsemide (DEMADEX) 20 MG tablet      8. S/P TAVR (transcatheter aortic valve replacement)  Z95.2       9. Stage 3a chronic kidney disease (H)  N18.31       10. Chronic obstructive pulmonary disease, unspecified COPD type (H)  J44.9 Fluticasone-Umeclidin-Vilanterol (TRELEGY ELLIPTA) 200-62.5-25 MCG/ACT oral inhaler              Assessment/Plan:    Chronic renal failure  Stable  Avoid nephrotic medications  Estimated Creatinine Clearance: 38.4 mL/min (A) (based on SCr of 1.22 mg/dL (H)).    Permanent atrial fibrillation  She is largely asymptomatic but ran out of metoprolol succinate x 3 weeks. Saw device nurse yesterday and reported increased dyspnea, LE edema and racing in chest. Appointment made for today. She picked up metoprolol yesterday and re-started. EKG today shows atrial fibrillation with RVR.   Continue metoprolol succinate 100 mg once daily. She just re-started. She will send a message if above symptoms due to RVR do not improve  Previously on diltiazem. We questioned if diltiazem was contributing to LE edema. Switch to metoprolol succinate. LE seemed to improve.    [id number] >= 2.    Continue chronic oral anticoagulation: Eliquis 5 mg twice daily  for stroke prophylaxis with atrial fibrillation  Denies any bleeding concerns at this time.      History of complete heart block s/p pacemaker placement  Reviewed recent device interrogation   She will continue to follow with the device clinic.      Hyperlipidemia with LDL goal less than 100, controlled  Continue atorvastatin 10 mg once daily.    Recent Labs   Lab Test 05/13/24  1531 06/23/22  1342   CHOL 150 168   HDL 71 90   LDL 60 65   TRIG 94 67       Essential hypertension, controlled  Continue spironolactone/torsemide for HFpEF  History of adverse reaction of cough to ACEi.    BP Readings from Last 6 Encounters:   07/10/25 (!) 166/83   06/30/25 132/86   05/15/25 (!) 136/92   05/12/25 130/74   05/05/25 120/82   03/12/25 (!) 137/92       COPD  pulmonary hypertension  dyspnea  Dyspnea likely multifactorial with history of smoking/severe COPD on recent PFTs as well as diastolic dysfunction.    Continue management of COPD with PCP      HFpEF (Echo showed diastolic dysfunction grade I back on echo in 2018, mildly elevated left-sided filling pressures on cardiac cath in 2021, LVH. Symptoms of LE edema, dyspnea)    BP:   controlled   Fluid status:  Improved LE edema with switching diltiazem to metoprolol and she has been doing a lot of walking now with her puppy which she thinks has been helpful.   Discussed decreasing torsemide 40 mg once daily but symptoms have been so well controlled she would like to continue with this.   Aldosterone antagonist:   Continue spironolactone 12.5 mg once daily  SGLT-2:   Could be considered at follow-up  Ischemia evaluation:   Completed in 2021- cardiac catheterization showed no CAD  ACEi/ARB/ARNI:   n/a, no evidence for use in HFpEF. History of cough with ACEi  BB:   n/a, no evidence for use in HFpEF   SCD prophylaxis:   n/a, no evidence for use in HFpEF  NSAID use:   Contraindicated  Sleep apnea evaluation:   Refused    Wt Readings from Last 5 Encounters:   07/10/25 61 kg (134 lb  6.4 oz)   06/30/25 61.7 kg (136 lb)   05/12/25 60.2 kg (132 lb 11.2 oz)   05/05/25 62.1 kg (137 lb)   04/01/25 64.4 kg (141 lb 15.6 oz)         Severe aortic stenosis status post TAVR in July 2021: Echocardiogram 1 year postop shows normal function of valve.  Also shows normal heart functioning.  She was instructed to stop aspirin by valve clinic.  She will continue with Eliquis 5 mg twice daily.   Should she need to stop anticoagulation for any reason in the future she should re-start aspirin 81 mg once daily.   She will need dental prophylaxis with amoxicillin 2 g prior to dental work.      Follow-up with cardiology in one year, certainly sooner with acute concerns          Interval history:  Mrs. Cameron presents today for cardiology follow-up.     Recall she is s/p TAVR for severe aortic stenosis in July 2021.  She had been on Eliquis 5 mg twice daily for atrial fibrillation as well as aspirin 81 mg once daily.  She did follow-up with TAVR clinic with recent echocardiogram showing that the valve is functioning well.  Echocardiogram also showed mild concentric wall thickening consistent with left ventricular hypertrophy, normal left ventricular function with LVEF of 55 to 60%.  She was instructed that she could stop use of daily aspirin 81 mg.  She had cardiac catheterization April 30, 2021 as part of TAVR work-up.  No coronary artery disease. She also has a history of complete heart block status post pacemaker placement, permanent atrial fibrillation, HFpEF       Ms. Cameron was recently seen for routine follow-up and feeling well. - no concerns.  She was seen in pacemaker clinic this week and reported increased dyspnea, increased LE edema, racing in chest.   She had been out of metoprolol succinate for 3 weeks.   She endorses that racing sensation comes and goes- mostly at night.   No chest pain or pressure.   Picked up metoprolol succinate 100 mg yesterday at [facility]. Had been out of it for 3 weeks.   She  has had ongoing unintentional weight loss. Appetite has been good. She is eating per her usual. No pain. No vomiting.       Social history: Lives alone in [facility] apartments and city [place] building 2 days per week- she enjoys being able to stay active and doing this.     Smoking history: Started smoking at age 16 years old.  She quit smoking in 2007.  Endorse that she probably smoked 0.25 packs/day.    Sleep history: she does have a hospital bed at home that she elevates at least 30 degrees due to orthopnea.  She sometimes uses home oxygen but this is rare.  Denies PND.  She has had some increased lower extremity edema recently.  She also endorses weight gain of 4 to 5 pounds.  She does endorse compliance with furosemide 60 mg in the morning and 40 mg in the afternoon.      HPI:    #1 permanent atrial fibrillation, Patient has had chronic permanent atrial fibrillation for several years, she was previously on warfarin therapy she is currently on Eliquis 5 mg twice a day, additionally she is on aspirin 81 mg a day since her TAVR procedure.  She is questioning why she is on both as was myself.  She is nearly 1 year post TAVR aortic valve replacement procedure.  Patient has noted increased bruisability.  She is in contact with the [facility] team that performed the TAVR procedure and is scheduled for an echo follow-up.  I told her to relate a question to the nurse coordinator to check with the performing interventionalist ,whether she still needs to be on the aspirin 81 mg.  Patient denies associated palpitations her rate seems to be well controlled.    #2: Status post TAVR  4 critical aortic stenosis, procedure was performed in July 2021.  I personally reviewed all of the documentation and diagnostic data leading up to the implantation.  The aortic valve area was in the range of 0.8 cm and the patient was becoming more symptomatic and her LV function was somewhat compromised.  Patient stated that she  is actually feeling better, is less short of breath and having less issues with her COPD.  She is still troubled by leg edema.      #3:  Systemic anticoagulation with Eliquis, patient also on aspirin low-dose. The patient has noted increased bruising, as noted above is questioning why she needs to be on the aspirin she will check with the team at the [facility] whether she needs to continue the aspirin.    #4: Labile  Hypertension with likely underlying degree of Hypertensive Heart Disease, patient is on multiple medications including clonidine 0.2 mg at bedtime, Tiazac 360 mg long-acting diltiazem once a day, losartan 50 mg twice a day.  Additionally she is on Lasix and potassium for the edema.    #5: Edema, as noted below, still problematic.  Patient unable to wear compression stockings due to compromise of circulation.    #6: Coronary artery disease by history from the chart, careful review of the coronary artery angiogram reveals description of clean coronary arteries.  The patient does have diffuse peripheral vascular disease including carotid aortic and pelvic atherosclerotic sclerotic PVD.    #7: Carotid artery disease, noncritical asymptomatic last checkup well over a year ago.    #8: Peripheral vascular disease, including aortic and pelvic atherosclerotic disease as described by pre-TAVR TVA work-up asymptomatic    #9: Hyperlipidemia patient is on Lipitor last checked June 2021 total cholesterol 201 she did have an elevated HDL of 109 which was good LDL was therapeutic at 78 triglycerides 71 patient needs recheck.    #10: COPD, as per PFTs from 2018 was moderately severe the patient is feeling better clinically she is actually reduced her Combivent inhaler from 4 times a day to more like twice a day.  When I brought up the issue of repeating the study she was actually interested for her own knowledge to see if its improved.  It may well have improved with the release of the likely elevated  pressures in the LV from the aortic stenosis.  We will recheck PFTs    #11: Status post permanent pacemaker for complete heart block following TAVR procedure clinically stable post permanent pacemaker with normal function.        RELEVANT TESTING:  Transthoracic Echocardiogram 7/12/2022  Interpretation Summary  Global and regional left ventricular function is normal with an EF of 55-60%.  Left ventricular size is normal. Mild concentric wall thickening consistent  with left ventricular hypertrophy is present. Left ventricular diastolic  function is indeterminate.  The right ventricle is normal size.  Global right ventricular function is normal.  S/P TAVR with 29mm ES3 ultra valve with normal Doppler interrogation.  Trace aortic insufficiency is present.  No pericardial effusion is present.    PFTs 8/12/2022:  The FVC, FEV1, FEV1-FVC ratio and FEF 25-75% are reduced indicating airway obstruction.  The slow vital capacity is reduced.  Following administration of bronchodilators, there is no significant response.  Pulmonary function diagnosis: Severe obstructive airway disease.    CT angiogram of the chest: From April 30, 2021 was reviewed and is available in the chart giving the measurements regarding the TAVR and also documenting a peripheral vascular disease  2D echo echo: Performed in January 21 revealed ejection fraction of 55 to 60% no definitive evidence of left ventricular wall thickness being abnormal left ventricular size was described as normal there was severe biatrial enlargement calculated valve area was 0.7 cm  by telemetry 0.82 square V-max across the valve was 4.1 in the high area compatible with severe to critical aortic stenosis.  [facility] valve replacement team has contacted the patient and a follow-up echo is pending    Carotid ultrasound and Doppler: Previously performed more than a year and a half ago revealed bilateral carotid artery stenosis of less than 50% the velocities were  not elevated the patient is on antihyperlipidemics dated the study was April 2021..    Coronary angiogram:   Conclusion  Right sided filling pressures are normal.  Moderately elevated pulmonary artery hypertension.  Left sided filling pressures are mildly elevated.  Normal cardiac output level.  Hemodynamic data has been modified in [facility] per physician review.  Normal coronary arteries.          Past Medical History:   Diagnosis Date    Acute bronchitis 3/22/2015    Asthma     Atrial fibrillation (H)     Congestive heart failure (H)     COPD (chronic obstructive pulmonary disease) (H)     Depression     High cholesterol     HTN (hypertension)     Infection due to 2019 novel coronavirus 2/4/2021    Oxygen dependent     Thyroid disease        Past Surgical History:   Procedure Laterality Date    BACK SURGERY  2006    CARDIAC SURGERY  06/12/2018    Angiogram at [facility]    CHOLECYSTECTOMY      COLONOSCOPY N/A 01/19/2016    Procedure: COLONOSCOPY;  Surgeon: Waldemar Bob MD;  Location: [facility] OR    CV CORONARY ANGIOGRAM N/A 04/30/2021    Procedure: CV CORONARY ANGIOGRAM;  Surgeon: Poli Walsh MD;  Location:  HEART CARDIAC CATH LAB    CV LEFT HEART CATH N/A 04/30/2021    Procedure: CV LEFT HEART CATH;  Surgeon: Poli Walsh MD;  Location:  HEART CARDIAC CATH LAB    CV RIGHT HEART CATH MEASUREMENTS RECORDED N/A 04/30/2021    Procedure: CV RIGHT HEART CATH;  Surgeon: Poli Walsh MD;  Location:  HEART CARDIAC CATH LAB    CV TRANSCATHETER AORTIC VALVE REPLACEMENT N/A 07/14/2021    Procedure: Right femoral Transcaval transcatheter aortic valve replacement (SUMMERS) size 29mm.  Transesophageal echocardiogram per anesthesia;  Surgeon: Domo Sarah MD;  Location:  OR    ENT SURGERY  July 14 2022    Cancer on nose    EP PPM INSERT OF NEW OR REPL W/VENT LEAD N/A 07/14/2021    Procedure: insertion of Permanent Pacemaker;  Surgeon: Eliezer Myers MD;  Location:  OR     HEAD & NECK SURGERY      Carotid artery on left side    HEART CATH FEMORAL CANNULIZATION WITH OPEN STANDBY REPAIR AORTIC VALVE N/A 07/14/2021    Procedure: Cardiopulmonary standby.;  Surgeon: Fly Smith MD;  Location: [facility] OR    HYSTERECTOMY  1980    partial    SLING BLADDER SUSPENSION WITH FASCIA LINNETTE         Allergies   Allergen Reactions    Amlodipine Besylate Cough     Norvasc    Lisinopril Cough    Ace Inhibitors Cough       Current Outpatient Medications   Medication Sig Dispense Refill    acetaminophen (TYLENOL) 500 MG tablet Take 500-1,000 mg by mouth every 6 hours as needed for mild pain      albuterol (PROAIR HFA/PROVENTIL HFA/VENTOLIN HFA) 108 (90 Base) MCG/ACT inhaler INHALE 2 PUFFS BY MOUTH EVERY 6 HOURS 18 g 3    ammonium lactate (AMLACTIN) 12 % external cream Apply topically 2 times daily. 385 g 3    amoxicillin (AMOXIL) 500 MG capsule Take 4 capsules (2,000 mg) by mouth once as needed (SBE prophylaxis take 30-60 minutes prior to dental procedure/cleaning) 4 capsule 3    atorvastatin (LIPITOR) 10 MG tablet TAKE 1 TABLET BY MOUTH EVERY DAY 90 tablet 3    Calcium Carb-Cholecalciferol (CALTRATE 600+D3 SOFT PO) Take 600 mg by mouth 2 times daily      diphenhydrAMINE (BENADRYL) 25 MG tablet Take 1-2 tablets (25-50 mg) by mouth every 6 hours as needed for itching or allergies 60 tablet 1    ELIQUIS ANTICOAGULANT 5 MG tablet TAKE 1 TABLET BY MOUTH TWICE A  tablet 3    Fluticasone-Umeclidin-Vilanterol (TRELEGY ELLIPTA) 200-62.5-25 MCG/ACT oral inhaler Inhale 1 puff into the lungs daily. 180 each 3    hydrOXYzine HCl (ATARAX) 25 MG tablet Take 1 tablet (25 mg) by mouth 3 times daily as needed for itching 60 tablet 2    levothyroxine (SYNTHROID/LEVOTHROID) 88 MCG tablet TAKE 1 TABLET (88 MCG) BY MOUTH DAILY. 90 tablet 3    metoprolol succinate ER (TOPROL XL) 100 MG 24 hr tablet Take 1 tablet (100 mg) by mouth daily. 90 tablet 3    potassium chloride trinity ER (KLOR-CON M10) 10 MEQ CR  tablet TAKE 1 TABLET (10 MEQ) BY MOUTH DAILY. 90 tablet 2    saccharomyces boulardii (FLORASTOR) 250 MG capsule Take 1 capsule (250 mg) by mouth 2 times daily 28 capsule 0    spironolactone (ALDACTONE) 25 MG tablet Take 0.5 tablets (12.5 mg) by mouth daily. 45 tablet 3    torsemide (DEMADEX) 20 MG tablet Take 2 tablets (40 mg) by mouth daily. 180 tablet 3       Social History     Socioeconomic History    Marital status:      Spouse name: Not on file    Number of children: Not on file    Years of education: Not on file    Highest education level: Not on file   Occupational History     Employer: RETIRED     Comment: disabled   Tobacco Use    Smoking status: Former     Current packs/day: 0.00     Average packs/day: 0.5 packs/day for 41.1 years (20.5 ttl pk-yrs)     Types: Cigarettes     Start date: 1966     Quit date: 2007     Years since quittin.4    Smokeless tobacco: Never   Vaping Use    Vaping status: Never Used   Substance and Sexual Activity    Alcohol use: No    Drug use: No    Sexual activity: Never     Comment:    Other Topics Concern     Service No    Blood Transfusions Yes     Comment: Permits if needed    Caffeine Concern Yes     Comment: 2 cups coffee daily    Occupational Exposure Not Asked    Hobby Hazards Not Asked    Sleep Concern Not Asked    Stress Concern Not Asked    Weight Concern Not Asked    Special Diet Not Asked    Back Care Not Asked    Exercise Not Asked    Bike Helmet Not Asked    Seat Belt Yes    Self-Exams Not Asked    Parent/sibling w/ CABG, MI or angioplasty before 65F 55M? Yes     Comment: Pascual Mora   Social History Narrative    Not on file     Social Drivers of Health     Financial Resource Strain: Low Risk  (1/10/2024)    Financial Resource Strain     Within the past 12 months, have you or your family members you live with been unable to get utilities (heat, electricity) when it was really needed?: No   Food Insecurity: Low Risk  (1/10/2024)     Food Insecurity     Within the past 12 months, did you worry that your food would run out before you got money to buy more?: No     Within the past 12 months, did the food you bought just not last and you didn t have money to get more?: No   Transportation Needs: Low Risk  (1/10/2024)    Transportation Needs     Within the past 12 months, has lack of transportation kept you from medical appointments, getting your medicines, non-medical meetings or appointments, work, or from getting things that you need?: No   Physical Activity: Not on file   Stress: Not on file   Social Connections: Not on file   Interpersonal Safety: Low Risk  (3/6/2025)    Interpersonal Safety     Do you feel physically and emotionally safe where you currently live?: Yes     Within the past 12 months, have you been hit, slapped, kicked or otherwise physically hurt by someone?: No     Within the past 12 months, have you been humiliated or emotionally abused in other ways by your partner or ex-partner?: No   Housing Stability: Low Risk  (1/10/2024)    Housing Stability     Do you have housing? : Yes     Are you worried about losing your housing?: No       LAB RESULTS:   Results for orders placed or performed in visit on 07/10/25   EKG 12-lead complete w/read - (Clinic Performed)     Status: None (Preliminary result)   Result Value Ref Range    Systolic Blood Pressure  mmHg    Diastolic Blood Pressure  mmHg    Ventricular Rate 103 BPM    Atrial Rate  BPM    NE Interval  ms    QRS Duration 126 ms     ms    QTc 440 ms    P Axis  degrees    R AXIS -4 degrees    T Axis -43 degrees    Interpretation ECG       Atrial fibrillation with rapid ventricular response  Right bundle branch block  T wave abnormality, consider inferolateral ischemia  Abnormal ECG  When compared with ECG of 05-May-2025 14:18,  No significant change was found         Review of systems: Negative except that which was noted in the HPI.    Physical examination:  BP (!) 166/83  "(BP Location: Right arm, Patient Position: Sitting, Cuff Size: Adult Regular)   Pulse 87   Resp 16   Ht 1.626 m (5' 4\")   Wt 61 kg (134 lb 6.4 oz)   SpO2 95%   BMI 23.07 kg/m      CHEST: lungs clear to auscultation - no rales, rhonchi or wheezes, no use of accessory muscles, no retractions, respirations are unlabored, normal respiratory rate  CARDIOVASCULAR: Irregularly irregular rhythm, normal rate. normal S1 with physiologic split S2, no S3 or S4 and no murmur, click or rub  EXTREMITIES: +1 Bilateral lower extremity edema.  NEURO: alert and oriented normal speech, and affect  VASC: No carotid bruits heard.    Thank you for allowing me to participate in the care of your patient. Please do not hesitate to contact me if you have any questions.       Julisa Villegas, CNP  "

## 2025-07-10 NOTE — PATIENT INSTRUCTIONS
Thank you for allowing Julisa Villegas CNP and our  team to participate in your care. Please call our office at [phone] with scheduling questions or if you need to cancel or change your appointment. With any other questions or concerns you may call cardiology nurse at  [phone].       If you experience chest pain, chest pressure, chest tightness, shortness of breath, fainting, lightheadedness, nausea, vomiting, or other concerning symptoms, please report to the Emergency Department or call 911. These symptoms may be emergent, and best treated in the Emergency Department.

## 2025-07-21 ENCOUNTER — OFFICE VISIT (OUTPATIENT)
Dept: PODIATRY | Facility: OTHER | Age: 75
End: 2025-07-21
Attending: PODIATRIST
Payer: MEDICARE

## 2025-07-21 VITALS
SYSTOLIC BLOOD PRESSURE: 154 MMHG | HEART RATE: 94 BPM | OXYGEN SATURATION: 97 % | RESPIRATION RATE: 18 BRPM | TEMPERATURE: 97.5 F | DIASTOLIC BLOOD PRESSURE: 99 MMHG

## 2025-07-21 DIAGNOSIS — L60.3 ONYCHODYSTROPHY: Primary | ICD-10-CM

## 2025-07-21 DIAGNOSIS — Z79.01 LONG TERM CURRENT USE OF ANTICOAGULANT THERAPY: ICD-10-CM

## 2025-07-21 PROCEDURE — G0463 HOSPITAL OUTPT CLINIC VISIT: HCPCS

## 2025-07-21 PROCEDURE — 11721 DEBRIDE NAIL 6 OR MORE: CPT | Performed by: PODIATRIST

## 2025-07-21 ASSESSMENT — PAIN SCALES - GENERAL: PAINLEVEL_OUTOF10: NO PAIN (0)

## 2025-07-21 NOTE — PROGRESS NOTES
Chief complaint: Patient presents with:  toenails      History of Present Illness: This 75 year old female is seen for follow-up management of elongated, thickened toenails. She has difficulty trimming her toenails.    She uses Cera Ve on her skin on her feet.    She takes Torsemide which controls the swelling in her lower extremities. She is not wearing compression socks this summer because it is too hot to wear them.    She is on Eliquis for a-fib.     She previously had burning, tingling, and numbness in her feet, but this has continued to be controlled.    No further pedal complaints today.       BP (!) 154/99   Pulse 94   Temp 97.5  F (36.4  C)   Resp 18   SpO2 97%      Patient Active Problem List   Diagnosis    Permanent atrial fibrillation (H)    Pulmonary emphysema (H)    Bicuspid aortic valve    Mild major depression (H)    Hypothyroidism, postablative    Advance care planning    Essential hypertension    Long-term (current) use of anticoagulants [Z79.01]    Acute on chronic respiratory failure (H)    Status post coronary angiogram    Coronary artery disease involving native coronary artery of native heart without angina pectoris    Acute cystitis without hematuria    Small bowel obstruction (H)    Acute renal failure    Hypokalemia    Aortic aneurysm    Aortic valve disorder    Mitral valve disorder    Cardiomegaly    Carotid stenosis    Depressive disorder    Edema    COPD (chronic obstructive pulmonary disease) (H)    Other ill-defined heart diseases    Aortic valve stenosis, etiology of cardiac valve disease unspecified    Aortic stenosis, severe    Status post transcatheter aortic valve replacement (TAVR) using bioprosthesis    Postoperative complete heart block (H)    Cardiac pacemaker in situ       Past Surgical History:   Procedure Laterality Date    BACK SURGERY  2006    CARDIAC SURGERY  06/12/2018    Angiogram at [facility]    CHOLECYSTECTOMY      COLONOSCOPY N/A 01/19/2016     Procedure: COLONOSCOPY;  Surgeon: Waldemar Bob MD;  Location: [facility]    CV CORONARY ANGIOGRAM N/A 04/30/2021    Procedure: CV CORONARY ANGIOGRAM;  Surgeon: Poli Walsh MD;  Location:  HE[facility]ARDI[facility] CATH LAB    CV LEFT HEART CATH N/A 04/30/2021    Procedure: CV LEFT HEART CATH;  Surgeon: Poli Walsh MD;  Location:  HE[facility] CARD[facility] CATH LAB    CV RIGHT HEART CATH MEASUREMENTS RECORDED N/A 04/30/2021    Procedure: CV RIGHT HEART CATH;  Surgeon: Poli Walsh MD;  Location:  HEART CARDIAC CATH LAB    CV TRANSCATHETER AORTIC VALVE REPLACEMENT N/A 07/14/2021    Procedure: Right femoral Transcaval transcatheter aortic valve replacement (SUMMERS) size 29mm.  Transesophageal echocardiogram per anesthesia;  Surgeon: Domo Sarah MD;  Location: [facility]    ENT SURGERY  July 14 2022    Cancer on nose    EP PPM INSERT OF NEW OR REPL W/VENT LEAD N/A 07/14/2021    Procedure: insertion of Permanent Pacemaker;  Surgeon: Eliezer Myers MD;  Location: [facility]    HEAD & NECK SURGERY      Carotid artery on left side    HEART CATH FEMORAL CANNULIZATION WITH OPEN STANDBY REPAIR AORTIC VALVE N/A 07/14/2021    Procedure: Cardiopulmonary standby.;  Surgeon: Fly Smith MD;  Location:  OR    HYSTERECTOMY  1980    partial    SLING BLADDER SUSPENSION WITH FASCIA LINNETTE         Current Outpatient Medications   Medication Sig Dispense Refill    acetaminophen (TYLENOL) 500 MG tablet Take 500-1,000 mg by mouth every 6 hours as needed for mild pain      albuterol (PROAIR HFA/PROVENTIL HFA/VENTOLIN HFA) 108 (90 Base) MCG/ACT inhaler INHALE 2 PUFFS BY MOUTH EVERY 6 HOURS 18 g 3    ammonium lactate (AMLACTIN) 12 % external cream Apply topically 2 times daily. 385 g 3    amoxicillin (AMOXIL) 500 MG capsule Take 4 capsules (2,000 mg) by mouth once as needed (SBE prophylaxis take 30-60 minutes prior to dental procedure/cleaning) 4 capsule 3    atorvastatin (LIPITOR) 10 MG tablet TAKE 1 TABLET  BY MOUTH EVERY DAY 90 tablet 3    Calcium Carb-Cholecalciferol (CALTRATE 600+D3 SOFT PO) Take 600 mg by mouth 2 times daily      diphenhydrAMINE (BENADRYL) 25 MG tablet Take 1-2 tablets (25-50 mg) by mouth every 6 hours as needed for itching or allergies 60 tablet 1    ELIQUIS ANTICOAGULANT 5 MG tablet TAKE 1 TABLET BY MOUTH TWICE A  tablet 3    Fluticasone-Umeclidin-Vilanterol (TRELEGY ELLIPTA) 200-62.5-25 MCG/ACT oral inhaler Inhale 1 puff into the lungs daily. 180 each 3    hydrOXYzine HCl (ATARAX) 25 MG tablet Take 1 tablet (25 mg) by mouth 3 times daily as needed for itching 60 tablet 2    levothyroxine (SYNTHROID/LEVOTHROID) 88 MCG tablet TAKE 1 TABLET (88 MCG) BY MOUTH DAILY. 90 tablet 3    metoprolol succinate ER (TOPROL XL) 100 MG 24 hr tablet Take 1 tablet (100 mg) by mouth daily. 90 tablet 3    potassium chloride trinity ER (KLOR-CON M10) 10 MEQ CR tablet TAKE 1 TABLET (10 MEQ) BY MOUTH DAILY. 90 tablet 2    saccharomyces boulardii (FLORASTOR) 250 MG capsule Take 1 capsule (250 mg) by mouth 2 times daily 28 capsule 0    spironolactone (ALDACTONE) 25 MG tablet Take 0.5 tablets (12.5 mg) by mouth daily. 45 tablet 3    torsemide (DEMADEX) 20 MG tablet Take 2 tablets (40 mg) by mouth daily. 180 tablet 3     No current facility-administered medications for this visit.          Allergies   Allergen Reactions    Amlodipine Besylate Cough     Norvasc    Lisinopril Cough    Ace Inhibitors Cough       Family History   Problem Relation Age of Onset    Ovarian Cancer Mother 72    Asthma Mother     Cancer Mother         ovarian    Hypertension Mother     Diabetes Mother     Other Cancer Mother     Depression Mother     Osteoporosis Mother     Prostate Cancer Father     Hypertension Father     Heart Failure Father         CHF    Diabetes Father     Breast Cancer Sister     Diabetes Sister     Hypertension Sister     Cerebrovascular Disease Sister     Depression Sister     Hypertension Sister     Hypertension  Brother     Asthma Brother     Asthma Brother     Hypertension Brother     Diabetes Brother     Hypertension Brother     Depression Son     Anxiety Disorder Son     Asthma Son     Osteoporosis Son     Hypertension Sister     Hypertension Brother     Asthma Brother        Social History     Socioeconomic History    Marital status:      Spouse name: None    Number of children: None    Years of education: None    Highest education level: None   Occupational History     Employer: RETIRED     Comment: disabled   Tobacco Use    Smoking status: Former Smoker     Packs/day: 0.50     Years: 41.00     Pack years: 20.50     Types: Cigarettes     Start date: 1/1/1966     Quit date: 1/28/2007     Years since quitting: 15.4    Smokeless tobacco: Never Used   Substance and Sexual Activity    Alcohol use: No     Alcohol/week: 0.0 standard drinks    Drug use: No    Sexual activity: Never     Comment:    Other Topics Concern     Service No    Blood Transfusions Yes     Comment: Permits if needed    Caffeine Concern Yes     Comment: 2 cups coffee daily    Seat Belt Yes    Parent/sibling w/ CABG, MI or angioplasty before 65F 55M? No       ROS: 10 point ROS neg other than the symptoms noted above in the HPI.  EXAM  Constitutional: healthy, alert and no distress    Psychiatric: mentation appears normal and affect normal/bright    VASCULAR:  -Dorsalis pedis pulse +2/4 b/l  -Posterior tibial pulse +1/4 b/l  -Capillary refill time < 3 seconds to b/l hallux  -Hair growth Absent to b/l anterior legs and ankles  -Varicosities and telangiectasias to foot, bilaterally   -Moderate 2+ pitting edema to bilateral foot and leg  NEURO:  -Light touch sensation intact to bilateral foot  DERM:  -Skin temperature, texture and turgor WNL b/l  -Skin diffusely dry and flaking to bilateral foot and leg  -Toenails elongated, thickened, dystrophic and discolored x 10  MSK:  -Prominent bony prominence to dorsal and medial 1st metatarsal head,  bilaterally   -Moderate decrease in arch height while patient is NWB, bilaterally     -Muscle strength of ankles +5/5 for dorsiflexion, plantarflexion, ABDUction and ADDuction b/l  -DORSIFLEXION ROM limited to 90 degrees on the bilateral ankle    ============================================================    ASSESSMENT:  (L60.3) Onychodystrophy  (primary encounter diagnosis)    (Z79.01) Long-term (current) use of anticoagulants [Z79.01]      PLAN:  -Patient evaluated and examined. Treatment options discussed with no educational barriers noted.    -Toenail debridement x 10 toenails without incident including reduction in height of the toenails.    -Foot Education provided. This included checking the feet daily looking for new new blisters or wounds, wearing shoes at all times when walking including around the house, and avoiding lotion application between the toes. If there are any signs of infection, the patient should present to the ED as soon as possible. Infections of the foot can be life threatening or lead to amputations of the foot or leg.  -She is on Eliquis which increases her risks of bleeding with cuts on her feet. She understands the importance of checking her feet daily.     -Patient in agreement with the above treatment plan and all of patient's questions were answered.      RTC 63+ days for toenail debridement        Alessandra Wilkins DPM

## 2025-08-17 ENCOUNTER — MYC REFILL (OUTPATIENT)
Dept: FAMILY MEDICINE | Facility: OTHER | Age: 75
End: 2025-08-17

## 2025-08-17 DIAGNOSIS — J44.1 COPD EXACERBATION (H): ICD-10-CM

## 2025-08-18 RX ORDER — ALBUTEROL SULFATE 90 UG/1
2 INHALANT RESPIRATORY (INHALATION) EVERY 6 HOURS
Qty: 18 G | Refills: 0 | Status: SHIPPED | OUTPATIENT
Start: 2025-08-18

## (undated) DEVICE — DRSG PRIMAPORE 02X3" 7133

## (undated) DEVICE — KIT MICRO-INTRODUCER STIFFEN 4FR 7274V

## (undated) DEVICE — Device

## (undated) DEVICE — KIT HAND CONTROL ANGIOTOUCH ACIST 65CM AT-P65

## (undated) DEVICE — DRSG TEGADERM 4X4 3/4" 1626W

## (undated) DEVICE — ESU GROUND PAD ADULT REM W/15' CORD E7507DB

## (undated) DEVICE — SHTH INTRO 0.021IN ID 6FR DIA

## (undated) DEVICE — SUCTION MANIFOLD NEPTUNE 2 SYS 4 PORT 0702-020-000

## (undated) DEVICE — WIRE GUIDE 0.035"X150CM EMERALD STR 502542

## (undated) DEVICE — INTRO SHEATH 7FRX10CM PINNACLE RSS702

## (undated) DEVICE — SPONGE RAY-TEC 4X8" 7318

## (undated) DEVICE — WIRE GUIDE 0.035"X150CM EMERALD J TIP 502521

## (undated) DEVICE — DRAPE STERI FLUOROSCOPE 35X43"1012 LATEX FREE

## (undated) DEVICE — TUBING PRESSURE 30"

## (undated) DEVICE — VALVE HEMOSTASIS .096" COPILOT MECH 1003331

## (undated) DEVICE — SLITTER ADJSTBL 6232ADJ

## (undated) DEVICE — 7FR X 13CM SAFESHEATH II TEAR-AWAY VALVED SHEATH SYSTEM, WITH SIDE PORT, 0.038IN GUIDEWIRE, 19.5CM DILATOR

## (undated) DEVICE — SLEEVE TR BAND RADIAL COMPRESSION DEVICE 24CM TRB24-REG

## (undated) DEVICE — SYSTEM LOADING AND DELIVERY FOR EDWARDS VALVE 29MM 9600LDS29

## (undated) DEVICE — CATH EP PACEL 5FRX110CM 1MM RIGHT HEART CURVE 401763

## (undated) DEVICE — COVER ULTRASOUND PROBE W/GEL FLEXI-FEEL 6"X58" LF  25-FF658

## (undated) DEVICE — CABLE PACING ALLIGATOR CLIP 12FT 5833SL

## (undated) DEVICE — SU ETHIBOND 0 CT-2 30" X412H

## (undated) DEVICE — SYR 50ML LL W/O NDL 309653

## (undated) DEVICE — DRAPE IOBAN INCISE 23X17" 6650EZ

## (undated) DEVICE — SET INTRODUCER SHEATH 16FR 916ES

## (undated) DEVICE — SYR LOCKING ATRION QL38

## (undated) DEVICE — BOWL STERILE 32OZ DYND50320

## (undated) DEVICE — CLOSURE DEVICE 6FR VASC PROGLIDE MEDICATED SUTURE 12673-03

## (undated) DEVICE — TUBING SUCTION 10'X3/16" N510

## (undated) DEVICE — ADH SKIN CLOSURE PREMIERPRO EXOFIN 1.0ML 3470

## (undated) DEVICE — INTRO SHEATH 7FRX25CM PINNACLE RSS706

## (undated) DEVICE — PACK HEART LEFT CUSTOM

## (undated) DEVICE — LINEN TOWEL PACK X30 5481

## (undated) DEVICE — PREP CHLORAPREP 26ML TINTED ORANGE  260815

## (undated) DEVICE — CATH TRAY FOLEY SURESTEP 16FR W/URINE MTR STATLK LF A303416A

## (undated) DEVICE — BASIN EMESIS STERILE  SSK9005A

## (undated) DEVICE — 0.035IN X 260CM, EMERALD DIAGNOSTIC GUIDEWIRE, FIXED-CORE PTFE COATED, STANDARD, 3MM EXCHANGE J-TIP (EA/1)

## (undated) DEVICE — LIGHT HANDLE X1 31140133

## (undated) DEVICE — GUIDEWIRE VASC SAFARI2 0.035X275CM H74939406XS1

## (undated) DEVICE — DRSG TEGADERM 8X12" 1629

## (undated) DEVICE — ESU PENCIL W/COATED BLADE E2450H

## (undated) DEVICE — KIT ACCESSORY INTRO INFLATION SYS 20/30 PRIORITY 1000186-115

## (undated) DEVICE — KIT HAND CONTROL ACIST 014644 AR-P54

## (undated) DEVICE — CONNECTOR STOPCOCK 3 WAY MALE LL HI-FLO MX9311L

## (undated) DEVICE — RAD KNIFE HANDLE W/11 BLADE DISPOSABLE 371611

## (undated) DEVICE — WIRE GUIDE LUNDERQUIST 0.035"X260CM DBL CVD

## (undated) DEVICE — INTRO SHEATH 6FRX25CM PINNACLE RSS606

## (undated) DEVICE — CATH ANGIO SUPERTORQUE PLUS JR4 6FRX100CM 533621

## (undated) DEVICE — CATH GD 5.4FR ID / 7FR OD X 43

## (undated) DEVICE — GW VASC .035IN DIA 260CML 7CML 3 MM RADIUS J CURVE 502455

## (undated) DEVICE — GLOVE PROTEXIS MICRO 7.5  2D73PM75

## (undated) DEVICE — DRAPE CV SPLIT II 147X106" 9158

## (undated) DEVICE — CATH ANGIO SUPERTORQUE AL1 6FRX100CM 532-645

## (undated) DEVICE — PACK GOWN 3/PK DISP XL SBA32GPFCB

## (undated) DEVICE — DRAPE SHEET REV FOLD 3/4 9349

## (undated) DEVICE — MANIFOLD KIT ANGIO AUTOMATED 014613

## (undated) DEVICE — CATH ANGIO INFINITI PIGTAIL 145 6 SH 6FRX110CM  534-652S

## (undated) DEVICE — WIPES FOLEY CARE SURESTEP PROVON DFC100

## (undated) DEVICE — CATH NAVICROSS SUPPORT 12MMX90CM STR NC35900

## (undated) DEVICE — DEFIB PADPRO CONMED ADULT/CHILD 2001Z-C

## (undated) DEVICE — SYR 10ML LL W/O NDL 302995

## (undated) DEVICE — FASTENER CATH BALLOON CLAMPX2 STATLOCK 0684-00-493

## (undated) DEVICE — SYR ANGIOGRAPHY MULTIUSE KIT ACIST 014612

## (undated) DEVICE — CONNECTOR STOPCOCK 3-WAY HIGH PRESSURE (NAMIC) H965700550091

## (undated) RX ORDER — ONDANSETRON 2 MG/ML
INJECTION INTRAMUSCULAR; INTRAVENOUS
Status: DISPENSED
Start: 2021-07-14

## (undated) RX ORDER — PROTAMINE SULFATE 10 MG/ML
INJECTION, SOLUTION INTRAVENOUS
Status: DISPENSED
Start: 2021-07-14

## (undated) RX ORDER — LIDOCAINE HYDROCHLORIDE 10 MG/ML
INJECTION, SOLUTION EPIDURAL; INFILTRATION; INTRACAUDAL; PERINEURAL
Status: DISPENSED
Start: 2021-07-14

## (undated) RX ORDER — CEFAZOLIN SODIUM 1 G/3ML
INJECTION, POWDER, FOR SOLUTION INTRAMUSCULAR; INTRAVENOUS
Status: DISPENSED
Start: 2021-07-14

## (undated) RX ORDER — NICARDIPINE HCL-0.9% SOD CHLOR 1 MG/10 ML
SYRINGE (ML) INTRAVENOUS
Status: DISPENSED
Start: 2021-04-30

## (undated) RX ORDER — HEPARIN SODIUM 1000 [USP'U]/ML
INJECTION, SOLUTION INTRAVENOUS; SUBCUTANEOUS
Status: DISPENSED
Start: 2021-07-14

## (undated) RX ORDER — BUPIVACAINE HYDROCHLORIDE 5 MG/ML
INJECTION, SOLUTION EPIDURAL; INTRACAUDAL
Status: DISPENSED
Start: 2021-07-14

## (undated) RX ORDER — HYDROMORPHONE HYDROCHLORIDE 1 MG/ML
INJECTION, SOLUTION INTRAMUSCULAR; INTRAVENOUS; SUBCUTANEOUS
Status: DISPENSED
Start: 2021-07-14

## (undated) RX ORDER — FENTANYL CITRATE 50 UG/ML
INJECTION, SOLUTION INTRAMUSCULAR; INTRAVENOUS
Status: DISPENSED
Start: 2021-07-14

## (undated) RX ORDER — HEPARIN SODIUM 1000 [USP'U]/ML
INJECTION, SOLUTION INTRAVENOUS; SUBCUTANEOUS
Status: DISPENSED
Start: 2021-04-30

## (undated) RX ORDER — BUPIVACAINE HYDROCHLORIDE 2.5 MG/ML
INJECTION, SOLUTION EPIDURAL; INFILTRATION; INTRACAUDAL
Status: DISPENSED
Start: 2021-07-14

## (undated) RX ORDER — FENTANYL CITRATE 50 UG/ML
INJECTION, SOLUTION INTRAMUSCULAR; INTRAVENOUS
Status: DISPENSED
Start: 2021-04-30

## (undated) RX ORDER — SODIUM CHLORIDE 9 MG/ML
INJECTION, SOLUTION INTRAVENOUS
Status: DISPENSED
Start: 2021-04-30

## (undated) RX ORDER — PROPOFOL 10 MG/ML
INJECTION, EMULSION INTRAVENOUS
Status: DISPENSED
Start: 2021-07-14

## (undated) RX ORDER — CLOPIDOGREL BISULFATE 75 MG/1
TABLET ORAL
Status: DISPENSED
Start: 2021-07-14

## (undated) RX ORDER — DEXAMETHASONE SODIUM PHOSPHATE 4 MG/ML
INJECTION, SOLUTION INTRA-ARTICULAR; INTRALESIONAL; INTRAMUSCULAR; INTRAVENOUS; SOFT TISSUE
Status: DISPENSED
Start: 2021-07-14

## (undated) RX ORDER — LIDOCAINE HYDROCHLORIDE 20 MG/ML
INJECTION, SOLUTION EPIDURAL; INFILTRATION; INTRACAUDAL; PERINEURAL
Status: DISPENSED
Start: 2021-07-14

## (undated) RX ORDER — NITROGLYCERIN 5 MG/ML
VIAL (ML) INTRAVENOUS
Status: DISPENSED
Start: 2021-04-30

## (undated) RX ORDER — ASPIRIN 325 MG
TABLET ORAL
Status: DISPENSED
Start: 2021-07-14

## (undated) RX ORDER — CEFAZOLIN SODIUM 2 G/100ML
INJECTION, SOLUTION INTRAVENOUS
Status: DISPENSED
Start: 2021-07-14